# Patient Record
Sex: MALE | Race: WHITE | NOT HISPANIC OR LATINO | Employment: OTHER | URBAN - NONMETROPOLITAN AREA
[De-identification: names, ages, dates, MRNs, and addresses within clinical notes are randomized per-mention and may not be internally consistent; named-entity substitution may affect disease eponyms.]

---

## 2017-01-04 ENCOUNTER — OFFICE VISIT (OUTPATIENT)
Dept: CHIROPRACTIC MEDICINE | Facility: OTHER | Age: 57
End: 2017-01-04
Attending: CHIROPRACTOR
Payer: COMMERCIAL

## 2017-01-04 DIAGNOSIS — M99.02 SEGMENTAL AND SOMATIC DYSFUNCTION OF THORACIC REGION: ICD-10-CM

## 2017-01-04 DIAGNOSIS — M99.01 SEGMENTAL AND SOMATIC DYSFUNCTION OF CERVICAL REGION: Primary | ICD-10-CM

## 2017-01-04 DIAGNOSIS — M99.03 SEGMENTAL AND SOMATIC DYSFUNCTION OF LUMBAR REGION: ICD-10-CM

## 2017-01-04 DIAGNOSIS — M54.2 CERVICALGIA: ICD-10-CM

## 2017-01-04 PROCEDURE — 98941 CHIROPRACT MANJ 3-4 REGIONS: CPT | Performed by: CHIROPRACTOR

## 2017-01-09 NOTE — PROGRESS NOTES

## 2017-01-10 DIAGNOSIS — M50.30 DDD (DEGENERATIVE DISC DISEASE), CERVICAL: Primary | ICD-10-CM

## 2017-01-11 DIAGNOSIS — N40.0 BPH (BENIGN PROSTATIC HYPERPLASIA): Primary | ICD-10-CM
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
## 2017-01-13 DIAGNOSIS — M50.30 DDD (DEGENERATIVE DISC DISEASE), CERVICAL: Primary | ICD-10-CM

## 2017-01-13 RX ORDER — FENTANYL 75 UG/1
PATCH TRANSDERMAL
Qty: 11 PATCH | Refills: 0 | Status: SHIPPED | OUTPATIENT
Start: 2017-01-13 | End: 2017-02-01

## 2017-01-13 RX ORDER — FENTANYL 75 UG/1
PATCH TRANSDERMAL
Qty: 11 PATCH | Refills: 0 | OUTPATIENT
Start: 2017-01-13

## 2017-01-13 RX ORDER — TAMSULOSIN HYDROCHLORIDE 0.4 MG/1
CAPSULE ORAL
Qty: 30 CAPSULE | Refills: 9 | Status: SHIPPED | OUTPATIENT
Start: 2017-01-13 | End: 2017-05-10

## 2017-01-13 NOTE — TELEPHONE ENCOUNTER
Last office visit 11/02/16.  Fentanyl last filled 12/16/16 #11. Medication pended. PCP Dr. Fisher.

## 2017-01-23 DIAGNOSIS — H91.93 DECREASED HEARING, BILATERAL: Primary | ICD-10-CM

## 2017-01-24 DIAGNOSIS — Z00.00 HEALTH CARE MAINTENANCE: Primary | ICD-10-CM

## 2017-01-25 RX ORDER — MV,CAL,MIN/IRON/FOLIC ACID/LUT 18-500-300
TABLET ORAL
Qty: 90 TABLET | Refills: 3 | Status: SHIPPED | OUTPATIENT
Start: 2017-01-25 | End: 2017-05-25

## 2017-02-01 DIAGNOSIS — M50.30 DDD (DEGENERATIVE DISC DISEASE), CERVICAL: Primary | ICD-10-CM

## 2017-02-02 RX ORDER — FENTANYL 75 UG/1
PATCH TRANSDERMAL
Qty: 12 PATCH | Refills: 0 | Status: SHIPPED | OUTPATIENT
Start: 2017-02-02 | End: 2017-02-22

## 2017-02-02 NOTE — TELEPHONE ENCOUNTER
Fentanyl 75mcg/hr 72hr patch      Last Written Prescription Date:  12-  Last Fill Quantity: 11 patches,   # refills: 0  Last Office Visit with [facility], [facility] or [facility] prescribing provider: 1-4-2017  Future Office visit:    Next 5 appointments (look out 90 days)     Feb 02, 2017  2:40 PM   Return Visit with Shamar Escamilla DC   [facility] ([facility])    [address]  [facility] [address]   [phone]            Feb 14, 2017  4:00 PM   (Arrive by 3:45 PM)   Return Visit with Laquita Fernandez MD   [facility] [place] ([facility])    [address]  [facility] [id number] [phone]                   Routing refill request to provider for review/approval because:  Drug not on the [facility], [facility] or [facility] refill protocol or controlled substance

## 2017-02-09 DIAGNOSIS — Z87.39 HX OF DEGENERATIVE DISC DISEASE: Primary | ICD-10-CM

## 2017-02-13 RX ORDER — HYDROCODONE BITARTRATE AND ACETAMINOPHEN 10; 325 MG/1; MG/1
TABLET ORAL
Qty: 80 TABLET | Refills: 0 | Status: SHIPPED | OUTPATIENT
Start: 2017-02-13 | End: 2017-03-15

## 2017-02-14 ENCOUNTER — OFFICE VISIT (OUTPATIENT)
Dept: PSYCHIATRY | Facility: OTHER | Age: 57
End: 2017-02-14
Attending: PSYCHIATRY & NEUROLOGY
Payer: COMMERCIAL

## 2017-02-14 VITALS
WEIGHT: 233 LBS | BODY MASS INDEX: 33.36 KG/M2 | HEIGHT: 70 IN | DIASTOLIC BLOOD PRESSURE: 78 MMHG | SYSTOLIC BLOOD PRESSURE: 124 MMHG | HEART RATE: 75 BPM | TEMPERATURE: 97.4 F

## 2017-02-14 DIAGNOSIS — F41.1 GAD (GENERALIZED ANXIETY DISORDER): ICD-10-CM

## 2017-02-14 DIAGNOSIS — F98.8 ATTENTION DEFICIT DISORDER: ICD-10-CM

## 2017-02-14 PROCEDURE — 99212 OFFICE O/P EST SF 10 MIN: CPT

## 2017-02-14 PROCEDURE — 99214 OFFICE O/P EST MOD 30 MIN: CPT | Performed by: PSYCHIATRY & NEUROLOGY

## 2017-02-14 RX ORDER — LISDEXAMFETAMINE DIMESYLATE 40 MG/1
40 CAPSULE ORAL EVERY MORNING
Qty: 30 CAPSULE | Refills: 0 | Status: SHIPPED | OUTPATIENT
Start: 2017-03-14 | End: 2017-04-17 | Stop reason: DRUGHIGH

## 2017-02-14 RX ORDER — DIAZEPAM 5 MG
TABLET ORAL
Qty: 60 TABLET | Refills: 3 | Status: SHIPPED | OUTPATIENT
Start: 2017-03-14 | End: 2017-06-27

## 2017-02-14 RX ORDER — LISDEXAMFETAMINE DIMESYLATE 40 MG/1
40 CAPSULE ORAL EVERY MORNING
Qty: 30 CAPSULE | Refills: 0 | Status: SHIPPED | OUTPATIENT
Start: 2017-02-14 | End: 2017-04-17 | Stop reason: DRUGHIGH

## 2017-02-14 RX ORDER — METHYLPHENIDATE HYDROCHLORIDE 20 MG/1
TABLET ORAL
Qty: 90 TABLET | Refills: 0 | Status: SHIPPED | OUTPATIENT
Start: 2017-02-14 | End: 2017-02-14

## 2017-02-14 RX ORDER — METHYLPHENIDATE HYDROCHLORIDE 20 MG/1
TABLET ORAL
Qty: 90 TABLET | Refills: 0 | Status: SHIPPED | OUTPATIENT
Start: 2017-03-14 | End: 2017-02-14

## 2017-02-14 RX ORDER — METHYLPHENIDATE HYDROCHLORIDE 20 MG/1
TABLET ORAL
Qty: 60 TABLET | Refills: 0 | Status: SHIPPED | OUTPATIENT
Start: 2017-04-11 | End: 2017-02-14

## 2017-02-14 ASSESSMENT — PATIENT HEALTH QUESTIONNAIRE - PHQ9: 5. POOR APPETITE OR OVEREATING: NOT AT ALL

## 2017-02-14 ASSESSMENT — ANXIETY QUESTIONNAIRES
IF YOU CHECKED OFF ANY PROBLEMS ON THIS QUESTIONNAIRE, HOW DIFFICULT HAVE THESE PROBLEMS MADE IT FOR YOU TO DO YOUR WORK, TAKE CARE OF THINGS AT HOME, OR GET ALONG WITH OTHER PEOPLE: EXTREMELY DIFFICULT
7. FEELING AFRAID AS IF SOMETHING AWFUL MIGHT HAPPEN: SEVERAL DAYS
6. BECOMING EASILY ANNOYED OR IRRITABLE: SEVERAL DAYS
1. FEELING NERVOUS, ANXIOUS, OR ON EDGE: NOT AT ALL
5. BEING SO RESTLESS THAT IT IS HARD TO SIT STILL: NOT AT ALL
2. NOT BEING ABLE TO STOP OR CONTROL WORRYING: SEVERAL DAYS
GAD7 TOTAL SCORE: 3
3. WORRYING TOO MUCH ABOUT DIFFERENT THINGS: NOT AT ALL

## 2017-02-14 ASSESSMENT — PAIN SCALES - GENERAL: PAINLEVEL: MODERATE PAIN (5)

## 2017-02-14 NOTE — PROGRESS NOTES
"  PSYCHIATRY CLINIC PROGRESS NOTE   20 minute medication management, more than 50% of time spent counseling patient on medications, medication side effects, symptom history and management   SUBJECTIVE / INTERIM HISTORY                                                                       Social- Was  twice. Lives alone with his dog Montserrat and 3 cats. Has a GF in [place]  Children-  2 kids, they are in [place]  Subjective: last visit Novemeber: Continue Ritalin 40 mg am and 20 mg afternoon : last one for 11/1 and 12/1, and wrote one for 12/30/16 and gave him today .   Continue Trileptal 300 mg bid, Valium 5 mg every 12 hours prn anxiety, nortriptyline 50 mg bid.   - doing chiropractic and this is helping  - thinking would like to try a dfiferent med for aDHD  - Valium helped with anxiety and helped with the pain. Does NOT want to take any more as \"I don't want to be sleeping all the time\"  - Lots of family issues: Joshua has taken care of his parents and yet parents give his other brothers everything. oldest of 3 brothers. Brother in [place] youngest [place] and Major is here. Mom and dad here [place]. Joshua noting moving here to be closer to parents and help them, etc. But, parents don't seem to want him around much or talk to him.  SUBSTANCE USE- denies abuse of meds or substances    SYMPTOMS- issues with attention and concentration improved with Ritalin.  racing thoughts, depressed mood, impulsivity, distractibility  MEDICAL ROS-  Pain though notes improved lately, denies any issues with headaches or stomach pain / issues with stimulant  MEDICAL / SURGICAL HISTORY                     Patient Active Problem List   Diagnosis     Diabetes mellitus, type 2 (H)     Mixed hyperlipidemia     Tobacco Abuse, History of     Degeneration of lumbar or lumbosacral intervertebral disc     Depression, major     Chronic pain disorder     Chemical dependency (H)     Chronic rhinitis     Tinnitus of both ears     ETD (eustachian tube " dysfunction)     SNHL (sensorineural hearing loss)     Back pain     Obesity     Bipolar disorder (H)     Type 2 diabetes, HbA1c goal < 7% (H)     Seizure-like activity (H)     Bilateral foot pain     Preop general physical exam     Trigger index finger of right hand     Moderate persistent asthma without complication     Chronic lower back pain     ACP (advance care planning)     Onychia of toe of left foot     DDD (degenerative disc disease), lumbar     ALLERGY   Amoxicillin trihydrate and Clavulanic acid potassium  MEDICATIONS                                                                                             Current Outpatient Prescriptions   Medication Sig     HYDROcodone-acetaminophen (NORCO)  MG per tablet TAKE 1 TABLET BY MOUTH EVERY 8 HOURS AS NEEDED FOR MODERATE TO SEVEREPAIN     omeprazole (PRILOSEC) 20 MG CR capsule TAKE 1 CAPSULE BY MOUTH EVERY DAY BEFORE A MEAL     traMADol (ULTRAM) 50 MG tablet TAKE 1 TABLET BY MOUTH EVERY 6 HOURS AS NEEDED FOR MODERATE PAIN     fentaNYL (DURAGESIC) 75 mcg/hr 72 hr patch APPLY 1 PATCH EVERY 48 HOURS     multivitamin  with iron (SM COMPLETE ADVANCED FORMULA) TABS TAKE 1 TABLET BY MOUTH DAILY     tamsulosin (FLOMAX) 0.4 MG capsule TAKE 1 CAPSULE BY MOUTH DAILY     baclofen (LIORESAL) 10 MG tablet TAKE 1 TABLET BY MOUTH THREE TIMES DAILY     ibuprofen (ADVIL/MOTRIN) 600 MG tablet TAKE 1 TABLET BY MOUTH EVERY 6 HOURS AS NEEDED     SM STOOL SOFTENER/LAXATIVE 8.6-50 MG per tablet TAKE 1 OR 2 TABLETS BY MOUTH 2 TIMES A DAY     diazepam (VALIUM) 5 MG tablet TAKE 1 TABLET BY MOUTH EVERY 12 HOURS AS NEEDED FOR ANXIETY OR SLEEP SPASM     methylphenidate (RITALIN) 20 MG tablet Take 2 tabs in am and 1 tab afternoon     nortriptyline (PAMELOR) 50 MG capsule Take 1 capsule (50 mg) by mouth 3 times daily     gabapentin (NEURONTIN) 300 MG capsule TAKE 3 CAPSULES BY MOUTH THREE TIMES DAILY     DULERA 100-5 MCG/ACT oral inhaler INHALE 2 PUFFS INTO THE LUNGS 2 TIMES A DAY      VENTOLIN  (90 BASE) MCG/ACT inhaler USE 2 PUFFS 4 TIMES A DAY AS NEEDED FOR SHORTNESS OF BREATH     lidocaine (LIDODERM) 5 % patch PLACE 3 PATCHES ONTO THE SKIN DAILY AS NEEDED FOR MODERATE PAIN     OXcarbazepine (TRILEPTAL) 300 MG tablet TAKE 1 TABLET BY MOUTH 2 TIMES DAILY     SM CHILDRENS ASPIRIN 81 MG chewable tablet CHEW AND SWALLOW 1 TABLET BY MOUTH DAILY     fluticasone (FLONASE) 50 MCG/ACT nasal spray USE 2 SPRAYS IN EACH NOSTRIL DAILY     propranolol (INDERAL) 20 MG tablet TAKE 1 TABLET BY MOUTH TWICE DAILY     nicotine polacrilex (NICORETTE) 2 MG gum CHEW 1 PIECE AS NEEDED FOR SMOKING CESSATION     traZODone (DESYREL) 50 MG tablet TAKE 1 TABLET BY MOUTH NIGHTLY AS NEEDED FOR SLEEP     NON-ASPIRIN PAIN RELIEF 325 MG tablet TAKE 2 TABLETS BY MOUTH EVERY 4 HOURS AS NEEDED FOR MILD PAIN     rosuvastatin (CRESTOR) 10 MG tablet Take 1 tablet (10 mg) by mouth daily     diclofenac (VOLTAREN) 50 MG EC tablet TAKE 1 TABLET BY MOUTH 3 TIMES DAILY     order for DME Equipment being ordered: Boa Back brace     acetaminophen (TYLENOL) 325 MG tablet Take 2 tablets (650 mg) by mouth every 4 hours as needed for other (mild pain)     blood glucose monitoring (ONE TOUCH DELICA) lancets USE TO TEST BLOOD SUGARS 3 TIMES A DAY OR AS DIRECTED     blood glucose (ONE TOUCH DELICA) lancing device Use to test blood sugars 3 times daily or as directed.     blood glucose (ONE TOUCH ULTRA) test strip Use to test blood sugars 3 times daily or as directed.     blood glucose monitoring (ONE TOUCH ULTRA 2) meter device kit Use to test blood sugars 3 times daily or as directed.     ORDER FOR DME Equipment being ordered: Large gloves     [DISCONTINUED] mometasone-formoterol (DULERA) 100-5 MCG/ACT oral inhaler Inhale 2 puffs into the lungs 2 times daily     No current facility-administered medications for this visit.        VITALS   /78 (BP Location: Right arm, Patient Position: Chair, Cuff Size: Adult Regular)  Pulse 75  Temp  "97.4  F (36.3  C) (Tympanic)  Ht 5' 10\" (1.778 m)  Wt 233 lb (105.7 kg)  BMI 33.43 kg/m2     LABS                                                                                                                         EKG 2016 with 376 ms (on nortriptyline)  Last Basic Metabolic Panel:  NA      138   6/9/2016   POTASSIUM      4.7   6/9/2016  CHLORIDE      104   6/9/2016  SANDRITA      9.2   6/9/2016  CO2       29   6/9/2016  BUN       25   6/9/2016  CR     0.76   6/9/2016  GLC      173   6/9/2016    Liver Function Studies -   Recent Labs   Lab Test  02/18/15   1536   PROTTOTAL  7.5   ALBUMIN  4.6   BILITOTAL  0.4   ALKPHOS  82   AST  18   ALT  32      MENTAL STATUS EXAM                                                                                        Alert. Oriented to person, place, and date / time. Well groomed, calm, cooperative with good eye contact. No problems with speech or psychomotor behavior. Mood was described as \"doing okay\" and affect was congruent to speech content and full range. Thought process, including associations, was unremarkable and thought content was devoid of suicidal and homicidal ideation and psychotic thought. No hallucinations. Insight was good. Judgment was intact and adequate for safety. Fund of knowledge was intact. Pt demonstrates no obvious problems with attention, concentration, language, recent or remote memory although these were not formally tested.     ASSESSMENT                                                                                                      HISTORICAL:  Initial psych note 10/6/15          NOTES:      This patient is a 56 year old who was diagnosed with bipolar d/o in past in context of one episode in which he admits he was drinking heavily, depressed and made some statements that he feels were taken the wrong way. From what he reports, I can't gather enough criteria to make diagnosis of bipolar d/o and I have it listed as a rule out. I do agree that he " has ADD and I'm comfortable with prescribing med for this: today we agreed on d/c Ritalin and we will try vyvanse instead as once daily and sounds like he thinks ritalin efficacy faded. He does struggle with depression and seems to be largely related to his chronic pain issues. He is on Trileptal of which from what I can tell his for his hx of seizures - discussed it is also used as a mood stabilizer. Also discussed today with Trent that stimulants can lower the seizure threshold however I feel more comfortable since he is on the Trileptal. We started Valium in past several months as dual purpose: muscle relaxant properties and for anxiety. Per Trent has helped sificantly. Trent and I have discussed the risks that go along with combination of medications he is on: on several medications with CNS depressant effect and thus he needs to be very careful and NOT use alcohol or any other type sedating meds/substances as there are risks including respiratory depression. Valium is 5 mg bid prn and I would not feel comfortable any higher dose given the other medications he is on with CNS depressant potential. Joshua is on a lot of controlled substances thus I reviewed the [place]  and no other prescriptions aside from those his PCP and I prescribe. He is on nortriptyline which antidepressant and also can help with pain.     No med changes today    TREATMENT RISK STATEMENT:  The risks, benefits, alternatives and potential adverse effects have been explained and are understood by the pt.  The pt agrees to the treatment plan with the ability to do so.   The pt knows to call the clinic for any problems or access emergency care if needed.        DIAGNOSES                 (Use of Axes system will continue, even though absent from DSM 5)         Axis I   - ADD               MDD, recurrent, mild to mod                 Rule out bipolar disorder  Axis II  - no dx  Axis III - chronic pain (s/p back injuries and surgeries)  Axis IV-   Psychosocial Stressors include: financial, lack of support  Axis V - Global Assessment of Functioning current: 45    PLAN                                                                                                                    1)  MEDICATIONS:         --  D/c methylphendiate and we will try Vyvanse 40 mg take one daily script today 2/14 and one script for 3/14. Continue Trileptal 300 mg bid, Valium 5 mg every 12 hours prn anxiety, nortriptyline 50 mg bid.     2)  THERAPY:  No change    3)  LABS:  None    4)  PT MONITOR [call for probs]:  SEs from meds, worsening sx, SI/HI    5)  REFERRALS [CD, medical, other]:  None    6)  RTC:   ~2 months

## 2017-02-14 NOTE — NURSING NOTE
"Chief Complaint   Patient presents with     RECHECK     Mental health.  Patient reports doing alright, getting more forgetful.       Initial /78 (BP Location: Right arm, Patient Position: Chair, Cuff Size: Adult Regular)  Pulse 75  Temp 97.4  F (36.3  C) (Tympanic)  Ht 5' 10\" (1.778 m)  Wt 233 lb (105.7 kg)  BMI 33.43 kg/m2 Estimated body mass index is 33.43 kg/(m^2) as calculated from the following:    Height as of this encounter: 5' 10\" (1.778 m).    Weight as of this encounter: 233 lb (105.7 kg).  Medication Reconciliation: complete     JACQUELYN SUAREZ      "

## 2017-02-15 ENCOUNTER — TELEPHONE (OUTPATIENT)
Dept: PEDIATRICS | Facility: OTHER | Age: 57
End: 2017-02-15

## 2017-02-15 DIAGNOSIS — M54.50 MIDLINE LOW BACK PAIN WITHOUT SCIATICA: ICD-10-CM

## 2017-02-15 RX ORDER — ACETAMINOPHEN 325 MG/1
TABLET ORAL
Qty: 200 TABLET | Refills: 1 | Status: SHIPPED | OUTPATIENT
Start: 2017-02-15 | End: 2017-07-11

## 2017-02-15 ASSESSMENT — ANXIETY QUESTIONNAIRES: GAD7 TOTAL SCORE: 3

## 2017-02-15 ASSESSMENT — PATIENT HEALTH QUESTIONNAIRE - PHQ9: SUM OF ALL RESPONSES TO PHQ QUESTIONS 1-9: 5

## 2017-02-15 NOTE — TELEPHONE ENCOUNTER
Reason for call:  Medication    1. Medication Name? Tylenol  2. Is this request for a refill? Yes  3. What Pharmacy do you use? [facility]  4. Have you contacted your pharmacy? Yes    5. If yes, when?  (Please note that the turn-around-time for prescriptions is 72 business hours; I am sending your request at this time. SEND TO  [facility]  )  Description: Pt stated he has requested med several times since Sept but has not gotten this one filled. Please call him back at [phone]  Was an appointment offered for this a call? No   Preferred method for responding to this messageTelephone Call  If we cannot reach you directly, may we leave a detailed response at the number you provided? Yes  Can this message wait until your PCP/Provider returns if not available today? Not applicable

## 2017-02-22 DIAGNOSIS — M50.30 DDD (DEGENERATIVE DISC DISEASE), CERVICAL: ICD-10-CM

## 2017-02-22 RX ORDER — FENTANYL 75 UG/1
PATCH TRANSDERMAL
Qty: 12 PATCH | Refills: 0 | Status: SHIPPED | OUTPATIENT
Start: 2017-02-25 | End: 2017-03-15

## 2017-03-01 ENCOUNTER — OFFICE VISIT (OUTPATIENT)
Dept: AUDIOLOGY | Facility: OTHER | Age: 57
End: 2017-03-01
Attending: INTERNAL MEDICINE
Payer: COMMERCIAL

## 2017-03-01 DIAGNOSIS — H90.3 SENSORINEURAL HEARING LOSS, BILATERAL: Primary | ICD-10-CM

## 2017-03-01 PROCEDURE — 92552 PURE TONE AUDIOMETRY AIR: CPT | Performed by: AUDIOLOGIST

## 2017-03-01 PROCEDURE — 92567 TYMPANOMETRY: CPT | Performed by: AUDIOLOGIST

## 2017-03-01 PROCEDURE — V5266 BATTERY FOR HEARING DEVICE: HCPCS | Performed by: AUDIOLOGIST

## 2017-03-01 NOTE — MR AVS SNAPSHOT
"              After Visit Summary   3/1/2017    Joshua Barron    MRN: [id number]           Patient Information     Date Of Birth          1960        Visit Information        Provider Department      3/1/2017 2:00 PM Ciara Valladares [facility]        Today's Diagnoses     Sensorineural hearing loss, bilateral    -  1       Follow-ups after your visit        Your next 10 appointments already scheduled     Mar 01, 2017  2:00 PM CST   (Arrive by 1:45 PM)   Hearing Eval with Kevin Moreno   [facility] [place] ([facility] [place] [facility])    [address]  [place] [address]   [phone]            Apr 17, 2017  3:20 PM CDT   (Arrive by 3:05 PM)   Return Visit with Laquita Fernandez MD   [facility] [place] ([facility] [place] [place] [facility])    [address] [place] [address]  [place] [id number] [phone]              Who to contact     If you have questions or need follow up information about today's clinic visit or your schedule please contact [facility] directly at [phone].  Normal or non-critical lab and imaging results will be communicated to you by [employer], letter or phone within 4 business days after the clinic has received the results. If you do not hear from us within 7 days, please contact the clinic through [employer] or phone. If you have a critical or abnormal lab result, we will notify you by phone as soon as possible.  Submit refill requests through [employer] or call your pharmacy and they will forward the refill request to us. Please allow 3 business days for your refill to be completed.          Additional Information About Your Visit        MyChart Information     [employer] lets you send messages to your doctor, view your test results, renew your prescriptions, schedule appointments and more. To sign up, go to [email or web address] . Click on \"Log in\" on the left side of the screen, which will take you to the Welcome page. Then click on \"Sign up Now\" on the right " side of the page.     You will be asked to enter the access code listed below, as well as some personal information. Please follow the directions to create your username and password.     Your access code is: [email or web address]  Expires: 5/15/2017  5:32 PM     Your access code will  in 90 days. If you need help or a new code, please call your [place] clinic or [phone].        Care EveryWhere ID     This is your Care EveryWhere ID. This could be used by other organizations to access your [place] medical records  [id number]         Blood Pressure from Last 3 Encounters:   17 124/78   16 106/68   16 122/70    Weight from Last 3 Encounters:   17 233 lb (105.7 kg)   16 230 lb (104.3 kg)   16 230 lb (104.3 kg)              We Performed the Following     AUDIOGRAM/TYMPANOGRAM - INTERFACE        Primary Care Provider Office Phone # Fax #    Lyle Juan Fisher [phone]       [facility] [place] [address] [facility]  [place] [address]        Thank you!     Thank you for choosing [facility] [place]  for your care. Our goal is always to provide you with excellent care. Hearing back from our patients is one way we can continue to improve our services. Please take a few minutes to complete the written survey that you may receive in the mail after your visit with us. Thank you!             Your Updated Medication List - Protect others around you: Learn how to safely use, store and throw away your medicines at www.disposemymeds.org.          This list is accurate as of: 3/1/17  1:59 PM.  Always use your most recent med list.                   Brand Name Dispense Instructions for use    * acetaminophen 325 MG tablet    TYLENOL     Take 2 tablets (650 mg) by mouth every 4 hours as needed for other (mild pain)       * acetaminophen 325 MG tablet    NON-ASPIRIN PAIN RELIEF    200 tablet    TAKE 2 TABLETS BY MOUTH EVERY 4 HOURS AS NEEDED FOR MILD PAIN       *  baclofen 10 MG tablet    LIORESAL    90 tablet    TAKE 1 TABLET BY MOUTH THREE TIMES DAILY       * baclofen 10 MG tablet    LIORESAL    90 tablet    TAKE 1 TABLET BY MOUTH THREE TIMES DAILY       blood glucose lancing device     1 each    Use to test blood sugars 3 times daily or as directed.       blood glucose monitoring lancets     100 Box    USE TO TEST BLOOD SUGARS 3 TIMES A DAY OR AS DIRECTED       blood glucose monitoring meter device kit     1 kit    Use to test blood sugars 3 times daily or as directed.       blood glucose monitoring test strip    ONE TOUCH ULTRA    300 strip    Use to test blood sugars 3 times daily or as directed.       diazepam 5 MG tablet   Start taking on:  3/14/2017    VALIUM    60 tablet    TAKE 1 TABLET BY MOUTH EVERY 12 HOURS AS NEEDED FOR ANXIETY OR SLEEP SPASM       diclofenac 50 MG EC tablet    VOLTAREN    90 tablet    TAKE 1 TABLET BY MOUTH 3 TIMES DAILY       DULERA 100-5 MCG/ACT oral inhaler   Generic drug:  mometasone-formoterol     13 Inhaler    INHALE 2 PUFFS INTO THE LUNGS 2 TIMES A DAY       fentaNYL 75 mcg/hr 72 hr patch    DURAGESIC    12 patch    APPLY 1 PATCH EVERY 48 HOURS       fluticasone 50 MCG/ACT spray    FLONASE    16 g    USE 2 SPRAYS IN EACH NOSTRIL DAILY       gabapentin 300 MG capsule    NEURONTIN    270 capsule    TAKE 3 CAPSULES BY MOUTH THREE TIMES DAILY       HYDROcodone-acetaminophen  MG per tablet    NORCO    80 tablet    TAKE 1 TABLET BY MOUTH EVERY 8 HOURS AS NEEDED FOR MODERATE TO SEVEREPAIN       ibuprofen 600 MG tablet    ADVIL/MOTRIN    120 tablet    TAKE 1 TABLET BY MOUTH EVERY 6 HOURS AS NEEDED       lidocaine 5 % Patch    LIDODERM    90 patch    PLACE 3 PATCHES ONTO THE SKIN DAILY AS NEEDED FOR MODERATE PAIN       * lisdexamfetamine 40 MG capsule    VYVANSE    30 capsule    Take 1 capsule (40 mg) by mouth every morning       * lisdexamfetamine 40 MG capsule   Start taking on:  3/14/2017    VYVANSE    30 capsule    Take 1 capsule (40  mg) by mouth every morning       multivitamin  with iron Tabs     90 tablet    TAKE 1 TABLET BY MOUTH DAILY       nicotine polacrilex 2 MG gum    NICORETTE    110 each    CHEW 1 PIECE AS NEEDED FOR SMOKING CESSATION       nortriptyline 50 MG capsule    PAMELOR    90 capsule    Take 1 capsule (50 mg) by mouth 3 times daily       omeprazole 20 MG CR capsule    priLOSEC    30 capsule    TAKE 1 CAPSULE BY MOUTH EVERY DAY BEFORE A MEAL       * order for DME     2 Box    Equipment being ordered: Large gloves       * order for DME     1 Units    Equipment being ordered: Boa Back brace       OXcarbazepine 300 MG tablet    TRILEPTAL    60 tablet    TAKE 1 TABLET BY MOUTH 2 TIMES DAILY       propranolol 20 MG tablet    INDERAL    60 tablet    TAKE 1 TABLET BY MOUTH TWICE DAILY       rosuvastatin 10 MG tablet    CRESTOR    90 tablet    Take 1 tablet (10 mg) by mouth daily       SM CHILDRENS ASPIRIN 81 MG chewable tablet   Generic drug:  aspirin     30 tablet    CHEW AND SWALLOW 1 TABLET BY MOUTH DAILY       SM STOOL SOFTENER/LAXATIVE 8.6-50 MG per tablet   Generic drug:  senna-docusate     120 tablet    TAKE 1 OR 2 TABLETS BY MOUTH 2 TIMES A DAY       tamsulosin 0.4 MG capsule    FLOMAX    30 capsule    TAKE 1 CAPSULE BY MOUTH DAILY       traMADol 50 MG tablet    ULTRAM    120 tablet    TAKE 1 TABLET BY MOUTH EVERY 6 HOURS AS NEEDED FOR MODERATE PAIN       traZODone 50 MG tablet    DESYREL    30 tablet    TAKE 1 TABLET BY MOUTH NIGHTLY AS NEEDED FOR SLEEP       VENTOLIN  (90 BASE) MCG/ACT Inhaler   Generic drug:  albuterol     18 g    USE 2 PUFFS 4 TIMES A DAY AS NEEDED FOR SHORTNESS OF BREATH       * Notice:  This list has 8 medication(s) that are the same as other medications prescribed for you. Read the directions carefully, and ask your doctor or other care provider to review them with you.

## 2017-03-01 NOTE — PROGRESS NOTES
Audiology Evaluation Completed.    Hearing aid check in warranty complted. Also 32 cells given today under [facility] CONTRACT GUIDELINES.     Please refer SCANNED AUDIOGRAM and/or TYMPANOGRAM for BACKGROUND, RESULTS, RECOMMENDATIONS.        Ciara WHEAT, [facility]  Audiologist #[id number]

## 2017-03-02 DIAGNOSIS — F31.0 BIPOLAR AFFECTIVE DISORDER, CURRENT EPISODE HYPOMANIC (H): ICD-10-CM

## 2017-03-03 RX ORDER — OXCARBAZEPINE 300 MG/1
TABLET, FILM COATED ORAL
Qty: 60 TABLET | Refills: 1 | Status: SHIPPED | OUTPATIENT
Start: 2017-03-03 | End: 2017-05-26

## 2017-03-10 DIAGNOSIS — Z87.39 HX OF DEGENERATIVE DISC DISEASE: ICD-10-CM

## 2017-03-13 RX ORDER — IBUPROFEN 600 MG/1
TABLET, FILM COATED ORAL
Qty: 120 TABLET | Refills: 0 | Status: SHIPPED | OUTPATIENT
Start: 2017-03-13 | End: 2017-05-04

## 2017-03-14 ENCOUNTER — OFFICE VISIT (OUTPATIENT)
Dept: CHIROPRACTIC MEDICINE | Facility: OTHER | Age: 57
End: 2017-03-14
Attending: CHIROPRACTOR
Payer: COMMERCIAL

## 2017-03-14 DIAGNOSIS — M99.03 SEGMENTAL AND SOMATIC DYSFUNCTION OF LUMBAR REGION: ICD-10-CM

## 2017-03-14 DIAGNOSIS — M99.01 SEGMENTAL AND SOMATIC DYSFUNCTION OF CERVICAL REGION: Primary | ICD-10-CM

## 2017-03-14 DIAGNOSIS — M54.2 CERVICALGIA: ICD-10-CM

## 2017-03-14 DIAGNOSIS — M99.02 SEGMENTAL AND SOMATIC DYSFUNCTION OF THORACIC REGION: ICD-10-CM

## 2017-03-14 PROCEDURE — 98941 CHIROPRACT MANJ 3-4 REGIONS: CPT | Performed by: CHIROPRACTOR

## 2017-03-14 NOTE — MR AVS SNAPSHOT
After Visit Summary   3/14/2017    Joshua Barron    MRN: [id number]           Patient Information     Date Of Birth          1960        Visit Information        Provider Department      3/14/2017 11:10 AM Shamar Escamilla DC  [facility] Hamm        Today's Diagnoses     Segmental and somatic dysfunction of cervical region    -  1    Cervicalgia        Segmental and somatic dysfunction of lumbar region        Segmental and somatic dysfunction of thoracic region           Follow-ups after your visit        Your next 10 appointments already scheduled     Apr 17, 2017  3:20 PM CDT   (Arrive by 3:05 PM)   Return Visit with Laquita Fernandez MD   [facility] ([facility])    [address] [place], [place] [address] [id number] [phone]            Mar 07, 2018  2:45 PM CST   (Arrive by 2:30 PM)   Hearing Eval with Kevin Moreno   [facility] ([facility])    [address]  [facility] [address]   [phone]              Who to contact     If you have questions or need follow up information about today's clinic visit or your schedule please contact  [facility] directly at [phone].  Normal or non-critical lab and imaging results will be communicated to you by MyChart, letter or phone within 4 business days after the clinic has received the results. If you do not hear from us within 7 days, please contact the clinic through MyChart or phone. If you have a critical or abnormal lab result, we will notify you by phone as soon as possible.  Submit refill requests through [employer] or call your pharmacy and they will forward the refill request to us. Please allow 3 business days for your refill to be completed.          Additional Information About Your Visit        MyChart Information     [employer] lets you send messages to your doctor, view your test results, renew your prescriptions, schedule appointments and more. To sign up, go to  [email or web address] . Click on \"Log in\" on the left side of the screen, which will take you to the Welcome page. Then click on \"Sign up Now\" on the right side of the page.     You will be asked to enter the access code listed below, as well as some personal information. Please follow the directions to create your username and password.     Your access code is: [email or web address]  Expires: 5/15/2017  6:32 PM     Your access code will  in 90 days. If you need help or a new code, please call your [place] clinic or [phone].        Care EveryWhere ID     This is your Care EveryWhere ID. This could be used by other organizations to access your [place] medical records  [id number]         Blood Pressure from Last 3 Encounters:   17 124/78   16 106/68   16 122/70    Weight from Last 3 Encounters:   17 233 lb (105.7 kg)   16 230 lb (104.3 kg)   16 230 lb (104.3 kg)              We Performed the Following     CHIROPRAC MANIP,SPINAL,3-4 REGIONS        Primary Care Provider Office Phone # Fax #    Lyle Martinez DO Natalia [phone]       [facility] [place] [address]  [place] [address]        Thank you!     Thank you for choosing  [facility] [place] [facility]  for your care. Our goal is always to provide you with excellent care. Hearing back from our patients is one way we can continue to improve our services. Please take a few minutes to complete the written survey that you may receive in the mail after your visit with us. Thank you!             Your Updated Medication List - Protect others around you: Learn how to safely use, store and throw away your medicines at www.disposemymeds.org.          This list is accurate as of: 3/14/17 11:59 PM.  Always use your most recent med list.                   Brand Name Dispense Instructions for use    * acetaminophen 325 MG tablet    TYLENOL     Take 2 tablets (650 mg) by mouth every 4 hours as needed for other " (mild pain)       * acetaminophen 325 MG tablet    NON-ASPIRIN PAIN RELIEF    200 tablet    TAKE 2 TABLETS BY MOUTH EVERY 4 HOURS AS NEEDED FOR MILD PAIN       * baclofen 10 MG tablet    LIORESAL    90 tablet    TAKE 1 TABLET BY MOUTH THREE TIMES DAILY       * baclofen 10 MG tablet    LIORESAL    90 tablet    TAKE 1 TABLET BY MOUTH THREE TIMES DAILY       blood glucose lancing device     1 each    Use to test blood sugars 3 times daily or as directed.       blood glucose monitoring lancets     100 Box    USE TO TEST BLOOD SUGARS 3 TIMES A DAY OR AS DIRECTED       blood glucose monitoring meter device kit     1 kit    Use to test blood sugars 3 times daily or as directed.       blood glucose monitoring test strip    ONE TOUCH ULTRA    300 strip    Use to test blood sugars 3 times daily or as directed.       diazepam 5 MG tablet    VALIUM    60 tablet    TAKE 1 TABLET BY MOUTH EVERY 12 HOURS AS NEEDED FOR ANXIETY OR SLEEP SPASM       diclofenac 50 MG EC tablet    VOLTAREN    90 tablet    TAKE 1 TABLET BY MOUTH 3 TIMES DAILY       DULERA 100-5 MCG/ACT oral inhaler   Generic drug:  mometasone-formoterol     13 Inhaler    INHALE 2 PUFFS INTO THE LUNGS 2 TIMES A DAY       fentaNYL 75 mcg/hr 72 hr patch    DURAGESIC    12 patch    APPLY 1 PATCH EVERY 48 HOURS       fluticasone 50 MCG/ACT spray    FLONASE    16 g    USE 2 SPRAYS IN EACH NOSTRIL DAILY       gabapentin 300 MG capsule    NEURONTIN    270 capsule    TAKE 3 CAPSULES BY MOUTH THREE TIMES DAILY       ibuprofen 600 MG tablet    ADVIL/MOTRIN    120 tablet    TAKE 1 TABLET BY MOUTH EVERY 6 HOURS AS NEEDED       lidocaine 5 % Patch    LIDODERM    90 patch    PLACE 3 PATCHES ONTO THE SKIN DAILY AS NEEDED FOR MODERATE PAIN       * lisdexamfetamine 40 MG capsule    VYVANSE    30 capsule    Take 1 capsule (40 mg) by mouth every morning       * lisdexamfetamine 40 MG capsule    VYVANSE    30 capsule    Take 1 capsule (40 mg) by mouth every morning       multivitamin  with  iron Tabs     90 tablet    TAKE 1 TABLET BY MOUTH DAILY       nicotine polacrilex 2 MG gum    NICORETTE    110 each    CHEW 1 PIECE AS NEEDED FOR SMOKING CESSATION       nortriptyline 50 MG capsule    PAMELOR    90 capsule    Take 1 capsule (50 mg) by mouth 3 times daily       omeprazole 20 MG CR capsule    priLOSEC    30 capsule    TAKE 1 CAPSULE BY MOUTH EVERY DAY BEFORE A MEAL       * order for DME     2 Box    Equipment being ordered: Large gloves       * order for DME     1 Units    Equipment being ordered: Boa Back brace       OXcarbazepine 300 MG tablet    TRILEPTAL    60 tablet    TAKE 1 TABLET BY MOUTH 2 TIMES DAILY       propranolol 20 MG tablet    INDERAL    60 tablet    TAKE 1 TABLET BY MOUTH TWICE DAILY       rosuvastatin 10 MG tablet    CRESTOR    90 tablet    Take 1 tablet (10 mg) by mouth daily       SM CHILDRENS ASPIRIN 81 MG chewable tablet   Generic drug:  aspirin     30 tablet    CHEW AND SWALLOW 1 TABLET BY MOUTH DAILY       SM STOOL SOFTENER/LAXATIVE 8.6-50 MG per tablet   Generic drug:  senna-docusate     120 tablet    TAKE 1 OR 2 TABLETS BY MOUTH 2 TIMES A DAY       tamsulosin 0.4 MG capsule    FLOMAX    30 capsule    TAKE 1 CAPSULE BY MOUTH DAILY       traMADol 50 MG tablet    ULTRAM    120 tablet    TAKE 1 TABLET BY MOUTH EVERY 6 HOURS AS NEEDED FOR MODERATE PAIN       traZODone 50 MG tablet    DESYREL    30 tablet    TAKE 1 TABLET BY MOUTH NIGHTLY AS NEEDED FOR SLEEP       VENTOLIN  (90 BASE) MCG/ACT Inhaler   Generic drug:  albuterol     18 g    USE 2 PUFFS 4 TIMES A DAY AS NEEDED FOR SHORTNESS OF BREATH       * Notice:  This list has 8 medication(s) that are the same as other medications prescribed for you. Read the directions carefully, and ask your doctor or other care provider to review them with you.

## 2017-03-15 DIAGNOSIS — M50.30 DDD (DEGENERATIVE DISC DISEASE), CERVICAL: ICD-10-CM

## 2017-03-15 DIAGNOSIS — Z87.39 HX OF DEGENERATIVE DISC DISEASE: ICD-10-CM

## 2017-03-15 RX ORDER — HYDROCODONE BITARTRATE AND ACETAMINOPHEN 10; 325 MG/1; MG/1
TABLET ORAL
Qty: 80 TABLET | Refills: 0 | Status: SHIPPED | OUTPATIENT
Start: 2017-03-15 | End: 2017-04-05

## 2017-03-15 NOTE — PROGRESS NOTES
Subjective Finding:    Chief compalint: No chief complaint on file.  , Pain Scale: 4/10, Intensity: dull, Duration: 2 days, Radiating: no.    Date of injury:     Activities that the pain restricts:   Home/household/hobbies/social activities: yes.  Work duties: yes.  Sleep: yes.  Makes symptoms better: rest.  Makes symptoms worse: lumbar extension and lumbar flexion.  Have you seen anyone else for the symptoms? No.  Work related: no.  Automobile related injury: no.    Objective and Assessment:    Posture Analysis:   High shoulder: .  Head tilt: .  High iliac crest: .  Head carriage: neutral.  Thoracic Kyphosis: neutral.  Lumbar Lordosis: forward.    Lumbar Range of Motion: flexion decreased and extension decreased.  Cervical Range of Motion: extension decreased.  Thoracic Range of Motion: extension decreased.  Extremity Range of Motion: .    Palpation:   Quad lumb: bilateral, referred pain: no  T paraspinals: dull pain, no    Segmental dysfunction pre-treatment and treatment area: C4, T5, T6 and Sacrum.    Assessment post-treatment:  Cervical: ROM increased.  Thoracic: ROM increased.  Lumbar: ROM increased.    Comments: history of DDD in C and L spine.      Complicating Factors: .    Procedure(s):  CMT:  33033 Chiropractic manipulative treatment 3-4 regions performed   Cervical: Diversified, See above for level, Supine, Thoracic: Diversified, See above for level, Prone and Lumbar: Diversified, See above for level, Side posture    Modalities:  None performed this visit    Therapeutic procedures:  None    Plan:  Treatment plan: PRN.  Instructed patient: stretch as instructed at visit.  Short term goals: increase ROM.  Long term goals: increase ADL.  Prognosis: good.

## 2017-03-16 ENCOUNTER — TELEPHONE (OUTPATIENT)
Dept: INTERNAL MEDICINE | Facility: OTHER | Age: 57
End: 2017-03-16

## 2017-03-16 ENCOUNTER — TELEPHONE (OUTPATIENT)
Dept: PSYCHIATRY | Facility: OTHER | Age: 57
End: 2017-03-16

## 2017-03-16 RX ORDER — FENTANYL 75 UG/1
PATCH TRANSDERMAL
Qty: 11 PATCH | Refills: 0 | Status: SHIPPED | OUTPATIENT
Start: 2017-03-16 | End: 2017-03-29

## 2017-03-16 NOTE — TELEPHONE ENCOUNTER
Fentanyl      Last Written Prescription Date:  2.25.17  Last Fill Quantity: 11,   # refills: 0  Last Office Visit with [facility], [facility] or [facility] prescribing provider: 11.2.16  Future Office visit:    Next 5 appointments (look out 90 days)     Apr 17, 2017  3:20 PM CDT   (Arrive by 3:05 PM)   Return Visit with Laquita Fernandez MD   [facility]    [facility] [address] [place] [address]   [phone]                   Routing refill request to provider for review/approval because:  Drug not on the [facility], [facility] or [facility] refill protocol or controlled substance

## 2017-03-18 NOTE — TELEPHONE ENCOUNTER
NO , not refilling. I did two scripts: one 2/14/17 and one 3/14/17 so he should have one for upcoming month. I reviewed [place]  and he picked it up on 3/16/17 so he has Vyvanse. I'm not increasing..this can be discussed at his next clinic visit.    Thanks

## 2017-03-28 NOTE — TELEPHONE ENCOUNTER
Pt called and states that he had a missed call from someone a couple weeks ago and he deleted the message he is returning phone call today (03/28/17) would like a call back.

## 2017-03-29 DIAGNOSIS — Z87.39 HX OF DEGENERATIVE DISC DISEASE: Primary | ICD-10-CM

## 2017-03-29 DIAGNOSIS — M50.30 DDD (DEGENERATIVE DISC DISEASE), CERVICAL: ICD-10-CM

## 2017-03-29 DIAGNOSIS — M54.50 LUMBAGO: ICD-10-CM

## 2017-03-29 DIAGNOSIS — G63 POLYNEUROPATHY ASSOCIATED WITH UNDERLYING DISEASE (H): ICD-10-CM

## 2017-03-29 NOTE — TELEPHONE ENCOUNTER
Fentanyl 75mcg      Last Written Prescription Date:  2/25/17  Last Fill Quantity: 11,   # refills: 0  Last Office Visit with [facility], [facility] or [facility] prescribing provider: 11/2/16  Future Office visit:    Next 5 appointments (look out 90 days)     Apr 17, 2017  3:20 PM CDT   (Arrive by 3:05 PM)   Return Visit with Laquita Fernandez MD   [facility] [place] ([facility] [place] [facility])    [address]  [place] [address]   [phone]                   Routing refill request to provider for review/approval because:  Drug not on the [facility], [facility] or [facility] refill protocol or controlled substance    Gabapentin 300mg      Last Written Prescription Date: 2/22/17  Last Quantity: 270, # refills: 0  Last Office Visit with [facility], [facility] or [facility] prescribing provider: 11/2/16    Diclofenac sod ec 50mg      Last Written Prescription Date: 3/2/17  Last Quantity: 90, # refills: 0  Last Office Visit with [facility], [facility] or [facility] prescribing provider: 11/2/16    Baclofen 10mg      Last Written Prescription Date: 2/24/17  Last Fill Quantity: 90,  # refills: 0   Last Office Visit with [facility], [facility] or [facility] prescribing provider: 11/2/16                                         Next 5 appointments (look out 90 days)     Apr 17, 2017  3:20 PM CDT   (Arrive by 3:05 PM)   Return Visit with Laquita Fernandez MD   [facility] [place] ([facility] [place] [facility])    [address]  [place] [address] [phone]                      Next 5 appointments (look out 90 days)     Apr 17, 2017  3:20 PM CDT   (Arrive by 3:05 PM)   Return Visit with Laquita Fernandez MD   [facility] [place] ([facility] [place] [facility])    [address]  [place] [address] [phone]                   Creatinine   Date Value Ref Range Status   06/09/2016 0.76 0.66 - 1.25 mg/dL Final     Lab Results   Component Value Date    AST 19 03/17/2016     Lab Results   Component Value Date    ALT 43 03/17/2016     BP Readings from Last 3 Encounters:   02/14/17 124/78    11/22/16 106/68   11/02/16 122/70       Next 5 appointments (look out 90 days)     Apr 17, 2017  3:20 PM CDT   (Arrive by 3:05 PM)   Return Visit with Laquita Fernandez MD   [facility] ([facility]    [address] [place] [address]   [phone]                   Creatinine   Date Value Ref Range Status   06/09/2016 0.76 0.66 - 1.25 mg/dL Final     Lab Results   Component Value Date    AST 19 03/17/2016     Lab Results   Component Value Date    ALT 43 03/17/2016     BP Readings from Last 3 Encounters:   02/14/17 124/78   11/22/16 106/68   11/02/16 122/70

## 2017-03-30 NOTE — TELEPHONE ENCOUNTER
Patient contacted.  Will discuss possible increase to Vyvanse at next f/u appt..  He should have enough Vyvanse until next f/u.  He is ok with this plan.

## 2017-03-31 RX ORDER — BACLOFEN 10 MG/1
TABLET ORAL
Qty: 90 TABLET | Refills: 0 | Status: SHIPPED | OUTPATIENT
Start: 2017-03-31 | End: 2017-05-10

## 2017-03-31 RX ORDER — GABAPENTIN 300 MG/1
CAPSULE ORAL
Qty: 270 CAPSULE | Refills: 0 | Status: SHIPPED | OUTPATIENT
Start: 2017-03-31 | End: 2017-05-04

## 2017-03-31 RX ORDER — FENTANYL 75 UG/1
PATCH TRANSDERMAL
Qty: 11 PATCH | Refills: 0 | Status: SHIPPED | OUTPATIENT
Start: 2017-03-31 | End: 2017-04-20

## 2017-04-03 ENCOUNTER — TELEPHONE (OUTPATIENT)
Dept: PEDIATRICS | Facility: OTHER | Age: 57
End: 2017-04-03

## 2017-04-03 NOTE — TELEPHONE ENCOUNTER
I have it that he is currently on Advil.  He can take 600 mg three four per day or 800 mg three times per day.  He should take this with food.

## 2017-04-03 NOTE — TELEPHONE ENCOUNTER
10:49 AM    Reason for Call: Phone Call    Description: patient wants a return phone call to discuss how much advil he should be taking on top of all the other medication he is on    Was an appointment offered for this call? N/A    Preferred method for responding to this message: Telephone Call    If we cannot reach you directly, may we leave a detailed response at the number you provided? Yes    Can this message wait until your PCP/provider returns, if available today? Not applicable, provider is in today    Dodie Vergara

## 2017-04-05 DIAGNOSIS — Z87.39 HX OF DEGENERATIVE DISC DISEASE: ICD-10-CM

## 2017-04-05 RX ORDER — HYDROCODONE BITARTRATE AND ACETAMINOPHEN 10; 325 MG/1; MG/1
TABLET ORAL
Qty: 80 TABLET | Refills: 0 | Status: SHIPPED | OUTPATIENT
Start: 2017-04-13 | End: 2017-05-10

## 2017-04-05 NOTE — TELEPHONE ENCOUNTER
norco      Last Written Prescription Date: 3/15/17  Last Fill Quantity: 80,  # refills: 0   Last Office Visit with [facility], [facility] or [facility] prescribing provider: 11/12/16                                         Next 5 appointments (look out 90 days)     Apr 17, 2017  3:20 PM CDT   (Arrive by 3:05 PM)   Return Visit with Laquita Fernandez MD   [facility] ([facility])    [facility] [address] [place] [address] [phone]                    ]

## 2017-04-11 ENCOUNTER — TELEPHONE (OUTPATIENT)
Dept: INTERNAL MEDICINE | Facility: OTHER | Age: 57
End: 2017-04-11

## 2017-04-11 NOTE — TELEPHONE ENCOUNTER
Reason for call:  Medication    1. Medication Name? Hydrocodone  2. Is this request for a refill?  Yes  3. What Pharmacy do you use?   [facility]  4. Have you contacted your pharmacy?  Yes  5. If yes, when?  Today  (Please note that the turn-around-time for prescriptions is 72 business hours; I am sending your request at this time. SEND TO  [facility]  )  Description:   Joshua states that his prescription was only for 26 days but not set  for refill for 30 days. He is out of this medication.   Filled last month 15th. Not set to refill until 13th.  Was an appointment offered for this a call?  No  Preferred method for responding to this  Message :  [phone]  If we cannot reach you directly, may we leave a detailed response at the number you provided?  Yes

## 2017-04-11 NOTE — TELEPHONE ENCOUNTER
Signed by Natalia and already at [facility] but post dated for 4-13-17.  Patient is stating that the RX only lasted 26 days.  It was filled 3-15-17 #80. Please advise. Thank you

## 2017-04-12 DIAGNOSIS — Z87.39 HX OF DEGENERATIVE DISC DISEASE: ICD-10-CM

## 2017-04-12 RX ORDER — TRAMADOL HYDROCHLORIDE 50 MG/1
TABLET ORAL
Qty: 120 TABLET | Refills: 0 | Status: SHIPPED | OUTPATIENT
Start: 2017-04-12 | End: 2017-05-23 | Stop reason: ALTCHOICE

## 2017-04-17 ENCOUNTER — OFFICE VISIT (OUTPATIENT)
Dept: PSYCHIATRY | Facility: OTHER | Age: 57
End: 2017-04-17
Attending: PSYCHIATRY & NEUROLOGY
Payer: COMMERCIAL

## 2017-04-17 VITALS
SYSTOLIC BLOOD PRESSURE: 116 MMHG | WEIGHT: 224 LBS | HEIGHT: 70 IN | DIASTOLIC BLOOD PRESSURE: 72 MMHG | OXYGEN SATURATION: 98 % | RESPIRATION RATE: 18 BRPM | BODY MASS INDEX: 32.07 KG/M2 | TEMPERATURE: 97.5 F | HEART RATE: 68 BPM

## 2017-04-17 DIAGNOSIS — F90.2 ADHD (ATTENTION DEFICIT HYPERACTIVITY DISORDER), COMBINED TYPE: Primary | ICD-10-CM

## 2017-04-17 DIAGNOSIS — Z87.39 HX OF DEGENERATIVE DISC DISEASE: ICD-10-CM

## 2017-04-17 LAB
CREAT SERPL-MCNC: 0.8 MG/DL (ref 0.66–1.25)
GFR SERPL CREATININE-BSD FRML MDRD: NORMAL ML/MIN/1.7M2

## 2017-04-17 PROCEDURE — 99214 OFFICE O/P EST MOD 30 MIN: CPT | Performed by: PSYCHIATRY & NEUROLOGY

## 2017-04-17 PROCEDURE — 36415 COLL VENOUS BLD VENIPUNCTURE: CPT | Mod: 59 | Performed by: INTERNAL MEDICINE

## 2017-04-17 PROCEDURE — 82565 ASSAY OF CREATININE: CPT | Performed by: INTERNAL MEDICINE

## 2017-04-17 PROCEDURE — 99213 OFFICE O/P EST LOW 20 MIN: CPT

## 2017-04-17 RX ORDER — LISDEXAMFETAMINE DIMESYLATE 60 MG/1
60 CAPSULE ORAL EVERY MORNING
Qty: 30 CAPSULE | Refills: 0 | Status: SHIPPED | OUTPATIENT
Start: 2017-05-15 | End: 2017-04-17

## 2017-04-17 RX ORDER — LIDOCAINE 50 MG/G
3 PATCH TOPICAL
COMMUNITY
End: 2017-05-10

## 2017-04-17 RX ORDER — HYDROCODONE BITARTRATE AND ACETAMINOPHEN 10; 325 MG/1; MG/1
TABLET ORAL
COMMUNITY
End: 2017-05-10

## 2017-04-17 RX ORDER — LISDEXAMFETAMINE DIMESYLATE 60 MG/1
60 CAPSULE ORAL EVERY MORNING
Qty: 30 CAPSULE | Refills: 0 | Status: SHIPPED | OUTPATIENT
Start: 2017-05-12 | End: 2017-06-19

## 2017-04-17 RX ORDER — LISDEXAMFETAMINE DIMESYLATE 60 MG/1
60 CAPSULE ORAL EVERY MORNING
Qty: 30 CAPSULE | Refills: 0 | Status: SHIPPED | OUTPATIENT
Start: 2017-04-17 | End: 2017-05-10

## 2017-04-17 ASSESSMENT — PATIENT HEALTH QUESTIONNAIRE - PHQ9: 5. POOR APPETITE OR OVEREATING: NOT AT ALL

## 2017-04-17 ASSESSMENT — PAIN SCALES - GENERAL: PAINLEVEL: EXTREME PAIN (8)

## 2017-04-17 ASSESSMENT — ANXIETY QUESTIONNAIRES
6. BECOMING EASILY ANNOYED OR IRRITABLE: NOT AT ALL
3. WORRYING TOO MUCH ABOUT DIFFERENT THINGS: NOT AT ALL
2. NOT BEING ABLE TO STOP OR CONTROL WORRYING: NOT AT ALL
GAD7 TOTAL SCORE: 0
5. BEING SO RESTLESS THAT IT IS HARD TO SIT STILL: NOT AT ALL
1. FEELING NERVOUS, ANXIOUS, OR ON EDGE: NOT AT ALL
7. FEELING AFRAID AS IF SOMETHING AWFUL MIGHT HAPPEN: NOT AT ALL

## 2017-04-17 NOTE — NURSING NOTE
"Chief Complaint   Patient presents with     Mental Health Problem     Follow up        Initial /72 (BP Location: Right arm, Patient Position: Chair, Cuff Size: Adult Large)  Pulse 68  Temp 97.5  F (36.4  C) (Tympanic)  Resp 18  Ht 5' 10\" (1.778 m)  Wt 224 lb (101.6 kg)  SpO2 98%  BMI 32.14 kg/m2 Estimated body mass index is 32.14 kg/(m^2) as calculated from the following:    Height as of this encounter: 5' 10\" (1.778 m).    Weight as of this encounter: 224 lb (101.6 kg).  Medication Reconciliation:complete  Elizabeth Mo LPN      "

## 2017-04-17 NOTE — PROGRESS NOTES
"  PSYCHIATRY CLINIC PROGRESS NOTE   20 minute medication management, more than 50% of time spent counseling patient on medications, medication side effects, symptom history and management   SUBJECTIVE / INTERIM HISTORY                                                                       Social- Was  twice. Lives alone with his dog Montserrat (beltran) and 3 cats. Has a GF in [place]  Children-  2 kids, they are in [place]  Subjective: last visit 2/14/17: D/c methylphendiate and we will try Vyvanse 40 mg take one daily script today 2/14 and one script for 3/14. Continue Trileptal 300 mg bid, Valium 5 mg every 12 hours prn anxiety, nortriptyline 50 mg bid.   - feels Vyvanse helped but room for improvement  - Easter weekend, kind of lonely  - doing chiropractic and this is helping  - thinking would like to try a dfiferent med for aDHD  - Valium helped with anxiety and helped with the pain. Does NOT want to take any more as \"I don't want to be sleeping all the time\"  - Lots of family issues: Joshua has taken care of his parents and yet parents give his other brothers everything. oldest of 3 brothers. Brother in [place] youngest [place] and Major is here. Mom and dad here [place] on [place]. Joshua noting moving here to be closer to parents and help them, etc. But, parents don't seem to want him around much or talk to him.  SUBSTANCE USE- denies abuse of meds or substances    SYMPTOMS- issues with attention and concentration improved with Ritalin.  racing thoughts, depressed mood, impulsivity, distractibility  MEDICAL ROS- back pain, denies any issues with headaches or stomach pain / issues with stimulant  MEDICAL / SURGICAL HISTORY                     Patient Active Problem List   Diagnosis     Diabetes mellitus, type 2 (H)     Mixed hyperlipidemia     Tobacco Abuse, History of     Degeneration of lumbar or lumbosacral intervertebral disc     Depression, major     Chronic pain disorder     Chemical dependency (H)     Chronic rhinitis     " Tinnitus of both ears     ETD (eustachian tube dysfunction)     SNHL (sensorineural hearing loss)     Back pain     Obesity     Bipolar disorder (H)     Type 2 diabetes, HbA1c goal < 7% (H)     Seizure-like activity (H)     Bilateral foot pain     Preop general physical exam     Trigger index finger of right hand     Moderate persistent asthma without complication     Chronic lower back pain     ACP (advance care planning)     Onychia of toe of left foot     DDD (degenerative disc disease), lumbar     ALLERGY   Amoxicillin trihydrate; Clavulanic acid potassium; Cymbalta; and Seasonal allergies  MEDICATIONS                                                                                             Current Outpatient Prescriptions   Medication Sig     HYDROcodone-acetaminophen (NORCO)  MG per tablet      lidocaine (LIDODERM) 5 % Patch 3 patches     traMADol (ULTRAM) 50 MG tablet TAKE 1 TABLET BY MOUTH EVERY 6 HOURS AS NEEDED FOR MODERATE PAIN     HYDROcodone-acetaminophen (NORCO)  MG per tablet TAKE 1 TABLET BY MOUTH EVERY 8 HOURS AS NEEDED FOR MODERATE TO SEVEREPAIN     gabapentin (NEURONTIN) 300 MG capsule TAKE 3 CAPSULES BY MOUTH THREE TIMES DAILY     baclofen (LIORESAL) 10 MG tablet TAKE 1 TABLET BY MOUTH THREE TIMES DAILY     diclofenac (VOLTAREN) 50 MG EC tablet TAKE 1 TABLET BY MOUTH 3 TIMES DAILY     fentaNYL (DURAGESIC) 75 mcg/hr 72 hr patch APPLY 1 PATCH EVERY 48 HOURS     ibuprofen (ADVIL/MOTRIN) 600 MG tablet TAKE 1 TABLET BY MOUTH EVERY 6 HOURS AS NEEDED     OXcarbazepine (TRILEPTAL) 300 MG tablet TAKE 1 TABLET BY MOUTH 2 TIMES DAILY     baclofen (LIORESAL) 10 MG tablet TAKE 1 TABLET BY MOUTH THREE TIMES DAILY     acetaminophen (NON-ASPIRIN PAIN RELIEF) 325 MG tablet TAKE 2 TABLETS BY MOUTH EVERY 4 HOURS AS NEEDED FOR MILD PAIN     lisdexamfetamine (VYVANSE) 40 MG capsule Take 1 capsule (40 mg) by mouth every morning     lisdexamfetamine (VYVANSE) 40 MG capsule Take 1 capsule (40 mg) by mouth  every morning     diazepam (VALIUM) 5 MG tablet TAKE 1 TABLET BY MOUTH EVERY 12 HOURS AS NEEDED FOR ANXIETY OR SLEEP SPASM     omeprazole (PRILOSEC) 20 MG CR capsule TAKE 1 CAPSULE BY MOUTH EVERY DAY BEFORE A MEAL     multivitamin  with iron ( COMPLETE ADVANCED FORMULA) TABS TAKE 1 TABLET BY MOUTH DAILY     tamsulosin (FLOMAX) 0.4 MG capsule TAKE 1 CAPSULE BY MOUTH DAILY     baclofen (LIORESAL) 10 MG tablet TAKE 1 TABLET BY MOUTH THREE TIMES DAILY      STOOL SOFTENER/LAXATIVE 8.6-50 MG per tablet TAKE 1 OR 2 TABLETS BY MOUTH 2 TIMES A DAY     nortriptyline (PAMELOR) 50 MG capsule Take 1 capsule (50 mg) by mouth 3 times daily     DULERA 100-5 MCG/ACT oral inhaler INHALE 2 PUFFS INTO THE LUNGS 2 TIMES A DAY     VENTOLIN  (90 BASE) MCG/ACT inhaler USE 2 PUFFS 4 TIMES A DAY AS NEEDED FOR SHORTNESS OF BREATH     lidocaine (LIDODERM) 5 % patch PLACE 3 PATCHES ONTO THE SKIN DAILY AS NEEDED FOR MODERATE PAIN      CHILDRENS ASPIRIN 81 MG chewable tablet CHEW AND SWALLOW 1 TABLET BY MOUTH DAILY     fluticasone (FLONASE) 50 MCG/ACT nasal spray USE 2 SPRAYS IN EACH NOSTRIL DAILY     propranolol (INDERAL) 20 MG tablet TAKE 1 TABLET BY MOUTH TWICE DAILY     nicotine polacrilex (NICORETTE) 2 MG gum CHEW 1 PIECE AS NEEDED FOR SMOKING CESSATION     traZODone (DESYREL) 50 MG tablet TAKE 1 TABLET BY MOUTH NIGHTLY AS NEEDED FOR SLEEP     rosuvastatin (CRESTOR) 10 MG tablet Take 1 tablet (10 mg) by mouth daily     order for DME Equipment being ordered: Boa Back brace     acetaminophen (TYLENOL) 325 MG tablet Take 2 tablets (650 mg) by mouth every 4 hours as needed for other (mild pain)     blood glucose monitoring (ONE TOUCH DELICA) lancets USE TO TEST BLOOD SUGARS 3 TIMES A DAY OR AS DIRECTED     blood glucose (ONE TOUCH DELICA) lancing device Use to test blood sugars 3 times daily or as directed.     blood glucose (ONE TOUCH ULTRA) test strip Use to test blood sugars 3 times daily or as directed.     blood glucose  "monitoring (ONE TOUCH ULTRA 2) meter device kit Use to test blood sugars 3 times daily or as directed.     ORDER FOR DME Equipment being ordered: Large gloves     Methylphenidate HCl (CONCERTA PO)      No current facility-administered medications for this visit.        VITALS   /72 (BP Location: Right arm, Patient Position: Chair, Cuff Size: Adult Large)  Pulse 68  Temp 97.5  F (36.4  C) (Tympanic)  Resp 18  Ht 5' 10\" (1.778 m)  Wt 224 lb (101.6 kg)  SpO2 98%  BMI 32.14 kg/m2     LABS                                                                                                                         EKG 2016 with 376 ms (on nortriptyline)  Last Basic Metabolic Panel:  NA      138   6/9/2016   POTASSIUM      4.7   6/9/2016  CHLORIDE      104   6/9/2016  SANDRITA      9.2   6/9/2016  CO2       29   6/9/2016  BUN       25   6/9/2016  CR     0.76   6/9/2016  GLC      173   6/9/2016    Liver Function Studies -   Recent Labs   Lab Test  02/18/15   1536   PROTTOTAL  7.5   ALBUMIN  4.6   BILITOTAL  0.4   ALKPHOS  82   AST  18   ALT  32      MENTAL STATUS EXAM                                                                                        Alert. Oriented to person, place, and date / time. Well groomed, calm, cooperative with good eye contact. No problems with speech or psychomotor behavior. Mood was described as \"doing okay\" and affect was congruent to speech content and full range. Thought process, including associations, was unremarkable and thought content was devoid of suicidal and homicidal ideation and psychotic thought. No hallucinations. Insight was good. Judgment was intact and adequate for safety. Fund of knowledge was intact. Pt demonstrates no obvious problems with attention, concentration, language, recent or remote memory although these were not formally tested.     ASSESSMENT                                                                                                      HISTORICAL:  " Initial psych note 10/6/15          NOTES:      This patient is a 56 year old who was diagnosed with bipolar d/o in past in context of one episode in which he admits he was drinking heavily, depressed and made some statements that he feels were taken the wrong way. From what he reports, I can't gather enough criteria to make diagnosis of bipolar d/o and I have it listed as a rule out. I do agree that he has ADD and I'm comfortable with prescribing med for this: today we agreed on d/c Ritalin and we will try vyvanse instead as once daily and sounds like he thinks ritalin efficacy faded. He does struggle with depression and seems to be largely related to his chronic pain issues. He is on Trileptal of which from what I can tell his for his hx of seizures - discussed it is also used as a mood stabilizer. Also discussed today with Trent that stimulants can lower the seizure threshold however I feel more comfortable since he is on the Trileptal. We started Valium in as dual purpose: muscle relaxant properties and for anxiety. Per Trent has helped sificantly. Trent and I have discussed the risks that go along with combination of medications he is on: on several medications with CNS depressant effect and thus he needs to be very careful and NOT use alcohol or any other type sedating meds/substances as there are risks including respiratory depression. Valium is 5 mg bid prn and I would not feel comfortable any higher dose given the other medications he is on with CNS depressant potential. Joshua is on a lot of controlled substances thus I reviewed the [place]  and no other prescriptions aside from those his PCP and I prescribe. He is on nortriptyline which antidepressant and also can help with pain.     Vyvanse helps but room for improvement so we are going to increase dose.     TREATMENT RISK STATEMENT:  The risks, benefits, alternatives and potential adverse effects have been explained and are understood by the pt.  The pt agrees  to the treatment plan with the ability to do so.   The pt knows to call the clinic for any problems or access emergency care if needed.        DIAGNOSES                 (Use of Axes system will continue, even though absent from DSM 5)         Axis I   - ADD               MDD, recurrent, mild to mod                 Rule out bipolar disorder  Axis II  - no dx  Axis III - chronic pain (s/p back injuries and surgeries)  Axis IV-  Psychosocial Stressors include: financial, lack of support  Axis V - Global Assessment of Functioning current: 45    PLAN                                                                                                                    1)  MEDICATIONS:         --  Increase Vyvanse from 40 mg to 60 mg daily and gave scripts today 4/17 and 5/12/17.  Continue Trileptal 300 mg bid, Valium 5 mg every 12 hours prn anxiety, nortriptyline 50 mg bid.     2)  THERAPY:  No change    3)  LABS:  None    4)  PT MONITOR [call for probs]:  SEs from meds, worsening sx, SI/HI    5)  REFERRALS [CD, medical, other]:  None    6)  RTC:   ~2 months

## 2017-04-17 NOTE — MR AVS SNAPSHOT
"              After Visit Summary   4/17/2017    Joshua Barron    MRN: [id number]           Patient Information     Date Of Birth          1960        Visit Information        Provider Department      4/17/2017 3:20 PM Laquita Fernandez MD [facility]        Today's Diagnoses     ADHD (attention deficit hyperactivity disorder), combined type    -  1       Follow-ups after your visit        Your next 10 appointments already scheduled     Jun 19, 2017  3:00 PM CDT   (Arrive by 2:45 PM)   Return Visit with Laquita Fernandez MD   [facility] [place] ([facility] [place] [facility])    [address] [place] [address]  [place] [id number] [phone]            Mar 07, 2018  2:45 PM CST   (Arrive by 2:30 PM)   Hearing Eval with Kevin Moreno   [facility] [place] ([facility] [place] [facility])    [address]  [facility] [id number] [phone]              Who to contact     If you have questions or need follow up information about today's clinic visit or your schedule please contact [facility] directly at [phone].  Normal or non-critical lab and imaging results will be communicated to you by MyChart, letter or phone within 4 business days after the clinic has received the results. If you do not hear from us within 7 days, please contact the clinic through Filecubedhart or phone. If you have a critical or abnormal lab result, we will notify you by phone as soon as possible.  Submit refill requests through Huixiaoer or call your pharmacy and they will forward the refill request to us. Please allow 3 business days for your refill to be completed.          Additional Information About Your Visit        Filecubedhart Information     Huixiaoer lets you send messages to your doctor, view your test results, renew your prescriptions, schedule appointments and more. To sign up, go to [email or web address] . Click on \"Log in\" on the left side of the screen, which will take you to the Welcome page. Then click on " "\"Sign up Now\" on the right side of the page.     You will be asked to enter the access code listed below, as well as some personal information. Please follow the directions to create your username and password.     Your access code is: [email or web address]  Expires: 5/15/2017  6:32 PM     Your access code will  in 90 days. If you need help or a new code, please call your [place] clinic or [phone].        Care EveryWhere ID     This is your Care EveryWhere ID. This could be used by other organizations to access your [place] medical records  [id number]        Your Vitals Were     Pulse Temperature Respirations Height Pulse Oximetry BMI (Body Mass Index)    68 97.5  F (36.4  C) (Tympanic) 18 5' 10\" (1.778 m) 98% 32.14 kg/m2       Blood Pressure from Last 3 Encounters:   17 116/72   17 124/78   16 106/68    Weight from Last 3 Encounters:   17 224 lb (101.6 kg)   17 233 lb (105.7 kg)   16 230 lb (104.3 kg)              Today, you had the following     No orders found for display         Today's Medication Changes          These changes are accurate as of: 17  4:07 PM.  If you have any questions, ask your nurse or doctor.               These medicines have changed or have updated prescriptions.        Dose/Directions    * lisdexamfetamine 60 MG capsule   Commonly known as:  VYVANSE   This may have changed:    - medication strength  - how much to take   Used for:  ADHD (attention deficit hyperactivity disorder), combined type        Dose:  60 mg   Take 1 capsule (60 mg) by mouth every morning   Quantity:  30 capsule   Refills:  0       * lisdexamfetamine 60 MG capsule   Commonly known as:  VYVANSE   This may have changed:    - medication strength  - how much to take   Used for:  ADHD (attention deficit hyperactivity disorder), combined type        Dose:  60 mg   Start taking on:  2017   Take 1 capsule (60 mg) by mouth every morning   Quantity:  30 capsule   Refills:  0    "    * Notice:  This list has 2 medication(s) that are the same as other medications prescribed for you. Read the directions carefully, and ask your doctor or other care provider to review them with you.         Where to get your medicines      Some of these will need a paper prescription and others can be bought over the counter.  Ask your nurse if you have questions.     Bring a paper prescription for each of these medications     lisdexamfetamine 60 MG capsule    lisdexamfetamine 60 MG capsule                Primary Care Provider Office Phone # Fax #    Lyle Martinez Natalia,  [phone]       [facility] [place] [address] [facility]  [place] [address]        Thank you!     Thank you for choosing [facility] [place]  for your care. Our goal is always to provide you with excellent care. Hearing back from our patients is one way we can continue to improve our services. Please take a few minutes to complete the written survey that you may receive in the mail after your visit with us. Thank you!             Your Updated Medication List - Protect others around you: Learn how to safely use, store and throw away your medicines at www.disposemymeds.org.          This list is accurate as of: 4/17/17  4:07 PM.  Always use your most recent med list.                   Brand Name Dispense Instructions for use    * acetaminophen 325 MG tablet    TYLENOL     Take 2 tablets (650 mg) by mouth every 4 hours as needed for other (mild pain)       * acetaminophen 325 MG tablet    NON-ASPIRIN PAIN RELIEF    200 tablet    TAKE 2 TABLETS BY MOUTH EVERY 4 HOURS AS NEEDED FOR MILD PAIN       * baclofen 10 MG tablet    LIORESAL    90 tablet    TAKE 1 TABLET BY MOUTH THREE TIMES DAILY       * baclofen 10 MG tablet    LIORESAL    90 tablet    TAKE 1 TABLET BY MOUTH THREE TIMES DAILY       * baclofen 10 MG tablet    LIORESAL    90 tablet    TAKE 1 TABLET BY MOUTH THREE TIMES DAILY       blood glucose lancing device      1 each    Use to test blood sugars 3 times daily or as directed.       blood glucose monitoring lancets     100 Box    USE TO TEST BLOOD SUGARS 3 TIMES A DAY OR AS DIRECTED       blood glucose monitoring meter device kit     1 kit    Use to test blood sugars 3 times daily or as directed.       blood glucose monitoring test strip    ONE TOUCH ULTRA    300 strip    Use to test blood sugars 3 times daily or as directed.       CONCERTA PO          diazepam 5 MG tablet    VALIUM    60 tablet    TAKE 1 TABLET BY MOUTH EVERY 12 HOURS AS NEEDED FOR ANXIETY OR SLEEP SPASM       diclofenac 50 MG EC tablet    VOLTAREN    90 tablet    TAKE 1 TABLET BY MOUTH 3 TIMES DAILY       DULERA 100-5 MCG/ACT oral inhaler   Generic drug:  mometasone-formoterol     13 Inhaler    INHALE 2 PUFFS INTO THE LUNGS 2 TIMES A DAY       fentaNYL 75 mcg/hr 72 hr patch    DURAGESIC    11 patch    APPLY 1 PATCH EVERY 48 HOURS       fluticasone 50 MCG/ACT spray    FLONASE    16 g    USE 2 SPRAYS IN EACH NOSTRIL DAILY       gabapentin 300 MG capsule    NEURONTIN    270 capsule    TAKE 3 CAPSULES BY MOUTH THREE TIMES DAILY       * HYDROcodone-acetaminophen  MG per tablet    NORCO         * HYDROcodone-acetaminophen  MG per tablet    NORCO    80 tablet    TAKE 1 TABLET BY MOUTH EVERY 8 HOURS AS NEEDED FOR MODERATE TO SEVEREPAIN       ibuprofen 600 MG tablet    ADVIL/MOTRIN    120 tablet    TAKE 1 TABLET BY MOUTH EVERY 6 HOURS AS NEEDED       * lidocaine 5 % Patch    LIDODERM     3 patches       * lidocaine 5 % Patch    LIDODERM    90 patch    PLACE 3 PATCHES ONTO THE SKIN DAILY AS NEEDED FOR MODERATE PAIN       * lisdexamfetamine 60 MG capsule    VYVANSE    30 capsule    Take 1 capsule (60 mg) by mouth every morning       * lisdexamfetamine 60 MG capsule   Start taking on:  5/12/2017    VYVANSE    30 capsule    Take 1 capsule (60 mg) by mouth every morning       multivitamin  with iron Tabs     90 tablet    TAKE 1 TABLET BY MOUTH DAILY        nicotine polacrilex 2 MG gum    NICORETTE    110 each    CHEW 1 PIECE AS NEEDED FOR SMOKING CESSATION       nortriptyline 50 MG capsule    PAMELOR    90 capsule    Take 1 capsule (50 mg) by mouth 3 times daily       omeprazole 20 MG CR capsule    priLOSEC    30 capsule    TAKE 1 CAPSULE BY MOUTH EVERY DAY BEFORE A MEAL       * order for DME     2 Box    Equipment being ordered: Large gloves       * order for DME     1 Units    Equipment being ordered: Boa Back brace       OXcarbazepine 300 MG tablet    TRILEPTAL    60 tablet    TAKE 1 TABLET BY MOUTH 2 TIMES DAILY       propranolol 20 MG tablet    INDERAL    60 tablet    TAKE 1 TABLET BY MOUTH TWICE DAILY       rosuvastatin 10 MG tablet    CRESTOR    90 tablet    Take 1 tablet (10 mg) by mouth daily       SM CHILDRENS ASPIRIN 81 MG chewable tablet   Generic drug:  aspirin     30 tablet    CHEW AND SWALLOW 1 TABLET BY MOUTH DAILY       SM STOOL SOFTENER/LAXATIVE 8.6-50 MG per tablet   Generic drug:  senna-docusate     120 tablet    TAKE 1 OR 2 TABLETS BY MOUTH 2 TIMES A DAY       tamsulosin 0.4 MG capsule    FLOMAX    30 capsule    TAKE 1 CAPSULE BY MOUTH DAILY       traMADol 50 MG tablet    ULTRAM    120 tablet    TAKE 1 TABLET BY MOUTH EVERY 6 HOURS AS NEEDED FOR MODERATE PAIN       traZODone 50 MG tablet    DESYREL    30 tablet    TAKE 1 TABLET BY MOUTH NIGHTLY AS NEEDED FOR SLEEP       VENTOLIN  (90 BASE) MCG/ACT Inhaler   Generic drug:  albuterol     18 g    USE 2 PUFFS 4 TIMES A DAY AS NEEDED FOR SHORTNESS OF BREATH       * Notice:  This list has 13 medication(s) that are the same as other medications prescribed for you. Read the directions carefully, and ask your doctor or other care provider to review them with you.

## 2017-04-18 ASSESSMENT — PATIENT HEALTH QUESTIONNAIRE - PHQ9: SUM OF ALL RESPONSES TO PHQ QUESTIONS 1-9: 6

## 2017-04-18 ASSESSMENT — ANXIETY QUESTIONNAIRES: GAD7 TOTAL SCORE: 0

## 2017-04-20 DIAGNOSIS — M50.30 DDD (DEGENERATIVE DISC DISEASE), CERVICAL: ICD-10-CM

## 2017-04-20 NOTE — TELEPHONE ENCOUNTER
Fentanyl        Last Written Prescription Date: 3/31/17  Last Fill Quantity: 11,  # refills: 0   Last Office Visit with [facility], [facility] or [facility] prescribing provider: 4/17/17                                         Next 5 appointments (look out 90 days)     Jun 19, 2017  3:00 PM CDT   (Arrive by 2:45 PM)   Return Visit with Lauqita Fernandez MD   [facility] ([facility]    [facility] [address] [place] [id number] [phone]

## 2017-04-21 RX ORDER — FENTANYL 75 UG/1
PATCH TRANSDERMAL
Qty: 11 PATCH | Refills: 0 | Status: SHIPPED | OUTPATIENT
Start: 2017-04-27 | End: 2017-04-25

## 2017-04-25 DIAGNOSIS — M50.30 DDD (DEGENERATIVE DISC DISEASE), CERVICAL: ICD-10-CM

## 2017-04-25 RX ORDER — FENTANYL 75 UG/1
PATCH TRANSDERMAL
Qty: 15 PATCH | Refills: 0 | Status: SHIPPED | OUTPATIENT
Start: 2017-04-25 | End: 2017-05-08

## 2017-04-25 NOTE — TELEPHONE ENCOUNTER
Last office visit 11/02/16.  Fentanyl last signed 04/21/17 #11 that was post dated to 04/27/17.  Per patient, due to directions stating every 48 hours patient was out of patches the weekend of 04/22/17. Patient would like a new script sent to [facility] with a start date of today. Please advise.

## 2017-05-04 ENCOUNTER — TELEPHONE (OUTPATIENT)
Dept: PSYCHIATRY | Facility: OTHER | Age: 57
End: 2017-05-04

## 2017-05-04 DIAGNOSIS — J45.40 MODERATE PERSISTENT ASTHMA WITHOUT COMPLICATION: ICD-10-CM

## 2017-05-04 DIAGNOSIS — G63 POLYNEUROPATHY ASSOCIATED WITH UNDERLYING DISEASE (H): ICD-10-CM

## 2017-05-04 DIAGNOSIS — Z87.39 HX OF DEGENERATIVE DISC DISEASE: ICD-10-CM

## 2017-05-04 DIAGNOSIS — M54.50 LUMBAGO: ICD-10-CM

## 2017-05-04 DIAGNOSIS — M54.5 LOW BACK PAIN, UNSPECIFIED BACK PAIN LATERALITY, UNSPECIFIED CHRONICITY, WITH SCIATICA PRESENCE UNSPECIFIED: Primary | ICD-10-CM

## 2017-05-04 DIAGNOSIS — J31.0 CHRONIC RHINITIS: ICD-10-CM

## 2017-05-04 NOTE — TELEPHONE ENCOUNTER
Patient reports that the Vyvanse is not helping, does nothing for him.  Feels it makes his condition worse.  States Ritalin worked better.  Would like to go back on the Ritalin.  Next f/u appt. on 5-10.  I told patient that Dr. Fernandez will discuss at f/u visit next week. He is ok with this

## 2017-05-08 ENCOUNTER — TELEPHONE (OUTPATIENT)
Dept: FAMILY MEDICINE | Facility: OTHER | Age: 57
End: 2017-05-08

## 2017-05-08 ENCOUNTER — TELEPHONE (OUTPATIENT)
Dept: PEDIATRICS | Facility: OTHER | Age: 57
End: 2017-05-08

## 2017-05-08 ENCOUNTER — OFFICE VISIT (OUTPATIENT)
Dept: CHIROPRACTIC MEDICINE | Facility: OTHER | Age: 57
End: 2017-05-08
Attending: CHIROPRACTOR
Payer: COMMERCIAL

## 2017-05-08 DIAGNOSIS — M99.03 SEGMENTAL AND SOMATIC DYSFUNCTION OF LUMBAR REGION: Primary | ICD-10-CM

## 2017-05-08 DIAGNOSIS — M50.30 DDD (DEGENERATIVE DISC DISEASE), CERVICAL: ICD-10-CM

## 2017-05-08 DIAGNOSIS — M54.50 ACUTE BILATERAL LOW BACK PAIN WITHOUT SCIATICA: ICD-10-CM

## 2017-05-08 DIAGNOSIS — M99.02 SEGMENTAL AND SOMATIC DYSFUNCTION OF THORACIC REGION: ICD-10-CM

## 2017-05-08 DIAGNOSIS — M99.01 SEGMENTAL AND SOMATIC DYSFUNCTION OF CERVICAL REGION: ICD-10-CM

## 2017-05-08 PROCEDURE — 98941 CHIROPRACT MANJ 3-4 REGIONS: CPT | Performed by: CHIROPRACTOR

## 2017-05-08 RX ORDER — MOMETASONE FUROATE AND FORMOTEROL FUMARATE DIHYDRATE 100; 5 UG/1; UG/1
AEROSOL RESPIRATORY (INHALATION)
Qty: 13 G | Refills: 0 | Status: SHIPPED | OUTPATIENT
Start: 2017-05-08 | End: 2017-09-07

## 2017-05-08 RX ORDER — IBUPROFEN 600 MG/1
TABLET, FILM COATED ORAL
Qty: 120 TABLET | Refills: 0 | Status: SHIPPED | OUTPATIENT
Start: 2017-05-08 | End: 2017-05-25

## 2017-05-08 RX ORDER — GABAPENTIN 300 MG/1
CAPSULE ORAL
Qty: 270 CAPSULE | Refills: 0 | Status: SHIPPED | OUTPATIENT
Start: 2017-05-08 | End: 2017-06-06

## 2017-05-08 RX ORDER — HYDROCODONE BITARTRATE AND ACETAMINOPHEN 10; 325 MG/1; MG/1
TABLET ORAL
Qty: 80 TABLET | Refills: 0 | Status: SHIPPED | OUTPATIENT
Start: 2017-05-08 | End: 2017-06-06

## 2017-05-08 RX ORDER — BACLOFEN 10 MG/1
TABLET ORAL
Qty: 90 TABLET | Refills: 0 | Status: SHIPPED | OUTPATIENT
Start: 2017-05-08 | End: 2017-07-31 | Stop reason: DRUGHIGH

## 2017-05-08 RX ORDER — ALBUTEROL SULFATE 90 UG/1
AEROSOL, METERED RESPIRATORY (INHALATION)
Qty: 18 G | Refills: 0 | Status: SHIPPED | OUTPATIENT
Start: 2017-05-08 | End: 2017-09-07

## 2017-05-08 RX ORDER — FLUTICASONE PROPIONATE 50 MCG
SPRAY, SUSPENSION (ML) NASAL
Qty: 16 G | Refills: 0 | Status: SHIPPED | OUTPATIENT
Start: 2017-05-08 | End: 2017-07-14

## 2017-05-08 RX ORDER — FENTANYL 75 UG/1
PATCH TRANSDERMAL
Qty: 15 PATCH | Refills: 0 | Status: SHIPPED | OUTPATIENT
Start: 2017-05-08 | End: 2017-06-06

## 2017-05-08 NOTE — TELEPHONE ENCOUNTER
Pt of . Usha is pt.reported.Neurontin last filled on 3.31.17,# 270. Baclofen last filled on 5.31.17,#90. Dulera last filled on 11.8.16 # 13 with 5 refills.Ventolin last filled on 11.8.16 # 18g with 5 refills. Last office visit on 6.30. 16. Medications pended.Thank you

## 2017-05-08 NOTE — TELEPHONE ENCOUNTER
4:26 PM    Reason for Call: Phone Call    Description: call to discuss medication & xrays from last surgery    Was an appointment offered for this call? N/A    Preferred method for responding to this message: Telephone Call    If we cannot reach you directly, may we leave a detailed response at the number you provided? Yes    Can this message wait until your PCP/provider returns, if available today? Please return call Tuesday 5/8/17 AM    Dodie Vergara

## 2017-05-08 NOTE — PROGRESS NOTES

## 2017-05-08 NOTE — MR AVS SNAPSHOT
After Visit Summary   5/8/2017    Joshua Barron    MRN: [id number]           Patient Information     Date Of Birth          1960        Visit Information        Provider Department      5/8/2017 11:40 AM [facility]        Today's Diagnoses     Segmental and somatic dysfunction of lumbar region    -  1    Acute bilateral low back pain without sciatica        Segmental and somatic dysfunction of thoracic region        Segmental and somatic dysfunction of cervical region           Follow-ups after your visit        Your next 10 appointments already scheduled     May 10, 2017  1:20 PM CDT   (Arrive by 1:05 PM)   Return Visit with Laquita Fernandez MD   [facility] [place] ([facility])    [address] [place] [address]  [place] [address]   [phone]            Jun 19, 2017  3:00 PM CDT   (Arrive by 2:45 PM)   Return Visit with Laquita Fernandez MD   [facility] [place] ([facility])    [address] [place] [address]  [place] [address]   [phone]            Mar 07, 2018  2:45 PM CST   (Arrive by 2:30 PM)   Hearing Eval with Kevin Moreno   [facility] [place] ([facility])    [address]  [place] [address]   [phone]              Who to contact     If you have questions or need follow up information about today's clinic visit or your schedule please contact  [facility] directly at [phone].  Normal or non-critical lab and imaging results will be communicated to you by MyChart, letter or phone within 4 business days after the clinic has received the results. If you do not hear from us within 7 days, please contact the clinic through MyChart or phone. If you have a critical or abnormal lab result, we will notify you by phone as soon as possible.  Submit refill requests through [employer] or call your pharmacy and they will forward the refill request to us. Please allow 3 business days for your refill to be  "completed.          Additional Information About Your Visit        [employer] Information     [employer] lets you send messages to your doctor, view your test results, renew your prescriptions, schedule appointments and more. To sign up, go to [email or web address] . Click on \"Log in\" on the left side of the screen, which will take you to the Welcome page. Then click on \"Sign up Now\" on the right side of the page.     You will be asked to enter the access code listed below, as well as some personal information. Please follow the directions to create your username and password.     Your access code is: [email or web address]  Expires: 5/15/2017  6:32 PM     Your access code will  in 90 days. If you need help or a new code, please call your [place] clinic or [phone].        Care EveryWhere ID     This is your Care EveryWhere ID. This could be used by other organizations to access your [facility] medical records  [id number]         Blood Pressure from Last 3 Encounters:   17 116/72   17 124/78   16 106/68    Weight from Last 3 Encounters:   17 224 lb (101.6 kg)   17 233 lb (105.7 kg)   16 230 lb (104.3 kg)              We Performed the Following     CHIROPRAC MANIP,SPINAL,3-4 REGIONS          Where to get your medicines      Some of these will need a paper prescription and others can be bought over the counter.  Ask your nurse if you have questions.     Bring a paper prescription for each of these medications     fentaNYL 75 mcg/hr 72 hr patch          Primary Care Provider Office Phone # Fax #    Lylegrecia Martinez DO Natalia [phone]       [facility] [place] [address]  [place] [address]        Thank you!     Thank you for choosing  [facility]  for your care. Our goal is always to provide you with excellent care. Hearing back from our patients is one way we can continue to improve our services. Please take a few minutes to complete the written " survey that you may receive in the mail after your visit with us. Thank you!             Your Updated Medication List - Protect others around you: Learn how to safely use, store and throw away your medicines at www.disposemymeds.org.          This list is accurate as of: 5/8/17  4:21 PM.  Always use your most recent med list.                   Brand Name Dispense Instructions for use    * acetaminophen 325 MG tablet    TYLENOL     Take 2 tablets (650 mg) by mouth every 4 hours as needed for other (mild pain)       * acetaminophen 325 MG tablet    NON-ASPIRIN PAIN RELIEF    200 tablet    TAKE 2 TABLETS BY MOUTH EVERY 4 HOURS AS NEEDED FOR MILD PAIN       * baclofen 10 MG tablet    LIORESAL    90 tablet    TAKE 1 TABLET BY MOUTH THREE TIMES DAILY       * baclofen 10 MG tablet    LIORESAL    90 tablet    TAKE 1 TABLET BY MOUTH THREE TIMES DAILY       * baclofen 10 MG tablet    LIORESAL    90 tablet    TAKE 1 TABLET BY MOUTH THREE TIMES DAILY       * baclofen 10 MG tablet    LIORESAL    90 tablet    TAKE 1 TABLET BY MOUTH THREE TIMES DAILY       blood glucose lancing device     1 each    Use to test blood sugars 3 times daily or as directed.       blood glucose monitoring lancets     100 Box    USE TO TEST BLOOD SUGARS 3 TIMES A DAY OR AS DIRECTED       blood glucose monitoring meter device kit     1 kit    Use to test blood sugars 3 times daily or as directed.       blood glucose monitoring test strip    ONE TOUCH ULTRA    300 strip    Use to test blood sugars 3 times daily or as directed.       CONCERTA PO          diazepam 5 MG tablet    VALIUM    60 tablet    TAKE 1 TABLET BY MOUTH EVERY 12 HOURS AS NEEDED FOR ANXIETY OR SLEEP SPASM       diclofenac 50 MG EC tablet    VOLTAREN    90 tablet    TAKE 1 TABLET BY MOUTH 3 TIMES DAILY       DULERA 100-5 MCG/ACT oral inhaler   Generic drug:  mometasone-formoterol     13 g    INHALE 2 PUFFS INTO THE LUNGS 2 TIMES A DAY       fentaNYL 75 mcg/hr 72 hr patch    DURAGESIC    15  patch    APPLY 1 PATCH EVERY 48 HOURS       fluticasone 50 MCG/ACT spray    FLONASE    16 g    USE 2 SPRAYS IN EACH NOSTRIL DAILY       gabapentin 300 MG capsule    NEURONTIN    270 capsule    TAKE 3 CAPSULES BY MOUTH THREE TIMES DAILY       * HYDROcodone-acetaminophen  MG per tablet    NORCO         * HYDROcodone-acetaminophen  MG per tablet    NORCO    80 tablet    TAKE 1 TABLET BY MOUTH EVERY 8 HOURS AS NEEDED FOR MODERATE TO SEVEREPAIN       * HYDROcodone-acetaminophen  MG per tablet    NORCO    80 tablet    TAKE 1 TABLET BY MOUTH EVERY 8 HOURS AS NEEDED FOR MODERATE TO SEVEREPAIN       ibuprofen 600 MG tablet    ADVIL/MOTRIN    120 tablet    TAKE 1 TABLET BY MOUTH EVERY 6 HOURS AS NEEDED       * lidocaine 5 % Patch    LIDODERM     3 patches       * lidocaine 5 % Patch    LIDODERM    90 patch    PLACE 3 PATCHES ONTO THE SKIN DAILY AS NEEDED FOR MODERATE PAIN       * lisdexamfetamine 60 MG capsule    VYVANSE    30 capsule    Take 1 capsule (60 mg) by mouth every morning       * lisdexamfetamine 60 MG capsule   Start taking on:  5/12/2017    VYVANSE    30 capsule    Take 1 capsule (60 mg) by mouth every morning       multivitamin  with iron Tabs     90 tablet    TAKE 1 TABLET BY MOUTH DAILY       nicotine polacrilex 2 MG gum    NICORETTE    110 each    CHEW 1 PIECE AS NEEDED FOR SMOKING CESSATION       nortriptyline 50 MG capsule    PAMELOR    90 capsule    Take 1 capsule (50 mg) by mouth 3 times daily       omeprazole 20 MG CR capsule    priLOSEC    30 capsule    TAKE 1 CAPSULE BY MOUTH EVERY DAY BEFORE A MEAL       * order for DME     2 Box    Equipment being ordered: Large gloves       * order for DME     1 Units    Equipment being ordered: Boa Back brace       OXcarbazepine 300 MG tablet    TRILEPTAL    60 tablet    TAKE 1 TABLET BY MOUTH 2 TIMES DAILY       propranolol 20 MG tablet    INDERAL    60 tablet    TAKE 1 TABLET BY MOUTH TWICE DAILY       rosuvastatin 10 MG tablet    CRESTOR    90  tablet    Take 1 tablet (10 mg) by mouth daily       SM CHILDRENS ASPIRIN 81 MG chewable tablet   Generic drug:  aspirin     30 tablet    CHEW AND SWALLOW 1 TABLET BY MOUTH DAILY       SM STOOL SOFTENER/LAXATIVE 8.6-50 MG per tablet   Generic drug:  senna-docusate     120 tablet    TAKE 1 OR 2 TABLETS BY MOUTH 2 TIMES A DAY       tamsulosin 0.4 MG capsule    FLOMAX    30 capsule    TAKE 1 CAPSULE BY MOUTH DAILY       traMADol 50 MG tablet    ULTRAM    120 tablet    TAKE 1 TABLET BY MOUTH EVERY 6 HOURS AS NEEDED FOR MODERATE PAIN       traZODone 50 MG tablet    DESYREL    30 tablet    TAKE 1 TABLET BY MOUTH NIGHTLY AS NEEDED FOR SLEEP       VENTOLIN  (90 BASE) MCG/ACT Inhaler   Generic drug:  albuterol     18 g    USE 2 PUFFS 4 TIMES A DAY AS NEEDED FOR SHORTNESS OF BREATH       * Notice:  This list has 15 medication(s) that are the same as other medications prescribed for you. Read the directions carefully, and ask your doctor or other care provider to review them with you.

## 2017-05-08 NOTE — TELEPHONE ENCOUNTER
Reason for call:  Medication    Medication Name?             fentaNYL (DURAGESIC) 75 mcg/hr 72 hr patch          1. Is this request for a refill? Yes  2. What [facility] you use? [facility]  3. Have you contacted your pharmacy? No    4. If yes, when?  (Please note that the turn-around-time for prescriptions is 72 business hours; I am sending your request at this time. SEND TO  [facility]  )  Description: Patient will be going to [place] on 5-15-17 and would like Rx early, if possible.  Was an appointment offered for this a call? No   Preferred method for responding to this messageTelephone Call  If we cannot reach you directly, may we leave a detailed response at the number you provided? Yes  Can this message wait until your PCP/Provider returns if not available today? Yes

## 2017-05-09 NOTE — TELEPHONE ENCOUNTER
Talked with patient. He wanted to let us know he was going to [place] may 15th and would like to get his fentanyl patches on Saturday 13th. I instructed him this is fine as they let you  a few days early. Also questioning xrays from Dr. Pettit and where to load them here at the clinic so Dr. Fisher can take a look. Patient instructed to bring in disc and front staff may bring back to lab to get loaded.

## 2017-05-09 NOTE — TELEPHONE ENCOUNTER
Patient reports medication is working fine.  He is very happy with the dosing.  Also has had some overwhelming stuff going on .  Will discuss more at tomorrow's (5-10.)  Thank you

## 2017-05-10 ENCOUNTER — OFFICE VISIT (OUTPATIENT)
Dept: PSYCHIATRY | Facility: OTHER | Age: 57
End: 2017-05-10
Attending: PSYCHIATRY & NEUROLOGY
Payer: COMMERCIAL

## 2017-05-10 VITALS
WEIGHT: 227 LBS | TEMPERATURE: 98.4 F | BODY MASS INDEX: 32.5 KG/M2 | DIASTOLIC BLOOD PRESSURE: 76 MMHG | HEIGHT: 70 IN | HEART RATE: 76 BPM | SYSTOLIC BLOOD PRESSURE: 122 MMHG

## 2017-05-10 DIAGNOSIS — Z79.899 ENCOUNTER FOR LONG TERM BENZODIAZEPINE THERAPY: Primary | ICD-10-CM

## 2017-05-10 DIAGNOSIS — Z79.899 ENCOUNTER FOR LONG TERM BENZODIAZEPINE THERAPY: ICD-10-CM

## 2017-05-10 DIAGNOSIS — F90.2 ADHD (ATTENTION DEFICIT HYPERACTIVITY DISORDER), COMBINED TYPE: ICD-10-CM

## 2017-05-10 PROCEDURE — 80307 DRUG TEST PRSMV CHEM ANLYZR: CPT | Performed by: PSYCHIATRY & NEUROLOGY

## 2017-05-10 PROCEDURE — 99214 OFFICE O/P EST MOD 30 MIN: CPT | Performed by: PSYCHIATRY & NEUROLOGY

## 2017-05-10 PROCEDURE — 99212 OFFICE O/P EST SF 10 MIN: CPT

## 2017-05-10 RX ORDER — LISDEXAMFETAMINE DIMESYLATE 60 MG/1
60 CAPSULE ORAL EVERY MORNING
Qty: 30 CAPSULE | Refills: 0 | Status: SHIPPED | OUTPATIENT
Start: 2017-06-09 | End: 2017-05-15

## 2017-05-10 ASSESSMENT — ANXIETY QUESTIONNAIRES
2. NOT BEING ABLE TO STOP OR CONTROL WORRYING: SEVERAL DAYS
7. FEELING AFRAID AS IF SOMETHING AWFUL MIGHT HAPPEN: SEVERAL DAYS
3. WORRYING TOO MUCH ABOUT DIFFERENT THINGS: SEVERAL DAYS
1. FEELING NERVOUS, ANXIOUS, OR ON EDGE: NEARLY EVERY DAY
GAD7 TOTAL SCORE: 7
5. BEING SO RESTLESS THAT IT IS HARD TO SIT STILL: NOT AT ALL
6. BECOMING EASILY ANNOYED OR IRRITABLE: SEVERAL DAYS

## 2017-05-10 ASSESSMENT — PATIENT HEALTH QUESTIONNAIRE - PHQ9: 5. POOR APPETITE OR OVEREATING: NOT AT ALL

## 2017-05-10 ASSESSMENT — PAIN SCALES - GENERAL: PAINLEVEL: MODERATE PAIN (4)

## 2017-05-10 NOTE — LETTER
May 18, 2017      TO: Joshua Barron  [address]  [place] [address]     All checked out well with the urine drug screen. I can show you what the results look like in the electronic medical record at our next visit if you'd like. Hope you are doing well!    Sincerely,      [facility] MD

## 2017-05-10 NOTE — NURSING NOTE
"Chief Complaint   Patient presents with     RECHECK     Mental health.       Initial /76 (BP Location: Right arm, Patient Position: Chair, Cuff Size: Adult Large)  Pulse 76  Temp 98.4  F (36.9  C) (Tympanic)  Ht 5' 10\" (1.778 m)  Wt 227 lb (103 kg)  BMI 32.57 kg/m2 Estimated body mass index is 32.57 kg/(m^2) as calculated from the following:    Height as of this encounter: 5' 10\" (1.778 m).    Weight as of this encounter: 227 lb (103 kg).  Medication Reconciliation: complete     JACQUELYN SUAREZ      "

## 2017-05-10 NOTE — PROGRESS NOTES
"  PSYCHIATRY CLINIC PROGRESS NOTE   20 minute medication management, more than 50% of time spent counseling patient on medications, medication side effects, symptom history and management   SUBJECTIVE / INTERIM HISTORY                                                                       Social- Was  twice. Lives alone with his dog Montserrat (beltran) and 3 cats. Has a GF in [place]  Children-  2 kids, they are in [place]  Last visit: --  Increase Vyvanse from 40 mg to 60 mg daily and gave scripts today 4/17 and 5/12/17.  Continue Trileptal 300 mg bid, Valium 5 mg every 12 hours prn anxiety, nortriptyline 50 mg bid.    - feels Vyvanse helped now, but for while it didn't seem to but is now. Likes doesn't have the drop off like Ritalin in past. Less misplacing stuff. Feels his attention / concentration are better.   - trip to [place]   - doing chiropractic and this is helping  - thinking would like to try a dfiferent med for aDHD  - Valium helped with anxiety and helped with the pain. Does NOT want to take any more as \"I don't want to be sleeping all the time\"  - Lots of family issues: Joshua has taken care of his parents and yet parents give his other brothers everything. oldest of 3 brothers. Brother in [place] youngest [place] and Major is here. Mom and dad here out on [place]. Joshua noting moving here to be closer to parents and help them, etc. But, parents don't seem to want him around much or talk to him.  SUBSTANCE USE- denies abuse of meds or substances    SYMPTOMS- issues with attention and concentration improved with Ritalin.  racing thoughts, depressed mood, impulsivity, distractibility  MEDICAL ROS- back pain, denies any issues with headaches or stomach pain / issues with stimulant  MEDICAL / SURGICAL HISTORY                     Patient Active Problem List   Diagnosis     Diabetes mellitus, type 2 (H)     Mixed hyperlipidemia     Tobacco Abuse, History of     Degeneration of lumbar or lumbosacral intervertebral disc     " Depression, major     Chronic pain disorder     Chemical dependency (H)     Chronic rhinitis     Tinnitus of both ears     ETD (eustachian tube dysfunction)     SNHL (sensorineural hearing loss)     Back pain     Obesity     Bipolar disorder (H)     Type 2 diabetes, HbA1c goal < 7% (H)     Seizure-like activity (H)     Bilateral foot pain     Preop general physical exam     Trigger index finger of right hand     Moderate persistent asthma without complication     Chronic lower back pain     ACP (advance care planning)     Onychia of toe of left foot     DDD (degenerative disc disease), lumbar     ALLERGY   Amoxicillin trihydrate; Clavulanic acid potassium; Cymbalta; and Seasonal allergies  MEDICATIONS                                                                                             Current Outpatient Prescriptions   Medication Sig     HYDROcodone-acetaminophen (NORCO)  MG per tablet TAKE 1 TABLET BY MOUTH EVERY 8 HOURS AS NEEDED FOR MODERATE TO SEVEREPAIN     gabapentin (NEURONTIN) 300 MG capsule TAKE 3 CAPSULES BY MOUTH THREE TIMES DAILY     baclofen (LIORESAL) 10 MG tablet TAKE 1 TABLET BY MOUTH THREE TIMES DAILY     diclofenac (VOLTAREN) 50 MG EC tablet TAKE 1 TABLET BY MOUTH 3 TIMES DAILY     ibuprofen (ADVIL/MOTRIN) 600 MG tablet TAKE 1 TABLET BY MOUTH EVERY 6 HOURS AS NEEDED     VENTOLIN  (90 BASE) MCG/ACT Inhaler USE 2 PUFFS 4 TIMES A DAY AS NEEDED FOR SHORTNESS OF BREATH     DULERA 100-5 MCG/ACT oral inhaler INHALE 2 PUFFS INTO THE LUNGS 2 TIMES A DAY     fluticasone (FLONASE) 50 MCG/ACT spray USE 2 SPRAYS IN EACH NOSTRIL DAILY     fentaNYL (DURAGESIC) 75 mcg/hr 72 hr patch APPLY 1 PATCH EVERY 48 HOURS     [START ON 5/12/2017] lisdexamfetamine (VYVANSE) 60 MG capsule Take 1 capsule (60 mg) by mouth every morning     traMADol (ULTRAM) 50 MG tablet TAKE 1 TABLET BY MOUTH EVERY 6 HOURS AS NEEDED FOR MODERATE PAIN     OXcarbazepine (TRILEPTAL) 300 MG tablet TAKE 1 TABLET BY MOUTH 2 TIMES  DAILY     acetaminophen (NON-ASPIRIN PAIN RELIEF) 325 MG tablet TAKE 2 TABLETS BY MOUTH EVERY 4 HOURS AS NEEDED FOR MILD PAIN     diazepam (VALIUM) 5 MG tablet TAKE 1 TABLET BY MOUTH EVERY 12 HOURS AS NEEDED FOR ANXIETY OR SLEEP SPASM     omeprazole (PRILOSEC) 20 MG CR capsule TAKE 1 CAPSULE BY MOUTH EVERY DAY BEFORE A MEAL     multivitamin  with iron (SM COMPLETE ADVANCED FORMULA) TABS TAKE 1 TABLET BY MOUTH DAILY     baclofen (LIORESAL) 10 MG tablet TAKE 1 TABLET BY MOUTH THREE TIMES DAILY     SM STOOL SOFTENER/LAXATIVE 8.6-50 MG per tablet TAKE 1 OR 2 TABLETS BY MOUTH 2 TIMES A DAY     nortriptyline (PAMELOR) 50 MG capsule Take 1 capsule (50 mg) by mouth 3 times daily     lidocaine (LIDODERM) 5 % patch PLACE 3 PATCHES ONTO THE SKIN DAILY AS NEEDED FOR MODERATE PAIN     SM CHILDRENS ASPIRIN 81 MG chewable tablet CHEW AND SWALLOW 1 TABLET BY MOUTH DAILY     propranolol (INDERAL) 20 MG tablet TAKE 1 TABLET BY MOUTH TWICE DAILY     nicotine polacrilex (NICORETTE) 2 MG gum CHEW 1 PIECE AS NEEDED FOR SMOKING CESSATION     traZODone (DESYREL) 50 MG tablet TAKE 1 TABLET BY MOUTH NIGHTLY AS NEEDED FOR SLEEP     rosuvastatin (CRESTOR) 10 MG tablet Take 1 tablet (10 mg) by mouth daily     order for DME Equipment being ordered: Boa Back brace     blood glucose monitoring (ONE TOUCH DELICA) lancets USE TO TEST BLOOD SUGARS 3 TIMES A DAY OR AS DIRECTED     blood glucose (ONE TOUCH DELICA) lancing device Use to test blood sugars 3 times daily or as directed.     blood glucose (ONE TOUCH ULTRA) test strip Use to test blood sugars 3 times daily or as directed.     blood glucose monitoring (ONE TOUCH ULTRA 2) meter device kit Use to test blood sugars 3 times daily or as directed.     ORDER FOR DME Equipment being ordered: Large gloves     tamsulosin (FLOMAX) 0.4 MG capsule TAKE 1 CAPSULE BY MOUTH DAILY (Patient not taking: Reported on 5/10/2017)     No current facility-administered medications for this visit.        VITALS   BP  "122/76 (BP Location: Right arm, Patient Position: Chair, Cuff Size: Adult Large)  Pulse 76  Temp 98.4  F (36.9  C) (Tympanic)  Ht 5' 10\" (1.778 m)  Wt 227 lb (103 kg)  BMI 32.57 kg/m2     LABS                                                                                                                         EKG 2016 with 376 ms (on nortriptyline)  Last Basic Metabolic Panel:  NA      138   6/9/2016   POTASSIUM      4.7   6/9/2016  CHLORIDE      104   6/9/2016  SANDRITA      9.2   6/9/2016  CO2       29   6/9/2016  BUN       25   6/9/2016  CR     0.76   6/9/2016  GLC      173   6/9/2016    Liver Function Studies -   Recent Labs   Lab Test  02/18/15   1536   PROTTOTAL  7.5   ALBUMIN  4.6   BILITOTAL  0.4   ALKPHOS  82   AST  18   ALT  32      MENTAL STATUS EXAM                                                                                        Alert. Oriented to person, place, and date / time. Well groomed, calm, cooperative with good eye contact. No problems with speech or psychomotor behavior. Mood was described as \"doing okay\" and affect was congruent to speech content and full range. Thought process, including associations, was unremarkable and thought content was devoid of suicidal and homicidal ideation and psychotic thought. No hallucinations. Insight was good. Judgment was intact and adequate for safety. Fund of knowledge was intact. Pt demonstrates no obvious problems with attention, concentration, language, recent or remote memory although these were not formally tested.     ASSESSMENT                                                                                                      HISTORICAL:  Initial psych note 10/6/15          NOTES:      This patient is a 56 year old who was diagnosed with bipolar d/o in past in context of one episode in which he admits he was drinking heavily, depressed and made some statements that he feels were taken the wrong way. From what he reports, I can't gather enough " criteria to make diagnosis of bipolar d/o and I have it listed as a rule out. I do agree that he has ADD and I'm comfortable with prescribing med for this: today we agreed on d/c Ritalin and we will try vyvanse instead as once daily and sounds like he thinks ritalin efficacy faded. He does struggle with depression and seems to be largely related to his chronic pain issues. He is on Trileptal of which from what I can tell his for his hx of seizures - discussed it is also used as a mood stabilizer. Also discussed today with Trent that stimulants can lower the seizure threshold however I feel more comfortable since he is on the Trileptal. We started Valium in as dual purpose: muscle relaxant properties and for anxiety. Per Trent has helped sificantly. Trent and I have discussed the risks that go along with combination of medications he is on: on several medications with CNS depressant effect and thus he needs to be very careful and NOT use alcohol or any other type sedating meds/substances as there are risks including respiratory depression. Valium is 5 mg bid prn and I would not feel comfortable any higher dose given the other medications he is on with CNS depressant potential. Joshua is on a lot of controlled substances thus I reviewed the [place]  and no other prescriptions aside from those his PCP and I prescribe. He is on nortriptyline which antidepressant and also can help with pain.     Discussed given he is on multiple controlled substances UDS. He was fine with this.    TREATMENT RISK STATEMENT:  The risks, benefits, alternatives and potential adverse effects have been explained and are understood by the pt.  The pt agrees to the treatment plan with the ability to do so.   The pt knows to call the clinic for any problems or access emergency care if needed.        DIAGNOSES                 (Use of Axes system will continue, even though absent from DSM 5)         Axis I   - ADD               MDD, recurrent,  mod                  Rule out bipolar disorder  Axis II  - no dx  Axis III - chronic pain (s/p back injuries and surgeries)  Axis IV-  Psychosocial Stressors include: financial, lack of support  Axis V - Global Assessment of Functioning current: 45    PLAN                                                                                                                    1)  MEDICATIONS:         --  Continue Vyvanse 60 mg daily and last script 5/12/17, gave scripts today: 6/9/17, .  Continue Trileptal 300 mg bid, Valium 5 mg every 12 hours prn anxiety, nortriptyline 50 mg bid.     2)  THERAPY:  No change    3)  LABS:  UDS today    4)  PT MONITOR [call for probs]:  SEs from meds, worsening sx, SI/HI    5)  REFERRALS [CD, medical, other]:  None    6)  RTC:   Has apptmt set up 6/19/17

## 2017-05-10 NOTE — MR AVS SNAPSHOT
"              After Visit Summary   5/10/2017    Joshua Barron    MRN: [id number]           Patient Information     Date Of Birth          1960        Visit Information        Provider Department      5/10/2017 1:20 PM [facility]        Today's Diagnoses     Encounter for long term benzodiazepine therapy    -  1    ADHD (attention deficit hyperactivity disorder), combined type           Follow-ups after your visit        Your next 10 appointments already scheduled     Jun 19, 2017  3:00 PM CDT   (Arrive by 2:45 PM)   Return Visit with Laquita Fernandez MD   [facility]    [address] [place] [address]  [place] [id number] [phone]            Mar 07, 2018  2:45 PM CST   (Arrive by 2:30 PM)   Hearing Eval with Kevin Moreno   [facility] ([facility]    [address]  [place] [address] [phone]              Who to contact     If you have questions or need follow up information about today's clinic visit or your schedule please contact [facility] directly at [phone].  Normal or non-critical lab and imaging results will be communicated to you by MyChart, letter or phone within 4 business days after the clinic has received the results. If you do not hear from us within 7 days, please contact the clinic through MyChart or phone. If you have a critical or abnormal lab result, we will notify you by phone as soon as possible.  Submit refill requests through [employer] or call your pharmacy and they will forward the refill request to us. Please allow 3 business days for your refill to be completed.          Additional Information About Your Visit        MyChart Information     [employer] lets you send messages to your doctor, view your test results, renew your prescriptions, schedule appointments and more. To sign up, go to [email or web address] . Click on \"Log in\" on the left side of the screen, " "which will take you to the Welcome page. Then click on \"Sign up Now\" on the right side of the page.     You will be asked to enter the access code listed below, as well as some personal information. Please follow the directions to create your username and password.     Your access code is: [email or web address]  Expires: 5/15/2017  6:32 PM     Your access code will  in 90 days. If you need help or a new code, please call your [facility] or [phone].        Care EveryWhere ID     This is your Care EveryWhere ID. This could be used by other organizations to access your [facility] medical records  [id number]        Your Vitals Were     Pulse Temperature Height BMI (Body Mass Index)          76 98.4  F (36.9  C) (Tympanic) 5' 10\" (1.778 m) 32.57 kg/m2         Blood Pressure from Last 3 Encounters:   05/10/17 122/76   17 116/72   17 124/78    Weight from Last 3 Encounters:   05/10/17 227 lb (103 kg)   17 224 lb (101.6 kg)   17 233 lb (105.7 kg)              We Performed the Following     Pain Drug Scr UR W Rptd Meds          Today's Medication Changes          These changes are accurate as of: 5/10/17  1:57 PM.  If you have any questions, ask your nurse or doctor.               These medicines have changed or have updated prescriptions.        Dose/Directions    acetaminophen 325 MG tablet   Commonly known as:  NON-ASPIRIN PAIN RELIEF   This may have changed:  Another medication with the same name was removed. Continue taking this medication, and follow the directions you see here.   Used for:  Midline low back pain without sciatica   Changed by:  Lyle Fisher, DO        TAKE 2 TABLETS BY MOUTH EVERY 4 HOURS AS NEEDED FOR MILD PAIN   Quantity:  200 tablet   Refills:  1       * baclofen 10 MG tablet   Commonly known as:  LIORESAL   This may have changed:  Another medication with the same name was removed. Continue taking this medication, and follow the directions you see here. "   Used for:  Hx of degenerative disc disease   Changed by:  Lyle Fisher DO        TAKE 1 TABLET BY MOUTH THREE TIMES DAILY   Quantity:  90 tablet   Refills:  0       * baclofen 10 MG tablet   Commonly known as:  LIORESAL   This may have changed:  Another medication with the same name was removed. Continue taking this medication, and follow the directions you see here.   Used for:  Low back pain, unspecified back pain laterality, unspecified chronicity, with sciatica presence unspecified   Changed by:  Lyle Fisher DO        TAKE 1 TABLET BY MOUTH THREE TIMES DAILY   Quantity:  90 tablet   Refills:  0       HYDROcodone-acetaminophen  MG per tablet   Commonly known as:  NORCO   This may have changed:  Another medication with the same name was removed. Continue taking this medication, and follow the directions you see here.   Used for:  Low back pain, unspecified back pain laterality, unspecified chronicity, with sciatica presence unspecified   Changed by:  Lyle Fisher DO        TAKE 1 TABLET BY MOUTH EVERY 8 HOURS AS NEEDED FOR MODERATE TO SEVEREPAIN   Quantity:  80 tablet   Refills:  0       lidocaine 5 % Patch   Commonly known as:  LIDODERM   This may have changed:  Another medication with the same name was removed. Continue taking this medication, and follow the directions you see here.   Used for:  Midline low back pain without sciatica   Changed by:  Lyle Fisher DO        PLACE 3 PATCHES ONTO THE SKIN DAILY AS NEEDED FOR MODERATE PAIN   Quantity:  90 patch   Refills:  5       * Notice:  This list has 2 medication(s) that are the same as other medications prescribed for you. Read the directions carefully, and ask your doctor or other care provider to review them with you.      Stop taking these medicines if you haven't already. Please contact your care team if you have questions.     CONCERTA PO   Stopped by:  Laquita Fernandez MD                Where to get  your medicines      Some of these will need a paper prescription and others can be bought over the counter.  Ask your nurse if you have questions.     Bring a paper prescription for each of these medications     lisdexamfetamine 60 MG capsule                Primary Care Provider Office Phone # Fax #    Lyle Fisher [phone]       [facility] [place] [address]  [facility] [address]        Thank you!     [facility]  [facility]. Our goal is always to provide you with excellent care. Hearing back from our patients is one way we can continue to improve our services. Please take a few minutes to complete the written survey that you may receive in the mail after your visit with us. Thank you!             Your Updated Medication List - Protect others around you: Learn how to safely use, store and throw away your medicines at www.disposemymeds.org.          This list is accurate as of: 5/10/17  1:57 PM.  Always use your most recent med list.                   Brand Name Dispense Instructions for use    acetaminophen 325 MG tablet    NON-ASPIRIN PAIN RELIEF    200 tablet    TAKE 2 TABLETS BY MOUTH EVERY 4 HOURS AS NEEDED FOR MILD PAIN       * baclofen 10 MG tablet    LIORESAL    90 tablet    TAKE 1 TABLET BY MOUTH THREE TIMES DAILY       * baclofen 10 MG tablet    LIORESAL    90 tablet    TAKE 1 TABLET BY MOUTH THREE TIMES DAILY       blood glucose lancing device     1 each    Use to test blood sugars 3 times daily or as directed.       blood glucose monitoring lancets     100 Box    USE TO TEST BLOOD SUGARS 3 TIMES A DAY OR AS DIRECTED       blood glucose monitoring meter device kit     1 kit    Use to test blood sugars 3 times daily or as directed.       blood glucose monitoring test strip    ONE TOUCH ULTRA    300 strip    Use to test blood sugars 3 times daily or as directed.       diazepam 5 MG tablet    VALIUM    60 tablet    TAKE 1 TABLET  BY MOUTH EVERY 12 HOURS AS NEEDED FOR ANXIETY OR SLEEP SPASM       diclofenac 50 MG EC tablet    VOLTAREN    90 tablet    TAKE 1 TABLET BY MOUTH 3 TIMES DAILY       DULERA 100-5 MCG/ACT oral inhaler   Generic drug:  mometasone-formoterol     13 g    INHALE 2 PUFFS INTO THE LUNGS 2 TIMES A DAY       fentaNYL 75 mcg/hr 72 hr patch    DURAGESIC    15 patch    APPLY 1 PATCH EVERY 48 HOURS       fluticasone 50 MCG/ACT spray    FLONASE    16 g    USE 2 SPRAYS IN EACH NOSTRIL DAILY       gabapentin 300 MG capsule    NEURONTIN    270 capsule    TAKE 3 CAPSULES BY MOUTH THREE TIMES DAILY       HYDROcodone-acetaminophen  MG per tablet    NORCO    80 tablet    TAKE 1 TABLET BY MOUTH EVERY 8 HOURS AS NEEDED FOR MODERATE TO SEVEREPAIN       ibuprofen 600 MG tablet    ADVIL/MOTRIN    120 tablet    TAKE 1 TABLET BY MOUTH EVERY 6 HOURS AS NEEDED       lidocaine 5 % Patch    LIDODERM    90 patch    PLACE 3 PATCHES ONTO THE SKIN DAILY AS NEEDED FOR MODERATE PAIN       * lisdexamfetamine 60 MG capsule   Start taking on:  5/12/2017    VYVANSE    30 capsule    Take 1 capsule (60 mg) by mouth every morning       * lisdexamfetamine 60 MG capsule   Start taking on:  6/9/2017    VYVANSE    30 capsule    Take 1 capsule (60 mg) by mouth every morning       multivitamin  with iron Tabs     90 tablet    TAKE 1 TABLET BY MOUTH DAILY       nicotine polacrilex 2 MG gum    NICORETTE    110 each    CHEW 1 PIECE AS NEEDED FOR SMOKING CESSATION       nortriptyline 50 MG capsule    PAMELOR    90 capsule    Take 1 capsule (50 mg) by mouth 3 times daily       omeprazole 20 MG CR capsule    priLOSEC    30 capsule    TAKE 1 CAPSULE BY MOUTH EVERY DAY BEFORE A MEAL       * order for DME     2 Box    Equipment being ordered: Large gloves       * order for DME     1 Units    Equipment being ordered: Boa Back brace       OXcarbazepine 300 MG tablet    TRILEPTAL    60 tablet    TAKE 1 TABLET BY MOUTH 2 TIMES DAILY       propranolol 20 MG tablet    INDERAL     60 tablet    TAKE 1 TABLET BY MOUTH TWICE DAILY       rosuvastatin 10 MG tablet    CRESTOR    90 tablet    Take 1 tablet (10 mg) by mouth daily       SM CHILDRENS ASPIRIN 81 MG chewable tablet   Generic drug:  aspirin     30 tablet    CHEW AND SWALLOW 1 TABLET BY MOUTH DAILY       SM STOOL SOFTENER/LAXATIVE 8.6-50 MG per tablet   Generic drug:  senna-docusate     120 tablet    TAKE 1 OR 2 TABLETS BY MOUTH 2 TIMES A DAY       tamsulosin 0.4 MG capsule    FLOMAX    30 capsule    TAKE 1 CAPSULE BY MOUTH DAILY       traMADol 50 MG tablet    ULTRAM    120 tablet    TAKE 1 TABLET BY MOUTH EVERY 6 HOURS AS NEEDED FOR MODERATE PAIN       traZODone 50 MG tablet    DESYREL    30 tablet    TAKE 1 TABLET BY MOUTH NIGHTLY AS NEEDED FOR SLEEP       VENTOLIN  (90 BASE) MCG/ACT Inhaler   Generic drug:  albuterol     18 g    USE 2 PUFFS 4 TIMES A DAY AS NEEDED FOR SHORTNESS OF BREATH       * Notice:  This list has 6 medication(s) that are the same as other medications prescribed for you. Read the directions carefully, and ask your doctor or other care provider to review them with you.

## 2017-05-10 NOTE — Clinical Note
I did a comprehensive drug screen since Joshua is on multiple controlled substances -- looks like all checked out well.

## 2017-05-11 ASSESSMENT — PATIENT HEALTH QUESTIONNAIRE - PHQ9: SUM OF ALL RESPONSES TO PHQ QUESTIONS 1-9: 11

## 2017-05-11 ASSESSMENT — ANXIETY QUESTIONNAIRES: GAD7 TOTAL SCORE: 7

## 2017-05-14 ENCOUNTER — HOSPITAL ENCOUNTER (EMERGENCY)
Facility: HOSPITAL | Age: 57
Discharge: HOME | End: 2017-05-15
Attending: EMERGENCY MEDICINE | Admitting: EMERGENCY MEDICINE
Payer: COMMERCIAL

## 2017-05-14 DIAGNOSIS — G40.89 OTHER SEIZURES (H): Primary | ICD-10-CM

## 2017-05-14 PROCEDURE — 99285 EMERGENCY DEPT VISIT HI MDM: CPT | Mod: 25

## 2017-05-14 PROCEDURE — 99285 EMERGENCY DEPT VISIT HI MDM: CPT | Performed by: EMERGENCY MEDICINE

## 2017-05-14 PROCEDURE — 93005 ELECTROCARDIOGRAM TRACING: CPT

## 2017-05-14 NOTE — ED AVS SNAPSHOT
[facility]    [address] [address]    Phone:  [phone]                                       Joshua Barron   MRN: [id number]    Department:  [facility]   Date of Visit:  5/14/2017           Patient Information     Date Of Birth          1960        Your diagnoses for this visit were:     Other seizures (H)        You were seen by Rahat Villeda MD.      Follow-up Information     Follow up with Lyle Fisher DO In 2 days.    Specialties:  Internal Medicine, Pediatrics    Contact information:    [facility] [place]  [address]  [facility] [id number] [phone]          Discharge Instructions         Discharge Instructions for Epilepsy  You have been diagnosed with epilepsy, a disorder of recurring seizures. When you have a seizure, an electrical disturbance happens in your brain. There are different kinds of seizures, and each patient may have one or many types of seizures. Here are some guidelines for you and your family.  If you have a seizure  Ask friends and family members to learn seizure management. Also, tell them to do the following if you have a seizure:    Clear the area to prevent injury.    Position you on a flat, carpeted surface, if possible.    Don t try to restrain you.    Don t put anything in your mouth.    Turn you onto your side if you start to vomit.    Keep track of the date and time the seizure started, how long it lasted, whether or not you lost consciousness, a description of your body movements, what provoked the seizure (if known), and any injuries you suffered. Using a watch may help keep correct time of events.      Stay with you until you regain consciousness.    Call 911 if the seizure is longer than 5 minutes, if there are multiple seizures, or if you do not begin to wake up after the seizure stops.    Activities  Following are some things to consider:    Enjoy your normal activities. Most people  with epilepsy lead normal lives.    Avoid hazardous activities, such as mountain climbing or scuba diving. A seizure under these conditions could lead to a fatal accident.    Do not swim alone or participate in other similar activities without others nearby.    Ask your healthcare provider about any restrictions on driving or other activities.    Check with your [facility] department of public safety to learn whether there are any driving limitations based on your condition.  Other home care  Other considerations:    Take your medicine exactly as directed. Skipping doses can affect the way your body handles the medicine, which could cause you to have a seizure.    Don t drink alcohol or use any medicine without talking with your healthcare provider first.    Seizure medicines may interact with other medicines. Make sure all of your healthcare providers have a list of all your medicines.     Birth control pills may not work as effectively when taking seizure medicines. Ask your healthcare provider if a change in birth control is needed.     Wear a medical alert pendant or bracelet that alerts others to your condition, especially if you are allergic to seizure medicine.    Join a local support group. Ask your healthcare provider for names and phone numbers.  .  When to seek medical attention  Tell your family members or friends to call 911 right away if you have:    Seizure that lasts more than 5 minutes    Multiple seizures in a row    No recovery of consciousness after the seizure stops  Otherwise, have them call your healthcare provider immediately if you have:    Seizures that are getting longer and worse    Seizures that are different from those you ve had in the past    Seizures strong enough to cause injury    Skin rash    Fever of 101.4 F (38.5 C) or higher     [id number] [employer]. [address] [place] [address], [place] [address]. All rights reserved. This information is not intended as a substitute for  professional medical care. Always follow your healthcare professional's instructions.          Future Appointments        Provider Department Dept Phone Center    6/19/2017 3:00 PM Laquita Fernandez MD [facility] [phone] [facility] [place]    3/7/2018 2:45 PM Kevin Zavala [facility] [phone] [facility] [place]         Review of your medicines      Our records show that you are taking the medicines listed below. If these are incorrect, please call your family doctor or clinic.        Dose / Directions Last dose taken    acetaminophen 325 MG tablet   Commonly known as:  NON-ASPIRIN PAIN RELIEF   Quantity:  200 tablet        TAKE 2 TABLETS BY MOUTH EVERY 4 HOURS AS NEEDED FOR MILD PAIN   Refills:  1        * baclofen 10 MG tablet   Commonly known as:  LIORESAL   Quantity:  90 tablet        TAKE 1 TABLET BY MOUTH THREE TIMES DAILY   Refills:  0        * baclofen 10 MG tablet   Commonly known as:  LIORESAL   Quantity:  90 tablet        TAKE 1 TABLET BY MOUTH THREE TIMES DAILY   Refills:  0        blood glucose monitoring meter device kit   Quantity:  1 kit        Use to test blood sugars 3 times daily or as directed.   Refills:  0        blood glucose monitoring test strip   Commonly known as:  ONE TOUCH ULTRA   Quantity:  300 strip        Use to test blood sugars 3 times daily or as directed.   Refills:  3        diazepam 5 MG tablet   Commonly known as:  VALIUM   Quantity:  60 tablet        TAKE 1 TABLET BY MOUTH EVERY 12 HOURS AS NEEDED FOR ANXIETY OR SLEEP SPASM   Refills:  3        diclofenac 50 MG EC tablet   Commonly known as:  VOLTAREN   Quantity:  90 tablet        TAKE 1 TABLET BY MOUTH 3 TIMES DAILY   Refills:  0        DULERA 100-5 MCG/ACT oral inhaler   Quantity:  13 g   Generic drug:  mometasone-formoterol        INHALE 2 PUFFS INTO THE LUNGS 2 TIMES A DAY   Refills:  0        fentaNYL 75 mcg/hr 72 hr patch   Commonly known as:  DURAGESIC   Quantity:  15 patch        APPLY 1  PATCH EVERY 48 HOURS   Refills:  0        fluticasone 50 MCG/ACT spray   Commonly known as:  FLONASE   Quantity:  16 g        USE 2 SPRAYS IN EACH NOSTRIL DAILY   Refills:  0        gabapentin 300 MG capsule   Commonly known as:  NEURONTIN   Quantity:  270 capsule        TAKE 3 CAPSULES BY MOUTH THREE TIMES DAILY   Refills:  0        HYDROcodone-acetaminophen  MG per tablet   Commonly known as:  NORCO   Quantity:  80 tablet        TAKE 1 TABLET BY MOUTH EVERY 8 HOURS AS NEEDED FOR MODERATE TO SEVEREPAIN   Refills:  0        ibuprofen 600 MG tablet   Commonly known as:  ADVIL/MOTRIN   Quantity:  120 tablet        TAKE 1 TABLET BY MOUTH EVERY 6 HOURS AS NEEDED   Refills:  0        lidocaine 5 % Patch   Commonly known as:  LIDODERM   Quantity:  90 patch        PLACE 3 PATCHES ONTO THE SKIN DAILY AS NEEDED FOR MODERATE PAIN   Refills:  5        lisdexamfetamine 60 MG capsule   Commonly known as:  VYVANSE   Dose:  60 mg   Quantity:  30 capsule        Take 1 capsule (60 mg) by mouth every morning   Refills:  0        multivitamin  with iron Tabs   Quantity:  90 tablet        TAKE 1 TABLET BY MOUTH DAILY   Refills:  3        nicotine polacrilex 2 MG gum   Commonly known as:  NICORETTE   Quantity:  110 each        CHEW 1 PIECE AS NEEDED FOR SMOKING CESSATION   Refills:  3        nortriptyline 50 MG capsule   Commonly known as:  PAMELOR   Dose:  50 mg   Quantity:  90 capsule        Take 1 capsule (50 mg) by mouth 3 times daily   Refills:  5        omeprazole 20 MG CR capsule   Commonly known as:  priLOSEC   Quantity:  30 capsule        TAKE 1 CAPSULE BY MOUTH EVERY DAY BEFORE A MEAL   Refills:  10        * order for DME   Quantity:  2 Box        Equipment being ordered: Large gloves   Refills:  0        * order for DME   Quantity:  1 Units        Equipment being ordered: Boa Back brace   Refills:  0        OXcarbazepine 300 MG tablet   Commonly known as:  TRILEPTAL   Quantity:  60 tablet        TAKE 1 TABLET BY MOUTH 2  TIMES DAILY   Refills:  1        rosuvastatin 10 MG tablet   Commonly known as:  CRESTOR   Dose:  10 mg   Quantity:  90 tablet        Take 1 tablet (10 mg) by mouth daily   Refills:  3        SM CHILDRENS ASPIRIN 81 MG chewable tablet   Quantity:  30 tablet   Generic drug:  aspirin        CHEW AND SWALLOW 1 TABLET BY MOUTH DAILY   Refills:  11        traMADol 50 MG tablet   Commonly known as:  ULTRAM   Quantity:  120 tablet        TAKE 1 TABLET BY MOUTH EVERY 6 HOURS AS NEEDED FOR MODERATE PAIN   Refills:  0        traZODone 50 MG tablet   Commonly known as:  DESYREL   Quantity:  30 tablet        TAKE 1 TABLET BY MOUTH NIGHTLY AS NEEDED FOR SLEEP   Refills:  11        VENTOLIN  (90 BASE) MCG/ACT Inhaler   Quantity:  18 g   Generic drug:  albuterol        USE 2 PUFFS 4 TIMES A DAY AS NEEDED FOR SHORTNESS OF BREATH   Refills:  0        * Notice:  This list has 4 medication(s) that are the same as other medications prescribed for you. Read the directions carefully, and ask your doctor or other care provider to review them with you.            Procedures and tests performed during your visit     CBC with platelets differential    Comprehensive metabolic panel    EKG 12 lead    Head CT w/o contrast    Lactic acid      Orders Needing Specimen Collection     Ordered          05/14/17 2357  UA reflex to Microscopic and Culture - ROUTINE, Pri[facility]: Routine, Needs to be Collected     Scheduled Task Status   05/14/17 2356 Collect UA reflex to Microscopic and Culture Open   Order Class:  PCU Collect                  Pending Results     Date and Time Order Name Status Description    5/14/2017 2357 Head CT w/o contrast In process             Pending Culture Results     No orders found for last 3 day(s).            Thank you for choosing [facility]       Thank you for choosing [facility] for your care. Our goal is always to provide you with excellent care. Hearing back from our patients is one way we can continue to improve our  "services. Please take a few minutes to complete the written survey that you may receive in the mail after you visit with us. Thank you!        [employer] Information     [employer] lets you send messages to your doctor, view your test results, renew your prescriptions, schedule appointments and more. To sign up, go to [email or web address] . Click on \"Log in\" on the left side of the screen, which will take you to the Welcome page. Then click on \"Sign up Now\" on the right side of the page.     You will be asked to enter the access code listed below, as well as some personal information. Please follow the directions to create your username and password.     Your access code is: [email or web address]  Expires: 5/15/2017  6:32 PM     Your access code will  in 90 days. If you need help or a new code, please call your [facility] clinic or [phone].        Care EveryWhere ID     This is your Care EveryWhere ID. This could be used by other organizations to access your [facility] medical records  [id number]        After Visit Summary       This is your record. Keep this with you and show to your community pharmacist(s) and doctor(s) at your next visit.                  "

## 2017-05-14 NOTE — ED AVS SNAPSHOT
[facility]    [address]    [place] [address]    Phone:  [phone]                                       Joshua Barron   MRN: [id number]    Department:  [facility]   Date of Visit:  5/14/2017           After Visit Summary Signature Page     I have received my discharge instructions, and my questions have been answered. I have discussed any challenges I see with this plan with the nurse or doctor.    ..........................................................................................................................................  Patient/Patient Representative Signature      ..........................................................................................................................................  Patient Representative Print Name and Relationship to Patient    ..................................................               ................................................  Date                                            Time    ..........................................................................................................................................  Reviewed by Signature/Title    ...................................................              ..............................................  Date                                                            Time

## 2017-05-15 VITALS
DIASTOLIC BLOOD PRESSURE: 92 MMHG | RESPIRATION RATE: 12 BRPM | OXYGEN SATURATION: 96 % | SYSTOLIC BLOOD PRESSURE: 142 MMHG | TEMPERATURE: 98.8 F

## 2017-05-15 LAB
ALBUMIN SERPL-MCNC: 4.5 G/DL (ref 3.4–5)
ALP SERPL-CCNC: 89 U/L (ref 40–150)
ALT SERPL W P-5'-P-CCNC: 53 U/L (ref 0–70)
ANION GAP SERPL CALCULATED.3IONS-SCNC: 9 MMOL/L (ref 3–14)
AST SERPL W P-5'-P-CCNC: 124 U/L (ref 0–45)
BASOPHILS # BLD AUTO: 0 10E9/L (ref 0–0.2)
BASOPHILS NFR BLD AUTO: 0.3 %
BILIRUB SERPL-MCNC: 0.4 MG/DL (ref 0.2–1.3)
BUN SERPL-MCNC: 26 MG/DL (ref 7–30)
CALCIUM SERPL-MCNC: 9 MG/DL (ref 8.5–10.1)
CHLORIDE SERPL-SCNC: 108 MMOL/L (ref 94–109)
CO2 SERPL-SCNC: 27 MMOL/L (ref 20–32)
CREAT SERPL-MCNC: 1.33 MG/DL (ref 0.66–1.25)
DIFFERENTIAL METHOD BLD: ABNORMAL
EOSINOPHIL # BLD AUTO: 0 10E9/L (ref 0–0.7)
EOSINOPHIL NFR BLD AUTO: 0.6 %
ERYTHROCYTE [DISTWIDTH] IN BLOOD BY AUTOMATED COUNT: 12.1 % (ref 10–15)
GFR SERPL CREATININE-BSD FRML MDRD: 56 ML/MIN/1.7M2
GLUCOSE SERPL-MCNC: 116 MG/DL (ref 70–99)
HCT VFR BLD AUTO: 37.7 % (ref 40–53)
HGB BLD-MCNC: 13.1 G/DL (ref 13.3–17.7)
IMM GRANULOCYTES # BLD: 0 10E9/L (ref 0–0.4)
IMM GRANULOCYTES NFR BLD: 0.3 %
LACTATE SERPL-SCNC: 1.6 MMOL/L (ref 0.4–2)
LYMPHOCYTES # BLD AUTO: 0.8 10E9/L (ref 0.8–5.3)
LYMPHOCYTES NFR BLD AUTO: 12.3 %
MCH RBC QN AUTO: 32.4 PG (ref 26.5–33)
MCHC RBC AUTO-ENTMCNC: 34.7 G/DL (ref 31.5–36.5)
MCV RBC AUTO: 93 FL (ref 78–100)
MONOCYTES # BLD AUTO: 0.4 10E9/L (ref 0–1.3)
MONOCYTES NFR BLD AUTO: 6.4 %
NEUTROPHILS # BLD AUTO: 5.3 10E9/L (ref 1.6–8.3)
NEUTROPHILS NFR BLD AUTO: 80.1 %
NRBC # BLD AUTO: 0 10*3/UL
NRBC BLD AUTO-RTO: 0 /100
PLATELET # BLD AUTO: 170 10E9/L (ref 150–450)
POTASSIUM SERPL-SCNC: 4.2 MMOL/L (ref 3.4–5.3)
PROT SERPL-MCNC: 7.6 G/DL (ref 6.8–8.8)
RBC # BLD AUTO: 4.04 10E12/L (ref 4.4–5.9)
SODIUM SERPL-SCNC: 144 MMOL/L (ref 133–144)
WBC # BLD AUTO: 6.6 10E9/L (ref 4–11)

## 2017-05-15 PROCEDURE — 93010 ELECTROCARDIOGRAM REPORT: CPT | Performed by: INTERNAL MEDICINE

## 2017-05-15 PROCEDURE — 83605 ASSAY OF LACTIC ACID: CPT | Performed by: EMERGENCY MEDICINE

## 2017-05-15 PROCEDURE — 80053 COMPREHEN METABOLIC PANEL: CPT | Performed by: EMERGENCY MEDICINE

## 2017-05-15 PROCEDURE — 36415 COLL VENOUS BLD VENIPUNCTURE: CPT | Performed by: EMERGENCY MEDICINE

## 2017-05-15 PROCEDURE — 70450 CT HEAD/BRAIN W/O DYE: CPT | Mod: TC

## 2017-05-15 PROCEDURE — 85025 COMPLETE CBC W/AUTO DIFF WBC: CPT | Performed by: EMERGENCY MEDICINE

## 2017-05-15 NOTE — ED NOTES
"Pt's girlfriend, (a \"med tech\" in the [facility]), is on the phone telling him he shouldn't be discharged r/t his leg pain. Pt informed that his presenting complaint was r/t his LOC, right leg weakness, and seeming confusion. His CT ruled out stroke, he is very alert and loudly verbal, he has a history of back surgery and chronic back pain. Pt informed by MD that his leg symptoms are most likely r/t his back problems and that he needs to follow up with PCP for further evaluation. Pt is standing while yelling about no doctor wants to see or treat him. His tirade lasted about 10 minutes. Pt was assisted to get dressed and calmed a bit. Pt was discharged in care of parents who state understanding of explanation of his test results tonight and need to follow through with his PCP.  "

## 2017-05-15 NOTE — ED PROVIDER NOTES
History     Chief Complaint   Patient presents with     Altered Mental Status     Had an episode of LOC around 1500, parents found him on the floor unconsciousness, they were able to rouse him and get him off the floor. States numbness of right leg since episode. Pt was ambulating at registration.,     HPI  Joshua Barron is a 56 year old male who is brought to the emergency department by father.  He was found on the floor unconscious.  Patient is known to be epileptic.  Patient remembers trying to go to the bathroom at 3:00 this afternoon then suspects that he passed out.  Denies any recent medication changes.  No fever, headache, neck pains.  Patient does not recall the events that happened between 3:00 and up to now.     Patient denies unilateral body weakness, headache, neck pains, fever, vomiting, visual changes, decreased urine output, chills.    I have reviewed the Medications, Allergies, Past Medical and Surgical History, and Social History in the Epic system.    Review of Systems   All other systems reviewed and are negative.      Physical Exam      Physical Exam   Constitutional: He is oriented to person, place, and time. He appears well-developed and well-nourished. No distress.   HENT:   Head: Normocephalic and atraumatic.   Mouth/Throat: Oropharynx is clear and moist. No oropharyngeal exudate.   Eyes: Pupils are equal, round, and reactive to light. No scleral icterus.   Cardiovascular: Normal rate, regular rhythm, normal heart sounds and intact distal pulses.    Pulmonary/Chest: Effort normal and breath sounds normal. No respiratory distress. He has no wheezes.   Abdominal: Soft. Bowel sounds are normal. There is no tenderness. There is no rebound and no guarding.   Musculoskeletal: He exhibits no edema or tenderness.   Neurological: He is alert and oriented to person, place, and time.   Skin: Skin is warm. No rash noted. He is not diaphoretic.       ED Course   Evaluated and laboratory tests  ordered.  CT scan ordered.  1:28 PM: Normal CT scan, normal CBC, CMP.    ED Course     Procedures           Labs Ordered and Resulted from Time of ED Arrival Up to the Time of Departure from the ED   CBC WITH PLATELETS DIFFERENTIAL - Abnormal; Notable for the following:        Result Value    RBC Count 4.04 (*)     Hemoglobin 13.1 (*)     Hematocrit 37.7 (*)     All other components within normal limits   COMPREHENSIVE METABOLIC PANEL - Abnormal; Notable for the following:     Glucose 116 (*)     Creatinine 1.33 (*)     GFR Estimate 56 (*)      (*)     All other components within normal limits   LACTIC ACID   UA MACROSCOPIC WITH REFLEX TO MICRO AND CULTURE       Assessments & Plan (with Medical Decision Making)     I have reviewed the nursing notes.    I have reviewed the findings, diagnosis, plan and need for follow up with the patient.    New Prescriptions    No medications on file       Final diagnoses:   Other seizures (H)     Postictal state.  Normal evaluation at the ED.  Normal CT scan, CBC, CMP.  Most likely patient had a seizure and was found in a postictal state.  Patient subsequently discharged home together with both parents.  Advised follow-up with PCP within 2 days.    5/14/2017   [facility]     Rahat Villeda MD  05/15/17 0130

## 2017-05-15 NOTE — ED NOTES
Attempt to discharge the patient and the patient is questioning how he can be discharged with what happened to him tonight.. Patient's girlfriend is on the phone and swearing and demanding to speak to the nurse. The patient's parents are at the bedside. Dr. Villeda notified and states he will talk to the patient.

## 2017-05-15 NOTE — DISCHARGE INSTRUCTIONS
Discharge Instructions for Epilepsy  You have been diagnosed with epilepsy, a disorder of recurring seizures. When you have a seizure, an electrical disturbance happens in your brain. There are different kinds of seizures, and each patient may have one or many types of seizures. Here are some guidelines for you and your family.  If you have a seizure  Ask friends and family members to learn seizure management. Also, tell them to do the following if you have a seizure:    Clear the area to prevent injury.    Position you on a flat, carpeted surface, if possible.    Don t try to restrain you.    Don t put anything in your mouth.    Turn you onto your side if you start to vomit.    Keep track of the date and time the seizure started, how long it lasted, whether or not you lost consciousness, a description of your body movements, what provoked the seizure (if known), and any injuries you suffered. Using a watch may help keep correct time of events.      Stay with you until you regain consciousness.    Call 911 if the seizure is longer than 5 minutes, if there are multiple seizures, or if you do not begin to wake up after the seizure stops.    Activities  Following are some things to consider:    Enjoy your normal activities. Most people with epilepsy lead normal lives.    Avoid hazardous activities, such as mountain climbing or scuba diving. A seizure under these conditions could lead to a fatal accident.    Do not swim alone or participate in other similar activities without others nearby.    Ask your healthcare provider about any restrictions on driving or other activities.    Check with your state department of public safety to learn whether there are any driving limitations based on your condition.  Other home care  Other considerations:    Take your medicine exactly as directed. Skipping doses can affect the way your body handles the medicine, which could cause you to have a seizure.    Don t drink alcohol or use  any medicine without talking with your healthcare provider first.    Seizure medicines may interact with other medicines. Make sure all of your healthcare providers have a list of all your medicines.     Birth control pills may not work as effectively when taking seizure medicines. Ask your healthcare provider if a change in birth control is needed.     Wear a medical alert pendant or bracelet that alerts others to your condition, especially if you are allergic to seizure medicine.    Join a local support group. Ask your healthcare provider for names and phone numbers.  .  When to seek medical attention  Tell your family members or friends to call 911 right away if you have:    Seizure that lasts more than 5 minutes    Multiple seizures in a row    No recovery of consciousness after the seizure stops  Otherwise, have them call your healthcare provider immediately if you have:    Seizures that are getting longer and worse    Seizures that are different from those you ve had in the past    Seizures strong enough to cause injury    Skin rash    Fever of 101.4 F (38.5 C) or higher     2801-5686 [employer]. [address] [place] [address], [place] [id number]. All rights reserved. This information is not intended as a substitute for professional medical care. Always follow your healthcare professional's instructions.

## 2017-05-16 ENCOUNTER — TELEPHONE (OUTPATIENT)
Dept: PEDIATRICS | Facility: OTHER | Age: 57
End: 2017-05-16

## 2017-05-16 NOTE — TELEPHONE ENCOUNTER
9:31 AM    Reason for Call: OVERBOOK    Patient is having the following symptoms: ER follow up/poss stroke/RT leg and RT hand numbness for 3 days.    The patient is requesting an appointment for ASAP with Dr Fisher.    Was an appointment offered for this call? Yes    Preferred method for responding to this message: Telephone Call   [phone]    If we cannot reach you directly, may we leave a detailed response at the number you provided? Yes    Can this message wait until your PCP/provider returns, if unavailable today? Not applicable    Janett Knight

## 2017-05-16 NOTE — TELEPHONE ENCOUNTER
Please schedule patient for date/time: Please schedule for Thursday 5-18-17 at 8:00 arrive 7:40    Have patient go to ER/Urgent Care Center. Urgent Care hours are 9:30 am to 8 pm, open 7 days a week. No.    Provider will call patient.No.    Other:

## 2017-05-18 LAB — PAIN DRUG SCR UR W RPTD MEDS: NORMAL

## 2017-05-22 ENCOUNTER — TELEPHONE (OUTPATIENT)
Dept: PEDIATRICS | Facility: OTHER | Age: 57
End: 2017-05-22

## 2017-05-22 NOTE — TELEPHONE ENCOUNTER
Called patient to advise him that he will be scheduled as a next available as this is the second scheduled visit the patient has no showed for. Patient was not available, writer left voicemail explaining this as well as provided him with the follow up date he has been scheduled for.

## 2017-05-22 NOTE — TELEPHONE ENCOUNTER
3:12 PM    Reason for Call: OVERBOOK    Patient is having the following symptoms: Follow up ER - pain/numbess for 2 weeks.    The patient is requesting an appointment for overbook with Dr Fisher.    Was an appointment offered for this call? Yes 6-16-17 pt declined - had appointment scheduled today but did not show up because he fell asleep    Preferred method for responding to this message: Telephone Call - [phone]    If we cannot reach you directly, may we leave a detailed response at the number you provided? Yes    Can this message wait until your PCP/provider returns, if unavailable today? Not applicable, provider is in today    Catalina Francois

## 2017-05-23 ENCOUNTER — OFFICE VISIT (OUTPATIENT)
Dept: PEDIATRICS | Facility: OTHER | Age: 57
End: 2017-05-23
Attending: INTERNAL MEDICINE
Payer: COMMERCIAL

## 2017-05-23 VITALS
WEIGHT: 218 LBS | TEMPERATURE: 98.1 F | RESPIRATION RATE: 20 BRPM | DIASTOLIC BLOOD PRESSURE: 70 MMHG | OXYGEN SATURATION: 95 % | HEART RATE: 82 BPM | BODY MASS INDEX: 31.28 KG/M2 | SYSTOLIC BLOOD PRESSURE: 108 MMHG

## 2017-05-23 DIAGNOSIS — G89.4 CHRONIC PAIN SYNDROME: ICD-10-CM

## 2017-05-23 DIAGNOSIS — M51.379 DEGENERATION OF LUMBAR OR LUMBOSACRAL INTERVERTEBRAL DISC: Primary | ICD-10-CM

## 2017-05-23 DIAGNOSIS — R56.9 CONVULSIONS, UNSPECIFIED CONVULSION TYPE (H): ICD-10-CM

## 2017-05-23 DIAGNOSIS — G40.909 SEIZURE DISORDER (H): ICD-10-CM

## 2017-05-23 LAB
ALBUMIN SERPL-MCNC: 3.6 G/DL (ref 3.4–5)
ALP SERPL-CCNC: 65 U/L (ref 40–150)
ALT SERPL W P-5'-P-CCNC: 73 U/L (ref 0–70)
ANION GAP SERPL CALCULATED.3IONS-SCNC: 10 MMOL/L (ref 3–14)
AST SERPL W P-5'-P-CCNC: 26 U/L (ref 0–45)
BILIRUB SERPL-MCNC: 0.2 MG/DL (ref 0.2–1.3)
BUN SERPL-MCNC: 21 MG/DL (ref 7–30)
CALCIUM SERPL-MCNC: 8.4 MG/DL (ref 8.5–10.1)
CHLORIDE SERPL-SCNC: 106 MMOL/L (ref 94–109)
CO2 SERPL-SCNC: 27 MMOL/L (ref 20–32)
CREAT SERPL-MCNC: 0.71 MG/DL (ref 0.66–1.25)
GFR SERPL CREATININE-BSD FRML MDRD: >90 ML/MIN/1.7M2
GLUCOSE SERPL-MCNC: 130 MG/DL (ref 70–99)
POTASSIUM SERPL-SCNC: 4.2 MMOL/L (ref 3.4–5.3)
PROT SERPL-MCNC: 6.3 G/DL (ref 6.8–8.8)
SODIUM SERPL-SCNC: 143 MMOL/L (ref 133–144)

## 2017-05-23 PROCEDURE — 99214 OFFICE O/P EST MOD 30 MIN: CPT | Performed by: INTERNAL MEDICINE

## 2017-05-23 PROCEDURE — 99212 OFFICE O/P EST SF 10 MIN: CPT

## 2017-05-23 PROCEDURE — 80183 DRUG SCRN QUANT OXCARBAZEPIN: CPT | Mod: ZL | Performed by: INTERNAL MEDICINE

## 2017-05-23 PROCEDURE — 36415 COLL VENOUS BLD VENIPUNCTURE: CPT | Mod: ZL | Performed by: INTERNAL MEDICINE

## 2017-05-23 PROCEDURE — 99000 SPECIMEN HANDLING OFFICE-LAB: CPT | Mod: ZL | Performed by: INTERNAL MEDICINE

## 2017-05-23 PROCEDURE — 80053 COMPREHEN METABOLIC PANEL: CPT | Mod: ZL | Performed by: INTERNAL MEDICINE

## 2017-05-23 ASSESSMENT — ENCOUNTER SYMPTOMS
BACK PAIN: 1
COUGH: 0
HEADACHES: 0
DIZZINESS: 1
DIARRHEA: 0
CONSTIPATION: 0
HEARTBURN: 0
SHORTNESS OF BREATH: 0
MEMORY LOSS: 1
WHEEZING: 0
FEVER: 0
HEMATURIA: 0
VOMITING: 0
NAUSEA: 0
CHILLS: 0
BLOOD IN STOOL: 0
PALPITATIONS: 0
DYSURIA: 0
SENSORY CHANGE: 1
ABDOMINAL PAIN: 0

## 2017-05-23 ASSESSMENT — PAIN SCALES - GENERAL: PAINLEVEL: SEVERE PAIN (6)

## 2017-05-23 NOTE — LETTER
[facility]    05/23/17    Patient: Joshua Barron  YOB: 1960  Medical Record Number: [id number]                                                                  Controlled Substance Agreement  I understand that my care provider has prescribed controlled substances (narcotics, tranquilizers, and/or stimulants) to help manage my condition(s).  I am taking this medicine to help me function or work.  I know that this is strong medicine.  It could have serious side effects and even cause a dependency on the drug.  If I stop these medicines suddenly, I could have severe withdrawal symptoms.    The risks, benefits, and side effects of these medication(s) were explained to me.  I agree that:  1. I will take part in other treatments as advised by my provider.  This may be psychiatry or counseling, physical therapy, behavioral therapy, group treatment, or a referral to a pain clinic.  I will reduce or stop my medicine when my provider tells me to do so.   2. I will take my medicines as prescribed.  I will not change the dose or schedule unless my provider tells me to.  There will be no refills if I  run out early.   I may be contacted at any time without warning and asked to complete a drug test or pill count.   3. I will keep all my appointments at the clinic.  If I miss appointments or fail to follow instructions, my provider may stop my medicine.  4. I will not ask other providers to prescribe controlled substances. And I will not accept controlled substances from other people. If I need another prescribed controlled substance for a new reason, I will notify my provider within one business day.  5. If I enroll in the [place] Medical Marijuana program, I will tell my provider.  I will also sign an agreement to share my medical records with my provider.  6. I will use one pharmacy to fill all of my controlled substance prescriptions.  If my prescription is mailed to my pharmacy, it may take 5 to 7  days for my medicine to be ready.  7. I understand that my provider, clinic care team, and pharmacy can track controlled substance prescriptions from other providers through a central database (prescription monitoring program).  8. I will bring in my list of medications (or my medicine bottles) each time I come to the clinic.  REV-  04/2016                                                                                                                                            Page 1 of 2      [facility]    05/23/17    Patient: Joshua Barron  YOB: 1960  Medical Record Number: [id number]    9. Refills of controlled substances will be made only during office hours.  It is up to me to make sure that I do not run out of my medicines on weekends or holidays.    10. I am responsible for my prescriptions.  If the medicine is lost or stolen, it will not be replaced.   I also agree not to share these medicines with anyone.  11. I agree to not use ANY illegal or recreational drugs.  This includes marijuana, cocaine, bath salts or other drugs.  I agree not to use alcohol unless my provider says I may.  I agree to give urine samples whenever asked.  If I fail to give a urine sample, the provider may stop my medicine.     12. I will tell my nurse or provider right away if I become pregnant or have a new medical problem treated outside of [facility].  13. I understand that this medicine can affect my thinking and judgment.  It may be unsafe for me to drive, use machinery and do dangerous tasks.  I will not do any of these things until I know how the medicine affects me.  If my dose changes, I will wait to see how it affects me.  I will contact my provider if I have concerns about medicine side effects.  I understand that if I do not follow any of the conditions above, my prescriptions or treatment may be stopped.    I agree that my provider, clinic care team, and pharmacy may work with any city,  state or federal law enforcement agency that investigates the misuse, sale, or other diversion of my controlled medicine. I will allow my provider to discuss my care with or share a copy of this agreement with any other treating provider, pharmacy or emergency room where I receive care.  I agree to give up (waive) any right of privacy or confidentiality with respect to these authorizations.   I have read this agreement and have asked questions about anything I did not understand.   ___________________________________    ___________________________  Patient Signature                                                           Date and Time  ___________________________________     ____________________________  Witness                                                                            Date and Time  ___________________________________  Lyle Fisher DO, DO  REV- 04/2016                                                                                                                                                                 Page 2 of 2
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
## 2017-05-23 NOTE — NURSING NOTE
"Chief Complaint   Patient presents with     ER F/U     seizures       Initial /70 (BP Location: Right arm, Patient Position: Chair, Cuff Size: Adult Large)  Pulse 82  Temp 98.1  F (36.7  C) (Tympanic)  Resp 20  Wt 218 lb (98.9 kg)  SpO2 95%  BMI 31.28 kg/m2 Estimated body mass index is 31.28 kg/(m^2) as calculated from the following:    Height as of 5/10/17: 5' 10\" (1.778 m).    Weight as of this encounter: 218 lb (98.9 kg).  Medication Reconciliation: complete   Chelo Rader LPN      "

## 2017-05-23 NOTE — MR AVS SNAPSHOT
After Visit Summary   5/23/2017    Joshua Barron    MRN: [id number]           Patient Information     Date Of Birth          1960        Visit Information        Provider Department      5/23/2017 9:00 AM Lyle Fisher DO [facility] [place]        Today's Diagnoses     Degeneration of lumbar or lumbosacral intervertebral disc    -  1    Seizure disorder (H)        Chronic pain disorder        Convulsions, unspecified convulsion type (H)           Follow-ups after your visit        Follow-up notes from your care team     Return in about 2 weeks (around 6/6/2017), or fatigue and drowsiness.      Your next 10 appointments already scheduled     Jun 19, 2017  3:00 PM CDT   (Arrive by 2:45 PM)   Return Visit with Laquita Fernandez MD   [facility] ([facility] [place] [facility])    [address] [place] [address]  [place] [address] [phone]            Jun 27, 2017  3:20 PM CDT   (Arrive by 3:00 PM)   SHORT with Lyle Fisher DO   [facility] [place] ([facility] [place] [facility])    [address]  [place] [address]   [phone]            Mar 07, 2018  2:45 PM CST   (Arrive by 2:30 PM)   Hearing Eval with Kevin Moreno   [facility] ([facility] [place] [facility])    [address] [place] [address]   [phone]              Future tests that were ordered for you today     Open Future Orders        Priority Expected Expires Ordered    EEG AWAKE & DROWSY Routine  6/20/2017 5/23/2017            Who to contact     If you have questions or need follow up information about today's clinic visit or your schedule please contact [facility] directly at [phone].  Normal or non-critical lab and imaging results will be communicated to you by MyChart, letter or phone within 4 business days after the clinic has received the results. If you do not hear from us within 7 days, please contact the clinic through MyChart or phone. If you have a critical or  "abnormal lab result, we will notify you by phone as soon as possible.  Submit refill requests through [employer] or call your pharmacy and they will forward the refill request to us. Please allow 3 business days for your refill to be completed.          Additional Information About Your Visit        [employer] Information     [employer] lets you send messages to your doctor, view your test results, renew your prescriptions, schedule appointments and more. To sign up, go to [email or web address] . Click on \"Log in\" on the left side of the screen, which will take you to the Welcome page. Then click on \"Sign up Now\" on the right side of the page.     You will be asked to enter the access code listed below, as well as some personal information. Please follow the directions to create your username and password.     Your access code is: [email or web address]  Expires: 2017  9:31 AM     Your access code will  in 90 days. If you need help or a new code, please call your [facility] clinic or [phone].        Care EveryWhere ID     This is your Care EveryWhere ID. This could be used by other organizations to access your [facility] medical records  [id number]        Your Vitals Were     Pulse Temperature Respirations Pulse Oximetry BMI (Body Mass Index)       82 98.1  F (36.7  C) (Tympanic) 20 95% 31.28 kg/m2        Blood Pressure from Last 3 Encounters:   17 108/70   05/15/17 142/92   05/10/17 122/76    Weight from Last 3 Encounters:   17 218 lb (98.9 kg)   05/10/17 227 lb (103 kg)   17 224 lb (101.6 kg)              We Performed the Following     Comprehensive metabolic panel     Oxcarbazepine level          Today's Medication Changes          These changes are accurate as of: 17  9:32 AM.  If you have any questions, ask your nurse or doctor.               Stop taking these medicines if you haven't already. Please contact your care team if you have questions.     nortriptyline 50 MG capsule   Commonly " known as:  PAMELOR   Stopped by:  Lyle Fisher DO           traMADol 50 MG tablet   Commonly known as:  ULTRAM   Stopped by:  Lyle Fisher DO                    Primary Care Provider Office Phone # Fax #    Lyle Martinez DO Natalia [phone]       [facility] [place] [address]  [place] [address]        Thank you!     Thank you for choosing [facility] [place]  for your care. Our goal is always to provide you with excellent care. Hearing back from our patients is one way we can continue to improve our services. Please take a few minutes to complete the written survey that you may receive in the mail after your visit with us. Thank you!             Your Updated Medication List - Protect others around you: Learn how to safely use, store and throw away your medicines at www.disposemymeds.org.          This list is accurate as of: 5/23/17  9:32 AM.  Always use your most recent med list.                   Brand Name Dispense Instructions for use    acetaminophen 325 MG tablet    NON-ASPIRIN PAIN RELIEF    200 tablet    TAKE 2 TABLETS BY MOUTH EVERY 4 HOURS AS NEEDED FOR MILD PAIN       * baclofen 10 MG tablet    LIORESAL    90 tablet    TAKE 1 TABLET BY MOUTH THREE TIMES DAILY       * baclofen 10 MG tablet    LIORESAL    90 tablet    TAKE 1 TABLET BY MOUTH THREE TIMES DAILY       blood glucose monitoring meter device kit     1 kit    Use to test blood sugars 3 times daily or as directed.       blood glucose monitoring test strip    ONE TOUCH ULTRA    300 strip    Use to test blood sugars 3 times daily or as directed.       diazepam 5 MG tablet    VALIUM    60 tablet    TAKE 1 TABLET BY MOUTH EVERY 12 HOURS AS NEEDED FOR ANXIETY OR SLEEP SPASM       diclofenac 50 MG EC tablet    VOLTAREN    90 tablet    TAKE 1 TABLET BY MOUTH 3 TIMES DAILY       DULERA 100-5 MCG/ACT oral inhaler   Generic drug:  mometasone-formoterol     13 g    INHALE 2 PUFFS INTO THE LUNGS 2  TIMES A DAY       fentaNYL 75 mcg/hr 72 hr patch    DURAGESIC    15 patch    APPLY 1 PATCH EVERY 48 HOURS       fluticasone 50 MCG/ACT spray    FLONASE    16 g    USE 2 SPRAYS IN EACH NOSTRIL DAILY       gabapentin 300 MG capsule    NEURONTIN    270 capsule    TAKE 3 CAPSULES BY MOUTH THREE TIMES DAILY       HYDROcodone-acetaminophen  MG per tablet    NORCO    80 tablet    TAKE 1 TABLET BY MOUTH EVERY 8 HOURS AS NEEDED FOR MODERATE TO SEVEREPAIN       ibuprofen 600 MG tablet    ADVIL/MOTRIN    120 tablet    TAKE 1 TABLET BY MOUTH EVERY 6 HOURS AS NEEDED       lidocaine 5 % Patch    LIDODERM    90 patch    PLACE 3 PATCHES ONTO THE SKIN DAILY AS NEEDED FOR MODERATE PAIN       lisdexamfetamine 60 MG capsule    VYVANSE    30 capsule    Take 1 capsule (60 mg) by mouth every morning       multivitamin  with iron Tabs     90 tablet    TAKE 1 TABLET BY MOUTH DAILY       nicotine polacrilex 2 MG gum    NICORETTE    110 each    CHEW 1 PIECE AS NEEDED FOR SMOKING CESSATION       omeprazole 20 MG CR capsule    priLOSEC    30 capsule    TAKE 1 CAPSULE BY MOUTH EVERY DAY BEFORE A MEAL       * order for DME     2 Box    Equipment being ordered: Large gloves       * order for DME     1 Units    Equipment being ordered: Boa Back brace       OXcarbazepine 300 MG tablet    TRILEPTAL    60 tablet    TAKE 1 TABLET BY MOUTH 2 TIMES DAILY       rosuvastatin 10 MG tablet    CRESTOR    90 tablet    Take 1 tablet (10 mg) by mouth daily       [facility] ASPIRIN 81 MG chewable tablet   Generic drug:  aspirin     30 tablet    CHEW AND SWALLOW 1 TABLET BY MOUTH DAILY       traZODone 50 MG tablet    DESYREL    30 tablet    TAKE 1 TABLET BY MOUTH NIGHTLY AS NEEDED FOR SLEEP       VENTOLIN  (90 BASE) MCG/ACT Inhaler   Generic drug:  albuterol     18 g    USE 2 PUFFS 4 TIMES A DAY AS NEEDED FOR SHORTNESS OF BREATH       * Notice:  This list has 4 medication(s) that are the same as other medications prescribed for you. Read the  directions carefully, and ask your doctor or other care provider to review them with you.

## 2017-05-24 ENCOUNTER — TELEPHONE (OUTPATIENT)
Dept: PEDIATRICS | Facility: OTHER | Age: 57
End: 2017-05-24

## 2017-05-24 NOTE — TELEPHONE ENCOUNTER
Reason for call:  RESULTS    1. What is the test that was ordered? Labs (pt unsure of specific lab)  2. Who ordered your test? Dr Fisher  3. When was the test performed?  05/23/17  Description: Pt called to check on status of labs? Stated someone did try to call his cell phone but there was no message. Please call him back at [phone]  Was an appointment offered for this a call? no  Preferred method for responding to this message: Telephone Call  If we cannot reach you directly, may we leave a detailed response at the number you provided? Yes  Can this message wait until your PCP/Provider returns if not available today? Not applicable

## 2017-05-25 DIAGNOSIS — G63 POLYNEUROPATHY ASSOCIATED WITH UNDERLYING DISEASE (H): ICD-10-CM

## 2017-05-25 DIAGNOSIS — M54.5 LOW BACK PAIN, UNSPECIFIED BACK PAIN LATERALITY, UNSPECIFIED CHRONICITY, WITH SCIATICA PRESENCE UNSPECIFIED: ICD-10-CM

## 2017-05-25 LAB — 10OH-CARBAZEPINE SERPL-MCNC: 6.3 UG/ML

## 2017-05-26 ENCOUNTER — TELEPHONE (OUTPATIENT)
Dept: INTERNAL MEDICINE | Facility: OTHER | Age: 57
End: 2017-05-26

## 2017-05-26 DIAGNOSIS — F31.0 BIPOLAR AFFECTIVE DISORDER, CURRENT EPISODE HYPOMANIC (H): ICD-10-CM

## 2017-05-26 RX ORDER — BACLOFEN 10 MG/1
TABLET ORAL
Qty: 90 TABLET | Refills: 0 | OUTPATIENT
Start: 2017-05-26

## 2017-05-26 RX ORDER — GABAPENTIN 300 MG/1
CAPSULE ORAL
Qty: 270 CAPSULE | Refills: 0 | OUTPATIENT
Start: 2017-05-26

## 2017-05-26 RX ORDER — OXCARBAZEPINE 300 MG/1
600 TABLET, FILM COATED ORAL 2 TIMES DAILY
Qty: 60 TABLET | Refills: 1 | Status: SHIPPED | OUTPATIENT
Start: 2017-05-26 | End: 2017-06-06

## 2017-05-26 NOTE — TELEPHONE ENCOUNTER
5:01 PM    Reason for Call: Phone Call    Description: Pt called and would like a call back regarding his medication.    Was an appointment offered for this call? No    Preferred method for responding to this message: Telephone Call    If we cannot reach you directly, may we leave a detailed response at the number you provided? Yes    Can this message wait until your PCP/provider returns, if available today? YES, Not applicable,     Edna Elliott

## 2017-05-30 ENCOUNTER — TELEPHONE (OUTPATIENT)
Dept: INTERNAL MEDICINE | Facility: OTHER | Age: 57
End: 2017-05-30

## 2017-05-30 NOTE — TELEPHONE ENCOUNTER
Patient states he put in a refill and it didn't get refilled. I think it went to Dr. Mayers. This is regarding tramadol. Please advise.
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
## 2017-05-30 NOTE — TELEPHONE ENCOUNTER
This needs to be addressed at an office visit, not over the phone. Please schedule him with the next available.

## 2017-05-30 NOTE — TELEPHONE ENCOUNTER
Reason for call:  Medication    1. Medication Name? gabapentin (NEURONTIN) 300 MG capsule  2. Is this request for a refill? No  3. What Pharmacy do you use? n/a  4. Have you contacted your pharmacy? n/a    5. If yes, when?  (Please note that the turn-around-time for prescriptions is 72 business hours; I am sending your request at this time. SEND TO  [facility]  )  Description: Pt calling stating that the medication listed is no longer effective and is requesting a different medication.  Pt is upset that the medication isn't resolving the pain he is in.  Pt states his pain level is at an 8 on a scale of 1-10.  Nurse please advise.  Was an appointment offered for this a call? No   Preferred method for responding to this message Telephone call: [phone] house phone.  Cell phone is out of minutes-do not call this.  If we cannot reach you directly, may we leave a detailed response at the number you provided? Yes  Can this message wait until your PCP/Provider returns if not available today? Not applicable, DR. Fisher is in today.

## 2017-05-31 ENCOUNTER — TELEPHONE (OUTPATIENT)
Dept: PEDIATRICS | Facility: OTHER | Age: 57
End: 2017-05-31

## 2017-05-31 ENCOUNTER — APPOINTMENT (OUTPATIENT)
Dept: PEDIATRICS | Facility: OTHER | Age: 57
End: 2017-05-31
Attending: INTERNAL MEDICINE
Payer: COMMERCIAL

## 2017-06-01 ENCOUNTER — TELEPHONE (OUTPATIENT)
Dept: PEDIATRICS | Facility: OTHER | Age: 57
End: 2017-06-01

## 2017-06-01 DIAGNOSIS — M54.5 LOW BACK PAIN, UNSPECIFIED BACK PAIN LATERALITY, UNSPECIFIED CHRONICITY, WITH SCIATICA PRESENCE UNSPECIFIED: ICD-10-CM

## 2017-06-01 DIAGNOSIS — M50.30 DDD (DEGENERATIVE DISC DISEASE), CERVICAL: ICD-10-CM

## 2017-06-01 NOTE — TELEPHONE ENCOUNTER
11:59 AM    Reason for Call: Phone Call    Description: Patient is inquiring if Dr Fisher received a letter from Dr Pettit regarding explanation of thorax degeneration and possible surgery? Patient never received a letter and called Dr Pettit's office and was informed of information. If you could call back at your earliest convenience   [phone]  Was an appointment offered for this call? No    Preferred method for responding to this message: Telephone Call    If we cannot reach you directly, may we leave a detailed response at the number you provided? Yes    Can this message wait until your PCP/provider returns, if available today? YES    Brenda Kaplan

## 2017-06-02 RX ORDER — BACLOFEN 10 MG/1
TABLET ORAL
Qty: 90 TABLET | Refills: 0 | OUTPATIENT
Start: 2017-06-02

## 2017-06-02 RX ORDER — FENTANYL 75 UG/1
PATCH TRANSDERMAL
Qty: 11 PATCH | Refills: 0 | OUTPATIENT
Start: 2017-06-02

## 2017-06-02 RX ORDER — HYDROCODONE BITARTRATE AND ACETAMINOPHEN 10; 325 MG/1; MG/1
TABLET ORAL
Qty: 80 TABLET | Refills: 0 | OUTPATIENT
Start: 2017-06-02

## 2017-06-05 DIAGNOSIS — M54.50 MIDLINE LOW BACK PAIN WITHOUT SCIATICA: ICD-10-CM

## 2017-06-06 ENCOUNTER — MEDICAL CORRESPONDENCE (OUTPATIENT)
Dept: HEALTH INFORMATION MANAGEMENT | Facility: CLINIC | Age: 57
End: 2017-06-06

## 2017-06-06 ENCOUNTER — OFFICE VISIT (OUTPATIENT)
Dept: PEDIATRICS | Facility: OTHER | Age: 57
End: 2017-06-06
Attending: INTERNAL MEDICINE
Payer: COMMERCIAL

## 2017-06-06 ENCOUNTER — TRANSFERRED RECORDS (OUTPATIENT)
Dept: HEALTH INFORMATION MANAGEMENT | Facility: CLINIC | Age: 57
End: 2017-06-06

## 2017-06-06 VITALS
TEMPERATURE: 99.1 F | OXYGEN SATURATION: 98 % | SYSTOLIC BLOOD PRESSURE: 122 MMHG | DIASTOLIC BLOOD PRESSURE: 90 MMHG | HEART RATE: 70 BPM

## 2017-06-06 DIAGNOSIS — M50.30 DDD (DEGENERATIVE DISC DISEASE), CERVICAL: ICD-10-CM

## 2017-06-06 DIAGNOSIS — M62.830 BACK MUSCLE SPASM: ICD-10-CM

## 2017-06-06 DIAGNOSIS — F31.0 BIPOLAR AFFECTIVE DISORDER, CURRENT EPISODE HYPOMANIC (H): ICD-10-CM

## 2017-06-06 DIAGNOSIS — M54.5 LOW BACK PAIN, UNSPECIFIED BACK PAIN LATERALITY, UNSPECIFIED CHRONICITY, WITH SCIATICA PRESENCE UNSPECIFIED: ICD-10-CM

## 2017-06-06 DIAGNOSIS — G63 POLYNEUROPATHY ASSOCIATED WITH UNDERLYING DISEASE (H): ICD-10-CM

## 2017-06-06 DIAGNOSIS — G40.909 SEIZURE DISORDER (H): Primary | ICD-10-CM

## 2017-06-06 DIAGNOSIS — G89.4 CHRONIC PAIN SYNDROME: ICD-10-CM

## 2017-06-06 PROCEDURE — 99212 OFFICE O/P EST SF 10 MIN: CPT

## 2017-06-06 PROCEDURE — 99214 OFFICE O/P EST MOD 30 MIN: CPT | Performed by: INTERNAL MEDICINE

## 2017-06-06 RX ORDER — OXCARBAZEPINE 600 MG/1
600 TABLET, FILM COATED ORAL 2 TIMES DAILY
Qty: 60 TABLET | Refills: 3 | Status: SHIPPED | OUTPATIENT
Start: 2017-06-06 | End: 2017-10-09

## 2017-06-06 RX ORDER — BACLOFEN 20 MG/1
20 TABLET ORAL 3 TIMES DAILY
Qty: 90 TABLET | Refills: 3 | Status: SHIPPED | OUTPATIENT
Start: 2017-06-06 | End: 2017-08-26

## 2017-06-06 RX ORDER — FENTANYL 75 UG/1
PATCH TRANSDERMAL
Qty: 15 PATCH | Refills: 0 | Status: SHIPPED | OUTPATIENT
Start: 2017-06-06 | End: 2017-06-29

## 2017-06-06 RX ORDER — HYDROCODONE BITARTRATE AND ACETAMINOPHEN 10; 325 MG/1; MG/1
TABLET ORAL
Qty: 120 TABLET | Refills: 0 | Status: SHIPPED | OUTPATIENT
Start: 2017-06-06 | End: 2017-07-05

## 2017-06-06 ASSESSMENT — ENCOUNTER SYMPTOMS
BACK PAIN: 1
CONSTIPATION: 0
BLOOD IN STOOL: 0
COUGH: 0
NAUSEA: 0
DYSURIA: 0
SHORTNESS OF BREATH: 0
SENSORY CHANGE: 1
PALPITATIONS: 0
HEADACHES: 0
HEMATURIA: 0
FEVER: 0
ABDOMINAL PAIN: 0
VOMITING: 0
DIARRHEA: 0
WHEEZING: 0
CHILLS: 0
DIZZINESS: 0
HEARTBURN: 0

## 2017-06-06 ASSESSMENT — PAIN SCALES - GENERAL: PAINLEVEL: EXTREME PAIN (8)

## 2017-06-06 NOTE — NURSING NOTE
"Chief Complaint   Patient presents with     Back Pain       Initial /90  Pulse 70  Temp 99.1  F (37.3  C)  SpO2 98% Estimated body mass index is 31.28 kg/(m^2) as calculated from the following:    Height as of 5/10/17: 5' 10\" (1.778 m).    Weight as of 5/23/17: 218 lb (98.9 kg).  Medication Reconciliation: princess Boogie      "
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
## 2017-06-06 NOTE — MR AVS SNAPSHOT
After Visit Summary   6/6/2017    Joshua Barron    MRN: [id number]           Patient Information     Date Of Birth          1960        Visit Information        Provider Department      6/6/2017 11:40 AM Lyle Fisher DO [facility]        Today's Diagnoses     Seizure disorder (H)    -  1    Low back pain, unspecified back pain laterality, unspecified chronicity, with sciatica presence unspecified        Bipolar affective disorder, current episode hypomanic (H)        DDD (degenerative disc disease), cervical        Back muscle spasm           Follow-ups after your visit        Your next 10 appointments already scheduled     Jun 07, 2017 11:20 AM CDT   Return Visit with Shamar Escamilla DC   [facility] [place] [facility]    [address]  [place] [address]   [phone]            Jun 19, 2017  3:00 PM CDT   (Arrive by 2:45 PM)   Return Visit with Laquita Fernandez MD   [facility] [place] [facility]    [address]  [place] [id number] [phone]            Jun 27, 2017  3:20 PM CDT   (Arrive by 3:00 PM)   SHORT with Lyle Fisher DO   [facility] [place] [facility]    [address]  [place] [address]   [phone]            Mar 07, 2018  2:45 PM CST   (Arrive by 2:30 PM)   Hearing Eval with Kevin Moreno   [facility] [place] [facility]    [address]  [place] [address]   [phone]              Who to contact     If you have questions or need follow up information about today's clinic visit or your schedule please contact [facility] directly at [phone].  Normal or non-critical lab and imaging results will be communicated to you by MyChart, letter or phone within 4 business days after the clinic has received the results. If you do not hear from us within 7 days, please contact the clinic through MyChart or phone. If you have a critical or  "abnormal lab result, we will notify you by phone as soon as possible.  Submit refill requests through Lexy or call your pharmacy and they will forward the refill request to us. Please allow 3 business days for your refill to be completed.          Additional Information About Your Visit        [email or web address] Information     Lexy lets you send messages to your doctor, view your test results, renew your prescriptions, schedule appointments and more. To sign up, go to [email or web address] . Click on \"Log in\" on the left side of the screen, which will take you to the Welcome page. Then click on \"Sign up Now\" on the right side of the page.     You will be asked to enter the access code listed below, as well as some personal information. Please follow the directions to create your username and password.     Your access code is: [email or web address]  Expires: 2017  9:31 AM     Your access code will  in 90 days. If you need help or a new code, please call your [place] clinic or [phone].        Care EveryWhere ID     This is your Care EveryWhere ID. This could be used by other organizations to access your [place] medical records  [id number]        Your Vitals Were     Pulse Temperature Pulse Oximetry             70 99.1  F (37.3  C) 98%          Blood Pressure from Last 3 Encounters:   17 122/90   17 108/70   05/15/17 142/92    Weight from Last 3 Encounters:   17 218 lb (98.9 kg)   05/10/17 227 lb (103 kg)   17 224 lb (101.6 kg)              Today, you had the following     No orders found for display         Today's Medication Changes          These changes are accurate as of: 17  4:18 PM.  If you have any questions, ask your nurse or doctor.               These medicines have changed or have updated prescriptions.        Dose/Directions    * baclofen 10 MG tablet   Commonly known as:  LIORESAL   This may have changed:  Another medication with the same name was changed. Make sure " you understand how and when to take each.   Used for:  Low back pain, unspecified back pain laterality, unspecified chronicity, with sciatica presence unspecified   Changed by:  Lyle Fisher DO        TAKE 1 TABLET BY MOUTH THREE TIMES DAILY   Quantity:  90 tablet   Refills:  0       * baclofen 20 MG tablet   Commonly known as:  LIORESAL   This may have changed:  See the new instructions.   Used for:  Back muscle spasm   Changed by:  Lyle Fisher DO        Dose:  20 mg   Take 1 tablet (20 mg) by mouth 3 times daily   Quantity:  90 tablet   Refills:  3       HYDROcodone-acetaminophen  MG per tablet   Commonly known as:  NORCO   This may have changed:  See the new instructions.   Used for:  Low back pain, unspecified back pain laterality, unspecified chronicity, with sciatica presence unspecified   Changed by:  Lyle Fisher DO        TAKE 1 TABLET BY MOUTH EVERY 6 HOURS AS NEEDED FOR MODERATE TO SEVEREPAIN   Quantity:  120 tablet   Refills:  0       OXcarbazepine 600 MG tablet   Commonly known as:  TRILEPTAL   This may have changed:  medication strength   Used for:  Bipolar affective disorder, current episode hypomanic (H)   Changed by:  Lyle Fisher DO        Dose:  600 mg   Take 1 tablet (600 mg) by mouth 2 times daily   Quantity:  60 tablet   Refills:  3       * Notice:  This list has 2 medication(s) that are the same as other medications prescribed for you. Read the directions carefully, and ask your doctor or other care provider to review them with you.         Where to get your medicines      These medications were sent to [facility] - [address]  [address] [place] [address]     Phone:  [phone]     baclofen 20 MG tablet    OXcarbazepine 600 MG tablet         Some of these will need a paper prescription and others can be bought over the counter.  Ask your nurse if you have questions.     Bring a paper prescription  for each of these medications     fentaNYL 75 mcg/hr 72 hr patch    HYDROcodone-acetaminophen  MG per tablet                Primary Care Provider Office Phone # Fax #    Lyle Fisher [phone]       [facility] [place] [address]  [place] [address]        Thank you!     Thank you for choosing [facility] [place]  for your care. Our goal is always to provide you with excellent care. Hearing back from our patients is one way we can continue to improve our services. Please take a few minutes to complete the written survey that you may receive in the mail after your visit with us. Thank you!             Your Updated Medication List - Protect others around you: Learn how to safely use, store and throw away your medicines at www.disposemymeds.org.          This list is accurate as of: 6/6/17  4:18 PM.  Always use your most recent med list.                   Brand Name Dispense Instructions for use    acetaminophen 325 MG tablet    NON-ASPIRIN PAIN RELIEF    200 tablet    TAKE 2 TABLETS BY MOUTH EVERY 4 HOURS AS NEEDED FOR MILD PAIN       * baclofen 10 MG tablet    LIORESAL    90 tablet    TAKE 1 TABLET BY MOUTH THREE TIMES DAILY       * baclofen 20 MG tablet    LIORESAL    90 tablet    Take 1 tablet (20 mg) by mouth 3 times daily       blood glucose monitoring meter device kit     1 kit    Use to test blood sugars 3 times daily or as directed.       blood glucose monitoring test strip    ONE TOUCH ULTRA    300 strip    Use to test blood sugars 3 times daily or as directed.       diazepam 5 MG tablet    VALIUM    60 tablet    TAKE 1 TABLET BY MOUTH EVERY 12 HOURS AS NEEDED FOR ANXIETY OR SLEEP SPASM       diclofenac 50 MG EC tablet    VOLTAREN    90 tablet    TAKE 1 TABLET BY MOUTH 3 TIMES DAILY       DULERA 100-5 MCG/ACT oral inhaler   Generic drug:  mometasone-formoterol     13 g    INHALE 2 PUFFS INTO THE LUNGS 2 TIMES A DAY       fentaNYL 75 mcg/hr 72 hr patch     DURAGESIC    15 patch    APPLY 1 PATCH EVERY 48 HOURS       fluticasone 50 MCG/ACT spray    FLONASE    16 g    USE 2 SPRAYS IN EACH NOSTRIL DAILY       gabapentin 300 MG capsule    NEURONTIN    270 capsule    TAKE 3 CAPSULES BY MOUTH THREE TIMES DAILY       HYDROcodone-acetaminophen  MG per tablet    NORCO    120 tablet    TAKE 1 TABLET BY MOUTH EVERY 6 HOURS AS NEEDED FOR MODERATE TO SEVEREPAIN       ibuprofen 600 MG tablet    ADVIL/MOTRIN    120 tablet    TAKE 1 TABLET BY MOUTH EVERY 6 HOURS AS NEEDED       lidocaine 5 % Patch    LIDODERM    90 patch    PLACE 3 PATCHES ONTO THE SKIN DAILY AS NEEDED FOR MODERATE PAIN       lisdexamfetamine 60 MG capsule    VYVANSE    30 capsule    Take 1 capsule (60 mg) by mouth every morning       multivitamin  with iron Tabs     90 tablet    TAKE 1 TABLET BY MOUTH DAILY       nicotine polacrilex 2 MG gum    NICORETTE    110 each    CHEW 1 PIECE AS NEEDED FOR SMOKING CESSATION       omeprazole 20 MG CR capsule    priLOSEC    30 capsule    TAKE 1 CAPSULE BY MOUTH EVERY DAY BEFORE A MEAL       * order for DME     2 Box    Equipment being ordered: Large gloves       * order for DME     1 Units    Equipment being ordered: Boa Back brace       OXcarbazepine 600 MG tablet    TRILEPTAL    60 tablet    Take 1 tablet (600 mg) by mouth 2 times daily       rosuvastatin 10 MG tablet    CRESTOR    90 tablet    Take 1 tablet (10 mg) by mouth daily       SM CHILDRENS ASPIRIN 81 MG chewable tablet   Generic drug:  aspirin     30 tablet    CHEW AND SWALLOW 1 TABLET BY MOUTH DAILY       traZODone 50 MG tablet    DESYREL    30 tablet    TAKE 1 TABLET BY MOUTH NIGHTLY AS NEEDED FOR SLEEP       VENTOLIN  (90 BASE) MCG/ACT Inhaler   Generic drug:  albuterol     18 g    USE 2 PUFFS 4 TIMES A DAY AS NEEDED FOR SHORTNESS OF BREATH       * Notice:  This list has 4 medication(s) that are the same as other medications prescribed for you. Read the directions carefully, and ask your doctor or  other care provider to review them with you.

## 2017-06-07 ENCOUNTER — OFFICE VISIT (OUTPATIENT)
Dept: CHIROPRACTIC MEDICINE | Facility: OTHER | Age: 57
End: 2017-06-07
Attending: CHIROPRACTOR
Payer: COMMERCIAL

## 2017-06-07 DIAGNOSIS — M99.03 SEGMENTAL AND SOMATIC DYSFUNCTION OF LUMBAR REGION: Primary | ICD-10-CM

## 2017-06-07 DIAGNOSIS — M54.50 ACUTE BILATERAL LOW BACK PAIN WITHOUT SCIATICA: ICD-10-CM

## 2017-06-07 DIAGNOSIS — M99.01 SEGMENTAL AND SOMATIC DYSFUNCTION OF CERVICAL REGION: ICD-10-CM

## 2017-06-07 DIAGNOSIS — E78.2 MIXED HYPERLIPIDEMIA: ICD-10-CM

## 2017-06-07 DIAGNOSIS — M99.02 SEGMENTAL AND SOMATIC DYSFUNCTION OF THORACIC REGION: ICD-10-CM

## 2017-06-07 DIAGNOSIS — E11.42 TYPE 2 DIABETES MELLITUS WITH DIABETIC POLYNEUROPATHY (H): ICD-10-CM

## 2017-06-07 PROCEDURE — 98941 CHIROPRACT MANJ 3-4 REGIONS: CPT | Mod: AT | Performed by: CHIROPRACTOR

## 2017-06-07 RX ORDER — LIDOCAINE 50 MG/G
PATCH TOPICAL
Qty: 90 PATCH | Refills: 0 | Status: SHIPPED | OUTPATIENT
Start: 2017-06-07 | End: 2017-07-11

## 2017-06-07 NOTE — MR AVS SNAPSHOT
After Visit Summary   6/7/2017    Joshua Barron    MRN: [id number]           Patient Information     Date Of Birth          1960        Visit Information        Provider Department      6/7/2017 11:20 AM Shamar Escamilla DC [facility] [place] [facility]        Today's Diagnoses     Segmental and somatic dysfunction of lumbar region    -  1    Acute bilateral low back pain without sciatica        Segmental and somatic dysfunction of thoracic region        Segmental and somatic dysfunction of cervical region           Follow-ups after your visit        Your next 10 appointments already scheduled     Jun 19, 2017  3:00 PM CDT   (Arrive by 2:45 PM)   Return Visit with Laquita Fernandez MD   [facility] [place] [facility] [place] [facility]    [address] [place] [address]  [place] [address] [phone]            Jun 27, 2017  3:20 PM CDT   (Arrive by 3:00 PM)   SHORT with Lyle Fisher DO   [facility] [place] [facility] [place] [facility]    [address]  [place] [address]   [phone]            Mar 07, 2018  2:45 PM CST   (Arrive by 2:30 PM)   Hearing Eval with Kevin Moreno   [facility] [place] [facility] [place] [facility]    [address]  [place] [address]   [phone]              Who to contact     If you have questions or need follow up information about today's clinic visit or your schedule please contact  [facility] [place] directly at [phone].  Normal or non-critical lab and imaging results will be communicated to you by MyChart, letter or phone within 4 business days after the clinic has received the results. If you do not hear from us within 7 days, please contact the clinic through MyChart or phone. If you have a critical or abnormal lab result, we will notify you by phone as soon as possible.  Submit refill requests through Transmit or call your pharmacy and they will forward the refill request to us. Please allow 3 business days for your refill to be completed.     "      Additional Information About Your Visit        MyChart Information     [email or web address] lets you send messages to your doctor, view your test results, renew your prescriptions, schedule appointments and more. To sign up, go to [email or web address] . Click on \"Log in\" on the left side of the screen, which will take you to the Welcome page. Then click on \"Sign up Now\" on the right side of the page.     You will be asked to enter the access code listed below, as well as some personal information. Please follow the directions to create your username and password.     Your access code is: [email or web address]  Expires: 2017  9:31 AM     Your access code will  in 90 days. If you need help or a new code, please call your [place] clinic or [phone].        Care EveryWhere ID     This is your Care EveryWhere ID. This could be used by other organizations to access your [place] medical records  [id number]         Blood Pressure from Last 3 Encounters:   17 122/90   17 108/70   05/15/17 142/92    Weight from Last 3 Encounters:   17 218 lb (98.9 kg)   05/10/17 227 lb (103 kg)   17 224 lb (101.6 kg)              We Performed the Following     CHIROPRAC MANIP,SPINAL,3-4 REGIONS        Primary Care Provider Office Phone # Fax #    Lyle Juan Fisher [phone]       [facility] [place] [address] [place] [id number]        Thank you!     Thank you for choosing  [facility] [place] [facility]  for your care. Our goal is always to provide you with excellent care. Hearing back from our patients is one way we can continue to improve our services. Please take a few minutes to complete the written survey that you may receive in the mail after your visit with us. Thank you!             Your Updated Medication List - Protect others around you: Learn how to safely use, store and throw away your medicines at www.disposemymeds.org.          This list is accurate as of: 17 11:59 " PM.  Always use your most recent med list.                   Brand Name Dispense Instructions for use    acetaminophen 325 MG tablet    NON-ASPIRIN PAIN RELIEF    200 tablet    TAKE 2 TABLETS BY MOUTH EVERY 4 HOURS AS NEEDED FOR MILD PAIN       * baclofen 10 MG tablet    LIORESAL    90 tablet    TAKE 1 TABLET BY MOUTH THREE TIMES DAILY       * baclofen 20 MG tablet    LIORESAL    90 tablet    Take 1 tablet (20 mg) by mouth 3 times daily       blood glucose monitoring meter device kit     1 kit    Use to test blood sugars 3 times daily or as directed.       blood glucose monitoring test strip    ONE TOUCH ULTRA    300 strip    Use to test blood sugars 3 times daily or as directed.       diazepam 5 MG tablet    VALIUM    60 tablet    TAKE 1 TABLET BY MOUTH EVERY 12 HOURS AS NEEDED FOR ANXIETY OR SLEEP SPASM       diclofenac 50 MG EC tablet    VOLTAREN    90 tablet    TAKE 1 TABLET BY MOUTH 3 TIMES DAILY       DULERA 100-5 MCG/ACT oral inhaler   Generic drug:  mometasone-formoterol     13 g    INHALE 2 PUFFS INTO THE LUNGS 2 TIMES A DAY       fentaNYL 75 mcg/hr 72 hr patch    DURAGESIC    15 patch    APPLY 1 PATCH EVERY 48 HOURS       fluticasone 50 MCG/ACT spray    FLONASE    16 g    USE 2 SPRAYS IN EACH NOSTRIL DAILY       gabapentin 300 MG capsule    NEURONTIN    270 capsule    TAKE 3 CAPSULES BY MOUTH THREE TIMES DAILY       HYDROcodone-acetaminophen  MG per tablet    NORCO    120 tablet    TAKE 1 TABLET BY MOUTH EVERY 6 HOURS AS NEEDED FOR MODERATE TO SEVEREPAIN       ibuprofen 600 MG tablet    ADVIL/MOTRIN    120 tablet    TAKE 1 TABLET BY MOUTH EVERY 6 HOURS AS NEEDED       lidocaine 5 % Patch    LIDODERM    90 patch    PLACE 3 PATCHES ONTO THE SKIN DAILY AS NEEDED FOR MODERATE PAIN       lisdexamfetamine 60 MG capsule    VYVANSE    30 capsule    Take 1 capsule (60 mg) by mouth every morning       multivitamin  with iron Tabs     90 tablet    TAKE 1 TABLET BY MOUTH DAILY       nicotine polacrilex 2 MG gum     NICORETTE    110 each    CHEW 1 PIECE AS NEEDED FOR SMOKING CESSATION       omeprazole 20 MG CR capsule    priLOSEC    30 capsule    TAKE 1 CAPSULE BY MOUTH EVERY DAY BEFORE A MEAL       * order for DME     2 Box    Equipment being ordered: Large gloves       * order for DME     1 Units    Equipment being ordered: Boa Back brace       OXcarbazepine 600 MG tablet    TRILEPTAL    60 tablet    Take 1 tablet (600 mg) by mouth 2 times daily       rosuvastatin 10 MG tablet    CRESTOR    90 tablet    Take 1 tablet (10 mg) by mouth daily       SM CHIL[employer]ENS ASPIRIN 81 MG chewable tablet   Generic drug:  aspirin     30 tablet    CHEW AND SWALLOW 1 TABLET BY MOUTH DAILY       traZODone 50 MG tablet    DESYREL    30 tablet    TAKE 1 TABLET BY MOUTH NIGHTLY AS NEEDED FOR SLEEP       VENTOLIN  (90 BASE) MCG/ACT Inhaler   Generic drug:  albuterol     18 g    USE 2 PUFFS 4 TIMES A DAY AS NEEDED FOR SHORTNESS OF BREATH       * Notice:  This list has 4 medication(s) that are the same as other medications prescribed for you. Read the directions carefully, and ask your doctor or other care provider to review them with you.

## 2017-06-08 RX ORDER — GABAPENTIN 300 MG/1
CAPSULE ORAL
Qty: 270 CAPSULE | Refills: 1 | Status: SHIPPED | OUTPATIENT
Start: 2017-06-08 | End: 2017-07-11

## 2017-06-08 NOTE — TELEPHONE ENCOUNTER
Gabapentin 300mg      Last Written Prescription Date: 12/23/13  Last Fill Quantity: 270,  # refills: 0   Last Office Visit with G, P or [facility] prescribing provider: 6/7/17                                         Next 5 appointments (look out 90 days)     Jun 19, 2017  3:00 PM CDT   (Arrive by 2:45 PM)   Return Visit with Laquita Fernandez MD   [facility] [place] [facility] [place] [facility]    [address] [place] [id number] [phone]            Jun 27, 2017  3:20 PM CDT   (Arrive by 3:00 PM)   SHORT with Lyle Fisher DO   [facility] [place] [facility] [place] [facility]    [address]  [place] [address]   [phone]

## 2017-06-08 NOTE — PROGRESS NOTES

## 2017-06-09 RX ORDER — ROSUVASTATIN CALCIUM 10 MG/1
TABLET, COATED ORAL
Qty: 30 TABLET | Refills: 0 | Status: SHIPPED | OUTPATIENT
Start: 2017-06-09 | End: 2017-07-31

## 2017-06-12 ENCOUNTER — OFFICE VISIT (OUTPATIENT)
Dept: CHIROPRACTIC MEDICINE | Facility: OTHER | Age: 57
End: 2017-06-12
Attending: CHIROPRACTOR
Payer: COMMERCIAL

## 2017-06-12 DIAGNOSIS — M99.03 SEGMENTAL AND SOMATIC DYSFUNCTION OF LUMBAR REGION: Primary | ICD-10-CM

## 2017-06-12 DIAGNOSIS — M99.01 SEGMENTAL AND SOMATIC DYSFUNCTION OF CERVICAL REGION: ICD-10-CM

## 2017-06-12 DIAGNOSIS — M99.02 SEGMENTAL AND SOMATIC DYSFUNCTION OF THORACIC REGION: ICD-10-CM

## 2017-06-12 DIAGNOSIS — M54.50 ACUTE BILATERAL LOW BACK PAIN WITHOUT SCIATICA: ICD-10-CM

## 2017-06-12 PROCEDURE — 98941 CHIROPRACT MANJ 3-4 REGIONS: CPT | Performed by: CHIROPRACTOR

## 2017-06-12 NOTE — MR AVS SNAPSHOT
After Visit Summary   6/12/2017    Joshua Barron    MRN: [id number]           Patient Information     Date Of Birth          1960        Visit Information        Provider Department      6/12/2017 10:40 AM Shamar Escamilla DC  [facility]        Today's Diagnoses     Segmental and somatic dysfunction of lumbar region    -  1    Acute bilateral low back pain without sciatica        Segmental and somatic dysfunction of thoracic region        Segmental and somatic dysfunction of cervical region           Follow-ups after your visit        Your next 10 appointments already scheduled     Jun 19, 2017  3:00 PM CDT   (Arrive by 2:45 PM)   Return Visit with Laquita Fernandez MD   [facility] [place] ([facility] [place] )    [address] [place]  [place] [id number] [phone]            Jun 27, 2017  3:20 PM CDT   (Arrive by 3:00 PM)   SHORT with Lyle Fisher,    [facility] [place] ([facility] [place] )    [address]  [place] [address]   [phone]            Mar 07, 2018  2:45 PM CST   (Arrive by 2:30 PM)   Hearing Eval with Kevin Moreno   [facility] [place] ([facility] [place] )    [address]  [place] [address]   [phone]              Who to contact     If you have questions or need follow up information about today's clinic visit or your schedule please contact  [facility] directly at [phone].  Normal or non-critical lab and imaging results will be communicated to you by MyChart, letter or phone within 4 business days after the clinic has received the results. If you do not hear from us within 7 days, please contact the clinic through MyChart or phone. If you have a critical or abnormal lab result, we will notify you by phone as soon as possible.  Submit refill requests through [employer] or call your pharmacy and they will forward the refill request to us. Please allow 3  "business days for your refill to be completed.          Additional Information About Your Visit        MyChart Information     [employer] lets you send messages to your doctor, view your test results, renew your prescriptions, schedule appointments and more. To sign up, go to [email or web address] . Click on \"Log in\" on the left side of the screen, which will take you to the Welcome page. Then click on \"Sign up Now\" on the right side of the page.     You will be asked to enter the access code listed below, as well as some personal information. Please follow the directions to create your username and password.     Your access code is: [email or web address]  Expires: 2017  9:31 AM     Your access code will  in 90 days. If you need help or a new code, please call your [place] clinic or [phone].        Care EveryWhere ID     This is your Care EveryWhere ID. This could be used by other organizations to access your [place] medical records  [id number]         Blood Pressure from Last 3 Encounters:   17 122/90   17 108/70   05/15/17 142/92    Weight from Last 3 Encounters:   17 218 lb (98.9 kg)   05/10/17 227 lb (103 kg)   17 224 lb (101.6 kg)              We Performed the Following     CHIROPRAC MANIP,SPINAL,3-4 REGIONS        Primary Care Provider Office Phone # Fax #    Lyle Martinez DO Natalia [phone]       [facility] [place] [address] [place] [address]        Thank you!     Thank you for choosing  [facility] [facility]  for your care. Our goal is always to provide you with excellent care. Hearing back from our patients is one way we can continue to improve our services. Please take a few minutes to complete the written survey that you may receive in the mail after your visit with us. Thank you!             Your Updated Medication List - Protect others around you: Learn how to safely use, store and throw away your medicines at www.disposemymeds.org. "          This list is accurate as of: 6/12/17 11:59 PM.  Always use your most recent med list.                   Brand Name Dispense Instructions for use    acetaminophen 325 MG tablet    NON-ASPIRIN PAIN RELIEF    200 tablet    TAKE 2 TABLETS BY MOUTH EVERY 4 HOURS AS NEEDED FOR MILD PAIN       * baclofen 10 MG tablet    LIORESAL    90 tablet    TAKE 1 TABLET BY MOUTH THREE TIMES DAILY       * baclofen 20 MG tablet    LIORESAL    90 tablet    Take 1 tablet (20 mg) by mouth 3 times daily       blood glucose monitoring meter device kit     1 kit    Use to test blood sugars 3 times daily or as directed.       blood glucose monitoring test strip    ONE TOUCH ULTRA    300 strip    Use to test blood sugars 3 times daily or as directed.       diazepam 5 MG tablet    VALIUM    60 tablet    TAKE 1 TABLET BY MOUTH EVERY 12 HOURS AS NEEDED FOR ANXIETY OR SLEEP SPASM       diclofenac 50 MG EC tablet    VOLTAREN    90 tablet    TAKE 1 TABLET BY MOUTH 3 TIMES DAILY       DULERA 100-5 MCG/ACT oral inhaler   Generic drug:  mometasone-formoterol     13 g    INHALE 2 PUFFS INTO THE LUNGS 2 TIMES A DAY       fentaNYL 75 mcg/hr 72 hr patch    DURAGESIC    15 patch    APPLY 1 PATCH EVERY 48 HOURS       fluticasone 50 MCG/ACT spray    FLONASE    16 g    USE 2 SPRAYS IN EACH NOSTRIL DAILY       gabapentin 300 MG capsule    NEURONTIN    270 capsule    TAKE 3 CAPSULES BY MOUTH THREE TIMES DAILY       HYDROcodone-acetaminophen  MG per tablet    NORCO    120 tablet    TAKE 1 TABLET BY MOUTH EVERY 6 HOURS AS NEEDED FOR MODERATE TO SEVEREPAIN       ibuprofen 600 MG tablet    ADVIL/MOTRIN    120 tablet    TAKE 1 TABLET BY MOUTH EVERY 6 HOURS AS NEEDED       lidocaine 5 % Patch    LIDODERM    90 patch    PLACE 3 PATCHES ONTO THE SKIN DAILY AS NEEDED FOR MODERATE PAIN       lisdexamfetamine 60 MG capsule    VYVANSE    30 capsule    Take 1 capsule (60 mg) by mouth every morning       multivitamin  with iron Tabs     90 tablet    TAKE 1  TABLET BY MOUTH DAILY       nicotine polacrilex 2 MG gum    NICORETTE    110 each    CHEW 1 PIECE AS NEEDED FOR SMOKING CESSATION       omeprazole 20 MG CR capsule    priLOSEC    30 capsule    TAKE 1 CAPSULE BY MOUTH EVERY DAY BEFORE A MEAL       * order for DME     2 Box    Equipment being ordered: Large gloves       * order for DME     1 Units    Equipment being ordered: Boa Back brace       OXcarbazepine 600 MG tablet    TRILEPTAL    60 tablet    Take 1 tablet (600 mg) by mouth 2 times daily       rosuvastatin 10 MG tablet    CRESTOR    30 tablet    TAKE 1 TABLET BY MOUTH DAILY       SM CHILDRENS ASPIRIN 81 MG chewable tablet   Generic drug:  aspirin     30 tablet    CHEW AND SWALLOW 1 TABLET BY MOUTH DAILY       traZODone 50 MG tablet    DESYREL    30 tablet    TAKE 1 TABLET BY MOUTH NIGHTLY AS NEEDED FOR SLEEP       VENTOLIN  (90 BASE) MCG/ACT Inhaler   Generic drug:  albuterol     18 g    USE 2 PUFFS 4 TIMES A DAY AS NEEDED FOR SHORTNESS OF BREATH       * Notice:  This list has 4 medication(s) that are the same as other medications prescribed for you. Read the directions carefully, and ask your doctor or other care provider to review them with you.

## 2017-06-13 ENCOUNTER — TELEPHONE (OUTPATIENT)
Dept: INTERNAL MEDICINE | Facility: OTHER | Age: 57
End: 2017-06-13

## 2017-06-13 NOTE — TELEPHONE ENCOUNTER
Received PA request from [employer] on 06/12/2017 for Rosuvastatin 10 mg - not on formulary for [facility].  Received DENIAL from [employer] on 6/12/2017.  I spoke with Nat at [employer] [phone] and asked for alternatives.  She said there are four alternatives which do not require a PA - they are Atorvastatin, Simvastatin,Pravastatin, Lovastatin.  Would you like to switch patient to one of these alternatives or appeal the denial? Please advise.  Carmen Bishop, [facility] Specialist

## 2017-06-13 NOTE — PROGRESS NOTES

## 2017-06-15 ENCOUNTER — OFFICE VISIT (OUTPATIENT)
Dept: CHIROPRACTIC MEDICINE | Facility: OTHER | Age: 57
End: 2017-06-15
Attending: CHIROPRACTOR
Payer: COMMERCIAL

## 2017-06-15 ENCOUNTER — TELEPHONE (OUTPATIENT)
Dept: FAMILY MEDICINE | Facility: OTHER | Age: 57
End: 2017-06-15

## 2017-06-15 DIAGNOSIS — M99.03 SEGMENTAL AND SOMATIC DYSFUNCTION OF LUMBAR REGION: Primary | ICD-10-CM

## 2017-06-15 DIAGNOSIS — M99.01 SEGMENTAL AND SOMATIC DYSFUNCTION OF CERVICAL REGION: ICD-10-CM

## 2017-06-15 DIAGNOSIS — E78.2 MIXED HYPERLIPIDEMIA: Primary | ICD-10-CM

## 2017-06-15 DIAGNOSIS — M99.02 SEGMENTAL AND SOMATIC DYSFUNCTION OF THORACIC REGION: ICD-10-CM

## 2017-06-15 DIAGNOSIS — M54.50 ACUTE BILATERAL LOW BACK PAIN WITHOUT SCIATICA: ICD-10-CM

## 2017-06-15 PROCEDURE — 98941 CHIROPRACT MANJ 3-4 REGIONS: CPT | Performed by: CHIROPRACTOR

## 2017-06-15 RX ORDER — ATORVASTATIN CALCIUM 20 MG/1
20 TABLET, FILM COATED ORAL DAILY
Qty: 90 TABLET | Refills: 1 | Status: SHIPPED | OUTPATIENT
Start: 2017-06-15 | End: 2017-11-27

## 2017-06-15 NOTE — MR AVS SNAPSHOT
After Visit Summary   6/15/2017    Joshua Barron    MRN: [id number]           Patient Information     Date Of Birth          1960        Visit Information        Provider Department      6/15/2017 2:10 PM Shamar Escamilla DC  [facility]        Today's Diagnoses     Segmental and somatic dysfunction of lumbar region    -  1    Acute bilateral low back pain without sciatica        Segmental and somatic dysfunction of thoracic region        Segmental and somatic dysfunction of cervical region           Follow-ups after your visit        Your next 10 appointments already scheduled     Jun 19, 2017  3:00 PM CDT   (Arrive by 2:45 PM)   Return Visit with Laquita Fernandez MD   [facility] [place] ([facility] [place] )    [address]  [place] [id number] [phone]            Jun 27, 2017  3:20 PM CDT   (Arrive by 3:00 PM)   SHORT with Lyle Fisher,    [facility] [place] ([facility] [place] )    [address]  [place] [address]   [phone]            Mar 07, 2018  2:45 PM CST   (Arrive by 2:30 PM)   Hearing Eval with Kevin Moreno   [facility] [place] ([facility] [place] )    [address]  [place] [address]   [phone]              Who to contact     If you have questions or need follow up information about today's clinic visit or your schedule please contact  [facility] directly at [phone].  Normal or non-critical lab and imaging results will be communicated to you by MyChart, letter or phone within 4 business days after the clinic has received the results. If you do not hear from us within 7 days, please contact the clinic through MyChart or phone. If you have a critical or abnormal lab result, we will notify you by phone as soon as possible.  Submit refill requests through [employer] or call your pharmacy and they will forward the refill request to us. Please allow 3  "business days for your refill to be completed.          Additional Information About Your Visit        MyChart Information     [employer] lets you send messages to your doctor, view your test results, renew your prescriptions, schedule appointments and more. To sign up, go to [email or web address] . Click on \"Log in\" on the left side of the screen, which will take you to the Welcome page. Then click on \"Sign up Now\" on the right side of the page.     You will be asked to enter the access code listed below, as well as some personal information. Please follow the directions to create your username and password.     Your access code is: [email or web address]  Expires: 2017  9:31 AM     Your access code will  in 90 days. If you need help or a new code, please call your [place] clinic or [phone].        Care EveryWhere ID     This is your Care EveryWhere ID. This could be used by other organizations to access your [place] medical records  [id number]         Blood Pressure from Last 3 Encounters:   17 122/90   17 108/70   05/15/17 142/92    Weight from Last 3 Encounters:   17 218 lb (98.9 kg)   05/10/17 227 lb (103 kg)   17 224 lb (101.6 kg)              We Performed the Following     CHIROPRAC MANIP,SPINAL,3-4 REGIONS          Today's Medication Changes          These changes are accurate as of: 6/15/17 11:59 PM.  If you have any questions, ask your nurse or doctor.               Start taking these medicines.        Dose/Directions    atorvastatin 20 MG tablet   Commonly known as:  LIPITOR   Used for:  Mixed hyperlipidemia   Started by:  Lyle Fisher DO        Dose:  20 mg   Take 1 tablet (20 mg) by mouth daily   Quantity:  90 tablet   Refills:  1            Where to get your medicines      These medications were sent to [facility] - [address]  [address] [address]     Phone:  [phone]     atorvastatin 20 MG tablet          "       Primary Care Provider Office Phone # Fax #    Lyle Fisher [phone]       [facility] [place] [address]  [place] [address]        Thank you!     Thank you for choosing  [facility] [place] [facility]  for your care. Our goal is always to provide you with excellent care. Hearing back from our patients is one way we can continue to improve our services. Please take a few minutes to complete the written survey that you may receive in the mail after your visit with us. Thank you!             Your Updated Medication List - Protect others around you: Learn how to safely use, store and throw away your medicines at www.disposemymeds.org.          This list is accurate as of: 6/15/17 11:59 PM.  Always use your most recent med list.                   Brand Name Dispense Instructions for use    acetaminophen 325 MG tablet    NON-ASPIRIN PAIN RELIEF    200 tablet    TAKE 2 TABLETS BY MOUTH EVERY 4 HOURS AS NEEDED FOR MILD PAIN       atorvastatin 20 MG tablet    LIPITOR    90 tablet    Take 1 tablet (20 mg) by mouth daily       * baclofen 10 MG tablet    LIORESAL    90 tablet    TAKE 1 TABLET BY MOUTH THREE TIMES DAILY       * baclofen 20 MG tablet    LIORESAL    90 tablet    Take 1 tablet (20 mg) by mouth 3 times daily       blood glucose monitoring meter device kit     1 kit    Use to test blood sugars 3 times daily or as directed.       blood glucose monitoring test strip    ONE TOUCH ULTRA    300 strip    Use to test blood sugars 3 times daily or as directed.       diazepam 5 MG tablet    VALIUM    60 tablet    TAKE 1 TABLET BY MOUTH EVERY 12 HOURS AS NEEDED FOR ANXIETY OR SLEEP SPASM       diclofenac 50 MG EC tablet    VOLTAREN    90 tablet    TAKE 1 TABLET BY MOUTH 3 TIMES DAILY       DULERA 100-5 MCG/ACT oral inhaler   Generic drug:  mometasone-formoterol     13 g    INHALE 2 PUFFS INTO THE LUNGS 2 TIMES A DAY       fentaNYL 75 mcg/hr 72 hr patch    DURAGESIC    15 patch    APPLY  1 PATCH EVERY 48 HOURS       fluticasone 50 MCG/ACT spray    FLONASE    16 g    USE 2 SPRAYS IN EACH NOSTRIL DAILY       gabapentin 300 MG capsule    NEURONTIN    270 capsule    TAKE 3 CAPSULES BY MOUTH THREE TIMES DAILY       HYDROcodone-acetaminophen  MG per tablet    NORCO    120 tablet    TAKE 1 TABLET BY MOUTH EVERY 6 HOURS AS NEEDED FOR MODERATE TO SEVEREPAIN       ibuprofen 600 MG tablet    ADVIL/MOTRIN    120 tablet    TAKE 1 TABLET BY MOUTH EVERY 6 HOURS AS NEEDED       lidocaine 5 % Patch    LIDODERM    90 patch    PLACE 3 PATCHES ONTO THE SKIN DAILY AS NEEDED FOR MODERATE PAIN       lisdexamfetamine 60 MG capsule    VYVANSE    30 capsule    Take 1 capsule (60 mg) by mouth every morning       multivitamin  with iron Tabs     90 tablet    TAKE 1 TABLET BY MOUTH DAILY       nicotine polacrilex 2 MG gum    NICORETTE    110 each    CHEW 1 PIECE AS NEEDED FOR SMOKING CESSATION       omeprazole 20 MG CR capsule    priLOSEC    30 capsule    TAKE 1 CAPSULE BY MOUTH EVERY DAY BEFORE A MEAL       * order for DME     2 Box    Equipment being ordered: Large gloves       * order for DME     1 Units    Equipment being ordered: Boa Back brace       OXcarbazepine 600 MG tablet    TRILEPTAL    60 tablet    Take 1 tablet (600 mg) by mouth 2 times daily       rosuvastatin 10 MG tablet    CRESTOR    30 tablet    TAKE 1 TABLET BY MOUTH DAILY       SM CHILDRENS ASPIRIN 81 MG chewable tablet   Generic drug:  aspirin     30 tablet    CHEW AND SWALLOW 1 TABLET BY MOUTH DAILY       traZODone 50 MG tablet    DESYREL    30 tablet    TAKE 1 TABLET BY MOUTH NIGHTLY AS NEEDED FOR SLEEP       VENTOLIN  (90 BASE) MCG/ACT Inhaler   Generic drug:  albuterol     18 g    USE 2 PUFFS 4 TIMES A DAY AS NEEDED FOR SHORTNESS OF BREATH       * Notice:  This list has 4 medication(s) that are the same as other medications prescribed for you. Read the directions carefully, and ask your doctor or other care provider to review them with  you.

## 2017-06-16 ENCOUNTER — TELEPHONE (OUTPATIENT)
Dept: PEDIATRICS | Facility: OTHER | Age: 57
End: 2017-06-16

## 2017-06-16 NOTE — TELEPHONE ENCOUNTER
10:40 AM    Reason for Call: Phone Call    Description: Joshua wanted Dr. Fisher to know that he is not taking the nortryptolene anymore , upping the dose af the hydrocodone has helped him a lot . He wants to Thank Dr. Fisher    Was an appointment offered for this call?  No    Preferred method for responding to this message: [phone]    If we cannot reach you directly, may we leave a detailed response at the number you provided?  Yes      Nicky Tai

## 2017-06-19 ENCOUNTER — OFFICE VISIT (OUTPATIENT)
Dept: PSYCHIATRY | Facility: OTHER | Age: 57
End: 2017-06-19
Attending: PSYCHIATRY & NEUROLOGY
Payer: COMMERCIAL

## 2017-06-19 VITALS
HEIGHT: 70 IN | DIASTOLIC BLOOD PRESSURE: 88 MMHG | SYSTOLIC BLOOD PRESSURE: 130 MMHG | TEMPERATURE: 99.2 F | HEART RATE: 77 BPM | WEIGHT: 211 LBS | BODY MASS INDEX: 30.21 KG/M2 | OXYGEN SATURATION: 98 %

## 2017-06-19 DIAGNOSIS — F90.2 ADHD (ATTENTION DEFICIT HYPERACTIVITY DISORDER), COMBINED TYPE: ICD-10-CM

## 2017-06-19 PROCEDURE — 90833 PSYTX W PT W E/M 30 MIN: CPT | Performed by: PSYCHIATRY & NEUROLOGY

## 2017-06-19 PROCEDURE — 99214 OFFICE O/P EST MOD 30 MIN: CPT | Performed by: PSYCHIATRY & NEUROLOGY

## 2017-06-19 PROCEDURE — 99212 OFFICE O/P EST SF 10 MIN: CPT

## 2017-06-19 PROCEDURE — 90833 PSYTX W PT W E/M 30 MIN: CPT

## 2017-06-19 RX ORDER — LISDEXAMFETAMINE DIMESYLATE 60 MG/1
60 CAPSULE ORAL EVERY MORNING
Qty: 30 CAPSULE | Refills: 0 | Status: SHIPPED | OUTPATIENT
Start: 2017-07-07 | End: 2017-07-31

## 2017-06-19 ASSESSMENT — PAIN SCALES - GENERAL: PAINLEVEL: SEVERE PAIN (7)

## 2017-06-19 NOTE — PROGRESS NOTES
"  PSYCHIATRY CLINIC PROGRESS NOTE   40 minute medication management, more than 50% of time spent counseling patient on medications, medication side effects, symptom history and management   SUBJECTIVE / INTERIM HISTORY                                                                       Social- Was  twice. Lives alone with his dog Montserrat (beltran) and 3 cats. Has a GF in [place]  Children-  2 kids, they are in [place]  Last visit: --  Increase Vyvanse from 40 mg to 60 mg daily and gave scripts today 4/17 and 5/12/17.  Continue Trileptal 300 mg bid, Valium 5 mg every 12 hours prn anxiety, nortriptyline 50 mg bid.    - had \"stroke / seizures\" and didn't make flight to [place]. CT head. He feels however this was him being lightheaded and he thinks once nortriptyline was d/c he no longer had the lightheadedness.   - feels Vyvanse helped now, but for while it didn't seem to but is now. Likes doesn't have the drop off like Ritalin in past. Less misplacing stuff. Feels his attention / concentration are better.   - trip to [place]   - doing chiropractic and this is helping  - thinking would like to try a dfiferent med for aDHD  - Valium helped with anxiety and helped with the pain. Does NOT want to take any more as \"I don't want to be sleeping all the time\"  - Lots of family issues: Joshua has taken care of his parents and yet parents give his other brothers everything. oldest of 3 brothers. Brother in [place] youngest Mark and Major is here. Mom and dad here out on [place]. Joshua noting moving here to be closer to parents and help them, etc. But, parents don't seem to want him around much or talk to him.  SUBSTANCE USE- denies abuse of meds or substances    SYMPTOMS- issues with attention and concentration improved with Ritalin.  racing thoughts, depressed mood, impulsivity, distractibility  MEDICAL ROS- back pain, denies any issues with headaches or stomach pain / issues with stimulant  MEDICAL / SURGICAL HISTORY                 "     Patient Active Problem List   Diagnosis     Diabetes mellitus, type 2 (H)     Mixed hyperlipidemia     Tobacco Abuse, History of     Degeneration of lumbar or lumbosacral intervertebral disc     Depression, major     Chronic pain syndrome     Chemical dependency (H)     Chronic rhinitis     Tinnitus of both ears     ETD (eustachian tube dysfunction)     SNHL (sensorineural hearing loss)     Back pain     Obesity     Bipolar disorder (H)     Type 2 diabetes, HbA1c goal < 7% (H)     Seizure-like activity (H)     Bilateral foot pain     Preop general physical exam     Trigger index finger of right hand     Moderate persistent asthma without complication     Chronic lower back pain     ACP (advance care planning)     Onychia of toe of left foot     DDD (degenerative disc disease), lumbar     Seizure disorder (H)     ALLERGY   Amoxicillin trihydrate; Clavulanic acid potassium; Cymbalta; and Seasonal allergies  MEDICATIONS                                                                                             Current Outpatient Prescriptions   Medication Sig     atorvastatin (LIPITOR) 20 MG tablet Take 1 tablet (20 mg) by mouth daily     rosuvastatin (CRESTOR) 10 MG tablet TAKE 1 TABLET BY MOUTH DAILY     gabapentin (NEURONTIN) 300 MG capsule TAKE 3 CAPSULES BY MOUTH THREE TIMES DAILY     lidocaine (LIDODERM) 5 % Patch PLACE 3 PATCHES ONTO THE SKIN DAILY AS NEEDED FOR MODERATE PAIN     HYDROcodone-acetaminophen (NORCO)  MG per tablet TAKE 1 TABLET BY MOUTH EVERY 6 HOURS AS NEEDED FOR MODERATE TO SEVEREPAIN     OXcarbazepine (TRILEPTAL) 600 MG tablet Take 1 tablet (600 mg) by mouth 2 times daily     fentaNYL (DURAGESIC) 75 mcg/hr 72 hr patch APPLY 1 PATCH EVERY 48 HOURS     baclofen (LIORESAL) 20 MG tablet Take 1 tablet (20 mg) by mouth 3 times daily     diclofenac (VOLTAREN) 50 MG EC tablet TAKE 1 TABLET BY MOUTH 3 TIMES DAILY     ibuprofen (ADVIL/MOTRIN) 600 MG tablet TAKE 1 TABLET BY MOUTH EVERY 6 HOURS AS  "NEEDED     multivitamin  with iron ( COMPLETE ADVANCED FORMULA) TABS TAKE 1 TABLET BY MOUTH DAILY     baclofen (LIORESAL) 10 MG tablet TAKE 1 TABLET BY MOUTH THREE TIMES DAILY     VENTOLIN  (90 BASE) MCG/ACT Inhaler USE 2 PUFFS 4 TIMES A DAY AS NEEDED FOR SHORTNESS OF BREATH     DULERA 100-5 MCG/ACT oral inhaler INHALE 2 PUFFS INTO THE LUNGS 2 TIMES A DAY     fluticasone (FLONASE) 50 MCG/ACT spray USE 2 SPRAYS IN EACH NOSTRIL DAILY     lisdexamfetamine (VYVANSE) 60 MG capsule Take 1 capsule (60 mg) by mouth every morning     acetaminophen (NON-ASPIRIN PAIN RELIEF) 325 MG tablet TAKE 2 TABLETS BY MOUTH EVERY 4 HOURS AS NEEDED FOR MILD PAIN     diazepam (VALIUM) 5 MG tablet TAKE 1 TABLET BY MOUTH EVERY 12 HOURS AS NEEDED FOR ANXIETY OR SLEEP SPASM     omeprazole (PRILOSEC) 20 MG CR capsule TAKE 1 CAPSULE BY MOUTH EVERY DAY BEFORE A MEAL      CHILDRENS ASPIRIN 81 MG chewable tablet CHEW AND SWALLOW 1 TABLET BY MOUTH DAILY     nicotine polacrilex (NICORETTE) 2 MG gum CHEW 1 PIECE AS NEEDED FOR SMOKING CESSATION     traZODone (DESYREL) 50 MG tablet TAKE 1 TABLET BY MOUTH NIGHTLY AS NEEDED FOR SLEEP     order for DME Equipment being ordered: Boa Back brace     blood glucose (ONE TOUCH ULTRA) test strip Use to test blood sugars 3 times daily or as directed.     blood glucose monitoring (ONE TOUCH ULTRA 2) meter device kit Use to test blood sugars 3 times daily or as directed.     ORDER FOR DME Equipment being ordered: Large gloves     No current facility-administered medications for this visit.        VITALS   /88 (BP Location: Right arm, Cuff Size: Adult Regular)  Pulse 77  Temp 99.2  F (37.3  C) (Tympanic)  Ht 5' 10\" (1.778 m)  Wt 211 lb (95.7 kg)  SpO2 98%  BMI 30.28 kg/m2     LABS                                                                                                                         EKG 2016 with 376 ms (on nortriptyline)  Last Basic Metabolic Panel:  NA      138   6/9/2016 " "  POTASSIUM      4.7   6/9/2016  CHLORIDE      104   6/9/2016  SANDRITA      9.2   6/9/2016  CO2       29   6/9/2016  BUN       25   6/9/2016  CR     0.76   6/9/2016  GLC      173   6/9/2016    Liver Function Studies -   Recent Labs   Lab Test  02/18/15   1536   PROTTOTAL  7.5   ALBUMIN  4.6   BILITOTAL  0.4   ALKPHOS  82   AST  18   ALT  32      MENTAL STATUS EXAM                                                                                        Alert. Oriented to person, place, and date / time. Well groomed, calm, cooperative with good eye contact. No problems with speech or psychomotor behavior. Mood was described as \"doing okay\" and affect was congruent to speech content and full range. Thought process, including associations, was unremarkable and thought content was devoid of suicidal and homicidal ideation and psychotic thought. No hallucinations. Insight was good. Judgment was intact and adequate for safety. Fund of knowledge was intact. Pt demonstrates no obvious problems with attention, concentration, language, recent or remote memory although these were not formally tested.     ASSESSMENT                                                                                                      HISTORICAL:  Initial psych note 10/6/15          NOTES:      This patient is a 56 year old with bipolar, MDD, bipolar d/o.  He does struggle with depression and seems to be largely related to his chronic pain issues.We started Valium in as dual purpose: muscle relaxant properties and for anxiety. Heladio Newman has helped sificantly. Trent and I have discussed the risks that go along with combination of medications he is on: on several medications with CNS depressant effect and thus he needs to be very careful and NOT use alcohol or any other type sedating meds/substances as there are risks including respiratory depression. Valium is 5 mg bid prn and I would not feel comfortable any higher dose given the other medications he is on " with CNS depressant potential. Joshua is on a lot of controlled substances thus I reviewed the [place]  and no other prescriptions aside from those his PCP and I prescribe. He is on nortriptyline which antidepressant and also can help with pain. We had lengthy discussion today as I noted episodes of LOC: I discussed stimulant and if seizure stimulants can lower threshold. Joshua is certain that the nortriptyline was causing him to fall as opposed to a stroke or seizure.     20 minutes of today's visit spent on psychotherapy.      TREATMENT RISK STATEMENT:  The risks, benefits, alternatives and potential adverse effects have been explained and are understood by the pt.  The pt agrees to the treatment plan with the ability to do so.   The pt knows to call the clinic for any problems or access emergency care if needed.        DIAGNOSES                 (Use of Axes system will continue, even though absent from DSM 5)         Axis I   - ADD               MDD, recurrent,  mod                 Rule out bipolar disorder  Axis II  - no dx  Axis III - chronic pain (s/p back injuries and surgeries)  Axis IV-  Psychosocial Stressors include: financial, lack of support  Axis V - Global Assessment of Functioning current: 45    PLAN                                                                                                                    1)  MEDICATIONS:         --  Continue Vyvanse 60 mg daily and last script 6/9/17 and gave script 7/7/17 .  Continue Trileptal 600 mg bid, Valium 5 mg every 12 hours prn anxiety .  2)  THERAPY:  No change    3)  LABS:  UDS 5/10/17    4)  PT MONITOR [call for probs]:  SEs from meds, worsening sx, SI/HI    5)  REFERRALS [CD, medical, other]:  None    6)  RTC:  4-6 weeks

## 2017-06-19 NOTE — PROGRESS NOTES

## 2017-06-19 NOTE — NURSING NOTE
"Chief Complaint   Patient presents with     RECHECK     ADD       Initial /88 (BP Location: Right arm, Cuff Size: Adult Regular)  Pulse 77  Temp 99.2  F (37.3  C) (Tympanic)  Ht 5' 10\" (1.778 m)  Wt 211 lb (95.7 kg)  SpO2 98%  BMI 30.28 kg/m2 Estimated body mass index is 30.28 kg/(m^2) as calculated from the following:    Height as of this encounter: 5' 10\" (1.778 m).    Weight as of this encounter: 211 lb (95.7 kg).  Medication Reconciliation: complete   Myrtle Finch LPN      "

## 2017-06-19 NOTE — MR AVS SNAPSHOT
After Visit Summary   6/19/2017    Joshua Barron    MRN: [id number]           Patient Information     Date Of Birth          1960        Visit Information        Provider Department      6/19/2017 3:00 PM [facility] Fernandez MD [facility]        Today's Diagnoses     ADHD (attention deficit hyperactivity disorder), combined type           Follow-ups after your visit        Your next 10 appointments already scheduled     Jun 27, 2017  3:20 PM CDT   (Arrive by 3:00 PM)   SHORT with Lyle Fisher DO   [facility] [place] ([facility] [place] )    [address]  [place] [address]   [phone]            Jul 31, 2017  2:40 PM CDT   (Arrive by 2:25 PM)   Return Visit with Laquita Fernandez MD   [facility] [place] ([facility] [place] )    [address] [place] [address]  [place] [address] [phone]            Mar 07, 2018  2:45 PM CST   (Arrive by 2:30 PM)   Hearing Eval with Kevin Moreno   [facility] [place] ([facility] [place] )    [address]  [place] [address]   [phone]              Who to contact     If you have questions or need follow up information about today's clinic visit or your schedule please contact [facility] at [phone].  Normal or non-critical lab and imaging results will be communicated to you by MyChart, letter or phone within 4 business days after the clinic has received the results. If you do not hear from us within 7 days, please contact the clinic through MyChart or phone. If you have a critical or abnormal lab result, we will notify you by phone as soon as possible.  Submit refill requests through [employer] or call your pharmacy and they will forward the refill request to us. Please allow 3 business days for your refill to be completed.          Additional Information About Your Visit        MyChart Information     [employer] lets you send messages to your  "doctor, view your test results, renew your prescriptions, schedule appointments and more. To sign up, go to [email or web address] . Click on \"Log in\" on the left side of the screen, which will take you to the Welcome page. Then click on \"Sign up Now\" on the right side of the page.     You will be asked to enter the access code listed below, as well as some personal information. Please follow the directions to create your username and password.     Your access code is: [email or web address]  Expires: 2017  9:31 AM     Your access code will  in 90 days. If you need help or a new code, please call your [place] clinic or [phone].        Care EveryWhere ID     This is your Care EveryWhere ID. This could be used by other organizations to access your [place] medical records  [id number]        Your Vitals Were     Pulse Temperature Height Pulse Oximetry BMI (Body Mass Index)       77 99.2  F (37.3  C) (Tympanic) 5' 10\" (1.778 m) 98% 30.28 kg/m2        Blood Pressure from Last 3 Encounters:   17 130/88   17 122/90   17 108/70    Weight from Last 3 Encounters:   17 211 lb (95.7 kg)   17 218 lb (98.9 kg)   05/10/17 227 lb (103 kg)              Today, you had the following     No orders found for display         Where to get your medicines      Some of these will need a paper prescription and others can be bought over the counter.  Ask your nurse if you have questions.     Bring a paper prescription for each of these medications     lisdexamfetamine 60 MG capsule          Primary Care Provider Office Phone # Fax #    Lyle Juan Fisher [phone]       [facility] [place] [address]  [facility] [address]        Thank you!     Thank you for choosing [facility]  for your care. Our goal is always to provide you with excellent care. Hearing back from our patients is one way we can continue to improve our services. Please take a few minutes to " complete the written survey that you may receive in the mail after your visit with us. Thank you!             Your Updated Medication List - Protect others around you: Learn how to safely use, store and throw away your medicines at www.disposemymeds.org.          This list is accurate as of: 6/19/17  4:58 PM.  Always use your most recent med list.                   Brand Name Dispense Instructions for use    acetaminophen 325 MG tablet    NON-ASPIRIN PAIN RELIEF    200 tablet    TAKE 2 TABLETS BY MOUTH EVERY 4 HOURS AS NEEDED FOR MILD PAIN       atorvastatin 20 MG tablet    LIPITOR    90 tablet    Take 1 tablet (20 mg) by mouth daily       * baclofen 10 MG tablet    LIORESAL    90 tablet    TAKE 1 TABLET BY MOUTH THREE TIMES DAILY       * baclofen 20 MG tablet    LIORESAL    90 tablet    Take 1 tablet (20 mg) by mouth 3 times daily       blood glucose monitoring meter device kit     1 kit    Use to test blood sugars 3 times daily or as directed.       blood glucose monitoring test strip    ONE TOUCH ULTRA    300 strip    Use to test blood sugars 3 times daily or as directed.       diazepam 5 MG tablet    VALIUM    60 tablet    TAKE 1 TABLET BY MOUTH EVERY 12 HOURS AS NEEDED FOR ANXIETY OR SLEEP SPASM       diclofenac 50 MG EC tablet    VOLTAREN    90 tablet    TAKE 1 TABLET BY MOUTH 3 TIMES DAILY       DULERA 100-5 MCG/ACT oral inhaler   Generic drug:  mometasone-formoterol     13 g    INHALE 2 PUFFS INTO THE LUNGS 2 TIMES A DAY       fentaNYL 75 mcg/hr 72 hr patch    DURAGESIC    15 patch    APPLY 1 PATCH EVERY 48 HOURS       fluticasone 50 MCG/ACT spray    FLONASE    16 g    USE 2 SPRAYS IN EACH NOSTRIL DAILY       gabapentin 300 MG capsule    NEURONTIN    270 capsule    TAKE 3 CAPSULES BY MOUTH THREE TIMES DAILY       HYDROcodone-acetaminophen  MG per tablet    NORCO    120 tablet    TAKE 1 TABLET BY MOUTH EVERY 6 HOURS AS NEEDED FOR MODERATE TO SEVEREPAIN       ibuprofen 600 MG tablet    ADVIL/MOTRIN     120 tablet    TAKE 1 TABLET BY MOUTH EVERY 6 HOURS AS NEEDED       lidocaine 5 % Patch    LIDODERM    90 patch    PLACE 3 PATCHES ONTO THE SKIN DAILY AS NEEDED FOR MODERATE PAIN       lisdexamfetamine 60 MG capsule   Start taking on:  7/7/2017    VYVANSE    30 capsule    Take 1 capsule (60 mg) by mouth every morning       multivitamin  with iron Tabs     90 tablet    TAKE 1 TABLET BY MOUTH DAILY       nicotine polacrilex 2 MG gum    NICORETTE    110 each    CHEW 1 PIECE AS NEEDED FOR SMOKING CESSATION       omeprazole 20 MG CR capsule    priLOSEC    30 capsule    TAKE 1 CAPSULE BY MOUTH EVERY DAY BEFORE A MEAL       * order for DME     2 Box    Equipment being ordered: Large gloves       * order for DME     1 Units    Equipment being ordered: Boa Back brace       OXcarbazepine 600 MG tablet    TRILEPTAL    60 tablet    Take 1 tablet (600 mg) by mouth 2 times daily       rosuvastatin 10 MG tablet    CRESTOR    30 tablet    TAKE 1 TABLET BY MOUTH DAILY       SM CHILDRENS ASPIRIN 81 MG chewable tablet   Generic drug:  aspirin     30 tablet    CHEW AND SWALLOW 1 TABLET BY MOUTH DAILY       traZODone 50 MG tablet    DESYREL    30 tablet    TAKE 1 TABLET BY MOUTH NIGHTLY AS NEEDED FOR SLEEP       VENTOLIN  (90 BASE) MCG/ACT Inhaler   Generic drug:  albuterol     18 g    USE 2 PUFFS 4 TIMES A DAY AS NEEDED FOR SHORTNESS OF BREATH       * Notice:  This list has 4 medication(s) that are the same as other medications prescribed for you. Read the directions carefully, and ask your doctor or other care provider to review them with you.

## 2017-06-27 ENCOUNTER — OFFICE VISIT (OUTPATIENT)
Dept: PEDIATRICS | Facility: OTHER | Age: 57
End: 2017-06-27
Attending: INTERNAL MEDICINE
Payer: COMMERCIAL

## 2017-06-27 VITALS
HEART RATE: 82 BPM | OXYGEN SATURATION: 98 % | SYSTOLIC BLOOD PRESSURE: 150 MMHG | RESPIRATION RATE: 20 BRPM | WEIGHT: 205 LBS | TEMPERATURE: 98 F | BODY MASS INDEX: 29.35 KG/M2 | HEIGHT: 70 IN | DIASTOLIC BLOOD PRESSURE: 80 MMHG

## 2017-06-27 DIAGNOSIS — F41.1 GAD (GENERALIZED ANXIETY DISORDER): ICD-10-CM

## 2017-06-27 DIAGNOSIS — M79.7 FIBROMYALGIA: ICD-10-CM

## 2017-06-27 DIAGNOSIS — J45.40 MODERATE PERSISTENT ASTHMA WITHOUT COMPLICATION: ICD-10-CM

## 2017-06-27 DIAGNOSIS — M50.30 DDD (DEGENERATIVE DISC DISEASE), CERVICAL: ICD-10-CM

## 2017-06-27 DIAGNOSIS — M62.830 BACK MUSCLE SPASM: ICD-10-CM

## 2017-06-27 DIAGNOSIS — M51.379 DEGENERATION OF LUMBAR OR LUMBOSACRAL INTERVERTEBRAL DISC: Primary | ICD-10-CM

## 2017-06-27 DIAGNOSIS — G40.909 SEIZURE DISORDER (H): ICD-10-CM

## 2017-06-27 LAB
ALBUMIN SERPL-MCNC: 4.3 G/DL (ref 3.4–5)
ALP SERPL-CCNC: 82 U/L (ref 40–150)
ALT SERPL W P-5'-P-CCNC: 28 U/L (ref 0–70)
ANION GAP SERPL CALCULATED.3IONS-SCNC: 6 MMOL/L (ref 3–14)
AST SERPL W P-5'-P-CCNC: 12 U/L (ref 0–45)
BILIRUB SERPL-MCNC: 0.3 MG/DL (ref 0.2–1.3)
BUN SERPL-MCNC: 21 MG/DL (ref 7–30)
CALCIUM SERPL-MCNC: 8.6 MG/DL (ref 8.5–10.1)
CHLORIDE SERPL-SCNC: 111 MMOL/L (ref 94–109)
CO2 SERPL-SCNC: 29 MMOL/L (ref 20–32)
CREAT SERPL-MCNC: 0.72 MG/DL (ref 0.66–1.25)
GFR SERPL CREATININE-BSD FRML MDRD: ABNORMAL ML/MIN/1.7M2
GLUCOSE SERPL-MCNC: 116 MG/DL (ref 70–99)
POTASSIUM SERPL-SCNC: 3.8 MMOL/L (ref 3.4–5.3)
PROT SERPL-MCNC: 7.1 G/DL (ref 6.8–8.8)
SODIUM SERPL-SCNC: 146 MMOL/L (ref 133–144)

## 2017-06-27 PROCEDURE — 36415 COLL VENOUS BLD VENIPUNCTURE: CPT | Mod: ZL | Performed by: INTERNAL MEDICINE

## 2017-06-27 PROCEDURE — 80053 COMPREHEN METABOLIC PANEL: CPT | Mod: ZL | Performed by: INTERNAL MEDICINE

## 2017-06-27 PROCEDURE — 99000 SPECIMEN HANDLING OFFICE-LAB: CPT | Performed by: INTERNAL MEDICINE

## 2017-06-27 PROCEDURE — 99214 OFFICE O/P EST MOD 30 MIN: CPT | Performed by: INTERNAL MEDICINE

## 2017-06-27 PROCEDURE — 80183 DRUG SCRN QUANT OXCARBAZEPIN: CPT | Mod: ZL | Performed by: INTERNAL MEDICINE

## 2017-06-27 PROCEDURE — 99212 OFFICE O/P EST SF 10 MIN: CPT

## 2017-06-27 RX ORDER — ALBUTEROL SULFATE 90 UG/1
2 AEROSOL, METERED RESPIRATORY (INHALATION) EVERY 6 HOURS PRN
COMMUNITY
End: 2018-11-15

## 2017-06-27 RX ORDER — FENTANYL 75 UG/1
PATCH TRANSDERMAL
Qty: 11 PATCH | Refills: 0 | Status: CANCELLED | OUTPATIENT
Start: 2017-06-27

## 2017-06-27 ASSESSMENT — ENCOUNTER SYMPTOMS
DYSURIA: 0
SHORTNESS OF BREATH: 0
BACK PAIN: 1
NAUSEA: 0
DIARRHEA: 0
DIZZINESS: 0
FEVER: 0
HEMATURIA: 0
VOMITING: 0
WHEEZING: 0
PALPITATIONS: 0
SENSORY CHANGE: 1
CHILLS: 0
HEARTBURN: 0
INSOMNIA: 0
HEADACHES: 0
ABDOMINAL PAIN: 0
BLOOD IN STOOL: 0
COUGH: 0
CONSTIPATION: 0

## 2017-06-27 ASSESSMENT — PAIN SCALES - GENERAL: PAINLEVEL: MODERATE PAIN (5)

## 2017-06-27 NOTE — NURSING NOTE
"Chief Complaint   Patient presents with     Pain     Follow up Pain      *_* Health Care Directive *_*     Declined       Initial /80  Pulse 82  Temp 98  F (36.7  C) (Tympanic)  Resp 20  Ht 5' 10\" (1.778 m)  Wt 205 lb (93 kg)  SpO2 98%  BMI 29.41 kg/m2 Estimated body mass index is 29.41 kg/(m^2) as calculated from the following:    Height as of this encounter: 5' 10\" (1.778 m).    Weight as of this encounter: 205 lb (93 kg).  Medication Reconciliation: complete   Marla Alvarado LPN      "

## 2017-06-27 NOTE — PATIENT INSTRUCTIONS
My Asthma Action Plan  Name: Joshua Barron   YOB: 1960  Date: 6/27/2017   My doctor: Lyle Fisher, DO, DO   My clinic: [facility] [place]        My Control Medicine: Dulera 100/5 2 puffs twice a day  My Rescue Medicine: Albuterol inhaler 2 puffs as needed    My Asthma Severity: intermittent  Avoid your asthma triggers: dust mites, animal dander, mold and strong odors and fumes               GREEN ZONE   Good Control    I feel good    No cough or wheeze    Can work, sleep and play without asthma symptoms       Take your asthma control medicine every day.     1. If exercise triggers your asthma, take your rescue medication    15 minutes before exercise or sports, and    During exercise if you have asthma symptoms  2. Spacer to use with inhaler: If you have a spacer, make sure to use it with your inhaler             YELLOW ZONE Getting Worse  I have ANY of these:    I do not feel good    Cough or wheeze    Chest feels tight    Wake up at night   1. Keep taking your Green Zone medications  2. Start taking your rescue medicine:    every 20 minutes for up to 1 hour. Then every 4 hours for 24-48 hours.  3. If you stay in the Yellow Zone for more than 12-24 hours, contact your doctor.  4. If you do not return to the Green Zone in 12-24 hours or you get worse, start taking your oral steroid medicine if prescribed by your provider.           RED ZONE Medical Alert - Get Help  I have ANY of these:    I feel awful    Medicine is not helping    Breathing getting harder    Trouble walking or talking    Nose opens wide to breathe       1. Take your rescue medicine NOW  2. If your provider has prescribed an oral steroid medicine, start taking it NOW  3. Call your doctor NOW  4. If you are still in the Red Zone after 20 minutes and you have not reached your doctor:    Take your rescue medicine again and    Call 911 or go to the emergency room right away    See your regular doctor within 2 weeks  of an Emergency Room or Urgent Care visit for follow-up treatment.        Electronically signed by: Marla Alvarado, June 27, 2017    Annual Reminders:  Meet with Asthma Educator,  Flu Shot in the Fall, consider Pneumonia Vaccination for patients with asthma (aged 19 and older).    Pharmacy: [facility] [place], [place] [address] [address]                    Asthma Triggers  How To Control Things That Make Your Asthma Worse    Triggers are things that make your asthma worse.  Look at the list below to help you find your triggers and what you can do about them.  You can help prevent asthma flare-ups by staying away from your triggers.      Trigger                                                          What you can do   Cigarette Smoke  Tobacco smoke can make asthma worse. Do not allow smoking in your home, car or around you.  Be sure no one smokes at a child s day care or school.  If you smoke, ask your health care provider for ways to help you quit.  Ask family members to quit too.  Ask your health care provider for a referral to Quit Plan to help you quit smoking, or call 7-849-531-PLAN.     Colds, Flu, Bronchitis  These are common triggers of asthma. Wash your hands often.  Don t touch your eyes, nose or mouth.  Get a flu shot every year.     Dust Mites  These are tiny bugs that live in cloth or carpet. They are too small to see. Wash sheets and blankets in hot water every week.   Encase pillows and mattress in dust mite proof covers.  Avoid having carpet if you can. If you have carpet, vacuum weekly.   Use a dust mask and HEPA vacuum.   Pollen and Outdoor Mold  Some people are allergic to trees, grass, or weed pollen, or molds. Try to keep your windows closed.  Limit time out doors when pollen count is high.   Ask you health care provider about taking medicine during allergy season.     Animal Dander  Some people are allergic to skin flakes, urine or saliva from pets with fur or feathers. Keep pets  with fur or feathers out of your home.    If you can t keep the pet outdoors, then keep the pet out of your bedroom.  Keep the bedroom door closed.  Keep pets off cloth furniture and away from stuffed toys.     Mice, Rats, and Cockroaches  Some people are allergic to the waste from these pests.   Cover food and garbage.  Clean up spills and food crumbs.  Store grease in the refrigerator.   Keep food out of the bedroom.   Indoor Mold  This can be a trigger if your home has high moisture. Fix leaking faucets, pipes, or other sources of water.   Clean moldy surfaces.  Dehumidify basement if it is damp and smelly.   Smoke, Strong Odors, and Sprays  These can reduce air quality. Stay away from strong odors and sprays, such as perfume, powder, hair spray, paints, smoke incense, paint, cleaning products, candles and new carpet.   Exercise or Sports  Some people with asthma have this trigger. Be active!  Ask your doctor about taking medicine before sports or exercise to prevent symptoms.    Warm up for 5-10 minutes before and after sports or exercise.     Other Triggers of Asthma  Cold air:  Cover your nose and mouth with a scarf.  Sometimes laughing or crying can be a trigger.  Some medicines and food can trigger asthma.
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
## 2017-06-27 NOTE — MR AVS SNAPSHOT
After Visit Summary   6/27/2017    Joshua Barron    MRN: [id number]           Patient Information     Date Of Birth          1960        Visit Information        Provider Department      6/27/2017 3:20 PM Lyle Fisher DO [facility]        Today's Diagnoses     Degeneration of lumbar or lumbosacral intervertebral disc    -  1    Back muscle spasm        Moderate persistent asthma without complication        Seizure disorder (H)          Care Instructions      My Asthma Action Plan  Name: Joshua Barron   YOB: 1960  Date: 6/27/2017   My doctor: Lyle Fisher DO, DO   My clinic: [facility] [place]        My Control Medicine: Dulera 100/5 2 puffs twice a day  My Rescue Medicine: Albuterol inhaler 2 puffs as needed    My Asthma Severity: intermittent  Avoid your asthma triggers: dust mites, animal dander, mold and strong odors and fumes               GREEN ZONE   Good Control    I feel good    No cough or wheeze    Can work, sleep and play without asthma symptoms       Take your asthma control medicine every day.     1. If exercise triggers your asthma, take your rescue medication    15 minutes before exercise or sports, and    During exercise if you have asthma symptoms  2. Spacer to use with inhaler: If you have a spacer, make sure to use it with your inhaler             YELLOW ZONE Getting Worse  I have ANY of these:    I do not feel good    Cough or wheeze    Chest feels tight    Wake up at night   1. Keep taking your Green Zone medications  2. Start taking your rescue medicine:    every 20 minutes for up to 1 hour. Then every 4 hours for 24-48 hours.  3. If you stay in the Yellow Zone for more than 12-24 hours, contact your doctor.  4. If you do not return to the Green Zone in 12-24 hours or you get worse, start taking your oral steroid medicine if prescribed by your provider.           RED ZONE Medical Alert - Get Help  I have ANY of  these:    I feel awful    Medicine is not helping    Breathing getting harder    Trouble walking or talking    Nose opens wide to breathe       1. Take your rescue medicine NOW  2. If your provider has prescribed an oral steroid medicine, start taking it NOW  3. Call your doctor NOW  4. If you are still in the Red Zone after 20 minutes and you have not reached your doctor:    Take your rescue medicine again and    Call 911 or go to the emergency room right away    See your regular doctor within 2 weeks of an Emergency Room or Urgent Care visit for follow-up treatment.        Electronically signed by: Marla Alvarado, June 27, 2017    Annual Reminders:  Meet with Asthma Educator,  Flu Shot in the Fall, consider Pneumonia Vaccination for patients with asthma (aged 19 and older).    Pharmacy: [facility] - [place] [address] [address]                    Asthma Triggers  How To Control Things That Make Your Asthma Worse    Triggers are things that make your asthma worse.  Look at the list below to help you find your triggers and what you can do about them.  You can help prevent asthma flare-ups by staying away from your triggers.      Trigger                                                          What you can do   Cigarette Smoke  Tobacco smoke can make asthma worse. Do not allow smoking in your home, car or around you.  Be sure no one smokes at a child s day care or school.  If you smoke, ask your health care provider for ways to help you quit.  Ask family members to quit too.  Ask your health care provider for a referral to Quit Plan to help you quit smoking, or call 6-284-915-PLAN.     Colds, Flu, Bronchitis  These are common triggers of asthma. Wash your hands often.  Don t touch your eyes, nose or mouth.  Get a flu shot every year.     Dust Mites  These are tiny bugs that live in cloth or carpet. They are too small to see. Wash sheets and blankets in hot water every week.   Encase pillows and  mattress in dust mite proof covers.  Avoid having carpet if you can. If you have carpet, vacuum weekly.   Use a dust mask and HEPA vacuum.   Pollen and Outdoor Mold  Some people are allergic to trees, grass, or weed pollen, or molds. Try to keep your windows closed.  Limit time out doors when pollen count is high.   Ask you health care provider about taking medicine during allergy season.     Animal Dander  Some people are allergic to skin flakes, urine or saliva from pets with fur or feathers. Keep pets with fur or feathers out of your home.    If you can t keep the pet outdoors, then keep the pet out of your bedroom.  Keep the bedroom door closed.  Keep pets off cloth furniture and away from stuffed toys.     Mice, Rats, and Cockroaches  Some people are allergic to the waste from these pests.   Cover food and garbage.  Clean up spills and food crumbs.  Store grease in the refrigerator.   Keep food out of the bedroom.   Indoor Mold  This can be a trigger if your home has high moisture. Fix leaking faucets, pipes, or other sources of water.   Clean moldy surfaces.  Dehumidify basement if it is damp and smelly.   Smoke, Strong Odors, and Sprays  These can reduce air quality. Stay away from strong odors and sprays, such as perfume, powder, hair spray, paints, smoke incense, paint, cleaning products, candles and new carpet.   Exercise or Sports  Some people with asthma have this trigger. Be active!  Ask your doctor about taking medicine before sports or exercise to prevent symptoms.    Warm up for 5-10 minutes before and after sports or exercise.     Other Triggers of Asthma  Cold air:  Cover your nose and mouth with a scarf.  Sometimes laughing or crying can be a trigger.  Some medicines and food can trigger asthma.             Follow-ups after your visit        Follow-up notes from your care team     Return in about 4 months (around 10/27/2017).      Your next 10 appointments already scheduled     Jul 31, 2017  2:40  "PM CDT   (Arrive by 2:25 PM)   Return Visit with Laquita Fernandez MD   [facility] [place] ([facility] [place] )    [address]  [place] [address] [phone]            Mar 07, 2018  2:45 PM CST   (Arrive by 2:30 PM)   Hearing Eval with Kevin Moreno   [facility] [place] ([facility] [place] )    [address]  [place] [address]   [phone]              Who to contact     If you have questions or need follow up information about today's clinic visit or your schedule please contact [facility] directly at [phone].  Normal or non-critical lab and imaging results will be communicated to you by MyChart, letter or phone within 4 business days after the clinic has received the results. If you do not hear from us within 7 days, please contact the clinic through MyChart or phone. If you have a critical or abnormal lab result, we will notify you by phone as soon as possible.  Submit refill requests through [employer] or call your pharmacy and they will forward the refill request to us. Please allow 3 business days for your refill to be completed.          Additional Information About Your Visit        [employer] To File Information     [employer] lets you send messages to your doctor, view your test results, renew your prescriptions, schedule appointments and more. To sign up, go to [email or web address] . Click on \"Log in\" on the left side of the screen, which will take you to the Welcome page. Then click on \"Sign up Now\" on the right side of the page.     You will be asked to enter the access code listed below, as well as some personal information. Please follow the directions to create your username and password.     Your access code is: [email or web address]  Expires: 2017  9:31 AM     Your access code will  in 90 days. If you need help or a new code, please call your [place] clinic or [phone].        Care EveryWhere ID     This is your Care " "EveryWhere ID. This could be used by other organizations to access your [facility] medical records  [id number]        Your Vitals Were     Pulse Temperature Respirations Height Pulse Oximetry BMI (Body Mass Index)    82 98  F (36.7  C) (Tympanic) 20 5' 10\" (1.778 m) 98% 29.41 kg/m2       Blood Pressure from Last 3 Encounters:   06/27/17 150/80   06/19/17 130/88   06/06/17 122/90    Weight from Last 3 Encounters:   06/27/17 205 lb (93 kg)   06/19/17 211 lb (95.7 kg)   05/23/17 218 lb (98.9 kg)              We Performed the Following     Asthma Action Plan     Comprehensive metabolic panel (BMP + Alb, Alk Phos, ALT, AST, Total. Bili, TP)     Oxcarbazepine level        Primary Care Provider Office Phone # Fax #    Lyle Juan Natalia, [phone]       [facility] [place] [address]  [place] [address]        Equal Access to Services     SHAHBAZ DANIELS AH: Hadii aad ku hadasho Soomaali, waaxda luqadaha, qaybta kaalmada adeegyada, waxay nicole haydayna millan . So [facility] [phone].    ATENCIÓN: Si habla español, tiene a hastings disposición servicios gratuitos de asistencia lingüística. Llame al [phone].    We comply with applicable federal civil rights laws and [place] laws. We do not discriminate on the basis of race, color, national origin, age, disability sex, sexual orientation or gender identity.            Thank you!     Thank you for choosing [facility] [place]  for your care. Our goal is always to provide you with excellent care. Hearing back from our patients is one way we can continue to improve our services. Please take a few minutes to complete the written survey that you may receive in the mail after your visit with us. Thank you!             Your Updated Medication List - Protect others around you: Learn how to safely use, store and throw away your medicines at www.disposemymeds.org.          This list is accurate as of: 6/27/17  3:56 PM.  Always use your " most recent med list.                   Brand Name Dispense Instructions for use Diagnosis    acetaminophen 325 MG tablet    NON-ASPIRIN PAIN RELIEF    200 tablet    TAKE 2 TABLETS BY MOUTH EVERY 4 HOURS AS NEEDED FOR MILD PAIN    Midline low back pain without sciatica       atorvastatin 20 MG tablet    LIPITOR    90 tablet    Take 1 tablet (20 mg) by mouth daily    Mixed hyperlipidemia       * baclofen 10 MG tablet    LIORESAL    90 tablet    TAKE 1 TABLET BY MOUTH THREE TIMES DAILY    Low back pain, unspecified back pain laterality, unspecified chronicity, with sciatica presence unspecified       * baclofen 20 MG tablet    LIORESAL    90 tablet    Take 1 tablet (20 mg) by mouth 3 times daily    Back muscle spasm       blood glucose monitoring meter device kit     1 kit    Use to test blood sugars 3 times daily or as directed.    Type 2 diabetes, HbA1c goal < 7% (H)       blood glucose monitoring test strip    ONE TOUCH ULTRA    300 strip    Use to test blood sugars 3 times daily or as directed.    Type 2 diabetes, HbA1c goal < 7% (H)       diazepam 5 MG tablet    VALIUM    60 tablet    TAKE 1 TABLET BY MOUTH EVERY 12 HOURS AS NEEDED FOR ANXIETY OR SLEEP SPASM    DAYANA (generalized anxiety disorder)       diclofenac 50 MG EC tablet    VOLTAREN    90 tablet    TAKE 1 TABLET BY MOUTH 3 TIMES DAILY    Lumbago       DULERA 100-5 MCG/ACT oral inhaler   Generic drug:  mometasone-formoterol     13 g    INHALE 2 PUFFS INTO THE LUNGS 2 TIMES A DAY    Moderate persistent asthma without complication       fentaNYL 75 mcg/hr 72 hr patch    DURAGESIC    15 patch    APPLY 1 PATCH EVERY 48 HOURS    DDD (degenerative disc disease), cervical       fluticasone 50 MCG/ACT spray    FLONASE    16 g    USE 2 SPRAYS IN EACH NOSTRIL DAILY    Chronic rhinitis       gabapentin 300 MG capsule    NEURONTIN    270 capsule    TAKE 3 CAPSULES BY MOUTH THREE TIMES DAILY    Polyneuropathy associated with underlying disease (H), Low back pain,  unspecified back pain laterality, unspecified chronicity, with sciatica presence unspecified       HYDROcodone-acetaminophen  MG per tablet    NORCO    120 tablet    TAKE 1 TABLET BY MOUTH EVERY 6 HOURS AS NEEDED FOR MODERATE TO SEVEREPAIN    Low back pain, unspecified back pain laterality, unspecified chronicity, with sciatica presence unspecified       ibuprofen 600 MG tablet    ADVIL/MOTRIN    120 tablet    TAKE 1 TABLET BY MOUTH EVERY 6 HOURS AS NEEDED    Hx of degenerative disc disease       lidocaine 5 % Patch    LIDODERM    90 patch    PLACE 3 PATCHES ONTO THE SKIN DAILY AS NEEDED FOR MODERATE PAIN    Midline low back pain without sciatica       lisdexamfetamine 60 MG capsule   Start taking on:  7/7/2017    VYVANSE    30 capsule    Take 1 capsule (60 mg) by mouth every morning    ADHD (attention deficit hyperactivity disorder), combined type       multivitamin  with iron Tabs     90 tablet    TAKE 1 TABLET BY MOUTH DAILY    Health care maintenance       nicotine polacrilex 2 MG gum    NICORETTE    110 each    CHEW 1 PIECE AS NEEDED FOR SMOKING CESSATION    Tobacco abuse       omeprazole 20 MG CR capsule    priLOSEC    30 capsule    TAKE 1 CAPSULE BY MOUTH EVERY DAY BEFORE A MEAL    Gastroesophageal reflux disease, esophagitis presence not specified       * order for DME     2 Box    Equipment being ordered: Large gloves    Need for assistance with personal care       * order for DME     1 Units    Equipment being ordered: Boa Back brace    DDD (degenerative disc disease), lumbar, Chronic lower back pain       OXcarbazepine 600 MG tablet    TRILEPTAL    60 tablet    Take 1 tablet (600 mg) by mouth 2 times daily    Bipolar affective disorder, current episode hypomanic (H)       rosuvastatin 10 MG tablet    CRESTOR    30 tablet    TAKE 1 TABLET BY MOUTH DAILY    Mixed hyperlipidemia, Type 2 diabetes mellitus with diabetic polyneuropathy (H)        CHILDRENS ASPIRIN 81 MG chewable tablet   Generic drug:   aspirin     30 tablet    CHEW AND SWALLOW 1 TABLET BY MOUTH DAILY    Health care maintenance       traZODone 50 MG tablet    DESYREL    30 tablet    TAKE 1 TABLET BY MOUTH NIGHTLY AS NEEDED FOR SLEEP    Chronic pain       * albuterol 108 (90 BASE) MCG/ACT Inhaler    PROAIR HFA/PROVENTIL HFA/VENTOLIN HFA     Inhale 2 puffs into the lungs every 6 hours as needed for shortness of breath / dyspnea or wheezing        * VENTOLIN  (90 BASE) MCG/ACT Inhaler   Generic drug:  albuterol     18 g    USE 2 PUFFS 4 TIMES A DAY AS NEEDED FOR SHORTNESS OF BREATH    Moderate persistent asthma without complication       * Notice:  This list has 6 medication(s) that are the same as other medications prescribed for you. Read the directions carefully, and ask your doctor or other care provider to review them with you.

## 2017-06-28 ASSESSMENT — ASTHMA QUESTIONNAIRES: ACT_TOTALSCORE: 25

## 2017-06-29 ENCOUNTER — TELEPHONE (OUTPATIENT)
Dept: INTERNAL MEDICINE | Facility: OTHER | Age: 57
End: 2017-06-29

## 2017-06-29 DIAGNOSIS — M50.30 DDD (DEGENERATIVE DISC DISEASE), CERVICAL: ICD-10-CM

## 2017-06-29 LAB — 10OH-CARBAZEPINE SERPL-MCNC: 13.5 UG/ML

## 2017-06-29 RX ORDER — NORTRIPTYLINE HYDROCHLORIDE 50 MG/1
CAPSULE ORAL
Qty: 90 CAPSULE | Refills: 0 | Status: SHIPPED | OUTPATIENT
Start: 2017-06-29 | End: 2017-07-31

## 2017-06-29 RX ORDER — FENTANYL 75 UG/1
PATCH TRANSDERMAL
Qty: 15 PATCH | Refills: 0 | Status: SHIPPED | OUTPATIENT
Start: 2017-06-29 | End: 2017-07-18

## 2017-06-29 NOTE — TELEPHONE ENCOUNTER
Fentanyl      Last Written Prescription Date:  6.6.17  Last Fill Quantity: #11,   # refills: 0  Last Office Visit with [facility], [facility] or [facility] prescribing provider: 6.27.17  Future Office visit:    Next 5 appointments (look out 90 days)     Jul 31, 2017  2:40 PM CDT   (Arrive by 2:25 PM)   Return Visit with Laquita Fernandez MD   [facility] ([facility] )    [facility] [address] [place] [id number]   [phone]                   Routing refill request to provider for review/approval because:  Drug not on the [facility], [facility] or [facility] refill protocol or controlled substance

## 2017-06-29 NOTE — TELEPHONE ENCOUNTER
Just spoke with Nan at [facility]. They were only able to dispense #11 on 6.6.17.  Patient is now out. Please advise. Thank you  Last office visit: 6.27.17

## 2017-06-29 NOTE — TELEPHONE ENCOUNTER
9:56 AM    Reason for Call: Phone Call    Description: Pt states that he only receives 10 or 11 pain patches at a time and his order says 15, he states he will need  A new patch today. If you can please call him back    Was an appointment offered for this call? Yes    Preferred method for responding to this message: Telephone Call    If we cannot reach you directly, may we leave a detailed response at the number you provided? Yes    Can this message wait until your PCP/provider returns, if available today? PCP is in    Lisandra Rodarte

## 2017-06-29 NOTE — TELEPHONE ENCOUNTER
RX pended to Dr. Fisher in a refill encounter.    Love Quan RN-BSN  Chronic Pain Care Coordinator  [phone]

## 2017-06-30 RX ORDER — DIAZEPAM 5 MG
TABLET ORAL
Qty: 60 TABLET | Refills: 5 | Status: SHIPPED | OUTPATIENT
Start: 2017-06-30 | End: 2017-11-28

## 2017-07-05 ENCOUNTER — TELEPHONE (OUTPATIENT)
Dept: PEDIATRICS | Facility: OTHER | Age: 57
End: 2017-07-05

## 2017-07-05 DIAGNOSIS — M54.5 LOW BACK PAIN, UNSPECIFIED BACK PAIN LATERALITY, UNSPECIFIED CHRONICITY, WITH SCIATICA PRESENCE UNSPECIFIED: ICD-10-CM

## 2017-07-05 DIAGNOSIS — G89.29 CHRONIC PAIN: ICD-10-CM

## 2017-07-05 NOTE — TELEPHONE ENCOUNTER
"Patient states \"Dr. Fisher's new nurse must not know about my medications\" and that he is out of his hydrocodone and needs a refill, which patent was advised has already been sent to the refill pool. This message is forwarded to Chelo, nurse with Dr. Fisher, for review.   "

## 2017-07-06 ENCOUNTER — OFFICE VISIT (OUTPATIENT)
Dept: CHIROPRACTIC MEDICINE | Facility: OTHER | Age: 57
End: 2017-07-06
Attending: CHIROPRACTOR
Payer: COMMERCIAL

## 2017-07-06 DIAGNOSIS — M99.03 SEGMENTAL AND SOMATIC DYSFUNCTION OF LUMBAR REGION: ICD-10-CM

## 2017-07-06 DIAGNOSIS — M99.02 SEGMENTAL AND SOMATIC DYSFUNCTION OF THORACIC REGION: ICD-10-CM

## 2017-07-06 DIAGNOSIS — M99.01 SEGMENTAL AND SOMATIC DYSFUNCTION OF CERVICAL REGION: Primary | ICD-10-CM

## 2017-07-06 DIAGNOSIS — M54.2 CERVICALGIA: ICD-10-CM

## 2017-07-06 PROCEDURE — 98941 CHIROPRACT MANJ 3-4 REGIONS: CPT | Performed by: CHIROPRACTOR

## 2017-07-06 RX ORDER — HYDROCODONE BITARTRATE AND ACETAMINOPHEN 10; 325 MG/1; MG/1
TABLET ORAL
Qty: 120 TABLET | Refills: 0 | Status: SHIPPED | OUTPATIENT
Start: 2017-07-06 | End: 2017-07-31

## 2017-07-06 RX ORDER — TRAZODONE HYDROCHLORIDE 50 MG/1
TABLET, FILM COATED ORAL
Qty: 30 TABLET | Refills: 5 | Status: SHIPPED | OUTPATIENT
Start: 2017-07-06 | End: 2017-12-04

## 2017-07-06 NOTE — MR AVS SNAPSHOT
After Visit Summary   7/6/2017    Joshua Barron    MRN: [id number]           Patient Information     Date Of Birth          1960        Visit Information        Provider Department      7/6/2017 2:50 PM Shamar Escamilla DC  [facility]        Today's Diagnoses     Segmental and somatic dysfunction of cervical region    -  1    Cervicalgia        Segmental and somatic dysfunction of lumbar region        Segmental and somatic dysfunction of thoracic region           Follow-ups after your visit        Your next 10 appointments already scheduled     Jul 12, 2017  2:20 PM CDT   Return Visit with Shamar Escamilla DC   [facility] ([facility])    [address]  [place] [address]   [phone]            Jul 31, 2017  2:40 PM CDT   (Arrive by 2:25 PM)   Return Visit with Laquita Fernandez MD   [facility] ([facility] [place] )    [address]  [place] [address] [phone]            Mar 07, 2018  2:45 PM CST   (Arrive by 2:30 PM)   Hearing Eval with Kevin Moreno   [facility] ([facility] [place] )    [address] [address]   [phone]              Who to contact     If you have questions or need follow up information about today's clinic visit or your schedule please contact  [facility] directly at [phone].  Normal or non-critical lab and imaging results will be communicated to you by MyChart, letter or phone within 4 business days after the clinic has received the results. If you do not hear from us within 7 days, please contact the clinic through MyChart or phone. If you have a critical or abnormal lab result, we will notify you by phone as soon as possible.  Submit refill requests through [employer] or call your pharmacy and they will forward the refill request to us. Please allow 3 business days for your refill to be completed.          Additional Information  "About Your Visit        MyChart Information     [employer] lets you send messages to your doctor, view your test results, renew your prescriptions, schedule appointments and more. To sign up, go to [email or web address] . Click on \"Log in\" on the left side of the screen, which will take you to the Welcome page. Then click on \"Sign up Now\" on the right side of the page.     You will be asked to enter the access code listed below, as well as some personal information. Please follow the directions to create your username and password.     Your access code is: [email or web address]  Expires: 2017  9:31 AM     Your access code will  in 90 days. If you need help or a new code, please call your [facility] clinic or [phone].        Care EveryWhere ID     This is your Care EveryWhere ID. This could be used by other organizations to access your [facility] medical records  [id number]         Blood Pressure from Last 3 Encounters:   17 158/88   17 150/80   17 130/88    Weight from Last 3 Encounters:   17 201 lb 6.4 oz (91.4 kg)   17 205 lb (93 kg)   17 211 lb (95.7 kg)              We Performed the Following     CHIROPRAC MANIP,SPINAL,3-4 REGIONS        Primary Care Provider Office Phone # Fax #    Lyle Juan MorenoDO yoav [phone]       [facility] [place] [address]  [place] [address]        Equal Access to Services     ANIYAH DANIELS : Hadii aad ku hadasho Soomaali, waaxda luqadaha, qaybta kaalmada adeegyacheng, jaki millan . So [facility] [phone].    ATENCIÓN: Si habla español, tiene a hastings disposición servicios gratuitos de asistencia lingüística. Llame al [phone].    We comply with applicable federal civil rights laws and [place] laws. We do not discriminate on the basis of race, color, national origin, age, disability sex, sexual orientation or gender identity.            Thank you!     Thank you for choosing  [facility]  for your care. Our goal is always to provide you with excellent care. Hearing back from our patients is one way we can continue to improve our services. Please take a few minutes to complete the written survey that you may receive in the mail after your visit with us. Thank you!             Your Updated Medication List - Protect others around you: Learn how to safely use, store and throw away your medicines at www.disposemymeds.org.          This list is accurate as of: 7/6/17 11:59 PM.  Always use your most recent med list.                   Brand Name Dispense Instructions for use Diagnosis    acetaminophen 325 MG tablet    NON-ASPIRIN PAIN RELIEF    200 tablet    TAKE 2 TABLETS BY MOUTH EVERY 4 HOURS AS NEEDED FOR MILD PAIN    Midline low back pain without sciatica       atorvastatin 20 MG tablet    LIPITOR    90 tablet    Take 1 tablet (20 mg) by mouth daily    Mixed hyperlipidemia       * baclofen 10 MG tablet    LIORESAL    90 tablet    TAKE 1 TABLET BY MOUTH THREE TIMES DAILY    Low back pain, unspecified back pain laterality, unspecified chronicity, with sciatica presence unspecified       * baclofen 20 MG tablet    LIORESAL    90 tablet    Take 1 tablet (20 mg) by mouth 3 times daily    Back muscle spasm       blood glucose monitoring meter device kit     1 kit    Use to test blood sugars 3 times daily or as directed.    Type 2 diabetes, HbA1c goal < 7% (H)       blood glucose monitoring test strip    ONE TOUCH ULTRA    300 strip    Use to test blood sugars 3 times daily or as directed.    Type 2 diabetes, HbA1c goal < 7% (H)       diazepam 5 MG tablet    VALIUM    60 tablet    TAKE 1 TABLET BY MOUTH EVERY 12 HOURS AS NEEDED FOR ANXIETY OR SLEEP SPASM    DAYANA (generalized anxiety disorder)       diclofenac 50 MG EC tablet    VOLTAREN    90 tablet    TAKE 1 TABLET BY MOUTH 3 TIMES DAILY    Lumbago       DULERA 100-5 MCG/ACT oral inhaler   Generic drug:  mometasone-formoterol     13 g    INHALE  2 PUFFS INTO THE LUNGS 2 TIMES A DAY    Moderate persistent asthma without complication       fentaNYL 75 mcg/hr 72 hr patch    DURAGESIC    15 patch    APPLY 1 PATCH EVERY 48 HOURS    DDD (degenerative disc disease), cervical       fluticasone 50 MCG/ACT spray    FLONASE    16 g    USE 2 SPRAYS IN EACH NOSTRIL DAILY    Chronic rhinitis       gabapentin 300 MG capsule    NEURONTIN    270 capsule    TAKE 3 CAPSULES BY MOUTH THREE TIMES DAILY    Polyneuropathy associated with underlying disease (H), Low back pain, unspecified back pain laterality, unspecified chronicity, with sciatica presence unspecified       HYDROcodone-acetaminophen  MG per tablet    NORCO    120 tablet    TAKE 1 TABLET BY MOUTH EVERY 6 HOURS AS NEEDED FOR MODERATE TO SEVEREPAIN    Low back pain, unspecified back pain laterality, unspecified chronicity, with sciatica presence unspecified       ibuprofen 600 MG tablet    ADVIL/MOTRIN    120 tablet    TAKE 1 TABLET BY MOUTH EVERY 6 HOURS AS NEEDED    Hx of degenerative disc disease       lidocaine 5 % Patch    LIDODERM    90 patch    PLACE 3 PATCHES ONTO THE SKIN DAILY AS NEEDED FOR MODERATE PAIN    Midline low back pain without sciatica       lisdexamfetamine 60 MG capsule    VYVANSE    30 capsule    Take 1 capsule (60 mg) by mouth every morning    ADHD (attention deficit hyperactivity disorder), combined type       multivitamin  with iron Tabs     90 tablet    TAKE 1 TABLET BY MOUTH DAILY    Health care maintenance       nicotine polacrilex 2 MG gum    NICORETTE    110 each    CHEW 1 PIECE AS NEEDED FOR SMOKING CESSATION    Tobacco abuse       nortriptyline 50 MG capsule    PAMELOR    90 capsule    TAKE 1 CAPSULE BY MOUTH 3 TIMES A DAY    Fibromyalgia       omeprazole 20 MG CR capsule    priLOSEC    30 capsule    TAKE 1 CAPSULE BY MOUTH EVERY DAY BEFORE A MEAL    Gastroesophageal reflux disease, esophagitis presence not specified       * order for DME     2 Box    Equipment being ordered:  Large gloves    Need for assistance with personal care       * order for DME     1 Units    Equipment being ordered: Boa Back brace    DDD (degenerative disc disease), lumbar, Chronic lower back pain       OXcarbazepine 600 MG tablet    TRILEPTAL    60 tablet    Take 1 tablet (600 mg) by mouth 2 times daily    Bipolar affective disorder, current episode hypomanic (H)       rosuvastatin 10 MG tablet    CRESTOR    30 tablet    TAKE 1 TABLET BY MOUTH DAILY    Mixed hyperlipidemia, Type 2 diabetes mellitus with diabetic polyneuropathy (H)       [employer] ASPIRIN 81 MG chewable tablet   Generic drug:  aspirin     30 tablet    CHEW AND SWALLOW 1 TABLET BY MOUTH DAILY    Health care maintenance       traZODone 50 MG tablet    DESYREL    30 tablet    TAKE 1 TABLET BY MOUTH NIGHTLY AS NEEDED FOR SLEEP    Chronic pain       * albuterol 108 (90 BASE) MCG/ACT Inhaler    PROAIR HFA/PROVENTIL HFA/VENTOLIN HFA     Inhale 2 puffs into the lungs every 6 hours as needed for shortness of breath / dyspnea or wheezing        * VENTOLIN  (90 BASE) MCG/ACT Inhaler   Generic drug:  albuterol     18 g    USE 2 PUFFS 4 TIMES A DAY AS NEEDED FOR SHORTNESS OF BREATH    Moderate persistent asthma without complication       * Notice:  This list has 6 medication(s) that are the same as other medications prescribed for you. Read the directions carefully, and ask your doctor or other care provider to review them with you.

## 2017-07-07 ENCOUNTER — OFFICE VISIT (OUTPATIENT)
Dept: PEDIATRICS | Facility: OTHER | Age: 57
End: 2017-07-07
Attending: INTERNAL MEDICINE
Payer: COMMERCIAL

## 2017-07-07 VITALS
SYSTOLIC BLOOD PRESSURE: 158 MMHG | WEIGHT: 201.4 LBS | HEART RATE: 77 BPM | DIASTOLIC BLOOD PRESSURE: 88 MMHG | BODY MASS INDEX: 28.83 KG/M2 | HEIGHT: 70 IN | TEMPERATURE: 98.1 F | OXYGEN SATURATION: 95 %

## 2017-07-07 DIAGNOSIS — G89.4 CHRONIC PAIN SYNDROME: Primary | ICD-10-CM

## 2017-07-07 DIAGNOSIS — I10 BENIGN ESSENTIAL HYPERTENSION: ICD-10-CM

## 2017-07-07 DIAGNOSIS — N53.19 OTHER EJACULATORY DYSFUNCTION: ICD-10-CM

## 2017-07-07 DIAGNOSIS — R07.0 THROAT PAIN: ICD-10-CM

## 2017-07-07 PROCEDURE — 99212 OFFICE O/P EST SF 10 MIN: CPT

## 2017-07-07 PROCEDURE — 99213 OFFICE O/P EST LOW 20 MIN: CPT | Performed by: INTERNAL MEDICINE

## 2017-07-07 ASSESSMENT — ENCOUNTER SYMPTOMS
COUGH: 0
ABDOMINAL PAIN: 0
FEVER: 0
CONSTIPATION: 0
SORE THROAT: 0
CHILLS: 0
PALPITATIONS: 0
VOMITING: 0
WHEEZING: 0
BLOOD IN STOOL: 0
HEADACHES: 0
NAUSEA: 0
HEMATURIA: 0
DYSURIA: 0
SHORTNESS OF BREATH: 0
BACK PAIN: 1
DIARRHEA: 0

## 2017-07-07 ASSESSMENT — PAIN SCALES - GENERAL: PAINLEVEL: EXTREME PAIN (8)

## 2017-07-07 NOTE — NURSING NOTE
"Chief Complaint   Patient presents with     Results     would like copies of 3 previous xrays (dr troy), also would like to talk about MRI     Pharyngitis     no longer has a sore throat       Initial /78 (BP Location: Right arm, Patient Position: Chair, Cuff Size: Adult Regular)  Pulse 77  Temp 98.1  F (36.7  C) (Tympanic)  Ht 5' 10\" (1.778 m)  Wt 201 lb 6.4 oz (91.4 kg)  SpO2 95%  BMI 28.9 kg/m2 Estimated body mass index is 28.9 kg/(m^2) as calculated from the following:    Height as of this encounter: 5' 10\" (1.778 m).    Weight as of this encounter: 201 lb 6.4 oz (91.4 kg).  Medication Reconciliation: complete     Ana Downey   "
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
## 2017-07-07 NOTE — MR AVS SNAPSHOT
After Visit Summary   7/7/2017    Joshua Barron    MRN: [id number]           Patient Information     Date Of Birth          1960        Visit Information        Provider Department      7/7/2017 4:20 PM Lyle Fisher,  [facility]        Today's Diagnoses     Chronic pain syndrome    -  1    Throat pain        Benign essential hypertension        Other ejaculatory dysfunction           Follow-ups after your visit        Additional Services     UROLOGY ADULT REFERRAL       Your provider has referred you to: PREFERRED PROVIDERS:  [facility]    Please be aware that coverage of these services is subject to the terms and limitations of your health insurance plan.  Call member services at your health plan with any benefit or coverage questions.      Please bring the following with you to your appointment:    (1) Any X-Rays, CTs or MRIs which have been performed.  Contact the facility where they were done to arrange for  prior to your scheduled appointment.    (2) List of current medications  (3) This referral request   (4) Any documents/labs given to you for this referral                  Your next 10 appointments already scheduled     Jul 31, 2017  2:40 PM CDT   (Arrive by 2:25 PM)   Return Visit with Laquita Fernandez MD   [facility] ([facility] [place] )    [address] [place] [address]  [place] [address] [phone]            Mar 07, 2018  2:45 PM CST   (Arrive by 2:30 PM)   Hearing Eval with Kevin Moreno   [facility] ([facility] [place] )    [address]  [place] [address]   [phone]              Who to contact     If you have questions or need follow up information about today's clinic visit or your schedule please contact [facility] directly at [phone].  Normal or non-critical lab and imaging results will be communicated to you by MyChart, letter or phone within 4 business days  "after the clinic has received the results. If you do not hear from us within 7 days, please contact the clinic through [facility] or phone. If you have a critical or abnormal lab result, we will notify you by phone as soon as possible.  Submit refill requests through [employer] or call your pharmacy and they will forward the refill request to us. Please allow 3 business days for your refill to be completed.          Additional Information About Your Visit        [facility] [employer] Information     [facility] lets you send messages to your doctor, view your test results, renew your prescriptions, schedule appointments and more. To sign up, go to [email or web address] . Click on \"Log in\" on the left side of the screen, which will take you to the Welcome page. Then click on \"Sign up Now\" on the right side of the page.     You will be asked to enter the access code listed below, as well as some personal information. Please follow the directions to create your username and password.     Your access code is: [email or web address]  Expires: 2017  9:31 AM     Your access code will  in 90 days. If you need help or a new code, please call your [place] clinic or [phone].        Care EveryWhere ID     This is your Care EveryWhere ID. This could be used by other organizations to access your [place] medical records  [id number]        Your Vitals Were     Pulse Temperature Height Pulse Oximetry BMI (Body Mass Index)       77 98.1  F (36.7  C) (Tympanic) 5' 10\" (1.778 m) 95% 28.9 kg/m2        Blood Pressure from Last 3 Encounters:   17 158/88   17 150/80   17 130/88    Weight from Last 3 Encounters:   17 201 lb 6.4 oz (91.4 kg)   17 205 lb (93 kg)   17 211 lb (95.7 kg)              We Performed the Following     UROLOGY ADULT REFERRAL        Primary Care Provider Office Phone # Fax #    Lyle Juan Fisher [phone]       [facility] [place] [address] [place] " [address]        Equal Access to Services     [facility]: Hadii jim arizmendi samanta Gary, waflorianda luqadaha, qaybta kadanishacheng mcmillan, jaki fermin. So [facility] [phone].    ATENCIÓN: Si habla español, tiene a hastings disposición servicios gratuitos de asistencia lingüística. Alcidesame al [phone].    We comply with applicable federal civil rights laws and [place] laws. We do not discriminate on the basis of race, color, national origin, age, disability sex, sexual orientation or gender identity.            Thank you!     Thank you for choosing [facility]  for your care. Our goal is always to provide you with excellent care. Hearing back from our patients is one way we can continue to improve our services. Please take a few minutes to complete the written survey that you may receive in the mail after your visit with us. Thank you!             Your Updated Medication List - Protect others around you: Learn how to safely use, store and throw away your medicines at www.disposemymeds.org.          This list is accurate as of: 7/7/17  4:42 PM.  Always use your most recent med list.                   Brand Name Dispense Instructions for use Diagnosis    acetaminophen 325 MG tablet    NON-ASPIRIN PAIN RELIEF    200 tablet    TAKE 2 TABLETS BY MOUTH EVERY 4 HOURS AS NEEDED FOR MILD PAIN    Midline low back pain without sciatica       atorvastatin 20 MG tablet    LIPITOR    90 tablet    Take 1 tablet (20 mg) by mouth daily    Mixed hyperlipidemia       * baclofen 10 MG tablet    LIORESAL    90 tablet    TAKE 1 TABLET BY MOUTH THREE TIMES DAILY    Low back pain, unspecified back pain laterality, unspecified chronicity, with sciatica presence unspecified       * baclofen 20 MG tablet    LIORESAL    90 tablet    Take 1 tablet (20 mg) by mouth 3 times daily    Back muscle spasm       blood glucose monitoring meter device kit     1 kit    Use to test blood sugars 3 times daily or as directed.     Type 2 diabetes, HbA1c goal < 7% (H)       blood glucose monitoring test strip    ONE TOUCH ULTRA    300 strip    Use to test blood sugars 3 times daily or as directed.    Type 2 diabetes, HbA1c goal < 7% (H)       diazepam 5 MG tablet    VALIUM    60 tablet    TAKE 1 TABLET BY MOUTH EVERY 12 HOURS AS NEEDED FOR ANXIETY OR SLEEP SPASM    DAYANA (generalized anxiety disorder)       diclofenac 50 MG EC tablet    VOLTAREN    90 tablet    TAKE 1 TABLET BY MOUTH 3 TIMES DAILY    Lumbago       DULERA 100-5 MCG/ACT oral inhaler   Generic drug:  mometasone-formoterol     13 g    INHALE 2 PUFFS INTO THE LUNGS 2 TIMES A DAY    Moderate persistent asthma without complication       fentaNYL 75 mcg/hr 72 hr patch    DURAGESIC    15 patch    APPLY 1 PATCH EVERY 48 HOURS    DDD (degenerative disc disease), cervical       fluticasone 50 MCG/ACT spray    FLONASE    16 g    USE 2 SPRAYS IN EACH NOSTRIL DAILY    Chronic rhinitis       gabapentin 300 MG capsule    NEURONTIN    270 capsule    TAKE 3 CAPSULES BY MOUTH THREE TIMES DAILY    Polyneuropathy associated with underlying disease (H), Low back pain, unspecified back pain laterality, unspecified chronicity, with sciatica presence unspecified       HYDROcodone-acetaminophen  MG per tablet    NORCO    120 tablet    TAKE 1 TABLET BY MOUTH EVERY 6 HOURS AS NEEDED FOR MODERATE TO SEVEREPAIN    Low back pain, unspecified back pain laterality, unspecified chronicity, with sciatica presence unspecified       ibuprofen 600 MG tablet    ADVIL/MOTRIN    120 tablet    TAKE 1 TABLET BY MOUTH EVERY 6 HOURS AS NEEDED    Hx of degenerative disc disease       lidocaine 5 % Patch    LIDODERM    90 patch    PLACE 3 PATCHES ONTO THE SKIN DAILY AS NEEDED FOR MODERATE PAIN    Midline low back pain without sciatica       lisdexamfetamine 60 MG capsule    VYVANSE    30 capsule    Take 1 capsule (60 mg) by mouth every morning    ADHD (attention deficit hyperactivity disorder), combined type        multivitamin  with iron Tabs     90 tablet    TAKE 1 TABLET BY MOUTH DAILY    Health care maintenance       nicotine polacrilex 2 MG gum    NICORETTE    110 each    CHEW 1 PIECE AS NEEDED FOR SMOKING CESSATION    Tobacco abuse       nortriptyline 50 MG capsule    PAMELOR    90 capsule    TAKE 1 CAPSULE BY MOUTH 3 TIMES A DAY    Fibromyalgia       omeprazole 20 MG CR capsule    priLOSEC    30 capsule    TAKE 1 CAPSULE BY MOUTH EVERY DAY BEFORE A MEAL    Gastroesophageal reflux disease, esophagitis presence not specified       * order for DME     2 Box    Equipment being ordered: Large gloves    Need for assistance with personal care       * order for DME     1 Units    Equipment being ordered: Boa Back brace    DDD (degenerative disc disease), lumbar, Chronic lower back pain       OXcarbazepine 600 MG tablet    TRILEPTAL    60 tablet    Take 1 tablet (600 mg) by mouth 2 times daily    Bipolar affective disorder, current episode hypomanic (H)       rosuvastatin 10 MG tablet    CRESTOR    30 tablet    TAKE 1 TABLET BY MOUTH DAILY    Mixed hyperlipidemia, Type 2 diabetes mellitus with diabetic polyneuropathy (H)       SM CHILDRENS ASPIRIN 81 MG chewable tablet   Generic drug:  aspirin     30 tablet    CHEW AND SWALLOW 1 TABLET BY MOUTH DAILY    Health care maintenance       traZODone 50 MG tablet    DESYREL    30 tablet    TAKE 1 TABLET BY MOUTH NIGHTLY AS NEEDED FOR SLEEP    Chronic pain       * albuterol 108 (90 BASE) MCG/ACT Inhaler    PROAIR HFA/PROVENTIL HFA/VENTOLIN HFA     Inhale 2 puffs into the lungs every 6 hours as needed for shortness of breath / dyspnea or wheezing        * VENTOLIN  (90 BASE) MCG/ACT Inhaler   Generic drug:  albuterol     18 g    USE 2 PUFFS 4 TIMES A DAY AS NEEDED FOR SHORTNESS OF BREATH    Moderate persistent asthma without complication       * Notice:  This list has 6 medication(s) that are the same as other medications prescribed for you. Read the directions carefully, and ask  your doctor or other care provider to review them with you.

## 2017-07-11 ENCOUNTER — TELEPHONE (OUTPATIENT)
Dept: PEDIATRICS | Facility: OTHER | Age: 57
End: 2017-07-11

## 2017-07-11 DIAGNOSIS — M54.50 MIDLINE LOW BACK PAIN WITHOUT SCIATICA: ICD-10-CM

## 2017-07-11 DIAGNOSIS — F90.2 ADHD (ATTENTION DEFICIT HYPERACTIVITY DISORDER), COMBINED TYPE: ICD-10-CM

## 2017-07-11 DIAGNOSIS — Z87.39 HX OF DEGENERATIVE DISC DISEASE: ICD-10-CM

## 2017-07-11 DIAGNOSIS — M54.50 LUMBAGO: ICD-10-CM

## 2017-07-11 NOTE — TELEPHONE ENCOUNTER
10:22 AM    Reason for Call: Phone Call    Description: Pt called and stated he just realized why his BP was high last week. He forgot to put his BP med in his weekly pill organizer for last week so he hadn't taken it all week. If you have any questions or concerns, please call him back at [phone]    Was an appointment offered for this call? No    Preferred method for responding to this message: Telephone Call    If we cannot reach you directly, may we leave a detailed response at the number you provided? Yes    Can this message wait until your PCP/provider returns, if available today? Not applicable    Janett Knight

## 2017-07-11 NOTE — TELEPHONE ENCOUNTER
Pt called back. He said someone from our clinic tried to call him, but that he told them he would call right back. He said he didn't get the name of who it was , but would like them to call him back. He stated that he believed it was regarding this telephone encounter. [phone]

## 2017-07-11 NOTE — PROGRESS NOTES

## 2017-07-11 NOTE — TELEPHONE ENCOUNTER
Phone call missed was not from Dr. Ravi office. Updated patient. Informed patient to start taking his BP med again.

## 2017-07-12 ENCOUNTER — OFFICE VISIT (OUTPATIENT)
Dept: CHIROPRACTIC MEDICINE | Facility: OTHER | Age: 57
End: 2017-07-12
Attending: CHIROPRACTOR
Payer: COMMERCIAL

## 2017-07-12 DIAGNOSIS — M99.01 SEGMENTAL AND SOMATIC DYSFUNCTION OF CERVICAL REGION: ICD-10-CM

## 2017-07-12 DIAGNOSIS — M99.02 SEGMENTAL AND SOMATIC DYSFUNCTION OF THORACIC REGION: Primary | ICD-10-CM

## 2017-07-12 DIAGNOSIS — M99.03 SEGMENTAL AND SOMATIC DYSFUNCTION OF LUMBAR REGION: ICD-10-CM

## 2017-07-12 DIAGNOSIS — M54.2 CERVICALGIA: ICD-10-CM

## 2017-07-12 PROCEDURE — 98941 CHIROPRACT MANJ 3-4 REGIONS: CPT | Performed by: CHIROPRACTOR

## 2017-07-12 RX ORDER — ACETAMINOPHEN 325 MG/1
TABLET ORAL
Qty: 200 TABLET | Refills: 0 | Status: SHIPPED | OUTPATIENT
Start: 2017-07-12 | End: 2017-10-30

## 2017-07-12 RX ORDER — IBUPROFEN 600 MG/1
TABLET, FILM COATED ORAL
Qty: 120 TABLET | Refills: 0 | Status: SHIPPED | OUTPATIENT
Start: 2017-07-12 | End: 2017-08-26

## 2017-07-12 RX ORDER — LIDOCAINE 50 MG/G
PATCH TOPICAL
Qty: 90 PATCH | Refills: 0 | Status: SHIPPED | OUTPATIENT
Start: 2017-07-12 | End: 2017-08-02

## 2017-07-12 NOTE — MR AVS SNAPSHOT
After Visit Summary   7/12/2017    Joshua Barron    MRN: [id number]           Patient Information     Date Of Birth          1960        Visit Information        Provider Department      7/12/2017 2:20 PM Shamar Escamilla DC  [facility]        Today's Diagnoses     Segmental and somatic dysfunction of thoracic region    -  1    Cervicalgia        Segmental and somatic dysfunction of lumbar region        Segmental and somatic dysfunction of cervical region           Follow-ups after your visit        Your next 10 appointments already scheduled     Jul 26, 2017 10:30 AM CDT   (Arrive by 10:15 AM)   New Visit with Red Hair MD   [facility] [place] ([facility] [place] )    [address]  [place] [address]   [phone]            Jul 31, 2017  2:40 PM CDT   (Arrive by 2:25 PM)   Return Visit with Laquita Fernandez MD   [facility] ([facility] [place] )    [address] [place] [address]  [place] [address] [phone]            Mar 07, 2018  2:45 PM CST   (Arrive by 2:30 PM)   Hearing Eval with Kevin Moreno   [facility] ([facility] [place] )    [address]  [place] [address]   [phone]              Who to contact     If you have questions or need follow up information about today's clinic visit or your schedule please contact  [facility] directly at [phone].  Normal or non-critical lab and imaging results will be communicated to you by MyChart, letter or phone within 4 business days after the clinic has received the results. If you do not hear from us within 7 days, please contact the clinic through MyChart or phone. If you have a critical or abnormal lab result, we will notify you by phone as soon as possible.  Submit refill requests through [employer] or call your pharmacy and they will forward the refill request to us. Please allow 3 business days for your refill to be completed.  "         Additional Information About Your Visit        MyChart Information     [employer] lets you send messages to your doctor, view your test results, renew your prescriptions, schedule appointments and more. To sign up, go to [email or web address] . Click on \"Log in\" on the left side of the screen, which will take you to the Welcome page. Then click on \"Sign up Now\" on the right side of the page.     You will be asked to enter the access code listed below, as well as some personal information. Please follow the directions to create your username and password.     Your access code is: [email or web address]  Expires: 2017  9:31 AM     Your access code will  in 90 days. If you need help or a new code, please call your [place] clinic or [phone].        Care EveryWhere ID     This is your Care EveryWhere ID. This could be used by other organizations to access your [place] medical records  [id number]         Blood Pressure from Last 3 Encounters:   17 158/88   17 150/80   17 130/88    Weight from Last 3 Encounters:   17 201 lb 6.4 oz (91.4 kg)   17 205 lb (93 kg)   17 211 lb (95.7 kg)              We Performed the Following     CHIROPRAC MANIP,SPINAL,3-4 REGIONS        Primary Care Provider Office Phone # Fax #    Lyle Juan Fisher [phone]       [facility] [place] [address]  [place] [address]        Equal Access to Services     [facility] : Hadii aad ku hadasho Soomaali, waaxda luqadaha, qaybta kaalmada adeegyacheng, jaki millan . So [facility] [phone].    ATENCIÓN: Si habla español, tiene a hastings disposición servicios gratuitos de asistencia lingüística. Llame al [phone].    We comply with applicable federal civil rights laws and [place] laws. We do not discriminate on the basis of race, color, national origin, age, disability sex, sexual orientation or gender identity.            Thank you!     " Thank you for choosing  [facility]  for your care. Our goal is always to provide you with excellent care. Hearing back from our patients is one way we can continue to improve our services. Please take a few minutes to complete the written survey that you may receive in the mail after your visit with us. Thank you!             Your Updated Medication List - Protect others around you: Learn how to safely use, store and throw away your medicines at www.disposemymeds.org.          This list is accurate as of: 7/12/17 11:59 PM.  Always use your most recent med list.                   Brand Name Dispense Instructions for use Diagnosis    atorvastatin 20 MG tablet    LIPITOR    90 tablet    Take 1 tablet (20 mg) by mouth daily    Mixed hyperlipidemia       * baclofen 10 MG tablet    LIORESAL    90 tablet    TAKE 1 TABLET BY MOUTH THREE TIMES DAILY    Low back pain, unspecified back pain laterality, unspecified chronicity, with sciatica presence unspecified       * baclofen 20 MG tablet    LIORESAL    90 tablet    Take 1 tablet (20 mg) by mouth 3 times daily    Back muscle spasm       blood glucose monitoring meter device kit     1 kit    Use to test blood sugars 3 times daily or as directed.    Type 2 diabetes, HbA1c goal < 7% (H)       blood glucose monitoring test strip    ONE TOUCH ULTRA    300 strip    Use to test blood sugars 3 times daily or as directed.    Type 2 diabetes, HbA1c goal < 7% (H)       diazepam 5 MG tablet    VALIUM    60 tablet    TAKE 1 TABLET BY MOUTH EVERY 12 HOURS AS NEEDED FOR ANXIETY OR SLEEP SPASM    DAYANA (generalized anxiety disorder)       diclofenac 50 MG EC tablet    VOLTAREN    90 tablet    TAKE 1 TABLET BY MOUTH 3 TIMES DAILY    Lumbago       docusate sodium 100 MG capsule    COLACE    180 capsule    Take 1 capsule (100 mg) by mouth 2 times daily    Drug-induced constipation       DULERA 100-5 MCG/ACT oral inhaler   Generic drug:  mometasone-formoterol     13 g    INHALE 2 PUFFS  INTO THE LUNGS 2 TIMES A DAY    Moderate persistent asthma without complication       fentaNYL 75 mcg/hr 72 hr patch    DURAGESIC    15 patch    APPLY 1 PATCH EVERY 48 HOURS    DDD (degenerative disc disease), cervical       fluticasone 50 MCG/ACT spray    FLONASE    16 g    USE 2 SPRAYS IN EACH NOSTRIL DAILY    Chronic rhinitis       gabapentin 300 MG capsule    NEURONTIN    270 capsule    TAKE 3 CAPSULES BY MOUTH THREE TIMES DAILY    Polyneuropathy associated with underlying disease (H), Low back pain, unspecified back pain laterality, unspecified chronicity, with sciatica presence unspecified       HYDROcodone-acetaminophen  MG per tablet    NORCO    120 tablet    TAKE 1 TABLET BY MOUTH EVERY 6 HOURS AS NEEDED FOR MODERATE TO SEVEREPAIN    Low back pain, unspecified back pain laterality, unspecified chronicity, with sciatica presence unspecified       ibuprofen 600 MG tablet    ADVIL/MOTRIN    120 tablet    TAKE 1 TABLET BY MOUTH EVERY 6 HOURS AS NEEDED    Hx of degenerative disc disease       lidocaine 5 % Patch    LIDODERM    90 patch    PLACE 3 PATCHES ONTO THE SKIN DAILY AS NEEDED FOR MODERATE PAIN    Midline low back pain without sciatica       lisdexamfetamine 60 MG capsule    VYVANSE    30 capsule    Take 1 capsule (60 mg) by mouth every morning    ADHD (attention deficit hyperactivity disorder), combined type       MAPAP 325 MG tablet   Generic drug:  acetaminophen     200 tablet    TAKE 2 TABLETS BY MOUTH EVERY 4 HOURS AS NEEDED FOR MILD PAIN    Midline low back pain without sciatica       multivitamin  with iron Tabs     90 tablet    TAKE 1 TABLET BY MOUTH DAILY    Health care maintenance       nicotine polacrilex 2 MG gum    NICORETTE    110 each    CHEW 1 PIECE AS NEEDED FOR SMOKING CESSATION    Tobacco abuse       nortriptyline 50 MG capsule    PAMELOR    90 capsule    TAKE 1 CAPSULE BY MOUTH 3 TIMES A DAY    Fibromyalgia       omeprazole 20 MG CR capsule    priLOSEC    30 capsule    TAKE 1  CAPSULE BY MOUTH EVERY DAY BEFORE A MEAL    Gastroesophageal reflux disease, esophagitis presence not specified       * order for DME     2 Box    Equipment being ordered: Large gloves    Need for assistance with personal care       * order for DME     1 Units    Equipment being ordered: Boa Back brace    DDD (degenerative disc disease), lumbar, Chronic lower back pain       OXcarbazepine 600 MG tablet    TRILEPTAL    60 tablet    Take 1 tablet (600 mg) by mouth 2 times daily    Bipolar affective disorder, current episode hypomanic (H)       rosuvastatin 10 MG tablet    CRESTOR    30 tablet    TAKE 1 TABLET BY MOUTH DAILY    Mixed hyperlipidemia, Type 2 diabetes mellitus with diabetic polyneuropathy (H)       SM CHILDRENS ASPIRIN 81 MG chewable tablet   Generic drug:  aspirin     30 tablet    CHEW AND SWALLOW 1 TABLET BY MOUTH DAILY    Health care maintenance       traZODone 50 MG tablet    DESYREL    30 tablet    TAKE 1 TABLET BY MOUTH NIGHTLY AS NEEDED FOR SLEEP    Chronic pain       * albuterol 108 (90 BASE) MCG/ACT Inhaler    PROAIR HFA/PROVENTIL HFA/VENTOLIN HFA     Inhale 2 puffs into the lungs every 6 hours as needed for shortness of breath / dyspnea or wheezing        * VENTOLIN  (90 BASE) MCG/ACT Inhaler   Generic drug:  albuterol     18 g    USE 2 PUFFS 4 TIMES A DAY AS NEEDED FOR SHORTNESS OF BREATH    Moderate persistent asthma without complication       * Notice:  This list has 6 medication(s) that are the same as other medications prescribed for you. Read the directions carefully, and ask your doctor or other care provider to review them with you.

## 2017-07-13 RX ORDER — LISDEXAMFETAMINE DIMESYLATE 60 MG/1
CAPSULE ORAL
Qty: 30 CAPSULE | Refills: 0 | OUTPATIENT
Start: 2017-07-13

## 2017-07-14 ENCOUNTER — TELEPHONE (OUTPATIENT)
Dept: INTERNAL MEDICINE | Facility: OTHER | Age: 57
End: 2017-07-14

## 2017-07-14 DIAGNOSIS — K59.00 CONSTIPATION, UNSPECIFIED CONSTIPATION TYPE: Primary | ICD-10-CM

## 2017-07-14 DIAGNOSIS — J31.0 CHRONIC RHINITIS, UNSPECIFIED TYPE: ICD-10-CM

## 2017-07-14 RX ORDER — FLUTICASONE PROPIONATE 50 MCG
SPRAY, SUSPENSION (ML) NASAL
Qty: 16 G | Refills: 5 | Status: SHIPPED | OUTPATIENT
Start: 2017-07-14 | End: 2017-12-04

## 2017-07-14 NOTE — TELEPHONE ENCOUNTER
Patient reports that he is out of [employer].  Last fill date on pill bottle is 6-13-17.  Patient has medications delivered by [facility].  Per [facility], last fill was on 7-7-17.  Called pharmacy and they do not have a hard copy.  Last office visit for patient was on 6-19-17.  Next f/u is 7-31-17.  Thank you, please advise.

## 2017-07-14 NOTE — TELEPHONE ENCOUNTER
Last appointment was 6.19.17 and this was the day the Vyvanse was signed.  Would the hard copy have been sent with the patient that visit? Thank you

## 2017-07-14 NOTE — PROGRESS NOTES

## 2017-07-14 NOTE — TELEPHONE ENCOUNTER
Reason for call:  Medication    1. Medication Name?   Flucosinate nasal spray  2. Is this request for a refill?  Yes  3. What Pharmacy do you use?   [facility]  4. Have you contacted your pharmacy?  Yes  5. If yes, when?  3 days ago  (Please note that the turn-around-time for prescriptions is 72 business hours; I am sending your request at this time. SEND TO  [facility]  )  Description:  Joshua is completely out of this.  Was an appointment offered for this a call?  No  Preferred method for responding to this message   [phone]  If we cannot reach you directly, may we leave a detailed response at the number you provided?   Yes

## 2017-07-16 NOTE — TELEPHONE ENCOUNTER
Per note from last visit 6/19 I gave him script dated for 7/7/17. Per epic it was print on 6/19/17 and start date is 7/7/17.

## 2017-07-18 DIAGNOSIS — M50.30 DDD (DEGENERATIVE DISC DISEASE), CERVICAL: ICD-10-CM

## 2017-07-18 RX ORDER — DOCUSATE SODIUM AND SENNOSIDES 50; 8.6 MG/1; MG/1
TABLET ORAL
Qty: 120 TABLET | Refills: 0 | Status: SHIPPED | OUTPATIENT
Start: 2017-07-18 | End: 2017-09-02

## 2017-07-18 NOTE — TELEPHONE ENCOUNTER
Patient contacted to f/u on medication Vyvanse.  Patient states that medication was delivered to him from pharmacy.

## 2017-07-20 NOTE — TELEPHONE ENCOUNTER
Fentanyl  Last office visit: 7/7/17  Last refill: 6/29/17 #15, 0 R.  On controlled substance contract.  Medication pended.  Thank you.

## 2017-07-21 ENCOUNTER — ALLIED HEALTH/NURSE VISIT (OUTPATIENT)
Dept: PEDIATRICS | Facility: OTHER | Age: 57
End: 2017-07-21
Attending: INTERNAL MEDICINE
Payer: COMMERCIAL

## 2017-07-21 DIAGNOSIS — Z71.9 COUNSELED BY NURSE: Primary | ICD-10-CM

## 2017-07-21 RX ORDER — FENTANYL 75 UG/1
PATCH TRANSDERMAL
Qty: 11 PATCH | Refills: 0 | COMMUNITY
Start: 2017-07-21 | End: 2017-08-01

## 2017-07-21 RX ORDER — FENTANYL 75 UG/1
PATCH TRANSDERMAL
Qty: 15 PATCH | Refills: 0 | Status: SHIPPED | OUTPATIENT
Start: 2017-07-27 | End: 2017-07-21

## 2017-07-21 NOTE — NURSING NOTE
Prescription filled on July 1, 2017 today states I am suppose to place my new patch for the prescription on July 1st place. Called in presciption on Tuesday and today and patient states cannot refill until 27th he is stating he doses 2 days not the 3 and that is the way it has been and I will be High level of pain if I wait this long. I will be in severe pain. Patient is very hard to follow and states prescription is at pharmacy. Stating he does not understand why this is happen he talks about a person. Patient advise nurse will contact Dr. Fisher and see his recommendation.   Elizabeth Mo LPN

## 2017-07-21 NOTE — MR AVS SNAPSHOT
After Visit Summary   7/21/2017    Joshua Barron    MRN: [id number]           Patient Information     Date Of Birth          1960        Visit Information        Provider Department      7/21/2017 4:15 PM HC IM PEDS NURSE [facility]        Today's Diagnoses     Counseled by nurse    -  1       Follow-ups after your visit        Your next 10 appointments already scheduled     Jul 26, 2017 10:30 AM CDT   (Arrive by 10:15 AM)   New Visit with Red Hair MD   [facility] ([facility] [place] )    [address]  [place] [address]   [phone]            Jul 31, 2017  2:40 PM CDT   (Arrive by 2:25 PM)   Return Visit with Laquita Fernandez MD   [facility] ([facility] [place] )    [address] [place] [address]  [place] [id number] [phone]            Mar 07, 2018  2:45 PM CST   (Arrive by 2:30 PM)   Hearing Eval with Kevin Moreno   [facility] [place] ([facility] [place] )    [address]  [place] [address]   [phone]              Who to contact     If you have questions or need follow up information about today's clinic visit or your schedule please contact [facility] directly at [phone].  Normal or non-critical lab and imaging results will be communicated to you by MyChart, letter or phone within 4 business days after the clinic has received the results. If you do not hear from us within 7 days, please contact the clinic through MyChart or phone. If you have a critical or abnormal lab result, we will notify you by phone as soon as possible.  Submit refill requests through [employer] or call your pharmacy and they will forward the refill request to us. Please allow 3 business days for your refill to be completed.          Additional Information About Your Visit        MyChart Information     [employer] lets you send messages to your doctor, view your test results, renew your  "prescriptions, schedule appointments and more. To sign up, go to [email or web address] . Click on \"Log in\" on the left side of the screen, which will take you to the Welcome page. Then click on \"Sign up Now\" on the right side of the page.     You will be asked to enter the access code listed below, as well as some personal information. Please follow the directions to create your username and password.     Your access code is: [email or web address]  Expires: 2017  9:31 AM     Your access code will  in 90 days. If you need help or a new code, please call your [facility] clinic or [phone].        Care EveryWhere ID     This is your Care EveryWhere ID. This could be used by other organizations to access your [facility] medical records  [id number]         Blood Pressure from Last 3 Encounters:   17 158/88   17 150/80   17 130/88    Weight from Last 3 Encounters:   17 201 lb 6.4 oz (91.4 kg)   17 205 lb (93 kg)   17 211 lb (95.7 kg)              Today, you had the following     No orders found for display       Primary Care Provider Office Phone # Fax #    Lyle Juan Fisher [phone]       [facility] [place] [address]  [place] [address]        Equal Access to Services     SHAHBAZ DANIELS AH: Hadii aad ku hadasho Soomaali, waaxda luqadaha, qaybta kaalmada adeegyada, waxay nicole millan . So [facility] [phone].    ATENCIÓN: Si habla español, tiene a hastings disposición servicios gratuitos de asistencia lingüística. Llame al [phone].    We comply with applicable federal civil rights laws and [place] laws. We do not discriminate on the basis of race, color, national origin, age, disability sex, sexual orientation or gender identity.            Thank you!     Thank you for choosing [facility] [place]  for your care. Our goal is always to provide you with excellent care. Hearing back from our patients is one way we can " continue to improve our services. Please take a few minutes to complete the written survey that you may receive in the mail after your visit with us. Thank you!             Your Updated Medication List - Protect others around you: Learn how to safely use, store and throw away your medicines at www.disposemymeds.org.          This list is accurate as of: 7/21/17  4:34 PM.  Always use your most recent med list.                   Brand Name Dispense Instructions for use Diagnosis    atorvastatin 20 MG tablet    LIPITOR    90 tablet    Take 1 tablet (20 mg) by mouth daily    Mixed hyperlipidemia       * baclofen 10 MG tablet    LIORESAL    90 tablet    TAKE 1 TABLET BY MOUTH THREE TIMES DAILY    Low back pain, unspecified back pain laterality, unspecified chronicity, with sciatica presence unspecified       * baclofen 20 MG tablet    LIORESAL    90 tablet    Take 1 tablet (20 mg) by mouth 3 times daily    Back muscle spasm       blood glucose monitoring meter device kit     1 kit    Use to test blood sugars 3 times daily or as directed.    Type 2 diabetes, HbA1c goal < 7% (H)       blood glucose monitoring test strip    ONE TOUCH ULTRA    300 strip    Use to test blood sugars 3 times daily or as directed.    Type 2 diabetes, HbA1c goal < 7% (H)       diazepam 5 MG tablet    VALIUM    60 tablet    TAKE 1 TABLET BY MOUTH EVERY 12 HOURS AS NEEDED FOR ANXIETY OR SLEEP SPASM    DAYANA (generalized anxiety disorder)       diclofenac 50 MG EC tablet    VOLTAREN    90 tablet    TAKE 1 TABLET BY MOUTH 3 TIMES DAILY    Lumbago       docusate sodium 100 MG capsule    COLACE    180 capsule    Take 1 capsule (100 mg) by mouth 2 times daily    Drug-induced constipation       DULERA 100-5 MCG/ACT oral inhaler   Generic drug:  mometasone-formoterol     13 g    INHALE 2 PUFFS INTO THE LUNGS 2 TIMES A DAY    Moderate persistent asthma without complication       fentaNYL 75 mcg/hr 72 hr patch   Start taking on:  7/27/2017    DURAGESIC    15  patch    APPLY 1 PATCH EVERY 48 HOURS    DDD (degenerative disc disease), cervical       fluticasone 50 MCG/ACT spray    FLONASE    16 g    USE 2 SPRAYS IN EACH NOSTRIL DAILY    Chronic rhinitis, unspecified type       gabapentin 300 MG capsule    NEURONTIN    270 capsule    TAKE 3 CAPSULES BY MOUTH THREE TIMES DAILY    Polyneuropathy associated with underlying disease (H), Low back pain, unspecified back pain laterality, unspecified chronicity, with sciatica presence unspecified       HYDROcodone-acetaminophen  MG per tablet    NORCO    120 tablet    TAKE 1 TABLET BY MOUTH EVERY 6 HOURS AS NEEDED FOR MODERATE TO SEVEREPAIN    Low back pain, unspecified back pain laterality, unspecified chronicity, with sciatica presence unspecified       ibuprofen 600 MG tablet    ADVIL/MOTRIN    120 tablet    TAKE 1 TABLET BY MOUTH EVERY 6 HOURS AS NEEDED    Hx of degenerative disc disease       lidocaine 5 % Patch    LIDODERM    90 patch    PLACE 3 PATCHES ONTO THE SKIN DAILY AS NEEDED FOR MODERATE PAIN    Midline low back pain without sciatica       lisdexamfetamine 60 MG capsule    VYVANSE    30 capsule    Take 1 capsule (60 mg) by mouth every morning    ADHD (attention deficit hyperactivity disorder), combined type       MAPAP 325 MG tablet   Generic drug:  acetaminophen     200 tablet    TAKE 2 TABLETS BY MOUTH EVERY 4 HOURS AS NEEDED FOR MILD PAIN    Midline low back pain without sciatica       multivitamin  with iron Tabs     90 tablet    TAKE 1 TABLET BY MOUTH DAILY    Health care maintenance       nicotine polacrilex 2 MG gum    NICORETTE    110 each    CHEW 1 PIECE AS NEEDED FOR SMOKING CESSATION    Tobacco abuse       nortriptyline 50 MG capsule    PAMELOR    90 capsule    TAKE 1 CAPSULE BY MOUTH 3 TIMES A DAY    Fibromyalgia       omeprazole 20 MG CR capsule    priLOSEC    30 capsule    TAKE 1 CAPSULE BY MOUTH EVERY DAY BEFORE A MEAL    Gastroesophageal reflux disease, esophagitis presence not specified       *  order for DME     2 Box    Equipment being ordered: Large gloves    Need for assistance with personal care       * order for DME     1 Units    Equipment being ordered: Boa Back brace    DDD (degenerative disc disease), lumbar, Chronic lower back pain       OXcarbazepine 600 MG tablet    TRILEPTAL    60 tablet    Take 1 tablet (600 mg) by mouth 2 times daily    Bipolar affective disorder, current episode hypomanic (H)       rosuvastatin 10 MG tablet    CRESTOR    30 tablet    TAKE 1 TABLET BY MOUTH DAILY    Mixed hyperlipidemia, Type 2 diabetes mellitus with diabetic polyneuropathy (H)       SM CHILDRENS ASPIRIN 81 MG chewable tablet   Generic drug:  aspirin     30 tablet    CHEW AND SWALLOW 1 TABLET BY MOUTH DAILY    Health care maintenance        STOOL SOFTENER/LAXATIVE 8.6-50 MG per tablet   Generic drug:  senna-docusate     120 tablet    TAKE 1 OR 2 TABLETS BY MOUTH 2 TIMES A DAY    Constipation, unspecified constipation type       traZODone 50 MG tablet    DESYREL    30 tablet    TAKE 1 TABLET BY MOUTH NIGHTLY AS NEEDED FOR SLEEP    Chronic pain       * albuterol 108 (90 BASE) MCG/ACT Inhaler    PROAIR HFA/PROVENTIL HFA/VENTOLIN HFA     Inhale 2 puffs into the lungs every 6 hours as needed for shortness of breath / dyspnea or wheezing        * VENTOLIN  (90 BASE) MCG/ACT Inhaler   Generic drug:  albuterol     18 g    USE 2 PUFFS 4 TIMES A DAY AS NEEDED FOR SHORTNESS OF BREATH    Moderate persistent asthma without complication       * Notice:  This list has 6 medication(s) that are the same as other medications prescribed for you. Read the directions carefully, and ask your doctor or other care provider to review them with you.

## 2017-07-25 ENCOUNTER — OFFICE VISIT (OUTPATIENT)
Dept: CHIROPRACTIC MEDICINE | Facility: OTHER | Age: 57
End: 2017-07-25
Attending: CHIROPRACTOR
Payer: COMMERCIAL

## 2017-07-25 DIAGNOSIS — M99.01 SEGMENTAL AND SOMATIC DYSFUNCTION OF CERVICAL REGION: ICD-10-CM

## 2017-07-25 DIAGNOSIS — M99.03 SEGMENTAL AND SOMATIC DYSFUNCTION OF LUMBAR REGION: Primary | ICD-10-CM

## 2017-07-25 DIAGNOSIS — M54.50 ACUTE BILATERAL LOW BACK PAIN WITHOUT SCIATICA: ICD-10-CM

## 2017-07-25 DIAGNOSIS — M99.02 SEGMENTAL AND SOMATIC DYSFUNCTION OF THORACIC REGION: ICD-10-CM

## 2017-07-25 PROCEDURE — 98941 CHIROPRACT MANJ 3-4 REGIONS: CPT | Performed by: CHIROPRACTOR

## 2017-07-25 NOTE — MR AVS SNAPSHOT
After Visit Summary   7/25/2017    Joshua Barron    MRN: [id number]           Patient Information     Date Of Birth          1960        Visit Information        Provider Department      7/25/2017 3:50 PM [facility] Esc[facility]illa [facility]        Today's Diagnoses     Segmental and somatic dysfunction of lumbar region    -  1    Acute bilateral low back pain without sciatica        Segmental and somatic dysfunction of thoracic region        Segmental and somatic dysfunction of cervical region           Follow-ups after your visit        Your next 10 appointments already scheduled     Sep 06, 2017  2:20 PM CDT   (Arrive by 2:05 PM)   Return Visit with Laquita Fernandez MD   [facility] [place] ([facility] [place] )    [address] [place] [address]  [place] [address] [phone]            Mar 07, 2018  2:45 PM CST   (Arrive by 2:30 PM)   Hearing Eval with Kevin Moreno   [facility] [place] ([facility] [place] )    [address] [facility] [place] [address]   [phone]              Who to contact     If you have questions or need follow up information about today's clinic visit or your schedule please contact  [facility] directly at [phone].  Normal or non-critical lab and imaging results will be communicated to you by MyChart, letter or phone within 4 business days after the clinic has received the results. If you do not hear from us within 7 days, please contact the clinic through MyChart or phone. If you have a critical or abnormal lab result, we will notify you by phone as soon as possible.  Submit refill requests through [employer] or call your pharmacy and they will forward the refill request to us. Please allow 3 business days for your refill to be completed.          Additional Information About Your Visit        [email or web address] Information     [employer] lets you send messages to your doctor, view your test results, renew your  "prescriptions, schedule appointments and more. To sign up, go to [email or web address] . Click on \"Log in\" on the left side of the screen, which will take you to the Welcome page. Then click on \"Sign up Now\" on the right side of the page.     You will be asked to enter the access code listed below, as well as some personal information. Please follow the directions to create your username and password.     Your access code is: [email or web address]  Expires: 2017  9:31 AM     Your access code will  in 90 days. If you need help or a new code, please call your [facility] clinic or [phone].        Care EveryWhere ID     This is your Care EveryWhere ID. This could be used by other organizations to access your [facility] medical records  [id number]         Blood Pressure from Last 3 Encounters:   17 124/90   17 140/70   17 158/88    Weight from Last 3 Encounters:   17 185 lb (83.9 kg)   17 185 lb (83.9 kg)   17 201 lb 6.4 oz (91.4 kg)              We Performed the Following     CHIROPRAC MANIP,SPINAL,3-4 REGIONS        Primary Care Provider Office Phone # Fax #    Lyle Juan MorenoDO yoav [phone]       [facility] [place] [address]  [facility] [place] [address]        Equal Access to Services     SHAHBAZ DANIELS AH: Hadii aad ku hadasho Soomaali, waaxda luqadaha, qaybta kaalmada adeegyada, jaki millan . So [facility] [phone].    ATENCIÓN: Si habla kira, tiene a hastings disposición servicios gratuitos de asistencia lingüística. Tip al [phone].    We comply with applicable federal civil rights laws and [place] laws. We do not discriminate on the basis of race, color, national origin, age, disability sex, sexual orientation or gender identity.            Thank you!     Thank you for choosing  [facility] [place] [facility]  for your care. Our goal is always to provide you with excellent care. Hearing back from our patients is one way we " can continue to improve our services. Please take a few minutes to complete the written survey that you may receive in the mail after your visit with us. Thank you!             Your Updated Medication List - Protect others around you: Learn how to safely use, store and throw away your medicines at www.disposemymeds.org.          This list is accurate as of: 7/25/17 11:59 PM.  Always use your most recent med list.                   Brand Name Dispense Instructions for use Diagnosis    atorvastatin 20 MG tablet    LIPITOR    90 tablet    Take 1 tablet (20 mg) by mouth daily    Mixed hyperlipidemia       baclofen 20 MG tablet    LIORESAL    90 tablet    Take 1 tablet (20 mg) by mouth 3 times daily    Back muscle spasm       diazepam 5 MG tablet    VALIUM    60 tablet    TAKE 1 TABLET BY MOUTH EVERY 12 HOURS AS NEEDED FOR ANXIETY OR SLEEP SPASM    DAYANA (generalized anxiety disorder)       diclofenac 50 MG EC tablet    VOLTAREN    90 tablet    TAKE 1 TABLET BY MOUTH 3 TIMES DAILY    Lumbago       docusate sodium 100 MG capsule    COLACE    180 capsule    Take 1 capsule (100 mg) by mouth 2 times daily    Drug-induced constipation       DULERA 100-5 MCG/ACT oral inhaler   Generic drug:  mometasone-formoterol     13 g    INHALE 2 PUFFS INTO THE LUNGS 2 TIMES A DAY    Moderate persistent asthma without complication       fluticasone 50 MCG/ACT spray    FLONASE    16 g    USE 2 SPRAYS IN EACH NOSTRIL DAILY    Chronic rhinitis, unspecified type       gabapentin 300 MG capsule    NEURONTIN    270 capsule    TAKE 3 CAPSULES BY MOUTH THREE TIMES DAILY    Polyneuropathy associated with underlying disease (H), Low back pain, unspecified back pain laterality, unspecified chronicity, with sciatica presence unspecified       ibuprofen 600 MG tablet    ADVIL/MOTRIN    120 tablet    TAKE 1 TABLET BY MOUTH EVERY 6 HOURS AS NEEDED    Hx of degenerative disc disease       lidocaine 5 % Patch    LIDODERM    90 patch    PLACE 3 PATCHES ONTO  THE SKIN DAILY AS NEEDED FOR MODERATE PAIN    Midline low back pain without sciatica       losartan 50 MG tablet    COZAAR    90 tablet    Take 1 tablet (50 mg) by mouth daily    Benign essential hypertension       MAPAP 325 MG tablet   Generic drug:  acetaminophen     200 tablet    TAKE 2 TABLETS BY MOUTH EVERY 4 HOURS AS NEEDED FOR MILD PAIN    Midline low back pain without sciatica       multivitamin  with iron Tabs     90 tablet    TAKE 1 TABLET BY MOUTH DAILY    Health care maintenance       nicotine polacrilex 2 MG gum    NICORETTE    110 each    CHEW 1 PIECE AS NEEDED FOR SMOKING CESSATION    Tobacco abuse       omeprazole 20 MG CR capsule    priLOSEC    30 capsule    TAKE 1 CAPSULE BY MOUTH EVERY DAY BEFORE A MEAL    Gastroesophageal reflux disease, esophagitis presence not specified       * order for DME     2 Box    Equipment being ordered: Large gloves    Need for assistance with personal care       * order for DME     1 Units    Equipment being ordered: Boa Back brace    DDD (degenerative disc disease), lumbar, Chronic lower back pain       OXcarbazepine 600 MG tablet    TRILEPTAL    60 tablet    Take 1 tablet (600 mg) by mouth 2 times daily    Bipolar affective disorder, current episode hypomanic (H)       SM CHILDRENS ASPIRIN 81 MG chewable tablet   Generic drug:  aspirin     30 tablet    CHEW AND SWALLOW 1 TABLET BY MOUTH DAILY    Health care maintenance       SM STOOL SOFTENER/LAXATIVE 8.6-50 MG per tablet   Generic drug:  senna-docusate     120 tablet    TAKE 1 OR 2 TABLETS BY MOUTH 2 TIMES A DAY    Constipation, unspecified constipation type       traZODone 50 MG tablet    DESYREL    30 tablet    TAKE 1 TABLET BY MOUTH NIGHTLY AS NEEDED FOR SLEEP    Chronic pain       * albuterol 108 (90 BASE) MCG/ACT Inhaler    PROAIR HFA/PROVENTIL HFA/VENTOLIN HFA     Inhale 2 puffs into the lungs every 6 hours as needed for shortness of breath / dyspnea or wheezing        * VENTOLIN  (90 BASE) MCG/ACT  Inhaler   Generic drug:  albuterol     18 g    USE 2 PUFFS 4 TIMES A DAY AS NEEDED FOR SHORTNESS OF BREATH    Moderate persistent asthma without complication       * Notice:  This list has 4 medication(s) that are the same as other medications prescribed for you. Read the directions carefully, and ask your doctor or other care provider to review them with you.

## 2017-07-26 ENCOUNTER — OFFICE VISIT (OUTPATIENT)
Dept: CARE COORDINATION | Facility: OTHER | Age: 57
End: 2017-07-26
Attending: INTERNAL MEDICINE
Payer: COMMERCIAL

## 2017-07-26 ENCOUNTER — AMBULATORY - GICH (OUTPATIENT)
Dept: SCHEDULING | Facility: OTHER | Age: 57
End: 2017-07-26

## 2017-07-26 VITALS
DIASTOLIC BLOOD PRESSURE: 70 MMHG | HEART RATE: 68 BPM | BODY MASS INDEX: 26.48 KG/M2 | SYSTOLIC BLOOD PRESSURE: 140 MMHG | TEMPERATURE: 97.7 F | WEIGHT: 185 LBS | HEIGHT: 70 IN

## 2017-07-26 DIAGNOSIS — N53.19 OTHER EJACULATORY DYSFUNCTION: ICD-10-CM

## 2017-07-26 DIAGNOSIS — N53.14 RETROGRADE EJACULATION: Primary | ICD-10-CM

## 2017-07-26 PROCEDURE — 99213 OFFICE O/P EST LOW 20 MIN: CPT | Performed by: UROLOGY

## 2017-07-26 ASSESSMENT — PAIN SCALES - GENERAL: PAINLEVEL: EXTREME PAIN (8)

## 2017-07-26 NOTE — NURSING NOTE
"Chief Complaint   Patient presents with     Consult     Ejaculatory dysfuction.   Dr. Fisher referring.       Initial /70 (BP Location: Left arm, Patient Position: Chair, Cuff Size: Adult Regular)  Pulse 68  Temp 97.7  F (36.5  C) (Tympanic)  Ht 5' 10\" (1.778 m)  Wt 185 lb (83.9 kg)  BMI 26.54 kg/m2 Estimated body mass index is 26.54 kg/(m^2) as calculated from the following:    Height as of this encounter: 5' 10\" (1.778 m).    Weight as of this encounter: 185 lb (83.9 kg).  Medication Reconciliation: complete   SUSAN SHULTZ    Review of Systems (currently do you have any of the following)    Weight loss Yes      Recent fever/chills No     Current skin rash Yes      Heat intolerance Yes      Chest pain No      Shortness of breath Yes     Constipation No      Nausea No       Kidney/side pain No      Frequent headaches Yes     Night sweats No  Recent hair loss Yes  Cold intolerance Yes  Palpitations No  Wheezing Yes  Diarrhea Yes  Vomiting No  Back pain Yes  Dizziness No  Calf pain No    Family History    Do you have parents, brothers or sisters with kidney cancer? No  Do you have parents, brothers or sisters with bladder cancer? No   SUSAN SHULTZ            "

## 2017-07-26 NOTE — MR AVS SNAPSHOT
After Visit Summary   7/26/2017    Joshua Barron    MRN: [id number]           Patient Information     Date Of Birth          1960        Visit Information        Provider Department      7/26/2017 10:30 AM Red [facility] MD [facility]        Today's Diagnoses     Retrograde ejaculation    -  1    Other ejaculatory dysfunction           Follow-ups after your visit        Your next 10 appointments already scheduled     Jul 31, 2017  2:40 PM CDT   (Arrive by 2:25 PM)   Return Visit with Laquita Fernandez MD   [facility] [place] ([facility] [place] )    [address]  [place] [address] [phone]            Jul 31, 2017  4:00 PM CDT   Return Visit with Shamar Escamilla DC   [facility] [place] [facility] ([facility]    [address] [place] [address]   [phone]            Mar 07, 2018  2:45 PM CST   (Arrive by 2:30 PM)   Hearing Eval with Kevin Moreno   [facility] [place] ([facility] [place] )    [address]  [place] [address]   [phone]              Who to contact     If you have questions or need follow up information about today's clinic visit or your schedule please contact [facility] directly at [phone].  Normal or non-critical lab and imaging results will be communicated to you by MyChart, letter or phone within 4 business days after the clinic has received the results. If you do not hear from us within 7 days, please contact the clinic through MyChart or phone. If you have a critical or abnormal lab result, we will notify you by phone as soon as possible.  Submit refill requests through [employer] or call your pharmacy and they will forward the refill request to us. Please allow 3 business days for your refill to be completed.          Additional Information About Your Visit        [employer] Information     [employer] lets you send messages to your doctor, view your test results, renew  "your prescriptions, schedule appointments and more. To sign up, go to [email or web address] . Click on \"Log in\" on the left side of the screen, which will take you to the Welcome page. Then click on \"Sign up Now\" on the right side of the page.     You will be asked to enter the access code listed below, as well as some personal information. Please follow the directions to create your username and password.     Your access code is: [email or web address]  Expires: 2017  9:31 AM     Your access code will  in 90 days. If you need help or a new code, please call your [facility] clinic or [phone].        Care EveryWhere ID     This is your Care EveryWhere ID. This could be used by other organizations to access your [facility] [place] medical records  [id number]        Your Vitals Were     Pulse Temperature Height BMI (Body Mass Index)          68 97.7  F (36.5  C) (Tympanic) 1.778 m (5' 10\") 26.54 kg/m2         Blood Pressure from Last 3 Encounters:   17 140/70   17 158/88   17 150/80    Weight from Last 3 Encounters:   17 83.9 kg (185 lb)   17 91.4 kg (201 lb 6.4 oz)   17 93 kg (205 lb)              Today, you had the following     No orders found for display       Primary Care Provider Office Phone # Fax #    Lyle Juan Fisher [phone]       [facility] [place] [address]  [place] [address]        Equal Access to Services     [facility] FRANCES : Hadii jim england Soro, waaxda luqadaha, qaybta kaalmada deyanira, jaki fermin. So [facility] [phone].    ATENCIÓN: Si habla español, tiene a hastings disposición servicios gratuitos de asistencia lingüística. Llame al [phone].    We comply with applicable federal civil rights laws and [place] laws. We do not discriminate on the basis of race, color, national origin, age, disability sex, sexual orientation or gender identity.            Thank you!     Thank you for choosing " [facility] [place]  for your care. Our goal is always to provide you with excellent care. Hearing back from our patients is one way we can continue to improve our services. Please take a few minutes to complete the written survey that you may receive in the mail after your visit with us. Thank you!             Your Updated Medication List - Protect others around you: Learn how to safely use, store and throw away your medicines at www.disposemymeds.org.          This list is accurate as of: 7/26/17 10:54 AM.  Always use your most recent med list.                   Brand Name Dispense Instructions for use Diagnosis    atorvastatin 20 MG tablet    LIPITOR    90 tablet    Take 1 tablet (20 mg) by mouth daily    Mixed hyperlipidemia       * baclofen 10 MG tablet    LIORESAL    90 tablet    TAKE 1 TABLET BY MOUTH THREE TIMES DAILY    Low back pain, unspecified back pain laterality, unspecified chronicity, with sciatica presence unspecified       * baclofen 20 MG tablet    LIORESAL    90 tablet    Take 1 tablet (20 mg) by mouth 3 times daily    Back muscle spasm       diazepam 5 MG tablet    VALIUM    60 tablet    TAKE 1 TABLET BY MOUTH EVERY 12 HOURS AS NEEDED FOR ANXIETY OR SLEEP SPASM    DAYANA (generalized anxiety disorder)       diclofenac 50 MG EC tablet    VOLTAREN    90 tablet    TAKE 1 TABLET BY MOUTH 3 TIMES DAILY    Lumbago       docusate sodium 100 MG capsule    COLACE    180 capsule    Take 1 capsule (100 mg) by mouth 2 times daily    Drug-induced constipation       DULERA 100-5 MCG/ACT oral inhaler   Generic drug:  mometasone-formoterol     13 g    INHALE 2 PUFFS INTO THE LUNGS 2 TIMES A DAY    Moderate persistent asthma without complication       fentaNYL 75 mcg/hr 72 hr patch    DURAGESIC    11 patch    APPLY 1 PATCH EVERY 48 HOURS    DDD (degenerative disc disease), cervical       fluticasone 50 MCG/ACT spray    FLONASE    16 g    USE 2 SPRAYS IN EACH NOSTRIL DAILY    Chronic rhinitis, unspecified type        gabapentin 300 MG capsule    NEURONTIN    270 capsule    TAKE 3 CAPSULES BY MOUTH THREE TIMES DAILY    Polyneuropathy associated with underlying disease (H), Low back pain, unspecified back pain laterality, unspecified chronicity, with sciatica presence unspecified       HYDROcodone-acetaminophen  MG per tablet    NORCO    120 tablet    TAKE 1 TABLET BY MOUTH EVERY 6 HOURS AS NEEDED FOR MODERATE TO SEVEREPAIN    Low back pain, unspecified back pain laterality, unspecified chronicity, with sciatica presence unspecified       ibuprofen 600 MG tablet    ADVIL/MOTRIN    120 tablet    TAKE 1 TABLET BY MOUTH EVERY 6 HOURS AS NEEDED    Hx of degenerative disc disease       lidocaine 5 % Patch    LIDODERM    90 patch    PLACE 3 PATCHES ONTO THE SKIN DAILY AS NEEDED FOR MODERATE PAIN    Midline low back pain without sciatica       lisdexamfetamine 60 MG capsule    VYVANSE    30 capsule    Take 1 capsule (60 mg) by mouth every morning    ADHD (attention deficit hyperactivity disorder), combined type       MAPAP 325 MG tablet   Generic drug:  acetaminophen     200 tablet    TAKE 2 TABLETS BY MOUTH EVERY 4 HOURS AS NEEDED FOR MILD PAIN    Midline low back pain without sciatica       multivitamin  with iron Tabs     90 tablet    TAKE 1 TABLET BY MOUTH DAILY    Health care maintenance       nicotine polacrilex 2 MG gum    NICORETTE    110 each    CHEW 1 PIECE AS NEEDED FOR SMOKING CESSATION    Tobacco abuse       nortriptyline 50 MG capsule    PAMELOR    90 capsule    TAKE 1 CAPSULE BY MOUTH 3 TIMES A DAY    Fibromyalgia       omeprazole 20 MG CR capsule    priLOSEC    30 capsule    TAKE 1 CAPSULE BY MOUTH EVERY DAY BEFORE A MEAL    Gastroesophageal reflux disease, esophagitis presence not specified       * order for DME     2 Box    Equipment being ordered: Large gloves    Need for assistance with personal care       * order for DME     1 Units    Equipment being ordered: Boa Back brace    DDD (degenerative disc  disease), lumbar, Chronic lower back pain       OXcarbazepine 600 MG tablet    TRILEPTAL    60 tablet    Take 1 tablet (600 mg) by mouth 2 times daily    Bipolar affective disorder, current episode hypomanic (H)       rosuvastatin 10 MG tablet    CRESTOR    30 tablet    TAKE 1 TABLET BY MOUTH DAILY    Mixed hyperlipidemia, Type 2 diabetes mellitus with diabetic polyneuropathy (H)       SM CHILDRENS ASPIRIN 81 MG chewable tablet   Generic drug:  aspirin     30 tablet    CHEW AND SWALLOW 1 TABLET BY MOUTH DAILY    Health care maintenance        STOOL SOFTENER/LAXATIVE 8.6-50 MG per tablet   Generic drug:  senna-docusate     120 tablet    TAKE 1 OR 2 TABLETS BY MOUTH 2 TIMES A DAY    Constipation, unspecified constipation type       traZODone 50 MG tablet    DESYREL    30 tablet    TAKE 1 TABLET BY MOUTH NIGHTLY AS NEEDED FOR SLEEP    Chronic pain       * albuterol 108 (90 BASE) MCG/ACT Inhaler    PROAIR HFA/PROVENTIL HFA/VENTOLIN HFA     Inhale 2 puffs into the lungs every 6 hours as needed for shortness of breath / dyspnea or wheezing        * VENTOLIN  (90 BASE) MCG/ACT Inhaler   Generic drug:  albuterol     18 g    USE 2 PUFFS 4 TIMES A DAY AS NEEDED FOR SHORTNESS OF BREATH    Moderate persistent asthma without complication       * Notice:  This list has 6 medication(s) that are the same as other medications prescribed for you. Read the directions carefully, and ask your doctor or other care provider to review them with you.
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
## 2017-07-28 RX ORDER — LOSARTAN POTASSIUM 50 MG/1
50 TABLET ORAL DAILY
Qty: 90 TABLET | Refills: 1 | Status: SHIPPED | OUTPATIENT
Start: 2017-07-28 | End: 2017-12-04

## 2017-07-31 ENCOUNTER — APPOINTMENT (OUTPATIENT)
Dept: CHIROPRACTIC MEDICINE | Facility: OTHER | Age: 57
End: 2017-07-31
Attending: CHIROPRACTOR
Payer: COMMERCIAL

## 2017-07-31 ENCOUNTER — OFFICE VISIT (OUTPATIENT)
Dept: PSYCHIATRY | Facility: OTHER | Age: 57
End: 2017-07-31
Attending: PSYCHIATRY & NEUROLOGY
Payer: COMMERCIAL

## 2017-07-31 ENCOUNTER — TELEPHONE (OUTPATIENT)
Dept: PEDIATRICS | Facility: OTHER | Age: 57
End: 2017-07-31

## 2017-07-31 VITALS
BODY MASS INDEX: 26.48 KG/M2 | WEIGHT: 185 LBS | SYSTOLIC BLOOD PRESSURE: 124 MMHG | HEART RATE: 79 BPM | TEMPERATURE: 98.5 F | DIASTOLIC BLOOD PRESSURE: 90 MMHG | HEIGHT: 70 IN

## 2017-07-31 DIAGNOSIS — F90.2 ADHD (ATTENTION DEFICIT HYPERACTIVITY DISORDER), COMBINED TYPE: ICD-10-CM

## 2017-07-31 DIAGNOSIS — M54.40 CHRONIC LOW BACK PAIN WITH SCIATICA, SCIATICA LATERALITY UNSPECIFIED, UNSPECIFIED BACK PAIN LATERALITY: Primary | ICD-10-CM

## 2017-07-31 DIAGNOSIS — M54.5 LOW BACK PAIN, UNSPECIFIED BACK PAIN LATERALITY, UNSPECIFIED CHRONICITY, WITH SCIATICA PRESENCE UNSPECIFIED: ICD-10-CM

## 2017-07-31 DIAGNOSIS — G89.29 CHRONIC LOW BACK PAIN WITH SCIATICA, SCIATICA LATERALITY UNSPECIFIED, UNSPECIFIED BACK PAIN LATERALITY: Primary | ICD-10-CM

## 2017-07-31 PROCEDURE — 99212 OFFICE O/P EST SF 10 MIN: CPT

## 2017-07-31 PROCEDURE — 99213 OFFICE O/P EST LOW 20 MIN: CPT | Performed by: PSYCHIATRY & NEUROLOGY

## 2017-07-31 RX ORDER — HYDROCODONE BITARTRATE AND ACETAMINOPHEN 10; 325 MG/1; MG/1
TABLET ORAL
Qty: 120 TABLET | Refills: 0 | Status: SHIPPED | OUTPATIENT
Start: 2017-07-31 | End: 2017-08-26

## 2017-07-31 RX ORDER — LISDEXAMFETAMINE DIMESYLATE 60 MG/1
60 CAPSULE ORAL EVERY MORNING
Qty: 30 CAPSULE | Refills: 0 | Status: SHIPPED | OUTPATIENT
Start: 2017-08-02 | End: 2017-08-26

## 2017-07-31 ASSESSMENT — ANXIETY QUESTIONNAIRES
GAD7 TOTAL SCORE: 14
6. BECOMING EASILY ANNOYED OR IRRITABLE: NOT AT ALL
7. FEELING AFRAID AS IF SOMETHING AWFUL MIGHT HAPPEN: MORE THAN HALF THE DAYS
3. WORRYING TOO MUCH ABOUT DIFFERENT THINGS: NEARLY EVERY DAY
2. NOT BEING ABLE TO STOP OR CONTROL WORRYING: NEARLY EVERY DAY
5. BEING SO RESTLESS THAT IT IS HARD TO SIT STILL: NOT AT ALL
1. FEELING NERVOUS, ANXIOUS, OR ON EDGE: NEARLY EVERY DAY

## 2017-07-31 ASSESSMENT — PAIN SCALES - GENERAL: PAINLEVEL: EXTREME PAIN (8)

## 2017-07-31 ASSESSMENT — PATIENT HEALTH QUESTIONNAIRE - PHQ9: 5. POOR APPETITE OR OVEREATING: NEARLY EVERY DAY

## 2017-07-31 NOTE — NURSING NOTE
"Chief Complaint   Patient presents with     RECHECK     Mental health.       Initial /90 (BP Location: Right arm, Patient Position: Sitting, Cuff Size: Adult Regular)  Pulse 79  Temp 98.5  F (36.9  C) (Tympanic)  Ht 5' 10\" (1.778 m)  Wt 185 lb (83.9 kg)  BMI 26.54 kg/m2 Estimated body mass index is 26.54 kg/(m^2) as calculated from the following:    Height as of this encounter: 5' 10\" (1.778 m).    Weight as of this encounter: 185 lb (83.9 kg).  Medication Reconciliation: complete     JACQUELYN SUAREZ      "

## 2017-07-31 NOTE — MR AVS SNAPSHOT
After Visit Summary   7/31/2017    Joshua Barron    MRN: [id number]           Patient Information     Date Of Birth          1960        Visit Information        Provider Department      7/31/2017 2:40 PM Laquita Fernandez MD [facility]        Today's Diagnoses     Chronic low back pain with sciatica, sciatica laterality unspecified, unspecified back pain laterality    -  1    ADHD (attention deficit hyperactivity disorder), combined type           Follow-ups after your visit        Your next 10 appointments already scheduled     Jul 31, 2017  4:00 PM CDT   Return Visit with Shamar Escamilla DC   [facility]    [address]  [place] [address]   [phone]            Sep 06, 2017  2:20 PM CDT   (Arrive by 2:05 PM)   Return Visit with Laquita Fernandez MD   [facility] ([facility] [place] [facility]    [address]  [place] [address]   [phone]            Mar 07, 2018  2:45 PM CST   (Arrive by 2:30 PM)   Hearing Eval with Kevin Moreno   [facility] ([facility] [place] )    [address] [facility] [address]   [phone]              Who to contact     If you have questions or need follow up information about today's clinic visit or your schedule please contact [facility] directly at [phone].  Normal or non-critical lab and imaging results will be communicated to you by MyChart, letter or phone within 4 business days after the clinic has received the results. If you do not hear from us within 7 days, please contact the clinic through MyChart or phone. If you have a critical or abnormal lab result, we will notify you by phone as soon as possible.  Submit refill requests through [email or web address] or call your pharmacy and they will forward the refill request to us. Please allow 3 business days for your refill to be completed.          Additional Information About Your  "Visit        IN[employer] Information     [employer] lets you send messages to your doctor, view your test results, renew your prescriptions, schedule appointments and more. To sign up, go to [email or web address] . Click on \"Log in\" on the left side of the screen, which will take you to the Welcome page. Then click on \"Sign up Now\" on the right side of the page.     You will be asked to enter the access code listed below, as well as some personal information. Please follow the directions to create your username and password.     Your access code is: [email or web address]  Expires: 2017  9:31 AM     Your access code will  in 90 days. If you need help or a new code, please call your [facility] clinic or [phone].        Care EveryWhere ID     This is your Care EveryWhere ID. This could be used by other organizations to access your [facility] medical records  [id number]        Your Vitals Were     Pulse Temperature Height BMI (Body Mass Index)          79 98.5  F (36.9  C) (Tympanic) 5' 10\" (1.778 m) 26.54 kg/m2         Blood Pressure from Last 3 Encounters:   17 124/90   17 140/70   17 158/88    Weight from Last 3 Encounters:   17 185 lb (83.9 kg)   17 185 lb (83.9 kg)   17 201 lb 6.4 oz (91.4 kg)              Today, you had the following     No orders found for display         Today's Medication Changes          These changes are accurate as of: 17  3:30 PM.  If you have any questions, ask your nurse or doctor.               These medicines have changed or have updated prescriptions.        Dose/Directions    * order for DME   This may have changed:  Another medication with the same name was added. Make sure you understand how and when to take each.   Used for:  Need for assistance with personal care   Changed by:  Lyle Fisher,         Equipment being ordered: Large gloves   Quantity:  2 Box   Refills:  0       * order for DME   This may have changed:  Another " medication with the same name was added. Make sure you understand how and when to take each.   Used for:  DDD (degenerative disc disease), lumbar, Chronic lower back pain   Changed by:  Lyle Fisher DO        Equipment being ordered: Boa Back brace   Quantity:  1 Units   Refills:  0       * order for DME   This may have changed:  You were already taking a medication with the same name, and this prescription was added. Make sure you understand how and when to take each.   Used for:  Chronic low back pain with sciatica, sciatica laterality unspecified, unspecified back pain laterality   Changed by:  Laquita Fernandez MD        1 boa back brace   Quantity:  1 Device   Refills:  0       * Notice:  This list has 3 medication(s) that are the same as other medications prescribed for you. Read the directions carefully, and ask your doctor or other care provider to review them with you.         Where to get your medicines      Some of these will need a paper prescription and others can be bought over the counter.  Ask your nurse if you have questions.     Bring a paper prescription for each of these medications     lisdexamfetamine 60 MG capsule    order for DME                Primary Care Provider Office Phone # Fax #    Lyle Juan Fisher [phone]       [place] [address]  [place] [address]        Equal Access to Services     SHAHBAZ DANIELS AH: Hadii aad ku hadasho Soomaali, waaxda luqadaha, qaybta kaalmada adeegyada, waxay nicole fermin. So [facility] [phone].    ATENCIÓN: Si habla español, tiene a hastings disposición servicios gratuitos de asistencia lingüística. Llame al [phone].    We comply with applicable federal civil rights laws and [place] laws. We do not discriminate on the basis of race, color, national origin, age, disability sex, sexual orientation or gender identity.            Thank you!     Thank you for choosing [facility]  for your care. Our goal is always to provide you with excellent care. Hearing back from our patients is one way we can continue to improve our services. Please take a few minutes to complete the written survey that you may receive in the mail after your visit with us. Thank you!             Your Updated Medication List - Protect others around you: Learn how to safely use, store and throw away your medicines at www.disposemymeds.org.          This list is accurate as of: 7/31/17  3:30 PM.  Always use your most recent med list.                   Brand Name Dispense Instructions for use Diagnosis    atorvastatin 20 MG tablet    LIPITOR    90 tablet    Take 1 tablet (20 mg) by mouth daily    Mixed hyperlipidemia       * baclofen 10 MG tablet    LIORESAL    90 tablet    TAKE 1 TABLET BY MOUTH THREE TIMES DAILY    Low back pain, unspecified back pain laterality, unspecified chronicity, with sciatica presence unspecified       * baclofen 20 MG tablet    LIORESAL    90 tablet    Take 1 tablet (20 mg) by mouth 3 times daily    Back muscle spasm       diazepam 5 MG tablet    VALIUM    60 tablet    TAKE 1 TABLET BY MOUTH EVERY 12 HOURS AS NEEDED FOR ANXIETY OR SLEEP SPASM    DAYANA (generalized anxiety disorder)       diclofenac 50 MG EC tablet    VOLTAREN    90 tablet    TAKE 1 TABLET BY MOUTH 3 TIMES DAILY    Lumbago       docusate sodium 100 MG capsule    COLACE    180 capsule    Take 1 capsule (100 mg) by mouth 2 times daily    Drug-induced constipation       DULERA 100-5 MCG/ACT oral inhaler   Generic drug:  mometasone-formoterol     13 g    INHALE 2 PUFFS INTO THE LUNGS 2 TIMES A DAY    Moderate persistent asthma without complication       fentaNYL 75 mcg/hr 72 hr patch    DURAGESIC    11 patch    APPLY 1 PATCH EVERY 48 HOURS    DDD (degenerative disc disease), cervical       fluticasone 50 MCG/ACT spray    FLONASE    16 g    USE 2 SPRAYS IN EACH NOSTRIL DAILY    Chronic rhinitis, unspecified type        gabapentin 300 MG capsule    NEURONTIN    270 capsule    TAKE 3 CAPSULES BY MOUTH THREE TIMES DAILY    Polyneuropathy associated with underlying disease (H), Low back pain, unspecified back pain laterality, unspecified chronicity, with sciatica presence unspecified       HYDROcodone-acetaminophen  MG per tablet    NORCO    120 tablet    TAKE 1 TABLET BY MOUTH EVERY 6 HOURS AS NEEDED FOR MODERATE TO SEVEREPAIN    Low back pain, unspecified back pain laterality, unspecified chronicity, with sciatica presence unspecified       ibuprofen 600 MG tablet    ADVIL/MOTRIN    120 tablet    TAKE 1 TABLET BY MOUTH EVERY 6 HOURS AS NEEDED    Hx of degenerative disc disease       lidocaine 5 % Patch    LIDODERM    90 patch    PLACE 3 PATCHES ONTO THE SKIN DAILY AS NEEDED FOR MODERATE PAIN    Midline low back pain without sciatica       lisdexamfetamine 60 MG capsule   Start taking on:  8/2/2017    VYVANSE    30 capsule    Take 1 capsule (60 mg) by mouth every morning    ADHD (attention deficit hyperactivity disorder), combined type       losartan 50 MG tablet    COZAAR    90 tablet    Take 1 tablet (50 mg) by mouth daily    Benign essential hypertension       MAPAP 325 MG tablet   Generic drug:  acetaminophen     200 tablet    TAKE 2 TABLETS BY MOUTH EVERY 4 HOURS AS NEEDED FOR MILD PAIN    Midline low back pain without sciatica       multivitamin  with iron Tabs     90 tablet    TAKE 1 TABLET BY MOUTH DAILY    Health care maintenance       nicotine polacrilex 2 MG gum    NICORETTE    110 each    CHEW 1 PIECE AS NEEDED FOR SMOKING CESSATION    Tobacco abuse       omeprazole 20 MG CR capsule    priLOSEC    30 capsule    TAKE 1 CAPSULE BY MOUTH EVERY DAY BEFORE A MEAL    Gastroesophageal reflux disease, esophagitis presence not specified       * order for DME     2 Box    Equipment being ordered: Large gloves    Need for assistance with personal care       * order for DME     1 Units    Equipment being ordered: Boa Back brace     DDD (degenerative disc disease), lumbar, Chronic lower back pain       * order for DME     1 Device    1 boa back brace    Chronic low back pain with sciatica, sciatica laterality unspecified, unspecified back pain laterality       OXcarbazepine 600 MG tablet    TRILEPTAL    60 tablet    Take 1 tablet (600 mg) by mouth 2 times daily    Bipolar affective disorder, current episode hypomanic (H)       SM CHILDRENS ASPIRIN 81 MG chewable tablet   Generic drug:  aspirin     30 tablet    CHEW AND SWALLOW 1 TABLET BY MOUTH DAILY    Health care maintenance        STOOL SOFTENER/LAXATIVE 8.6-50 MG per tablet   Generic drug:  senna-docusate     120 tablet    TAKE 1 OR 2 TABLETS BY MOUTH 2 TIMES A DAY    Constipation, unspecified constipation type       traZODone 50 MG tablet    DESYREL    30 tablet    TAKE 1 TABLET BY MOUTH NIGHTLY AS NEEDED FOR SLEEP    Chronic pain       * albuterol 108 (90 BASE) MCG/ACT Inhaler    PROAIR HFA/PROVENTIL HFA/VENTOLIN HFA     Inhale 2 puffs into the lungs every 6 hours as needed for shortness of breath / dyspnea or wheezing        * VENTOLIN  (90 BASE) MCG/ACT Inhaler   Generic drug:  albuterol     18 g    USE 2 PUFFS 4 TIMES A DAY AS NEEDED FOR SHORTNESS OF BREATH    Moderate persistent asthma without complication       * Notice:  This list has 7 medication(s) that are the same as other medications prescribed for you. Read the directions carefully, and ask your doctor or other care provider to review them with you.

## 2017-07-31 NOTE — PROGRESS NOTES
"  PSYCHIATRY CLINIC PROGRESS NOTE   40 minute medication management, more than 50% of time spent counseling patient on medications, medication side effects, symptom history and management   SUBJECTIVE / INTERIM HISTORY                                                                       Social- Was  twice. Lives alone with his dog Montserrat (beltran) and 3 cats. Has a GF in [place]  Children-  2 kids, they are in [place]  Last visit:    --  Continue Vyvanse 60 mg daily and last script 6/9/17 and gave script 7/7/17 .  Continue Trileptal 600 mg bid, Valium 5 mg every 12 hours prn anxiety .    - leaves for trip this coming Thursday be there for about one month  - feels Vyvanse helped now, but for while it didn't seem to but is now. Likes doesn't have the drop off like Ritalin in past. Less misplacing stuff. Feels his attention / concentration are better.   - trip to [place]   - doing chiropractic and this is helping  - thinking would like to try a dfiferent med for aDHD  - Valium helped with anxiety and helped with the pain. Does NOT want to take any more as \"I don't want to be sleeping all the time\"  - Lots of family issues: Joshua has taken care of his parents and yet parents give his other brothers everything. oldest of 3 brothers. Brother in [place] youngest [place] and Major is here. Mom and dad here out on [place]. Joshua noting moving here to be closer to parents and help them, etc. But, parents don't seem to want him around much or talk to him.  SUBSTANCE USE- denies abuse of meds or substances    SYMPTOMS- issues with attention and concentration improved with Ritalin.  racing thoughts, depressed mood, impulsivity, distractibility  MEDICAL ROS- back pain, denies any issues with headaches or stomach pain / issues with stimulant  MEDICAL / SURGICAL HISTORY                     Patient Active Problem List   Diagnosis     Mixed hyperlipidemia     Tobacco Abuse, History of     Degeneration of lumbar or lumbosacral intervertebral disc "     Depression, major     Chronic pain syndrome     Chemical dependency (H)     Chronic rhinitis     Tinnitus of both ears     ETD (eustachian tube dysfunction)     SNHL (sensorineural hearing loss)     Back pain     Bipolar disorder (H)     Seizure-like activity (H)     Bilateral foot pain     Preop general physical exam     Trigger index finger of right hand     Moderate persistent asthma without complication     Chronic lower back pain     ACP (advance care planning)     Onychia of toe of left foot     DDD (degenerative disc disease), lumbar     Seizure disorder (H)     Back muscle spasm     Throat pain     Benign essential hypertension     Retrograde ejaculation     ALLERGY   Amoxicillin trihydrate; Clavulanic acid potassium; Cymbalta; and Seasonal allergies  MEDICATIONS                                                                                             Current Outpatient Prescriptions   Medication Sig     losartan (COZAAR) 50 MG tablet Take 1 tablet (50 mg) by mouth daily     fentaNYL (DURAGESIC) 75 mcg/hr 72 hr patch APPLY 1 PATCH EVERY 48 HOURS     SM STOOL SOFTENER/LAXATIVE 8.6-50 MG per tablet TAKE 1 OR 2 TABLETS BY MOUTH 2 TIMES A DAY     fluticasone (FLONASE) 50 MCG/ACT spray USE 2 SPRAYS IN EACH NOSTRIL DAILY     docusate sodium (COLACE) 100 MG capsule Take 1 capsule (100 mg) by mouth 2 times daily     gabapentin (NEURONTIN) 300 MG capsule TAKE 3 CAPSULES BY MOUTH THREE TIMES DAILY     lidocaine (LIDODERM) 5 % Patch PLACE 3 PATCHES ONTO THE SKIN DAILY AS NEEDED FOR MODERATE PAIN     ibuprofen (ADVIL/MOTRIN) 600 MG tablet TAKE 1 TABLET BY MOUTH EVERY 6 HOURS AS NEEDED     MAPAP 325 MG tablet TAKE 2 TABLETS BY MOUTH EVERY 4 HOURS AS NEEDED FOR MILD PAIN     diclofenac (VOLTAREN) 50 MG EC tablet TAKE 1 TABLET BY MOUTH 3 TIMES DAILY     HYDROcodone-acetaminophen (NORCO)  MG per tablet TAKE 1 TABLET BY MOUTH EVERY 6 HOURS AS NEEDED FOR MODERATE TO SEVEREPAIN     traZODone (DESYREL) 50 MG tablet  "TAKE 1 TABLET BY MOUTH NIGHTLY AS NEEDED FOR SLEEP     diazepam (VALIUM) 5 MG tablet TAKE 1 TABLET BY MOUTH EVERY 12 HOURS AS NEEDED FOR ANXIETY OR SLEEP SPASM     albuterol (PROAIR HFA/PROVENTIL HFA/VENTOLIN HFA) 108 (90 BASE) MCG/ACT Inhaler Inhale 2 puffs into the lungs every 6 hours as needed for shortness of breath / dyspnea or wheezing     lisdexamfetamine (VYVANSE) 60 MG capsule Take 1 capsule (60 mg) by mouth every morning     atorvastatin (LIPITOR) 20 MG tablet Take 1 tablet (20 mg) by mouth daily     OXcarbazepine (TRILEPTAL) 600 MG tablet Take 1 tablet (600 mg) by mouth 2 times daily     baclofen (LIORESAL) 20 MG tablet Take 1 tablet (20 mg) by mouth 3 times daily     multivitamin  with iron ( COMPLETE ADVANCED FORMULA) TABS TAKE 1 TABLET BY MOUTH DAILY     baclofen (LIORESAL) 10 MG tablet TAKE 1 TABLET BY MOUTH THREE TIMES DAILY     VENTOLIN  (90 BASE) MCG/ACT Inhaler USE 2 PUFFS 4 TIMES A DAY AS NEEDED FOR SHORTNESS OF BREATH     DULERA 100-5 MCG/ACT oral inhaler INHALE 2 PUFFS INTO THE LUNGS 2 TIMES A DAY     omeprazole (PRILOSEC) 20 MG CR capsule TAKE 1 CAPSULE BY MOUTH EVERY DAY BEFORE A MEAL      CHILDRENS ASPIRIN 81 MG chewable tablet CHEW AND SWALLOW 1 TABLET BY MOUTH DAILY     nicotine polacrilex (NICORETTE) 2 MG gum CHEW 1 PIECE AS NEEDED FOR SMOKING CESSATION     order for DME Equipment being ordered: Boa Back brace     ORDER FOR DME Equipment being ordered: Large gloves     No current facility-administered medications for this visit.        VITALS   /90 (BP Location: Right arm, Patient Position: Sitting, Cuff Size: Adult Regular)  Pulse 79  Temp 98.5  F (36.9  C) (Tympanic)  Ht 5' 10\" (1.778 m)  Wt 185 lb (83.9 kg)  BMI 26.54 kg/m2     LABS                                                                                                                         EKG 2016 with 376 ms (on nortriptyline)  Last Basic Metabolic Panel:  NA      138   6/9/2016   POTASSIUM      4.7   " "6/9/2016  CHLORIDE      104   6/9/2016  SANDRITA      9.2   6/9/2016  CO2       29   6/9/2016  BUN       25   6/9/2016  CR     0.76   6/9/2016  GLC      173   6/9/2016    Liver Function Studies -   Recent Labs   Lab Test  02/18/15   1536   PROTTOTAL  7.5   ALBUMIN  4.6   BILITOTAL  0.4   ALKPHOS  82   AST  18   ALT  32      MENTAL STATUS EXAM                                                                                        Alert. Oriented to person, place, and date / time. Casually groomed, calm, cooperative with good eye contact. No problems with speech or psychomotor behavior. Mood was described as \"doing okay\" and affect was congruent to speech content and full range. Thought process, including associations, was unremarkable and thought content was devoid of suicidal and homicidal ideation and psychotic thought. No hallucinations. Insight was good. Judgment was intact and adequate for safety. Fund of knowledge was intact. Pt demonstrates no obvious problems with attention, concentration, language, recent or remote memory although these were not formally tested.     ASSESSMENT                                                                                                      HISTORICAL:  Initial psych note 10/6/15          NOTES:      This patient is a 56 year old with bipolar, MDD, bipolar d/o.  He does struggle with depression and seems to be largely related to his chronic pain issues.We started Valium in as dual purpose: muscle relaxant properties and for anxiety. Heladio Newman has helped sificantly. Trent and I have discussed the risks that go along with combination of medications he is on: on several medications with CNS depressant effect and thus he needs to be very careful and NOT use alcohol or any other type sedating meds/substances as there are risks including respiratory depression. Valium is 5 mg bid prn and I would not feel comfortable any higher dose given the other medications he is on with CNS depressant " potential. Joshua is on a lot of controlled substances thus I reviewed the [place]  and no other prescriptions aside from those his PCP and I prescribe. He is on nortriptyline which antidepressant and also can help with pain. We had lengthy discussion today as I noted episodes of LOC: I discussed stimulant and if seizure stimulants can lower threshold. Joshua is certain that the nortriptyline was causing him to fall as opposed to a stroke or seizure.         TREATMENT RISK STATEMENT:  The risks, benefits, alternatives and potential adverse effects have been explained and are understood by the pt.  The pt agrees to the treatment plan with the ability to do so.   The pt knows to call the clinic for any problems or access emergency care if needed.        DIAGNOSES                 (Use of Axes system will continue, even though absent from DSM 5)         Axis I   - ADD               MDD, recurrent,  mod                 Rule out bipolar disorder  Axis II  - no dx  Axis III - chronic pain (s/p back injuries and surgeries)  Axis IV-  Psychosocial Stressors include: financial, lack of support  Axis V - Global Assessment of Functioning current: 45    PLAN                                                                                                                    1)  MEDICATIONS:         --  Continue Vyvanse 60 mg daily and last script 7/7/17gave script today for 8/2/17 .  Continue Trileptal 600 mg bid, Valium 5 mg every 12 hours prn anxiety .  2)  THERAPY:  No change    3)  LABS:  UDS 5/10/17    4)  PT MONITOR [call for probs]:  SEs from meds, worsening sx, SI/HI    5)  REFERRALS [CD, medical, other]:  None    6)  RTC:  4-6 weeks

## 2017-08-01 ENCOUNTER — TELEPHONE (OUTPATIENT)
Dept: PEDIATRICS | Facility: OTHER | Age: 57
End: 2017-08-01

## 2017-08-01 DIAGNOSIS — M50.30 DDD (DEGENERATIVE DISC DISEASE), CERVICAL: ICD-10-CM

## 2017-08-01 RX ORDER — FENTANYL 75 UG/1
PATCH TRANSDERMAL
Qty: 11 PATCH | Refills: 0 | Status: SHIPPED | OUTPATIENT
Start: 2017-08-12 | End: 2017-08-26

## 2017-08-01 ASSESSMENT — ANXIETY QUESTIONNAIRES: GAD7 TOTAL SCORE: 14

## 2017-08-01 ASSESSMENT — PATIENT HEALTH QUESTIONNAIRE - PHQ9: SUM OF ALL RESPONSES TO PHQ QUESTIONS 1-9: 13

## 2017-08-01 NOTE — TELEPHONE ENCOUNTER
10:32 AM    Reason for Call: Phone Call    Description: Pt calling stating that he talked to someone in Dr Fisher's office about getting his Fentanyl patches due to the fact he is leaving on 08/3/17 and will be out of town so needs to have date of 08/12/17 to be filled. Please call him to advise. He will be calling the pharmacy also. He gets 11 per month    Was an appointment offered for this call? No    Preferred method for responding to this message: Telephone Call call home first if no answer call cell    If we cannot reach you directly, may we leave a detailed response at the number you provided? Yes    Can this message wait until your PCP/provider returns, if available today?     Teresita Joshi

## 2017-08-01 NOTE — TELEPHONE ENCOUNTER
He contacted his pharmacy. He is going out of town. We have told him it was ok to post date a script and he can bring this with him.

## 2017-08-01 NOTE — TELEPHONE ENCOUNTER
Please call patient to inform he needs to contact his pharmacy to send a refill request. Thank you.

## 2017-08-02 DIAGNOSIS — M54.50 MIDLINE LOW BACK PAIN WITHOUT SCIATICA: ICD-10-CM

## 2017-08-02 NOTE — PROGRESS NOTES

## 2017-08-03 NOTE — TELEPHONE ENCOUNTER
Lidoderm patches       Last Written Prescription Date: 7/12/2017  Last Fill Quantity: 90,  # refills: 0   Last Office Visit with [facility], [facility] or [facility] prescribing provider: 7/31/2017                                         Next 5 appointments (look out 90 days)     Sep 06, 2017  2:20 PM CDT   (Arrive by 2:05 PM)   Return Visit with Laquita Fernandez MD   [facility]    [facility] [address] [place] [id number] [phone]

## 2017-08-07 ENCOUNTER — TELEPHONE (OUTPATIENT)
Dept: PEDIATRICS | Facility: OTHER | Age: 57
End: 2017-08-07

## 2017-08-07 RX ORDER — LIDOCAINE 50 MG/G
PATCH TOPICAL
Qty: 90 PATCH | Refills: 0 | Status: SHIPPED | OUTPATIENT
Start: 2017-08-07 | End: 2017-09-07

## 2017-08-07 NOTE — TELEPHONE ENCOUNTER
Reason for call:  Medication    1. Medication Name? lisdexamfetamine (VYVANSE) 60 MG capsule and HYDROcodone-acetaminophen (NORCO)  MG per tablet  2. Is this request for a refill? No  3. [facility]  4. Have you contacted your pharmacy? Pharmacy is calling    5. If yes, when?  (Please note that the turn-around-time for prescriptions is 72 business hours; I am sending your request at this time. SEND TO  [facility]  )  Description: Pharmacy needs call back to verify these prescriptions at [phone]  Was an appointment offered for this a call? No   Preferred method for responding to this messageTelephone Call - [phone]  If we cannot reach you directly, may we leave a detailed response at the number you provided? Yes  Can this message wait until your PCP/Provider returns if not available today? No

## 2017-08-21 DIAGNOSIS — M54.50 LUMBAGO: ICD-10-CM

## 2017-08-21 DIAGNOSIS — F41.1 GAD (GENERALIZED ANXIETY DISORDER): ICD-10-CM

## 2017-08-21 NOTE — TELEPHONE ENCOUNTER
Voltaren      Last Written Prescription Date: 07/12/2017  Last Quantity: 90, # refills: 0  Last Office Visit with [facility], [facility] or  Health prescribing provider: 07/31/2017  Next 5 appointments (look out 90 days)     Sep 27, 2017  1:20 PM CDT   (Arrive by 1:05 PM)   Return Visit with Laquita Fernandez MD   [facility] [place] ([facility] [place] [facility]    [address] [place] [address]  [place] [address]   [phone]                   Creatinine   Date Value Ref Range Status   06/27/2017 0.72 0.66 - 1.25 mg/dL Final     Lab Results   Component Value Date    AST 12 06/27/2017     Lab Results   Component Value Date    ALT 28 06/27/2017     BP Readings from Last 3 Encounters:   07/31/17 124/90   07/26/17 140/70   07/07/17 158/88

## 2017-08-21 NOTE — TELEPHONE ENCOUNTER
Propranolol      Last Written Prescription Date: 8/25/16  Last Fill Quantity: 60, # refills: 9    Last Office Visit with [facility], [facility] or  Health prescribing provider:  7/7/17   Future Office Visit:    Next 5 appointments (look out 90 days)     Sep 27, 2017  1:20 PM CDT   (Arrive by 1:05 PM)   Return Visit with Laquita Fernandez MD   [facility] ([facility]    [facility] [address] [place] [address]   [phone]                    BP Readings from Last 3 Encounters:   07/31/17 124/90   07/26/17 140/70   07/07/17 158/88

## 2017-08-22 RX ORDER — PROPRANOLOL HYDROCHLORIDE 20 MG/1
TABLET ORAL
Qty: 60 TABLET | Refills: 8 | Status: SHIPPED | OUTPATIENT
Start: 2017-08-22 | End: 2018-02-15

## 2017-08-26 DIAGNOSIS — M50.30 DDD (DEGENERATIVE DISC DISEASE), CERVICAL: ICD-10-CM

## 2017-08-26 DIAGNOSIS — M62.830 BACK MUSCLE SPASM: ICD-10-CM

## 2017-08-26 DIAGNOSIS — Z87.39 HX OF DEGENERATIVE DISC DISEASE: ICD-10-CM

## 2017-08-26 DIAGNOSIS — M54.5 LOW BACK PAIN, UNSPECIFIED BACK PAIN LATERALITY, UNSPECIFIED CHRONICITY, WITH SCIATICA PRESENCE UNSPECIFIED: ICD-10-CM

## 2017-08-26 DIAGNOSIS — F90.2 ADHD (ATTENTION DEFICIT HYPERACTIVITY DISORDER), COMBINED TYPE: ICD-10-CM

## 2017-08-28 RX ORDER — IBUPROFEN 600 MG/1
TABLET, FILM COATED ORAL
Qty: 120 TABLET | Refills: 0 | Status: SHIPPED | OUTPATIENT
Start: 2017-08-28 | End: 2017-10-16

## 2017-08-28 NOTE — TELEPHONE ENCOUNTER
Controlled Substance Refill Request for Baclofen, Fentanyl, Norco, Vyvanse  Problem List Complete:  Yes  Details  Code: G89.4  Noted: 09/08/2011    Change Dx  Resolve        Overview  Edited:  Love Quan, RN  6/7/2017  Patient is followed by Lyle Fisher DO, DO for ongoing prescription of pain medication. All refills should only be approved by this provider, or covering partner.     Medication(s): Fentanyl 75mcg, Norco 10/325mg.   Maximum quantity per month: #15, #120  Clinic visit frequency required: Q 3 months      Controlled substance agreement:  Encounter-Level CSA - 09/18/2015:                     Controlled Substance Agreement - Scan on 11/25/2015 2:25 PM : SCHEDULED MEDICATION USE AGREEMENT (below)         Pain Clinic evaluation in the past: No     DIRE Total Score(s):  No flowsheet data found.     Last [facility] website verification: done on 5.17.17                Baclofen  Last Written Prescription Date:  6.6.17  Last Fill Quantity: 90,   # refills: 3    Fentanyl  Last Written Prescription Date:  8.12.17  Last Fill Quantity: 11,   # refills: 0    Vyvanse  Last Written Prescription Date:  8.2.17  Last Fill Quantity: 30,   # refills: 0    Norco  Last Written Prescription Date:  7.31.17  Last Fill Quantity: 120,   # refills: 0    [place] [facility] [place]: 7.7.17    Clinic visit frequency required: Q 3 months     Future Office visit:   Next 5 appointments (look out 90 days)     Sep 27, 2017  1:20 PM CDT   (Arrive by 1:05 PM)   Return Visit with [facility] MD   [facility] ([facility]    [address] [place] [address]   [phone]                  Controlled substance agreement on file: Yes     Processing:  Staff will hand deliver Rx to on-site pharmacy     checked in past 6 months?  Yes 5.17.17

## 2017-08-30 RX ORDER — LISDEXAMFETAMINE DIMESYLATE 60 MG/1
CAPSULE ORAL
Qty: 30 CAPSULE | Refills: 0 | Status: SHIPPED | OUTPATIENT
Start: 2017-08-30 | End: 2017-10-31

## 2017-08-30 RX ORDER — BACLOFEN 20 MG/1
TABLET ORAL
Qty: 90 TABLET | Refills: 0 | Status: SHIPPED | OUTPATIENT
Start: 2017-08-30 | End: 2017-11-17

## 2017-08-30 RX ORDER — HYDROCODONE BITARTRATE AND ACETAMINOPHEN 10; 325 MG/1; MG/1
TABLET ORAL
Qty: 120 TABLET | Refills: 0 | Status: SHIPPED | OUTPATIENT
Start: 2017-08-30 | End: 2017-10-06

## 2017-08-30 RX ORDER — FENTANYL 75 UG/1
PATCH TRANSDERMAL
Qty: 11 PATCH | Refills: 0 | Status: SHIPPED | OUTPATIENT
Start: 2017-08-30 | End: 2017-09-18

## 2017-09-02 DIAGNOSIS — K59.00 CONSTIPATION, UNSPECIFIED CONSTIPATION TYPE: ICD-10-CM

## 2017-09-05 RX ORDER — DOCUSATE SODIUM AND SENNOSIDES 50; 8.6 MG/1; MG/1
TABLET ORAL
Qty: 120 TABLET | Refills: 0 | Status: SHIPPED | OUTPATIENT
Start: 2017-09-05 | End: 2017-10-30

## 2017-09-05 NOTE — TELEPHONE ENCOUNTER
SM Stool softener       Last Written Prescription Date: 7/18/17  Last Fill Quantity: 120,  # refills: 0   Last Office Visit with [facility], [facility] or [facility] prescribing provider: 7/7/17                                         Next 5 appointments (look out 90 days)     Sep 27, 2017  1:20 PM CDT   (Arrive by 1:05 PM)   Return Visit with Laquita Fernandez MD   [facility] ([facility] )    [facility] [address] [place] [id number] [phone]

## 2017-09-07 DIAGNOSIS — Z00.00 HEALTH CARE MAINTENANCE: ICD-10-CM

## 2017-09-07 DIAGNOSIS — J45.40 MODERATE PERSISTENT ASTHMA WITHOUT COMPLICATION: ICD-10-CM

## 2017-09-07 DIAGNOSIS — M54.50 MIDLINE LOW BACK PAIN WITHOUT SCIATICA: ICD-10-CM

## 2017-09-07 RX ORDER — MOMETASONE FUROATE AND FORMOTEROL FUMARATE DIHYDRATE 100; 5 UG/1; UG/1
AEROSOL RESPIRATORY (INHALATION)
Qty: 13 G | Refills: 1 | Status: SHIPPED | OUTPATIENT
Start: 2017-09-07 | End: 2017-10-09

## 2017-09-07 RX ORDER — ALBUTEROL SULFATE 90 UG/1
AEROSOL, METERED RESPIRATORY (INHALATION)
Qty: 18 G | Refills: 1 | Status: SHIPPED | OUTPATIENT
Start: 2017-09-07 | End: 2017-10-09

## 2017-09-07 RX ORDER — MV,CAL,MIN/IRON/FOLIC ACID/LUT 18-500-300
TABLET ORAL
Qty: 90 TABLET | Refills: 0 | Status: SHIPPED | OUTPATIENT
Start: 2017-09-07 | End: 2017-12-04

## 2017-09-07 RX ORDER — LIDOCAINE 50 MG/G
PATCH TOPICAL
Qty: 90 PATCH | Refills: 0 | Status: SHIPPED | OUTPATIENT
Start: 2017-09-07 | End: 2017-10-09

## 2017-09-18 DIAGNOSIS — M50.30 DDD (DEGENERATIVE DISC DISEASE), CERVICAL: ICD-10-CM

## 2017-09-18 RX ORDER — FENTANYL 75 UG/1
PATCH TRANSDERMAL
Qty: 11 PATCH | Refills: 0 | Status: SHIPPED | OUTPATIENT
Start: 2017-09-18 | End: 2017-10-09

## 2017-09-18 NOTE — TELEPHONE ENCOUNTER
Controlled Substance Refill Request for Fentanyl  Problem List Complete:  Yes    Last Written Prescription Date: 8.30.17  Last Fill Quantity: 11,   # refills: 0    Last Office Visit with [facility] primary care provider: 7.7.17    Clinic visit frequency required: Q 3 months     Future Office visit:   Next 5 appointments (look out 90 days)     Sep 27, 2017  1:20 PM CDT   (Arrive by 1:05 PM)   Return Visit with Laquita Fernandez MD   [facility] ([facility] )    [address] [place] [address]   [phone]                  Controlled substance agreement on file: Yes:  Date 6.7.17.     Processing:  Staff will hand deliver Rx to on-site pharmacy     checked in past 6 months?  Yes 5.17.17

## 2017-09-25 DIAGNOSIS — M54.50 LUMBAGO: ICD-10-CM

## 2017-09-26 NOTE — TELEPHONE ENCOUNTER
D iclofenac (VOLTAREN) 50 MG EC tablet       Last Written Prescription Date:  8/22/17  Last Fill Quantity: 90,   # refills: 0  Last Office Visit with [facility], [facility] or [facility] prescribing provider: 7/7/17  Future Office visit:    Next 5 appointments (look out 90 days)     Sep 27, 2017  1:20 PM CDT   (Arrive by 1:05 PM)   Return Visit with Laquita Fernandez MD   [facility] ([facility] )    [address] [place] [id number] [phone]                   Routing refill request to provider for review/approval because:  Drug not on the [facility], [facility] or [facility] refill protocol or controlled substance

## 2017-09-27 ENCOUNTER — OFFICE VISIT (OUTPATIENT)
Dept: PSYCHIATRY | Facility: OTHER | Age: 57
End: 2017-09-27
Attending: PSYCHIATRY & NEUROLOGY
Payer: COMMERCIAL

## 2017-09-27 ENCOUNTER — OFFICE VISIT (OUTPATIENT)
Dept: CHIROPRACTIC MEDICINE | Facility: OTHER | Age: 57
End: 2017-09-27
Attending: CHIROPRACTOR
Payer: COMMERCIAL

## 2017-09-27 VITALS
DIASTOLIC BLOOD PRESSURE: 90 MMHG | BODY MASS INDEX: 27.41 KG/M2 | HEART RATE: 79 BPM | WEIGHT: 191 LBS | TEMPERATURE: 97.1 F | SYSTOLIC BLOOD PRESSURE: 162 MMHG

## 2017-09-27 DIAGNOSIS — M99.01 SEGMENTAL AND SOMATIC DYSFUNCTION OF CERVICAL REGION: ICD-10-CM

## 2017-09-27 DIAGNOSIS — M54.50 ACUTE BILATERAL LOW BACK PAIN WITHOUT SCIATICA: ICD-10-CM

## 2017-09-27 DIAGNOSIS — F90.2 ADHD (ATTENTION DEFICIT HYPERACTIVITY DISORDER), COMBINED TYPE: Primary | ICD-10-CM

## 2017-09-27 DIAGNOSIS — M99.03 SEGMENTAL AND SOMATIC DYSFUNCTION OF LUMBAR REGION: Primary | ICD-10-CM

## 2017-09-27 DIAGNOSIS — M99.02 SEGMENTAL AND SOMATIC DYSFUNCTION OF THORACIC REGION: ICD-10-CM

## 2017-09-27 PROCEDURE — 99213 OFFICE O/P EST LOW 20 MIN: CPT | Performed by: PSYCHIATRY & NEUROLOGY

## 2017-09-27 PROCEDURE — 98941 CHIROPRACT MANJ 3-4 REGIONS: CPT | Mod: AT | Performed by: CHIROPRACTOR

## 2017-09-27 PROCEDURE — 99212 OFFICE O/P EST SF 10 MIN: CPT

## 2017-09-27 RX ORDER — LISDEXAMFETAMINE DIMESYLATE 60 MG/1
60 CAPSULE ORAL EVERY MORNING
Qty: 30 CAPSULE | Refills: 0 | Status: SHIPPED | OUTPATIENT
Start: 2017-10-25 | End: 2017-10-31

## 2017-09-27 RX ORDER — LISDEXAMFETAMINE DIMESYLATE 60 MG/1
60 CAPSULE ORAL EVERY MORNING
Qty: 30 CAPSULE | Refills: 0 | Status: SHIPPED | OUTPATIENT
Start: 2017-09-27 | End: 2017-11-28

## 2017-09-27 ASSESSMENT — PATIENT HEALTH QUESTIONNAIRE - PHQ9
SUM OF ALL RESPONSES TO PHQ QUESTIONS 1-9: 7
5. POOR APPETITE OR OVEREATING: NOT AT ALL

## 2017-09-27 ASSESSMENT — ANXIETY QUESTIONNAIRES
3. WORRYING TOO MUCH ABOUT DIFFERENT THINGS: SEVERAL DAYS
6. BECOMING EASILY ANNOYED OR IRRITABLE: SEVERAL DAYS
GAD7 TOTAL SCORE: 5
2. NOT BEING ABLE TO STOP OR CONTROL WORRYING: SEVERAL DAYS
5. BEING SO RESTLESS THAT IT IS HARD TO SIT STILL: NOT AT ALL
7. FEELING AFRAID AS IF SOMETHING AWFUL MIGHT HAPPEN: SEVERAL DAYS
1. FEELING NERVOUS, ANXIOUS, OR ON EDGE: SEVERAL DAYS

## 2017-09-27 ASSESSMENT — PAIN SCALES - GENERAL: PAINLEVEL: EXTREME PAIN (8)

## 2017-09-27 NOTE — NURSING NOTE
"Chief Complaint   Patient presents with     RECHECK     Mental health.       Initial /90 (BP Location: Left arm, Patient Position: Sitting, Cuff Size: Adult Regular)  Pulse 79  Temp 97.1  F (36.2  C) (Tympanic)  Wt 191 lb (86.6 kg)  BMI 27.41 kg/m2 Estimated body mass index is 27.41 kg/(m^2) as calculated from the following:    Height as of 7/31/17: 5' 10\" (1.778 m).    Weight as of this encounter: 191 lb (86.6 kg).  Medication Reconciliation: complete     JACQUELYN SUAREZ      "

## 2017-09-27 NOTE — MR AVS SNAPSHOT
After Visit Summary   9/27/2017    Joshua Barron    MRN: [id number]           Patient Information     Date Of Birth          1960        Visit Information        Provider Department      9/27/2017 11:40 AM Shamar Escamilla DC [facility] [place] [facility]        Today's Diagnoses     Segmental and somatic dysfunction of lumbar region    -  1    Acute bilateral low back pain without sciatica        Segmental and somatic dysfunction of thoracic region        Segmental and somatic dysfunction of cervical region           Follow-ups after your visit        Your next 10 appointments already scheduled     Sep 29, 2017 12:30 PM CDT   Return Visit with Shamar Escamilla DC   [facility] ([facility])    [address]  [place] [address]   [phone]            Oct 31, 2017  2:20 PM CDT   (Arrive by 2:05 PM)   Return Visit with Laquita Fernandez MD   [facility] ([facility] [place] )    [address]  [place] [address]   [phone]            Mar 07, 2018  2:45 PM CST   (Arrive by 2:30 PM)   Hearing Eval with Kevin Moreno   [facility] ([facility] [place] )    [address]  [facility] [address]   [phone]              Who to contact     If you have questions or need follow up information about today's clinic visit or your schedule please contact  [facility] directly at [phone].  Normal or non-critical lab and imaging results will be communicated to you by MyChart, letter or phone within 4 business days after the clinic has received the results. If you do not hear from us within 7 days, please contact the clinic through MyChart or phone. If you have a critical or abnormal lab result, we will notify you by phone as soon as possible.  Submit refill requests through [employer] or call your pharmacy and they will forward the refill request to us. Please allow 3 business days for your refill to be  "completed.          Additional Information About Your Visit        Kleek Information     [employer]ek lets you send messages to your doctor, view your test results, renew your prescriptions, schedule appointments and more. To sign up, go to [email or web address] . Click on \"Log in\" on the left side of the screen, which will take you to the Welcome page. Then click on \"Sign up Now\" on the right side of the page.     You will be asked to enter the access code listed below, as well as some personal information. Please follow the directions to create your username and password.     Your access code is: [email or web address]  Expires: 2017  2:03 PM     Your access code will  in 90 days. If you need help or a new code, please call your [facility] clinic or [phone].        Care EveryWhere ID     This is your Care EveryWhere ID. This could be used by other organizations to access your [facility] medical records  [id number]         Blood Pressure from Last 3 Encounters:   17 162/90   17 124/90   17 140/70    Weight from Last 3 Encounters:   17 191 lb (86.6 kg)   17 185 lb (83.9 kg)   17 185 lb (83.9 kg)              We Performed the Following     CHIROPRAC MANIP,SPINAL,3-4 REGIONS          Today's Medication Changes          These changes are accurate as of: 17 11:59 PM.  If you have any questions, ask your nurse or doctor.               These medicines have changed or have updated prescriptions.        Dose/Directions    * VYVANSE 60 MG capsule   This may have changed:  Another medication with the same name was added. Make sure you understand how and when to take each.   Used for:  ADHD (attention deficit hyperactivity disorder), combined type   Generic drug:  lisdexamfetamine   Changed by:  Lyle Fisher, DO        TAKE 1 CAPSULE BY MOUTH EVERY MORNING   Quantity:  30 capsule   Refills:  0       * lisdexamfetamine 60 MG capsule   Commonly known as:  VYVANSE   This may " have changed:  You were already taking a medication with the same name, and this prescription was added. Make sure you understand how and when to take each.   Used for:  ADHD (attention deficit hyperactivity disorder), combined type   Changed by:  Laquita Fernandez MD        Dose:  60 mg   Take 1 capsule (60 mg) by mouth every morning   Quantity:  30 capsule   Refills:  0       * lisdexamfetamine 60 MG capsule   Commonly known as:  VYVANSE   This may have changed:  You were already taking a medication with the same name, and this prescription was added. Make sure you understand how and when to take each.   Used for:  ADHD (attention deficit hyperactivity disorder), combined type   Changed by:  Laquita Fernandez MD        Dose:  60 mg   Start taking on:  10/25/2017   Take 1 capsule (60 mg) by mouth every morning   Quantity:  30 capsule   Refills:  0       * Notice:  This list has 3 medication(s) that are the same as other medications prescribed for you. Read the directions carefully, and ask your doctor or other care provider to review them with you.         Where to get your medicines      Some of these will need a paper prescription and others can be bought over the counter.  Ask your nurse if you have questions.     Bring a paper prescription for each of these medications     lisdexamfetamine 60 MG capsule    lisdexamfetamine 60 MG capsule                Primary Care Provider Office Phone # Fax #    Lyle Juan Fisher [phone]       [facility] [place] [address]  [place] [address]        Equal Access to Services     [facility]: Hadii aad ku hadasho Soomaali, waaxda luqadaha, qaybta kaalmada adeegyada, jaki fermin. So [facility] [phone].    ATENCIÓN: Si habla español, tiene a hastings disposición servicios gratuitos de asistencia lingüística. Llame al [phone].    We comply with applicable federal civil rights laws and [place] laws. We do not  discriminate on the basis of race, color, national origin, age, disability sex, sexual orientation or gender identity.            Thank you!     Thank you for choosing  [facility]  for your care. Our goal is always to provide you with excellent care. Hearing back from our patients is one way we can continue to improve our services. Please take a few minutes to complete the written survey that you may receive in the mail after your visit with us. Thank you!             Your Updated Medication List - Protect others around you: Learn how to safely use, store and throw away your medicines at www.disposemymeds.org.          This list is accurate as of: 9/27/17 11:59 PM.  Always use your most recent med list.                   Brand Name Dispense Instructions for use Diagnosis    * albuterol 108 (90 BASE) MCG/ACT Inhaler    PROAIR HFA/PROVENTIL HFA/VENTOLIN HFA     Inhale 2 puffs into the lungs every 6 hours as needed for shortness of breath / dyspnea or wheezing        * VENTOLIN  (90 BASE) MCG/ACT Inhaler   Generic drug:  albuterol     18 g    USE 2 PUFFS 4 TIMES A DAY AS NEEDED FOR SHORTNESS OF BREATH    Moderate persistent asthma without complication       atorvastatin 20 MG tablet    LIPITOR    90 tablet    Take 1 tablet (20 mg) by mouth daily    Mixed hyperlipidemia       baclofen 20 MG tablet    LIORESAL    90 tablet    TAKE 1 TABLET BY MOUTH 3 TIMES DAILY    Back muscle spasm       diazepam 5 MG tablet    VALIUM    60 tablet    TAKE 1 TABLET BY MOUTH EVERY 12 HOURS AS NEEDED FOR ANXIETY OR SLEEP SPASM    DAYANA (generalized anxiety disorder)       diclofenac 50 MG EC tablet    VOLTAREN    90 tablet    TAKE 1 TABLET BY MOUTH 3 TIMES DAILY    Lumbago       docusate sodium 100 MG capsule    COLACE    180 capsule    Take 1 capsule (100 mg) by mouth 2 times daily    Drug-induced constipation       DULERA 100-5 MCG/ACT oral inhaler   Generic drug:  mometasone-formoterol     13 g    INHALE 2 PUFFS INTO THE  LUNGS 2 TIMES A DAY    Moderate persistent asthma without complication       fentaNYL 75 mcg/hr 72 hr patch    DURAGESIC    11 patch    APPLY 1 PATCH EVERY 48 HOURS    DDD (degenerative disc disease), cervical       fluticasone 50 MCG/ACT spray    FLONASE    16 g    USE 2 SPRAYS IN EACH NOSTRIL DAILY    Chronic rhinitis, unspecified type       gabapentin 300 MG capsule    NEURONTIN    270 capsule    TAKE 3 CAPSULES BY MOUTH THREE TIMES DAILY    Polyneuropathy associated with underlying disease (H), Low back pain, unspecified back pain laterality, unspecified chronicity, with sciatica presence unspecified       HYDROcodone-acetaminophen  MG per tablet    NORCO    120 tablet    TAKE 1 TABLET BY MOUTH EVERY 6 HOURS AS NEEDED FOR MODERATE TO SEVEREPAIN    Low back pain, unspecified back pain laterality, unspecified chronicity, with sciatica presence unspecified       ibuprofen 600 MG tablet    ADVIL/MOTRIN    120 tablet    TAKE 1 TABLET BY MOUTH EVERY 6 HOURS AS NEEDED    Hx of degenerative disc disease       lidocaine 5 % Patch    LIDODERM    90 patch    PLACE 3 PATCHES ONTO THE SKIN DAILY AS NEEDED FOR MODERATE PAIN    Midline low back pain without sciatica       losartan 50 MG tablet    COZAAR    90 tablet    Take 1 tablet (50 mg) by mouth daily    Benign essential hypertension       MAPAP 325 MG tablet   Generic drug:  acetaminophen     200 tablet    TAKE 2 TABLETS BY MOUTH EVERY 4 HOURS AS NEEDED FOR MILD PAIN    Midline low back pain without sciatica       multivitamin  with iron Tabs     90 tablet    TAKE 1 TABLET BY MOUTH DAILY    Health care maintenance       nicotine polacrilex 2 MG gum    NICORETTE    110 each    CHEW 1 PIECE AS NEEDED FOR SMOKING CESSATION    Tobacco abuse       omeprazole 20 MG CR capsule    priLOSEC    30 capsule    TAKE 1 CAPSULE BY MOUTH EVERY DAY BEFORE A MEAL    Gastroesophageal reflux disease, esophagitis presence not specified       * order for DME     2 Box    Equipment being  ordered: Large gloves    Need for assistance with personal care       * order for DME     1 Units    Equipment being ordered: Boa Back brace    DDD (degenerative disc disease), lumbar, Chronic lower back pain       * order for DME     1 Device    1 boa back brace    Chronic low back pain with sciatica, sciatica laterality unspecified, unspecified back pain laterality       OXcarbazepine 600 MG tablet    TRILEPTAL    60 tablet    Take 1 tablet (600 mg) by mouth 2 times daily    Bipolar affective disorder, current episode hypomanic (H)       propranolol 20 MG tablet    INDERAL    60 tablet    TAKE 1 TABLET BY MOUTH TWICE DAILY    DAYANA (generalized anxiety disorder)       SM CHILDRENS ASPIRIN 81 MG chewable tablet   Generic drug:  aspirin     30 tablet    CHEW AND SWALLOW 1 TABLET BY MOUTH DAILY    Health care maintenance       SM STOOL SOFTENER/LAXATIVE 8.6-50 MG per tablet   Generic drug:  senna-docusate     120 tablet    TAKE 1 OR 2 TABLETS BY MOUTH 2 TIMES A DAY    Constipation, unspecified constipation type       traZODone 50 MG tablet    DESYREL    30 tablet    TAKE 1 TABLET BY MOUTH NIGHTLY AS NEEDED FOR SLEEP    Chronic pain       * VYVANSE 60 MG capsule   Generic drug:  lisdexamfetamine     30 capsule    TAKE 1 CAPSULE BY MOUTH EVERY MORNING    ADHD (attention deficit hyperactivity disorder), combined type       * lisdexamfetamine 60 MG capsule    VYVANSE    30 capsule    Take 1 capsule (60 mg) by mouth every morning    ADHD (attention deficit hyperactivity disorder), combined type       * lisdexamfetamine 60 MG capsule   Start taking on:  10/25/2017    VYVANSE    30 capsule    Take 1 capsule (60 mg) by mouth every morning    ADHD (attention deficit hyperactivity disorder), combined type       * Notice:  This list has 8 medication(s) that are the same as other medications prescribed for you. Read the directions carefully, and ask your doctor or other care provider to review them with you.

## 2017-09-27 NOTE — PROGRESS NOTES
"  PSYCHIATRY CLINIC PROGRESS NOTE   40 minute medication management, more than 50% of time spent counseling patient on medications, medication side effects, symptom history and management   SUBJECTIVE / INTERIM HISTORY                                                                       Social- Was  twice. Lives alone with his dog Montserrat (beltran) and 3 cats. Has a GF in [place]  Children-  2 kids, they are in [place]  Last visit 7/31/17:  Continue Vyvanse 60 mg daily and last script 7/7/17gave script today for 8/2/17 .  Continue Trileptal 600 mg bid, Valium 5 mg every 12 hours prn anxiety .    - trip to [place] not so good. His GF wasn't very nice and \"liberty wondering if I was just a goat\".  Also his friends weren't very nice to him  - in lot of pain evident when I walked in: wincing, sitting very still holding his back.   - Vyvanse 60 mg Dr. Fisher mailed out to [place] on 8/30/17   - trip to [place]   - marriage 6 years: not good.   - Valium helped with anxiety and helped with the pain. Does NOT want to take any more as \"I don't want to be sleeping all the time\"  - Lots of family issues: Joshua has taken care of his parents and yet parents give his other brothers everything. oldest of 3 brothers. Brother in [place] youngest [place] and Major is here. Mom and dad here out on [place]. Joshua noting moving here to be closer to parents and help them, etc. But, parents don't seem to want him around much or talk to him.  SUBSTANCE USE- denies abuse of meds or substances    SYMPTOMS- issues with attention and concentration improved with Ritalin.  racing thoughts, depressed mood, impulsivity, distractibility  MEDICAL ROS- back pain, denies any issues with headaches or stomach pain / issues with stimulant  MEDICAL / SURGICAL HISTORY                     Patient Active Problem List   Diagnosis     Mixed hyperlipidemia     Tobacco Abuse, History of     Degeneration of lumbar or lumbosacral intervertebral disc     Depression, major     Chronic pain " syndrome     Chemical dependency (H)     Chronic rhinitis     Tinnitus of both ears     ETD (eustachian tube dysfunction)     SNHL (sensorineural hearing loss)     Back pain     Bipolar disorder (H)     Seizure-like activity (H)     Bilateral foot pain     Preop general physical exam     Trigger index finger of right hand     Moderate persistent asthma without complication     Chronic lower back pain     ACP (advance care planning)     Onychia of toe of left foot     DDD (degenerative disc disease), lumbar     Seizure disorder (H)     Back muscle spasm     Throat pain     Benign essential hypertension     Retrograde ejaculation     ALLERGY   Amoxicillin trihydrate; Clavulanic acid potassium; Cymbalta; and Seasonal allergies  MEDICATIONS                                                                                             Current Outpatient Prescriptions   Medication Sig     diclofenac (VOLTAREN) 50 MG EC tablet TAKE 1 TABLET BY MOUTH 3 TIMES DAILY     fentaNYL (DURAGESIC) 75 mcg/hr 72 hr patch APPLY 1 PATCH EVERY 48 HOURS     DULERA 100-5 MCG/ACT oral inhaler INHALE 2 PUFFS INTO THE LUNGS 2 TIMES A DAY     VENTOLIN  (90 BASE) MCG/ACT Inhaler USE 2 PUFFS 4 TIMES A DAY AS NEEDED FOR SHORTNESS OF BREATH     lidocaine (LIDODERM) 5 % Patch PLACE 3 PATCHES ONTO THE SKIN DAILY AS NEEDED FOR MODERATE PAIN     multivitamin  with iron (SM COMPLETE ADVANCED FORMULA) TABS TAKE 1 TABLET BY MOUTH DAILY     SM STOOL SOFTENER/LAXATIVE 8.6-50 MG per tablet TAKE 1 OR 2 TABLETS BY MOUTH 2 TIMES A DAY     baclofen (LIORESAL) 20 MG tablet TAKE 1 TABLET BY MOUTH 3 TIMES DAILY     VYVANSE 60 MG capsule TAKE 1 CAPSULE BY MOUTH EVERY MORNING     HYDROcodone-acetaminophen (NORCO)  MG per tablet TAKE 1 TABLET BY MOUTH EVERY 6 HOURS AS NEEDED FOR MODERATE TO SEVEREPAIN     ibuprofen (ADVIL/MOTRIN) 600 MG tablet TAKE 1 TABLET BY MOUTH EVERY 6 HOURS AS NEEDED     propranolol (INDERAL) 20 MG tablet TAKE 1 TABLET BY MOUTH TWICE  DAILY     order for DME 1 boa back brace     losartan (COZAAR) 50 MG tablet Take 1 tablet (50 mg) by mouth daily     fluticasone (FLONASE) 50 MCG/ACT spray USE 2 SPRAYS IN EACH NOSTRIL DAILY     docusate sodium (COLACE) 100 MG capsule Take 1 capsule (100 mg) by mouth 2 times daily     gabapentin (NEURONTIN) 300 MG capsule TAKE 3 CAPSULES BY MOUTH THREE TIMES DAILY     MAPAP 325 MG tablet TAKE 2 TABLETS BY MOUTH EVERY 4 HOURS AS NEEDED FOR MILD PAIN     traZODone (DESYREL) 50 MG tablet TAKE 1 TABLET BY MOUTH NIGHTLY AS NEEDED FOR SLEEP     diazepam (VALIUM) 5 MG tablet TAKE 1 TABLET BY MOUTH EVERY 12 HOURS AS NEEDED FOR ANXIETY OR SLEEP SPASM     albuterol (PROAIR HFA/PROVENTIL HFA/VENTOLIN HFA) 108 (90 BASE) MCG/ACT Inhaler Inhale 2 puffs into the lungs every 6 hours as needed for shortness of breath / dyspnea or wheezing     atorvastatin (LIPITOR) 20 MG tablet Take 1 tablet (20 mg) by mouth daily     OXcarbazepine (TRILEPTAL) 600 MG tablet Take 1 tablet (600 mg) by mouth 2 times daily     omeprazole (PRILOSEC) 20 MG CR capsule TAKE 1 CAPSULE BY MOUTH EVERY DAY BEFORE A MEAL     SM CHILDRENS ASPIRIN 81 MG chewable tablet CHEW AND SWALLOW 1 TABLET BY MOUTH DAILY     nicotine polacrilex (NICORETTE) 2 MG gum CHEW 1 PIECE AS NEEDED FOR SMOKING CESSATION     order for DME Equipment being ordered: Boa Back brace     ORDER FOR DME Equipment being ordered: Large gloves     No current facility-administered medications for this visit.        VITALS   /90 (BP Location: Left arm, Patient Position: Sitting, Cuff Size: Adult Regular)  Pulse 79  Temp 97.1  F (36.2  C) (Tympanic)  Wt 191 lb (86.6 kg)  BMI 27.41 kg/m2     LABS                                                                                                                         EKG 2016 with 376 ms (on nortriptyline)  Last Basic Metabolic Panel:  NA      138   6/9/2016   POTASSIUM      4.7   6/9/2016  CHLORIDE      104   6/9/2016  SANDIRTA      9.2    "6/9/2016  CO2       29   6/9/2016  BUN       25   6/9/2016  CR     0.76   6/9/2016  GLC      173   6/9/2016    Liver Function Studies -   Recent Labs   Lab Test  02/18/15   1536   PROTTOTAL  7.5   ALBUMIN  4.6   BILITOTAL  0.4   ALKPHOS  82   AST  18   ALT  32      MENTAL STATUS EXAM                                                                                        Alert. Oriented to person, place, and date / time. Casually groomed, calm, cooperative with good eye contact. No problems with speech or psychomotor behavior. Mood was described as \"doing okay\" and affect was congruent to speech content and full range. Thought process, including associations, was unremarkable and thought content was devoid of suicidal and homicidal ideation and psychotic thought. No hallucinations. Insight was good. Judgment was intact and adequate for safety. Fund of knowledge was intact. Pt demonstrates no obvious problems with attention, concentration, language, recent or remote memory although these were not formally tested.     ASSESSMENT                                                                                                      HISTORICAL:  Initial psych note 10/6/15          NOTES:      This patient is a 56 year old with bipolar, MDD, bipolar d/o.  He does struggle with depression and seems to be largely related to his chronic pain issues.We started Valium in as dual purpose: muscle relaxant properties and for anxiety. Heladio Newman has helped sificantly. Trent and I have discussed the risks that go along with combination of medications he is on: on several medications with CNS depressant effect and thus he needs to be very careful and NOT use alcohol or any other type sedating meds/substances as there are risks including respiratory depression. Valium is 5 mg bid prn and I would not feel comfortable any higher dose given the other medications he is on with CNS depressant potential. Joshua is on a lot of controlled substances " "thus I reviewed the [place]  and no other prescriptions aside from those his PCP and I prescribe. We have reviewed on benzoiazepine and opioids combo and more risk adverse side effects. Joshua and I feel benefits outweigh risks of continuing the diazepam as helps him in terms of anxiety and for muscle tension. We feel his functioning would significantly be impaired to point of unable to leave his house. As we spoke about this he reminded me of several \"blown disks\" and one ruptured.  He is on nortriptyline which antidepressant and also can help with pain.       TREATMENT RISK STATEMENT:  The risks, benefits, alternatives and potential adverse effects have been explained and are understood by the pt.  The pt agrees to the treatment plan with the ability to do so.   The pt knows to call the clinic for any problems or access emergency care if needed.        DIAGNOSES                 (Use of Axes system will continue, even though absent from DSM 5)         Axis I   - ADD               MDD, recurrent,  mod                 Rule out bipolar disorder  Axis II  - no dx  Axis III - chronic pain (s/p back injuries and surgeries)  Axis IV-  Psychosocial Stressors include: financial, lack of support  Axis V - Global Assessment of Functioning current: 45    PLAN                                                                                                                    1)  MEDICATIONS:         --  Continue Vyvanse 60 mg daily and last script was 8/30/17 and I will refill today 9/27/17 and 10/25/17 :  .  Continue Trileptal 600 mg bid, Valium 5 mg every 12 hours prn anxiety .  2)  THERAPY:  No change    3)  LABS:  UDS 5/10/17    4)  PT MONITOR [call for probs]:  SEs from meds, worsening sx, SI/HI    5)  REFERRALS [CD, medical, other]:  None    6)  RTC:  4-6 weeks                                                              "

## 2017-09-27 NOTE — MR AVS SNAPSHOT
"              After Visit Summary   9/27/2017    Joshua Barron    MRN: [id number]           Patient Information     Date Of Birth          1960        Visit Information        Provider Department      9/27/2017 1:20 PM Laquita Fernandez MD [facility]        Today's Diagnoses     ADHD (attention deficit hyperactivity disorder), combined type    -  1       Follow-ups after your visit        Your next 10 appointments already scheduled     Sep 29, 2017 12:30 PM CDT   [facility] Visit with [facility]illa DC   [facility]    [address]  [facility] [address]   [phone]            Mar 07, 2018  2:45 PM CST   (Arrive by 2:30 PM)   Hearing Eval with Kevin Moreno   [facility] ([facility] )    [address]  [facility] [address]   [phone]              Who to contact     If you have questions or need follow up information about today's clinic visit or your schedule please contact [facility] directly at [phone].  Normal or non-critical lab and imaging results will be communicated to you by [employer], letter or phone within 4 business days after the clinic has received the results. If you do not hear from us within 7 days, please contact the clinic through [employer] or phone. If you have a critical or abnormal lab result, we will notify you by phone as soon as possible.  Submit refill requests through [employer] or call your pharmacy and they will forward the refill request to us. Please allow 3 business days for your refill to be completed.          Additional Information About Your Visit        MyChart Information     [facility] lets you send messages to your doctor, view your test results, renew your prescriptions, schedule appointments and more. To sign up, go to [email or web address] . Click on \"Log in\" on the left side of the screen, which will take you to the Welcome page. Then click on \"Sign up Now\" on " the right side of the page.     You will be asked to enter the access code listed below, as well as some personal information. Please follow the directions to create your username and password.     Your access code is: [email or web address]  Expires: 2017  2:03 PM     Your access code will  in 90 days. If you need help or a new code, please call your [facility] clinic or [phone].        Care EveryWhere ID     This is your Care EveryWhere ID. This could be used by other organizations to access your [facility] medical records  [id number]        Your Vitals Were     Pulse Temperature BMI (Body Mass Index)             79 97.1  F (36.2  C) (Tympanic) 27.41 kg/m2          Blood Pressure from Last 3 Encounters:   17 162/90   17 124/90   17 140/70    Weight from Last 3 Encounters:   17 191 lb (86.6 kg)   17 185 lb (83.9 kg)   17 185 lb (83.9 kg)              Today, you had the following     No orders found for display         Today's Medication Changes          These changes are accurate as of: 17  2:03 PM.  If you have any questions, ask your nurse or doctor.               These medicines have changed or have updated prescriptions.        Dose/Directions    * VYVANSE 60 MG capsule   This may have changed:  Another medication with the same name was added. Make sure you understand how and when to take each.   Used for:  ADHD (attention deficit hyperactivity disorder), combined type   Generic drug:  lisdexamfetamine   Changed by:  Lyle Fisher, DO        TAKE 1 CAPSULE BY MOUTH EVERY MORNING   Quantity:  30 capsule   Refills:  0       * lisdexamfetamine 60 MG capsule   Commonly known as:  VYVANSE   This may have changed:  You were already taking a medication with the same name, and this prescription was added. Make sure you understand how and when to take each.   Used for:  ADHD (attention deficit hyperactivity disorder), combined type   Changed by:  Laquita Fernandez,  MD        Dose:  60 mg   Take 1 capsule (60 mg) by mouth every morning   Quantity:  30 capsule   Refills:  0       * lisdexamfetamine 60 MG capsule   Commonly known as:  VYVANSE   This may have changed:  You were already taking a medication with the same name, and this prescription was added. Make sure you understand how and when to take each.   Used for:  ADHD (attention deficit hyperactivity disorder), combined type   Changed by:  Laquita Fernandez MD        Dose:  60 mg   Start taking on:  10/25/2017   Take 1 capsule (60 mg) by mouth every morning   Quantity:  30 capsule   Refills:  0       * Notice:  This list has 3 medication(s) that are the same as other medications prescribed for you. Read the directions carefully, and ask your doctor or other care provider to review them with you.         Where to get your medicines      Some of these will need a paper prescription and others can be bought over the counter.  Ask your nurse if you have questions.     Bring a paper prescription for each of these medications     lisdexamfetamine 60 MG capsule    lisdexamfetamine 60 MG capsule                Primary Care Provider Office Phone # Fax #    Lyle Juan Fisher [phone]       [facility] [place] [address] [facility] [address]        Equal Access to Services     SHAHBAZ DANIELS AH: Hadii jim arizmendi hadasho Soomaali, waaxda luqadaha, qaybta kaalmada adeegyada, jaki fermin. So [facility] [phone].    ATENCIÓN: Si habla español, tiene a hastings disposición servicios gratuitos de asistencia lingüística. Llame al [phone].    We comply with applicable federal civil rights laws and [place] laws. We do not discriminate on the basis of race, color, national origin, age, disability sex, sexual orientation or gender identity.            Thank you!     Thank you for choosing [facility] [place]  for your care. Our goal is always to provide you with excellent care.  Hearing back from our patients is one way we can continue to improve our services. Please take a few minutes to complete the written survey that you may receive in the mail after your visit with us. Thank you!             Your Updated Medication List - Protect others around you: Learn how to safely use, store and throw away your medicines at www.disposemymeds.org.          This list is accurate as of: 9/27/17  2:03 PM.  Always use your most recent med list.                   Brand Name Dispense Instructions for use Diagnosis    * albuterol 108 (90 BASE) MCG/ACT Inhaler    PROAIR HFA/PROVENTIL HFA/VENTOLIN HFA     Inhale 2 puffs into the lungs every 6 hours as needed for shortness of breath / dyspnea or wheezing        * VENTOLIN  (90 BASE) MCG/ACT Inhaler   Generic drug:  albuterol     18 g    USE 2 PUFFS 4 TIMES A DAY AS NEEDED FOR SHORTNESS OF BREATH    Moderate persistent asthma without complication       atorvastatin 20 MG tablet    LIPITOR    90 tablet    Take 1 tablet (20 mg) by mouth daily    Mixed hyperlipidemia       baclofen 20 MG tablet    LIORESAL    90 tablet    TAKE 1 TABLET BY MOUTH 3 TIMES DAILY    Back muscle spasm       diazepam 5 MG tablet    VALIUM    60 tablet    TAKE 1 TABLET BY MOUTH EVERY 12 HOURS AS NEEDED FOR ANXIETY OR SLEEP SPASM    DAYANA (generalized anxiety disorder)       diclofenac 50 MG EC tablet    VOLTAREN    90 tablet    TAKE 1 TABLET BY MOUTH 3 TIMES DAILY    Lumbago       docusate sodium 100 MG capsule    COLACE    180 capsule    Take 1 capsule (100 mg) by mouth 2 times daily    Drug-induced constipation       DULERA 100-5 MCG/ACT oral inhaler   Generic drug:  mometasone-formoterol     13 g    INHALE 2 PUFFS INTO THE LUNGS 2 TIMES A DAY    Moderate persistent asthma without complication       fentaNYL 75 mcg/hr 72 hr patch    DURAGESIC    11 patch    APPLY 1 PATCH EVERY 48 HOURS    DDD (degenerative disc disease), cervical       fluticasone 50 MCG/ACT spray    FLONASE    16  g    USE 2 SPRAYS IN EACH NOSTRIL DAILY    Chronic rhinitis, unspecified type       gabapentin 300 MG capsule    NEURONTIN    270 capsule    TAKE 3 CAPSULES BY MOUTH THREE TIMES DAILY    Polyneuropathy associated with underlying disease (H), Low back pain, unspecified back pain laterality, unspecified chronicity, with sciatica presence unspecified       HYDROcodone-acetaminophen  MG per tablet    NORCO    120 tablet    TAKE 1 TABLET BY MOUTH EVERY 6 HOURS AS NEEDED FOR MODERATE TO SEVEREPAIN    Low back pain, unspecified back pain laterality, unspecified chronicity, with sciatica presence unspecified       ibuprofen 600 MG tablet    ADVIL/MOTRIN    120 tablet    TAKE 1 TABLET BY MOUTH EVERY 6 HOURS AS NEEDED    Hx of degenerative disc disease       lidocaine 5 % Patch    LIDODERM    90 patch    PLACE 3 PATCHES ONTO THE SKIN DAILY AS NEEDED FOR MODERATE PAIN    Midline low back pain without sciatica       losartan 50 MG tablet    COZAAR    90 tablet    Take 1 tablet (50 mg) by mouth daily    Benign essential hypertension       MAPAP 325 MG tablet   Generic drug:  acetaminophen     200 tablet    TAKE 2 TABLETS BY MOUTH EVERY 4 HOURS AS NEEDED FOR MILD PAIN    Midline low back pain without sciatica       multivitamin  with iron Tabs     90 tablet    TAKE 1 TABLET BY MOUTH DAILY    Health care maintenance       nicotine polacrilex 2 MG gum    NICORETTE    110 each    CHEW 1 PIECE AS NEEDED FOR SMOKING CESSATION    Tobacco abuse       omeprazole 20 MG CR capsule    priLOSEC    30 capsule    TAKE 1 CAPSULE BY MOUTH EVERY DAY BEFORE A MEAL    Gastroesophageal reflux disease, esophagitis presence not specified       * order for DME     2 Box    Equipment being ordered: Large gloves    Need for assistance with personal care       * order for DME     1 Units    Equipment being ordered: Boa Back brace    DDD (degenerative disc disease), lumbar, Chronic lower back pain       * order for DME     1 Device    1 boa back  brace    Chronic low back pain with sciatica, sciatica laterality unspecified, unspecified back pain laterality       OXcarbazepine 600 MG tablet    TRILEPTAL    60 tablet    Take 1 tablet (600 mg) by mouth 2 times daily    Bipolar affective disorder, current episode hypomanic (H)       propranolol 20 MG tablet    INDERAL    60 tablet    TAKE 1 TABLET BY MOUTH TWICE DAILY    DAYANA (generalized anxiety disorder)       SM CHILDRENS ASPIRIN 81 MG chewable tablet   Generic drug:  aspirin     30 tablet    CHEW AND SWALLOW 1 TABLET BY MOUTH DAILY    Health care maintenance       SM STOOL SOFTENER/LAXATIVE 8.6-50 MG per tablet   Generic drug:  senna-docusate     120 tablet    TAKE 1 OR 2 TABLETS BY MOUTH 2 TIMES A DAY    Constipation, unspecified constipation type       traZODone 50 MG tablet    DESYREL    30 tablet    TAKE 1 TABLET BY MOUTH NIGHTLY AS NEEDED FOR SLEEP    Chronic pain       * VYVANSE 60 MG capsule   Generic drug:  lisdexamfetamine     30 capsule    TAKE 1 CAPSULE BY MOUTH EVERY MORNING    ADHD (attention deficit hyperactivity disorder), combined type       * lisdexamfetamine 60 MG capsule    VYVANSE    30 capsule    Take 1 capsule (60 mg) by mouth every morning    ADHD (attention deficit hyperactivity disorder), combined type       * lisdexamfetamine 60 MG capsule   Start taking on:  10/25/2017    VYVANSE    30 capsule    Take 1 capsule (60 mg) by mouth every morning    ADHD (attention deficit hyperactivity disorder), combined type       * Notice:  This list has 8 medication(s) that are the same as other medications prescribed for you. Read the directions carefully, and ask your doctor or other care provider to review them with you.

## 2017-09-28 ASSESSMENT — ANXIETY QUESTIONNAIRES: GAD7 TOTAL SCORE: 5

## 2017-09-28 NOTE — PROGRESS NOTES

## 2017-09-29 ENCOUNTER — OFFICE VISIT (OUTPATIENT)
Dept: CHIROPRACTIC MEDICINE | Facility: OTHER | Age: 57
End: 2017-09-29
Attending: CHIROPRACTOR
Payer: COMMERCIAL

## 2017-09-29 DIAGNOSIS — M54.5 LOW BACK PAIN, UNSPECIFIED BACK PAIN LATERALITY, UNSPECIFIED CHRONICITY, WITH SCIATICA PRESENCE UNSPECIFIED: ICD-10-CM

## 2017-09-29 DIAGNOSIS — M54.50 ACUTE BILATERAL LOW BACK PAIN WITHOUT SCIATICA: ICD-10-CM

## 2017-09-29 DIAGNOSIS — M99.01 SEGMENTAL AND SOMATIC DYSFUNCTION OF CERVICAL REGION: ICD-10-CM

## 2017-09-29 DIAGNOSIS — M99.02 SEGMENTAL AND SOMATIC DYSFUNCTION OF THORACIC REGION: ICD-10-CM

## 2017-09-29 DIAGNOSIS — M99.03 SEGMENTAL AND SOMATIC DYSFUNCTION OF LUMBAR REGION: Primary | ICD-10-CM

## 2017-09-29 PROCEDURE — 98941 CHIROPRACT MANJ 3-4 REGIONS: CPT | Mod: AT | Performed by: CHIROPRACTOR

## 2017-10-02 RX ORDER — BACLOFEN 10 MG/1
TABLET ORAL
Qty: 90 TABLET | Refills: 0 | Status: SHIPPED | OUTPATIENT
Start: 2017-10-02 | End: 2017-11-17

## 2017-10-02 NOTE — TELEPHONE ENCOUNTER
Controlled Substance Refill Request for Baclofen  Problem List Complete:  Yes    Last Written Prescription Date:  8.30.17  Last Fill Quantity: 90,   # refills: 0    Last Office Visit with [facility] primary care provider: 7.7.17    Clinic visit frequency required: Q 3 months     Future Office visit:   Next 5 appointments (look out 90 days)     Oct 03, 2017 12:30 PM CDT   [facility] Shamar [facility]illa DC   [facility] [place] [facility]    [address]  [facility] [address]   [phone]            Oct 31, 2017  2:20 PM CDT   (Arrive by 2:05 PM)   [facility]   [facility] )    [address]  [facility] [address] [phone]                  Controlled substance agreement on file: Yes:  Date 9.18.15.     Processing:  E-prescribe to Caroline Chavez     checked in past 6 months?  Yes 5.17.17

## 2017-10-03 NOTE — PROGRESS NOTES

## 2017-10-04 ENCOUNTER — OFFICE VISIT (OUTPATIENT)
Dept: CHIROPRACTIC MEDICINE | Facility: OTHER | Age: 57
End: 2017-10-04
Attending: CHIROPRACTOR
Payer: COMMERCIAL

## 2017-10-04 DIAGNOSIS — M99.01 SEGMENTAL AND SOMATIC DYSFUNCTION OF CERVICAL REGION: ICD-10-CM

## 2017-10-04 DIAGNOSIS — M99.02 SEGMENTAL AND SOMATIC DYSFUNCTION OF THORACIC REGION: ICD-10-CM

## 2017-10-04 DIAGNOSIS — M99.03 SEGMENTAL AND SOMATIC DYSFUNCTION OF LUMBAR REGION: Primary | ICD-10-CM

## 2017-10-04 DIAGNOSIS — M54.50 ACUTE BILATERAL LOW BACK PAIN WITHOUT SCIATICA: ICD-10-CM

## 2017-10-04 PROCEDURE — 98941 CHIROPRACT MANJ 3-4 REGIONS: CPT | Mod: AT | Performed by: CHIROPRACTOR

## 2017-10-04 NOTE — MR AVS SNAPSHOT
After Visit Summary   10/4/2017    Joshua Barron    MRN: [id number]           Patient Information     Date Of Birth          1960        Visit Information        Provider Department      10/4/2017 4:00 PM Shamar Esc[facility]illa [facility]  [facility]        Today's Diagnoses     Segmental and somatic dysfunction of lumbar region    -  1    Acute bilateral low back pain without sciatica        Segmental and somatic dysfunction of thoracic region        Segmental and somatic dysfunction of cervical region           Follow-ups after your visit        Your next 10 appointments already scheduled     Oct 31, 2017  2:20 PM CDT   (Arrive by 2:05 PM)   Return Visit with Laquita Fernandez MD   [facility] [place] ([facility] [place] )    [address] [place] [address]  [place] [address] [phone]            Mar 07, 2018  2:45 PM CST   (Arrive by 2:30 PM)   Hearing Eval with Kevin Moreno   [facility] ([facility] [place] )    [address] [place] [address]   [phone]              Who to contact     If you have questions or need follow up information about today's clinic visit or your schedule please contact  [facility] directly at [phone].  Normal or non-critical lab and imaging results will be communicated to you by MyChart, letter or phone within 4 business days after the clinic has received the results. If you do not hear from us within 7 days, please contact the clinic through MyChart or phone. If you have a critical or abnormal lab result, we will notify you by phone as soon as possible.  Submit refill requests through Zwittle or call your pharmacy and they will forward the refill request to us. Please allow 3 business days for your refill to be completed.          Additional Information About Your Visit        [facility] Information     Zwittle lets you send messages to your doctor, view your test results, renew your  "prescriptions, schedule appointments and more. To sign up, go to [email or web address] . Click on \"Log in\" on the left side of the screen, which will take you to the Welcome page. Then click on \"Sign up Now\" on the right side of the page.     You will be asked to enter the access code listed below, as well as some personal information. Please follow the directions to create your username and password.     Your access code is: [email or web address]  Expires: 2017  2:03 PM     Your access code will  in 90 days. If you need help or a new code, please call your [facility] clinic or [phone].        Care EveryWhere ID     This is your Care EveryWhere ID. This could be used by other organizations to access your [facility] medical records  [id number]         Blood Pressure from Last 3 Encounters:   17 162/90   17 124/90   17 140/70    Weight from Last 3 Encounters:   17 191 lb (86.6 kg)   17 185 lb (83.9 kg)   17 185 lb (83.9 kg)              We Performed the Following     CHIROPRAC MANIP,SPINAL,3-4 REGIONS        Primary Care Provider Office Phone # Fax #    Lylegrecia Martinez DO Natalia [phone]       [facility] [place] [address]  \[facility] [place] [facility]\""[place] [address]        Equal Access to Services     [facility] : Hadii aad ku hadasho Soomaali, waaxda luqadaha, qaybta kaalmada adeegyada, jaki millan . So [facility] [phone].    ATENCIÓN: Si habla español, tiene a hastings disposición servicios gratuitos de asistencia lingüística. Llame al [phone].    We comply with applicable federal civil rights laws and [place] laws. We do not discriminate on the basis of race, color, national origin, age, disability, sex, sexual orientation, or gender identity.            Thank you!     Thank you for choosing  [facility] [place] [facility]  for your care. Our goal is always to provide you with excellent care. Hearing back from our patients is one way we can " continue to improve our services. Please take a few minutes to complete the written survey that you may receive in the mail after your visit with us. Thank you!             Your Updated Medication List - Protect others around you: Learn how to safely use, store and throw away your medicines at www.disposemymeds.org.          This list is accurate as of: 10/4/17 11:59 PM.  Always use your most recent med list.                   Brand Name Dispense Instructions for use Diagnosis    * albuterol 108 (90 BASE) MCG/ACT Inhaler    PROAIR HFA/PROVENTIL HFA/VENTOLIN HFA     Inhale 2 puffs into the lungs every 6 hours as needed for shortness of breath / dyspnea or wheezing        * VENTOLIN  (90 BASE) MCG/ACT Inhaler   Generic drug:  albuterol     18 g    USE 2 PUFFS 4 TIMES A DAY AS NEEDED FOR SHORTNESS OF BREATH    Moderate persistent asthma without complication       atorvastatin 20 MG tablet    LIPITOR    90 tablet    Take 1 tablet (20 mg) by mouth daily    Mixed hyperlipidemia       * baclofen 20 MG tablet    LIORESAL    90 tablet    TAKE 1 TABLET BY MOUTH 3 TIMES DAILY    Back muscle spasm       * baclofen 10 MG tablet    LIORESAL    90 tablet    TAKE 1 TABLET BY MOUTH THREE TIMES DAILY    Low back pain, unspecified back pain laterality, unspecified chronicity, with sciatica presence unspecified       diazepam 5 MG tablet    VALIUM    60 tablet    TAKE 1 TABLET BY MOUTH EVERY 12 HOURS AS NEEDED FOR ANXIETY OR SLEEP SPASM    DAYANA (generalized anxiety disorder)       diclofenac 50 MG EC tablet    VOLTAREN    90 tablet    TAKE 1 TABLET BY MOUTH 3 TIMES DAILY    Lumbago       docusate sodium 100 MG capsule    COLACE    180 capsule    Take 1 capsule (100 mg) by mouth 2 times daily    Drug-induced constipation       DULERA 100-5 MCG/ACT oral inhaler   Generic drug:  mometasone-formoterol     13 g    INHALE 2 PUFFS INTO THE LUNGS 2 TIMES A DAY    Moderate persistent asthma without complication       fentaNYL 75 mcg/hr 72  hr patch    DURAGESIC    11 patch    APPLY 1 PATCH EVERY 48 HOURS    DDD (degenerative disc disease), cervical       fluticasone 50 MCG/ACT spray    FLONASE    16 g    USE 2 SPRAYS IN EACH NOSTRIL DAILY    Chronic rhinitis, unspecified type       gabapentin 300 MG capsule    NEURONTIN    270 capsule    TAKE 3 CAPSULES BY MOUTH THREE TIMES DAILY    Polyneuropathy associated with underlying disease (H), Low back pain, unspecified back pain laterality, unspecified chronicity, with sciatica presence unspecified       HYDROcodone-acetaminophen  MG per tablet    NORCO    120 tablet    TAKE 1 TABLET BY MOUTH EVERY 6 HOURS AS NEEDED FOR MODERATE TO SEVEREPAIN    Low back pain, unspecified back pain laterality, unspecified chronicity, with sciatica presence unspecified       ibuprofen 600 MG tablet    ADVIL/MOTRIN    120 tablet    TAKE 1 TABLET BY MOUTH EVERY 6 HOURS AS NEEDED    Hx of degenerative disc disease       lidocaine 5 % Patch    LIDODERM    90 patch    PLACE 3 PATCHES ONTO THE SKIN DAILY AS NEEDED FOR MODERATE PAIN    Midline low back pain without sciatica       losartan 50 MG tablet    COZAAR    90 tablet    Take 1 tablet (50 mg) by mouth daily    Benign essential hypertension       MAPAP 325 MG tablet   Generic drug:  acetaminophen     200 tablet    TAKE 2 TABLETS BY MOUTH EVERY 4 HOURS AS NEEDED FOR MILD PAIN    Midline low back pain without sciatica       multivitamin  with iron Tabs     90 tablet    TAKE 1 TABLET BY MOUTH DAILY    Health care maintenance       nicotine polacrilex 2 MG gum    NICORETTE    110 each    CHEW 1 PIECE AS NEEDED FOR SMOKING CESSATION    Tobacco abuse       omeprazole 20 MG CR capsule    priLOSEC    30 capsule    TAKE 1 CAPSULE BY MOUTH EVERY DAY BEFORE A MEAL    Gastroesophageal reflux disease, esophagitis presence not specified       * order for DME     2 Box    Equipment being ordered: Large gloves    Need for assistance with personal care       * order for DME     1 Units     Equipment being ordered: Boa Back brace    DDD (degenerative disc disease), lumbar, Chronic lower back pain       * order for DME     1 Device    1 boa back brace    Chronic low back pain with sciatica, sciatica laterality unspecified, unspecified back pain laterality       OXcarbazepine 600 MG tablet    TRILEPTAL    60 tablet    Take 1 tablet (600 mg) by mouth 2 times daily    Bipolar affective disorder, current episode hypomanic (H)       propranolol 20 MG tablet    INDERAL    60 tablet    TAKE 1 TABLET BY MOUTH TWICE DAILY    DAYANA (generalized anxiety disorder)       [facility] [employer] ASPIRIN 81 MG chewable tablet   Generic drug:  aspirin     30 tablet    CHEW AND SWALLOW 1 TABLET BY MOUTH DAILY    Health care maintenance        STOOL SOFTENER/LAXATIVE 8.6-50 MG per tablet   Generic drug:  senna-docusate     120 tablet    TAKE 1 OR 2 TABLETS BY MOUTH 2 TIMES A DAY    Constipation, unspecified constipation type       traZODone 50 MG tablet    DESYREL    30 tablet    TAKE 1 TABLET BY MOUTH NIGHTLY AS NEEDED FOR SLEEP    Chronic pain       * VYVANSE 60 MG capsule   Generic drug:  lisdexamfetamine     30 capsule    TAKE 1 CAPSULE BY MOUTH EVERY MORNING    ADHD (attention deficit hyperactivity disorder), combined type       * lisdexamfetamine 60 MG capsule    VYVANSE    30 capsule    Take 1 capsule (60 mg) by mouth every morning    ADHD (attention deficit hyperactivity disorder), combined type       * lisdexamfetamine 60 MG capsule   Start taking on:  10/25/2017    VYVANSE    30 capsule    Take 1 capsule (60 mg) by mouth every morning    ADHD (attention deficit hyperactivity disorder), combined type       * Notice:  This list has 10 medication(s) that are the same as other medications prescribed for you. Read the directions carefully, and ask your doctor or other care provider to review them with you.

## 2017-10-05 NOTE — PROGRESS NOTES
Subjective Finding:    Chief compalint: Patient presents with:  Back Pain: doing better   Neck Pain  , Pain Scale: 4/10, Intensity: dull, Duration: 2 days, Radiating: no.    Date of injury:     Activities that the pain restricts:   Home/household/hobbies/social activities: yes.  Work duties: yes.  Sleep: yes.  Makes symptoms better: rest.  Makes symptoms worse: lumbar extension and lumbar flexion.  Have you seen anyone else for the symptoms? No.  Work related: no.  Automobile related injury: no.    Objective and Assessment:    Posture Analysis:   High shoulder: .  Head tilt: .  High iliac crest: .  Head carriage: neutral.  Thoracic Kyphosis: neutral.  Lumbar Lordosis: forward.    Lumbar Range of Motion: flexion decreased and extension decreased.  Cervical Range of Motion: extension decreased.  Thoracic Range of Motion: extension decreased.  Extremity Range of Motion: .    Palpation:   Quad lumb: bilateral, referred pain: no  T paraspinals: dull pain, no    Segmental dysfunction pre-treatment and treatment area: C4, T5, T6 and Sacrum.    Assessment post-treatment:  Cervical: ROM increased.  Thoracic: ROM increased.  Lumbar: ROM increased.    Comments: history of DDD in C and L spine.      Complicating Factors: .    Procedure(s):  CMT:  59716 Chiropractic manipulative treatment 3-4 regions performed   Cervical: Diversified, See above for level, Supine, Thoracic: Diversified, See above for level, Prone and Lumbar: Diversified, See above for level, Side posture    Modalities:  None performed this visit    Therapeutic procedures:  None    Plan:  Treatment plan: PRN.  Instructed patient: stretch as instructed at visit.  Short term goals: increase ROM.  Long term goals: increase ADL.  Prognosis: good.

## 2017-10-06 ENCOUNTER — TELEPHONE (OUTPATIENT)
Dept: PEDIATRICS | Facility: OTHER | Age: 57
End: 2017-10-06

## 2017-10-06 DIAGNOSIS — M54.5 LOW BACK PAIN, UNSPECIFIED BACK PAIN LATERALITY, UNSPECIFIED CHRONICITY, WITH SCIATICA PRESENCE UNSPECIFIED: ICD-10-CM

## 2017-10-06 RX ORDER — HYDROCODONE BITARTRATE AND ACETAMINOPHEN 10; 325 MG/1; MG/1
TABLET ORAL
Qty: 120 TABLET | Refills: 0 | OUTPATIENT
Start: 2017-10-06

## 2017-10-06 RX ORDER — HYDROCODONE BITARTRATE AND ACETAMINOPHEN 10; 325 MG/1; MG/1
TABLET ORAL
Qty: 120 TABLET | Refills: 0 | Status: SHIPPED | OUTPATIENT
Start: 2017-10-06 | End: 2017-10-30

## 2017-10-06 NOTE — TELEPHONE ENCOUNTER
Controlled Substance Refill Request for Norco  Problem List Complete:  Yes    Last Written Prescription Date:  8.30.17  Last Fill Quantity: 120,   # refills: 0    Last Office Visit with [facility] primary care provider: 7.7.17    Clinic visit frequency required: Q 3 months     Future Office visit:   Next 5 appointments (look out 90 days)     Oct 31, 2017  2:20 PM CDT   (Arrive by 2:05 PM)   Return Visit with Laquita Fernandez MD   [facility] ([facility] )    [facility] [address] [place] [address]   [phone]                  Controlled substance agreement on file: Yes:  Date 9.18.15.     Processing:  Staff will hand deliver Rx to on-site pharmacy     checked in past 6 months?  Yes 5.17.17

## 2017-10-06 NOTE — TELEPHONE ENCOUNTER
Reason for call:  Medication    1. Medication Name? Hydrocodone  2. Is this request for a refill? Yes  3. What Pharm[facility] do you use? [facility]  4. Have you contacted your pharmacy? Yes    5. If yes, when? Monday 10/02  (Please note that the turn-around-time for prescriptions is 72 business hours; I am sending your request at this time. SEND TO  [facility]  )  Description: Patient is out of hydrocodone at this time and would like the refill. Patient called Rx on Monday and I did not see anything in chart regarding refill.  Was an appointment offered for this a call? No   Preferred method for responding to this messageTelephone Call  If we cannot reach you directly, may we leave a detailed response at the number you provided? Yes  Can this message wait until your PCP/Provider returns if not available today? No

## 2017-10-09 DIAGNOSIS — J45.40 MODERATE PERSISTENT ASTHMA WITHOUT COMPLICATION: ICD-10-CM

## 2017-10-09 NOTE — TELEPHONE ENCOUNTER
Ventolin HFA 90 MCG INH       Last Written Prescription Date: 9-7-17  Last Fill Quantity: 18g, # refills: 1    Last Office Visit with [facility], P or  Health prescribing provider:  7-7-17   Future Office Visit:    Next 5 appointments (look out 90 days)     Oct 10, 2017 12:30 PM CDT   Return Visit with Shamar Escamilla DC   [facility] [place] [facility] ([facility])    [address]  [place] [address]   [phone]            Oct 31, 2017  2:20 PM CDT   (Arrive by 2:05 PM)   Return Visit with Laquita Fernandez MD   [facility] [place] ([facility] [place] )    [address]  [place] [address] [phone]                   Date of Last Asthma Action Plan Letter:   Asthma Action Plan Q1 Year    Topic Date Due     Asthma Action Plan - yearly  06/27/2018      Asthma Control Test:   ACT Total Scores 6/27/2017   ACT TOTAL SCORE -   ASTHMA ER VISITS -   ASTHMA HOSPITALIZATIONS -   ACT TOTAL SCORE (Goal Greater than or Equal to 20) 25   In the past 12 months, how many times did you visit the emergency room for your asthma without being admitted to the hospital? 0   In the past 12 months, how many times were you hospitalized overnight because of your asthma? 0     Dulera 100 MCG/5 MCG INH       Last Written Prescription Date: 9-7-17  Last Fill Quantity: 13G, # refills: 1    Last Office Visit with [facility], P or  Health prescribing provider:  7-7-17   Future Office Visit:    Next 5 appointments (look out 90 days)     Oct 10, 2017 12:30 PM CDT   Return Visit with Shamar Escamilla DC   [facility] [place] [facility] ([facility]    [address]  [place] [address]   [phone]            Oct 31, 2017  2:20 PM CDT   (Arrive by 2:05 PM)   Return Visit with [facility] Fernandez MD   [facility] ([facility] [place] )    [address]  [place] [id number] [phone]                   Date of Last Asthma Action Plan Letter:   Asthma Action Plan Q1 Year    Topic  Date Due     Asthma Action Plan - yearly  06/27/2018      Asthma Control Test:   ACT Total Scores 6/27/2017   ACT TOTAL SCORE -   ASTHMA ER VISITS -   ASTHMA HOSPITALIZATIONS -   ACT TOTAL SCORE (Goal Greater than or Equal to 20) 25   In the past 12 months, how many times did you visit the emergency room for your asthma without being admitted to the hospital? 0   In the past 12 months, how many times were you hospitalized overnight because of your asthma? 0       Date of Last Spirometry Test:   No results found for this or any previous visit.              Date of Last Spirometry Test:   No results found for this or any previous visit.

## 2017-10-10 ENCOUNTER — OFFICE VISIT (OUTPATIENT)
Dept: CHIROPRACTIC MEDICINE | Facility: OTHER | Age: 57
End: 2017-10-10
Attending: CHIROPRACTOR
Payer: COMMERCIAL

## 2017-10-10 DIAGNOSIS — M99.03 SEGMENTAL AND SOMATIC DYSFUNCTION OF LUMBAR REGION: Primary | ICD-10-CM

## 2017-10-10 DIAGNOSIS — M99.01 SEGMENTAL AND SOMATIC DYSFUNCTION OF CERVICAL REGION: ICD-10-CM

## 2017-10-10 DIAGNOSIS — M50.30 DDD (DEGENERATIVE DISC DISEASE), CERVICAL: ICD-10-CM

## 2017-10-10 DIAGNOSIS — M54.50 ACUTE BILATERAL LOW BACK PAIN WITHOUT SCIATICA: ICD-10-CM

## 2017-10-10 DIAGNOSIS — M99.02 SEGMENTAL AND SOMATIC DYSFUNCTION OF THORACIC REGION: ICD-10-CM

## 2017-10-10 PROCEDURE — 98941 CHIROPRACT MANJ 3-4 REGIONS: CPT | Mod: AT | Performed by: CHIROPRACTOR

## 2017-10-10 NOTE — MR AVS SNAPSHOT
After Visit Summary   10/10/2017    Joshua Barron    MRN: [id number]           Patient Information     Date Of Birth          1960        Visit Information        Provider Department      10/10/2017 12:30 PM [facility]amar [facility]illa [facility]  [facility]        Today's Diagnoses     Segmental and somatic dysfunction of lumbar region    -  1    Acute bilateral low back pain without sciatica        Segmental and somatic dysfunction of thoracic region        Segmental and somatic dysfunction of cervical region           Follow-ups after your visit        Your next 10 appointments already scheduled     Oct 31, 2017  2:20 PM CDT   (Arrive by 2:05 PM)   Return Visit with Laquita Fernandez MD   [facility] [place] ([facility] [place] )    [address] [place] [address]  [place] [address] [phone]            Mar 07, 2018  2:45 PM CST   (Arrive by 2:30 PM)   Hearing Eval with Kevin Moreno   [facility] ([facility] [place] [facility]    [address] [place] [address]   [phone]              Who to contact     If you have questions or need follow up information about today's clinic visit or your schedule please contact  [facility] directly at [phone].  Normal or non-critical lab and imaging results will be communicated to you by MyChart, letter or phone within 4 business days after the clinic has received the results. If you do not hear from us within 7 days, please contact the clinic through MyChart or phone. If you have a critical or abnormal lab result, we will notify you by phone as soon as possible.  Submit refill requests through [employer] or call your pharmacy and they will forward the refill request to us. Please allow 3 business days for your refill to be completed.          Additional Information About Your Visit        [employer] Information     [employer] lets you send messages to your doctor, view your test results, renew your  "prescriptions, schedule appointments and more. To sign up, go to [email or web address] . Click on \"Log in\" on the left side of the screen, which will take you to the Welcome page. Then click on \"Sign up Now\" on the right side of the page.     You will be asked to enter the access code listed below, as well as some personal information. Please follow the directions to create your username and password.     Your access code is: [email or web address]  Expires: 2017  2:03 PM     Your access code will  in 90 days. If you need help or a new code, please call your [facility] clinic or [phone].        Care EveryWhere ID     This is your Care EveryWhere ID. This could be used by other organizations to access your [facility] medical records  [id number]         Blood Pressure from Last 3 Encounters:   17 162/90   17 124/90   17 140/70    Weight from Last 3 Encounters:   17 191 lb (86.6 kg)   17 185 lb (83.9 kg)   17 185 lb (83.9 kg)              We Performed the Following     CHIROPRAC MANIP,SPINAL,3-4 REGIONS        Primary Care Provider Office Phone # Fax #    Lylegrecia Martinez DO Natalia [phone]       [facility] [place] [address]  [facility] [place] [address]        Equal Access to Services     [facility] : Hadii aad ku hadasho Soomaali, waaxda luqadaha, qaybta kaalmada adeegyada, jaki millan . So [facility] [phone].    ATENCIÓN: Si habla español, tiene a hastings disposición servicios gratuitos de asistencia lingüística. Llame al [phone].    We comply with applicable federal civil rights laws and [place] laws. We do not discriminate on the basis of race, color, national origin, age, disability, sex, sexual orientation, or gender identity.            Thank you!     Thank you for choosing  [facility]  for your care. Our goal is always to provide you with excellent care. Hearing back from our patients is one way we can " continue to improve our services. Please take a few minutes to complete the written survey that you may receive in the mail after your visit with us. Thank you!             Your Updated Medication List - Protect others around you: Learn how to safely use, store and throw away your medicines at www.disposemymeds.org.          This list is accurate as of: 10/10/17 11:59 PM.  Always use your most recent med list.                   Brand Name Dispense Instructions for use Diagnosis    * albuterol 108 (90 BASE) MCG/ACT Inhaler    PROAIR HFA/PROVENTIL HFA/VENTOLIN HFA     Inhale 2 puffs into the lungs every 6 hours as needed for shortness of breath / dyspnea or wheezing        * VENTOLIN  (90 BASE) MCG/ACT Inhaler   Generic drug:  albuterol     18 g    USE 2 PUFFS 4 TIMES A DAY AS NEEDED FOR SHORTNESS OF BREATH    Moderate persistent asthma without complication       atorvastatin 20 MG tablet    LIPITOR    90 tablet    Take 1 tablet (20 mg) by mouth daily    Mixed hyperlipidemia       * baclofen 20 MG tablet    LIORESAL    90 tablet    TAKE 1 TABLET BY MOUTH 3 TIMES DAILY    Back muscle spasm       * baclofen 10 MG tablet    LIORESAL    90 tablet    TAKE 1 TABLET BY MOUTH THREE TIMES DAILY    Low back pain, unspecified back pain laterality, unspecified chronicity, with sciatica presence unspecified       diazepam 5 MG tablet    VALIUM    60 tablet    TAKE 1 TABLET BY MOUTH EVERY 12 HOURS AS NEEDED FOR ANXIETY OR SLEEP SPASM    DAYANA (generalized anxiety disorder)       diclofenac 50 MG EC tablet    VOLTAREN    90 tablet    TAKE 1 TABLET BY MOUTH 3 TIMES DAILY    Lumbago       docusate sodium 100 MG capsule    COLACE    180 capsule    Take 1 capsule (100 mg) by mouth 2 times daily    Drug-induced constipation       DULERA 100-5 MCG/ACT oral inhaler   Generic drug:  mometasone-formoterol     13 g    INHALE 2 PUFFS INTO THE LUNGS 2 TIMES A DAY    Moderate persistent asthma without complication       fentaNYL 75 mcg/hr  72 hr patch    DURAGESIC    11 patch    APPLY 1 PATCH EVERY 48 HOURS    DDD (degenerative disc disease), cervical       fluticasone 50 MCG/ACT spray    FLONASE    16 g    USE 2 SPRAYS IN EACH NOSTRIL DAILY    Chronic rhinitis, unspecified type       gabapentin 300 MG capsule    NEURONTIN    270 capsule    TAKE 3 CAPSULES BY MOUTH THREE TIMES DAILY    Polyneuropathy associated with underlying disease (H), Low back pain, unspecified back pain laterality, unspecified chronicity, with sciatica presence unspecified       HYDROcodone-acetaminophen  MG per tablet    NORCO    120 tablet    TAKE 1 TABLET BY MOUTH EVERY 6 HOURS AS NEEDED FOR MODERATE TO SEVEREPAIN    Low back pain, unspecified back pain laterality, unspecified chronicity, with sciatica presence unspecified       ibuprofen 600 MG tablet    ADVIL/MOTRIN    120 tablet    TAKE 1 TABLET BY MOUTH EVERY 6 HOURS AS NEEDED    Hx of degenerative disc disease       lidocaine 5 % Patch    LIDODERM    90 patch    PLACE 3 PATCHES ONTO THE SKIN DAILY AS NEEDED FOR MODERATE PAIN    Midline low back pain without sciatica       losartan 50 MG tablet    COZAAR    90 tablet    Take 1 tablet (50 mg) by mouth daily    Benign essential hypertension       MAPAP 325 MG tablet   Generic drug:  acetaminophen     200 tablet    TAKE 2 TABLETS BY MOUTH EVERY 4 HOURS AS NEEDED FOR MILD PAIN    Midline low back pain without sciatica       multivitamin  with iron Tabs     90 tablet    TAKE 1 TABLET BY MOUTH DAILY    Health care maintenance       nicotine polacrilex 2 MG gum    NICORETTE    110 each    CHEW 1 PIECE AS NEEDED FOR SMOKING CESSATION    Tobacco abuse       omeprazole 20 MG CR capsule    priLOSEC    30 capsule    TAKE 1 CAPSULE BY MOUTH EVERY DAY BEFORE A MEAL    Gastroesophageal reflux disease, esophagitis presence not specified       * order for DME     2 Box    Equipment being ordered: Large gloves    Need for assistance with personal care       * order for DME     1  Units    Equipment being ordered: Boa Back brace    DDD (degenerative disc disease), lumbar, Chronic lower back pain       * order for DME     1 Device    1 boa back brace    Chronic low back pain with sciatica, sciatica laterality unspecified, unspecified back pain laterality       OXcarbazepine 600 MG tablet    TRILEPTAL    60 tablet    Take 1 tablet (600 mg) by mouth 2 times daily    Bipolar affective disorder, current episode hypomanic (H)       propranolol 20 MG tablet    INDERAL    60 tablet    TAKE 1 TABLET BY MOUTH TWICE DAILY    DAYANA (generalized anxiety disorder)       SM CHILDRENS ASPIRIN 81 MG chewable tablet   Generic drug:  aspirin     30 tablet    CHEW AND SWALLOW 1 TABLET BY MOUTH DAILY    Health care maintenance        STOOL SOFTENER/LAXATIVE 8.6-50 MG per tablet   Generic drug:  senna-docusate     120 tablet    TAKE 1 OR 2 TABLETS BY MOUTH 2 TIMES A DAY    Constipation, unspecified constipation type       traZODone 50 MG tablet    DESYREL    30 tablet    TAKE 1 TABLET BY MOUTH NIGHTLY AS NEEDED FOR SLEEP    Chronic pain       * VYVANSE 60 MG capsule   Generic drug:  lisdexamfetamine     30 capsule    TAKE 1 CAPSULE BY MOUTH EVERY MORNING    ADHD (attention deficit hyperactivity disorder), combined type       * lisdexamfetamine 60 MG capsule    VYVANSE    30 capsule    Take 1 capsule (60 mg) by mouth every morning    ADHD (attention deficit hyperactivity disorder), combined type       * lisdexamfetamine 60 MG capsule   Start taking on:  10/25/2017    VYVANSE    30 capsule    Take 1 capsule (60 mg) by mouth every morning    ADHD (attention deficit hyperactivity disorder), combined type       * Notice:  This list has 10 medication(s) that are the same as other medications prescribed for you. Read the directions carefully, and ask your doctor or other care provider to review them with you.

## 2017-10-11 ENCOUNTER — OFFICE VISIT (OUTPATIENT)
Dept: CHIROPRACTIC MEDICINE | Facility: OTHER | Age: 57
End: 2017-10-11
Attending: CHIROPRACTOR
Payer: COMMERCIAL

## 2017-10-11 DIAGNOSIS — M99.01 SEGMENTAL AND SOMATIC DYSFUNCTION OF CERVICAL REGION: ICD-10-CM

## 2017-10-11 DIAGNOSIS — M99.02 SEGMENTAL AND SOMATIC DYSFUNCTION OF THORACIC REGION: ICD-10-CM

## 2017-10-11 DIAGNOSIS — M99.03 SEGMENTAL AND SOMATIC DYSFUNCTION OF LUMBAR REGION: Primary | ICD-10-CM

## 2017-10-11 DIAGNOSIS — M54.50 ACUTE BILATERAL LOW BACK PAIN WITHOUT SCIATICA: ICD-10-CM

## 2017-10-11 PROCEDURE — 98941 CHIROPRACT MANJ 3-4 REGIONS: CPT | Mod: AT | Performed by: CHIROPRACTOR

## 2017-10-11 RX ORDER — FENTANYL 75 UG/1
PATCH TRANSDERMAL
Qty: 11 PATCH | Refills: 0 | OUTPATIENT
Start: 2017-10-11

## 2017-10-11 NOTE — PROGRESS NOTES

## 2017-10-11 NOTE — MR AVS SNAPSHOT
After Visit Summary   10/11/2017    Joshua Barron    MRN: [id number]           Patient Information     Date Of Birth          1960        Visit Information        Provider Department      10/11/2017 4:20 PM Shamar Esc[facility]illa DC  [facility]        Today's Diagnoses     Segmental and somatic dysfunction of lumbar region    -  1    Acute bilateral low back pain without sciatica        Segmental and somatic dysfunction of thoracic region        Segmental and somatic dysfunction of cervical region           Follow-ups after your visit        Your next 10 appointments already scheduled     Oct 31, 2017  2:20 PM CDT   (Arrive by 2:05 PM)   Return Visit with Laquita Fernandez MD   [facility] [place] ([facility] [place] )    [address] [place] [address]  [place] [address] [phone]            Mar 07, 2018  2:45 PM CST   (Arrive by 2:30 PM)   Hearing Eval with Kevin Moreno   [facility] ([facility] [place] )    [address] [place] [address]   [phone]              Who to contact     If you have questions or need follow up information about today's clinic visit or your schedule please contact  [facility] directly at [phone].  Normal or non-critical lab and imaging results will be communicated to you by MyChart, letter or phone within 4 business days after the clinic has received the results. If you do not hear from us within 7 days, please contact the clinic through MyChart or phone. If you have a critical or abnormal lab result, we will notify you by phone as soon as possible.  Submit refill requests through [employer] or call your pharmacy and they will forward the refill request to us. Please allow 3 business days for your refill to be completed.          Additional Information About Your Visit        [employer] Information     [employer] lets you send messages to your doctor, view your test results, renew your  "prescriptions, schedule appointments and more. To sign up, go to [email or web address] . Click on \"Log in\" on the left side of the screen, which will take you to the Welcome page. Then click on \"Sign up Now\" on the right side of the page.     You will be asked to enter the access code listed below, as well as some personal information. Please follow the directions to create your username and password.     Your access code is: [email or web address]  Expires: 2017  2:03 PM     Your access code will  in 90 days. If you need help or a new code, please call your [facility] clinic or [phone].        Care EveryWhere ID     This is your Care EveryWhere ID. This could be used by other organizations to access your [facility] medical records  [id number]         Blood Pressure from Last 3 Encounters:   17 162/90   17 124/90   17 140/70    Weight from Last 3 Encounters:   17 191 lb (86.6 kg)   17 185 lb (83.9 kg)   17 185 lb (83.9 kg)              We Performed the Following     CHIROPRAC MANIP,SPINAL,3-4 REGIONS        Primary Care Provider Office Phone # Fax #    Lylegrecia Martinez DO Natalia [phone]       [facility] [place] [address]  [place] [address]        Equal Access to Services     [facility] : Hadii aad ku hadasho Soomaali, waaxda luqadaha, qaybta kaalmada adeegyada, jaki millan . So [facility] [phone].    ATENCIÓN: Si habla español, tiene a hastings disposición servicios gratuitos de asistencia lingüística. Llame al [phone].    We comply with applicable federal civil rights laws and [place] laws. We do not discriminate on the basis of race, color, national origin, age, disability, sex, sexual orientation, or gender identity.            Thank you!     Thank you for choosing  [facility]  for your care. Our goal is always to provide you with excellent care. Hearing back from our patients is one way we can " continue to improve our services. Please take a few minutes to complete the written survey that you may receive in the mail after your visit with us. Thank you!             Your Updated Medication List - Protect others around you: Learn how to safely use, store and throw away your medicines at www.disposemymeds.org.          This list is accurate as of: 10/11/17 11:59 PM.  Always use your most recent med list.                   Brand Name Dispense Instructions for use Diagnosis    * albuterol 108 (90 BASE) MCG/ACT Inhaler    PROAIR HFA/PROVENTIL HFA/VENTOLIN HFA     Inhale 2 puffs into the lungs every 6 hours as needed for shortness of breath / dyspnea or wheezing        * VENTOLIN  (90 BASE) MCG/ACT Inhaler   Generic drug:  albuterol     18 g    USE 2 PUFFS 4 TIMES A DAY AS NEEDED FOR SHORTNESS OF BREATH    Moderate persistent asthma without complication       atorvastatin 20 MG tablet    LIPITOR    90 tablet    Take 1 tablet (20 mg) by mouth daily    Mixed hyperlipidemia       * baclofen 20 MG tablet    LIORESAL    90 tablet    TAKE 1 TABLET BY MOUTH 3 TIMES DAILY    Back muscle spasm       * baclofen 10 MG tablet    LIORESAL    90 tablet    TAKE 1 TABLET BY MOUTH THREE TIMES DAILY    Low back pain, unspecified back pain laterality, unspecified chronicity, with sciatica presence unspecified       diazepam 5 MG tablet    VALIUM    60 tablet    TAKE 1 TABLET BY MOUTH EVERY 12 HOURS AS NEEDED FOR ANXIETY OR SLEEP SPASM    DAYANA (generalized anxiety disorder)       diclofenac 50 MG EC tablet    VOLTAREN    90 tablet    TAKE 1 TABLET BY MOUTH 3 TIMES DAILY    Lumbago       docusate sodium 100 MG capsule    COLACE    180 capsule    Take 1 capsule (100 mg) by mouth 2 times daily    Drug-induced constipation       DULERA 100-5 MCG/ACT oral inhaler   Generic drug:  mometasone-formoterol     13 g    INHALE 2 PUFFS INTO THE LUNGS 2 TIMES A DAY    Moderate persistent asthma without complication       fentaNYL 75 mcg/hr  72 hr patch    DURAGESIC    15 patch    APPLY 1 PATCH EVERY 48 HOURS    DDD (degenerative disc disease), cervical       fluticasone 50 MCG/ACT spray    FLONASE    16 g    USE 2 SPRAYS IN EACH NOSTRIL DAILY    Chronic rhinitis, unspecified type       gabapentin 300 MG capsule    NEURONTIN    270 capsule    TAKE 3 CAPSULES BY MOUTH THREE TIMES DAILY    Polyneuropathy associated with underlying disease (H), Low back pain, unspecified back pain laterality, unspecified chronicity, with sciatica presence unspecified       HYDROcodone-acetaminophen  MG per tablet    NORCO    120 tablet    TAKE 1 TABLET BY MOUTH EVERY 6 HOURS AS NEEDED FOR MODERATE TO SEVEREPAIN    Low back pain, unspecified back pain laterality, unspecified chronicity, with sciatica presence unspecified       ibuprofen 600 MG tablet    ADVIL/MOTRIN    120 tablet    TAKE 1 TABLET BY MOUTH EVERY 6 HOURS AS NEEDED    Hx of degenerative disc disease       lidocaine 5 % Patch    LIDODERM    90 patch    PLACE 3 PATCHES ONTO THE SKIN DAILY AS NEEDED FOR MODERATE PAIN    Midline low back pain without sciatica       losartan 50 MG tablet    COZAAR    90 tablet    Take 1 tablet (50 mg) by mouth daily    Benign essential hypertension       MAPAP 325 MG tablet   Generic drug:  acetaminophen     200 tablet    TAKE 2 TABLETS BY MOUTH EVERY 4 HOURS AS NEEDED FOR MILD PAIN    Midline low back pain without sciatica       multivitamin  with iron Tabs     90 tablet    TAKE 1 TABLET BY MOUTH DAILY    Health care maintenance       nicotine polacrilex 2 MG gum    NICORETTE    110 each    CHEW 1 PIECE AS NEEDED FOR SMOKING CESSATION    Tobacco abuse       omeprazole 20 MG CR capsule    priLOSEC    30 capsule    TAKE 1 CAPSULE BY MOUTH EVERY DAY BEFORE A MEAL    Gastroesophageal reflux disease, esophagitis presence not specified       * order for DME     2 Box    Equipment being ordered: Large gloves    Need for assistance with personal care       * order for DME     1  Units    Equipment being ordered: Boa Back brace    DDD (degenerative disc disease), lumbar, Chronic lower back pain       * order for DME     1 Device    1 boa back brace    Chronic low back pain with sciatica, sciatica laterality unspecified, unspecified back pain laterality       OXcarbazepine 600 MG tablet    TRILEPTAL    60 tablet    TAKE 1 TABLET BY MOUTH 2 TIMES DAILY    Bipolar affective disorder, current episode hypomanic (H)       propranolol 20 MG tablet    INDERAL    60 tablet    TAKE 1 TABLET BY MOUTH TWICE DAILY    DAYANA (generalized anxiety disorder)       SM CHILDRENS ASPIRIN 81 MG chewable tablet   Generic drug:  aspirin     30 tablet    CHEW AND SWALLOW 1 TABLET BY MOUTH DAILY    Health care maintenance       SM STOOL SOFTENER/LAXATIVE 8.6-50 MG per tablet   Generic drug:  senna-docusate     120 tablet    TAKE 1 OR 2 TABLETS BY MOUTH 2 TIMES A DAY    Constipation, unspecified constipation type       traZODone 50 MG tablet    DESYREL    30 tablet    TAKE 1 TABLET BY MOUTH NIGHTLY AS NEEDED FOR SLEEP    Chronic pain       * VYVANSE 60 MG capsule   Generic drug:  lisdexamfetamine     30 capsule    TAKE 1 CAPSULE BY MOUTH EVERY MORNING    ADHD (attention deficit hyperactivity disorder), combined type       * lisdexamfetamine 60 MG capsule    VYVANSE    30 capsule    Take 1 capsule (60 mg) by mouth every morning    ADHD (attention deficit hyperactivity disorder), combined type       * lisdexamfetamine 60 MG capsule   Start taking on:  10/25/2017    VYVANSE    30 capsule    Take 1 capsule (60 mg) by mouth every morning    ADHD (attention deficit hyperactivity disorder), combined type       * Notice:  This list has 10 medication(s) that are the same as other medications prescribed for you. Read the directions carefully, and ask your doctor or other care provider to review them with you.

## 2017-10-11 NOTE — TELEPHONE ENCOUNTER
Denied this one because already signed by Dr. Fisher this morning.  's called to let us know that they can only fill 11 patches at a time and not the 15.  They want us to know this so won't be denied next time they request.

## 2017-10-12 RX ORDER — MOMETASONE FUROATE AND FORMOTEROL FUMARATE DIHYDRATE 100; 5 UG/1; UG/1
AEROSOL RESPIRATORY (INHALATION)
Qty: 13 G | Refills: 2 | Status: SHIPPED | OUTPATIENT
Start: 2017-10-12 | End: 2018-01-05

## 2017-10-12 RX ORDER — ALBUTEROL SULFATE 90 UG/1
AEROSOL, METERED RESPIRATORY (INHALATION)
Qty: 18 G | Refills: 2 | Status: SHIPPED | OUTPATIENT
Start: 2017-10-12 | End: 2018-01-05

## 2017-10-12 NOTE — PROGRESS NOTES

## 2017-10-16 DIAGNOSIS — Z87.39 HX OF DEGENERATIVE DISC DISEASE: ICD-10-CM

## 2017-10-16 NOTE — TELEPHONE ENCOUNTER
Ibuprofen      Last Written Prescription Date: 8/28/17  Last Quantity: 120, # refills: 0  Last Office Visit with [facility], [facility] or  Health prescribing provider: 7/7/17  Next 5 appointments (look out 90 days)     Oct 31, 2017  2:20 PM CDT   (Arrive by 2:05 PM)   Return Visit with Laquita Fernandez MD   [facility] [place] ([facility] [place] )    [address] [place] [address]  [place] [address] [phone]                   Creatinine   Date Value Ref Range Status   06/27/2017 0.72 0.66 - 1.25 mg/dL Final     Lab Results   Component Value Date    AST 12 06/27/2017     Lab Results   Component Value Date    ALT 28 06/27/2017     BP Readings from Last 3 Encounters:   09/27/17 162/90   07/31/17 124/90   07/26/17 140/70

## 2017-10-18 RX ORDER — IBUPROFEN 600 MG/1
TABLET, FILM COATED ORAL
Qty: 120 TABLET | Refills: 0 | Status: SHIPPED | OUTPATIENT
Start: 2017-10-18 | End: 2017-11-20

## 2017-10-20 ENCOUNTER — HOSPITAL ENCOUNTER (EMERGENCY)
Facility: HOSPITAL | Age: 57
Discharge: HOME | End: 2017-10-20
Attending: NURSE PRACTITIONER | Admitting: NURSE PRACTITIONER
Payer: COMMERCIAL

## 2017-10-20 ENCOUNTER — TELEPHONE (OUTPATIENT)
Dept: INTERNAL MEDICINE | Facility: OTHER | Age: 57
End: 2017-10-20

## 2017-10-20 ENCOUNTER — APPOINTMENT (OUTPATIENT)
Dept: CT IMAGING | Facility: HOSPITAL | Age: 57
End: 2017-10-20
Attending: NURSE PRACTITIONER
Payer: COMMERCIAL

## 2017-10-20 VITALS
OXYGEN SATURATION: 98 % | DIASTOLIC BLOOD PRESSURE: 93 MMHG | RESPIRATION RATE: 16 BRPM | TEMPERATURE: 95.9 F | SYSTOLIC BLOOD PRESSURE: 160 MMHG

## 2017-10-20 DIAGNOSIS — R10.9 FLANK PAIN: ICD-10-CM

## 2017-10-20 LAB
ALBUMIN SERPL-MCNC: 4.4 G/DL (ref 3.4–5)
ALP SERPL-CCNC: 120 U/L (ref 40–150)
ALT SERPL W P-5'-P-CCNC: 30 U/L (ref 0–70)
ANION GAP SERPL CALCULATED.3IONS-SCNC: 7 MMOL/L (ref 3–14)
AST SERPL W P-5'-P-CCNC: 18 U/L (ref 0–45)
BASOPHILS # BLD AUTO: 0 10E9/L (ref 0–0.2)
BASOPHILS NFR BLD AUTO: 0.5 %
BILIRUB SERPL-MCNC: 0.4 MG/DL (ref 0.2–1.3)
BUN SERPL-MCNC: 17 MG/DL (ref 7–30)
CALCIUM SERPL-MCNC: 9 MG/DL (ref 8.5–10.1)
CHLORIDE SERPL-SCNC: 107 MMOL/L (ref 94–109)
CO2 SERPL-SCNC: 28 MMOL/L (ref 20–32)
CREAT SERPL-MCNC: 0.69 MG/DL (ref 0.66–1.25)
CRP SERPL-MCNC: <2.9 MG/L (ref 0–8)
DIFFERENTIAL METHOD BLD: ABNORMAL
EOSINOPHIL # BLD AUTO: 0.1 10E9/L (ref 0–0.7)
EOSINOPHIL NFR BLD AUTO: 2.5 %
ERYTHROCYTE [DISTWIDTH] IN BLOOD BY AUTOMATED COUNT: 12.4 % (ref 10–15)
GFR SERPL CREATININE-BSD FRML MDRD: >90 ML/MIN/1.7M2
GLUCOSE SERPL-MCNC: 116 MG/DL (ref 70–99)
HCT VFR BLD AUTO: 36.5 % (ref 40–53)
HGB BLD-MCNC: 12.9 G/DL (ref 13.3–17.7)
IMM GRANULOCYTES # BLD: 0 10E9/L (ref 0–0.4)
IMM GRANULOCYTES NFR BLD: 0.2 %
LYMPHOCYTES # BLD AUTO: 1 10E9/L (ref 0.8–5.3)
LYMPHOCYTES NFR BLD AUTO: 24.6 %
MCH RBC QN AUTO: 32.3 PG (ref 26.5–33)
MCHC RBC AUTO-ENTMCNC: 35.3 G/DL (ref 31.5–36.5)
MCV RBC AUTO: 91 FL (ref 78–100)
MONOCYTES # BLD AUTO: 0.3 10E9/L (ref 0–1.3)
MONOCYTES NFR BLD AUTO: 7.4 %
NEUTROPHILS # BLD AUTO: 2.6 10E9/L (ref 1.6–8.3)
NEUTROPHILS NFR BLD AUTO: 64.8 %
NRBC # BLD AUTO: 0 10*3/UL
NRBC BLD AUTO-RTO: 0 /100
PLATELET # BLD AUTO: 151 10E9/L (ref 150–450)
POTASSIUM SERPL-SCNC: 4.2 MMOL/L (ref 3.4–5.3)
PROT SERPL-MCNC: 7.4 G/DL (ref 6.8–8.8)
RBC # BLD AUTO: 4 10E12/L (ref 4.4–5.9)
SODIUM SERPL-SCNC: 142 MMOL/L (ref 133–144)
WBC # BLD AUTO: 4 10E9/L (ref 4–11)

## 2017-10-20 PROCEDURE — 99214 OFFICE O/P EST MOD 30 MIN: CPT | Performed by: NURSE PRACTITIONER

## 2017-10-20 PROCEDURE — 36415 COLL VENOUS BLD VENIPUNCTURE: CPT | Performed by: NURSE PRACTITIONER

## 2017-10-20 PROCEDURE — 80053 COMPREHEN METABOLIC PANEL: CPT | Performed by: NURSE PRACTITIONER

## 2017-10-20 PROCEDURE — 86140 C-REACTIVE PROTEIN: CPT | Performed by: NURSE PRACTITIONER

## 2017-10-20 PROCEDURE — 99214 OFFICE O/P EST MOD 30 MIN: CPT | Mod: 25

## 2017-10-20 PROCEDURE — 85025 COMPLETE CBC W/AUTO DIFF WBC: CPT | Performed by: NURSE PRACTITIONER

## 2017-10-20 PROCEDURE — 74176 CT ABD & PELVIS W/O CONTRAST: CPT | Mod: TC

## 2017-10-20 NOTE — ED AVS SNAPSHOT
[facility]    [address]    [place] [address]    Phone:  [phone]                                       Joshua Barron   MRN: [id number]    Department:  [facility]   Date of Visit:  10/20/2017           Patient Information     Date Of Birth          1960        Your diagnoses for this visit were:     Flank pain Rule of kidney stone.       You were seen by Sue Krishnamurthy NP.      Follow-up Information     Follow up with Lyle Fisher DO In 3 days.    Specialties:  Internal Medicine, Pediatrics    Contact information:    [facility] [place]  [address]  [place] [address] [phone]          Follow up with [facility].    Specialty:  EMERGENCY MEDICINE    Why:  As needed, If symptoms worsen    Contact information:    [address]  [place] [address] [phone]    Additional information:    From [place]: Take [address]. Turn left at [address]. Turn left at the first stoplight on [address]. At the first stop sign, take a right onto [address]. Take a left into the parking lot and continue through until you reach the North enterance of the building.       From [place]: Take [address]. Take the [address] ramp towards [place]. Turn left onto [address]. Take a slight right onto [address]. Turn left at the first stoplight on [address]. At the first stop sign, take a right onto [address]. Take a left into the parking lot and continue through until you reach the North enterance of the building.       From [place]: Take [address]. Take a right at [address]. At the first stop sign, take a right onto [address]. Take a left into the parking lot and continue through until you reach the North enterance of the building.         Discharge Instructions       Bring urine sample in when you are able to go.   Continue pain  medication regime per Dr. Fisher.   Increase water intake.   Follow up with Dr. Fisher in 3 days for re-evaluation.   Return to urgent care or emergency department with any increase in symptoms or concerns.     Discharge References/Attachments     FLANK PAIN, UNCERTAIN CAUSE (ENGLISH)      Future Appointments        Provider Department Dept Phone Center    10/31/2017 2:20 PM Laquita Fernandez MD [facility] [place] [phone] [place]    3/7/2018 2:45 PM Kevin Zavala [facility] [place] [phone] [place]         Review of your medicines      Our records show that you are taking the medicines listed below. If these are incorrect, please call your family doctor or clinic.        Dose / Directions Last dose taken    * albuterol 108 (90 BASE) MCG/ACT Inhaler   Commonly known as:  PROAIR HFA/PROVENTIL HFA/VENTOLIN HFA   Dose:  2 puff        Inhale 2 puffs into the lungs every 6 hours as needed for shortness of breath / dyspnea or wheezing   Refills:  0        * VENTOLIN  (90 BASE) MCG/ACT Inhaler   Quantity:  18 g   Generic drug:  albuterol        USE 2 PUFFS 4 TIMES A DAY AS NEEDED FOR SHORTNESS OF BREATH   Refills:  2        atorvastatin 20 MG tablet   Commonly known as:  LIPITOR   Dose:  20 mg   Quantity:  90 tablet        Take 1 tablet (20 mg) by mouth daily   Refills:  1        * baclofen 20 MG tablet   Commonly known as:  LIORESAL   Quantity:  90 tablet        TAKE 1 TABLET BY MOUTH 3 TIMES DAILY   Refills:  0        * baclofen 10 MG tablet   Commonly known as:  LIORESAL   Quantity:  90 tablet        TAKE 1 TABLET BY MOUTH THREE TIMES DAILY   Refills:  0        diazepam 5 MG tablet   Commonly known as:  VALIUM   Quantity:  60 tablet        TAKE 1 TABLET BY MOUTH EVERY 12 HOURS AS NEEDED FOR ANXIETY OR SLEEP SPASM   Refills:  5        diclofenac 50 MG EC tablet   Commonly known as:  VOLTAREN   Quantity:  90 tablet        TAKE 1 TABLET BY MOUTH 3 TIMES DAILY   Refills:  1         docusate sodium 100 MG capsule   Commonly known as:  COLACE   Dose:  100 mg   Quantity:  180 capsule        Take 1 capsule (100 mg) by mouth 2 times daily   Refills:  1        DULERA 100-5 MCG/ACT oral inhaler   Quantity:  13 g   Generic drug:  mometasone-formoterol        INHALE 2 PUFFS INTO THE LUNGS 2 TIMES A DAY   Refills:  2        fentaNYL 75 mcg/hr 72 hr patch   Commonly known as:  DURAGESIC   Quantity:  15 patch        APPLY 1 PATCH EVERY 48 HOURS   Refills:  0        fluticasone 50 MCG/ACT spray   Commonly known as:  FLONASE   Quantity:  16 g        USE 2 SPRAYS IN EACH NOSTRIL DAILY   Refills:  5        gabapentin 300 MG capsule   Commonly known as:  NEURONTIN   Quantity:  270 capsule        TAKE 3 CAPSULES BY MOUTH THREE TIMES DAILY   Refills:  5        HYDROcodone-acetaminophen  MG per tablet   Commonly known as:  NORCO   Quantity:  120 tablet        TAKE 1 TABLET BY MOUTH EVERY 6 HOURS AS NEEDED FOR MODERATE TO SEVEREPAIN   Refills:  0        ibuprofen 600 MG tablet   Commonly known as:  ADVIL/MOTRIN   Quantity:  120 tablet        TAKE 1 TABLET BY MOUTH EVERY 6 HOURS AS NEEDED   Refills:  0        lidocaine 5 % Patch   Commonly known as:  LIDODERM   Quantity:  90 patch        PLACE 3 PATCHES ONTO THE SKIN DAILY AS NEEDED FOR MODERATE PAIN   Refills:  0        losartan 50 MG tablet   Commonly known as:  COZAAR   Dose:  50 mg   Quantity:  90 tablet        Take 1 tablet (50 mg) by mouth daily   Refills:  1        MAPAP 325 MG tablet   Quantity:  200 tablet   Generic drug:  acetaminophen        TAKE 2 TABLETS BY MOUTH EVERY 4 HOURS AS NEEDED FOR MILD PAIN   Refills:  0        multivitamin  with iron Tabs   Quantity:  90 tablet        TAKE 1 TABLET BY MOUTH DAILY   Refills:  0        nicotine polacrilex 2 MG gum   Commonly known as:  NICORETTE   Quantity:  110 each        CHEW 1 PIECE AS NEEDED FOR SMOKING CESSATION   Refills:  3        omeprazole 20 MG CR capsule   Commonly known as:  priLOSEC    Quantity:  30 capsule        TAKE 1 CAPSULE BY MOUTH EVERY DAY BEFORE A MEAL   Refills:  10        * order for DME   Quantity:  2 Box        Equipment being ordered: Large gloves   Refills:  0        * order for DME   Quantity:  1 Units        Equipment being ordered: Boa Back brace   Refills:  0        * order for DME   Quantity:  1 Device        1 boa back brace   Refills:  0        OXcarbazepine 600 MG tablet   Commonly known as:  TRILEPTAL   Quantity:  60 tablet        TAKE 1 TABLET BY MOUTH 2 TIMES DAILY   Refills:  3        propranolol 20 MG tablet   Commonly known as:  INDERAL   Quantity:  60 tablet        TAKE 1 TABLET BY MOUTH TWICE DAILY   Refills:  8        SM CHILDRENS ASPIRIN 81 MG chewable tablet   Quantity:  30 tablet   Generic drug:  aspirin        CHEW AND SWALLOW 1 TABLET BY MOUTH DAILY   Refills:  5        SM STOOL SOFTENER/LAXATIVE 8.6-50 MG per tablet   Quantity:  120 tablet   Generic drug:  senna-docusate        TAKE 1 OR 2 TABLETS BY MOUTH 2 TIMES A DAY   Refills:  0        traZODone 50 MG tablet   Commonly known as:  DESYREL   Quantity:  30 tablet        TAKE 1 TABLET BY MOUTH NIGHTLY AS NEEDED FOR SLEEP   Refills:  5        * VYVANSE 60 MG capsule   Quantity:  30 capsule   Generic drug:  lisdexamfetamine        TAKE 1 CAPSULE BY MOUTH EVERY MORNING   Refills:  0        * lisdexamfetamine 60 MG capsule   Commonly known as:  VYVANSE   Dose:  60 mg   Quantity:  30 capsule        Take 1 capsule (60 mg) by mouth every morning   Refills:  0        * lisdexamfetamine 60 MG capsule   Commonly known as:  VYVANSE   Dose:  60 mg   Quantity:  30 capsule   Start taking on:  10/25/2017        Take 1 capsule (60 mg) by mouth every morning   Refills:  0        * Notice:  This list has 10 medication(s) that are the same as other medications prescribed for you. Read the directions carefully, and ask your doctor or other care provider to review them with you.            Procedures and tests performed during  "your visit     Abd/pelvis CT - no contrast - Stone Protocol    CBC with platelets differential    CRP inflammation    Comprehensive metabolic panel      Orders Needing Specimen Collection     Ordered          10/20/17 1349  UA reflex to Microscopic and Culture - STAT, Pri[facility]: STAT, Needs to be Collected     Scheduled Task Status   10/20/17 1350 Collect UA reflex to Microscopic and Culture Open   Order Class:  PCU Collect                  Pending Results     No orders found from 10/18/2017 to 10/21/2017.            Pending Culture Results     No orders found from 10/18/2017 to 10/21/2017.            Thank you for choosing [facility]       Thank you for choosing [facility] for your care. Our goal is always to provide you with excellent care. Hearing back from our patients is one way we can continue to improve our services. Please take a few minutes to complete the written survey that you may receive in the mail after you visit with us. Thank you!        [employer] Information     [employer] lets you send messages to your doctor, view your test results, renew your prescriptions, schedule appointments and more. To sign up, go to [email or web address] . Click on \"Log in\" on the left side of the screen, which will take you to the Welcome page. Then click on \"Sign up Now\" on the right side of the page.     You will be asked to enter the access code listed below, as well as some personal information. Please follow the directions to create your username and password.     Your access code is: [email or web address]  Expires: 2017  2:03 PM     Your access code will  in 90 days. If you need help or a new code, please call your [facility] clinic or [phone].        Care EveryWhere ID     This is your Care EveryWhere ID. This could be used by other organizations to access your [facility] medical records  [id number]        Equal Access to Services     SHAHBAZ DANIELS AH: dixon Coe, john paul lopez " jaki mcmillan ah. So [facility] [phone].    ATENCIÓN: Si habla español, tiene a hastings disposición servicios gratuitos de asistencia lingüística. Llame al [phone].    We comply with applicable federal civil rights laws and [place] laws. We do not discriminate on the basis of race, color, national origin, age, disability, sex, sexual orientation, or gender identity.            After Visit Summary       This is your record. Keep this with you and show to your community pharmacist(s) and doctor(s) at your next visit.

## 2017-10-20 NOTE — TELEPHONE ENCOUNTER
Talked with patient. States he is having a lot of pain in back and like no other pain hes experienced previously. Informed patient to have a scan done at ER/UC. Patient states he thinks it is a kidney stone. Left message yesterday informing us that he was going to ER/UC but did not go. He states understanding and states he will go to ER/UC.

## 2017-10-20 NOTE — DISCHARGE INSTRUCTIONS
Bring urine sample in when you are able to go.   Continue pain medication regime per Dr. Fisher.   Increase water intake.   Follow up with Dr. Fisher in 3 days for re-evaluation.   Return to urgent care or emergency department with any increase in symptoms or concerns.

## 2017-10-20 NOTE — ED AVS SNAPSHOT
[facility]    [address]    [place] [address]    Phone:  [phone]                                       Joshua Barron   MRN: [id number]    Department:  [facility]   Date of Visit:  10/20/2017           After Visit Summary Signature Page     I have received my discharge instructions, and my questions have been answered. I have discussed any challenges I see with this plan with the nurse or doctor.    ..........................................................................................................................................  Patient/Patient Representative Signature      ..........................................................................................................................................  Patient Representative Print Name and Relationship to Patient    ..................................................               ................................................  Date                                            Time    ..........................................................................................................................................  Reviewed by Signature/Title    ...................................................              ..............................................  Date                                                            Time

## 2017-10-20 NOTE — ED NOTES
Patient presents with  Possible kidney stone he states.  Lower back pain.  SX stream no blood, dark urine and small amounts when he urinates.

## 2017-10-20 NOTE — TELEPHONE ENCOUNTER
8:52 AM    Reason for Call: Phone Call    Description: Patient would like to talk to the nurse regarding a possible kidney stone he may have, if you could call him back at [phone]    Preferred method for responding to this message: Telephone Call     What is your phone number ?    [phone]    If we cannot reach you directly, may we leave a detailed response at the number you provided? Yes    Can this message wait until your PCP/provider returns, if available today? PCP is in    Lisandra Rodarte

## 2017-10-20 NOTE — PROGRESS NOTES
Viki Krishnamurthy, ALYSSA was having difficulty accessing [place]  for this pt.  She asked if I could print of his report, to aid in her pt care. To assist in pt care, I printed the [place]  report on this pt and gave to Sue.   Jesika Yang Certified  Physician Assistant  10/20/2017  2:16 PM  [facility]
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
## 2017-10-21 ASSESSMENT — ENCOUNTER SYMPTOMS
FEVER: 0
HEMATURIA: 0
WOUND: 0
NUMBNESS: 0
DYSURIA: 1
NAUSEA: 0
PSYCHIATRIC NEGATIVE: 1
VOMITING: 0
DIARRHEA: 0
TROUBLE SWALLOWING: 0
ACTIVITY CHANGE: 1
CHILLS: 0
BACK PAIN: 1
COUGH: 0
FLANK PAIN: 1
APPETITE CHANGE: 0

## 2017-10-22 ENCOUNTER — APPOINTMENT (OUTPATIENT)
Dept: LAB | Facility: HOSPITAL | Age: 57
End: 2017-10-22
Attending: NURSE PRACTITIONER
Payer: COMMERCIAL

## 2017-10-22 LAB
ALBUMIN UR-MCNC: 10 MG/DL
APPEARANCE UR: CLEAR
BACTERIA #/AREA URNS HPF: ABNORMAL /HPF
BILIRUB UR QL STRIP: NEGATIVE
COLOR UR AUTO: YELLOW
GLUCOSE UR STRIP-MCNC: NEGATIVE MG/DL
HGB UR QL STRIP: NEGATIVE
KETONES UR STRIP-MCNC: NEGATIVE MG/DL
LEUKOCYTE ESTERASE UR QL STRIP: NEGATIVE
MUCOUS THREADS #/AREA URNS LPF: PRESENT /LPF
NITRATE UR QL: NEGATIVE
PH UR STRIP: 6 PH (ref 4.7–8)
RBC #/AREA URNS AUTO: <1 /HPF (ref 0–2)
SOURCE: ABNORMAL
SP GR UR STRIP: 1.03 (ref 1–1.03)
SQUAMOUS #/AREA URNS AUTO: <1 /HPF (ref 0–1)
UROBILINOGEN UR STRIP-MCNC: NORMAL MG/DL (ref 0–2)
WBC #/AREA URNS AUTO: 2 /HPF (ref 0–2)

## 2017-10-22 PROCEDURE — 81001 URINALYSIS AUTO W/SCOPE: CPT | Performed by: NURSE PRACTITIONER

## 2017-10-25 ENCOUNTER — OFFICE VISIT (OUTPATIENT)
Dept: PEDIATRICS | Facility: OTHER | Age: 57
End: 2017-10-25
Attending: INTERNAL MEDICINE
Payer: COMMERCIAL

## 2017-10-25 VITALS
SYSTOLIC BLOOD PRESSURE: 130 MMHG | OXYGEN SATURATION: 96 % | TEMPERATURE: 97.8 F | HEART RATE: 77 BPM | BODY MASS INDEX: 27.06 KG/M2 | WEIGHT: 188.6 LBS | DIASTOLIC BLOOD PRESSURE: 80 MMHG

## 2017-10-25 DIAGNOSIS — M99.05 SOMATIC DYSFUNCTION OF PELVIS REGION: Primary | ICD-10-CM

## 2017-10-25 PROCEDURE — 98925 OSTEOPATH MANJ 1-2 REGIONS: CPT | Performed by: INTERNAL MEDICINE

## 2017-10-25 PROCEDURE — 99212 OFFICE O/P EST SF 10 MIN: CPT | Mod: 25

## 2017-10-25 PROCEDURE — 99212 OFFICE O/P EST SF 10 MIN: CPT | Mod: 25 | Performed by: INTERNAL MEDICINE

## 2017-10-25 RX ORDER — AMOXICILLIN 250 MG
1-4 CAPSULE ORAL 2 TIMES DAILY
COMMUNITY
Start: 2010-05-06 | End: 2018-02-15

## 2017-10-25 ASSESSMENT — PAIN SCALES - GENERAL: PAINLEVEL: SEVERE PAIN (6)

## 2017-10-25 ASSESSMENT — ANXIETY QUESTIONNAIRES
2. NOT BEING ABLE TO STOP OR CONTROL WORRYING: NOT AT ALL
3. WORRYING TOO MUCH ABOUT DIFFERENT THINGS: NOT AT ALL
4. TROUBLE RELAXING: SEVERAL DAYS
6. BECOMING EASILY ANNOYED OR IRRITABLE: SEVERAL DAYS
GAD7 TOTAL SCORE: 3
5. BEING SO RESTLESS THAT IT IS HARD TO SIT STILL: NOT AT ALL
7. FEELING AFRAID AS IF SOMETHING AWFUL MIGHT HAPPEN: SEVERAL DAYS
1. FEELING NERVOUS, ANXIOUS, OR ON EDGE: NOT AT ALL

## 2017-10-25 ASSESSMENT — ENCOUNTER SYMPTOMS
NAUSEA: 0
PALPITATIONS: 0
DIZZINESS: 0
COUGH: 0
BLOOD IN STOOL: 0
CONSTIPATION: 0
SHORTNESS OF BREATH: 0
HEMATURIA: 0
HEADACHES: 0
WHEEZING: 0
VOMITING: 0
HEARTBURN: 0
FEVER: 0
BACK PAIN: 1
DIARRHEA: 0
ABDOMINAL PAIN: 0
DYSURIA: 0
CHILLS: 0

## 2017-10-25 ASSESSMENT — PATIENT HEALTH QUESTIONNAIRE - PHQ9: SUM OF ALL RESPONSES TO PHQ QUESTIONS 1-9: 5

## 2017-10-25 NOTE — NURSING NOTE
"Chief Complaint   Patient presents with     ER F/U     flank pain       Initial /80 (BP Location: Right arm, Patient Position: Chair, Cuff Size: Adult Large)  Pulse 77  Temp 97.8  F (36.6  C) (Tympanic)  Wt 188 lb 9.6 oz (85.5 kg)  SpO2 96%  BMI 27.06 kg/m2 Estimated body mass index is 27.06 kg/(m^2) as calculated from the following:    Height as of 7/31/17: 5' 10\" (1.778 m).    Weight as of this encounter: 188 lb 9.6 oz (85.5 kg).  Medication Reconciliation: complete   Bonny Mckeon LPN    "

## 2017-10-25 NOTE — MR AVS SNAPSHOT
After Visit Summary   10/25/2017    Joshua Barron    MRN: [id number]           Patient Information     Date Of Birth          1960        Visit Information        Provider Department      10/25/2017 2:00 PM Lyle Fisher DO [facility]        Today's Diagnoses     Somatic dysfunction of pelvis region    -  1       Follow-ups after your visit        Follow-up notes from your care team     Return if symptoms worsen or fail to improve.      Your next 10 appointments already scheduled     Oct 26, 2017  2:20 PM CDT   Return Visit with Shamar Escamilla DC   [facility] [place] [facility] ([facility]    [address]  [place] [address]   [phone]            Oct 31, 2017  2:20 PM CDT   (Arrive by 2:05 PM)   Return Visit with Laquita Fernandez MD   [facility] [place] ([facility] [place] )    [address]  [place] [address]   [phone]            Jan 29, 2018  2:20 PM CST   (Arrive by 2:00 PM)   SHORT with Lyle Fisher DO   [facility] [place] ([facility] [place] )    [address]  [place] [address]   [phone]            Mar 07, 2018  2:45 PM CST   (Arrive by 2:30 PM)   Hearing Eval with Kevin Moreno   [facility] [place] ([facility] [place] )    [address]  [place] [address]   [phone]              Who to contact     If you have questions or need follow up information about today's clinic visit or your schedule please contact [facility] directly at [phone].  Normal or non-critical lab and imaging results will be communicated to you by MyChart, letter or phone within 4 business days after the clinic has received the results. If you do not hear from us within 7 days, please contact the clinic through MyChart or phone. If you have a critical or abnormal lab result, we will notify you by phone as soon as possible.  Submit refill requests through  "MyChart or call your pharmacy and they will forward the refill request to us. Please allow 3 business days for your refill to be completed.          Additional Information About Your Visit        MyChart Information     [employer] lets you send messages to your doctor, view your test results, renew your prescriptions, schedule appointments and more. To sign up, go to [email or web address] . Click on \"Log in\" on the left side of the screen, which will take you to the Welcome page. Then click on \"Sign up Now\" on the right side of the page.     You will be asked to enter the access code listed below, as well as some personal information. Please follow the directions to create your username and password.     Your access code is: [email or web address]  Expires: 2017  2:03 PM     Your access code will  in 90 days. If you need help or a new code, please call your [facility] clinic or [phone].        Care EveryWhere ID     This is your [facility] EveryWhere ID. This could be used by other organizations to access your [facility] medical records  [id number]        Your Vitals Were     Pulse Temperature Pulse Oximetry BMI (Body Mass Index)          77 97.8  F (36.6  C) (Tympanic) 96% 27.06 kg/m2         Blood Pressure from Last 3 Encounters:   10/25/17 130/80   10/20/17 160/93   17 162/90    Weight from Last 3 Encounters:   10/25/17 188 lb 9.6 oz (85.5 kg)   17 191 lb (86.6 kg)   17 185 lb (83.9 kg)              Today, you had the following     No orders found for display       Primary Care Provider Office Phone # Fax #    Lyle Juan Fisher [phone]       [facility] [place] [address]  [place] [address]        Equal Access to Services     SHAHBAZ DANIELS : Rosie england Soro, waflorianda luqadaha, qaybta kaalmada deyanira, jaki fermin. So [facility] [phone].    ATENCIÓN: Si habla jvañol, tiene a hastings disposición servicios gratuitos de " asistencia lingüística. Tip al [phone].    We comply with applicable federal civil rights laws and [place] laws. We do not discriminate on the basis of race, color, national origin, age, disability, sex, sexual orientation, or gender identity.            Thank you!     Thank you for choosing [facility]  for your care. Our goal is always to provide you with excellent care. Hearing back from our patients is one way we can continue to improve our services. Please take a few minutes to complete the written survey that you may receive in the mail after your visit with us. Thank you!             Your Updated Medication List - Protect others around you: Learn how to safely use, store and throw away your medicines at www.disposemymeds.org.          This list is accurate as of: 10/25/17  2:43 PM.  Always use your most recent med list.                   Brand Name Dispense Instructions for use Diagnosis    * albuterol 108 (90 BASE) MCG/ACT Inhaler    PROAIR HFA/PROVENTIL HFA/VENTOLIN HFA     Inhale 2 puffs into the lungs every 6 hours as needed for shortness of breath / dyspnea or wheezing        * VENTOLIN  (90 BASE) MCG/ACT Inhaler   Generic drug:  albuterol     18 g    USE 2 PUFFS 4 TIMES A DAY AS NEEDED FOR SHORTNESS OF BREATH    Moderate persistent asthma without complication       atorvastatin 20 MG tablet    LIPITOR    90 tablet    Take 1 tablet (20 mg) by mouth daily    Mixed hyperlipidemia       * baclofen 20 MG tablet    LIORESAL    90 tablet    TAKE 1 TABLET BY MOUTH 3 TIMES DAILY    Back muscle spasm       * baclofen 10 MG tablet    LIORESAL    90 tablet    TAKE 1 TABLET BY MOUTH THREE TIMES DAILY    Low back pain, unspecified back pain laterality, unspecified chronicity, with sciatica presence unspecified       diazepam 5 MG tablet    VALIUM    60 tablet    TAKE 1 TABLET BY MOUTH EVERY 12 HOURS AS NEEDED FOR ANXIETY OR SLEEP SPASM    DAYANA (generalized anxiety disorder)        diclofenac 50 MG EC tablet    VOLTAREN    90 tablet    TAKE 1 TABLET BY MOUTH 3 TIMES DAILY    Lumbago       docusate sodium 100 MG capsule    COLACE    180 capsule    Take 1 capsule (100 mg) by mouth 2 times daily    Drug-induced constipation       DULERA 100-5 MCG/ACT oral inhaler   Generic drug:  mometasone-formoterol     13 g    INHALE 2 PUFFS INTO THE LUNGS 2 TIMES A DAY    Moderate persistent asthma without complication       fentaNYL 75 mcg/hr 72 hr patch    DURAGESIC    15 patch    APPLY 1 PATCH EVERY 48 HOURS    DDD (degenerative disc disease), cervical       fluticasone 50 MCG/ACT spray    FLONASE    16 g    USE 2 SPRAYS IN EACH NOSTRIL DAILY    Chronic rhinitis, unspecified type       gabapentin 300 MG capsule    NEURONTIN    270 capsule    TAKE 3 CAPSULES BY MOUTH THREE TIMES DAILY    Polyneuropathy associated with underlying disease (H), Low back pain, unspecified back pain laterality, unspecified chronicity, with sciatica presence unspecified       HYDROcodone-acetaminophen  MG per tablet    NORCO    120 tablet    TAKE 1 TABLET BY MOUTH EVERY 6 HOURS AS NEEDED FOR MODERATE TO SEVEREPAIN    Low back pain, unspecified back pain laterality, unspecified chronicity, with sciatica presence unspecified       ibuprofen 600 MG tablet    ADVIL/MOTRIN    120 tablet    TAKE 1 TABLET BY MOUTH EVERY 6 HOURS AS NEEDED    Hx of degenerative disc disease       lidocaine 5 % Patch    LIDODERM    90 patch    PLACE 3 PATCHES ONTO THE SKIN DAILY AS NEEDED FOR MODERATE PAIN    Midline low back pain without sciatica       losartan 50 MG tablet    COZAAR    90 tablet    Take 1 tablet (50 mg) by mouth daily    Benign essential hypertension       MAPAP 325 MG tablet   Generic drug:  acetaminophen     200 tablet    TAKE 2 TABLETS BY MOUTH EVERY 4 HOURS AS NEEDED FOR MILD PAIN    Midline low back pain without sciatica       multivitamin  with iron Tabs     90 tablet    TAKE 1 TABLET BY MOUTH DAILY    Health care maintenance        nicotine polacrilex 2 MG gum    NICORETTE    110 each    CHEW 1 PIECE AS NEEDED FOR SMOKING CESSATION    Tobacco abuse       omeprazole 20 MG CR capsule    priLOSEC    30 capsule    TAKE 1 CAPSULE BY MOUTH EVERY DAY BEFORE A MEAL    Gastroesophageal reflux disease, esophagitis presence not specified       * order for DME     2 Box    Equipment being ordered: Large gloves    Need for assistance with personal care       * order for DME     1 Units    Equipment being ordered: Boa Back brace    DDD (degenerative disc disease), lumbar, Chronic lower back pain       * order for DME     1 Device    1 boa back brace    Chronic low back pain with sciatica, sciatica laterality unspecified, unspecified back pain laterality       OXcarbazepine 600 MG tablet    TRILEPTAL    60 tablet    TAKE 1 TABLET BY MOUTH 2 TIMES DAILY    Bipolar affective disorder, current episode hypomanic (H)       propranolol 20 MG tablet    INDERAL    60 tablet    TAKE 1 TABLET BY MOUTH TWICE DAILY    DAYANA (generalized anxiety disorder)       SM CHILDRENS ASPIRIN 81 MG chewable tablet   Generic drug:  aspirin     30 tablet    CHEW AND SWALLOW 1 TABLET BY MOUTH DAILY    Health care maintenance       * senna-docusate 8.6-50 MG per tablet    SENOKOT-S;PERICOLACE     Take 1-4 tablets by mouth 2 times daily        * SM STOOL SOFTENER/LAXATIVE 8.6-50 MG per tablet   Generic drug:  senna-docusate     120 tablet    TAKE 1 OR 2 TABLETS BY MOUTH 2 TIMES A DAY    Constipation, unspecified constipation type       traZODone 50 MG tablet    DESYREL    30 tablet    TAKE 1 TABLET BY MOUTH NIGHTLY AS NEEDED FOR SLEEP    Chronic pain       * VYVANSE 60 MG capsule   Generic drug:  lisdexamfetamine     30 capsule    TAKE 1 CAPSULE BY MOUTH EVERY MORNING    ADHD (attention deficit hyperactivity disorder), combined type       * lisdexamfetamine 60 MG capsule    VYVANSE    30 capsule    Take 1 capsule (60 mg) by mouth every morning    ADHD (attention deficit  hyperactivity disorder), combined type       * lisdexamfetamine 60 MG capsule    VYVANSE    30 capsule    Take 1 capsule (60 mg) by mouth every morning    ADHD (attention deficit hyperactivity disorder), combined type       * Notice:  This list has 12 medication(s) that are the same as other medications prescribed for you. Read the directions carefully, and ask your doctor or other care provider to review them with you.
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
## 2017-10-26 ENCOUNTER — OFFICE VISIT (OUTPATIENT)
Dept: CHIROPRACTIC MEDICINE | Facility: OTHER | Age: 57
End: 2017-10-26
Attending: CHIROPRACTOR
Payer: COMMERCIAL

## 2017-10-26 DIAGNOSIS — M54.42 ACUTE BILATERAL LOW BACK PAIN WITH BILATERAL SCIATICA: ICD-10-CM

## 2017-10-26 DIAGNOSIS — M99.03 SEGMENTAL AND SOMATIC DYSFUNCTION OF LUMBAR REGION: Primary | ICD-10-CM

## 2017-10-26 DIAGNOSIS — M54.41 ACUTE BILATERAL LOW BACK PAIN WITH BILATERAL SCIATICA: ICD-10-CM

## 2017-10-26 DIAGNOSIS — M99.02 SEGMENTAL AND SOMATIC DYSFUNCTION OF THORACIC REGION: ICD-10-CM

## 2017-10-26 DIAGNOSIS — M99.01 SEGMENTAL AND SOMATIC DYSFUNCTION OF CERVICAL REGION: ICD-10-CM

## 2017-10-26 PROCEDURE — 98941 CHIROPRACT MANJ 3-4 REGIONS: CPT | Mod: AT | Performed by: CHIROPRACTOR

## 2017-10-26 ASSESSMENT — ANXIETY QUESTIONNAIRES: GAD7 TOTAL SCORE: 3

## 2017-10-26 NOTE — MR AVS SNAPSHOT
After Visit Summary   10/26/2017    Joshua Barron    MRN: [id number]           Patient Information     Date Of Birth          1960        Visit Information        Provider Department      10/26/2017 2:20 PM Shamar Escamilla DC  [facility] Josue Hamm        Today's Diagnoses     Segmental and somatic dysfunction of lumbar region    -  1    Acute bilateral low back pain with bilateral sciatica        Segmental and somatic dysfunction of thoracic region        Segmental and somatic dysfunction of cervical region           Follow-ups after your visit        Your next 10 appointments already scheduled     Oct 31, 2017  2:20 PM CDT   (Arrive by 2:05 PM)   Return Visit with Laquita Fernandez MD   [facility] [place] [facility] [place] [facility]    [address] [place] [address]  [place] [id number] [phone]            Jan 29, 2018  2:20 PM CST   (Arrive by 2:00 PM)   SHORT with Lyle Fisher,    [facility] [place] [facility] [place] [facility]    [address]  [place] [address]   [phone]            Mar 07, 2018  2:45 PM CST   (Arrive by 2:30 PM)   Hearing Eval with Kevin Moreno   [facility] [place] [facility] [place] [facility]    [address]  [place] [address]   [phone]              Who to contact     If you have questions or need follow up information about today's clinic visit or your schedule please contact  [facility] directly at [phone].  Normal or non-critical lab and imaging results will be communicated to you by MyChart, letter or phone within 4 business days after the clinic has received the results. If you do not hear from us within 7 days, please contact the clinic through MyChart or phone. If you have a critical or abnormal lab result, we will notify you by phone as soon as possible.  Submit refill requests through [facility] or call your pharmacy and they will forward the refill request to us. Please allow  "3 business days for your refill to be completed.          Additional Information About Your Visit        MyChart Information     [employer] lets you send messages to your doctor, view your test results, renew your prescriptions, schedule appointments and more. To sign up, go to [email or web address] . Click on \"Log in\" on the left side of the screen, which will take you to the Welcome page. Then click on \"Sign up Now\" on the right side of the page.     You will be asked to enter the access code listed below, as well as some personal information. Please follow the directions to create your username and password.     Your access code is: [email or web address]  Expires: 2017  2:03 PM     Your access code will  in 90 days. If you need help or a new code, please call your [facility] clinic or [phone].        Care EveryWhere ID     This is your Care EveryWhere ID. This could be used by other organizations to access your [facility] medical records  [id number]         Blood Pressure from Last 3 Encounters:   10/25/17 130/80   10/20/17 160/93   17 162/90    Weight from Last 3 Encounters:   10/25/17 188 lb 9.6 oz (85.5 kg)   17 191 lb (86.6 kg)   17 185 lb (83.9 kg)              We Performed the Following     CHIROPRAC MANIP,SPINAL,3-4 REGIONS        Primary Care Provider Office Phone # Fax #    Lyle Juan Fisher [phone]       [facility] [place] [address]  [place] [address]        Equal Access to Services     [facility] : Hadii aad ku hadasho Soomaali, waaxda luqadaha, qaybta kaalmada deyanira, jaki fermin. So [facility] [phone].    ATENCIÓN: Si habla español, tiene a hastings disposición servicios gratuitos de asistencia lingüística. Llame al [phone].    We comply with applicable federal civil rights laws and [place] laws. We do not discriminate on the basis of race, color, national origin, age, disability, sex, sexual " orientation, or gender identity.            Thank you!     [facility] you [facility]  [facility]. Our goal is always to provide you with excellent care. Hearing back from our patients is one way we can continue to improve our services. Please take a few minutes to complete the written survey that you may receive in the mail after your visit with us. Thank you!             Your Updated Medication List - Protect others around you: Learn how to safely use, store and throw away your medicines at www.disposemymeds.org.          This list is accurate as of: 10/26/17 11:59 PM.  Always use your most recent med list.                   Brand Name Dispense Instructions for use Diagnosis    * albuterol 108 (90 BASE) MCG/ACT Inhaler    PROAIR HFA/PROVENTIL HFA/VENTOLIN HFA     Inhale 2 puffs into the lungs every 6 hours as needed for shortness of breath / dyspnea or wheezing        * VENTOLIN  (90 BASE) MCG/ACT Inhaler   Generic drug:  albuterol     18 g    USE 2 PUFFS 4 TIMES A DAY AS NEEDED FOR SHORTNESS OF BREATH    Moderate persistent asthma without complication       atorvastatin 20 MG tablet    LIPITOR    90 tablet    Take 1 tablet (20 mg) by mouth daily    Mixed hyperlipidemia       * baclofen 20 MG tablet    LIORESAL    90 tablet    TAKE 1 TABLET BY MOUTH 3 TIMES DAILY    Back muscle spasm       * baclofen 10 MG tablet    LIORESAL    90 tablet    TAKE 1 TABLET BY MOUTH THREE TIMES DAILY    Low back pain, unspecified back pain laterality, unspecified chronicity, with sciatica presence unspecified       diazepam 5 MG tablet    VALIUM    60 tablet    TAKE 1 TABLET BY MOUTH EVERY 12 HOURS AS NEEDED FOR ANXIETY OR SLEEP SPASM    DAYANA (generalized anxiety disorder)       diclofenac 50 MG EC tablet    VOLTAREN    90 tablet    TAKE 1 TABLET BY MOUTH 3 TIMES DAILY    Lumbago       docusate sodium 100 MG capsule    COLACE    180 capsule    Take 1 capsule (100 mg) by mouth 2 times daily    Drug-induced  constipation       DULERA 100-5 MCG/ACT oral inhaler   Generic drug:  mometasone-formoterol     13 g    INHALE 2 PUFFS INTO THE LUNGS 2 TIMES A DAY    Moderate persistent asthma without complication       fentaNYL 75 mcg/hr 72 hr patch    DURAGESIC    15 patch    APPLY 1 PATCH EVERY 48 HOURS    DDD (degenerative disc disease), cervical       fluticasone 50 MCG/ACT spray    FLONASE    16 g    USE 2 SPRAYS IN EACH NOSTRIL DAILY    Chronic rhinitis, unspecified type       gabapentin 300 MG capsule    NEURONTIN    270 capsule    TAKE 3 CAPSULES BY MOUTH THREE TIMES DAILY    Polyneuropathy associated with underlying disease (H), Low back pain, unspecified back pain laterality, unspecified chronicity, with sciatica presence unspecified       HYDROcodone-acetaminophen  MG per tablet    NORCO    120 tablet    TAKE 1 TABLET BY MOUTH EVERY 6 HOURS AS NEEDED FOR MODERATE TO SEVEREPAIN    Low back pain, unspecified back pain laterality, unspecified chronicity, with sciatica presence unspecified       ibuprofen 600 MG tablet    ADVIL/MOTRIN    120 tablet    TAKE 1 TABLET BY MOUTH EVERY 6 HOURS AS NEEDED    Hx of degenerative disc disease       lidocaine 5 % Patch    LIDODERM    90 patch    PLACE 3 PATCHES ONTO THE SKIN DAILY AS NEEDED FOR MODERATE PAIN    Midline low back pain without sciatica       losartan 50 MG tablet    COZAAR    90 tablet    Take 1 tablet (50 mg) by mouth daily    Benign essential hypertension       MAPAP 325 MG tablet   Generic drug:  acetaminophen     200 tablet    TAKE 2 TABLETS BY MOUTH EVERY 4 HOURS AS NEEDED FOR MILD PAIN    Midline low back pain without sciatica       multivitamin  with iron Tabs     90 tablet    TAKE 1 TABLET BY MOUTH DAILY    Health care maintenance       nicotine polacrilex 2 MG gum    NICORETTE    110 each    CHEW 1 PIECE AS NEEDED FOR SMOKING CESSATION    Tobacco abuse       omeprazole 20 MG CR capsule    priLOSEC    30 capsule    TAKE 1 CAPSULE BY MOUTH EVERY DAY BEFORE A  MEAL    Gastroesophageal reflux disease, esophagitis presence not specified       * order for DME     2 Box    Equipment being ordered: Large gloves    Need for assistance with personal care       * order for DME     1 Units    Equipment being ordered: Boa Back brace    DDD (degenerative disc disease), lumbar, Chronic lower back pain       * order for DME     1 Device    1 boa back brace    Chronic low back pain with sciatica, sciatica laterality unspecified, unspecified back pain laterality       OXcarbazepine 600 MG tablet    TRILEPTAL    60 tablet    TAKE 1 TABLET BY MOUTH 2 TIMES DAILY    Bipolar affective disorder, current episode hypomanic (H)       propranolol 20 MG tablet    INDERAL    60 tablet    TAKE 1 TABLET BY MOUTH TWICE DAILY    DAYANA (generalized anxiety disorder)       SM CHILDRENS ASPIRIN 81 MG chewable tablet   Generic drug:  aspirin     30 tablet    CHEW AND SWALLOW 1 TABLET BY MOUTH DAILY    Health care maintenance       * senna-docusate 8.6-50 MG per tablet    SENOKOT-S;PERICOLACE     Take 1-4 tablets by mouth 2 times daily        * SM STOOL SOFTENER/LAXATIVE 8.6-50 MG per tablet   Generic drug:  senna-docusate     120 tablet    TAKE 1 OR 2 TABLETS BY MOUTH 2 TIMES A DAY    Constipation, unspecified constipation type       traZODone 50 MG tablet    DESYREL    30 tablet    TAKE 1 TABLET BY MOUTH NIGHTLY AS NEEDED FOR SLEEP    Chronic pain       * VYVANSE 60 MG capsule   Generic drug:  lisdexamfetamine     30 capsule    TAKE 1 CAPSULE BY MOUTH EVERY MORNING    ADHD (attention deficit hyperactivity disorder), combined type       * lisdexamfetamine 60 MG capsule    VYVANSE    30 capsule    Take 1 capsule (60 mg) by mouth every morning    ADHD (attention deficit hyperactivity disorder), combined type       * lisdexamfetamine 60 MG capsule    VYVANSE    30 capsule    Take 1 capsule (60 mg) by mouth every morning    ADHD (attention deficit hyperactivity disorder), combined type       * Notice:  This  list has 12 medication(s) that are the same as other medications prescribed for you. Read the directions carefully, and ask your doctor or other care provider to review them with you.

## 2017-10-30 DIAGNOSIS — K59.00 CONSTIPATION, UNSPECIFIED CONSTIPATION TYPE: ICD-10-CM

## 2017-10-30 DIAGNOSIS — M54.5 LOW BACK PAIN, UNSPECIFIED BACK PAIN LATERALITY, UNSPECIFIED CHRONICITY, WITH SCIATICA PRESENCE UNSPECIFIED: ICD-10-CM

## 2017-10-30 DIAGNOSIS — M50.30 DDD (DEGENERATIVE DISC DISEASE), CERVICAL: ICD-10-CM

## 2017-10-30 DIAGNOSIS — M54.50 MIDLINE LOW BACK PAIN WITHOUT SCIATICA: ICD-10-CM

## 2017-10-30 NOTE — PROGRESS NOTES

## 2017-10-31 ENCOUNTER — OFFICE VISIT (OUTPATIENT)
Dept: PSYCHIATRY | Facility: OTHER | Age: 57
End: 2017-10-31
Attending: PSYCHIATRY & NEUROLOGY
Payer: COMMERCIAL

## 2017-10-31 VITALS
WEIGHT: 188 LBS | HEART RATE: 76 BPM | SYSTOLIC BLOOD PRESSURE: 116 MMHG | BODY MASS INDEX: 26.98 KG/M2 | TEMPERATURE: 97.2 F | DIASTOLIC BLOOD PRESSURE: 78 MMHG

## 2017-10-31 DIAGNOSIS — N40.1 BENIGN PROSTATIC HYPERPLASIA WITH INCOMPLETE BLADDER EMPTYING: Primary | ICD-10-CM

## 2017-10-31 DIAGNOSIS — F90.2 ADHD (ATTENTION DEFICIT HYPERACTIVITY DISORDER), COMBINED TYPE: ICD-10-CM

## 2017-10-31 DIAGNOSIS — R39.14 BENIGN PROSTATIC HYPERPLASIA WITH INCOMPLETE BLADDER EMPTYING: Primary | ICD-10-CM

## 2017-10-31 PROCEDURE — 99213 OFFICE O/P EST LOW 20 MIN: CPT | Performed by: PSYCHIATRY & NEUROLOGY

## 2017-10-31 PROCEDURE — 99212 OFFICE O/P EST SF 10 MIN: CPT

## 2017-10-31 RX ORDER — LISDEXAMFETAMINE DIMESYLATE 60 MG/1
60 CAPSULE ORAL EVERY MORNING
Qty: 30 CAPSULE | Refills: 0 | Status: SHIPPED | OUTPATIENT
Start: 2017-11-22 | End: 2018-01-16

## 2017-10-31 ASSESSMENT — PATIENT HEALTH QUESTIONNAIRE - PHQ9
SUM OF ALL RESPONSES TO PHQ QUESTIONS 1-9: 4
5. POOR APPETITE OR OVEREATING: SEVERAL DAYS

## 2017-10-31 ASSESSMENT — ANXIETY QUESTIONNAIRES
1. FEELING NERVOUS, ANXIOUS, OR ON EDGE: NOT AT ALL
GAD7 TOTAL SCORE: 2
7. FEELING AFRAID AS IF SOMETHING AWFUL MIGHT HAPPEN: NOT AT ALL
5. BEING SO RESTLESS THAT IT IS HARD TO SIT STILL: NOT AT ALL
6. BECOMING EASILY ANNOYED OR IRRITABLE: SEVERAL DAYS
2. NOT BEING ABLE TO STOP OR CONTROL WORRYING: NOT AT ALL
3. WORRYING TOO MUCH ABOUT DIFFERENT THINGS: NOT AT ALL

## 2017-10-31 ASSESSMENT — PAIN SCALES - GENERAL: PAINLEVEL: EXTREME PAIN (8)

## 2017-10-31 NOTE — NURSING NOTE
"Chief Complaint   Patient presents with     RECHECK     Mental health.       Initial /78 (BP Location: Right arm, Patient Position: Sitting, Cuff Size: Adult Regular)  Pulse 76  Temp 97.2  F (36.2  C) (Tympanic)  Wt 188 lb (85.3 kg)  BMI 26.98 kg/m2 Estimated body mass index is 26.98 kg/(m^2) as calculated from the following:    Height as of 7/31/17: 5' 10\" (1.778 m).    Weight as of this encounter: 188 lb (85.3 kg).  Medication Reconciliation: complete     JACQUELYN SUAREZ      "

## 2017-10-31 NOTE — TELEPHONE ENCOUNTER
tamsulosin 0.4 mg  Last office visit: 10/25/17  Last refill: 30  DISCONTINUED ON 5/10/17 WITH 9 REILLS REMAINING #30    Thank you.

## 2017-10-31 NOTE — MR AVS SNAPSHOT
After Visit Summary   10/31/2017    Joshua Barron    MRN: [id number]           Patient Information     Date Of Birth          1960        Visit Information        Provider Department      10/31/2017 2:20 PM [facility]        Today's Diagnoses     ADHD (attention deficit hyperactivity disorder), combined type           Follow-ups after your visit        Your next 10 appointments already scheduled     Nov 28, 2017  3:00 PM CST   (Arrive by 2:45 PM)   Return Visit with Laquita Fernandez MD   [facility] [place] ([facility] [place] [facility]    [address] [place] [address]  [place] [address]   [phone]            Jan 29, 2018  2:20 PM CST   (Arrive by 2:00 PM)   SHORT with Lyle Fisher,    [facility] [place] ([facility] [place] [facility]    [address]  [place] [address]   [phone]            Mar 07, 2018  2:45 PM CST   (Arrive by 2:30 PM)   Hearing Eval with Kevin Moreno   [facility] [place] ([facility] [place] [facility]    [address]  [place] [address]   [phone]              Who to contact     If you have questions or need follow up information about today's clinic visit or your schedule please contact [facility] directly at [phone].  Normal or non-critical lab and imaging results will be communicated to you by MyChart, letter or phone within 4 business days after the clinic has received the results. If you do not hear from us within 7 days, please contact the clinic through MyChart or phone. If you have a critical or abnormal lab result, we will notify you by phone as soon as possible.  Submit refill requests through [employer] or call your pharmacy and they will forward the refill request to us. Please allow 3 business days for your refill to be completed.          Additional Information About Your Visit        MyChart Information     [employer] lets you send messages to your  "doctor, view your test results, renew your prescriptions, schedule appointments and more. To sign up, go to [email or web address] . Click on \"Log in\" on the left side of the screen, which will take you to the Welcome page. Then click on \"Sign up Now\" on the right side of the page.     You will be asked to enter the access code listed below, as well as some personal information. Please follow the directions to create your username and password.     Your access code is: [email or web address]  Expires: 2017  2:03 PM     Your access code will  in 90 days. If you need help or a new code, please call your [facility] clinic or [phone].        Care EveryWhere ID     This is your Care EveryWhere ID. This could be used by other organizations to access your [place] medical records  [id number]        Your Vitals Were     Pulse Temperature BMI (Body Mass Index)             76 97.2  F (36.2  C) (Tympanic) 26.98 kg/m2          Blood Pressure from Last 3 Encounters:   10/31/17 116/78   10/25/17 130/80   10/20/17 160/93    Weight from Last 3 Encounters:   10/31/17 188 lb (85.3 kg)   10/25/17 188 lb 9.6 oz (85.5 kg)   17 191 lb (86.6 kg)              Today, you had the following     No orders found for display         Today's Medication Changes          These changes are accurate as of: 10/31/17  3:12 PM.  If you have any questions, ask your nurse or doctor.               These medicines have changed or have updated prescriptions.        Dose/Directions    * lisdexamfetamine 60 MG capsule   Commonly known as:  VYVANSE   This may have changed:  Another medication with the same name was removed. Continue taking this medication, and follow the directions you see here.   Used for:  ADHD (attention deficit hyperactivity disorder), combined type   Changed by:  Laquita Fernandez MD        Dose:  60 mg   Take 1 capsule (60 mg) by mouth every morning   Quantity:  30 capsule   Refills:  0       * lisdexamfetamine 60 MG capsule "   Commonly known as:  VYVANSE   This may have changed:  Another medication with the same name was removed. Continue taking this medication, and follow the directions you see here.   Used for:  ADHD (attention deficit hyperactivity disorder), combined type   Changed by:  Laquita Fernandez MD        Dose:  60 mg   Start taking on:  11/22/2017   Take 1 capsule (60 mg) by mouth every morning   Quantity:  30 capsule   Refills:  0       * Notice:  This list has 2 medication(s) that are the same as other medications prescribed for you. Read the directions carefully, and ask your doctor or other care provider to review them with you.         Where to get your medicines      Some of these will need a paper prescription and others can be bought over the counter.  Ask your nurse if you have questions.     Bring a paper prescription for each of these medications     lisdexamfetamine 60 MG capsule                Primary Care Provider Office Phone # Fax #    Lylegrecia Martinez DO [place] [phone]       [facility] [place] [address]  [place] [address]        Equal Access to Services     [facility] : Hadii aad ku hadasho Soomaali, waaxda luqadaha, qaybta kaalmada adeegyada, waxay idiin hayaan brittany millan . So [facility] [phone].    ATENCIÓN: Si habla español, tiene a hastings disposición servicios gratuitos de asistencia lingüística. Llame al [phone].    We comply with applicable federal civil rights laws and [place] laws. We do not discriminate on the basis of race, color, national origin, age, disability, sex, sexual orientation, or gender identity.            Thank you!     Thank you for choosing [facility] [place]  for your care. Our goal is always to provide you with excellent care. Hearing back from our patients is one way we can continue to improve our services. Please take a few minutes to complete the written survey that you may receive in the mail after your visit with us.  Thank you!             Your Updated Medication List - Protect others around you: Learn how to safely use, store and throw away your medicines at www.disposemymeds.org.          This list is accurate as of: 10/31/17  3:12 PM.  Always use your most recent med list.                   Brand Name Dispense Instructions for use Diagnosis    * albuterol 108 (90 BASE) MCG/ACT Inhaler    PROAIR HFA/PROVENTIL HFA/VENTOLIN HFA     Inhale 2 puffs into the lungs every 6 hours as needed for shortness of breath / dyspnea or wheezing        * VENTOLIN  (90 BASE) MCG/ACT Inhaler   Generic drug:  albuterol     18 g    USE 2 PUFFS 4 TIMES A DAY AS NEEDED FOR SHORTNESS OF BREATH    Moderate persistent asthma without complication       atorvastatin 20 MG tablet    LIPITOR    90 tablet    Take 1 tablet (20 mg) by mouth daily    Mixed hyperlipidemia       * baclofen 20 MG tablet    LIORESAL    90 tablet    TAKE 1 TABLET BY MOUTH 3 TIMES DAILY    Back muscle spasm       * baclofen 10 MG tablet    LIORESAL    90 tablet    TAKE 1 TABLET BY MOUTH THREE TIMES DAILY    Low back pain, unspecified back pain laterality, unspecified chronicity, with sciatica presence unspecified       diazepam 5 MG tablet    VALIUM    60 tablet    TAKE 1 TABLET BY MOUTH EVERY 12 HOURS AS NEEDED FOR ANXIETY OR SLEEP SPASM    DAYANA (generalized anxiety disorder)       diclofenac 50 MG EC tablet    VOLTAREN    90 tablet    TAKE 1 TABLET BY MOUTH 3 TIMES DAILY    Lumbago       docusate sodium 100 MG capsule    COLACE    180 capsule    Take 1 capsule (100 mg) by mouth 2 times daily    Drug-induced constipation       DULERA 100-5 MCG/ACT oral inhaler   Generic drug:  mometasone-formoterol     13 g    INHALE 2 PUFFS INTO THE LUNGS 2 TIMES A DAY    Moderate persistent asthma without complication       fentaNYL 75 mcg/hr 72 hr patch    DURAGESIC    15 patch    APPLY 1 PATCH EVERY 48 HOURS    DDD (degenerative disc disease), cervical       fluticasone 50 MCG/ACT spray     FLONASE    16 g    USE 2 SPRAYS IN EACH NOSTRIL DAILY    Chronic rhinitis, unspecified type       gabapentin 300 MG capsule    NEURONTIN    270 capsule    TAKE 3 CAPSULES BY MOUTH THREE TIMES DAILY    Polyneuropathy associated with underlying disease (H), Low back pain, unspecified back pain laterality, unspecified chronicity, with sciatica presence unspecified       HYDROcodone-acetaminophen  MG per tablet    NORCO    120 tablet    TAKE 1 TABLET BY MOUTH EVERY 6 HOURS AS NEEDED FOR MODERATE TO SEVEREPAIN    Low back pain, unspecified back pain laterality, unspecified chronicity, with sciatica presence unspecified       ibuprofen 600 MG tablet    ADVIL/MOTRIN    120 tablet    TAKE 1 TABLET BY MOUTH EVERY 6 HOURS AS NEEDED    Hx of degenerative disc disease       lidocaine 5 % Patch    LIDODERM    90 patch    PLACE 3 PATCHES ONTO THE SKIN DAILY AS NEEDED FOR MODERATE PAIN    Midline low back pain without sciatica       * lisdexamfetamine 60 MG capsule    VYVANSE    30 capsule    Take 1 capsule (60 mg) by mouth every morning    ADHD (attention deficit hyperactivity disorder), combined type       * lisdexamfetamine 60 MG capsule   Start taking on:  11/22/2017    VYVANSE    30 capsule    Take 1 capsule (60 mg) by mouth every morning    ADHD (attention deficit hyperactivity disorder), combined type       losartan 50 MG tablet    COZAAR    90 tablet    Take 1 tablet (50 mg) by mouth daily    Benign essential hypertension       MAPAP 325 MG tablet   Generic drug:  acetaminophen     200 tablet    TAKE 2 TABLETS BY MOUTH EVERY 4 HOURS AS NEEDED FOR MILD PAIN    Midline low back pain without sciatica       multivitamin  with iron Tabs     90 tablet    TAKE 1 TABLET BY MOUTH DAILY    Health care maintenance       nicotine polacrilex 2 MG gum    NICORETTE    110 each    CHEW 1 PIECE AS NEEDED FOR SMOKING CESSATION    Tobacco abuse       omeprazole 20 MG CR capsule    priLOSEC    30 capsule    TAKE 1 CAPSULE BY MOUTH EVERY  DAY BEFORE A MEAL    Gastroesophageal reflux disease, esophagitis presence not specified       * order for DME     2 Box    Equipment being ordered: Large gloves    Need for assistance with personal care       * order for DME     1 Units    Equipment being ordered: Boa Back brace    DDD (degenerative disc disease), lumbar, Chronic lower back pain       * order for DME     1 Device    1 boa back brace    Chronic low back pain with sciatica, sciatica laterality unspecified, unspecified back pain laterality       OXcarbazepine 600 MG tablet    TRILEPTAL    60 tablet    TAKE 1 TABLET BY MOUTH 2 TIMES DAILY    Bipolar affective disorder, current episode hypomanic (H)       propranolol 20 MG tablet    INDERAL    60 tablet    TAKE 1 TABLET BY MOUTH TWICE DAILY    DAYANA (generalized anxiety disorder)       SM CHILDRENS ASPIRIN 81 MG chewable tablet   Generic drug:  aspirin     30 tablet    CHEW AND SWALLOW 1 TABLET BY MOUTH DAILY    Health care maintenance       * senna-docusate 8.6-50 MG per tablet    SENOKOT-S;PERICOLACE     Take 1-4 tablets by mouth 2 times daily        * SM STOOL SOFTENER/LAXATIVE 8.6-50 MG per tablet   Generic drug:  senna-docusate     120 tablet    TAKE 1 OR 2 TABLETS BY MOUTH 2 TIMES A DAY    Constipation, unspecified constipation type       traZODone 50 MG tablet    DESYREL    30 tablet    TAKE 1 TABLET BY MOUTH NIGHTLY AS NEEDED FOR SLEEP    Chronic pain       * Notice:  This list has 11 medication(s) that are the same as other medications prescribed for you. Read the directions carefully, and ask your doctor or other care provider to review them with you.

## 2017-10-31 NOTE — PROGRESS NOTES
"  PSYCHIATRY CLINIC PROGRESS NOTE   40 minute medication management, more than 50% of time spent counseling patient on medications, medication side effects, symptom history and management   SUBJECTIVE / INTERIM HISTORY                                                                       Social- Was  twice. Lives alone with his dog Montserrat (beltran) and 3 cats. Has a GF in [place]  Children-  2 kids, they are in [place]  Last visit :      --  Continue Vyvanse 60 mg daily and last script was 8/30/17 and I will refill today 9/27/17 and 10/25/17 :  .  Continue Trileptal 600 mg bid, Valium 5 mg every 12 hours prn anxiety .      - in lot of pain evident when I walked in: wincing, sitting very still holding his back.   - Vyvanse 60 mg Dr. Fisher mailed out to [place] on 8/30/17   - trip to [place]   - marriage 6 years: not good.   - Valium helped with anxiety and helped with the pain. Does NOT want to take any more as \"I don't want to be sleeping all the time\"  - Lots of family issues: Joshua has taken care of his parents and yet parents give his other brothers everything. oldest of 3 brothers. Brother in [place] youngest [place] and Major is here. Mom and dad here [place]. Joshua noting moving here to be closer to parents and help them, etc. But, parents don't seem to want him around much or talk to him.  SUBSTANCE USE- denies abuse of meds or substances    SYMPTOMS- issues with attention and concentration improved with Ritalin.  racing thoughts, depressed mood, impulsivity, distractibility  MEDICAL ROS- back pain, denies any issues with headaches or stomach pain / issues with stimulant  MEDICAL / SURGICAL HISTORY                     Patient Active Problem List   Diagnosis     Mixed hyperlipidemia     Tobacco Abuse, History of     Degeneration of lumbar or lumbosacral intervertebral disc     Depression, major     Chronic pain syndrome     Chemical dependency (H)     Chronic rhinitis     Tinnitus of both ears     ETD (eustachian tube " dysfunction)     SNHL (sensorineural hearing loss)     Back pain     Bipolar disorder (H)     Seizure-like activity (H)     Somatic dysfunction of pelvis region     Bilateral foot pain     Preop general physical exam     Trigger index finger of right hand     Moderate persistent asthma without complication     Chronic lower back pain     ACP (advance care planning)     Onychia of toe of left foot     DDD (degenerative disc disease), lumbar     Seizure disorder (H)     Back muscle spasm     Throat pain     Benign essential hypertension     Retrograde ejaculation     ALLERGY   Amoxicillin trihydrate; Clavulanic acid potassium; Cymbalta; and Seasonal allergies  MEDICATIONS                                                                                             Current Outpatient Prescriptions   Medication Sig     senna-docusate (SENOKOT-S;PERICOLACE) 8.6-50 MG per tablet Take 1-4 tablets by mouth 2 times daily     ibuprofen (ADVIL/MOTRIN) 600 MG tablet TAKE 1 TABLET BY MOUTH EVERY 6 HOURS AS NEEDED     VENTOLIN  (90 BASE) MCG/ACT Inhaler USE 2 PUFFS 4 TIMES A DAY AS NEEDED FOR SHORTNESS OF BREATH     DULERA 100-5 MCG/ACT oral inhaler INHALE 2 PUFFS INTO THE LUNGS 2 TIMES A DAY     OXcarbazepine (TRILEPTAL) 600 MG tablet TAKE 1 TABLET BY MOUTH 2 TIMES DAILY     SM CHILDRENS ASPIRIN 81 MG chewable tablet CHEW AND SWALLOW 1 TABLET BY MOUTH DAILY     fentaNYL (DURAGESIC) 75 mcg/hr 72 hr patch APPLY 1 PATCH EVERY 48 HOURS     lidocaine (LIDODERM) 5 % Patch PLACE 3 PATCHES ONTO THE SKIN DAILY AS NEEDED FOR MODERATE PAIN     HYDROcodone-acetaminophen (NORCO)  MG per tablet TAKE 1 TABLET BY MOUTH EVERY 6 HOURS AS NEEDED FOR MODERATE TO SEVEREPAIN     baclofen (LIORESAL) 10 MG tablet TAKE 1 TABLET BY MOUTH THREE TIMES DAILY     lisdexamfetamine (VYVANSE) 60 MG capsule Take 1 capsule (60 mg) by mouth every morning     lisdexamfetamine (VYVANSE) 60 MG capsule Take 1 capsule (60 mg) by mouth every morning      diclofenac (VOLTAREN) 50 MG EC tablet TAKE 1 TABLET BY MOUTH 3 TIMES DAILY     multivitamin  with iron (SM COMPLETE ADVANCED FORMULA) TABS TAKE 1 TABLET BY MOUTH DAILY     SM STOOL SOFTENER/LAXATIVE 8.6-50 MG per tablet TAKE 1 OR 2 TABLETS BY MOUTH 2 TIMES A DAY     baclofen (LIORESAL) 20 MG tablet TAKE 1 TABLET BY MOUTH 3 TIMES DAILY     propranolol (INDERAL) 20 MG tablet TAKE 1 TABLET BY MOUTH TWICE DAILY     order for DME 1 boa back brace     losartan (COZAAR) 50 MG tablet Take 1 tablet (50 mg) by mouth daily     fluticasone (FLONASE) 50 MCG/ACT spray USE 2 SPRAYS IN EACH NOSTRIL DAILY     docusate sodium (COLACE) 100 MG capsule Take 1 capsule (100 mg) by mouth 2 times daily     gabapentin (NEURONTIN) 300 MG capsule TAKE 3 CAPSULES BY MOUTH THREE TIMES DAILY     MAPAP 325 MG tablet TAKE 2 TABLETS BY MOUTH EVERY 4 HOURS AS NEEDED FOR MILD PAIN     traZODone (DESYREL) 50 MG tablet TAKE 1 TABLET BY MOUTH NIGHTLY AS NEEDED FOR SLEEP     diazepam (VALIUM) 5 MG tablet TAKE 1 TABLET BY MOUTH EVERY 12 HOURS AS NEEDED FOR ANXIETY OR SLEEP SPASM     albuterol (PROAIR HFA/PROVENTIL HFA/VENTOLIN HFA) 108 (90 BASE) MCG/ACT Inhaler Inhale 2 puffs into the lungs every 6 hours as needed for shortness of breath / dyspnea or wheezing     atorvastatin (LIPITOR) 20 MG tablet Take 1 tablet (20 mg) by mouth daily     omeprazole (PRILOSEC) 20 MG CR capsule TAKE 1 CAPSULE BY MOUTH EVERY DAY BEFORE A MEAL     nicotine polacrilex (NICORETTE) 2 MG gum CHEW 1 PIECE AS NEEDED FOR SMOKING CESSATION     order for DME Equipment being ordered: Boa Back brace     ORDER FOR DME Equipment being ordered: Large gloves     No current facility-administered medications for this visit.        VITALS   /78 (BP Location: Right arm, Patient Position: Sitting, Cuff Size: Adult Regular)  Pulse 76  Temp 97.2  F (36.2  C) (Tympanic)  Wt 188 lb (85.3 kg)  BMI 26.98 kg/m2     LABS                                                                            "                                              EKG 2016 with 376 ms (on nortriptyline)  Last Basic Metabolic Panel:  NA      138   6/9/2016   POTASSIUM      4.7   6/9/2016  CHLORIDE      104   6/9/2016  SANDRITA      9.2   6/9/2016  CO2       29   6/9/2016  BUN       25   6/9/2016  CR     0.76   6/9/2016  GLC      173   6/9/2016    Liver Function Studies -   Recent Labs   Lab Test  02/18/15   1536   PROTTOTAL  7.5   ALBUMIN  4.6   BILITOTAL  0.4   ALKPHOS  82   AST  18   ALT  32      MENTAL STATUS EXAM                                                                                        Alert. Oriented to person, place, and date / time. Casually groomed, calm, cooperative with good eye contact. No problems with speech or psychomotor behavior. Mood was described as \"doing okay\" and affect was congruent to speech content and full range. Thought process, including associations, was unremarkable and thought content was devoid of suicidal and homicidal ideation and psychotic thought. No hallucinations. Insight was good. Judgment was intact and adequate for safety. Fund of knowledge was intact. Pt demonstrates no obvious problems with attention, concentration, language, recent or remote memory although these were not formally tested.     ASSESSMENT                                                                                                      HISTORICAL:  Initial psych note 10/6/15          NOTES:      This patient is a 56 year old with bipolar, MDD, bipolar d/o.  He does struggle with depression and seems to be largely related to his chronic pain issues.We started Valium in as dual purpose: muscle relaxant properties and for anxiety. Heladio Newman has helped sificantly. Trent and I have discussed the risks that go along with combination of medications he is on: on several medications with CNS depressant effect and thus he needs to be very careful and NOT use alcohol or any other type sedating meds/substances as there are risks " "including respiratory depression. Valium is 5 mg bid prn and I would not feel comfortable any higher dose given the other medications he is on with CNS depressant potential. Joshua is on a lot of controlled substances thus I reviewed the [place]  and no other prescriptions aside from those his PCP and I prescribe. We have reviewed on benzodiazepine and opioids combo and more risk adverse side effects. Joshua and I feel benefits outweigh risks of continuing the diazepam as helps him in terms of anxiety and for muscle tension. We feel his functioning would significantly be impaired to point of unable to leave his house. As we spoke about this he reminded me of several \"blown disks\" and one ruptured.  He is on nortriptyline which antidepressant and also can help with pain.       TREATMENT RISK STATEMENT:  The risks, benefits, alternatives and potential adverse effects have been explained and are understood by the pt.  The pt agrees to the treatment plan with the ability to do so.   The pt knows to call the clinic for any problems or access emergency care if needed.        DIAGNOSES                 (Use of Axes system will continue, even though absent from DSM 5)         Axis I   - ADD               MDD, recurrent,  mod                 Rule out bipolar disorder  Axis II  - no dx  Axis III - chronic pain (s/p back injuries and surgeries)  Axis IV-  Psychosocial Stressors include: financial, lack of support  Axis V - Global Assessment of Functioning current: 45    PLAN                                                                                                                    1)  MEDICATIONS:         --  Continue Vyvanse 60 mg daily and last script was  10/25/17 and gave script today:11/22/17  .  Continue Trileptal 600 mg bid, Valium 5 mg every 12 hours prn anxiety .  2)  THERAPY:  No change    3)  LABS:  UDS 5/10/17    4)  PT MONITOR [call for probs]:  SEs from meds, worsening sx, SI/HI    5)  REFERRALS [CD, medical, " other]:  None    6)  RTC:  4-6 weeks

## 2017-11-01 ENCOUNTER — OFFICE VISIT (OUTPATIENT)
Dept: CHIROPRACTIC MEDICINE | Facility: OTHER | Age: 57
End: 2017-11-01
Attending: CHIROPRACTOR
Payer: COMMERCIAL

## 2017-11-01 DIAGNOSIS — M99.01 SEGMENTAL AND SOMATIC DYSFUNCTION OF CERVICAL REGION: ICD-10-CM

## 2017-11-01 DIAGNOSIS — M54.50 ACUTE BILATERAL LOW BACK PAIN WITHOUT SCIATICA: ICD-10-CM

## 2017-11-01 DIAGNOSIS — M99.02 SEGMENTAL AND SOMATIC DYSFUNCTION OF THORACIC REGION: ICD-10-CM

## 2017-11-01 DIAGNOSIS — M99.03 SEGMENTAL AND SOMATIC DYSFUNCTION OF LUMBAR REGION: Primary | ICD-10-CM

## 2017-11-01 PROCEDURE — 98941 CHIROPRACT MANJ 3-4 REGIONS: CPT | Mod: AT | Performed by: CHIROPRACTOR

## 2017-11-01 ASSESSMENT — ANXIETY QUESTIONNAIRES: GAD7 TOTAL SCORE: 2

## 2017-11-01 NOTE — TELEPHONE ENCOUNTER
norco      Last Written Prescription Date: 10/06/2017  Last Fill Quantity: 120,  # refills: 0   Last Office Visit with [facility], [facility] or [facility] prescribing provider: 10/25/2017         Stool softner      Last Written Prescription Date: 9/05/2017  Last Fill Quantity: 120,  # refills: 0  Last Office Visit with [facility], [facility] or [facility] prescribing provider: 10/25/2017      acetaminophen      Last Written Prescription Date: 7/12/2017  Last Fill Quantity: 200,  # refills: 0   Last Office Visit with [facility], [facility] or [facility] prescribing provider: 10/25/2017      fentanyl      Last Written Prescription Date: 10/11/2017  Last Fill Quantity: 15,  # refills: 0   Last Office Visit with [facility], [facility] or [facility] prescribing provider: 10/25/2017                                        Next 5 appointments (look out 90 days)     Nov 01, 2017  2:10 PM CDT   Return Visit with Shamar Escamilla DC   [facility] [place] ([facility])    [address]  [place] [address]   [phone]            Nov 28, 2017  3:00 PM CST   (Arrive by 2:45 PM)   Return Visit with Laquita Fernandez MD   [facility] [place] ([facility] [place] )    [address]  [place] [id number] [phone]            Jan 29, 2018  2:20 PM CST   (Arrive by 2:00 PM)   SHORT with Lyle Fisher DO   [facility] [place] ([facility] [place] )    [address]  [place] [address]   [phone]

## 2017-11-01 NOTE — MR AVS SNAPSHOT
After Visit Summary   11/1/2017    Joshua Barron    MRN: [id number]           Patient Information     Date Of Birth          1960        Visit Information        Provider Department      11/1/2017 2:10 PM Shamar Escamilla DC [facility]        Today's Diagnoses     Segmental and somatic dysfunction of lumbar region    -  1    Acute bilateral low back pain without sciatica        Segmental and somatic dysfunction of thoracic region        Segmental and somatic dysfunction of cervical region           Follow-ups after your visit        Your next 10 appointments already scheduled     Nov 03, 2017 12:30 PM CDT   Return Visit with Shamar Escamilla DC   [facility] ([facility])    [address]  [place] [address]   [phone]            Nov 28, 2017  3:00 PM CST   (Arrive by 2:45 PM)   Return Visit with Laquita Fernandez MD   [facility] [place] ([facility] [place] )    [address]  [place] [address]   [phone]            Jan 29, 2018  2:20 PM CST   (Arrive by 2:00 PM)   SHORT with Lyle Fisher,    [facility] [place] ([facility] [place] )    [address]  [place] [address]   [phone]            Mar 07, 2018  2:45 PM CST   (Arrive by 2:30 PM)   Hearing Eval with Kevin Moreno   [facility] [place] ([facility] [place] )    [address]  [place] [address]   [phone]              Who to contact     If you have questions or need follow up information about today's clinic visit or your schedule please contact  [facility] directly at [phone].  Normal or non-critical lab and imaging results will be communicated to you by MyChart, letter or phone within 4 business days after the clinic has received the results. If you do not hear from us within 7 days, please contact the clinic through MyChart or phone. If you have a critical or abnormal lab result,  "we will notify you by phone as soon as possible.  Submit refill requests through [employer] or call your pharmacy and they will forward the refill request to us. Please allow 3 business days for your refill to be completed.          Additional Information About Your Visit        [employer] Information     [employer] lets you send messages to your doctor, view your test results, renew your prescriptions, schedule appointments and more. To sign up, go to [email or web address] . Click on \"Log in\" on the left side of the screen, which will take you to the Welcome page. Then click on \"Sign up Now\" on the right side of the page.     You will be asked to enter the access code listed below, as well as some personal information. Please follow the directions to create your username and password.     Your access code is: [email or web address]  Expires: 2017  2:03 PM     Your access code will  in 90 days. If you need help or a new code, please call your [place] clinic or [phone].        Care EveryWhere ID     This is your Care EveryWhere ID. This could be used by other organizations to access your [place] medical records  [id number]         Blood Pressure from Last 3 Encounters:   10/31/17 116/78   10/25/17 130/80   10/20/17 160/93    Weight from Last 3 Encounters:   10/31/17 188 lb (85.3 kg)   10/25/17 188 lb 9.6 oz (85.5 kg)   17 191 lb (86.6 kg)              We Performed the Following     CHIROPRAC MANIP,SPINAL,3-4 REGIONS        Primary Care Provider Office Phone # Fax #    Lyle Juan Fisher [phone]       [facility] [place] [address]  [place] [address]        Equal Access to Services     SHAHBAZ DANIELS : Hadii jim Gary, waflorianda renettaadaha, qaybta kaalmada deyanira, jaki fermin. So [facility] [phone].    ATENCIÓN: Si habla español, tiene a hastings disposición servicios gratuitos de asistencia lingüística. Llame al [phone].    We " comply with applicable federal civil rights laws and [place] laws. We do not discriminate on the basis of race, color, national origin, age, disability, sex, sexual orientation, or gender identity.            Thank you!     Thank you for choosing  [facility]  for your care. Our goal is always to provide you with excellent care. Hearing back from our patients is one way we can continue to improve our services. Please take a few minutes to complete the written survey that you may receive in the mail after your visit with us. Thank you!             Your Updated Medication List - Protect others around you: Learn how to safely use, store and throw away your medicines at www.disposemymeds.org.          This list is accurate as of: 11/1/17 11:59 PM.  Always use your most recent med list.                   Brand Name Dispense Instructions for use Diagnosis    * albuterol 108 (90 BASE) MCG/ACT Inhaler    PROAIR HFA/PROVENTIL HFA/VENTOLIN HFA     Inhale 2 puffs into the lungs every 6 hours as needed for shortness of breath / dyspnea or wheezing        * VENTOLIN  (90 BASE) MCG/ACT Inhaler   Generic drug:  albuterol     18 g    USE 2 PUFFS 4 TIMES A DAY AS NEEDED FOR SHORTNESS OF BREATH    Moderate persistent asthma without complication       atorvastatin 20 MG tablet    LIPITOR    90 tablet    Take 1 tablet (20 mg) by mouth daily    Mixed hyperlipidemia       * baclofen 20 MG tablet    LIORESAL    90 tablet    TAKE 1 TABLET BY MOUTH 3 TIMES DAILY    Back muscle spasm       * baclofen 10 MG tablet    LIORESAL    90 tablet    TAKE 1 TABLET BY MOUTH THREE TIMES DAILY    Low back pain, unspecified back pain laterality, unspecified chronicity, with sciatica presence unspecified       diazepam 5 MG tablet    VALIUM    60 tablet    TAKE 1 TABLET BY MOUTH EVERY 12 HOURS AS NEEDED FOR ANXIETY OR SLEEP SPASM    DAYANA (generalized anxiety disorder)       diclofenac 50 MG EC tablet    VOLTAREN    90 tablet    TAKE 1  TABLET BY MOUTH 3 TIMES DAILY    Lumbago       docusate sodium 100 MG capsule    COLACE    180 capsule    Take 1 capsule (100 mg) by mouth 2 times daily    Drug-induced constipation       DULERA 100-5 MCG/ACT oral inhaler   Generic drug:  mometasone-formoterol     13 g    INHALE 2 PUFFS INTO THE LUNGS 2 TIMES A DAY    Moderate persistent asthma without complication       fentaNYL 75 mcg/hr 72 hr patch    DURAGESIC    15 patch    APPLY 1 PATCH EVERY 48 HOURS    DDD (degenerative disc disease), cervical       fluticasone 50 MCG/ACT spray    FLONASE    16 g    USE 2 SPRAYS IN EACH NOSTRIL DAILY    Chronic rhinitis, unspecified type       gabapentin 300 MG capsule    NEURONTIN    270 capsule    TAKE 3 CAPSULES BY MOUTH THREE TIMES DAILY    Polyneuropathy associated with underlying disease (H), Low back pain, unspecified back pain laterality, unspecified chronicity, with sciatica presence unspecified       HYDROcodone-acetaminophen  MG per tablet    NORCO    120 tablet    TAKE 1 TABLET BY MOUTH EVERY 6 HOURS AS NEEDED FOR MODERATE TO SEVEREPAIN    Low back pain, unspecified back pain laterality, unspecified chronicity, with sciatica presence unspecified       ibuprofen 600 MG tablet    ADVIL/MOTRIN    120 tablet    TAKE 1 TABLET BY MOUTH EVERY 6 HOURS AS NEEDED    Hx of degenerative disc disease       lidocaine 5 % Patch    LIDODERM    90 patch    PLACE 3 PATCHES ONTO THE SKIN DAILY AS NEEDED FOR MODERATE PAIN    Midline low back pain without sciatica       * lisdexamfetamine 60 MG capsule    VYVANSE    30 capsule    Take 1 capsule (60 mg) by mouth every morning    ADHD (attention deficit hyperactivity disorder), combined type       * lisdexamfetamine 60 MG capsule   Start taking on:  11/22/2017    VYVANSE    30 capsule    Take 1 capsule (60 mg) by mouth every morning    ADHD (attention deficit hyperactivity disorder), combined type       losartan 50 MG tablet    COZAAR    90 tablet    Take 1 tablet (50 mg) by  mouth daily    Benign essential hypertension       MAPAP 325 MG tablet   Generic drug:  acetaminophen     200 tablet    TAKE 2 TABLETS BY MOUTH EVERY 4 HOURS AS NEEDED FOR MILD PAIN    Midline low back pain without sciatica       multivitamin  with iron Tabs     90 tablet    TAKE 1 TABLET BY MOUTH DAILY    Health care maintenance       nicotine polacrilex 2 MG gum    NICORETTE    110 each    CHEW 1 PIECE AS NEEDED FOR SMOKING CESSATION    Tobacco abuse       omeprazole 20 MG CR capsule    priLOSEC    30 capsule    TAKE 1 CAPSULE BY MOUTH EVERY DAY BEFORE A MEAL    Gastroesophageal reflux disease, esophagitis presence not specified       * order for DME     2 Box    Equipment being ordered: Large gloves    Need for assistance with personal care       * order for DME     1 Units    Equipment being ordered: Boa Back brace    DDD (degenerative disc disease), lumbar, Chronic lower back pain       * order for DME     1 Device    1 boa back brace    Chronic low back pain with sciatica, sciatica laterality unspecified, unspecified back pain laterality       OXcarbazepine 600 MG tablet    TRILEPTAL    60 tablet    TAKE 1 TABLET BY MOUTH 2 TIMES DAILY    Bipolar affective disorder, current episode hypomanic (H)       propranolol 20 MG tablet    INDERAL    60 tablet    TAKE 1 TABLET BY MOUTH TWICE DAILY    DAYANA (generalized anxiety disorder)       * senna-docusate 8.6-50 MG per tablet    SENOKOT-S;PERICOLACE     Take 1-4 tablets by mouth 2 times daily        * SM STOOL SOFTENER/LAXATIVE 8.6-50 MG per tablet   Generic drug:  senna-docusate     120 tablet    TAKE 1 OR 2 TABLETS BY MOUTH 2 TIMES A DAY    Constipation, unspecified constipation type       SM CHILDRENS ASPIRIN 81 MG chewable tablet   Generic drug:  aspirin     30 tablet    CHEW AND SWALLOW 1 TABLET BY MOUTH DAILY    Health care maintenance       traZODone 50 MG tablet    DESYREL    30 tablet    TAKE 1 TABLET BY MOUTH NIGHTLY AS NEEDED FOR SLEEP    Chronic pain        * Notice:  This list has 11 medication(s) that are the same as other medications prescribed for you. Read the directions carefully, and ask your doctor or other care provider to review them with you.

## 2017-11-02 RX ORDER — HYDROCODONE BITARTRATE AND ACETAMINOPHEN 10; 325 MG/1; MG/1
TABLET ORAL
Qty: 120 TABLET | Refills: 0 | Status: SHIPPED | OUTPATIENT
Start: 2017-11-04 | End: 2017-11-28

## 2017-11-02 RX ORDER — FENTANYL 75 UG/1
PATCH TRANSDERMAL
Qty: 11 PATCH | Refills: 0 | Status: SHIPPED | OUTPATIENT
Start: 2017-11-02 | End: 2017-11-16

## 2017-11-02 RX ORDER — ACETAMINOPHEN 325 MG/1
TABLET ORAL
Qty: 200 TABLET | Refills: 0 | Status: SHIPPED | OUTPATIENT
Start: 2017-11-02 | End: 2018-02-15

## 2017-11-02 RX ORDER — DOCUSATE SODIUM AND SENNOSIDES 50; 8.6 MG/1; MG/1
TABLET ORAL
Qty: 120 TABLET | Refills: 0 | Status: SHIPPED | OUTPATIENT
Start: 2017-11-02 | End: 2017-12-18

## 2017-11-02 NOTE — TELEPHONE ENCOUNTER
Controlled Substance Refill Request for Fentanyl  Problem List Complete:  Yes    Last Written Prescription Date:  10/11/17  Last Fill Quantity: 15,   # refills: 0  Pharmacy can only fill 11 patches.     4:09 PM   Note      Denied this one because already signed by Dr. Fisher this morning.  [facility] called to let us know that they can only fill 11 patches at a time and not the 15.  They want us to know this so won't be denied next time they request            Last Office Visit with [facility] primary care provider: 10/25/17    Future Office visit:   Next 5 appointments (look out 90 days)     Nov 03, 2017 12:30 PM CDT   Return Visit with Shamar Escamilla DC   [facility] [place] [facility] ([facility]    [address]  [place] [address]   [phone]            Nov 28, 2017  3:00 PM CST   (Arrive by 2:45 PM)   Return Visit with Laquita Fernandez MD   [facility] [place] ([facility] [place] )    [address]  [place] [address] [phone]            Jan 29, 2018  2:20 PM CST   (Arrive by 2:00 PM)   SHORT with Lyle Fisher,    [facility] [place] ([facility] [place] )    [address]  [place] [address]   [phone]                  Controlled substance agreement on file: Yes:  Date 11/25/15.     Processing:  Staff will hand deliver Rx to on-site pharmacy     [facility]  Last refill: 10/6/17 #120 R-0  Script starting 11/5/17

## 2017-11-02 NOTE — PROGRESS NOTES
Subjective Finding:    Chief compalint: Patient presents with:  Back Pain: soreness throughout low back  Neck Pain  , Pain Scale: 4/10, Intensity: dull, Duration: 2 days, Radiating: no.    Date of injury:     Activities that the pain restricts:   Home/household/hobbies/social activities: yes.  Work duties: yes.  Sleep: yes.  Makes symptoms better: rest.  Makes symptoms worse: lumbar extension and lumbar flexion.  Have you seen anyone else for the symptoms? No.  Work related: no.  Automobile related injury: no.    Objective and Assessment:    Posture Analysis:   High shoulder: .  Head tilt: .  High iliac crest: .  Head carriage: neutral.  Thoracic Kyphosis: neutral.  Lumbar Lordosis: forward.    Lumbar Range of Motion: flexion decreased and extension decreased.  Cervical Range of Motion: extension decreased.  Thoracic Range of Motion: extension decreased.  Extremity Range of Motion: .    Palpation:   Quad lumb: bilateral, referred pain: no  T paraspinals: dull pain, no    Segmental dysfunction pre-treatment and treatment area: C4, T5, T6 and Sacrum.    Assessment post-treatment:  Cervical: ROM increased.  Thoracic: ROM increased.  Lumbar: ROM increased.    Comments: history of DDD in C and L spine.      Complicating Factors: .    Procedure(s):  CMT:  26277 Chiropractic manipulative treatment 3-4 regions performed   Cervical: Diversified, See above for level, Supine, Thoracic: Diversified, See above for level, Prone and Lumbar: Diversified, See above for level, Side posture    Modalities:  None performed this visit    Therapeutic procedures:  None    Plan:  Treatment plan: PRN.  Instructed patient: stretch as instructed at visit.  Short term goals: increase ROM.  Long term goals: increase ADL.  Prognosis: good.

## 2017-11-03 ENCOUNTER — OFFICE VISIT (OUTPATIENT)
Dept: CHIROPRACTIC MEDICINE | Facility: OTHER | Age: 57
End: 2017-11-03
Attending: CHIROPRACTOR
Payer: COMMERCIAL

## 2017-11-03 DIAGNOSIS — M54.50 ACUTE BILATERAL LOW BACK PAIN WITHOUT SCIATICA: ICD-10-CM

## 2017-11-03 DIAGNOSIS — M99.01 SEGMENTAL AND SOMATIC DYSFUNCTION OF CERVICAL REGION: ICD-10-CM

## 2017-11-03 DIAGNOSIS — M99.02 SEGMENTAL AND SOMATIC DYSFUNCTION OF THORACIC REGION: ICD-10-CM

## 2017-11-03 DIAGNOSIS — M99.03 SEGMENTAL AND SOMATIC DYSFUNCTION OF LUMBAR REGION: Primary | ICD-10-CM

## 2017-11-03 PROCEDURE — 98941 CHIROPRACT MANJ 3-4 REGIONS: CPT | Mod: AT | Performed by: CHIROPRACTOR

## 2017-11-03 RX ORDER — TAMSULOSIN HYDROCHLORIDE 0.4 MG/1
CAPSULE ORAL
Qty: 30 CAPSULE | Refills: 0 | Status: SHIPPED | OUTPATIENT
Start: 2017-11-03 | End: 2018-02-15

## 2017-11-03 NOTE — MR AVS SNAPSHOT
After Visit Summary   11/3/2017    Joshua Barron    MRN: [id number]           Patient Information     Date Of Birth          1960        Visit Information        Provider Department      11/3/2017 12:30 PM Shamar Escamilla DC  [facility]        Today's Diagnoses     Segmental and somatic dysfunction of lumbar region    -  1    Acute bilateral low back pain without sciatica        Segmental and somatic dysfunction of thoracic region        Segmental and somatic dysfunction of cervical region           Follow-ups after your visit        Your next 10 appointments already scheduled     Nov 28, 2017  3:00 PM CST   (Arrive by 2:45 PM)   Return Visit with Laquita Fernandez MD   [facility] [place] ([facility] [place] [facility]    [address] [place]  [place] [id number] [phone]            Jan 29, 2018  2:20 PM CST   (Arrive by 2:00 PM)   SHORT with Lyle Fisher,    [facility] [place] ([facility] [place] )    [address]  [place] [address]   [phone]            Mar 07, 2018  2:45 PM CST   (Arrive by 2:30 PM)   Hearing Eval with Kevin Moreno   [facility] [place] ([facility] [place] )    [address]  [place] [address]   [phone]              Who to contact     If you have questions or need follow up information about today's clinic visit or your schedule please contact  [facility] at [phone].  Normal or non-critical lab and imaging results will be communicated to you by MyChart, letter or phone within 4 business days after the clinic has received the results. If you do not hear from us within 7 days, please contact the clinic through MyChart or phone. If you have a critical or abnormal lab result, we will notify you by phone as soon as possible.  Submit refill requests through [email or web address] or call your pharmacy and they will forward the refill request to us. Please allow 3  "business days for your refill to be completed.          Additional Information About Your Visit        MyChart Information     [facility] [employer] lets you send messages to your doctor, view your test results, renew your prescriptions, schedule appointments and more. To sign up, go to [email or web address] . Click on \"Log in\" on the left side of the screen, which will take you to the Welcome page. Then click on \"Sign up Now\" on the right side of the page.     You will be asked to enter the access code listed below, as well as some personal information. Please follow the directions to create your username and password.     Your access code is: [email or web address]  Expires: 2017  1:03 PM     Your access code will  in 90 days. If you need help or a new code, please call your [place] clinic or [phone].        Care EveryWhere ID     This is your Care EveryWhere ID. This could be used by other organizations to access your [place] medical records  [id number]         Blood Pressure from Last 3 Encounters:   10/31/17 116/78   10/25/17 130/80   10/20/17 160/93    Weight from Last 3 Encounters:   10/31/17 188 lb (85.3 kg)   10/25/17 188 lb 9.6 oz (85.5 kg)   17 191 lb (86.6 kg)              We Performed the Following     CHIROPRAC MANIP,SPINAL,3-4 REGIONS        Primary Care Provider Office Phone # Fax #    Lyle Juan Fisher [phone]       [facility] [place] [address]  [place] [address]        Equal Access to Services     [facility]: Hadii aad ku hadasho Soomaali, waaxda luqadaha, qaybta kaalmada adebelkis, jaki fermin. So [facility] [phone].    ATENCIÓN: Si habla español, tiene a hastings disposición servicios gratuitos de asistencia lingüística. Llame al [phone].    We comply with applicable federal civil rights laws and [place] laws. We do not discriminate on the basis of race, color, national origin, age, disability, sex, sexual " orientation, or gender identity.            Thank you!     [facility] you [facility]  [facility]. Our goal is always to provide you with excellent care. Hearing back from our patients is one way we can continue to improve our services. Please take a few minutes to complete the written survey that you may receive in the mail after your visit with us. Thank you!             Your Updated Medication List - Protect others around you: Learn how to safely use, store and throw away your medicines at www.disposemymeds.org.          This list is accurate as of: 11/3/17 11:59 PM.  Always use your most recent med list.                   Brand Name Dispense Instructions for use Diagnosis    * albuterol 108 (90 BASE) MCG/ACT Inhaler    PROAIR HFA/PROVENTIL HFA/VENTOLIN HFA     Inhale 2 puffs into the lungs every 6 hours as needed for shortness of breath / dyspnea or wheezing        * VENTOLIN  (90 BASE) MCG/ACT Inhaler   Generic drug:  albuterol     18 g    USE 2 PUFFS 4 TIMES A DAY AS NEEDED FOR SHORTNESS OF BREATH    Moderate persistent asthma without complication       atorvastatin 20 MG tablet    LIPITOR    90 tablet    Take 1 tablet (20 mg) by mouth daily    Mixed hyperlipidemia       * baclofen 20 MG tablet    LIORESAL    90 tablet    TAKE 1 TABLET BY MOUTH 3 TIMES DAILY    Back muscle spasm       * baclofen 10 MG tablet    LIORESAL    90 tablet    TAKE 1 TABLET BY MOUTH THREE TIMES DAILY    Low back pain, unspecified back pain laterality, unspecified chronicity, with sciatica presence unspecified       diazepam 5 MG tablet    VALIUM    60 tablet    TAKE 1 TABLET BY MOUTH EVERY 12 HOURS AS NEEDED FOR ANXIETY OR SLEEP SPASM    DAYANA (generalized anxiety disorder)       diclofenac 50 MG EC tablet    VOLTAREN    90 tablet    TAKE 1 TABLET BY MOUTH 3 TIMES DAILY    Lumbago       docusate sodium 100 MG capsule    COLACE    180 capsule    Take 1 capsule (100 mg) by mouth 2 times daily    Drug-induced  constipation       DULERA 100-5 MCG/ACT oral inhaler   Generic drug:  mometasone-formoterol     13 g    INHALE 2 PUFFS INTO THE LUNGS 2 TIMES A DAY    Moderate persistent asthma without complication       fentaNYL 75 mcg/hr 72 hr patch    DURAGESIC    11 patch    APPLY 1 PATCH EVERY 48 HOURS    DDD (degenerative disc disease), cervical       fluticasone 50 MCG/ACT spray    FLONASE    16 g    USE 2 SPRAYS IN EACH NOSTRIL DAILY    Chronic rhinitis, unspecified type       gabapentin 300 MG capsule    NEURONTIN    270 capsule    TAKE 3 CAPSULES BY MOUTH THREE TIMES DAILY    Polyneuropathy associated with underlying disease (H), Low back pain, unspecified back pain laterality, unspecified chronicity, with sciatica presence unspecified       HYDROcodone-acetaminophen  MG per tablet    NORCO    120 tablet    TAKE 1 TABLET BY MOUTH EVERY 6 HOURS AS NEEDED FOR MODERATE TO SEVEREPAIN    Low back pain, unspecified back pain laterality, unspecified chronicity, with sciatica presence unspecified       ibuprofen 600 MG tablet    ADVIL/MOTRIN    120 tablet    TAKE 1 TABLET BY MOUTH EVERY 6 HOURS AS NEEDED    Hx of degenerative disc disease       lidocaine 5 % Patch    LIDODERM    90 patch    PLACE 3 PATCHES ONTO THE SKIN DAILY AS NEEDED FOR MODERATE PAIN    Midline low back pain without sciatica       * lisdexamfetamine 60 MG capsule    VYVANSE    30 capsule    Take 1 capsule (60 mg) by mouth every morning    ADHD (attention deficit hyperactivity disorder), combined type       * lisdexamfetamine 60 MG capsule   Start taking on:  11/22/2017    VYVANSE    30 capsule    Take 1 capsule (60 mg) by mouth every morning    ADHD (attention deficit hyperactivity disorder), combined type       losartan 50 MG tablet    COZAAR    90 tablet    Take 1 tablet (50 mg) by mouth daily    Benign essential hypertension       MAPAP 325 MG tablet   Generic drug:  acetaminophen     200 tablet    TAKE 2 TABLETS BY MOUTH EVERY 4 HOURS AS NEEDED FOR  MILD PAIN    Midline low back pain without sciatica       multivitamin  with iron Tabs     90 tablet    TAKE 1 TABLET BY MOUTH DAILY    Health care maintenance       nicotine polacrilex 2 MG gum    NICORETTE    110 each    CHEW 1 PIECE AS NEEDED FOR SMOKING CESSATION    Tobacco abuse       omeprazole 20 MG CR capsule    priLOSEC    30 capsule    TAKE 1 CAPSULE BY MOUTH EVERY DAY BEFORE A MEAL    Gastroesophageal reflux disease, esophagitis presence not specified       * order for DME     2 Box    Equipment being ordered: Large gloves    Need for assistance with personal care       * order for DME     1 Units    Equipment being ordered: Boa Back brace    DDD (degenerative disc disease), lumbar, Chronic lower back pain       * order for DME     1 Device    1 boa back brace    Chronic low back pain with sciatica, sciatica laterality unspecified, unspecified back pain laterality       OXcarbazepine 600 MG tablet    TRILEPTAL    60 tablet    TAKE 1 TABLET BY MOUTH 2 TIMES DAILY    Bipolar affective disorder, current episode hypomanic (H)       propranolol 20 MG tablet    INDERAL    60 tablet    TAKE 1 TABLET BY MOUTH TWICE DAILY    DAYANA (generalized anxiety disorder)       * senna-docusate 8.6-50 MG per tablet    SENOKOT-S;PERICOLACE     Take 1-4 tablets by mouth 2 times daily        * SM STOOL SOFTENER/LAXATIVE 8.6-50 MG per tablet   Generic drug:  senna-docusate     120 tablet    TAKE 1 OR 2 TABLETS BY MOUTH 2 TIMES A DAY    Constipation, unspecified constipation type       SM CHILDRENS ASPIRIN 81 MG chewable tablet   Generic drug:  aspirin     30 tablet    CHEW AND SWALLOW 1 TABLET BY MOUTH DAILY    Health care maintenance       tamsulosin 0.4 MG capsule    FLOMAX    30 capsule    TAKE 1 CAPSULE BY MOUTH DAILY    Benign prostatic hyperplasia with incomplete bladder emptying       traZODone 50 MG tablet    DESYREL    30 tablet    TAKE 1 TABLET BY MOUTH NIGHTLY AS NEEDED FOR SLEEP    Chronic pain       * Notice:  This  list has 11 medication(s) that are the same as other medications prescribed for you. Read the directions carefully, and ask your doctor or other care provider to review them with you.

## 2017-11-06 NOTE — PROGRESS NOTES

## 2017-11-07 ENCOUNTER — TELEPHONE (OUTPATIENT)
Dept: PEDIATRICS | Facility: OTHER | Age: 57
End: 2017-11-07

## 2017-11-16 DIAGNOSIS — M50.30 DDD (DEGENERATIVE DISC DISEASE), CERVICAL: ICD-10-CM

## 2017-11-16 NOTE — TELEPHONE ENCOUNTER
Duragesic      Last Written Prescription Date: 11/2/17  Last Fill Quantity: 11patch,  # refills: 0   Last Office Visit with G, [facility] or [facility] prescribing provider: 10/25/17

## 2017-11-17 ENCOUNTER — RADIANT APPOINTMENT (OUTPATIENT)
Dept: GENERAL RADIOLOGY | Facility: OTHER | Age: 57
End: 2017-11-17
Attending: INTERNAL MEDICINE
Payer: COMMERCIAL

## 2017-11-17 ENCOUNTER — OFFICE VISIT (OUTPATIENT)
Dept: PEDIATRICS | Facility: OTHER | Age: 57
End: 2017-11-17
Attending: INTERNAL MEDICINE
Payer: COMMERCIAL

## 2017-11-17 VITALS
BODY MASS INDEX: 26.98 KG/M2 | SYSTOLIC BLOOD PRESSURE: 128 MMHG | HEART RATE: 78 BPM | WEIGHT: 188 LBS | DIASTOLIC BLOOD PRESSURE: 74 MMHG | RESPIRATION RATE: 19 BRPM

## 2017-11-17 DIAGNOSIS — R07.81 RIB PAIN ON RIGHT SIDE: ICD-10-CM

## 2017-11-17 DIAGNOSIS — Z23 NEED FOR PROPHYLACTIC VACCINATION AND INOCULATION AGAINST INFLUENZA: Primary | ICD-10-CM

## 2017-11-17 DIAGNOSIS — G89.29 CHRONIC BILATERAL LOW BACK PAIN WITH SCIATICA, SCIATICA LATERALITY UNSPECIFIED: ICD-10-CM

## 2017-11-17 DIAGNOSIS — G89.4 CHRONIC PAIN SYNDROME: ICD-10-CM

## 2017-11-17 DIAGNOSIS — M54.40 CHRONIC BILATERAL LOW BACK PAIN WITH SCIATICA, SCIATICA LATERALITY UNSPECIFIED: ICD-10-CM

## 2017-11-17 PROCEDURE — 99212 OFFICE O/P EST SF 10 MIN: CPT | Mod: 25

## 2017-11-17 PROCEDURE — 99213 OFFICE O/P EST LOW 20 MIN: CPT | Performed by: INTERNAL MEDICINE

## 2017-11-17 PROCEDURE — 90686 IIV4 VACC NO PRSV 0.5 ML IM: CPT | Performed by: INTERNAL MEDICINE

## 2017-11-17 PROCEDURE — 71101 X-RAY EXAM UNILAT RIBS/CHEST: CPT | Mod: TC,RT

## 2017-11-17 PROCEDURE — 90471 IMMUNIZATION ADMIN: CPT | Performed by: INTERNAL MEDICINE

## 2017-11-17 RX ORDER — FENTANYL 75 UG/1
PATCH TRANSDERMAL
Qty: 11 PATCH | Refills: 0 | Status: SHIPPED | OUTPATIENT
Start: 2017-11-24 | End: 2017-12-13

## 2017-11-17 ASSESSMENT — ENCOUNTER SYMPTOMS
DIZZINESS: 0
NAUSEA: 0
PALPITATIONS: 0
VOMITING: 0
DIARRHEA: 0
HEMATURIA: 0
ABDOMINAL PAIN: 0
FEVER: 0
FOCAL WEAKNESS: 0
DYSURIA: 0
COUGH: 0
SHORTNESS OF BREATH: 0
WHEEZING: 0
BACK PAIN: 1
BLOOD IN STOOL: 0
CONSTIPATION: 1

## 2017-11-17 ASSESSMENT — PAIN SCALES - GENERAL: PAINLEVEL: EXTREME PAIN (8)

## 2017-11-17 NOTE — MR AVS SNAPSHOT
After Visit Summary   11/17/2017    Joshua Barron    MRN: [id number]           Patient Information     Date Of Birth          1960        Visit Information        Provider Department      11/17/2017 9:00 AM Lyle Fisher,  [facility] [place]        Today's Diagnoses     Need for prophylactic vaccination and inoculation against influenza    -  1    Rib pain on right side        Chronic bilateral low back pain with sciatica, sciatica laterality unspecified        Chronic pain syndrome           Follow-ups after your visit        Your next 10 appointments already scheduled     Jan 16, 2018  2:40 PM CST   (Arrive by 2:25 PM)   Return Visit with Laquita Fernandez MD   [facility] [place] ([facility] [place] )    [address] [place] [address]  [place] [id number] [phone]            Jan 29, 2018  2:20 PM CST   (Arrive by 2:00 PM)   SHORT with Lyle Fisher DO   [facility] [place] ([facility] [place] )    [address]  [place] [address]   [phone]            Mar 07, 2018  2:45 PM CST   (Arrive by 2:30 PM)   Hearing Eval with Kevin Moreno   [facility] [place] ([facility] [place] )    [address]  [place] [address]   [phone]              Who to contact     If you have questions or need follow up information about today's clinic visit or your schedule please contact [facility] directly at [phone].  Normal or non-critical lab and imaging results will be communicated to you by MyChart, letter or phone within 4 business days after the clinic has received the results. If you do not hear from us within 7 days, please contact the clinic through MyChart or phone. If you have a critical or abnormal lab result, we will notify you by phone as soon as possible.  Submit refill requests through [employer] or call your pharmacy and they will forward the refill request to us. Please allow 3  "business days for your refill to be completed.          Additional Information About Your Visit        MyChart Information     [employer] lets you send messages to your doctor, view your test results, renew your prescriptions, schedule appointments and more. To sign up, go to [email or web address] . Click on \"Log in\" on the left side of the screen, which will take you to the Welcome page. Then click on \"Sign up Now\" on the right side of the page.     You will be asked to enter the access code listed below, as well as some personal information. Please follow the directions to create your username and password.     Your access code is: [email or web address]  Expires: 2018  8:58 AM     Your access code will  in 90 days. If you need help or a new code, please call your [place] clinic or [phone].        Care EveryWhere ID     This is your [facility] EveryWhere ID. This could be used by other organizations to access your [place] medical records  [id number]        Your Vitals Were     Pulse Respirations BMI (Body Mass Index)             78 19 26.98 kg/m2          Blood Pressure from Last 3 Encounters:   17 132/74   17 128/74   10/31/17 116/78    Weight from Last 3 Encounters:   17 188 lb (85.3 kg)   17 188 lb (85.3 kg)   10/31/17 188 lb (85.3 kg)              We Performed the Following     FLU VAC, SPLIT VIRUS IM > 3 YO (QUADRIVALENT) [30207]     Vaccine Administration, Initial [50654]        Primary Care Provider Office Phone # Fax #    Lyle Juan Fisher [phone]       [facility] [place] [address]  [place] [address]        Equal Access to Services     [facility]: Rosie Gary, wasuzie broderick, qarosio kaalmada adebelkis, jaki fermin. So [facility] [phone].    ATENCIÓN: Si habla español, tiene a hastings disposición servicios gratuitos de asistencia lingüística. Llame al [phone].    We comply with applicable " federal civil rights laws and [place] laws. We do not discriminate on the basis of race, color, national origin, age, disability, sex, sexual orientation, or gender identity.            Thank you!     [facility] you for [facility]  for your care. Our goal is always to provide you with excellent care. Hearing back from our patients is one way we can continue to improve our services. Please take a few minutes to complete the written survey that you may receive in the mail after your visit with us. Thank you!             Your Updated Medication List - Protect others around you: Learn how to safely use, store and throw away your medicines at www.disposemymeds.org.          This list is accurate as of: 11/17/17 11:59 PM.  Always use your most recent med list.                   Brand Name Dispense Instructions for use Diagnosis    * albuterol 108 (90 BASE) MCG/ACT Inhaler    PROAIR HFA/PROVENTIL HFA/VENTOLIN HFA     Inhale 2 puffs into the lungs every 6 hours as needed for shortness of breath / dyspnea or wheezing        * VENTOLIN  (90 BASE) MCG/ACT Inhaler   Generic drug:  albuterol     18 g    USE 2 PUFFS 4 TIMES A DAY AS NEEDED FOR SHORTNESS OF BREATH    Moderate persistent asthma without complication       baclofen 20 MG tablet    LIORESAL    90 tablet    TAKE 1 TABLET BY MOUTH 3 TIMES DAILY    Low back pain, unspecified back pain laterality, unspecified chronicity, with sciatica presence unspecified       docusate sodium 100 MG capsule    COLACE    180 capsule    Take 1 capsule (100 mg) by mouth 2 times daily    Drug-induced constipation       DULERA 100-5 MCG/ACT oral inhaler   Generic drug:  mometasone-formoterol     13 g    INHALE 2 PUFFS INTO THE LUNGS 2 TIMES A DAY    Moderate persistent asthma without complication       gabapentin 300 MG capsule    NEURONTIN    270 capsule    TAKE 3 CAPSULES BY MOUTH THREE TIMES DAILY    Polyneuropathy associated with underlying disease (H), Low back  pain, unspecified back pain laterality, unspecified chronicity, with sciatica presence unspecified       * lisdexamfetamine 60 MG capsule    VYVANSE    30 capsule    Take 1 capsule (60 mg) by mouth every morning    ADHD (attention deficit hyperactivity disorder), combined type       * lisdexamfetamine 60 MG capsule    VYVANSE    30 capsule    Take 1 capsule (60 mg) by mouth every morning    ADHD (attention deficit hyperactivity disorder), combined type       MAPAP 325 MG tablet   Generic drug:  acetaminophen     200 tablet    TAKE 2 TABLETS BY MOUTH EVERY 4 HOURS AS NEEDED FOR MILD PAIN    Midline low back pain without sciatica       nicotine polacrilex 2 MG gum    NICORETTE    110 each    CHEW 1 PIECE AS NEEDED FOR SMOKING CESSATION    Tobacco abuse       * order for DME     2 Box    Equipment being ordered: Large gloves    Need for assistance with personal care       * order for DME     1 Units    Equipment being ordered: Boa Back brace    DDD (degenerative disc disease), lumbar, Chronic lower back pain       * order for DME     1 Device    1 boa back brace    Chronic low back pain with sciatica, sciatica laterality unspecified, unspecified back pain laterality       OXcarbazepine 600 MG tablet    TRILEPTAL    60 tablet    TAKE 1 TABLET BY MOUTH 2 TIMES DAILY    Bipolar affective disorder, current episode hypomanic (H)       propranolol 20 MG tablet    INDERAL    60 tablet    TAKE 1 TABLET BY MOUTH TWICE DAILY    DAYANA (generalized anxiety disorder)       senna-docusate 8.6-50 MG per tablet    SENOKOT-S;PERICOLACE     Take 1-4 tablets by mouth 2 times daily        SM CHILDRENS ASPIRIN 81 MG chewable tablet   Generic drug:  aspirin     30 tablet    CHEW AND SWALLOW 1 TABLET BY MOUTH DAILY    Health care maintenance       tamsulosin 0.4 MG capsule    FLOMAX    30 capsule    TAKE 1 CAPSULE BY MOUTH DAILY    Benign prostatic hyperplasia with incomplete bladder emptying       * Notice:  This list has 7 medication(s) that  are the same as other medications prescribed for you. Read the directions carefully, and ask your doctor or other care provider to review them with you.

## 2017-11-17 NOTE — PROGRESS NOTES
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
## 2017-11-17 NOTE — NURSING NOTE
"Chief Complaint   Patient presents with     Back Pain     Fall     states fell a week ago and hurt right side of rib cage        Initial /74  Pulse 78  Resp 19  Wt 188 lb (85.3 kg)  BMI 26.98 kg/m2 Estimated body mass index is 26.98 kg/(m^2) as calculated from the following:    Height as of 7/31/17: 5' 10\" (1.778 m).    Weight as of this encounter: 188 lb (85.3 kg).  Medication Reconciliation: complete   Kristen Breaux       "

## 2017-11-27 DIAGNOSIS — M54.5 LOW BACK PAIN, UNSPECIFIED BACK PAIN LATERALITY, UNSPECIFIED CHRONICITY, WITH SCIATICA PRESENCE UNSPECIFIED: ICD-10-CM

## 2017-11-27 DIAGNOSIS — E78.2 MIXED HYPERLIPIDEMIA: ICD-10-CM

## 2017-11-28 ENCOUNTER — OFFICE VISIT (OUTPATIENT)
Dept: PSYCHIATRY | Facility: OTHER | Age: 57
End: 2017-11-28
Attending: PSYCHIATRY & NEUROLOGY
Payer: COMMERCIAL

## 2017-11-28 VITALS
BODY MASS INDEX: 26.98 KG/M2 | DIASTOLIC BLOOD PRESSURE: 74 MMHG | SYSTOLIC BLOOD PRESSURE: 132 MMHG | WEIGHT: 188 LBS | TEMPERATURE: 98.6 F | HEART RATE: 78 BPM

## 2017-11-28 DIAGNOSIS — F41.1 GAD (GENERALIZED ANXIETY DISORDER): ICD-10-CM

## 2017-11-28 DIAGNOSIS — M54.5 LOW BACK PAIN, UNSPECIFIED BACK PAIN LATERALITY, UNSPECIFIED CHRONICITY, WITH SCIATICA PRESENCE UNSPECIFIED: ICD-10-CM

## 2017-11-28 DIAGNOSIS — F90.2 ADHD (ATTENTION DEFICIT HYPERACTIVITY DISORDER), COMBINED TYPE: Primary | ICD-10-CM

## 2017-11-28 PROCEDURE — 99213 OFFICE O/P EST LOW 20 MIN: CPT | Performed by: PSYCHIATRY & NEUROLOGY

## 2017-11-28 PROCEDURE — 99212 OFFICE O/P EST SF 10 MIN: CPT

## 2017-11-28 RX ORDER — LISDEXAMFETAMINE DIMESYLATE 60 MG/1
60 CAPSULE ORAL EVERY MORNING
Qty: 30 CAPSULE | Refills: 0 | Status: SHIPPED | OUTPATIENT
Start: 2018-01-18 | End: 2018-01-16 | Stop reason: DRUGHIGH

## 2017-11-28 RX ORDER — DIAZEPAM 5 MG
TABLET ORAL
Qty: 60 TABLET | Refills: 5 | Status: SHIPPED | OUTPATIENT
Start: 2017-11-28 | End: 2018-03-27

## 2017-11-28 RX ORDER — LISDEXAMFETAMINE DIMESYLATE 60 MG/1
60 CAPSULE ORAL EVERY MORNING
Qty: 30 CAPSULE | Refills: 0 | Status: SHIPPED | OUTPATIENT
Start: 2017-12-20 | End: 2018-01-29 | Stop reason: DRUGHIGH

## 2017-11-28 ASSESSMENT — ANXIETY QUESTIONNAIRES
2. NOT BEING ABLE TO STOP OR CONTROL WORRYING: SEVERAL DAYS
6. BECOMING EASILY ANNOYED OR IRRITABLE: SEVERAL DAYS
7. FEELING AFRAID AS IF SOMETHING AWFUL MIGHT HAPPEN: SEVERAL DAYS
1. FEELING NERVOUS, ANXIOUS, OR ON EDGE: NOT AT ALL
5. BEING SO RESTLESS THAT IT IS HARD TO SIT STILL: NOT AT ALL
GAD7 TOTAL SCORE: 4
3. WORRYING TOO MUCH ABOUT DIFFERENT THINGS: SEVERAL DAYS

## 2017-11-28 ASSESSMENT — PATIENT HEALTH QUESTIONNAIRE - PHQ9
5. POOR APPETITE OR OVEREATING: NOT AT ALL
SUM OF ALL RESPONSES TO PHQ QUESTIONS 1-9: 9

## 2017-11-28 ASSESSMENT — PAIN SCALES - GENERAL: PAINLEVEL: EXTREME PAIN (8)

## 2017-11-28 NOTE — NURSING NOTE
"Chief Complaint   Patient presents with     RECHECK     Mental health.       Initial /74 (BP Location: Right arm, Patient Position: Sitting, Cuff Size: Adult Regular)  Pulse 78  Temp 98.6  F (37  C) (Tympanic)  Wt 188 lb (85.3 kg)  BMI 26.98 kg/m2 Estimated body mass index is 26.98 kg/(m^2) as calculated from the following:    Height as of 7/31/17: 5' 10\" (1.778 m).    Weight as of this encounter: 188 lb (85.3 kg).  Medication Reconciliation: complete     JACQUELYN SUAREZ      "
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
## 2017-11-28 NOTE — PROGRESS NOTES
"  PSYCHIATRY CLINIC PROGRESS NOTE   20 minute medication management, more than 50% of time spent counseling patient on medications, medication side effects, symptom history and management   SUBJECTIVE / INTERIM HISTORY                                                                       Social- Was  twice. Lives alone with his dog Montserrat (beltran) and 3 cats. Has a GF in [place]  Children-  2 kids, they are in [place]  Last visit :      --  Continue Vyvanse 60 mg daily and last script was 8/30/17 and I will refill today 9/27/17 and 10/25/17 :  .  Continue Trileptal 600 mg bid, Valium 5 mg every 12 hours prn anxiety .      - in lot of pain. To point he is getting physically ill / nauseated and not able to do much other than rest on the couch. Notes clicking and such noises  - his neurosurgeon is in [facility].   - marriage 6 years: not good.   - Valium helped with anxiety and helped with the pain. Does NOT want to take any more as \"I don't want to be sleeping all the time\"  - Lots of family issues: Joshua has taken care of his parents and yet parents give his other brothers everything. oldest of 3 brothers. Brother in [place] youngest [place] and Major is here. Mom and dad here [place] on [place]. Joshua noting moving here to be closer to parents and help them, etc. But, parents don't seem to want him around much or talk to him.  SUBSTANCE USE- denies abuse of meds or substances    SYMPTOMS- issues with attention and concentration improved with Ritalin.  racing thoughts, depressed mood, impulsivity, distractibility  MEDICAL ROS- back pain, denies any issues with headaches or stomach pain / issues with stimulant  MEDICAL / SURGICAL HISTORY                     Patient Active Problem List   Diagnosis     Mixed hyperlipidemia     Tobacco Abuse, History of     Degeneration of lumbar or lumbosacral intervertebral disc     Depression, major     Chronic pain syndrome     Chemical dependency (H)     Chronic rhinitis     Tinnitus of both " ears     ETD (eustachian tube dysfunction)     SNHL (sensorineural hearing loss)     Back pain     Bipolar disorder (H)     Seizure-like activity (H)     Somatic dysfunction of pelvis region     Bilateral foot pain     Preop general physical exam     Trigger index finger of right hand     Moderate persistent asthma without complication     Chronic lower back pain     ACP (advance care planning)     Onychia of toe of left foot     DDD (degenerative disc disease), lumbar     Seizure disorder (H)     Back muscle spasm     Throat pain     Benign essential hypertension     Retrograde ejaculation     ALLERGY   Amoxicillin trihydrate; Clavulanic acid potassium; Cymbalta; and Seasonal allergies  MEDICATIONS                                                                                             Current Outpatient Prescriptions   Medication Sig     ibuprofen (ADVIL/MOTRIN) 600 MG tablet TAKE 1 TABLET BY MOUTH EVERY 6 HOURS AS NEEDED     fentaNYL (DURAGESIC) 75 mcg/hr 72 hr patch APPLY 1 PATCH EVERY 48 HOURS     baclofen (LIORESAL) 20 MG tablet TAKE 1 TABLET BY MOUTH 3 TIMES DAILY     tamsulosin (FLOMAX) 0.4 MG capsule TAKE 1 CAPSULE BY MOUTH DAILY     HYDROcodone-acetaminophen (NORCO)  MG per tablet TAKE 1 TABLET BY MOUTH EVERY 6 HOURS AS NEEDED FOR MODERATE TO SEVEREPAIN     SM STOOL SOFTENER/LAXATIVE 8.6-50 MG per tablet TAKE 1 OR 2 TABLETS BY MOUTH 2 TIMES A DAY     MAPAP 325 MG tablet TAKE 2 TABLETS BY MOUTH EVERY 4 HOURS AS NEEDED FOR MILD PAIN     senna-docusate (SENOKOT-S;PERICOLACE) 8.6-50 MG per tablet Take 1-4 tablets by mouth 2 times daily     VENTOLIN  (90 BASE) MCG/ACT Inhaler USE 2 PUFFS 4 TIMES A DAY AS NEEDED FOR SHORTNESS OF BREATH     DULERA 100-5 MCG/ACT oral inhaler INHALE 2 PUFFS INTO THE LUNGS 2 TIMES A DAY     OXcarbazepine (TRILEPTAL) 600 MG tablet TAKE 1 TABLET BY MOUTH 2 TIMES DAILY     SM CHILDRENS ASPIRIN 81 MG chewable tablet CHEW AND SWALLOW 1 TABLET BY MOUTH DAILY     lidocaine  (LIDODERM) 5 % Patch PLACE 3 PATCHES ONTO THE SKIN DAILY AS NEEDED FOR MODERATE PAIN     diclofenac (VOLTAREN) 50 MG EC tablet TAKE 1 TABLET BY MOUTH 3 TIMES DAILY     multivitamin  with iron (SM COMPLETE ADVANCED FORMULA) TABS TAKE 1 TABLET BY MOUTH DAILY     propranolol (INDERAL) 20 MG tablet TAKE 1 TABLET BY MOUTH TWICE DAILY     losartan (COZAAR) 50 MG tablet Take 1 tablet (50 mg) by mouth daily     fluticasone (FLONASE) 50 MCG/ACT spray USE 2 SPRAYS IN EACH NOSTRIL DAILY     docusate sodium (COLACE) 100 MG capsule Take 1 capsule (100 mg) by mouth 2 times daily     gabapentin (NEURONTIN) 300 MG capsule TAKE 3 CAPSULES BY MOUTH THREE TIMES DAILY     traZODone (DESYREL) 50 MG tablet TAKE 1 TABLET BY MOUTH NIGHTLY AS NEEDED FOR SLEEP     diazepam (VALIUM) 5 MG tablet TAKE 1 TABLET BY MOUTH EVERY 12 HOURS AS NEEDED FOR ANXIETY OR SLEEP SPASM     albuterol (PROAIR HFA/PROVENTIL HFA/VENTOLIN HFA) 108 (90 BASE) MCG/ACT Inhaler Inhale 2 puffs into the lungs every 6 hours as needed for shortness of breath / dyspnea or wheezing     atorvastatin (LIPITOR) 20 MG tablet Take 1 tablet (20 mg) by mouth daily     omeprazole (PRILOSEC) 20 MG CR capsule TAKE 1 CAPSULE BY MOUTH EVERY DAY BEFORE A MEAL     order for DME Equipment being ordered: Boa Back brace     ORDER FOR DME Equipment being ordered: Large gloves     lisdexamfetamine (VYVANSE) 60 MG capsule Take 1 capsule (60 mg) by mouth every morning     lisdexamfetamine (VYVANSE) 60 MG capsule Take 1 capsule (60 mg) by mouth every morning     order for DME 1 boa back brace     nicotine polacrilex (NICORETTE) 2 MG gum CHEW 1 PIECE AS NEEDED FOR SMOKING CESSATION (Patient not taking: Reported on 11/28/2017)     No current facility-administered medications for this visit.        VITALS   /74 (BP Location: Right arm, Patient Position: Sitting, Cuff Size: Adult Regular)  Pulse 78  Temp 98.6  F (37  C) (Tympanic)  Wt 188 lb (85.3 kg)  BMI 26.98 kg/m2     LABS                 "                                                                                                         EKG 2016 with 376 ms (on nortriptyline)  Last Basic Metabolic Panel:  NA      138   6/9/2016   POTASSIUM      4.7   6/9/2016  CHLORIDE      104   6/9/2016  SANDRITA      9.2   6/9/2016  CO2       29   6/9/2016  BUN       25   6/9/2016  CR     0.76   6/9/2016  GLC      173   6/9/2016    Liver Function Studies -   Recent Labs   Lab Test  02/18/15   1536   PROTTOTAL  7.5   ALBUMIN  4.6   BILITOTAL  0.4   ALKPHOS  82   AST  18   ALT  32      MENTAL STATUS EXAM                                                                                        Alert. Oriented to person, place, and date / time. Casually groomed, calm, cooperative with good eye contact. No problems with speech or psychomotor behavior. Mood was described as \"doing okay\" and affect was congruent to speech content and full range. Thought process, including associations, was unremarkable and thought content was devoid of suicidal and homicidal ideation and psychotic thought. No hallucinations. Insight was good. Judgment was intact and adequate for safety. Fund of knowledge was intact. Pt demonstrates no obvious problems with attention, concentration, language, recent or remote memory although these were not formally tested.     ASSESSMENT                                                                                                      HISTORICAL:  Initial psych note 10/6/15          NOTES:      This patient is a 57 year old with bipolar, MDD, bipolar d/o.  He does struggle with depression and seems to be largely related to his chronic pain issues.We started Valium in as dual purpose: muscle relaxant properties and for anxiety. Heladio Newman has helped sificantly. Trent and I have discussed the risks that go along with combination of medications he is on: on several medications with CNS depressant effect and thus he needs to be very careful and NOT use alcohol or " "any other type sedating meds/substances as there are risks including respiratory depression. Valium is 5 mg bid prn and I would not feel comfortable any higher dose given the other medications he is on with CNS depressant potential. Joshua is on a lot of controlled substances thus I reviewed the [place]  and no other prescriptions aside from those his PCP and I prescribe. We have reviewed on benzodiazepine and opioids combo and more risk adverse side effects. Joshua and I feel benefits outweigh risks of continuing the diazepam as helps him in terms of anxiety and for muscle tension. We feel his functioning would significantly be impaired to point of unable to leave his house. As we spoke about this he reminded me of several \"blown disks\" and one ruptured.  He is on nortriptyline which antidepressant and also can help with pain.     No changes today      TREATMENT RISK STATEMENT:  The risks, benefits, alternatives and potential adverse effects have been explained and are understood by the pt.  The pt agrees to the treatment plan with the ability to do so.   The pt knows to call the clinic for any problems or access emergency care if needed.        DIAGNOSES                 (Use of Axes system will continue, even though absent from DSM 5)         Axis I   - ADD               MDD, recurrent,  mod                 Rule out bipolar disorder  Axis II  - no dx  Axis III - chronic pain (s/p back injuries and surgeries)  Axis IV-  Psychosocial Stressors include: financial, lack of support  Axis V - Global Assessment of Functioning current: 45    PLAN                                                                                                                    1)  MEDICATIONS:         --  Continue Vyvanse 60 mg daily and last script was 11/22/17 and gave script for 12/20/17 , 1/18/17 .  Discuss next visit whether increase Vyvanse to 70 mg daily. Continue Trileptal 600 mg bid, Valium 5 mg every 12 hours prn anxiety .  2)  " THERAPY:  No change    3)  LABS:  UDS 5/10/17    4)  PT MONITOR [call for probs]:  SEs from meds, worsening sx, SI/HI    5)  REFERRALS [CD, medical, other]:  None    6)  RTC:  4-6 weeks

## 2017-11-28 NOTE — MR AVS SNAPSHOT
After Visit Summary   11/28/2017    Joshua Barron    MRN: [id number]           Patient Information     Date Of Birth          1960        Visit Information        Provider Department      11/28/2017 3:00 PM Laquita Fernandez MD [facility]        Today's Diagnoses     ADHD (attention deficit hyperactivity disorder), combined type    -  1    DAYANA (generalized anxiety disorder)           Follow-ups after your visit        Your next 10 appointments already scheduled     Jan 16, 2018  2:40 PM CST   (Arrive by 2:25 PM)   Return Visit with Laquita Fernandze MD   [facility] ([facility] [place] )    [address] [place] [address]  [place] [address]   [phone]            Jan 29, 2018  2:20 PM CST   (Arrive by 2:00 PM)   SHORT with Lyle Fisher,    [facility] [place] ([facility] [place] )    [address]  [place] [address]   [phone]            Mar 07, 2018  2:45 PM CST   (Arrive by 2:30 PM)   Hearing Eval with Kevin Moreno   [facility] [place] ([facility] [place] )    [address]  [place] [address]   [phone]              Who to contact     If you have questions or need follow up information about today's clinic visit or your schedule please contact [facility] directly at [phone].  Normal or non-critical lab and imaging results will be communicated to you by MyChart, letter or phone within 4 business days after the clinic has received the results. If you do not hear from us within 7 days, please contact the clinic through MyChart or phone. If you have a critical or abnormal lab result, we will notify you by phone as soon as possible.  Submit refill requests through [employer] or call your pharmacy and they will forward the refill request to us. Please allow 3 business days for your refill to be completed.          Additional Information About Your Visit        [facility]  "Information     [employer] lets you send messages to your doctor, view your test results, renew your prescriptions, schedule appointments and more. To sign up, go to [email or web address] . Click on \"Log in\" on the left side of the screen, which will take you to the Welcome page. Then click on \"Sign up Now\" on the right side of the page.     You will be asked to enter the access code listed below, as well as some personal information. Please follow the directions to create your username and password.     Your access code is: [email or web address]  Expires: 2017  1:03 PM     Your access code will  in 90 days. If you need help or a new code, please call your [facility] clinic or [phone].        Care EveryWhere ID     This is your Care EveryWhere ID. This could be used by other organizations to access your [facility] medical records  [id number]        Your Vitals Were     Pulse Temperature BMI (Body Mass Index)             78 98.6  F (37  C) (Tympanic) 26.98 kg/m2          Blood Pressure from Last 3 Encounters:   17 132/74   17 128/74   10/31/17 116/78    Weight from Last 3 Encounters:   17 188 lb (85.3 kg)   17 188 lb (85.3 kg)   10/31/17 188 lb (85.3 kg)              Today, you had the following     No orders found for display         Today's Medication Changes          These changes are accurate as of: 17  3:50 PM.  If you have any questions, ask your nurse or doctor.               These medicines have changed or have updated prescriptions.        Dose/Directions    diazepam 5 MG tablet   Commonly known as:  VALIUM   This may have changed:  See the new instructions.   Used for:  DAYANA (generalized anxiety disorder)   Changed by:  Laquita Fernandez MD        TAKE 1 TABLET BY MOUTH EVERY 12 HOURS AS NEEDED FOR ANXIETY OR SLEEP SPASM   Quantity:  60 tablet   Refills:  5       * lisdexamfetamine 60 MG capsule   Commonly known as:  VYVANSE   This may have changed:  Another medication with " the same name was added. Make sure you understand how and when to take each.   Used for:  ADHD (attention deficit hyperactivity disorder), combined type   Changed by:  Laquita Fernandez MD        Dose:  60 mg   Take 1 capsule (60 mg) by mouth every morning   Quantity:  30 capsule   Refills:  0       * lisdexamfetamine 60 MG capsule   Commonly known as:  VYVANSE   This may have changed:  Another medication with the same name was added. Make sure you understand how and when to take each.   Used for:  ADHD (attention deficit hyperactivity disorder), combined type   Changed by:  Laquita Fernandez MD        Dose:  60 mg   Start taking on:  12/20/2017   Take 1 capsule (60 mg) by mouth every morning   Quantity:  30 capsule   Refills:  0       * lisdexamfetamine 60 MG capsule   Commonly known as:  VYVANSE   This may have changed:  You were already taking a medication with the same name, and this prescription was added. Make sure you understand how and when to take each.   Used for:  ADHD (attention deficit hyperactivity disorder), combined type   Changed by:  Laquita Fernandez MD        Dose:  60 mg   Start taking on:  1/18/2018   Take 1 capsule (60 mg) by mouth every morning   Quantity:  30 capsule   Refills:  0       * order for DME   This may have changed:  Another medication with the same name was removed. Continue taking this medication, and follow the directions you see here.   Used for:  Need for assistance with personal care   Changed by:  Lyle Fisher DO        Equipment being ordered: Large gloves   Quantity:  2 Box   Refills:  0       * order for DME   This may have changed:  Another medication with the same name was removed. Continue taking this medication, and follow the directions you see here.   Used for:  Chronic low back pain with sciatica, sciatica laterality unspecified, unspecified back pain laterality   Changed by:  Laquita Fernandez MD        1 boa back brace   Quantity:  1 Device    Refills:  0       * Notice:  This list has 5 medication(s) that are the same as other medications prescribed for you. Read the directions carefully, and ask your doctor or other care provider to review them with you.         Where to get your medicines      Some of these will need a paper prescription and others can be bought over the counter.  Ask your nurse if you have questions.     Bring a paper prescription for each of these medications     diazepam 5 MG tablet    lisdexamfetamine 60 MG capsule    lisdexamfetamine 60 MG capsule                Primary Care Provider Office Phone # Fax #    Lyle Fisher,  [phone]       [facility] [place] [address]  [place] [address]        Equal Access to Services     SHAHBAZ DANIELS : Hadii aad ku hadasho Soomaali, waaxda luqadaha, qaybta kaalmada adeegyada, jaki fermin. So [facility] [phone].    ATENCIÓN: Si habla español, tiene a hastings disposición servicios gratuitos de asistencia lingüística. Llame al [phone].    We comply with applicable federal civil rights laws and [place] laws. We do not discriminate on the basis of race, color, national origin, age, disability, sex, sexual orientation, or gender identity.            Thank you!     Thank you for choosing [facility] [place]  for your care. Our goal is always to provide you with excellent care. Hearing back from our patients is one way we can continue to improve our services. Please take a few minutes to complete the written survey that you may receive in the mail after your visit with us. Thank you!             Your Updated Medication List - Protect others around you: Learn how to safely use, store and throw away your medicines at www.disposemymeds.org.          This list is accurate as of: 11/28/17  3:50 PM.  Always use your most recent med list.                   Brand Name Dispense Instructions for use Diagnosis    * albuterol 108 (90  BASE) MCG/ACT Inhaler    PROAIR HFA/PROVENTIL HFA/VENTOLIN HFA     Inhale 2 puffs into the lungs every 6 hours as needed for shortness of breath / dyspnea or wheezing        * VENTOLIN  (90 BASE) MCG/ACT Inhaler   Generic drug:  albuterol     18 g    USE 2 PUFFS 4 TIMES A DAY AS NEEDED FOR SHORTNESS OF BREATH    Moderate persistent asthma without complication       atorvastatin 20 MG tablet    LIPITOR    90 tablet    Take 1 tablet (20 mg) by mouth daily    Mixed hyperlipidemia       baclofen 20 MG tablet    LIORESAL    90 tablet    TAKE 1 TABLET BY MOUTH 3 TIMES DAILY    Low back pain, unspecified back pain laterality, unspecified chronicity, with sciatica presence unspecified       diazepam 5 MG tablet    VALIUM    60 tablet    TAKE 1 TABLET BY MOUTH EVERY 12 HOURS AS NEEDED FOR ANXIETY OR SLEEP SPASM    DAYANA (generalized anxiety disorder)       diclofenac 50 MG EC tablet    VOLTAREN    90 tablet    TAKE 1 TABLET BY MOUTH 3 TIMES DAILY    Lumbago       docusate sodium 100 MG capsule    COLACE    180 capsule    Take 1 capsule (100 mg) by mouth 2 times daily    Drug-induced constipation       DULERA 100-5 MCG/ACT oral inhaler   Generic drug:  mometasone-formoterol     13 g    INHALE 2 PUFFS INTO THE LUNGS 2 TIMES A DAY    Moderate persistent asthma without complication       fentaNYL 75 mcg/hr 72 hr patch    DURAGESIC    11 patch    APPLY 1 PATCH EVERY 48 HOURS    DDD (degenerative disc disease), cervical       fluticasone 50 MCG/ACT spray    FLONASE    16 g    USE 2 SPRAYS IN EACH NOSTRIL DAILY    Chronic rhinitis, unspecified type       gabapentin 300 MG capsule    NEURONTIN    270 capsule    TAKE 3 CAPSULES BY MOUTH THREE TIMES DAILY    Polyneuropathy associated with underlying disease (H), Low back pain, unspecified back pain laterality, unspecified chronicity, with sciatica presence unspecified       HYDROcodone-acetaminophen  MG per tablet    NORCO    120 tablet    TAKE 1 TABLET BY MOUTH EVERY 6  HOURS AS NEEDED FOR MODERATE TO SEVEREPAIN    Low back pain, unspecified back pain laterality, unspecified chronicity, with sciatica presence unspecified       ibuprofen 600 MG tablet    ADVIL/MOTRIN    120 tablet    TAKE 1 TABLET BY MOUTH EVERY 6 HOURS AS NEEDED    Hx of degenerative disc disease       lidocaine 5 % Patch    LIDODERM    90 patch    PLACE 3 PATCHES ONTO THE SKIN DAILY AS NEEDED FOR MODERATE PAIN    Midline low back pain without sciatica       * lisdexamfetamine 60 MG capsule    VYVANSE    30 capsule    Take 1 capsule (60 mg) by mouth every morning    ADHD (attention deficit hyperactivity disorder), combined type       * lisdexamfetamine 60 MG capsule   Start taking on:  12/20/2017    VYVANSE    30 capsule    Take 1 capsule (60 mg) by mouth every morning    ADHD (attention deficit hyperactivity disorder), combined type       * lisdexamfetamine 60 MG capsule   Start taking on:  1/18/2018    VYVANSE    30 capsule    Take 1 capsule (60 mg) by mouth every morning    ADHD (attention deficit hyperactivity disorder), combined type       losartan 50 MG tablet    COZAAR    90 tablet    Take 1 tablet (50 mg) by mouth daily    Benign essential hypertension       MAPAP 325 MG tablet   Generic drug:  acetaminophen     200 tablet    TAKE 2 TABLETS BY MOUTH EVERY 4 HOURS AS NEEDED FOR MILD PAIN    Midline low back pain without sciatica       multivitamin  with iron Tabs     90 tablet    TAKE 1 TABLET BY MOUTH DAILY    Health care maintenance       nicotine polacrilex 2 MG gum    NICORETTE    110 each    CHEW 1 PIECE AS NEEDED FOR SMOKING CESSATION    Tobacco abuse       omeprazole 20 MG CR capsule    priLOSEC    30 capsule    TAKE 1 CAPSULE BY MOUTH EVERY DAY BEFORE A MEAL    Gastroesophageal reflux disease, esophagitis presence not specified       * order for DME     2 Box    Equipment being ordered: Large gloves    Need for assistance with personal care       * order for DME     1 Device    1 boa back brace     Chronic low back pain with sciatica, sciatica laterality unspecified, unspecified back pain laterality       OXcarbazepine 600 MG tablet    TRILEPTAL    60 tablet    TAKE 1 TABLET BY MOUTH 2 TIMES DAILY    Bipolar affective disorder, current episode hypomanic (H)       propranolol 20 MG tablet    INDERAL    60 tablet    TAKE 1 TABLET BY MOUTH TWICE DAILY    DAYANA (generalized anxiety disorder)       * senna-docusate 8.6-50 MG per tablet    SENOKOT-S;PERICOLACE     Take 1-4 tablets by mouth 2 times daily        * SM STOOL SOFTENER/LAXATIVE 8.6-50 MG per tablet   Generic drug:  senna-docusate     120 tablet    TAKE 1 OR 2 TABLETS BY MOUTH 2 TIMES A DAY    Constipation, unspecified constipation type       SM CHILDRENS ASPIRIN 81 MG chewable tablet   Generic drug:  aspirin     30 tablet    CHEW AND SWALLOW 1 TABLET BY MOUTH DAILY    Health care maintenance       tamsulosin 0.4 MG capsule    FLOMAX    30 capsule    TAKE 1 CAPSULE BY MOUTH DAILY    Benign prostatic hyperplasia with incomplete bladder emptying       traZODone 50 MG tablet    DESYREL    30 tablet    TAKE 1 TABLET BY MOUTH NIGHTLY AS NEEDED FOR SLEEP    Chronic pain       * Notice:  This list has 9 medication(s) that are the same as other medications prescribed for you. Read the directions carefully, and ask your doctor or other care provider to review them with you.

## 2017-11-29 ENCOUNTER — OFFICE VISIT (OUTPATIENT)
Dept: CHIROPRACTIC MEDICINE | Facility: OTHER | Age: 57
End: 2017-11-29
Attending: CHIROPRACTOR
Payer: COMMERCIAL

## 2017-11-29 DIAGNOSIS — M99.01 SEGMENTAL AND SOMATIC DYSFUNCTION OF CERVICAL REGION: ICD-10-CM

## 2017-11-29 DIAGNOSIS — M99.02 SEGMENTAL AND SOMATIC DYSFUNCTION OF THORACIC REGION: ICD-10-CM

## 2017-11-29 DIAGNOSIS — M54.50 ACUTE BILATERAL LOW BACK PAIN WITHOUT SCIATICA: ICD-10-CM

## 2017-11-29 DIAGNOSIS — M99.03 SEGMENTAL AND SOMATIC DYSFUNCTION OF LUMBAR REGION: Primary | ICD-10-CM

## 2017-11-29 PROCEDURE — 98941 CHIROPRACT MANJ 3-4 REGIONS: CPT | Mod: AT | Performed by: CHIROPRACTOR

## 2017-11-29 ASSESSMENT — ANXIETY QUESTIONNAIRES: GAD7 TOTAL SCORE: 4

## 2017-11-29 NOTE — MR AVS SNAPSHOT
After Visit Summary   11/29/2017    Joshua Barron    MRN: [id number]           Patient Information     Date Of Birth          1960        Visit Information        Provider Department      11/29/2017 4:30 PM Shamar Escamilla DC [facility]        Today's Diagnoses     Segmental and somatic dysfunction of lumbar region    -  1    Acute bilateral low back pain without sciatica        Segmental and somatic dysfunction of thoracic region        Segmental and somatic dysfunction of cervical region           Follow-ups after your visit        Your next 10 appointments already scheduled     Dec 06, 2017  4:50 PM CST   Return Visit with Shamar Escamilla DC   [facility] [place] [facility]    [address]  [place] [address]   [phone]            Jan 16, 2018  2:40 PM CST   (Arrive by 2:25 PM)   Return Visit with Laquita Fernandez MD   [facility] [place] ([facility] [place] )    [address]  [place] [address]   [phone]            Jan 29, 2018  2:20 PM CST   (Arrive by 2:00 PM)   SHORT with Lyle Fisher,    [facility] [place] ([facility] [place] )    [address]  [place] [address]   [phone]            Mar 07, 2018  2:45 PM CST   (Arrive by 2:30 PM)   Hearing Eval with Kevin Moreno   [facility] [place] ([facility] [place] )    [address]  [place] [address]   [phone]              Who to contact     If you have questions or need follow up information about today's clinic visit or your schedule please contact  [facility] directly at [phone].  Normal or non-critical lab and imaging results will be communicated to you by MyChart, letter or phone within 4 business days after the clinic has received the results. If you do not hear from us within 7 days, please contact the clinic through MyChart or phone. If you have a critical or abnormal lab result,  "we will notify you by phone as soon as possible.  Submit refill requests through Evi or call your pharmacy and they will forward the refill request to us. Please allow 3 business days for your refill to be completed.          Additional Information About Your Visit        [employer] Information     Evi lets you send messages to your doctor, view your test results, renew your prescriptions, schedule appointments and more. To sign up, go to [email or web address] . Click on \"Log in\" on the left side of the screen, which will take you to the Welcome page. Then click on \"Sign up Now\" on the right side of the page.     You will be asked to enter the access code listed below, as well as some personal information. Please follow the directions to create your username and password.     Your access code is: [email or web address]  Expires: 2017  1:03 PM     Your access code will  in 90 days. If you need help or a new code, please call your [place] clinic or [phone].        Care EveryWhere ID     This is your Care EveryWhere ID. This could be used by other organizations to access your [place] medical records  [id number]         Blood Pressure from Last 3 Encounters:   17 132/74   17 128/74   10/31/17 116/78    Weight from Last 3 Encounters:   17 188 lb (85.3 kg)   17 188 lb (85.3 kg)   10/31/17 188 lb (85.3 kg)              We Performed the Following     CHIROPRAC MANIP,SPINAL,3-4 REGIONS        Primary Care Provider Office Phone # Fax #    Lyle Juan Fisher [phone]       [facility] [place] [address]  [place] [address]        Equal Access to Services     SHAHBAZ DANIELS : Hadii jim Gary, waflorianda luqadaha, qaybta kaalmada adechanceyada, jaki fermin. So [facility] [phone].    ATENCIÓN: Si habla español, tiene a hastings disposición servicios gratuitos de asistencia lingüística. Llame al [phone].    We comply with " applicable federal civil rights laws and [place] laws. We do not discriminate on the basis of race, color, national origin, age, disability, sex, sexual orientation, or gender identity.            Thank you!     Thank you for choosing  [facility]  for your care. Our goal is always to provide you with excellent care. Hearing back from our patients is one way we can continue to improve our services. Please take a few minutes to complete the written survey that you may receive in the mail after your visit with us. Thank you!             Your Updated Medication List - Protect others around you: Learn how to safely use, store and throw away your medicines at www.disposemymeds.org.          This list is accurate as of: 11/29/17 11:59 PM.  Always use your most recent med list.                   Brand Name Dispense Instructions for use Diagnosis    * albuterol 108 (90 BASE) MCG/ACT Inhaler    PROAIR HFA/PROVENTIL HFA/VENTOLIN HFA     Inhale 2 puffs into the lungs every 6 hours as needed for shortness of breath / dyspnea or wheezing        * VENTOLIN  (90 BASE) MCG/ACT Inhaler   Generic drug:  albuterol     18 g    USE 2 PUFFS 4 TIMES A DAY AS NEEDED FOR SHORTNESS OF BREATH    Moderate persistent asthma without complication       atorvastatin 20 MG tablet    LIPITOR    90 tablet    Take 1 tablet (20 mg) by mouth daily    Mixed hyperlipidemia       baclofen 20 MG tablet    LIORESAL    90 tablet    TAKE 1 TABLET BY MOUTH 3 TIMES DAILY    Low back pain, unspecified back pain laterality, unspecified chronicity, with sciatica presence unspecified       diazepam 5 MG tablet    VALIUM    60 tablet    TAKE 1 TABLET BY MOUTH EVERY 12 HOURS AS NEEDED FOR ANXIETY OR SLEEP SPASM    DAYANA (generalized anxiety disorder)       diclofenac 50 MG EC tablet    VOLTAREN    90 tablet    TAKE 1 TABLET BY MOUTH 3 TIMES DAILY    Lumbago       docusate sodium 100 MG capsule    COLACE    180 capsule    Take 1 capsule (100 mg) by  mouth 2 times daily    Drug-induced constipation       DULERA 100-5 MCG/ACT oral inhaler   Generic drug:  mometasone-formoterol     13 g    INHALE 2 PUFFS INTO THE LUNGS 2 TIMES A DAY    Moderate persistent asthma without complication       fentaNYL 75 mcg/hr 72 hr patch    DURAGESIC    11 patch    APPLY 1 PATCH EVERY 48 HOURS    DDD (degenerative disc disease), cervical       fluticasone 50 MCG/ACT spray    FLONASE    16 g    USE 2 SPRAYS IN EACH NOSTRIL DAILY    Chronic rhinitis, unspecified type       gabapentin 300 MG capsule    NEURONTIN    270 capsule    TAKE 3 CAPSULES BY MOUTH THREE TIMES DAILY    Polyneuropathy associated with underlying disease (H), Low back pain, unspecified back pain laterality, unspecified chronicity, with sciatica presence unspecified       HYDROcodone-acetaminophen  MG per tablet    NORCO    120 tablet    TAKE 1 TABLET BY MOUTH EVERY 6 HOURS AS NEEDED FOR MODERATE TO SEVEREPAIN    Low back pain, unspecified back pain laterality, unspecified chronicity, with sciatica presence unspecified       ibuprofen 600 MG tablet    ADVIL/MOTRIN    120 tablet    TAKE 1 TABLET BY MOUTH EVERY 6 HOURS AS NEEDED    Hx of degenerative disc disease       lidocaine 5 % Patch    LIDODERM    90 patch    PLACE 3 PATCHES ONTO THE SKIN DAILY AS NEEDED FOR MODERATE PAIN    Midline low back pain without sciatica       * lisdexamfetamine 60 MG capsule    VYVANSE    30 capsule    Take 1 capsule (60 mg) by mouth every morning    ADHD (attention deficit hyperactivity disorder), combined type       * lisdexamfetamine 60 MG capsule   Start taking on:  12/20/2017    VYVANSE    30 capsule    Take 1 capsule (60 mg) by mouth every morning    ADHD (attention deficit hyperactivity disorder), combined type       * lisdexamfetamine 60 MG capsule   Start taking on:  1/18/2018    VYVANSE    30 capsule    Take 1 capsule (60 mg) by mouth every morning    ADHD (attention deficit hyperactivity disorder), combined type        losartan 50 MG tablet    COZAAR    90 tablet    Take 1 tablet (50 mg) by mouth daily    Benign essential hypertension       MAPAP 325 MG tablet   Generic drug:  acetaminophen     200 tablet    TAKE 2 TABLETS BY MOUTH EVERY 4 HOURS AS NEEDED FOR MILD PAIN    Midline low back pain without sciatica       multivitamin  with iron Tabs     90 tablet    TAKE 1 TABLET BY MOUTH DAILY    Health care maintenance       nicotine polacrilex 2 MG gum    NICORETTE    110 each    CHEW 1 PIECE AS NEEDED FOR SMOKING CESSATION    Tobacco abuse       omeprazole 20 MG CR capsule    priLOSEC    30 capsule    TAKE 1 CAPSULE BY MOUTH EVERY DAY BEFORE A MEAL    Gastroesophageal reflux disease, esophagitis presence not specified       * order for DME     2 Box    Equipment being ordered: Large gloves    Need for assistance with personal care       * order for DME     1 Device    1 boa back brace    Chronic low back pain with sciatica, sciatica laterality unspecified, unspecified back pain laterality       OXcarbazepine 600 MG tablet    TRILEPTAL    60 tablet    TAKE 1 TABLET BY MOUTH 2 TIMES DAILY    Bipolar affective disorder, current episode hypomanic (H)       propranolol 20 MG tablet    INDERAL    60 tablet    TAKE 1 TABLET BY MOUTH TWICE DAILY    DAYANA (generalized anxiety disorder)       * senna-docusate 8.6-50 MG per tablet    SENOKOT-S;PERICOLACE     Take 1-4 tablets by mouth 2 times daily        * SM STOOL SOFTENER/LAXATIVE 8.6-50 MG per tablet   Generic drug:  senna-docusate     120 tablet    TAKE 1 OR 2 TABLETS BY MOUTH 2 TIMES A DAY    Constipation, unspecified constipation type       SM CHILDRENS ASPIRIN 81 MG chewable tablet   Generic drug:  aspirin     30 tablet    CHEW AND SWALLOW 1 TABLET BY MOUTH DAILY    Health care maintenance       tamsulosin 0.4 MG capsule    FLOMAX    30 capsule    TAKE 1 CAPSULE BY MOUTH DAILY    Benign prostatic hyperplasia with incomplete bladder emptying       traZODone 50 MG tablet    DESYREL    30  tablet    TAKE 1 TABLET BY MOUTH NIGHTLY AS NEEDED FOR SLEEP    Chronic pain       * Notice:  This list has 9 medication(s) that are the same as other medications prescribed for you. Read the directions carefully, and ask your doctor or other care provider to review them with you.

## 2017-11-30 RX ORDER — HYDROCODONE BITARTRATE AND ACETAMINOPHEN 10; 325 MG/1; MG/1
TABLET ORAL
Qty: 120 TABLET | Refills: 0 | Status: SHIPPED | OUTPATIENT
Start: 2017-12-02 | End: 2018-01-02

## 2017-11-30 NOTE — PROGRESS NOTES
Subjective Finding:    Chief compalint: Patient presents with:  Back Pain  , Pain Scale: 4/10, Intensity: dull, Duration: 2 days, Radiating: no.    Date of injury:     Activities that the pain restricts:   Home/household/hobbies/social activities: yes.  Work duties: yes.  Sleep: yes.  Makes symptoms better: rest.  Makes symptoms worse: lumbar extension and lumbar flexion.  Have you seen anyone else for the symptoms? No.  Work related: no.  Automobile related injury: no.    Objective and Assessment:    Posture Analysis:   High shoulder: .  Head tilt: .  High iliac crest: .  Head carriage: neutral.  Thoracic Kyphosis: neutral.  Lumbar Lordosis: forward.    Lumbar Range of Motion: flexion decreased and extension decreased.  Cervical Range of Motion: extension decreased.  Thoracic Range of Motion: extension decreased.  Extremity Range of Motion: .    Palpation:   Quad lumb: bilateral, referred pain: no  T paraspinals: dull pain, no    Segmental dysfunction pre-treatment and treatment area: C4, T5, T6 and Sacrum.    Assessment post-treatment:  Cervical: ROM increased.  Thoracic: ROM increased.  Lumbar: ROM increased.    Comments: history of DDD in C and L spine.      Complicating Factors: .    Procedure(s):  CMT:  27673 Chiropractic manipulative treatment 3-4 regions performed   Cervical: Diversified, See above for level, Supine, Thoracic: Diversified, See above for level, Prone and Lumbar: Diversified, See above for level, Side posture    Modalities:  None performed this visit    Therapeutic procedures:  None    Plan:  Treatment plan: PRN.  Instructed patient: stretch as instructed at visit.  Short term goals: increase ROM.  Long term goals: increase ADL.  Prognosis: good.

## 2017-11-30 NOTE — TELEPHONE ENCOUNTER
norco      Last Written Prescription Date: 11/4/17  Last Fill Quantity: 120,  # refills: 0   Last Office Visit with G, [facility] or [facility] prescribing provider: 11/17/17                                         Next 5 appointments (look out 90 days)     Dec 06, 2017  4:50 PM CST   Return Visit with Shamar Escamilla DC   [facility] [place] [facility]    [address]  [place] [address]   [phone]            Jan 16, 2018  2:40 PM CST   (Arrive by 2:25 PM)   Return Visit with Laquita Fernandez MD   [facility] [place] ([facility] [place] )    [address]  [place] [address] [phone]            Jan 29, 2018  2:20 PM CST   (Arrive by 2:00 PM)   SHORT with Lyle Fisher DO   [facility] [place] ([facility] [place] )    [address]  [place] [address]   [phone]

## 2017-12-01 RX ORDER — ATORVASTATIN CALCIUM 20 MG/1
TABLET, FILM COATED ORAL
Qty: 30 TABLET | Refills: 0 | Status: SHIPPED | OUTPATIENT
Start: 2017-12-01 | End: 2018-02-01

## 2017-12-01 RX ORDER — BACLOFEN 10 MG/1
TABLET ORAL
Qty: 90 TABLET | Refills: 0 | OUTPATIENT
Start: 2017-12-01

## 2017-12-04 DIAGNOSIS — M54.50 MIDLINE LOW BACK PAIN WITHOUT SCIATICA: ICD-10-CM

## 2017-12-04 DIAGNOSIS — K21.9 GASTROESOPHAGEAL REFLUX DISEASE, ESOPHAGITIS PRESENCE NOT SPECIFIED: ICD-10-CM

## 2017-12-04 DIAGNOSIS — M54.50 LUMBAGO: ICD-10-CM

## 2017-12-04 DIAGNOSIS — J31.0 CHRONIC RHINITIS, UNSPECIFIED TYPE: ICD-10-CM

## 2017-12-04 DIAGNOSIS — G89.29 CHRONIC PAIN: ICD-10-CM

## 2017-12-04 DIAGNOSIS — Z00.00 HEALTH CARE MAINTENANCE: ICD-10-CM

## 2017-12-04 DIAGNOSIS — I10 BENIGN ESSENTIAL HYPERTENSION: ICD-10-CM

## 2017-12-06 ENCOUNTER — OFFICE VISIT (OUTPATIENT)
Dept: CHIROPRACTIC MEDICINE | Facility: OTHER | Age: 57
End: 2017-12-06
Attending: CHIROPRACTOR
Payer: COMMERCIAL

## 2017-12-06 DIAGNOSIS — M99.02 SEGMENTAL AND SOMATIC DYSFUNCTION OF THORACIC REGION: ICD-10-CM

## 2017-12-06 DIAGNOSIS — M99.01 SEGMENTAL AND SOMATIC DYSFUNCTION OF CERVICAL REGION: ICD-10-CM

## 2017-12-06 DIAGNOSIS — M99.03 SEGMENTAL AND SOMATIC DYSFUNCTION OF LUMBAR REGION: Primary | ICD-10-CM

## 2017-12-06 DIAGNOSIS — M54.50 ACUTE BILATERAL LOW BACK PAIN WITHOUT SCIATICA: ICD-10-CM

## 2017-12-06 PROCEDURE — 98941 CHIROPRACT MANJ 3-4 REGIONS: CPT | Mod: AT | Performed by: CHIROPRACTOR

## 2017-12-06 NOTE — MR AVS SNAPSHOT
After Visit Summary   12/6/2017    Joshua Barron    MRN: [id number]           Patient Information     Date Of Birth          1960        Visit Information        Provider Department      12/6/2017 3:50 PM Shamar Escamilla DC  [facility]        Today's Diagnoses     Segmental and somatic dysfunction of lumbar region    -  1    Acute bilateral low back pain without sciatica        Segmental and somatic dysfunction of thoracic region        Segmental and somatic dysfunction of cervical region           Follow-ups after your visit        Your next 10 appointments already scheduled     Jan 16, 2018  2:40 PM CST   (Arrive by 2:25 PM)   Return Visit with Laquita Fernandez MD   [facility] [place] ([facility] [place] )    [address]  [place] [address] [phone]            Jan 29, 2018  2:20 PM CST   (Arrive by 2:00 PM)   SHORT with Lyle Fisher,    [facility] [place] ([facility] [place] )    [address]  [place] [address]   [phone]            Mar 07, 2018  2:45 PM CST   (Arrive by 2:30 PM)   Hearing Eval with Kevin Moreno   [facility] [place] ([facility] [place] )    [address]  [place] [address]   [phone]              Who to contact     If you have questions or need follow up information about today's clinic visit or your schedule please contact  [facility] directly at [phone].  Normal or non-critical lab and imaging results will be communicated to you by MyChart, letter or phone within 4 business days after the clinic has received the results. If you do not hear from us within 7 days, please contact the clinic through MyChart or phone. If you have a critical or abnormal lab result, we will notify you by phone as soon as possible.  Submit refill requests through [employer] or call your pharmacy and they will forward the refill request to us. Please allow 3  "business days for your refill to be completed.          Additional Information About Your Visit        MyChart Information     Minbox lets you send messages to your doctor, view your test results, renew your prescriptions, schedule appointments and more. To sign up, go to [email or web address] . Click on \"Log in\" on the left side of the screen, which will take you to the Welcome page. Then click on \"Sign up Now\" on the right side of the page.     You will be asked to enter the access code listed below, as well as some personal information. Please follow the directions to create your username and password.     Your access code is: [email or web address]  Expires: 2017  1:03 PM     Your access code will  in 90 days. If you need help or a new code, please call your [place] clinic or [phone].        Care EveryWhere ID     This is your Care EveryWhere ID. This could be used by other organizations to access your [place] medical records  [id number]         Blood Pressure from Last 3 Encounters:   17 132/74   17 128/74   10/31/17 116/78    Weight from Last 3 Encounters:   17 188 lb (85.3 kg)   17 188 lb (85.3 kg)   10/31/17 188 lb (85.3 kg)              We Performed the Following     CHIROPRAC MANIP,SPINAL,3-4 REGIONS        Primary Care Provider Office Phone # Fax #    Lyle Juan Fisher [phone]       [facility] [place] [address]  [place] [address]        Equal Access to Services     [facility]: Hadii aad ku hadasho Soomaali, waaxda luqadaha, qaybta kaalmada adeegyada, jaki millan . So [facility] [phone].    ATENCIÓN: Si habla español, tiene a hastings disposición servicios gratuitos de asistencia lingüística. Llame al [phone].    We comply with applicable federal civil rights laws and [place] laws. We do not discriminate on the basis of race, color, national origin, age, disability, sex, sexual orientation, or " gender identity.            Thank you!     [facility] you for choosing  [facility]  [facility] your [facility]. Our goal is always to provide you with excellent care. Hearing back from our patients is one way we can continue to improve our services. Please take a few minutes to complete the written survey that you may receive in the mail after your visit with us. Thank you!             Your Updated Medication List - Protect others around you: Learn how to safely use, store and throw away your medicines at www.disposemymeds.org.          This list is accurate as of: 12/6/17 11:59 PM.  Always use your most recent med list.                   Brand Name Dispense Instructions for use Diagnosis    * albuterol 108 (90 BASE) MCG/ACT Inhaler    PROAIR HFA/PROVENTIL HFA/VENTOLIN HFA     Inhale 2 puffs into the lungs every 6 hours as needed for shortness of breath / dyspnea or wheezing        * VENTOLIN  (90 BASE) MCG/ACT Inhaler   Generic drug:  albuterol     18 g    USE 2 PUFFS 4 TIMES A DAY AS NEEDED FOR SHORTNESS OF BREATH    Moderate persistent asthma without complication       atorvastatin 20 MG tablet    LIPITOR    30 tablet    TAKE 1 TABLET BY MOUTH DAILY    Mixed hyperlipidemia       baclofen 20 MG tablet    LIORESAL    90 tablet    TAKE 1 TABLET BY MOUTH 3 TIMES DAILY    Low back pain, unspecified back pain laterality, unspecified chronicity, with sciatica presence unspecified       diazepam 5 MG tablet    VALIUM    60 tablet    TAKE 1 TABLET BY MOUTH EVERY 12 HOURS AS NEEDED FOR ANXIETY OR SLEEP SPASM    DAYANA (generalized anxiety disorder)       diclofenac 50 MG EC tablet    VOLTAREN    90 tablet    TAKE 1 TABLET BY MOUTH 3 TIMES DAILY    Lumbago       docusate sodium 100 MG capsule    COLACE    180 capsule    Take 1 capsule (100 mg) by mouth 2 times daily    Drug-induced constipation       DULERA 100-5 MCG/ACT oral inhaler   Generic drug:  mometasone-formoterol     13 g    INHALE 2 PUFFS INTO THE LUNGS 2 TIMES A DAY     Moderate persistent asthma without complication       fentaNYL 75 mcg/hr 72 hr patch    DURAGESIC    11 patch    APPLY 1 PATCH EVERY 48 HOURS    DDD (degenerative disc disease), cervical       fluticasone 50 MCG/ACT spray    FLONASE    16 g    USE 2 SPRAYS IN EACH NOSTRIL DAILY    Chronic rhinitis, unspecified type       gabapentin 300 MG capsule    NEURONTIN    270 capsule    TAKE 3 CAPSULES BY MOUTH THREE TIMES DAILY    Polyneuropathy associated with underlying disease (H), Low back pain, unspecified back pain laterality, unspecified chronicity, with sciatica presence unspecified       HYDROcodone-acetaminophen  MG per tablet    NORCO    120 tablet    TAKE 1 TABLET BY MOUTH EVERY 6 HOURS AS NEEDED FOR MODERATE TO SEVEREPAIN. MUST LAST 30 DAYS    Low back pain, unspecified back pain laterality, unspecified chronicity, with sciatica presence unspecified       ibuprofen 600 MG tablet    ADVIL/MOTRIN    120 tablet    TAKE 1 TABLET BY MOUTH EVERY 6 HOURS AS NEEDED    Hx of degenerative disc disease       lidocaine 5 % Patch    LIDODERM    90 patch    PLACE 3 PATCHES ONTO THE SKIN DAILY AS NEEDED FOR MODERATE PAIN    Midline low back pain without sciatica       * lisdexamfetamine 60 MG capsule    VYVANSE    30 capsule    Take 1 capsule (60 mg) by mouth every morning    ADHD (attention deficit hyperactivity disorder), combined type       * lisdexamfetamine 60 MG capsule   Start taking on:  12/20/2017    VYVANSE    30 capsule    Take 1 capsule (60 mg) by mouth every morning    ADHD (attention deficit hyperactivity disorder), combined type       * lisdexamfetamine 60 MG capsule   Start taking on:  1/18/2018    VYVANSE    30 capsule    Take 1 capsule (60 mg) by mouth every morning    ADHD (attention deficit hyperactivity disorder), combined type       losartan 50 MG tablet    COZAAR    30 tablet    TAKE 1 TABLET BY MOUTH DAILY    Benign essential hypertension       MAPAP 325 MG tablet   Generic drug:  acetaminophen      200 tablet    TAKE 2 TABLETS BY MOUTH EVERY 4 HOURS AS NEEDED FOR MILD PAIN    Midline low back pain without sciatica       multivitamin  with iron Tabs     30 tablet    TAKE 1 TABLET BY MOUTH DAILY    Health care maintenance       nicotine polacrilex 2 MG gum    NICORETTE    110 each    CHEW 1 PIECE AS NEEDED FOR SMOKING CESSATION    Tobacco abuse       omeprazole 20 MG CR capsule    priLOSEC    30 capsule    TAKE 1 CAPSULE BY MOUTH EVERY DAY BEFORE A MEAL    Gastroesophageal reflux disease, esophagitis presence not specified       * order for DME     2 Box    Equipment being ordered: Large gloves    Need for assistance with personal care       * order for DME     1 Device    1 boa back brace    Chronic low back pain with sciatica, sciatica laterality unspecified, unspecified back pain laterality       OXcarbazepine 600 MG tablet    TRILEPTAL    60 tablet    TAKE 1 TABLET BY MOUTH 2 TIMES DAILY    Bipolar affective disorder, current episode hypomanic (H)       propranolol 20 MG tablet    INDERAL    60 tablet    TAKE 1 TABLET BY MOUTH TWICE DAILY    DAYANA (generalized anxiety disorder)       * senna-docusate 8.6-50 MG per tablet    SENOKOT-S;PERICOLACE     Take 1-4 tablets by mouth 2 times daily        * SM STOOL SOFTENER/LAXATIVE 8.6-50 MG per tablet   Generic drug:  senna-docusate     120 tablet    TAKE 1 OR 2 TABLETS BY MOUTH 2 TIMES A DAY    Constipation, unspecified constipation type       SM CHILDRENS ASPIRIN 81 MG chewable tablet   Generic drug:  aspirin     30 tablet    CHEW AND SWALLOW 1 TABLET BY MOUTH DAILY    Health care maintenance       tamsulosin 0.4 MG capsule    FLOMAX    30 capsule    TAKE 1 CAPSULE BY MOUTH DAILY    Benign prostatic hyperplasia with incomplete bladder emptying       traZODone 50 MG tablet    DESYREL    30 tablet    TAKE 1 TABLET BY MOUTH NIGHTLY AS NEEDED FOR SLEEP    Chronic pain       * Notice:  This list has 9 medication(s) that are the same as other medications prescribed  for you. Read the directions carefully, and ask your doctor or other care provider to review them with you.

## 2017-12-07 RX ORDER — LOSARTAN POTASSIUM 50 MG/1
TABLET ORAL
Qty: 30 TABLET | Refills: 11 | Status: SHIPPED | OUTPATIENT
Start: 2017-12-07 | End: 2018-02-15

## 2017-12-07 RX ORDER — FLUTICASONE PROPIONATE 50 MCG
SPRAY, SUSPENSION (ML) NASAL
Qty: 16 G | Refills: 11 | Status: SHIPPED | OUTPATIENT
Start: 2017-12-07 | End: 2018-02-15

## 2017-12-07 RX ORDER — TRAZODONE HYDROCHLORIDE 50 MG/1
TABLET, FILM COATED ORAL
Qty: 30 TABLET | Refills: 11 | Status: SHIPPED | OUTPATIENT
Start: 2017-12-07 | End: 2018-02-15

## 2017-12-07 RX ORDER — LIDOCAINE 50 MG/G
PATCH TOPICAL
Qty: 90 PATCH | Refills: 11 | Status: SHIPPED | OUTPATIENT
Start: 2017-12-07 | End: 2019-01-09

## 2017-12-07 RX ORDER — MV,CAL,MIN/IRON/FOLIC ACID/LUT 18-500-300
TABLET ORAL
Qty: 30 TABLET | Refills: 11 | Status: SHIPPED | OUTPATIENT
Start: 2017-12-07 | End: 2019-02-23

## 2017-12-07 NOTE — PROGRESS NOTES

## 2017-12-13 DIAGNOSIS — M50.30 DDD (DEGENERATIVE DISC DISEASE), CERVICAL: ICD-10-CM

## 2017-12-15 NOTE — TELEPHONE ENCOUNTER
Controlled Substance Refill Request for Fentanyl  Problem List Complete:  Yes    Last Written Prescription Date:  11/17/17 start date 11/24/17  Last Fill Quantity: 11,   # refills: 0    Last Office Visit with [facility] primary care provider: 11/17/17    Future Office visit:   Next 5 appointments (look out 90 days)     Jan 16, 2018  2:40 PM CST   (Arrive by 2:25 PM)   Return Visit with La[facility] Fernandez MD   [facility]    [address] [place], [place] [address] [address] [phone]            Jan 29, 2018  2:20 PM CST   (Arrive by 2:00 PM)   SHORT with Lyle Fisher,    [facility]    [address] [place] [address]   [phone]                  Controlled substance agreement on file: Yes:  Date 11/25/15.     Processing:  Staff will hand deliver Rx to on-site pharmacy    PCP Natalia.  Thank you.

## 2017-12-18 ENCOUNTER — TELEPHONE (OUTPATIENT)
Dept: INTERNAL MEDICINE | Facility: OTHER | Age: 57
End: 2017-12-18

## 2017-12-18 RX ORDER — FENTANYL 75 UG/1
PATCH TRANSDERMAL
Qty: 11 PATCH | Refills: 0 | Status: SHIPPED | OUTPATIENT
Start: 2017-12-18 | End: 2018-01-04

## 2017-12-18 NOTE — TELEPHONE ENCOUNTER
12:37 PM    Reason for Call: Phone Call    Description: Pt called and states that he would like a call back regarding his medications     Was an appointment offered for this call? No  If yes : Appointment type              Date    Preferred method for responding to this message: Telephone Call  What is your phone number ?    If we cannot reach you directly, may we leave a detailed response at the number you provided? Yes    Can this message wait until your PCP/provider returns, if available today? Geovanna Elliott

## 2017-12-18 NOTE — TELEPHONE ENCOUNTER
Fentanyl prescription carried to [facility] from [facility]. Jennifer Segovia LPN

## 2017-12-18 NOTE — TELEPHONE ENCOUNTER
Pt called wondering where his patches were- notified Leticia (refill line nurse) that it will be coming via carrier this afternoon. Jennifer Segovia LPN

## 2018-01-02 DIAGNOSIS — M54.5 LOW BACK PAIN, UNSPECIFIED BACK PAIN LATERALITY, UNSPECIFIED CHRONICITY, WITH SCIATICA PRESENCE UNSPECIFIED: ICD-10-CM

## 2018-01-04 DIAGNOSIS — M50.30 DDD (DEGENERATIVE DISC DISEASE), CERVICAL: ICD-10-CM

## 2018-01-04 RX ORDER — HYDROCODONE BITARTRATE AND ACETAMINOPHEN 10; 325 MG/1; MG/1
TABLET ORAL
Qty: 120 TABLET | Refills: 0 | Status: SHIPPED | OUTPATIENT
Start: 2018-01-04 | End: 2018-01-17

## 2018-01-04 NOTE — TELEPHONE ENCOUNTER
fentanyl      Last Written Prescription Date: 12/18/17  Last Fill Quantity: 11,  # refills: 0   Last Office Visit with [facility], [facility] or [facility] prescribing provider: 11/17/17                                         Next 5 appointments (look out 90 days)     Jan 16, 2018  2:40 PM CST   (Arrive by 2:25 PM)   Return Visit with Laquita Fernandez MD   [facility] [place] ([facility] [place] [facility]    [address] [place] [address]  [place] [address] [phone]            Jan 29, 2018  2:20 PM CST   (Arrive by 2:00 PM)   SHORT with Lyle Fisher DO   [facility] [place] ([facility] [place] [facility]    [facility] [place] [address]   [phone]

## 2018-01-05 DIAGNOSIS — M54.5 LOW BACK PAIN, UNSPECIFIED BACK PAIN LATERALITY, UNSPECIFIED CHRONICITY, WITH SCIATICA PRESENCE UNSPECIFIED: ICD-10-CM

## 2018-01-05 DIAGNOSIS — G63 POLYNEUROPATHY ASSOCIATED WITH UNDERLYING DISEASE (H): ICD-10-CM

## 2018-01-05 DIAGNOSIS — K59.03 DRUG-INDUCED CONSTIPATION: ICD-10-CM

## 2018-01-05 DIAGNOSIS — J45.40 MODERATE PERSISTENT ASTHMA WITHOUT COMPLICATION: ICD-10-CM

## 2018-01-05 RX ORDER — FENTANYL 75 UG/1
PATCH TRANSDERMAL
Qty: 11 PATCH | Refills: 0 | Status: SHIPPED | OUTPATIENT
Start: 2018-01-10 | End: 2018-01-25

## 2018-01-08 NOTE — TELEPHONE ENCOUNTER
ventolin      Last Written Prescription Date:  10/12/17  Last Fill Quantity: 18 g,   # refills: 2  Last Office Visit: 11/17/17    dulera      Last Written Prescription Date:  10/12/17  Last Fill Quantity: 13 g,   # refills: 2  Last Office Visit: 11/17/17  Future Office visit:    Next 5 appointments (look out 90 days)     Jan 16, 2018  2:40 PM CST   (Arrive by 2:25 PM)   Return Visit with Laquita Fernandez MD   [facility] [place] ([facility] [place] [facility]    [address] [place] [address]  [place] [address]   [phone]            Jan 29, 2018  2:20 PM CST   (Arrive by 2:00 PM)   SHORT with Lyle Fisher DO   [facility] [place] ([facility] [place] )    [address]  [place] [address]   [phone]                        Future Office visit:    Next 5 appointments (look out 90 days)     Jan 16, 2018  2:40 PM CST   (Arrive by 2:25 PM)   Return Visit with Laquita Fernandez MD   [facility] [place] ([facility] [place] )    [address]  [place] [address]   [phone]            Jan 29, 2018  2:20 PM CST   (Arrive by 2:00 PM)   SHORT with Lyle Fisher DO   [facility] [place] ([facility] [place] )    [address]  [place] [address]   [phone]

## 2018-01-08 NOTE — TELEPHONE ENCOUNTER
gabapentin      Last Written Prescription Date:  7/12/17  Last Fill Quantity: 270,   # refills: 5  Last Office Visit: 11/17/17  Future Office visit:      DOK      Last Written Prescription Date:  Not on medication list  Last Fill Quantity: ,   # refills:   Last Office Visit: 11/17/17  Future Office visit:    Next 5 appointments (look out 90 days)     Jan 16, 2018  2:40 PM CST   (Arrive by 2:25 PM)   Return Visit with Laquita Fernandez MD   [facility] [place] ([facility] [place] [facility]    [address] [place] [address]  [place] [address]   [phone]            Jan 29, 2018  2:20 PM CST   (Arrive by 2:00 PM)   SHORT with Lyle Fisher DO   [facility] [place] ([facility] [place] [facility]    [address] [facility]  [place] [address]   [phone]                        Next 5 appointments (look out 90 days)     Jan 16, 2018  2:40 PM CST   (Arrive by 2:25 PM)   Return Visit with Laquita Fernandez MD   [facility] [place] ([facility] [place] [facility]    [address] [place] [address]  [place] [address]   [phone]            Jan 29, 2018  2:20 PM CST   (Arrive by 2:00 PM)   SHORT with Lyle Fisher DO   [facility] [place] ([facility] [place] )    [address]  [place] [address]   [phone]

## 2018-01-09 RX ORDER — DOCUSATE SODIUM 100 MG/1
CAPSULE, LIQUID FILLED ORAL
Qty: 60 CAPSULE | Refills: 5 | Status: SHIPPED | OUTPATIENT
Start: 2018-01-09 | End: 2018-02-15

## 2018-01-09 RX ORDER — GABAPENTIN 300 MG/1
CAPSULE ORAL
Qty: 270 CAPSULE | Refills: 0 | Status: SHIPPED | OUTPATIENT
Start: 2018-01-09 | End: 2018-02-15

## 2018-01-09 RX ORDER — ALBUTEROL SULFATE 90 UG/1
AEROSOL, METERED RESPIRATORY (INHALATION)
Qty: 18 G | Refills: 0 | Status: SHIPPED | OUTPATIENT
Start: 2018-01-09 | End: 2018-02-15

## 2018-01-09 RX ORDER — MOMETASONE FUROATE AND FORMOTEROL FUMARATE DIHYDRATE 100; 5 UG/1; UG/1
AEROSOL RESPIRATORY (INHALATION)
Qty: 13 G | Refills: 0 | Status: SHIPPED | OUTPATIENT
Start: 2018-01-09 | End: 2018-02-15

## 2018-01-12 ENCOUNTER — TELEPHONE (OUTPATIENT)
Dept: PEDIATRICS | Facility: OTHER | Age: 58
End: 2018-01-12

## 2018-01-12 NOTE — TELEPHONE ENCOUNTER
8:24 AM    Reason for Call: OVERBOOK    Patient is having the following symptoms: medication review.    The patient is requesting an appointment for sooner than the 1-29-18 appointment with Dr Fisher.    Was an appointment offered for this call? Yes, patient has one scheduled for 1-29-19, but has court that day.  If yes : Appointment type              Date    Preferred method for responding to this message: Telephone Call  What is your phone number ? [phone]    If we cannot reach you directly, may we leave a detailed response at the number you provided? Yes    Can this message wait until your PCP/provider returns, if unavailable today? YES    Brenda Kaplan

## 2018-01-16 ENCOUNTER — OFFICE VISIT (OUTPATIENT)
Dept: PSYCHIATRY | Facility: OTHER | Age: 58
End: 2018-01-16
Attending: PSYCHIATRY & NEUROLOGY
Payer: COMMERCIAL

## 2018-01-16 ENCOUNTER — ALLIED HEALTH/NURSE VISIT (OUTPATIENT)
Dept: AUDIOLOGY | Facility: OTHER | Age: 58
End: 2018-01-16
Attending: AUDIOLOGIST
Payer: COMMERCIAL

## 2018-01-16 VITALS
SYSTOLIC BLOOD PRESSURE: 84 MMHG | HEART RATE: 99 BPM | TEMPERATURE: 99 F | DIASTOLIC BLOOD PRESSURE: 54 MMHG | BODY MASS INDEX: 26.98 KG/M2 | WEIGHT: 188 LBS

## 2018-01-16 DIAGNOSIS — H90.3 SENSORINEURAL HEARING LOSS (SNHL) OF BOTH EARS: ICD-10-CM

## 2018-01-16 DIAGNOSIS — F90.2 ADHD (ATTENTION DEFICIT HYPERACTIVITY DISORDER), COMBINED TYPE: Primary | ICD-10-CM

## 2018-01-16 PROCEDURE — 99214 OFFICE O/P EST MOD 30 MIN: CPT | Performed by: PSYCHIATRY & NEUROLOGY

## 2018-01-16 PROCEDURE — G0463 HOSPITAL OUTPT CLINIC VISIT: HCPCS

## 2018-01-16 PROCEDURE — V5266 BATTERY FOR HEARING DEVICE: HCPCS

## 2018-01-16 RX ORDER — LISDEXAMFETAMINE DIMESYLATE 70 MG/1
70 CAPSULE ORAL DAILY
Qty: 30 CAPSULE | Refills: 0 | Status: SHIPPED | OUTPATIENT
Start: 2018-01-16 | End: 2018-02-15

## 2018-01-16 ASSESSMENT — PAIN SCALES - GENERAL: PAINLEVEL: SEVERE PAIN (6)

## 2018-01-16 ASSESSMENT — ANXIETY QUESTIONNAIRES
5. BEING SO RESTLESS THAT IT IS HARD TO SIT STILL: NOT AT ALL
7. FEELING AFRAID AS IF SOMETHING AWFUL MIGHT HAPPEN: NEARLY EVERY DAY
3. WORRYING TOO MUCH ABOUT DIFFERENT THINGS: NEARLY EVERY DAY
1. FEELING NERVOUS, ANXIOUS, OR ON EDGE: NEARLY EVERY DAY
GAD7 TOTAL SCORE: 15
6. BECOMING EASILY ANNOYED OR IRRITABLE: NOT AT ALL
2. NOT BEING ABLE TO STOP OR CONTROL WORRYING: NEARLY EVERY DAY

## 2018-01-16 ASSESSMENT — PATIENT HEALTH QUESTIONNAIRE - PHQ9: 5. POOR APPETITE OR OVEREATING: NEARLY EVERY DAY
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
## 2018-01-16 NOTE — MR AVS SNAPSHOT
"              After Visit Summary   1/16/2018    Joshua Barron    MRN: [id number]           Patient Information     Date Of Birth          1960        Visit Information        Provider Department      1/16/2018 2:40 PM Laquita Fernandez MD [facility]        Today's Diagnoses     ADHD (attention deficit hyperactivity disorder), combined type    -  1       Follow-ups after your visit        Your next 10 appointments already scheduled     Jan 26, 2018  2:40 PM CST   (Arrive by 2:20 PM)   SHORT with Lyle Fisher,    [facility] [place] ([facility] [place] )    [address]  [place] [address]   [phone]            Mar 07, 2018  2:45 PM CST   (Arrive by 2:30 PM)   Hearing Eval with Kevin Moreno   [facility] [place] ([facility] [place] )    [address]  [place] [address]   [phone]              Who to contact     If you have questions or need follow up information about today's clinic visit or your schedule please contact [facility] directly at [phone].  Normal or non-critical lab and imaging results will be communicated to you by MyChart, letter or phone within 4 business days after the clinic has received the results. If you do not hear from us within 7 days, please contact the clinic through MyChart or phone. If you have a critical or abnormal lab result, we will notify you by phone as soon as possible.  Submit refill requests through [employer] or call your pharmacy and they will forward the refill request to us. Please allow 3 business days for your refill to be completed.          Additional Information About Your Visit        MyChart Information     [employer] lets you send messages to your doctor, view your test results, renew your prescriptions, schedule appointments and more. To sign up, go to [email or web address] . Click on \"Log in\" on the left side of the screen, which will take you to the Welcome " "page. Then click on \"Sign up Now\" on the right side of the page.     You will be asked to enter the access code listed below, as well as some personal information. Please follow the directions to create your username and password.     Your access code is: [email or web address]  Expires: 2018  8:58 AM     Your access code will  in 90 days. If you need help or a new code, please call your [facility] clinic or [phone].        Care EveryWhere ID     This is your Care EveryWhere ID. This could be used by other organizations to access your [facility] medical records  [id number]        Your Vitals Were     Pulse Temperature BMI (Body Mass Index)             99 99  F (37.2  C) (Tympanic) 26.98 kg/m2          Blood Pressure from Last 3 Encounters:   18 (!) 84/54   17 132/74   17 128/74    Weight from Last 3 Encounters:   18 188 lb (85.3 kg)   17 188 lb (85.3 kg)   17 188 lb (85.3 kg)              Today, you had the following     No orders found for display         Today's Medication Changes          These changes are accurate as of: 18  3:37 PM.  If you have any questions, ask your nurse or doctor.               These medicines have changed or have updated prescriptions.        Dose/Directions    * lisdexamfetamine 60 MG capsule   Commonly known as:  VYVANSE   This may have changed:    - Another medication with the same name was changed. Make sure you understand how and when to take each.  - Another medication with the same name was removed. Continue taking this medication, and follow the directions you see here.   Used for:  ADHD (attention deficit hyperactivity disorder), combined type   Changed by:  Laquita Fernandez MD        Dose:  60 mg   Take 1 capsule (60 mg) by mouth every morning   Quantity:  30 capsule   Refills:  0       * lisdexamfetamine 70 MG capsule   Commonly known as:  VYVANSE   This may have changed:    - medication strength  - how much to take  - when to take " this  - Another medication with the same name was removed. Continue taking this medication, and follow the directions you see here.   Used for:  ADHD (attention deficit hyperactivity disorder), combined type   Changed by:  Laquita Fernandez MD        Dose:  70 mg   Take 1 capsule (70 mg) by mouth daily   Quantity:  30 capsule   Refills:  0       * Notice:  This list has 2 medication(s) that are the same as other medications prescribed for you. Read the directions carefully, and ask your doctor or other care provider to review them with you.         Where to get your medicines      Some of these will need a paper prescription and others can be bought over the counter.  Ask your nurse if you have questions.     Bring a paper prescription for each of these medications     lisdexamfetamine 70 MG capsule                Primary Care Provider Office Phone # Fax #    Lyle Martinez DO Natalia [phone]       [facility] [place] [address]  [place] [address]        Equal Access to Services     SHAHBAZ DANIELS : Hadii aad ku hadasho Soomaali, waaxda luqadaha, qaybta kaalmada adeegyada, waxay idiin hayaan brittany millan . So [facility] [phone].    ATENCIÓN: Si habla español, tiene a hastings disposición servicios gratuitos de asistencia lingüística. Llame al [phone].    We comply with applicable federal civil rights laws and [place] laws. We do not discriminate on the basis of race, color, national origin, age, disability, sex, sexual orientation, or gender identity.            Thank you!     Thank you for choosing [facility]  for your care. Our goal is always to provide you with excellent care. Hearing back from our patients is one way we can continue to improve our services. Please take a few minutes to complete the written survey that you may receive in the mail after your visit with us. Thank you!             Your Updated Medication List - Protect others around you: Learn how  to safely use, store and throw away your medicines at www.disposemymeds.org.          This list is accurate as of: 1/16/18  3:37 PM.  Always use your most recent med list.                   Brand Name Dispense Instructions for use Diagnosis    * albuterol 108 (90 BASE) MCG/ACT Inhaler    PROAIR HFA/PROVENTIL HFA/VENTOLIN HFA     Inhale 2 puffs into the lungs every 6 hours as needed for shortness of breath / dyspnea or wheezing        * VENTOLIN  (90 BASE) MCG/ACT Inhaler   Generic drug:  albuterol     18 g    USE 2 PUFFS 4 TIMES A DAY AS NEEDED FOR SHORTNESS OF BREATH    Moderate persistent asthma without complication       atorvastatin 20 MG tablet    LIPITOR    30 tablet    TAKE 1 TABLET BY MOUTH DAILY    Mixed hyperlipidemia       baclofen 20 MG tablet    LIORESAL    90 tablet    TAKE 1 TABLET BY MOUTH 3 TIMES DAILY    Low back pain, unspecified back pain laterality, unspecified chronicity, with sciatica presence unspecified       diazepam 5 MG tablet    VALIUM    60 tablet    TAKE 1 TABLET BY MOUTH EVERY 12 HOURS AS NEEDED FOR ANXIETY OR SLEEP SPASM    DAYANA (generalized anxiety disorder)       diclofenac 50 MG EC tablet    VOLTAREN    90 tablet    TAKE 1 TABLET BY MOUTH 3 TIMES DAILY    Lumbago        MG capsule   Generic drug:  docusate sodium     60 capsule    TAKE 1 CAPSULE BY MOUTH TWICE DAILY    Drug-induced constipation       DULERA 100-5 MCG/ACT oral inhaler   Generic drug:  mometasone-formoterol     13 g    INHALE 2 PUFFS INTO THE LUNGS 2 TIMES A DAY    Moderate persistent asthma without complication       fentaNYL 75 mcg/hr 72 hr patch    DURAGESIC    11 patch    APPLY 1 PATCH EVERY 48 HOURS    DDD (degenerative disc disease), cervical       fluticasone 50 MCG/ACT spray    FLONASE    16 g    USE 2 SPRAYS IN EACH NOSTRIL DAILY    Chronic rhinitis, unspecified type       gabapentin 300 MG capsule    NEURONTIN    270 capsule    TAKE 3 CAPSULES BY MOUTH THREE TIMES DAILY    Polyneuropathy  associated with underlying disease (H), Low back pain, unspecified back pain laterality, unspecified chronicity, with sciatica presence unspecified       HYDROcodone-acetaminophen  MG per tablet    NORCO    120 tablet    TAKE 1 TABLET BY MOUTH EVERY 6 HOURS AS NEEDED FOR MODERATE TO SEVEREPAIN. MUST LAST 30 DAYS    Low back pain, unspecified back pain laterality, unspecified chronicity, with sciatica presence unspecified       ibuprofen 600 MG tablet    ADVIL/MOTRIN    120 tablet    TAKE 1 TABLET BY MOUTH EVERY 6 HOURS AS NEEDED    Hx of degenerative disc disease       lidocaine 5 % Patch    LIDODERM    90 patch    PLACE 3 PATCHES ONTO THE SKIN DAILY AS NEEDED FOR MODERATE PAIN    Midline low back pain without sciatica       * lisdexamfetamine 60 MG capsule    VYVANSE    30 capsule    Take 1 capsule (60 mg) by mouth every morning    ADHD (attention deficit hyperactivity disorder), combined type       * lisdexamfetamine 70 MG capsule    VYVANSE    30 capsule    Take 1 capsule (70 mg) by mouth daily    ADHD (attention deficit hyperactivity disorder), combined type       losartan 50 MG tablet    COZAAR    30 tablet    TAKE 1 TABLET BY MOUTH DAILY    Benign essential hypertension       MAPAP 325 MG tablet   Generic drug:  acetaminophen     200 tablet    TAKE 2 TABLETS BY MOUTH EVERY 4 HOURS AS NEEDED FOR MILD PAIN    Midline low back pain without sciatica       multivitamin  with iron Tabs     30 tablet    TAKE 1 TABLET BY MOUTH DAILY    Health care maintenance       nicotine polacrilex 2 MG gum    NICORETTE    110 each    CHEW 1 PIECE AS NEEDED FOR SMOKING CESSATION    Tobacco abuse       omeprazole 20 MG CR capsule    priLOSEC    30 capsule    TAKE 1 CAPSULE BY MOUTH EVERY DAY BEFORE A MEAL    Gastroesophageal reflux disease, esophagitis presence not specified       * order for DME     2 Box    Equipment being ordered: Large gloves    Need for assistance with personal care       * order for DME     1 Device    1  boa back brace    Chronic low back pain with sciatica, sciatica laterality unspecified, unspecified back pain laterality       OXcarbazepine 600 MG tablet    TRILEPTAL    60 tablet    TAKE 1 TABLET BY MOUTH 2 TIMES DAILY    Bipolar affective disorder, current episode hypomanic (H)       propranolol 20 MG tablet    INDERAL    60 tablet    TAKE 1 TABLET BY MOUTH TWICE DAILY    DAYANA (generalized anxiety disorder)       * senna-docusate 8.6-50 MG per tablet    SENOKOT-S;PERICOLACE     Take 1-4 tablets by mouth 2 times daily        * SM STOOL SOFTENER/LAXATIVE 8.6-50 MG per tablet   Generic drug:  senna-docusate     120 tablet    TAKE 1 OR 2 TABLETS BY MOUTH 2 TIMES A DAY    Constipation, unspecified constipation type       SM CHILDRENS ASPIRIN 81 MG chewable tablet   Generic drug:  aspirin     30 tablet    CHEW AND SWALLOW 1 TABLET BY MOUTH DAILY    Health care maintenance       tamsulosin 0.4 MG capsule    FLOMAX    30 capsule    TAKE 1 CAPSULE BY MOUTH DAILY    Benign prostatic hyperplasia with incomplete bladder emptying       traZODone 50 MG tablet    DESYREL    30 tablet    TAKE 1 TABLET BY MOUTH NIGHTLY AS NEEDED FOR SLEEP    Chronic pain       * Notice:  This list has 8 medication(s) that are the same as other medications prescribed for you. Read the directions carefully, and ask your doctor or other care provider to review them with you.

## 2018-01-16 NOTE — NURSING NOTE
"Chief Complaint   Patient presents with     RECHECK     Mental health.       Initial BP (!) 84/54 (BP Location: Right arm, Patient Position: Sitting, Cuff Size: Adult Regular)  Pulse 99  Temp 99  F (37.2  C) (Tympanic)  Wt 188 lb (85.3 kg)  BMI 26.98 kg/m2 Estimated body mass index is 26.98 kg/(m^2) as calculated from the following:    Height as of 7/31/17: 5' 10\" (1.778 m).    Weight as of this encounter: 188 lb (85.3 kg).  Medication Reconciliation: complete     JACQUELYN SUAREZ      "

## 2018-01-17 ENCOUNTER — OFFICE VISIT (OUTPATIENT)
Dept: PEDIATRICS | Facility: OTHER | Age: 58
End: 2018-01-17
Attending: INTERNAL MEDICINE
Payer: COMMERCIAL

## 2018-01-17 VITALS
DIASTOLIC BLOOD PRESSURE: 82 MMHG | OXYGEN SATURATION: 97 % | SYSTOLIC BLOOD PRESSURE: 130 MMHG | TEMPERATURE: 99.3 F | WEIGHT: 170 LBS | HEART RATE: 80 BPM | BODY MASS INDEX: 24.39 KG/M2

## 2018-01-17 DIAGNOSIS — F31.12 BIPOLAR AFFECTIVE DISORDER, CURRENTLY MANIC, MODERATE (H): Primary | ICD-10-CM

## 2018-01-17 DIAGNOSIS — M79.7 FIBROMYALGIA: ICD-10-CM

## 2018-01-17 DIAGNOSIS — M54.5 LOW BACK PAIN, UNSPECIFIED BACK PAIN LATERALITY, UNSPECIFIED CHRONICITY, WITH SCIATICA PRESENCE UNSPECIFIED: ICD-10-CM

## 2018-01-17 PROCEDURE — 99214 OFFICE O/P EST MOD 30 MIN: CPT | Performed by: INTERNAL MEDICINE

## 2018-01-17 PROCEDURE — G0463 HOSPITAL OUTPT CLINIC VISIT: HCPCS

## 2018-01-17 RX ORDER — NORTRIPTYLINE HYDROCHLORIDE 75 MG/1
75 CAPSULE ORAL AT BEDTIME
Qty: 30 CAPSULE | Refills: 3 | Status: SHIPPED | OUTPATIENT
Start: 2018-01-17 | End: 2018-02-15

## 2018-01-17 RX ORDER — HYDROCODONE BITARTRATE AND ACETAMINOPHEN 10; 325 MG/1; MG/1
2 TABLET ORAL 2 TIMES DAILY
Qty: 120 TABLET | Refills: 0 | COMMUNITY
Start: 2018-01-17 | End: 2018-02-01

## 2018-01-17 RX ORDER — DIVALPROEX SODIUM 500 MG/1
500 TABLET, DELAYED RELEASE ORAL 2 TIMES DAILY
Qty: 60 TABLET | Refills: 3 | Status: SHIPPED | OUTPATIENT
Start: 2018-01-17 | End: 2018-02-15

## 2018-01-17 ASSESSMENT — PATIENT HEALTH QUESTIONNAIRE - PHQ9
SUM OF ALL RESPONSES TO PHQ QUESTIONS 1-9: 26
5. POOR APPETITE OR OVEREATING: NEARLY EVERY DAY

## 2018-01-17 ASSESSMENT — ANXIETY QUESTIONNAIRES
2. NOT BEING ABLE TO STOP OR CONTROL WORRYING: NEARLY EVERY DAY
GAD7 TOTAL SCORE: 15
7. FEELING AFRAID AS IF SOMETHING AWFUL MIGHT HAPPEN: NEARLY EVERY DAY
6. BECOMING EASILY ANNOYED OR IRRITABLE: NEARLY EVERY DAY
GAD7 TOTAL SCORE: 21
5. BEING SO RESTLESS THAT IT IS HARD TO SIT STILL: NEARLY EVERY DAY
3. WORRYING TOO MUCH ABOUT DIFFERENT THINGS: NEARLY EVERY DAY
IF YOU CHECKED OFF ANY PROBLEMS ON THIS QUESTIONNAIRE, HOW DIFFICULT HAVE THESE PROBLEMS MADE IT FOR YOU TO DO YOUR WORK, TAKE CARE OF THINGS AT HOME, OR GET ALONG WITH OTHER PEOPLE: EXTREMELY DIFFICULT
1. FEELING NERVOUS, ANXIOUS, OR ON EDGE: NEARLY EVERY DAY

## 2018-01-17 NOTE — LETTER
To whom it may concern,     I am the primary care provider for Mr. Joshua Barron, : 1960, who I treat for several conditions including his Bipolar Disorder with the help from my colleague Dr. Laquita Fernandez.  I have been treating Joshua for the past 4 years.  I recently saw Joshua on 2016 for a request to get back on his Bipolar medications which he had stopped at he believed that one of the medications had be causing him to black out and have seizures.  He did in fact have a black out episode in the past 18 months where he was founds on the floor by his family.  He has been advised by myself that he should never go off of his medications to suleiman episodes.  The medication in question (medications causing black out events) has been removed from his medication regimen.  He has informed me of recent incident with the police involved due to a neighbor complaining that Joshua's TV was too loud and Joshua receiving a ticket for disturbing the peace.  It is of my medical opinion that the events occurring on this day were exacerbated by both Joshua being off his medications and due to Joshua being hard of hearing with poor adherence to wearing his hearing aids.  Additionally, Joshua felt that his neighbor had been torturing his cat by pulling out the cat's nails which led to heightened emotions.  When I discussed this with Joshua, he believes this to be true and not just a paranoid hallucination.    I am asking that the court please consider the medical circumstances, regarding Mr. Barron's violation.  I would also suggest that he be given a warning that he is to stay on his psychiatric medications as prescribed by myself and Dr. Jim PAULSON.  I thank you for your time and efforts in regards to this matter.  If I can be of any help in clarifying Joshua's medical condition please do not hesitate to contact me.          Sincerely,                 Lyle Fisher, DO

## 2018-01-17 NOTE — MR AVS SNAPSHOT
After Visit Summary   1/17/2018    Joshua Barron    MRN: [id number]           Patient Information     Date Of Birth          1960        Visit Information        Provider Department      1/17/2018 4:20 PM Lyle Fisher DO [facility]        Today's Diagnoses     Bipolar affective disorder, currently manic, moderate (H)    -  1    Low back pain, unspecified back pain laterality, unspecified chronicity, with sciatica presence unspecified        Fibromyalgia           Follow-ups after your visit        Follow-up notes from your care team     Return in about 9 days (around 1/26/2018) for Med adjustemnt.      Your next 10 appointments already scheduled     Jan 26, 2018  2:40 PM CST   (Arrive by 2:20 PM)   SHORT with Lyle Fisher DO   [facility] [place] ([facility] [place] )    [address]  [place] [address]   [phone]            Feb 14, 2018  3:00 PM CST   (Arrive by 2:45 PM)   Return Visit with Laquita Fernandez MD   [facility] ([facility] [place] )    [address] [place] [address]  [place] [id number] [phone]            Mar 07, 2018  2:45 PM CST   (Arrive by 2:30 PM)   Hearing Eval with Kevin Moreno   [facility] ([facility] [place] [facility]    [address] [facility] [place] [address]   [phone]              Who to contact     If you have questions or need follow up information about today's clinic visit or your schedule please contact [facility] directly at [phone].  Normal or non-critical lab and imaging results will be communicated to you by MyChart, letter or phone within 4 business days after the clinic has received the results. If you do not hear from us within 7 days, please contact the clinic through MyChart or phone. If you have a critical or abnormal lab result, we will notify you by phone as soon as possible.  Submit refill requests through [employer] or  "call your pharmacy and they will forward the refill request to us. Please allow 3 business days for your refill to be completed.          Additional Information About Your Visit        MyChart Information     [employer] lets you send messages to your doctor, view your test results, renew your prescriptions, schedule appointments and more. To sign up, go to [email or web address] . Click on \"Log in\" on the left side of the screen, which will take you to the Welcome page. Then click on \"Sign up Now\" on the right side of the page.     You will be asked to enter the access code listed below, as well as some personal information. Please follow the directions to create your username and password.     Your access code is: [email or web address]  Expires: 2018  8:58 AM     Your access code will  in 90 days. If you need help or a new code, please call your [facility] clinic or [phone].        Care EveryWhere ID     This is your Care EveryWhere ID. This could be used by other organizations to access your [facility] medical records  [id number]        Your Vitals Were     Pulse Temperature Pulse Oximetry BMI (Body Mass Index)          80 99.3  F (37.4  C) (Tympanic) 97% 24.39 kg/m2         Blood Pressure from Last 3 Encounters:   18 130/82   18 (!) 84/54   17 132/74    Weight from Last 3 Encounters:   18 170 lb (77.1 kg)   18 188 lb (85.3 kg)   17 188 lb (85.3 kg)              Today, you had the following     No orders found for display         Today's Medication Changes          These changes are accurate as of: 18  5:34 PM.  If you have any questions, ask your nurse or doctor.               Start taking these medicines.        Dose/Directions    divalproex 500 MG EC tablet   Commonly known as:  DEPAKOTE   Used for:  Bipolar affective disorder, currently manic, moderate (H)   Started by:  Lyle Fisher, DO        Dose:  500 mg   Take 1 tablet (500 mg) by mouth 2 times daily "   Quantity:  60 tablet   Refills:  3       nortriptyline 75 MG capsule   Commonly known as:  PAMELOR   Used for:  Fibromyalgia   Started by:  Lyle Fisher DO        Dose:  75 mg   Take 1 capsule (75 mg) by mouth At Bedtime   Quantity:  30 capsule   Refills:  3         These medicines have changed or have updated prescriptions.        Dose/Directions    HYDROcodone-acetaminophen  MG per tablet   Commonly known as:  NORCO   This may have changed:  See the new instructions.   Used for:  Low back pain, unspecified back pain laterality, unspecified chronicity, with sciatica presence unspecified   Changed by:  Lyle Fisher DO        Dose:  2 tablet   Take 2 tablets by mouth 2 times daily   Quantity:  120 tablet   Refills:  0            Where to get your medicines      These medications were sent to [facility] [place] - [address]  [address] [place] [address]     Phone:  [phone]     divalproex 500 MG EC tablet    nortriptyline 75 MG capsule                Primary Care Provider Office Phone # Fax #    Lyle Fisher [phone]       [facility] [place] [address]  [place] [address]        Equal Access to Services     SHAHBAZ DANIELS : Hadii aad ku hadasho Soomaali, waaxda luqadaha, qaybta kaalmada adeegyada, waxay nicole hayolivan brittany fermin. So [facility] [phone].    ATENCIÓN: Si habla español, tiene a hastings disposición servicios gratuitos de asistencia lingüística. [facility] [phone].    We comply with applicable federal civil rights laws and [place] laws. We do not discriminate on the basis of race, color, national origin, age, disability, sex, sexual orientation, or gender identity.            Thank you!     [facility]. Our goal is always to provide you with excellent care. Hearing back from our patients is one way we can continue to improve our services.  Please take a few minutes to complete the written survey that you may receive in the mail after your visit with us. Thank you!             Your Updated Medication List - Protect others around you: Learn how to safely use, store and throw away your medicines at www.disposemymeds.org.          This list is accurate as of: 1/17/18  5:34 PM.  Always use your most recent med list.                   Brand Name Dispense Instructions for use Diagnosis    * albuterol 108 (90 BASE) MCG/ACT Inhaler    PROAIR HFA/PROVENTIL HFA/VENTOLIN HFA     Inhale 2 puffs into the lungs every 6 hours as needed for shortness of breath / dyspnea or wheezing        * VENTOLIN  (90 BASE) MCG/ACT Inhaler   Generic drug:  albuterol     18 g    USE 2 PUFFS 4 TIMES A DAY AS NEEDED FOR SHORTNESS OF BREATH    Moderate persistent asthma without complication       atorvastatin 20 MG tablet    LIPITOR    30 tablet    TAKE 1 TABLET BY MOUTH DAILY    Mixed hyperlipidemia       baclofen 20 MG tablet    LIORESAL    90 tablet    TAKE 1 TABLET BY MOUTH 3 TIMES DAILY    Low back pain, unspecified back pain laterality, unspecified chronicity, with sciatica presence unspecified       diazepam 5 MG tablet    VALIUM    60 tablet    TAKE 1 TABLET BY MOUTH EVERY 12 HOURS AS NEEDED FOR ANXIETY OR SLEEP SPASM    DAYANA (generalized anxiety disorder)       diclofenac 50 MG EC tablet    VOLTAREN    90 tablet    TAKE 1 TABLET BY MOUTH 3 TIMES DAILY    Lumbago       divalproex 500 MG EC tablet    DEPAKOTE    60 tablet    Take 1 tablet (500 mg) by mouth 2 times daily    Bipolar affective disorder, currently manic, moderate (H)        MG capsule   Generic drug:  docusate sodium     60 capsule    TAKE 1 CAPSULE BY MOUTH TWICE DAILY    Drug-induced constipation       DULERA 100-5 MCG/ACT oral inhaler   Generic drug:  mometasone-formoterol     13 g    INHALE 2 PUFFS INTO THE LUNGS 2 TIMES A DAY    Moderate persistent asthma without complication       fentaNYL 75  mcg/hr 72 hr patch    DURAGESIC    11 patch    APPLY 1 PATCH EVERY 48 HOURS    DDD (degenerative disc disease), cervical       fluticasone 50 MCG/ACT spray    FLONASE    16 g    USE 2 SPRAYS IN EACH NOSTRIL DAILY    Chronic rhinitis, unspecified type       gabapentin 300 MG capsule    NEURONTIN    270 capsule    TAKE 3 CAPSULES BY MOUTH THREE TIMES DAILY    Polyneuropathy associated with underlying disease (H), Low back pain, unspecified back pain laterality, unspecified chronicity, with sciatica presence unspecified       HYDROcodone-acetaminophen  MG per tablet    NORCO    120 tablet    Take 2 tablets by mouth 2 times daily    Low back pain, unspecified back pain laterality, unspecified chronicity, with sciatica presence unspecified       ibuprofen 600 MG tablet    ADVIL/MOTRIN    120 tablet    TAKE 1 TABLET BY MOUTH EVERY 6 HOURS AS NEEDED    Hx of degenerative disc disease       lidocaine 5 % Patch    LIDODERM    90 patch    PLACE 3 PATCHES ONTO THE SKIN DAILY AS NEEDED FOR MODERATE PAIN    Midline low back pain without sciatica       * lisdexamfetamine 60 MG capsule    VYVANSE    30 capsule    Take 1 capsule (60 mg) by mouth every morning    ADHD (attention deficit hyperactivity disorder), combined type       * lisdexamfetamine 70 MG capsule    VYVANSE    30 capsule    Take 1 capsule (70 mg) by mouth daily    ADHD (attention deficit hyperactivity disorder), combined type       losartan 50 MG tablet    COZAAR    30 tablet    TAKE 1 TABLET BY MOUTH DAILY    Benign essential hypertension       MAPAP 325 MG tablet   Generic drug:  acetaminophen     200 tablet    TAKE 2 TABLETS BY MOUTH EVERY 4 HOURS AS NEEDED FOR MILD PAIN    Midline low back pain without sciatica       multivitamin  with iron Tabs     30 tablet    TAKE 1 TABLET BY MOUTH DAILY    Health care maintenance       nicotine polacrilex 2 MG gum    NICORETTE    110 each    CHEW 1 PIECE AS NEEDED FOR SMOKING CESSATION    Tobacco abuse        nortriptyline 75 MG capsule    PAMELOR    30 capsule    Take 1 capsule (75 mg) by mouth At Bedtime    Fibromyalgia       omeprazole 20 MG CR capsule    priLOSEC    30 capsule    TAKE 1 CAPSULE BY MOUTH EVERY DAY BEFORE A MEAL    Gastroesophageal reflux disease, esophagitis presence not specified       * order for DME     2 Box    Equipment being ordered: Large gloves    Need for assistance with personal care       * order for DME     1 Device    1 boa back brace    Chronic low back pain with sciatica, sciatica laterality unspecified, unspecified back pain laterality       OXcarbazepine 600 MG tablet    TRILEPTAL    60 tablet    TAKE 1 TABLET BY MOUTH 2 TIMES DAILY    Bipolar affective disorder, current episode hypomanic (H)       propranolol 20 MG tablet    INDERAL    60 tablet    TAKE 1 TABLET BY MOUTH TWICE DAILY    DAYANA (generalized anxiety disorder)       * senna-docusate 8.6-50 MG per tablet    SENOKOT-S;PERICOLACE     Take 1-4 tablets by mouth 2 times daily        * SM STOOL SOFTENER/LAXATIVE 8.6-50 MG per tablet   Generic drug:  senna-docusate     120 tablet    TAKE 1 OR 2 TABLETS BY MOUTH 2 TIMES A DAY    Constipation, unspecified constipation type       SM CHILDRENS ASPIRIN 81 MG chewable tablet   Generic drug:  aspirin     30 tablet    CHEW AND SWALLOW 1 TABLET BY MOUTH DAILY    Health care maintenance       tamsulosin 0.4 MG capsule    FLOMAX    30 capsule    TAKE 1 CAPSULE BY MOUTH DAILY    Benign prostatic hyperplasia with incomplete bladder emptying       traZODone 50 MG tablet    DESYREL    30 tablet    TAKE 1 TABLET BY MOUTH NIGHTLY AS NEEDED FOR SLEEP    Chronic pain       * Notice:  This list has 8 medication(s) that are the same as other medications prescribed for you. Read the directions carefully, and ask your doctor or other care provider to review them with you.

## 2018-01-17 NOTE — NURSING NOTE
"Chief Complaint   Patient presents with     Other     BP Problems       Initial /82  Pulse 80  Temp 99.3  F (37.4  C) (Tympanic)  SpO2 97% Estimated body mass index is 26.98 kg/(m^2) as calculated from the following:    Height as of 7/31/17: 5' 10\" (1.778 m).    Weight as of 1/16/18: 188 lb (85.3 kg).  Medication Reconciliation: complete   ROBIN OLGUIN LPN  "

## 2018-01-18 ENCOUNTER — TELEPHONE (OUTPATIENT)
Dept: INTERNAL MEDICINE | Facility: OTHER | Age: 58
End: 2018-01-18

## 2018-01-18 NOTE — TELEPHONE ENCOUNTER
2:35 PM    Reason for Call: Phone Call    Description: Pt called and states that he would like to let  know that the meds for his bipolar seraquil 200 mg 1 tablet by mouth daily at 8 pm   concerta 36 mg ER tabs 1 tablet by mouth every morning     Was an appointment offered for this call? No  If yes : Appointment type              Date    Preferred method for responding to this message: Telephone Call  What is your phone number ?    If we cannot reach you directly, may we leave a detailed response at the number you provided? Yes    Can this message wait until your PCP/provider returns, if available today? Not applicable, PCP is in     [facility]

## 2018-01-19 ASSESSMENT — ANXIETY QUESTIONNAIRES: GAD7 TOTAL SCORE: 21

## 2018-01-24 ENCOUNTER — TRANSFERRED RECORDS (OUTPATIENT)
Dept: HEALTH INFORMATION MANAGEMENT | Facility: CLINIC | Age: 58
End: 2018-01-24

## 2018-01-25 DIAGNOSIS — M50.30 DDD (DEGENERATIVE DISC DISEASE), CERVICAL: ICD-10-CM

## 2018-01-25 NOTE — TELEPHONE ENCOUNTER
Controlled Substance Refill Request for Fentanyl  Problem List Complete:  Yes    Last Written Prescription Date:  1/5/18 with start date 1/10/18  Last Fill Quantity: 11,   # refills: 0 - EARLY    Last Office Visit with [facility] primary care provider: 1/17/18    Future Office visit:   Next 5 appointments (look out 90 days)     Jan 26, 2018  2:40 PM CST   (Arrive by 2:20 PM)   SHORT with Lyle Fisher,    [facility] [place] ([facility] [place] )    [address] [place] [address]  [place] [address]   [phone]            Feb 14, 2018  3:00 PM CST   (Arrive by 2:45 PM)   Return Visit with Laquita Fernandez MD   [facility] [place] ([facility] [place] )    [address] [place], [place] [address] [place] [address] [phone]                  Controlled substance agreement on file: Yes:  Date 11/25/15.     Processing:  Staff will hand deliver Rx to on-site pharmacy     PCP Natalia.  Thank you.

## 2018-01-26 ENCOUNTER — OFFICE VISIT (OUTPATIENT)
Dept: PEDIATRICS | Facility: OTHER | Age: 58
End: 2018-01-26
Attending: INTERNAL MEDICINE
Payer: COMMERCIAL

## 2018-01-26 VITALS
HEART RATE: 100 BPM | TEMPERATURE: 97.8 F | OXYGEN SATURATION: 97 % | SYSTOLIC BLOOD PRESSURE: 139 MMHG | BODY MASS INDEX: 24.39 KG/M2 | WEIGHT: 170 LBS | DIASTOLIC BLOOD PRESSURE: 84 MMHG

## 2018-01-26 DIAGNOSIS — F31.12 BIPOLAR AFFECTIVE DISORDER, CURRENTLY MANIC, MODERATE (H): Primary | ICD-10-CM

## 2018-01-26 PROCEDURE — G0463 HOSPITAL OUTPT CLINIC VISIT: HCPCS

## 2018-01-26 PROCEDURE — 99213 OFFICE O/P EST LOW 20 MIN: CPT | Performed by: INTERNAL MEDICINE

## 2018-01-26 RX ORDER — DIVALPROEX SODIUM 250 MG/1
TABLET, DELAYED RELEASE ORAL
Qty: 60 TABLET | Refills: 3 | Status: SHIPPED | OUTPATIENT
Start: 2018-01-26 | End: 2018-02-15

## 2018-01-26 ASSESSMENT — ANXIETY QUESTIONNAIRES
7. FEELING AFRAID AS IF SOMETHING AWFUL MIGHT HAPPEN: NEARLY EVERY DAY
1. FEELING NERVOUS, ANXIOUS, OR ON EDGE: NEARLY EVERY DAY
3. WORRYING TOO MUCH ABOUT DIFFERENT THINGS: MORE THAN HALF THE DAYS
GAD7 TOTAL SCORE: 13
2. NOT BEING ABLE TO STOP OR CONTROL WORRYING: NEARLY EVERY DAY
6. BECOMING EASILY ANNOYED OR IRRITABLE: NOT AT ALL
5. BEING SO RESTLESS THAT IT IS HARD TO SIT STILL: NOT AT ALL
IF YOU CHECKED OFF ANY PROBLEMS ON THIS QUESTIONNAIRE, HOW DIFFICULT HAVE THESE PROBLEMS MADE IT FOR YOU TO DO YOUR WORK, TAKE CARE OF THINGS AT HOME, OR GET ALONG WITH OTHER PEOPLE: VERY DIFFICULT

## 2018-01-26 ASSESSMENT — PATIENT HEALTH QUESTIONNAIRE - PHQ9: 5. POOR APPETITE OR OVEREATING: MORE THAN HALF THE DAYS

## 2018-01-26 ASSESSMENT — PAIN SCALES - GENERAL: PAINLEVEL: EXTREME PAIN (8)

## 2018-01-26 NOTE — PROGRESS NOTES
SUBJECTIVE:   Joshua Barron is a 57 year old male who presents to clinic today for the following health issues:      HPI  Bipolar disorder:  He has restarted his Depakote 500 mg BID and has continued Trileptal 600 mg BID      He reports his mood id better in regards to his anger.  He reports no depression.  He denies any pressured speech.  He feels that his thiugh process is not tangential.            Problem list and histories reviewed & adjusted, as indicated.  Additional history: as documented        Patient Active Problem List   Diagnosis     Mixed hyperlipidemia     Tobacco Abuse, History of     Degeneration of lumbar or lumbosacral intervertebral disc     Depression, major     Chronic pain syndrome     Chemical dependency (H)     Chronic rhinitis     Tinnitus of both ears     ETD (eustachian tube dysfunction)     SNHL (sensorineural hearing loss)     Back pain     Bipolar disorder (H)     Seizure-like activity (H)     Somatic dysfunction of pelvis region     Bilateral foot pain     Preop general physical exam     Trigger index finger of right hand     Moderate persistent asthma without complication     Chronic lower back pain     ACP (advance care planning)     Onychia of toe of left foot     DDD (degenerative disc disease), lumbar     Seizure disorder (H)     Back muscle spasm     Throat pain     Benign essential hypertension     Retrograde ejaculation     Allergic rhinitis due to other allergen     Attention to dressings and sutures     Cervical spondylosis without myelopathy     Chronic headaches     DDD (degenerative disc disease)     Diabetes mellitus, type II (H)     Generalized anxiety disorder     Hypertrophy of prostate without urinary obstruction and other lower urinary tract symptoms (LUTS)     GERD (gastroesophageal reflux disease)     Lumbago     Major depressive disorder, recurrent episode, moderate (H)     Myalgia and myositis     Hyperlipidemia     Other pain disorders related to  psychological factors     Spinal stenosis in cervical region     Status post lumbar spinal fusion     Tobacco use disorder     Trigger finger     Asthma     Past Surgical History:   Procedure Laterality Date     APPENDECTOMY      Appendicitis     BACK SURGERY  2007,2010    back surgery 3 disk fusion     BACK SURGERY      L1-L2, L3-L4 laminectomy     COLONOSCOPY  11/2007    repeat 5-10 years     COLONOSCOPY N/A 7/1/2016    Procedure: COLONOSCOPY;  Surgeon: Steve Hoff DO;  Location: [facility] OR     exophytic lesion posterior scalp line  1/2011    Excision     laminectomy L3-4 and L1-2       RELEASE TRIGGER FINGER  2010    4th digit both hands     RELEASE TRIGGER FINGER Right 1/7/2016    Procedure: RELEASE TRIGGER FINGER;  Surgeon: Zev Schroeder MD;  Location: [facility] OR       Social History   Substance Use Topics     Smoking status: Former Smoker     Packs/day: 1.00     Years: 30.00     Smokeless tobacco: Current User     Types: Chew      Comment: Quit 2007 (Smoking)?     Alcohol use Yes      Comment: Rarely     Family History   Problem Relation Age of Onset     Asthma Mother      Musculoskeletal Disorder Mother      arthritis     DIABETES Father      CANCER Maternal Grandmother      stomach     Alzheimer Disease Maternal Grandfather      CANCER Paternal Grandmother      stomach     Hypertension Paternal Grandfather            ROS:  C: NEGATIVE for fever, chills, change in weight  E: NEGATIVE for vision changes or irritation  E/M: NEGATIVE for ear, mouth and throat problems  ENT/MOUTH: He reports dry mouth   R: NEGATIVE for significant cough or SOB  CV: NEGATIVE for chest pain, palpitations or peripheral edema  GI: NEGATIVE for nausea, abdominal pain, heartburn, or change in bowel habits  : NEGATIVE for frequency, dysuria, or hematuria  MUSCULOSKELETAL:POSITIVE  for back pain   N: NEGATIVE for weakness, dizziness or paresthesias  PSYCHIATRIC: See HPI.    OBJECTIVE:     /84  Pulse 100  Temp 97.8  F (36.6   C) (Tympanic)  Wt 170 lb (77.1 kg)  SpO2 97%  BMI 24.39 kg/m2  Body mass index is 24.39 kg/(m^2).  GENERAL: healthy, alert and no distress  NECK: no adenopathy, no asymmetry, masses, or scars and thyroid normal to palpation  RESP: lungs clear to auscultation - no rales, rhonchi or wheezes  CV: regular rate and rhythm, normal S1 S2, no S3 or S4, no murmur, click or rub, no peripheral edema and peripheral pulses strong  ABDOMEN: soft, nontender, no hepatosplenomegaly, no masses and bowel sounds normal  MS: no gross musculoskeletal defects noted, no edema  PSYCH: mentation appears normal, affect normal/bright    Diagnostic Test Results:  none     ASSESSMENT/PLAN:   (F31.12) Bipolar affective disorder, currently manic, moderate (H)  (primary encounter diagnosis)  Comment: He continues to have rapid/ pressured speech.  He is otherwise doing very well.  Plan:   Add divalproex (DEPAKOTE) 250 MG EC tablet to 500 mg tablets BID and continue Trileptal 600 mg BID.  He will continue Vyvanse 70 mg for his inattention.                  FUTURE APPOINTMENTS:       - Follow-up visit in 3 weeks.    Lyle Fisher DO,   [facility]

## 2018-01-26 NOTE — MR AVS SNAPSHOT
After Visit Summary   1/26/2018    Joshua Barron    MRN: [id number]           Patient Information     Date Of Birth          1960        Visit Information        Provider Department      1/26/2018 2:40 PM Lyle Fisher,  [facility]        Today's Diagnoses     Bipolar affective disorder, currently manic, moderate (H)    -  1       Follow-ups after your visit        Follow-up notes from your care team     Return in about 3 months (around 4/26/2018) for Bipolar dis and labs.      Your next 10 appointments already scheduled     Feb 14, 2018  3:00 PM CST   (Arrive by 2:45 PM)   Return Visit with Laquita Fernandez MD   [facility] ([facility] [place] )    [address]  [place] [address] [phone]            Mar 07, 2018  2:45 PM CST   (Arrive by 2:30 PM)   Hearing Eval with Kevin Moreno   [facility] [place] ([facility] [place] )    [address]  [place] [address]   [phone]              Who to contact     If you have questions or need follow up information about today's clinic visit or your schedule please contact [facility] directly at [phone].  Normal or non-critical lab and imaging results will be communicated to you by MyChart, letter or phone within 4 business days after the clinic has received the results. If you do not hear from us within 7 days, please contact the clinic through MyChart or phone. If you have a critical or abnormal lab result, we will notify you by phone as soon as possible.  Submit refill requests through [employer] or call your pharmacy and they will forward the refill request to us. Please allow 3 business days for your refill to be completed.          Additional Information About Your Visit        [employer] Information     [employer] lets you send messages to your doctor, view your test results, renew your prescriptions, schedule appointments and more. To sign  "up, go to [email or web address] . Click on \"Log in\" on the left side of the screen, which will take you to the Welcome page. Then click on \"Sign up Now\" on the right side of the page.     You will be asked to enter the access code listed below, as well as some personal information. Please follow the directions to create your username and password.     Your access code is: [email or web address]  Expires: 2018  8:58 AM     Your access code will  in 90 days. If you need help or a new code, please call your [facility] clinic or [phone].        Care EveryWhere ID     This is your Care EveryWhere ID. This could be used by other organizations to access your [facility] medical records  [id number]        Your Vitals Were     Pulse Temperature Pulse Oximetry BMI (Body Mass Index)          100 97.8  F (36.6  C) (Tympanic) 97% 24.39 kg/m2         Blood Pressure from Last 3 Encounters:   18 139/84   18 130/82   18 (!) 84/54    Weight from Last 3 Encounters:   18 170 lb (77.1 kg)   18 170 lb (77.1 kg)   18 188 lb (85.3 kg)              Today, you had the following     No orders found for display         Today's Medication Changes          These changes are accurate as of 18  3:09 PM.  If you have any questions, ask your nurse or doctor.               These medicines have changed or have updated prescriptions.        Dose/Directions    * divalproex 500 MG EC tablet   Commonly known as:  DEPAKOTE   This may have changed:  Another medication with the same name was added. Make sure you understand how and when to take each.   Used for:  Bipolar affective disorder, currently manic, moderate (H)   Changed by:  Lyle Fisher DO        Dose:  500 mg   Take 1 tablet (500 mg) by mouth 2 times daily   Quantity:  60 tablet   Refills:  3       * divalproex 250 MG EC tablet   Commonly known as:  DEPAKOTE   This may have changed:  You were already taking a medication with the same name, " and this prescription was added. Make sure you understand how and when to take each.   Used for:  Bipolar affective disorder, currently manic, moderate (H)   Changed by:  Lyle Fisher, DO        Take one tablet by mouth with your 500 mg twice per day.   Quantity:  60 tablet   Refills:  3       * Notice:  This list has 2 medication(s) that are the same as other medications prescribed for you. Read the directions carefully, and ask your doctor or other care provider to review them with you.         Where to get your medicines      These medications were sent to [facility] [place] - [address]  [address] [place] [address]     Phone:  [phone]     divalproex 250 MG EC tablet                Primary Care Provider Office Phone # Fax #    Lyle Fisher [phone]       [facility] [address]  [place] [address]        Equal Access to Services     SHAHBAZ DANIELS AH: Hadii jim ku hadasho Soomaali, waaxda luqadaha, qaybta kaalmada adeegyada, waxay corinnein hayolivan brittany millan . So [facility] [phone].    ATENCIÓN: Si radhames malone, tiene a hastings disposición servicios gratuitos de asistencia lingüística. Llame al [phone].    We comply with applicable federal civil rights laws and [place] laws. We do not discriminate on the basis of race, color, national origin, age, disability, sex, sexual orientation, or gender identity.            Thank you!     Thank you for choosing [facility] (formerly [facility]3]  for your care. Our goal is always to provide you with excellent care. Hearing back from our patients is one way we can continue to improve our services. Please take a few minutes to complete the written survey that you may receive in the mail after your visit with us. Thank you!             Your Updated Medication List - Protect others around you: Learn how to safely use, store and throw away your medicines at www.disposemymeds.org.           This list is accurate as of 1/26/18  3:09 PM.  Always use your most recent med list.                   Brand Name Dispense Instructions for use Diagnosis    * albuterol 108 (90 BASE) MCG/ACT Inhaler    PROAIR HFA/PROVENTIL HFA/VENTOLIN HFA     Inhale 2 puffs into the lungs every 6 hours as needed for shortness of breath / dyspnea or wheezing        * VENTOLIN  (90 BASE) MCG/ACT Inhaler   Generic drug:  albuterol     18 g    USE 2 PUFFS 4 TIMES A DAY AS NEEDED FOR SHORTNESS OF BREATH    Moderate persistent asthma without complication       atorvastatin 20 MG tablet    LIPITOR    30 tablet    TAKE 1 TABLET BY MOUTH DAILY    Mixed hyperlipidemia       baclofen 20 MG tablet    LIORESAL    90 tablet    TAKE 1 TABLET BY MOUTH 3 TIMES DAILY    Low back pain, unspecified back pain laterality, unspecified chronicity, with sciatica presence unspecified       diazepam 5 MG tablet    VALIUM    60 tablet    TAKE 1 TABLET BY MOUTH EVERY 12 HOURS AS NEEDED FOR ANXIETY OR SLEEP SPASM    DAYANA (generalized anxiety disorder)       diclofenac 50 MG EC tablet    VOLTAREN    90 tablet    TAKE 1 TABLET BY MOUTH 3 TIMES DAILY    Lumbago       * divalproex 500 MG EC tablet    DEPAKOTE    60 tablet    Take 1 tablet (500 mg) by mouth 2 times daily    Bipolar affective disorder, currently manic, moderate (H)       * divalproex 250 MG EC tablet    DEPAKOTE    60 tablet    Take one tablet by mouth with your 500 mg twice per day.    Bipolar affective disorder, currently manic, moderate (H)        MG capsule   Generic drug:  docusate sodium     60 capsule    TAKE 1 CAPSULE BY MOUTH TWICE DAILY    Drug-induced constipation       DULERA 100-5 MCG/ACT oral inhaler   Generic drug:  mometasone-formoterol     13 g    INHALE 2 PUFFS INTO THE LUNGS 2 TIMES A DAY    Moderate persistent asthma without complication       fentaNYL 75 mcg/hr 72 hr patch    DURAGESIC    11 patch    APPLY 1 PATCH EVERY 48 HOURS    DDD (degenerative disc disease),  cervical       fluticasone 50 MCG/ACT spray    FLONASE    16 g    USE 2 SPRAYS IN EACH NOSTRIL DAILY    Chronic rhinitis, unspecified type       gabapentin 300 MG capsule    NEURONTIN    270 capsule    TAKE 3 CAPSULES BY MOUTH THREE TIMES DAILY    Polyneuropathy associated with underlying disease (H), Low back pain, unspecified back pain laterality, unspecified chronicity, with sciatica presence unspecified       HYDROcodone-acetaminophen  MG per tablet    NORCO    120 tablet    Take 2 tablets by mouth 2 times daily    Low back pain, unspecified back pain laterality, unspecified chronicity, with sciatica presence unspecified       ibuprofen 600 MG tablet    ADVIL/MOTRIN    120 tablet    TAKE 1 TABLET BY MOUTH EVERY 6 HOURS AS NEEDED    Hx of degenerative disc disease       lidocaine 5 % Patch    LIDODERM    90 patch    PLACE 3 PATCHES ONTO THE SKIN DAILY AS NEEDED FOR MODERATE PAIN    Midline low back pain without sciatica       * lisdexamfetamine 60 MG capsule    VYVANSE    30 capsule    Take 1 capsule (60 mg) by mouth every morning    ADHD (attention deficit hyperactivity disorder), combined type       * lisdexamfetamine 70 MG capsule    VYVANSE    30 capsule    Take 1 capsule (70 mg) by mouth daily    ADHD (attention deficit hyperactivity disorder), combined type       losartan 50 MG tablet    COZAAR    30 tablet    TAKE 1 TABLET BY MOUTH DAILY    Benign essential hypertension       MAPAP 325 MG tablet   Generic drug:  acetaminophen     200 tablet    TAKE 2 TABLETS BY MOUTH EVERY 4 HOURS AS NEEDED FOR MILD PAIN    Midline low back pain without sciatica       multivitamin  with iron Tabs     30 tablet    TAKE 1 TABLET BY MOUTH DAILY    Health care maintenance       nicotine polacrilex 2 MG gum    NICORETTE    110 each    CHEW 1 PIECE AS NEEDED FOR SMOKING CESSATION    Tobacco abuse       nortriptyline 75 MG capsule    PAMELOR    30 capsule    Take 1 capsule (75 mg) by mouth At Bedtime    Fibromyalgia        omeprazole 20 MG CR capsule    priLOSEC    30 capsule    TAKE 1 CAPSULE BY MOUTH EVERY DAY BEFORE A MEAL    Gastroesophageal reflux disease, esophagitis presence not specified       * order for DME     2 Box    Equipment being ordered: Large gloves    Need for assistance with personal care       * order for DME     1 Device    1 boa back brace    Chronic low back pain with sciatica, sciatica laterality unspecified, unspecified back pain laterality       OXcarbazepine 600 MG tablet    TRILEPTAL    60 tablet    TAKE 1 TABLET BY MOUTH 2 TIMES DAILY    Bipolar affective disorder, current episode hypomanic (H)       propranolol 20 MG tablet    INDERAL    60 tablet    TAKE 1 TABLET BY MOUTH TWICE DAILY    DAYANA (generalized anxiety disorder)       * senna-docusate 8.6-50 MG per tablet    SENOKOT-S;PERICOLACE     Take 1-4 tablets by mouth 2 times daily        * SM STOOL SOFTENER/LAXATIVE 8.6-50 MG per tablet   Generic drug:  senna-docusate     120 tablet    TAKE 1 OR 2 TABLETS BY MOUTH 2 TIMES A DAY    Constipation, unspecified constipation type       SM CHILDRENS ASPIRIN 81 MG chewable tablet   Generic drug:  aspirin     30 tablet    CHEW AND SWALLOW 1 TABLET BY MOUTH DAILY    Health care maintenance       tamsulosin 0.4 MG capsule    FLOMAX    30 capsule    TAKE 1 CAPSULE BY MOUTH DAILY    Benign prostatic hyperplasia with incomplete bladder emptying       traZODone 50 MG tablet    DESYREL    30 tablet    TAKE 1 TABLET BY MOUTH NIGHTLY AS NEEDED FOR SLEEP    Chronic pain       * Notice:  This list has 10 medication(s) that are the same as other medications prescribed for you. Read the directions carefully, and ask your doctor or other care provider to review them with you.

## 2018-01-26 NOTE — NURSING NOTE
"Chief Complaint   Patient presents with     Other     Medication adjustment/Medication follow up       Initial /84  Pulse 100  Temp 97.8  F (36.6  C) (Tympanic)  Wt 170 lb (77.1 kg)  SpO2 97%  BMI 24.39 kg/m2 Estimated body mass index is 24.39 kg/(m^2) as calculated from the following:    Height as of 7/31/17: 5' 10\" (1.778 m).    Weight as of this encounter: 170 lb (77.1 kg).  Medication Reconciliation: complete   ROBIN OLGUIN LPN  "

## 2018-01-27 RX ORDER — FENTANYL 75 UG/1
PATCH TRANSDERMAL
Qty: 11 PATCH | Refills: 0 | Status: SHIPPED | OUTPATIENT
Start: 2018-01-31 | End: 2018-02-13

## 2018-01-27 ASSESSMENT — ANXIETY QUESTIONNAIRES: GAD7 TOTAL SCORE: 13

## 2018-01-27 ASSESSMENT — PATIENT HEALTH QUESTIONNAIRE - PHQ9: SUM OF ALL RESPONSES TO PHQ QUESTIONS 1-9: 10
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
## 2018-01-29 ASSESSMENT — ENCOUNTER SYMPTOMS
NECK PAIN: 0
DYSURIA: 0
ABDOMINAL PAIN: 0
HALLUCINATIONS: 0
FEVER: 0
DEPRESSION: 0
NERVOUS/ANXIOUS: 1
DIZZINESS: 0
TREMORS: 0
BLOOD IN STOOL: 0
PALPITATIONS: 0
CHILLS: 0
NAUSEA: 0
HEMATURIA: 0
SHORTNESS OF BREATH: 0
DOUBLE VISION: 0
DIARRHEA: 0
CONSTIPATION: 0
BACK PAIN: 1
HEADACHES: 0
INSOMNIA: 1
VOMITING: 0
WHEEZING: 0
HEARTBURN: 0
BLURRED VISION: 0

## 2018-01-29 ASSESSMENT — LIFESTYLE VARIABLES: SUBSTANCE_ABUSE: 0

## 2018-02-01 DIAGNOSIS — F31.0 BIPOLAR AFFECTIVE DISORDER, CURRENT EPISODE HYPOMANIC (H): ICD-10-CM

## 2018-02-01 DIAGNOSIS — M54.5 LOW BACK PAIN, UNSPECIFIED BACK PAIN LATERALITY, UNSPECIFIED CHRONICITY, WITH SCIATICA PRESENCE UNSPECIFIED: ICD-10-CM

## 2018-02-01 DIAGNOSIS — E78.2 MIXED HYPERLIPIDEMIA: ICD-10-CM

## 2018-02-01 NOTE — TELEPHONE ENCOUNTER
Lipitor  Last office visit: 1/26/18  Last refill: 12/1/17 #30, 0 R.  Patient due for lab. Lab pended for you if you would like.     LDL on file in past 12 months           Recent Labs   Lab Test  03/17/16   1412   LDL  Cannot estimate LDL when triglyceride exceeds 400 mg/dL             Controlled Substance Refill Request for Norco  Problem List Complete:  Yes    Last Written Prescription Date:  1/17/18 - Early  Last Fill Quantity: 120,   # refills: 0    Last Office Visit with [facility] primary care provider: 1/26/18      Future Office visit:   Next 5 appointments (look out 90 days)     Feb 14, 2018  3:00 PM CST   (Arrive by 2:45 PM)   Return Visit with Laquita Fernandez MD   [facility] ([facility] )    [address] [place] [id number]   [phone]                  Controlled substance agreement on file: Yes:  Date 11/25/15.     Processing:  Staff will hand deliver Rx to on-site pharmacy    Trileptal  Last filled: 10/11/17 #60, 3 R.  Thank you.

## 2018-02-01 NOTE — TELEPHONE ENCOUNTER
Last visit 1/26/18  Last fill  Atorvastatin 12/01/17 D 30 R 0  Oxcarbazepine 10/11/17  D 60 R 3  Hydrocodone 1/17/17 D 120 R 0

## 2018-02-02 RX ORDER — ATORVASTATIN CALCIUM 20 MG/1
TABLET, FILM COATED ORAL
Qty: 90 TABLET | Refills: 0 | Status: SHIPPED | OUTPATIENT
Start: 2018-02-02 | End: 2018-02-15

## 2018-02-02 RX ORDER — HYDROCODONE BITARTRATE AND ACETAMINOPHEN 10; 325 MG/1; MG/1
TABLET ORAL
Qty: 120 TABLET | Refills: 0 | Status: SHIPPED | OUTPATIENT
Start: 2018-02-02 | End: 2018-02-23

## 2018-02-02 RX ORDER — OXCARBAZEPINE 600 MG/1
TABLET, FILM COATED ORAL
Qty: 60 TABLET | Refills: 3 | Status: SHIPPED | OUTPATIENT
Start: 2018-02-02 | End: 2018-02-15

## 2018-02-12 ENCOUNTER — TELEPHONE (OUTPATIENT)
Dept: PEDIATRICS | Facility: OTHER | Age: 58
End: 2018-02-12
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
## 2018-02-12 NOTE — TELEPHONE ENCOUNTER
9:33 AM    Reason for Call: OVERBOOK    Patient is having the following symptoms: Med follow up for n/a days.     The patient is requesting an appointment for ASAP with Dr Fisher.    Was an appointment offered for this call? Yes  If yes : Appointment type short              Date  03/05/18    Preferred method for responding to this message: Telephone  What is your phone number ?  [phone]    If we cannot reach you directly, may we leave a detailed response at the number you provided? Yes    Can this message wait until your PCP/provider returns, if unavailable today? Yes, aware provider out today    Janett Knight

## 2018-02-12 NOTE — TELEPHONE ENCOUNTER
LVM letting patient know he was added to wait list, and that he can also call each morning to see if there are any cancellations

## 2018-02-13 ENCOUNTER — HOSPITAL ENCOUNTER (EMERGENCY)
Facility: HOSPITAL | Age: 58
Discharge: HOME | End: 2018-02-13
Attending: PHYSICIAN ASSISTANT | Admitting: PHYSICIAN ASSISTANT
Payer: COMMERCIAL

## 2018-02-13 ENCOUNTER — ALLIED HEALTH/NURSE VISIT (OUTPATIENT)
Dept: PEDIATRICS | Facility: OTHER | Age: 58
End: 2018-02-13
Attending: INTERNAL MEDICINE
Payer: COMMERCIAL

## 2018-02-13 VITALS
SYSTOLIC BLOOD PRESSURE: 150 MMHG | RESPIRATION RATE: 18 BRPM | DIASTOLIC BLOOD PRESSURE: 100 MMHG | HEART RATE: 68 BPM | TEMPERATURE: 98.2 F | OXYGEN SATURATION: 95 % | HEIGHT: 69 IN

## 2018-02-13 DIAGNOSIS — M50.30 DDD (DEGENERATIVE DISC DISEASE), CERVICAL: ICD-10-CM

## 2018-02-13 DIAGNOSIS — F11.23 OPIOID DEPENDENCE WITH WITHDRAWAL (H): ICD-10-CM

## 2018-02-13 DIAGNOSIS — M51.369 DDD (DEGENERATIVE DISC DISEASE), LUMBAR: Primary | ICD-10-CM

## 2018-02-13 DIAGNOSIS — F90.2 ADHD (ATTENTION DEFICIT HYPERACTIVITY DISORDER), COMBINED TYPE: ICD-10-CM

## 2018-02-13 PROCEDURE — 99283 EMERGENCY DEPT VISIT LOW MDM: CPT

## 2018-02-13 PROCEDURE — 99284 EMERGENCY DEPT VISIT MOD MDM: CPT | Performed by: PHYSICIAN ASSISTANT

## 2018-02-13 PROCEDURE — 25000125 ZZHC RX 250: Performed by: PHYSICIAN ASSISTANT

## 2018-02-13 PROCEDURE — 25000132 ZZH RX MED GY IP 250 OP 250 PS 637: Performed by: PHYSICIAN ASSISTANT

## 2018-02-13 RX ORDER — HYDROXYZINE PAMOATE 50 MG/1
50-100 CAPSULE ORAL 4 TIMES DAILY PRN
Qty: 30 CAPSULE | Refills: 0 | Status: SHIPPED | OUTPATIENT
Start: 2018-02-13 | End: 2018-02-15

## 2018-02-13 RX ORDER — CLONIDINE HYDROCHLORIDE 0.1 MG/1
0.1 TABLET ORAL ONCE
Status: COMPLETED | OUTPATIENT
Start: 2018-02-13 | End: 2018-02-13

## 2018-02-13 RX ORDER — ONDANSETRON 4 MG/1
4 TABLET, ORALLY DISINTEGRATING ORAL ONCE
Status: COMPLETED | OUTPATIENT
Start: 2018-02-13 | End: 2018-02-13

## 2018-02-13 RX ORDER — CLONIDINE HYDROCHLORIDE 0.1 MG/1
0.1 TABLET ORAL EVERY 6 HOURS PRN
Qty: 30 TABLET | Refills: 1 | Status: SHIPPED | OUTPATIENT
Start: 2018-02-13 | End: 2018-02-15

## 2018-02-13 RX ORDER — ONDANSETRON 4 MG/1
4 TABLET, ORALLY DISINTEGRATING ORAL EVERY 8 HOURS PRN
Qty: 20 TABLET | Refills: 0 | Status: SHIPPED | OUTPATIENT
Start: 2018-02-13 | End: 2018-02-15

## 2018-02-13 RX ORDER — LISDEXAMFETAMINE DIMESYLATE 70 MG/1
CAPSULE ORAL
Qty: 30 CAPSULE | Refills: 0 | Status: CANCELLED | OUTPATIENT
Start: 2018-02-13

## 2018-02-13 RX ADMIN — ONDANSETRON 4 MG: 4 TABLET, ORALLY DISINTEGRATING ORAL at 18:01

## 2018-02-13 RX ADMIN — CLONIDINE HYDROCHLORIDE 0.1 MG: 0.1 TABLET ORAL at 18:01

## 2018-02-13 ASSESSMENT — ENCOUNTER SYMPTOMS
NEUROLOGICAL NEGATIVE: 1
DIARRHEA: 0
VOMITING: 0
ABDOMINAL PAIN: 0
NAUSEA: 1

## 2018-02-13 NOTE — ED AVS SNAPSHOT
[facility]    [address] [address]    Phone:  [phone]                                       Joshua Barron   MRN: [id number]    Department:  [facility]   Date of Visit:  2/13/2018           After Visit Summary Signature Page     I have received my discharge instructions, and my questions have been answered. I have discussed any challenges I see with this plan with the nurse or doctor.    ..........................................................................................................................................  Patient/Patient Representative Signature      ..........................................................................................................................................  Patient Representative Print Name and Relationship to Patient    ..................................................               ................................................  Date                                            Time    ..........................................................................................................................................  Reviewed by Signature/Title    ...................................................              ..............................................  Date                                                            Time
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
## 2018-02-13 NOTE — ED AVS SNAPSHOT
[facility]    [address] [address]    Phone:  [phone]                                       Joshua Barron   MRN: [id number]    Department:  [facility]   Date of Visit:  2/13/2018           Patient Information     Date Of Birth          1960        Your diagnoses for this visit were:     Opioid dependence with withdrawal (H)        You were seen by Myesha Hinton PA-C.      Follow-up Information     Follow up with Lyle Fisher DO In 3 days.    Specialties:  Internal Medicine, Pediatrics    Contact information:    [address] [facility] [address]  [phone]          Follow up with [facility].    Specialty:  EMERGENCY MEDICINE    Why:  If symptoms worsen    Contact information:    [address]  [facility] [address] [phone]    Additional information:    From [facility]: Take [address]. Turn left at [address]. Turn left at the first stoplight on [address]. At the first stop sign, take a right onto [address]. Take a left into the parking lot and continue through until you reach the North enterance of the building.       From [place]: Take [address]. Take the [address] ramp towards [place]. Turn left onto [address]. Take a slight right onto [address]. Turn left at the first stoplight on [address]. At the first stop sign, take a right onto [address]. Take a left into the parking lot and continue through until you reach the North enterance of the building.       From [place]: Take [address]. Take a right at [address]. At the first stop sign, take a right onto [address]. Take a left into the parking lot and continue through until you reach the North enterance of the building.         Discharge Instructions       Take the medications as prescribed for your withdrawal symptoms. Consider going to detox as  discussed. Keep your medications locked up in the future at all times.         Opioid Withdrawal  Opioid withdrawal occurs in people who have used opioids on a daily basis for at least 3 weeks. Symptoms usually start about 12 hours after the last dose of the opioid. Withdrawal symptoms last from 3 to 5 days and may include yawning, sweating, runny nose, restlessness, stomach cramping, diarrhea, nausea, vomiting, hot and cold flashes, and trouble sleeping.  Home care  The treatment for withdrawal is mostly managing the symptoms without making the problem worse. Follow these guidelines when caring for yourself at home:    Stay with someone who can help you and give you emotional support during this time. Resist the temptation to take more of the addicting drug to stop your symptoms.    If you have stomach cramps, nausea, or vomiting, take only clear liquids until the symptoms improve. Adults should drink a total of 2 to 3 quarts of liquid daily. It is best to take small frequent drinks rather than a few large ones. You may consume liquids in any of the following forms: mineral water, apple juice, sports drinks, soft drinks without caffeine, clear broth soups, plain gelatin, and ice pops.    Avoid alcohol, caffeine, and tobacco during this time.    Take any medicines prescribed for nausea or cramping exactly as directed.    If you were given a clonidine patch, leave this on for 7 days. You may remove it sooner if you develop excess dizziness or drowsiness.  Follow-up care  Follow up with your healthcare provider if you need further symptom control, or as advised. When you are through withdrawal, take the opportunity to enter a treatment program. This may help you stay off the addicting drug.  When to seek medical advice  Call your healthcare provider right away if any of these occur:    Fever of 100.4 F (38 C) or higher, or as directed by your healthcare provider    Inability to keep down liquids for 8  hours    Frequent diarrhea    Signs of infection at the site of IV needle use (redness, warmth, pain, or swelling)  Call 911  Call emergency services right away if any of these occur:    Trouble breathing or swallowing, or wheezing    Severe confusion    Extreme drowsiness or trouble awakening    Fainting or loss of consciousness    Rapid heart rate or very slow heart rate    Very low or very high blood pressure    Vomiting blood, or large amounts of blood in stool    Seizure  Date Last Reviewed: 3/1/2017    9057-9668 [employer]. [address] [place] [address]. All rights reserved. This information is not intended as a substitute for professional medical care. Always follow your healthcare professional's instructions.          Future Appointments        Provider Department Dept Phone Center    2/14/2018 3:00 PM Laquita Fernandez MD [facility] [phone] [facility] [place]    2/15/2018 11:00 AM Lyle Fisher DO, DO [facility] [phone] [place]    3/7/2018 2:45 PM Kevin Zavala [facility] [phone] [facility]         Review of your medicines      START taking        Dose / Directions Last dose taken    cloNIDine 0.1 MG tablet   Commonly known as:  CATAPRES   Dose:  0.1 mg   Quantity:  30 tablet        Take 1 tablet (0.1 mg) by mouth every 6 hours as needed   Refills:  1        hydrOXYzine 50 MG capsule   Commonly known as:  VISTARIL   Dose:   mg   Quantity:  30 capsule        Take 1-2 capsules ( mg) by mouth 4 times daily as needed for anxiety   Refills:  0        ondansetron 4 MG ODT tab   Commonly known as:  ZOFRAN ODT   Dose:  4 mg   Quantity:  20 tablet        Take 1 tablet (4 mg) by mouth every 8 hours as needed for nausea   Refills:  0          Our records show that you are taking the medicines listed below. If these are incorrect, please call your family doctor or clinic.        Dose / Directions Last dose  taken    * albuterol 108 (90 BASE) MCG/ACT Inhaler   Commonly known as:  PROAIR HFA/PROVENTIL HFA/VENTOLIN HFA   Dose:  2 puff        Inhale 2 puffs into the lungs every 6 hours as needed for shortness of breath / dyspnea or wheezing   Refills:  0        * VENTOLIN  (90 BASE) MCG/ACT Inhaler   Quantity:  18 g   Generic drug:  albuterol        USE 2 PUFFS 4 TIMES A DAY AS NEEDED FOR SHORTNESS OF BREATH   Refills:  0        atorvastatin 20 MG tablet   Commonly known as:  LIPITOR   Quantity:  90 tablet        TAKE 1 TABLET BY MOUTH DAILY   Refills:  0        baclofen 20 MG tablet   Commonly known as:  LIORESAL   Quantity:  90 tablet        TAKE 1 TABLET BY MOUTH 3 TIMES DAILY   Refills:  3        diazepam 5 MG tablet   Commonly known as:  VALIUM   Quantity:  60 tablet        TAKE 1 TABLET BY MOUTH EVERY 12 HOURS AS NEEDED FOR ANXIETY OR SLEEP SPASM   Refills:  5        diclofenac 50 MG EC tablet   Commonly known as:  VOLTAREN   Quantity:  90 tablet        TAKE 1 TABLET BY MOUTH 3 TIMES DAILY   Refills:  3        * divalproex sodium delayed-release 500 MG DR tablet   Commonly known as:  DEPAKOTE   Dose:  500 mg   Quantity:  60 tablet        Take 1 tablet (500 mg) by mouth 2 times daily   Refills:  3        * divalproex sodium delayed-release 250 MG DR tablet   Commonly known as:  DEPAKOTE   Quantity:  60 tablet        Take one tablet by mouth with your 500 mg twice per day.   Refills:  3         MG capsule   Quantity:  60 capsule   Generic drug:  docusate sodium        TAKE 1 CAPSULE BY MOUTH TWICE DAILY   Refills:  5        DULERA 100-5 MCG/ACT oral inhaler   Quantity:  13 g   Generic drug:  mometasone-formoterol        INHALE 2 PUFFS INTO THE LUNGS 2 TIMES A DAY   Refills:  0        fentaNYL 75 mcg/hr 72 hr patch   Commonly known as:  DURAGESIC   Quantity:  11 patch        APPLY 1 PATCH ONTO THE SKIN EVERY 48 HOURS   Refills:  0        fluticasone 50 MCG/ACT spray   Commonly known as:  FLONASE    Quantity:  16 g        USE 2 SPRAYS IN EACH NOSTRIL DAILY   Refills:  11        gabapentin 300 MG capsule   Commonly known as:  NEURONTIN   Quantity:  270 capsule        TAKE 3 CAPSULES BY MOUTH THREE TIMES DAILY   Refills:  0        HYDROcodone-acetaminophen  MG per tablet   Commonly known as:  NORCO   Quantity:  120 tablet        TAKE 1 TABLET BY MOUTH EVERY 6 HOURS AS NEEDED FOR MODERATE TO SEVEREPAIN. MUST LAST 30 DAYS   Refills:  0        ibuprofen 600 MG tablet   Commonly known as:  ADVIL/MOTRIN   Quantity:  120 tablet        TAKE 1 TABLET BY MOUTH EVERY 6 HOURS AS NEEDED   Refills:  5        lidocaine 5 % Patch   Commonly known as:  LIDODERM   Quantity:  90 patch        PLACE 3 PATCHES ONTO THE SKIN DAILY AS NEEDED FOR MODERATE PAIN   Refills:  11        lisdexamfetamine 70 MG capsule   Commonly known as:  VYVANSE   Dose:  70 mg   Quantity:  30 capsule        Take 1 capsule (70 mg) by mouth daily   Refills:  0        losartan 50 MG tablet   Commonly known as:  COZAAR   Quantity:  30 tablet        TAKE 1 TABLET BY MOUTH DAILY   Refills:  11        MAPAP 325 MG tablet   Quantity:  200 tablet   Generic drug:  acetaminophen        TAKE 2 TABLETS BY MOUTH EVERY 4 HOURS AS NEEDED FOR MILD PAIN   Refills:  0        multivitamin  with iron Tabs   Quantity:  30 tablet        TAKE 1 TABLET BY MOUTH DAILY   Refills:  11        nicotine polacrilex 2 MG gum   Commonly known as:  NICORETTE   Quantity:  110 each        CHEW 1 PIECE AS NEEDED FOR SMOKING CESSATION   Refills:  3        nortriptyline 75 MG capsule   Commonly known as:  PAMELOR   Dose:  75 mg   Quantity:  30 capsule        Take 1 capsule (75 mg) by mouth At Bedtime   Refills:  3        omeprazole 20 MG CR capsule   Commonly known as:  priLOSEC   Quantity:  30 capsule        TAKE 1 CAPSULE BY MOUTH EVERY DAY BEFORE A MEAL   Refills:  11        * order for DME   Quantity:  2 Box        Equipment being ordered: Large gloves   Refills:  0        * order for  DME   Quantity:  1 Device        1 boa back brace   Refills:  0        OXcarbazepine 600 MG tablet   Commonly known as:  TRILEPTAL   Quantity:  60 tablet        TAKE 1 TABLET BY MOUTH 2 TIMES DAILY   Refills:  3        propranolol 20 MG tablet   Commonly known as:  INDERAL   Quantity:  60 tablet        TAKE 1 TABLET BY MOUTH TWICE DAILY   Refills:  8        * senna-docusate 8.6-50 MG per tablet   Commonly known as:  SENOKOT-S;PERICOLACE   Dose:  1-4 tablet        Take 1-4 tablets by mouth 2 times daily   Refills:  0        * SM STOOL SOFTENER/LAXATIVE 8.6-50 MG per tablet   Quantity:  120 tablet   Generic drug:  senna-docusate        TAKE 1 OR 2 TABLETS BY MOUTH 2 TIMES A DAY   Refills:  5        SM CHILDRENS ASPIRIN 81 MG chewable tablet   Quantity:  30 tablet   Generic drug:  aspirin        CHEW AND SWALLOW 1 TABLET BY MOUTH DAILY   Refills:  5        tamsulosin 0.4 MG capsule   Commonly known as:  FLOMAX   Quantity:  30 capsule        TAKE 1 CAPSULE BY MOUTH DAILY   Refills:  0        traZODone 50 MG tablet   Commonly known as:  DESYREL   Quantity:  30 tablet        TAKE 1 TABLET BY MOUTH NIGHTLY AS NEEDED FOR SLEEP   Refills:  11        * Notice:  This list has 8 medication(s) that are the same as other medications prescribed for you. Read the directions carefully, and ask your doctor or other care provider to review them with you.            Prescriptions were sent or printed at these locations (3 Prescriptions)                   [facility] [address] [place], [place] - [address] [facility] [address]   [address] [facility] [address], [facility] [address]    Telephone:  [phone]   Fax:  [phone]   Hours:                  E-Prescribed (3 of 3)         cloNIDine (CATAPRES) 0.1 MG tablet               ondansetron (ZOFRAN ODT) 4 MG ODT tab               hydrOXYzine (VISTARIL) 50 MG capsule                Orders Needing Specimen Collection     None      Pending Results     No orders found from 2/11/2018 to 2/14/2018.            Pending Culture  "Results     No orders found from 2018 to 2018.            Thank you for choosing [facility]       Thank you for choosing [facility] for your care. Our goal is always to provide you with excellent care. Hearing back from our patients is one way we can continue to improve our services. Please take a few minutes to complete the written survey that you may receive in the mail after you visit with us. Thank you!        [employer] Information     [employer] lets you send messages to your doctor, view your test results, renew your prescriptions, schedule appointments and more. To sign up, go to [email or web address] . Click on \"Log in\" on the left side of the screen, which will take you to the Welcome page. Then click on \"Sign up Now\" on the right side of the page.     You will be asked to enter the access code listed below, as well as some personal information. Please follow the directions to create your username and password.     Your access code is: [email or web address]  Expires: 2018  8:58 AM     Your access code will  in 90 days. If you need help or a new code, please call your [facility] clinic or [phone].        Care EveryWhere ID     This is your Care EveryWhere ID. This could be used by other organizations to access your [facility] medical records  [id number]        Equal Access to Services     SHAHBAZ DANIELS : Rosie Gary, waaxda luqadaha, qaybta kaalmada deyanira, jaki millan . So [facility] [phone].    ATENCIÓN: Si habla español, tiene a hastings disposición servicios gratuitos de asistencia lingüística. Llame al [phone].    We comply with applicable federal civil rights laws and [place] laws. We do not discriminate on the basis of race, color, national origin, age, disability, sex, sexual orientation, or gender identity.            After Visit Summary       This is your record. Keep this with you and show to your community pharmacist(s) and doctor(s) at your " next visit.

## 2018-02-13 NOTE — MR AVS SNAPSHOT
After Visit Summary   2/13/2018    Joshua Barron    MRN: [id number]           Patient Information     Date Of Birth          1960        Visit Information        Provider Department      2/13/2018 4:15 PM HC IM PEDS NURSE [facility]        Today's Diagnoses     DDD (degenerative disc disease), lumbar    -  1       Follow-ups after your visit        Your next 10 appointments already scheduled     Feb 14, 2018  3:00 PM CST   (Arrive by 2:45 PM)   Return Visit with Laquita Fernandez MD   [facility] [place] ([facility] [place] [facility] [place] )    [address]  [place] [address]   [phone]            Feb 15, 2018 11:00 AM CST   (Arrive by 10:40 AM)   Office Visit with Lyle Fisher DO   [facility] [place] ([facility] [place] [facility] [place] )    [address]  [place] [address]   [phone]           Bring a current list of meds and any records pertaining to this visit. For Physicals, please bring immunization records and any forms needing to be filled out. Please arrive 10 minutes early to complete paperwork.            Mar 07, 2018  2:45 PM CST   (Arrive by 2:30 PM)   Hearing Eval with Kevin Moreno   [facility] [place] ([facility] [place] [facility] [place] )    [address] [place] [address]   [phone]              Who to contact     If you have questions or need follow up information about today's clinic visit or your schedule please contact [facility] directly at [phone].  Normal or non-critical lab and imaging results will be communicated to you by MyChart, letter or phone within 4 business days after the clinic has received the results. If you do not hear from us within 7 days, please contact the clinic through MyChart or phone. If you have a critical or abnormal lab result, we will notify you by phone as soon as possible.  Submit refill requests through [employer] or call your pharmacy and they will forward  "the refill request to us. Please allow 3 business days for your refill to be completed.          Additional Information About Your Visit        MyChart Information     [employer] lets you send messages to your doctor, view your test results, renew your prescriptions, schedule appointments and more. To sign up, go to [email or web address] . Click on \"Log in\" on the left side of the screen, which will take you to the Welcome page. Then click on \"Sign up Now\" on the right side of the page.     You will be asked to enter the access code listed below, as well as some personal information. Please follow the directions to create your username and password.     Your access code is: [email or web address]  Expires: 2018  8:58 AM     Your access code will  in 90 days. If you need help or a new code, please call your [facility] clinic or [phone].        Care EveryWhere ID     This is your Care EveryWhere ID. This could be used by other organizations to access your [facility] medical records  [id number]         Blood Pressure from Last 3 Encounters:   18 139/84   18 130/82   18 (!) 84/54    Weight from Last 3 Encounters:   18 170 lb (77.1 kg)   18 170 lb (77.1 kg)   18 188 lb (85.3 kg)              Today, you had the following     No orders found for display       Primary Care Provider Office Phone # Fax #    Lyle Juan Fisher [phone]       [address] [place] [address]        Equal Access to Services     [facility] : Hadii aad ku hadashirma Soro, waaxda luqadaha, qaybta kaalmada deyanira, jaki fermin. So [facility] [phone].    ATENCIÓN: Si habla español, tiene a hastings disposición servicios gratuitos de asistencia lingüística. Tip vela [phone].    We comply with applicable federal civil rights laws and [place] laws. We do not discriminate on the basis of race, color, national origin, age, disability, sex, sexual " orientation, or gender identity.            Thank you!     Thank you for choosing [facility]  [facility] your [facility]. Our goal is always to provide you with excellent care. Hearing back from our patients is one way we can continue to improve our services. Please take a few minutes to complete the written survey that you may receive in the mail after your visit with us. Thank you!             Your Updated Medication List - Protect others around you: Learn how to safely use, store and throw away your medicines at www.disposemymeds.org.          This list is accurate as of 2/13/18  4:47 PM.  Always use your most recent med list.                   Brand Name Dispense Instructions for use Diagnosis    * albuterol 108 (90 BASE) MCG/ACT Inhaler    PROAIR HFA/PROVENTIL HFA/VENTOLIN HFA     Inhale 2 puffs into the lungs every 6 hours as needed for shortness of breath / dyspnea or wheezing        * VENTOLIN  (90 BASE) MCG/ACT Inhaler   Generic drug:  albuterol     18 g    USE 2 PUFFS 4 TIMES A DAY AS NEEDED FOR SHORTNESS OF BREATH    Moderate persistent asthma without complication       atorvastatin 20 MG tablet    LIPITOR    90 tablet    TAKE 1 TABLET BY MOUTH DAILY    Mixed hyperlipidemia       baclofen 20 MG tablet    LIORESAL    90 tablet    TAKE 1 TABLET BY MOUTH 3 TIMES DAILY    Low back pain, unspecified back pain laterality, unspecified chronicity, with sciatica presence unspecified       diazepam 5 MG tablet    VALIUM    60 tablet    TAKE 1 TABLET BY MOUTH EVERY 12 HOURS AS NEEDED FOR ANXIETY OR SLEEP SPASM    DAYANA (generalized anxiety disorder)       diclofenac 50 MG EC tablet    VOLTAREN    90 tablet    TAKE 1 TABLET BY MOUTH 3 TIMES DAILY    Lumbago       * divalproex sodium delayed-release 500 MG DR tablet    DEPAKOTE    60 tablet    Take 1 tablet (500 mg) by mouth 2 times daily    Bipolar affective disorder, currently manic, moderate (H)       * divalproex sodium delayed-release 250 MG DR tablet     DEPAKOTE    60 tablet    Take one tablet by mouth with your 500 mg twice per day.    Bipolar affective disorder, currently manic, moderate (H)        MG capsule   Generic drug:  docusate sodium     60 capsule    TAKE 1 CAPSULE BY MOUTH TWICE DAILY    Drug-induced constipation       DULERA 100-5 MCG/ACT oral inhaler   Generic drug:  mometasone-formoterol     13 g    INHALE 2 PUFFS INTO THE LUNGS 2 TIMES A DAY    Moderate persistent asthma without complication       fentaNYL 75 mcg/hr 72 hr patch    DURAGESIC    11 patch    APPLY 1 PATCH ONTO THE SKIN EVERY 48 HOURS    DDD (degenerative disc disease), cervical       fluticasone 50 MCG/ACT spray    FLONASE    16 g    USE 2 SPRAYS IN EACH NOSTRIL DAILY    Chronic rhinitis, unspecified type       gabapentin 300 MG capsule    NEURONTIN    270 capsule    TAKE 3 CAPSULES BY MOUTH THREE TIMES DAILY    Polyneuropathy associated with underlying disease (H), Low back pain, unspecified back pain laterality, unspecified chronicity, with sciatica presence unspecified       HYDROcodone-acetaminophen  MG per tablet    NORCO    120 tablet    TAKE 1 TABLET BY MOUTH EVERY 6 HOURS AS NEEDED FOR MODERATE TO SEVEREPAIN. MUST LAST 30 DAYS    Low back pain, unspecified back pain laterality, unspecified chronicity, with sciatica presence unspecified       ibuprofen 600 MG tablet    ADVIL/MOTRIN    120 tablet    TAKE 1 TABLET BY MOUTH EVERY 6 HOURS AS NEEDED    Hx of degenerative disc disease       lidocaine 5 % Patch    LIDODERM    90 patch    PLACE 3 PATCHES ONTO THE SKIN DAILY AS NEEDED FOR MODERATE PAIN    Midline low back pain without sciatica       lisdexamfetamine 70 MG capsule    VYVANSE    30 capsule    Take 1 capsule (70 mg) by mouth daily    ADHD (attention deficit hyperactivity disorder), combined type       losartan 50 MG tablet    COZAAR    30 tablet    TAKE 1 TABLET BY MOUTH DAILY    Benign essential hypertension       MAPAP 325 MG tablet   Generic drug:   acetaminophen     200 tablet    TAKE 2 TABLETS BY MOUTH EVERY 4 HOURS AS NEEDED FOR MILD PAIN    Midline low back pain without sciatica       multivitamin  with iron Tabs     30 tablet    TAKE 1 TABLET BY MOUTH DAILY    Health care maintenance       nicotine polacrilex 2 MG gum    NICORETTE    110 each    CHEW 1 PIECE AS NEEDED FOR SMOKING CESSATION    Tobacco abuse       nortriptyline 75 MG capsule    PAMELOR    30 capsule    Take 1 capsule (75 mg) by mouth At Bedtime    Fibromyalgia       omeprazole 20 MG CR capsule    priLOSEC    30 capsule    TAKE 1 CAPSULE BY MOUTH EVERY DAY BEFORE A MEAL    Gastroesophageal reflux disease, esophagitis presence not specified       * order for DME     2 Box    Equipment being ordered: Large gloves    Need for assistance with personal care       * order for DME     1 Device    1 boa back brace    Chronic low back pain with sciatica, sciatica laterality unspecified, unspecified back pain laterality       OXcarbazepine 600 MG tablet    TRILEPTAL    60 tablet    TAKE 1 TABLET BY MOUTH 2 TIMES DAILY    Bipolar affective disorder, current episode hypomanic (H)       propranolol 20 MG tablet    INDERAL    60 tablet    TAKE 1 TABLET BY MOUTH TWICE DAILY    DAYANA (generalized anxiety disorder)       * senna-docusate 8.6-50 MG per tablet    SENOKOT-S;PERICOLACE     Take 1-4 tablets by mouth 2 times daily        * SM STOOL SOFTENER/LAXATIVE 8.6-50 MG per tablet   Generic drug:  senna-docusate     120 tablet    TAKE 1 OR 2 TABLETS BY MOUTH 2 TIMES A DAY    Constipation, unspecified constipation type       SM CHILDRENS ASPIRIN 81 MG chewable tablet   Generic drug:  aspirin     30 tablet    CHEW AND SWALLOW 1 TABLET BY MOUTH DAILY    Health care maintenance       tamsulosin 0.4 MG capsule    FLOMAX    30 capsule    TAKE 1 CAPSULE BY MOUTH DAILY    Benign prostatic hyperplasia with incomplete bladder emptying       traZODone 50 MG tablet    DESYREL    30 tablet    TAKE 1 TABLET BY MOUTH NIGHTLY  AS NEEDED FOR SLEEP    Chronic pain       * Notice:  This list has 8 medication(s) that are the same as other medications prescribed for you. Read the directions carefully, and ask your doctor or other care provider to review them with you.
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
## 2018-02-14 NOTE — DISCHARGE INSTRUCTIONS
Take the medications as prescribed for your withdrawal symptoms. Consider going to detox as discussed. Keep your medications locked up in the future at all times.         Opioid Withdrawal  Opioid withdrawal occurs in people who have used opioids on a daily basis for at least 3 weeks. Symptoms usually start about 12 hours after the last dose of the opioid. Withdrawal symptoms last from 3 to 5 days and may include yawning, sweating, runny nose, restlessness, stomach cramping, diarrhea, nausea, vomiting, hot and cold flashes, and trouble sleeping.  Home care  The treatment for withdrawal is mostly managing the symptoms without making the problem worse. Follow these guidelines when caring for yourself at home:    Stay with someone who can help you and give you emotional support during this time. Resist the temptation to take more of the addicting drug to stop your symptoms.    If you have stomach cramps, nausea, or vomiting, take only clear liquids until the symptoms improve. Adults should drink a total of 2 to 3 quarts of liquid daily. It is best to take small frequent drinks rather than a few large ones. You may consume liquids in any of the following forms: mineral water, apple juice, sports drinks, soft drinks without caffeine, clear broth soups, plain gelatin, and ice pops.    Avoid alcohol, caffeine, and tobacco during this time.    Take any medicines prescribed for nausea or cramping exactly as directed.    If you were given a clonidine patch, leave this on for 7 days. You may remove it sooner if you develop excess dizziness or drowsiness.  Follow-up care  Follow up with your healthcare provider if you need further symptom control, or as advised. When you are through withdrawal, take the opportunity to enter a treatment program. This may help you stay off the addicting drug.  When to seek medical advice  Call your healthcare provider right away if any of these occur:    Fever of 100.4 F (38 C) or higher, or as  directed by your healthcare provider    Inability to keep down liquids for 8 hours    Frequent diarrhea    Signs of infection at the site of IV needle use (redness, warmth, pain, or swelling)  Call 911  Call emergency services right away if any of these occur:    Trouble breathing or swallowing, or wheezing    Severe confusion    Extreme drowsiness or trouble awakening    Fainting or loss of consciousness    Rapid heart rate or very slow heart rate    Very low or very high blood pressure    Vomiting blood, or large amounts of blood in stool    Seizure  Date Last Reviewed: 3/1/2017    [id number] [employer]. [address] [place] [id number]. All rights reserved. This information is not intended as a substitute for professional medical care. Always follow your healthcare professional's instructions.

## 2018-02-14 NOTE — ED NOTES
Pt given dc instruct via avs, verbalizes understanding and is aware to follow up at appts made already and that scripts for vistaril, zofran and hydroxizine have been e scribed to [facility]

## 2018-02-15 ENCOUNTER — OFFICE VISIT (OUTPATIENT)
Dept: PEDIATRICS | Facility: OTHER | Age: 58
End: 2018-02-15
Attending: INTERNAL MEDICINE
Payer: COMMERCIAL

## 2018-02-15 VITALS
SYSTOLIC BLOOD PRESSURE: 128 MMHG | HEIGHT: 69 IN | DIASTOLIC BLOOD PRESSURE: 72 MMHG | BODY MASS INDEX: 25.18 KG/M2 | HEART RATE: 86 BPM | OXYGEN SATURATION: 98 % | TEMPERATURE: 98.1 F | WEIGHT: 170 LBS

## 2018-02-15 DIAGNOSIS — Z79.899 POLYPHARMACY: Primary | ICD-10-CM

## 2018-02-15 DIAGNOSIS — H91.93 DECREASED HEARING OF BOTH EARS: Primary | ICD-10-CM

## 2018-02-15 PROCEDURE — G0463 HOSPITAL OUTPT CLINIC VISIT: HCPCS

## 2018-02-15 PROCEDURE — 99214 OFFICE O/P EST MOD 30 MIN: CPT | Performed by: INTERNAL MEDICINE

## 2018-02-15 RX ORDER — ALBUTEROL SULFATE 90 UG/1
AEROSOL, METERED RESPIRATORY (INHALATION)
Qty: 18 G | Refills: 0 | Status: SHIPPED | OUTPATIENT
Start: 2018-02-15 | End: 2019-07-08

## 2018-02-15 RX ORDER — HYDROXYZINE PAMOATE 50 MG/1
50-100 CAPSULE ORAL 4 TIMES DAILY PRN
Qty: 60 CAPSULE | Refills: 1 | Status: SHIPPED | OUTPATIENT
Start: 2018-02-15 | End: 2019-01-09

## 2018-02-15 RX ORDER — PROPRANOLOL HYDROCHLORIDE 20 MG/1
20 TABLET ORAL 2 TIMES DAILY
Qty: 60 TABLET | Refills: 8 | Status: SHIPPED | OUTPATIENT
Start: 2018-02-15 | End: 2019-02-23

## 2018-02-15 RX ORDER — DIVALPROEX SODIUM 250 MG/1
TABLET, DELAYED RELEASE ORAL
Qty: 60 TABLET | Refills: 3 | Status: SHIPPED | OUTPATIENT
Start: 2018-02-15 | End: 2018-03-27

## 2018-02-15 RX ORDER — DOCUSATE SODIUM 100 MG/1
CAPSULE, LIQUID FILLED ORAL
Qty: 60 CAPSULE | Refills: 5 | Status: SHIPPED | OUTPATIENT
Start: 2018-02-15 | End: 2021-10-26

## 2018-02-15 RX ORDER — LOSARTAN POTASSIUM 50 MG/1
50 TABLET ORAL DAILY
Qty: 30 TABLET | Refills: 11 | Status: SHIPPED | OUTPATIENT
Start: 2018-02-15 | End: 2019-02-23

## 2018-02-15 RX ORDER — OXCARBAZEPINE 600 MG/1
TABLET, FILM COATED ORAL
Qty: 60 TABLET | Refills: 3 | Status: SHIPPED | OUTPATIENT
Start: 2018-02-15 | End: 2018-06-29

## 2018-02-15 RX ORDER — ASPIRIN 81 MG/1
TABLET, CHEWABLE ORAL
Qty: 30 TABLET | Refills: 11 | Status: SHIPPED | OUTPATIENT
Start: 2018-02-15 | End: 2018-03-27

## 2018-02-15 RX ORDER — DIVALPROEX SODIUM 500 MG/1
500 TABLET, DELAYED RELEASE ORAL 2 TIMES DAILY
Qty: 60 TABLET | Refills: 3 | Status: SHIPPED | OUTPATIENT
Start: 2018-02-15 | End: 2018-03-27

## 2018-02-15 RX ORDER — NORTRIPTYLINE HYDROCHLORIDE 75 MG/1
75 CAPSULE ORAL AT BEDTIME
Qty: 30 CAPSULE | Refills: 11 | Status: SHIPPED | OUTPATIENT
Start: 2018-02-15 | End: 2018-12-26

## 2018-02-15 RX ORDER — TRAZODONE HYDROCHLORIDE 50 MG/1
100 TABLET, FILM COATED ORAL
Qty: 60 TABLET | Refills: 11 | Status: SHIPPED | OUTPATIENT
Start: 2018-02-15 | End: 2019-03-14

## 2018-02-15 RX ORDER — GABAPENTIN 300 MG/1
CAPSULE ORAL
Qty: 270 CAPSULE | Refills: 11 | Status: SHIPPED | OUTPATIENT
Start: 2018-02-15 | End: 2019-03-14

## 2018-02-15 RX ORDER — FLUTICASONE PROPIONATE 50 MCG
SPRAY, SUSPENSION (ML) NASAL
Qty: 16 G | Refills: 11 | Status: SHIPPED | OUTPATIENT
Start: 2018-02-15 | End: 2019-02-23

## 2018-02-15 RX ORDER — BACLOFEN 20 MG/1
TABLET ORAL
Qty: 90 TABLET | Refills: 3 | Status: SHIPPED | OUTPATIENT
Start: 2018-02-15 | End: 2018-06-29

## 2018-02-15 RX ORDER — TAMSULOSIN HYDROCHLORIDE 0.4 MG/1
CAPSULE ORAL
Qty: 30 CAPSULE | Refills: 11 | Status: SHIPPED | OUTPATIENT
Start: 2018-02-15 | End: 2018-10-25

## 2018-02-15 RX ORDER — LISDEXAMFETAMINE DIMESYLATE 70 MG/1
70 CAPSULE ORAL DAILY
Qty: 30 CAPSULE | Refills: 0 | Status: SHIPPED | OUTPATIENT
Start: 2018-02-15 | End: 2018-03-15

## 2018-02-15 RX ORDER — AMOXICILLIN 250 MG
CAPSULE ORAL
Qty: 120 TABLET | Refills: 5 | Status: SHIPPED | OUTPATIENT
Start: 2018-02-15 | End: 2019-03-15

## 2018-02-15 RX ORDER — ATORVASTATIN CALCIUM 20 MG/1
20 TABLET, FILM COATED ORAL DAILY
Qty: 90 TABLET | Refills: 3 | Status: SHIPPED | OUTPATIENT
Start: 2018-02-15 | End: 2019-01-23

## 2018-02-15 ASSESSMENT — PAIN SCALES - GENERAL: PAINLEVEL: WORST PAIN (10)

## 2018-02-15 NOTE — TELEPHONE ENCOUNTER
Controlled Substance Refill Request for Fentanyl  Problem List Complete:  Yes    Last Written Prescription Date:  1.31.18  Last Fill Quantity: 11,   # refills: 0    [place] [facility] [place] 02/15/18      Clinic visit frequency required: Q 3 months     Future Office visit:   Next 5 appointments (look out 90 days)     Mar 27, 2018  3:00 PM CDT   (Arrive by 2:45 PM)   Return Visit with Laquita Fernandez MD   [facility] ([facility] [place] )    [address] [place] [id number]   [phone]                  Controlled substance agreement on file: Yes:  Date 11.25.15.     Processing:  Staff will hand deliver Rx to on-site pharmacy   checked in past 6 months?  Yes 5.17.17
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
## 2018-02-15 NOTE — MR AVS SNAPSHOT
After Visit Summary   2/15/2018    Joshua Barron    MRN: [id number]           Patient Information     Date Of Birth          1960        Visit Information        Provider Department      2/15/2018 11:00 AM Lyle Fisher,  [facility]        Today's Diagnoses     Bipolar affective disorder, currently manic, moderate (H)    -  1    Bipolar affective disorder, current episode hypomanic (H)        Polypharmacy           Follow-ups after your visit        Your next 10 appointments already scheduled     Mar 07, 2018  2:45 PM CST   (Arrive by 2:30 PM)   Hearing Eval with Kevin Moreno   [facility] [place] ([facility] [place] )    [address]  [place] [address] [phone]            Mar 27, 2018  3:00 PM CDT   (Arrive by 2:45 PM)   Return Visit with Lauqita Fernandez MD   [facility] [place] ([facility] [place] )    [address] [place] [address]  [place] [address] [phone]              Who to contact     If you have questions or need follow up information about today's clinic visit or your schedule please contact [facility] directly at [phone].  Normal or non-critical lab and imaging results will be communicated to you by MyChart, letter or phone within 4 business days after the clinic has received the results. If you do not hear from us within 7 days, please contact the clinic through MyChart or phone. If you have a critical or abnormal lab result, we will notify you by phone as soon as possible.  Submit refill requests through [employer] or call your pharmacy and they will forward the refill request to us. Please allow 3 business days for your refill to be completed.          Additional Information About Your Visit        [employer] Information     [employer] lets you send messages to your doctor, view your test results, renew your prescriptions, schedule appointments and more. To sign up, go to  [email or web address] . Click on \"Log in\" on the left side of the screen, which will take you to the Welcome page. Then click on \"Sign up Now\" on the right side of the page.     You will be asked to enter the access code listed below, as well as some personal information. Please follow the directions to create your username and password.     Your access code is: [email or web address]  Expires: 2018  8:58 AM     Your access code will  in 90 days. If you need help or a new code, please call your [place] clinic or [phone].        Care EveryWhere ID     This is your Care EveryWhere ID. This could be used by other organizations to access your [place] medical records  [id number]        Your Vitals Were     Pulse Temperature Height Pulse Oximetry BMI (Body Mass Index)       86 98.1  F (36.7  C) (Tympanic) 5' 9\" (1.753 m) 98% 25.1 kg/m2        Blood Pressure from Last 3 Encounters:   02/15/18 128/72   18 150/100   18 139/84    Weight from Last 3 Encounters:   02/15/18 170 lb (77.1 kg)   18 170 lb (77.1 kg)   18 170 lb (77.1 kg)              Today, you had the following     No orders found for display         Today's Medication Changes          These changes are accurate as of 2/15/18 11:14 AM.  If you have any questions, ask your nurse or doctor.               These medicines have changed or have updated prescriptions.        Dose/Directions    * albuterol 108 (90 BASE) MCG/ACT Inhaler   Commonly known as:  PROAIR HFA/PROVENTIL HFA/VENTOLIN HFA   This may have changed:  Another medication with the same name was changed. Make sure you understand how and when to take each.   Changed by:  Lyle Fisher, DO        Dose:  2 puff   Inhale 2 puffs into the lungs every 6 hours as needed for shortness of breath / dyspnea or wheezing   Refills:  0       * albuterol 108 (90 BASE) MCG/ACT Inhaler   Commonly known as:  VENTOLIN HFA   This may have changed:  See the new instructions. "   Changed by:  Lyle Fisher DO        USE 2 PUFFS 4 TIMES A DAY AS NEEDED FOR SHORTNESS OF BREATH   Quantity:  18 g   Refills:  0       aspirin 81 MG chewable tablet   Commonly known as:  SM CHILDRENS ASPIRIN   This may have changed:  See the new instructions.   Changed by:  Lyle Fisher DO        CHEW AND SWALLOW 1 TABLET BY MOUTH DAILY   Quantity:  30 tablet   Refills:  11       atorvastatin 20 MG tablet   Commonly known as:  LIPITOR   This may have changed:  See the new instructions.   Changed by:  Lyle Fisher DO        Dose:  20 mg   Take 1 tablet (20 mg) by mouth daily   Quantity:  90 tablet   Refills:  3       baclofen 20 MG tablet   Commonly known as:  LIORESAL   This may have changed:  See the new instructions.   Changed by:  Lyle Fisher DO        TAKE 1 TABLET BY MOUTH 3 TIMES DAILY   Quantity:  90 tablet   Refills:  3       docusate sodium 100 MG capsule   Commonly known as:  DOK   This may have changed:  See the new instructions.   Changed by:  Lyle Fisher DO        TAKE 1 CAPSULE BY MOUTH TWICE DAILY   Quantity:  60 capsule   Refills:  5       fluticasone 50 MCG/ACT spray   Commonly known as:  FLONASE   This may have changed:  See the new instructions.   Changed by:  Lyle Fisher DO        USE 2 SPRAYS IN EACH NOSTRIL DAILY   Quantity:  16 g   Refills:  11       gabapentin 300 MG capsule   Commonly known as:  NEURONTIN   This may have changed:  See the new instructions.   Changed by:  Lyle Fisher DO        TAKE 3 CAPSULES BY MOUTH THREE TIMES DAILY   Quantity:  270 capsule   Refills:  11       losartan 50 MG tablet   Commonly known as:  COZAAR   This may have changed:  See the new instructions.   Changed by:  Lyle Fisher DO        Dose:  50 mg   Take 1 tablet (50 mg) by mouth daily   Quantity:  30 tablet   Refills:  11       mometasone-formoterol 100-5 MCG/ACT oral inhaler   Commonly known as:  DULERA    This may have changed:  See the new instructions.   Changed by:  Lyle Fisher DO        INHALE 2 PUFFS INTO THE LUNGS 2 TIMES A DAY   Quantity:  13 g   Refills:  3       nicotine polacrilex 2 MG gum   Commonly known as:  NICORETTE   This may have changed:  See the new instructions.   Changed by:  Lyle Fisher DO        CHEW 1 PIECE AS NEEDED FOR SMOKING CESSATION   Quantity:  110 each   Refills:  3       omeprazole 20 MG CR capsule   Commonly known as:  priLOSEC   This may have changed:  See the new instructions.   Changed by:  Lyle Fisher DO        TAKE 1 CAPSULE BY MOUTH EVERY DAY BEFORE A MEAL   Quantity:  30 capsule   Refills:  11       OXcarbazepine 600 MG tablet   Commonly known as:  TRILEPTAL   This may have changed:  See the new instructions.   Used for:  Bipolar affective disorder, current episode hypomanic (H)   Changed by:  Lyle Fisher DO        TAKE 1 TABLET BY MOUTH 2 TIMES DAILY   Quantity:  60 tablet   Refills:  3       propranolol 20 MG tablet   Commonly known as:  INDERAL   This may have changed:  See the new instructions.   Changed by:  Lyle Fisher DO        Dose:  20 mg   Take 1 tablet (20 mg) by mouth 2 times daily   Quantity:  60 tablet   Refills:  8       senna-docusate 8.6-50 MG per tablet   Commonly known as:  SM STOOL SOFTENER/LAXATIVE   This may have changed:  See the new instructions.   Changed by:  Lyle Fisher DO        TAKE 1 OR 2 TABLETS BY MOUTH 2 TIMES A DAY   Quantity:  120 tablet   Refills:  5       tamsulosin 0.4 MG capsule   Commonly known as:  FLOMAX   This may have changed:  See the new instructions.   Changed by:  Lyle Fisher DO        TAKE 1 CAPSULE BY MOUTH DAILY   Quantity:  30 capsule   Refills:  11       traZODone 50 MG tablet   Commonly known as:  DESYREL   This may have changed:  See the new instructions.   Changed by:  Lyle Fisher DO        Dose:  100 mg   Take 2  tablets (100 mg) by mouth nightly as needed   Quantity:  60 tablet   Refills:  11       * Notice:  This list has 2 medication(s) that are the same as other medications prescribed for you. Read the directions carefully, and ask your doctor or other care provider to review them with you.         Where to get your medicines      These medications were sent to [facility] [place] - [address]  [address] [place] [address]     Phone:  [phone]     albuterol 108 (90 BASE) MCG/ACT Inhaler    aspirin 81 MG chewable tablet    atorvastatin 20 MG tablet    baclofen 20 MG tablet    divalproex sodium delayed-release 250 MG DR tablet    divalproex sodium delayed-release 500 MG DR tablet    docusate sodium 100 MG capsule    fluticasone 50 MCG/ACT spray    gabapentin 300 MG capsule    hydrOXYzine 50 MG capsule    losartan 50 MG tablet    mometasone-formoterol 100-5 MCG/ACT oral inhaler    nortriptyline 75 MG capsule    omeprazole 20 MG CR capsule    OXcarbazepine 600 MG tablet    propranolol 20 MG tablet    senna-docusate 8.6-50 MG per tablet    tamsulosin 0.4 MG capsule    traZODone 50 MG tablet         Some of these will need a paper prescription and others can be bought over the counter.  Ask your nurse if you have questions.     Bring a paper prescription for each of these medications     lisdexamfetamine 70 MG capsule                Primary Care Provider Office Phone # Fax #    Lyle Juan Fisher  [phone] [address] [address]        Equal Access to Services     [facility] AH: Hadii aad ku hadasho Soomaali, waaxda luqadaha, qaybta kaalmada adeegyada, waxay nicole millan . So [facility] [phone].    ATENCIÓN: Si habla español, tiene a hastings disposición servicios gratuitos de asistencia lingüística. Llame al [phone].    We comply with applicable federal civil rights laws and [place] laws. We do not discriminate on the basis  of race, color, national origin, age, disability, sex, sexual orientation, or gender identity.            Thank you!     Thank you for choosing [facility]  for your care. Our goal is always to provide you with excellent care. Hearing back from our patients is one way we can continue to improve our services. Please take a few minutes to complete the written survey that you may receive in the mail after your visit with us. Thank you!             Your Updated Medication List - Protect others around you: Learn how to safely use, store and throw away your medicines at www.disposemymeds.org.          This list is accurate as of 2/15/18 11:14 AM.  Always use your most recent med list.                   Brand Name Dispense Instructions for use Diagnosis    * albuterol 108 (90 BASE) MCG/ACT Inhaler    PROAIR HFA/PROVENTIL HFA/VENTOLIN HFA     Inhale 2 puffs into the lungs every 6 hours as needed for shortness of breath / dyspnea or wheezing        * albuterol 108 (90 BASE) MCG/ACT Inhaler    VENTOLIN HFA    18 g    USE 2 PUFFS 4 TIMES A DAY AS NEEDED FOR SHORTNESS OF BREATH        aspirin 81 MG chewable tablet    SM CHILDRENS ASPIRIN    30 tablet    CHEW AND SWALLOW 1 TABLET BY MOUTH DAILY        atorvastatin 20 MG tablet    LIPITOR    90 tablet    Take 1 tablet (20 mg) by mouth daily        baclofen 20 MG tablet    LIORESAL    90 tablet    TAKE 1 TABLET BY MOUTH 3 TIMES DAILY        diazepam 5 MG tablet    VALIUM    60 tablet    TAKE 1 TABLET BY MOUTH EVERY 12 HOURS AS NEEDED FOR ANXIETY OR SLEEP SPASM    DAYANA (generalized anxiety disorder)       * divalproex sodium delayed-release 500 MG DR tablet    DEPAKOTE    60 tablet    Take 1 tablet (500 mg) by mouth 2 times daily    Bipolar affective disorder, currently manic, moderate (H)       * divalproex sodium delayed-release 250 MG DR tablet    DEPAKOTE    60 tablet    Take one tablet by mouth with your 500 mg twice per day.    Bipolar affective disorder,  currently manic, moderate (H)       docusate sodium 100 MG capsule    DOK    60 capsule    TAKE 1 CAPSULE BY MOUTH TWICE DAILY        fentaNYL 75 mcg/hr 72 hr patch    DURAGESIC    11 patch    APPLY 1 PATCH ONTO THE SKIN EVERY 48 HOURS    DDD (degenerative disc disease), cervical       fluticasone 50 MCG/ACT spray    FLONASE    16 g    USE 2 SPRAYS IN EACH NOSTRIL DAILY        gabapentin 300 MG capsule    NEURONTIN    270 capsule    TAKE 3 CAPSULES BY MOUTH THREE TIMES DAILY        HYDROcodone-acetaminophen  MG per tablet    NORCO    120 tablet    TAKE 1 TABLET BY MOUTH EVERY 6 HOURS AS NEEDED FOR MODERATE TO SEVEREPAIN. MUST LAST 30 DAYS    Low back pain, unspecified back pain laterality, unspecified chronicity, with sciatica presence unspecified       hydrOXYzine 50 MG capsule    VISTARIL    60 capsule    Take 1-2 capsules ( mg) by mouth 4 times daily as needed for anxiety        lidocaine 5 % Patch    LIDODERM    90 patch    PLACE 3 PATCHES ONTO THE SKIN DAILY AS NEEDED FOR MODERATE PAIN    Midline low back pain without sciatica       lisdexamfetamine 70 MG capsule    VYVANSE    30 capsule    Take 1 capsule (70 mg) by mouth daily        losartan 50 MG tablet    COZAAR    30 tablet    Take 1 tablet (50 mg) by mouth daily        mometasone-formoterol 100-5 MCG/ACT oral inhaler    DULERA    13 g    INHALE 2 PUFFS INTO THE LUNGS 2 TIMES A DAY        multivitamin  with iron Tabs     30 tablet    TAKE 1 TABLET BY MOUTH DAILY    Health care maintenance       nicotine polacrilex 2 MG gum    NICORETTE    110 each    CHEW 1 PIECE AS NEEDED FOR SMOKING CESSATION        nortriptyline 75 MG capsule    PAMELOR    30 capsule    Take 1 capsule (75 mg) by mouth At Bedtime        omeprazole 20 MG CR capsule    priLOSEC    30 capsule    TAKE 1 CAPSULE BY MOUTH EVERY DAY BEFORE A MEAL        * order for DME     2 Box    Equipment being ordered: Large gloves    Need for assistance with personal care       * order for DME      1 Device    1 boa back brace    Chronic low back pain with sciatica, sciatica laterality unspecified, unspecified back pain laterality       OXcarbazepine 600 MG tablet    TRILEPTAL    60 tablet    TAKE 1 TABLET BY MOUTH 2 TIMES DAILY    Bipolar affective disorder, current episode hypomanic (H)       propranolol 20 MG tablet    INDERAL    60 tablet    Take 1 tablet (20 mg) by mouth 2 times daily        senna-docusate 8.6-50 MG per tablet    SM STOOL SOFTENER/LAXATIVE    120 tablet    TAKE 1 OR 2 TABLETS BY MOUTH 2 TIMES A DAY        tamsulosin 0.4 MG capsule    FLOMAX    30 capsule    TAKE 1 CAPSULE BY MOUTH DAILY        traZODone 50 MG tablet    DESYREL    60 tablet    Take 2 tablets (100 mg) by mouth nightly as needed        * Notice:  This list has 6 medication(s) that are the same as other medications prescribed for you. Read the directions carefully, and ask your doctor or other care provider to review them with you.

## 2018-02-16 RX ORDER — FENTANYL 75 UG/1
PATCH TRANSDERMAL
Qty: 11 PATCH | Refills: 0 | Status: SHIPPED | OUTPATIENT
Start: 2018-02-21 | End: 2018-03-12

## 2018-02-16 ASSESSMENT — PATIENT HEALTH QUESTIONNAIRE - PHQ9: SUM OF ALL RESPONSES TO PHQ QUESTIONS 1-9: 25

## 2018-02-20 ENCOUNTER — OFFICE VISIT (OUTPATIENT)
Dept: CHIROPRACTIC MEDICINE | Facility: OTHER | Age: 58
End: 2018-02-20
Attending: CHIROPRACTOR
Payer: COMMERCIAL

## 2018-02-20 DIAGNOSIS — M99.01 SEGMENTAL AND SOMATIC DYSFUNCTION OF CERVICAL REGION: ICD-10-CM

## 2018-02-20 DIAGNOSIS — M99.02 SEGMENTAL AND SOMATIC DYSFUNCTION OF THORACIC REGION: ICD-10-CM

## 2018-02-20 DIAGNOSIS — M99.03 SEGMENTAL AND SOMATIC DYSFUNCTION OF LUMBAR REGION: Primary | ICD-10-CM

## 2018-02-20 DIAGNOSIS — M54.50 ACUTE BILATERAL LOW BACK PAIN WITHOUT SCIATICA: ICD-10-CM

## 2018-02-20 PROCEDURE — 99211 OFF/OP EST MAY X REQ PHY/QHP: CPT | Mod: 25 | Performed by: CHIROPRACTOR

## 2018-02-20 PROCEDURE — 98941 CHIROPRACT MANJ 3-4 REGIONS: CPT | Mod: AT | Performed by: CHIROPRACTOR

## 2018-02-20 NOTE — MR AVS SNAPSHOT
After Visit Summary   2/20/2018    Joshua Barron    MRN: [id number]           Patient Information     Date Of Birth          1960        Visit Information        Provider Department      2/20/2018 4:00 PM Shamar Escamilla DC  [facility]        Today's Diagnoses     Segmental and somatic dysfunction of lumbar region    -  1    Acute bilateral low back pain without sciatica        Segmental and somatic dysfunction of thoracic region        Segmental and somatic dysfunction of cervical region           Follow-ups after your visit        Your next 10 appointments already scheduled     Mar 07, 2018  2:45 PM CST   (Arrive by 2:30 PM)   Hearing Eval with Kevin Moreno   [facility] [place] ([facility] [place] )    [address]  [place] [address] [phone]            Mar 27, 2018  3:00 PM CDT   (Arrive by 2:45 PM)   Return Visit with Laquita Fernandez MD   [facility] [place] ([facility] [place] )    [address] [place] [address]  [place] [address] [phone]              Who to contact     If you have questions or need follow up information about today's clinic visit or your schedule please contact  [facility] directly at [phone].  Normal or non-critical lab and imaging results will be communicated to you by MyChart, letter or phone within 4 business days after the clinic has received the results. If you do not hear from us within 7 days, please contact the clinic through [facility] or phone. If you have a critical or abnormal lab result, we will notify you by phone as soon as possible.  Submit refill requests through [employer] or call your pharmacy and they will forward the refill request to us. Please allow 3 business days for your refill to be completed.          Additional Information About Your Visit        [facility] Information     [employer] lets you send messages to your doctor, view your test results, renew your  "prescriptions, schedule appointments and more. To sign up, go to [email or web address] . Click on \"Log in\" on the left side of the screen, which will take you to the Welcome page. Then click on \"Sign up Now\" on the right side of the page.     You will be asked to enter the access code listed below, as well as some personal information. Please follow the directions to create your username and password.     Your access code is: [email or web address]  Expires: 2018  8:58 AM     Your access code will  in 90 days. If you need help or a new code, please call your [place] clinic or [phone].        Care EveryWhere ID     This is your Care EveryWhere ID. This could be used by other organizations to access your [place] medical records  [id number]         Blood Pressure from Last 3 Encounters:   02/15/18 128/72   18 150/100   18 139/84    Weight from Last 3 Encounters:   02/15/18 170 lb (77.1 kg)   18 170 lb (77.1 kg)   18 170 lb (77.1 kg)              We Performed the Following     CHIROPRAC MANIP,SPINAL,3-4 REGIONS        Primary Care Provider Office Phone # Fax #    Lyle Juan DO [place] [phone] [address] [place] [id number]        Equal Access to Services     ANIYAH DANIELS : Hadii aad ku hadasho Sosaranali, waaxda luqadaha, qaybta kaalmada adeegyada, jaki millan . So [facility] [phone].    ATENCIÓN: Si habla español, tiene a hastings disposición servicios gratuitos de asistencia lingüística. Tip al [phone].    We comply with applicable federal civil rights laws and [place] laws. We do not discriminate on the basis of race, color, national origin, age, disability, sex, sexual orientation, or gender identity.            Thank you!     Thank you for choosing  [facility]  for your care. Our goal is always to provide you with excellent care. Hearing back from our patients is one way we can continue to improve our " services. Please take a few minutes to complete the written survey that you may receive in the mail after your visit with us. Thank you!             Your Updated Medication List - Protect others around you: Learn how to safely use, store and throw away your medicines at www.disposemymeds.org.          This list is accurate as of 2/20/18  4:08 PM.  Always use your most recent med list.                   Brand Name Dispense Instructions for use Diagnosis    * albuterol 108 (90 BASE) MCG/ACT Inhaler    PROAIR HFA/PROVENTIL HFA/VENTOLIN HFA     Inhale 2 puffs into the lungs every 6 hours as needed for shortness of breath / dyspnea or wheezing        * albuterol 108 (90 BASE) MCG/ACT Inhaler    VENTOLIN HFA    18 g    USE 2 PUFFS 4 TIMES A DAY AS NEEDED FOR SHORTNESS OF BREATH        aspirin 81 MG chewable tablet    SM CHILDRENS ASPIRIN    30 tablet    CHEW AND SWALLOW 1 TABLET BY MOUTH DAILY        atorvastatin 20 MG tablet    LIPITOR    90 tablet    Take 1 tablet (20 mg) by mouth daily        baclofen 20 MG tablet    LIORESAL    90 tablet    TAKE 1 TABLET BY MOUTH 3 TIMES DAILY        diazepam 5 MG tablet    VALIUM    60 tablet    TAKE 1 TABLET BY MOUTH EVERY 12 HOURS AS NEEDED FOR ANXIETY OR SLEEP SPASM    DAYANA (generalized anxiety disorder)       * divalproex sodium delayed-release 500 MG DR tablet    DEPAKOTE    60 tablet    Take 1 tablet (500 mg) by mouth 2 times daily        * divalproex sodium delayed-release 250 MG DR tablet    DEPAKOTE    60 tablet    Take one tablet by mouth with your 500 mg twice per day.        docusate sodium 100 MG capsule    DOK    60 capsule    TAKE 1 CAPSULE BY MOUTH TWICE DAILY        fentaNYL 75 mcg/hr 72 hr patch   Start taking on:  2/21/2018    DURAGESIC    11 patch    APPLY 1 PATCH ONTO THE SKIN EVERY 48 HOURS    DDD (degenerative disc disease), cervical       fluticasone 50 MCG/ACT spray    FLONASE    16 g    USE 2 SPRAYS IN EACH NOSTRIL DAILY        gabapentin 300 MG capsule     NEURONTIN    270 capsule    TAKE 3 CAPSULES BY MOUTH THREE TIMES DAILY        HYDROcodone-acetaminophen  MG per tablet    NORCO    120 tablet    TAKE 1 TABLET BY MOUTH EVERY 6 HOURS AS NEEDED FOR MODERATE TO SEVEREPAIN. MUST LAST 30 DAYS    Low back pain, unspecified back pain laterality, unspecified chronicity, with sciatica presence unspecified       hydrOXYzine 50 MG capsule    VISTARIL    60 capsule    Take 1-2 capsules ( mg) by mouth 4 times daily as needed for anxiety        lidocaine 5 % Patch    LIDODERM    90 patch    PLACE 3 PATCHES ONTO THE SKIN DAILY AS NEEDED FOR MODERATE PAIN    Midline low back pain without sciatica       lisdexamfetamine 70 MG capsule    VYVANSE    30 capsule    Take 1 capsule (70 mg) by mouth daily        losartan 50 MG tablet    COZAAR    30 tablet    Take 1 tablet (50 mg) by mouth daily        mometasone-formoterol 100-5 MCG/ACT oral inhaler    DULERA    13 g    INHALE 2 PUFFS INTO THE LUNGS 2 TIMES A DAY        multivitamin  with iron Tabs     30 tablet    TAKE 1 TABLET BY MOUTH DAILY    Health care maintenance       nicotine polacrilex 2 MG gum    NICORETTE    110 each    CHEW 1 PIECE AS NEEDED FOR SMOKING CESSATION        nortriptyline 75 MG capsule    PAMELOR    30 capsule    Take 1 capsule (75 mg) by mouth At Bedtime        omeprazole 20 MG CR capsule    priLOSEC    30 capsule    TAKE 1 CAPSULE BY MOUTH EVERY DAY BEFORE A MEAL        * order for DME     2 Box    Equipment being ordered: Large gloves    Need for assistance with personal care       * order for DME     1 Device    1 boa back brace    Chronic low back pain with sciatica, sciatica laterality unspecified, unspecified back pain laterality       OXcarbazepine 600 MG tablet    TRILEPTAL    60 tablet    TAKE 1 TABLET BY MOUTH 2 TIMES DAILY        propranolol 20 MG tablet    INDERAL    60 tablet    Take 1 tablet (20 mg) by mouth 2 times daily        senna-docusate 8.6-50 MG per tablet    SM STOOL  SOFTENER/LAXATIVE    120 tablet    TAKE 1 OR 2 TABLETS BY MOUTH 2 TIMES A DAY        tamsulosin 0.4 MG capsule    FLOMAX    30 capsule    TAKE 1 CAPSULE BY MOUTH DAILY        traZODone 50 MG tablet    DESYREL    60 tablet    Take 2 tablets (100 mg) by mouth nightly as needed        * Notice:  This list has 6 medication(s) that are the same as other medications prescribed for you. Read the directions carefully, and ask your doctor or other care provider to review them with you.

## 2018-02-20 NOTE — PROGRESS NOTES

## 2018-02-23 DIAGNOSIS — M54.5 LOW BACK PAIN, UNSPECIFIED BACK PAIN LATERALITY, UNSPECIFIED CHRONICITY, WITH SCIATICA PRESENCE UNSPECIFIED: ICD-10-CM

## 2018-02-23 NOTE — TELEPHONE ENCOUNTER
Controlled Substance Refill Request for Norco last filled on 2.2.18 #120. Last office visit on 2.15.18  Problem List Complete:  Yes   checked in past 6 months?  Yes 5.17.17     Next 5 appointments (look out 90 days)     Mar 01, 2018 10:10 AM CST   Return Visit with Shamar Escamilla DC   [facility]    [address]  [facility] [address]   [phone]            Mar 27, 2018  3:00 PM CDT   (Arrive by 2:45 PM)   Return Visit with Laquita Fernandez MD   [facility] ([facility] )    [address]  [facility] [address] [phone]                   Routing refill request to provider for review/approval because:  Drug not on the [facility], [facility] or  Health refill protocol or controlled substance

## 2018-02-26 RX ORDER — HYDROCODONE BITARTRATE AND ACETAMINOPHEN 10; 325 MG/1; MG/1
TABLET ORAL
Qty: 120 TABLET | Refills: 0 | Status: SHIPPED | OUTPATIENT
Start: 2018-03-01 | End: 2018-04-02

## 2018-02-27 ENCOUNTER — TELEPHONE (OUTPATIENT)
Dept: PEDIATRICS | Facility: OTHER | Age: 58
End: 2018-02-27

## 2018-02-27 DIAGNOSIS — B37.0 THRUSH: Primary | ICD-10-CM

## 2018-02-27 RX ORDER — FLUCONAZOLE 100 MG/1
200 TABLET ORAL DAILY
Qty: 14 TABLET | Refills: 0 | Status: SHIPPED | OUTPATIENT
Start: 2018-02-27 | End: 2018-11-15

## 2018-02-27 NOTE — TELEPHONE ENCOUNTER
"9:05 AM    Reason for Call: Phone Call    Description: Pt called and stated he has been forgetting to rinse his mouth after using his inhaler and has developed \"trench\" mouth. Pt spoke with pharmacist who suggested he speak with his doctor about getting Nystatin. Pt wondering if this can be called into [facility]? Please call him back at [phone]    Was an appointment offered for this call? No  If yes : Appointment type              Date    Preferred method for responding to this message: Telephone Call  What is your phone number ?    If we cannot reach you directly, may we leave a detailed response at the number you provided? Yes    Can this message wait until your PCP/provider returns, if available today? Not applicable    Janett Knight    "

## 2018-02-28 ENCOUNTER — TELEPHONE (OUTPATIENT)
Dept: INTERNAL MEDICINE | Facility: OTHER | Age: 58
End: 2018-02-28

## 2018-03-05 ENCOUNTER — OFFICE VISIT (OUTPATIENT)
Dept: CHIROPRACTIC MEDICINE | Facility: OTHER | Age: 58
End: 2018-03-05
Attending: CHIROPRACTOR
Payer: COMMERCIAL

## 2018-03-05 ENCOUNTER — TELEPHONE (OUTPATIENT)
Dept: INTERNAL MEDICINE | Facility: OTHER | Age: 58
End: 2018-03-05

## 2018-03-05 ENCOUNTER — TRANSFERRED RECORDS (OUTPATIENT)
Dept: HEALTH INFORMATION MANAGEMENT | Facility: CLINIC | Age: 58
End: 2018-03-05

## 2018-03-05 DIAGNOSIS — M99.03 SEGMENTAL AND SOMATIC DYSFUNCTION OF LUMBAR REGION: Primary | ICD-10-CM

## 2018-03-05 DIAGNOSIS — M99.02 SEGMENTAL AND SOMATIC DYSFUNCTION OF THORACIC REGION: ICD-10-CM

## 2018-03-05 DIAGNOSIS — M54.50 ACUTE BILATERAL LOW BACK PAIN WITHOUT SCIATICA: ICD-10-CM

## 2018-03-05 DIAGNOSIS — M99.01 SEGMENTAL AND SOMATIC DYSFUNCTION OF CERVICAL REGION: ICD-10-CM

## 2018-03-05 LAB — RETINOPATHY: NEGATIVE

## 2018-03-05 PROCEDURE — 98941 CHIROPRACT MANJ 3-4 REGIONS: CPT | Mod: AT | Performed by: CHIROPRACTOR

## 2018-03-05 NOTE — MR AVS SNAPSHOT
After Visit Summary   3/5/2018    Joshua Barron    MRN: [id number]           Patient Information     Date Of Birth          1960        Visit Information        Provider Department      3/5/2018 2:40 PM Shamar Escamilla DC  [facility]        Today's Diagnoses     Segmental and somatic dysfunction of lumbar region    -  1    Acute bilateral low back pain without sciatica        Segmental and somatic dysfunction of thoracic region        Segmental and somatic dysfunction of cervical region           Follow-ups after your visit        Your next 10 appointments already scheduled     Mar 07, 2018  2:45 PM CST   (Arrive by 2:30 PM)   Hearing Eval with Kevin Moreno   [facility] [place] ([facility] [place] )    [address]  [place] [address]   [phone]            Mar 08, 2018  2:40 PM CST   Return Visit with Shamar Escamilla DC   [facility] ([facility])    [address]  [place] [address]   [phone]            Mar 27, 2018  3:00 PM CDT   (Arrive by 2:45 PM)   Return Visit with Laquita Fernandez MD   [facility] ([facility] [place] )    [address]  [place] [id number] [phone]              Who to contact     If you have questions or need follow up information about today's clinic visit or your schedule please contact  [facility] directly at [phone].  Normal or non-critical lab and imaging results will be communicated to you by MyChart, letter or phone within 4 business days after the clinic has received the results. If you do not hear from us within 7 days, please contact the clinic through MyChart or phone. If you have a critical or abnormal lab result, we will notify you by phone as soon as possible.  Submit refill requests through [employer] or call your pharmacy and they will forward the refill request to us. Please allow 3 business days for your refill to be  "completed.          Additional Information About Your Visit        [employer] Information     [employer] lets you send messages to your doctor, view your test results, renew your prescriptions, schedule appointments and more. To sign up, go to [email or web address] . Click on \"Log in\" on the left side of the screen, which will take you to the Welcome page. Then click on \"Sign up Now\" on the right side of the page.     You will be asked to enter the access code listed below, as well as some personal information. Please follow the directions to create your username and password.     Your access code is: [email or web address]  Expires: 2018  8:58 AM     Your access code will  in 90 days. If you need help or a new code, please call your [facility] clinic or [phone].        Care EveryWhere ID     This is your Care EveryWhere ID. This could be used by other organizations to access your [facility] medical records  [id number]         Blood Pressure from Last 3 Encounters:   02/15/18 128/72   18 150/100   18 139/84    Weight from Last 3 Encounters:   02/15/18 170 lb (77.1 kg)   18 170 lb (77.1 kg)   18 170 lb (77.1 kg)              We Performed the Following     CHIROPRAC MANIP,SPINAL,3-4 REGIONS        Primary Care Provider Office Phone # Fax #    Lyle Juan DO Natalia [phone]       [address] [place] [address]        Equal Access to Services     ANIYAH DANIELS : Hadii aad ku hadasho Sosaranali, waaxda luqadaha, qaybta kaalmada adeegyada, jaki fermin. So [facility] [phone].    ATENCIÓN: Si habla español, tiene a hastings disposición servicios gratuitos de asistencia lingüística. Llame al [phone].    We comply with applicable federal civil rights laws and [place] laws. We do not discriminate on the basis of race, color, national origin, age, disability, sex, sexual orientation, or gender identity.            Thank you!     Thank you for " choosing  [facility]  for your care. Our goal is always to provide you with excellent care. Hearing back from our patients is one way we can continue to improve our services. Please take a few minutes to complete the written survey that you may receive in the mail after your visit with us. Thank you!             Your Updated Medication List - Protect others around you: Learn how to safely use, store and throw away your medicines at www.disposemymeds.org.          This list is accurate as of 3/5/18  2:57 PM.  Always use your most recent med list.                   Brand Name Dispense Instructions for use Diagnosis    * albuterol 108 (90 BASE) MCG/ACT Inhaler    PROAIR HFA/PROVENTIL HFA/VENTOLIN HFA     Inhale 2 puffs into the lungs every 6 hours as needed for shortness of breath / dyspnea or wheezing        * albuterol 108 (90 BASE) MCG/ACT Inhaler    VENTOLIN HFA    18 g    USE 2 PUFFS 4 TIMES A DAY AS NEEDED FOR SHORTNESS OF BREATH        aspirin 81 MG chewable tablet    SM CHILDRENS ASPIRIN    30 tablet    CHEW AND SWALLOW 1 TABLET BY MOUTH DAILY        atorvastatin 20 MG tablet    LIPITOR    90 tablet    Take 1 tablet (20 mg) by mouth daily        baclofen 20 MG tablet    LIORESAL    90 tablet    TAKE 1 TABLET BY MOUTH 3 TIMES DAILY        diazepam 5 MG tablet    VALIUM    60 tablet    TAKE 1 TABLET BY MOUTH EVERY 12 HOURS AS NEEDED FOR ANXIETY OR SLEEP SPASM    DAYANA (generalized anxiety disorder)       * divalproex sodium delayed-release 500 MG DR tablet    DEPAKOTE    60 tablet    Take 1 tablet (500 mg) by mouth 2 times daily        * divalproex sodium delayed-release 250 MG DR tablet    DEPAKOTE    60 tablet    Take one tablet by mouth with your 500 mg twice per day.        docusate sodium 100 MG capsule    DOK    60 capsule    TAKE 1 CAPSULE BY MOUTH TWICE DAILY        fentaNYL 75 mcg/hr 72 hr patch    DURAGESIC    11 patch    APPLY 1 PATCH ONTO THE SKIN EVERY 48 HOURS    DDD (degenerative disc  disease), cervical       fluconazole 100 MG tablet    DIFLUCAN    14 tablet    Take 2 tablets (200 mg) by mouth daily    Thrush       fluticasone 50 MCG/ACT spray    FLONASE    16 g    USE 2 SPRAYS IN EACH NOSTRIL DAILY        gabapentin 300 MG capsule    NEURONTIN    270 capsule    TAKE 3 CAPSULES BY MOUTH THREE TIMES DAILY        HYDROcodone-acetaminophen  MG per tablet    NORCO    120 tablet    TAKE 1 TABLET BY MOUTH EVERY 6 HOURS AS NEEDED FOR MODERATE TO SEVERER PAIN, MUST LAST 30 DAYS    Low back pain, unspecified back pain laterality, unspecified chronicity, with sciatica presence unspecified       hydrOXYzine 50 MG capsule    VISTARIL    60 capsule    Take 1-2 capsules ( mg) by mouth 4 times daily as needed for anxiety        lidocaine 5 % Patch    LIDODERM    90 patch    PLACE 3 PATCHES ONTO THE SKIN DAILY AS NEEDED FOR MODERATE PAIN    Midline low back pain without sciatica       lisdexamfetamine 70 MG capsule    VYVANSE    30 capsule    Take 1 capsule (70 mg) by mouth daily        losartan 50 MG tablet    COZAAR    30 tablet    Take 1 tablet (50 mg) by mouth daily        MAPAP 325 MG tablet   Generic drug:  acetaminophen     200 tablet    TAKE 2 TABLETS BY MOUTH EVERY 4 HOURS AS NEEDED FOR MILD PAIN    Pain       mometasone-formoterol 100-5 MCG/ACT oral inhaler    DULERA    13 g    INHALE 2 PUFFS INTO THE LUNGS 2 TIMES A DAY        multivitamin  with iron Tabs     30 tablet    TAKE 1 TABLET BY MOUTH DAILY    Health care maintenance       nicotine polacrilex 2 MG gum    NICORETTE    110 each    CHEW 1 PIECE AS NEEDED FOR SMOKING CESSATION        nortriptyline 75 MG capsule    PAMELOR    30 capsule    Take 1 capsule (75 mg) by mouth At Bedtime        omeprazole 20 MG CR capsule    priLOSEC    30 capsule    TAKE 1 CAPSULE BY MOUTH EVERY DAY BEFORE A MEAL        * order for DME     2 Box    Equipment being ordered: Large gloves    Need for assistance with personal care       * order for DME     1  Device    1 boa back brace    Chronic low back pain with sciatica, sciatica laterality unspecified, unspecified back pain laterality       OXcarbazepine 600 MG tablet    TRILEPTAL    60 tablet    TAKE 1 TABLET BY MOUTH 2 TIMES DAILY        propranolol 20 MG tablet    INDERAL    60 tablet    Take 1 tablet (20 mg) by mouth 2 times daily        senna-docusate 8.6-50 MG per tablet    SM STOOL SOFTENER/LAXATIVE    120 tablet    TAKE 1 OR 2 TABLETS BY MOUTH 2 TIMES A DAY        tamsulosin 0.4 MG capsule    FLOMAX    30 capsule    TAKE 1 CAPSULE BY MOUTH DAILY        traZODone 50 MG tablet    DESYREL    60 tablet    Take 2 tablets (100 mg) by mouth nightly as needed        * Notice:  This list has 6 medication(s) that are the same as other medications prescribed for you. Read the directions carefully, and ask your doctor or other care provider to review them with you.

## 2018-03-05 NOTE — TELEPHONE ENCOUNTER
8:58 AM    Reason for Call: OVERBOOK    Patient is having the following symptoms: URI Brayan Leiws was told if it wasn't better to come back. It is not better    The patient is requesting an appointment for  Today with Dr. Fisher    Was an appointment offered for this call?    No    Preferred method for responding to this message: [phone]    If we cannot reach you directly, may we leave a detailed response at the number you provided? Yes        Nicky Tai

## 2018-03-05 NOTE — TELEPHONE ENCOUNTER
Please schedule patient for date/time: Mikala Holguin we have no openings. Please schedule with another provider or Joshua should go to ER/UC    Have patient go to ER/Urgent Care Center. Urgent Care hours are 9:30 am to 8 pm, open 7 days a week. Yes.    Provider will call patient.No.    Other:

## 2018-03-05 NOTE — PROGRESS NOTES
Subjective Finding:    Chief compalint: Patient presents with:  Neck Pain  Back Pain  , Pain Scale: 4/10, Intensity: dull, Duration: 2 days, Radiating: no.    Date of injury:     Activities that the pain restricts:   Home/household/hobbies/social activities: yes.  Work duties: yes.  Sleep: yes.  Makes symptoms better: rest.  Makes symptoms worse: lumbar extension and lumbar flexion.  Have you seen anyone else for the symptoms? No.  Work related: no.  Automobile related injury: no.    Objective and Assessment:    Posture Analysis:   High shoulder: .  Head tilt: .  High iliac crest: .  Head carriage: neutral.  Thoracic Kyphosis: neutral.  Lumbar Lordosis: forward.    Lumbar Range of Motion: flexion decreased and extension decreased.  Cervical Range of Motion: extension decreased.  Thoracic Range of Motion: extension decreased.  Extremity Range of Motion: .    Palpation:   Quad lumb: bilateral, referred pain: no  T paraspinals: dull pain, no    Segmental dysfunction pre-treatment and treatment area: C4, T5, T6 and Sacrum.    Assessment post-treatment:  Cervical: ROM increased.  Thoracic: ROM increased.  Lumbar: ROM increased.    Comments: history of DDD in C and L spine.      Complicating Factors: .    Procedure(s):  CMT:  97996 Chiropractic manipulative treatment 3-4 regions performed   Cervical: Diversified, See above for level, Supine, Thoracic: Diversified, See above for level, Prone and Lumbar: Diversified, See above for level, Side posture    Modalities:  None performed this visit    Therapeutic procedures:  None    Plan:  Treatment plan: PRN.  Instructed patient: stretch as instructed at visit.  Short term goals: increase ROM.  Long term goals: increase ADL.  Prognosis: good.

## 2018-03-07 ENCOUNTER — HOSPITAL ENCOUNTER (EMERGENCY)
Facility: HOSPITAL | Age: 58
Discharge: HOME | End: 2018-03-07
Attending: PHYSICIAN ASSISTANT | Admitting: PHYSICIAN ASSISTANT
Payer: COMMERCIAL

## 2018-03-07 ENCOUNTER — OFFICE VISIT (OUTPATIENT)
Dept: AUDIOLOGY | Facility: OTHER | Age: 58
End: 2018-03-07
Attending: AUDIOLOGIST
Payer: COMMERCIAL

## 2018-03-07 VITALS — DIASTOLIC BLOOD PRESSURE: 42 MMHG | TEMPERATURE: 97 F | SYSTOLIC BLOOD PRESSURE: 110 MMHG | OXYGEN SATURATION: 96 %

## 2018-03-07 DIAGNOSIS — J01.00 ACUTE MAXILLARY SINUSITIS, RECURRENCE NOT SPECIFIED: ICD-10-CM

## 2018-03-07 DIAGNOSIS — H90.3 SENSORINEURAL HEARING LOSS, BILATERAL: Primary | ICD-10-CM

## 2018-03-07 DIAGNOSIS — J20.8 ACUTE BRONCHITIS DUE TO OTHER SPECIFIED ORGANISMS: ICD-10-CM

## 2018-03-07 PROCEDURE — 92593 ZZHC HEARING AID CHECK, BINAURAL: CPT | Performed by: AUDIOLOGIST

## 2018-03-07 PROCEDURE — G0463 HOSPITAL OUTPT CLINIC VISIT: HCPCS | Mod: 25

## 2018-03-07 PROCEDURE — 92552 PURE TONE AUDIOMETRY AIR: CPT | Performed by: AUDIOLOGIST

## 2018-03-07 PROCEDURE — 99213 OFFICE O/P EST LOW 20 MIN: CPT | Performed by: PHYSICIAN ASSISTANT

## 2018-03-07 PROCEDURE — G0463 HOSPITAL OUTPT CLINIC VISIT: HCPCS

## 2018-03-07 PROCEDURE — 92556 SPEECH AUDIOMETRY COMPLETE: CPT | Performed by: AUDIOLOGIST

## 2018-03-07 ASSESSMENT — ENCOUNTER SYMPTOMS
TROUBLE SWALLOWING: 0
HEADACHES: 0
SINUS PAIN: 1
FATIGUE: 1
EYE DISCHARGE: 0
ABDOMINAL PAIN: 0
COUGH: 1
PSYCHIATRIC NEGATIVE: 1
VOMITING: 0
CARDIOVASCULAR NEGATIVE: 1
DIZZINESS: 0
SINUS PRESSURE: 1
FEVER: 0
LIGHT-HEADEDNESS: 0
VOICE CHANGE: 0
SORE THROAT: 0
NAUSEA: 0
APPETITE CHANGE: 0
EYE REDNESS: 0
DIARRHEA: 0
NECK STIFFNESS: 0
NECK PAIN: 0

## 2018-03-07 NOTE — ED AVS SNAPSHOT
[facility]    [address]    [place] [address]    Phone:  [phone]                                       Joshua Barron   MRN: [id number]    Department:  [facility]   Date of Visit:  3/7/2018           After Visit Summary Signature Page     I have received my discharge instructions, and my questions have been answered. I have discussed any challenges I see with this plan with the nurse or doctor.    ..........................................................................................................................................  Patient/Patient Representative Signature      ..........................................................................................................................................  Patient Representative Print Name and Relationship to Patient    ..................................................               ................................................  Date                                            Time    ..........................................................................................................................................  Reviewed by Signature/Title    ...................................................              ..............................................  Date                                                            Time

## 2018-03-07 NOTE — PROGRESS NOTES
Audiology Evaluation Completed.   Hearing aid check completed.  Please refer SCANNED AUDIOGRAM and/or TYMPANOGRAM for BACKGROUND, RESULTS, RECOMMENDATIONS.  Ciara Valladares M.S., [facility]  Audiologist #[id number]
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
## 2018-03-07 NOTE — ED AVS SNAPSHOT
[facility]    [address] [address]    Phone:  [phone]                                       Joshua Barron   MRN: [id number]    Department:  [facility]   Date of Visit:  3/7/2018           Patient Information     Date Of Birth          1960        Your diagnoses for this visit were:     Acute bronchitis due to other specified organisms     Acute maxillary sinusitis, recurrence not specified        You were seen by Jesika Yang PA.      Follow-up Information     Follow up with Lyle Fisher DO.    Specialties:  Internal Medicine, Pediatrics    Why:  If symptoms worsen    Contact information:    [address]  [facility] [address] [phone]          Follow up with [facility].    Specialty:  EMERGENCY MEDICINE    Why:  If further concerns develop    Contact information:    [address] [address] [phone]    Additional information:    From [place]: Take [address]. Turn left at [address]. Turn left at the first stoplight on [address] [place] [address]. At the first stop sign, take a right onto [address]. Take a left into the parking lot and continue through until you reach the North enterance of the building.       From [place]: Take [address]. Take the [address] ramp towards [place]. Turn left onto [address]. Take a slight right onto [address]. Turn left at the first stoplight on [address]. At the first stop sign, take a right onto [address]. Take a left into the parking lot and continue through until you reach the North enterance of the building.       From [place]: Take [address]. Take a right at [address]. At the first stop sign, take a right onto [address]. Take a left into the parking lot and continue through until you reach the North enterance of the building.       Discharge References/Attachments      BRONCHITIS, ANTIBIOTIC TREATMENT (ADULT) (ENGLISH)    SINUSITIS (ANTIBIOTIC TREATMENT) (ENGLISH)      Future Appointments        Provider Department Dept Phone Center    3/8/2018 2:40 PM Shamar Escamilla DC  [facility] [place] [phone] [facility]    3/27/2018 3:00 PM Laquita Fernandez MD [facility] [place] [phone] [facility] [place]    3/13/2019 2:15 PM Kevin Zavala [facility] [phone] [facility]         Review of your medicines      START taking        Dose / Directions Last dose taken    amoxicillin-clavulanate 875-125 MG per tablet   Commonly known as:  AUGMENTIN   Dose:  1 tablet   Quantity:  20 tablet        Take 1 tablet by mouth 2 times daily   Refills:  0          Our records show that you are taking the medicines listed below. If these are incorrect, please call your family doctor or clinic.        Dose / Directions Last dose taken    * albuterol 108 (90 BASE) MCG/ACT Inhaler   Commonly known as:  PROAIR HFA/PROVENTIL HFA/VENTOLIN HFA   Dose:  2 puff        Inhale 2 puffs into the lungs every 6 hours as needed for shortness of breath / dyspnea or wheezing   Refills:  0        * albuterol 108 (90 BASE) MCG/ACT Inhaler   Commonly known as:  VENTOLIN HFA   Quantity:  18 g        USE 2 PUFFS 4 TIMES A DAY AS NEEDED FOR SHORTNESS OF BREATH   Refills:  0        aspirin 81 MG chewable tablet   Commonly known as:  SM CHILDRENS ASPIRIN   Quantity:  30 tablet        CHEW AND SWALLOW 1 TABLET BY MOUTH DAILY   Refills:  11        atorvastatin 20 MG tablet   Commonly known as:  LIPITOR   Dose:  20 mg   Quantity:  90 tablet        Take 1 tablet (20 mg) by mouth daily   Refills:  3        baclofen 20 MG tablet   Commonly known as:  LIORESAL   Quantity:  90 tablet        TAKE 1 TABLET BY MOUTH 3 TIMES DAILY   Refills:  3        diazepam 5 MG tablet   Commonly known as:  VALIUM   Quantity:  60 tablet        TAKE 1 TABLET BY MOUTH EVERY 12 HOURS AS NEEDED FOR ANXIETY OR SLEEP SPASM    Refills:  5        * divalproex sodium delayed-release 500 MG DR tablet   Commonly known as:  DEPAKOTE   Dose:  500 mg   Quantity:  60 tablet        Take 1 tablet (500 mg) by mouth 2 times daily   Refills:  3        * divalproex sodium delayed-release 250 MG DR tablet   Commonly known as:  DEPAKOTE   Quantity:  60 tablet        Take one tablet by mouth with your 500 mg twice per day.   Refills:  3        docusate sodium 100 MG capsule   Commonly known as:  DOK   Quantity:  60 capsule        TAKE 1 CAPSULE BY MOUTH TWICE DAILY   Refills:  5        fentaNYL 75 mcg/hr 72 hr patch   Commonly known as:  DURAGESIC   Quantity:  11 patch        APPLY 1 PATCH ONTO THE SKIN EVERY 48 HOURS   Refills:  0        fluconazole 100 MG tablet   Commonly known as:  DIFLUCAN   Dose:  200 mg   Quantity:  14 tablet        Take 2 tablets (200 mg) by mouth daily   Refills:  0        fluticasone 50 MCG/ACT spray   Commonly known as:  FLONASE   Quantity:  16 g        USE 2 SPRAYS IN EACH NOSTRIL DAILY   Refills:  11        gabapentin 300 MG capsule   Commonly known as:  NEURONTIN   Quantity:  270 capsule        TAKE 3 CAPSULES BY MOUTH THREE TIMES DAILY   Refills:  11        HYDROcodone-acetaminophen  MG per tablet   Commonly known as:  NORCO   Quantity:  120 tablet        TAKE 1 TABLET BY MOUTH EVERY 6 HOURS AS NEEDED FOR MODERATE TO SEVERER PAIN, MUST LAST 30 DAYS   Refills:  0        hydrOXYzine 50 MG capsule   Commonly known as:  VISTARIL   Dose:   mg   Quantity:  60 capsule        Take 1-2 capsules ( mg) by mouth 4 times daily as needed for anxiety   Refills:  1        lidocaine 5 % Patch   Commonly known as:  LIDODERM   Quantity:  90 patch        PLACE 3 PATCHES ONTO THE SKIN DAILY AS NEEDED FOR MODERATE PAIN   Refills:  11        lisdexamfetamine 70 MG capsule   Commonly known as:  VYVANSE   Dose:  70 mg   Quantity:  30 capsule        Take 1 capsule (70 mg) by mouth daily   Refills:  0        losartan 50 MG tablet    Commonly known as:  COZAAR   Dose:  50 mg   Quantity:  30 tablet        Take 1 tablet (50 mg) by mouth daily   Refills:  11        MAPAP 325 MG tablet   Quantity:  200 tablet   Generic drug:  acetaminophen        TAKE 2 TABLETS BY MOUTH EVERY 4 HOURS AS NEEDED FOR MILD PAIN   Refills:  0        mometasone-formoterol 100-5 MCG/ACT oral inhaler   Commonly known as:  DULERA   Quantity:  13 g        INHALE 2 PUFFS INTO THE LUNGS 2 TIMES A DAY   Refills:  3        multivitamin  with iron Tabs   Quantity:  30 tablet        TAKE 1 TABLET BY MOUTH DAILY   Refills:  11        nicotine polacrilex 2 MG gum   Commonly known as:  NICORETTE   Quantity:  110 each        CHEW 1 PIECE AS NEEDED FOR SMOKING CESSATION   Refills:  3        nortriptyline 75 MG capsule   Commonly known as:  PAMELOR   Dose:  75 mg   Quantity:  30 capsule        Take 1 capsule (75 mg) by mouth At Bedtime   Refills:  11        omeprazole 20 MG CR capsule   Commonly known as:  priLOSEC   Quantity:  30 capsule        TAKE 1 CAPSULE BY MOUTH EVERY DAY BEFORE A MEAL   Refills:  11        * order for DME   Quantity:  2 Box        Equipment being ordered: Large gloves   Refills:  0        * order for DME   Quantity:  1 Device        1 boa back brace   Refills:  0        OXcarbazepine 600 MG tablet   Commonly known as:  TRILEPTAL   Quantity:  60 tablet        TAKE 1 TABLET BY MOUTH 2 TIMES DAILY   Refills:  3        propranolol 20 MG tablet   Commonly known as:  INDERAL   Dose:  20 mg   Quantity:  60 tablet        Take 1 tablet (20 mg) by mouth 2 times daily   Refills:  8        senna-docusate 8.6-50 MG per tablet   Commonly known as:  SM STOOL SOFTENER/LAXATIVE   Quantity:  120 tablet        TAKE 1 OR 2 TABLETS BY MOUTH 2 TIMES A DAY   Refills:  5        tamsulosin 0.4 MG capsule   Commonly known as:  FLOMAX   Quantity:  30 capsule        TAKE 1 CAPSULE BY MOUTH DAILY   Refills:  11        traZODone 50 MG tablet   Commonly known as:  DESYREL   Dose:  100 mg  "  Quantity:  60 tablet        Take 2 tablets (100 mg) by mouth nightly as needed   Refills:  11        * Notice:  This list has 6 medication(s) that are the same as other medications prescribed for you. Read the directions carefully, and ask your doctor or other care provider to review them with you.            Prescriptions were sent or printed at these locations (1 Prescription)                   [facility] [place] - [address]   [address] [place] [place] [address]    Telephone:  [phone]   Fax:  [phone]   Hours:                  E-Prescribed (1 of 1)         amoxicillin-clavulanate (AUGMENTIN) 875-125 MG per tablet                Orders Needing Specimen Collection     None      Pending Results     No orders found from 3/5/2018 to 3/8/2018.            Pending Culture Results     No orders found from 3/5/2018 to 3/8/2018.            Thank you for choosing [facility]       Thank you for choosing [facility] for your care. Our goal is always to provide you with excellent care. Hearing back from our patients is one way we can continue to improve our services. Please take a few minutes to complete the written survey that you may receive in the mail after you visit with us. Thank you!        [employer] Information     [employer] lets you send messages to your doctor, view your test results, renew your prescriptions, schedule appointments and more. To sign up, go to [email or web address] . Click on \"Log in\" on the left side of the screen, which will take you to the Welcome page. Then click on \"Sign up Now\" on the right side of the page.     You will be asked to enter the access code listed below, as well as some personal information. Please follow the directions to create your username and password.     Your access code is: [email or web address]  Expires: 2018  8:58 AM     Your access code will  in 90 days. If you need help or a new code, please call your [facility] clinic or [phone].      "   Care EveryWhere ID     This is your Care EveryWhere ID. This could be used by other organizations to access your [facility] medical records  [id number]        Equal Access to Services     SHAHBAZ DANIELS : Rosie Gary, dixon broderick, john paul mcmillan, jaki fermin. So [facility] [phone].    ATENCIÓN: Si habla español, tiene a hastings disposición servicios gratuitos de asistencia lingüística. Llame al [phone].    We comply with applicable federal civil rights laws and [place] laws. We do not discriminate on the basis of race, color, national origin, age, disability, sex, sexual orientation, or gender identity.            After Visit Summary       This is your record. Keep this with you and show to your community pharmacist(s) and doctor(s) at your next visit.

## 2018-03-07 NOTE — MR AVS SNAPSHOT
"              After Visit Summary   3/7/2018    Joshua Barron    MRN: [id number]           Patient Information     Date Of Birth          1960        Visit Information        Provider Department      3/7/2018 2:45 PM Ciara Valladares [facility]        Today's Diagnoses     Sensorineural hearing loss, bilateral    -  1       Follow-ups after your visit        Your next 10 appointments already scheduled     Mar 08, 2018  2:40 PM CST   Return Visit with Shamar Escamilla DC   [facility] [place] ([facility])    [address]  [place] [address]   [phone]            Mar 27, 2018  3:00 PM CDT   (Arrive by 2:45 PM)   Return Visit with Laquita Fernandez MD   [facility] ([facility] [place] )    [address]  [place] [id number] [phone]              Who to contact     If you have questions or need follow up information about today's clinic visit or your schedule please contact [facility] directly at [phone].  Normal or non-critical lab and imaging results will be communicated to you by MyChart, letter or phone within 4 business days after the clinic has received the results. If you do not hear from us within 7 days, please contact the clinic through MyChart or phone. If you have a critical or abnormal lab result, we will notify you by phone as soon as possible.  Submit refill requests through [employer] or call your pharmacy and they will forward the refill request to us. Please allow 3 business days for your refill to be completed.          Additional Information About Your Visit        MyChart Information     [employer] lets you send messages to your doctor, view your test results, renew your prescriptions, schedule appointments and more. To sign up, go to [email or web address] . Click on \"Log in\" on the left side of the screen, which will take you to the Welcome page. Then click on \"Sign up Now\" on the right side of the " page.     You will be asked to enter the access code listed below, as well as some personal information. Please follow the directions to create your username and password.     Your access code is: [email or web address]  Expires: 2018  8:58 AM     Your access code will  in 90 days. If you need help or a new code, please call your [facility] clinic or [phone].        Care EveryWhere ID     This is your Care EveryWhere ID. This could be used by other organizations to access your [facility] medical records  [id number]         Blood Pressure from Last 3 Encounters:   02/15/18 128/72   18 150/100   18 139/84    Weight from Last 3 Encounters:   02/15/18 170 lb (77.1 kg)   18 170 lb (77.1 kg)   18 170 lb (77.1 kg)              We Performed the Following     AUDIOGRAM/TYMPANOGRAM - INTERFACE        Primary Care Provider Office Phone # Fax #    Lyle Juan Fisher,  [phone]       [address] [place] [address]        Equal Access to Services     [facility] FRANCES AH: Hadii aad ku hadasho Soomaali, waaxda luqadaha, qaybta kaalmada adeegyada, jaki millan . [facility] [phone].    ATENCIÓN: Si habla español, tiene a hastings disposición servicios gratuitos de asistencia lingüística. [facility] [phone].    We comply with applicable federal civil rights laws and [place] laws. We do not discriminate on the basis of race, color, national origin, age, disability, sex, sexual orientation, or gender identity.            Thank you!     Thank you for choosing [facility]  for your care. Our goal is always to provide you with excellent care. Hearing back from our patients is one way we can continue to improve our services. Please take a few minutes to complete the written survey that you may receive in the mail after your visit with us. Thank you!             Your Updated Medication List - Protect others around you: Learn how to safely use, store  and throw away your medicines at www.disposemymeds.org.          This list is accurate as of 3/7/18  3:02 PM.  Always use your most recent med list.                   Brand Name Dispense Instructions for use Diagnosis    * albuterol 108 (90 BASE) MCG/ACT Inhaler    PROAIR HFA/PROVENTIL HFA/VENTOLIN HFA     Inhale 2 puffs into the lungs every 6 hours as needed for shortness of breath / dyspnea or wheezing        * albuterol 108 (90 BASE) MCG/ACT Inhaler    VENTOLIN HFA    18 g    USE 2 PUFFS 4 TIMES A DAY AS NEEDED FOR SHORTNESS OF BREATH        aspirin 81 MG chewable tablet     CHILDRENS ASPIRIN    30 tablet    CHEW AND SWALLOW 1 TABLET BY MOUTH DAILY        atorvastatin 20 MG tablet    LIPITOR    90 tablet    Take 1 tablet (20 mg) by mouth daily        baclofen 20 MG tablet    LIORESAL    90 tablet    TAKE 1 TABLET BY MOUTH 3 TIMES DAILY        diazepam 5 MG tablet    VALIUM    60 tablet    TAKE 1 TABLET BY MOUTH EVERY 12 HOURS AS NEEDED FOR ANXIETY OR SLEEP SPASM    DAYANA (generalized anxiety disorder)       * divalproex sodium delayed-release 500 MG DR tablet    DEPAKOTE    60 tablet    Take 1 tablet (500 mg) by mouth 2 times daily        * divalproex sodium delayed-release 250 MG DR tablet    DEPAKOTE    60 tablet    Take one tablet by mouth with your 500 mg twice per day.        docusate sodium 100 MG capsule    DOK    60 capsule    TAKE 1 CAPSULE BY MOUTH TWICE DAILY        fentaNYL 75 mcg/hr 72 hr patch    DURAGESIC    11 patch    APPLY 1 PATCH ONTO THE SKIN EVERY 48 HOURS    DDD (degenerative disc disease), cervical       fluconazole 100 MG tablet    DIFLUCAN    14 tablet    Take 2 tablets (200 mg) by mouth daily    Thrush       fluticasone 50 MCG/ACT spray    FLONASE    16 g    USE 2 SPRAYS IN EACH NOSTRIL DAILY        gabapentin 300 MG capsule    NEURONTIN    270 capsule    TAKE 3 CAPSULES BY MOUTH THREE TIMES DAILY        HYDROcodone-acetaminophen  MG per tablet    NORCO    120 tablet    TAKE 1  TABLET BY MOUTH EVERY 6 HOURS AS NEEDED FOR MODERATE TO SEVERER PAIN, MUST LAST 30 DAYS    Low back pain, unspecified back pain laterality, unspecified chronicity, with sciatica presence unspecified       hydrOXYzine 50 MG capsule    VISTARIL    60 capsule    Take 1-2 capsules ( mg) by mouth 4 times daily as needed for anxiety        lidocaine 5 % Patch    LIDODERM    90 patch    PLACE 3 PATCHES ONTO THE SKIN DAILY AS NEEDED FOR MODERATE PAIN    Midline low back pain without sciatica       lisdexamfetamine 70 MG capsule    VYVANSE    30 capsule    Take 1 capsule (70 mg) by mouth daily        losartan 50 MG tablet    COZAAR    30 tablet    Take 1 tablet (50 mg) by mouth daily        MAPAP 325 MG tablet   Generic drug:  acetaminophen     200 tablet    TAKE 2 TABLETS BY MOUTH EVERY 4 HOURS AS NEEDED FOR MILD PAIN    Pain       mometasone-formoterol 100-5 MCG/ACT oral inhaler    DULERA    13 g    INHALE 2 PUFFS INTO THE LUNGS 2 TIMES A DAY        multivitamin  with iron Tabs     30 tablet    TAKE 1 TABLET BY MOUTH DAILY    Health care maintenance       nicotine polacrilex 2 MG gum    NICORETTE    110 each    CHEW 1 PIECE AS NEEDED FOR SMOKING CESSATION        nortriptyline 75 MG capsule    PAMELOR    30 capsule    Take 1 capsule (75 mg) by mouth At Bedtime        omeprazole 20 MG CR capsule    priLOSEC    30 capsule    TAKE 1 CAPSULE BY MOUTH EVERY DAY BEFORE A MEAL        * order for DME     2 Box    Equipment being ordered: Large gloves    Need for assistance with personal care       * order for DME     1 Device    1 boa back brace    Chronic low back pain with sciatica, sciatica laterality unspecified, unspecified back pain laterality       OXcarbazepine 600 MG tablet    TRILEPTAL    60 tablet    TAKE 1 TABLET BY MOUTH 2 TIMES DAILY        propranolol 20 MG tablet    INDERAL    60 tablet    Take 1 tablet (20 mg) by mouth 2 times daily        senna-docusate 8.6-50 MG per tablet    SM STOOL SOFTENER/LAXATIVE     120 tablet    TAKE 1 OR 2 TABLETS BY MOUTH 2 TIMES A DAY        tamsulosin 0.4 MG capsule    FLOMAX    30 capsule    TAKE 1 CAPSULE BY MOUTH DAILY        traZODone 50 MG tablet    DESYREL    60 tablet    Take 2 tablets (100 mg) by mouth nightly as needed        * Notice:  This list has 6 medication(s) that are the same as other medications prescribed for you. Read the directions carefully, and ask your doctor or other care provider to review them with you.

## 2018-03-12 ENCOUNTER — TELEPHONE (OUTPATIENT)
Dept: INTERNAL MEDICINE | Facility: OTHER | Age: 58
End: 2018-03-12

## 2018-03-12 DIAGNOSIS — M50.30 DDD (DEGENERATIVE DISC DISEASE), CERVICAL: ICD-10-CM

## 2018-03-12 NOTE — TELEPHONE ENCOUNTER
Controlled Substance Refill Request for Fentanyl  Problem List Complete:  Yes    Last Written Prescription Date:  2.21.18  Last Fill Quantity: 11,   # refills: 0    Last Office Visit with [facility] primary care provider: 2.15.18    Clinic visit frequency required: Q 3 months     Future Office visit:   Next 5 appointments (look out 90 days)     Mar 27, 2018  3:00 PM CDT   (Arrive by 2:45 PM)   Return Visit with Laquita Fernandez MD   [facility] ([facility] [place] )    [address] [place] [id number]   [phone]                  Controlled substance agreement on file: Yes:  Date 11.25.15.     Processing:  Staff will hand deliver Rx to on-site pharmacy   checked in past 6 months?  Yes 5.17.17

## 2018-03-13 RX ORDER — FENTANYL 75 UG/1
PATCH TRANSDERMAL
Qty: 11 PATCH | Refills: 0 | Status: SHIPPED | OUTPATIENT
Start: 2018-03-13 | End: 2018-03-29

## 2018-03-15 DIAGNOSIS — F90.9 ADHD (ATTENTION DEFICIT HYPERACTIVITY DISORDER): Primary | ICD-10-CM

## 2018-03-15 DIAGNOSIS — F90.9 ATTENTION DEFICIT HYPERACTIVITY DISORDER (ADHD), UNSPECIFIED ADHD TYPE: ICD-10-CM

## 2018-03-16 RX ORDER — LISDEXAMFETAMINE DIMESYLATE 70 MG/1
CAPSULE ORAL
Qty: 30 CAPSULE | Refills: 0 | Status: SHIPPED | OUTPATIENT
Start: 2018-03-16 | End: 2018-03-27

## 2018-03-16 NOTE — TELEPHONE ENCOUNTER
Vyvanse  Last Written Prescription Date:  2/15/18  Last Fill Qty:  30, # Refills:  0  Last Office Visit:  2/15/18    PCP is Dr. Fisher.  Medication is pended.  Thank you.

## 2018-03-27 ENCOUNTER — OFFICE VISIT (OUTPATIENT)
Dept: PSYCHIATRY | Facility: OTHER | Age: 58
End: 2018-03-27
Attending: PSYCHIATRY & NEUROLOGY
Payer: COMMERCIAL

## 2018-03-27 VITALS
HEART RATE: 86 BPM | DIASTOLIC BLOOD PRESSURE: 70 MMHG | BODY MASS INDEX: 23.63 KG/M2 | TEMPERATURE: 99 F | SYSTOLIC BLOOD PRESSURE: 114 MMHG | WEIGHT: 160 LBS

## 2018-03-27 DIAGNOSIS — Z00.00 HEALTHCARE MAINTENANCE: Primary | ICD-10-CM

## 2018-03-27 DIAGNOSIS — F90.9 ATTENTION DEFICIT HYPERACTIVITY DISORDER (ADHD), UNSPECIFIED ADHD TYPE: ICD-10-CM

## 2018-03-27 DIAGNOSIS — F41.1 GAD (GENERALIZED ANXIETY DISORDER): ICD-10-CM

## 2018-03-27 PROCEDURE — 99214 OFFICE O/P EST MOD 30 MIN: CPT | Performed by: PSYCHIATRY & NEUROLOGY

## 2018-03-27 PROCEDURE — G0463 HOSPITAL OUTPT CLINIC VISIT: HCPCS

## 2018-03-27 RX ORDER — ASPIRIN 81 MG
TABLET,CHEWABLE ORAL
Qty: 30 TABLET | Refills: 2 | Status: SHIPPED | OUTPATIENT
Start: 2018-03-27 | End: 2019-07-27

## 2018-03-27 RX ORDER — LISDEXAMFETAMINE DIMESYLATE 70 MG/1
CAPSULE ORAL
Qty: 30 CAPSULE | Refills: 0 | Status: SHIPPED | OUTPATIENT
Start: 2018-04-13 | End: 2018-04-30

## 2018-03-27 RX ORDER — DIAZEPAM 5 MG
TABLET ORAL
Qty: 60 TABLET | Refills: 5 | Status: SHIPPED | OUTPATIENT
Start: 2018-04-25 | End: 2018-12-05 | Stop reason: DRUGHIGH

## 2018-03-27 ASSESSMENT — ANXIETY QUESTIONNAIRES
2. NOT BEING ABLE TO STOP OR CONTROL WORRYING: SEVERAL DAYS
1. FEELING NERVOUS, ANXIOUS, OR ON EDGE: NOT AT ALL
7. FEELING AFRAID AS IF SOMETHING AWFUL MIGHT HAPPEN: SEVERAL DAYS
GAD7 TOTAL SCORE: 5
5. BEING SO RESTLESS THAT IT IS HARD TO SIT STILL: NOT AT ALL
3. WORRYING TOO MUCH ABOUT DIFFERENT THINGS: SEVERAL DAYS
6. BECOMING EASILY ANNOYED OR IRRITABLE: SEVERAL DAYS

## 2018-03-27 ASSESSMENT — PATIENT HEALTH QUESTIONNAIRE - PHQ9: 5. POOR APPETITE OR OVEREATING: SEVERAL DAYS

## 2018-03-27 ASSESSMENT — PAIN SCALES - GENERAL: PAINLEVEL: EXTREME PAIN (8)
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
## 2018-03-27 NOTE — MR AVS SNAPSHOT
"              After Visit Summary   3/27/2018    Joshua Barron    MRN: [id number]           Patient Information     Date Of Birth          1960        Visit Information        Provider Department      3/27/2018 3:00 PM Laquita Fernandez MD [facility]        Today's Diagnoses     DAYANA (generalized anxiety disorder)        Attention deficit hyperactivity disorder (ADHD), unspecified ADHD type           Follow-ups after your visit        Your next 10 appointments already scheduled     Apr 30, 2018  3:20 PM CDT   (Arrive by 3:05 PM)   Return Visit with Laquita Fernandez MD   [facility] ([facility] [place] )    [address] [place] [address]  [place] [address] [phone]            Mar 13, 2019  2:15 PM CDT   (Arrive by 2:00 PM)   Hearing Eval with Kevin Moreno   [facility] ([facility] [place] )    [address]  [place] [address]   [phone]              Who to contact     If you have questions or need follow up information about today's clinic visit or your schedule please contact [facility] directly at [phone].  Normal or non-critical lab and imaging results will be communicated to you by MyChart, letter or phone within 4 business days after the clinic has received the results. If you do not hear from us within 7 days, please contact the clinic through MyChart or phone. If you have a critical or abnormal lab result, we will notify you by phone as soon as possible.  Submit refill requests through nanoMR or call your pharmacy and they will forward the refill request to us. Please allow 3 business days for your refill to be completed.          Additional Information About Your Visit        MyChart Information     nanoMR lets you send messages to your doctor, view your test results, renew your prescriptions, schedule appointments and more. To sign up, go to [email or web address] . Click on \"Log in\" on the left " "side of the screen, which will take you to the Welcome page. Then click on \"Sign up Now\" on the right side of the page.     You will be asked to enter the access code listed below, as well as some personal information. Please follow the directions to create your username and password.     Your access code is: [email or web address]  Expires: 2018  9:58 AM     Your access code will  in 90 days. If you need help or a new code, please call your [facility] clinic or [phone].        Care EveryWhere ID     This is your Care EveryWhere ID. This could be used by other organizations to access your [facility] medical records  [id number]        Your Vitals Were     Pulse Temperature BMI (Body Mass Index)             86 99  F (37.2  C) (Tympanic) 23.63 kg/m2          Blood Pressure from Last 3 Encounters:   18 114/70   18 (!) 110/42   02/15/18 128/72    Weight from Last 3 Encounters:   18 160 lb (72.6 kg)   02/15/18 170 lb (77.1 kg)   18 170 lb (77.1 kg)              Today, you had the following     No orders found for display         Today's Medication Changes          These changes are accurate as of 3/27/18  4:22 PM.  If you have any questions, ask your nurse or doctor.               These medicines have changed or have updated prescriptions.        Dose/Directions    lisdexamfetamine 70 MG capsule   Commonly known as:  VYVANSE   This may have changed:  See the new instructions.   Used for:  Attention deficit hyperactivity disorder (ADHD), unspecified ADHD type   Changed by:  Laquita Fernandez MD        Start taking on:  2018   TAKE 1 CAPSULE BY MOUTH DAILY   Quantity:  30 capsule   Refills:  0            Where to get your medicines      Some of these will need a paper prescription and others can be bought over the counter.  Ask your nurse if you have questions.     Bring a paper prescription for each of these medications     diazepam 5 MG tablet    lisdexamfetamine 70 MG capsule                " Primary Care Provider Office Phone # Fax #    Lyle Fisher [phone] [address] [phone]        Equal Access to Services     SHAHBAZ DANIELS : Hadii aad ku hadbrookeo Sosaranali, waaxda luqadaha, qaybta kaalmada deyanira, jaki krishna hanselchance arthur monty fermin. So [facility] [phone].    ATENCIÓN: Si habla español, tiene a hastings disposición servicios gratuitos de asistencia lingüística. Llame al [phone].    We comply with applicable federal civil rights laws and [place] laws. We do not discriminate on the basis of race, color, national origin, age, disability, sex, sexual orientation, or gender identity.            Thank you!     Thank you for choosing [facility]  for your care. Our goal is always to provide you with excellent care. Hearing back from our patients is one way we can continue to improve our services. Please take a few minutes to complete the written survey that you may receive in the mail after your visit with us. Thank you!             Your Updated Medication List - Protect others around you: Learn how to safely use, store and throw away your medicines at www.disposemymeds.org.          This list is accurate as of 3/27/18  4:22 PM.  Always use your most recent med list.                   Brand Name Dispense Instructions for use Diagnosis    * albuterol 108 (90 BASE) MCG/ACT Inhaler    PROAIR HFA/PROVENTIL HFA/VENTOLIN HFA     Inhale 2 puffs into the lungs every 6 hours as needed for shortness of breath / dyspnea or wheezing        * albuterol 108 (90 BASE) MCG/ACT Inhaler    VENTOLIN HFA    18 g    USE 2 PUFFS 4 TIMES A DAY AS NEEDED FOR SHORTNESS OF BREATH        aspirin 81 MG chewable tablet    SM CHILDRENS ASPIRIN    30 tablet    CHEW AND SWALLOW 1 TABLET BY MOUTH DAILY        atorvastatin 20 MG tablet    LIPITOR    90 tablet    Take 1 tablet (20 mg) by mouth daily        baclofen 20 MG tablet    LIORESAL    90 tablet    TAKE 1  TABLET BY MOUTH 3 TIMES DAILY        diazepam 5 MG tablet   Start taking on:  4/25/2018    VALIUM    60 tablet    TAKE 1 TABLET BY MOUTH EVERY 12 HOURS AS NEEDED FOR ANXIETY OR SLEEP SPASM    DAYANA (generalized anxiety disorder)       docusate sodium 100 MG capsule    DOK    60 capsule    TAKE 1 CAPSULE BY MOUTH TWICE DAILY        fentaNYL 75 mcg/hr 72 hr patch    DURAGESIC    11 patch    APPLY 1 PATCH ONTO THE SKIN EVERY 48 HOURS    DDD (degenerative disc disease), cervical       fluconazole 100 MG tablet    DIFLUCAN    14 tablet    Take 2 tablets (200 mg) by mouth daily    Thrush       fluticasone 50 MCG/ACT spray    FLONASE    16 g    USE 2 SPRAYS IN EACH NOSTRIL DAILY        gabapentin 300 MG capsule    NEURONTIN    270 capsule    TAKE 3 CAPSULES BY MOUTH THREE TIMES DAILY        HYDROcodone-acetaminophen  MG per tablet    NORCO    120 tablet    TAKE 1 TABLET BY MOUTH EVERY 6 HOURS AS NEEDED FOR MODERATE TO SEVERER PAIN, MUST LAST 30 DAYS    Low back pain, unspecified back pain laterality, unspecified chronicity, with sciatica presence unspecified       hydrOXYzine 50 MG capsule    VISTARIL    60 capsule    Take 1-2 capsules ( mg) by mouth 4 times daily as needed for anxiety        lidocaine 5 % Patch    LIDODERM    90 patch    PLACE 3 PATCHES ONTO THE SKIN DAILY AS NEEDED FOR MODERATE PAIN    Midline low back pain without sciatica       lisdexamfetamine 70 MG capsule   Start taking on:  4/13/2018    VYVANSE    30 capsule    TAKE 1 CAPSULE BY MOUTH DAILY    Attention deficit hyperactivity disorder (ADHD), unspecified ADHD type       losartan 50 MG tablet    COZAAR    30 tablet    Take 1 tablet (50 mg) by mouth daily        MAPAP 325 MG tablet   Generic drug:  acetaminophen     200 tablet    TAKE 2 TABLETS BY MOUTH EVERY 4 HOURS AS NEEDED FOR MILD PAIN    Pain       mometasone-formoterol 100-5 MCG/ACT oral inhaler    DULERA    13 g    INHALE 2 PUFFS INTO THE LUNGS 2 TIMES A DAY        multivitamin  with  iron Tabs     30 tablet    TAKE 1 TABLET BY MOUTH DAILY    Health care maintenance       nicotine polacrilex 2 MG gum    NICORETTE    110 each    CHEW 1 PIECE AS NEEDED FOR SMOKING CESSATION        nortriptyline 75 MG capsule    PAMELOR    30 capsule    Take 1 capsule (75 mg) by mouth At Bedtime        omeprazole 20 MG CR capsule    priLOSEC    30 capsule    TAKE 1 CAPSULE BY MOUTH EVERY DAY BEFORE A MEAL        * order for DME     2 Box    Equipment being ordered: Large gloves    Need for assistance with personal care       * order for DME     1 Device    1 boa back brace    Chronic low back pain with sciatica, sciatica laterality unspecified, unspecified back pain laterality       OXcarbazepine 600 MG tablet    TRILEPTAL    60 tablet    TAKE 1 TABLET BY MOUTH 2 TIMES DAILY        propranolol 20 MG tablet    INDERAL    60 tablet    Take 1 tablet (20 mg) by mouth 2 times daily        senna-docusate 8.6-50 MG per tablet    SM STOOL SOFTENER/LAXATIVE    120 tablet    TAKE 1 OR 2 TABLETS BY MOUTH 2 TIMES A DAY        tamsulosin 0.4 MG capsule    FLOMAX    30 capsule    TAKE 1 CAPSULE BY MOUTH DAILY        traZODone 50 MG tablet    DESYREL    60 tablet    Take 2 tablets (100 mg) by mouth nightly as needed        * Notice:  This list has 4 medication(s) that are the same as other medications prescribed for you. Read the directions carefully, and ask your doctor or other care provider to review them with you.

## 2018-03-27 NOTE — NURSING NOTE
"Chief Complaint   Patient presents with     RECHECK     Mental health.  Patient stopped taking Depakote.  Unable to tolerate       Initial /70 (BP Location: Right arm, Patient Position: Sitting, Cuff Size: Adult Regular)  Pulse 86  Temp 99  F (37.2  C) (Tympanic)  Wt 160 lb (72.6 kg)  BMI 23.63 kg/m2 Estimated body mass index is 23.63 kg/(m^2) as calculated from the following:    Height as of 2/15/18: 5' 9\" (1.753 m).    Weight as of this encounter: 160 lb (72.6 kg).  Medication Reconciliation: complete     JACQUELYN SUAREZ      "

## 2018-03-28 ASSESSMENT — ANXIETY QUESTIONNAIRES: GAD7 TOTAL SCORE: 5

## 2018-03-29 DIAGNOSIS — M50.30 DDD (DEGENERATIVE DISC DISEASE), CERVICAL: ICD-10-CM

## 2018-03-29 RX ORDER — FENTANYL 75 UG/1
PATCH TRANSDERMAL
Qty: 11 PATCH | Refills: 0 | Status: SHIPPED | OUTPATIENT
Start: 2018-04-02 | End: 2018-04-19

## 2018-03-29 NOTE — TELEPHONE ENCOUNTER
fentaNYL (DURAGESIC) 75 mcg/hr 72 hr patch    Last fill date: 3/13/18    Last office visit: 2/15/18

## 2018-03-29 NOTE — TELEPHONE ENCOUNTER
Controlled Substance Refill Request for Fentanyl  Problem List Complete:  Yes    Last Written Prescription Date:  3.13.18  Last Fill Quantity: 11,   # refills: 0    [facility] Office Visit with [facility] primary care provider: 2.15.18    Clinic visit frequency required: Q 3 months     Future Office visit:   Next 5 appointments (look out 90 days)     Apr 30, 2018  3:20 PM CDT   (Arrive by 3:05 PM)   Return Visit with Laquita Fernandez MD   [facility] ([facility] )    [address] [place] [address]   [phone]                  Controlled substance agreement on file: Yes:  Date 11.25.15.     Processing:  Staff will hand deliver Rx to on-site pharmacy   checked in past 6 months?  Yes 5.17.17

## 2018-04-02 ENCOUNTER — TELEPHONE (OUTPATIENT)
Dept: PEDIATRICS | Facility: OTHER | Age: 58
End: 2018-04-02

## 2018-04-02 DIAGNOSIS — M54.5 LOW BACK PAIN, UNSPECIFIED BACK PAIN LATERALITY, UNSPECIFIED CHRONICITY, WITH SCIATICA PRESENCE UNSPECIFIED: ICD-10-CM

## 2018-04-02 NOTE — TELEPHONE ENCOUNTER
norco      Last Written Prescription Date:  3/1/18  Last Fill Quantity: 120,   # refills: 0  Last Office Visit: 2/15/18  Future Office visit:    Next 5 appointments (look out 90 days)     Apr 30, 2018  3:20 PM CDT   (Arrive by 3:05 PM)   Return Visit with Laquita Fernandez MD   [facility] [place] ([facility] [place] )    [address] [place] [address]  [place] [address]   [phone]                   Routing refill request to provider for review/approval because:  Drug not on the [facility], [facility] or  Health refill protocol or controlled substance

## 2018-04-02 NOTE — TELEPHONE ENCOUNTER
APPROVAL - 04/02/2018 - received PA request from [employer] for lidocaine patches.  Submitted thru [facility].  Received immediate APPROVAL.  Approval dates:  03/03/2018 thru 04/02/2019.  Pharmacy advised.  Forms scanned to Epic.  Carmen Bishop, HIS Specialist.

## 2018-04-03 RX ORDER — HYDROCODONE BITARTRATE AND ACETAMINOPHEN 10; 325 MG/1; MG/1
TABLET ORAL
Qty: 120 TABLET | Refills: 0 | Status: SHIPPED | OUTPATIENT
Start: 2018-04-03 | End: 2018-05-07

## 2018-04-04 ENCOUNTER — OFFICE VISIT (OUTPATIENT)
Dept: CHIROPRACTIC MEDICINE | Facility: OTHER | Age: 58
End: 2018-04-04
Attending: CHIROPRACTOR
Payer: COMMERCIAL

## 2018-04-04 DIAGNOSIS — M99.02 SEGMENTAL AND SOMATIC DYSFUNCTION OF THORACIC REGION: ICD-10-CM

## 2018-04-04 DIAGNOSIS — M99.01 SEGMENTAL AND SOMATIC DYSFUNCTION OF CERVICAL REGION: ICD-10-CM

## 2018-04-04 DIAGNOSIS — M54.50 ACUTE BILATERAL LOW BACK PAIN WITHOUT SCIATICA: ICD-10-CM

## 2018-04-04 DIAGNOSIS — M99.03 SEGMENTAL AND SOMATIC DYSFUNCTION OF LUMBAR REGION: Primary | ICD-10-CM

## 2018-04-04 PROCEDURE — 98941 CHIROPRACT MANJ 3-4 REGIONS: CPT | Mod: AT | Performed by: CHIROPRACTOR

## 2018-04-04 NOTE — MR AVS SNAPSHOT
After Visit Summary   4/4/2018    Joshua Barron    MRN: [id number]           Patient Information     Date Of Birth          1960        Visit Information        Provider Department      4/4/2018 4:30 PM Shamar Escamilla DC [facility]        Today's Diagnoses     Segmental and somatic dysfunction of lumbar region    -  1    Acute bilateral low back pain without sciatica        Segmental and somatic dysfunction of thoracic region        Segmental and somatic dysfunction of cervical region           Follow-ups after your visit        Your next 10 appointments already scheduled     Apr 05, 2018  4:20 PM CDT   Return Visit with Shamar Escamilla DC   [facility] ([facility])    [address]  [place] [address]   [phone]            Apr 30, 2018  3:20 PM CDT   (Arrive by 3:05 PM)   [facility] Visit [facility] MD   [facility] ([facility] [place] )    [address]  [place] [address]   [phone]            Mar 13, 2019  2:15 PM CDT   (Arrive by 2:00 PM)   Hearing Eval with Kevin Moreno   [facility] ([facility] [place] )    [address] [facility] [address]   [phone]              Who to contact     If you have questions or need follow up information about today's clinic visit or your schedule please contact  [facility] directly at [phone].  Normal or non-critical lab and imaging results will be communicated to you by MyChart, letter or phone within 4 business days after the clinic has received the results. If you do not hear from us within 7 days, please contact the clinic through MyChart or phone. If you have a critical or abnormal lab result, we will notify you by phone as soon as possible.  Submit refill requests through op5 or call your pharmacy and they will forward the refill request to us. Please allow 3 business days for your refill to be  "completed.          Additional Information About Your Visit        [employer] Information     [employer] lets you send messages to your doctor, view your test results, renew your prescriptions, schedule appointments and more. To sign up, go to [email or web address] . Click on \"Log in\" on the left side of the screen, which will take you to the Welcome page. Then click on \"Sign up Now\" on the right side of the page.     You will be asked to enter the access code listed below, as well as some personal information. Please follow the directions to create your username and password.     Your access code is: [email or web address]  Expires: 2018  9:58 AM     Your access code will  in 90 days. If you need help or a new code, please call your [facility] clinic or [phone].        Care EveryWhere ID     This is your Care EveryWhere ID. This could be used by other organizations to access your [facility] medical records  [id number]         Blood Pressure from Last 3 Encounters:   18 114/70   18 (!) 110/42   02/15/18 128/72    Weight from Last 3 Encounters:   18 160 lb (72.6 kg)   02/15/18 170 lb (77.1 kg)   18 170 lb (77.1 kg)              We Performed the Following     CHIROPRAC MANIP,SPINAL,3-4 REGIONS        Primary Care Provider Office Phone # Fax #    Lyle Juan DO Natal[place] [phone]       [address] [place] [address]        Equal Access to Services     SHAHBAZ DANIELS : Hadsergio bryanto Soro, waaxda luqadaha, qaybta kaalmada adeegyacheng, jaki fermin. So [facility] [phone].    ATENCIÓN: Si habla español, tiene a hastings disposición servicios gratuitos de asistencia lingüística. Llame al [phone].    We comply with applicable federal civil rights laws and [place] laws. We do not discriminate on the basis of race, color, national origin, age, disability, sex, sexual orientation, or gender identity.            Thank you!     Thank you for " choosing  [facility]  for your care. Our goal is always to provide you with excellent care. Hearing back from our patients is one way we can continue to improve our services. Please take a few minutes to complete the written survey that you may receive in the mail after your visit with us. Thank you!             Your Updated Medication List - Protect others around you: Learn how to safely use, store and throw away your medicines at www.disposemymeds.org.          This list is accurate as of 4/4/18 11:59 PM.  Always use your most recent med list.                   Brand Name Dispense Instructions for use Diagnosis    * albuterol 108 (90 BASE) MCG/ACT Inhaler    PROAIR HFA/PROVENTIL HFA/VENTOLIN HFA     Inhale 2 puffs into the lungs every 6 hours as needed for shortness of breath / dyspnea or wheezing        * albuterol 108 (90 BASE) MCG/ACT Inhaler    VENTOLIN HFA    18 g    USE 2 PUFFS 4 TIMES A DAY AS NEEDED FOR SHORTNESS OF BREATH        ASPIRIN LOW DOSE 81 MG chewable tablet   Generic drug:  aspirin     30 tablet    CHEW AND SWALLOW 1 TABLET BY MOUTH DAILY    Healthcare maintenance       atorvastatin 20 MG tablet    LIPITOR    90 tablet    Take 1 tablet (20 mg) by mouth daily        baclofen 20 MG tablet    LIORESAL    90 tablet    TAKE 1 TABLET BY MOUTH 3 TIMES DAILY        diazepam 5 MG tablet   Start taking on:  4/25/2018    VALIUM    60 tablet    TAKE 1 TABLET BY MOUTH EVERY 12 HOURS AS NEEDED FOR ANXIETY OR SLEEP SPASM    DAYANA (generalized anxiety disorder)       docusate sodium 100 MG capsule    DOK    60 capsule    TAKE 1 CAPSULE BY MOUTH TWICE DAILY        fentaNYL 75 mcg/hr 72 hr patch    DURAGESIC    11 patch    APPLY 1 PATCH ONTO THE SKIN EVERY 48 HOURS    DDD (degenerative disc disease), cervical       fluconazole 100 MG tablet    DIFLUCAN    14 tablet    Take 2 tablets (200 mg) by mouth daily    Thrush       fluticasone 50 MCG/ACT spray    FLONASE    16 g    USE 2 SPRAYS IN EACH NOSTRIL  DAILY        gabapentin 300 MG capsule    NEURONTIN    270 capsule    TAKE 3 CAPSULES BY MOUTH THREE TIMES DAILY        HYDROcodone-acetaminophen  MG per tablet    NORCO    120 tablet    TAKE 1 TABLET BY MOUTH EVERY 6 HOURS AS NEEDED FOR MODERATE TO SEVEREPAIN    Low back pain, unspecified back pain laterality, unspecified chronicity, with sciatica presence unspecified       hydrOXYzine 50 MG capsule    VISTARIL    60 capsule    Take 1-2 capsules ( mg) by mouth 4 times daily as needed for anxiety        lidocaine 5 % Patch    LIDODERM    90 patch    PLACE 3 PATCHES ONTO THE SKIN DAILY AS NEEDED FOR MODERATE PAIN    Midline low back pain without sciatica       lisdexamfetamine 70 MG capsule   Start taking on:  4/13/2018    VYVANSE    30 capsule    TAKE 1 CAPSULE BY MOUTH DAILY    Attention deficit hyperactivity disorder (ADHD), unspecified ADHD type       losartan 50 MG tablet    COZAAR    30 tablet    Take 1 tablet (50 mg) by mouth daily        MAPAP 325 MG tablet   Generic drug:  acetaminophen     200 tablet    TAKE 2 TABLETS BY MOUTH EVERY 4 HOURS AS NEEDED FOR MILD PAIN    Pain       mometasone-formoterol 100-5 MCG/ACT oral inhaler    DULERA    13 g    INHALE 2 PUFFS INTO THE LUNGS 2 TIMES A DAY        multivitamin  with iron Tabs     30 tablet    TAKE 1 TABLET BY MOUTH DAILY    Health care maintenance       nicotine polacrilex 2 MG gum    NICORETTE    110 each    CHEW 1 PIECE AS NEEDED FOR SMOKING CESSATION        nortriptyline 75 MG capsule    PAMELOR    30 capsule    Take 1 capsule (75 mg) by mouth At Bedtime        omeprazole 20 MG CR capsule    priLOSEC    30 capsule    TAKE 1 CAPSULE BY MOUTH EVERY DAY BEFORE A MEAL        * order for DME     2 Box    Equipment being ordered: Large gloves    Need for assistance with personal care       * order for DME     1 Device    1 boa back brace    Chronic low back pain with sciatica, sciatica laterality unspecified, unspecified back pain laterality        OXcarbazepine 600 MG tablet    TRILEPTAL    60 tablet    TAKE 1 TABLET BY MOUTH 2 TIMES DAILY        propranolol 20 MG tablet    INDERAL    60 tablet    Take 1 tablet (20 mg) by mouth 2 times daily        senna-docusate 8.6-50 MG per tablet    SM STOOL SOFTENER/LAXATIVE    120 tablet    TAKE 1 OR 2 TABLETS BY MOUTH 2 TIMES A DAY        tamsulosin 0.4 MG capsule    FLOMAX    30 capsule    TAKE 1 CAPSULE BY MOUTH DAILY        traZODone 50 MG tablet    DESYREL    60 tablet    Take 2 tablets (100 mg) by mouth nightly as needed        * Notice:  This list has 4 medication(s) that are the same as other medications prescribed for you. Read the directions carefully, and ask your doctor or other care provider to review them with you.

## 2018-04-05 NOTE — PROGRESS NOTES
Subjective Finding:    Chief compalint: No chief complaint on file.  , Pain Scale: 4/10, Intensity: dull, Duration: 2 days, Radiating: no.    Date of injury:     Activities that the pain restricts:   Home/household/hobbies/social activities: yes.  Work duties: yes.  Sleep: yes.  Makes symptoms better: rest.  Makes symptoms worse: lumbar extension and lumbar flexion.  Have you seen anyone else for the symptoms? No.  Work related: no.  Automobile related injury: no.    Objective and Assessment:    Posture Analysis:   High shoulder: .  Head tilt: .  High iliac crest: .  Head carriage: neutral.  Thoracic Kyphosis: neutral.  Lumbar Lordosis: forward.    Lumbar Range of Motion: flexion decreased and extension decreased.  Cervical Range of Motion: extension decreased.  Thoracic Range of Motion: extension decreased.  Extremity Range of Motion: .    Palpation:   Quad lumb: bilateral, referred pain: no  T paraspinals: dull pain, no    Segmental dysfunction pre-treatment and treatment area: C4, T5, T6 and Sacrum.    Assessment post-treatment:  Cervical: ROM increased.  Thoracic: ROM increased.  Lumbar: ROM increased.    Comments: history of DDD in C and L spine.      Complicating Factors: .    Procedure(s):  CMT:  72157 Chiropractic manipulative treatment 3-4 regions performed   Cervical: Diversified, See above for level, Supine, Thoracic: Diversified, See above for level, Prone and Lumbar: Diversified, See above for level, Side posture    Modalities:  None performed this visit    Therapeutic procedures:  None    Plan:  Treatment plan: PRN.  Instructed patient: stretch as instructed at visit.  Short term goals: increase ROM.  Long term goals: increase ADL.  Prognosis: good.

## 2018-04-17 ENCOUNTER — TELEPHONE (OUTPATIENT)
Dept: PEDIATRICS | Facility: OTHER | Age: 58
End: 2018-04-17

## 2018-04-17 NOTE — TELEPHONE ENCOUNTER
04/17/2018 - Received PA request thru [employer] for hydrocodone-acetaminophen  mg tablets.  Completed/submitted form thru [employer].  Received immediate response:  Drug is covered by current benefit plan.  No further PA activity needed.  Carmen Bishop, HIS Specialist.

## 2018-04-19 DIAGNOSIS — M50.30 DDD (DEGENERATIVE DISC DISEASE), CERVICAL: ICD-10-CM

## 2018-04-20 RX ORDER — FENTANYL 75 UG/1
PATCH TRANSDERMAL
Qty: 11 PATCH | Refills: 0 | Status: SHIPPED | OUTPATIENT
Start: 2018-04-23 | End: 2018-05-15

## 2018-04-20 NOTE — TELEPHONE ENCOUNTER
Controlled Substance Refill Request for Fentanyl  Problem List Complete:  Yes    Last Written Prescription Date:  4/2/18  Last Fill Quantity: 11,   # refills: 0    [place] [facility] [place]: 2/15/18    Chronic pain syndrome         Problem Detail      Noted:  9/8/2011      Priority:  Medium      Overview Addendum 1/18/2018  8:15 AM by Samuel Lawrence     Overview:   Opioid Drug Agreement Form.   Patient is followed by Lyle Fisher DO, DO for ongoing prescription of pain medication.  All refills should only be approved by this provider, or covering partner.     Medication(s): Fentanyl 75mcg, Norco 10/325mg.   Maximum quantity per month: #15, #120  Clinic visit frequency required: Q 3 months      Controlled substance agreement:  Encounter-Level CSA - 09/18/2015:                     Controlled Substance Agreement - Scan on 11/25/2015  2:25 PM : SCHEDULED MEDICATION USE AGREEMENT (below)         Pain Clinic evaluation in the past: No     DIRE Total Score(s):  No flowsheet data found.     Last [facility] website verification:  done on 5.17.17   [email or web address]       Future Office visit:   Next 5 appointments (look out 90 days)     Apr 30, 2018  3:20 PM CDT   (Arrive by 3:05 PM)   Return Visit with Laquita Fernandez MD   [facility] [place] ([facility] [place] )    [address] [place] [id number]   [phone]                   Processing:  Staff will hand deliver Rx to on-site pharmacy

## 2018-04-25 NOTE — TELEPHONE ENCOUNTER
Pt called to check on status of med. Advised nurse will go look for it and bring it to [facility] and he can check with them later.

## 2018-04-25 NOTE — TELEPHONE ENCOUNTER
Patient called scheduling in regards to script.  Script with Dr. Fisher.  Walked RX Fentanyl  to [facility].

## 2018-04-30 ENCOUNTER — OFFICE VISIT (OUTPATIENT)
Dept: PSYCHIATRY | Facility: OTHER | Age: 58
End: 2018-04-30
Attending: PSYCHIATRY & NEUROLOGY
Payer: COMMERCIAL

## 2018-04-30 VITALS
HEART RATE: 78 BPM | TEMPERATURE: 97.9 F | BODY MASS INDEX: 28.5 KG/M2 | DIASTOLIC BLOOD PRESSURE: 86 MMHG | SYSTOLIC BLOOD PRESSURE: 144 MMHG | WEIGHT: 193 LBS

## 2018-04-30 DIAGNOSIS — F90.9 ATTENTION DEFICIT HYPERACTIVITY DISORDER (ADHD), UNSPECIFIED ADHD TYPE: ICD-10-CM

## 2018-04-30 PROCEDURE — 99214 OFFICE O/P EST MOD 30 MIN: CPT | Performed by: PSYCHIATRY & NEUROLOGY

## 2018-04-30 PROCEDURE — G0463 HOSPITAL OUTPT CLINIC VISIT: HCPCS

## 2018-04-30 RX ORDER — LISDEXAMFETAMINE DIMESYLATE 70 MG/1
CAPSULE ORAL
Qty: 30 CAPSULE | Refills: 0 | Status: SHIPPED | OUTPATIENT
Start: 2018-05-11 | End: 2018-05-30

## 2018-04-30 ASSESSMENT — ANXIETY QUESTIONNAIRES
GAD7 TOTAL SCORE: 6
5. BEING SO RESTLESS THAT IT IS HARD TO SIT STILL: NOT AT ALL
2. NOT BEING ABLE TO STOP OR CONTROL WORRYING: MORE THAN HALF THE DAYS
7. FEELING AFRAID AS IF SOMETHING AWFUL MIGHT HAPPEN: SEVERAL DAYS
1. FEELING NERVOUS, ANXIOUS, OR ON EDGE: NOT AT ALL
3. WORRYING TOO MUCH ABOUT DIFFERENT THINGS: MORE THAN HALF THE DAYS
6. BECOMING EASILY ANNOYED OR IRRITABLE: SEVERAL DAYS

## 2018-04-30 ASSESSMENT — PAIN SCALES - GENERAL: PAINLEVEL: EXTREME PAIN (8)

## 2018-04-30 ASSESSMENT — PATIENT HEALTH QUESTIONNAIRE - PHQ9: 5. POOR APPETITE OR OVEREATING: NOT AT ALL

## 2018-04-30 NOTE — NURSING NOTE
"Chief Complaint   Patient presents with     RECHECK     Mental health.  Discuss Depakote       Initial /86 (BP Location: Left arm, Patient Position: Sitting, Cuff Size: Adult Regular)  Pulse 78  Temp 97.9  F (36.6  C) (Tympanic)  Wt 193 lb (87.5 kg)  BMI 28.5 kg/m2 Estimated body mass index is 28.5 kg/(m^2) as calculated from the following:    Height as of 2/15/18: 5' 9\" (1.753 m).    Weight as of this encounter: 193 lb (87.5 kg).  Medication Reconciliation: complete     JACQUELYN SUAREZ      "

## 2018-04-30 NOTE — MR AVS SNAPSHOT
"              After Visit Summary   4/30/2018    Joshua Barron    MRN: [id number]           Patient Information     Date Of Birth          1960        Visit Information        Provider Department      4/30/2018 3:20 PM Laquita Fernandez MD [facility]        Today's Diagnoses     Attention deficit hyperactivity disorder (ADHD), unspecified ADHD type           Follow-ups after your visit        Your next 10 appointments already scheduled     Mar 13, 2019  2:15 PM CDT   (Arrive by 2:00 PM)   Hearing Eval with Kevin Moreno   [facility] [place] ([facility] [place] [facility]    [address]  [place] [address]   [phone]              Who to contact     If you have questions or need follow up information about today's clinic visit or your schedule please contact [facility] directly at [phone].  Normal or non-critical lab and imaging results will be communicated to you by MyChart, letter or phone within 4 business days after the clinic has received the results. If you do not hear from us within 7 days, please contact the clinic through MyChart or phone. If you have a critical or abnormal lab result, we will notify you by phone as soon as possible.  Submit refill requests through [employer] or call your pharmacy and they will forward the refill request to us. Please allow 3 business days for your refill to be completed.          Additional Information About Your Visit        MyChart Information     [employer] lets you send messages to your doctor, view your test results, renew your prescriptions, schedule appointments and more. To sign up, go to [email or web address] . Click on \"Log in\" on the left side of the screen, which will take you to the Welcome page. Then click on \"Sign up Now\" on the right side of the page.     You will be asked to enter the access code listed below, as well as some personal information. Please follow the directions to create your " username and password.     Your access code is: [email or web address]  Expires: 2018  4:04 PM     Your access code will  in 90 days. If you need help or a new code, please call your [facility] clinic or [phone].        Care EveryWhere ID     This is your Care EveryWhere ID. This could be used by other organizations to access your [facility] medical records  [id number]        Your Vitals Were     Pulse Temperature BMI (Body Mass Index)             78 97.9  F (36.6  C) (Tympanic) 28.5 kg/m2          Blood Pressure from Last 3 Encounters:   18 144/86   18 114/70   18 (!) 110/42    Weight from Last 3 Encounters:   18 193 lb (87.5 kg)   18 160 lb (72.6 kg)   02/15/18 170 lb (77.1 kg)              Today, you had the following     No orders found for display         Where to get your medicines      Some of these will need a paper prescription and others can be bought over the counter.  Ask your nurse if you have questions.     Bring a paper prescription for each of these medications     lisdexamfetamine 70 MG capsule          Primary Care Provider Office Phone # Fax #    Lyle Juan DO Natalia [phone]       [address] [place] [address]        Equal Access to Services     SHAHBAZ DANIELS AH: Hadii jmi arizmendi hadasho Sosaranali, waaxda luqadaha, qaybta kaalmada deyanira, jaki fermin. So [facility] [phone].    ATENCIÓN: Si habla español, tiene a hastings disposición servicios gratuitos de asistencia lingüística. Llame al [phone].    We comply with applicable federal civil rights laws and [place] laws. We do not discriminate on the basis of race, color, national origin, age, disability, sex, sexual orientation, or gender identity.            Thank you!     Thank you for choosing [facility]  for your care. Our goal is always to provide you with excellent care. Hearing back from our patients is one way we can continue to improve  our services. Please take a few minutes to complete the written survey that you may receive in the mail after your visit with us. Thank you!             Your Updated Medication List - Protect others around you: Learn how to safely use, store and throw away your medicines at www.disposemymeds.org.          This list is accurate as of 4/30/18  4:04 PM.  Always use your most recent med list.                   Brand Name Dispense Instructions for use Diagnosis    * albuterol 108 (90 Base) MCG/ACT Inhaler    PROAIR HFA/PROVENTIL HFA/VENTOLIN HFA     Inhale 2 puffs into the lungs every 6 hours as needed for shortness of breath / dyspnea or wheezing        * albuterol 108 (90 Base) MCG/ACT Inhaler    VENTOLIN HFA    18 g    USE 2 PUFFS 4 TIMES A DAY AS NEEDED FOR SHORTNESS OF BREATH        ASPIRIN LOW DOSE 81 MG chewable tablet   Generic drug:  aspirin     30 tablet    CHEW AND SWALLOW 1 TABLET BY MOUTH DAILY    Healthcare maintenance       atorvastatin 20 MG tablet    LIPITOR    90 tablet    Take 1 tablet (20 mg) by mouth daily        baclofen 20 MG tablet    LIORESAL    90 tablet    TAKE 1 TABLET BY MOUTH 3 TIMES DAILY        diazepam 5 MG tablet    VALIUM    60 tablet    TAKE 1 TABLET BY MOUTH EVERY 12 HOURS AS NEEDED FOR ANXIETY OR SLEEP SPASM    DAYANA (generalized anxiety disorder)       docusate sodium 100 MG capsule    DOK    60 capsule    TAKE 1 CAPSULE BY MOUTH TWICE DAILY        fentaNYL 75 mcg/hr 72 hr patch    DURAGESIC    11 patch    APPLY 1 PATCH ONTO THE SKIN EVERY 48 HOURS    DDD (degenerative disc disease), cervical       fluconazole 100 MG tablet    DIFLUCAN    14 tablet    Take 2 tablets (200 mg) by mouth daily    Thrush       fluticasone 50 MCG/ACT spray    FLONASE    16 g    USE 2 SPRAYS IN EACH NOSTRIL DAILY        gabapentin 300 MG capsule    NEURONTIN    270 capsule    TAKE 3 CAPSULES BY MOUTH THREE TIMES DAILY        HYDROcodone-acetaminophen  MG per tablet    NORCO    120 tablet    TAKE 1  TABLET BY MOUTH EVERY 6 HOURS AS NEEDED FOR MODERATE TO SEVEREPAIN    Low back pain, unspecified back pain laterality, unspecified chronicity, with sciatica presence unspecified       hydrOXYzine 50 MG capsule    VISTARIL    60 capsule    Take 1-2 capsules ( mg) by mouth 4 times daily as needed for anxiety        lidocaine 5 % Patch    LIDODERM    90 patch    PLACE 3 PATCHES ONTO THE SKIN DAILY AS NEEDED FOR MODERATE PAIN    Midline low back pain without sciatica       lisdexamfetamine 70 MG capsule   Start taking on:  5/11/2018    VYVANSE    30 capsule    TAKE 1 CAPSULE BY MOUTH DAILY    Attention deficit hyperactivity disorder (ADHD), unspecified ADHD type       losartan 50 MG tablet    COZAAR    30 tablet    Take 1 tablet (50 mg) by mouth daily        MAPAP 325 MG tablet   Generic drug:  acetaminophen     200 tablet    TAKE 2 TABLETS BY MOUTH EVERY 4 HOURS AS NEEDED FOR MILD PAIN    Pain       mometasone-formoterol 100-5 MCG/ACT oral inhaler    DULERA    13 g    INHALE 2 PUFFS INTO THE LUNGS 2 TIMES A DAY        multivitamin  with iron Tabs     30 tablet    TAKE 1 TABLET BY MOUTH DAILY    Health care maintenance       nicotine polacrilex 2 MG gum    NICORETTE    110 each    CHEW 1 PIECE AS NEEDED FOR SMOKING CESSATION        nortriptyline 75 MG capsule    PAMELOR    30 capsule    Take 1 capsule (75 mg) by mouth At Bedtime        omeprazole 20 MG CR capsule    priLOSEC    30 capsule    TAKE 1 CAPSULE BY MOUTH EVERY DAY BEFORE A MEAL        * order for DME     2 Box    Equipment being ordered: Large gloves    Need for assistance with personal care       * order for DME     1 Device    1 boa back brace    Chronic low back pain with sciatica, sciatica laterality unspecified, unspecified back pain laterality       OXcarbazepine 600 MG tablet    TRILEPTAL    60 tablet    TAKE 1 TABLET BY MOUTH 2 TIMES DAILY        propranolol 20 MG tablet    INDERAL    60 tablet    Take 1 tablet (20 mg) by mouth 2 times daily         senna-docusate 8.6-50 MG per tablet    SM STOOL SOFTENER/LAXATIVE    120 tablet    TAKE 1 OR 2 TABLETS BY MOUTH 2 TIMES A DAY        tamsulosin 0.4 MG capsule    FLOMAX    30 capsule    TAKE 1 CAPSULE BY MOUTH DAILY        traZODone 50 MG tablet    DESYREL    60 tablet    Take 2 tablets (100 mg) by mouth nightly as needed        * Notice:  This list has 4 medication(s) that are the same as other medications prescribed for you. Read the directions carefully, and ask your doctor or other care provider to review them with you.

## 2018-04-30 NOTE — PROGRESS NOTES
"  PSYCHIATRY CLINIC PROGRESS NOTE   20 minute medication management, more than 50% of time spent counseling patient on medications, medication side effects, symptom history and management   SUBJECTIVE / INTERIM HISTORY                                                                       Social- Was  twice. Lives alone with his dog Montserrat (beltran) and 3 cats: Smoky the cat. Has a GF in [place]  Children-  2 kids, they are in [place]  Last visit March '18:     - Joshua notes he had stopped the Depakote 750 as felt was too high of a dose. But tried 250 mg other and feels like helped smooth his mood.   -   - his neurosurgeon is in [facility].   - marriage 6 years: not good.   - Valium helped with anxiety and helped with the pain. Does NOT want to take any more as \"I don't want to be sleeping all the time\"  - Lots of family issues: Joshua has taken care of his parents and yet parents give his other brothers everything. oldest of 3 brothers. Brother in [place] youngest [place] and Major is here. Mom and dad here [place] on [place]. Joshua noting moving here to be closer to parents and help them, etc. But, parents don't seem to want him around much or talk to him.  SUBSTANCE USE- denies abuse of meds or substances    SYMPTOMS- issues with attention and concentration improved with Vyvanse: racing thoughts, depressed mood, impulsivity, distractibility  MEDICAL ROS- back pain, denies any issues with headaches or stomach pain / issues with stimulant  MEDICAL / SURGICAL HISTORY                     Patient Active Problem List   Diagnosis     Mixed hyperlipidemia     Tobacco Abuse, History of     Degeneration of lumbar or lumbosacral intervertebral disc     Depression, major     Chronic pain syndrome     Chemical dependency (H)     Chronic rhinitis     Tinnitus of both ears     ETD (eustachian tube dysfunction)     SNHL (sensorineural hearing loss)     Back pain     Bipolar disorder (H)     Seizure-like activity (H)     Somatic dysfunction of " pelvis region     Bilateral foot pain     Preop general physical exam     Trigger index finger of right hand     Moderate persistent asthma without complication     Chronic lower back pain     ACP (advance care planning)     Onychia of toe of left foot     DDD (degenerative disc disease), lumbar     Seizure disorder (H)     Back muscle spasm     Throat pain     Benign essential hypertension     Retrograde ejaculation     Allergic rhinitis due to other allergen     Attention to dressings and sutures     Cervical spondylosis without myelopathy     Chronic headaches     DDD (degenerative disc disease)     Diabetes mellitus, type II (H)     Generalized anxiety disorder     Hypertrophy of prostate without urinary obstruction and other lower urinary tract symptoms (LUTS)     GERD (gastroesophageal reflux disease)     Lumbago     Major depressive disorder, recurrent episode, moderate (H)     Myalgia and myositis     Hyperlipidemia     Other pain disorders related to psychological factors     Spinal stenosis in cervical region     Status post lumbar spinal fusion     Tobacco use disorder     Trigger finger     Asthma     Polypharmacy     ALLERGY   Cymbalta and Seasonal allergies  MEDICATIONS                                                                                             Current Outpatient Prescriptions   Medication Sig     albuterol (PROAIR HFA/PROVENTIL HFA/VENTOLIN HFA) 108 (90 BASE) MCG/ACT Inhaler Inhale 2 puffs into the lungs every 6 hours as needed for shortness of breath / dyspnea or wheezing     albuterol (VENTOLIN HFA) 108 (90 BASE) MCG/ACT Inhaler USE 2 PUFFS 4 TIMES A DAY AS NEEDED FOR SHORTNESS OF BREATH     ASPIRIN LOW DOSE 81 MG chewable tablet CHEW AND SWALLOW 1 TABLET BY MOUTH DAILY     atorvastatin (LIPITOR) 20 MG tablet Take 1 tablet (20 mg) by mouth daily     baclofen (LIORESAL) 20 MG tablet TAKE 1 TABLET BY MOUTH 3 TIMES DAILY     diazepam (VALIUM) 5 MG tablet TAKE 1 TABLET BY MOUTH EVERY 12  HOURS AS NEEDED FOR ANXIETY OR SLEEP SPASM     docusate sodium (DOK) 100 MG capsule TAKE 1 CAPSULE BY MOUTH TWICE DAILY     fentaNYL (DURAGESIC) 75 mcg/hr 72 hr patch APPLY 1 PATCH ONTO THE SKIN EVERY 48 HOURS     fluconazole (DIFLUCAN) 100 MG tablet Take 2 tablets (200 mg) by mouth daily     fluticasone (FLONASE) 50 MCG/ACT spray USE 2 SPRAYS IN EACH NOSTRIL DAILY     gabapentin (NEURONTIN) 300 MG capsule TAKE 3 CAPSULES BY MOUTH THREE TIMES DAILY     HYDROcodone-acetaminophen (NORCO)  MG per tablet TAKE 1 TABLET BY MOUTH EVERY 6 HOURS AS NEEDED FOR MODERATE TO SEVEREPAIN     hydrOXYzine (VISTARIL) 50 MG capsule Take 1-2 capsules ( mg) by mouth 4 times daily as needed for anxiety     lidocaine (LIDODERM) 5 % Patch PLACE 3 PATCHES ONTO THE SKIN DAILY AS NEEDED FOR MODERATE PAIN     lisdexamfetamine (VYVANSE) 70 MG capsule TAKE 1 CAPSULE BY MOUTH DAILY     losartan (COZAAR) 50 MG tablet Take 1 tablet (50 mg) by mouth daily     MAPAP 325 MG tablet TAKE 2 TABLETS BY MOUTH EVERY 4 HOURS AS NEEDED FOR MILD PAIN     mometasone-formoterol (DULERA) 100-5 MCG/ACT oral inhaler INHALE 2 PUFFS INTO THE LUNGS 2 TIMES A DAY     multivitamin  with iron (SM COMPLETE ADVANCED FORMULA) TABS TAKE 1 TABLET BY MOUTH DAILY     nicotine polacrilex (NICORETTE) 2 MG gum CHEW 1 PIECE AS NEEDED FOR SMOKING CESSATION     nortriptyline (PAMELOR) 75 MG capsule Take 1 capsule (75 mg) by mouth At Bedtime     omeprazole (PRILOSEC) 20 MG CR capsule TAKE 1 CAPSULE BY MOUTH EVERY DAY BEFORE A MEAL     ORDER FOR DME Equipment being ordered: Large gloves     order for DME 1 boa back brace     OXcarbazepine (TRILEPTAL) 600 MG tablet TAKE 1 TABLET BY MOUTH 2 TIMES DAILY     propranolol (INDERAL) 20 MG tablet Take 1 tablet (20 mg) by mouth 2 times daily     senna-docusate ( STOOL SOFTENER/LAXATIVE) 8.6-50 MG per tablet TAKE 1 OR 2 TABLETS BY MOUTH 2 TIMES A DAY     tamsulosin (FLOMAX) 0.4 MG capsule TAKE 1 CAPSULE BY MOUTH DAILY     traZODone  "(DESYREL) 50 MG tablet Take 2 tablets (100 mg) by mouth nightly as needed     No current facility-administered medications for this visit.        VITALS   /86 (BP Location: Left arm, Patient Position: Sitting, Cuff Size: Adult Regular)  Pulse 78  Temp 97.9  F (36.6  C) (Tympanic)  Wt 193 lb (87.5 kg)  BMI 28.5 kg/m2     LABS                                                                                                                         EKG 2016 with 376 ms (on nortriptyline)    Last Basic Metabolic Panel:  Lab Results   Component Value Date     10/20/2017      Lab Results   Component Value Date    POTASSIUM 4.2 10/20/2017     Lab Results   Component Value Date    CHLORIDE 107 10/20/2017     Lab Results   Component Value Date    SANDRITA 9.0 10/20/2017     Lab Results   Component Value Date    CO2 28 10/20/2017     Lab Results   Component Value Date    BUN 17 10/20/2017     Lab Results   Component Value Date    CR 0.69 10/20/2017     Lab Results   Component Value Date     10/20/2017     Liver Function Studies -   Recent Labs   Lab Test  10/20/17   1443   PROTTOTAL  7.4   ALBUMIN  4.4   BILITOTAL  0.4   ALKPHOS  120   AST  18   ALT  30     CBC RESULTS:   Recent Labs   Lab Test  10/20/17   1443   WBC  4.0   RBC  4.00*   HGB  12.9*   HCT  36.5*   MCV  91   MCH  32.3   MCHC  35.3   RDW  12.4   PLT  151          MENTAL STATUS EXAM                                                                                        Alert. Oriented to person, place, and date / time. Casually groomed, calm, cooperative with good eye contact. No problems with speech or psychomotor behavior. Mood was described as \"kickin ass\" and affect was congruent to speech content and full range. Thought process, including associations, was unremarkable and thought content was devoid of suicidal and homicidal ideation and psychotic thought. No hallucinations. Insight was good. Judgment was intact and adequate for safety. Fund of " "knowledge was intact. Pt demonstrates no obvious problems with attention, concentration, language, recent or remote memory although these were not formally tested.     ASSESSMENT                                                                                                      HISTORICAL:  Initial psych note 10/6/15          NOTES:      Joshua is a 57 year old with bipolar and MDD.   He struggles with depression and seems to be largely related to his chronic pain issues.We started Valium as dual purpose: muscle relaxant properties and for anxiety. Per Trent has helped sificantly. Trent and I have discussed the risks that go along with combination of medications he is on: on several medications with CNS depressant effect and thus he needs to be very careful and NOT use alcohol or any other type sedating meds/substances as there are risks including respiratory depression. Valium is 5 mg bid prn and I would not feel comfortable any higher dose given the other medications he is on with CNS depressant potential. Joshua is on a lot of controlled substances thus I reviewed the [place]  and no other prescriptions aside from those his PCP and I prescribe. We have reviewed on benzodiazepine and opioids combo and more risk adverse side effects. Joshua and I feel benefits outweigh risks of continuing the diazepam as helps him in terms of anxiety and for muscle tension. We feel his functioning would significantly be impaired to point of unable to leave his house. As we spoke about this he reminded me of several \"blown disks\" and one ruptured.  He is on nortriptyline which antidepressant and also can help with pain.      Was started on depakote but Joshua notes he stopped it 2/2 SEs..Today he notes he tried Depakote couple days ago and took 250 mg and feels it helped his mood (last visit he noted he had stopped it as felt too sedated).      TREATMENT RISK STATEMENT:  The risks, benefits, alternatives and potential adverse effects have " been explained and are understood by the pt.  The pt agrees to the treatment plan with the ability to do so.   The pt knows to call the clinic for any problems or access emergency care if needed.        DIAGNOSES                  ADD  Bipolar disorder      PLAN                                                                                                                    1)  MEDICATIONS:         He restarted Depakote but taking 250 mg daily and he notes he is taking once daily.  Last script I wrote was Vyvanse  4/13/18 and gave script today 5/11/18 .  Continue Trileptal 600 mg bid, Valium 5 mg every 12 hours prn   2)  THERAPY:  No change    3)  LABS:  UDS 5/10/17    4)  PT MONITOR [call for probs]:  SEs from meds, worsening sx, SI/HI    5)  REFERRALS [CD, medical, other]:  None    6)  RTC:  4-6 weeks

## 2018-05-01 ASSESSMENT — PATIENT HEALTH QUESTIONNAIRE - PHQ9: SUM OF ALL RESPONSES TO PHQ QUESTIONS 1-9: 11

## 2018-05-01 ASSESSMENT — ANXIETY QUESTIONNAIRES: GAD7 TOTAL SCORE: 6

## 2018-05-02 ENCOUNTER — OFFICE VISIT (OUTPATIENT)
Dept: CHIROPRACTIC MEDICINE | Facility: OTHER | Age: 58
End: 2018-05-02
Attending: CHIROPRACTOR
Payer: COMMERCIAL

## 2018-05-02 DIAGNOSIS — M99.02 SEGMENTAL AND SOMATIC DYSFUNCTION OF THORACIC REGION: ICD-10-CM

## 2018-05-02 DIAGNOSIS — M54.50 ACUTE BILATERAL LOW BACK PAIN WITHOUT SCIATICA: ICD-10-CM

## 2018-05-02 DIAGNOSIS — M99.03 SEGMENTAL AND SOMATIC DYSFUNCTION OF LUMBAR REGION: Primary | ICD-10-CM

## 2018-05-02 DIAGNOSIS — M99.01 SEGMENTAL AND SOMATIC DYSFUNCTION OF CERVICAL REGION: ICD-10-CM

## 2018-05-02 PROCEDURE — 98941 CHIROPRACT MANJ 3-4 REGIONS: CPT | Mod: AT | Performed by: CHIROPRACTOR

## 2018-05-02 NOTE — MR AVS SNAPSHOT
After Visit Summary   5/2/2018    Joshua Barron    MRN: [id number]           Patient Information     Date Of Birth          1960        Visit Information        Provider Department      5/2/2018 3:30 PM Shamar Escamilla DC  [facility]        Today's Diagnoses     Segmental and somatic dysfunction of lumbar region    -  1    Acute bilateral low back pain without sciatica        Segmental and somatic dysfunction of thoracic region        Segmental and somatic dysfunction of cervical region           Follow-ups after your visit        Your next 10 appointments already scheduled     May 09, 2018  3:30 PM CDT   Return Visit with Shamar Escamilla DC   [facility] ([facility])    [address]  [place] [address]   [phone]            May 30, 2018  4:00 PM CDT   (Arrive by 3:45 PM)   Return Visit with Laquita Fernandez MD   [facility] ([facility] [place] )    [address]  [place] [address]   [phone]            Mar 13, 2019  2:15 PM CDT   (Arrive by 2:00 PM)   Hearing Eval with Kevin Moreno   [facility] [place] )    [address] [facility] [address]   [phone]              Who to contact     If you have questions or need follow up information about today's clinic visit or your schedule please contact  [facility] directly at [phone].  Normal or non-critical lab and imaging results will be communicated to you by MyChart, letter or phone within 4 business days after the clinic has received the results. If you do not hear from us within 7 days, please contact the clinic through MyChart or phone. If you have a critical or abnormal lab result, we will notify you by phone as soon as possible.  Submit refill requests through [employer] or call your pharmacy and they will forward the refill request to us. Please allow 3 business days for your refill to be  "completed.          Additional Information About Your Visit        SensewareharFileThis Information     [employer] lets you send messages to your doctor, view your test results, renew your prescriptions, schedule appointments and more. To sign up, go to [email or web address] . Click on \"Log in\" on the left side of the screen, which will take you to the Welcome page. Then click on \"Sign up Now\" on the right side of the page.     You will be asked to enter the access code listed below, as well as some personal information. Please follow the directions to create your username and password.     Your access code is: [email or web address]  Expires: 2018  4:04 PM     Your access code will  in 90 days. If you need help or a new code, please call your [place] clinic or [phone].        Care EveryWhere ID     This is your Care EveryWhere ID. This could be used by other organizations to access your [place] medical records  [id number]         Blood Pressure from Last 3 Encounters:   18 144/86   18 114/70   18 (!) 110/42    Weight from Last 3 Encounters:   18 193 lb (87.5 kg)   18 160 lb (72.6 kg)   02/15/18 170 lb (77.1 kg)              We Performed the Following     CHIROPRAC MANIP,SPINAL,3-4 REGIONS        Primary Care Provider Office Phone # Fax #    Lyle Juan DO Natalia [phone]       [address] [place] [address]        Equal Access to Services     SHAHBAZ DANIELS : Hadii aad ku hadasho Sosaranali, waaxda luqadaha, qaybta kaalmada adeegyacheng, jaki fermin. So [facility] [phone].    ATENCIÓN: Si habla español, tiene a hastings disposición servicios gratuitos de asistencia lingüística. Llame al [phone].    We comply with applicable federal civil rights laws and [place] laws. We do not discriminate on the basis of race, color, national origin, age, disability, sex, sexual orientation, or gender identity.            Thank you!     Thank you for " choosing  [facility]  for your care. Our goal is always to provide you with excellent care. Hearing back from our patients is one way we can continue to improve our services. Please take a few minutes to complete the written survey that you may receive in the mail after your visit with us. Thank you!             Your Updated Medication List - Protect others around you: Learn how to safely use, store and throw away your medicines at www.disposemymeds.org.          This list is accurate as of 5/2/18 11:59 PM.  Always use your most recent med list.                   Brand Name Dispense Instructions for use Diagnosis    * albuterol 108 (90 Base) MCG/ACT Inhaler    PROAIR HFA/PROVENTIL HFA/VENTOLIN HFA     Inhale 2 puffs into the lungs every 6 hours as needed for shortness of breath / dyspnea or wheezing        * albuterol 108 (90 Base) MCG/ACT Inhaler    VENTOLIN HFA    18 g    USE 2 PUFFS 4 TIMES A DAY AS NEEDED FOR SHORTNESS OF BREATH        ASPIRIN LOW DOSE 81 MG chewable tablet   Generic drug:  aspirin     30 tablet    CHEW AND SWALLOW 1 TABLET BY MOUTH DAILY    Healthcare maintenance       atorvastatin 20 MG tablet    LIPITOR    90 tablet    Take 1 tablet (20 mg) by mouth daily        baclofen 20 MG tablet    LIORESAL    90 tablet    TAKE 1 TABLET BY MOUTH 3 TIMES DAILY        diazepam 5 MG tablet    VALIUM    60 tablet    TAKE 1 TABLET BY MOUTH EVERY 12 HOURS AS NEEDED FOR ANXIETY OR SLEEP SPASM    DAYANA (generalized anxiety disorder)       docusate sodium 100 MG capsule    DOK    60 capsule    TAKE 1 CAPSULE BY MOUTH TWICE DAILY        fentaNYL 75 mcg/hr 72 hr patch    DURAGESIC    11 patch    APPLY 1 PATCH ONTO THE SKIN EVERY 48 HOURS    DDD (degenerative disc disease), cervical       fluconazole 100 MG tablet    DIFLUCAN    14 tablet    Take 2 tablets (200 mg) by mouth daily    Thrush       fluticasone 50 MCG/ACT spray    FLONASE    16 g    USE 2 SPRAYS IN EACH NOSTRIL DAILY        gabapentin 300 MG  capsule    NEURONTIN    270 capsule    TAKE 3 CAPSULES BY MOUTH THREE TIMES DAILY        HYDROcodone-acetaminophen  MG per tablet    NORCO    120 tablet    TAKE 1 TABLET BY MOUTH EVERY 6 HOURS AS NEEDED FOR MODERATE TO SEVEREPAIN    Low back pain, unspecified back pain laterality, unspecified chronicity, with sciatica presence unspecified       hydrOXYzine 50 MG capsule    VISTARIL    60 capsule    Take 1-2 capsules ( mg) by mouth 4 times daily as needed for anxiety        lidocaine 5 % Patch    LIDODERM    90 patch    PLACE 3 PATCHES ONTO THE SKIN DAILY AS NEEDED FOR MODERATE PAIN    Midline low back pain without sciatica       lisdexamfetamine 70 MG capsule   Start taking on:  5/11/2018    VYVANSE    30 capsule    TAKE 1 CAPSULE BY MOUTH DAILY    Attention deficit hyperactivity disorder (ADHD), unspecified ADHD type       losartan 50 MG tablet    COZAAR    30 tablet    Take 1 tablet (50 mg) by mouth daily        MAPAP 325 MG tablet   Generic drug:  acetaminophen     200 tablet    TAKE 2 TABLETS BY MOUTH EVERY 4 HOURS AS NEEDED FOR MILD PAIN    Pain       mometasone-formoterol 100-5 MCG/ACT oral inhaler    DULERA    13 g    INHALE 2 PUFFS INTO THE LUNGS 2 TIMES A DAY        multivitamin  with iron Tabs     30 tablet    TAKE 1 TABLET BY MOUTH DAILY    Health care maintenance       nicotine polacrilex 2 MG gum    NICORETTE    110 each    CHEW 1 PIECE AS NEEDED FOR SMOKING CESSATION        nortriptyline 75 MG capsule    PAMELOR    30 capsule    Take 1 capsule (75 mg) by mouth At Bedtime        omeprazole 20 MG CR capsule    priLOSEC    30 capsule    TAKE 1 CAPSULE BY MOUTH EVERY DAY BEFORE A MEAL        * order for DME     2 Box    Equipment being ordered: Large gloves    Need for assistance with personal care       * order for DME     1 Device    1 boa back brace    Chronic low back pain with sciatica, sciatica laterality unspecified, unspecified back pain laterality       OXcarbazepine 600 MG tablet     TRILEPTAL    60 tablet    TAKE 1 TABLET BY MOUTH 2 TIMES DAILY        propranolol 20 MG tablet    INDERAL    60 tablet    Take 1 tablet (20 mg) by mouth 2 times daily        senna-docusate 8.6-50 MG per tablet    SM STOOL SOFTENER/LAXATIVE    120 tablet    TAKE 1 OR 2 TABLETS BY MOUTH 2 TIMES A DAY        tamsulosin 0.4 MG capsule    FLOMAX    30 capsule    TAKE 1 CAPSULE BY MOUTH DAILY        traZODone 50 MG tablet    DESYREL    60 tablet    Take 2 tablets (100 mg) by mouth nightly as needed        * Notice:  This list has 4 medication(s) that are the same as other medications prescribed for you. Read the directions carefully, and ask your doctor or other care provider to review them with you.

## 2018-05-03 NOTE — PROGRESS NOTES

## 2018-05-07 DIAGNOSIS — M54.5 LOW BACK PAIN, UNSPECIFIED BACK PAIN LATERALITY, UNSPECIFIED CHRONICITY, WITH SCIATICA PRESENCE UNSPECIFIED: ICD-10-CM

## 2018-05-07 RX ORDER — HYDROCODONE BITARTRATE AND ACETAMINOPHEN 10; 325 MG/1; MG/1
TABLET ORAL
Qty: 120 TABLET | Refills: 0 | Status: SHIPPED | OUTPATIENT
Start: 2018-05-07 | End: 2018-06-08

## 2018-05-07 NOTE — TELEPHONE ENCOUNTER
Controlled Substance Refill Request for Norco  Problem List Complete:  Yes    Last Written Prescription Date:  4/3/18  Last Fill Quantity: 120,   # refills: 0    Last Office Visit with [facility] primary care provider: 2/15/18  Chronic pain syndrome         Problem Detail      Noted:  9/8/2011      Priority:  Medium      Overview Addendum 1/18/2018  8:15 AM by Samuel Lawrence     Overview:   Opioid Drug Agreement Form.   Patient is followed by Lyle Fisher DO, DO for ongoing prescription of pain medication.  All refills should only be approved by this provider, or covering partner.     Medication(s): Fentanyl 75mcg, Norco 10/325mg.   Maximum quantity per month: #15, #120  Clinic visit frequency required: Q 3 months      Controlled substance agreement:  Encounter-Level CSA - 09/18/2015:                     Controlled Substance Agreement - Scan on 11/25/2015  2:25 PM : SCHEDULED MEDICATION USE AGREEMENT (below)         Pain Clinic evaluation in the past: No     DIRE Total Score(s):  No flowsheet data found.     Last [facility] website verification:  done on 5.17.17   [email or web address]       Future Office visit:   Next 5 appointments (look out 90 days)     May 09, 2018  3:30 PM CDT   [facility] Visit with Shamar [facility] DC   [facility]    [address]  [place] [address]   [phone]            May 30, 2018  4:00 PM CDT   (Arrive by 3:45 PM)   [facility] La[facility] Fernandez MD   [facility] [place] [facility] [place] [facility]    [address]  [place] [address] [phone]                   Processing:  Staff will hand deliver Rx to on-site pharmacy

## 2018-05-07 NOTE — TELEPHONE ENCOUNTER
Pt called and states that he has been out of this since 05/03/18, I told him that this can take up to 72 business hours to fill and we got it today he still wanted me to let someone know that he is out of this.

## 2018-05-10 ENCOUNTER — OFFICE VISIT (OUTPATIENT)
Dept: CHIROPRACTIC MEDICINE | Facility: OTHER | Age: 58
End: 2018-05-10
Attending: CHIROPRACTOR
Payer: COMMERCIAL

## 2018-05-10 DIAGNOSIS — M99.01 SEGMENTAL AND SOMATIC DYSFUNCTION OF CERVICAL REGION: ICD-10-CM

## 2018-05-10 DIAGNOSIS — M99.03 SEGMENTAL AND SOMATIC DYSFUNCTION OF LUMBAR REGION: Primary | ICD-10-CM

## 2018-05-10 DIAGNOSIS — M99.02 SEGMENTAL AND SOMATIC DYSFUNCTION OF THORACIC REGION: ICD-10-CM

## 2018-05-10 DIAGNOSIS — M54.50 ACUTE BILATERAL LOW BACK PAIN WITHOUT SCIATICA: ICD-10-CM

## 2018-05-10 PROCEDURE — 98941 CHIROPRACT MANJ 3-4 REGIONS: CPT | Mod: AT | Performed by: CHIROPRACTOR

## 2018-05-10 NOTE — MR AVS SNAPSHOT
After Visit Summary   5/10/2018    Joshua Barron    MRN: [id number]           Patient Information     Date Of Birth          1960        Visit Information        Provider Department      5/10/2018 11:40 AM Shamar Escamilla DC [facility]        Today's Diagnoses     Segmental and somatic dysfunction of lumbar region    -  1    Acute bilateral low back pain without sciatica        Segmental and somatic dysfunction of thoracic region        Segmental and somatic dysfunction of cervical region           Follow-ups after your visit        Your next 10 appointments already scheduled     May 14, 2018  1:30 PM CDT   Return Visit with Shamar Escamilla DC   [facility] ([facility])    [address]  [place] [address]   [phone]            May 30, 2018  4:00 PM CDT   (Arrive by 3:45 PM)   Return Visit with Laquita Fernandez MD   [facility] [place] )    [address]  [place] [address]   [phone]            Mar 13, 2019  2:15 PM CDT   (Arrive by 2:00 PM)   Hearing Eval with Kevin Moreno   [facility] [place] [facility]    [address] [facility] [place] [address]   [phone]              Who to contact     If you have questions or need follow up information about today's clinic visit or your schedule please contact  [facility] directly at [phone].  Normal or non-critical lab and imaging results will be communicated to you by MyChart, letter or phone within 4 business days after the clinic has received the results. If you do not hear from us within 7 days, please contact the clinic through MyChart or phone. If you have a critical or abnormal lab result, we will notify you by phone as soon as possible.  Submit refill requests through [employer] or call your pharmacy and they will forward the refill request to us. Please allow 3 business days for your refill to be  "completed.          Additional Information About Your Visit        RewardableharLimeLife Information     [email or web address] lets you send messages to your doctor, view your test results, renew your prescriptions, schedule appointments and more. To sign up, go to [email or web address] . Click on \"Log in\" on the left side of the screen, which will take you to the Welcome page. Then click on \"Sign up Now\" on the right side of the page.     You will be asked to enter the access code listed below, as well as some personal information. Please follow the directions to create your username and password.     Your access code is: [email or web address]  Expires: 2018  4:04 PM     Your access code will  in 90 days. If you need help or a new code, please call your [place] clinic or [phone].        Care EveryWhere ID     This is your Care EveryWhere ID. This could be used by other organizations to access your [place] medical records  [id number]         Blood Pressure from Last 3 Encounters:   18 144/86   18 114/70   18 (!) 110/42    Weight from Last 3 Encounters:   18 193 lb (87.5 kg)   18 160 lb (72.6 kg)   02/15/18 170 lb (77.1 kg)              We Performed the Following     CHIROPRAC MANIP,SPINAL,3-4 REGIONS        Primary Care Provider Office Phone # Fax #    Lyle Juan DO Natalia [phone]       [address] [place] [address]        Equal Access to Services     SHAHBAZ DANIELS : Hadii aad ku hadasho Sosaranali, waaxda luqadaha, qaybta kaalmada adeegyacheng, jaki fermin. So [facility] [phone].    ATENCIÓN: Si habla español, tiene a hastings disposición servicios gratuitos de asistencia lingüística. Llame al [phone].    We comply with applicable federal civil rights laws and [place] laws. We do not discriminate on the basis of race, color, national origin, age, disability, sex, sexual orientation, or gender identity.            Thank you!     Thank you for " choosing  [facility]  for your care. Our goal is always to provide you with excellent care. Hearing back from our patients is one way we can continue to improve our services. Please take a few minutes to complete the written survey that you may receive in the mail after your visit with us. Thank you!             Your Updated Medication List - Protect others around you: Learn how to safely use, store and throw away your medicines at www.disposemymeds.org.          This list is accurate as of 5/10/18  1:25 PM.  Always use your most recent med list.                   Brand Name Dispense Instructions for use Diagnosis    * albuterol 108 (90 Base) MCG/ACT Inhaler    PROAIR HFA/PROVENTIL HFA/VENTOLIN HFA     Inhale 2 puffs into the lungs every 6 hours as needed for shortness of breath / dyspnea or wheezing        * albuterol 108 (90 Base) MCG/ACT Inhaler    VENTOLIN HFA    18 g    USE 2 PUFFS 4 TIMES A DAY AS NEEDED FOR SHORTNESS OF BREATH        ASPIRIN LOW DOSE 81 MG chewable tablet   Generic drug:  aspirin     30 tablet    CHEW AND SWALLOW 1 TABLET BY MOUTH DAILY    Healthcare maintenance       atorvastatin 20 MG tablet    LIPITOR    90 tablet    Take 1 tablet (20 mg) by mouth daily        baclofen 20 MG tablet    LIORESAL    90 tablet    TAKE 1 TABLET BY MOUTH 3 TIMES DAILY        diazepam 5 MG tablet    VALIUM    60 tablet    TAKE 1 TABLET BY MOUTH EVERY 12 HOURS AS NEEDED FOR ANXIETY OR SLEEP SPASM    DAYANA (generalized anxiety disorder)       docusate sodium 100 MG capsule    DOK    60 capsule    TAKE 1 CAPSULE BY MOUTH TWICE DAILY        fentaNYL 75 mcg/hr 72 hr patch    DURAGESIC    11 patch    APPLY 1 PATCH ONTO THE SKIN EVERY 48 HOURS    DDD (degenerative disc disease), cervical       fluconazole 100 MG tablet    DIFLUCAN    14 tablet    Take 2 tablets (200 mg) by mouth daily    Thrush       fluticasone 50 MCG/ACT spray    FLONASE    16 g    USE 2 SPRAYS IN EACH NOSTRIL DAILY        gabapentin 300 MG  capsule    NEURONTIN    270 capsule    TAKE 3 CAPSULES BY MOUTH THREE TIMES DAILY        HYDROcodone-acetaminophen  MG per tablet    NORCO    120 tablet    TAKE 1 TABLET BY MOUTH EVERY 6 HOURS AS NEEDED FOR MODERATE TO SEVEREPAIN    Low back pain, unspecified back pain laterality, unspecified chronicity, with sciatica presence unspecified       hydrOXYzine 50 MG capsule    VISTARIL    60 capsule    Take 1-2 capsules ( mg) by mouth 4 times daily as needed for anxiety        lidocaine 5 % Patch    LIDODERM    90 patch    PLACE 3 PATCHES ONTO THE SKIN DAILY AS NEEDED FOR MODERATE PAIN    Midline low back pain without sciatica       lisdexamfetamine 70 MG capsule   Start taking on:  5/11/2018    VYVANSE    30 capsule    TAKE 1 CAPSULE BY MOUTH DAILY    Attention deficit hyperactivity disorder (ADHD), unspecified ADHD type       losartan 50 MG tablet    COZAAR    30 tablet    Take 1 tablet (50 mg) by mouth daily        MAPAP 325 MG tablet   Generic drug:  acetaminophen     200 tablet    TAKE 2 TABLETS BY MOUTH EVERY 4 HOURS AS NEEDED FOR MILD PAIN    Pain       mometasone-formoterol 100-5 MCG/ACT oral inhaler    DULERA    13 g    INHALE 2 PUFFS INTO THE LUNGS 2 TIMES A DAY        multivitamin  with iron Tabs     30 tablet    TAKE 1 TABLET BY MOUTH DAILY    Health care maintenance       nicotine polacrilex 2 MG gum    NICORETTE    110 each    CHEW 1 PIECE AS NEEDED FOR SMOKING CESSATION        nortriptyline 75 MG capsule    PAMELOR    30 capsule    Take 1 capsule (75 mg) by mouth At Bedtime        omeprazole 20 MG CR capsule    priLOSEC    30 capsule    TAKE 1 CAPSULE BY MOUTH EVERY DAY BEFORE A MEAL        * order for DME     2 Box    Equipment being ordered: Large gloves    Need for assistance with personal care       * order for DME     1 Device    1 boa back brace    Chronic low back pain with sciatica, sciatica laterality unspecified, unspecified back pain laterality       OXcarbazepine 600 MG tablet     TRILEPTAL    60 tablet    TAKE 1 TABLET BY MOUTH 2 TIMES DAILY        propranolol 20 MG tablet    INDERAL    60 tablet    Take 1 tablet (20 mg) by mouth 2 times daily        senna-docusate 8.6-50 MG per tablet    SM STOOL SOFTENER/LAXATIVE    120 tablet    TAKE 1 OR 2 TABLETS BY MOUTH 2 TIMES A DAY        tamsulosin 0.4 MG capsule    FLOMAX    30 capsule    TAKE 1 CAPSULE BY MOUTH DAILY        traZODone 50 MG tablet    DESYREL    60 tablet    Take 2 tablets (100 mg) by mouth nightly as needed        * Notice:  This list has 4 medication(s) that are the same as other medications prescribed for you. Read the directions carefully, and ask your doctor or other care provider to review them with you.

## 2018-05-10 NOTE — PROGRESS NOTES

## 2018-05-14 ENCOUNTER — OFFICE VISIT (OUTPATIENT)
Dept: CHIROPRACTIC MEDICINE | Facility: OTHER | Age: 58
End: 2018-05-14
Attending: CHIROPRACTOR
Payer: COMMERCIAL

## 2018-05-14 DIAGNOSIS — M54.50 ACUTE BILATERAL LOW BACK PAIN WITHOUT SCIATICA: ICD-10-CM

## 2018-05-14 DIAGNOSIS — M99.02 SEGMENTAL AND SOMATIC DYSFUNCTION OF THORACIC REGION: Primary | ICD-10-CM

## 2018-05-14 DIAGNOSIS — M99.03 SEGMENTAL AND SOMATIC DYSFUNCTION OF LUMBAR REGION: ICD-10-CM

## 2018-05-14 DIAGNOSIS — M99.01 SEGMENTAL AND SOMATIC DYSFUNCTION OF CERVICAL REGION: ICD-10-CM

## 2018-05-14 PROCEDURE — 98941 CHIROPRACT MANJ 3-4 REGIONS: CPT | Mod: AT | Performed by: CHIROPRACTOR

## 2018-05-14 NOTE — MR AVS SNAPSHOT
After Visit Summary   5/14/2018    Joshua Barron    MRN: [id number]           Patient Information     Date Of Birth          1960        Visit Information        Provider Department      5/14/2018 1:30 PM Shamar Escamilla DC [facility]        Today's Diagnoses     Segmental and somatic dysfunction of thoracic region    -  1    Acute bilateral low back pain without sciatica        Segmental and somatic dysfunction of lumbar region        Segmental and somatic dysfunction of cervical region           Follow-ups after your visit        Your next 10 appointments already scheduled     May 23, 2018  2:00 PM CDT   Return Visit with Shamar Escamilla DC   [facility] ([facility])    [address]  [place] [address]   [phone]            May 30, 2018  4:00 PM CDT   (Arrive by 3:45 PM)   Return Visit with Laquita Fernandez MD   [facility] ([facility] [place] )    [address]  [place] [address]   [phone]            Mar 13, 2019  2:15 PM CDT   (Arrive by 2:00 PM)   Hearing Eval with Kevin Moreno   [facility] ([facility] [place] )    [address] [facility] [address]   [phone]              Who to contact     If you have questions or need follow up information about today's clinic visit or your schedule please contact  [facility] directly at [phone].  Normal or non-critical lab and imaging results will be communicated to you by MyChart, letter or phone within 4 business days after the clinic has received the results. If you do not hear from us within 7 days, please contact the clinic through MyChart or phone. If you have a critical or abnormal lab result, we will notify you by phone as soon as possible.  Submit refill requests through [employer] or call your pharmacy and they will forward the refill request to us. Please allow 3 business days for your refill to be  "completed.          Additional Information About Your Visit        Keel[employer] Information     [employer] lets you send messages to your doctor, view your test results, renew your prescriptions, schedule appointments and more. To sign up, go to [email or web address] . Click on \"Log in\" on the left side of the screen, which will take you to the Welcome page. Then click on \"Sign up Now\" on the right side of the page.     You will be asked to enter the access code listed below, as well as some personal information. Please follow the directions to create your username and password.     Your access code is: [email or web address]  Expires: 2018  4:04 PM     Your access code will  in 90 days. If you need help or a new code, please call your [facility] clinic or [phone].        Care EveryWhere ID     This is your Care EveryWhere ID. This could be used by other organizations to access your [facility] medical records  [id number]         Blood Pressure from Last 3 Encounters:   18 144/86   18 114/70   18 (!) 110/42    Weight from Last 3 Encounters:   18 193 lb (87.5 kg)   18 160 lb (72.6 kg)   02/15/18 170 lb (77.1 kg)              We Performed the Following     CHIROPRAC MANIP,SPINAL,3-4 REGIONS        Primary Care Provider Office Phone # Fax #    Lyle Juan DO Natalia [phone]       [address] [place] [address]        Equal Access to Services     SHAHBAZ DANIELS : Hadii aad ku hadasho Sosaranali, waaxda luqadaha, qaybta kaalmada adeegyacheng, jaki fermin. So [facility] [phone].    ATENCIÓN: Si habla español, tiene a hastings disposición servicios gratuitos de asistencia lingüística. Llame al [phone].    We comply with applicable federal civil rights laws and [place] laws. We do not discriminate on the basis of race, color, national origin, age, disability, sex, sexual orientation, or gender identity.            Thank you!     Thank you for " choosing  [facility]  for your care. Our goal is always to provide you with excellent care. Hearing back from our patients is one way we can continue to improve our services. Please take a few minutes to complete the written survey that you may receive in the mail after your visit with us. Thank you!             Your Updated Medication List - Protect others around you: Learn how to safely use, store and throw away your medicines at www.disposemymeds.org.          This list is accurate as of 5/14/18  2:46 PM.  Always use your most recent med list.                   Brand Name Dispense Instructions for use Diagnosis    * albuterol 108 (90 Base) MCG/ACT Inhaler    PROAIR HFA/PROVENTIL HFA/VENTOLIN HFA     Inhale 2 puffs into the lungs every 6 hours as needed for shortness of breath / dyspnea or wheezing        * albuterol 108 (90 Base) MCG/ACT Inhaler    VENTOLIN HFA    18 g    USE 2 PUFFS 4 TIMES A DAY AS NEEDED FOR SHORTNESS OF BREATH        ASPIRIN LOW DOSE 81 MG chewable tablet   Generic drug:  aspirin     30 tablet    CHEW AND SWALLOW 1 TABLET BY MOUTH DAILY    Healthcare maintenance       atorvastatin 20 MG tablet    LIPITOR    90 tablet    Take 1 tablet (20 mg) by mouth daily        baclofen 20 MG tablet    LIORESAL    90 tablet    TAKE 1 TABLET BY MOUTH 3 TIMES DAILY        diazepam 5 MG tablet    VALIUM    60 tablet    TAKE 1 TABLET BY MOUTH EVERY 12 HOURS AS NEEDED FOR ANXIETY OR SLEEP SPASM    DAYANA (generalized anxiety disorder)       docusate sodium 100 MG capsule    DOK    60 capsule    TAKE 1 CAPSULE BY MOUTH TWICE DAILY        fentaNYL 75 mcg/hr 72 hr patch    DURAGESIC    11 patch    APPLY 1 PATCH ONTO THE SKIN EVERY 48 HOURS    DDD (degenerative disc disease), cervical       fluconazole 100 MG tablet    DIFLUCAN    14 tablet    Take 2 tablets (200 mg) by mouth daily    Thrush       fluticasone 50 MCG/ACT spray    FLONASE    16 g    USE 2 SPRAYS IN EACH NOSTRIL DAILY        gabapentin 300 MG  capsule    NEURONTIN    270 capsule    TAKE 3 CAPSULES BY MOUTH THREE TIMES DAILY        HYDROcodone-acetaminophen  MG per tablet    NORCO    120 tablet    TAKE 1 TABLET BY MOUTH EVERY 6 HOURS AS NEEDED FOR MODERATE TO SEVEREPAIN    Low back pain, unspecified back pain laterality, unspecified chronicity, with sciatica presence unspecified       hydrOXYzine 50 MG capsule    VISTARIL    60 capsule    Take 1-2 capsules ( mg) by mouth 4 times daily as needed for anxiety        lidocaine 5 % Patch    LIDODERM    90 patch    PLACE 3 PATCHES ONTO THE SKIN DAILY AS NEEDED FOR MODERATE PAIN    Midline low back pain without sciatica       lisdexamfetamine 70 MG capsule    VYVANSE    30 capsule    TAKE 1 CAPSULE BY MOUTH DAILY    Attention deficit hyperactivity disorder (ADHD), unspecified ADHD type       losartan 50 MG tablet    COZAAR    30 tablet    Take 1 tablet (50 mg) by mouth daily        MAPAP 325 MG tablet   Generic drug:  acetaminophen     200 tablet    TAKE 2 TABLETS BY MOUTH EVERY 4 HOURS AS NEEDED FOR MILD PAIN    Pain       mometasone-formoterol 100-5 MCG/ACT oral inhaler    DULERA    13 g    INHALE 2 PUFFS INTO THE LUNGS 2 TIMES A DAY        multivitamin  with iron Tabs     30 tablet    TAKE 1 TABLET BY MOUTH DAILY    Health care maintenance       nicotine polacrilex 2 MG gum    NICORETTE    110 each    CHEW 1 PIECE AS NEEDED FOR SMOKING CESSATION        nortriptyline 75 MG capsule    PAMELOR    30 capsule    Take 1 capsule (75 mg) by mouth At Bedtime        omeprazole 20 MG CR capsule    priLOSEC    30 capsule    TAKE 1 CAPSULE BY MOUTH EVERY DAY BEFORE A MEAL        * order for DME     2 Box    Equipment being ordered: Large gloves    Need for assistance with personal care       * order for DME     1 Device    1 boa back brace    Chronic low back pain with sciatica, sciatica laterality unspecified, unspecified back pain laterality       OXcarbazepine 600 MG tablet    TRILEPTAL    60 tablet    TAKE  1 TABLET BY MOUTH 2 TIMES DAILY        propranolol 20 MG tablet    INDERAL    60 tablet    Take 1 tablet (20 mg) by mouth 2 times daily        senna-docusate 8.6-50 MG per tablet    SM STOOL SOFTENER/LAXATIVE    120 tablet    TAKE 1 OR 2 TABLETS BY MOUTH 2 TIMES A DAY        tamsulosin 0.4 MG capsule    FLOMAX    30 capsule    TAKE 1 CAPSULE BY MOUTH DAILY        traZODone 50 MG tablet    DESYREL    60 tablet    Take 2 tablets (100 mg) by mouth nightly as needed        * Notice:  This list has 4 medication(s) that are the same as other medications prescribed for you. Read the directions carefully, and ask your doctor or other care provider to review them with you.

## 2018-05-14 NOTE — PROGRESS NOTES
Subjective Finding:    Chief compalint: Patient presents with:  Back Pain: still having tightness in mid back  Neck Pain  , Pain Scale: 4/10, Intensity: dull, Duration: 2 days, Radiating: no.    Date of injury:     Activities that the pain restricts:   Home/household/hobbies/social activities: yes.  Work duties: yes.  Sleep: yes.  Makes symptoms better: rest.  Makes symptoms worse: lumbar extension and lumbar flexion.  Have you seen anyone else for the symptoms? No.  Work related: no.  Automobile related injury: no.    Objective and Assessment:    Posture Analysis:   High shoulder: .  Head tilt: .  High iliac crest: .  Head carriage: neutral.  Thoracic Kyphosis: neutral.  Lumbar Lordosis: forward.    Lumbar Range of Motion: flexion decreased and extension decreased.  Cervical Range of Motion: extension decreased.  Thoracic Range of Motion: extension decreased.  Extremity Range of Motion: .    Palpation:   Quad lumb: bilateral, referred pain: no  T paraspinals: dull pain, no    Segmental dysfunction pre-treatment and treatment area: C4, T5, T6 and Sacrum.    Assessment post-treatment:  Cervical: ROM increased.  Thoracic: ROM increased.  Lumbar: ROM increased.    Comments: history of DDD in C and L spine.      Complicating Factors: .    Procedure(s):  CMT:  70944 Chiropractic manipulative treatment 3-4 regions performed   Cervical: Diversified, See above for level, Supine, Thoracic: Diversified, See above for level, Prone and Lumbar: Diversified, See above for level, Side posture    Modalities:  None performed this visit    Therapeutic procedures:  None    Plan:  Treatment plan: PRN.  Instructed patient: stretch as instructed at visit.  Short term goals: increase ROM.  Long term goals: increase ADL.  Prognosis: good.

## 2018-05-15 DIAGNOSIS — M50.30 DDD (DEGENERATIVE DISC DISEASE), CERVICAL: ICD-10-CM

## 2018-05-15 NOTE — TELEPHONE ENCOUNTER
Controlled Substance Refill Request for Fentanyl  Problem List Complete:  Yes    Last Written Prescription Date:  4/23/18  Last Fill Quantity: 11 patches,   # refills: 0    Last Office Visit with [facility] primary care provider: 2/15/18  Chronic pain syndrome         Problem Detail      Noted:  9/8/2011      Priority:  Medium      Overview Addendum 1/18/2018  8:15 AM by Samuel Lawrence     Overview:   Opioid Drug Agreement Form.   Patient is followed by Lyle Fisher DO, DO for ongoing prescription of pain medication.  All refills should only be approved by this provider, or covering partner.     Medication(s): Fentanyl 75mcg, Norco 10/325mg.   Maximum quantity per month: #15, #120  Clinic visit frequency required: Q 3 months      Controlled substance agreement:  Encounter-Level CSA - 09/18/2015:                     Controlled Substance Agreement - Scan on 11/25/2015  2:25 PM : SCHEDULED MEDICATION USE AGREEMENT (below)         Pain Clinic evaluation in the past: No     DIRE Total Score(s):  No flowsheet data found.     Last [facility] website verification:  done on 5.17.17   [email or web address]       Future Office visit:   Next 5 appointments (look out 90 days)     May 23, 2018  2:00 PM CDT   Return Visit with Shamar Escamilla DC   [facility] ([facility])    [address]  [place] [address]   [phone]            May 30, 2018  4:00 PM CDT   (Arrive by 3:45 PM)   Return Visit with Laquita Fernandez MD   [facility] ([facility] [place] )    [address]  [place] [address] [phone]                   Processing:  Staff will hand deliver Rx to on-site pharmacy

## 2018-05-16 RX ORDER — FENTANYL 75 UG/1
PATCH TRANSDERMAL
Qty: 11 PATCH | Refills: 0 | Status: SHIPPED | OUTPATIENT
Start: 2018-05-16 | End: 2018-05-29

## 2018-05-22 DIAGNOSIS — M19.90 ARTHRITIS PAIN: Primary | ICD-10-CM

## 2018-05-23 ENCOUNTER — OFFICE VISIT (OUTPATIENT)
Dept: CHIROPRACTIC MEDICINE | Facility: OTHER | Age: 58
End: 2018-05-23
Attending: CHIROPRACTOR
Payer: COMMERCIAL

## 2018-05-23 DIAGNOSIS — M99.03 SEGMENTAL AND SOMATIC DYSFUNCTION OF LUMBAR REGION: Primary | ICD-10-CM

## 2018-05-23 DIAGNOSIS — M99.01 SEGMENTAL AND SOMATIC DYSFUNCTION OF CERVICAL REGION: ICD-10-CM

## 2018-05-23 DIAGNOSIS — M99.02 SEGMENTAL AND SOMATIC DYSFUNCTION OF THORACIC REGION: ICD-10-CM

## 2018-05-23 DIAGNOSIS — M54.50 ACUTE BILATERAL LOW BACK PAIN WITHOUT SCIATICA: ICD-10-CM

## 2018-05-23 PROCEDURE — 98941 CHIROPRACT MANJ 3-4 REGIONS: CPT | Mod: AT | Performed by: CHIROPRACTOR

## 2018-05-23 NOTE — MR AVS SNAPSHOT
After Visit Summary   5/23/2018    Joshua Barron    MRN: [id number]           Patient Information     Date Of Birth          1960        Visit Information        Provider Department      5/23/2018 2:00 PM [facility]        Today's Diagnoses     Segmental and somatic dysfunction of lumbar region    -  1    Acute bilateral low back pain without sciatica        Segmental and somatic dysfunction of thoracic region        Segmental and somatic dysfunction of cervical region           Follow-ups after your visit        Your next 10 appointments already scheduled     May 30, 2018  2:00 PM CDT   Return Visit with Shamar Escamilla DC   [facility] ([facility])    [address]  [place] [address]   [phone]            May 30, 2018  4:00 PM CDT   (Arrive by 3:45 PM)   Return Visit with La[facility] Fernandez MD   [facility] ([facility] [place] )    [address]  [place] [address]   [phone]            Mar 13, 2019  2:15 PM CDT   (Arrive by 2:00 PM)   Hearing Eval with Kevin Moreno   [facility] ([facility] [place] )    [address] [facility] [address]   [phone]              Who to contact     If you have questions or need follow up information about today's clinic visit or your schedule please contact  [facility] directly at [phone].  Normal or non-critical lab and imaging results will be communicated to you by MyChart, letter or phone within 4 business days after the clinic has received the results. If you do not hear from us within 7 days, please contact the clinic through MyChart or phone. If you have a critical or abnormal lab result, we will notify you by phone as soon as possible.  Submit refill requests through [employer] or call your pharmacy and they will forward the refill request to us. Please allow 3 business days for your refill to be  "completed.          Additional Information About Your Visit        [employer] Information     [employer] lets you send messages to your doctor, view your test results, renew your prescriptions, schedule appointments and more. To sign up, go to [email or web address] . Click on \"Log in\" on the left side of the screen, which will take you to the Welcome page. Then click on \"Sign up Now\" on the right side of the page.     You will be asked to enter the access code listed below, as well as some personal information. Please follow the directions to create your username and password.     Your access code is: [email or web address]  Expires: 2018  4:04 PM     Your access code will  in 90 days. If you need help or a new code, please call your [facility] clinic or [phone].        Care EveryWhere ID     This is your Care EveryWhere ID. This could be used by other organizations to access your [place] medical records  [id number]         Blood Pressure from Last 3 Encounters:   18 144/86   18 114/70   18 (!) 110/42    Weight from Last 3 Encounters:   18 193 lb (87.5 kg)   18 160 lb (72.6 kg)   02/15/18 170 lb (77.1 kg)              We Performed the Following     CHIROPRAC MANIP,SPINAL,3-4 REGIONS        Primary Care Provider Office Phone # Fax #    Lyle Juan DO Natalia [phone]       [address] [place] [address]        Equal Access to Services     SHAHBAZ DANIELS : Hadii aad ku hadasho Sosaranali, waaxda luqadaha, qaybta kaalmada adeegyacheng, jaki fermin. So [facility] [phone].    ATENCIÓN: Si habla español, tiene a hastings disposición servicios gratuitos de asistencia lingüística. Llame al [phone].    We comply with applicable federal civil rights laws and [place] laws. We do not discriminate on the basis of race, color, national origin, age, disability, sex, sexual orientation, or gender identity.            Thank you!     Thank you for " choosing  [facility]  for your care. Our goal is always to provide you with excellent care. Hearing back from our patients is one way we can continue to improve our services. Please take a few minutes to complete the written survey that you may receive in the mail after your visit with us. Thank you!             Your Updated Medication List - Protect others around you: Learn how to safely use, store and throw away your medicines at www.disposemymeds.org.          This list is accurate as of 5/23/18 11:59 PM.  Always use your most recent med list.                   Brand Name Dispense Instructions for use Diagnosis    * albuterol 108 (90 Base) MCG/ACT Inhaler    PROAIR HFA/PROVENTIL HFA/VENTOLIN HFA     Inhale 2 puffs into the lungs every 6 hours as needed for shortness of breath / dyspnea or wheezing        * albuterol 108 (90 Base) MCG/ACT Inhaler    VENTOLIN HFA    18 g    USE 2 PUFFS 4 TIMES A DAY AS NEEDED FOR SHORTNESS OF BREATH        * VENTOLIN  (90 Base) MCG/ACT Inhaler   Generic drug:  albuterol     18 g    USE 2 PUFFS 4 TIMES A DAY AS NEEDED FOR SHORTNESS OF BREATH    Moderate persistent asthma without complication       ASPIRIN LOW DOSE 81 MG chewable tablet   Generic drug:  aspirin     30 tablet    CHEW AND SWALLOW 1 TABLET BY MOUTH DAILY    Healthcare maintenance       atorvastatin 20 MG tablet    LIPITOR    90 tablet    Take 1 tablet (20 mg) by mouth daily        baclofen 20 MG tablet    LIORESAL    90 tablet    TAKE 1 TABLET BY MOUTH 3 TIMES DAILY        diazepam 5 MG tablet    VALIUM    60 tablet    TAKE 1 TABLET BY MOUTH EVERY 12 HOURS AS NEEDED FOR ANXIETY OR SLEEP SPASM    DAYANA (generalized anxiety disorder)       diclofenac 50 MG EC tablet    VOLTAREN    90 tablet    TAKE 1 TABLET BY MOUTH 3 TIMES DAILY    Arthritis pain       docusate sodium 100 MG capsule    DOK    60 capsule    TAKE 1 CAPSULE BY MOUTH TWICE DAILY        fentaNYL 75 mcg/hr 72 hr patch    DURAGESIC    11 patch     APPLY 1 PATCH ONTO THE SKIN EVERY 48 HOURS    DDD (degenerative disc disease), cervical       fluconazole 100 MG tablet    DIFLUCAN    14 tablet    Take 2 tablets (200 mg) by mouth daily    Thrush       fluticasone 50 MCG/ACT spray    FLONASE    16 g    USE 2 SPRAYS IN EACH NOSTRIL DAILY        gabapentin 300 MG capsule    NEURONTIN    270 capsule    TAKE 3 CAPSULES BY MOUTH THREE TIMES DAILY        HYDROcodone-acetaminophen  MG per tablet    NORCO    120 tablet    TAKE 1 TABLET BY MOUTH EVERY 6 HOURS AS NEEDED FOR MODERATE TO SEVEREPAIN    Low back pain, unspecified back pain laterality, unspecified chronicity, with sciatica presence unspecified       hydrOXYzine 50 MG capsule    VISTARIL    60 capsule    Take 1-2 capsules ( mg) by mouth 4 times daily as needed for anxiety        lidocaine 5 % Patch    LIDODERM    90 patch    PLACE 3 PATCHES ONTO THE SKIN DAILY AS NEEDED FOR MODERATE PAIN    Midline low back pain without sciatica       lisdexamfetamine 70 MG capsule    VYVANSE    30 capsule    TAKE 1 CAPSULE BY MOUTH DAILY    Attention deficit hyperactivity disorder (ADHD), unspecified ADHD type       losartan 50 MG tablet    COZAAR    30 tablet    Take 1 tablet (50 mg) by mouth daily        MAPAP 325 MG tablet   Generic drug:  acetaminophen     200 tablet    TAKE 2 TABLETS BY MOUTH EVERY 4 HOURS AS NEEDED FOR MILD PAIN    Pain       mometasone-formoterol 100-5 MCG/ACT oral inhaler    DULERA    13 g    INHALE 2 PUFFS INTO THE LUNGS 2 TIMES A DAY        multivitamin  with iron Tabs     30 tablet    TAKE 1 TABLET BY MOUTH DAILY    Health care maintenance       nicotine polacrilex 2 MG gum    NICORETTE    110 each    CHEW 1 PIECE AS NEEDED FOR SMOKING CESSATION        nortriptyline 75 MG capsule    PAMELOR    30 capsule    Take 1 capsule (75 mg) by mouth At Bedtime        omeprazole 20 MG CR capsule    priLOSEC    30 capsule    TAKE 1 CAPSULE BY MOUTH EVERY DAY BEFORE A MEAL        * order for DME     2  Box    Equipment being ordered: Large gloves    Need for assistance with personal care       * order for DME     1 Device    1 boa back brace    Chronic low back pain with sciatica, sciatica laterality unspecified, unspecified back pain laterality       OXcarbazepine 600 MG tablet    TRILEPTAL    60 tablet    TAKE 1 TABLET BY MOUTH 2 TIMES DAILY        propranolol 20 MG tablet    INDERAL    60 tablet    Take 1 tablet (20 mg) by mouth 2 times daily        senna-docusate 8.6-50 MG per tablet    SM STOOL SOFTENER/LAXATIVE    120 tablet    TAKE 1 OR 2 TABLETS BY MOUTH 2 TIMES A DAY        tamsulosin 0.4 MG capsule    FLOMAX    30 capsule    TAKE 1 CAPSULE BY MOUTH DAILY        traZODone 50 MG tablet    DESYREL    60 tablet    Take 2 tablets (100 mg) by mouth nightly as needed        * Notice:  This list has 5 medication(s) that are the same as other medications prescribed for you. Read the directions carefully, and ask your doctor or other care provider to review them with you.

## 2018-05-23 NOTE — TELEPHONE ENCOUNTER
Voltaren  Last office visit: 02/15/18  Last refill: not listed on current medication list.  Medication not listed on current medication list.   Please associate dx and sign if appropriate.    Thank you.

## 2018-05-29 DIAGNOSIS — J45.40 MODERATE PERSISTENT ASTHMA WITHOUT COMPLICATION: ICD-10-CM

## 2018-05-29 DIAGNOSIS — M50.30 DDD (DEGENERATIVE DISC DISEASE), CERVICAL: ICD-10-CM

## 2018-05-29 NOTE — PROGRESS NOTES

## 2018-05-30 ENCOUNTER — OFFICE VISIT (OUTPATIENT)
Dept: PSYCHIATRY | Facility: OTHER | Age: 58
End: 2018-05-30
Attending: PSYCHIATRY & NEUROLOGY
Payer: COMMERCIAL

## 2018-05-30 ENCOUNTER — OFFICE VISIT (OUTPATIENT)
Dept: CHIROPRACTIC MEDICINE | Facility: OTHER | Age: 58
End: 2018-05-30
Attending: CHIROPRACTOR
Payer: COMMERCIAL

## 2018-05-30 VITALS
WEIGHT: 190 LBS | SYSTOLIC BLOOD PRESSURE: 132 MMHG | BODY MASS INDEX: 28.06 KG/M2 | DIASTOLIC BLOOD PRESSURE: 80 MMHG | HEART RATE: 71 BPM | TEMPERATURE: 98.4 F

## 2018-05-30 DIAGNOSIS — M99.01 SEGMENTAL AND SOMATIC DYSFUNCTION OF CERVICAL REGION: ICD-10-CM

## 2018-05-30 DIAGNOSIS — M54.50 ACUTE BILATERAL LOW BACK PAIN WITHOUT SCIATICA: ICD-10-CM

## 2018-05-30 DIAGNOSIS — M99.03 SEGMENTAL AND SOMATIC DYSFUNCTION OF LUMBAR REGION: Primary | ICD-10-CM

## 2018-05-30 DIAGNOSIS — F90.9 ATTENTION DEFICIT HYPERACTIVITY DISORDER (ADHD), UNSPECIFIED ADHD TYPE: ICD-10-CM

## 2018-05-30 DIAGNOSIS — M99.02 SEGMENTAL AND SOMATIC DYSFUNCTION OF THORACIC REGION: ICD-10-CM

## 2018-05-30 PROCEDURE — G0463 HOSPITAL OUTPT CLINIC VISIT: HCPCS

## 2018-05-30 PROCEDURE — 99214 OFFICE O/P EST MOD 30 MIN: CPT | Performed by: PSYCHIATRY & NEUROLOGY

## 2018-05-30 PROCEDURE — 98941 CHIROPRACT MANJ 3-4 REGIONS: CPT | Mod: AT | Performed by: CHIROPRACTOR

## 2018-05-30 RX ORDER — LISDEXAMFETAMINE DIMESYLATE 70 MG/1
70 CAPSULE ORAL EVERY MORNING
Qty: 30 CAPSULE | Refills: 0 | Status: SHIPPED | OUTPATIENT
Start: 2018-07-06 | End: 2018-09-05

## 2018-05-30 RX ORDER — LISDEXAMFETAMINE DIMESYLATE 70 MG/1
CAPSULE ORAL
Qty: 30 CAPSULE | Refills: 0 | Status: SHIPPED | OUTPATIENT
Start: 2018-05-30 | End: 2018-05-30

## 2018-05-30 RX ORDER — LISDEXAMFETAMINE DIMESYLATE 70 MG/1
CAPSULE ORAL
Qty: 30 CAPSULE | Refills: 0 | Status: SHIPPED | OUTPATIENT
Start: 2018-06-08 | End: 2018-07-30

## 2018-05-30 ASSESSMENT — ANXIETY QUESTIONNAIRES
5. BEING SO RESTLESS THAT IT IS HARD TO SIT STILL: NOT AT ALL
GAD7 TOTAL SCORE: 1
3. WORRYING TOO MUCH ABOUT DIFFERENT THINGS: NOT AT ALL
IF YOU CHECKED OFF ANY PROBLEMS ON THIS QUESTIONNAIRE, HOW DIFFICULT HAVE THESE PROBLEMS MADE IT FOR YOU TO DO YOUR WORK, TAKE CARE OF THINGS AT HOME, OR GET ALONG WITH OTHER PEOPLE: EXTREMELY DIFFICULT
6. BECOMING EASILY ANNOYED OR IRRITABLE: NOT AT ALL
7. FEELING AFRAID AS IF SOMETHING AWFUL MIGHT HAPPEN: SEVERAL DAYS
1. FEELING NERVOUS, ANXIOUS, OR ON EDGE: NOT AT ALL
2. NOT BEING ABLE TO STOP OR CONTROL WORRYING: NOT AT ALL

## 2018-05-30 ASSESSMENT — PATIENT HEALTH QUESTIONNAIRE - PHQ9: 5. POOR APPETITE OR OVEREATING: NOT AT ALL

## 2018-05-30 ASSESSMENT — PAIN SCALES - GENERAL: PAINLEVEL: SEVERE PAIN (7)

## 2018-05-30 NOTE — PROGRESS NOTES
Subjective Finding:    Chief compalint: Patient presents with:  Back Pain  , Pain Scale: 4/10, Intensity: dull, Duration: 2 days, Radiating: no.    Date of injury:     Activities that the pain restricts:   Home/household/hobbies/social activities: yes.  Work duties: yes.  Sleep: yes.  Makes symptoms better: rest.  Makes symptoms worse: lumbar extension and lumbar flexion.  Have you seen anyone else for the symptoms? No.  Work related: no.  Automobile related injury: no.    Objective and Assessment:    Posture Analysis:   High shoulder: .  Head tilt: .  High iliac crest: .  Head carriage: neutral.  Thoracic Kyphosis: neutral.  Lumbar Lordosis: forward.    Lumbar Range of Motion: flexion decreased and extension decreased.  Cervical Range of Motion: extension decreased.  Thoracic Range of Motion: extension decreased.  Extremity Range of Motion: .    Palpation:   Quad lumb: bilateral, referred pain: no  T paraspinals: dull pain, no    Segmental dysfunction pre-treatment and treatment area: C4, T5, T6 and Sacrum.    Assessment post-treatment:  Cervical: ROM increased.  Thoracic: ROM increased.  Lumbar: ROM increased.    Comments: history of DDD in C and L spine.      Complicating Factors: .    Procedure(s):  CMT:  72503 Chiropractic manipulative treatment 3-4 regions performed   Cervical: Diversified, See above for level, Supine, Thoracic: Diversified, See above for level, Prone and Lumbar: Diversified, See above for level, Side posture    Modalities:  None performed this visit    Therapeutic procedures:  None    Plan:  Treatment plan: PRN.  Instructed patient: stretch as instructed at visit.  Short term goals: increase ROM.  Long term goals: increase ADL.  Prognosis: good.

## 2018-05-30 NOTE — PROGRESS NOTES
"  PSYCHIATRY CLINIC PROGRESS NOTE   20 minute medication management, more than 50% of time spent counseling patient on medications, medication side effects, symptom history and management   SUBJECTIVE / INTERIM HISTORY                                                                       Social- Was  twice. Lives alone with his dog Montserrat (beltran) and 3 cats: Smoky the cat. Has a GF in [place]  Children-  2 kids, they are in [place]  Last visit:   He restarted Depakote but taking 250 mg daily and he notes he is taking once daily.  Last script I wrote was Vyvanse  4/13/18 and gave script today 5/11/18 .  Continue Trileptal 600 mg bid, Valium 5 mg every 12 hours prn     - Joshua reports today his dog Montserrat was likely beat by a neighbor with a stick. Joshua notes \"the police ended up coming hard down on me for it\"  - I inquired about how he thought this happened. He notes Montserrat came back to door limping. Now anytime he is thinking / talking out loud Montserrat goes and runs and hides under bed  - NO longer taking Depakote as he notes gives him seizures. I inquires if he realizes Depakote is an anticonvulsant as well as for mood and he noted he did not realize this and may give Depakote a try for a consistent week rather than couple days  - his neurosurgeon is in [facility].   - marriage 6 years: not good.   - Valium helped with anxiety and helped with the pain. Does NOT want to take any more as \"I don't want to be sleeping all the time\"  - Lots of family issues: Joshua has taken care of his parents and yet parents give his other brothers everything. oldest of 3 brothers. Brother in [place] youngest Mark and Major is here. Mom and dad here out on [place]. Joshua noting moving here to be closer to parents and help them, etc. But, parents don't seem to want him around much or talk to him.  SUBSTANCE USE- denies abuse of meds or substances    SYMPTOMS- issues with attention and concentration improved with Vyvanse: racing thoughts, " depressed mood, impulsivity, distractibility  MEDICAL ROS- back pain, denies any issues with headaches or stomach pain / issues with stimulant  MEDICAL / SURGICAL HISTORY                     Patient Active Problem List   Diagnosis     Mixed hyperlipidemia     Tobacco Abuse, History of     Degeneration of lumbar or lumbosacral intervertebral disc     Depression, major     Chronic pain syndrome     Chemical dependency (H)     Chronic rhinitis     Tinnitus of both ears     ETD (eustachian tube dysfunction)     SNHL (sensorineural hearing loss)     Back pain     Bipolar disorder (H)     Seizure-like activity (H)     Somatic dysfunction of pelvis region     Bilateral foot pain     Preop general physical exam     Trigger index finger of right hand     Moderate persistent asthma without complication     Chronic lower back pain     ACP (advance care planning)     Onychia of toe of left foot     DDD (degenerative disc disease), lumbar     Seizure disorder (H)     Back muscle spasm     Throat pain     Benign essential hypertension     Retrograde ejaculation     Allergic rhinitis due to other allergen     Attention to dressings and sutures     Cervical spondylosis without myelopathy     Chronic headaches     DDD (degenerative disc disease)     Diabetes mellitus, type II (H)     Generalized anxiety disorder     Hypertrophy of prostate without urinary obstruction and other lower urinary tract symptoms (LUTS)     GERD (gastroesophageal reflux disease)     Lumbago     Major depressive disorder, recurrent episode, moderate (H)     Myalgia and myositis     Hyperlipidemia     Other pain disorders related to psychological factors     Spinal stenosis in cervical region     Status post lumbar spinal fusion     Tobacco use disorder     Trigger finger     Asthma     Polypharmacy     ALLERGY   Cymbalta and Seasonal allergies  MEDICATIONS                                                                                             Current  Outpatient Prescriptions   Medication Sig     albuterol (PROAIR HFA/PROVENTIL HFA/VENTOLIN HFA) 108 (90 BASE) MCG/ACT Inhaler Inhale 2 puffs into the lungs every 6 hours as needed for shortness of breath / dyspnea or wheezing     albuterol (VENTOLIN HFA) 108 (90 BASE) MCG/ACT Inhaler USE 2 PUFFS 4 TIMES A DAY AS NEEDED FOR SHORTNESS OF BREATH     ASPIRIN LOW DOSE 81 MG chewable tablet CHEW AND SWALLOW 1 TABLET BY MOUTH DAILY     atorvastatin (LIPITOR) 20 MG tablet Take 1 tablet (20 mg) by mouth daily     baclofen (LIORESAL) 20 MG tablet TAKE 1 TABLET BY MOUTH 3 TIMES DAILY     diazepam (VALIUM) 5 MG tablet TAKE 1 TABLET BY MOUTH EVERY 12 HOURS AS NEEDED FOR ANXIETY OR SLEEP SPASM     diclofenac (VOLTAREN) 50 MG EC tablet TAKE 1 TABLET BY MOUTH 3 TIMES DAILY     docusate sodium (DOK) 100 MG capsule TAKE 1 CAPSULE BY MOUTH TWICE DAILY     fentaNYL (DURAGESIC) 75 mcg/hr 72 hr patch APPLY 1 PATCH ONTO THE SKIN EVERY 48 HOURS     fluconazole (DIFLUCAN) 100 MG tablet Take 2 tablets (200 mg) by mouth daily     fluticasone (FLONASE) 50 MCG/ACT spray USE 2 SPRAYS IN EACH NOSTRIL DAILY     gabapentin (NEURONTIN) 300 MG capsule TAKE 3 CAPSULES BY MOUTH THREE TIMES DAILY     HYDROcodone-acetaminophen (NORCO)  MG per tablet TAKE 1 TABLET BY MOUTH EVERY 6 HOURS AS NEEDED FOR MODERATE TO SEVEREPAIN     hydrOXYzine (VISTARIL) 50 MG capsule Take 1-2 capsules ( mg) by mouth 4 times daily as needed for anxiety     lidocaine (LIDODERM) 5 % Patch PLACE 3 PATCHES ONTO THE SKIN DAILY AS NEEDED FOR MODERATE PAIN     lisdexamfetamine (VYVANSE) 70 MG capsule TAKE 1 CAPSULE BY MOUTH DAILY     losartan (COZAAR) 50 MG tablet Take 1 tablet (50 mg) by mouth daily     MAPAP 325 MG tablet TAKE 2 TABLETS BY MOUTH EVERY 4 HOURS AS NEEDED FOR MILD PAIN     mometasone-formoterol (DULERA) 100-5 MCG/ACT oral inhaler INHALE 2 PUFFS INTO THE LUNGS 2 TIMES A DAY     multivitamin  with iron (SM COMPLETE ADVANCED FORMULA) TABS TAKE 1 TABLET BY  MOUTH DAILY     nicotine polacrilex (NICORETTE) 2 MG gum CHEW 1 PIECE AS NEEDED FOR SMOKING CESSATION     nortriptyline (PAMELOR) 75 MG capsule Take 1 capsule (75 mg) by mouth At Bedtime     omeprazole (PRILOSEC) 20 MG CR capsule TAKE 1 CAPSULE BY MOUTH EVERY DAY BEFORE A MEAL     order for DME 1 boa back brace     ORDER FOR DME Equipment being ordered: Large gloves     OXcarbazepine (TRILEPTAL) 600 MG tablet TAKE 1 TABLET BY MOUTH 2 TIMES DAILY     propranolol (INDERAL) 20 MG tablet Take 1 tablet (20 mg) by mouth 2 times daily     senna-docusate (SM STOOL SOFTENER/LAXATIVE) 8.6-50 MG per tablet TAKE 1 OR 2 TABLETS BY MOUTH 2 TIMES A DAY     tamsulosin (FLOMAX) 0.4 MG capsule TAKE 1 CAPSULE BY MOUTH DAILY     traZODone (DESYREL) 50 MG tablet Take 2 tablets (100 mg) by mouth nightly as needed     VENTOLIN  (90 Base) MCG/ACT Inhaler USE 2 PUFFS 4 TIMES A DAY AS NEEDED FOR SHORTNESS OF BREATH     No current facility-administered medications for this visit.        VITALS   There were no vitals taken for this visit.     LABS                                                                                                                         EKG 2016 with 376 ms (on nortriptyline)    Last Basic Metabolic Panel:  Lab Results   Component Value Date     10/20/2017      Lab Results   Component Value Date    POTASSIUM 4.2 10/20/2017     Lab Results   Component Value Date    CHLORIDE 107 10/20/2017     Lab Results   Component Value Date    SANDRITA 9.0 10/20/2017     Lab Results   Component Value Date    CO2 28 10/20/2017     Lab Results   Component Value Date    BUN 17 10/20/2017     Lab Results   Component Value Date    CR 0.69 10/20/2017     Lab Results   Component Value Date     10/20/2017     Liver Function Studies -   Recent Labs   Lab Test  10/20/17   1443   PROTTOTAL  7.4   ALBUMIN  4.4   BILITOTAL  0.4   ALKPHOS  120   AST  18   ALT  30     CBC RESULTS:   Recent Labs   Lab Test  10/20/17   1443   WBC   "4.0   RBC  4.00*   HGB  12.9*   HCT  36.5*   MCV  91   MCH  32.3   MCHC  35.3   RDW  12.4   PLT  151          MENTAL STATUS EXAM                                                                                        Alert. Oriented to person, place, and date / time. Casually groomed, calm, cooperative with good eye contact. No problems with speech or psychomotor behavior. Mood was described as \"doing better\" and affect was congruent to speech content and full range. Thought process, including associations, was unremarkable and thought content was devoid of suicidal and homicidal ideation and psychotic thought. No hallucinations. Insight was good. Judgment was intact and adequate for safety. Fund of knowledge was intact. Pt demonstrates no obvious problems with attention, concentration, language, recent or remote memory although these were not formally tested.     ASSESSMENT                                                                                                      HISTORICAL:  Initial psych note 10/6/15          NOTES:      Joshua is a 57 year old with bipolar and MDD.   He struggles with depression and seems to be largely related to his chronic pain issues.We started Valium as dual purpose: muscle relaxant properties and for anxiety. Per Trent has helped sificantly. Trent and I have discussed the risks that go along with combination of medications he is on: on several medications with CNS depressant effect and thus he needs to be very careful and NOT use alcohol or any other type sedating meds/substances as there are risks including respiratory depression. Valium is 5 mg bid prn and I would not feel comfortable any higher dose given the other medications he is on with CNS depressant potential. Joshua is on a lot of controlled substances thus I reviewed the [place]  and no other prescriptions aside from those his PCP and I prescribe. We have reviewed on benzodiazepine and opioids combo and more risk adverse side " "effects. Joshua and I feel benefits outweigh risks of continuing the diazepam as helps him in terms of anxiety and for muscle tension. We feel his functioning would significantly be impaired to point of unable to leave his house. As we spoke about this he reminded me of several \"blown disks\" and one ruptured.  He is on nortriptyline which antidepressant and also can help with pain.      Was started on depakote but Joshua notes he stopped it 2/2 SEs..Today we discussed he didn't realize Depakote IS for seizures in addition to as mood stabilizer thus doesn't make senese it would cause seizures. He's thinking giving Deapkote another try..      TREATMENT RISK STATEMENT:  The risks, benefits, alternatives and potential adverse effects have been explained and are understood by the pt.  The pt agrees to the treatment plan with the ability to do so.   The pt knows to call the clinic for any problems or access emergency care if needed.        DIAGNOSES                  ADD  Bipolar disorder      PLAN                                                                                                                    1)  MEDICATIONS:         He restarted Depakote but taking 250 mg daily and he notes he is taking once daily.  Last script I wrote was Vyvanse 5/11/18 and next script 6/8/18, 7/6/18 .  Continue Trileptal 600 mg bid, Valium 5 mg every 12 hours prn   2)  THERAPY:  No change    3)  LABS:  UDS 5/10/17    4)  PT MONITOR [call for probs]:  SEs from meds, worsening sx, SI/HI    5)  REFERRALS [CD, medical, other]:  None    6)  RTC:  2 months                                                  "

## 2018-05-30 NOTE — MR AVS SNAPSHOT
After Visit Summary   5/30/2018    Joshua Barron    MRN: [id number]           Patient Information     Date Of Birth          1960        Visit Information        Provider Department      5/30/2018 4:00 PM Laquita Fernandez MD [facility]        Today's Diagnoses     Attention deficit hyperactivity disorder (ADHD), unspecified ADHD type           Follow-ups after your visit        Your next 10 appointments already scheduled     Mar 13, 2019  2:15 PM CDT   (Arrive by 2:00 PM)   Hearing Eval with Kevin Moreno   [facility] [place] ([facility] [place] )    [address]  [place] [address]   [phone]              Who to contact     If you have questions or need follow up information about today's clinic visit or your schedule please contact [facility] directly at [phone].  Normal or non-critical lab and imaging results will be communicated to you by MyChart, letter or phone within 4 business days after the clinic has received the results. If you do not hear from us within 7 days, please contact the clinic through MyChart or phone. If you have a critical or abnormal lab result, we will notify you by phone as soon as possible.  Submit refill requests through [facility] or call your pharmacy and they will forward the refill request to us. Please allow 3 business days for your refill to be completed.          Additional Information About Your Visit        Care EveryWhere ID     This is your Care EveryWhere ID. This could be used by other organizations to access your [facility] medical records  [id number]        Your Vitals Were     Pulse Temperature BMI (Body Mass Index)             71 98.4  F (36.9  C) (Tympanic) 28.06 kg/m2          Blood Pressure from Last 3 Encounters:   05/30/18 132/80   04/30/18 144/86   03/27/18 114/70    Weight from Last 3 Encounters:   05/30/18 190 lb (86.2 kg)   04/30/18 193 lb (87.5 kg)   03/27/18 160 lb (72.6 kg)               Today, you had the following     No orders found for display         Today's Medication Changes          These changes are accurate as of 5/30/18  4:47 PM.  If you have any questions, ask your nurse or doctor.               Start taking these medicines.        Dose/Directions    * lisdexamfetamine 70 MG capsule   Commonly known as:  VYVANSE   Used for:  Attention deficit hyperactivity disorder (ADHD), unspecified ADHD type   Started by:  Laquita Fernandez MD        Start taking on:  6/8/2018   TAKE 1 CAPSULE BY MOUTH DAILY   Quantity:  30 capsule   Refills:  0       * lisdexamfetamine 70 MG capsule   Commonly known as:  VYVANSE   Used for:  Attention deficit hyperactivity disorder (ADHD), unspecified ADHD type   Started by:  Laquita Fernandez MD        Dose:  70 mg   Start taking on:  7/6/2018   Take 1 capsule (70 mg) by mouth every morning   Quantity:  30 capsule   Refills:  0       * Notice:  This list has 2 medication(s) that are the same as other medications prescribed for you. Read the directions carefully, and ask your doctor or other care provider to review them with you.         Where to get your medicines      Some of these will need a paper prescription and others can be bought over the counter.  Ask your nurse if you have questions.     Bring a paper prescription for each of these medications     lisdexamfetamine 70 MG capsule    lisdexamfetamine 70 MG capsule                Primary Care Provider Office Phone # Fax #    Lylegrecia Fisher  [phone]       [address] [place] [address]        Equal Access to Services     [facility]: Hadii jim england Soro, waaxda luqadaha, qaybta kaalmada adeegyada, jaki fermin. So [facility] [phone].    ATENCIÓN: Si habla español, tiene a hastings disposición servicios gratuitos de asistencia lingüística. Llame al [phone].    We comply with applicable federal civil rights laws and  [place] laws. We do not discriminate on the basis of race, color, national origin, age, disability, sex, sexual orientation, or gender identity.            Thank you!     [facility] you for [facility]  [facility] your [facility]. Our goal is always to provide you with excellent care. Hearing back from our patients is one way we can continue to improve our services. Please take a few minutes to complete the written survey that you may receive in the mail after your visit with us. Thank you!             Your Updated Medication List - Protect others around you: Learn how to safely use, store and throw away your medicines at www.disposemymeds.org.          This list is accurate as of 5/30/18  4:47 PM.  Always use your most recent med list.                   Brand Name Dispense Instructions for use Diagnosis    * albuterol 108 (90 Base) MCG/ACT Inhaler    PROAIR HFA/PROVENTIL HFA/VENTOLIN HFA     Inhale 2 puffs into the lungs every 6 hours as needed for shortness of breath / dyspnea or wheezing        * albuterol 108 (90 Base) MCG/ACT Inhaler    VENTOLIN HFA    18 g    USE 2 PUFFS 4 TIMES A DAY AS NEEDED FOR SHORTNESS OF BREATH        * VENTOLIN  (90 Base) MCG/ACT Inhaler   Generic drug:  albuterol     18 g    USE 2 PUFFS 4 TIMES A DAY AS NEEDED FOR SHORTNESS OF BREATH    Moderate persistent asthma without complication       ASPIRIN LOW DOSE 81 MG chewable tablet   Generic drug:  aspirin     30 tablet    CHEW AND SWALLOW 1 TABLET BY MOUTH DAILY    Healthcare maintenance       atorvastatin 20 MG tablet    LIPITOR    90 tablet    Take 1 tablet (20 mg) by mouth daily        baclofen 20 MG tablet    LIORESAL    90 tablet    TAKE 1 TABLET BY MOUTH 3 TIMES DAILY        diazepam 5 MG tablet    VALIUM    60 tablet    TAKE 1 TABLET BY MOUTH EVERY 12 HOURS AS NEEDED FOR ANXIETY OR SLEEP SPASM    DAYANA (generalized anxiety disorder)       diclofenac 50 MG EC tablet    VOLTAREN    90 tablet    TAKE 1 TABLET BY MOUTH 3  TIMES DAILY    Arthritis pain       docusate sodium 100 MG capsule    DOK    60 capsule    TAKE 1 CAPSULE BY MOUTH TWICE DAILY        fentaNYL 75 mcg/hr 72 hr patch    DURAGESIC    11 patch    APPLY 1 PATCH ONTO THE SKIN EVERY 48 HOURS    DDD (degenerative disc disease), cervical       fluconazole 100 MG tablet    DIFLUCAN    14 tablet    Take 2 tablets (200 mg) by mouth daily    Thrush       fluticasone 50 MCG/ACT spray    FLONASE    16 g    USE 2 SPRAYS IN EACH NOSTRIL DAILY        gabapentin 300 MG capsule    NEURONTIN    270 capsule    TAKE 3 CAPSULES BY MOUTH THREE TIMES DAILY        HYDROcodone-acetaminophen  MG per tablet    NORCO    120 tablet    TAKE 1 TABLET BY MOUTH EVERY 6 HOURS AS NEEDED FOR MODERATE TO SEVEREPAIN    Low back pain, unspecified back pain laterality, unspecified chronicity, with sciatica presence unspecified       hydrOXYzine 50 MG capsule    VISTARIL    60 capsule    Take 1-2 capsules ( mg) by mouth 4 times daily as needed for anxiety        lidocaine 5 % Patch    LIDODERM    90 patch    PLACE 3 PATCHES ONTO THE SKIN DAILY AS NEEDED FOR MODERATE PAIN    Midline low back pain without sciatica       * lisdexamfetamine 70 MG capsule   Start taking on:  6/8/2018    VYVANSE    30 capsule    TAKE 1 CAPSULE BY MOUTH DAILY    Attention deficit hyperactivity disorder (ADHD), unspecified ADHD type       * lisdexamfetamine 70 MG capsule   Start taking on:  7/6/2018    VYVANSE    30 capsule    Take 1 capsule (70 mg) by mouth every morning    Attention deficit hyperactivity disorder (ADHD), unspecified ADHD type       losartan 50 MG tablet    COZAAR    30 tablet    Take 1 tablet (50 mg) by mouth daily        MAPAP 325 MG tablet   Generic drug:  acetaminophen     200 tablet    TAKE 2 TABLETS BY MOUTH EVERY 4 HOURS AS NEEDED FOR MILD PAIN    Pain       mometasone-formoterol 100-5 MCG/ACT oral inhaler    DULERA    13 g    INHALE 2 PUFFS INTO THE LUNGS 2 TIMES A DAY        multivitamin  with  iron Tabs     30 tablet    TAKE 1 TABLET BY MOUTH DAILY    Health care maintenance       nicotine polacrilex 2 MG gum    NICORETTE    110 each    CHEW 1 PIECE AS NEEDED FOR SMOKING CESSATION        nortriptyline 75 MG capsule    PAMELOR    30 capsule    Take 1 capsule (75 mg) by mouth At Bedtime        omeprazole 20 MG CR capsule    priLOSEC    30 capsule    TAKE 1 CAPSULE BY MOUTH EVERY DAY BEFORE A MEAL        * order for DME     2 Box    Equipment being ordered: Large gloves    Need for assistance with personal care       * order for DME     1 Device    1 boa back brace    Chronic low back pain with sciatica, sciatica laterality unspecified, unspecified back pain laterality       OXcarbazepine 600 MG tablet    TRILEPTAL    60 tablet    TAKE 1 TABLET BY MOUTH 2 TIMES DAILY        propranolol 20 MG tablet    INDERAL    60 tablet    Take 1 tablet (20 mg) by mouth 2 times daily        senna-docusate 8.6-50 MG per tablet    SM STOOL SOFTENER/LAXATIVE    120 tablet    TAKE 1 OR 2 TABLETS BY MOUTH 2 TIMES A DAY        tamsulosin 0.4 MG capsule    FLOMAX    30 capsule    TAKE 1 CAPSULE BY MOUTH DAILY        traZODone 50 MG tablet    DESYREL    60 tablet    Take 2 tablets (100 mg) by mouth nightly as needed        * Notice:  This list has 7 medication(s) that are the same as other medications prescribed for you. Read the directions carefully, and ask your doctor or other care provider to review them with you.

## 2018-05-30 NOTE — NURSING NOTE
"Chief Complaint   Patient presents with     RECHECK     Mental health.        Initial /80 (BP Location: Left arm, Patient Position: Sitting, Cuff Size: Adult Regular)  Pulse 71  Temp 98.4  F (36.9  C) (Tympanic)  Wt 190 lb (86.2 kg)  BMI 28.06 kg/m2 Estimated body mass index is 28.06 kg/(m^2) as calculated from the following:    Height as of 2/15/18: 5' 9\" (1.753 m).    Weight as of this encounter: 190 lb (86.2 kg).  Medication Reconciliation: complete    JACQUELYN SUAREZ LPN    "

## 2018-05-31 RX ORDER — ALBUTEROL SULFATE 90 UG/1
AEROSOL, METERED RESPIRATORY (INHALATION)
Qty: 18 G | Refills: 1 | Status: SHIPPED | OUTPATIENT
Start: 2018-05-31 | End: 2018-08-20

## 2018-05-31 RX ORDER — FENTANYL 75 UG/1
PATCH TRANSDERMAL
Qty: 11 PATCH | Refills: 0 | Status: SHIPPED | OUTPATIENT
Start: 2018-06-06 | End: 2018-06-28

## 2018-05-31 ASSESSMENT — PATIENT HEALTH QUESTIONNAIRE - PHQ9: SUM OF ALL RESPONSES TO PHQ QUESTIONS 1-9: 4

## 2018-05-31 ASSESSMENT — ANXIETY QUESTIONNAIRES: GAD7 TOTAL SCORE: 1

## 2018-05-31 NOTE — TELEPHONE ENCOUNTER
Controlled Substance Refill Request for Fentanyl  Problem List Complete:  Yes    Last Written Prescription Date:  5.16.18  Last Fill Quantity: 11,   # refills: 0    [facility] Office Visit with [facility] [place] care provider: 2.15.18    Clinic visit frequency required: Q 3 months     Future Office visit:   Next 5 appointments (look out 90 days)     Jul 30, 2018  3:40 PM CDT   (Arrive by 3:25 PM)   Return Visit with Laquita Fernandez MD   [facility] ([facility] )    [address] [place] [id number]   [phone]                  Controlled substance agreement on file: Yes:  Date 11.25.15.     Processing:  Staff will hand deliver Rx to on-site pharmacy   checked in past 6 months?  Yes 5.17.17

## 2018-06-08 DIAGNOSIS — M54.5 LOW BACK PAIN, UNSPECIFIED BACK PAIN LATERALITY, UNSPECIFIED CHRONICITY, WITH SCIATICA PRESENCE UNSPECIFIED: ICD-10-CM

## 2018-06-08 RX ORDER — HYDROCODONE BITARTRATE AND ACETAMINOPHEN 10; 325 MG/1; MG/1
TABLET ORAL
Qty: 120 TABLET | Refills: 0 | Status: SHIPPED | OUTPATIENT
Start: 2018-06-08 | End: 2018-07-05

## 2018-06-08 NOTE — TELEPHONE ENCOUNTER
Controlled Substance Refill Request for Norco  Problem List Complete:  Yes    Last Written Prescription Date:  5/7/18  Last Fill Quantity: 120,   # refills: 0    Last Office Visit with [facility] primary care provider: 2/15/18    Clinic visit frequency required: Q 3 months     Future Office visit:   Next 5 appointments (look out 90 days)     Jul 30, 2018  3:40 PM CDT   (Arrive by 3:25 PM)   [facility]   [facility]    [facility] [address] [place] [id number]   [phone]                  Controlled substance agreement on file: Yes:  Date 11/25/15.     Processing:  Staff will hand deliver Rx to on-site pharmacy   checked in past 6 months?  Yes 5/17/17

## 2018-06-11 DIAGNOSIS — R52 PAIN: ICD-10-CM

## 2018-06-11 DIAGNOSIS — M19.90 ARTHRITIS PAIN: ICD-10-CM

## 2018-06-11 NOTE — TELEPHONE ENCOUNTER
ACETAMINOPHEN 325 MG TABLET    Last Written Prescription Date:  3/2/18  Last Fill Quantity: 200,   # refills: 0  Last Office Visit: 2/15/18  Future Office visit:    Next 5 appointments (look out 90 days)     Jul 30, 2018  3:40 PM CDT   (Arrive by 3:25 PM)   Return Visit with Laquita Fernandez MD   [facility] [place] [facility] [place]    [facility] [address] [place] [id number] [phone]

## 2018-06-13 RX ORDER — ACETAMINOPHEN 325 MG/1
TABLET ORAL
Qty: 200 TABLET | Refills: 1 | Status: SHIPPED | OUTPATIENT
Start: 2018-06-13 | End: 2018-11-02

## 2018-06-13 RX ORDER — ATORVASTATIN CALCIUM 20 MG/1
TABLET, FILM COATED ORAL
Qty: 30 TABLET | Refills: 0 | OUTPATIENT
Start: 2018-06-13

## 2018-06-14 ENCOUNTER — OFFICE VISIT (OUTPATIENT)
Dept: CHIROPRACTIC MEDICINE | Facility: OTHER | Age: 58
End: 2018-06-14
Attending: CHIROPRACTOR
Payer: COMMERCIAL

## 2018-06-14 DIAGNOSIS — M54.50 ACUTE BILATERAL LOW BACK PAIN WITHOUT SCIATICA: ICD-10-CM

## 2018-06-14 DIAGNOSIS — M99.03 SEGMENTAL AND SOMATIC DYSFUNCTION OF LUMBAR REGION: Primary | ICD-10-CM

## 2018-06-14 DIAGNOSIS — M99.02 SEGMENTAL AND SOMATIC DYSFUNCTION OF THORACIC REGION: ICD-10-CM

## 2018-06-14 DIAGNOSIS — M99.01 SEGMENTAL AND SOMATIC DYSFUNCTION OF CERVICAL REGION: ICD-10-CM

## 2018-06-14 PROCEDURE — 98941 CHIROPRACT MANJ 3-4 REGIONS: CPT | Mod: AT | Performed by: CHIROPRACTOR

## 2018-06-14 NOTE — PROGRESS NOTES
Subjective Finding:    Chief compalint: Patient presents with:  Back Pain: sharp low back pain  Neck Pain  , Pain Scale: 4/10, Intensity: dull, Duration: 2 days, Radiating: no.    Date of injury:     Activities that the pain restricts:   Home/household/hobbies/social activities: yes.  Work duties: yes.  Sleep: yes.  Makes symptoms better: rest.  Makes symptoms worse: lumbar extension and lumbar flexion.  Have you seen anyone else for the symptoms? No.  Work related: no.  Automobile related injury: no.    Objective and Assessment:    Posture Analysis:   High shoulder: .  Head tilt: .  High iliac crest: .  Head carriage: neutral.  Thoracic Kyphosis: neutral.  Lumbar Lordosis: forward.    Lumbar Range of Motion: flexion decreased and extension decreased.  Cervical Range of Motion: extension decreased.  Thoracic Range of Motion: extension decreased.  Extremity Range of Motion: .    Palpation:   Quad lumb: bilateral, referred pain: no  T paraspinals: dull pain, no    Segmental dysfunction pre-treatment and treatment area: C4, T5, T6 and Sacrum.    Assessment post-treatment:  Cervical: ROM increased.  Thoracic: ROM increased.  Lumbar: ROM increased.    Comments: history of DDD in C and L spine.      Complicating Factors: .    Procedure(s):  CMT:  07988 Chiropractic manipulative treatment 3-4 regions performed   Cervical: Diversified, See above for level, Supine, Thoracic: Diversified, See above for level, Prone and Lumbar: Diversified, See above for level, Side posture    Modalities:  None performed this visit    Therapeutic procedures:  None    Plan:  Treatment plan: PRN.  Instructed patient: stretch as instructed at visit.  Short term goals: increase ROM.  Long term goals: increase ADL.  Prognosis: good.

## 2018-06-14 NOTE — MR AVS SNAPSHOT
After Visit Summary   6/14/2018    Joshua Barron    MRN: [id number]           Patient Information     Date Of Birth          1960        Visit Information        Provider Department      6/14/2018 2:40 PM [facility]        Today's Diagnoses     Segmental and somatic dysfunction of lumbar region    -  1    Acute bilateral low back pain without sciatica        Segmental and somatic dysfunction of thoracic region        Segmental and somatic dysfunction of cervical region           Follow-ups after your visit        Your next 10 appointments already scheduled     Jul 30, 2018  3:40 PM CDT   (Arrive by 3:25 PM)   Return Visit with Laquita Fernandez MD   [facility]    [address] [address] [phone]            Mar 13, 2019  2:15 PM CDT   (Arrive by 2:00 PM)   Hearing Eval with Kevin Moreno   [facility]    [address] [facility] [place] [address]   [phone]              Who to contact     If you have questions or need follow up information about today's clinic visit or your schedule please contact  [facility] directly at [phone].  Normal or non-critical lab and imaging results will be communicated to you by MyChart, letter or phone within 4 business days after the clinic has received the results. If you do not hear from us within 7 days, please contact the clinic through MyChart or phone. If you have a critical or abnormal lab result, we will notify you by phone as soon as possible.  Submit refill requests through [employer] or call your pharmacy and they will forward the refill request to us. Please allow 3 business days for your refill to be completed.          Additional Information About Your Visit        Care EveryWhere ID     This is your Care EveryWhere ID. This could be used by other organizations to access your  [place] medical records  [id number]         Blood Pressure from Last 3 Encounters:   05/30/18 132/80   04/30/18 144/86   03/27/18 114/70    Weight from Last 3 Encounters:   05/30/18 190 lb (86.2 kg)   04/30/18 193 lb (87.5 kg)   03/27/18 160 lb (72.6 kg)              We Performed the Following     CHIROPRAC MANIP,SPINAL,3-4 REGIONS        Primary Care Provider Office Phone # Fax #    Lyle Fisher  [phone] [address] [facility] [address]        Equal Access to Services     [facility] : Hadii jim Gary, waaxda luqadaha, qaybta kaalmada deyanira, jaki millan . So [facility] [phone].    ATENCIÓN: Si habla español, tiene a hastings disposición servicios gratuitos de asistencia lingüística. [facility] [phone].    We comply with applicable federal civil rights laws and [place] laws. We do not discriminate on the basis of race, color, national origin, age, disability, sex, sexual orientation, or gender identity.            Thank you!     Thank you for choosing  [facility]  for your care. Our goal is always to provide you with excellent care. Hearing back from our patients is one way we can continue to improve our services. Please take a few minutes to complete the written survey that you may receive in the mail after your visit with us. Thank you!             Your Updated Medication List - Protect others around you: Learn how to safely use, store and throw away your medicines at www.disposemymeds.org.          This list is accurate as of 6/14/18  3:47 PM.  Always use your most recent med list.                   Brand Name Dispense Instructions for use Diagnosis    * albuterol 108 (90 Base) MCG/ACT Inhaler    PROAIR HFA/PROVENTIL HFA/VENTOLIN HFA     Inhale 2 puffs into the lungs every 6 hours as needed for shortness of breath / dyspnea or wheezing        * albuterol 108 (90 Base) MCG/ACT Inhaler    VENTOLIN HFA    18 g     USE 2 PUFFS 4 TIMES A DAY AS NEEDED FOR SHORTNESS OF BREATH        * VENTOLIN  (90 Base) MCG/ACT Inhaler   Generic drug:  albuterol     18 g    USE 2 PUFFS 4 TIMES A DAY AS NEEDED FOR SHORTNESS OF BREATH    Moderate persistent asthma without complication       * VENTOLIN  (90 Base) MCG/ACT Inhaler   Generic drug:  albuterol     18 g    USE 2 PUFFS 4 TIMES A DAY AS NEEDED FOR SHORTNESS OF BREATH    Moderate persistent asthma without complication       ASPIRIN LOW DOSE 81 MG chewable tablet   Generic drug:  aspirin     30 tablet    CHEW AND SWALLOW 1 TABLET BY MOUTH DAILY    Healthcare maintenance       atorvastatin 20 MG tablet    LIPITOR    90 tablet    Take 1 tablet (20 mg) by mouth daily        baclofen 20 MG tablet    LIORESAL    90 tablet    TAKE 1 TABLET BY MOUTH 3 TIMES DAILY        diazepam 5 MG tablet    VALIUM    60 tablet    TAKE 1 TABLET BY MOUTH EVERY 12 HOURS AS NEEDED FOR ANXIETY OR SLEEP SPASM    DAYANA (generalized anxiety disorder)       diclofenac 50 MG EC tablet    VOLTAREN    90 tablet    TAKE 1 TABLET BY MOUTH 3 TIMES DAILY    Arthritis pain       docusate sodium 100 MG capsule    DOK    60 capsule    TAKE 1 CAPSULE BY MOUTH TWICE DAILY        fentaNYL 75 mcg/hr 72 hr patch    DURAGESIC    11 patch    APPLY 1 PATCH ONTO THE SKIN EVERY 48 HOURS    DDD (degenerative disc disease), cervical       fluconazole 100 MG tablet    DIFLUCAN    14 tablet    Take 2 tablets (200 mg) by mouth daily    Thrush       fluticasone 50 MCG/ACT spray    FLONASE    16 g    USE 2 SPRAYS IN EACH NOSTRIL DAILY        gabapentin 300 MG capsule    NEURONTIN    270 capsule    TAKE 3 CAPSULES BY MOUTH THREE TIMES DAILY        HYDROcodone-acetaminophen  MG per tablet    NORCO    120 tablet    TAKE 1 TABLET BY MOUTH EVERY 6 HOURS AS NEEDED FOR MODERATE TO SEVEREPAIN    Low back pain, unspecified back pain laterality, unspecified chronicity, with sciatica presence unspecified       hydrOXYzine 50 MG capsule     VISTARIL    60 capsule    Take 1-2 capsules ( mg) by mouth 4 times daily as needed for anxiety        lidocaine 5 % Patch    LIDODERM    90 patch    PLACE 3 PATCHES ONTO THE SKIN DAILY AS NEEDED FOR MODERATE PAIN    Midline low back pain without sciatica       * lisdexamfetamine 70 MG capsule    VYVANSE    30 capsule    TAKE 1 CAPSULE BY MOUTH DAILY    Attention deficit hyperactivity disorder (ADHD), unspecified ADHD type       * lisdexamfetamine 70 MG capsule   Start taking on:  7/6/2018    VYVANSE    30 capsule    Take 1 capsule (70 mg) by mouth every morning    Attention deficit hyperactivity disorder (ADHD), unspecified ADHD type       losartan 50 MG tablet    COZAAR    30 tablet    Take 1 tablet (50 mg) by mouth daily        MAPAP 325 MG tablet   Generic drug:  acetaminophen     200 tablet    TAKE 2 TABLETS BY MOUTH EVERY 4 HOURS AS NEEDED FOR MILD PAIN    Pain       mometasone-formoterol 100-5 MCG/ACT oral inhaler    DULERA    13 g    INHALE 2 PUFFS INTO THE LUNGS 2 TIMES A DAY        multivitamin  with iron Tabs     30 tablet    TAKE 1 TABLET BY MOUTH DAILY    Health care maintenance       nicotine polacrilex 2 MG gum    NICORETTE    110 each    CHEW 1 PIECE AS NEEDED FOR SMOKING CESSATION        nortriptyline 75 MG capsule    PAMELOR    30 capsule    Take 1 capsule (75 mg) by mouth At Bedtime        omeprazole 20 MG CR capsule    priLOSEC    30 capsule    TAKE 1 CAPSULE BY MOUTH EVERY DAY BEFORE A MEAL        * order for DME     2 Box    Equipment being ordered: Large gloves    Need for assistance with personal care       * order for DME     1 Device    1 boa back brace    Chronic low back pain with sciatica, sciatica laterality unspecified, unspecified back pain laterality       OXcarbazepine 600 MG tablet    TRILEPTAL    60 tablet    TAKE 1 TABLET BY MOUTH 2 TIMES DAILY        propranolol 20 MG tablet    INDERAL    60 tablet    Take 1 tablet (20 mg) by mouth 2 times daily        senna-docusate 8.6-50  MG per tablet    SM STOOL SOFTENER/LAXATIVE    120 tablet    TAKE 1 OR 2 TABLETS BY MOUTH 2 TIMES A DAY        tamsulosin 0.4 MG capsule    FLOMAX    30 capsule    TAKE 1 CAPSULE BY MOUTH DAILY        traZODone 50 MG tablet    DESYREL    60 tablet    Take 2 tablets (100 mg) by mouth nightly as needed        * Notice:  This list has 8 medication(s) that are the same as other medications prescribed for you. Read the directions carefully, and ask your doctor or other care provider to review them with you.

## 2018-06-22 ENCOUNTER — OFFICE VISIT (OUTPATIENT)
Dept: CHIROPRACTIC MEDICINE | Facility: OTHER | Age: 58
End: 2018-06-22
Attending: CHIROPRACTOR
Payer: COMMERCIAL

## 2018-06-22 DIAGNOSIS — M99.03 SEGMENTAL AND SOMATIC DYSFUNCTION OF LUMBAR REGION: Primary | ICD-10-CM

## 2018-06-22 DIAGNOSIS — M99.01 SEGMENTAL AND SOMATIC DYSFUNCTION OF CERVICAL REGION: ICD-10-CM

## 2018-06-22 DIAGNOSIS — M99.02 SEGMENTAL AND SOMATIC DYSFUNCTION OF THORACIC REGION: ICD-10-CM

## 2018-06-22 DIAGNOSIS — M54.50 ACUTE BILATERAL LOW BACK PAIN WITHOUT SCIATICA: ICD-10-CM

## 2018-06-22 PROCEDURE — 98941 CHIROPRACT MANJ 3-4 REGIONS: CPT | Mod: AT | Performed by: CHIROPRACTOR

## 2018-06-22 NOTE — MR AVS SNAPSHOT
After Visit Summary   6/22/2018    Joshua Barron    MRN: [id number]           Patient Information     Date Of Birth          1960        Visit Information        Provider Department      6/22/2018 11:10 AM Shamar Escamilla DC [facility]        Today's Diagnoses     Segmental and somatic dysfunction of lumbar region    -  1    Acute bilateral low back pain without sciatica        Segmental and somatic dysfunction of thoracic region        Segmental and somatic dysfunction of cervical region           Follow-ups after your visit        Your next 10 appointments already scheduled     Jun 25, 2018  2:50 PM CDT   Return Visit with FRANCY Park ([facility])    [address]  [place] [address]   [phone]            Jul 30, 2018  3:40 PM CDT   (Arrive by 3:25 PM)   [facility]   [facility] [place] [facility]    [address]  [place] [address]   [phone]            Mar 13, 2019  2:15 PM CDT   (Arrive by 2:00 PM)   Hearing Eval with Kevin Moreno   [facility] [place] [facility]    [address] [facility] [address]   [phone]              Who to contact     If you have questions or need follow up information about today's clinic visit or your schedule please contact  [facility] directly at [phone].  Normal or non-critical lab and imaging results will be communicated to you by MyChart, letter or phone within 4 business days after the clinic has received the results. If you do not hear from us within 7 days, please contact the clinic through MyChart or phone. If you have a critical or abnormal lab result, we will notify you by phone as soon as possible.  Submit refill requests through [employer] or call your pharmacy and they will forward the refill request to us. Please allow 3 business days for your refill to be  completed.          Additional Information About Your Visit        Care EveryWhere ID     This is your Care EveryWhere ID. This could be used by other organizations to access your [place] medical records  [id number]         Blood Pressure from Last 3 Encounters:   05/30/18 132/80   04/30/18 144/86   03/27/18 114/70    Weight from Last 3 Encounters:   05/30/18 190 lb (86.2 kg)   04/30/18 193 lb (87.5 kg)   03/27/18 160 lb (72.6 kg)              We Performed the Following     CHIROPRAC MANIP,SPINAL,3-4 REGIONS        Primary Care Provider Office Phone # Fax #    Lyle Juan Fisher,  [phone]       [facility] [address]        Equal Access to Services     SHAHBAZ DANIELS : Rosie bryanto Soro, waaxda luqadaha, qaybta kaalmada adeegyada, jaki millan . [facility] [phone].    ATENCIÓN: Si habla español, tiene a hastings disposición servicios gratuitos de asistencia lingüística. Llame al [phone].    We comply with applicable federal civil rights laws and [place] laws. We do not discriminate on the basis of race, color, national origin, age, disability, sex, sexual orientation, or gender identity.            Thank you!     Thank you for choosing  [facility]  for your care. Our goal is always to provide you with excellent care. Hearing back from our patients is one way we can continue to improve our services. Please take a few minutes to complete the written survey that you may receive in the mail after your visit with us. Thank you!             Your Updated Medication List - Protect others around you: Learn how to safely use, store and throw away your medicines at www.disposemymeds.org.          This list is accurate as of 6/22/18 11:59 PM.  Always use your most recent med list.                   Brand Name Dispense Instructions for use Diagnosis    * albuterol 108 (90 Base) MCG/ACT Inhaler    PROAIR HFA/PROVENTIL HFA/VENTOLIN HFA      Inhale 2 puffs into the lungs every 6 hours as needed for shortness of breath / dyspnea or wheezing        * albuterol 108 (90 Base) MCG/ACT Inhaler    VENTOLIN HFA    18 g    USE 2 PUFFS 4 TIMES A DAY AS NEEDED FOR SHORTNESS OF BREATH        * VENTOLIN  (90 Base) MCG/ACT Inhaler   Generic drug:  albuterol     18 g    USE 2 PUFFS 4 TIMES A DAY AS NEEDED FOR SHORTNESS OF BREATH    Moderate persistent asthma without complication       * VENTOLIN  (90 Base) MCG/ACT Inhaler   Generic drug:  albuterol     18 g    USE 2 PUFFS 4 TIMES A DAY AS NEEDED FOR SHORTNESS OF BREATH    Moderate persistent asthma without complication       ASPIRIN LOW DOSE 81 MG chewable tablet   Generic drug:  aspirin     30 tablet    CHEW AND SWALLOW 1 TABLET BY MOUTH DAILY    Healthcare maintenance       atorvastatin 20 MG tablet    LIPITOR    90 tablet    Take 1 tablet (20 mg) by mouth daily        baclofen 20 MG tablet    LIORESAL    90 tablet    TAKE 1 TABLET BY MOUTH 3 TIMES DAILY        diazepam 5 MG tablet    VALIUM    60 tablet    TAKE 1 TABLET BY MOUTH EVERY 12 HOURS AS NEEDED FOR ANXIETY OR SLEEP SPASM    DAYANA (generalized anxiety disorder)       diclofenac 50 MG EC tablet    VOLTAREN    90 tablet    TAKE 1 TABLET BY MOUTH 3 TIMES DAILY    Arthritis pain       docusate sodium 100 MG capsule    DOK    60 capsule    TAKE 1 CAPSULE BY MOUTH TWICE DAILY        fentaNYL 75 mcg/hr 72 hr patch    DURAGESIC    11 patch    APPLY 1 PATCH ONTO THE SKIN EVERY 48 HOURS    DDD (degenerative disc disease), cervical       fluconazole 100 MG tablet    DIFLUCAN    14 tablet    Take 2 tablets (200 mg) by mouth daily    Thrush       fluticasone 50 MCG/ACT spray    FLONASE    16 g    USE 2 SPRAYS IN EACH NOSTRIL DAILY        gabapentin 300 MG capsule    NEURONTIN    270 capsule    TAKE 3 CAPSULES BY MOUTH THREE TIMES DAILY        HYDROcodone-acetaminophen  MG per tablet    NORCO    120 tablet    TAKE 1 TABLET BY MOUTH EVERY 6 HOURS AS  NEEDED FOR MODERATE TO SEVEREPAIN    Low back pain, unspecified back pain laterality, unspecified chronicity, with sciatica presence unspecified       hydrOXYzine 50 MG capsule    VISTARIL    60 capsule    Take 1-2 capsules ( mg) by mouth 4 times daily as needed for anxiety        lidocaine 5 % Patch    LIDODERM    90 patch    PLACE 3 PATCHES ONTO THE SKIN DAILY AS NEEDED FOR MODERATE PAIN    Midline low back pain without sciatica       * lisdexamfetamine 70 MG capsule    VYVANSE    30 capsule    TAKE 1 CAPSULE BY MOUTH DAILY    Attention deficit hyperactivity disorder (ADHD), unspecified ADHD type       * lisdexamfetamine 70 MG capsule   Start taking on:  7/6/2018    VYVANSE    30 capsule    Take 1 capsule (70 mg) by mouth every morning    Attention deficit hyperactivity disorder (ADHD), unspecified ADHD type       losartan 50 MG tablet    COZAAR    30 tablet    Take 1 tablet (50 mg) by mouth daily        MAPAP 325 MG tablet   Generic drug:  acetaminophen     200 tablet    TAKE 2 TABLETS BY MOUTH EVERY 4 HOURS AS NEEDED FOR MILD PAIN    Pain       mometasone-formoterol 100-5 MCG/ACT oral inhaler    DULERA    13 g    INHALE 2 PUFFS INTO THE LUNGS 2 TIMES A DAY        multivitamin  with iron Tabs     30 tablet    TAKE 1 TABLET BY MOUTH DAILY    Health care maintenance       nicotine polacrilex 2 MG gum    NICORETTE    110 each    CHEW 1 PIECE AS NEEDED FOR SMOKING CESSATION        nortriptyline 75 MG capsule    PAMELOR    30 capsule    Take 1 capsule (75 mg) by mouth At Bedtime        omeprazole 20 MG CR capsule    priLOSEC    30 capsule    TAKE 1 CAPSULE BY MOUTH EVERY DAY BEFORE A MEAL        * order for DME     2 Box    Equipment being ordered: Large gloves    Need for assistance with personal care       * order for DME     1 Device    1 boa back brace    Chronic low back pain with sciatica, sciatica laterality unspecified, unspecified back pain laterality       OXcarbazepine 600 MG tablet    TRILEPTAL    60  tablet    TAKE 1 TABLET BY MOUTH 2 TIMES DAILY        propranolol 20 MG tablet    INDERAL    60 tablet    Take 1 tablet (20 mg) by mouth 2 times daily        senna-docusate 8.6-50 MG per tablet    SM STOOL SOFTENER/LAXATIVE    120 tablet    TAKE 1 OR 2 TABLETS BY MOUTH 2 TIMES A DAY        tamsulosin 0.4 MG capsule    FLOMAX    30 capsule    TAKE 1 CAPSULE BY MOUTH DAILY        traZODone 50 MG tablet    DESYREL    60 tablet    Take 2 tablets (100 mg) by mouth nightly as needed        * Notice:  This list has 8 medication(s) that are the same as other medications prescribed for you. Read the directions carefully, and ask your doctor or other care provider to review them with you.

## 2018-06-25 ENCOUNTER — OFFICE VISIT (OUTPATIENT)
Dept: CHIROPRACTIC MEDICINE | Facility: OTHER | Age: 58
End: 2018-06-25
Attending: CHIROPRACTOR
Payer: COMMERCIAL

## 2018-06-25 DIAGNOSIS — M99.02 SEGMENTAL AND SOMATIC DYSFUNCTION OF THORACIC REGION: ICD-10-CM

## 2018-06-25 DIAGNOSIS — M99.03 SEGMENTAL AND SOMATIC DYSFUNCTION OF LUMBAR REGION: Primary | ICD-10-CM

## 2018-06-25 DIAGNOSIS — M99.01 SEGMENTAL AND SOMATIC DYSFUNCTION OF CERVICAL REGION: ICD-10-CM

## 2018-06-25 DIAGNOSIS — M54.50 ACUTE BILATERAL LOW BACK PAIN WITHOUT SCIATICA: ICD-10-CM

## 2018-06-25 PROCEDURE — 98941 CHIROPRACT MANJ 3-4 REGIONS: CPT | Mod: AT | Performed by: CHIROPRACTOR

## 2018-06-25 NOTE — PROGRESS NOTES

## 2018-06-25 NOTE — MR AVS SNAPSHOT
After Visit Summary   6/25/2018    Joshua Barron    MRN: [id number]           Patient Information     Date Of Birth          1960        Visit Information        Provider Department      6/25/2018 2:50 PM [facility]amar Esc[facility]illa [facility]  [facility]        Today's Diagnoses     Segmental and somatic dysfunction of lumbar region    -  1    Acute bilateral low back pain without sciatica        Segmental and somatic dysfunction of thoracic region        Segmental and somatic dysfunction of cervical region           Follow-ups after your visit        Your next 10 appointments already scheduled     Jul 30, 2018  3:40 PM CDT   (Arrive by 3:25 PM)   Return Visit with Laquita Fernandez MD   [facility] [place] ([facility] [place] )    [address]  [place] [address] [phone]            Mar 13, 2019  2:15 PM CDT   (Arrive by 2:00 PM)   Hearing Eval with Kevin Moreno   [facility] ([facility] [place] )    [address] [facility] [place] [address]   [phone]              Who to contact     If you have questions or need follow up information about today's clinic visit or your schedule please contact  [facility] directly at [phone].  Normal or non-critical lab and imaging results will be communicated to you by MyChart, letter or phone within 4 business days after the clinic has received the results. If you do not hear from us within 7 days, please contact the clinic through MyChart or phone. If you have a critical or abnormal lab result, we will notify you by phone as soon as possible.  Submit refill requests through [employer] or call your pharmacy and they will forward the refill request to us. Please allow 3 business days for your refill to be completed.          Additional Information About Your Visit        Care EveryWhere ID     This is your Care EveryWhere ID. This could be used by other organizations to access your  [place] medical records  [id number]         Blood Pressure from Last 3 Encounters:   05/30/18 132/80   04/30/18 144/86   03/27/18 114/70    Weight from Last 3 Encounters:   05/30/18 190 lb (86.2 kg)   04/30/18 193 lb (87.5 kg)   03/27/18 160 lb (72.6 kg)              We Performed the Following     CHIROPRAC MANIP,SPINAL,3-4 REGIONS        Primary Care Provider Office Phone # Fax #    Lyle Fisher  [phone] [address] [facility] [address]        Equal Access to Services     [facility] : Hadii jim Gary, waaxda luqadaha, qaybta kaalmada deyanira, jaki millan . So [facility] [phone].    ATENCIÓN: Si habla español, tiene a hastings disposición servicios gratuitos de asistencia lingüística. [facility] [phone].    We comply with applicable federal civil rights laws and [place] laws. We do not discriminate on the basis of race, color, national origin, age, disability, sex, sexual orientation, or gender identity.            Thank you!     Thank you for choosing  [facility]  for your care. Our goal is always to provide you with excellent care. Hearing back from our patients is one way we can continue to improve our services. Please take a few minutes to complete the written survey that you may receive in the mail after your visit with us. Thank you!             Your Updated Medication List - Protect others around you: Learn how to safely use, store and throw away your medicines at www.disposemymeds.org.          This list is accurate as of 6/25/18 11:59 PM.  Always use your most recent med list.                   Brand Name Dispense Instructions for use Diagnosis    * albuterol 108 (90 Base) MCG/ACT Inhaler    PROAIR HFA/PROVENTIL HFA/VENTOLIN HFA     Inhale 2 puffs into the lungs every 6 hours as needed for shortness of breath / dyspnea or wheezing        * albuterol 108 (90 Base) MCG/ACT Inhaler    VENTOLIN HFA    18 g     USE 2 PUFFS 4 TIMES A DAY AS NEEDED FOR SHORTNESS OF BREATH        * VENTOLIN  (90 Base) MCG/ACT Inhaler   Generic drug:  albuterol     18 g    USE 2 PUFFS 4 TIMES A DAY AS NEEDED FOR SHORTNESS OF BREATH    Moderate persistent asthma without complication       * VENTOLIN  (90 Base) MCG/ACT Inhaler   Generic drug:  albuterol     18 g    USE 2 PUFFS 4 TIMES A DAY AS NEEDED FOR SHORTNESS OF BREATH    Moderate persistent asthma without complication       ASPIRIN LOW DOSE 81 MG chewable tablet   Generic drug:  aspirin     30 tablet    CHEW AND SWALLOW 1 TABLET BY MOUTH DAILY    Healthcare maintenance       atorvastatin 20 MG tablet    LIPITOR    90 tablet    Take 1 tablet (20 mg) by mouth daily        baclofen 20 MG tablet    LIORESAL    90 tablet    TAKE 1 TABLET BY MOUTH 3 TIMES DAILY        diazepam 5 MG tablet    VALIUM    60 tablet    TAKE 1 TABLET BY MOUTH EVERY 12 HOURS AS NEEDED FOR ANXIETY OR SLEEP SPASM    DAYANA (generalized anxiety disorder)       diclofenac 50 MG EC tablet    VOLTAREN    90 tablet    TAKE 1 TABLET BY MOUTH 3 TIMES DAILY    Arthritis pain       docusate sodium 100 MG capsule    DOK    60 capsule    TAKE 1 CAPSULE BY MOUTH TWICE DAILY        fentaNYL 75 mcg/hr 72 hr patch    DURAGESIC    11 patch    APPLY 1 PATCH ONTO THE SKIN EVERY 48 HOURS    DDD (degenerative disc disease), cervical       fluconazole 100 MG tablet    DIFLUCAN    14 tablet    Take 2 tablets (200 mg) by mouth daily    Thrush       fluticasone 50 MCG/ACT spray    FLONASE    16 g    USE 2 SPRAYS IN EACH NOSTRIL DAILY        gabapentin 300 MG capsule    NEURONTIN    270 capsule    TAKE 3 CAPSULES BY MOUTH THREE TIMES DAILY        HYDROcodone-acetaminophen  MG per tablet    NORCO    120 tablet    TAKE 1 TABLET BY MOUTH EVERY 6 HOURS AS NEEDED FOR MODERATE TO SEVEREPAIN    Low back pain, unspecified back pain laterality, unspecified chronicity, with sciatica presence unspecified       hydrOXYzine 50 MG capsule     VISTARIL    60 capsule    Take 1-2 capsules ( mg) by mouth 4 times daily as needed for anxiety        lidocaine 5 % Patch    LIDODERM    90 patch    PLACE 3 PATCHES ONTO THE SKIN DAILY AS NEEDED FOR MODERATE PAIN    Midline low back pain without sciatica       * lisdexamfetamine 70 MG capsule    VYVANSE    30 capsule    TAKE 1 CAPSULE BY MOUTH DAILY    Attention deficit hyperactivity disorder (ADHD), unspecified ADHD type       * lisdexamfetamine 70 MG capsule   Start taking on:  7/6/2018    VYVANSE    30 capsule    Take 1 capsule (70 mg) by mouth every morning    Attention deficit hyperactivity disorder (ADHD), unspecified ADHD type       losartan 50 MG tablet    COZAAR    30 tablet    Take 1 tablet (50 mg) by mouth daily        MAPAP 325 MG tablet   Generic drug:  acetaminophen     200 tablet    TAKE 2 TABLETS BY MOUTH EVERY 4 HOURS AS NEEDED FOR MILD PAIN    Pain       mometasone-formoterol 100-5 MCG/ACT oral inhaler    DULERA    13 g    INHALE 2 PUFFS INTO THE LUNGS 2 TIMES A DAY        multivitamin  with iron Tabs     30 tablet    TAKE 1 TABLET BY MOUTH DAILY    Health care maintenance       nicotine polacrilex 2 MG gum    NICORETTE    110 each    CHEW 1 PIECE AS NEEDED FOR SMOKING CESSATION        nortriptyline 75 MG capsule    PAMELOR    30 capsule    Take 1 capsule (75 mg) by mouth At Bedtime        omeprazole 20 MG CR capsule    priLOSEC    30 capsule    TAKE 1 CAPSULE BY MOUTH EVERY DAY BEFORE A MEAL        * order for DME     2 Box    Equipment being ordered: Large gloves    Need for assistance with personal care       * order for DME     1 Device    1 boa back brace    Chronic low back pain with sciatica, sciatica laterality unspecified, unspecified back pain laterality       OXcarbazepine 600 MG tablet    TRILEPTAL    60 tablet    TAKE 1 TABLET BY MOUTH 2 TIMES DAILY        propranolol 20 MG tablet    INDERAL    60 tablet    Take 1 tablet (20 mg) by mouth 2 times daily        senna-docusate 8.6-50  MG per tablet    SM STOOL SOFTENER/LAXATIVE    120 tablet    TAKE 1 OR 2 TABLETS BY MOUTH 2 TIMES A DAY        tamsulosin 0.4 MG capsule    FLOMAX    30 capsule    TAKE 1 CAPSULE BY MOUTH DAILY        traZODone 50 MG tablet    DESYREL    60 tablet    Take 2 tablets (100 mg) by mouth nightly as needed        * Notice:  This list has 8 medication(s) that are the same as other medications prescribed for you. Read the directions carefully, and ask your doctor or other care provider to review them with you.

## 2018-06-27 NOTE — PROGRESS NOTES

## 2018-06-28 DIAGNOSIS — M50.30 DDD (DEGENERATIVE DISC DISEASE), CERVICAL: ICD-10-CM

## 2018-06-28 RX ORDER — FENTANYL 75 UG/1
PATCH TRANSDERMAL
Qty: 11 PATCH | Refills: 0 | Status: SHIPPED | OUTPATIENT
Start: 2018-06-28 | End: 2018-07-18

## 2018-06-28 NOTE — TELEPHONE ENCOUNTER
Controlled Substance Refill Request for Fentanyl  Problem List Complete:  Yes    Last Written Prescription Date:  5/31/18 start 6/6/18  Last Fill Quantity: 11,   # refills: 0    Last Office Visit with [facility] primary care provider: 2/15/18    Future Office visit:   Next 5 appointments (look out 90 days)     Jul 30, 2018  3:40 PM CDT   (Arrive by 3:25 PM)   Return Visit with Laquita Fernandez MD   [facility] ([facility] )    [facility] [address] [place] [address]   [phone]                  Controlled substance agreement on file: Yes:  Date 11/25/15.     Processing:  Staff will hand deliver Rx to on-site pharmacy  PCP Natalia.  Thank you.

## 2018-07-05 DIAGNOSIS — M54.5 LOW BACK PAIN, UNSPECIFIED BACK PAIN LATERALITY, UNSPECIFIED CHRONICITY, WITH SCIATICA PRESENCE UNSPECIFIED: ICD-10-CM

## 2018-07-05 NOTE — TELEPHONE ENCOUNTER
Ibuprofen 600mg      Last Written Prescription Date:  1-  Last Fill Quantity: 120,   # refills: 0  Last Office Visit: 2-  Future Office visit:    Next 5 appointments (look out 90 days)     Jul 30, 2018  3:40 PM CDT   (Arrive by 3:25 PM)   Return Visit with Laquita Fernandez MD   [facility] ([facility] )    [address] [place], [place] [address] [place] [address]   [phone]                   Routing refill request to provider for review/approval because:    Hydrocodone-Apap  Last Written Prescription Date:  6-8-2018  Last Fill Quantity: 120,   # refills: 0  Last Office Visit: 2-  Future Office visit:    Next 5 appointments (look out 90 days)     Jul 30, 2018  3:40 PM CDT   (Arrive by 3:25 PM)   Return Visit with Laquita Fernandez MD   [facility] ([facility] )    [address] [place], [place] [address] [place] [address]   [phone]                   Routing refill request to provider for review/approval because:

## 2018-07-06 NOTE — TELEPHONE ENCOUNTER
Ibuprofen 600mg      Last Written Prescription Date:  11/20/17  Last Fill Quantity: 120,   # refills: 5  Last Office Visit: 2-  Future Office visit:         Next 5 appointments (look out 90 days)      Jul 30, 2018  3:40 PM CDT   (Arrive by 3:25 PM)   Return Visit with La[facility]ita Fernandez MD   [facility]     [address] [place], [place] [address] [address] [phone]                    IBUPROFEN discontinued by Dr. Fisher on 2/15/18  pcp out routed to liz johnson      Controlled Substance Refill Request for norco  Problem List Complete:  No     PROVIDER TO CONSIDER COMPLETION OF PROBLEM LIST AND OVERVIEW/CONTROLLED SUBSTANCE AGREEMENT    Last Written Prescription Date:  6/8/18  Last Fill Quantity: 120,   # refills: 0    Last Office Visit with [facility] primary care provider: 2/15/18    Future Office visit:   Next 5 appointments (look out 90 days)     Jul 30, 2018  3:40 PM CDT   (Arrive by 3:25 PM)   Return Visit with Laquita Fernandez MD   [facility]    [address] [place], [place] [address] [place] [address] [phone]                  Controlled substance agreement on file: No.     Processing:  Staff will hand deliver Rx to on-site pharmacy   checked in past 3 months?

## 2018-07-09 RX ORDER — HYDROCODONE BITARTRATE AND ACETAMINOPHEN 10; 325 MG/1; MG/1
TABLET ORAL
Qty: 120 TABLET | Refills: 0 | Status: SHIPPED | OUTPATIENT
Start: 2018-07-09 | End: 2018-08-06

## 2018-07-09 RX ORDER — IBUPROFEN 600 MG/1
TABLET ORAL
Qty: 120 TABLET | Refills: 0 | Status: SHIPPED | OUTPATIENT
Start: 2018-07-09 | End: 2018-08-14

## 2018-07-12 ENCOUNTER — OFFICE VISIT (OUTPATIENT)
Dept: CHIROPRACTIC MEDICINE | Facility: OTHER | Age: 58
End: 2018-07-12
Attending: CHIROPRACTOR
Payer: COMMERCIAL

## 2018-07-12 DIAGNOSIS — M54.50 ACUTE BILATERAL LOW BACK PAIN WITHOUT SCIATICA: ICD-10-CM

## 2018-07-12 DIAGNOSIS — M99.01 SEGMENTAL AND SOMATIC DYSFUNCTION OF CERVICAL REGION: ICD-10-CM

## 2018-07-12 DIAGNOSIS — M99.03 SEGMENTAL AND SOMATIC DYSFUNCTION OF LUMBAR REGION: Primary | ICD-10-CM

## 2018-07-12 DIAGNOSIS — M99.02 SEGMENTAL AND SOMATIC DYSFUNCTION OF THORACIC REGION: ICD-10-CM

## 2018-07-12 PROCEDURE — 98940 CHIROPRACT MANJ 1-2 REGIONS: CPT | Mod: AT | Performed by: CHIROPRACTOR

## 2018-07-12 NOTE — MR AVS SNAPSHOT
After Visit Summary   7/12/2018    Joshua Barron    MRN: [id number]           Patient Information     Date Of Birth          1960        Visit Information        Provider Department      7/12/2018 3:30 PM [facility]  [facility]        Today's Diagnoses     Segmental and somatic dysfunction of lumbar region    -  1    Acute bilateral low back pain without sciatica        Segmental and somatic dysfunction of thoracic region        Segmental and somatic dysfunction of cervical region           Follow-ups after your visit        Your next 10 appointments already scheduled     Jul 30, 2018  3:40 PM CDT   (Arrive by 3:25 PM)   Return Visit with Laquita Fernandez MD   [facility] ([facility] )    [address]  [place] [address] [phone]            Mar 13, 2019  2:15 PM CDT   (Arrive by 2:00 PM)   Hearing Eval with Kevin Moreno   [facility] ([facility] )    [address] [facility] [place] [address]   [phone]              Who to contact     If you have questions or need follow up information about today's clinic visit or your schedule please contact  [facility] directly at [phone].  Normal or non-critical lab and imaging results will be communicated to you by MyChart, letter or phone within 4 business days after the clinic has received the results. If you do not hear from us within 7 days, please contact the clinic through MyChart or phone. If you have a critical or abnormal lab result, we will notify you by phone as soon as possible.  Submit refill requests through [employer] or call your pharmacy and they will forward the refill request to us. Please allow 3 business days for your refill to be completed.          Additional Information About Your Visit        Care EveryWhere ID     This is your Care EveryWhere ID. This could be used by other organizations to access your  [place] medical records  [id number]         Blood Pressure from Last 3 Encounters:   05/30/18 132/80   04/30/18 144/86   03/27/18 114/70    Weight from Last 3 Encounters:   05/30/18 190 lb (86.2 kg)   04/30/18 193 lb (87.5 kg)   03/27/18 160 lb (72.6 kg)              We Performed the Following     CHIROPRAC MANIP,SPINAL,3-4 REGIONS        Primary Care Provider Office Phone # Fax #    Lyle Fisher  [phone] [address] [facility] [address]        Equal Access to Services     [facility] : Hadii jim Gary, waaxda luqadaha, qaybta kaalmada deyanira, jaki millan . So [facility] [phone].    ATENCIÓN: Si habla español, tiene a hastings disposición servicios gratuitos de asistencia lingüística. [facility] [phone].    We comply with applicable federal civil rights laws and [place] laws. We do not discriminate on the basis of race, color, national origin, age, disability, sex, sexual orientation, or gender identity.            Thank you!     Thank you for choosing  [facility]  for your care. Our goal is always to provide you with excellent care. Hearing back from our patients is one way we can continue to improve our services. Please take a few minutes to complete the written survey that you may receive in the mail after your visit with us. Thank you!             Your Updated Medication List - Protect others around you: Learn how to safely use, store and throw away your medicines at www.disposemymeds.org.          This list is accurate as of 7/12/18  3:43 PM.  Always use your most recent med list.                   Brand Name Dispense Instructions for use Diagnosis    * albuterol 108 (90 Base) MCG/ACT Inhaler    PROAIR HFA/PROVENTIL HFA/VENTOLIN HFA     Inhale 2 puffs into the lungs every 6 hours as needed for shortness of breath / dyspnea or wheezing        * albuterol 108 (90 Base) MCG/ACT Inhaler    VENTOLIN HFA    18 g     USE 2 PUFFS 4 TIMES A DAY AS NEEDED FOR SHORTNESS OF BREATH        * VENTOLIN  (90 Base) MCG/ACT Inhaler   Generic drug:  albuterol     18 g    USE 2 PUFFS 4 TIMES A DAY AS NEEDED FOR SHORTNESS OF BREATH    Moderate persistent asthma without complication       * VENTOLIN  (90 Base) MCG/ACT Inhaler   Generic drug:  albuterol     18 g    USE 2 PUFFS 4 TIMES A DAY AS NEEDED FOR SHORTNESS OF BREATH    Moderate persistent asthma without complication       ASPIRIN LOW DOSE 81 MG chewable tablet   Generic drug:  aspirin     30 tablet    CHEW AND SWALLOW 1 TABLET BY MOUTH DAILY    Healthcare maintenance       atorvastatin 20 MG tablet    LIPITOR    90 tablet    Take 1 tablet (20 mg) by mouth daily        baclofen 20 MG tablet    LIORESAL    90 tablet    TAKE 1 TABLET BY MOUTH 3 TIMES DAILY    Bilateral low back pain with left-sided sciatica, unspecified chronicity       diazepam 5 MG tablet    VALIUM    60 tablet    TAKE 1 TABLET BY MOUTH EVERY 12 HOURS AS NEEDED FOR ANXIETY OR SLEEP SPASM    DAYANA (generalized anxiety disorder)       diclofenac 50 MG EC tablet    VOLTAREN    90 tablet    TAKE 1 TABLET BY MOUTH 3 TIMES DAILY    Arthritis pain       docusate sodium 100 MG capsule    DOK    60 capsule    TAKE 1 CAPSULE BY MOUTH TWICE DAILY        fentaNYL 75 mcg/hr 72 hr patch    DURAGESIC    11 patch    APPLY 1 PATCH ONTO THE SKIN EVERY 48 HOURS    DDD (degenerative disc disease), cervical       fluconazole 100 MG tablet    DIFLUCAN    14 tablet    Take 2 tablets (200 mg) by mouth daily    Thrush       fluticasone 50 MCG/ACT spray    FLONASE    16 g    USE 2 SPRAYS IN EACH NOSTRIL DAILY        gabapentin 300 MG capsule    NEURONTIN    270 capsule    TAKE 3 CAPSULES BY MOUTH THREE TIMES DAILY        HYDROcodone-acetaminophen  MG per tablet    NORCO    120 tablet    TAKE 1 TABLET BY MOUTH EVERY 6 HOURS AS NEEDED FOR MODERATE TO SEVEREPAIN    Low back pain, unspecified back pain laterality, unspecified  chronicity, with sciatica presence unspecified       hydrOXYzine 50 MG capsule    VISTARIL    60 capsule    Take 1-2 capsules ( mg) by mouth 4 times daily as needed for anxiety         MG tablet   Generic drug:  ibuprofen     120 tablet    TAKE 1 TABLET BY MOUTH EVERY 6 HOURS AS NEEDED    Low back pain, unspecified back pain laterality, unspecified chronicity, with sciatica presence unspecified       lidocaine 5 % Patch    LIDODERM    90 patch    PLACE 3 PATCHES ONTO THE SKIN DAILY AS NEEDED FOR MODERATE PAIN    Midline low back pain without sciatica       * lisdexamfetamine 70 MG capsule    VYVANSE    30 capsule    TAKE 1 CAPSULE BY MOUTH DAILY    Attention deficit hyperactivity disorder (ADHD), unspecified ADHD type       * lisdexamfetamine 70 MG capsule    VYVANSE    30 capsule    Take 1 capsule (70 mg) by mouth every morning    Attention deficit hyperactivity disorder (ADHD), unspecified ADHD type       losartan 50 MG tablet    COZAAR    30 tablet    Take 1 tablet (50 mg) by mouth daily        MAPAP 325 MG tablet   Generic drug:  acetaminophen     200 tablet    TAKE 2 TABLETS BY MOUTH EVERY 4 HOURS AS NEEDED FOR MILD PAIN    Pain       mometasone-formoterol 100-5 MCG/ACT oral inhaler    DULERA    13 g    INHALE 2 PUFFS INTO THE LUNGS 2 TIMES A DAY        multivitamin  with iron Tabs     30 tablet    TAKE 1 TABLET BY MOUTH DAILY    Health care maintenance       nicotine polacrilex 2 MG gum    NICORETTE    110 each    CHEW 1 PIECE AS NEEDED FOR SMOKING CESSATION        nortriptyline 75 MG capsule    PAMELOR    30 capsule    Take 1 capsule (75 mg) by mouth At Bedtime        omeprazole 20 MG CR capsule    priLOSEC    30 capsule    TAKE 1 CAPSULE BY MOUTH EVERY DAY BEFORE A MEAL        * order for DME     2 Box    Equipment being ordered: Large gloves    Need for assistance with personal care       * order for DME     1 Device    1 boa back brace    Chronic low back pain with sciatica, sciatica  laterality unspecified, unspecified back pain laterality       OXcarbazepine 600 MG tablet    TRILEPTAL    60 tablet    TAKE 1 TABLET BY MOUTH 2 TIMES DAILY    Bipolar affective disorder, current episode hypomanic (H)       propranolol 20 MG tablet    INDERAL    60 tablet    Take 1 tablet (20 mg) by mouth 2 times daily        senna-docusate 8.6-50 MG per tablet    SM STOOL SOFTENER/LAXATIVE    120 tablet    TAKE 1 OR 2 TABLETS BY MOUTH 2 TIMES A DAY        tamsulosin 0.4 MG capsule    FLOMAX    30 capsule    TAKE 1 CAPSULE BY MOUTH DAILY        traZODone 50 MG tablet    DESYREL    60 tablet    Take 2 tablets (100 mg) by mouth nightly as needed        * Notice:  This list has 8 medication(s) that are the same as other medications prescribed for you. Read the directions carefully, and ask your doctor or other care provider to review them with you.

## 2018-07-12 NOTE — PROGRESS NOTES

## 2018-07-18 DIAGNOSIS — M50.30 DDD (DEGENERATIVE DISC DISEASE), CERVICAL: ICD-10-CM

## 2018-07-19 RX ORDER — FENTANYL 75 UG/1
PATCH TRANSDERMAL
Qty: 11 PATCH | Refills: 0 | Status: SHIPPED | OUTPATIENT
Start: 2018-07-19 | End: 2018-08-06

## 2018-07-19 NOTE — TELEPHONE ENCOUNTER
FENTANYL 75 MCG/HR PATCH    Last Written Prescription Date:  6/28/2018  Last Fill Quantity: 11,   # refills: 0  Last Office Visit: 2/15/2018  Future Office visit:    Next 5 appointments (look out 90 days)     Jul 30, 2018  3:40 PM CDT   (Arrive by 3:25 PM)   Return Visit with Laquita Fernandez MD   [facility] ([facility]    [facility] [address] [place] [address] [phone]

## 2018-07-19 NOTE — TELEPHONE ENCOUNTER
Controlled Substance Refill Request for Fentanyl  Problem List Complete:  Yes    Last Written Prescription Date:  6/28/18  Last Fill Quantity: 11,   # refills: 0    Last Office Visit with [facility] primary care provider: 2/15/18    Future Office visit:   Next 5 appointments (look out 90 days)     Jul 20, 2018 11:40 AM CDT   Return Visit with Shamar Escamilla DC   [facility]    [address]  [facility] [address]   [phone]            Jul 30, 2018  3:40 PM CDT   (Arrive by 3:25 PM)   Return Visit with La[facility]ita [facility]andez MD   [facility]    [address]  [facility] [address] [phone]                  Controlled substance agreement on file: Yes:  Date 11/25/15.     Processing:  Staff will hand deliver Rx to on-site pharmacy  PCP Natalia.  Thank you.

## 2018-07-20 ENCOUNTER — OFFICE VISIT (OUTPATIENT)
Dept: CHIROPRACTIC MEDICINE | Facility: OTHER | Age: 58
End: 2018-07-20
Attending: CHIROPRACTOR
Payer: COMMERCIAL

## 2018-07-20 DIAGNOSIS — M99.02 SEGMENTAL AND SOMATIC DYSFUNCTION OF THORACIC REGION: ICD-10-CM

## 2018-07-20 DIAGNOSIS — M99.03 SEGMENTAL AND SOMATIC DYSFUNCTION OF LUMBAR REGION: Primary | ICD-10-CM

## 2018-07-20 DIAGNOSIS — M99.01 SEGMENTAL AND SOMATIC DYSFUNCTION OF CERVICAL REGION: ICD-10-CM

## 2018-07-20 DIAGNOSIS — M54.50 ACUTE BILATERAL LOW BACK PAIN WITHOUT SCIATICA: ICD-10-CM

## 2018-07-20 PROCEDURE — 98941 CHIROPRACT MANJ 3-4 REGIONS: CPT | Mod: AT | Performed by: CHIROPRACTOR

## 2018-07-20 NOTE — MR AVS SNAPSHOT
After Visit Summary   7/20/2018    Joshua Barron    MRN: [id number]           Patient Information     Date Of Birth          1960        Visit Information        Provider Department      7/20/2018 11:40 AM Sh[facility] Escamilla [facility]        Today's Diagnoses     Segmental and somatic dysfunction of lumbar region    -  1    Acute bilateral low back pain without sciatica        Segmental and somatic dysfunction of thoracic region        Segmental and somatic dysfunction of cervical region           Follow-ups after your visit        Your next 10 appointments already scheduled     Jul 30, 2018  3:40 PM CDT   (Arrive by 3:25 PM)   Return Visit with Laquita Fernandez MD   [facility] [place] [place] [facility]    [address]  [place] [address] [phone]            Mar 13, 2019  2:15 PM CDT   (Arrive by 2:00 PM)   Hearing Eval with eKvin Moreno   [facility] [place] [facility]    [address] [place] [address]   [phone]              Who to contact     If you have questions or need follow up information about today's clinic visit or your schedule please contact  [facility] directly at [phone].  Normal or non-critical lab and imaging results will be communicated to you by MyChart, letter or phone within 4 business days after the clinic has received the results. If you do not hear from us within 7 days, please contact the clinic through MyChart or phone. If you have a critical or abnormal lab result, we will notify you by phone as soon as possible.  Submit refill requests through [employer] or call your pharmacy and they will forward the refill request to us. Please allow 3 business days for your refill to be completed.          Additional Information About Your Visit        Care EveryWhere ID     This is your Care EveryWhere ID. This could be used by other organizations to access your  [place] medical records  [id number]         Blood Pressure from Last 3 Encounters:   05/30/18 132/80   04/30/18 144/86   03/27/18 114/70    Weight from Last 3 Encounters:   05/30/18 190 lb (86.2 kg)   04/30/18 193 lb (87.5 kg)   03/27/18 160 lb (72.6 kg)              We Performed the Following     CHIROPRAC MANIP,SPINAL,3-4 REGIONS        Primary Care Provider Office Phone # Fax #    Lyle Fisher  [phone] [address] [facility] [address]        Equal Access to Services     [facility] : Hadii jim Gary, waaxda luqadaha, qaybta kaalmada deyanira, jaki millan . So [facility] [phone].    ATENCIÓN: Si habla español, tiene a hastings disposición servicios gratuitos de asistencia lingüística. [facility] [phone].    We comply with applicable federal civil rights laws and [place] laws. We do not discriminate on the basis of race, color, national origin, age, disability, sex, sexual orientation, or gender identity.            Thank you!     Thank you for choosing  [facility]  for your care. Our goal is always to provide you with excellent care. Hearing back from our patients is one way we can continue to improve our services. Please take a few minutes to complete the written survey that you may receive in the mail after your visit with us. Thank you!             Your Updated Medication List - Protect others around you: Learn how to safely use, store and throw away your medicines at www.disposemymeds.org.          This list is accurate as of 7/20/18 11:59 PM.  Always use your most recent med list.                   Brand Name Dispense Instructions for use Diagnosis    * albuterol 108 (90 Base) MCG/ACT Inhaler    PROAIR HFA/PROVENTIL HFA/VENTOLIN HFA     Inhale 2 puffs into the lungs every 6 hours as needed for shortness of breath / dyspnea or wheezing        * albuterol 108 (90 Base) MCG/ACT Inhaler    VENTOLIN HFA    18 g     USE 2 PUFFS 4 TIMES A DAY AS NEEDED FOR SHORTNESS OF BREATH        * VENTOLIN  (90 Base) MCG/ACT Inhaler   Generic drug:  albuterol     18 g    USE 2 PUFFS 4 TIMES A DAY AS NEEDED FOR SHORTNESS OF BREATH    Moderate persistent asthma without complication       * VENTOLIN  (90 Base) MCG/ACT Inhaler   Generic drug:  albuterol     18 g    USE 2 PUFFS 4 TIMES A DAY AS NEEDED FOR SHORTNESS OF BREATH    Moderate persistent asthma without complication       ASPIRIN LOW DOSE 81 MG chewable tablet   Generic drug:  aspirin     30 tablet    CHEW AND SWALLOW 1 TABLET BY MOUTH DAILY    Healthcare maintenance       atorvastatin 20 MG tablet    LIPITOR    90 tablet    Take 1 tablet (20 mg) by mouth daily        baclofen 20 MG tablet    LIORESAL    90 tablet    TAKE 1 TABLET BY MOUTH 3 TIMES DAILY    Bilateral low back pain with left-sided sciatica, unspecified chronicity       diazepam 5 MG tablet    VALIUM    60 tablet    TAKE 1 TABLET BY MOUTH EVERY 12 HOURS AS NEEDED FOR ANXIETY OR SLEEP SPASM    DAYANA (generalized anxiety disorder)       diclofenac 50 MG EC tablet    VOLTAREN    90 tablet    TAKE 1 TABLET BY MOUTH 3 TIMES DAILY    Arthritis pain       docusate sodium 100 MG capsule    DOK    60 capsule    TAKE 1 CAPSULE BY MOUTH TWICE DAILY        fentaNYL 75 mcg/hr 72 hr patch    DURAGESIC    11 patch    APPLY 1 PATCH ONTO THE SKIN EVERY 48 HOURS. REMOVE OLD PATCH    DDD (degenerative disc disease), cervical       fluconazole 100 MG tablet    DIFLUCAN    14 tablet    Take 2 tablets (200 mg) by mouth daily    Thrush       fluticasone 50 MCG/ACT spray    FLONASE    16 g    USE 2 SPRAYS IN EACH NOSTRIL DAILY        gabapentin 300 MG capsule    NEURONTIN    270 capsule    TAKE 3 CAPSULES BY MOUTH THREE TIMES DAILY        HYDROcodone-acetaminophen  MG per tablet    NORCO    120 tablet    TAKE 1 TABLET BY MOUTH EVERY 6 HOURS AS NEEDED FOR MODERATE TO SEVEREPAIN    Low back pain, unspecified back pain laterality,  unspecified chronicity, with sciatica presence unspecified       hydrOXYzine 50 MG capsule    VISTARIL    60 capsule    Take 1-2 capsules ( mg) by mouth 4 times daily as needed for anxiety         MG tablet   Generic drug:  ibuprofen     120 tablet    TAKE 1 TABLET BY MOUTH EVERY 6 HOURS AS NEEDED    Low back pain, unspecified back pain laterality, unspecified chronicity, with sciatica presence unspecified       lidocaine 5 % Patch    LIDODERM    90 patch    PLACE 3 PATCHES ONTO THE SKIN DAILY AS NEEDED FOR MODERATE PAIN    Midline low back pain without sciatica       * lisdexamfetamine 70 MG capsule    VYVANSE    30 capsule    TAKE 1 CAPSULE BY MOUTH DAILY    Attention deficit hyperactivity disorder (ADHD), unspecified ADHD type       * lisdexamfetamine 70 MG capsule    VYVANSE    30 capsule    Take 1 capsule (70 mg) by mouth every morning    Attention deficit hyperactivity disorder (ADHD), unspecified ADHD type       losartan 50 MG tablet    COZAAR    30 tablet    Take 1 tablet (50 mg) by mouth daily        MAPAP 325 MG tablet   Generic drug:  acetaminophen     200 tablet    TAKE 2 TABLETS BY MOUTH EVERY 4 HOURS AS NEEDED FOR MILD PAIN    Pain       mometasone-formoterol 100-5 MCG/ACT oral inhaler    DULERA    13 g    INHALE 2 PUFFS INTO THE LUNGS 2 TIMES A DAY        multivitamin  with iron Tabs     30 tablet    TAKE 1 TABLET BY MOUTH DAILY    Health care maintenance       nicotine polacrilex 2 MG gum    NICORETTE    110 each    CHEW 1 PIECE AS NEEDED FOR SMOKING CESSATION        nortriptyline 75 MG capsule    PAMELOR    30 capsule    Take 1 capsule (75 mg) by mouth At Bedtime        omeprazole 20 MG CR capsule    priLOSEC    30 capsule    TAKE 1 CAPSULE BY MOUTH EVERY DAY BEFORE A MEAL        * order for DME     2 Box    Equipment being ordered: Large gloves    Need for assistance with personal care       * order for DME     1 Device    1 boa back brace    Chronic low back pain with sciatica,  sciatica laterality unspecified, unspecified back pain laterality       OXcarbazepine 600 MG tablet    TRILEPTAL    60 tablet    TAKE 1 TABLET BY MOUTH 2 TIMES DAILY    Bipolar affective disorder, current episode hypomanic (H)       propranolol 20 MG tablet    INDERAL    60 tablet    Take 1 tablet (20 mg) by mouth 2 times daily        senna-docusate 8.6-50 MG per tablet    SM STOOL SOFTENER/LAXATIVE    120 tablet    TAKE 1 OR 2 TABLETS BY MOUTH 2 TIMES A DAY        tamsulosin 0.4 MG capsule    FLOMAX    30 capsule    TAKE 1 CAPSULE BY MOUTH DAILY        traZODone 50 MG tablet    DESYREL    60 tablet    Take 2 tablets (100 mg) by mouth nightly as needed        * Notice:  This list has 8 medication(s) that are the same as other medications prescribed for you. Read the directions carefully, and ask your doctor or other care provider to review them with you.

## 2018-07-23 NOTE — PROGRESS NOTES

## 2018-07-30 ENCOUNTER — OFFICE VISIT (OUTPATIENT)
Dept: PSYCHIATRY | Facility: OTHER | Age: 58
End: 2018-07-30
Attending: PSYCHIATRY & NEUROLOGY
Payer: COMMERCIAL

## 2018-07-30 VITALS
WEIGHT: 190 LBS | SYSTOLIC BLOOD PRESSURE: 118 MMHG | HEART RATE: 83 BPM | TEMPERATURE: 98.6 F | DIASTOLIC BLOOD PRESSURE: 82 MMHG | BODY MASS INDEX: 28.06 KG/M2

## 2018-07-30 DIAGNOSIS — F90.2 ADHD (ATTENTION DEFICIT HYPERACTIVITY DISORDER), COMBINED TYPE: Primary | ICD-10-CM

## 2018-07-30 PROCEDURE — G0463 HOSPITAL OUTPT CLINIC VISIT: HCPCS

## 2018-07-30 PROCEDURE — 99214 OFFICE O/P EST MOD 30 MIN: CPT | Performed by: PSYCHIATRY & NEUROLOGY

## 2018-07-30 RX ORDER — LISDEXAMFETAMINE DIMESYLATE 70 MG/1
70 CAPSULE ORAL DAILY
Qty: 30 CAPSULE | Refills: 0 | Status: SHIPPED | OUTPATIENT
Start: 2018-08-31 | End: 2018-09-05

## 2018-07-30 RX ORDER — LISDEXAMFETAMINE DIMESYLATE 70 MG/1
70 CAPSULE ORAL DAILY
Qty: 30 CAPSULE | Refills: 0 | Status: SHIPPED | OUTPATIENT
Start: 2018-08-03 | End: 2019-02-11

## 2018-07-30 ASSESSMENT — PAIN SCALES - GENERAL: PAINLEVEL: EXTREME PAIN (8)

## 2018-07-30 NOTE — PROGRESS NOTES
PSYCHIATRY CLINIC PROGRESS NOTE   20 minute medication management, more than 50% of time spent counseling patient on medications, medication side effects, symptom history and management   SUBJECTIVE / INTERIM HISTORY                                                                       Social- Was  twice. Lives alone with his dog Montserrat (beltran) and 3 cats: Smoky the cat. Has a GF in [place]  Children-  2 kids, they are in [place]  Last visit:   He restarted Depakote but taking 250 mg daily and he notes he is taking once daily.  Last script I wrote was Vyvanse  4/13/18 and gave script today 5/11/18 .  Continue Trileptal 600 mg bid, Valium 5 mg every 12 hours prn     - lots of pain still unfortunately  - not taking Depakote  - his neurosurgeon is in [facility].   - marriage 6 years: not good.   - Valium helped with anxiety and helped with the pain. I noted today I will be tapering down and then discontinuing diazepam as guidelines are NOW short time use and avoid long-term use. Also to avoid with use opioids.   - Lots of family issues: Joshua has taken care of his parents and yet parents give his other brothers everything. oldest of 3 brothers. Brother in [place] youngest [place] and Major is here. Mom and dad here [place] on [place]. Joshua noting moving here to be closer to parents and help them, etc. But, parents don't seem to want him around much or talk to him.  SUBSTANCE USE- denies abuse of meds or substances    SYMPTOMS- issues with attention and concentration improved with Vyvanse: racing thoughts, depressed mood, impulsivity, distractibility  MEDICAL ROS- back pain, denies any issues with headaches or stomach pain / issues with stimulant  MEDICAL / SURGICAL HISTORY                     Patient Active Problem List   Diagnosis     Mixed hyperlipidemia     Tobacco Abuse, History of     Degeneration of lumbar or lumbosacral intervertebral disc     Depression, major     Chronic pain syndrome     Chemical dependency (H)      Chronic rhinitis     Tinnitus of both ears     ETD (eustachian tube dysfunction)     SNHL (sensorineural hearing loss)     Back pain     Bipolar disorder (H)     Seizure-like activity (H)     Somatic dysfunction of pelvis region     Bilateral foot pain     Preop general physical exam     Trigger index finger of right hand     Moderate persistent asthma without complication     Chronic lower back pain     ACP (advance care planning)     Onychia of toe of left foot     DDD (degenerative disc disease), lumbar     Seizure disorder (H)     Back muscle spasm     Throat pain     Benign essential hypertension     Retrograde ejaculation     Allergic rhinitis due to other allergen     Attention to dressings and sutures     Cervical spondylosis without myelopathy     Chronic headaches     DDD (degenerative disc disease)     Diabetes mellitus, type II (H)     Generalized anxiety disorder     Hypertrophy of prostate without urinary obstruction and other lower urinary tract symptoms (LUTS)     GERD (gastroesophageal reflux disease)     Lumbago     Major depressive disorder, recurrent episode, moderate (H)     Myalgia and myositis     Hyperlipidemia     Other pain disorders related to psychological factors     Spinal stenosis in cervical region     Status post lumbar spinal fusion     Tobacco use disorder     Trigger finger     Asthma     Polypharmacy     ALLERGY   Cymbalta and Seasonal allergies  MEDICATIONS                                                                                             Current Outpatient Prescriptions   Medication Sig     albuterol (PROAIR HFA/PROVENTIL HFA/VENTOLIN HFA) 108 (90 BASE) MCG/ACT Inhaler Inhale 2 puffs into the lungs every 6 hours as needed for shortness of breath / dyspnea or wheezing     albuterol (VENTOLIN HFA) 108 (90 BASE) MCG/ACT Inhaler USE 2 PUFFS 4 TIMES A DAY AS NEEDED FOR SHORTNESS OF BREATH     ASPIRIN LOW DOSE 81 MG chewable tablet CHEW AND SWALLOW 1 TABLET BY MOUTH DAILY      atorvastatin (LIPITOR) 20 MG tablet Take 1 tablet (20 mg) by mouth daily     baclofen (LIORESAL) 20 MG tablet TAKE 1 TABLET BY MOUTH 3 TIMES DAILY     diazepam (VALIUM) 5 MG tablet TAKE 1 TABLET BY MOUTH EVERY 12 HOURS AS NEEDED FOR ANXIETY OR SLEEP SPASM     diclofenac (VOLTAREN) 50 MG EC tablet TAKE 1 TABLET BY MOUTH 3 TIMES DAILY     docusate sodium (DOK) 100 MG capsule TAKE 1 CAPSULE BY MOUTH TWICE DAILY     fentaNYL (DURAGESIC) 75 mcg/hr 72 hr patch APPLY 1 PATCH ONTO THE SKIN EVERY 48 HOURS. REMOVE OLD PATCH     fluconazole (DIFLUCAN) 100 MG tablet Take 2 tablets (200 mg) by mouth daily     fluticasone (FLONASE) 50 MCG/ACT spray USE 2 SPRAYS IN EACH NOSTRIL DAILY     gabapentin (NEURONTIN) 300 MG capsule TAKE 3 CAPSULES BY MOUTH THREE TIMES DAILY     HYDROcodone-acetaminophen (NORCO)  MG per tablet TAKE 1 TABLET BY MOUTH EVERY 6 HOURS AS NEEDED FOR MODERATE TO SEVEREPAIN     hydrOXYzine (VISTARIL) 50 MG capsule Take 1-2 capsules ( mg) by mouth 4 times daily as needed for anxiety      MG tablet TAKE 1 TABLET BY MOUTH EVERY 6 HOURS AS NEEDED     lidocaine (LIDODERM) 5 % Patch PLACE 3 PATCHES ONTO THE SKIN DAILY AS NEEDED FOR MODERATE PAIN     lisdexamfetamine (VYVANSE) 70 MG capsule Take 1 capsule (70 mg) by mouth every morning     losartan (COZAAR) 50 MG tablet Take 1 tablet (50 mg) by mouth daily     MAPAP 325 MG tablet TAKE 2 TABLETS BY MOUTH EVERY 4 HOURS AS NEEDED FOR MILD PAIN     mometasone-formoterol (DULERA) 100-5 MCG/ACT oral inhaler INHALE 2 PUFFS INTO THE LUNGS 2 TIMES A DAY     multivitamin  with iron (SM COMPLETE ADVANCED FORMULA) TABS TAKE 1 TABLET BY MOUTH DAILY     nortriptyline (PAMELOR) 75 MG capsule Take 1 capsule (75 mg) by mouth At Bedtime     omeprazole (PRILOSEC) 20 MG CR capsule TAKE 1 CAPSULE BY MOUTH EVERY DAY BEFORE A MEAL     order for DME 1 boa back brace     ORDER FOR DME Equipment being ordered: Large gloves     OXcarbazepine (TRILEPTAL) 600 MG tablet TAKE 1  TABLET BY MOUTH 2 TIMES DAILY     propranolol (INDERAL) 20 MG tablet Take 1 tablet (20 mg) by mouth 2 times daily     senna-docusate (SM STOOL SOFTENER/LAXATIVE) 8.6-50 MG per tablet TAKE 1 OR 2 TABLETS BY MOUTH 2 TIMES A DAY     tamsulosin (FLOMAX) 0.4 MG capsule TAKE 1 CAPSULE BY MOUTH DAILY     traZODone (DESYREL) 50 MG tablet Take 2 tablets (100 mg) by mouth nightly as needed     VENTOLIN  (90 Base) MCG/ACT Inhaler USE 2 PUFFS 4 TIMES A DAY AS NEEDED FOR SHORTNESS OF BREATH     nicotine polacrilex (NICORETTE) 2 MG gum CHEW 1 PIECE AS NEEDED FOR SMOKING CESSATION     [DISCONTINUED] VENTOLIN  (90 Base) MCG/ACT Inhaler USE 2 PUFFS 4 TIMES A DAY AS NEEDED FOR SHORTNESS OF BREATH     No current facility-administered medications for this visit.        VITALS   /82 (BP Location: Right arm, Patient Position: Sitting, Cuff Size: Adult Regular)  Pulse 83  Temp 98.6  F (37  C) (Tympanic)  Wt 190 lb (86.2 kg)  BMI 28.06 kg/m2     LABS                                                                                                                         EKG 2016 with 376 ms (on nortriptyline)    Last Basic Metabolic Panel:  Lab Results   Component Value Date     10/20/2017      Lab Results   Component Value Date    POTASSIUM 4.2 10/20/2017     Lab Results   Component Value Date    CHLORIDE 107 10/20/2017     Lab Results   Component Value Date    SANDRITA 9.0 10/20/2017     Lab Results   Component Value Date    CO2 28 10/20/2017     Lab Results   Component Value Date    BUN 17 10/20/2017     Lab Results   Component Value Date    CR 0.69 10/20/2017     Lab Results   Component Value Date     10/20/2017     Liver Function Studies -   Recent Labs   Lab Test  10/20/17   1443   PROTTOTAL  7.4   ALBUMIN  4.4   BILITOTAL  0.4   ALKPHOS  120   AST  18   ALT  30     CBC RESULTS:   Recent Labs   Lab Test  10/20/17   1443   WBC  4.0   RBC  4.00*   HGB  12.9*   HCT  36.5*   MCV  91   MCH  32.3   MCHC  35.3  "  RDW  12.4   PLT  151          MENTAL STATUS EXAM                                                                                        Alert. Oriented to person, place, and date / time. Casually groomed, calm, cooperative with good eye contact. No problems with speech or psychomotor behavior. Mood was described as depressed and affect was congruent to speech content and full range. Thought process, including associations, was unremarkable and thought content was devoid of suicidal and homicidal ideation and psychotic thought. No hallucinations. Insight was good. Judgment was intact and adequate for safety. Fund of knowledge was intact. Pt demonstrates no obvious problems with attention, concentration, language, recent or remote memory although these were not formally tested.     ASSESSMENT                                                                                                      HISTORICAL:  Initial psych note 10/6/15          NOTES:      Joshua is a 57 year old with bipolar, ADHD, and MDD.   He struggles with depression and seems to be largely related to his chronic pain issues.We started Valium as dual purpose: muscle relaxant properties and for anxiety. Today I discussed the new guidelines for short - term use of benzodiazepines (Valium). I will leave the current script but once refill comes will begin to taper down and then eventually discontinued. He notes his primary would take over it. Noted I\"m fine with if his primary is comfortable filling / managing .. If I continue to fill however again plan will be to taper down and then off. I am okay continuing the vyvanse, Trileptal and him continuing trazodone.       TREATMENT RISK STATEMENT:  The risks, benefits, alternatives and potential adverse effects have been explained and are understood by the pt.  The pt agrees to the treatment plan with the ability to do so.   The pt knows to call the clinic for any problems or access emergency care if needed.  "       DIAGNOSES                  ADD  Bipolar disorder      PLAN                                                                                                                    1)  MEDICATIONS:         Valium is currently 5 mg bid prn: next fill if I am asked to refill will be down to 2 mg bid prn (see above discussion)Last script I wrote was Vyvanse 7/6/18 and today gave scripts today 8/3/18, and 8/31/18   .  Continue Trileptal 600 mg bid,   2)  THERAPY:  No change    3)  LABS:  UDS 5/10/17    4)  PT MONITOR [call for probs]:  SEs from meds, worsening sx, SI/HI    5)  REFERRALS [CD, medical, other]:  None    6)  RTC:  2 months

## 2018-07-30 NOTE — NURSING NOTE
"Chief Complaint   Patient presents with     RECHECK     Mental health.  Patient c/o pain issues.  Cannot take Depakote       Initial /82 (BP Location: Right arm, Patient Position: Sitting, Cuff Size: Adult Regular)  Pulse 83  Temp 98.6  F (37  C) (Tympanic)  Wt 190 lb (86.2 kg)  BMI 28.06 kg/m2 Estimated body mass index is 28.06 kg/(m^2) as calculated from the following:    Height as of 2/15/18: 5' 9\" (1.753 m).    Weight as of this encounter: 190 lb (86.2 kg).  Medication Reconciliation: complete    JACQUELYN SUAREZ LPN    "

## 2018-07-30 NOTE — MR AVS SNAPSHOT
After Visit Summary   7/30/2018    Joshua Barron    MRN: [id number]           Patient Information     Date Of Birth          1960        Visit Information        Provider Department      7/30/2018 3:40 PM Laquita Fernandez MD [facility]        Today's Diagnoses     ADHD (attention deficit hyperactivity disorder), combined type    -  1       Follow-ups after your visit        Your next 10 appointments already scheduled     Mar 13, 2019  2:15 PM CDT   (Arrive by 2:00 PM)   Hearing Eval with Kevin Moreno   [facility] [place] ([facility] [place] )    [address]  [place] [address]   [phone]              Who to contact     If you have questions or need follow up information about today's clinic visit or your schedule please contact [facility] directly at [phone].  Normal or non-critical lab and imaging results will be communicated to you by MyChart, letter or phone within 4 business days after the clinic has received the results. If you do not hear from us within 7 days, please contact the clinic through MyChart or phone. If you have a critical or abnormal lab result, we will notify you by phone as soon as possible.  Submit refill requests through Ofuz or call your pharmacy and they will forward the refill request to us. Please allow 3 business days for your refill to be completed.          Additional Information About Your Visit        Care EveryWhere ID     This is your Care EveryWhere ID. This could be used by other organizations to access your [place] medical records  [id number]        Your Vitals Were     Pulse Temperature BMI (Body Mass Index)             83 98.6  F (37  C) (Tympanic) 28.06 kg/m2          Blood Pressure from Last 3 Encounters:   07/30/18 118/82   05/30/18 132/80   04/30/18 144/86    Weight from Last 3 Encounters:   07/30/18 190 lb (86.2 kg)   05/30/18 190 lb (86.2 kg)   04/30/18 193 lb (87.5 kg)               Today, you had the following     No orders found for display         Today's Medication Changes          These changes are accurate as of 7/30/18  4:14 PM.  If you have any questions, ask your nurse or doctor.               These medicines have changed or have updated prescriptions.        Dose/Directions    * lisdexamfetamine 70 MG capsule   Commonly known as:  VYVANSE   This may have changed:  Another medication with the same name was added. Make sure you understand how and when to take each.   Used for:  Attention deficit hyperactivity disorder (ADHD), unspecified ADHD type   Changed by:  Laquita Fernandez MD        Dose:  70 mg   Take 1 capsule (70 mg) by mouth every morning   Quantity:  30 capsule   Refills:  0       * lisdexamfetamine 70 MG capsule   Commonly known as:  VYVANSE   This may have changed:  You were already taking a medication with the same name, and this prescription was added. Make sure you understand how and when to take each.   Used for:  ADHD (attention deficit hyperactivity disorder), combined type   Changed by:  Laquita Fernandez MD        Dose:  70 mg   Start taking on:  8/3/2018   Take 1 capsule (70 mg) by mouth daily   Quantity:  30 capsule   Refills:  0       * lisdexamfetamine 70 MG capsule   Commonly known as:  VYVANSE   This may have changed:  You were already taking a medication with the same name, and this prescription was added. Make sure you understand how and when to take each.   Used for:  ADHD (attention deficit hyperactivity disorder), combined type   Changed by:  Laquita Fernandez MD        Dose:  70 mg   Start taking on:  8/31/2018   Take 1 capsule (70 mg) by mouth daily   Quantity:  30 capsule   Refills:  0       * Notice:  This list has 3 medication(s) that are the same as other medications prescribed for you. Read the directions carefully, and ask your doctor or other care provider to review them with you.         Where to get your medicines      Some of these  will need a paper prescription and others can be bought over the counter.  Ask your nurse if you have questions.     Bring a paper prescription for each of these medications     lisdexamfetamine 70 MG capsule    lisdexamfetamine 70 MG capsule                Primary Care Provider Office Phone # Fax #    Lyle Fisher [phone] [address] [address]        Equal Access to Services     SHAHBAZ DANIELS : Hadii aad ku hadasho Soomaali, waaxda luqadaha, qaybta kaalmada adeegyada, waxay idiin hayaan adeeg khmichaelsh laserina . So [facility] [phone].    ATENCIÓN: Si radhames malnoe, tiene a hastings disposición servicios gratuitos de asistencia lingüística. Llame al [phone].    We comply with applicable federal civil rights laws and [place] laws. We do not discriminate on the basis of race, color, national origin, age, disability, sex, sexual orientation, or gender identity.            Thank you!     Thank you for choosing [facility]  for your care. Our goal is always to provide you with excellent care. Hearing back from our patients is one way we can continue to improve our services. Please take a few minutes to complete the written survey that you may receive in the mail after your visit with us. Thank you!             Your Updated Medication List - Protect others around you: Learn how to safely use, store and throw away your medicines at www.disposemymeds.org.          This list is accurate as of 7/30/18  4:14 PM.  Always use your most recent med list.                   Brand Name Dispense Instructions for use Diagnosis    * albuterol 108 (90 Base) MCG/ACT Inhaler    PROAIR HFA/PROVENTIL HFA/VENTOLIN HFA     Inhale 2 puffs into the lungs every 6 hours as needed for shortness of breath / dyspnea or wheezing        * albuterol 108 (90 Base) MCG/ACT Inhaler    VENTOLIN HFA    18 g    USE 2 PUFFS 4 TIMES A DAY AS NEEDED FOR SHORTNESS OF BREATH        * VENTOLIN HFA  108 (90 Base) MCG/ACT Inhaler   Generic drug:  albuterol     18 g    USE 2 PUFFS 4 TIMES A DAY AS NEEDED FOR SHORTNESS OF BREATH    Moderate persistent asthma without complication       ASPIRIN LOW DOSE 81 MG chewable tablet   Generic drug:  aspirin     30 tablet    CHEW AND SWALLOW 1 TABLET BY MOUTH DAILY    Healthcare maintenance       atorvastatin 20 MG tablet    LIPITOR    90 tablet    Take 1 tablet (20 mg) by mouth daily        baclofen 20 MG tablet    LIORESAL    90 tablet    TAKE 1 TABLET BY MOUTH 3 TIMES DAILY    Bilateral low back pain with left-sided sciatica, unspecified chronicity       diazepam 5 MG tablet    VALIUM    60 tablet    TAKE 1 TABLET BY MOUTH EVERY 12 HOURS AS NEEDED FOR ANXIETY OR SLEEP SPASM    DAYANA (generalized anxiety disorder)       diclofenac 50 MG EC tablet    VOLTAREN    90 tablet    TAKE 1 TABLET BY MOUTH 3 TIMES DAILY    Arthritis pain       docusate sodium 100 MG capsule    DOK    60 capsule    TAKE 1 CAPSULE BY MOUTH TWICE DAILY        fentaNYL 75 mcg/hr 72 hr patch    DURAGESIC    11 patch    APPLY 1 PATCH ONTO THE SKIN EVERY 48 HOURS. REMOVE OLD PATCH    DDD (degenerative disc disease), cervical       fluconazole 100 MG tablet    DIFLUCAN    14 tablet    Take 2 tablets (200 mg) by mouth daily    Thrush       fluticasone 50 MCG/ACT spray    FLONASE    16 g    USE 2 SPRAYS IN EACH NOSTRIL DAILY        gabapentin 300 MG capsule    NEURONTIN    270 capsule    TAKE 3 CAPSULES BY MOUTH THREE TIMES DAILY        HYDROcodone-acetaminophen  MG per tablet    NORCO    120 tablet    TAKE 1 TABLET BY MOUTH EVERY 6 HOURS AS NEEDED FOR MODERATE TO SEVEREPAIN    Low back pain, unspecified back pain laterality, unspecified chronicity, with sciatica presence unspecified       hydrOXYzine 50 MG capsule    VISTARIL    60 capsule    Take 1-2 capsules ( mg) by mouth 4 times daily as needed for anxiety         MG tablet   Generic drug:  ibuprofen     120 tablet    TAKE 1 TABLET BY  MOUTH EVERY 6 HOURS AS NEEDED    Low back pain, unspecified back pain laterality, unspecified chronicity, with sciatica presence unspecified       lidocaine 5 % Patch    LIDODERM    90 patch    PLACE 3 PATCHES ONTO THE SKIN DAILY AS NEEDED FOR MODERATE PAIN    Midline low back pain without sciatica       * lisdexamfetamine 70 MG capsule    VYVANSE    30 capsule    Take 1 capsule (70 mg) by mouth every morning    Attention deficit hyperactivity disorder (ADHD), unspecified ADHD type       * lisdexamfetamine 70 MG capsule   Start taking on:  8/3/2018    VYVANSE    30 capsule    Take 1 capsule (70 mg) by mouth daily    ADHD (attention deficit hyperactivity disorder), combined type       * lisdexamfetamine 70 MG capsule   Start taking on:  8/31/2018    VYVANSE    30 capsule    Take 1 capsule (70 mg) by mouth daily    ADHD (attention deficit hyperactivity disorder), combined type       losartan 50 MG tablet    COZAAR    30 tablet    Take 1 tablet (50 mg) by mouth daily        MAPAP 325 MG tablet   Generic drug:  acetaminophen     200 tablet    TAKE 2 TABLETS BY MOUTH EVERY 4 HOURS AS NEEDED FOR MILD PAIN    Pain       mometasone-formoterol 100-5 MCG/ACT oral inhaler    DULERA    13 g    INHALE 2 PUFFS INTO THE LUNGS 2 TIMES A DAY        multivitamin  with iron Tabs     30 tablet    TAKE 1 TABLET BY MOUTH DAILY    Health care maintenance       nicotine polacrilex 2 MG gum    NICORETTE    110 each    CHEW 1 PIECE AS NEEDED FOR SMOKING CESSATION        nortriptyline 75 MG capsule    PAMELOR    30 capsule    Take 1 capsule (75 mg) by mouth At Bedtime        omeprazole 20 MG CR capsule    priLOSEC    30 capsule    TAKE 1 CAPSULE BY MOUTH EVERY DAY BEFORE A MEAL        * order for DME     2 Box    Equipment being ordered: Large gloves    Need for assistance with personal care       * order for DME     1 Device    1 boa back brace    Chronic low back pain with sciatica, sciatica laterality unspecified, unspecified back pain  laterality       OXcarbazepine 600 MG tablet    TRILEPTAL    60 tablet    TAKE 1 TABLET BY MOUTH 2 TIMES DAILY    Bipolar affective disorder, current episode hypomanic (H)       propranolol 20 MG tablet    INDERAL    60 tablet    Take 1 tablet (20 mg) by mouth 2 times daily        senna-docusate 8.6-50 MG per tablet    SM STOOL SOFTENER/LAXATIVE    120 tablet    TAKE 1 OR 2 TABLETS BY MOUTH 2 TIMES A DAY        tamsulosin 0.4 MG capsule    FLOMAX    30 capsule    TAKE 1 CAPSULE BY MOUTH DAILY        traZODone 50 MG tablet    DESYREL    60 tablet    Take 2 tablets (100 mg) by mouth nightly as needed        * Notice:  This list has 8 medication(s) that are the same as other medications prescribed for you. Read the directions carefully, and ask your doctor or other care provider to review them with you.

## 2018-08-06 DIAGNOSIS — M54.5 LOW BACK PAIN, UNSPECIFIED BACK PAIN LATERALITY, UNSPECIFIED CHRONICITY, WITH SCIATICA PRESENCE UNSPECIFIED: ICD-10-CM

## 2018-08-06 DIAGNOSIS — M50.30 DDD (DEGENERATIVE DISC DISEASE), CERVICAL: ICD-10-CM

## 2018-08-08 RX ORDER — HYDROCODONE BITARTRATE AND ACETAMINOPHEN 10; 325 MG/1; MG/1
TABLET ORAL
Qty: 120 TABLET | Refills: 0 | Status: SHIPPED | OUTPATIENT
Start: 2018-08-08 | End: 2018-09-10

## 2018-08-08 RX ORDER — FENTANYL 75 UG/1
PATCH TRANSDERMAL
Qty: 11 PATCH | Refills: 0 | Status: SHIPPED | OUTPATIENT
Start: 2018-08-08 | End: 2018-08-28

## 2018-08-08 NOTE — TELEPHONE ENCOUNTER
Norco  Last office visit: 2/15/18  Last refill: 7/9/18 #120, 0 R  Thank you.  Fentanyl  Last refill: 7/19/18 #11, 0 R  Thank you.

## 2018-08-14 ENCOUNTER — TELEPHONE (OUTPATIENT)
Dept: PEDIATRICS | Facility: OTHER | Age: 58
End: 2018-08-14

## 2018-08-14 DIAGNOSIS — M54.5 LOW BACK PAIN, UNSPECIFIED BACK PAIN LATERALITY, UNSPECIFIED CHRONICITY, WITH SCIATICA PRESENCE UNSPECIFIED: ICD-10-CM

## 2018-08-14 NOTE — TELEPHONE ENCOUNTER
Ibuprofen       Last Written Prescription Date:  7/09/2018  Last Fill Quantity: 120,   # refills: 0  Last Office Visit: 1/26/2018  Future Office visit:    Next 5 appointments (look out 90 days)     Sep 05, 2018  3:00 PM CDT   (Arrive by 2:45 PM)   Return Visit with Laquita Fernandez MD   [facility] ([facility] [place] [facility] )    [address] [place] [id number] [phone]

## 2018-08-14 NOTE — TELEPHONE ENCOUNTER
11:06 AM    Reason for Call: Phone Call    Description: Pt called and stated he received a bill from  his last visit with Dr Escamilla on 07/20/18. He talked to his [employer] company ([employer]) and they stated if his PCP does a prior auth for his visits they would be covered. Pt also stated Dr Fernandez will not be able to prescribe his valium anymore. Wondering if Dr Fisher would be willing to take this over? Please call him back at [phone]    Was an appointment offered for this call? No  If yes : Appointment type              Date    Preferred method for responding to this message: Telephone Call  What is your phone number ?    If we cannot reach you directly, may we leave a detailed response at the number you provided? Yes    Can this message wait until your PCP/provider returns, if available today? Not applicable    Janett Knight

## 2018-08-14 NOTE — TELEPHONE ENCOUNTER
I will need information in regards to his Valium.  I am not sure how to go about having a prior Authorization done on chiro visits.  Please inquire to Carmen.

## 2018-08-15 RX ORDER — IBUPROFEN 600 MG/1
TABLET ORAL
Qty: 120 TABLET | Refills: 1 | Status: SHIPPED | OUTPATIENT
Start: 2018-08-15 | End: 2018-10-29

## 2018-08-15 NOTE — TELEPHONE ENCOUNTER
"I spoke with Ranjit in [employer][id number] about the 7/20/2018 chiropractic visit for which patient was billed.  Marivel Gates is in the process of reviewing this issue for the patient.  Ranjit and I are not sure why the patient received a bill, but the issue is being addressed.  I also called [facility] and spoke with TV at [phone].  He stated that the patient's plan, are [employer], allows for 24 visits in a year.  Visits 1-14 do not require any authorization.  For visits 15-24, the provider (Andi) will need/needs to fax a copy of the chiropractic visit notes to [facility] after each visit, as well as file their electronic claim, and the visits (15-24) will be reviewed for medical necessity.  Starting with visit 25, the provider (Andi) will need to submit a prior authorization to [facility] prior to visit #25, as patient's plan does not allow for retro authorizations.  They will review whether they believe the patient's visits (25 and up) will be covered.  All visits over 24 need to be \"prior authed\" by the provider (Andi) prior to each visit occurring.  At this time, it appears that patient has only attended 11 appts with Dr. Escamilla.  Please advise patient of the foregoing.  Thank you.  Carmen Bishop, [facility] Specialist.  "

## 2018-08-20 DIAGNOSIS — M54.42 BILATERAL LOW BACK PAIN WITH LEFT-SIDED SCIATICA, UNSPECIFIED CHRONICITY: ICD-10-CM

## 2018-08-20 DIAGNOSIS — J45.40 MODERATE PERSISTENT ASTHMA WITHOUT COMPLICATION: ICD-10-CM

## 2018-08-20 NOTE — TELEPHONE ENCOUNTER
Baclofen 20mg      Last Written Prescription Date:  7/2/18  Last Fill Quantity: 90,   # refills: 0  Last Office Visit: 2/15/18  Future Office visit:    Next 5 appointments (look out 90 days)     Aug 27, 2018  1:10 PM CDT   Return Visit with Shamar Escamilla DC   [facility] [place] [facility] ([facility]    [address]  [place] [address]   [phone]            Sep 05, 2018  3:00 PM CDT   (Arrive by 2:45 PM)   Return Visit with Laquita Fernandez MD   [facility] ([facility]    [address] [place] [address]  [place] [address]   [phone]                   Ventolin HFA 90mcg inhaler      Last Written Prescription Date:  5/31/18  Last Fill Quantity: 18g,   # refills: 1  Last Office Visit: 2/15/18  Future Office visit:    Next 5 appointments (look out 90 days)     Aug 27, 2018  1:10 PM CDT   Return Visit with Shamar Escamilla DC   [facility] [place] [facility] ([facility])    [address] [place] [address]  [place] [address]   [phone]            Sep 05, 2018  3:00 PM CDT   (Arrive by 2:45 PM)   Return Visit with Laquita Fernandez MD   [facility] ([facility]    [address] [place], [place] [address] [place] [address]   [phone]

## 2018-08-22 RX ORDER — ALBUTEROL SULFATE 90 UG/1
AEROSOL, METERED RESPIRATORY (INHALATION)
Qty: 18 G | Refills: 0 | Status: SHIPPED | OUTPATIENT
Start: 2018-08-22 | End: 2018-09-19

## 2018-08-22 RX ORDER — BACLOFEN 20 MG/1
TABLET ORAL
Qty: 90 TABLET | Refills: 0 | Status: SHIPPED | OUTPATIENT
Start: 2018-08-22 | End: 2018-09-19

## 2018-08-27 ENCOUNTER — OFFICE VISIT (OUTPATIENT)
Dept: CHIROPRACTIC MEDICINE | Facility: OTHER | Age: 58
End: 2018-08-27
Attending: CHIROPRACTOR
Payer: COMMERCIAL

## 2018-08-27 DIAGNOSIS — M99.03 SEGMENTAL AND SOMATIC DYSFUNCTION OF LUMBAR REGION: ICD-10-CM

## 2018-08-27 DIAGNOSIS — M99.01 SEGMENTAL AND SOMATIC DYSFUNCTION OF CERVICAL REGION: Primary | ICD-10-CM

## 2018-08-27 DIAGNOSIS — M54.2 CERVICALGIA: ICD-10-CM

## 2018-08-27 DIAGNOSIS — M99.02 SEGMENTAL AND SOMATIC DYSFUNCTION OF THORACIC REGION: ICD-10-CM

## 2018-08-27 PROCEDURE — 98941 CHIROPRACT MANJ 3-4 REGIONS: CPT | Mod: AT | Performed by: CHIROPRACTOR

## 2018-08-27 NOTE — PROGRESS NOTES

## 2018-08-27 NOTE — MR AVS SNAPSHOT
After Visit Summary   8/27/2018    Joshua Barron    MRN: [id number]           Patient Information     Date Of Birth          1960        Visit Information        Provider Department      8/27/2018 1:10 PM Shamar Escamilla DC  [facility]        Today's Diagnoses     Segmental and somatic dysfunction of cervical region    -  1    Cervicalgia        Segmental and somatic dysfunction of lumbar region        Segmental and somatic dysfunction of thoracic region           Follow-ups after your visit        Your next 10 appointments already scheduled     Aug 30, 2018  2:50 PM CDT   Return Visit with Shamar Escamilla DC   [facility] ([facility])    [address]  [place] [address]   [phone]            Sep 05, 2018  3:00 PM CDT   (Arrive by 2:45 PM)   Return Visit with Laquita Fernandez MD   [facility] ([facility] [place] )    [address]  [place] [address] [phone]            Mar 13, 2019  2:15 PM CDT   (Arrive by 2:00 PM)   Hearing Eval with Kevin Moreno   [facility] ([facility] [place] [facility]    [address] [place] [address]   [phone]              Who to contact     If you have questions or need follow up information about today's clinic visit or your schedule please contact  [facility] directly at [phone].  Normal or non-critical lab and imaging results will be communicated to you by MyChart, letter or phone within 4 business days after the clinic has received the results. If you do not hear from us within 7 days, please contact the clinic through MyChart or phone. If you have a critical or abnormal lab result, we will notify you by phone as soon as possible.  Submit refill requests through Bizily or call your pharmacy and they will forward the refill request to us. Please allow 3 business days for your refill to be completed.          Additional Information  About Your Visit        Care EveryWhere ID     This is your Care EveryWhere ID. This could be used by other organizations to access your [place] medical records  [id number]         Blood Pressure from Last 3 Encounters:   07/30/18 118/82   05/30/18 132/80   04/30/18 144/86    Weight from Last 3 Encounters:   07/30/18 190 lb (86.2 kg)   05/30/18 190 lb (86.2 kg)   04/30/18 193 lb (87.5 kg)              We Performed the Following     CHIROPRAC MANIP,SPINAL,3-4 REGIONS        Primary Care Provider Office Phone # Fax #    Lyleunruly Fisher, [phone] [address] [id number]        Equal Access to Services     SHAHBAZ DANIELS : Hadii aad ku hadasho Sosaranali, waaxda luqadaha, qaybta kaalmada adeegyada, jaki millan . So [facility] [phone].    ATENCIÓN: Si habla español, tiene a hastings disposición servicios gratuitos de asistencia lingüística. [facility] [phone].    We comply with applicable federal civil rights laws and [place] laws. We do not discriminate on the basis of race, color, national origin, age, disability, sex, sexual orientation, or gender identity.            Thank you!     Thank you for choosing  [facility]  for your care. Our goal is always to provide you with excellent care. Hearing back from our patients is one way we can continue to improve our services. Please take a few minutes to complete the written survey that you may receive in the mail after your visit with us. Thank you!             Your Updated Medication List - Protect others around you: Learn how to safely use, store and throw away your medicines at www.disposemymeds.org.          This list is accurate as of 8/27/18  3:15 PM.  Always use your most recent med list.                   Brand Name Dispense Instructions for use Diagnosis    * albuterol 108 (90 Base) MCG/ACT inhaler    PROAIR HFA/PROVENTIL HFA/VENTOLIN HFA     Inhale 2 puffs into the lungs every 6  hours as needed for shortness of breath / dyspnea or wheezing        * albuterol 108 (90 Base) MCG/ACT inhaler    VENTOLIN HFA    18 g    USE 2 PUFFS 4 TIMES A DAY AS NEEDED FOR SHORTNESS OF BREATH        * VENTOLIN  (90 Base) MCG/ACT inhaler   Generic drug:  albuterol     18 g    USE 2 PUFFS 4 TIMES A DAY AS NEEDED FOR SHORTNESS OF BREATH    Moderate persistent asthma without complication       ASPIRIN LOW DOSE 81 MG chewable tablet   Generic drug:  aspirin     30 tablet    CHEW AND SWALLOW 1 TABLET BY MOUTH DAILY    Healthcare maintenance       atorvastatin 20 MG tablet    LIPITOR    90 tablet    Take 1 tablet (20 mg) by mouth daily        baclofen 20 MG tablet    LIORESAL    90 tablet    TAKE 1 TABLET BY MOUTH 3 TIMES DAILY    Bilateral low back pain with left-sided sciatica, unspecified chronicity       diazepam 5 MG tablet    VALIUM    60 tablet    TAKE 1 TABLET BY MOUTH EVERY 12 HOURS AS NEEDED FOR ANXIETY OR SLEEP SPASM    DAYANA (generalized anxiety disorder)       diclofenac 50 MG EC tablet    VOLTAREN    90 tablet    TAKE 1 TABLET BY MOUTH 3 TIMES DAILY    Arthritis pain       docusate sodium 100 MG capsule    DOK    60 capsule    TAKE 1 CAPSULE BY MOUTH TWICE DAILY        fentaNYL 75 mcg/hr 72 hr patch    DURAGESIC    11 patch    APPLY 1 PATCH ONTO THE SKIN EVERY 48 HOURS. REMOVE OLD PATCH    DDD (degenerative disc disease), cervical       fluconazole 100 MG tablet    DIFLUCAN    14 tablet    Take 2 tablets (200 mg) by mouth daily    Thrush       fluticasone 50 MCG/ACT spray    FLONASE    16 g    USE 2 SPRAYS IN EACH NOSTRIL DAILY        gabapentin 300 MG capsule    NEURONTIN    270 capsule    TAKE 3 CAPSULES BY MOUTH THREE TIMES DAILY        HYDROcodone-acetaminophen  MG per tablet    NORCO    120 tablet    TAKE 1 TABLET BY MOUTH EVERY 6 HOURS AS NEEDED FOR MODERATE TO SEVEREPAIN    Low back pain, unspecified back pain laterality, unspecified chronicity, with sciatica presence unspecified        hydrOXYzine 50 MG capsule    VISTARIL    60 capsule    Take 1-2 capsules ( mg) by mouth 4 times daily as needed for anxiety         MG tablet   Generic drug:  ibuprofen     120 tablet    TAKE 1 TABLET BY MOUTH EVERY 6 HOURS AS NEEDED    Low back pain, unspecified back pain laterality, unspecified chronicity, with sciatica presence unspecified       lidocaine 5 % Patch    LIDODERM    90 patch    PLACE 3 PATCHES ONTO THE SKIN DAILY AS NEEDED FOR MODERATE PAIN    Midline low back pain without sciatica       * lisdexamfetamine 70 MG capsule    VYVANSE    30 capsule    Take 1 capsule (70 mg) by mouth every morning    Attention deficit hyperactivity disorder (ADHD), unspecified ADHD type       * lisdexamfetamine 70 MG capsule    VYVANSE    30 capsule    Take 1 capsule (70 mg) by mouth daily    ADHD (attention deficit hyperactivity disorder), combined type       * lisdexamfetamine 70 MG capsule   Start taking on:  8/31/2018    VYVANSE    30 capsule    Take 1 capsule (70 mg) by mouth daily    ADHD (attention deficit hyperactivity disorder), combined type       losartan 50 MG tablet    COZAAR    30 tablet    Take 1 tablet (50 mg) by mouth daily        MAPAP 325 MG tablet   Generic drug:  acetaminophen     200 tablet    TAKE 2 TABLETS BY MOUTH EVERY 4 HOURS AS NEEDED FOR MILD PAIN    Pain       mometasone-formoterol 100-5 MCG/ACT oral inhaler    DULERA    13 g    INHALE 2 PUFFS INTO THE LUNGS 2 TIMES A DAY        multivitamin  with iron Tabs     30 tablet    TAKE 1 TABLET BY MOUTH DAILY    Health care maintenance       nicotine polacrilex 2 MG gum    NICORETTE    110 each    CHEW 1 PIECE AS NEEDED FOR SMOKING CESSATION        nortriptyline 75 MG capsule    PAMELOR    30 capsule    Take 1 capsule (75 mg) by mouth At Bedtime        omeprazole 20 MG CR capsule    priLOSEC    30 capsule    TAKE 1 CAPSULE BY MOUTH EVERY DAY BEFORE A MEAL        * order for DME     2 Box    Equipment being ordered: Large gloves    Need  for assistance with personal care       * order for DME     1 Device    1 boa back brace    Chronic low back pain with sciatica, sciatica laterality unspecified, unspecified back pain laterality       OXcarbazepine 600 MG tablet    TRILEPTAL    60 tablet    TAKE 1 TABLET BY MOUTH 2 TIMES DAILY    Bipolar affective disorder, current episode hypomanic (H)       propranolol 20 MG tablet    INDERAL    60 tablet    Take 1 tablet (20 mg) by mouth 2 times daily        senna-docusate 8.6-50 MG per tablet    SM STOOL SOFTENER/LAXATIVE    120 tablet    TAKE 1 OR 2 TABLETS BY MOUTH 2 TIMES A DAY        tamsulosin 0.4 MG capsule    FLOMAX    30 capsule    TAKE 1 CAPSULE BY MOUTH DAILY        traZODone 50 MG tablet    DESYREL    60 tablet    Take 2 tablets (100 mg) by mouth nightly as needed        * Notice:  This list has 8 medication(s) that are the same as other medications prescribed for you. Read the directions carefully, and ask your doctor or other care provider to review them with you.

## 2018-08-28 DIAGNOSIS — M50.30 DDD (DEGENERATIVE DISC DISEASE), CERVICAL: ICD-10-CM

## 2018-08-29 NOTE — TELEPHONE ENCOUNTER
FENTANYL 75 MCG/HR PATCH     Last Written Prescription Date:  8/08/2018  Last Fill Quantity: 11,   # refills: 0  Last Office Visit: 2/15/2018  Future Office visit:    Next 5 appointments (look out 90 days)     Aug 30, 2018  2:50 PM CDT   Return Visit with Shamar Escamilla DC   [facility] ([facility])    [address]  [facility] [address]   [phone]            Sep 05, 2018  3:00 PM CDT   (Arrive by 2:45 PM)   Return Visit with Laquita Fernandez MD   [facility] ([facility] [place] )    [address]  [facility] [address] [phone]

## 2018-08-30 ENCOUNTER — OFFICE VISIT (OUTPATIENT)
Dept: CHIROPRACTIC MEDICINE | Facility: OTHER | Age: 58
End: 2018-08-30
Attending: CHIROPRACTOR
Payer: COMMERCIAL

## 2018-08-30 DIAGNOSIS — M99.01 SEGMENTAL AND SOMATIC DYSFUNCTION OF CERVICAL REGION: ICD-10-CM

## 2018-08-30 DIAGNOSIS — M54.50 ACUTE BILATERAL LOW BACK PAIN WITHOUT SCIATICA: ICD-10-CM

## 2018-08-30 DIAGNOSIS — M99.03 SEGMENTAL AND SOMATIC DYSFUNCTION OF LUMBAR REGION: Primary | ICD-10-CM

## 2018-08-30 DIAGNOSIS — M99.02 SEGMENTAL AND SOMATIC DYSFUNCTION OF THORACIC REGION: ICD-10-CM

## 2018-08-30 PROCEDURE — 98941 CHIROPRACT MANJ 3-4 REGIONS: CPT | Mod: AT | Performed by: CHIROPRACTOR

## 2018-08-30 RX ORDER — FENTANYL 75 UG/1
PATCH TRANSDERMAL
Qty: 11 PATCH | Refills: 0 | Status: SHIPPED | OUTPATIENT
Start: 2018-08-30 | End: 2018-09-19

## 2018-08-30 NOTE — MR AVS SNAPSHOT
After Visit Summary   8/30/2018    Joshua Barron    MRN: [id number]           Patient Information     Date Of Birth          1960        Visit Information        Provider Department      8/30/2018 2:50 PM Shamar Esc[facility]illa [facility]  [facility]        Today's Diagnoses     Segmental and somatic dysfunction of lumbar region    -  1    Acute bilateral low back pain without sciatica        Segmental and somatic dysfunction of thoracic region        Segmental and somatic dysfunction of cervical region           Follow-ups after your visit        Your next 10 appointments already scheduled     Sep 05, 2018  3:00 PM CDT   (Arrive by 2:45 PM)   Return Visit with Laquita Fernandez MD   [facility] [place] ([facility] [place] )    [address] [place]  [place] [address] [phone]            Mar 13, 2019  2:15 PM CDT   (Arrive by 2:00 PM)   Hearing Eval with Kevin Moreno   [facility] ([facility]    [address] [facility] [place] [address]   [phone]              Who to contact     If you have questions or need follow up information about today's clinic visit or your schedule please contact  [facility] directly at [phone].  Normal or non-critical lab and imaging results will be communicated to you by MyChart, letter or phone within 4 business days after the clinic has received the results. If you do not hear from us within 7 days, please contact the clinic through MyChart or phone. If you have a critical or abnormal lab result, we will notify you by phone as soon as possible.  Submit refill requests through [employer] or call your pharmacy and they will forward the refill request to us. Please allow 3 business days for your refill to be completed.          Additional Information About Your Visit        Care EveryWhere ID     This is your Care EveryWhere ID. This could be used by other organizations to access your  [place] medical records  [id number]         Blood Pressure from Last 3 Encounters:   07/30/18 118/82   05/30/18 132/80   04/30/18 144/86    Weight from Last 3 Encounters:   07/30/18 190 lb (86.2 kg)   05/30/18 190 lb (86.2 kg)   04/30/18 193 lb (87.5 kg)              We Performed the Following     CHIROPRAC MANIP,SPINAL,3-4 REGIONS        Primary Care Provider Office Phone # Fax #    Lyle Fisher  [phone] [address] [facility] [address]        Equal Access to Services     [facility] : Hadii jim Gary, waaxda luqadaha, qaybta kaalmada deyanira, jaki millan . So [facility] [phone].    ATENCIÓN: Si habla español, tiene a hastings disposición servicios gratuitos de asistencia lingüística. [facility] [phone].    We comply with applicable federal civil rights laws and [place] laws. We do not discriminate on the basis of race, color, national origin, age, disability, sex, sexual orientation, or gender identity.            Thank you!     Thank you for choosing  [facility]  for your care. Our goal is always to provide you with excellent care. Hearing back from our patients is one way we can continue to improve our services. Please take a few minutes to complete the written survey that you may receive in the mail after your visit with us. Thank you!             Your Updated Medication List - Protect others around you: Learn how to safely use, store and throw away your medicines at www.disposemymeds.org.          This list is accurate as of 8/30/18 11:59 PM.  Always use your most recent med list.                   Brand Name Dispense Instructions for use Diagnosis    * albuterol 108 (90 Base) MCG/ACT inhaler    PROAIR HFA/PROVENTIL HFA/VENTOLIN HFA     Inhale 2 puffs into the lungs every 6 hours as needed for shortness of breath / dyspnea or wheezing        * albuterol 108 (90 Base) MCG/ACT inhaler    VENTOLIN HFA    18 g     USE 2 PUFFS 4 TIMES A DAY AS NEEDED FOR SHORTNESS OF BREATH        * VENTOLIN  (90 Base) MCG/ACT inhaler   Generic drug:  albuterol     18 g    USE 2 PUFFS 4 TIMES A DAY AS NEEDED FOR SHORTNESS OF BREATH    Moderate persistent asthma without complication       ASPIRIN LOW DOSE 81 MG chewable tablet   Generic drug:  aspirin     30 tablet    CHEW AND SWALLOW 1 TABLET BY MOUTH DAILY    Healthcare maintenance       atorvastatin 20 MG tablet    LIPITOR    90 tablet    Take 1 tablet (20 mg) by mouth daily        baclofen 20 MG tablet    LIORESAL    90 tablet    TAKE 1 TABLET BY MOUTH 3 TIMES DAILY    Bilateral low back pain with left-sided sciatica, unspecified chronicity       diazepam 5 MG tablet    VALIUM    60 tablet    TAKE 1 TABLET BY MOUTH EVERY 12 HOURS AS NEEDED FOR ANXIETY OR SLEEP SPASM    DAYANA (generalized anxiety disorder)       diclofenac 50 MG EC tablet    VOLTAREN    90 tablet    TAKE 1 TABLET BY MOUTH 3 TIMES DAILY    Arthritis pain       docusate sodium 100 MG capsule    DOK    60 capsule    TAKE 1 CAPSULE BY MOUTH TWICE DAILY        fentaNYL 75 mcg/hr 72 hr patch    DURAGESIC    11 patch    APPLY 1 PATCH ONTO THE SKIN EVERY 48 HOURS, REMOVE OLD PATCH    DDD (degenerative disc disease), cervical       fluconazole 100 MG tablet    DIFLUCAN    14 tablet    Take 2 tablets (200 mg) by mouth daily    Thrush       fluticasone 50 MCG/ACT spray    FLONASE    16 g    USE 2 SPRAYS IN EACH NOSTRIL DAILY        gabapentin 300 MG capsule    NEURONTIN    270 capsule    TAKE 3 CAPSULES BY MOUTH THREE TIMES DAILY        HYDROcodone-acetaminophen  MG per tablet    NORCO    120 tablet    TAKE 1 TABLET BY MOUTH EVERY 6 HOURS AS NEEDED FOR MODERATE TO SEVEREPAIN    Low back pain, unspecified back pain laterality, unspecified chronicity, with sciatica presence unspecified       hydrOXYzine 50 MG capsule    VISTARIL    60 capsule    Take 1-2 capsules ( mg) by mouth 4 times daily as needed for anxiety          MG tablet   Generic drug:  ibuprofen     120 tablet    TAKE 1 TABLET BY MOUTH EVERY 6 HOURS AS NEEDED    Low back pain, unspecified back pain laterality, unspecified chronicity, with sciatica presence unspecified       lidocaine 5 % Patch    LIDODERM    90 patch    PLACE 3 PATCHES ONTO THE SKIN DAILY AS NEEDED FOR MODERATE PAIN    Midline low back pain without sciatica       * lisdexamfetamine 70 MG capsule    VYVANSE    30 capsule    Take 1 capsule (70 mg) by mouth every morning    Attention deficit hyperactivity disorder (ADHD), unspecified ADHD type       * lisdexamfetamine 70 MG capsule    VYVANSE    30 capsule    Take 1 capsule (70 mg) by mouth daily    ADHD (attention deficit hyperactivity disorder), combined type       * lisdexamfetamine 70 MG capsule    VYVANSE    30 capsule    Take 1 capsule (70 mg) by mouth daily    ADHD (attention deficit hyperactivity disorder), combined type       losartan 50 MG tablet    COZAAR    30 tablet    Take 1 tablet (50 mg) by mouth daily        MAPAP 325 MG tablet   Generic drug:  acetaminophen     200 tablet    TAKE 2 TABLETS BY MOUTH EVERY 4 HOURS AS NEEDED FOR MILD PAIN    Pain       mometasone-formoterol 100-5 MCG/ACT oral inhaler    DULERA    13 g    INHALE 2 PUFFS INTO THE LUNGS 2 TIMES A DAY        multivitamin  with iron Tabs     30 tablet    TAKE 1 TABLET BY MOUTH DAILY    Health care maintenance       nicotine polacrilex 2 MG gum    NICORETTE    110 each    CHEW 1 PIECE AS NEEDED FOR SMOKING CESSATION        nortriptyline 75 MG capsule    PAMELOR    30 capsule    Take 1 capsule (75 mg) by mouth At Bedtime        omeprazole 20 MG CR capsule    priLOSEC    30 capsule    TAKE 1 CAPSULE BY MOUTH EVERY DAY BEFORE A MEAL        * order for DME     2 Box    Equipment being ordered: Large gloves    Need for assistance with personal care       * order for DME     1 Device    1 boa back brace    Chronic low back pain with sciatica, sciatica laterality unspecified,  unspecified back pain laterality       OXcarbazepine 600 MG tablet    TRILEPTAL    60 tablet    TAKE 1 TABLET BY MOUTH 2 TIMES DAILY    Bipolar affective disorder, current episode hypomanic (H)       propranolol 20 MG tablet    INDERAL    60 tablet    Take 1 tablet (20 mg) by mouth 2 times daily        senna-docusate 8.6-50 MG per tablet    SM STOOL SOFTENER/LAXATIVE    120 tablet    TAKE 1 OR 2 TABLETS BY MOUTH 2 TIMES A DAY        tamsulosin 0.4 MG capsule    FLOMAX    30 capsule    TAKE 1 CAPSULE BY MOUTH DAILY        traZODone 50 MG tablet    DESYREL    60 tablet    Take 2 tablets (100 mg) by mouth nightly as needed        * Notice:  This list has 8 medication(s) that are the same as other medications prescribed for you. Read the directions carefully, and ask your doctor or other care provider to review them with you.

## 2018-09-04 NOTE — PROGRESS NOTES
Subjective Finding:    Chief compalint: Patient presents with:  Back Pain  , Pain Scale: 4/10, Intensity: dull, Duration: 2 days, Radiating: no.    Date of injury:     Activities that the pain restricts:   Home/household/hobbies/social activities: yes.  Work duties: yes.  Sleep: yes.  Makes symptoms better: rest.  Makes symptoms worse: lumbar extension and lumbar flexion.  Have you seen anyone else for the symptoms? No.  Work related: no.  Automobile related injury: no.    Objective and Assessment:    Posture Analysis:   High shoulder: .  Head tilt: .  High iliac crest: .  Head carriage: neutral.  Thoracic Kyphosis: neutral.  Lumbar Lordosis: forward.    Lumbar Range of Motion: flexion decreased and extension decreased.  Cervical Range of Motion: extension decreased.  Thoracic Range of Motion: extension decreased.  Extremity Range of Motion: .    Palpation:   Quad lumb: bilateral, referred pain: no  T paraspinals: dull pain, no    Segmental dysfunction pre-treatment and treatment area: C4, T5, T6 and Sacrum.    Assessment post-treatment:  Cervical: ROM increased.  Thoracic: ROM increased.  Lumbar: ROM increased.    Comments: history of DDD in C and L spine.      Complicating Factors: .    Procedure(s):  CMT:  84019 Chiropractic manipulative treatment 3-4 regions performed   Cervical: Diversified, See above for level, Supine, Thoracic: Diversified, See above for level, Prone and Lumbar: Diversified, See above for level, Side posture    Modalities:  None performed this visit    Therapeutic procedures:  None    Plan:  Treatment plan: PRN.  Instructed patient: stretch as instructed at visit.  Short term goals: increase ROM.  Long term goals: increase ADL.  Prognosis: good.

## 2018-09-05 ENCOUNTER — OFFICE VISIT (OUTPATIENT)
Dept: PSYCHIATRY | Facility: OTHER | Age: 58
End: 2018-09-05
Attending: PSYCHIATRY & NEUROLOGY
Payer: COMMERCIAL

## 2018-09-05 VITALS
WEIGHT: 190 LBS | SYSTOLIC BLOOD PRESSURE: 120 MMHG | DIASTOLIC BLOOD PRESSURE: 70 MMHG | OXYGEN SATURATION: 96 % | HEART RATE: 73 BPM | TEMPERATURE: 97.7 F | BODY MASS INDEX: 28.06 KG/M2

## 2018-09-05 DIAGNOSIS — F90.9 ATTENTION DEFICIT HYPERACTIVITY DISORDER (ADHD), UNSPECIFIED ADHD TYPE: ICD-10-CM

## 2018-09-05 DIAGNOSIS — F90.2 ADHD (ATTENTION DEFICIT HYPERACTIVITY DISORDER), COMBINED TYPE: ICD-10-CM

## 2018-09-05 DIAGNOSIS — F31.0 BIPOLAR AFFECTIVE DISORDER, CURRENT EPISODE HYPOMANIC (H): ICD-10-CM

## 2018-09-05 PROCEDURE — G0463 HOSPITAL OUTPT CLINIC VISIT: HCPCS

## 2018-09-05 PROCEDURE — 99214 OFFICE O/P EST MOD 30 MIN: CPT | Performed by: PSYCHIATRY & NEUROLOGY

## 2018-09-05 RX ORDER — LISDEXAMFETAMINE DIMESYLATE 70 MG/1
70 CAPSULE ORAL EVERY MORNING
Qty: 30 CAPSULE | Refills: 0 | Status: SHIPPED | OUTPATIENT
Start: 2018-09-28 | End: 2018-11-07

## 2018-09-05 RX ORDER — LISDEXAMFETAMINE DIMESYLATE 70 MG/1
70 CAPSULE ORAL DAILY
Qty: 30 CAPSULE | Refills: 0 | Status: SHIPPED | OUTPATIENT
Start: 2018-10-26 | End: 2018-11-07

## 2018-09-05 RX ORDER — OXCARBAZEPINE 600 MG/1
TABLET, FILM COATED ORAL
Qty: 60 TABLET | Refills: 6 | Status: SHIPPED | OUTPATIENT
Start: 2018-09-05 | End: 2019-05-08

## 2018-09-05 ASSESSMENT — PAIN SCALES - GENERAL: PAINLEVEL: EXTREME PAIN (8)

## 2018-09-05 NOTE — MR AVS SNAPSHOT
After Visit Summary   9/5/2018    Joshua Barron    MRN: [id number]           Patient Information     Date Of Birth          1960        Visit Information        Provider Department      9/5/2018 3:00 PM Laquita Fernnadez MD [facility]        Today's Diagnoses     Attention deficit hyperactivity disorder (ADHD), unspecified ADHD type        ADHD (attention deficit hyperactivity disorder), combined type        Bipolar affective disorder, current episode hypomanic (H)           Follow-ups after your visit        Your next 10 appointments already scheduled     Nov 07, 2018  3:00 PM CST   (Arrive by 2:45 PM)   Return Visit with Laquita Fernandez MD   [facility] [place] ([facility] [place] )    [address] [place] [address]  [place] [address] [phone]            Mar 13, 2019  2:15 PM CDT   (Arrive by 2:00 PM)   Hearing Eval with Kevin Moreno   [facility] [place] ([facility] [place] )    [address]  [place] [address]   [phone]              Who to contact     If you have questions or need follow up information about today's clinic visit or your schedule please contact [facility] directly at [phone].  Normal or non-critical lab and imaging results will be communicated to you by MyChart, letter or phone within 4 business days after the clinic has received the results. If you do not hear from us within 7 days, please contact the clinic through MyChart or phone. If you have a critical or abnormal lab result, we will notify you by phone as soon as possible.  Submit refill requests through [employer] or call your pharmacy and they will forward the refill request to us. Please allow 3 business days for your refill to be completed.          Additional Information About Your Visit        Care EveryWhere ID     This is your Care EveryWhere ID. This could be used by other organizations to access your [facility]  records  [id number]        Your Vitals Were     Pulse Temperature Pulse Oximetry BMI (Body Mass Index)          73 97.7  F (36.5  C) (Tympanic) 96% 28.06 kg/m2         Blood Pressure from Last 3 Encounters:   09/05/18 120/70   07/30/18 118/82   05/30/18 132/80    Weight from Last 3 Encounters:   09/05/18 190 lb (86.2 kg)   07/30/18 190 lb (86.2 kg)   05/30/18 190 lb (86.2 kg)              Today, you had the following     No orders found for display         Today's Medication Changes          These changes are accurate as of 9/5/18  4:02 PM.  If you have any questions, ask your nurse or doctor.               These medicines have changed or have updated prescriptions.        Dose/Directions    * lisdexamfetamine 70 MG capsule   Commonly known as:  VYVANSE   This may have changed:  These instructions start on 9/28/2018. If you are unsure what to do until then, ask your doctor or other care provider.   Used for:  Attention deficit hyperactivity disorder (ADHD), unspecified ADHD type   Changed by:  Laquita Fernandez MD        Dose:  70 mg   Start taking on:  9/28/2018   Take 1 capsule (70 mg) by mouth every morning   Quantity:  30 capsule   Refills:  0       * lisdexamfetamine 70 MG capsule   Commonly known as:  VYVANSE   This may have changed:  These instructions start on 10/26/2018. If you are unsure what to do until then, ask your doctor or other care provider.   Used for:  ADHD (attention deficit hyperactivity disorder), combined type   Changed by:  Laquita Fernandez MD        Dose:  70 mg   Start taking on:  10/26/2018   Take 1 capsule (70 mg) by mouth daily   Quantity:  30 capsule   Refills:  0       * Notice:  This list has 2 medication(s) that are the same as other medications prescribed for you. Read the directions carefully, and ask your doctor or other care provider to review them with you.         Where to get your medicines      These medications were sent to [facility] [place] [address]  [facility]  [address] [facility] [address]     Phone:  [phone]     OXcarbazepine 600 MG tablet         Some of these will need a paper prescription and others can be bought over the counter.  Ask your nurse if you have questions.     Bring a paper prescription for each of these medications     lisdexamfetamine 70 MG capsule    lisdexamfetamine 70 MG capsule                Primary Care Provider Office Phone # Fax #    Lyle Fisher [phone] [address] [facility] [address]        Equal Access to Services     [facility] : Hadii aad ku hadasho Soomaali, waaxda luqadaha, qaybta kaalmada adeegyada, jaki millan . So [facility] [phone].    ATENCIÓN: Si habla español, tiene a hastings disposición servicios gratuitos de asistencia lingüística. [facility] [phone].    We comply with applicable federal civil rights laws and [place] laws. We do not discriminate on the basis of race, color, national origin, age, disability, sex, sexual orientation, or gender identity.            Thank you!     Thank you for choosing [facility]  for your care. Our goal is always to provide you with excellent care. Hearing back from our patients is one way we can continue to improve our services. Please take a few minutes to complete the written survey that you may receive in the mail after your visit with us. Thank you!             Your Updated Medication List - Protect others around you: Learn how to safely use, store and throw away your medicines at www.disposemymeds.org.          This list is accurate as of 9/5/18  4:02 PM.  Always use your most recent med list.                   Brand Name Dispense Instructions for use Diagnosis    * albuterol 108 (90 Base) MCG/ACT inhaler    PROAIR HFA/PROVENTIL HFA/VENTOLIN HFA     Inhale 2 puffs into the lungs every 6 hours as needed for shortness of breath / dyspnea or wheezing        * albuterol 108 (90 Base)  MCG/ACT inhaler    VENTOLIN HFA    18 g    USE 2 PUFFS 4 TIMES A DAY AS NEEDED FOR SHORTNESS OF BREATH        * VENTOLIN  (90 Base) MCG/ACT inhaler   Generic drug:  albuterol     18 g    USE 2 PUFFS 4 TIMES A DAY AS NEEDED FOR SHORTNESS OF BREATH    Moderate persistent asthma without complication       ASPIRIN LOW DOSE 81 MG chewable tablet   Generic drug:  aspirin     30 tablet    CHEW AND SWALLOW 1 TABLET BY MOUTH DAILY    Healthcare maintenance       atorvastatin 20 MG tablet    LIPITOR    90 tablet    Take 1 tablet (20 mg) by mouth daily        baclofen 20 MG tablet    LIORESAL    90 tablet    TAKE 1 TABLET BY MOUTH 3 TIMES DAILY    Bilateral low back pain with left-sided sciatica, unspecified chronicity       diazepam 5 MG tablet    VALIUM    60 tablet    TAKE 1 TABLET BY MOUTH EVERY 12 HOURS AS NEEDED FOR ANXIETY OR SLEEP SPASM    DAYANA (generalized anxiety disorder)       diclofenac 50 MG EC tablet    VOLTAREN    90 tablet    TAKE 1 TABLET BY MOUTH 3 TIMES DAILY    Arthritis pain       docusate sodium 100 MG capsule    DOK    60 capsule    TAKE 1 CAPSULE BY MOUTH TWICE DAILY        fentaNYL 75 mcg/hr 72 hr patch    DURAGESIC    11 patch    APPLY 1 PATCH ONTO THE SKIN EVERY 48 HOURS, REMOVE OLD PATCH    DDD (degenerative disc disease), cervical       fluconazole 100 MG tablet    DIFLUCAN    14 tablet    Take 2 tablets (200 mg) by mouth daily    Thrush       fluticasone 50 MCG/ACT spray    FLONASE    16 g    USE 2 SPRAYS IN EACH NOSTRIL DAILY        gabapentin 300 MG capsule    NEURONTIN    270 capsule    TAKE 3 CAPSULES BY MOUTH THREE TIMES DAILY        HYDROcodone-acetaminophen  MG per tablet    NORCO    120 tablet    TAKE 1 TABLET BY MOUTH EVERY 6 HOURS AS NEEDED FOR MODERATE TO SEVEREPAIN    Low back pain, unspecified back pain laterality, unspecified chronicity, with sciatica presence unspecified       hydrOXYzine 50 MG capsule    VISTARIL    60 capsule    Take 1-2 capsules ( mg) by mouth  4 times daily as needed for anxiety         MG tablet   Generic drug:  ibuprofen     120 tablet    TAKE 1 TABLET BY MOUTH EVERY 6 HOURS AS NEEDED    Low back pain, unspecified back pain laterality, unspecified chronicity, with sciatica presence unspecified       lidocaine 5 % Patch    LIDODERM    90 patch    PLACE 3 PATCHES ONTO THE SKIN DAILY AS NEEDED FOR MODERATE PAIN    Midline low back pain without sciatica       * lisdexamfetamine 70 MG capsule   Start taking on:  9/28/2018    VYVANSE    30 capsule    Take 1 capsule (70 mg) by mouth every morning    Attention deficit hyperactivity disorder (ADHD), unspecified ADHD type       * lisdexamfetamine 70 MG capsule   Start taking on:  10/26/2018    VYVANSE    30 capsule    Take 1 capsule (70 mg) by mouth daily    ADHD (attention deficit hyperactivity disorder), combined type       losartan 50 MG tablet    COZAAR    30 tablet    Take 1 tablet (50 mg) by mouth daily        MAPAP 325 MG tablet   Generic drug:  acetaminophen     200 tablet    TAKE 2 TABLETS BY MOUTH EVERY 4 HOURS AS NEEDED FOR MILD PAIN    Pain       mometasone-formoterol 100-5 MCG/ACT oral inhaler    DULERA    13 g    INHALE 2 PUFFS INTO THE LUNGS 2 TIMES A DAY        multivitamin  with iron Tabs     30 tablet    TAKE 1 TABLET BY MOUTH DAILY    Health care maintenance       nicotine polacrilex 2 MG gum    NICORETTE    110 each    CHEW 1 PIECE AS NEEDED FOR SMOKING CESSATION        nortriptyline 75 MG capsule    PAMELOR    30 capsule    Take 1 capsule (75 mg) by mouth At Bedtime        omeprazole 20 MG CR capsule    priLOSEC    30 capsule    TAKE 1 CAPSULE BY MOUTH EVERY DAY BEFORE A MEAL        * order for DME     2 Box    Equipment being ordered: Large gloves    Need for assistance with personal care       * order for DME     1 Device    1 boa back brace    Chronic low back pain with sciatica, sciatica laterality unspecified, unspecified back pain laterality       OXcarbazepine 600 MG tablet     TRILEPTAL    60 tablet    TAKE 1 TABLET BY MOUTH 2 TIMES DAILY    Bipolar affective disorder, current episode hypomanic (H)       propranolol 20 MG tablet    INDERAL    60 tablet    Take 1 tablet (20 mg) by mouth 2 times daily        senna-docusate 8.6-50 MG per tablet    SM STOOL SOFTENER/LAXATIVE    120 tablet    TAKE 1 OR 2 TABLETS BY MOUTH 2 TIMES A DAY        tamsulosin 0.4 MG capsule    FLOMAX    30 capsule    TAKE 1 CAPSULE BY MOUTH DAILY        traZODone 50 MG tablet    DESYREL    60 tablet    Take 2 tablets (100 mg) by mouth nightly as needed        * Notice:  This list has 7 medication(s) that are the same as other medications prescribed for you. Read the directions carefully, and ask your doctor or other care provider to review them with you.

## 2018-09-05 NOTE — PROGRESS NOTES
PSYCHIATRY CLINIC PROGRESS NOTE   20 minute medication management, more than 50% of time spent counseling patient on medications, medication side effects, symptom history and management                                                                       Social- Was  twice. Lives alone with his dog Montserrat (beltran) and 3 cats: Smoky the cat. Has a GF in [place]  Children-  2 kids, they are in [place]  Last visit 7/30/18: Valium is currently 5 mg bid prn: next fill if I am asked to refill will be down to 2 mg bid prn (see above discussion)Last script I wrote was Vyvanse 7/6/18 and today gave scripts today 8/3/18, and 8/31/18 .  Continue Trileptal 600 mg bid,     - got home and his AC unit was taken off , door was wide open this past weekend.   - in past was NOT in favor of further surgeries however pain has gotten to the point that he wants to pursue surgery  - his neurosurgeon was in [facility]. Met with a surgeon in [place]  - marriage 6 years: not good.   - his daughter and son are in [place]  - Lots of family issues: Joshua has taken care of his parents and yet parents give his other brothers everything. oldest of 3 brothers. Brother in [place] youngest Mark and Major is here. Mom and dad here [place] on [place]. Joshua noting moving here to be closer to parents and help them, etc. But, parents don't seem to want him around much or talk to him.  SUBSTANCE USE- denies abuse of meds or substances    SYMPTOMS- issues with attention and concentration improved with Vyvanse: racing thoughts, depressed mood, impulsivity, distractibility  MEDICAL ROS- back pain, denies any issues with headaches or stomach pain / issues with stimulant  MEDICAL / SURGICAL HISTORY                     Patient Active Problem List   Diagnosis     Mixed hyperlipidemia     Tobacco Abuse, History of     Degeneration of lumbar or lumbosacral intervertebral disc     Depression, major     Chronic pain syndrome     Chemical dependency (H)     Chronic rhinitis      Tinnitus of both ears     ETD (eustachian tube dysfunction)     SNHL (sensorineural hearing loss)     Back pain     Bipolar disorder (H)     Seizure-like activity (H)     Somatic dysfunction of pelvis region     Bilateral foot pain     Preop general physical exam     Trigger index finger of right hand     Moderate persistent asthma without complication     Chronic lower back pain     ACP (advance care planning)     Onychia of toe of left foot     DDD (degenerative disc disease), lumbar     Seizure disorder (H)     Back muscle spasm     Throat pain     Benign essential hypertension     Retrograde ejaculation     Allergic rhinitis due to other allergen     Attention to dressings and sutures     Cervical spondylosis without myelopathy     Chronic headaches     DDD (degenerative disc disease)     Diabetes mellitus, type II (H)     Generalized anxiety disorder     Hypertrophy of prostate without urinary obstruction and other lower urinary tract symptoms (LUTS)     GERD (gastroesophageal reflux disease)     Lumbago     Major depressive disorder, recurrent episode, moderate (H)     Myalgia and myositis     Hyperlipidemia     Other pain disorders related to psychological factors     Spinal stenosis in cervical region     Status post lumbar spinal fusion     Tobacco use disorder     Trigger finger     Asthma     Polypharmacy     ALLERGY   Cymbalta and Seasonal allergies  MEDICATIONS                                                                                             Current Outpatient Prescriptions   Medication Sig     albuterol (PROAIR HFA/PROVENTIL HFA/VENTOLIN HFA) 108 (90 BASE) MCG/ACT Inhaler Inhale 2 puffs into the lungs every 6 hours as needed for shortness of breath / dyspnea or wheezing     albuterol (VENTOLIN HFA) 108 (90 BASE) MCG/ACT Inhaler USE 2 PUFFS 4 TIMES A DAY AS NEEDED FOR SHORTNESS OF BREATH     ASPIRIN LOW DOSE 81 MG chewable tablet CHEW AND SWALLOW 1 TABLET BY MOUTH DAILY     atorvastatin  (LIPITOR) 20 MG tablet Take 1 tablet (20 mg) by mouth daily     baclofen (LIORESAL) 20 MG tablet TAKE 1 TABLET BY MOUTH 3 TIMES DAILY     diazepam (VALIUM) 5 MG tablet TAKE 1 TABLET BY MOUTH EVERY 12 HOURS AS NEEDED FOR ANXIETY OR SLEEP SPASM     diclofenac (VOLTAREN) 50 MG EC tablet TAKE 1 TABLET BY MOUTH 3 TIMES DAILY     docusate sodium (DOK) 100 MG capsule TAKE 1 CAPSULE BY MOUTH TWICE DAILY     fentaNYL (DURAGESIC) 75 mcg/hr 72 hr patch APPLY 1 PATCH ONTO THE SKIN EVERY 48 HOURS, REMOVE OLD PATCH     fluconazole (DIFLUCAN) 100 MG tablet Take 2 tablets (200 mg) by mouth daily     fluticasone (FLONASE) 50 MCG/ACT spray USE 2 SPRAYS IN EACH NOSTRIL DAILY     gabapentin (NEURONTIN) 300 MG capsule TAKE 3 CAPSULES BY MOUTH THREE TIMES DAILY     HYDROcodone-acetaminophen (NORCO)  MG per tablet TAKE 1 TABLET BY MOUTH EVERY 6 HOURS AS NEEDED FOR MODERATE TO SEVEREPAIN     hydrOXYzine (VISTARIL) 50 MG capsule Take 1-2 capsules ( mg) by mouth 4 times daily as needed for anxiety      MG tablet TAKE 1 TABLET BY MOUTH EVERY 6 HOURS AS NEEDED     lidocaine (LIDODERM) 5 % Patch PLACE 3 PATCHES ONTO THE SKIN DAILY AS NEEDED FOR MODERATE PAIN     lisdexamfetamine (VYVANSE) 70 MG capsule Take 1 capsule (70 mg) by mouth daily     lisdexamfetamine (VYVANSE) 70 MG capsule Take 1 capsule (70 mg) by mouth every morning     losartan (COZAAR) 50 MG tablet Take 1 tablet (50 mg) by mouth daily     MAPAP 325 MG tablet TAKE 2 TABLETS BY MOUTH EVERY 4 HOURS AS NEEDED FOR MILD PAIN     mometasone-formoterol (DULERA) 100-5 MCG/ACT oral inhaler INHALE 2 PUFFS INTO THE LUNGS 2 TIMES A DAY     multivitamin  with iron (SM COMPLETE ADVANCED FORMULA) TABS TAKE 1 TABLET BY MOUTH DAILY     nicotine polacrilex (NICORETTE) 2 MG gum CHEW 1 PIECE AS NEEDED FOR SMOKING CESSATION     nortriptyline (PAMELOR) 75 MG capsule Take 1 capsule (75 mg) by mouth At Bedtime     omeprazole (PRILOSEC) 20 MG CR capsule TAKE 1 CAPSULE BY MOUTH EVERY DAY  BEFORE A MEAL     order for DME 1 boa back brace     ORDER FOR DME Equipment being ordered: Large gloves     OXcarbazepine (TRILEPTAL) 600 MG tablet TAKE 1 TABLET BY MOUTH 2 TIMES DAILY     propranolol (INDERAL) 20 MG tablet Take 1 tablet (20 mg) by mouth 2 times daily     senna-docusate (SM STOOL SOFTENER/LAXATIVE) 8.6-50 MG per tablet TAKE 1 OR 2 TABLETS BY MOUTH 2 TIMES A DAY     tamsulosin (FLOMAX) 0.4 MG capsule TAKE 1 CAPSULE BY MOUTH DAILY     traZODone (DESYREL) 50 MG tablet Take 2 tablets (100 mg) by mouth nightly as needed     VENTOLIN  (90 Base) MCG/ACT inhaler USE 2 PUFFS 4 TIMES A DAY AS NEEDED FOR SHORTNESS OF BREATH     No current facility-administered medications for this visit.        VITALS   /70 (BP Location: Right arm, Patient Position: Chair, Cuff Size: Adult Regular)  Pulse 73  Temp 97.7  F (36.5  C) (Tympanic)  Wt 190 lb (86.2 kg)  SpO2 96%  BMI 28.06 kg/m2     LABS                                                                                                                         EKG 2016 with 376 ms (on nortriptyline)    Last Basic Metabolic Panel:  Lab Results   Component Value Date     10/20/2017      Lab Results   Component Value Date    POTASSIUM 4.2 10/20/2017     Lab Results   Component Value Date    CHLORIDE 107 10/20/2017     Lab Results   Component Value Date    SANDRITA 9.0 10/20/2017     Lab Results   Component Value Date    CO2 28 10/20/2017     Lab Results   Component Value Date    BUN 17 10/20/2017     Lab Results   Component Value Date    CR 0.69 10/20/2017     Lab Results   Component Value Date     10/20/2017     Liver Function Studies -   Recent Labs   Lab Test  10/20/17   1443   PROTTOTAL  7.4   ALBUMIN  4.4   BILITOTAL  0.4   ALKPHOS  120   AST  18   ALT  30     CBC RESULTS:   Recent Labs   Lab Test  10/20/17   1443   WBC  4.0   RBC  4.00*   HGB  12.9*   HCT  36.5*   MCV  91   MCH  32.3   MCHC  35.3   RDW  12.4   PLT  151          MENTAL STATUS  EXAM                                                                                        Alert. Oriented to person, place, and date / time. Casually groomed, calm, cooperative with good eye contact. No problems with speech or psychomotor behavior. Mood was described as depressed and affect was congruent to speech content and full range. Thought process, including associations, was unremarkable and thought content was devoid of suicidal and homicidal ideation and psychotic thought. No hallucinations. Insight was good. Judgment was intact and adequate for safety. Fund of knowledge was intact. Pt demonstrates no obvious problems with attention, concentration, language, recent or remote memory although these were not formally tested.     ASSESSMENT                                                                                                      HISTORICAL:  Initial psych note 10/6/15          NOTES:      Joshua is a 57 year old with bipolar, ADHD, and MDD.   He struggles with depression and seems to be largely related to his chronic pain issues.We started Valium as dual purpose: muscle relaxant properties and for anxiety. Last visit we discussed the new guidelines for short - term use of benzodiazepines (Valium). I will leave the current script but once refill comes will begin to taper down and then eventually discontinued. . If I continue to fill however again plan will be to taper down and then off: we discussed this again today . I am okay continuing the vyvanse, Trileptal and him continuing trazodone.       TREATMENT RISK STATEMENT:  The risks, benefits, alternatives and potential adverse effects have been explained and are understood by the pt.  The pt agrees to the treatment plan with the ability to do so.   The pt knows to call the clinic for any problems or access emergency care if needed.        DIAGNOSES                  ADHD  Bipolar disorder      PLAN                                                                                                                     1)  MEDICATIONS:         Valium is currently 5 mg bid prn: next fill if I am asked to refill will be down to 2 mg bid prn (see above discussion) and we discussed this again today. Last script I wrote was Vyvanse 8/31/18 and gave script 9/28/18 and 10/26/18  .  Continue Trileptal 600 mg bid,   2)  THERAPY:  No change    3)  LABS:  UDS 5/10/17    4)  PT MONITOR [call for probs]:  SEs from meds, worsening sx, SI/HI    5)  REFERRALS [CD, medical, other]:  None    6)  RTC:  2 months

## 2018-09-05 NOTE — NURSING NOTE
"Chief Complaint   Patient presents with     Mental Health Problem     f/u ADD, Bipolar disorder        Initial /70 (BP Location: Right arm, Patient Position: Chair, Cuff Size: Adult Regular)  Pulse 73  Temp 97.7  F (36.5  C) (Tympanic)  Wt 190 lb (86.2 kg)  SpO2 96%  BMI 28.06 kg/m2 Estimated body mass index is 28.06 kg/(m^2) as calculated from the following:    Height as of 2/15/18: 5' 9\" (1.753 m).    Weight as of this encounter: 190 lb (86.2 kg).  Medication Reconciliation: complete    Karen Jeffries LPN    "

## 2018-09-10 DIAGNOSIS — M54.5 LOW BACK PAIN, UNSPECIFIED BACK PAIN LATERALITY, UNSPECIFIED CHRONICITY, WITH SCIATICA PRESENCE UNSPECIFIED: ICD-10-CM

## 2018-09-11 NOTE — TELEPHONE ENCOUNTER
Usha  Last visit: 2.15.18  Last refill: 8.8.18 #120    Next 5 appointments (look out 90 days)     Nov 07, 2018  3:00 PM CST   (Arrive by 2:45 PM)   Return Visit with Laquita Fernandez MD   [facility] [place] ([facility] [place] )    [address] [place] [address]  [place] [address] [phone]

## 2018-09-12 RX ORDER — HYDROCODONE BITARTRATE AND ACETAMINOPHEN 10; 325 MG/1; MG/1
TABLET ORAL
Qty: 120 TABLET | Refills: 0 | Status: SHIPPED | OUTPATIENT
Start: 2018-09-12 | End: 2018-10-08

## 2018-09-17 ENCOUNTER — OFFICE VISIT (OUTPATIENT)
Dept: CHIROPRACTIC MEDICINE | Facility: OTHER | Age: 58
End: 2018-09-17
Attending: CHIROPRACTOR
Payer: COMMERCIAL

## 2018-09-17 DIAGNOSIS — M99.03 SEGMENTAL AND SOMATIC DYSFUNCTION OF LUMBAR REGION: Primary | ICD-10-CM

## 2018-09-17 DIAGNOSIS — M99.01 SEGMENTAL AND SOMATIC DYSFUNCTION OF CERVICAL REGION: ICD-10-CM

## 2018-09-17 DIAGNOSIS — M99.02 SEGMENTAL AND SOMATIC DYSFUNCTION OF THORACIC REGION: ICD-10-CM

## 2018-09-17 DIAGNOSIS — M54.50 ACUTE BILATERAL LOW BACK PAIN WITHOUT SCIATICA: ICD-10-CM

## 2018-09-17 PROCEDURE — 98940 CHIROPRACT MANJ 1-2 REGIONS: CPT | Mod: AT | Performed by: CHIROPRACTOR

## 2018-09-19 DIAGNOSIS — M54.42 BILATERAL LOW BACK PAIN WITH LEFT-SIDED SCIATICA, UNSPECIFIED CHRONICITY: ICD-10-CM

## 2018-09-19 DIAGNOSIS — M50.30 DDD (DEGENERATIVE DISC DISEASE), CERVICAL: ICD-10-CM

## 2018-09-20 ENCOUNTER — OFFICE VISIT (OUTPATIENT)
Dept: CHIROPRACTIC MEDICINE | Facility: OTHER | Age: 58
End: 2018-09-20
Attending: CHIROPRACTOR
Payer: COMMERCIAL

## 2018-09-20 DIAGNOSIS — M99.01 SEGMENTAL AND SOMATIC DYSFUNCTION OF CERVICAL REGION: ICD-10-CM

## 2018-09-20 DIAGNOSIS — M54.50 ACUTE BILATERAL LOW BACK PAIN WITHOUT SCIATICA: ICD-10-CM

## 2018-09-20 DIAGNOSIS — M99.03 SEGMENTAL AND SOMATIC DYSFUNCTION OF LUMBAR REGION: Primary | ICD-10-CM

## 2018-09-20 DIAGNOSIS — M99.02 SEGMENTAL AND SOMATIC DYSFUNCTION OF THORACIC REGION: ICD-10-CM

## 2018-09-20 PROCEDURE — 98941 CHIROPRACT MANJ 3-4 REGIONS: CPT | Mod: AT | Performed by: CHIROPRACTOR

## 2018-09-20 NOTE — TELEPHONE ENCOUNTER
Controlled Substance Refill Request for Baclofen and Fentanyl  Problem List Complete:  Yes  Baclofen  Last Written Prescription Date:  8/22/18  Last Fill Quantity: 90,   # refills: 0  Fentanyl  Last Written Prescription Date:  8/30/18  Last Fill Quantity: 11,   # refills: 0    [facility] Office Visit with [facility] care provider: 2/15/18    Future Office visit:   Next 5 appointments (look out 90 days)     Sep 20, 2018  3:20 PM CDT   Return Visit with Shamar Escamilla DC   [facility] ([facility])    [address]  [facility] [address]   [phone]            Sep 21, 2018  1:20 PM CDT   (Arrive by 1:00 PM)   SHORT with Lyle Fisher DO   [facility]    [address] [facility] [address]   [phone]            Nov 07, 2018  3:00 PM CST   (Arrive by 2:45 PM)   Return Visit with Laquita Fernandez MD   [facility] ([facility] )    [address]  [facility] [id number] [phone]                  Controlled substance agreement on file: Yes:  Date 11/25/15.     Processing:  Staff will hand deliver Rx to on-site pharmacy  PCP Natalia.  Thank you.

## 2018-09-20 NOTE — MR AVS SNAPSHOT
After Visit Summary   9/20/2018    Joshua Barron    MRN: [id number]           Patient Information     Date Of Birth          1960        Visit Information        Provider Department      9/20/2018 3:20 PM Shamar Escamilla DC  [facility]        Today's Diagnoses     Segmental and somatic dysfunction of lumbar region    -  1    Acute bilateral low back pain without sciatica        Segmental and somatic dysfunction of thoracic region        Segmental and somatic dysfunction of cervical region           Follow-ups after your visit        Your next 10 appointments already scheduled     Sep 26, 2018  3:30 PM CDT   Return Visit with Shamar Escamilla DC   [facility] ([facility])    [address]  [place] [address]   [phone]            Oct 25, 2018  2:40 PM CDT   (Arrive by 2:20 PM)   SHORT with Lyle Fisher DO   [facility] [place] ([facility] [place] [facility]    [address]  [place] [address]   [phone]            Nov 07, 2018  3:00 PM CST   (Arrive by 2:45 PM)   Return Visit with Laquita Fernandez MD   [facility] [place] ([facility] [place] )    [address]  [place] [id number] [phone]            Mar 13, 2019  2:15 PM CDT   (Arrive by 2:00 PM)   Hearing Eval with Kevin Moreno   [facility] [place] ([facility] [place] )    [address]  [place] [address]   [phone]              Who to contact     If you have questions or need follow up information about today's clinic visit or your schedule please contact  [facility] directly at [phone].  Normal or non-critical lab and imaging results will be communicated to you by MyChart, letter or phone within 4 business days after the clinic has received the results. If you do not hear from us within 7 days, please contact the clinic through MyChart or phone. If you have a  critical or abnormal lab result, we will notify you by phone as soon as possible.  Submit refill requests through [facility] or call your pharmacy and they will forward the refill request to us. Please allow 3 business days for your refill to be completed.          Additional Information About Your Visit        Care EveryWhere ID     This is your Care EveryWhere ID. This could be used by other organizations to access your [facility] medical records  [id number]         Blood Pressure from Last 3 Encounters:   09/05/18 120/70   07/30/18 118/82   05/30/18 132/80    Weight from Last 3 Encounters:   09/05/18 190 lb (86.2 kg)   07/30/18 190 lb (86.2 kg)   05/30/18 190 lb (86.2 kg)              We Performed the Following     CHIROPRAC MANIP,SPINAL,3-4 REGIONS        Primary Care Provider Office Phone # Fax #    Lyleunruly Martinez DO Natalia [phone]       [address] [place] [address]        Equal Access to Services     SHAHBAZ DANIELS : Hadii aad ku hadasho Soomaali, waaxda luqadaha, qaybta kaalmada adeegyada, waxay idiin hayaan brittany millan . So [facility] [phone].    ATENCIÓN: Si habla español, tiene a hastings disposición servicios gratuitos de asistencia lingüística. Tip al [phone].    We comply with applicable federal civil rights laws and [place] laws. We do not discriminate on the basis of race, color, national origin, age, disability, sex, sexual orientation, or gender identity.            Thank you!     Thank you for choosing  [facility]  for your care. Our goal is always to provide you with excellent care. Hearing back from our patients is one way we can continue to improve our services. Please take a few minutes to complete the written survey that you may receive in the mail after your visit with us. Thank you!             Your Updated Medication List - Protect others around you: Learn how to safely use, store and throw away your medicines at www.disposemymeds.org.           This list is accurate as of 9/20/18 11:59 PM.  Always use your most recent med list.                   Brand Name Dispense Instructions for use Diagnosis    * albuterol 108 (90 Base) MCG/ACT inhaler    PROAIR HFA/PROVENTIL HFA/VENTOLIN HFA     Inhale 2 puffs into the lungs every 6 hours as needed for shortness of breath / dyspnea or wheezing        * albuterol 108 (90 Base) MCG/ACT inhaler    VENTOLIN HFA    18 g    USE 2 PUFFS 4 TIMES A DAY AS NEEDED FOR SHORTNESS OF BREATH        * VENTOLIN  (90 Base) MCG/ACT inhaler   Generic drug:  albuterol     18 g    USE 2 PUFFS 4 TIMES A DAY AS NEEDED FOR SHORTNESS OF BREATH    Moderate persistent asthma without complication       ASPIRIN LOW DOSE 81 MG chewable tablet   Generic drug:  aspirin     30 tablet    CHEW AND SWALLOW 1 TABLET BY MOUTH DAILY    Healthcare maintenance       atorvastatin 20 MG tablet    LIPITOR    90 tablet    Take 1 tablet (20 mg) by mouth daily        baclofen 20 MG tablet    LIORESAL    90 tablet    TAKE 1 TABLET BY MOUTH 3 TIMES DAILY    Bilateral low back pain with left-sided sciatica, unspecified chronicity       diazepam 5 MG tablet    VALIUM    60 tablet    TAKE 1 TABLET BY MOUTH EVERY 12 HOURS AS NEEDED FOR ANXIETY OR SLEEP SPASM    DAYANA (generalized anxiety disorder)       diclofenac 50 MG EC tablet    VOLTAREN    90 tablet    TAKE 1 TABLET BY MOUTH 3 TIMES DAILY    Arthritis pain       docusate sodium 100 MG capsule    DOK    60 capsule    TAKE 1 CAPSULE BY MOUTH TWICE DAILY        fentaNYL 75 mcg/hr 72 hr patch    DURAGESIC    5 patch    APPLY 1 PATCH ONTO THE SKIN EVERY 48 HOURS, REMOVE OLD PATCH    DDD (degenerative disc disease), cervical       fluconazole 100 MG tablet    DIFLUCAN    14 tablet    Take 2 tablets (200 mg) by mouth daily    Thrush       fluticasone 50 MCG/ACT spray    FLONASE    16 g    USE 2 SPRAYS IN EACH NOSTRIL DAILY        gabapentin 300 MG capsule    NEURONTIN    270 capsule    TAKE 3 CAPSULES BY MOUTH THREE  TIMES DAILY        HYDROcodone-acetaminophen  MG per tablet    NORCO    120 tablet    TAKE 1 TABLET BY MOUTH EVERY 6 HOURS AS NEEDED FOR MODERATE TO SEVEREPAIN    Low back pain, unspecified back pain laterality, unspecified chronicity, with sciatica presence unspecified       hydrOXYzine 50 MG capsule    VISTARIL    60 capsule    Take 1-2 capsules ( mg) by mouth 4 times daily as needed for anxiety         MG tablet   Generic drug:  ibuprofen     120 tablet    TAKE 1 TABLET BY MOUTH EVERY 6 HOURS AS NEEDED    Low back pain, unspecified back pain laterality, unspecified chronicity, with sciatica presence unspecified       lidocaine 5 % Patch    LIDODERM    90 patch    PLACE 3 PATCHES ONTO THE SKIN DAILY AS NEEDED FOR MODERATE PAIN    Midline low back pain without sciatica       * lisdexamfetamine 70 MG capsule   Start taking on:  9/28/2018    VYVANSE    30 capsule    Take 1 capsule (70 mg) by mouth every morning    Attention deficit hyperactivity disorder (ADHD), unspecified ADHD type       * lisdexamfetamine 70 MG capsule   Start taking on:  10/26/2018    VYVANSE    30 capsule    Take 1 capsule (70 mg) by mouth daily    ADHD (attention deficit hyperactivity disorder), combined type       losartan 50 MG tablet    COZAAR    30 tablet    Take 1 tablet (50 mg) by mouth daily        MAPAP 325 MG tablet   Generic drug:  acetaminophen     200 tablet    TAKE 2 TABLETS BY MOUTH EVERY 4 HOURS AS NEEDED FOR MILD PAIN    Pain       * mometasone-formoterol 100-5 MCG/ACT oral inhaler    DULERA    13 g    INHALE 2 PUFFS INTO THE LUNGS 2 TIMES A DAY        * DULERA 100-5 MCG/ACT oral inhaler   Generic drug:  mometasone-formoterol     13 g    INHALE 2 PUFFS INTO THE LUNGS 2 TIMES A DAY    Moderate persistent asthma without complication       multivitamin  with iron Tabs     30 tablet    TAKE 1 TABLET BY MOUTH DAILY    Health care maintenance       nicotine polacrilex 2 MG gum    NICORETTE    110 each    CHEW 1  PIECE AS NEEDED FOR SMOKING CESSATION        nortriptyline 75 MG capsule    PAMELOR    30 capsule    Take 1 capsule (75 mg) by mouth At Bedtime        omeprazole 20 MG CR capsule    priLOSEC    30 capsule    TAKE 1 CAPSULE BY MOUTH EVERY DAY BEFORE A MEAL        * order for DME     2 Box    Equipment being ordered: Large gloves    Need for assistance with personal care       * order for DME     1 Device    1 boa back brace    Chronic low back pain with sciatica, sciatica laterality unspecified, unspecified back pain laterality       OXcarbazepine 600 MG tablet    TRILEPTAL    60 tablet    TAKE 1 TABLET BY MOUTH 2 TIMES DAILY    Bipolar affective disorder, current episode hypomanic (H)       propranolol 20 MG tablet    INDERAL    60 tablet    Take 1 tablet (20 mg) by mouth 2 times daily        senna-docusate 8.6-50 MG per tablet    SM STOOL SOFTENER/LAXATIVE    120 tablet    TAKE 1 OR 2 TABLETS BY MOUTH 2 TIMES A DAY        tamsulosin 0.4 MG capsule    FLOMAX    30 capsule    TAKE 1 CAPSULE BY MOUTH DAILY        traZODone 50 MG tablet    DESYREL    60 tablet    Take 2 tablets (100 mg) by mouth nightly as needed        * Notice:  This list has 9 medication(s) that are the same as other medications prescribed for you. Read the directions carefully, and ask your doctor or other care provider to review them with you.

## 2018-09-20 NOTE — PROGRESS NOTES

## 2018-09-21 PROBLEM — R07.0 THROAT PAIN: Status: RESOLVED | Noted: 2017-07-07 | Resolved: 2018-09-21

## 2018-09-21 RX ORDER — BACLOFEN 20 MG/1
TABLET ORAL
Qty: 90 TABLET | Refills: 0 | Status: SHIPPED | OUTPATIENT
Start: 2018-09-21 | End: 2018-11-07

## 2018-09-21 RX ORDER — FENTANYL 75 UG/1
PATCH TRANSDERMAL
Qty: 5 PATCH | Refills: 0 | Status: SHIPPED | OUTPATIENT
Start: 2018-09-21 | End: 2018-10-01

## 2018-09-24 ENCOUNTER — TELEPHONE (OUTPATIENT)
Dept: PEDIATRICS | Facility: OTHER | Age: 58
End: 2018-09-24

## 2018-09-24 NOTE — PROGRESS NOTES

## 2018-09-24 NOTE — TELEPHONE ENCOUNTER
Reason for call:  REFERRAL   1. Concern: back pain // bad disks  2. Have you seen a provider for this concern? Yes  3. Who? Makenzie, Natalia, etc  4. When? Ongoing   5. What services are you requesting? Referral to neuro surgeon   Description: Pt requesting referral to a good neurosurgeon   Was an appointment offered for this a call? Yes  If Yes:  Appointment type                Date     Preferred method for responding to this message: Telephone Call  What is your phone number ? [phone]    If we cannot reach you directly, may we leave a detailed response at the number you provided? Yes  Can this message wait until your PCP/Provider returns if not available today? YES

## 2018-09-24 NOTE — TELEPHONE ENCOUNTER
Called patient.See below.He is requesting below. He missed his appt last Friday and apologizes.His lower back is painful and radiates up into his ribcage.He rescheduled for 10.25.18 for lower back. Last MRI of back 2016. He is aware provider is out until tomorrow.Please advise.

## 2018-09-27 ENCOUNTER — OFFICE VISIT (OUTPATIENT)
Dept: CHIROPRACTIC MEDICINE | Facility: OTHER | Age: 58
End: 2018-09-27
Attending: CHIROPRACTOR
Payer: COMMERCIAL

## 2018-09-27 DIAGNOSIS — M54.50 ACUTE BILATERAL LOW BACK PAIN WITHOUT SCIATICA: ICD-10-CM

## 2018-09-27 DIAGNOSIS — M99.01 SEGMENTAL AND SOMATIC DYSFUNCTION OF CERVICAL REGION: ICD-10-CM

## 2018-09-27 DIAGNOSIS — M99.03 SEGMENTAL AND SOMATIC DYSFUNCTION OF LUMBAR REGION: Primary | ICD-10-CM

## 2018-09-27 DIAGNOSIS — M99.02 SEGMENTAL AND SOMATIC DYSFUNCTION OF THORACIC REGION: ICD-10-CM

## 2018-09-27 PROCEDURE — 98940 CHIROPRACT MANJ 1-2 REGIONS: CPT | Mod: AT | Performed by: CHIROPRACTOR

## 2018-09-27 NOTE — MR AVS SNAPSHOT
After Visit Summary   9/27/2018    Joshua Barron    MRN: [id number]           Patient Information     Date Of Birth          1960        Visit Information        Provider Department      9/27/2018 3:40 PM Shamar Escamilla DC  [facility]        Today's Diagnoses     Segmental and somatic dysfunction of lumbar region    -  1    Acute bilateral low back pain without sciatica        Segmental and somatic dysfunction of thoracic region        Segmental and somatic dysfunction of cervical region           Follow-ups after your visit        Your next 10 appointments already scheduled     Oct 03, 2018  1:10 PM CDT   Return Visit with Shamar Escamilla DC   [facility] ([facility])    [address]  [place] [address]   [phone]            Oct 25, 2018  2:40 PM CDT   (Arrive by 2:20 PM)   SHORT with Lyle Fisher DO   [facility] [place] ([facility] [place] )    [address]  [place] [address]   [phone]            Nov 07, 2018  3:00 PM CST   (Arrive by 2:45 PM)   Return Visit with Laquita Fernandez MD   [facility] [place] ([facility] [place] )    [address]  [place] [id number] [phone]            Mar 13, 2019  2:15 PM CDT   (Arrive by 2:00 PM)   Hearing Eval with Kevin Moreno   [facility] [place] ([facility] [place] )    [address]  [place] [address]   [phone]              Who to contact     If you have questions or need follow up information about today's clinic visit or your schedule please contact  [facility] directly at [phone].  Normal or non-critical lab and imaging results will be communicated to you by MyChart, letter or phone within 4 business days after the clinic has received the results. If you do not hear from us within 7 days, please contact the clinic through MyChart or phone. If you have a  critical or abnormal lab result, we will notify you by phone as soon as possible.  Submit refill requests through [employer] or call your pharmacy and they will forward the refill request to us. Please allow 3 business days for your refill to be completed.          Additional Information About Your Visit        Care EveryWhere ID     This is your Care EveryWhere ID. This could be used by other organizations to access your [place] medical records  [id number]         Blood Pressure from Last 3 Encounters:   09/05/18 120/70   07/30/18 118/82   05/30/18 132/80    Weight from Last 3 Encounters:   09/05/18 190 lb (86.2 kg)   07/30/18 190 lb (86.2 kg)   05/30/18 190 lb (86.2 kg)              We Performed the Following     CHIROPRAC MANIP,SPINAL,3-4 REGIONS        Primary Care Provider Office Phone # Fax #    Lyleunruly Martinez DO Natalia [phone]       [address] [place] [address]        Equal Access to Services     SHAHBAZ DANIELS : Hadii aad ku hadasho Soomaali, waaxda luqadaha, qaybta kaalmada adeegyada, waxay idiin hayaan brittany millan . So [facility] [phone].    ATENCIÓN: Si habla español, tiene a hastings disposición servicios gratuitos de asistencia lingüística. Tip al [phone].    We comply with applicable federal civil rights laws and [place] laws. We do not discriminate on the basis of race, color, national origin, age, disability, sex, sexual orientation, or gender identity.            Thank you!     Thank you for choosing  [facility]  for your care. Our goal is always to provide you with excellent care. Hearing back from our patients is one way we can continue to improve our services. Please take a few minutes to complete the written survey that you may receive in the mail after your visit with us. Thank you!             Your Updated Medication List - Protect others around you: Learn how to safely use, store and throw away your medicines at www.disposemymeds.org.           This list is accurate as of 9/27/18 11:59 PM.  Always use your most recent med list.                   Brand Name Dispense Instructions for use Diagnosis    * albuterol 108 (90 Base) MCG/ACT inhaler    PROAIR HFA/PROVENTIL HFA/VENTOLIN HFA     Inhale 2 puffs into the lungs every 6 hours as needed for shortness of breath / dyspnea or wheezing        * albuterol 108 (90 Base) MCG/ACT inhaler    VENTOLIN HFA    18 g    USE 2 PUFFS 4 TIMES A DAY AS NEEDED FOR SHORTNESS OF BREATH        * VENTOLIN  (90 Base) MCG/ACT inhaler   Generic drug:  albuterol     18 g    USE 2 PUFFS 4 TIMES A DAY AS NEEDED FOR SHORTNESS OF BREATH    Moderate persistent asthma without complication       ASPIRIN LOW DOSE 81 MG chewable tablet   Generic drug:  aspirin     30 tablet    CHEW AND SWALLOW 1 TABLET BY MOUTH DAILY    Healthcare maintenance       atorvastatin 20 MG tablet    LIPITOR    90 tablet    Take 1 tablet (20 mg) by mouth daily        baclofen 20 MG tablet    LIORESAL    90 tablet    TAKE 1 TABLET BY MOUTH 3 TIMES DAILY    Bilateral low back pain with left-sided sciatica, unspecified chronicity       diazepam 5 MG tablet    VALIUM    60 tablet    TAKE 1 TABLET BY MOUTH EVERY 12 HOURS AS NEEDED FOR ANXIETY OR SLEEP SPASM    DAYANA (generalized anxiety disorder)       diclofenac 50 MG EC tablet    VOLTAREN    90 tablet    TAKE 1 TABLET BY MOUTH 3 TIMES DAILY    Arthritis pain       docusate sodium 100 MG capsule    DOK    60 capsule    TAKE 1 CAPSULE BY MOUTH TWICE DAILY        fentaNYL 75 mcg/hr 72 hr patch    DURAGESIC    5 patch    APPLY 1 PATCH ONTO THE SKIN EVERY 48 HOURS, REMOVE OLD PATCH    DDD (degenerative disc disease), cervical       fluconazole 100 MG tablet    DIFLUCAN    14 tablet    Take 2 tablets (200 mg) by mouth daily    Thrush       fluticasone 50 MCG/ACT spray    FLONASE    16 g    USE 2 SPRAYS IN EACH NOSTRIL DAILY        gabapentin 300 MG capsule    NEURONTIN    270 capsule    TAKE 3 CAPSULES BY MOUTH THREE  TIMES DAILY        HYDROcodone-acetaminophen  MG per tablet    NORCO    120 tablet    TAKE 1 TABLET BY MOUTH EVERY 6 HOURS AS NEEDED FOR MODERATE TO SEVEREPAIN    Low back pain, unspecified back pain laterality, unspecified chronicity, with sciatica presence unspecified       hydrOXYzine 50 MG capsule    VISTARIL    60 capsule    Take 1-2 capsules ( mg) by mouth 4 times daily as needed for anxiety         MG tablet   Generic drug:  ibuprofen     120 tablet    TAKE 1 TABLET BY MOUTH EVERY 6 HOURS AS NEEDED    Low back pain, unspecified back pain laterality, unspecified chronicity, with sciatica presence unspecified       lidocaine 5 % Patch    LIDODERM    90 patch    PLACE 3 PATCHES ONTO THE SKIN DAILY AS NEEDED FOR MODERATE PAIN    Midline low back pain without sciatica       * lisdexamfetamine 70 MG capsule    VYVANSE    30 capsule    Take 1 capsule (70 mg) by mouth every morning    Attention deficit hyperactivity disorder (ADHD), unspecified ADHD type       * lisdexamfetamine 70 MG capsule   Start taking on:  10/26/2018    VYVANSE    30 capsule    Take 1 capsule (70 mg) by mouth daily    ADHD (attention deficit hyperactivity disorder), combined type       losartan 50 MG tablet    COZAAR    30 tablet    Take 1 tablet (50 mg) by mouth daily        MAPAP 325 MG tablet   Generic drug:  acetaminophen     200 tablet    TAKE 2 TABLETS BY MOUTH EVERY 4 HOURS AS NEEDED FOR MILD PAIN    Pain       * mometasone-formoterol 100-5 MCG/ACT oral inhaler    DULERA    13 g    INHALE 2 PUFFS INTO THE LUNGS 2 TIMES A DAY        * DULERA 100-5 MCG/ACT oral inhaler   Generic drug:  mometasone-formoterol     13 g    INHALE 2 PUFFS INTO THE LUNGS 2 TIMES A DAY    Moderate persistent asthma without complication       multivitamin  with iron Tabs     30 tablet    TAKE 1 TABLET BY MOUTH DAILY    Health care maintenance       nicotine polacrilex 2 MG gum    NICORETTE    110 each    CHEW 1 PIECE AS NEEDED FOR SMOKING  CESSATION        nortriptyline 75 MG capsule    PAMELOR    30 capsule    Take 1 capsule (75 mg) by mouth At Bedtime        omeprazole 20 MG CR capsule    priLOSEC    30 capsule    TAKE 1 CAPSULE BY MOUTH EVERY DAY BEFORE A MEAL        * order for DME     2 Box    Equipment being ordered: Large gloves    Need for assistance with personal care       * order for DME     1 Device    1 boa back brace    Chronic low back pain with sciatica, sciatica laterality unspecified, unspecified back pain laterality       OXcarbazepine 600 MG tablet    TRILEPTAL    60 tablet    TAKE 1 TABLET BY MOUTH 2 TIMES DAILY    Bipolar affective disorder, current episode hypomanic (H)       propranolol 20 MG tablet    INDERAL    60 tablet    Take 1 tablet (20 mg) by mouth 2 times daily        senna-docusate 8.6-50 MG per tablet    SM STOOL SOFTENER/LAXATIVE    120 tablet    TAKE 1 OR 2 TABLETS BY MOUTH 2 TIMES A DAY        tamsulosin 0.4 MG capsule    FLOMAX    30 capsule    TAKE 1 CAPSULE BY MOUTH DAILY        traZODone 50 MG tablet    DESYREL    60 tablet    Take 2 tablets (100 mg) by mouth nightly as needed        * Notice:  This list has 9 medication(s) that are the same as other medications prescribed for you. Read the directions carefully, and ask your doctor or other care provider to review them with you.

## 2018-09-28 NOTE — PROGRESS NOTES
Subjective Finding:    Chief compalint: No chief complaint on file.  , Pain Scale: 4/10, Intensity: dull, Duration: 2 days, Radiating: no.    Date of injury:     Activities that the pain restricts:   Home/household/hobbies/social activities: yes.  Work duties: yes.  Sleep: yes.  Makes symptoms better: rest.  Makes symptoms worse: lumbar extension and lumbar flexion.  Have you seen anyone else for the symptoms? No.  Work related: no.  Automobile related injury: no.    Objective and Assessment:    Posture Analysis:   High shoulder: .  Head tilt: .  High iliac crest: .  Head carriage: neutral.  Thoracic Kyphosis: neutral.  Lumbar Lordosis: forward.    Lumbar Range of Motion: flexion decreased and extension decreased.  Cervical Range of Motion: extension decreased.  Thoracic Range of Motion: extension decreased.  Extremity Range of Motion: .    Palpation:   Quad lumb: bilateral, referred pain: no  T paraspinals: dull pain, no    Segmental dysfunction pre-treatment and treatment area: C4, T5, T6 and Sacrum.    Assessment post-treatment:  Cervical: ROM increased.  Thoracic: ROM increased.  Lumbar: ROM increased.    Comments: history of DDD in C and L spine.      Complicating Factors: .    Procedure(s):  CMT:  72792 Chiropractic manipulative treatment 3-4 regions performed   Cervical: Diversified, See above for level, Supine, Thoracic: Diversified, See above for level, Prone and Lumbar: Diversified, See above for level, Side posture    Modalities:  None performed this visit    Therapeutic procedures:  None    Plan:  Treatment plan: PRN.  Instructed patient: stretch as instructed at visit.  Short term goals: increase ROM.  Long term goals: increase ADL.  Prognosis: good.

## 2018-10-01 DIAGNOSIS — M50.30 DDD (DEGENERATIVE DISC DISEASE), CERVICAL: ICD-10-CM

## 2018-10-01 RX ORDER — FENTANYL 75 UG/1
PATCH TRANSDERMAL
Qty: 11 PATCH | Refills: 0 | Status: SHIPPED | OUTPATIENT
Start: 2018-10-01 | End: 2018-10-18

## 2018-10-01 NOTE — TELEPHONE ENCOUNTER
Patient no showed his appointment with Dr. Fisher on 9/21/18 and is rescheduled for 10/25/18. He states that he will need a refill to make it to his appointment. Pt states that he will move his appointment up if he needed.    Fentanyl patch      Last Written Prescription Date:  9/21/18  Last Fill Quantity: 5,   # refills: 0  Last Office Visit: 2/15/18  Future Office visit:    Next 5 appointments (look out 90 days)     Oct 03, 2018  1:10 PM CDT   Return Visit with Shamar Escamilla DC   [facility] ([facility]    [address]  [place] [facility] [place] [address]   [phone]            Oct 25, 2018  2:40 PM CDT   (Arrive by 2:20 PM)   SHORT with Lyle Fisher DO   [facility]    [address] [facility] [address]   [phone]            Nov 07, 2018  3:00 PM CST   (Arrive by 2:45 PM)   Return Visit with Laquita Fernandez MD   [facility] ([facility] )    [address]  [place] [facility] [place] [id number] [phone]                   Routing refill request to provider for review/approval because:

## 2018-10-03 ENCOUNTER — OFFICE VISIT (OUTPATIENT)
Dept: CHIROPRACTIC MEDICINE | Facility: OTHER | Age: 58
End: 2018-10-03
Attending: CHIROPRACTOR
Payer: COMMERCIAL

## 2018-10-03 DIAGNOSIS — M99.01 SEGMENTAL AND SOMATIC DYSFUNCTION OF CERVICAL REGION: ICD-10-CM

## 2018-10-03 DIAGNOSIS — M54.50 ACUTE BILATERAL LOW BACK PAIN WITHOUT SCIATICA: ICD-10-CM

## 2018-10-03 DIAGNOSIS — M99.03 SEGMENTAL AND SOMATIC DYSFUNCTION OF LUMBAR REGION: Primary | ICD-10-CM

## 2018-10-03 DIAGNOSIS — M99.02 SEGMENTAL AND SOMATIC DYSFUNCTION OF THORACIC REGION: ICD-10-CM

## 2018-10-03 PROCEDURE — 98941 CHIROPRACT MANJ 3-4 REGIONS: CPT | Mod: AT | Performed by: CHIROPRACTOR

## 2018-10-03 NOTE — MR AVS SNAPSHOT
After Visit Summary   10/3/2018    Joshua Barron    MRN: [id number]           Patient Information     Date Of Birth          1960        Visit Information        Provider Department      10/3/2018 1:10 PM Shamar Escamilla DC  [facility]        Today's Diagnoses     Segmental and somatic dysfunction of lumbar region    -  1    Acute bilateral low back pain without sciatica        Segmental and somatic dysfunction of thoracic region        Segmental and somatic dysfunction of cervical region           Follow-ups after your visit        Your next 10 appointments already scheduled     Oct 25, 2018  2:40 PM CDT   (Arrive by 2:20 PM)   SHORT with Lyle Fisher,    [facility] [place] ([facility] [place] )    [address]  [place] [address]   [phone]            Nov 07, 2018  3:00 PM CST   (Arrive by 2:45 PM)   Return Visit with Laquita Fernandez MD   [facility] [place] ([facility] [place] )    [address]  [place] [id number] [phone]            Mar 13, 2019  2:15 PM CDT   (Arrive by 2:00 PM)   Hearing Eval with Kevin Moreno   [facility] [place] ([facility] [place] )    [address]  [place] [address]   [phone]              Who to contact     If you have questions or need follow up information about today's clinic visit or your schedule please contact  [facility] directly at [phone].  Normal or non-critical lab and imaging results will be communicated to you by MyChart, letter or phone within 4 business days after the clinic has received the results. If you do not hear from us within 7 days, please contact the clinic through MyChart or phone. If you have a critical or abnormal lab result, we will notify you by phone as soon as possible.  Submit refill requests through [employer] or call your pharmacy and they will forward the refill request  to us. Please allow 3 business days for your refill to be completed.          Additional Information About Your Visit        Care EveryWhere ID     This is your Care EveryWhere ID. This could be used by other organizations to access your [place] medical records  [id number]         Blood Pressure from Last 3 Encounters:   09/05/18 120/70   07/30/18 118/82   05/30/18 132/80    Weight from Last 3 Encounters:   09/05/18 190 lb (86.2 kg)   07/30/18 190 lb (86.2 kg)   05/30/18 190 lb (86.2 kg)              We Performed the Following     CHIROPRAC MANIP,SPINAL,3-4 REGIONS        Primary Care Provider Office Phone # Fax #    Lyleunruly Fisher,  [phone]       [facility] [address]        Equal Access to Services     SHAHBAZ DANIELS : Hadii aad ku hadasho Soomaali, waaxda luqadaha, qaybta kaalmada brittanyyacheng, jaki millan . So [facility] [phone].    ATENCIÓN: Si habla español, tiene a hastings disposición servicios gratuitos de asistencia lingüística. Tip al [phone].    We comply with applicable federal civil rights laws and [place] laws. We do not discriminate on the basis of race, color, national origin, age, disability, sex, sexual orientation, or gender identity.            Thank you!     Thank you for choosing  [facility]  for your care. Our goal is always to provide you with excellent care. Hearing back from our patients is one way we can continue to improve our services. Please take a few minutes to complete the written survey that you may receive in the mail after your visit with us. Thank you!             Your Updated Medication List - Protect others around you: Learn how to safely use, store and throw away your medicines at www.disposemymeds.org.          This list is accurate as of 10/3/18 11:59 PM.  Always use your most recent med list.                   Brand Name Dispense Instructions for use Diagnosis    * albuterol 108 (90 Base)  MCG/ACT inhaler    PROAIR HFA/PROVENTIL HFA/VENTOLIN HFA     Inhale 2 puffs into the lungs every 6 hours as needed for shortness of breath / dyspnea or wheezing        * albuterol 108 (90 Base) MCG/ACT inhaler    VENTOLIN HFA    18 g    USE 2 PUFFS 4 TIMES A DAY AS NEEDED FOR SHORTNESS OF BREATH        * VENTOLIN  (90 Base) MCG/ACT inhaler   Generic drug:  albuterol     18 g    USE 2 PUFFS 4 TIMES A DAY AS NEEDED FOR SHORTNESS OF BREATH    Moderate persistent asthma without complication       ASPIRIN LOW DOSE 81 MG chewable tablet   Generic drug:  aspirin     30 tablet    CHEW AND SWALLOW 1 TABLET BY MOUTH DAILY    Healthcare maintenance       atorvastatin 20 MG tablet    LIPITOR    90 tablet    Take 1 tablet (20 mg) by mouth daily        baclofen 20 MG tablet    LIORESAL    90 tablet    TAKE 1 TABLET BY MOUTH 3 TIMES DAILY    Bilateral low back pain with left-sided sciatica, unspecified chronicity       diazepam 5 MG tablet    VALIUM    60 tablet    TAKE 1 TABLET BY MOUTH EVERY 12 HOURS AS NEEDED FOR ANXIETY OR SLEEP SPASM    DAYANA (generalized anxiety disorder)       diclofenac 50 MG EC tablet    VOLTAREN    90 tablet    TAKE 1 TABLET BY MOUTH 3 TIMES DAILY    Arthritis pain       docusate sodium 100 MG capsule    DOK    60 capsule    TAKE 1 CAPSULE BY MOUTH TWICE DAILY        fentaNYL 75 mcg/hr 72 hr patch    DURAGESIC    11 patch    APPLY 1 PATCH ONTO THE SKIN EVERY 48 HOURS, REMOVE OLD PATCH    DDD (degenerative disc disease), cervical       fluconazole 100 MG tablet    DIFLUCAN    14 tablet    Take 2 tablets (200 mg) by mouth daily    Thrush       fluticasone 50 MCG/ACT spray    FLONASE    16 g    USE 2 SPRAYS IN EACH NOSTRIL DAILY        gabapentin 300 MG capsule    NEURONTIN    270 capsule    TAKE 3 CAPSULES BY MOUTH THREE TIMES DAILY        HYDROcodone-acetaminophen  MG per tablet    NORCO    120 tablet    TAKE 1 TABLET BY MOUTH EVERY 6 HOURS AS NEEDED FOR MODERATE TO SEVEREPAIN    Low back pain,  unspecified back pain laterality, unspecified chronicity, with sciatica presence unspecified       hydrOXYzine 50 MG capsule    VISTARIL    60 capsule    Take 1-2 capsules ( mg) by mouth 4 times daily as needed for anxiety         MG tablet   Generic drug:  ibuprofen     120 tablet    TAKE 1 TABLET BY MOUTH EVERY 6 HOURS AS NEEDED    Low back pain, unspecified back pain laterality, unspecified chronicity, with sciatica presence unspecified       lidocaine 5 % Patch    LIDODERM    90 patch    PLACE 3 PATCHES ONTO THE SKIN DAILY AS NEEDED FOR MODERATE PAIN    Midline low back pain without sciatica       * lisdexamfetamine 70 MG capsule    VYVANSE    30 capsule    Take 1 capsule (70 mg) by mouth every morning    Attention deficit hyperactivity disorder (ADHD), unspecified ADHD type       * lisdexamfetamine 70 MG capsule   Start taking on:  10/26/2018    VYVANSE    30 capsule    Take 1 capsule (70 mg) by mouth daily    ADHD (attention deficit hyperactivity disorder), combined type       losartan 50 MG tablet    COZAAR    30 tablet    Take 1 tablet (50 mg) by mouth daily        MAPAP 325 MG tablet   Generic drug:  acetaminophen     200 tablet    TAKE 2 TABLETS BY MOUTH EVERY 4 HOURS AS NEEDED FOR MILD PAIN    Pain       * mometasone-formoterol 100-5 MCG/ACT oral inhaler    DULERA    13 g    INHALE 2 PUFFS INTO THE LUNGS 2 TIMES A DAY        * DULERA 100-5 MCG/ACT oral inhaler   Generic drug:  mometasone-formoterol     13 g    INHALE 2 PUFFS INTO THE LUNGS 2 TIMES A DAY    Moderate persistent asthma without complication       multivitamin  with iron Tabs     30 tablet    TAKE 1 TABLET BY MOUTH DAILY    Health care maintenance       nicotine polacrilex 2 MG gum    NICORETTE    110 each    CHEW 1 PIECE AS NEEDED FOR SMOKING CESSATION        nortriptyline 75 MG capsule    PAMELOR    30 capsule    Take 1 capsule (75 mg) by mouth At Bedtime        omeprazole 20 MG CR capsule    priLOSEC    30 capsule    TAKE 1  CAPSULE BY MOUTH EVERY DAY BEFORE A MEAL        * order for DME     2 Box    Equipment being ordered: Large gloves    Need for assistance with personal care       * order for DME     1 Device    1 boa back brace    Chronic low back pain with sciatica, sciatica laterality unspecified, unspecified back pain laterality       OXcarbazepine 600 MG tablet    TRILEPTAL    60 tablet    TAKE 1 TABLET BY MOUTH 2 TIMES DAILY    Bipolar affective disorder, current episode hypomanic (H)       propranolol 20 MG tablet    INDERAL    60 tablet    Take 1 tablet (20 mg) by mouth 2 times daily        senna-docusate 8.6-50 MG per tablet    SM STOOL SOFTENER/LAXATIVE    120 tablet    TAKE 1 OR 2 TABLETS BY MOUTH 2 TIMES A DAY        tamsulosin 0.4 MG capsule    FLOMAX    30 capsule    TAKE 1 CAPSULE BY MOUTH DAILY        traZODone 50 MG tablet    DESYREL    60 tablet    Take 2 tablets (100 mg) by mouth nightly as needed        * Notice:  This list has 9 medication(s) that are the same as other medications prescribed for you. Read the directions carefully, and ask your doctor or other care provider to review them with you.

## 2018-10-04 NOTE — PROGRESS NOTES

## 2018-10-08 DIAGNOSIS — M54.5 LOW BACK PAIN, UNSPECIFIED BACK PAIN LATERALITY, UNSPECIFIED CHRONICITY, WITH SCIATICA PRESENCE UNSPECIFIED: ICD-10-CM

## 2018-10-11 RX ORDER — HYDROCODONE BITARTRATE AND ACETAMINOPHEN 10; 325 MG/1; MG/1
TABLET ORAL
Qty: 120 TABLET | Refills: 0 | Status: SHIPPED | OUTPATIENT
Start: 2018-10-11 | End: 2018-11-02

## 2018-10-18 DIAGNOSIS — M54.42 BILATERAL LOW BACK PAIN WITH LEFT-SIDED SCIATICA, UNSPECIFIED CHRONICITY: Primary | ICD-10-CM

## 2018-10-18 DIAGNOSIS — F31.12 BIPOLAR AFFECTIVE DISORDER, CURRENTLY MANIC, MODERATE (H): ICD-10-CM

## 2018-10-18 DIAGNOSIS — M50.30 DDD (DEGENERATIVE DISC DISEASE), CERVICAL: ICD-10-CM

## 2018-10-18 NOTE — TELEPHONE ENCOUNTER
Fentanyl  Last office visit: 2/15/18  Last refill: 10/1/18 #11, 0 R  Thank you.  Baclofen  Last refill: 9/21/18 #90, 0 R  Thank you.  Depakote   Last refill: Not on current medication list.  Last signed 2/15/18 #60, 3 R.  Medication discontinued on 3/27/18 for reason stopped by patient.  It looks like the note from Dr. Fernandez from that day says that he dc'd this medication.  Please advise.  Thank you.

## 2018-10-19 RX ORDER — FENTANYL 75 UG/1
PATCH TRANSDERMAL
Qty: 11 PATCH | Refills: 0 | Status: SHIPPED | OUTPATIENT
Start: 2018-10-19 | End: 2018-11-15

## 2018-10-19 RX ORDER — DIVALPROEX SODIUM 500 MG/1
TABLET, DELAYED RELEASE ORAL
Qty: 60 TABLET | Refills: 0 | Status: SHIPPED | OUTPATIENT
Start: 2018-10-19 | End: 2018-11-15 | Stop reason: SINTOL

## 2018-10-19 RX ORDER — BACLOFEN 20 MG/1
TABLET ORAL
Qty: 90 TABLET | Refills: 0 | Status: SHIPPED | OUTPATIENT
Start: 2018-10-19 | End: 2018-11-15

## 2018-10-25 ENCOUNTER — TRANSFERRED RECORDS (OUTPATIENT)
Dept: HEALTH INFORMATION MANAGEMENT | Facility: CLINIC | Age: 58
End: 2018-10-25

## 2018-10-25 ENCOUNTER — PATIENT OUTREACH (OUTPATIENT)
Dept: CARE COORDINATION | Facility: OTHER | Age: 58
End: 2018-10-25

## 2018-10-25 ENCOUNTER — OFFICE VISIT (OUTPATIENT)
Dept: PEDIATRICS | Facility: OTHER | Age: 58
End: 2018-10-25
Attending: INTERNAL MEDICINE
Payer: COMMERCIAL

## 2018-10-25 VITALS
OXYGEN SATURATION: 95 % | WEIGHT: 186 LBS | BODY MASS INDEX: 27.47 KG/M2 | HEART RATE: 85 BPM | DIASTOLIC BLOOD PRESSURE: 74 MMHG | RESPIRATION RATE: 17 BRPM | TEMPERATURE: 98.2 F | SYSTOLIC BLOOD PRESSURE: 126 MMHG

## 2018-10-25 DIAGNOSIS — G89.4 CHRONIC PAIN SYNDROME: Primary | ICD-10-CM

## 2018-10-25 DIAGNOSIS — E78.2 MIXED HYPERLIPIDEMIA: ICD-10-CM

## 2018-10-25 DIAGNOSIS — G89.4 CHRONIC PAIN SYNDROME: ICD-10-CM

## 2018-10-25 DIAGNOSIS — Z23 NEED FOR PROPHYLACTIC VACCINATION AND INOCULATION AGAINST INFLUENZA: ICD-10-CM

## 2018-10-25 DIAGNOSIS — J45.40 MODERATE PERSISTENT ASTHMA WITHOUT COMPLICATION: ICD-10-CM

## 2018-10-25 DIAGNOSIS — Z12.5 SCREENING FOR PROSTATE CANCER: ICD-10-CM

## 2018-10-25 DIAGNOSIS — I10 BENIGN ESSENTIAL HYPERTENSION: ICD-10-CM

## 2018-10-25 DIAGNOSIS — E11.9 TYPE 2 DIABETES MELLITUS WITHOUT COMPLICATION, WITHOUT LONG-TERM CURRENT USE OF INSULIN (H): ICD-10-CM

## 2018-10-25 DIAGNOSIS — M51.369 DDD (DEGENERATIVE DISC DISEASE), LUMBAR: ICD-10-CM

## 2018-10-25 DIAGNOSIS — Z12.5 PROSTATE CANCER SCREENING: ICD-10-CM

## 2018-10-25 LAB
ALBUMIN SERPL-MCNC: 4.2 G/DL (ref 3.4–5)
ALP SERPL-CCNC: 88 U/L (ref 40–150)
ALT SERPL W P-5'-P-CCNC: 34 U/L (ref 0–70)
ANION GAP SERPL CALCULATED.3IONS-SCNC: 5 MMOL/L (ref 3–14)
AST SERPL W P-5'-P-CCNC: 28 U/L (ref 0–45)
BILIRUB SERPL-MCNC: 0.3 MG/DL (ref 0.2–1.3)
BUN SERPL-MCNC: 24 MG/DL (ref 7–30)
CALCIUM SERPL-MCNC: 8.2 MG/DL (ref 8.5–10.1)
CHLORIDE SERPL-SCNC: 107 MMOL/L (ref 94–109)
CHOLEST SERPL-MCNC: 131 MG/DL
CO2 SERPL-SCNC: 29 MMOL/L (ref 20–32)
CREAT SERPL-MCNC: 0.92 MG/DL (ref 0.66–1.25)
CRP SERPL-MCNC: 32.1 MG/L (ref 0–8)
ERYTHROCYTE [DISTWIDTH] IN BLOOD BY AUTOMATED COUNT: 12.1 % (ref 10–15)
EST. AVERAGE GLUCOSE BLD GHB EST-MCNC: 97 MG/DL
GFR SERPL CREATININE-BSD FRML MDRD: 86 ML/MIN/1.7M2
GLUCOSE SERPL-MCNC: 111 MG/DL (ref 70–99)
HBA1C MFR BLD: 5 % (ref 0–5.6)
HCT VFR BLD AUTO: 35.3 % (ref 40–53)
HDLC SERPL-MCNC: 51 MG/DL
HGB BLD-MCNC: 12.2 G/DL (ref 13.3–17.7)
LDLC SERPL CALC-MCNC: 60 MG/DL
MCH RBC QN AUTO: 32.7 PG (ref 26.5–33)
MCHC RBC AUTO-ENTMCNC: 34.6 G/DL (ref 31.5–36.5)
MCV RBC AUTO: 95 FL (ref 78–100)
NONHDLC SERPL-MCNC: 80 MG/DL
PLATELET # BLD AUTO: 166 10E9/L (ref 150–450)
POTASSIUM SERPL-SCNC: 4.4 MMOL/L (ref 3.4–5.3)
PROT SERPL-MCNC: 6.9 G/DL (ref 6.8–8.8)
PSA SERPL-ACNC: 0.19 UG/L (ref 0–4)
RBC # BLD AUTO: 3.73 10E12/L (ref 4.4–5.9)
SODIUM SERPL-SCNC: 141 MMOL/L (ref 133–144)
TRIGL SERPL-MCNC: 101 MG/DL
WBC # BLD AUTO: 5.6 10E9/L (ref 4–11)

## 2018-10-25 PROCEDURE — 80354 DRUG SCREENING FENTANYL: CPT | Mod: ZL | Performed by: INTERNAL MEDICINE

## 2018-10-25 PROCEDURE — 80361 OPIATES 1 OR MORE: CPT | Mod: ZL | Performed by: INTERNAL MEDICINE

## 2018-10-25 PROCEDURE — G0103 PSA SCREENING: HCPCS | Mod: ZL | Performed by: INTERNAL MEDICINE

## 2018-10-25 PROCEDURE — 90682 RIV4 VACC RECOMBINANT DNA IM: CPT | Performed by: INTERNAL MEDICINE

## 2018-10-25 PROCEDURE — 86140 C-REACTIVE PROTEIN: CPT | Mod: ZL | Performed by: INTERNAL MEDICINE

## 2018-10-25 PROCEDURE — 80053 COMPREHEN METABOLIC PANEL: CPT | Mod: ZL | Performed by: INTERNAL MEDICINE

## 2018-10-25 PROCEDURE — 80307 DRUG TEST PRSMV CHEM ANLYZR: CPT | Mod: ZL | Performed by: INTERNAL MEDICINE

## 2018-10-25 PROCEDURE — 80171 DRUG SCREEN QUANT GABAPENTIN: CPT | Mod: ZL | Performed by: INTERNAL MEDICINE

## 2018-10-25 PROCEDURE — 80359 METHYLENEDIOXYAMPHETAMINES: CPT | Mod: ZL | Performed by: INTERNAL MEDICINE

## 2018-10-25 PROCEDURE — 80061 LIPID PANEL: CPT | Mod: ZL | Performed by: INTERNAL MEDICINE

## 2018-10-25 PROCEDURE — 99214 OFFICE O/P EST MOD 30 MIN: CPT | Performed by: INTERNAL MEDICINE

## 2018-10-25 PROCEDURE — G0463 HOSPITAL OUTPT CLINIC VISIT: HCPCS | Mod: 25

## 2018-10-25 PROCEDURE — G0008 ADMIN INFLUENZA VIRUS VAC: HCPCS | Performed by: INTERNAL MEDICINE

## 2018-10-25 PROCEDURE — 80347 BENZODIAZEPINES 13 OR MORE: CPT | Mod: ZL | Performed by: INTERNAL MEDICINE

## 2018-10-25 PROCEDURE — 40000788 ZZHCL STATISTIC ESTIMATED AVERAGE GLUCOSE: Mod: ZL | Performed by: INTERNAL MEDICINE

## 2018-10-25 PROCEDURE — 85027 COMPLETE CBC AUTOMATED: CPT | Mod: ZL | Performed by: INTERNAL MEDICINE

## 2018-10-25 PROCEDURE — 83036 HEMOGLOBIN GLYCOSYLATED A1C: CPT | Mod: ZL | Performed by: INTERNAL MEDICINE

## 2018-10-25 PROCEDURE — 80365 DRUG SCREENING OXYCODONE: CPT | Mod: ZL | Performed by: INTERNAL MEDICINE

## 2018-10-25 PROCEDURE — 99000 SPECIMEN HANDLING OFFICE-LAB: CPT | Performed by: INTERNAL MEDICINE

## 2018-10-25 PROCEDURE — 80326 AMPHETAMINES 5 OR MORE: CPT | Mod: ZL | Performed by: INTERNAL MEDICINE

## 2018-10-25 PROCEDURE — 36415 COLL VENOUS BLD VENIPUNCTURE: CPT | Mod: ZL | Performed by: INTERNAL MEDICINE

## 2018-10-25 ASSESSMENT — PAIN SCALES - GENERAL: PAINLEVEL: EXTREME PAIN (8)

## 2018-10-25 ASSESSMENT — ACTIVITIES OF DAILY LIVING (ADL): DEPENDENT_IADLS:: INDEPENDENT

## 2018-10-25 NOTE — PROGRESS NOTES
"Clinic Care Coordination Contact  Care Team Conversations    Care coordinator attended office visit with pt and PCP this date.  Care coordination and role of care coordinator explained to pt.  He is accepting of services.  Provided him with CC\"s business card this date.  Mailed intro letter, emergency care plan, and chronic pain self care plan this date as well.  Encouraged him to call CC with any questions/concerns/problems.  Verbalized understanding.  He is to get an MRI of Thoracic Spine.  This is ordered.  No medication changes today.   Educated him on opioid induced hyperalgesia, use of Narcan, and how to properly dispose of Fentanyl patches.  He is interested in attending PT.  Will put in PT referral.      Love Quan RN-BSN  Chronic Pain Care Coordinator  [phone] opt. #3            "

## 2018-10-25 NOTE — LETTER
Chronic Pain Self-care Plan   Name: Joshua Barron   Date of Birth 1960  Date: 10/25/2018    My doctor: Lyle Fisher   My clinic: [facility] [place]  [address]  [place] [id number] [phone]          GREEN    ZONE     Good Control    What it looks like:     Things are going generally well.    You have normal up s and down s in your pain level.    You have bad moods but they usually last less than a day, and overall you don t feel depressed or, if you have depression, it is well managed.     You are able to do all aspects of your self-care plan.   What you need to do:  1. Continue to care for yourself (see self-care plan).  2. Follow your physician s recommendations including any medication.  3. Do not stop taking medication unless you consult with your physician first.         YELLOW         ZONE     Getting Worse    What it looks like:     Your pain is starting to interfere more with your life:  you hesitate to plan leisure/fun activities and find yourself saying  no  to invitations from friends and/or family.    Most days your pain is worse than it used to be and your medications aren t working as well.    You feel like you are more irritable or discouraged in general and you wonder if you need help with depression.     You are having more trouble completing your usual exercises and stretching program, daily relaxation or other parts of your self-care program.  OR    You are starting to notice a new kind of pain.     What you need to do:     1. Call your care team, your response to treatment will improve if you keep your care team informed of your progress. Yellow periods are signs an adjustment may need to be made.     2. Continue your self-care.    3. Talk to someone in your support network.    4. Do not adjust or stop taking medication unless you consult with your physician first.           RED    ZONE     Medical Alert - Get Help    What it looks  like:     Pain is seriously interfering with your life: You are taking time off of work or cancelling other obligations, you need to spend extra time in bed, and/or your friends or family are urging you to seek medical care.    Your pain seems out of control.     You are feeling depressed or your depression is worsening.    You are doing hardly any self-care.     OR    You have a severe, NEW pain.     What you need to do:  1. Call your care team and request a same-day appointment. If they are not available (weekends or after hours) call your local crisis line, emergency room or 911.    2. Do not adjust or stop taking medication unless you consult with your physician first.        Electronically signed by: ALMA DE JESUS, October 25, 2018        Chronic Pain Self Care Plan  There are many benefits of managing pain flares with self-care.  Self-care does not have the side effects found with medicines.  Medicines do not always help.  Some of their side effects may make you more sensitive to pain.  Pain can increase depression and self-care can help manage depression.  Depressed patients report more pain and report less pain when depression is managed.  Relaxation techniques can undo the harmful effects of stress on the body.  When you are stressed, your body releases a hormone called cortisol, which can increase anxiety and disturb sleep.    How can you better manage pain flares?     Ways to relax:    Muscle stretching    Shoulder shrugs, head circles, shoulder rolls, arm and fist tightening and release, arms overhead with chest tightening, PT exercises    General muscle relaxation    Breathing exercises    To a set count:  breathe in, counting 1-2-3-4.  Breath out, counting 1-2-3-4.  OR:  Breathe in for 6 counts and breathe out for 3 counts.    Color breathing:  breath as a soft relaxing color you breathe into all parts of your body.    Music    To relax, listen to music slower than your heart rate; To energize,  music faster than your heart rate.    Try to hear the sounds or patterns in music.    Use music with breathing or relaxation exercises.    Dance, sing, hum or play an instrument.    Visual imagery    Imagine your ideal place or a safe, peaceful place, relax.    Empty the sandbag:  fill it up, make a slit, and let the pain flow out with the sand.    Red ball:  gather pains and aches into a ball.  Change the size of the ball--from really big to the size of a grain of sand.  Move it out of your body and farther away each time you breath out.    Red light to green:  scan your body and see the areas of pain as red  stoplights.   Slowly scan again turning each red light to green, letting go of pain and tension.    Activity:    Exercise is good.  It will make you feel more cheerful, improve blood flow and muscle endurance, reduce pain, and help release emotions.  Talk to your provider about appropriate exercises.   In most instances, activity will not cause more harm even if it is painful.    Pace yourself.  Plan to take breaks, but stick to a schedule.  If you can t take a break, stop and change your position.  Change positions before you feel tired or sore.  Stop and stretch often.    Save energy.  Don t do what does not need to be done.  Avoid using shoulder bags and pack grocery bags lightly or use a rolling cart.  Keep items you use often within reach.    Move safely.  Be aware of your posture at rest and during activity.  Don t let one part of your body bear most of the force.  Break difficult activities into smaller movements.    Spiritual Living:    Thoughts can increase stress.  When you relive painful memories or have sad or angry thoughts, the body responds as if it is under threat.      Grow in spiritual awareness.   o Spiritual beliefs can change how you experience and respond to an event.  They can give hope and comfort that you are not alone.  A spiritual life is not necessarily about Confucianism.  It is the  connection to life energy.  o Ways to grow in spiritual awareness:  chanting, meditating, repetitious prayer, silent conversation or focusing on a sacred object.  Connecting to spirit helps us stay in the present and detach from past and future.  Pain will not take over.    Humor and Stress Reduction    Humor as medicine:  Collect funny TV programs or jokes.  Make daily laughs a habit.    Practice mindfulness:  be aware of the world around you--sunlight, movement of fish, leaves in the wind.  Connect to the world.  The world is not defined by pain or suffering.    Share your stress:  Talk with a friend, family, therapist and know when to ask for help.    Keep a journal:  Write about your dreams, disappointments, plans and concerns.  Let go of things that frustrate you.  Look for patterns of feelings and thoughts.  There is more to your life than pain.    Distraction:  When an activity causes your body discomfort, change what you re doing.  If your task requires sitting, do something active.  If you re moving about, sit and rest for a change.    You re Flaring.  Now what do you do?      Eat healthy meals.    Laugh, do an activity you enjoy.    Use cold and hot packs (a rice sock is one type).  Use cold for 24 to 48 hrs on red, hot or swollen joints and muscles.  Cold reduces inflammation, relaxes muscles and numbs the area.  Use heat for 48 to 72 hrs on stiff joints, muscle spasms and pain.  Use either one for 15 to 20 minutes at a time.    Acupressure    Hand massage    Stop.  Take a break.  Lie down.    Distract yourself with music or by looking forward to something you enjoy.    Talk to your care team regarding other options that may work for you.

## 2018-10-25 NOTE — LETTER
[facility]  [address] [place] [address]    October 25, 2018    Joshua Barron  [address] [facility] [address]      Dear Joshua,    I am a clinic care coordinator who works with Lyle Fisher DO, at the [facility] in [place]. I wanted to thank you for spending the time to talk with me.  I wanted to introduce myself and provide you with my contact information so that you can call me with questions or concerns about your health care. Below is a description of clinic care coordination and how I can further assist you.     The clinic care coordinator is a registered nurse and/or  who understand the health care system. The goal of clinic care coordination is to help you manage your health and improve access to the [facility] system in the most efficient manner. The registered nurse can assist you in meeting your health care goals by providing education, coordinating services, and strengthening the communication among your providers. The  can assist you with financial, behavioral, psychosocial, chemical dependency, counseling, and/or psychiatric resources.    Please feel free to contact me at [phone] option #3, with any questions or concerns. We at [facility] are focused on providing you with the highest-quality healthcare experience possible and that all starts with you.     Sincerely,       Love Quan RN-BSN  Chronic Pain Care Coordinator  [phone] opt. #3    Enclosed: I have enclosed a copy of a 24 Hour Access Plan. This has helpful phone numbers for you to call when needed. Please keep this in an easy to access place to use as needed.

## 2018-10-25 NOTE — LETTER
[facility] [place]  [address]  [place] [address] [phone]      October 25, 2018      Joshua Barron  [address] [place] [address]      EMERGENCY CARE PLAN  Presenting Problem Treatment Plan   Questions or concerns during clinic hours    24 hour Nurse line available - Call main clinic line and follow prompts I will call the clinic directly: [phone]    24 Hour Nurse Line [phone]- Follow prompts and press option for nurse advisor    [facility]  [address] [place] [address]   Patient needs to schedule an appointment  I will call the scheduling team during business hours at [phone]   Same day treatment   I will call the clinic first, then urgent care if needed   Clinic Care Coordinators [facility]  RN Clinic Care Coordinator  [phone] #3    [phone]   Crisis Services:  Behavioral or Mental Health Behavioral Health Crisis [facility]  [phone]   Emergency treatment--Immediately CALL 911

## 2018-10-25 NOTE — PROGRESS NOTES

## 2018-10-25 NOTE — MR AVS SNAPSHOT
After Visit Summary   10/25/2018    Joshua Barron    MRN: [id number]           Patient Information     Date Of Birth          1960        Visit Information        Provider Department      10/25/2018 2:40 PM Lyle Fisher DO [facility] [place]        Today's Diagnoses     Chronic pain syndrome    -  1    Need for prophylactic vaccination and inoculation against influenza        Moderate persistent asthma without complication           Follow-ups after your visit        Follow-up notes from your care team     Return in about 3 weeks (around 11/15/2018) for chronic pain.      Your next 10 appointments already scheduled     Nov 07, 2018  3:00 PM CST   (Arrive by 2:45 PM)   Return Visit with Laquita Fernandez MD   [facility] [place] [facility] [place] [facility]    [address]  [place] [address] [phone]            Nov 15, 2018  3:00 PM CST   (Arrive by 2:40 PM)   SHORT with Lyle Fisher DO   [facility] [place] ([facility] [place] [facility]    [address]  [place] [address]   [phone]            Mar 13, 2019  2:15 PM CDT   (Arrive by 2:00 PM)   Hearing Eval with Kevin Moreno   [facility] [place] ([facility] [place] [facility]    [address]  [place] [address]   [phone]              Future tests that were ordered for you today     Open Future Orders        Priority Expected Expires Ordered    MR Thoracic Spine w/o Contrast Routine  10/25/2019 10/25/2018            Who to contact     If you have questions or need follow up information about today's clinic visit or your schedule please contact [facility] directly at [phone].  Normal or non-critical lab and imaging results will be communicated to you by MyChart, letter or phone within 4 business days after the clinic has received the results. If you do not hear from us within 7 days,  please contact the clinic through [employer] or phone. If you have a critical or abnormal lab result, we will notify you by phone as soon as possible.  Submit refill requests through [employer] or call your pharmacy and they will forward the refill request to us. Please allow 3 business days for your refill to be completed.          Additional Information About Your Visit        Care EveryWhere ID     This is your Care EveryWhere ID. This could be used by other organizations to access your [facility] medical records  [id number]        Your Vitals Were     Pulse Temperature Respirations Pulse Oximetry BMI (Body Mass Index)       85 98.2  F (36.8  C) (Tympanic) 17 95% 27.47 kg/m2        Blood Pressure from Last 3 Encounters:   10/25/18 126/74   09/05/18 120/70   07/30/18 118/82    Weight from Last 3 Encounters:   10/25/18 186 lb (84.4 kg)   09/05/18 190 lb (86.2 kg)   07/30/18 190 lb (86.2 kg)              We Performed the Following     ADMIN INFLUENZA (For MEDICARE Patients ONLY) []     C RIV4 (FLUBLOK) VACCINE RECOMBINANT DNA PRSRV ANTIBIO FREE, IM          Today's Medication Changes          These changes are accurate as of 10/25/18  3:58 PM.  If you have any questions, ask your nurse or doctor.               Start taking these medicines.        Dose/Directions    naloxone 1 mg/mL for intranasal kit (2 syringes with 2 mucosal atomizer device)   Commonly known as:  NARCAN   Used for:  Chronic pain syndrome   Started by:  Lyle Fisher, DO        In opioid overdose put cone in nostril and push 1/2 of contents into each nostril.  Repeat every 3 min if no response until help arrives.   Quantity:  2 kit   Refills:  0            Where to get your medicines      These medications were sent to [facility] [place] - [address]  [address] [address]     Phone:  [phone]     mometasone-formoterol 100-5 MCG/ACT oral inhaler    naloxone 1 mg/mL for intranasal kit (2 syringes  with 2 mucosal atomizer device)                Primary Care Provider Office Phone # Fax #    yLle Fisher [phone] [address] [facility] [address]        Goals        General    Mental Health Management (pt-stated)     Pain Management (pt-stated)       Equal Access to Services     SHAHBAZ DANIELS : Hadii aad ku hadasho Soomaali, waaxda luqadaha, qaybta kaalmada adeegyada, waxay nicole garibaymichaelgreyson fermin. So [facility] [phone].    ATENCIÓN: Si habla español, tiene a hastings disposición servicios gratuitos de asistencia lingüística. Tip al [phone].    We comply with applicable federal civil rights laws and [place] laws. We do not discriminate on the basis of race, color, national origin, age, disability, sex, sexual orientation, or gender identity.            Thank you!     Thank you for choosing [facility]  for your care. Our goal is always to provide you with excellent care. Hearing back from our patients is one way we can continue to improve our services. Please take a few minutes to complete the written survey that you may receive in the mail after your visit with us. Thank you!             Your Updated Medication List - Protect others around you: Learn how to safely use, store and throw away your medicines at www.disposemymeds.org.          This list is accurate as of 10/25/18  3:58 PM.  Always use your most recent med list.                   Brand Name Dispense Instructions for use Diagnosis    * albuterol 108 (90 Base) MCG/ACT inhaler    PROAIR HFA/PROVENTIL HFA/VENTOLIN HFA     Inhale 2 puffs into the lungs every 6 hours as needed for shortness of breath / dyspnea or wheezing        * albuterol 108 (90 Base) MCG/ACT inhaler    VENTOLIN HFA    18 g    USE 2 PUFFS 4 TIMES A DAY AS NEEDED FOR SHORTNESS OF BREATH        * VENTOLIN  (90 Base) MCG/ACT inhaler   Generic drug:  albuterol     18 g    USE 2 PUFFS 4 TIMES A DAY AS NEEDED  FOR SHORTNESS OF BREATH    Moderate persistent asthma without complication       ASPIRIN LOW DOSE 81 MG chewable tablet   Generic drug:  aspirin     30 tablet    CHEW AND SWALLOW 1 TABLET BY MOUTH DAILY    Healthcare maintenance       atorvastatin 20 MG tablet    LIPITOR    90 tablet    Take 1 tablet (20 mg) by mouth daily        * baclofen 20 MG tablet    LIORESAL    90 tablet    TAKE 1 TABLET BY MOUTH 3 TIMES DAILY    Bilateral low back pain with left-sided sciatica, unspecified chronicity       * baclofen 20 MG tablet    LIORESAL    90 tablet    TAKE 1 TABLET BY MOUTH 3 TIMES DAILY    Bilateral low back pain with left-sided sciatica, unspecified chronicity       diazepam 5 MG tablet    VALIUM    60 tablet    TAKE 1 TABLET BY MOUTH EVERY 12 HOURS AS NEEDED FOR ANXIETY OR SLEEP SPASM    DAYANA (generalized anxiety disorder)       diclofenac 50 MG EC tablet    VOLTAREN    90 tablet    TAKE 1 TABLET BY MOUTH 3 TIMES DAILY    Arthritis pain       divalproex sodium delayed-release 500 MG DR tablet    DEPAKOTE    60 tablet    TAKE 1 TABLET BY MOUTH 2 TIMES A DAY    Bipolar affective disorder, currently manic, moderate (H)       docusate sodium 100 MG capsule    DOK    60 capsule    TAKE 1 CAPSULE BY MOUTH TWICE DAILY        * DULERA 100-5 MCG/ACT oral inhaler   Generic drug:  mometasone-formoterol     13 g    INHALE 2 PUFFS INTO THE LUNGS 2 TIMES A DAY    Moderate persistent asthma without complication       * mometasone-formoterol 100-5 MCG/ACT oral inhaler    DULERA    13 g    INHALE 2 PUFFS INTO THE LUNGS 2 TIMES A DAY    Moderate persistent asthma without complication       fentaNYL 75 mcg/hr 72 hr patch    DURAGESIC    11 patch    APPLY 1 PATCH ONTO THE SKIN EVERY 48 HOURS, REMOVE OLD PATCH    DDD (degenerative disc disease), cervical       fluconazole 100 MG tablet    DIFLUCAN    14 tablet    Take 2 tablets (200 mg) by mouth daily    Thrush       fluticasone 50 MCG/ACT spray    FLONASE    16 g    USE 2 SPRAYS IN EACH  NOSTRIL DAILY        gabapentin 300 MG capsule    NEURONTIN    270 capsule    TAKE 3 CAPSULES BY MOUTH THREE TIMES DAILY        HYDROcodone-acetaminophen  MG per tablet    NORCO    120 tablet    TAKE 1 TABLET BY MOUTH EVERY 6 HOURS AS NEEDED FOR MODERATE TO SEVEREPAIN    Low back pain, unspecified back pain laterality, unspecified chronicity, with sciatica presence unspecified       hydrOXYzine 50 MG capsule    VISTARIL    60 capsule    Take 1-2 capsules ( mg) by mouth 4 times daily as needed for anxiety         MG tablet   Generic drug:  ibuprofen     120 tablet    TAKE 1 TABLET BY MOUTH EVERY 6 HOURS AS NEEDED    Low back pain, unspecified back pain laterality, unspecified chronicity, with sciatica presence unspecified       lidocaine 5 % Patch    LIDODERM    90 patch    PLACE 3 PATCHES ONTO THE SKIN DAILY AS NEEDED FOR MODERATE PAIN    Midline low back pain without sciatica       * lisdexamfetamine 70 MG capsule    VYVANSE    30 capsule    Take 1 capsule (70 mg) by mouth every morning    Attention deficit hyperactivity disorder (ADHD), unspecified ADHD type       * lisdexamfetamine 70 MG capsule   Start taking on:  10/26/2018    VYVANSE    30 capsule    Take 1 capsule (70 mg) by mouth daily    ADHD (attention deficit hyperactivity disorder), combined type       losartan 50 MG tablet    COZAAR    30 tablet    Take 1 tablet (50 mg) by mouth daily        MAPAP 325 MG tablet   Generic drug:  acetaminophen     200 tablet    TAKE 2 TABLETS BY MOUTH EVERY 4 HOURS AS NEEDED FOR MILD PAIN    Pain       multivitamin  with iron Tabs     30 tablet    TAKE 1 TABLET BY MOUTH DAILY    Health care maintenance       naloxone 1 mg/mL for intranasal kit (2 syringes with 2 mucosal atomizer device)    NARCAN    2 kit    In opioid overdose put cone in nostril and push 1/2 of contents into each nostril.  Repeat every 3 min if no response until help arrives.    Chronic pain syndrome       nicotine polacrilex 2 MG gum     NICORETTE    110 each    CHEW 1 PIECE AS NEEDED FOR SMOKING CESSATION        nortriptyline 75 MG capsule    PAMELOR    30 capsule    Take 1 capsule (75 mg) by mouth At Bedtime        omeprazole 20 MG CR capsule    priLOSEC    30 capsule    TAKE 1 CAPSULE BY MOUTH EVERY DAY BEFORE A MEAL        * order for DME     2 Box    Equipment being ordered: Large gloves    Need for assistance with personal care       * order for DME     1 Device    1 boa back brace    Chronic low back pain with sciatica, sciatica laterality unspecified, unspecified back pain laterality       OXcarbazepine 600 MG tablet    TRILEPTAL    60 tablet    TAKE 1 TABLET BY MOUTH 2 TIMES DAILY    Bipolar affective disorder, current episode hypomanic (H)       propranolol 20 MG tablet    INDERAL    60 tablet    Take 1 tablet (20 mg) by mouth 2 times daily        senna-docusate 8.6-50 MG per tablet    SM STOOL SOFTENER/LAXATIVE    120 tablet    TAKE 1 OR 2 TABLETS BY MOUTH 2 TIMES A DAY        traZODone 50 MG tablet    DESYREL    60 tablet    Take 2 tablets (100 mg) by mouth nightly as needed        * Notice:  This list has 11 medication(s) that are the same as other medications prescribed for you. Read the directions carefully, and ask your doctor or other care provider to review them with you.

## 2018-10-26 ASSESSMENT — ANXIETY QUESTIONNAIRES
7. FEELING AFRAID AS IF SOMETHING AWFUL MIGHT HAPPEN: NOT AT ALL
2. NOT BEING ABLE TO STOP OR CONTROL WORRYING: NEARLY EVERY DAY
GAD7 TOTAL SCORE: 7
6. BECOMING EASILY ANNOYED OR IRRITABLE: SEVERAL DAYS
3. WORRYING TOO MUCH ABOUT DIFFERENT THINGS: NEARLY EVERY DAY
5. BEING SO RESTLESS THAT IT IS HARD TO SIT STILL: NOT AT ALL
1. FEELING NERVOUS, ANXIOUS, OR ON EDGE: NOT AT ALL

## 2018-10-26 ASSESSMENT — PATIENT HEALTH QUESTIONNAIRE - PHQ9
5. POOR APPETITE OR OVEREATING: NOT AT ALL
SUM OF ALL RESPONSES TO PHQ QUESTIONS 1-9: 11

## 2018-10-27 ASSESSMENT — ANXIETY QUESTIONNAIRES: GAD7 TOTAL SCORE: 7

## 2018-10-30 ENCOUNTER — HOSPITAL ENCOUNTER (OUTPATIENT)
Dept: MRI IMAGING | Facility: HOSPITAL | Age: 58
Discharge: HOME | End: 2018-10-30
Attending: INTERNAL MEDICINE | Admitting: INTERNAL MEDICINE
Payer: COMMERCIAL

## 2018-10-30 DIAGNOSIS — G89.4 CHRONIC PAIN SYNDROME: ICD-10-CM

## 2018-10-30 PROCEDURE — 72146 MRI CHEST SPINE W/O DYE: CPT | Mod: TC

## 2018-11-01 ENCOUNTER — PATIENT OUTREACH (OUTPATIENT)
Dept: CARE COORDINATION | Facility: OTHER | Age: 58
End: 2018-11-01

## 2018-11-01 ASSESSMENT — ACTIVITIES OF DAILY LIVING (ADL): DEPENDENT_IADLS:: INDEPENDENT

## 2018-11-01 NOTE — PROGRESS NOTES
"Received return phone call from pt.  Reports Dr. Pettit told him that rehab would not help him.  Dr. Pettit's orders were to \"walk, not bend\".  Did highly encourage him to go to PT on November 6.  He stated he will go.  He wants to stay on his current pain medication regimen, but would like a stronger muscle relaxer.  Is currently on Baclofen 20mg TID.  Did re-educate him on opioid induced hyperalgesia.  He still does not want to go down on his pain medication.  Did inform him of the CDC guidelines, [place] state guidelines, and safe prescribing.  He is adamant on not going down on pain medications.  Did inform him of his MRI results.  All questions answered.  Encouraged him to call CC with any questions/concerns/problems.  Verbalized understanding.    Love Quan RN-BSN  Chronic Pain Care Coordinator  [phone] opt. #3        "

## 2018-11-01 NOTE — PROGRESS NOTES
"Clinic Care Coordination Contact  Care Team Conversations    Received VM from pt this date stating he has been \"laid up\" since office visit on 10.25.18. States the exercise at the gym caused a flare up in his back pain.  He is going to cancel PT.  Opioids are the only thing that work for him.  He is canceling PT because it has not worked in the past and it is only going to cause problems.  He went to his chiropractor the other day and he is going to go over pt's recent imaging.  Reports his pain medications are the only things that work for him and he needs them.  He does not want a reduction at all.  He requested a return phone call.  CC did return his phone call.  He did not answer.  LM requesting a return phone call to discuss this matter.    Love Quan RN-BSN  Chronic Pain Care Coordinator  [phone] opt. #3            "

## 2018-11-02 DIAGNOSIS — M54.5 LOW BACK PAIN, UNSPECIFIED BACK PAIN LATERALITY, UNSPECIFIED CHRONICITY, WITH SCIATICA PRESENCE UNSPECIFIED: ICD-10-CM

## 2018-11-02 DIAGNOSIS — M50.30 DDD (DEGENERATIVE DISC DISEASE), CERVICAL: ICD-10-CM

## 2018-11-02 NOTE — TELEPHONE ENCOUNTER
Controlled Substance Refill Request for Fentanyl  Problem List Complete:  No     PROVIDER TO CONSIDER COMPLETION OF PROBLEM LIST AND OVERVIEW/CONTROLLED SUBSTANCE AGREEMENT    Last Written Prescription Date:  10/19/18  Last Fill Quantity: 11,   # refills: 0    Last Office Visit with [facility] primary care provider: 10/25/18    Future Office visit:   Next 5 appointments (look out 90 days)     Nov 07, 2018  3:00 PM CST   (Arrive by 2:45 PM)   Return Visit with Laquita Fernandez MD   [facility] ([facility] )    [address] [place] [id number] [phone]            Nov 15, 2018  3:00 PM CST   (Arrive by 2:40 PM)   SHORT with Lyle Fisher DO   [facility] ([facility] )    [address] [facility] [address]   [phone]                  Controlled substance agreement on file: Yes:  Date 11/25/15.     Processing:  Staff will hand deliver Rx to on-site pharmacy   checked in past 3 months?          norco  Last refill: 10/11/18 #120 R-0

## 2018-11-06 ENCOUNTER — HOSPITAL ENCOUNTER (OUTPATIENT)
Dept: PHYSICAL THERAPY | Facility: HOSPITAL | Age: 58
Setting detail: THERAPIES SERIES
End: 2018-11-06
Attending: INTERNAL MEDICINE
Payer: COMMERCIAL

## 2018-11-06 DIAGNOSIS — M51.369 DDD (DEGENERATIVE DISC DISEASE), LUMBAR: ICD-10-CM

## 2018-11-06 PROCEDURE — 97110 THERAPEUTIC EXERCISES: CPT | Mod: GP

## 2018-11-06 PROCEDURE — 97162 PT EVAL MOD COMPLEX 30 MIN: CPT | Mod: GP

## 2018-11-06 PROCEDURE — G8978 MOBILITY CURRENT STATUS: HCPCS | Mod: GP,CK

## 2018-11-06 PROCEDURE — G8979 MOBILITY GOAL STATUS: HCPCS | Mod: GP,CJ
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
## 2018-11-07 ENCOUNTER — OFFICE VISIT (OUTPATIENT)
Dept: PSYCHIATRY | Facility: OTHER | Age: 58
End: 2018-11-07
Attending: PSYCHIATRY & NEUROLOGY
Payer: COMMERCIAL

## 2018-11-07 ENCOUNTER — ALLIED HEALTH/NURSE VISIT (OUTPATIENT)
Dept: AUDIOLOGY | Facility: OTHER | Age: 58
End: 2018-11-07
Attending: AUDIOLOGIST
Payer: COMMERCIAL

## 2018-11-07 VITALS
TEMPERATURE: 99 F | SYSTOLIC BLOOD PRESSURE: 134 MMHG | HEART RATE: 88 BPM | WEIGHT: 180 LBS | BODY MASS INDEX: 26.58 KG/M2 | DIASTOLIC BLOOD PRESSURE: 92 MMHG

## 2018-11-07 DIAGNOSIS — H90.3 SENSORINEURAL HEARING LOSS (SNHL) OF BOTH EARS: ICD-10-CM

## 2018-11-07 DIAGNOSIS — F90.2 ADHD (ATTENTION DEFICIT HYPERACTIVITY DISORDER), COMBINED TYPE: ICD-10-CM

## 2018-11-07 PROCEDURE — V5266 BATTERY FOR HEARING DEVICE: HCPCS

## 2018-11-07 PROCEDURE — G0463 HOSPITAL OUTPT CLINIC VISIT: HCPCS

## 2018-11-07 PROCEDURE — 99214 OFFICE O/P EST MOD 30 MIN: CPT | Performed by: PSYCHIATRY & NEUROLOGY

## 2018-11-07 RX ORDER — LISDEXAMFETAMINE DIMESYLATE 70 MG/1
70 CAPSULE ORAL EVERY MORNING
Qty: 30 CAPSULE | Refills: 0 | Status: SHIPPED | OUTPATIENT
Start: 2018-12-21 | End: 2018-11-15

## 2018-11-07 RX ORDER — LISDEXAMFETAMINE DIMESYLATE 70 MG/1
70 CAPSULE ORAL DAILY
Qty: 30 CAPSULE | Refills: 0 | Status: SHIPPED | OUTPATIENT
Start: 2018-11-23 | End: 2018-12-05

## 2018-11-07 RX ORDER — MORPHINE SULFATE 80 MG/1
CAPSULE, EXTENDED RELEASE ORAL
Refills: 0 | OUTPATIENT
Start: 2018-11-07

## 2018-11-07 ASSESSMENT — ANXIETY QUESTIONNAIRES
7. FEELING AFRAID AS IF SOMETHING AWFUL MIGHT HAPPEN: SEVERAL DAYS
IF YOU CHECKED OFF ANY PROBLEMS ON THIS QUESTIONNAIRE, HOW DIFFICULT HAVE THESE PROBLEMS MADE IT FOR YOU TO DO YOUR WORK, TAKE CARE OF THINGS AT HOME, OR GET ALONG WITH OTHER PEOPLE: VERY DIFFICULT
1. FEELING NERVOUS, ANXIOUS, OR ON EDGE: NOT AT ALL
GAD7 TOTAL SCORE: 5
5. BEING SO RESTLESS THAT IT IS HARD TO SIT STILL: NOT AT ALL
3. WORRYING TOO MUCH ABOUT DIFFERENT THINGS: SEVERAL DAYS
6. BECOMING EASILY ANNOYED OR IRRITABLE: SEVERAL DAYS
2. NOT BEING ABLE TO STOP OR CONTROL WORRYING: SEVERAL DAYS

## 2018-11-07 ASSESSMENT — PATIENT HEALTH QUESTIONNAIRE - PHQ9
SUM OF ALL RESPONSES TO PHQ QUESTIONS 1-9: 7
5. POOR APPETITE OR OVEREATING: SEVERAL DAYS

## 2018-11-07 ASSESSMENT — PAIN SCALES - GENERAL: PAINLEVEL: NO PAIN (0)

## 2018-11-07 NOTE — MR AVS SNAPSHOT
After Visit Summary   11/7/2018    Joshua Barron    MRN: [id number]           Patient Information     Date Of Birth          1960        Visit Information        Provider Department      11/7/2018 3:00 PM Laquita Fernandez MD [facility]        Today's Diagnoses     ADHD (attention deficit hyperactivity disorder), combined type           Follow-ups after your visit        Your next 10 appointments already scheduled     Nov 08, 2018  2:40 PM CST   Return Visit with Shamar Escamilla DC   [facility] ([facility])    [address] [place] [address]  [place] [address]   [phone]            Nov 13, 2018  2:30 PM CST   Ortho Treatment with Sowmya Noha, PTA   [facility] ([facility] )    [address] [address]   [phone]            Nov 15, 2018  3:00 PM CST   (Arrive by 2:40 PM)   SHORT with Lyle Fisher DO   [facility] ([facility] )    [address] [facility] [address]   [phone]            Nov 20, 2018  2:00 PM CST   Ortho Treatment with Sowmya Noha, PTA   [facility] ([facility] )    [address] [address]   [phone]            Nov 27, 2018  3:00 PM CST   Ortho Treatment with Kip Thorstenson, PT   [facility] ([facility] )    [address] [address]   [phone]            Dec 04, 2018  2:00 PM CST   Ortho Treatment with Kip Thorstenson, PT   [facility] ([facility] )    [address] [address]   [phone]            Dec 05, 2018  3:20 PM CST   (Arrive by 3:05 PM)   Return Visit with Laquita Fernandez MD   [facility] ([facility] )    [address] [place], [place] [address] [place] [address] [phone]            Mar 13, 2019  2:15 PM CDT   (Arrive by 2:00 PM)   Hearing Eval with Kevin Moreno   [facility]  [facility] ([facility] )    [address] [place] [address]   [phone]              Who to contact     If you have questions or need follow up information about today's clinic visit or your schedule please contact [facility] directly at [phone].  Normal or non-critical lab and imaging results will be communicated to you by MyChart, letter or phone within 4 business days after the clinic has received the results. If you do not hear from us within 7 days, please contact the clinic through MyChart or phone. If you have a critical or abnormal lab result, we will notify you by phone as soon as possible.  Submit refill requests through [employer] or call your pharmacy and they will forward the refill request to us. Please allow 3 business days for your refill to be completed.          Additional Information About Your Visit        Care EveryWhere ID     This is your Care EveryWhere ID. This could be used by other organizations to access your [facility] medical records  [id number]        Your Vitals Were     Pulse Temperature BMI (Body Mass Index)             88 99  F (37.2  C) (Tympanic) 26.58 kg/m2          Blood Pressure from Last 3 Encounters:   11/07/18 (!) 134/92   10/25/18 126/74   09/05/18 120/70    Weight from Last 3 Encounters:   11/07/18 81.6 kg (180 lb)   10/25/18 84.4 kg (186 lb)   09/05/18 86.2 kg (190 lb)              Today, you had the following     No orders found for display         Today's Medication Changes          These changes are accurate as of 11/7/18  4:25 PM.  If you have any questions, ask your nurse or doctor.               These medicines have changed or have updated prescriptions.        Dose/Directions    * lisdexamfetamine 70 MG capsule   Commonly known as:  VYVANSE   This may have changed:  These instructions start on 11/23/2018. If you are unsure what to do until then, ask your doctor or other care provider.   Used for:  ADHD  (attention deficit hyperactivity disorder), combined type   Changed by:  Laquita Fernandez MD        Dose:  70 mg   Start taking on:  11/23/2018   Take 1 capsule (70 mg) by mouth daily   Quantity:  30 capsule   Refills:  0       * lisdexamfetamine 70 MG capsule   Commonly known as:  VYVANSE   This may have changed:  You were already taking a medication with the same name, and this prescription was added. Make sure you understand how and when to take each.   Used for:  ADHD (attention deficit hyperactivity disorder), combined type   Changed by:  Laquita Fernandez MD        Dose:  70 mg   Start taking on:  12/21/2018   Take 1 capsule (70 mg) by mouth every morning   Quantity:  30 capsule   Refills:  0       * Notice:  This list has 2 medication(s) that are the same as other medications prescribed for you. Read the directions carefully, and ask your doctor or other care provider to review them with you.         Where to get your medicines      Some of these will need a paper prescription and others can be bought over the counter.  Ask your nurse if you have questions.     Bring a paper prescription for each of these medications     lisdexamfetamine 70 MG capsule    lisdexamfetamine 70 MG capsule                Primary Care Provider Office Phone # Fax #    Lyle Juan Fisher,  [phone]       [address] [place] [address]        Goals        General    Mental Health Management (pt-stated)     Pain Management (pt-stated)       Equal Access to Services     SHAHBAZ DANIELS AH: Rosie Gary, waaxda luqadaha, qaybta kaaljaki booth. [facility] [phone].    ATENCIÓN: Si habla español, tiene a hastings disposición servicios gratuitos de asistencia lingüística. Llharvey al [phone].    We comply with applicable federal civil rights laws and [place] laws. We do not discriminate on the basis of race, color, national origin, age, disability,  sex, sexual orientation, or gender identity.            Thank you!     Thank you for choosing [facility]  for your care. Our goal is always to provide you with excellent care. Hearing back from our patients is one way we can continue to improve our services. Please take a few minutes to complete the written survey that you may receive in the mail after your visit with us. Thank you!             Your Updated Medication List - Protect others around you: Learn how to safely use, store and throw away your medicines at www.disposemymeds.org.          This list is accurate as of 11/7/18  4:25 PM.  Always use your most recent med list.                   Brand Name Dispense Instructions for use Diagnosis    * albuterol 108 (90 Base) MCG/ACT inhaler    PROAIR HFA/PROVENTIL HFA/VENTOLIN HFA     Inhale 2 puffs into the lungs every 6 hours as needed for shortness of breath / dyspnea or wheezing        * albuterol 108 (90 Base) MCG/ACT inhaler    VENTOLIN HFA    18 g    USE 2 PUFFS 4 TIMES A DAY AS NEEDED FOR SHORTNESS OF BREATH        * VENTOLIN  (90 Base) MCG/ACT inhaler   Generic drug:  albuterol     18 g    USE 2 PUFFS 4 TIMES A DAY AS NEEDED FOR SHORTNESS OF BREATH    Moderate persistent asthma without complication       ASPIRIN LOW DOSE 81 MG chewable tablet   Generic drug:  aspirin     30 tablet    CHEW AND SWALLOW 1 TABLET BY MOUTH DAILY    Healthcare maintenance       atorvastatin 20 MG tablet    LIPITOR    90 tablet    Take 1 tablet (20 mg) by mouth daily        baclofen 20 MG tablet    LIORESAL    90 tablet    TAKE 1 TABLET BY MOUTH 3 TIMES DAILY    Bilateral low back pain with left-sided sciatica, unspecified chronicity       diazepam 5 MG tablet    VALIUM    60 tablet    TAKE 1 TABLET BY MOUTH EVERY 12 HOURS AS NEEDED FOR ANXIETY OR SLEEP SPASM    DAYANA (generalized anxiety disorder)       diclofenac 50 MG EC tablet    VOLTAREN    90 tablet    TAKE 1 TABLET BY MOUTH 3 TIMES DAILY     Arthritis pain       divalproex sodium delayed-release 500 MG DR tablet    DEPAKOTE    60 tablet    TAKE 1 TABLET BY MOUTH 2 TIMES A DAY    Bipolar affective disorder, currently manic, moderate (H)       docusate sodium 100 MG capsule    DOK    60 capsule    TAKE 1 CAPSULE BY MOUTH TWICE DAILY        * DULERA 100-5 MCG/ACT oral inhaler   Generic drug:  mometasone-formoterol     13 g    INHALE 2 PUFFS INTO THE LUNGS 2 TIMES A DAY    Moderate persistent asthma without complication       * mometasone-formoterol 100-5 MCG/ACT oral inhaler    DULERA    13 g    INHALE 2 PUFFS INTO THE LUNGS 2 TIMES A DAY    Moderate persistent asthma without complication       fentaNYL 75 mcg/hr 72 hr patch    DURAGESIC    11 patch    APPLY 1 PATCH ONTO THE SKIN EVERY 48 HOURS, REMOVE OLD PATCH    DDD (degenerative disc disease), cervical       fluconazole 100 MG tablet    DIFLUCAN    14 tablet    Take 2 tablets (200 mg) by mouth daily    Thrush       fluticasone 50 MCG/ACT spray    FLONASE    16 g    USE 2 SPRAYS IN EACH NOSTRIL DAILY        gabapentin 300 MG capsule    NEURONTIN    270 capsule    TAKE 3 CAPSULES BY MOUTH THREE TIMES DAILY        HYDROcodone-acetaminophen  MG per tablet    NORCO    120 tablet    TAKE 1 TABLET BY MOUTH EVERY 6 HOURS AS NEEDED FOR MODERATE TO SEVEREPAIN    Low back pain, unspecified back pain laterality, unspecified chronicity, with sciatica presence unspecified       hydrOXYzine 50 MG capsule    VISTARIL    60 capsule    Take 1-2 capsules ( mg) by mouth 4 times daily as needed for anxiety         MG tablet   Generic drug:  ibuprofen     120 tablet    TAKE 1 TABLET BY MOUTH EVERY 6 HOURS AS NEEDED    Low back pain, unspecified back pain laterality, unspecified chronicity, with sciatica presence unspecified       lidocaine 5 % Patch    LIDODERM    90 patch    PLACE 3 PATCHES ONTO THE SKIN DAILY AS NEEDED FOR MODERATE PAIN    Midline low back pain without sciatica       * lisdexamfetamine  70 MG capsule   Start taking on:  11/23/2018    VYVANSE    30 capsule    Take 1 capsule (70 mg) by mouth daily    ADHD (attention deficit hyperactivity disorder), combined type       * lisdexamfetamine 70 MG capsule   Start taking on:  12/21/2018    VYVANSE    30 capsule    Take 1 capsule (70 mg) by mouth every morning    ADHD (attention deficit hyperactivity disorder), combined type       losartan 50 MG tablet    COZAAR    30 tablet    Take 1 tablet (50 mg) by mouth daily        MAPAP 325 MG tablet   Generic drug:  acetaminophen     200 tablet    TAKE 2 TABLETS BY MOUTH EVERY 4 HOURS AS NEEDED FOR MILD PAIN    Pain       multivitamin  with iron Tabs     30 tablet    TAKE 1 TABLET BY MOUTH DAILY    Health care maintenance       naloxone 1 mg/mL for intranasal kit (2 syringes with 2 mucosal atomizer device)    NARCAN    2 kit    In opioid overdose put cone in nostril and push 1/2 of contents into each nostril.  Repeat every 3 min if no response until help arrives.    Chronic pain syndrome       nicotine polacrilex 2 MG gum    NICORETTE    110 each    CHEW 1 PIECE AS NEEDED FOR SMOKING CESSATION        nortriptyline 75 MG capsule    PAMELOR    30 capsule    Take 1 capsule (75 mg) by mouth At Bedtime        omeprazole 20 MG CR capsule    priLOSEC    30 capsule    TAKE 1 CAPSULE BY MOUTH EVERY DAY BEFORE A MEAL        * order for DME     2 Box    Equipment being ordered: Large gloves    Need for assistance with personal care       * order for DME     1 Device    1 boa back brace    Chronic low back pain with sciatica, sciatica laterality unspecified, unspecified back pain laterality       OXcarbazepine 600 MG tablet    TRILEPTAL    60 tablet    TAKE 1 TABLET BY MOUTH 2 TIMES DAILY    Bipolar affective disorder, current episode hypomanic (H)       propranolol 20 MG tablet    INDERAL    60 tablet    Take 1 tablet (20 mg) by mouth 2 times daily        senna-docusate 8.6-50 MG per tablet    SM STOOL SOFTENER/LAXATIVE    120  tablet    TAKE 1 OR 2 TABLETS BY MOUTH 2 TIMES A DAY        traZODone 50 MG tablet    DESYREL    60 tablet    Take 2 tablets (100 mg) by mouth nightly as needed        * Notice:  This list has 9 medication(s) that are the same as other medications prescribed for you. Read the directions carefully, and ask your doctor or other care provider to review them with you.

## 2018-11-07 NOTE — NURSING NOTE
"Chief Complaint   Patient presents with     RECHECK     Mental health.       Initial BP (!) 134/92 (BP Location: Right arm, Patient Position: Sitting, Cuff Size: Adult Regular)  Pulse 88  Temp 99  F (37.2  C) (Tympanic)  Wt 81.6 kg (180 lb)  BMI 26.58 kg/m2 Estimated body mass index is 26.58 kg/(m^2) as calculated from the following:    Height as of 2/15/18: 1.753 m (5' 9\").    Weight as of this encounter: 81.6 kg (180 lb).  Medication Reconciliation: complete    JACQUELYN SUAREZ LPN    "

## 2018-11-07 NOTE — TELEPHONE ENCOUNTER
Rosa 80mg TID and Norco 10/325mg TID pended per your written wean on 10.25.18.  Please review and sign if appropriate.  Please place on CC's desk so he can be updated via telephone. Thank you.

## 2018-11-07 NOTE — PROGRESS NOTES
PSYCHIATRY CLINIC PROGRESS NOTE   20 minute medication management, more than 50% of time spent counseling patient on medications, medication side effects, symptom history and management                                                                       Social- Was  twice. Lives alone with his dog Montserrat (beltran) and 3 cats: Smoky the cat. Has a GF in [place]  Children-  2 kids, they are in [place]  Last visit 9/5/18:  Valium is currently 5 mg bid prn: next fill if I am asked to refill will be down to 2 mg bid prn (see above discussion) and we discussed this again today. Last script I wrote was Vyvanse 8/31/18 and gave script 9/28/18 and 10/26/18  .  Continue Trileptal 600 mg bid,     - today we talked about a bit his marriage years ago  - physical therapy yesterday   - his daughter and son are in [place]  - Lots of family issues: Joshua has taken care of his parents and yet parents give his other brothers everything. oldest of 3 brothers. Brother in [place] youngest Mark and Major is here. Mom and dad here [place] on [place]. Joshua noting moving here to be closer to parents and help them, etc. But, parents don't seem to want him around much or talk to him.  SUBSTANCE USE- denies abuse of meds or substances    SYMPTOMS- issues with attention and concentration improved with Vyvanse: racing thoughts, depressed mood, impulsivity, distractibility  MEDICAL ROS- back pain, denies any issues with headaches or stomach pain / issues with stimulant  MEDICAL / SURGICAL HISTORY                     Patient Active Problem List   Diagnosis     Mixed hyperlipidemia     Tobacco Abuse, History of     Degeneration of lumbar or lumbosacral intervertebral disc     Depression, major     Chronic pain syndrome     Chemical dependency (H)     Chronic rhinitis     Tinnitus of both ears     SNHL (sensorineural hearing loss)     Back pain     Bipolar disorder (H)     Seizure-like activity (H)     Somatic dysfunction of pelvis region     Bilateral foot pain      Prostate cancer screening     Trigger index finger of right hand     Moderate persistent asthma without complication     Chronic lower back pain     ACP (advance care planning)     Onychia of toe of left foot     DDD (degenerative disc disease), lumbar     Seizure disorder (H)     Back muscle spasm     Benign essential hypertension     Retrograde ejaculation     Allergic rhinitis due to other allergen     Attention to dressings and sutures     Cervical spondylosis without myelopathy     Chronic headaches     DDD (degenerative disc disease)     Diabetes mellitus, type II (H)     Generalized anxiety disorder     Hypertrophy of prostate without urinary obstruction and other lower urinary tract symptoms (LUTS)     GERD (gastroesophageal reflux disease)     Lumbago     Major depressive disorder, recurrent episode, moderate (H)     Myalgia and myositis     Hyperlipidemia     Other pain disorders related to psychological factors     Spinal stenosis in cervical region     Status post lumbar spinal fusion     Tobacco use disorder     Trigger finger     Asthma     Polypharmacy     ALLERGY   Cymbalta and Seasonal allergies  MEDICATIONS                                                                                             Current Outpatient Prescriptions   Medication Sig     albuterol (PROAIR HFA/PROVENTIL HFA/VENTOLIN HFA) 108 (90 BASE) MCG/ACT Inhaler Inhale 2 puffs into the lungs every 6 hours as needed for shortness of breath / dyspnea or wheezing     albuterol (VENTOLIN HFA) 108 (90 BASE) MCG/ACT Inhaler USE 2 PUFFS 4 TIMES A DAY AS NEEDED FOR SHORTNESS OF BREATH     ASPIRIN LOW DOSE 81 MG chewable tablet CHEW AND SWALLOW 1 TABLET BY MOUTH DAILY     atorvastatin (LIPITOR) 20 MG tablet Take 1 tablet (20 mg) by mouth daily     baclofen (LIORESAL) 20 MG tablet TAKE 1 TABLET BY MOUTH 3 TIMES DAILY     diazepam (VALIUM) 5 MG tablet TAKE 1 TABLET BY MOUTH EVERY 12 HOURS AS NEEDED FOR ANXIETY OR SLEEP SPASM      diclofenac (VOLTAREN) 50 MG EC tablet TAKE 1 TABLET BY MOUTH 3 TIMES DAILY     divalproex sodium delayed-release (DEPAKOTE) 500 MG DR tablet TAKE 1 TABLET BY MOUTH 2 TIMES A DAY     docusate sodium (DOK) 100 MG capsule TAKE 1 CAPSULE BY MOUTH TWICE DAILY     DULERA 100-5 MCG/ACT oral inhaler INHALE 2 PUFFS INTO THE LUNGS 2 TIMES A DAY     fentaNYL (DURAGESIC) 75 mcg/hr 72 hr patch APPLY 1 PATCH ONTO THE SKIN EVERY 48 HOURS, REMOVE OLD PATCH     fluconazole (DIFLUCAN) 100 MG tablet Take 2 tablets (200 mg) by mouth daily     fluticasone (FLONASE) 50 MCG/ACT spray USE 2 SPRAYS IN EACH NOSTRIL DAILY     gabapentin (NEURONTIN) 300 MG capsule TAKE 3 CAPSULES BY MOUTH THREE TIMES DAILY     HYDROcodone-acetaminophen (NORCO)  MG per tablet TAKE 1 TABLET BY MOUTH EVERY 6 HOURS AS NEEDED FOR MODERATE TO SEVEREPAIN     hydrOXYzine (VISTARIL) 50 MG capsule Take 1-2 capsules ( mg) by mouth 4 times daily as needed for anxiety      MG tablet TAKE 1 TABLET BY MOUTH EVERY 6 HOURS AS NEEDED     lidocaine (LIDODERM) 5 % Patch PLACE 3 PATCHES ONTO THE SKIN DAILY AS NEEDED FOR MODERATE PAIN     lisdexamfetamine (VYVANSE) 70 MG capsule Take 1 capsule (70 mg) by mouth every morning     losartan (COZAAR) 50 MG tablet Take 1 tablet (50 mg) by mouth daily     MAPAP 325 MG tablet TAKE 2 TABLETS BY MOUTH EVERY 4 HOURS AS NEEDED FOR MILD PAIN     mometasone-formoterol (DULERA) 100-5 MCG/ACT oral inhaler INHALE 2 PUFFS INTO THE LUNGS 2 TIMES A DAY     multivitamin  with iron (SM COMPLETE ADVANCED FORMULA) TABS TAKE 1 TABLET BY MOUTH DAILY     naloxone (NARCAN) 1 mg/mL for intranasal kit (2 syringes with 2 mucosal atomizer device) In opioid overdose put cone in nostril and push 1/2 of contents into each nostril.  Repeat every 3 min if no response until help arrives.     baclofen (LIORESAL) 20 MG tablet TAKE 1 TABLET BY MOUTH 3 TIMES DAILY     lisdexamfetamine (VYVANSE) 70 MG capsule Take 1 capsule (70 mg) by mouth daily      nicotine polacrilex (NICORETTE) 2 MG gum CHEW 1 PIECE AS NEEDED FOR SMOKING CESSATION     nortriptyline (PAMELOR) 75 MG capsule Take 1 capsule (75 mg) by mouth At Bedtime     omeprazole (PRILOSEC) 20 MG CR capsule TAKE 1 CAPSULE BY MOUTH EVERY DAY BEFORE A MEAL     order for DME 1 boa back brace     ORDER FOR DME Equipment being ordered: Large gloves     OXcarbazepine (TRILEPTAL) 600 MG tablet TAKE 1 TABLET BY MOUTH 2 TIMES DAILY     propranolol (INDERAL) 20 MG tablet Take 1 tablet (20 mg) by mouth 2 times daily     senna-docusate (SM STOOL SOFTENER/LAXATIVE) 8.6-50 MG per tablet TAKE 1 OR 2 TABLETS BY MOUTH 2 TIMES A DAY     traZODone (DESYREL) 50 MG tablet Take 2 tablets (100 mg) by mouth nightly as needed     VENTOLIN  (90 Base) MCG/ACT inhaler USE 2 PUFFS 4 TIMES A DAY AS NEEDED FOR SHORTNESS OF BREATH     No current facility-administered medications for this visit.        VITALS   BP (!) 134/92 (BP Location: Right arm, Patient Position: Sitting, Cuff Size: Adult Regular)  Pulse 88  Temp 99  F (37.2  C) (Tympanic)  Wt 81.6 kg (180 lb)  BMI 26.58 kg/m2     LABS                                                                                                                         EKG 2016 with 376 ms (on nortriptyline)    Last Basic Metabolic Panel:  Lab Results   Component Value Date     10/20/2017      Lab Results   Component Value Date    POTASSIUM 4.2 10/20/2017     Lab Results   Component Value Date    CHLORIDE 107 10/20/2017     Lab Results   Component Value Date    SANDRITA 9.0 10/20/2017     Lab Results   Component Value Date    CO2 28 10/20/2017     Lab Results   Component Value Date    BUN 17 10/20/2017     Lab Results   Component Value Date    CR 0.69 10/20/2017     Lab Results   Component Value Date     10/20/2017     Liver Function Studies -   Recent Labs   Lab Test  10/20/17   1443   PROTTOTAL  7.4   ALBUMIN  4.4   BILITOTAL  0.4   ALKPHOS  120   AST  18   ALT  30     CBC RESULTS:    Recent Labs   Lab Test  10/20/17   1443   WBC  4.0   RBC  4.00*   HGB  12.9*   HCT  36.5*   MCV  91   MCH  32.3   MCHC  35.3   RDW  12.4   PLT  151          MENTAL STATUS EXAM                                                                                        Alert. Oriented to person, place, and date / time. Casually groomed, calm, cooperative with good eye contact. No problems with speech or psychomotor behavior. Mood was described as depressed and affect was congruent to speech content and full range. Thought process, including associations, was unremarkable and thought content was devoid of suicidal and homicidal ideation and psychotic thought. No hallucinations. Insight was good. Judgment was intact and adequate for safety. Fund of knowledge was intact. Pt demonstrates no obvious problems with attention, concentration, language, recent or remote memory although these were not formally tested.     ASSESSMENT                                                                                                      HISTORICAL:  Initial psych note 10/6/15          NOTES:      Joshua is a 58 year old with bipolar, ADHD, and MDD.   He struggles with depression and seems to be largely related to his chronic pain issues.We started Valium as dual purpose: muscle relaxant properties and for anxiety. Couple visits ago we discussed the new guidelines for short - term use of benzodiazepines (Valium). . I am okay continuing the vyvanse, Trileptal and him continuing trazodone.       TREATMENT RISK STATEMENT:  The risks, benefits, alternatives and potential adverse effects have been explained and are understood by the pt.  The pt agrees to the treatment plan with the ability to do so.   The pt knows to call the clinic for any problems or access emergency care if needed.        DIAGNOSES                  ADHD  Bipolar disorder      PLAN                                                                                                                     1)  MEDICATIONS:    Last script I wrote was Vyvanse 10/26/18 and gave scripts today 11/23/18 and  12/21/18.  Continue Trileptal 600 mg bid, trazodone 100 mg bedtime prn insomnia  2)  THERAPY:  No change    3)  LABS:  UDS 5/10/17    4)  PT MONITOR [call for probs]:  SEs from meds, worsening sx, SI/HI    5)  REFERRALS [CD, medical, other]:  None    6)  RTC:  2 months

## 2018-11-08 ENCOUNTER — TELEPHONE (OUTPATIENT)
Dept: PEDIATRICS | Facility: OTHER | Age: 58
End: 2018-11-08

## 2018-11-08 ENCOUNTER — OFFICE VISIT (OUTPATIENT)
Dept: CHIROPRACTIC MEDICINE | Facility: OTHER | Age: 58
End: 2018-11-08
Attending: CHIROPRACTOR
Payer: COMMERCIAL

## 2018-11-08 DIAGNOSIS — M99.01 SEGMENTAL AND SOMATIC DYSFUNCTION OF CERVICAL REGION: ICD-10-CM

## 2018-11-08 DIAGNOSIS — M99.02 SEGMENTAL AND SOMATIC DYSFUNCTION OF THORACIC REGION: ICD-10-CM

## 2018-11-08 DIAGNOSIS — M54.50 ACUTE BILATERAL LOW BACK PAIN WITHOUT SCIATICA: ICD-10-CM

## 2018-11-08 DIAGNOSIS — M99.03 SEGMENTAL AND SOMATIC DYSFUNCTION OF LUMBAR REGION: Primary | ICD-10-CM

## 2018-11-08 PROCEDURE — 98941 CHIROPRACT MANJ 3-4 REGIONS: CPT | Mod: AT | Performed by: CHIROPRACTOR

## 2018-11-08 RX ORDER — MORPHINE SULFATE 80 MG/1
CAPSULE, EXTENDED RELEASE ORAL
Qty: 90 CAPSULE | Refills: 0 | Status: SHIPPED | OUTPATIENT
Start: 2018-11-08 | End: 2018-11-27

## 2018-11-08 RX ORDER — HYDROCODONE BITARTRATE AND ACETAMINOPHEN 10; 325 MG/1; MG/1
1 TABLET ORAL 3 TIMES DAILY PRN
Qty: 90 TABLET | Refills: 0 | Status: SHIPPED | OUTPATIENT
Start: 2018-11-08 | End: 2018-12-12

## 2018-11-08 ASSESSMENT — ANXIETY QUESTIONNAIRES: GAD7 TOTAL SCORE: 5

## 2018-11-08 NOTE — TELEPHONE ENCOUNTER
11/8/2018 - Received PA request from 's for morphine sulfate ER 80 mg.  Submitted thru [facility] as urgent request.  Waiting for response.  Carmen Bishop, [facility] Specialist.

## 2018-11-08 NOTE — MR AVS SNAPSHOT
After Visit Summary   11/8/2018    Joshua Barron    MRN: [id number]           Patient Information     Date Of Birth          1960        Visit Information        Provider Department      11/8/2018 2:40 PM Shamar Escamilla DC  [facility]        Today's Diagnoses     Segmental and somatic dysfunction of lumbar region    -  1    Acute bilateral low back pain without sciatica        Segmental and somatic dysfunction of thoracic region        Segmental and somatic dysfunction of cervical region           Follow-ups after your visit        Your next 10 appointments already scheduled     Nov 13, 2018  2:30 PM CST   Ortho Treatment with Sowmya Noha, PTA   [facility] ([facility] )    [address] [address]   [phone]            Nov 14, 2018  2:30 PM CST   Return Visit with Shamar Escamilla DC   [facility] ([facility])    [address] [address]   [phone]            Nov 15, 2018  3:00 PM CST   (Arrive by 2:40 PM)   [facility]    [address] [facility] [facility] [address]   [phone]            Nov 20, 2018  2:00 PM CST   Ortho Treatment with Sowmya Noha, PTA   [facility] ([facility] )    [address] [address]   [phone]            Nov 27, 2018  3:00 PM CST   Ortho Treatment with Kip Thorstenson, PT   [facility] ([facility] )    [address] [address]   [phone]            Dec 04, 2018  2:00 PM CST   Ortho Treatment with Kip Thorstenson, PT   [facility] ([facility] )    [address] [address]   [phone]            Dec 05, 2018  3:20 PM CST   (Arrive by 3:05 PM)   [facility]andez MD   [facility]    [address]  [place]  [place] [id number] [phone]            Mar 13, 2019  2:15 PM CDT   (Arrive by 2:00 PM)   Hearing Eval with Kevin Moreno   [facility]    [address] [facility]  [place] [address] [phone]              Who to contact     If you have questions or need follow up information about today's clinic visit or your schedule please contact  [facility] directly at [phone].  Normal or non-critical lab and imaging results will be communicated to you by MyChart, letter or phone within 4 business days after the clinic has received the results. If you do not hear from us within 7 days, please contact the clinic through MyChart or phone. If you have a critical or abnormal lab result, we will notify you by phone as soon as possible.  Submit refill requests through [employer] or call your pharmacy and they will forward the refill request to us. Please allow 3 business days for your refill to be completed.          Additional Information About Your Visit        Care EveryWhere ID     This is your Care EveryWhere ID. This could be used by other organizations to access your [facility] records  [id number]         Blood Pressure from Last 3 Encounters:   11/07/18 (!) 134/92   10/25/18 126/74   09/05/18 120/70    Weight from Last 3 Encounters:   11/07/18 180 lb (81.6 kg)   10/25/18 186 lb (84.4 kg)   09/05/18 190 lb (86.2 kg)              We Performed the Following     CHIROPRAC MANIP,SPINAL,3-4 REGIONS        Primary Care Provider Office Phone # Fax #    Lyle Martinez DO Natalia [phone]       [address] [facility] [address]        Goals        General    Mental Health Management (pt-stated)     Pain Management (pt-stated)       Equal Access to Services     SHAHBAZ DANIELS : Rosie bryanto Soro, waaxda luqadaha, qaybta kaalmada adeegyada, waxay nicole fermin. So [facility]  [phone].    ATENCIÓN: Si radhames malone, tiene a hastings disposición servicios gratuitos de asistencia lingüística. Tip vela [phone].    We comply with applicable federal civil rights laws and [place] laws. We do not discriminate on the basis of race, color, national origin, age, disability, sex, sexual orientation, or gender identity.            Thank you!     Thank you for choosing  [facility]  for your care. Our goal is always to provide you with excellent care. Hearing back from our patients is one way we can continue to improve our services. Please take a few minutes to complete the written survey that you may receive in the mail after your visit with us. Thank you!             Your Updated Medication List - Protect others around you: Learn how to safely use, store and throw away your medicines at www.disposemymeds.org.          This list is accurate as of 11/8/18  3:44 PM.  Always use your most recent med list.                   Brand Name Dispense Instructions for use Diagnosis    * albuterol 108 (90 Base) MCG/ACT inhaler    PROAIR HFA/PROVENTIL HFA/VENTOLIN HFA     Inhale 2 puffs into the lungs every 6 hours as needed for shortness of breath / dyspnea or wheezing        * albuterol 108 (90 Base) MCG/ACT inhaler    VENTOLIN HFA    18 g    USE 2 PUFFS 4 TIMES A DAY AS NEEDED FOR SHORTNESS OF BREATH        * VENTOLIN  (90 Base) MCG/ACT inhaler   Generic drug:  albuterol     18 g    USE 2 PUFFS 4 TIMES A DAY AS NEEDED FOR SHORTNESS OF BREATH    Moderate persistent asthma without complication       ASPIRIN LOW DOSE 81 MG chewable tablet   Generic drug:  aspirin     30 tablet    CHEW AND SWALLOW 1 TABLET BY MOUTH DAILY    Healthcare maintenance       atorvastatin 20 MG tablet    LIPITOR    90 tablet    Take 1 tablet (20 mg) by mouth daily        baclofen 20 MG tablet    LIORESAL    90 tablet    TAKE 1 TABLET BY MOUTH 3 TIMES DAILY    Bilateral low back pain with left-sided sciatica,  unspecified chronicity       diazepam 5 MG tablet    VALIUM    60 tablet    TAKE 1 TABLET BY MOUTH EVERY 12 HOURS AS NEEDED FOR ANXIETY OR SLEEP SPASM    DAYANA (generalized anxiety disorder)       diclofenac 50 MG EC tablet    VOLTAREN    90 tablet    TAKE 1 TABLET BY MOUTH 3 TIMES DAILY    Arthritis pain       divalproex sodium delayed-release 500 MG DR tablet    DEPAKOTE    60 tablet    TAKE 1 TABLET BY MOUTH 2 TIMES A DAY    Bipolar affective disorder, currently manic, moderate (H)       docusate sodium 100 MG capsule    DOK    60 capsule    TAKE 1 CAPSULE BY MOUTH TWICE DAILY        * DULERA 100-5 MCG/ACT oral inhaler   Generic drug:  mometasone-formoterol     13 g    INHALE 2 PUFFS INTO THE LUNGS 2 TIMES A DAY    Moderate persistent asthma without complication       * mometasone-formoterol 100-5 MCG/ACT oral inhaler    DULERA    13 g    INHALE 2 PUFFS INTO THE LUNGS 2 TIMES A DAY    Moderate persistent asthma without complication       fentaNYL 75 mcg/hr 72 hr patch    DURAGESIC    11 patch    APPLY 1 PATCH ONTO THE SKIN EVERY 48 HOURS, REMOVE OLD PATCH    DDD (degenerative disc disease), cervical       fluconazole 100 MG tablet    DIFLUCAN    14 tablet    Take 2 tablets (200 mg) by mouth daily    Thrush       fluticasone 50 MCG/ACT spray    FLONASE    16 g    USE 2 SPRAYS IN EACH NOSTRIL DAILY        gabapentin 300 MG capsule    NEURONTIN    270 capsule    TAKE 3 CAPSULES BY MOUTH THREE TIMES DAILY        HYDROcodone-acetaminophen  MG per tablet    NORCO    90 tablet    Take 1 tablet by mouth 3 times daily as needed for severe pain    Low back pain, unspecified back pain laterality, unspecified chronicity, with sciatica presence unspecified       hydrOXYzine 50 MG capsule    VISTARIL    60 capsule    Take 1-2 capsules ( mg) by mouth 4 times daily as needed for anxiety         MG tablet   Generic drug:  ibuprofen     120 tablet    TAKE 1 TABLET BY MOUTH EVERY 6 HOURS AS NEEDED    Low back pain,  unspecified back pain laterality, unspecified chronicity, with sciatica presence unspecified       lidocaine 5 % Patch    LIDODERM    90 patch    PLACE 3 PATCHES ONTO THE SKIN DAILY AS NEEDED FOR MODERATE PAIN    Midline low back pain without sciatica       * lisdexamfetamine 70 MG capsule   Start taking on:  11/23/2018    VYVANSE    30 capsule    Take 1 capsule (70 mg) by mouth daily    ADHD (attention deficit hyperactivity disorder), combined type       * lisdexamfetamine 70 MG capsule   Start taking on:  12/21/2018    VYVANSE    30 capsule    Take 1 capsule (70 mg) by mouth every morning    ADHD (attention deficit hyperactivity disorder), combined type       losartan 50 MG tablet    COZAAR    30 tablet    Take 1 tablet (50 mg) by mouth daily        MAPAP 325 MG tablet   Generic drug:  acetaminophen     200 tablet    TAKE 2 TABLETS BY MOUTH EVERY 4 HOURS AS NEEDED FOR MILD PAIN    Pain       Morphine Sulfate 80 MG Cp24     90 capsule    Take 1 capsule by oral route 3 times daily    Low back pain, unspecified back pain laterality, unspecified chronicity, with sciatica presence unspecified       multivitamin  with iron Tabs     30 tablet    TAKE 1 TABLET BY MOUTH DAILY    Health care maintenance       naloxone 1 mg/mL for intranasal kit (2 syringes with 2 mucosal atomizer device)    NARCAN    2 kit    In opioid overdose put cone in nostril and push 1/2 of contents into each nostril.  Repeat every 3 min if no response until help arrives.    Chronic pain syndrome       nicotine polacrilex 2 MG gum    NICORETTE    110 each    CHEW 1 PIECE AS NEEDED FOR SMOKING CESSATION        nortriptyline 75 MG capsule    PAMELOR    30 capsule    Take 1 capsule (75 mg) by mouth At Bedtime        omeprazole 20 MG CR capsule    priLOSEC    30 capsule    TAKE 1 CAPSULE BY MOUTH EVERY DAY BEFORE A MEAL        * order for DME     2 Box    Equipment being ordered: Large gloves    Need for assistance with personal care       * order for DME      1 Device    1 boa back brace    Chronic low back pain with sciatica, sciatica laterality unspecified, unspecified back pain laterality       OXcarbazepine 600 MG tablet    TRILEPTAL    60 tablet    TAKE 1 TABLET BY MOUTH 2 TIMES DAILY    Bipolar affective disorder, current episode hypomanic (H)       propranolol 20 MG tablet    INDERAL    60 tablet    Take 1 tablet (20 mg) by mouth 2 times daily        senna-docusate 8.6-50 MG per tablet    SM STOOL SOFTENER/LAXATIVE    120 tablet    TAKE 1 OR 2 TABLETS BY MOUTH 2 TIMES A DAY        traZODone 50 MG tablet    DESYREL    60 tablet    Take 2 tablets (100 mg) by mouth nightly as needed        * Notice:  This list has 9 medication(s) that are the same as other medications prescribed for you. Read the directions carefully, and ask your doctor or other care provider to review them with you.

## 2018-11-08 NOTE — PROGRESS NOTES

## 2018-11-09 LAB
6MAM SERPL QL CFM: NEGATIVE
AMPHET UR QL CFM: 112 NG/ML
AMPHETAMINES SPEC-MCNC: NORMAL NG/ML
APAP BLD-MCNC: NEGATIVE UG/ML
BARBITURATES SPEC-MCNC: NEGATIVE UG/ML
BENZODIAZ SPEC-MCNC: NORMAL NG/ML
BUPRENORPHINE SERPLBLD-MCNC: NEGATIVE NG/ML
CARBOXYTHC BLD-MCNC: NEGATIVE NG/ML
CARISOPRODOL IA: NEGATIVE UG/ML
COCAINE METABOLITE IA: NEGATIVE NG/ML
CODEINE SERPL-MCNC: NEGATIVE NG/ML
DECLARED MEDICATIONS: NORMAL
DIETHYLPROPION: NEGATIVE NG/ML
DIHYDROCODEINE: 5.9 NG/ML
DRUGS PRES NOT REPTD: NORMAL
EPHEDRINE: NEGATIVE NG/ML
ETHANOL BLD-MCNC: NEGATIVE GM/DL
FENTANYL BLD CFM-MCNC: 3.1 NG/ML
FENTANYL IA: NORMAL NG/ML
FENTANYL SPEC QL: POSITIVE
GABAPENTIN CONFIRM: POSITIVE
GABAPENTIN IA: NORMAL UG/ML
GABAPENTIN: 12.6 UG/ML
HYDROCODONE SERPL-MCNC: 39 NG/ML
HYDROMORPHONE SERPL-MCNC: NEGATIVE NG/ML
MDA SERPL CFM-MCNC: NEGATIVE NG/ML
MDEA SERPL CFM-MCNC: NEGATIVE NG/ML
MDMA SERPL CFM-MCNC: NEGATIVE NG/ML
MEPERIDINE SERPLBLD-MCNC: NEGATIVE NG/ML
METHADONE BLD-MCNC: NEGATIVE NG/ML
METHAMPHET SERPL CFM-MCNC: NEGATIVE NG/ML
MORPHINE SERPL-MCNC: NEGATIVE NG/ML
NORFENTANYL BLD CFM-MCNC: 1.6 NG/ML
OPIATES SERPL QL CFM: POSITIVE
OPIATES SPEC-MCNC: NORMAL NG/ML
OTHER DRUGS DETECTED: POSITIVE
OXYCODONE SERPL QL: NEGATIVE
OXYCODONE SERPL-MCNC: NEGATIVE NG/ML
OXYCODONE SERPLBLD SCN-MCNC: NEGATIVE NG/ML
OXYMORPHONE SERPLBLD CFM-MCNC: NEGATIVE NG/ML
PCP SPEC-MCNC: NEGATIVE NG/ML
PHENDIMETRAZINE: NEGATIVE NG/ML
PHENTERMINE BLD-MCNC: NEGATIVE NG/ML
PHENYLPROPANOLAMINE: NEGATIVE NG/ML
PROPOXYPH SERPL-MCNC: NORMAL NG/ML
PROPOXYPH SPEC-MCNC: NEGATIVE NG/ML
PSEUDOEPHEDRINE: NEGATIVE NG/ML
SYMPATHOMIMETICS CF: POSITIVE
TRAMADOL BLD-MCNC: NEGATIVE NG/ML

## 2018-11-09 NOTE — TELEPHONE ENCOUNTER
Pt notified via telephone on medication changes listed below.  He verbalized understanding.  He is willing to try these.  Did inform him that his pain level has been 7-8 on current regimen and they aren't controlling his pain adequately.  He verbalized understanding. Did also re-educate him on opioid induced hyperalgesia and increasing medications will most likely increase his pain.  He verbalized understanding.  He has f/u appt on 11.15.18.  All questions answered.  Encouraged him to call [facility] with any questions/concerns/problems.  Verbalized understanding.    Love Quan RN-BSN  Chronic Pain Care Coordinator  [phone] opt. #3

## 2018-11-09 NOTE — TELEPHONE ENCOUNTER
11/09/2018 - Spoke with Emiliana at [facility].  She ran the prescription for 51 tablets, and that went thru, so patient will be okay for a little while.  Will discuss with Dr. Fisher and send an appeal letter on 11/13/2018.  Dr. Fisher will need to write a new prescription for the remainder of pills that patient will need.  Carmen Bishop, [facility] Specialist.

## 2018-11-09 NOTE — TELEPHONE ENCOUNTER
DENIAL - 11/9/2018 - Received a DENIAL for quantity limits from [facility] for morphine sulfate ER 80 mg.  Limits are 51 tablets per prescription claim.  I have attempted to contact [facility] several times today and have been on hold a total of 2.5 hours with no one taking the call.  Spoke with Emiliana at [facility] and explained situation.  Spoke with Jennifer Segovia and it was discussed that we will need to wait until Dr. Fisher returns on 11/13 to address this situation due to the amount of pain medications patient is taking.  Carmen Bishop, [facility] Specialist.

## 2018-11-09 NOTE — PROCEDURES
"  SUBJECTIVE:   Joshua Barron is a 58 year old male who presents to clinic today for the following health issues:  {Provider please address medication reconciliation discrepancies--rooming staff please delete if no med/rec issues}    Chronic Pain Follow-Up       Type / Location of Pain: ***  Analgesia/pain control:       Recent changes:  { :836316477}      Overall control: { :734982::\"Tolerable with discomfort\"}  Activity level/function:      Daily activities:  { :071170}    Work:  { :492291}  Adverse effects:  { :349073::\"No\"}  Adherance    Taking medication as directed?  { :244994::\"Yes\"}    Participating in other treatments: { :956248::\"yes\"}  Risk Factors:    Sleep:  { :523641}    Mood/anxiety:  { :438805::\"controlled\"}    Recent family or social stressors:  { :635868::\"none noted\"}    Other aggravating factors: { :555246::\"none\"}  PHQ-9 SCORE 5/30/2018 10/26/2018 11/7/2018   Total Score - - -   Total Score 4 11 7     DAYANA-7 SCORE 5/30/2018 10/26/2018 11/7/2018   Total Score 1 7 5     Encounter-Level CSA - 05/23/2017:          Controlled Substance Agreement - Scan on 7/31/2018 12:12 PM : CONTROLLED SUBSTANCE AGREEMENT (below)            Encounter-Level CSA - 05/23/2017:          Controlled Substance Agreement - Scan on 11/25/2015  2:25 PM : SCHEDULED MEDICATION USE AGREEMENT (below)                Amount of exercise or physical activity: {Exercise frequency days per week:722052}    Problems taking medications regularly: {Med Problems:659880::\"No\"}    Medication side effects: {CHRONIC MED SIDE EFFECTS:875501::\"none\"}    Diet: { :731654}        {additional problems for provider to add:934137}    Problem list and histories reviewed & adjusted, as indicated.  Additional history: {NONE - AS DOCUMENTED:554291::\"as documented\"}    {HIST REVIEW/ LINKS 2:017565}    Reviewed and updated as needed this visit by clinical staff       Reviewed and updated as needed this visit by Provider         {PROVIDER CHARTING " PREFERENCE:475958}

## 2018-11-09 NOTE — TELEPHONE ENCOUNTER
APPROVAL - 11/09/2018 - received APPROVAL from [facility] for Morphine Sulfate er 80 mg.  Approval dates:  10/09/2018 thru 11/08/2019.  Pharmacy advised.  Documentation scanned to Epic.  Carmen Bishop, [facility] Specialist.

## 2018-11-13 LAB
7AMINOCLONAZEPAM BLD-MCNC: NEGATIVE NG/ML
ALPRAZ SERPL-MCNC: NEGATIVE NG/ML
BENZODIAZ SERPL QL: POSITIVE
CHLORDIAZEP SERPL-MCNC: NEGATIVE NG/ML
CLONAZEPAM SERPL CFM-MCNC: NEGATIVE NG/ML
DESALKYLFLURAZ SERPL-MCNC: NEGATIVE NG/ML
DIAZEPAM SERPL-MCNC: 184 NG/ML
FLURAZEPAM SERPL-MCNC: NEGATIVE NG/ML
LORAZEPAM BLD CFM-MCNC: NEGATIVE NG/ML
MIDAZOLAM BLD CFM-MCNC: NEGATIVE NG/ML
NORCHLORDIAZEP SERPL-MCNC: NEGATIVE NG/ML
NORDIAZEPAM SERPL-MCNC: 513 NG/ML
OXAZEPAM SERPL-MCNC: NEGATIVE NG/ML
TEMAZEPAM SERPL-MCNC: NEGATIVE NG/ML
TRIAZOLAM BLD-MCNC: NEGATIVE NG/ML

## 2018-11-14 ENCOUNTER — OFFICE VISIT (OUTPATIENT)
Dept: CHIROPRACTIC MEDICINE | Facility: OTHER | Age: 58
End: 2018-11-14
Attending: CHIROPRACTOR
Payer: COMMERCIAL

## 2018-11-14 DIAGNOSIS — M54.50 ACUTE BILATERAL LOW BACK PAIN WITHOUT SCIATICA: ICD-10-CM

## 2018-11-14 DIAGNOSIS — M99.02 SEGMENTAL AND SOMATIC DYSFUNCTION OF THORACIC REGION: ICD-10-CM

## 2018-11-14 DIAGNOSIS — M99.01 SEGMENTAL AND SOMATIC DYSFUNCTION OF CERVICAL REGION: ICD-10-CM

## 2018-11-14 DIAGNOSIS — M99.03 SEGMENTAL AND SOMATIC DYSFUNCTION OF LUMBAR REGION: Primary | ICD-10-CM

## 2018-11-14 PROCEDURE — 98941 CHIROPRACT MANJ 3-4 REGIONS: CPT | Mod: AT | Performed by: CHIROPRACTOR

## 2018-11-14 NOTE — MR AVS SNAPSHOT
After Visit Summary   11/14/2018    Joshua Barron    MRN: [id number]           Patient Information     Date Of Birth          1960        Visit Information        Provider Department      11/14/2018 2:30 PM Shamar Escamilla DC  [facility]        Today's Diagnoses     Segmental and somatic dysfunction of lumbar region    -  1    Acute bilateral low back pain without sciatica        Segmental and somatic dysfunction of thoracic region        Segmental and somatic dysfunction of cervical region           Follow-ups after your visit        Your next 10 appointments already scheduled     Nov 15, 2018  3:00 PM CST   (Arrive by 2:40 PM)   SHORT with Lyle Fisher DO   [facility]    [address] [facility] [address]   [phone]            Nov 20, 2018  2:00 PM CST   Ortho Treatment with Sowmya Noha, PTA   [facility] ([facility] )    [address] [address]   [phone]            Nov 21, 2018  3:00 PM CST   Return Visit with Shamar Escamilla DC   [facility] ([facility])    [address] [place] [address]   [phone]            Nov 27, 2018  3:00 PM CST   Ortho Treatment with Kip Thorstenson, PT   [facility] ([facility] )    [address] [address]   [phone]            Dec 04, 2018  2:00 PM CST   Ortho Treatment with Kip Thorstenson, PT   [facility] ([facility] )    [address] [address]   [phone]            Dec 05, 2018  3:20 PM CST   (Arrive by 3:05 PM)   Return Visit with Laquita Fernandez MD   [facility] ([facility] )    [address] [place], [place] [address] [place] [address] [phone]            Mar 13, 2019  2:15 PM CDT   (Arrive by 2:00 PM)   Hearing Eval with Kevin Moreno   [facility] ([facility] )    [address] [facility] [id number] [phone]              Who to contact     If you have questions or need follow up information about today's clinic visit or your schedule please contact  [facility] directly at [phone].  Normal or non-critical lab and imaging results will be communicated to you by MyChart, letter or phone within 4 business days after the clinic has received the results. If you do not hear from us within 7 days, please contact the clinic through MyChart or phone. If you have a critical or abnormal lab result, we will notify you by phone as soon as possible.  Submit refill requests through [employer] or call your pharmacy and they will forward the refill request to us. Please allow 3 business days for your refill to be completed.          Additional Information About Your Visit        Care EveryWhere ID     This is your Care EveryWhere ID. This could be used by other organizations to access your [facility] medical records  [id number]         Blood Pressure from Last 3 Encounters:   11/07/18 (!) 134/92   10/25/18 126/74   09/05/18 120/70    Weight from Last 3 Encounters:   11/07/18 180 lb (81.6 kg)   10/25/18 186 lb (84.4 kg)   09/05/18 190 lb (86.2 kg)              We Performed the Following     CHIROPRAC MANIP,SPINAL,3-4 REGIONS        Primary Care Provider Office Phone # Fax #    Lyle Juan Fisher  [phone] [address] [facility] [address]        Goals        General    Mental Health Management (pt-stated)     Pain Management (pt-stated)       Equal Access to Services     [facility] FRANCES : Hadii jim arizmendi hadasho Soro, waaxda luqadaha, qaybta kaalmada jaki mcmillan . So [facility] [phone].    ATENCIÓN: Si habla español, tiene a hastings disposición servicios gratuitos de asistencia lingüística. Tip al [phone].    We comply with applicable federal civil rights laws and [place]  laws. We do not discriminate on the basis of race, color, national origin, age, disability, sex, sexual orientation, or gender identity.            Thank you!     Thank you for choosing  [facility]  for your care. Our goal is always to provide you with excellent care. Hearing back from our patients is one way we can continue to improve our services. Please take a few minutes to complete the written survey that you may receive in the mail after your visit with us. Thank you!             Your Updated Medication List - Protect others around you: Learn how to safely use, store and throw away your medicines at www.disposemymeds.org.          This list is accurate as of 11/14/18 11:59 PM.  Always use your most recent med list.                   Brand Name Dispense Instructions for use Diagnosis    * albuterol 108 (90 Base) MCG/ACT inhaler    PROAIR HFA/PROVENTIL HFA/VENTOLIN HFA     Inhale 2 puffs into the lungs every 6 hours as needed for shortness of breath / dyspnea or wheezing        * albuterol 108 (90 Base) MCG/ACT inhaler    VENTOLIN HFA    18 g    USE 2 PUFFS 4 TIMES A DAY AS NEEDED FOR SHORTNESS OF BREATH        * VENTOLIN  (90 Base) MCG/ACT inhaler   Generic drug:  albuterol     18 g    USE 2 PUFFS 4 TIMES A DAY AS NEEDED FOR SHORTNESS OF BREATH    Moderate persistent asthma without complication       ASPIRIN LOW DOSE 81 MG chewable tablet   Generic drug:  aspirin     30 tablet    CHEW AND SWALLOW 1 TABLET BY MOUTH DAILY    Healthcare maintenance       atorvastatin 20 MG tablet    LIPITOR    90 tablet    Take 1 tablet (20 mg) by mouth daily        baclofen 20 MG tablet    LIORESAL    90 tablet    TAKE 1 TABLET BY MOUTH 3 TIMES DAILY    Bilateral low back pain with left-sided sciatica, unspecified chronicity       diazepam 5 MG tablet    VALIUM    60 tablet    TAKE 1 TABLET BY MOUTH EVERY 12 HOURS AS NEEDED FOR ANXIETY OR SLEEP SPASM    DAYANA (generalized anxiety disorder)       diclofenac 50 MG  EC tablet    VOLTAREN    90 tablet    TAKE 1 TABLET BY MOUTH 3 TIMES DAILY    Arthritis pain       divalproex sodium delayed-release 500 MG DR tablet    DEPAKOTE    60 tablet    TAKE 1 TABLET BY MOUTH 2 TIMES A DAY    Bipolar affective disorder, currently manic, moderate (H)       docusate sodium 100 MG capsule    DOK    60 capsule    TAKE 1 CAPSULE BY MOUTH TWICE DAILY        * DULERA 100-5 MCG/ACT oral inhaler   Generic drug:  mometasone-formoterol     13 g    INHALE 2 PUFFS INTO THE LUNGS 2 TIMES A DAY    Moderate persistent asthma without complication       * mometasone-formoterol 100-5 MCG/ACT oral inhaler    DULERA    13 g    INHALE 2 PUFFS INTO THE LUNGS 2 TIMES A DAY    Moderate persistent asthma without complication       fentaNYL 75 mcg/hr 72 hr patch    DURAGESIC    11 patch    APPLY 1 PATCH ONTO THE SKIN EVERY 48 HOURS, REMOVE OLD PATCH    DDD (degenerative disc disease), cervical       fluconazole 100 MG tablet    DIFLUCAN    14 tablet    Take 2 tablets (200 mg) by mouth daily    Thrush       fluticasone 50 MCG/ACT spray    FLONASE    16 g    USE 2 SPRAYS IN EACH NOSTRIL DAILY        gabapentin 300 MG capsule    NEURONTIN    270 capsule    TAKE 3 CAPSULES BY MOUTH THREE TIMES DAILY        HYDROcodone-acetaminophen  MG per tablet    NORCO    90 tablet    Take 1 tablet by mouth 3 times daily as needed for severe pain    Low back pain, unspecified back pain laterality, unspecified chronicity, with sciatica presence unspecified       hydrOXYzine 50 MG capsule    VISTARIL    60 capsule    Take 1-2 capsules ( mg) by mouth 4 times daily as needed for anxiety         MG tablet   Generic drug:  ibuprofen     120 tablet    TAKE 1 TABLET BY MOUTH EVERY 6 HOURS AS NEEDED    Low back pain, unspecified back pain laterality, unspecified chronicity, with sciatica presence unspecified       lidocaine 5 % Patch    LIDODERM    90 patch    PLACE 3 PATCHES ONTO THE SKIN DAILY AS NEEDED FOR MODERATE PAIN     Midline low back pain without sciatica       * lisdexamfetamine 70 MG capsule   Start taking on:  11/23/2018    VYVANSE    30 capsule    Take 1 capsule (70 mg) by mouth daily    ADHD (attention deficit hyperactivity disorder), combined type       * lisdexamfetamine 70 MG capsule   Start taking on:  12/21/2018    VYVANSE    30 capsule    Take 1 capsule (70 mg) by mouth every morning    ADHD (attention deficit hyperactivity disorder), combined type       losartan 50 MG tablet    COZAAR    30 tablet    Take 1 tablet (50 mg) by mouth daily        MAPAP 325 MG tablet   Generic drug:  acetaminophen     200 tablet    TAKE 2 TABLETS BY MOUTH EVERY 4 HOURS AS NEEDED FOR MILD PAIN    Pain       Morphine Sulfate 80 MG Cp24     90 capsule    Take 1 capsule by oral route 3 times daily    Low back pain, unspecified back pain laterality, unspecified chronicity, with sciatica presence unspecified       multivitamin  with iron Tabs     30 tablet    TAKE 1 TABLET BY MOUTH DAILY    Health care maintenance       naloxone 1 mg/mL for intranasal kit (2 syringes with 2 mucosal atomizer device)    NARCAN    2 kit    In opioid overdose put cone in nostril and push 1/2 of contents into each nostril.  Repeat every 3 min if no response until help arrives.    Chronic pain syndrome       nicotine polacrilex 2 MG gum    NICORETTE    110 each    CHEW 1 PIECE AS NEEDED FOR SMOKING CESSATION        nortriptyline 75 MG capsule    PAMELOR    30 capsule    Take 1 capsule (75 mg) by mouth At Bedtime        omeprazole 20 MG CR capsule    priLOSEC    30 capsule    TAKE 1 CAPSULE BY MOUTH EVERY DAY BEFORE A MEAL        * order for DME     2 Box    Equipment being ordered: Large gloves    Need for assistance with personal care       * order for DME     1 Device    1 boa back brace    Chronic low back pain with sciatica, sciatica laterality unspecified, unspecified back pain laterality       OXcarbazepine 600 MG tablet    TRILEPTAL    60 tablet    TAKE 1  TABLET BY MOUTH 2 TIMES DAILY    Bipolar affective disorder, current episode hypomanic (H)       propranolol 20 MG tablet    INDERAL    60 tablet    Take 1 tablet (20 mg) by mouth 2 times daily        senna-docusate 8.6-50 MG per tablet    SM STOOL SOFTENER/LAXATIVE    120 tablet    TAKE 1 OR 2 TABLETS BY MOUTH 2 TIMES A DAY        traZODone 50 MG tablet    DESYREL    60 tablet    Take 2 tablets (100 mg) by mouth nightly as needed        * Notice:  This list has 9 medication(s) that are the same as other medications prescribed for you. Read the directions carefully, and ask your doctor or other care provider to review them with you.

## 2018-11-15 ENCOUNTER — OFFICE VISIT (OUTPATIENT)
Dept: PEDIATRICS | Facility: OTHER | Age: 58
End: 2018-11-15
Attending: INTERNAL MEDICINE
Payer: COMMERCIAL

## 2018-11-15 VITALS
RESPIRATION RATE: 20 BRPM | TEMPERATURE: 98.7 F | SYSTOLIC BLOOD PRESSURE: 110 MMHG | OXYGEN SATURATION: 96 % | HEART RATE: 80 BPM | DIASTOLIC BLOOD PRESSURE: 80 MMHG

## 2018-11-15 DIAGNOSIS — M62.830 BACK MUSCLE SPASM: ICD-10-CM

## 2018-11-15 DIAGNOSIS — M54.50 LUMBAR BACK PAIN: Primary | ICD-10-CM

## 2018-11-15 DIAGNOSIS — M54.42 BILATERAL LOW BACK PAIN WITH LEFT-SIDED SCIATICA, UNSPECIFIED CHRONICITY: ICD-10-CM

## 2018-11-15 PROCEDURE — G0463 HOSPITAL OUTPT CLINIC VISIT: HCPCS | Performed by: INTERNAL MEDICINE

## 2018-11-15 PROCEDURE — 99214 OFFICE O/P EST MOD 30 MIN: CPT | Performed by: INTERNAL MEDICINE

## 2018-11-15 RX ORDER — BACLOFEN 20 MG/1
20 TABLET ORAL 4 TIMES DAILY
Qty: 120 TABLET | Refills: 3 | Status: SHIPPED | OUTPATIENT
Start: 2018-11-15 | End: 2019-03-25

## 2018-11-15 ASSESSMENT — ANXIETY QUESTIONNAIRES
7. FEELING AFRAID AS IF SOMETHING AWFUL MIGHT HAPPEN: SEVERAL DAYS
5. BEING SO RESTLESS THAT IT IS HARD TO SIT STILL: NOT AT ALL
2. NOT BEING ABLE TO STOP OR CONTROL WORRYING: NOT AT ALL
GAD7 TOTAL SCORE: 1
3. WORRYING TOO MUCH ABOUT DIFFERENT THINGS: NOT AT ALL
1. FEELING NERVOUS, ANXIOUS, OR ON EDGE: NOT AT ALL
6. BECOMING EASILY ANNOYED OR IRRITABLE: NOT AT ALL

## 2018-11-15 ASSESSMENT — PATIENT HEALTH QUESTIONNAIRE - PHQ9
5. POOR APPETITE OR OVEREATING: NOT AT ALL
SUM OF ALL RESPONSES TO PHQ QUESTIONS 1-9: 1

## 2018-11-15 ASSESSMENT — PAIN SCALES - GENERAL: PAINLEVEL: EXTREME PAIN (8)
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
## 2018-11-15 NOTE — MR AVS SNAPSHOT
After Visit Summary   11/15/2018    Joshua Barron    MRN: [id number]           Patient Information     Date Of Birth          1960        Visit Information        Provider Department      11/15/2018 3:00 PM Lyle Fisher,  [facility] [place]        Today's Diagnoses     Lumbar back pain    -  1    Bilateral low back pain with left-sided sciatica, unspecified chronicity        Back muscle spasm          Care Instructions      Back Exercises: Arm Reach    Do this exercise on your hands and knees. Keep your knees under your hips and your hands under your shoulders. Keep your spine in a neutral position (not arched or sagging). Be sure to maintain your neck s natural curve:    Stretch one arm straight out in front of you. Don t raise your head or let your supporting shoulder sag.    Hold for 5 seconds.    Return to starting position.    Repeat 5 times.    Switch arms.  Date Last Reviewed: 3/1/2018    7168-0674 [employer]. [address] [place] [address]. All rights reserved. This information is not intended as a substitute for professional medical care. Always follow your healthcare professional's instructions.        Back Exercises: Back Extension with Elbow Press    To start, lie face down on your stomach, feet slightly apart, forehead on the floor. Breathe deeply. You should feel comfortable and relaxed in this position.    Press up on your forearms. Keep your stomach and hips on the floor. Stay within your painfree range.    Hold for 20 seconds. Lower slowly.    Repeat 2 times.    Return to starting position.  Date Last Reviewed: 3/1/2018    [id number] [employer]. [address] [place] [address]. All rights reserved. This information is not intended as a substitute for professional medical care. Always follow your healthcare professional's instructions.        Exercises to Strengthen Your Lower Back  Strong lower back  and abdominal muscles work together to support your spine. The exercises below will help strengthen the lower back. It is important that you begin exercising slowly and increase levels gradually.  Always begin any exercise program with stretching. If you feel pain while doing any of these exercises, stop and talk to your doctor about a more specific exercise program that better suits your condition.   Low back stretch  The point of stretching is to make you more flexible and increase your range of motion. Stretch only as much as you are able. Stretch slowly. Do not push your stretch to the limit. If at any point you feel pain while stretching, this is your (temporary) limit.    Lie on your back with your knees bent and both feet on the ground.    Slowly raise your left knee to your chest as you flatten your lower back against the floor. Hold for 5 seconds.    Relax and repeat the exercise with your right knee.    Do 10 of these exercises for each leg.    Repeat hugging both knees to your chest at the same time.  Building lower back strength  Start your exercise routine with 10 to 30 minutes a day, 1 to 3 times a day.  Initial exercises  Lying on your back:  1. Ankle pumps: Move your foot up and down, towards your head, and then away. Repeat 10 times with each foot.  2. Heel slides: Slowly bend your knee, drawing the heel of your foot towards you. Then slide your heel/foot from you, straightening your knee. Do not lift your foot off the floor (this is not a leg lift).  3. Abdominal contraction: Bend your knees and put your hands on your stomach. Tighten your stomach muscles. Hold for 5 seconds, then relax. Repeat 10 times.  4. Straight leg raise: Bend one leg at the knee and keep the other leg straight. Tighten your stomach muscles. Slowly lift your straight leg 6 to 12 inches off the floor and hold for up to 5 seconds. Repeat 10 times on each side.  Standin. Wall squats: Stand with your back against the wall.  Move your feet about 12 inches away from the wall. Tighten your stomach muscles, and slowly bend your knees until they are at about a 45 degree angle. Do not go down too far. Hold about 5 seconds. Then slowly return to your starting position. Repeat 10 times.  2. Heel raises: Stand facing the wall. Slowly raise the heels of your feet up and down, while keeping your toes on the floor. If you have trouble balancing, you can touch the wall with your hands. Repeat 10 times.  More advanced exercises  When you feel comfortable enough, try these exercises.  1. Kneeling lumbar extension: Begin on your hands and knees. At the same time, raise and straighten your right arm and left leg until they are parallel to the ground. Hold for 2 seconds and come back slowly to a starting position. Repeat with left arm and right leg, alternating 10 times.  2. Prone lumbar extension: Lie face down, arms extended overhead, palms on the floor. At the same time, raise your right arm and left leg as high as comfortably possible. Hold for 10 seconds and slowly return to start. Repeat with left arm and right leg, alternating 10 times. Gradually build up to 20 times. (Advanced: Repeat this exercise raising both arms and both legs a few inches off the floor at the same time. Hold for 5 seconds and release.)  3. Pelvic tilt: Lie on the floor on your back with your knees bent at 90 degrees. Your feet should be flat on the floor. Inhale, exhale, then slowly contract your abdominal muscles bringing your navel toward your spine. Let your pelvis rock back until your lower back is flat on the floor. Hold for 10 seconds while breathing smoothly.  4. Abdominal crunch: Perform a pelvic tilt (above) flattening your lower back against the floor. Holding the tension in your abdominal muscles, take another breath and raise your shoulder blades off the ground (this is not a full sit-up). Keep your head in line with your body (don t bend your neck forward). Hold  for 2 seconds, then slowly lower.  Date Last Reviewed: 6/1/2016 2000-2018 [employer]. [address] [place] [address], [place] [address]. All rights reserved. This information is not intended as a substitute for professional medical care. Always follow your healthcare professional's instructions.                Follow-ups after your visit        Your next 10 appointments already scheduled     Nov 20, 2018  2:00 PM CST   Ortho Treatment with Sowmya Agarwal, PTA   [facility] ([facility] )    [address] [address]   [phone]            Nov 21, 2018  3:00 PM CST   Return Visit with Shamar Escamilla [facility] ([facility])    [address] [address]   [phone]            Nov 27, 2018  3:00 PM CST   Ortho Treatment with Kiyue Ovalles, PT   [facility] ([facility] )    [address] [address]   [phone]            Dec 04, 2018  2:00 PM CST   Ortho Treatment with Kip Josr, PT   [facility] ([facility] )    [address] [address]   [phone]            Dec 05, 2018  3:20 PM CST   (Arrive by 3:05 PM)   Return Visit with Laquita Fernandez MD   [facility] ([facility] )    [address] [address]   [phone]            Dec 05, 2018  4:20 PM CST   (Arrive by 4:00 PM)   Office Visit with Lyle Fisher DO   [facility] ([facility] )    [address] [facility] [address]   [phone]           Bring a current list of meds and any records pertaining to this visit. For Physicals, please bring immunization records and any forms needing to be filled out. Please arrive 10 minutes early to complete paperwork.            Mar 13, 2019  2:15 PM CDT   (Arrive by 2:00 PM)   Hearing Eval with Kevin Moreno   [facility]  ([facility] )    [address] [place] [address]   [phone]              Future tests that were ordered for you today     Open Future Orders        Priority Expected Expires Ordered    MR Lumbar Spine w/o Contrast Routine  11/15/2019 11/15/2018            Who to contact     If you have questions or need follow up information about today's clinic visit or your schedule please contact [facility] directly at [phone].  Normal or non-critical lab and imaging results will be communicated to you by MyChart, letter or phone within 4 business days after the clinic has received the results. If you do not hear from us within 7 days, please contact the clinic through MyChart or phone. If you have a critical or abnormal lab result, we will notify you by phone as soon as possible.  Submit refill requests through [employer] or call your pharmacy and they will forward the refill request to us. Please allow 3 business days for your refill to be completed.          Additional Information About Your Visit        Care EveryWhere ID     This is your Care EveryWhere ID. This could be used by other organizations to access your [facility] medical records  [id number]        Your Vitals Were     Pulse Temperature Respirations Pulse Oximetry          80 98.7  F (37.1  C) (Tympanic) 20 96%         Blood Pressure from Last 3 Encounters:   11/15/18 110/80   11/07/18 (!) 134/92   10/25/18 126/74    Weight from Last 3 Encounters:   11/07/18 180 lb (81.6 kg)   10/25/18 186 lb (84.4 kg)   09/05/18 190 lb (86.2 kg)                 Today's Medication Changes          These changes are accurate as of 11/15/18  4:34 PM.  If you have any questions, ask your nurse or doctor.               These medicines have changed or have updated prescriptions.        Dose/Directions    albuterol 108 (90 Base) MCG/ACT inhaler   Commonly known as:  VENTOLIN HFA   This may have changed:  Another medication with the  same name was removed. Continue taking this medication, and follow the directions you see here.   Changed by:  Lyle Fisher DO        USE 2 PUFFS 4 TIMES A DAY AS NEEDED FOR SHORTNESS OF BREATH   Quantity:  18 g   Refills:  0       baclofen 20 MG tablet   Commonly known as:  LIORESAL   This may have changed:  See the new instructions.   Used for:  Back muscle spasm, Lumbar back pain   Changed by:  Lyle Fisher DO        Dose:  20 mg   Take 1 tablet (20 mg) by mouth 4 times daily   Quantity:  120 tablet   Refills:  3       lisdexamfetamine 70 MG capsule   Commonly known as:  VYVANSE   This may have changed:  Another medication with the same name was removed. Continue taking this medication, and follow the directions you see here.   Used for:  ADHD (attention deficit hyperactivity disorder), combined type   Changed by:  Lyle Fisher DO        Dose:  70 mg   Start taking on:  11/23/2018   Take 1 capsule (70 mg) by mouth daily   Quantity:  30 capsule   Refills:  0       mometasone-formoterol 100-5 MCG/ACT oral inhaler   Commonly known as:  DULERA   This may have changed:  Another medication with the same name was removed. Continue taking this medication, and follow the directions you see here.   Used for:  Moderate persistent asthma without complication   Changed by:  Lyle Fisher DO        INHALE 2 PUFFS INTO THE LUNGS 2 TIMES A DAY   Quantity:  13 g   Refills:  3         Stop taking these medicines if you haven't already. Please contact your care team if you have questions.     divalproex sodium delayed-release 500 MG DR tablet   Commonly known as:  DEPAKOTE   Stopped by:  Lyle Fisher DO           fentaNYL 75 mcg/hr 72 hr patch   Commonly known as:  DURAGESIC   Stopped by:  Lyle Fisher DO           fluconazole 100 MG tablet   Commonly known as:  DIFLUCAN   Stopped by:  Lyle Fisher DO                Where to get your medicines       These medications were sent to [facility] - [place], [place] - [address] [facility]  [address] [facility] [address]     Phone:  [phone]     baclofen 20 MG tablet                Primary Care Provider Office Phone # Fax #    Lyle Fisher [phone] [address] [facility] [address]        Goals        General    Mental Health Management (pt-stated)     Pain Management (pt-stated)       Equal Access to Services     SHAHBAZ DANIELS : Hadii aad ku hadasho Soomaali, waaxda luqadaha, qaybta kaalmada adeegyada, waxay idiin hayaan adeeg kharagreyson millan . So [facility] [phone].    ATENCIÓN: Si habla español, tiene a hastings disposición servicios gratuitos de asistencia lingüística. Llame al [phone].    We comply with applicable federal civil rights laws and [place] laws. We do not discriminate on the basis of race, color, national origin, age, disability, sex, sexual orientation, or gender identity.            Thank you!     Thank you for choosing [facility]  for your care. Our goal is always to provide you with excellent care. Hearing back from our patients is one way we can continue to improve our services. Please take a few minutes to complete the written survey that you may receive in the mail after your visit with us. Thank you!             Your Updated Medication List - Protect others around you: Learn how to safely use, store and throw away your medicines at www.disposemymeds.org.          This list is accurate as of 11/15/18  4:34 PM.  Always use your most recent med list.                   Brand Name Dispense Instructions for use Diagnosis    albuterol 108 (90 Base) MCG/ACT inhaler    VENTOLIN HFA    18 g    USE 2 PUFFS 4 TIMES A DAY AS NEEDED FOR SHORTNESS OF BREATH        ASPIRIN LOW DOSE 81 MG chewable tablet   Generic drug:  aspirin     30 tablet    CHEW AND SWALLOW 1 TABLET BY MOUTH DAILY    Healthcare maintenance       atorvastatin 20  MG tablet    LIPITOR    90 tablet    Take 1 tablet (20 mg) by mouth daily        baclofen 20 MG tablet    LIORESAL    120 tablet    Take 1 tablet (20 mg) by mouth 4 times daily    Back muscle spasm, Lumbar back pain       diazepam 5 MG tablet    VALIUM    60 tablet    TAKE 1 TABLET BY MOUTH EVERY 12 HOURS AS NEEDED FOR ANXIETY OR SLEEP SPASM    DAYANA (generalized anxiety disorder)       diclofenac 50 MG EC tablet    VOLTAREN    90 tablet    TAKE 1 TABLET BY MOUTH 3 TIMES DAILY    Arthritis pain       docusate sodium 100 MG capsule    DOK    60 capsule    TAKE 1 CAPSULE BY MOUTH TWICE DAILY        fluticasone 50 MCG/ACT spray    FLONASE    16 g    USE 2 SPRAYS IN EACH NOSTRIL DAILY        gabapentin 300 MG capsule    NEURONTIN    270 capsule    TAKE 3 CAPSULES BY MOUTH THREE TIMES DAILY        HYDROcodone-acetaminophen  MG per tablet    NORCO    90 tablet    Take 1 tablet by mouth 3 times daily as needed for severe pain    Low back pain, unspecified back pain laterality, unspecified chronicity, with sciatica presence unspecified       hydrOXYzine 50 MG capsule    VISTARIL    60 capsule    Take 1-2 capsules ( mg) by mouth 4 times daily as needed for anxiety         MG tablet   Generic drug:  ibuprofen     120 tablet    TAKE 1 TABLET BY MOUTH EVERY 6 HOURS AS NEEDED    Low back pain, unspecified back pain laterality, unspecified chronicity, with sciatica presence unspecified       lidocaine 5 % Patch    LIDODERM    90 patch    PLACE 3 PATCHES ONTO THE SKIN DAILY AS NEEDED FOR MODERATE PAIN    Midline low back pain without sciatica       lisdexamfetamine 70 MG capsule   Start taking on:  11/23/2018    VYVANSE    30 capsule    Take 1 capsule (70 mg) by mouth daily    ADHD (attention deficit hyperactivity disorder), combined type       losartan 50 MG tablet    COZAAR    30 tablet    Take 1 tablet (50 mg) by mouth daily        MAPAP 325 MG tablet   Generic drug:  acetaminophen     200 tablet    TAKE 2  TABLETS BY MOUTH EVERY 4 HOURS AS NEEDED FOR MILD PAIN    Pain       mometasone-formoterol 100-5 MCG/ACT oral inhaler    DULERA    13 g    INHALE 2 PUFFS INTO THE LUNGS 2 TIMES A DAY    Moderate persistent asthma without complication       Morphine Sulfate 80 MG Cp24     90 capsule    Take 1 capsule by oral route 3 times daily    Low back pain, unspecified back pain laterality, unspecified chronicity, with sciatica presence unspecified       multivitamin  with iron Tabs     30 tablet    TAKE 1 TABLET BY MOUTH DAILY    Health care maintenance       naloxone 1 mg/mL for intranasal kit (2 syringes with 2 mucosal atomizer device)    NARCAN    2 kit    In opioid overdose put cone in nostril and push 1/2 of contents into each nostril.  Repeat every 3 min if no response until help arrives.    Chronic pain syndrome       nicotine polacrilex 2 MG gum    NICORETTE    110 each    CHEW 1 PIECE AS NEEDED FOR SMOKING CESSATION        nortriptyline 75 MG capsule    PAMELOR    30 capsule    Take 1 capsule (75 mg) by mouth At Bedtime        omeprazole 20 MG CR capsule    priLOSEC    30 capsule    TAKE 1 CAPSULE BY MOUTH EVERY DAY BEFORE A MEAL        * order for DME     2 Box    Equipment being ordered: Large gloves    Need for assistance with personal care       * order for DME     1 Device    1 boa back brace    Chronic low back pain with sciatica, sciatica laterality unspecified, unspecified back pain laterality       OXcarbazepine 600 MG tablet    TRILEPTAL    60 tablet    TAKE 1 TABLET BY MOUTH 2 TIMES DAILY    Bipolar affective disorder, current episode hypomanic (H)       propranolol 20 MG tablet    INDERAL    60 tablet    Take 1 tablet (20 mg) by mouth 2 times daily        senna-docusate 8.6-50 MG per tablet    SM STOOL SOFTENER/LAXATIVE    120 tablet    TAKE 1 OR 2 TABLETS BY MOUTH 2 TIMES A DAY        traZODone 50 MG tablet    DESYREL    60 tablet    Take 2 tablets (100 mg) by mouth nightly as needed        * Notice:  This  list has 2 medication(s) that are the same as other medications prescribed for you. Read the directions carefully, and ask your doctor or other care provider to review them with you.

## 2018-11-15 NOTE — LETTER
My COPD Action Plan     Name: Joshua Barron    YOB: 1960   Date: 11/9/2018    My doctor: Lyle Fisher, DO, DO   My clinic: [facility] [place]    [address]  [place] [address]  [phone]  My Controller Medicine: Formoterol/mometasone (Dulera)   Dose: 2 puffs twice a day     My Rescue Medicine: Albuterol (Proair/Ventolin/Proventil) inhaler   Dose: 2 puffs every 6 hours as needed     My Flare Up Medicine: None   Dose: NA     My COPD Severity: unknown      Use of Oxygen: Oxygen Not Prescribed      Make sure you've had your pneumonia   vaccines.          GREEN ZONE       Doing well today      Usual level of activity and exercise    Usual amount of cough and mucus    No shortness of breath    Usual level of health (thinking clearly, sleeping well, feel like eating) Actions:      Take daily medicines    Use oxygen as prescribed    Follow regular exercise and diet plan    Avoid cigarette smoke and other irritants that harm the lungs           YELLOW ZONE          Having a bad day or flare up      Short of breath more than usual    A lot more sputum (mucus) than usual    Sputum looks yellow, green, tan, brown or bloody    More coughing or wheezing    Fever or chills    Less energy; trouble completing activities    Trouble thinking or focusing    Using quick relief inhaler or nebulizer more often    Poor sleep; symptoms wake me up    Do not feel like eating Actions:      Get plenty of rest    Take daily medicines    Use quick relief inhaler every 6 hours    If you use oxygen, call you doctor to see if you should adjust your oxygen    Do breathing exercises or other things to help you relax    Let a loved one, friend or neighbor know you are feeling worse    Call your care team if you have 2 or more symptoms.  Start taking steroids or antibiotics if directed by your care team           RED ZONE       Need medical care now      Severe shortness of breath (feel you can't  breathe)    Fever, chills    Not enough breath to do any activity    Trouble coughing up mucus, walking or talking    Blood in mucus    Frequent coughing   Rescue medicines are not working    Not able to sleep because of breathing    Feel confused or drowsy    Chest pain    Actions:      Call your health care team.  If you cannot reach your care team, call 911 or go to the emergency room.        Annual Reminders:  Meet with Care Team, Flu Shot every Fall  Pharmacy:    [facility] - [place], [place] - [address]  [facility] - [place], [place] - [address] [facility] [address]

## 2018-11-15 NOTE — LETTER
My COPD Action Plan     Name: Joshua Braron    YOB: 1960   Date: 11/9/2018    My doctor: Lyle Fisher, DO, DO   My clinic: [facility] [place]    [address]  [place] [address]  [phone]  My Controller Medicine: { :439529}   Dose: ***     My Rescue Medicine: { :838581}   Dose: ***     My Flare Up Medicine: { :233406}   Dose: ***     My COPD Severity: { :982176}      Use of Oxygen: { :133108}     Make sure you've had your pneumonia   vaccines.          GREEN ZONE       Doing well today      Usual level of activity and exercise    Usual amount of cough and mucus    No shortness of breath    Usual level of health (thinking clearly, sleeping well, feel like eating) Actions:      Take daily medicines    Use oxygen as prescribed    Follow regular exercise and diet plan    Avoid cigarette smoke and other irritants that harm the lungs           YELLOW ZONE          Having a bad day or flare up      Short of breath more than usual    A lot more sputum (mucus) than usual    Sputum looks yellow, green, tan, brown or bloody    More coughing or wheezing    Fever or chills    Less energy; trouble completing activities    Trouble thinking or focusing    Using quick relief inhaler or nebulizer more often    Poor sleep; symptoms wake me up    Do not feel like eating Actions:      Get plenty of rest    Take daily medicines    Use quick relief inhaler every *** hours    If you use oxygen, call you doctor to see if you should adjust your oxygen    Do breathing exercises or other things to help you relax    Let a loved one, friend or neighbor know you are feeling worse    Call your care team if you have 2 or more symptoms.  Start taking steroids or antibiotics if directed by your care team           RED ZONE       Need medical care now      Severe shortness of breath (feel you can't breathe)    Fever, chills    Not enough breath to do any activity    Trouble coughing up mucus, walking  or talking    Blood in mucus    Frequent coughing   Rescue medicines are not working    Not able to sleep because of breathing    Feel confused or drowsy    Chest pain    Actions:      Call your health care team.  If you cannot reach your care team, call 911 or go to the emergency room.        Annual Reminders:  Meet with Care Team, Flu Shot every Fall  Pharmacy:    [facility] [place] [place] - [address] [facility] - [place], [place] - [address]

## 2018-11-15 NOTE — NURSING NOTE
"Chief Complaint   Patient presents with     Back Pain       Initial /80 (BP Location: Left arm, Patient Position: Chair, Cuff Size: Adult Large)  Pulse 80  Temp 98.7  F (37.1  C) (Tympanic)  Resp 20  SpO2 96% Estimated body mass index is 26.58 kg/(m^2) as calculated from the following:    Height as of 2/15/18: 5' 9\" (1.753 m).    Weight as of 11/7/18: 180 lb (81.6 kg).  Medication Reconciliation: complete    Chelo Rader LPN    "

## 2018-11-15 NOTE — PROGRESS NOTES

## 2018-11-15 NOTE — PATIENT INSTRUCTIONS
Back Exercises: Arm Reach    Do this exercise on your hands and knees. Keep your knees under your hips and your hands under your shoulders. Keep your spine in a neutral position (not arched or sagging). Be sure to maintain your neck s natural curve:    Stretch one arm straight out in front of you. Don t raise your head or let your supporting shoulder sag.    Hold for 5 seconds.    Return to starting position.    Repeat 5 times.    Switch arms.  Date Last Reviewed: 3/1/2018    6890-1118 [employer]. [address] [place] [address]. All rights reserved. This information is not intended as a substitute for professional medical care. Always follow your healthcare professional's instructions.        Back Exercises: Back Extension with Elbow Press    To start, lie face down on your stomach, feet slightly apart, forehead on the floor. Breathe deeply. You should feel comfortable and relaxed in this position.    Press up on your forearms. Keep your stomach and hips on the floor. Stay within your painfree range.    Hold for 20 seconds. Lower slowly.    Repeat 2 times.    Return to starting position.  Date Last Reviewed: 3/1/2018    6102-6405 [employer]. [address] [place] [address]. All rights reserved. This information is not intended as a substitute for professional medical care. Always follow your healthcare professional's instructions.        Exercises to Strengthen Your Lower Back  Strong lower back and abdominal muscles work together to support your spine. The exercises below will help strengthen the lower back. It is important that you begin exercising slowly and increase levels gradually.  Always begin any exercise program with stretching. If you feel pain while doing any of these exercises, stop and talk to your doctor about a more specific exercise program that better suits your condition.   Low back stretch  The point of stretching is to make you more flexible  and increase your range of motion. Stretch only as much as you are able. Stretch slowly. Do not push your stretch to the limit. If at any point you feel pain while stretching, this is your (temporary) limit.    Lie on your back with your knees bent and both feet on the ground.    Slowly raise your left knee to your chest as you flatten your lower back against the floor. Hold for 5 seconds.    Relax and repeat the exercise with your right knee.    Do 10 of these exercises for each leg.    Repeat hugging both knees to your chest at the same time.  Building lower back strength  Start your exercise routine with 10 to 30 minutes a day, 1 to 3 times a day.  Initial exercises  Lying on your back:  1. Ankle pumps: Move your foot up and down, towards your head, and then away. Repeat 10 times with each foot.  2. Heel slides: Slowly bend your knee, drawing the heel of your foot towards you. Then slide your heel/foot from you, straightening your knee. Do not lift your foot off the floor (this is not a leg lift).  3. Abdominal contraction: Bend your knees and put your hands on your stomach. Tighten your stomach muscles. Hold for 5 seconds, then relax. Repeat 10 times.  4. Straight leg raise: Bend one leg at the knee and keep the other leg straight. Tighten your stomach muscles. Slowly lift your straight leg 6 to 12 inches off the floor and hold for up to 5 seconds. Repeat 10 times on each side.  Standin. Wall squats: Stand with your back against the wall. Move your feet about 12 inches away from the wall. Tighten your stomach muscles, and slowly bend your knees until they are at about a 45 degree angle. Do not go down too far. Hold about 5 seconds. Then slowly return to your starting position. Repeat 10 times.  2. Heel raises: Stand facing the wall. Slowly raise the heels of your feet up and down, while keeping your toes on the floor. If you have trouble balancing, you can touch the wall with your hands. Repeat 10  times.  More advanced exercises  When you feel comfortable enough, try these exercises.  1. Kneeling lumbar extension: Begin on your hands and knees. At the same time, raise and straighten your right arm and left leg until they are parallel to the ground. Hold for 2 seconds and come back slowly to a starting position. Repeat with left arm and right leg, alternating 10 times.  2. Prone lumbar extension: Lie face down, arms extended overhead, palms on the floor. At the same time, raise your right arm and left leg as high as comfortably possible. Hold for 10 seconds and slowly return to start. Repeat with left arm and right leg, alternating 10 times. Gradually build up to 20 times. (Advanced: Repeat this exercise raising both arms and both legs a few inches off the floor at the same time. Hold for 5 seconds and release.)  3. Pelvic tilt: Lie on the floor on your back with your knees bent at 90 degrees. Your feet should be flat on the floor. Inhale, exhale, then slowly contract your abdominal muscles bringing your navel toward your spine. Let your pelvis rock back until your lower back is flat on the floor. Hold for 10 seconds while breathing smoothly.  4. Abdominal crunch: Perform a pelvic tilt (above) flattening your lower back against the floor. Holding the tension in your abdominal muscles, take another breath and raise your shoulder blades off the ground (this is not a full sit-up). Keep your head in line with your body (don t bend your neck forward). Hold for 2 seconds, then slowly lower.  Date Last Reviewed: 6/1/2016 2000-2018 [employer]. [address] [place] [address], [place] [address]. All rights reserved. This information is not intended as a substitute for professional medical care. Always follow your healthcare professional's instructions.

## 2018-11-15 NOTE — LETTER
My Depression Action Plan  Name: Joshua Barron   Date of Birth 1960  Date: 11/9/2018    My doctor: Lyle Fisher   My clinic: [facility] [place]  [address]  [place] [address]  [phone]          GREEN    ZONE   Good Control    What it looks like:     Things are going generally well. You have normal up s and down s. You may even feel depressed from time to time, but bad moods usually last less than a day.   What you need to do:  1. Continue to care for yourself (see self care plan)  2. Check your depression survival kit and update it as needed  3. Follow your physician s recommendations including any medication.  4. Do not stop taking medication unless you consult with your physician first.           YELLOW         ZONE Getting Worse    What it looks like:     Depression is starting to interfere with your life.     It may be hard to get out of bed; you may be starting to isolate yourself from others.    Symptoms of depression are starting to last most all day and this has happened for several days.     You may have suicidal thoughts but they are not constant.   What you need to do:     1. Call your care team, your response to treatment will improve if you keep your care team informed of your progress. Yellow periods are signs an adjustment may need to be made.     2. Continue your self-care, even if you have to fake it!    3. Talk to someone in your support network    4. Open up your depression survival kit           RED    ZONE Medical Alert - Get Help    What it looks like:     Depression is seriously interfering with your life.     You may experience these or other symptoms: You can t get out of bed most days, can t work or engage in other necessary activities, you have trouble taking care of basic hygiene, or basic responsibilities, thoughts of suicide or death that will not go away, self-injurious behavior.     What you need to do:  1. Call your care team  and request a same-day appointment. If they are not available (weekends or after hours) call your local crisis line, emergency room or 911.            Depression Self Care Plan / Survival Kit    Self-Care for Depression  Here s the deal. Your body and mind are really not as separate as most people think.  What you do and think affects how you feel and how you feel influences what you do and think. This means if you do things that people who feel good do, it will help you feel better.  Sometimes this is all it takes.  There is also a place for medication and therapy depending on how severe your depression is, so be sure to consult with your medical provider and/ or Behavioral Health Consultant if your symptoms are worsening or not improving.     In order to better manage my stress, I will:    Exercise  Get some form of exercise, every day. This will help reduce pain and release endorphins, the  feel good  chemicals in your brain. This is almost as good as taking antidepressants!  This is not the same as joining a gym and then never going! (they count on that by the way ) It can be as simple as just going for a walk or doing some gardening, anything that will get you moving.      Hygiene   Maintain good hygiene (Get out of bed in the morning, Make your bed, Brush your teeth, Take a shower, and Get dressed like you were going to work, even if you are unemployed).  If your clothes don't fit try to get ones that do.    Diet  I will strive to eat foods that are good for me, drink plenty of water, and avoid excessive sugar, caffeine, alcohol, and other mood-altering substances.  Some foods that are helpful in depression are: complex carbohydrates, B vitamins, flaxseed, fish or fish oil, fresh fruits and vegetables.    Psychotherapy  I agree to participate in Individual Therapy (if recommended).    Medication  If prescribed medications, I agree to take them.  Missing doses can result in serious side effects.  I understand  that drinking alcohol, or other illicit drug use, may cause potential side effects.  I will not stop my medication abruptly without first discussing it with my provider.    Staying Connected With Others  I will stay in touch with my friends, family members, and my primary care provider/team.    Use your imagination  Be creative.  We all have a creative side; it doesn t matter if it s oil painting, sand castles, or mud pies! This will also kick up the endorphins.    Witness Beauty  (AKA stop and smell the roses) Take a look outside, even in mid-winter. Notice colors, textures. Watch the squirrels and birds.     Service to others  Be of service to others.  There is always someone else in need.  By helping others we can  get out of ourselves  and remember the really important things.  This also provides opportunities for practicing all the other parts of the program.    Humor  Laugh and be silly!  Adjust your TV habits for less news and crime-drama and more comedy.    Control your stress  Try breathing deep, massage therapy, biofeedback, and meditation. Find time to relax each day.     My support system    Clinic Contact:  Phone number:    Contact 1:  Phone number:    Contact 2:  Phone number:    Zoroastrianism/:  Phone number:    Therapist:  Phone number:    Local crisis center:    Phone number:    Other community support:  Phone number:

## 2018-11-16 DIAGNOSIS — F41.1 GAD (GENERALIZED ANXIETY DISORDER): ICD-10-CM

## 2018-11-16 ASSESSMENT — ANXIETY QUESTIONNAIRES: GAD7 TOTAL SCORE: 1

## 2018-11-16 NOTE — TELEPHONE ENCOUNTER
DIVALPROEX SOD  MG TAB  Last Written Prescription Date:  2/15/18  Last Fill Quantity: 60 ,   # refills: 5  Last Office Visit: 11/15/18  Future Office visit:    Next 5 appointments (look out 90 days)     Nov 21, 2018  3:00 PM CST   Return Visit with Shamar Escamilla DC   [facility] ([facility])    [address]  [facility] [address]   [phone]            Dec 05, 2018  3:20 PM CST   (Arrive by 3:05 PM)   Return Visit with Laquita Fernandez MD   [facility]    [address]  [place] [address] [phone]            Dec 05, 2018  4:20 PM CST   (Arrive by 4:00 PM)   Office Visit with Lyle Fisher DO   [facility] ([facility] )    [address]  [facility] [address]   [phone]

## 2018-11-16 NOTE — TELEPHONE ENCOUNTER
DIAZEPAM 5 MG TABLET      Last Written Prescription Date:  4/25/18  Last Fill Quantity: 60,   # refills: 5  Last Office Visit: 11/15/18  Future Office visit:    Next 5 appointments (look out 90 days)     Nov 21, 2018  3:00 PM CST   Return Visit with Shamar Escamilla DC   [facility] ([facility])    [address]  [facility] [address]   [phone]            Dec 05, 2018  3:20 PM CST   (Arrive by 3:05 PM)   Return Visit with Laquita Fernandez MD   [facility] ([facility] )    [address]  [facility] [address] [phone]            Dec 05, 2018  4:20 PM CST   (Arrive by 4:00 PM)   Office Visit with L[facility] Fisher DO   [facility] ([facility] )    [address] [facility] [address]   [phone]

## 2018-11-19 RX ORDER — DIVALPROEX SODIUM 500 MG/1
TABLET, DELAYED RELEASE ORAL
Qty: 60 TABLET | Refills: 0 | OUTPATIENT
Start: 2018-11-19

## 2018-11-20 ENCOUNTER — HOSPITAL ENCOUNTER (OUTPATIENT)
Dept: PHYSICAL THERAPY | Facility: HOSPITAL | Age: 58
Setting detail: THERAPIES SERIES
End: 2018-11-20
Attending: INTERNAL MEDICINE
Payer: COMMERCIAL

## 2018-11-20 PROCEDURE — 97110 THERAPEUTIC EXERCISES: CPT | Mod: GP

## 2018-11-20 RX ORDER — DIAZEPAM 5 MG
TABLET ORAL
Qty: 60 TABLET | Refills: 0 | OUTPATIENT
Start: 2018-11-20

## 2018-11-20 RX ORDER — DIAZEPAM 2 MG
2 TABLET ORAL EVERY 12 HOURS PRN
Qty: 60 TABLET | Refills: 1 | Status: SHIPPED | OUTPATIENT
Start: 2018-11-20 | End: 2019-01-30

## 2018-11-20 NOTE — TELEPHONE ENCOUNTER
We have discussed several times given guidelines avoiding benzos and opioids together (he's on morphine and Norco) that plan is for when refill due to start decreasing dose and then ultimately discontinue. I've documented in past notes. So: this fill I am filling 2 mg rather than the 5 mg.

## 2018-11-21 ENCOUNTER — OFFICE VISIT (OUTPATIENT)
Dept: CHIROPRACTIC MEDICINE | Facility: OTHER | Age: 58
End: 2018-11-21
Attending: CHIROPRACTOR
Payer: COMMERCIAL

## 2018-11-21 DIAGNOSIS — M54.50 ACUTE BILATERAL LOW BACK PAIN WITHOUT SCIATICA: ICD-10-CM

## 2018-11-21 DIAGNOSIS — M99.01 SEGMENTAL AND SOMATIC DYSFUNCTION OF CERVICAL REGION: ICD-10-CM

## 2018-11-21 DIAGNOSIS — M99.02 SEGMENTAL AND SOMATIC DYSFUNCTION OF THORACIC REGION: ICD-10-CM

## 2018-11-21 DIAGNOSIS — M99.03 SEGMENTAL AND SOMATIC DYSFUNCTION OF LUMBAR REGION: Primary | ICD-10-CM

## 2018-11-21 PROCEDURE — 98941 CHIROPRACT MANJ 3-4 REGIONS: CPT | Mod: AT | Performed by: CHIROPRACTOR

## 2018-11-21 NOTE — TELEPHONE ENCOUNTER
Patient notified that dose of Valium is being decreased and was filled as 2 mg instead of 5 mg.  He is ok with this

## 2018-11-21 NOTE — MR AVS SNAPSHOT
After Visit Summary   11/21/2018    Joshua Barron    MRN: [id number]           Patient Information     Date Of Birth          1960        Visit Information        Provider Department      11/21/2018 3:00 PM Shamar Escamilla DC  [facility]        Today's Diagnoses     Segmental and somatic dysfunction of lumbar region    -  1    Acute bilateral low back pain without sciatica        Segmental and somatic dysfunction of thoracic region        Segmental and somatic dysfunction of cervical region           Follow-ups after your visit        Your next 10 appointments already scheduled     Nov 27, 2018  3:00 PM CST   Ortho Treatment with Kip Thorstenson, PT   [facility] ([facility] )    [address] [address]   [phone]            Nov 28, 2018  3:10 PM CST   Return Visit with Shamar Escamilla DC   [facility] ([facility])    [address] [place] [address]   [phone]            Dec 04, 2018  2:00 PM CST   Ortho Treatment with Kip Thorstenson, PT   [facility] ([facility] )    [address] [address]   [phone]            Dec 05, 2018  3:20 PM CST   (Arrive by 3:05 PM)   Return Visit with Laquita Fernandez MD   [facility] ([facility] )    [address] [address] [phone]            Dec 05, 2018  4:20 PM CST   (Arrive by 4:00 PM)   Office Visit with Lyle Fisher DO   [facility] ([facility] )    [address] [facility] [address]   [phone]           Bring a current list of meds and any records pertaining to this visit. For Physicals, please bring immunization records and any forms needing to be filled out. Please arrive 10 minutes early to complete paperwork.            Mar 13, 2019  2:15 PM CDT   (Arrive by 2:00 PM)   Hearing Eval with Ciara Valladares, AuD    [facility] ([facility] )    [address] [facility] [id number] [phone]              Who to contact     If you have questions or need follow up information about today's clinic visit or your schedule please contact  [facility] directly at [phone].  Normal or non-critical lab and imaging results will be communicated to you by MyChart, letter or phone within 4 business days after the clinic has received the results. If you do not hear from us within 7 days, please contact the clinic through MyChart or phone. If you have a critical or abnormal lab result, we will notify you by phone as soon as possible.  Submit refill requests through [employer] or call your pharmacy and they will forward the refill request to us. Please allow 3 business days for your refill to be completed.          Additional Information About Your Visit        Care EveryWhere ID     This is your Care EveryWhere ID. This could be used by other organizations to access your [facility] medical records  [id number]         Blood Pressure from Last 3 Encounters:   11/15/18 110/80   11/07/18 (!) 134/92   10/25/18 126/74    Weight from Last 3 Encounters:   11/07/18 180 lb (81.6 kg)   10/25/18 186 lb (84.4 kg)   09/05/18 190 lb (86.2 kg)              We Performed the Following     CHIROPRAC MANIP,SPINAL,3-4 REGIONS        Primary Care Provider Office Phone # Fax #    Lyle Juan DO Natalia [phone]       [address] [place] [address]        Goals        General    Mental Health Management (pt-stated)     Pain Management (pt-stated)       Equal Access to Services     ANIYAH DANIELS : Hadii jim arizmendi hadashirma Soro, waaxda luqadaha, qaybta kaalmada deyanira, jaki fermin. So [facility] [phone].    ATENCIÓN: Si habla español, tiene a hastings disposición servicios gratuitos de asistencia lingüística. Llame al [phone].    We comply with  applicable federal civil rights laws and [place] laws. We do not discriminate on the basis of race, color, national origin, age, disability, sex, sexual orientation, or gender identity.            Thank you!     Thank you for choosing  [facility]  for your care. Our goal is always to provide you with excellent care. Hearing back from our patients is one way we can continue to improve our services. Please take a few minutes to complete the written survey that you may receive in the mail after your visit with us. Thank you!             Your Updated Medication List - Protect others around you: Learn how to safely use, store and throw away your medicines at www.disposemymeds.org.          This list is accurate as of 11/21/18 11:59 PM.  Always use your most recent med list.                   Brand Name Dispense Instructions for use Diagnosis    albuterol 108 (90 Base) MCG/ACT inhaler    VENTOLIN HFA    18 g    USE 2 PUFFS 4 TIMES A DAY AS NEEDED FOR SHORTNESS OF BREATH        ASPIRIN LOW DOSE 81 MG chewable tablet   Generic drug:  aspirin     30 tablet    CHEW AND SWALLOW 1 TABLET BY MOUTH DAILY    Healthcare maintenance       atorvastatin 20 MG tablet    LIPITOR    90 tablet    Take 1 tablet (20 mg) by mouth daily        baclofen 20 MG tablet    LIORESAL    120 tablet    Take 1 tablet (20 mg) by mouth 4 times daily    Back muscle spasm, Lumbar back pain       * diazepam 5 MG tablet    VALIUM    60 tablet    TAKE 1 TABLET BY MOUTH EVERY 12 HOURS AS NEEDED FOR ANXIETY OR SLEEP SPASM    DAYANA (generalized anxiety disorder)       * diazepam 2 MG tablet    VALIUM    60 tablet    Take 1 tablet (2 mg) by mouth every 12 hours as needed for anxiety    DAYANA (generalized anxiety disorder)       diclofenac 50 MG EC tablet    VOLTAREN    90 tablet    TAKE 1 TABLET BY MOUTH 3 TIMES DAILY    Arthritis pain       docusate sodium 100 MG capsule    DOK    60 capsule    TAKE 1 CAPSULE BY MOUTH TWICE DAILY        fluticasone 50  MCG/ACT spray    FLONASE    16 g    USE 2 SPRAYS IN EACH NOSTRIL DAILY        gabapentin 300 MG capsule    NEURONTIN    270 capsule    TAKE 3 CAPSULES BY MOUTH THREE TIMES DAILY        HYDROcodone-acetaminophen  MG per tablet    NORCO    90 tablet    Take 1 tablet by mouth 3 times daily as needed for severe pain    Low back pain, unspecified back pain laterality, unspecified chronicity, with sciatica presence unspecified       hydrOXYzine 50 MG capsule    VISTARIL    60 capsule    Take 1-2 capsules ( mg) by mouth 4 times daily as needed for anxiety         MG tablet   Generic drug:  ibuprofen     120 tablet    TAKE 1 TABLET BY MOUTH EVERY 6 HOURS AS NEEDED    Low back pain, unspecified back pain laterality, unspecified chronicity, with sciatica presence unspecified       lidocaine 5 % Patch    LIDODERM    90 patch    PLACE 3 PATCHES ONTO THE SKIN DAILY AS NEEDED FOR MODERATE PAIN    Midline low back pain without sciatica       lisdexamfetamine 70 MG capsule    VYVANSE    30 capsule    Take 1 capsule (70 mg) by mouth daily    ADHD (attention deficit hyperactivity disorder), combined type       losartan 50 MG tablet    COZAAR    30 tablet    Take 1 tablet (50 mg) by mouth daily        MAPAP 325 MG tablet   Generic drug:  acetaminophen     200 tablet    TAKE 2 TABLETS BY MOUTH EVERY 4 HOURS AS NEEDED FOR MILD PAIN    Pain       mometasone-formoterol 100-5 MCG/ACT inhaler    DULERA    13 g    INHALE 2 PUFFS INTO THE LUNGS 2 TIMES A DAY    Moderate persistent asthma without complication       Morphine Sulfate 80 MG Cp24     90 capsule    Take 1 capsule by oral route 3 times daily    Low back pain, unspecified back pain laterality, unspecified chronicity, with sciatica presence unspecified       multivitamin  with iron Tabs     30 tablet    TAKE 1 TABLET BY MOUTH DAILY    Health care maintenance       naloxone 1 mg/mL for intranasal kit (2 syringes with 2 mucosal atomizer device)    NARCAN    2 kit     In opioid overdose put cone in nostril and push 1/2 of contents into each nostril.  Repeat every 3 min if no response until help arrives.    Chronic pain syndrome       nicotine polacrilex 2 MG gum    NICORETTE    110 each    CHEW 1 PIECE AS NEEDED FOR SMOKING CESSATION        nortriptyline 75 MG capsule    PAMELOR    30 capsule    Take 1 capsule (75 mg) by mouth At Bedtime        omeprazole 20 MG CR capsule    priLOSEC    30 capsule    TAKE 1 CAPSULE BY MOUTH EVERY DAY BEFORE A MEAL        * order for DME     2 Box    Equipment being ordered: Large gloves    Need for assistance with personal care       * order for DME     1 Device    1 boa back brace    Chronic low back pain with sciatica, sciatica laterality unspecified, unspecified back pain laterality       OXcarbazepine 600 MG tablet    TRILEPTAL    60 tablet    TAKE 1 TABLET BY MOUTH 2 TIMES DAILY    Bipolar affective disorder, current episode hypomanic (H)       propranolol 20 MG tablet    INDERAL    60 tablet    Take 1 tablet (20 mg) by mouth 2 times daily        senna-docusate 8.6-50 MG per tablet    SM STOOL SOFTENER/LAXATIVE    120 tablet    TAKE 1 OR 2 TABLETS BY MOUTH 2 TIMES A DAY        traZODone 50 MG tablet    DESYREL    60 tablet    Take 2 tablets (100 mg) by mouth nightly as needed        * Notice:  This list has 4 medication(s) that are the same as other medications prescribed for you. Read the directions carefully, and ask your doctor or other care provider to review them with you.

## 2018-11-26 NOTE — PROGRESS NOTES

## 2018-11-27 DIAGNOSIS — M54.5 LOW BACK PAIN, UNSPECIFIED BACK PAIN LATERALITY, UNSPECIFIED CHRONICITY, WITH SCIATICA PRESENCE UNSPECIFIED: ICD-10-CM

## 2018-11-27 RX ORDER — MORPHINE SULFATE 80 MG/1
CAPSULE, EXTENDED RELEASE ORAL
Qty: 90 CAPSULE | Refills: 0 | Status: SHIPPED | OUTPATIENT
Start: 2018-11-27 | End: 2018-12-11

## 2018-11-27 NOTE — TELEPHONE ENCOUNTER
Morphine      Last Written Prescription Date:  11.9.18  Last Fill Quantity: #51,   # refills: 0  Last Office Visit: 11.15.18  Future Office visit:    Next 5 appointments (look out 90 days)     Nov 28, 2018  3:10 PM CST   Return Visit with Shamar Escamilla DC   [facility] ([facility])    [address] [facility] [address]   [phone]            Dec 05, 2018  3:20 PM CST   (Arrive by 3:05 PM)   Return Visit with Laquita Fernandez MD   [facility] ([facility] )    [address] [facility] [address]   [phone]            Dec 05, 2018  4:20 PM CST   (Arrive by 4:00 PM)   Office Visit with Lyle Fisher DO   [facility] ([facility] )    [address] [facility] [address]   [phone]                   Routing refill request to provider for review/approval because:  Drug not on the [facility], [facility] or [facility] refill protocol or controlled substance

## 2018-11-28 ENCOUNTER — OFFICE VISIT (OUTPATIENT)
Dept: CHIROPRACTIC MEDICINE | Facility: OTHER | Age: 58
End: 2018-11-28
Attending: CHIROPRACTOR
Payer: COMMERCIAL

## 2018-11-28 ENCOUNTER — PATIENT OUTREACH (OUTPATIENT)
Dept: CARE COORDINATION | Facility: OTHER | Age: 58
End: 2018-11-28

## 2018-11-28 DIAGNOSIS — M99.01 SEGMENTAL AND SOMATIC DYSFUNCTION OF CERVICAL REGION: ICD-10-CM

## 2018-11-28 DIAGNOSIS — M99.02 SEGMENTAL AND SOMATIC DYSFUNCTION OF THORACIC REGION: ICD-10-CM

## 2018-11-28 DIAGNOSIS — M99.03 SEGMENTAL AND SOMATIC DYSFUNCTION OF LUMBAR REGION: Primary | ICD-10-CM

## 2018-11-28 DIAGNOSIS — M54.50 ACUTE BILATERAL LOW BACK PAIN WITHOUT SCIATICA: ICD-10-CM

## 2018-11-28 PROCEDURE — 98941 CHIROPRACT MANJ 3-4 REGIONS: CPT | Mod: AT | Performed by: CHIROPRACTOR

## 2018-11-28 NOTE — PROGRESS NOTES
Clinic Care Coordination Contact  [facility]/Voicemail    Received VM from pt this date in regards to having an antibiotic on hand in case he gets bronchitis this winter and was also inquiring about acupuncture.   Clinical Data: Care Coordinator Outreach  Outreach attempted x 1.  Left message on voicemail with call back information and requested return call.  Plan: Care Coordinator will await return phone call from pt.     Love Quan RN-BSN  Chronic Pain Care Coordinator  [phone] opt. #[phone]

## 2018-11-28 NOTE — MR AVS SNAPSHOT
After Visit Summary   11/28/2018    Joshua Barron    MRN: [id number]           Patient Information     Date Of Birth          1960        Visit Information        Provider Department      11/28/2018 3:10 PM Shamar Escamilla DC  [facility]        Today's Diagnoses     Segmental and somatic dysfunction of lumbar region    -  1    Acute bilateral low back pain without sciatica        Segmental and somatic dysfunction of thoracic region        Segmental and somatic dysfunction of cervical region           Follow-ups after your visit        Your next 10 appointments already scheduled     Dec 04, 2018  2:00 PM CST   Ortho Treatment with Eamon Ovalles PT   [facility] ([facility] )    [address] [place] [address]   [phone]            Dec 05, 2018  3:20 PM CST   (Arrive by 3:05 PM)   Return Visit with Laquita Fernandez MD   [facility]    [address] [place] [id number]   [phone]            Dec 05, 2018  4:20 PM CST   (Arrive by 4:00 PM)   Office Visit with Lyle Fisher DO   [facility] ([facility] )    [address] [facility] [address]   [phone]           Bring a current list of meds and any records pertaining to this visit. For Physicals, please bring immunization records and any forms needing to be filled out. Please arrive 10 minutes early to complete paperwork.            Dec 06, 2018  3:30 PM CST   Return Visit with Shamar Escamilla DC   [facility] ([facility])    [address] [place] [address]   [phone]            Mar 13, 2019  2:15 PM CDT   (Arrive by 2:00 PM)   Hearing Eval with Kevin Moreno   [facility] ([facility] )    [address]  [facility] [address]   [phone]              Who to contact     If you have questions or  "need follow up information about today's clinic visit or your schedule please contact  [facility] directly at [phone].  Normal or non-critical lab and imaging results will be communicated to you by Coworkshart, letter or phone within 4 business days after the clinic has received the results. If you do not hear from us within 7 days, please contact the clinic through Coworkshart or phone. If you have a critical or abnormal lab result, we will notify you by phone as soon as possible.  Submit refill requests through InGameNow or call your pharmacy and they will forward the refill request to us. Please allow 3 business days for your refill to be completed.          Additional Information About Your Visit        [employer] Information     InGameNow lets you send messages to your doctor, view your test results, renew your prescriptions, schedule appointments and more. To sign up, go to [email or web address] . Click on \"Log in\" on the left side of the screen, which will take you to the Welcome page. Then click on \"Sign up Now\" on the right side of the page.     You will be asked to enter the access code listed below, as well as some personal information. Please follow the directions to create your username and password.     Your access code is: [email or web address]  Expires: 2019 11:05 AM     Your access code will  in 90 days. If you need help or a new code, please call your [facility] clinic or [phone].        Care EveryWhere ID     This is your Care EveryWhere ID. This could be used by other organizations to access your [facility] medical records  [id number]         Blood Pressure from Last 3 Encounters:   11/15/18 110/80   18 (!) 134/92   10/25/18 126/74    Weight from Last 3 Encounters:   18 180 lb (81.6 kg)   10/25/18 186 lb (84.4 kg)   18 190 lb (86.2 kg)              We Performed the Following     CHIROPRAC MANIP,SPINAL,3-4 REGIONS        Primary Care Provider Office Phone # Fax #    " Lylegrecia Fisher [phone]       [facility] [address]        Goals        General    Mental Health Management (pt-stated)     Pain Management (pt-stated)       Equal Access to Services     SHAHBAZ DANIELS : Rosie jim arizmendi samanta Soro, waaxda luqadaha, qaybta kaalmada adeegyada, jaki krishna hanselchance arthur monty fermin. So [facility] [phone].    ATENCIÓN: Si habla español, tiene a hastings disposición servicios gratuitos de asistencia lingüística. Llame al [phone].    We comply with applicable federal civil rights laws and [place] laws. We do not discriminate on the basis of race, color, national origin, age, disability, sex, sexual orientation, or gender identity.            Thank you!     Thank you for choosing  [facility]  for your care. Our goal is always to provide you with excellent care. Hearing back from our patients is one way we can continue to improve our services. Please take a few minutes to complete the written survey that you may receive in the mail after your visit with us. Thank you!             Your Updated Medication List - Protect others around you: Learn how to safely use, store and throw away your medicines at www.disposemymeds.org.          This list is accurate as of 11/28/18 11:59 PM.  Always use your most recent med list.                   Brand Name Dispense Instructions for use Diagnosis    albuterol 108 (90 Base) MCG/ACT inhaler    VENTOLIN HFA    18 g    USE 2 PUFFS 4 TIMES A DAY AS NEEDED FOR SHORTNESS OF BREATH        ASPIRIN LOW DOSE 81 MG chewable tablet   Generic drug:  aspirin     30 tablet    CHEW AND SWALLOW 1 TABLET BY MOUTH DAILY    Healthcare maintenance       atorvastatin 20 MG tablet    LIPITOR    90 tablet    Take 1 tablet (20 mg) by mouth daily        baclofen 20 MG tablet    LIORESAL    120 tablet    Take 1 tablet (20 mg) by mouth 4 times daily    Back muscle spasm, Lumbar back pain       * diazepam 5 MG tablet     VALIUM    60 tablet    TAKE 1 TABLET BY MOUTH EVERY 12 HOURS AS NEEDED FOR ANXIETY OR SLEEP SPASM    DAYANA (generalized anxiety disorder)       * diazepam 2 MG tablet    VALIUM    60 tablet    Take 1 tablet (2 mg) by mouth every 12 hours as needed for anxiety    DAYANA (generalized anxiety disorder)       diclofenac 50 MG EC tablet    VOLTAREN    90 tablet    TAKE 1 TABLET BY MOUTH 3 TIMES DAILY    Arthritis pain       docusate sodium 100 MG capsule    DOK    60 capsule    TAKE 1 CAPSULE BY MOUTH TWICE DAILY        fluticasone 50 MCG/ACT nasal spray    FLONASE    16 g    USE 2 SPRAYS IN EACH NOSTRIL DAILY        gabapentin 300 MG capsule    NEURONTIN    270 capsule    TAKE 3 CAPSULES BY MOUTH THREE TIMES DAILY        HYDROcodone-acetaminophen  MG per tablet    NORCO    90 tablet    Take 1 tablet by mouth 3 times daily as needed for severe pain    Low back pain, unspecified back pain laterality, unspecified chronicity, with sciatica presence unspecified       hydrOXYzine 50 MG capsule    VISTARIL    60 capsule    Take 1-2 capsules ( mg) by mouth 4 times daily as needed for anxiety         MG tablet   Generic drug:  ibuprofen     120 tablet    TAKE 1 TABLET BY MOUTH EVERY 6 HOURS AS NEEDED    Low back pain, unspecified back pain laterality, unspecified chronicity, with sciatica presence unspecified       lidocaine 5 % patch    LIDODERM    90 patch    PLACE 3 PATCHES ONTO THE SKIN DAILY AS NEEDED FOR MODERATE PAIN    Midline low back pain without sciatica       lisdexamfetamine 70 MG capsule    VYVANSE    30 capsule    Take 1 capsule (70 mg) by mouth daily    ADHD (attention deficit hyperactivity disorder), combined type       losartan 50 MG tablet    COZAAR    30 tablet    Take 1 tablet (50 mg) by mouth daily        MAPAP 325 MG tablet   Generic drug:  acetaminophen     200 tablet    TAKE 2 TABLETS BY MOUTH EVERY 4 HOURS AS NEEDED FOR MILD PAIN    Pain       mometasone-formoterol 100-5 MCG/ACT inhaler     DULERA    13 g    INHALE 2 PUFFS INTO THE LUNGS 2 TIMES A DAY    Moderate persistent asthma without complication       Morphine Sulfate 80 MG Cp24     90 capsule    TAKE 1 CAPSULE BY MOUTH 3 TIMES DAILY    Low back pain, unspecified back pain laterality, unspecified chronicity, with sciatica presence unspecified       multivitamin  with iron Tabs     30 tablet    TAKE 1 TABLET BY MOUTH DAILY    Health care maintenance       naloxone 1 mg/mL for intranasal kit (2 syringes with 2 mucosal atomizer device)    NARCAN    2 kit    In opioid overdose put cone in nostril and push 1/2 of contents into each nostril.  Repeat every 3 min if no response until help arrives.    Chronic pain syndrome       nicotine 2 MG gum    NICORETTE    110 each    CHEW 1 PIECE AS NEEDED FOR SMOKING CESSATION        nortriptyline 75 MG capsule    PAMELOR    30 capsule    Take 1 capsule (75 mg) by mouth At Bedtime        omeprazole 20 MG DR capsule    priLOSEC    30 capsule    TAKE 1 CAPSULE BY MOUTH EVERY DAY BEFORE A MEAL        * order for DME     2 Box    Equipment being ordered: Large gloves    Need for assistance with personal care       * order for DME     1 Device    1 boa back brace    Chronic low back pain with sciatica, sciatica laterality unspecified, unspecified back pain laterality       OXcarbazepine 600 MG tablet    TRILEPTAL    60 tablet    TAKE 1 TABLET BY MOUTH 2 TIMES DAILY    Bipolar affective disorder, current episode hypomanic (H)       propranolol 20 MG tablet    INDERAL    60 tablet    Take 1 tablet (20 mg) by mouth 2 times daily        senna-docusate 8.6-50 MG tablet    SM STOOL SOFTENER/LAXATIVE    120 tablet    TAKE 1 OR 2 TABLETS BY MOUTH 2 TIMES A DAY        traZODone 50 MG tablet    DESYREL    60 tablet    Take 2 tablets (100 mg) by mouth nightly as needed        * Notice:  This list has 4 medication(s) that are the same as other medications prescribed for you. Read the directions carefully, and ask your doctor or  other care provider to review them with you.

## 2018-11-29 NOTE — PROGRESS NOTES

## 2018-11-30 ENCOUNTER — HOSPITAL ENCOUNTER (OUTPATIENT)
Dept: PHYSICAL THERAPY | Facility: HOSPITAL | Age: 58
Setting detail: THERAPIES SERIES
End: 2018-11-30
Attending: INTERNAL MEDICINE
Payer: COMMERCIAL

## 2018-11-30 PROCEDURE — 97110 THERAPEUTIC EXERCISES: CPT | Mod: GP

## 2018-11-30 PROCEDURE — 97140 MANUAL THERAPY 1/> REGIONS: CPT | Mod: GP

## 2018-12-04 ENCOUNTER — HOSPITAL ENCOUNTER (OUTPATIENT)
Dept: PHYSICAL THERAPY | Facility: HOSPITAL | Age: 58
Setting detail: THERAPIES SERIES
End: 2018-12-04
Attending: INTERNAL MEDICINE
Payer: COMMERCIAL

## 2018-12-04 ENCOUNTER — TELEPHONE (OUTPATIENT)
Dept: PEDIATRICS | Facility: OTHER | Age: 58
End: 2018-12-04

## 2018-12-04 ENCOUNTER — PATIENT OUTREACH (OUTPATIENT)
Dept: CARE COORDINATION | Facility: OTHER | Age: 58
End: 2018-12-04

## 2018-12-04 PROCEDURE — 97110 THERAPEUTIC EXERCISES: CPT | Mod: GP

## 2018-12-04 ASSESSMENT — ACTIVITIES OF DAILY LIVING (ADL): DEPENDENT_IADLS:: INDEPENDENT
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
## 2018-12-04 NOTE — PROGRESS NOTES
Spoke with pt.  Suggested he write down a list of questions/concerns and bring them to appt on 12.5.18.  He verbalized understanding.    Love Quan RN-BSN  Chronic Pain Care Coordinator  [phone] opt. #[phone]

## 2018-12-04 NOTE — TELEPHONE ENCOUNTER
CC returned phone call.  All questions answered.  See CC note from today.    Love Quan RN-BSN  Chronic Pain Care Coordinator  [phone] opt. #3

## 2018-12-04 NOTE — TELEPHONE ENCOUNTER
Love pt called stated you called him. Requesting you call his home phone, has a PT appt in 30 minutes. Please call later in the day.     [phone] (U)
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
## 2018-12-05 ENCOUNTER — OFFICE VISIT (OUTPATIENT)
Dept: PEDIATRICS | Facility: OTHER | Age: 58
End: 2018-12-05
Attending: INTERNAL MEDICINE
Payer: COMMERCIAL

## 2018-12-05 ENCOUNTER — OFFICE VISIT (OUTPATIENT)
Dept: PSYCHIATRY | Facility: OTHER | Age: 58
End: 2018-12-05
Attending: PSYCHIATRY & NEUROLOGY
Payer: COMMERCIAL

## 2018-12-05 ENCOUNTER — PATIENT OUTREACH (OUTPATIENT)
Dept: CARE COORDINATION | Facility: OTHER | Age: 58
End: 2018-12-05

## 2018-12-05 VITALS
OXYGEN SATURATION: 98 % | TEMPERATURE: 98.6 F | DIASTOLIC BLOOD PRESSURE: 76 MMHG | HEART RATE: 80 BPM | RESPIRATION RATE: 20 BRPM | SYSTOLIC BLOOD PRESSURE: 120 MMHG

## 2018-12-05 VITALS
BODY MASS INDEX: 26.58 KG/M2 | WEIGHT: 180 LBS | SYSTOLIC BLOOD PRESSURE: 132 MMHG | HEART RATE: 89 BPM | DIASTOLIC BLOOD PRESSURE: 90 MMHG | TEMPERATURE: 97.7 F

## 2018-12-05 DIAGNOSIS — M54.50 CHRONIC BILATERAL LOW BACK PAIN WITHOUT SCIATICA: Primary | ICD-10-CM

## 2018-12-05 DIAGNOSIS — F90.2 ADHD (ATTENTION DEFICIT HYPERACTIVITY DISORDER), COMBINED TYPE: ICD-10-CM

## 2018-12-05 DIAGNOSIS — G89.4 CHRONIC PAIN SYNDROME: ICD-10-CM

## 2018-12-05 DIAGNOSIS — M62.830 BACK MUSCLE SPASM: ICD-10-CM

## 2018-12-05 DIAGNOSIS — G89.29 CHRONIC BILATERAL LOW BACK PAIN WITHOUT SCIATICA: Primary | ICD-10-CM

## 2018-12-05 PROCEDURE — 99214 OFFICE O/P EST MOD 30 MIN: CPT | Performed by: INTERNAL MEDICINE

## 2018-12-05 PROCEDURE — 99213 OFFICE O/P EST LOW 20 MIN: CPT | Performed by: PSYCHIATRY & NEUROLOGY

## 2018-12-05 PROCEDURE — G0463 HOSPITAL OUTPT CLINIC VISIT: HCPCS

## 2018-12-05 RX ORDER — LISDEXAMFETAMINE DIMESYLATE 70 MG/1
70 CAPSULE ORAL DAILY
Qty: 30 CAPSULE | Refills: 0 | Status: SHIPPED | OUTPATIENT
Start: 2019-01-18 | End: 2019-02-11

## 2018-12-05 ASSESSMENT — PATIENT HEALTH QUESTIONNAIRE - PHQ9
5. POOR APPETITE OR OVEREATING: NOT AT ALL
5. POOR APPETITE OR OVEREATING: NOT AT ALL
SUM OF ALL RESPONSES TO PHQ QUESTIONS 1-9: 0
SUM OF ALL RESPONSES TO PHQ QUESTIONS 1-9: 0

## 2018-12-05 ASSESSMENT — ANXIETY QUESTIONNAIRES
GAD7 TOTAL SCORE: 0
1. FEELING NERVOUS, ANXIOUS, OR ON EDGE: NOT AT ALL
5. BEING SO RESTLESS THAT IT IS HARD TO SIT STILL: NOT AT ALL
2. NOT BEING ABLE TO STOP OR CONTROL WORRYING: NOT AT ALL
3. WORRYING TOO MUCH ABOUT DIFFERENT THINGS: NOT AT ALL
2. NOT BEING ABLE TO STOP OR CONTROL WORRYING: NOT AT ALL
1. FEELING NERVOUS, ANXIOUS, OR ON EDGE: NOT AT ALL
7. FEELING AFRAID AS IF SOMETHING AWFUL MIGHT HAPPEN: NOT AT ALL
5. BEING SO RESTLESS THAT IT IS HARD TO SIT STILL: NOT AT ALL
3. WORRYING TOO MUCH ABOUT DIFFERENT THINGS: NOT AT ALL
7. FEELING AFRAID AS IF SOMETHING AWFUL MIGHT HAPPEN: NOT AT ALL
6. BECOMING EASILY ANNOYED OR IRRITABLE: NOT AT ALL
6. BECOMING EASILY ANNOYED OR IRRITABLE: NOT AT ALL
GAD7 TOTAL SCORE: 0

## 2018-12-05 ASSESSMENT — PAIN SCALES - GENERAL
PAINLEVEL: SEVERE PAIN (6)
PAINLEVEL: MODERATE PAIN (5)

## 2018-12-05 ASSESSMENT — ACTIVITIES OF DAILY LIVING (ADL): DEPENDENT_IADLS:: INDEPENDENT

## 2018-12-05 NOTE — NURSING NOTE
"Chief Complaint   Patient presents with     RECHECK     mental health.       Initial /90 (BP Location: Left arm, Patient Position: Sitting, Cuff Size: Adult Regular)  Pulse 89  Temp 97.7  F (36.5  C) (Tympanic)  Wt 81.6 kg (180 lb)  BMI 26.58 kg/m2 Estimated body mass index is 26.58 kg/(m^2) as calculated from the following:    Height as of 2/15/18: 1.753 m (5' 9\").    Weight as of this encounter: 81.6 kg (180 lb).  Medication Reconciliation: complete    JACQUELYN SUAREZ LPN    "

## 2018-12-05 NOTE — LETTER
My COPD Action Plan     Name: Joshua Barron    YOB: 1960   Date: 12/4/2018    My doctor: Lyle Fisher, DO, DO   My clinic: [facility] [place]    [address]  [place] [address]  [phone]  My Controller Medicine: Formoterol/mometasone (Dulera)   Dose: 2 puffs twice daily     My Rescue Medicine: Albuterol (Proair/Ventolin/Proventil) inhaler   Dose: 2 puffs every 4 hours as needed     My Flare Up Medicine: NA   Dose: NA     My COPD Severity: Unknown      Use of Oxygen: Oxygen Not Prescribed      Make sure you've had your pneumonia   vaccines.          GREEN ZONE       Doing well today      Usual level of activity and exercise    Usual amount of cough and mucus    No shortness of breath    Usual level of health (thinking clearly, sleeping well, feel like eating) Actions:      Take daily medicines    Use oxygen as prescribed    Follow regular exercise and diet plan    Avoid cigarette smoke and other irritants that harm the lungs           YELLOW ZONE          Having a bad day or flare up      Short of breath more than usual    A lot more sputum (mucus) than usual    Sputum looks yellow, green, tan, brown or bloody    More coughing or wheezing    Fever or chills    Less energy; trouble completing activities    Trouble thinking or focusing    Using quick relief inhaler or nebulizer more often    Poor sleep; symptoms wake me up    Do not feel like eating Actions:      Get plenty of rest    Take daily medicines    Use quick relief inhaler every 4 hours    If you use oxygen, call you doctor to see if you should adjust your oxygen    Do breathing exercises or other things to help you relax    Let a loved one, friend or neighbor know you are feeling worse    Call your care team if you have 2 or more symptoms.  Start taking steroids or antibiotics if directed by your care team           RED ZONE       Need medical care now      Severe shortness of breath (feel you can't  breathe)    Fever, chills    Not enough breath to do any activity    Trouble coughing up mucus, walking or talking    Blood in mucus    Frequent coughing   Rescue medicines are not working    Not able to sleep because of breathing    Feel confused or drowsy    Chest pain    Actions:      Call your health care team.  If you cannot reach your care team, call 911 or go to the emergency room.        Annual Reminders:  Meet with Care Team, Flu Shot every Fall  Pharmacy:    [facility] [place] [place] - [address]  [facility] - [place], [place] - [address] [facility] [address]

## 2018-12-05 NOTE — MR AVS SNAPSHOT
After Visit Summary   12/5/2018    Joshua Barron    MRN: [id number]           Patient Information     Date Of Birth          1960        Visit Information        Provider Department      12/5/2018 4:20 PM Lyle Fisher,  [facility]        Today's Diagnoses     Chronic bilateral low back pain without sciatica    -  1    Back muscle spasm        Chronic pain syndrome           Follow-ups after your visit        Follow-up notes from your care team     Return in about 3 weeks (around 12/26/2018) for back pain..      Your next 10 appointments already scheduled     Dec 06, 2018  3:30 PM CST   Return Visit with Shamar Escamilla DC   [facility] ([facility])    [address]  [place] [address]   [phone]            Mar 13, 2019  2:15 PM CDT   (Arrive by 2:00 PM)   Hearing Eval with Kevin Moreno   [facility] ([facility] )    [address]  [facility] [address]   [phone]              Who to contact     If you have questions or need follow up information about today's clinic visit or your schedule please contact [facility] directly at [phone].  Normal or non-critical lab and imaging results will be communicated to you by MyChart, letter or phone within 4 business days after the clinic has received the results. If you do not hear from us within 7 days, please contact the clinic through MyChart or phone. If you have a critical or abnormal lab result, we will notify you by phone as soon as possible.  Submit refill requests through [employer] or call your pharmacy and they will forward the refill request to us. Please allow 3 business days for your refill to be completed.          Additional Information About Your Visit        MyChart Information     [employer] lets you send messages to your doctor, view your test results, renew your prescriptions, schedule  "appointments and more. To sign up, go to [email or web address] . Click on \"Log in\" on the left side of the screen, which will take you to the Welcome page. Then click on \"Sign up Now\" on the right side of the page.     You will be asked to enter the access code listed below, as well as some personal information. Please follow the directions to create your username and password.     Your access code is: [email or web address]  Expires: 2019 11:05 AM     Your access code will  in 90 days. If you need help or a new code, please call your [facility] clinic or [phone].        Care EveryWhere ID     This is your Care EveryWhere ID. This could be used by other organizations to access your [facility] medical records  [id number]        Your Vitals Were     Pulse Temperature Respirations Pulse Oximetry          80 98.6  F (37  C) (Tympanic) 20 98%         Blood Pressure from Last 3 Encounters:   18 120/76   18 132/90   11/15/18 110/80    Weight from Last 3 Encounters:   18 180 lb (81.6 kg)   18 180 lb (81.6 kg)   10/25/18 186 lb (84.4 kg)              Today, you had the following     No orders found for display         Today's Medication Changes          These changes are accurate as of 18  5:14 PM.  If you have any questions, ask your nurse or doctor.               Start taking these medicines.        Dose/Directions    tiZANidine 4 MG tablet   Commonly known as:  ZANAFLEX   Used for:  Back muscle spasm   Started by:  Lyle Fisher DO        Dose:  4 mg   Take 1 tablet (4 mg) by mouth 3 times daily   Quantity:  90 tablet   Refills:  3         These medicines have changed or have updated prescriptions.        Dose/Directions    diazepam 2 MG tablet   Commonly known as:  VALIUM   This may have changed:  Another medication with the same name was removed. Continue taking this medication, and follow the directions you see here.   Used for:  DAYANA (generalized anxiety disorder)   Changed " by:  Laquita Fernandez MD        Dose:  2 mg   Take 1 tablet (2 mg) by mouth every 12 hours as needed for anxiety   Quantity:  60 tablet   Refills:  1       lisdexamfetamine 70 MG capsule   Commonly known as:  VYVANSE   This may have changed:  These instructions start on 1/18/2019. If you are unsure what to do until then, ask your doctor or other care provider.   Used for:  ADHD (attention deficit hyperactivity disorder), combined type   Changed by:  Laquita Fernandez MD        Dose:  70 mg   Start taking on:  1/18/2019   Take 1 capsule (70 mg) by mouth daily   Quantity:  30 capsule   Refills:  0            Where to get your medicines      These medications were sent to [facility] [place] - [address]  [address] [facility] [place] [address]     Phone:  [phone]     tiZANidine 4 MG tablet         Some of these will need a paper prescription and others can be bought over the counter.  Ask your nurse if you have questions.     Bring a paper prescription for each of these medications     lisdexamfetamine 70 MG capsule                Primary Care Provider Office Phone # Fax #    Lylegrecia Fisher  [phone]       [address] [facility] [place] [address]        Goals        General    Mental Health Management (pt-stated)     Pain Management (pt-stated)       Equal Access to Services     ANIYAH DANIELS AH: Hadii jim arizmendi hadasho Soomaali, waaxda luqadaha, qaybta kaalmada adeegyada, jaki millan . So [facility] [phone].    ATENCIÓN: Si habla español, tiene a hastings disposición servicios gratuitos de asistencia lingüística. Llame al [phone].    We comply with applicable federal civil rights laws and [place] laws. We do not discriminate on the basis of race, color, national origin, age, disability, sex, sexual orientation, or gender identity.            Thank you!     Thank you for choosing [facility]  for your care. Our goal is  always to provide you with excellent care. Hearing back from our patients is one way we can continue to improve our services. Please take a few minutes to complete the written survey that you may receive in the mail after your visit with us. Thank you!             Your Updated Medication List - Protect others around you: Learn how to safely use, store and throw away your medicines at www.disposemymeds.org.          This list is accurate as of 12/5/18  5:14 PM.  Always use your most recent med list.                   Brand Name Dispense Instructions for use Diagnosis    albuterol 108 (90 Base) MCG/ACT inhaler    VENTOLIN HFA    18 g    USE 2 PUFFS 4 TIMES A DAY AS NEEDED FOR SHORTNESS OF BREATH        ASPIRIN LOW DOSE 81 MG chewable tablet   Generic drug:  aspirin     30 tablet    CHEW AND SWALLOW 1 TABLET BY MOUTH DAILY    Healthcare maintenance       atorvastatin 20 MG tablet    LIPITOR    90 tablet    Take 1 tablet (20 mg) by mouth daily        baclofen 20 MG tablet    LIORESAL    120 tablet    Take 1 tablet (20 mg) by mouth 4 times daily    Back muscle spasm, Lumbar back pain       diazepam 2 MG tablet    VALIUM    60 tablet    Take 1 tablet (2 mg) by mouth every 12 hours as needed for anxiety    DAYANA (generalized anxiety disorder)       diclofenac 50 MG EC tablet    VOLTAREN    90 tablet    TAKE 1 TABLET BY MOUTH 3 TIMES DAILY    Arthritis pain       docusate sodium 100 MG capsule    DOK    60 capsule    TAKE 1 CAPSULE BY MOUTH TWICE DAILY        fluticasone 50 MCG/ACT nasal spray    FLONASE    16 g    USE 2 SPRAYS IN EACH NOSTRIL DAILY        gabapentin 300 MG capsule    NEURONTIN    270 capsule    TAKE 3 CAPSULES BY MOUTH THREE TIMES DAILY        HYDROcodone-acetaminophen  MG per tablet    NORCO    90 tablet    Take 1 tablet by mouth 3 times daily as needed for severe pain    Low back pain, unspecified back pain laterality, unspecified chronicity, with sciatica presence unspecified       hydrOXYzine 50 MG  capsule    VISTARIL    60 capsule    Take 1-2 capsules ( mg) by mouth 4 times daily as needed for anxiety         MG tablet   Generic drug:  ibuprofen     120 tablet    TAKE 1 TABLET BY MOUTH EVERY 6 HOURS AS NEEDED    Low back pain, unspecified back pain laterality, unspecified chronicity, with sciatica presence unspecified       lidocaine 5 % patch    LIDODERM    90 patch    PLACE 3 PATCHES ONTO THE SKIN DAILY AS NEEDED FOR MODERATE PAIN    Midline low back pain without sciatica       lisdexamfetamine 70 MG capsule   Start taking on:  1/18/2019    VYVANSE    30 capsule    Take 1 capsule (70 mg) by mouth daily    ADHD (attention deficit hyperactivity disorder), combined type       losartan 50 MG tablet    COZAAR    30 tablet    Take 1 tablet (50 mg) by mouth daily        MAPAP 325 MG tablet   Generic drug:  acetaminophen     200 tablet    TAKE 2 TABLETS BY MOUTH EVERY 4 HOURS AS NEEDED FOR MILD PAIN    Pain       mometasone-formoterol 100-5 MCG/ACT inhaler    DULERA    13 g    INHALE 2 PUFFS INTO THE LUNGS 2 TIMES A DAY    Moderate persistent asthma without complication       Morphine Sulfate 80 MG Cp24     90 capsule    TAKE 1 CAPSULE BY MOUTH 3 TIMES DAILY    Low back pain, unspecified back pain laterality, unspecified chronicity, with sciatica presence unspecified       multivitamin  with iron Tabs     30 tablet    TAKE 1 TABLET BY MOUTH DAILY    Health care maintenance       naloxone 1 mg/mL for intranasal kit (2 syringes with 2 mucosal atomizer device)    NARCAN    2 kit    In opioid overdose put cone in nostril and push 1/2 of contents into each nostril.  Repeat every 3 min if no response until help arrives.    Chronic pain syndrome       nicotine 2 MG gum    NICORETTE    110 each    CHEW 1 PIECE AS NEEDED FOR SMOKING CESSATION        nortriptyline 75 MG capsule    PAMELOR    30 capsule    Take 1 capsule (75 mg) by mouth At Bedtime        omeprazole 20 MG DR capsule    priLOSEC    30 capsule     TAKE 1 CAPSULE BY MOUTH EVERY DAY BEFORE A MEAL        * order for DME     2 Box    Equipment being ordered: Large gloves    Need for assistance with personal care       * order for DME     1 Device    1 boa back brace    Chronic low back pain with sciatica, sciatica laterality unspecified, unspecified back pain laterality       OXcarbazepine 600 MG tablet    TRILEPTAL    60 tablet    TAKE 1 TABLET BY MOUTH 2 TIMES DAILY    Bipolar affective disorder, current episode hypomanic (H)       propranolol 20 MG tablet    INDERAL    60 tablet    Take 1 tablet (20 mg) by mouth 2 times daily        senna-docusate 8.6-50 MG tablet    SM STOOL SOFTENER/LAXATIVE    120 tablet    TAKE 1 OR 2 TABLETS BY MOUTH 2 TIMES A DAY        tiZANidine 4 MG tablet    ZANAFLEX    90 tablet    Take 1 tablet (4 mg) by mouth 3 times daily    Back muscle spasm       traZODone 50 MG tablet    DESYREL    60 tablet    Take 2 tablets (100 mg) by mouth nightly as needed        * Notice:  This list has 2 medication(s) that are the same as other medications prescribed for you. Read the directions carefully, and ask your doctor or other care provider to review them with you.

## 2018-12-05 NOTE — PROGRESS NOTES
PSYCHIATRY CLINIC PROGRESS NOTE   20 minute medication management, more than 50% of time spent counseling patient on medications, medication side effects, symptom history and management                                                                       Social- Was  twice. Lives alone with his dog Montserrat (beltran) and 3 cats: Smoky the cat. Has a GF in [place]  Children-  2 kids, they are in [place]  Last visit : Last script I wrote was Vyvanse 10/26/18 and gave scripts today 11/23/18 and  12/21/18.  Continue Trileptal 600 mg bid, trazodone 100 mg bedtime prn insomnia. Interim last visit when refill request for diazepam came up I reduced from 5 mg every 12 hours prn to 2 mg every 12 hours prn    - today Joshua notes he is very frustrated because his safe combination is changed  - apologizes today for being agitated. Also notes prefer keep visit short as would like to go home before his next visit to grab his medications  - his daughter and son are in [place]  - Lots of family issues: Joshua has taken care of his parents and yet parents give his other brothers everything. oldest of 3 brothers. Brother in [place] youngest [place] and Major is here. Mom and dad here [place] on [place]. Joshua noting moving here to be closer to parents and help them, etc. But, parents don't seem to want him around much or talk to him.  SUBSTANCE USE- denies abuse of meds or substances    SYMPTOMS- issues with attention and concentration improved with Vyvanse: racing thoughts, depressed mood, impulsivity, distractibility  MEDICAL ROS- back pain, denies any issues with headaches or stomach pain / issues with stimulant  MEDICAL / SURGICAL HISTORY                     Patient Active Problem List   Diagnosis     Mixed hyperlipidemia     Tobacco Abuse, History of     Degeneration of lumbar or lumbosacral intervertebral disc     Depression, major     Chronic pain syndrome     Chemical dependency (H)     Chronic rhinitis     Tinnitus of both ears     SNHL  (sensorineural hearing loss)     Back pain     Bipolar disorder (H)     Seizure-like activity (H)     Somatic dysfunction of pelvis region     Bilateral foot pain     Prostate cancer screening     Trigger index finger of right hand     Moderate persistent asthma without complication     Chronic lower back pain     ACP (advance care planning)     Onychia of toe of left foot     DDD (degenerative disc disease), lumbar     Seizure disorder (H)     Back muscle spasm     Benign essential hypertension     Retrograde ejaculation     Allergic rhinitis due to other allergen     Attention to dressings and sutures     Cervical spondylosis without myelopathy     Chronic headaches     DDD (degenerative disc disease)     Diabetes mellitus, type II (H)     Generalized anxiety disorder     Hypertrophy of prostate without urinary obstruction and other lower urinary tract symptoms (LUTS)     GERD (gastroesophageal reflux disease)     Lumbago     Major depressive disorder, recurrent episode, moderate (H)     Myalgia and myositis     Hyperlipidemia     Other pain disorders related to psychological factors     Spinal stenosis in cervical region     Status post lumbar spinal fusion     Tobacco use disorder     Trigger finger     Asthma     Polypharmacy     ALLERGY   Cymbalta; Depakote [valproic acid]; and Seasonal allergies  MEDICATIONS                                                                                             Current Outpatient Prescriptions   Medication Sig     albuterol (VENTOLIN HFA) 108 (90 BASE) MCG/ACT Inhaler USE 2 PUFFS 4 TIMES A DAY AS NEEDED FOR SHORTNESS OF BREATH     ASPIRIN LOW DOSE 81 MG chewable tablet CHEW AND SWALLOW 1 TABLET BY MOUTH DAILY     atorvastatin (LIPITOR) 20 MG tablet Take 1 tablet (20 mg) by mouth daily     baclofen (LIORESAL) 20 MG tablet Take 1 tablet (20 mg) by mouth 4 times daily     diazepam (VALIUM) 2 MG tablet Take 1 tablet (2 mg) by mouth every 12 hours as needed for anxiety      diazepam (VALIUM) 5 MG tablet TAKE 1 TABLET BY MOUTH EVERY 12 HOURS AS NEEDED FOR ANXIETY OR SLEEP SPASM     diclofenac (VOLTAREN) 50 MG EC tablet TAKE 1 TABLET BY MOUTH 3 TIMES DAILY     docusate sodium (DOK) 100 MG capsule TAKE 1 CAPSULE BY MOUTH TWICE DAILY     fluticasone (FLONASE) 50 MCG/ACT spray USE 2 SPRAYS IN EACH NOSTRIL DAILY     gabapentin (NEURONTIN) 300 MG capsule TAKE 3 CAPSULES BY MOUTH THREE TIMES DAILY     HYDROcodone-acetaminophen (NORCO)  MG per tablet Take 1 tablet by mouth 3 times daily as needed for severe pain     hydrOXYzine (VISTARIL) 50 MG capsule Take 1-2 capsules ( mg) by mouth 4 times daily as needed for anxiety      MG tablet TAKE 1 TABLET BY MOUTH EVERY 6 HOURS AS NEEDED     lidocaine (LIDODERM) 5 % Patch PLACE 3 PATCHES ONTO THE SKIN DAILY AS NEEDED FOR MODERATE PAIN     lisdexamfetamine (VYVANSE) 70 MG capsule Take 1 capsule (70 mg) by mouth daily     losartan (COZAAR) 50 MG tablet Take 1 tablet (50 mg) by mouth daily     MAPAP 325 MG tablet TAKE 2 TABLETS BY MOUTH EVERY 4 HOURS AS NEEDED FOR MILD PAIN     mometasone-formoterol (DULERA) 100-5 MCG/ACT oral inhaler INHALE 2 PUFFS INTO THE LUNGS 2 TIMES A DAY     Morphine Sulfate 80 MG CP24 TAKE 1 CAPSULE BY MOUTH 3 TIMES DAILY     multivitamin  with iron (SM COMPLETE ADVANCED FORMULA) TABS TAKE 1 TABLET BY MOUTH DAILY     naloxone (NARCAN) 1 mg/mL for intranasal kit (2 syringes with 2 mucosal atomizer device) In opioid overdose put cone in nostril and push 1/2 of contents into each nostril.  Repeat every 3 min if no response until help arrives.     nicotine polacrilex (NICORETTE) 2 MG gum CHEW 1 PIECE AS NEEDED FOR SMOKING CESSATION     nortriptyline (PAMELOR) 75 MG capsule Take 1 capsule (75 mg) by mouth At Bedtime     omeprazole (PRILOSEC) 20 MG CR capsule TAKE 1 CAPSULE BY MOUTH EVERY DAY BEFORE A MEAL     order for DME 1 boa back brace     ORDER FOR DME Equipment being ordered: Large gloves     OXcarbazepine  (TRILEPTAL) 600 MG tablet TAKE 1 TABLET BY MOUTH 2 TIMES DAILY     propranolol (INDERAL) 20 MG tablet Take 1 tablet (20 mg) by mouth 2 times daily     senna-docusate (SM STOOL SOFTENER/LAXATIVE) 8.6-50 MG per tablet TAKE 1 OR 2 TABLETS BY MOUTH 2 TIMES A DAY     traZODone (DESYREL) 50 MG tablet Take 2 tablets (100 mg) by mouth nightly as needed     No current facility-administered medications for this visit.        VITALS   /90 (BP Location: Left arm, Patient Position: Sitting, Cuff Size: Adult Regular)  Pulse 89  Temp 97.7  F (36.5  C) (Tympanic)  Wt 81.6 kg (180 lb)  BMI 26.58 kg/m2     LABS                                                                                                                         EKG 2016 with 376 ms (on nortriptyline)    Last Basic Metabolic Panel:  Lab Results   Component Value Date     10/20/2017      Lab Results   Component Value Date    POTASSIUM 4.2 10/20/2017     Lab Results   Component Value Date    CHLORIDE 107 10/20/2017     Lab Results   Component Value Date    SANDRITA 9.0 10/20/2017     Lab Results   Component Value Date    CO2 28 10/20/2017     Lab Results   Component Value Date    BUN 17 10/20/2017     Lab Results   Component Value Date    CR 0.69 10/20/2017     Lab Results   Component Value Date     10/20/2017     Liver Function Studies -   Recent Labs   Lab Test  10/20/17   1443   PROTTOTAL  7.4   ALBUMIN  4.4   BILITOTAL  0.4   ALKPHOS  120   AST  18   ALT  30     CBC RESULTS:   Recent Labs   Lab Test  10/20/17   1443   WBC  4.0   RBC  4.00*   HGB  12.9*   HCT  36.5*   MCV  91   MCH  32.3   MCHC  35.3   RDW  12.4   PLT  151          MENTAL STATUS EXAM                                                                                        Alert. Oriented to person, place, and date / time. Casually groomed, calm, cooperative with good eye contact. No problems with speech or psychomotor behavior. Mood was described as agitated and affect was congruent  to speech content and full range. Thought process, including associations, was unremarkable and thought content was devoid of suicidal and homicidal ideation and psychotic thought. No hallucinations. Insight was good. Judgment was intact and adequate for safety. Fund of knowledge was intact. Pt demonstrates no obvious problems with attention, concentration, language, recent or remote memory although these were not formally tested.     ASSESSMENT                                                                                                      HISTORICAL:  Initial psych note 10/6/15          NOTES:      Joshua is a 58 year old with bipolar, ADHD, and MDD.   He struggles with depression and seems to be largely related to his chronic pain issues.We started Valium as dual purpose: muscle relaxant properties and for anxiety. Couple visits ago we discussed the new guidelines for short - term use of benzodiazepines (Valium) and avoiding their use along with opioids. I had noted plan to taper down over time and eventually discontinue. Last refill decreased from 5 mg every 12 hours as needed down to 2 mg every 12 hours as needed . I am okay continuing the vyvanse, Trileptal and him continuing trazodone.       TREATMENT RISK STATEMENT:  The risks, benefits, alternatives and potential adverse effects have been explained and are understood by the pt.  The pt agrees to the treatment plan with the ability to do so.   The pt knows to call the clinic for any problems or access emergency care if needed.        DIAGNOSES                  ADHD  Bipolar disorder      PLAN                                                                                                                    1)  MEDICATIONS:    Last script I wrote was Vyvanse 12/21/18 and gave script for 1/18/19.  Continue Trileptal 600 mg bid, diazepam is at 2 mg every 12 hours as needed and continue trazodone 100 mg bedtime prn insomnia    2)  THERAPY:  No change    3)   LABS:  UDS 5/10/17    4)  PT MONITOR [call for probs]:  SEs from meds, worsening sx, SI/HI    5)  REFERRALS [CD, medical, other]:  None    6)  RTC:  2 months

## 2018-12-05 NOTE — MR AVS SNAPSHOT
After Visit Summary   12/5/2018    Joshua Barron    MRN: [id number]           Patient Information     Date Of Birth          1960        Visit Information        Provider Department      12/5/2018 3:20 PM Laquita Fernandez MD [facility] [place]        Today's Diagnoses     ADHD (attention deficit hyperactivity disorder), combined type           Follow-ups after your visit        Your next 10 appointments already scheduled     Dec 06, 2018  3:30 PM CST   Return Visit with Shamar Escamilla DC   [facility] ([facility])    [address]  [place] [address]   [phone]            Dec 26, 2018  4:20 PM CST   (Arrive by 4:00 PM)   Office Visit with Lyle Fisher,    [facility] [place] ([facility] [place] )    [address] [facility] [place] [address]   [phone]           Bring a current list of meds and any records pertaining to this visit. For Physicals, please bring immunization records and any forms needing to be filled out. Please arrive 10 minutes early to complete paperwork.            Mar 13, 2019  2:15 PM CDT   (Arrive by 2:00 PM)   Hearing Eval with Kevin Moreno   [facility] [place] ([facility] [place] [facility]    [address] [place] [address]   [phone]              Who to contact     If you have questions or need follow up information about today's clinic visit or your schedule please contact [facility] directly at [phone].  Normal or non-critical lab and imaging results will be communicated to you by MyChart, letter or phone within 4 business days after the clinic has received the results. If you do not hear from us within 7 days, please contact the clinic through MyChart or phone. If you have a critical or abnormal lab result, we will notify you by phone as soon as possible.  Submit refill requests through [employer] or call your pharmacy and  "they will forward the refill request to us. Please allow 3 business days for your refill to be completed.          Additional Information About Your Visit        [employer] Information     [employer] lets you send messages to your doctor, view your test results, renew your prescriptions, schedule appointments and more. To sign up, go to [email or web address] . Click on \"Log in\" on the left side of the screen, which will take you to the Welcome page. Then click on \"Sign up Now\" on the right side of the page.     You will be asked to enter the access code listed below, as well as some personal information. Please follow the directions to create your username and password.     Your access code is: [email or web address]  Expires: 2019 11:05 AM     Your access code will  in 90 days. If you need help or a new code, please call your [place] clinic or [phone].        Care EveryWhere ID     This is your Care EveryWhere ID. This could be used by other organizations to access your [place] medical records  [id number]        Your Vitals Were     Pulse Temperature BMI (Body Mass Index)             89 97.7  F (36.5  C) (Tympanic) 26.58 kg/m2          Blood Pressure from Last 3 Encounters:   18 120/76   18 132/90   11/15/18 110/80    Weight from Last 3 Encounters:   18 81.6 kg (180 lb)   18 81.6 kg (180 lb)   10/25/18 84.4 kg (186 lb)              Today, you had the following     No orders found for display         Today's Medication Changes          These changes are accurate as of 18  5:39 PM.  If you have any questions, ask your nurse or doctor.               Start taking these medicines.        Dose/Directions    tiZANidine 4 MG tablet   Commonly known as:  ZANAFLEX   Used for:  Back muscle spasm   Started by:  Lyle Fisher DO        Dose:  4 mg   Take 1 tablet (4 mg) by mouth 3 times daily   Quantity:  90 tablet   Refills:  3         These medicines have changed or have updated " prescriptions.        Dose/Directions    diazepam 2 MG tablet   Commonly known as:  VALIUM   This may have changed:  Another medication with the same name was removed. Continue taking this medication, and follow the directions you see here.   Used for:  DAYANA (generalized anxiety disorder)   Changed by:  Laquita Fernandez MD        Dose:  2 mg   Take 1 tablet (2 mg) by mouth every 12 hours as needed for anxiety   Quantity:  60 tablet   Refills:  1       lisdexamfetamine 70 MG capsule   Commonly known as:  VYVANSE   This may have changed:  These instructions start on 1/18/2019. If you are unsure what to do until then, ask your doctor or other care provider.   Used for:  ADHD (attention deficit hyperactivity disorder), combined type   Changed by:  Laquita Fernandez MD        Dose:  70 mg   Start taking on:  1/18/2019   Take 1 capsule (70 mg) by mouth daily   Quantity:  30 capsule   Refills:  0            Where to get your medicines      These medications were sent to [facility] - [address] [address]     Phone:  [phone]     tiZANidine 4 MG tablet         Some of these will need a paper prescription and others can be bought over the counter.  Ask your nurse if you have questions.     Bring a paper prescription for each of these medications     lisdexamfetamine 70 MG capsule                Primary Care Provider Office Phone # Fax #    Lyle Fisher [phone]       [address] [place] [id number]        Goals        General    Mental Health Management (pt-stated)     Pain Management (pt-stated)       Equal Access to Services     [facility]: Hadii jim arizmendi hadasho Soomaali, waaxda luqadaha, qaybta kaalmada adeegyada, jaki millan . So [facility] [phone].    ATENCIÓN: Si habla español, tiene a hastings disposición servicios gratuitos de asistencia lingüística. Llame al [phone].    We comply with  applicable federal civil rights laws and [place] laws. We do not discriminate on the basis of race, color, national origin, age, disability, sex, sexual orientation, or gender identity.            Thank you!     Thank you for choosing [facility]  for your care. Our goal is always to provide you with excellent care. Hearing back from our patients is one way we can continue to improve our services. Please take a few minutes to complete the written survey that you may receive in the mail after your visit with us. Thank you!             Your Updated Medication List - Protect others around you: Learn how to safely use, store and throw away your medicines at www.disposemymeds.org.          This list is accurate as of 12/5/18  5:39 PM.  Always use your most recent med list.                   Brand Name Dispense Instructions for use Diagnosis    albuterol 108 (90 Base) MCG/ACT inhaler    VENTOLIN HFA    18 g    USE 2 PUFFS 4 TIMES A DAY AS NEEDED FOR SHORTNESS OF BREATH        ASPIRIN LOW DOSE 81 MG chewable tablet   Generic drug:  aspirin     30 tablet    CHEW AND SWALLOW 1 TABLET BY MOUTH DAILY    Healthcare maintenance       atorvastatin 20 MG tablet    LIPITOR    90 tablet    Take 1 tablet (20 mg) by mouth daily        baclofen 20 MG tablet    LIORESAL    120 tablet    Take 1 tablet (20 mg) by mouth 4 times daily    Back muscle spasm, Lumbar back pain       diazepam 2 MG tablet    VALIUM    60 tablet    Take 1 tablet (2 mg) by mouth every 12 hours as needed for anxiety    DAYANA (generalized anxiety disorder)       diclofenac 50 MG EC tablet    VOLTAREN    90 tablet    TAKE 1 TABLET BY MOUTH 3 TIMES DAILY    Arthritis pain       docusate sodium 100 MG capsule    DOK    60 capsule    TAKE 1 CAPSULE BY MOUTH TWICE DAILY        fluticasone 50 MCG/ACT nasal spray    FLONASE    16 g    USE 2 SPRAYS IN EACH NOSTRIL DAILY        gabapentin 300 MG capsule    NEURONTIN    270 capsule    TAKE 3 CAPSULES BY  MOUTH THREE TIMES DAILY        HYDROcodone-acetaminophen  MG per tablet    NORCO    90 tablet    Take 1 tablet by mouth 3 times daily as needed for severe pain    Low back pain, unspecified back pain laterality, unspecified chronicity, with sciatica presence unspecified       hydrOXYzine 50 MG capsule    VISTARIL    60 capsule    Take 1-2 capsules ( mg) by mouth 4 times daily as needed for anxiety         MG tablet   Generic drug:  ibuprofen     120 tablet    TAKE 1 TABLET BY MOUTH EVERY 6 HOURS AS NEEDED    Low back pain, unspecified back pain laterality, unspecified chronicity, with sciatica presence unspecified       lidocaine 5 % patch    LIDODERM    90 patch    PLACE 3 PATCHES ONTO THE SKIN DAILY AS NEEDED FOR MODERATE PAIN    Midline low back pain without sciatica       lisdexamfetamine 70 MG capsule   Start taking on:  1/18/2019    VYVANSE    30 capsule    Take 1 capsule (70 mg) by mouth daily    ADHD (attention deficit hyperactivity disorder), combined type       losartan 50 MG tablet    COZAAR    30 tablet    Take 1 tablet (50 mg) by mouth daily        MAPAP 325 MG tablet   Generic drug:  acetaminophen     200 tablet    TAKE 2 TABLETS BY MOUTH EVERY 4 HOURS AS NEEDED FOR MILD PAIN    Pain       mometasone-formoterol 100-5 MCG/ACT inhaler    DULERA    13 g    INHALE 2 PUFFS INTO THE LUNGS 2 TIMES A DAY    Moderate persistent asthma without complication       Morphine Sulfate 80 MG Cp24     90 capsule    TAKE 1 CAPSULE BY MOUTH 3 TIMES DAILY    Low back pain, unspecified back pain laterality, unspecified chronicity, with sciatica presence unspecified       multivitamin  with iron Tabs     30 tablet    TAKE 1 TABLET BY MOUTH DAILY    Health care maintenance       naloxone 1 mg/mL for intranasal kit (2 syringes with 2 mucosal atomizer device)    NARCAN    2 kit    In opioid overdose put cone in nostril and push 1/2 of contents into each nostril.  Repeat every 3 min if no response until help  arrives.    Chronic pain syndrome       nicotine 2 MG gum    NICORETTE    110 each    CHEW 1 PIECE AS NEEDED FOR SMOKING CESSATION        nortriptyline 75 MG capsule    PAMELOR    30 capsule    Take 1 capsule (75 mg) by mouth At Bedtime        omeprazole 20 MG DR capsule    priLOSEC    30 capsule    TAKE 1 CAPSULE BY MOUTH EVERY DAY BEFORE A MEAL        * order for DME     2 Box    Equipment being ordered: Large gloves    Need for assistance with personal care       * order for DME     1 Device    1 boa back brace    Chronic low back pain with sciatica, sciatica laterality unspecified, unspecified back pain laterality       OXcarbazepine 600 MG tablet    TRILEPTAL    60 tablet    TAKE 1 TABLET BY MOUTH 2 TIMES DAILY    Bipolar affective disorder, current episode hypomanic (H)       propranolol 20 MG tablet    INDERAL    60 tablet    Take 1 tablet (20 mg) by mouth 2 times daily        senna-docusate 8.6-50 MG tablet    SM STOOL SOFTENER/LAXATIVE    120 tablet    TAKE 1 OR 2 TABLETS BY MOUTH 2 TIMES A DAY        tiZANidine 4 MG tablet    ZANAFLEX    90 tablet    Take 1 tablet (4 mg) by mouth 3 times daily    Back muscle spasm       traZODone 50 MG tablet    DESYREL    60 tablet    Take 2 tablets (100 mg) by mouth nightly as needed        * Notice:  This list has 2 medication(s) that are the same as other medications prescribed for you. Read the directions carefully, and ask your doctor or other care provider to review them with you.

## 2018-12-05 NOTE — LETTER
My Depression Action Plan  Name: Joshua Barron   Date of Birth 1960  Date: 12/4/2018    My doctor: Lyle Fisher   My clinic: [facility] [place]  [address]  [place] [address]  [phone]          GREEN    ZONE   Good Control    What it looks like:     Things are going generally well. You have normal up s and down s. You may even feel depressed from time to time, but bad moods usually last less than a day.   What you need to do:  1. Continue to care for yourself (see self care plan)  2. Check your depression survival kit and update it as needed  3. Follow your physician s recommendations including any medication.  4. Do not stop taking medication unless you consult with your physician first.           YELLOW         ZONE Getting Worse    What it looks like:     Depression is starting to interfere with your life.     It may be hard to get out of bed; you may be starting to isolate yourself from others.    Symptoms of depression are starting to last most all day and this has happened for several days.     You may have suicidal thoughts but they are not constant.   What you need to do:     1. Call your care team, your response to treatment will improve if you keep your care team informed of your progress. Yellow periods are signs an adjustment may need to be made.     2. Continue your self-care, even if you have to fake it!    3. Talk to someone in your support network    4. Open up your depression survival kit           RED    ZONE Medical Alert - Get Help    What it looks like:     Depression is seriously interfering with your life.     You may experience these or other symptoms: You can t get out of bed most days, can t work or engage in other necessary activities, you have trouble taking care of basic hygiene, or basic responsibilities, thoughts of suicide or death that will not go away, self-injurious behavior.     What you need to do:  1. Call your care team  and request a same-day appointment. If they are not available (weekends or after hours) call your local crisis line, emergency room or 911.            Depression Self Care Plan / Survival Kit    Self-Care for Depression  Here s the deal. Your body and mind are really not as separate as most people think.  What you do and think affects how you feel and how you feel influences what you do and think. This means if you do things that people who feel good do, it will help you feel better.  Sometimes this is all it takes.  There is also a place for medication and therapy depending on how severe your depression is, so be sure to consult with your medical provider and/ or Behavioral Health Consultant if your symptoms are worsening or not improving.     In order to better manage my stress, I will:    Exercise  Get some form of exercise, every day. This will help reduce pain and release endorphins, the  feel good  chemicals in your brain. This is almost as good as taking antidepressants!  This is not the same as joining a gym and then never going! (they count on that by the way ) It can be as simple as just going for a walk or doing some gardening, anything that will get you moving.      Hygiene   Maintain good hygiene (Get out of bed in the morning, Make your bed, Brush your teeth, Take a shower, and Get dressed like you were going to work, even if you are unemployed).  If your clothes don't fit try to get ones that do.    Diet  I will strive to eat foods that are good for me, drink plenty of water, and avoid excessive sugar, caffeine, alcohol, and other mood-altering substances.  Some foods that are helpful in depression are: complex carbohydrates, B vitamins, flaxseed, fish or fish oil, fresh fruits and vegetables.    Psychotherapy  I agree to participate in Individual Therapy (if recommended).    Medication  If prescribed medications, I agree to take them.  Missing doses can result in serious side effects.  I understand  that drinking alcohol, or other illicit drug use, may cause potential side effects.  I will not stop my medication abruptly without first discussing it with my provider.    Staying Connected With Others  I will stay in touch with my friends, family members, and my primary care provider/team.    Use your imagination  Be creative.  We all have a creative side; it doesn t matter if it s oil painting, sand castles, or mud pies! This will also kick up the endorphins.    Witness Beauty  (AKA stop and smell the roses) Take a look outside, even in mid-winter. Notice colors, textures. Watch the squirrels and birds.     Service to others  Be of service to others.  There is always someone else in need.  By helping others we can  get out of ourselves  and remember the really important things.  This also provides opportunities for practicing all the other parts of the program.    Humor  Laugh and be silly!  Adjust your TV habits for less news and crime-drama and more comedy.    Control your stress  Try breathing deep, massage therapy, biofeedback, and meditation. Find time to relax each day.     My support system    Clinic Contact:  Phone number:    Contact 1:  Phone number:    Contact 2:  Phone number:    Evangel[employer]/:  Phone number:    Therapist:  Phone number:    Local crisis center:    Phone number:    Other community support:  Phone number:

## 2018-12-05 NOTE — PROGRESS NOTES
Clinic Care Coordination Contact  Care Team Conversations    Care coordinator attended office visit with pt and PCP this date.  Please refer to Dr. Fisher's note for plan of care.  Reports his pain this date is 5/10.  Informed him that this is the lowest pain rating he has given in over 1 year.  His pain is usually 8/10.  He reports that the lower pain rating this date is due to being inactive this date.  He did not think it was due to medication changes (Fentanyl to Morphine).  Finished PT.  Is seeing Shamar Escamilla for chiropractic care. Educated him on opioid induced hyperalgesia.  Pointed out that his pain rating is actually better since going down on opioids.  He verbalized understanding.  Informed him that he will continue on his current wean and he will be assessed and evaluated after each wean to see how he is tolerating it.  Educated him that he has to be mindful when performing activities and keep his back safe.  He verbalized understanding.  Zanaflex 4mg TID was prescribed, in addition to his Baclofen 20mg QID.  CC will follow up via telephone in a couple of weeks to assess how he is tolerating new medication.      Love Quan RN-BSN  Chronic Pain Care Coordinator  [phone] opt. #3

## 2018-12-07 ASSESSMENT — ANXIETY QUESTIONNAIRES
GAD7 TOTAL SCORE: 0
GAD7 TOTAL SCORE: 0

## 2018-12-11 ENCOUNTER — TELEPHONE (OUTPATIENT)
Dept: PEDIATRICS | Facility: OTHER | Age: 58
End: 2018-12-11

## 2018-12-11 NOTE — TELEPHONE ENCOUNTER
"Patient called and stated he wanted to know who the police officers were that we were sending into his home to go through all of his things. I tried explaining to Joshua that we do not nor have not sent anyone into his home to go through his things. Patient states \"Oh so then it must be [facility] who is sending people in here.\" I again explained to patient that nobody in this clinic would send people into his home. Pt states that he knows people are coming in and going through his things and stealing. I explained to pt again this had nothing to do with us and he needs to go through [facility] to discuss pill changes as then he started talking about how [employer] is behind everything and she is very nice on the phone but when Dr. Fisher comes in the room, \"she jeckle and hydes me. And if Dr. Fisher is black balling him, then be a man and just do it.\" I again informed patient that his phone call needed to go through Care coordination as I am unsure what their plans are with his medications. Patient then states yeah transfer me to [facility] because I bet I wont get through to her again. Updated MD on telephone call.   "

## 2018-12-12 DIAGNOSIS — M54.5 LOW BACK PAIN, UNSPECIFIED BACK PAIN LATERALITY, UNSPECIFIED CHRONICITY, WITH SCIATICA PRESENCE UNSPECIFIED: ICD-10-CM

## 2018-12-12 NOTE — TELEPHONE ENCOUNTER
Verbally discussed with Dr. Fernandez.  She recommended that CC not call pt back due to his severe paranoia at this time.  Dr. Fernandez will address with pt.

## 2018-12-14 RX ORDER — HYDROCODONE BITARTRATE AND ACETAMINOPHEN 10; 325 MG/1; MG/1
1 TABLET ORAL 3 TIMES DAILY PRN
Qty: 90 TABLET | Refills: 0 | Status: SHIPPED | OUTPATIENT
Start: 2018-12-14 | End: 2019-01-10

## 2018-12-20 NOTE — PROGRESS NOTES
SUBJECTIVE:   Joshua Barron is a 58 year old male who presents to clinic today for the following health issues:      Back Pain Follow Up      Description:   Location of pain:  Low and Mid back bilateral  Character of pain: sharp  Pain radiation: radiates into the right buttocks and radiates into the left buttocks  Since last visit, pain is:  worsened  New numbness or weakness in legs, not attributed to pain:  YES and fingers    Intensity: Currently 9/10    History:   Pain interferes with job: Not applicable  Therapies tried without relief: prescribed medication, chiropractor and rest  Therapies tried with relief: medication and chiropractor; helpful not effective           Accompanying Signs & Symptoms:  Risk of Fracture:  None  Risk of Cauda Equina:  None  Risk of Infection:  None  Risk of Cancer:  None        Amount of exercise or physical activity: None as he feels that he has more pain on his medications and cannot tolerate the pain.    Problems taking medications regularly: No    Medication side effects: none    Diet: regular (no restrictions)        -------------------------------------    Problem list and histories reviewed & adjusted, as indicated.  Additional history: as documented    Patient Active Problem List   Diagnosis     Mixed hyperlipidemia     Tobacco Abuse, History of     Degeneration of lumbar or lumbosacral intervertebral disc     Depression, major     Chronic pain syndrome     Chemical dependency (H)     Chronic rhinitis     Tinnitus of both ears     SNHL (sensorineural hearing loss)     Back pain     Bipolar disorder (H)     Seizure-like activity (H)     Somatic dysfunction of pelvis region     Bilateral foot pain     Prostate cancer screening     Trigger index finger of right hand     Moderate persistent asthma without complication     Chronic lower back pain     ACP (advance care planning)     Onychia of toe of left foot     DDD (degenerative disc disease), lumbar     Seizure disorder  (H)     Back muscle spasm     Benign essential hypertension     Retrograde ejaculation     Allergic rhinitis due to other allergen     Attention to dressings and sutures     Cervical spondylosis without myelopathy     Chronic headaches     DDD (degenerative disc disease)     Diabetes mellitus, type II (H)     Generalized anxiety disorder     Hypertrophy of prostate without urinary obstruction and other lower urinary tract symptoms (LUTS)     GERD (gastroesophageal reflux disease)     Lumbago     Major depressive disorder, recurrent episode, moderate (H)     Myalgia and myositis     Hyperlipidemia     Other pain disorders related to psychological factors     Spinal stenosis in cervical region     Status post lumbar spinal fusion     Tobacco use disorder     Trigger finger     Asthma     Polypharmacy     Past Surgical History:   Procedure Laterality Date     APPENDECTOMY      Appendicitis     BACK SURGERY  ,    back surgery 3 disk fusion     BACK SURGERY      L1-L2, L3-L4 laminectomy     COLONOSCOPY  2007    repeat 5-10 years     COLONOSCOPY N/A 2016    Procedure: COLONOSCOPY;  Surgeon: Steve Hoff DO;  Location: [facility]     exophytic lesion posterior scalp line  2011    Excision     laminectomy L3-4 and L1-2       RELEASE TRIGGER FINGER  2010    4th digit both hands     RELEASE TRIGGER FINGER Right 2016    Procedure: RELEASE TRIGGER FINGER;  Surgeon: Zev Schroeder MD;  Location: [facility]       Social History     Tobacco Use     Smoking status: Former Smoker     Packs/day: 1.00     Years: 30.00     Pack years: 30.00     Last attempt to quit: 2007     Years since quittin.3     Smokeless tobacco: Current User     Types: Chew   Substance Use Topics     Alcohol use: Yes     Comment: Rarely     Family History   Problem Relation Age of Onset     Asthma Mother      Musculoskeletal Disorder Mother         arthritis     Diabetes Father      Cancer Maternal Grandmother         stomach      Alzheimer Disease Maternal Grandfather      Cancer Paternal Grandmother         stomach     Hypertension Paternal Grandfather            Reviewed and updated as needed this visit by clinical staff  Tobacco  Allergies  Meds  Med Hx  Surg Hx  Fam Hx  Soc Hx      Reviewed and updated as needed this visit by Provider         ROS:  CONSTITUTIONAL: NEGATIVE for fever, chills, change in weight  CONSTITUTIONAL:POSITIVE  for anorexia  ENT/MOUTH: NEGATIVE for ear, mouth and throat problems  RESP: NEGATIVE for significant cough or SOB  CV: NEGATIVE for chest pain, palpitations or peripheral edema  GI: NEGATIVE for nausea, abdominal pain, heartburn, or change in bowel habits  : NEGATIVE for frequency, dysuria, or hematuria  MUSCULOSKELETAL:as in the HPI  NEURO: Radiation to the buttocks bilaterally HPI  PSYCHIATRIC: NEGATIVE for changes in mood or affect    OBJECTIVE:     /72 (BP Location: Left arm, Patient Position: Sitting, Cuff Size: Adult Regular)   Pulse 96   Temp 97.9  F (36.6  C) (Tympanic)   Wt 82.7 kg (182 lb 6.4 oz)   SpO2 96%   BMI 26.94 kg/m    Body mass index is 26.94 kg/m .  GENERAL: healthy, alert and no distress  NECK: no adenopathy, no asymmetry, masses, or scars and thyroid normal to palpation  RESP: lungs clear to auscultation - no rales, rhonchi or wheezes  CV: regular rate and rhythm, normal S1 S2, no S3 or S4, no murmur, click or rub, no peripheral edema and peripheral pulses strong  MS: no gross musculoskeletal defects noted, no edema  MS: there are paraspinal muscle spasms isolated to the left side of the spine.  NEURO: Normal strength and tone, mentation intact and speech normal  PSYCH: mentation appears normal, affect normal/bright    Diagnostic Test Results:  none     ASSESSMENT/PLAN:   (M54.42,  M54.41,  G89.29) Chronic bilateral low back pain with bilateral sciatica  (primary encounter diagnosis)  Comment: Joshua reports increased back pains since coming off of his Fentanyl   Plan:    Increase nortriptyline (PAMELOR) 50 MG capsule to 100 MG.  He will continue on his opioid wean:  In December he will wean to Morphine ER 60 TID and  Hydrocodone 10 every 8 hours for breakthrough pain ( 210 MME).  Then,  Morphine ER 50 TID and  Hydrocodone 10 every 8 hours for breakthrough pain ( 180 MME).  Then,  Morphine ER 60 BID and  Hydrocodone 10 every 8 hours for breakthrough pain ( 150 MME).  Then,  Morphine ER 40 BID and  Hydrocodone 10 every 12 hours for breakthrough pain ( 130  MME).  Then,  Morphine ER 30 TID ( 90 MME) with a wean every 2-3 weeks first to 30 mg BID, then 20 mg BID.  From there we will have a plan for other options.          FUTURE APPOINTMENTS:       - Follow-up visit in 2 weeks for chronic pain.    Lyle Fisher DO, DO  [facility] [place]

## 2018-12-26 ENCOUNTER — OFFICE VISIT (OUTPATIENT)
Dept: CHIROPRACTIC MEDICINE | Facility: OTHER | Age: 58
End: 2018-12-26
Attending: CHIROPRACTOR
Payer: COMMERCIAL

## 2018-12-26 ENCOUNTER — OFFICE VISIT (OUTPATIENT)
Dept: PEDIATRICS | Facility: OTHER | Age: 58
End: 2018-12-26
Attending: INTERNAL MEDICINE
Payer: COMMERCIAL

## 2018-12-26 VITALS
WEIGHT: 182.4 LBS | OXYGEN SATURATION: 96 % | HEART RATE: 96 BPM | DIASTOLIC BLOOD PRESSURE: 72 MMHG | BODY MASS INDEX: 26.94 KG/M2 | TEMPERATURE: 97.9 F | SYSTOLIC BLOOD PRESSURE: 118 MMHG

## 2018-12-26 DIAGNOSIS — G89.29 CHRONIC BILATERAL LOW BACK PAIN WITH BILATERAL SCIATICA: Primary | ICD-10-CM

## 2018-12-26 DIAGNOSIS — M54.42 CHRONIC BILATERAL LOW BACK PAIN WITH BILATERAL SCIATICA: Primary | ICD-10-CM

## 2018-12-26 DIAGNOSIS — M99.02 SEGMENTAL AND SOMATIC DYSFUNCTION OF THORACIC REGION: ICD-10-CM

## 2018-12-26 DIAGNOSIS — M54.50 ACUTE BILATERAL LOW BACK PAIN WITHOUT SCIATICA: ICD-10-CM

## 2018-12-26 DIAGNOSIS — M99.03 SEGMENTAL AND SOMATIC DYSFUNCTION OF LUMBAR REGION: Primary | ICD-10-CM

## 2018-12-26 DIAGNOSIS — M54.41 CHRONIC BILATERAL LOW BACK PAIN WITH BILATERAL SCIATICA: Primary | ICD-10-CM

## 2018-12-26 DIAGNOSIS — M99.01 SEGMENTAL AND SOMATIC DYSFUNCTION OF CERVICAL REGION: ICD-10-CM

## 2018-12-26 PROCEDURE — 98941 CHIROPRACT MANJ 3-4 REGIONS: CPT | Mod: AT | Performed by: CHIROPRACTOR

## 2018-12-26 PROCEDURE — 99213 OFFICE O/P EST LOW 20 MIN: CPT | Performed by: INTERNAL MEDICINE

## 2018-12-26 PROCEDURE — G0463 HOSPITAL OUTPT CLINIC VISIT: HCPCS

## 2018-12-26 RX ORDER — NORTRIPTYLINE HYDROCHLORIDE 50 MG/1
100 CAPSULE ORAL AT BEDTIME
Qty: 60 CAPSULE | Refills: 5 | Status: SHIPPED | OUTPATIENT
Start: 2018-12-26 | End: 2019-01-22

## 2018-12-26 ASSESSMENT — PAIN SCALES - GENERAL: PAINLEVEL: EXTREME PAIN (9)

## 2018-12-26 NOTE — NURSING NOTE
"Chief Complaint   Patient presents with     Musculoskeletal Problem       Initial /72 (BP Location: Left arm, Patient Position: Sitting, Cuff Size: Adult Regular)   Pulse 96   Temp 97.9  F (36.6  C) (Tympanic)   Wt 82.7 kg (182 lb 6.4 oz)   SpO2 96%   BMI 26.94 kg/m   Estimated body mass index is 26.94 kg/m  as calculated from the following:    Height as of 2/15/18: 1.753 m (5' 9\").    Weight as of this encounter: 82.7 kg (182 lb 6.4 oz).  Medication Reconciliation: complete    Avis Dorman LPN  "

## 2018-12-26 NOTE — PROGRESS NOTES

## 2019-01-02 ENCOUNTER — OFFICE VISIT (OUTPATIENT)
Dept: CHIROPRACTIC MEDICINE | Facility: OTHER | Age: 59
End: 2019-01-02
Attending: CHIROPRACTOR
Payer: COMMERCIAL

## 2019-01-02 DIAGNOSIS — M99.03 SEGMENTAL AND SOMATIC DYSFUNCTION OF LUMBAR REGION: Primary | ICD-10-CM

## 2019-01-02 DIAGNOSIS — M99.02 SEGMENTAL AND SOMATIC DYSFUNCTION OF THORACIC REGION: ICD-10-CM

## 2019-01-02 DIAGNOSIS — M99.01 SEGMENTAL AND SOMATIC DYSFUNCTION OF CERVICAL REGION: ICD-10-CM

## 2019-01-02 DIAGNOSIS — M54.50 ACUTE BILATERAL LOW BACK PAIN WITHOUT SCIATICA: ICD-10-CM

## 2019-01-02 PROCEDURE — 98941 CHIROPRACT MANJ 3-4 REGIONS: CPT | Mod: AT | Performed by: CHIROPRACTOR

## 2019-01-02 NOTE — PROGRESS NOTES

## 2019-01-04 DIAGNOSIS — M25.541 ARTHRALGIA OF BOTH HANDS: Primary | ICD-10-CM

## 2019-01-04 DIAGNOSIS — M25.542 ARTHRALGIA OF BOTH HANDS: Primary | ICD-10-CM
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
## 2019-01-09 ENCOUNTER — OFFICE VISIT (OUTPATIENT)
Dept: INTERNAL MEDICINE | Facility: OTHER | Age: 59
End: 2019-01-09
Attending: INTERNAL MEDICINE
Payer: COMMERCIAL

## 2019-01-09 VITALS
RESPIRATION RATE: 20 BRPM | TEMPERATURE: 98.4 F | HEART RATE: 85 BPM | SYSTOLIC BLOOD PRESSURE: 130 MMHG | DIASTOLIC BLOOD PRESSURE: 74 MMHG | OXYGEN SATURATION: 98 %

## 2019-01-09 DIAGNOSIS — M54.50 CHRONIC MIDLINE LOW BACK PAIN WITHOUT SCIATICA: ICD-10-CM

## 2019-01-09 DIAGNOSIS — G89.29 CHRONIC MIDLINE LOW BACK PAIN WITHOUT SCIATICA: ICD-10-CM

## 2019-01-09 DIAGNOSIS — F22 PARANOID STATE (H): ICD-10-CM

## 2019-01-09 DIAGNOSIS — F41.1 GAD (GENERALIZED ANXIETY DISORDER): Primary | ICD-10-CM

## 2019-01-09 DIAGNOSIS — M54.50 CHRONIC BILATERAL LOW BACK PAIN WITHOUT SCIATICA: ICD-10-CM

## 2019-01-09 DIAGNOSIS — G89.29 CHRONIC BILATERAL LOW BACK PAIN WITHOUT SCIATICA: ICD-10-CM

## 2019-01-09 DIAGNOSIS — F11.90 CHRONIC, CONTINUOUS USE OF OPIOIDS: ICD-10-CM

## 2019-01-09 LAB
ALBUMIN SERPL-MCNC: 4.4 G/DL (ref 3.4–5)
ALP SERPL-CCNC: 85 U/L (ref 40–150)
ALT SERPL W P-5'-P-CCNC: 31 U/L (ref 0–70)
AMMONIA PLAS-SCNC: 22 UMOL/L (ref 10–50)
ANION GAP SERPL CALCULATED.3IONS-SCNC: 4 MMOL/L (ref 3–14)
AST SERPL W P-5'-P-CCNC: 13 U/L (ref 0–45)
BILIRUB SERPL-MCNC: 0.4 MG/DL (ref 0.2–1.3)
BUN SERPL-MCNC: 26 MG/DL (ref 7–30)
CALCIUM SERPL-MCNC: 8.6 MG/DL (ref 8.5–10.1)
CHLORIDE SERPL-SCNC: 108 MMOL/L (ref 94–109)
CO2 SERPL-SCNC: 30 MMOL/L (ref 20–32)
CREAT SERPL-MCNC: 0.91 MG/DL (ref 0.66–1.25)
GFR SERPL CREATININE-BSD FRML MDRD: >90 ML/MIN/{1.73_M2}
GLUCOSE SERPL-MCNC: 90 MG/DL (ref 70–99)
POTASSIUM SERPL-SCNC: 4.5 MMOL/L (ref 3.4–5.3)
PROT SERPL-MCNC: 6.9 G/DL (ref 6.8–8.8)
SODIUM SERPL-SCNC: 142 MMOL/L (ref 133–144)

## 2019-01-09 PROCEDURE — 36415 COLL VENOUS BLD VENIPUNCTURE: CPT | Mod: ZL | Performed by: INTERNAL MEDICINE

## 2019-01-09 PROCEDURE — 80053 COMPREHEN METABOLIC PANEL: CPT | Mod: ZL | Performed by: INTERNAL MEDICINE

## 2019-01-09 PROCEDURE — 80183 DRUG SCRN QUANT OXCARBAZEPIN: CPT | Mod: ZL | Performed by: INTERNAL MEDICINE

## 2019-01-09 PROCEDURE — 99214 OFFICE O/P EST MOD 30 MIN: CPT | Performed by: INTERNAL MEDICINE

## 2019-01-09 PROCEDURE — 82140 ASSAY OF AMMONIA: CPT | Mod: ZL | Performed by: INTERNAL MEDICINE

## 2019-01-09 PROCEDURE — 99000 SPECIMEN HANDLING OFFICE-LAB: CPT | Performed by: INTERNAL MEDICINE

## 2019-01-09 PROCEDURE — G0463 HOSPITAL OUTPT CLINIC VISIT: HCPCS | Performed by: INTERNAL MEDICINE

## 2019-01-09 RX ORDER — LIDOCAINE 50 MG/G
PATCH TOPICAL
Qty: 180 PATCH | Refills: 11 | Status: SHIPPED | OUTPATIENT
Start: 2019-01-09 | End: 2020-04-21

## 2019-01-09 RX ORDER — HYDROXYZINE PAMOATE 50 MG/1
50-100 CAPSULE ORAL 4 TIMES DAILY PRN
Qty: 60 CAPSULE | Refills: 1 | Status: SHIPPED | OUTPATIENT
Start: 2019-01-09 | End: 2019-02-23

## 2019-01-09 ASSESSMENT — ANXIETY QUESTIONNAIRES
1. FEELING NERVOUS, ANXIOUS, OR ON EDGE: NEARLY EVERY DAY
5. BEING SO RESTLESS THAT IT IS HARD TO SIT STILL: SEVERAL DAYS
2. NOT BEING ABLE TO STOP OR CONTROL WORRYING: NEARLY EVERY DAY
GAD7 TOTAL SCORE: 16
7. FEELING AFRAID AS IF SOMETHING AWFUL MIGHT HAPPEN: NEARLY EVERY DAY
6. BECOMING EASILY ANNOYED OR IRRITABLE: NOT AT ALL
3. WORRYING TOO MUCH ABOUT DIFFERENT THINGS: NEARLY EVERY DAY

## 2019-01-09 ASSESSMENT — PATIENT HEALTH QUESTIONNAIRE - PHQ9
5. POOR APPETITE OR OVEREATING: NEARLY EVERY DAY
SUM OF ALL RESPONSES TO PHQ QUESTIONS 1-9: 20

## 2019-01-09 ASSESSMENT — PAIN SCALES - GENERAL: PAINLEVEL: EXTREME PAIN (8)

## 2019-01-09 NOTE — NURSING NOTE
"Chief Complaint   Patient presents with     Pain       Initial /74 (BP Location: Left arm, Patient Position: Chair, Cuff Size: Adult Large)   Pulse 85   Temp 98.4  F (36.9  C) (Tympanic)   Resp 20   SpO2 98%  Estimated body mass index is 26.94 kg/m  as calculated from the following:    Height as of 2/15/18: 1.753 m (5' 9\").    Weight as of 12/26/18: 82.7 kg (182 lb 6.4 oz).  Medication Reconciliation: complete    Chelo Rader LPN      "

## 2019-01-10 ENCOUNTER — TELEPHONE (OUTPATIENT)
Dept: PEDIATRICS | Facility: OTHER | Age: 59
End: 2019-01-10

## 2019-01-10 DIAGNOSIS — M54.5 LOW BACK PAIN, UNSPECIFIED BACK PAIN LATERALITY, UNSPECIFIED CHRONICITY, WITH SCIATICA PRESENCE UNSPECIFIED: ICD-10-CM

## 2019-01-10 ASSESSMENT — ANXIETY QUESTIONNAIRES: GAD7 TOTAL SCORE: 16

## 2019-01-10 NOTE — TELEPHONE ENCOUNTER
1/10/2019 - received a medication PA request from [facility] for lidocaine 5% patches.  There is already a PA in place for 90 for 30 days.  The new prescription is for a quantity of 180 for 30 days (6 patches per day).  I attempted to do a PA thru [facility] and received a response that there is already a PA in place thru 4/2/2019.  I then called [employer] at [phone] and spoke with Livier.  She stated that this is NOT a quantity limits' rejection issue.  She stated that there is no quantity limit for this medication.  She stated that it is an issue that the pharmacy needs to call the pharmacy help desk to get the rejection overridden.  Pharmacy help desk - [phone].  I called [facility] and relayed this information to them.  They will contact the pharmacy help desk.  Documentation scanned to Epic.  Carmen Bishop, [facility] Specialist.

## 2019-01-10 NOTE — TELEPHONE ENCOUNTER
HYDROcodone-acetaminophen (NORCO)  MG per tablet      Last Written Prescription Date:  12/14/18  Last Fill Quantity: 90,   # refills: 0  Last Office Visit: 1/9/19  Future Office visit:    Next 5 appointments (look out 90 days)    Jan 22, 2019  3:00 PM CST  (Arrive by 2:45 PM)  [facility] [address] [place] [id number]  [phone]   Jan 24, 2019 11:00 AM CST  (Arrive by 10:45 AM)  Office Visit with Lyle Fisher DO  [facility] [address] [place] [id number]  [phone]           Routing refill request to provider for review/approval because:  Drug not on the [facility], [facility] or  Health refill protocol or controlled substance    Morphine Sulfate 80 MG CP24      Last Written Prescription Date:  -  Last Fill Quantity: -,   # refills: -    Pharmacy requesting 80mg, I dont see that pt has ever been prescribed this. Pt does have 60mg dose on med list. Please advise. Thank you!

## 2019-01-11 LAB — 10OH-CARBAZEPINE SERPL-MCNC: 20.4 UG/ML

## 2019-01-11 RX ORDER — MORPHINE SULFATE 50 MG/1
50 CAPSULE, EXTENDED RELEASE ORAL DAILY
Qty: 90 CAPSULE | Refills: 0 | Status: SHIPPED | OUTPATIENT
Start: 2019-01-11 | End: 2019-02-19 | Stop reason: DRUGHIGH

## 2019-01-11 RX ORDER — HYDROCODONE BITARTRATE AND ACETAMINOPHEN 10; 325 MG/1; MG/1
1 TABLET ORAL 3 TIMES DAILY PRN
Qty: 90 TABLET | Refills: 0 | Status: SHIPPED | OUTPATIENT
Start: 2019-01-11 | End: 2019-02-13

## 2019-01-14 ENCOUNTER — TELEPHONE (OUTPATIENT)
Dept: PEDIATRICS | Facility: OTHER | Age: 59
End: 2019-01-14

## 2019-01-14 NOTE — TELEPHONE ENCOUNTER
APPROVAL - 01/14/2019 - received approval from [employer] for morphine sulfate 50 mg.  Approval dates:  12/15/18 thru 01/14/2020.  Pharmacy advised.  Documentation scanned to Epic.  Caremn Bishop, [facility] Specialist.

## 2019-01-14 NOTE — TELEPHONE ENCOUNTER
01/14/2019 - Received PA request from [facility] for morphine sulfate 50 mg capsule.  Submitted as an urgent request thru [facility].  Waiting for response.  Carmen Bishop, [facility] Specialist.

## 2019-01-22 ENCOUNTER — OFFICE VISIT (OUTPATIENT)
Dept: PSYCHIATRY | Facility: OTHER | Age: 59
End: 2019-01-22
Attending: PSYCHIATRY & NEUROLOGY
Payer: COMMERCIAL

## 2019-01-22 VITALS
WEIGHT: 182 LBS | TEMPERATURE: 98.2 F | BODY MASS INDEX: 26.88 KG/M2 | HEART RATE: 83 BPM | DIASTOLIC BLOOD PRESSURE: 90 MMHG | OXYGEN SATURATION: 96 % | SYSTOLIC BLOOD PRESSURE: 150 MMHG

## 2019-01-22 DIAGNOSIS — G89.29 CHRONIC BILATERAL LOW BACK PAIN WITH BILATERAL SCIATICA: ICD-10-CM

## 2019-01-22 DIAGNOSIS — M54.41 CHRONIC BILATERAL LOW BACK PAIN WITH BILATERAL SCIATICA: ICD-10-CM

## 2019-01-22 DIAGNOSIS — M54.42 CHRONIC BILATERAL LOW BACK PAIN WITH BILATERAL SCIATICA: ICD-10-CM

## 2019-01-22 DIAGNOSIS — F31.2 BIPOLAR AFFECTIVE DISORDER, CURRENTLY MANIC, SEVERE, WITH PSYCHOTIC FEATURES (H): Primary | ICD-10-CM

## 2019-01-22 PROCEDURE — 99214 OFFICE O/P EST MOD 30 MIN: CPT | Performed by: PSYCHIATRY & NEUROLOGY

## 2019-01-22 PROCEDURE — G0463 HOSPITAL OUTPT CLINIC VISIT: HCPCS

## 2019-01-22 RX ORDER — QUETIAPINE FUMARATE 25 MG/1
TABLET, FILM COATED ORAL
Qty: 60 TABLET | Refills: 3 | Status: SHIPPED | OUTPATIENT
Start: 2019-01-22 | End: 2019-02-11

## 2019-01-22 RX ORDER — NORTRIPTYLINE HYDROCHLORIDE 50 MG/1
CAPSULE ORAL
Qty: 5 CAPSULE | Refills: 5 | COMMUNITY
Start: 2019-01-22 | End: 2019-02-14

## 2019-01-22 ASSESSMENT — PATIENT HEALTH QUESTIONNAIRE - PHQ9
5. POOR APPETITE OR OVEREATING: NOT AT ALL
SUM OF ALL RESPONSES TO PHQ QUESTIONS 1-9: 15

## 2019-01-22 ASSESSMENT — PAIN SCALES - GENERAL: PAINLEVEL: EXTREME PAIN (8)

## 2019-01-22 ASSESSMENT — ANXIETY QUESTIONNAIRES
2. NOT BEING ABLE TO STOP OR CONTROL WORRYING: SEVERAL DAYS
GAD7 TOTAL SCORE: 3
6. BECOMING EASILY ANNOYED OR IRRITABLE: NOT AT ALL
1. FEELING NERVOUS, ANXIOUS, OR ON EDGE: NOT AT ALL
3. WORRYING TOO MUCH ABOUT DIFFERENT THINGS: SEVERAL DAYS
5. BEING SO RESTLESS THAT IT IS HARD TO SIT STILL: NOT AT ALL
7. FEELING AFRAID AS IF SOMETHING AWFUL MIGHT HAPPEN: SEVERAL DAYS
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
## 2019-01-22 NOTE — NURSING NOTE
"Chief Complaint   Patient presents with     RECHECK     Mental health.       Initial /90 (BP Location: Left arm, Patient Position: Sitting, Cuff Size: Adult Regular)   Pulse 83   Temp 98.2  F (36.8  C) (Tympanic)   Wt 82.6 kg (182 lb)   SpO2 96%   BMI 26.88 kg/m   Estimated body mass index is 26.88 kg/m  as calculated from the following:    Height as of 2/15/18: 1.753 m (5' 9\").    Weight as of this encounter: 82.6 kg (182 lb).  Medication Reconciliation: complete    JACQUELYN SUAREZ LPN    "

## 2019-01-22 NOTE — PATIENT INSTRUCTIONS
Nortriptyline : decrease down to 50 mg daily for 5 days then discontinue. We are going to have you try seroquel and we will start you at 25 mg evening. Wait to start the seroquel until you are off the nortriptyline. Seroquel will have you take 25 mg evening for 4 days then increase up to 50 mg every evening.

## 2019-01-23 DIAGNOSIS — E78.2 MIXED HYPERLIPIDEMIA: Primary | ICD-10-CM

## 2019-01-23 ASSESSMENT — ANXIETY QUESTIONNAIRES: GAD7 TOTAL SCORE: 3

## 2019-01-24 ENCOUNTER — OFFICE VISIT (OUTPATIENT)
Dept: INTERNAL MEDICINE | Facility: OTHER | Age: 59
End: 2019-01-24
Attending: INTERNAL MEDICINE
Payer: COMMERCIAL

## 2019-01-24 VITALS
SYSTOLIC BLOOD PRESSURE: 122 MMHG | WEIGHT: 182 LBS | RESPIRATION RATE: 20 BRPM | DIASTOLIC BLOOD PRESSURE: 88 MMHG | OXYGEN SATURATION: 95 % | BODY MASS INDEX: 26.88 KG/M2 | TEMPERATURE: 97.7 F | HEART RATE: 73 BPM

## 2019-01-24 DIAGNOSIS — G89.29 CHRONIC MIDLINE LOW BACK PAIN WITHOUT SCIATICA: ICD-10-CM

## 2019-01-24 DIAGNOSIS — F11.90 CHRONIC, CONTINUOUS USE OF OPIOIDS: ICD-10-CM

## 2019-01-24 DIAGNOSIS — M54.50 CHRONIC MIDLINE LOW BACK PAIN WITHOUT SCIATICA: ICD-10-CM

## 2019-01-24 DIAGNOSIS — M54.50 CHRONIC BILATERAL LOW BACK PAIN WITHOUT SCIATICA: Primary | ICD-10-CM

## 2019-01-24 DIAGNOSIS — G89.29 CHRONIC BILATERAL LOW BACK PAIN WITHOUT SCIATICA: Primary | ICD-10-CM

## 2019-01-24 PROCEDURE — G0463 HOSPITAL OUTPT CLINIC VISIT: HCPCS

## 2019-01-24 PROCEDURE — 99213 OFFICE O/P EST LOW 20 MIN: CPT | Performed by: INTERNAL MEDICINE

## 2019-01-24 RX ORDER — ATORVASTATIN CALCIUM 20 MG/1
TABLET, FILM COATED ORAL
Qty: 30 TABLET | Refills: 8 | Status: SHIPPED | OUTPATIENT
Start: 2019-01-24 | End: 2019-10-16

## 2019-01-24 ASSESSMENT — PAIN SCALES - GENERAL: PAINLEVEL: EXTREME PAIN (8)

## 2019-01-24 NOTE — NURSING NOTE
"Chief Complaint   Patient presents with     Pain       Initial /88 (BP Location: Right arm, Patient Position: Sitting, Cuff Size: Adult Regular)   Pulse 73   Temp 97.7  F (36.5  C) (Tympanic)   Resp 20   Wt 82.6 kg (182 lb)   SpO2 95%   BMI 26.88 kg/m   Estimated body mass index is 26.88 kg/m  as calculated from the following:    Height as of 2/15/18: 1.753 m (5' 9\").    Weight as of this encounter: 82.6 kg (182 lb).  Medication Reconciliation: complete    Frances Bob LPN  "

## 2019-01-31 ENCOUNTER — TELEPHONE (OUTPATIENT)
Dept: PSYCHIATRY | Facility: OTHER | Age: 59
End: 2019-01-31

## 2019-01-31 NOTE — TELEPHONE ENCOUNTER
Received incoming VM from patient stating that he is not doing very well.  He has tapered off the Nortriptyline completely.  He c/o that now the fibromyalgia is over the top.  He sleeps more and feels drowsy with the Seroquel.  Wondering if this will wear off.  He is not sure if he can go without the Nortriptyline and the significant drop in pain meds.  It is too much and he has too much physical pain problems to drop almost everything.  He wants to go back on the Nortriptyline.  Next f/u appt is on 2-11-19.  Thank you, please advise.

## 2019-02-07 DIAGNOSIS — M25.541 ARTHRALGIA OF BOTH HANDS: Primary | ICD-10-CM

## 2019-02-07 DIAGNOSIS — M25.542 ARTHRALGIA OF BOTH HANDS: Primary | ICD-10-CM

## 2019-02-07 NOTE — TELEPHONE ENCOUNTER
diclofenac (VOLTAREN) 50 MG EC tablet  Last Written Prescription Date:  1/7/19  Last Fill Quantity: 90,   # refills: 0  Last Office Visit: 1/24/19  Future Office visit:    Next 5 appointments (look out 90 days)    Feb 11, 2019  3:20 PM CST  (Arrive by 3:05 PM)  Return Visit with Laquita Fernandez MD  [facility] ([facility] ) [address] [place] [id number] [phone]   Feb 14, 2019  2:20 PM CST  (Arrive by 2:05 PM)  Office Visit with Lyle Fisher DO  [facility] ([facility] ) [address] [place] [id number]  [phone]

## 2019-02-09 NOTE — TELEPHONE ENCOUNTER
He comes in on Monday. Sounds like we may end up putting him back on nortriptyline. Thank you KILLIAN

## 2019-02-11 ENCOUNTER — OFFICE VISIT (OUTPATIENT)
Dept: PSYCHIATRY | Facility: OTHER | Age: 59
End: 2019-02-11
Attending: PSYCHIATRY & NEUROLOGY
Payer: COMMERCIAL

## 2019-02-11 VITALS
TEMPERATURE: 99 F | DIASTOLIC BLOOD PRESSURE: 78 MMHG | SYSTOLIC BLOOD PRESSURE: 134 MMHG | WEIGHT: 182 LBS | HEART RATE: 96 BPM | BODY MASS INDEX: 26.88 KG/M2

## 2019-02-11 DIAGNOSIS — F90.2 ADHD (ATTENTION DEFICIT HYPERACTIVITY DISORDER), COMBINED TYPE: ICD-10-CM

## 2019-02-11 PROCEDURE — 99213 OFFICE O/P EST LOW 20 MIN: CPT | Performed by: PSYCHIATRY & NEUROLOGY

## 2019-02-11 PROCEDURE — G0463 HOSPITAL OUTPT CLINIC VISIT: HCPCS

## 2019-02-11 RX ORDER — LISDEXAMFETAMINE DIMESYLATE 70 MG/1
70 CAPSULE ORAL DAILY
Qty: 30 CAPSULE | Refills: 0 | Status: SHIPPED | OUTPATIENT
Start: 2019-02-15 | End: 2019-03-25

## 2019-02-11 ASSESSMENT — PAIN SCALES - GENERAL: PAINLEVEL: EXTREME PAIN (8)

## 2019-02-11 ASSESSMENT — ANXIETY QUESTIONNAIRES
GAD7 TOTAL SCORE: 2
2. NOT BEING ABLE TO STOP OR CONTROL WORRYING: NOT AT ALL
7. FEELING AFRAID AS IF SOMETHING AWFUL MIGHT HAPPEN: NOT AT ALL
3. WORRYING TOO MUCH ABOUT DIFFERENT THINGS: SEVERAL DAYS
6. BECOMING EASILY ANNOYED OR IRRITABLE: SEVERAL DAYS
1. FEELING NERVOUS, ANXIOUS, OR ON EDGE: NOT AT ALL
5. BEING SO RESTLESS THAT IT IS HARD TO SIT STILL: NOT AT ALL

## 2019-02-11 ASSESSMENT — PATIENT HEALTH QUESTIONNAIRE - PHQ9
5. POOR APPETITE OR OVEREATING: NOT AT ALL
SUM OF ALL RESPONSES TO PHQ QUESTIONS 1-9: 4

## 2019-02-11 NOTE — NURSING NOTE
"Chief Complaint   Patient presents with     RECHECK     Mental health.       Initial /78 (BP Location: Right arm, Patient Position: Sitting, Cuff Size: Adult Regular)   Pulse 96   Temp 99  F (37.2  C) (Tympanic)   Wt 82.6 kg (182 lb)   BMI 26.88 kg/m   Estimated body mass index is 26.88 kg/m  as calculated from the following:    Height as of 2/15/18: 1.753 m (5' 9\").    Weight as of this encounter: 82.6 kg (182 lb).  Medication Reconciliation: complete    JACQUELYN SUAREZ LPN    "
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
## 2019-02-12 ASSESSMENT — ANXIETY QUESTIONNAIRES: GAD7 TOTAL SCORE: 2

## 2019-02-12 NOTE — PROGRESS NOTES
SUBJECTIVE:   Joshua Barron is a 58 year old male who presents to clinic today for the following health issues:      Back Pain Follow Up      Description:   Location of pain:  bilateral  Character of pain: stabbing, burning and constant  Pain radiation: Does not radiate  Since last visit, pain is:  worsened  New numbness or weakness in legs, not attributed to pain:  no     Intensity: Currently 10/10    History:   Pain interferes with job: Not applicable,   Therapies tried without relief:  chiropractor, cold and heat  Therapies tried with relief: pain medication, not effective any longer.         Accompanying Signs & Symptoms:  Risk of Fracture:  None  Risk of Cauda Equina:  None  Risk of Infection:  None  Risk of Cancer:  None        Amount of exercise or physical activity: He has been doing stretching and his back extension exercises on days that he not shoveling snow.    Problems taking medications regularly: No    Medication side effects: none    Diet: regular (no restrictions)      He was taken off if Nortriptyline by Dr. Rios he states that he had a large increase in pin sensations in his back.  -------------------------------------    Problem list and histories reviewed & adjusted, as indicated.  Additional history: as documented    Patient Active Problem List   Diagnosis     Mixed hyperlipidemia     Tobacco Abuse, History of     Degeneration of lumbar or lumbosacral intervertebral disc     Depression, major     Chronic pain syndrome     Chemical dependency (H)     Chronic rhinitis     Tinnitus of both ears     SNHL (sensorineural hearing loss)     Back pain     Bipolar disorder (H)     Seizure-like activity (H)     Somatic dysfunction of pelvis region     Bilateral foot pain     Prostate cancer screening     Trigger index finger of right hand     Moderate persistent asthma without complication     Chronic lower back pain     ACP (advance care planning)     Onychia of toe of left foot     DDD  (degenerative disc disease), lumbar     Seizure disorder (H)     Back muscle spasm     Benign essential hypertension     Retrograde ejaculation     Allergic rhinitis due to other allergen     Attention to dressings and sutures     Cervical spondylosis without myelopathy     Chronic headaches     DDD (degenerative disc disease)     Diabetes mellitus, type II (H)     Generalized anxiety disorder     Hypertrophy of prostate without urinary obstruction and other lower urinary tract symptoms (LUTS)     GERD (gastroesophageal reflux disease)     Lumbago     Major depressive disorder, recurrent episode, moderate (H)     Myalgia and myositis     Hyperlipidemia     Other pain disorders related to psychological factors     Spinal stenosis in cervical region     Status post lumbar spinal fusion     Tobacco use disorder     Trigger finger     Asthma     Polypharmacy     Past Surgical History:   Procedure Laterality Date     APPENDECTOMY      Appendicitis     BACK SURGERY  ,    back surgery 3 disk fusion     BACK SURGERY      L1-L2, L3-L4 laminectomy     COLONOSCOPY  2007    repeat 5-10 years     COLONOSCOPY N/A 2016    Procedure: COLONOSCOPY;  Surgeon: Steve Hoff DO;  Location: [facility]     exophytic lesion posterior scalp line  2011    Excision     laminectomy L3-4 and L1-2       RELEASE TRIGGER FINGER  2010    4th digit both hands     RELEASE TRIGGER FINGER Right 2016    Procedure: RELEASE TRIGGER FINGER;  Surgeon: Zev Schroeder MD;  Location: [facility] OR       Social History     Tobacco Use     Smoking status: Former Smoker     Packs/day: 1.00     Years: 30.00     Pack years: 30.00     Last attempt to quit: 2007     Years since quittin.5     Smokeless tobacco: Current User     Types: Chew   Substance Use Topics     Alcohol use: Yes     Comment: Rarely     Family History   Problem Relation Age of Onset     Asthma Mother      Musculoskeletal Disorder Mother         arthritis     Diabetes  Father      Cancer Maternal Grandmother         stomach     Alzheimer Disease Maternal Grandfather      Cancer Paternal Grandmother         stomach     Hypertension Paternal Grandfather            Reviewed and updated as needed this visit by clinical staff       Reviewed and updated as needed this visit by Provider         ROS:  CONSTITUTIONAL: NEGATIVE for fever, chills, change in weight  EYES: NEGATIVE for vision changes or irritation  ENT/MOUTH: NEGATIVE for ear, mouth and throat problems  RESP: NEGATIVE for significant cough or SOB  RESP:POSITIVE for wheezing  CV: NEGATIVE for chest pain, palpitations or peripheral edema  GI: NEGATIVE for nausea, abdominal pain, heartburn, or change in bowel habits  : NEGATIVE for frequency, dysuria, or hematuria   male :He needs to sit down to have a good stream  MUSCULOSKELETAL:See HPI  NEURO: NEGATIVE for weakness, dizziness or paresthesias  PSYCHIATRIC: NEGATIVE for changes in mood or affect.  He reports that his concentration has been off due to increased pain.    OBJECTIVE:     /74   Pulse 92   Temp 98.6  F (37  C) (Tympanic)   Resp 20   Wt 82.6 kg (182 lb)   SpO2 95%   BMI 26.88 kg/m    Body mass index is 26.88 kg/m .  GENERAL: healthy, alert and no distress  EYES: Eyes grossly normal to inspection, PERRL and EOMI  NECK: no adenopathy, no asymmetry, masses, or scars and thyroid normal to palpation  RESP: lungs clear to auscultation - no rales, rhonchi or wheezes  CV: regular rate and rhythm, normal S1 S2, no S3 or S4, no murmur, click or rub, no peripheral edema and peripheral pulses strong  MS: poor posture   NEURO: Normal strength and tone over the upper extremities bilaterally, there is strength deficit in the lower extremities on the left with 4/5 strength in flexion and extension mentation intact and speech normal  PSYCH: mentation appears normal, tangential, speech pressured, judgement and insight impaired and appearance well groomed  Patient is  smiling despite being in 10/10 pain.        Diagnostic Test Results:  none     ASSESSMENT/PLAN:   (G89.4) Chronic pain disorder  Comment: Joshua has stable lumbar DDD disease s/p fusion.  He is currently on an opioid wean.  Plan:   As below.    (M54.42,  M54.41,  G89.29) Chronic bilateral low back pain with bilateral sciatica  Comment: Joshua reports 10/10 pain appears comfortable in the office and smiling.  His reports of pain are likely influenced by his mental health and psychological dependence.  Plan:   He will continue medications as below along with further opioid wean.    February:  Morphine ER 60 BID and  Hydrocodone 10 every 8 hours for breakthrough pain ( 150 MME).  Then,  Morphine ER 40 BID and  Hydrocodone 10 every 12 hours for breakthrough pain ( 130  MME).  Then,  Morphine ER 30 TID ( 90 MME) with a wean every 2-3 weeks first to 30 mg BID, then 20 mg BID.  From there we will have a plan for other options.    (M79.7) Fibromyalgia  Comment: Joshua could not tolerate his pins and needle sensation off of Nortriptyline.  Plan:   He will continue nortriptyline (PAMELOR) 50 MG capsule, 100 mg at bedtime and gabapentin 900 TID.    (M62.830) Back muscle spasm  Comment: Joshua has chronic back spasms due to paraspinal muscle weakness and recurrent strain.  He does not feel like Zanaflex helps his pain.  Plan:   Discontinue Zanaflex and start methocarbamol (ROBAXIN) 500 MG tablet TID.  He will continue Baclofen 20 mg 4 times daily.      (Z87.891) Personal history of tobacco use  (primary encounter diagnosis)  Comment:   Plan: Prof Fee: Shared Decision Making Visit for Lung        Cancer Screening, CT Chest Lung Cancer Scrn Low        Dose wo        FUTURE APPOINTMENTS:       - Follow-up visit in 3 weeks for back pain and fibromyalgia.    Lyle Fisher DO, DO  [facility] [place]  Lung Cancer Screening Shared Decision Making Visit     Joshua Barron is eligible for lung cancer screening on  the basis of the information provided in my signed lung cancer screening order.     I have discussed with patient the risks and benefits of screening for lung cancer with low-dose CT.     The risks include:  radiation exposure: one low dose chest CT has as much ionizing radiation as about 15 chest x-rays or 6 months of background radiation living in [place]    false positives: 96% of positive findings/nodules are NOT cancer, but some might still require additional diagnostic evaluation, including biopsy  over-diagnosis: some slow growing cancers that might never have been clinically significant will be detected and treated unnecessarily     The benefit of early detection of lung cancer is contingent upon adherence to annual screening or more frequent follow up if indicated.     Furthermore, reaping the benefits of screening requires Joshua Barron to be willing and physically able to undergo diagnostic procedures, if indicated. Although no specific guide is available for determining severity of comorbidities, it is reasonable to withhold screening in patients who have greater mortality risk from other diseases.     We did discuss that the only way to prevent lung cancer is to not smoke. Smoking cessation assistance was not offered.    I did offer risk estimation using a calculator such as this one:    ShouldIScreen

## 2019-02-13 DIAGNOSIS — H91.93 DECREASED HEARING OF BOTH EARS: Primary | ICD-10-CM

## 2019-02-13 DIAGNOSIS — G89.4 CHRONIC PAIN SYNDROME: ICD-10-CM

## 2019-02-13 DIAGNOSIS — M54.5 LOW BACK PAIN, UNSPECIFIED BACK PAIN LATERALITY, UNSPECIFIED CHRONICITY, WITH SCIATICA PRESENCE UNSPECIFIED: ICD-10-CM

## 2019-02-13 DIAGNOSIS — M51.379 DEGENERATION OF LUMBAR OR LUMBOSACRAL INTERVERTEBRAL DISC: ICD-10-CM

## 2019-02-13 RX ORDER — HYDROCODONE BITARTRATE AND ACETAMINOPHEN 10; 325 MG/1; MG/1
1 TABLET ORAL 3 TIMES DAILY PRN
Qty: 90 TABLET | Refills: 0 | Status: SHIPPED | OUTPATIENT
Start: 2019-02-13 | End: 2019-03-14

## 2019-02-13 RX ORDER — MORPHINE SULFATE 60 MG/1
60 TABLET, FILM COATED, EXTENDED RELEASE ORAL 2 TIMES DAILY
Qty: 60 TABLET | Refills: 0 | Status: SHIPPED | OUTPATIENT
Start: 2019-02-13 | End: 2019-03-05

## 2019-02-13 RX ORDER — MORPHINE SULFATE 50 MG/1
CAPSULE, EXTENDED RELEASE ORAL
Qty: 90 CAPSULE | Refills: 0 | OUTPATIENT
Start: 2019-02-13

## 2019-02-13 NOTE — TELEPHONE ENCOUNTER
MS Contin 60mg BID pended per written wean schedule.  Please review and sign if appropriate.  Thank you.

## 2019-02-13 NOTE — TELEPHONE ENCOUNTER
norco       Last Written Prescription Date:  1/11/19  Last Fill Quantity: 90,   # refills: 0  Last Office Visit: 1/24/19  Future Office visit:    Next 5 appointments (look out 90 days)    Feb 14, 2019  2:20 PM CST  (Arrive by 2:05 PM)  Office Visit with Lyle Fisher DO  [facility] [place] ([facility] [place] [facility] [address]  [place] [address]  [phone]   Mar 12, 2019  2:20 PM CDT  (Arrive by 2:05 PM)  Return Visit with Laquita Fernandez MD  [facility] [place] ([facility] [place] ) [address]  [place] [address] [phone]           Routing refill request to provider for review/approval because:  Drug not on the [facility], [facility] or [facility] refill protocol or controlled substance       morphine (YANIQUE) 50 MG 24 hr capsule  Last Written Prescription Date:  1/11/19  Last Fill Quantity: 90,   # refills: 0  Last Office Visit: 1/24/19  Future Office visit:    Next 5 appointments (look out 90 days)    Feb 14, 2019  2:20 PM CST  (Arrive by 2:05 PM)  Office Visit with Lyle Fisher DO  [facility] [place] ([facility] [place] ) [address]  [place] [address]  [phone]   Mar 12, 2019  2:20 PM CDT  (Arrive by 2:05 PM)  Return Visit with Laquita Fernandez MD  [facility] [place] ([facility] [place] ) [address]  [place] [address] [phone]           Routing refill request to provider for review/approval because:  Drug not on the [facility], [facility] or [facility] refill protocol or controlled substance

## 2019-02-14 ENCOUNTER — TELEPHONE (OUTPATIENT)
Dept: PEDIATRICS | Facility: OTHER | Age: 59
End: 2019-02-14

## 2019-02-14 ENCOUNTER — OFFICE VISIT (OUTPATIENT)
Dept: INTERNAL MEDICINE | Facility: OTHER | Age: 59
End: 2019-02-14
Attending: INTERNAL MEDICINE
Payer: COMMERCIAL

## 2019-02-14 VITALS
SYSTOLIC BLOOD PRESSURE: 140 MMHG | DIASTOLIC BLOOD PRESSURE: 74 MMHG | WEIGHT: 182 LBS | RESPIRATION RATE: 20 BRPM | BODY MASS INDEX: 26.88 KG/M2 | HEART RATE: 92 BPM | OXYGEN SATURATION: 95 % | TEMPERATURE: 98.6 F

## 2019-02-14 DIAGNOSIS — M79.7 FIBROMYALGIA: ICD-10-CM

## 2019-02-14 DIAGNOSIS — G89.4 CHRONIC PAIN SYNDROME: ICD-10-CM

## 2019-02-14 DIAGNOSIS — M54.41 CHRONIC BILATERAL LOW BACK PAIN WITH BILATERAL SCIATICA: ICD-10-CM

## 2019-02-14 DIAGNOSIS — G89.29 CHRONIC BILATERAL LOW BACK PAIN WITH BILATERAL SCIATICA: ICD-10-CM

## 2019-02-14 DIAGNOSIS — M54.42 CHRONIC BILATERAL LOW BACK PAIN WITH BILATERAL SCIATICA: ICD-10-CM

## 2019-02-14 DIAGNOSIS — Z87.891 PERSONAL HISTORY OF TOBACCO USE: Primary | ICD-10-CM

## 2019-02-14 DIAGNOSIS — M62.830 BACK MUSCLE SPASM: ICD-10-CM

## 2019-02-14 PROCEDURE — 99214 OFFICE O/P EST MOD 30 MIN: CPT | Performed by: INTERNAL MEDICINE

## 2019-02-14 PROCEDURE — G0296 VISIT TO DETERM LDCT ELIG: HCPCS | Performed by: INTERNAL MEDICINE

## 2019-02-14 PROCEDURE — G0463 HOSPITAL OUTPT CLINIC VISIT: HCPCS

## 2019-02-14 RX ORDER — NORTRIPTYLINE HYDROCHLORIDE 50 MG/1
100 CAPSULE ORAL AT BEDTIME
Qty: 180 CAPSULE | Refills: 1 | Status: SHIPPED | OUTPATIENT
Start: 2019-02-14 | End: 2019-09-03

## 2019-02-14 RX ORDER — METHOCARBAMOL 500 MG/1
500 TABLET, FILM COATED ORAL 3 TIMES DAILY
Qty: 90 TABLET | Refills: 3 | Status: SHIPPED | OUTPATIENT
Start: 2019-02-14 | End: 2019-06-04

## 2019-02-14 ASSESSMENT — PAIN SCALES - GENERAL: PAINLEVEL: WORST PAIN (10)

## 2019-02-14 NOTE — TELEPHONE ENCOUNTER
02/14/2019 - received PA request from [facility] for quantity limits morphine ER 60 mg. Submitted thru [facility] as an urgent request.  Waiting for response.  Carmen Bishop, [facility] Specialist.

## 2019-02-14 NOTE — PATIENT INSTRUCTIONS

## 2019-02-14 NOTE — NURSING NOTE
"Chief Complaint   Patient presents with     Back Pain       Initial /90 (BP Location: Right arm, Patient Position: Sitting, Cuff Size: Adult Regular)   Pulse 92   Temp 98.6  F (37  C) (Tympanic)   Resp 20   Wt 82.6 kg (182 lb)   SpO2 95%   BMI 26.88 kg/m   Estimated body mass index is 26.88 kg/m  as calculated from the following:    Height as of 2/15/18: 1.753 m (5' 9\").    Weight as of this encounter: 82.6 kg (182 lb).  Medication Reconciliation: complete    Frances Bob LPN  "

## 2019-02-15 NOTE — TELEPHONE ENCOUNTER
APPROVAL - 02/15/2019 - received APPROVAL from [facility] for morphine ER 60.  Approval dates:  01/15/2019 thru 02/14/2020.  Pharmacy advised.  Documentation scanned to Epic.  Carmen Bishop, [facility] Specialist.

## 2019-02-19 ENCOUNTER — HOSPITAL ENCOUNTER (EMERGENCY)
Facility: HOSPITAL | Age: 59
Discharge: HOME | End: 2019-02-19
Attending: FAMILY MEDICINE | Admitting: FAMILY MEDICINE
Payer: COMMERCIAL

## 2019-02-19 ENCOUNTER — TELEPHONE (OUTPATIENT)
Dept: PEDIATRICS | Facility: OTHER | Age: 59
End: 2019-02-19

## 2019-02-19 VITALS
HEART RATE: 83 BPM | RESPIRATION RATE: 20 BRPM | DIASTOLIC BLOOD PRESSURE: 100 MMHG | TEMPERATURE: 98.1 F | OXYGEN SATURATION: 99 % | SYSTOLIC BLOOD PRESSURE: 153 MMHG

## 2019-02-19 DIAGNOSIS — H10.32 ACUTE CONJUNCTIVITIS OF LEFT EYE, UNSPECIFIED ACUTE CONJUNCTIVITIS TYPE: ICD-10-CM

## 2019-02-19 PROCEDURE — 99283 EMERGENCY DEPT VISIT LOW MDM: CPT

## 2019-02-19 PROCEDURE — 99283 EMERGENCY DEPT VISIT LOW MDM: CPT | Mod: Z6 | Performed by: FAMILY MEDICINE

## 2019-02-19 RX ORDER — POLYMYXIN B SULFATE AND TRIMETHOPRIM 1; 10000 MG/ML; [USP'U]/ML
2 SOLUTION OPHTHALMIC EVERY 6 HOURS
Qty: 2 ML | Refills: 0 | Status: SHIPPED | OUTPATIENT
Start: 2019-02-19 | End: 2020-03-23

## 2019-02-19 ASSESSMENT — ENCOUNTER SYMPTOMS
FACIAL ASYMMETRY: 0
HEADACHES: 0
SINUS PAIN: 0
DIZZINESS: 0
EYE ITCHING: 0
EYE DISCHARGE: 0
FACIAL SWELLING: 0

## 2019-02-19 NOTE — ED AVS SNAPSHOT
[facility]  [address]  [place] [address]  Phone:  [phone]                                    Joshua Barron   MRN: [id number]    Department:  [facility]   Date of Visit:  2/19/2019           After Visit Summary Signature Page    I have received my discharge instructions, and my questions have been answered. I have discussed any challenges I see with this plan with the nurse or doctor.    ..........................................................................................................................................  Patient/Patient Representative Signature      ..........................................................................................................................................  Patient Representative Print Name and Relationship to Patient    ..................................................               ................................................  Date                                   Time    ..........................................................................................................................................  Reviewed by Signature/Title    ...................................................              ..............................................  Date                                               Time          22EPIC Rev 08/18

## 2019-02-19 NOTE — TELEPHONE ENCOUNTER
Patient phoned regarding the OxyContin, patient has [place] and MS Contin on med list, but does not take OxyContin, prescribed to him on 2/14/19 isn't working for his back pain 10/10.  Requesting to have PCP change his medication.  Consulted with pain management nurse, who advised that patient be seen for a follow-up appointment for medication changes.  Advised patient that he needs to make an appointment with PCP.  Patient states he already has an appointment on 3/7/19 scheduled.  Patient states he would like to file a complaint and is wondering who he can speak with. Patient states he is under distress and believes his PCP and nurses don't listen to him or answer his questions regarding his back pain  Advised that management would be contacting him about his concerns in the near future.

## 2019-02-19 NOTE — ED NOTES
Discharge instructions gone over with patient and he states understanding. Patient is then discharged in stable condition, ambulatory.

## 2019-02-19 NOTE — TELEPHONE ENCOUNTER
Patient phoned regarding the OxyContin, patient has [place] and MS Contin on med list, but does not take OxyContin, prescribed to him on 2/14/19 isn't working for his back pain 10/10.  Requesting to have PCP change his medication.  Consulted with pain management nurse, who advised that patient be seen for a follow-up appointment for medication changes.  Advised patient that he needs to make an appointment with PCP.  Patient states he already has an appointment on 3/7/19 scheduled.  Patient states he would like to file a complaint and is wondering who he can speak with. Patient states he is under distress and believes his PCP and nurses don't listen to him or answer his questions regarding his back pain  Advised that management would be contacting him about his concerns in the near future.

## 2019-02-19 NOTE — ED NOTES
Patient here for complaints of left eye pain for a couple weeks. He states he feels like he may have something in his eye. Denies being around anything or working near something that could have gotten in his eye. He states he has been trying flushing his eye at home with no relief. States having increased pain tonight.  Vision check done with right eye at 20/20, left eye at 20/15, and bilateral eyes at 20/15.
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
## 2019-02-20 ENCOUNTER — HOSPITAL ENCOUNTER (OUTPATIENT)
Dept: CT IMAGING | Facility: HOSPITAL | Age: 59
Discharge: HOME | End: 2019-02-20
Attending: INTERNAL MEDICINE | Admitting: INTERNAL MEDICINE
Payer: COMMERCIAL

## 2019-02-20 DIAGNOSIS — Z87.891 PERSONAL HISTORY OF TOBACCO USE: ICD-10-CM

## 2019-02-20 PROCEDURE — G0297 LDCT FOR LUNG CA SCREEN: HCPCS | Mod: TC

## 2019-02-23 DIAGNOSIS — F41.1 GAD (GENERALIZED ANXIETY DISORDER): ICD-10-CM

## 2019-02-23 DIAGNOSIS — J45.40 MODERATE PERSISTENT ASTHMA WITHOUT COMPLICATION: ICD-10-CM

## 2019-02-23 DIAGNOSIS — I10 ESSENTIAL HYPERTENSION, BENIGN: Primary | ICD-10-CM

## 2019-02-23 DIAGNOSIS — Z00.00 HEALTH CARE MAINTENANCE: ICD-10-CM

## 2019-02-25 ENCOUNTER — PATIENT OUTREACH (OUTPATIENT)
Dept: CARE COORDINATION | Facility: OTHER | Age: 59
End: 2019-02-25

## 2019-02-25 NOTE — TELEPHONE ENCOUNTER
The patient is well aware of the reason for his wean as the medications are nit indicated.  Your may leave a message for him that you are no longer involved with his care and he is to discuss issues with Dr. Fernandez via my chart or via me at his appointments.  I want no further phone calls from Joshua as he causes confusion and takes up valuable nursing time.

## 2019-02-25 NOTE — PROGRESS NOTES
"Clinic Care Coordination Contact  Care Team Conversations    Received VM from pt this date.  Inquires why his pain medications are being revoked?  On message, pt was very tangential. Reports that he used to throw his Fentanyl patches in waste basket, but then start putting them in coffee grounds.  He would flush some down the toilet.  He reports that this is the reason why his pain medications are being \"revoked\".  States he has been doing the \"silly exercises\" that Dr. Fisher instructed him to do.  He reports that it is making his back pain worse.  States Dr. Pettit told him to only \"walk, and don't bend\". Reports \"Dr. Pettit must not know as much as Dr. Fisher does\".  Reports it is not \"very fun for him\" getting his pain medications revoked.  He wants a reason why.  States, \"I am a big boy, I want you or Dr. Fisher to tell me to my face, the reason why this is happening\".  Again, very tangential.  Dr. Fernandez recommended in the past that this CC not be involved in pt's care due to increase in paranoia.  CC has not had any contact with pt since 12.5.18.  Dr. Fisher, this is an FYI.  Please advise as this CC is not directly involved with pt.    Love Quan RN-BSN  Chronic Pain Care Coordinator  [phone] opt. #3            "

## 2019-02-26 RX ORDER — ALBUTEROL SULFATE 90 UG/1
AEROSOL, METERED RESPIRATORY (INHALATION)
Qty: 18 G | Refills: 0 | Status: ON HOLD | OUTPATIENT
Start: 2019-02-26 | End: 2019-07-02

## 2019-02-26 RX ORDER — PROPRANOLOL HYDROCHLORIDE 20 MG/1
TABLET ORAL
Qty: 60 TABLET | Refills: 0 | Status: SHIPPED | OUTPATIENT
Start: 2019-02-26 | End: 2019-04-03

## 2019-02-26 RX ORDER — LOSARTAN POTASSIUM 50 MG/1
TABLET ORAL
Qty: 30 TABLET | Refills: 3 | Status: ON HOLD | OUTPATIENT
Start: 2019-02-26 | End: 2019-07-01

## 2019-02-26 RX ORDER — FLUTICASONE PROPIONATE 50 MCG
SPRAY, SUSPENSION (ML) NASAL
Qty: 16 G | Refills: 2 | Status: SHIPPED | OUTPATIENT
Start: 2019-02-26 | End: 2020-02-21

## 2019-02-26 RX ORDER — MV,CAL,MIN/IRON/FOLIC ACID/LUT 18-500-300
TABLET ORAL
Qty: 30 TABLET | Refills: 11 | Status: SHIPPED | OUTPATIENT
Start: 2019-02-26 | End: 2020-03-12

## 2019-02-26 RX ORDER — HYDROXYZINE PAMOATE 50 MG/1
CAPSULE ORAL
Qty: 60 CAPSULE | Refills: 1 | Status: SHIPPED | OUTPATIENT
Start: 2019-02-26 | End: 2019-04-08

## 2019-02-26 RX ORDER — ASPIRIN 81 MG/1
TABLET, CHEWABLE ORAL
Qty: 30 TABLET | Refills: 3 | Status: SHIPPED | OUTPATIENT
Start: 2019-02-26 | End: 2019-03-07

## 2019-02-26 NOTE — TELEPHONE ENCOUNTER
Ventolin  Last office visit: 02/14/19  Last refill: 02/15/18 #18g  Patient due for ACT score  Thank you.    Bethtaril  Last office visit: 02/14/19  Last refill: 01/09/19 #60 with 1 refill.    Thank you.

## 2019-02-26 NOTE — PROGRESS NOTES
Please inform pt of Dr. Fisher's response, as this CC is no longer involved in his direct care.  Thank you.

## 2019-02-26 NOTE — TELEPHONE ENCOUNTER
hydroxyzine      Last Written Prescription Date:  1/9/19  Last Fill Quantity: 60,   # refills: 1  Last Office Visit: 2/14/18  Future Office visit:    Next 5 appointments (look out 90 days)    Mar 07, 2019  3:00 PM CST  (Arrive by 2:40 PM)  Office Visit with Lyle Fisher DO  [facility] [place] ([facility] [place] ) [address]  [place] [address]  [phone]   Mar 12, 2019  2:20 PM CDT  (Arrive by 2:05 PM)  Return Visit with Laquita Fernandez MD  [facility] [place] ([facility] [place] ) [address] [place] [address]  [place] [address] [phone]           Routing refill request to provider for review/approval because:  pcp to advise.     Ventolin - policy says due for ACT score, APPT note made due for ACT score.     pcp to advise.

## 2019-03-01 DIAGNOSIS — F41.1 GAD (GENERALIZED ANXIETY DISORDER): ICD-10-CM

## 2019-03-04 DIAGNOSIS — G89.4 CHRONIC PAIN SYNDROME: ICD-10-CM

## 2019-03-04 DIAGNOSIS — M51.379 DEGENERATION OF LUMBAR OR LUMBOSACRAL INTERVERTEBRAL DISC: ICD-10-CM

## 2019-03-04 RX ORDER — DIAZEPAM 2 MG
TABLET ORAL
Qty: 60 TABLET | Refills: 0 | Status: SHIPPED | OUTPATIENT
Start: 2019-03-04 | End: 2019-04-08

## 2019-03-04 NOTE — TELEPHONE ENCOUNTER
Valium  Last visit: 2.14.19  Last refill: 2.4.19 #60    Future appointments:   Next 5 appointments (look out 90 days)    Mar 07, 2019  3:00 PM CST  (Arrive by 2:40 PM)  Office Visit with Lyle Fisher DO  [facility] [place] ([facility] [place] ) [address]  [place] [address]  [phone]   Mar 12, 2019  2:20 PM CDT  (Arrive by 2:05 PM)  Return Visit with Laquita Fernandez MD  [facility] [place] ([facility] [place] ) [address]  [place] [id number] [phone]

## 2019-03-04 NOTE — PROGRESS NOTES
SUBJECTIVE:   Joshua Barron is a 58 year old male who presents to clinic today for the following health issues:      Back Pain Follow Up      Description:   Location of pain:  bilateral  Character of pain: stabbing, burning and constant  Pain radiation: Does not radiate  Since last visit, pain is:  unchanged  New numbness or weakness in legs, not attributed to pain:  no     Intensity: Currently 7/10, At its worst 9/10    History:   Pain interferes with job: YES,   Therapies tried without relief: chiropractor, cold, heat, muscle relaxants, NSAIDs and Physical Therapy  Therapies tried with relief: walking makes a little beter   He continues to do his back exercises inconsistently due to perceived pain.    He does report weight gain due to muscle weight gain from more activity.  He is requiring to pull his belt tighter.      Accompanying Signs & Symptoms:  Risk of Fracture:  None  Risk of Cauda Equina:  None  Risk of Infection:  None  Risk of Cancer:  None        Amount of exercise or physical activity: 2-3 days/week for an average of less than 15 minutes    Problems taking medications regularly: No    Medication side effects: none    Diet: regular (no restrictions)    Wt Readings from Last 5 Encounters:   03/07/19 87.1 kg (192 lb)   02/14/19 82.6 kg (182 lb)   02/11/19 82.6 kg (182 lb)   01/24/19 82.6 kg (182 lb)   01/22/19 82.6 kg (182 lb)       BP Readings from Last 6 Encounters:   03/07/19 148/90   02/19/19 153/100   02/14/19 140/74   02/11/19 134/78   01/24/19 122/88   01/22/19 150/90       Asthma Follow-Up    Was ACT completed today?    Yes    ACT Total Scores 3/7/2019   ACT TOTAL SCORE -   ASTHMA ER VISITS -   ASTHMA HOSPITALIZATIONS -   ACT TOTAL SCORE (Goal Greater than or Equal to 20) 18   In the past 12 months, how many times did you visit the emergency room for your asthma without being admitted to the hospital? -   In the past 12 months, how many times were you hospitalized overnight because of your  asthma? -       Recent asthma triggers that patient is dealing with: dust mites and cold air and molds  He report that he only using his control medications every other day as he is afraid of getting mouth sores.  He reports that he is also lazy.  He is using and his albuterol inhaler once per day.    He has three cats and a dog with one cat that rolls around outside in a the shed   Problem list and histories reviewed & adjusted, as indicated.  Additional history: as documented    Patient Active Problem List   Diagnosis     Mixed hyperlipidemia     Tobacco Abuse, History of     Degeneration of lumbar or lumbosacral intervertebral disc     Depression, major     Chronic pain syndrome     Chemical dependency (H)     Chronic rhinitis     Tinnitus of both ears     SNHL (sensorineural hearing loss)     Back pain     Bipolar disorder (H)     Seizure-like activity (H)     Somatic dysfunction of pelvis region     Bilateral foot pain     Prostate cancer screening     Trigger index finger of right hand     Moderate persistent asthma without complication     Chronic lower back pain     ACP (advance care planning)     Onychia of toe of left foot     DDD (degenerative disc disease), lumbar     Seizure disorder (H)     Back muscle spasm     Benign essential hypertension     Retrograde ejaculation     Allergic rhinitis due to other allergen     Attention to dressings and sutures     Cervical spondylosis without myelopathy     Chronic headaches     DDD (degenerative disc disease)     Diabetes mellitus, type II (H)     Generalized anxiety disorder     Hypertrophy of prostate without urinary obstruction and other lower urinary tract symptoms (LUTS)     GERD (gastroesophageal reflux disease)     Lumbago     Major depressive disorder, recurrent episode, moderate (H)     Myalgia and myositis     Hyperlipidemia     Other pain disorders related to psychological factors     Spinal stenosis in cervical region     Status post lumbar spinal  fusion     Tobacco use disorder     Trigger finger     Asthma     Polypharmacy     Past Surgical History:   Procedure Laterality Date     APPENDECTOMY      Appendicitis     BACK SURGERY  ,2010    back surgery 3 disk fusion     BACK SURGERY      L1-L2, L3-L4 laminectomy     COLONOSCOPY  2007    repeat 5-10 years     COLONOSCOPY N/A 2016    Procedure: COLONOSCOPY;  Surgeon: Steve Hoff DO;  Location: [facility]     exophytic lesion posterior scalp line  2011    Excision     laminectomy L3-4 and L1-2       RELEASE TRIGGER FINGER  2010    4th digit both hands     RELEASE TRIGGER FINGER Right 2016    Procedure: RELEASE TRIGGER FINGER;  Surgeon: Zev Schroeder MD;  Location: [facility]       Social History     Tobacco Use     Smoking status: Former Smoker     Packs/day: 1.00     Years: 30.00     Pack years: 30.00     Last attempt to quit: 2007     Years since quittin.5     Smokeless tobacco: Current User     Types: Chew   Substance Use Topics     Alcohol use: Yes     Comment: Rarely     Family History   Problem Relation Age of Onset     Asthma Mother      Musculoskeletal Disorder Mother         arthritis     Diabetes Father      Cancer Maternal Grandmother         stomach     Alzheimer Disease Maternal Grandfather      Cancer Paternal Grandmother         stomach     Hypertension Paternal Grandfather            Reviewed and updated as needed this visit by clinical staff       Reviewed and updated as needed this visit by Provider         ROS:  CONSTITUTIONAL: NEGATIVE for fever, chills, change in weight  INTEGUMENTARY/SKIN: NEGATIVE for worrisome rashes, moles or lesions  EYES: NEGATIVE for vision changes or irritation  ENT/MOUTH: NEGATIVE for ear, mouth and throat problems  RESP:POSITIVE for cough-non productive, SOB/dyspnea and wheezing and NEGATIVE for hemoptysis  CV: NEGATIVE for chest pain, palpitations or peripheral edema  GI: NEGATIVE for nausea, abdominal pain, heartburn, or change in  bowel habits  GI: POSITIVE for constipation with good controlled with Senna-docusate   : NEGATIVE for frequency, dysuria, or hematuria  MUSCULOSKELETAL:See HPI  NEURO: NEGATIVE for weakness, dizziness or paresthesias  PSYCHIATRIC: NEGATIVE for changes in mood or affect    OBJECTIVE:     /90   Pulse 94   Temp 99.4  F (37.4  C) (Tympanic)   Resp 18   Wt 87.1 kg (192 lb)   SpO2 96%   BMI 28.35 kg/m     Body mass index is 28.35 kg/m .  GENERAL: healthy, alert and no distress  EYES: Eyes grossly normal to inspection and conjunctivae and sclerae normal  NECK: no adenopathy, no asymmetry, masses, or scars and thyroid normal to palpation  RESP: no rales , no rhonchi and expiratory wheezes R upper anterior, L upper anterior and bibasilar  CV: regular rate and rhythm, normal S1 S2, no S3 or S4, no murmur, click or rub, no peripheral edema and peripheral pulses strong  ABDOMEN: soft, nontender, no hepatosplenomegaly, no masses and bowel sounds normal  ABDOMEN: no bruits heard  MS: no gross musculoskeletal defects noted, no edema  MS: poor posture noted.  NEURO: Normal strength and tone, mentation intact and speech normal  PSYCH: mentation appears normal, tangential and speech pressured    Diagnostic Test Results:  none     ASSESSMENT/PLAN:   (G89.4) Chronic pain disorder  (primary encounter diagnosis)  Comment: Secondary to lack of exercise and stretching with muscle spasms.  He also has fibromyalgia.  Plan:   Joshua was once again encouraged to do his stretching and get back into the gym for muscle strengthening.  He was given further exercises for stretch as in the patient instructions.  He will continue Robaxin 500 mg TID and baclofen 20 mg 4 times per day.    (M54.42,  M54.41,  G89.29) Chronic bilateral low back pain with bilateral sciatica  Comment: Secondary to Lumbar DDD.  He is s/p Lumbar spine fusion and has stable MRIS  Plan:   He will continue his opioid wean as noted on the February Visit: Starting in  March:  Morphine ER 40 BID and  Hydrocodone 10 every 12 hours for breakthrough pain ( 130  MME).  Then,  Morphine ER 30 TID ( 90 MME) with a wean every 2-3 weeks first to 30 mg BID, then 20 mg BID.  From there we will have a plan for other options.     (M79.7) Fibromyalgia  Comment: Stable.  He did suffer with increased pain when his mental health provider had him discontinue his Nortriptyline  Plan:   He will continue Nortriptyline 100 mg at bedtime.  He will continue gabapentin 300 mg BID    (J45.40) Moderate persistent asthma without complication  Comment: Stable  Plan:   He will continue Dulera 100- 5 2 puffs BID    (R11.0) Nausea  Comment:   Plan:   dronabinol (MARINOL) 2.5 MG capsule BID before meals.                    FUTURE APPOINTMENTS:       - Follow-up visit in 5 weeks     Lyle Fisher DO, DO  [facility]

## 2019-03-05 RX ORDER — MORPHINE SULFATE 40 MG/1
40 CAPSULE, EXTENDED RELEASE ORAL 2 TIMES DAILY
Qty: 60 CAPSULE | Refills: 0 | Status: SHIPPED | OUTPATIENT
Start: 2019-03-05 | End: 2019-04-08

## 2019-03-05 RX ORDER — MORPHINE SULFATE 60 MG/1
TABLET, FILM COATED, EXTENDED RELEASE ORAL
Qty: 60 TABLET | Refills: 0 | OUTPATIENT
Start: 2019-03-05

## 2019-03-05 NOTE — TELEPHONE ENCOUNTER
Dr. Fisher, pt is to go down to 40mg BID.  Rosa 40mg 24hr capsules are the only 40mg long acting morphine available.  This is pended.  Please review and sign if appropriate.  Thank you.

## 2019-03-05 NOTE — TELEPHONE ENCOUNTER
morphine (MS CONTIN) 60 MG 12 hr tablet      Last Written Prescription Date:  2/13/19  Last Fill Quantity: 60,   # refills: 0  Last Office Visit: 2/14/19  Future Office visit:    Next 5 appointments (look out 90 days)    Mar 07, 2019  3:00 PM CST  (Arrive by 2:40 PM)  [facility] [address] [place] [address]  [phone]   Mar 12, 2019  2:20 PM CDT  (Arrive by 2:05 PM)  Return Visit with Laquita Fernandez MD  [facility] [address] [place] [id number] [phone]           Routing refill request to provider for review/approval because:  Drug not on the [facility], [facility] or  Health refill protocol or controlled substance

## 2019-03-07 ENCOUNTER — OFFICE VISIT (OUTPATIENT)
Dept: INTERNAL MEDICINE | Facility: OTHER | Age: 59
End: 2019-03-07
Attending: INTERNAL MEDICINE
Payer: COMMERCIAL

## 2019-03-07 VITALS
SYSTOLIC BLOOD PRESSURE: 136 MMHG | TEMPERATURE: 99.4 F | OXYGEN SATURATION: 96 % | BODY MASS INDEX: 28.35 KG/M2 | DIASTOLIC BLOOD PRESSURE: 90 MMHG | HEART RATE: 94 BPM | RESPIRATION RATE: 18 BRPM | WEIGHT: 192 LBS

## 2019-03-07 DIAGNOSIS — M54.41 CHRONIC BILATERAL LOW BACK PAIN WITH BILATERAL SCIATICA: ICD-10-CM

## 2019-03-07 DIAGNOSIS — G89.4 CHRONIC PAIN SYNDROME: Primary | ICD-10-CM

## 2019-03-07 DIAGNOSIS — R11.0 NAUSEA: ICD-10-CM

## 2019-03-07 DIAGNOSIS — G89.29 CHRONIC BILATERAL LOW BACK PAIN WITH BILATERAL SCIATICA: ICD-10-CM

## 2019-03-07 DIAGNOSIS — M79.7 FIBROMYALGIA: ICD-10-CM

## 2019-03-07 DIAGNOSIS — M54.42 CHRONIC BILATERAL LOW BACK PAIN WITH BILATERAL SCIATICA: ICD-10-CM

## 2019-03-07 DIAGNOSIS — J45.40 MODERATE PERSISTENT ASTHMA WITHOUT COMPLICATION: ICD-10-CM

## 2019-03-07 PROCEDURE — 99214 OFFICE O/P EST MOD 30 MIN: CPT | Performed by: INTERNAL MEDICINE

## 2019-03-07 PROCEDURE — G0463 HOSPITAL OUTPT CLINIC VISIT: HCPCS

## 2019-03-07 RX ORDER — DRONABINOL 2.5 MG/1
2.5 CAPSULE ORAL
Qty: 60 CAPSULE | Refills: 1 | Status: SHIPPED | OUTPATIENT
Start: 2019-03-07 | End: 2019-05-10

## 2019-03-07 ASSESSMENT — PAIN SCALES - GENERAL: PAINLEVEL: SEVERE PAIN (7)

## 2019-03-07 NOTE — PATIENT INSTRUCTIONS
Patient Education     Supine Hamstring Stretch    1. Lie on the floor on your back, with both knees bent and your feet flat on the floor. Put a towel around the back of your right thigh.  2. Tighten your stomach muscles. Slowly pull on the towel to pull your right leg toward your chest. Straighten your right leg or keep it slightly bent. Raise it as high as you feel comfortable.  3. Hold for 30 to 60 seconds. Lower the leg back down to the floor.  4. Repeat 2 to 3 times, or as instructed.  5. Switch legs and repeat.  Date Last Reviewed: 3/10/2016    2536-8964 [employer]. [address] [place] [address]. All rights reserved. This information is not intended as a substitute for professional medical care. Always follow your healthcare professional's instructions.           Patient Education     Lumbar Stretch (Flexibility)    1. Lie on your back on the floor, with your knees bent and your feet flat on the floor. Don t press your neck or lower back to the floor.  2. Pull one knee up toward your chest. Clasp your hands under your thigh to help pull.  3. Hold for 30 to 60 seconds. Lower your leg back down to the floor.  4. Repeat 2 times, or as instructed.  5. Switch legs and repeat.  Date Last Reviewed: 3/10/2016    3375-9409 [employer]. [address] [place] [address]. All rights reserved. This information is not intended as a substitute for professional medical care. Always follow your healthcare professional's instructions.           Patient Education     Lumbar Flexion (Flexibility)    1. Lie on your back on the floor, with your knees bent and your feet flat on the floor.  2. Gently pull your knees up toward your chest. Put your hands under your thighs to help pull your knees up.  3. Press your lower back down to the floor. Hold for 20 seconds.  4. Lower your legs back down to the floor and relax.  5. Repeat 2 times, or as instructed.  Date Last Reviewed:  3/10/2016    2251-3069 [employer]. [address] [place] [address]. All rights reserved. This information is not intended as a substitute for professional medical care. Always follow your healthcare professional's instructions.           Patient Education     Lumbar Extension (Flexibility)    1. Lie face down on your stomach, forehead on the floor. You can lie on a mat or towel.  2. Bend your arms next to your body and lift your upper body up on your forearms. Your palms and forearms should be flat on the floor. Keep your stomach and hips on the floor.  3. Hold your upper body up with your forearms for 20 seconds. Slowly lower back down to the floor.  4. Repeat 2 times, or as instructed.  Date Last Reviewed: 3/10/2016    [id number] [employer]. [address] [place] [address]. All rights reserved. This information is not intended as a substitute for professional medical care. Always follow your healthcare professional's instructions.           Patient Education     Hip Flexor Stretch (Flexibility)      1. Kneel on the floor on a mat or carpet. Put your right foot on the floor in front of you, with the knee bent. Hold on to a chair for balance if needed.  2. Press your hips forward, keeping your back and shoulders upright. Feel the stretch in the front of your left hip.  3. Hold for 30 to 60 seconds. Relax.  4. Repeat 2 times. Switch sides.   5. Repeat 3 times per day, or as instructed.  Date Last Reviewed: 3/10/2016    [id number] [employer]. [address] [place] [address]. All rights reserved. This information is not intended as a substitute for professional medical care. Always follow your healthcare professional's instructions.

## 2019-03-07 NOTE — NURSING NOTE
"Chief Complaint   Patient presents with     Pain       Initial /90 (BP Location: Right arm, Patient Position: Chair, Cuff Size: Adult Regular)   Pulse 94   Temp 99.4  F (37.4  C) (Tympanic)   Resp 18   Wt 87.1 kg (192 lb)   SpO2 96%   BMI 28.35 kg/m   Estimated body mass index is 28.35 kg/m  as calculated from the following:    Height as of 2/15/18: 1.753 m (5' 9\").    Weight as of this encounter: 87.1 kg (192 lb).  Medication Reconciliation: complete    Pamela M. Lechevalier, LPN    "

## 2019-03-14 DIAGNOSIS — H91.93 DECREASED HEARING OF BOTH EARS: Primary | ICD-10-CM

## 2019-03-14 DIAGNOSIS — M54.5 LOW BACK PAIN, UNSPECIFIED BACK PAIN LATERALITY, UNSPECIFIED CHRONICITY, WITH SCIATICA PRESENCE UNSPECIFIED: ICD-10-CM

## 2019-03-14 DIAGNOSIS — G89.4 CHRONIC PAIN SYNDROME: Primary | ICD-10-CM

## 2019-03-14 NOTE — TELEPHONE ENCOUNTER
Norco  Last Written Prescription Date: 2/13/19  Last Fill Quantity: 90 # of Refills: 0  Last Office Visit: 3/7/19    Gabapentin  Last Written Prescription Date: 2/15/18  Last Fill Quantity: 270 # of Refills: 11  Last Office Visit: 3/7/19    Trazodone  Last Written Prescription Date: 2/15/18  Last Fill Quantity: 60 # of Refills: 11  Last Office Visit: 3/7/19

## 2019-03-15 DIAGNOSIS — K59.00 CONSTIPATION: Primary | ICD-10-CM

## 2019-03-15 RX ORDER — GABAPENTIN 300 MG/1
CAPSULE ORAL
Qty: 270 CAPSULE | Refills: 0 | Status: SHIPPED | OUTPATIENT
Start: 2019-03-15 | End: 2019-04-12

## 2019-03-15 RX ORDER — TRAZODONE HYDROCHLORIDE 50 MG/1
TABLET, FILM COATED ORAL
Qty: 60 TABLET | Refills: 3 | Status: SHIPPED | OUTPATIENT
Start: 2019-03-15 | End: 2019-08-13

## 2019-03-15 RX ORDER — HYDROCODONE BITARTRATE AND ACETAMINOPHEN 10; 325 MG/1; MG/1
1 TABLET ORAL 3 TIMES DAILY PRN
Qty: 90 TABLET | Refills: 0 | Status: SHIPPED | OUTPATIENT
Start: 2019-03-15 | End: 2019-04-11

## 2019-03-18 RX ORDER — AMOXICILLIN 250 MG
CAPSULE ORAL
Qty: 120 TABLET | Refills: 1 | Status: SHIPPED | OUTPATIENT
Start: 2019-03-18 | End: 2019-05-21

## 2019-03-20 ENCOUNTER — OFFICE VISIT (OUTPATIENT)
Dept: CHIROPRACTIC MEDICINE | Facility: OTHER | Age: 59
End: 2019-03-20
Attending: CHIROPRACTOR
Payer: COMMERCIAL

## 2019-03-20 DIAGNOSIS — M99.03 SEGMENTAL AND SOMATIC DYSFUNCTION OF LUMBAR REGION: Primary | ICD-10-CM

## 2019-03-20 DIAGNOSIS — M99.02 SEGMENTAL AND SOMATIC DYSFUNCTION OF THORACIC REGION: ICD-10-CM

## 2019-03-20 DIAGNOSIS — M99.01 SEGMENTAL AND SOMATIC DYSFUNCTION OF CERVICAL REGION: ICD-10-CM

## 2019-03-20 DIAGNOSIS — M54.50 ACUTE BILATERAL LOW BACK PAIN WITHOUT SCIATICA: ICD-10-CM

## 2019-03-20 PROCEDURE — 98941 CHIROPRACT MANJ 3-4 REGIONS: CPT | Mod: AT | Performed by: CHIROPRACTOR

## 2019-03-21 NOTE — PROGRESS NOTES

## 2019-03-23 DIAGNOSIS — R52 PAIN: ICD-10-CM

## 2019-03-25 DIAGNOSIS — F90.2 ADHD (ATTENTION DEFICIT HYPERACTIVITY DISORDER), COMBINED TYPE: ICD-10-CM

## 2019-03-25 DIAGNOSIS — M62.830 BACK MUSCLE SPASM: ICD-10-CM

## 2019-03-25 DIAGNOSIS — M54.50 LUMBAR BACK PAIN: ICD-10-CM

## 2019-03-25 DIAGNOSIS — I10 ESSENTIAL HYPERTENSION: Primary | ICD-10-CM

## 2019-03-25 RX ORDER — ACETAMINOPHEN 325 MG/1
TABLET ORAL
Qty: 200 TABLET | Refills: 0 | Status: SHIPPED | OUTPATIENT
Start: 2019-03-25 | End: 2019-04-26

## 2019-03-25 NOTE — TELEPHONE ENCOUNTER
Med discontinued, not on med list.    acetaminophen (NON-ASPIRIN PAIN RELIEF) 325 MG tablet (Discontinued)     Last Written Prescription Date:  2/5/17  Last Fill Quantity: 200,   # refills: 1  Last Office Visit: 2/14/19  Future Office visit:    Next 5 appointments (look out 90 days)    Apr 03, 2019  1:40 PM CDT  (Arrive by 1:25 PM)  [facility] [address] [place] [id number] [phone]   Apr 18, 2019  3:20 PM CDT  (Arrive by 3:05 PM)  Office Visit with [facility]yle [facility] DO  [facility] [address] [place] [address]  [phone]           Routing refill request to provider for review/approval because:  Drug not active on patient's medication list

## 2019-03-26 RX ORDER — PROPRANOLOL HYDROCHLORIDE 20 MG/1
TABLET ORAL
Qty: 60 TABLET | Refills: 5 | Status: SHIPPED | OUTPATIENT
Start: 2019-03-26 | End: 2019-10-16

## 2019-03-26 NOTE — TELEPHONE ENCOUNTER
baclofen (LIORESAL) 20 MG tablet      Last Written Prescription Date:  11/15/18  Last Fill Quantity: 120,   # refills: 0  Last Office Visit: 3/7/19  Future Office visit:    Next 5 appointments (look out 90 days)    Apr 03, 2019  1:40 PM CDT  (Arrive by 1:25 PM)  [facility] Visit with La[facility] MD  [facility] [address] [place] [id number] [phone]   Apr 18, 2019  3:20 PM CDT  (Arrive by 3:05 PM)  Office Visit with Lyle Fisher DO  [facility] [address] [place] [address]  [phone]           Routing refill request to provider for review/approval because:  Drug not on the [facility], [facility] or  Health refill protocol or controlled substance

## 2019-03-26 NOTE — TELEPHONE ENCOUNTER
lisdexamfetamine (VYVANSE) 70 MG capsule      Last Written Prescription Date:  2/15/19  Last Fill Quantity: 30,   # refills: 0  Last Office Visit: 3/7/19  Future Office visit:    Next 5 appointments (look out 90 days)    Apr 03, 2019  1:40 PM CDT  (Arrive by 1:25 PM)  Return Visit with [facility]qu[facility] MD  [facility] [address] [place] [id number] [phone]   Apr 18, 2019  3:20 PM CDT  (Arrive by 3:05 PM)  Office Visit with Lyle [facility] DO  [facility] [address] [place] [address]  [phone]           Routing refill request to provider for review/approval because:  Drug not on the [facility], [facility] or [facility] refill protocol or controlled substance

## 2019-03-27 RX ORDER — BACLOFEN 20 MG/1
TABLET ORAL
Qty: 120 TABLET | Refills: 0 | Status: SHIPPED | OUTPATIENT
Start: 2019-03-27 | End: 2019-04-29

## 2019-03-27 RX ORDER — LISDEXAMFETAMINE DIMESYLATE 70 MG/1
CAPSULE ORAL
Qty: 30 CAPSULE | Refills: 0 | Status: SHIPPED | OUTPATIENT
Start: 2019-03-27 | End: 2019-04-03

## 2019-03-27 NOTE — TELEPHONE ENCOUNTER
CJ, I reviewed [place]  and filled vyvanse for him. He comes in next week. Can you please bring script to [facility]? Thank you!!

## 2019-03-29 ENCOUNTER — TELEPHONE (OUTPATIENT)
Dept: PEDIATRICS | Facility: OTHER | Age: 59
End: 2019-03-29

## 2019-03-29 NOTE — TELEPHONE ENCOUNTER
APPROVAL - 03/29/2019 - received PA request thru [facility] for lidocaine patch.  Submitted thru [facility] and receive immediate approval.  Approval dates:  02/27/19 thru 03/28/2020.  Pharmacy advised.  Documentation scanned to Epic.  Carmen Bishop, [facility] Specialist.

## 2019-03-30 DIAGNOSIS — M54.50 LOW BACK PAIN: Primary | ICD-10-CM

## 2019-04-01 RX ORDER — IBUPROFEN 600 MG/1
TABLET ORAL
Qty: 120 TABLET | Refills: 0 | Status: SHIPPED | OUTPATIENT
Start: 2019-04-01 | End: 2019-05-02

## 2019-04-02 ENCOUNTER — TELEPHONE (OUTPATIENT)
Dept: INTERNAL MEDICINE | Facility: OTHER | Age: 59
End: 2019-04-02

## 2019-04-03 ENCOUNTER — ALLIED HEALTH/NURSE VISIT (OUTPATIENT)
Dept: AUDIOLOGY | Facility: OTHER | Age: 59
End: 2019-04-03
Attending: AUDIOLOGIST
Payer: COMMERCIAL

## 2019-04-03 ENCOUNTER — OFFICE VISIT (OUTPATIENT)
Dept: PSYCHIATRY | Facility: OTHER | Age: 59
End: 2019-04-03
Attending: PSYCHIATRY & NEUROLOGY
Payer: COMMERCIAL

## 2019-04-03 VITALS
WEIGHT: 190 LBS | DIASTOLIC BLOOD PRESSURE: 82 MMHG | HEART RATE: 85 BPM | OXYGEN SATURATION: 96 % | SYSTOLIC BLOOD PRESSURE: 120 MMHG | TEMPERATURE: 98.4 F | BODY MASS INDEX: 28.06 KG/M2

## 2019-04-03 DIAGNOSIS — H90.3 SENSORINEURAL HEARING LOSS, BILATERAL: Primary | ICD-10-CM

## 2019-04-03 DIAGNOSIS — H90.3 SENSORINEURAL HEARING LOSS (SNHL) OF BOTH EARS: ICD-10-CM

## 2019-04-03 DIAGNOSIS — F90.2 ADHD (ATTENTION DEFICIT HYPERACTIVITY DISORDER), COMBINED TYPE: ICD-10-CM

## 2019-04-03 PROCEDURE — 92552 PURE TONE AUDIOMETRY AIR: CPT | Performed by: AUDIOLOGIST

## 2019-04-03 PROCEDURE — 92556 SPEECH AUDIOMETRY COMPLETE: CPT | Performed by: AUDIOLOGIST

## 2019-04-03 PROCEDURE — 99214 OFFICE O/P EST MOD 30 MIN: CPT | Performed by: PSYCHIATRY & NEUROLOGY

## 2019-04-03 PROCEDURE — G0463 HOSPITAL OUTPT CLINIC VISIT: HCPCS

## 2019-04-03 PROCEDURE — V5266 BATTERY FOR HEARING DEVICE: HCPCS

## 2019-04-03 RX ORDER — LISDEXAMFETAMINE DIMESYLATE 70 MG/1
70 CAPSULE ORAL DAILY
Qty: 30 CAPSULE | Refills: 0 | Status: SHIPPED | OUTPATIENT
Start: 2019-04-24 | End: 2019-05-08

## 2019-04-03 ASSESSMENT — ANXIETY QUESTIONNAIRES
2. NOT BEING ABLE TO STOP OR CONTROL WORRYING: NOT AT ALL
5. BEING SO RESTLESS THAT IT IS HARD TO SIT STILL: NOT AT ALL
GAD7 TOTAL SCORE: 3
3. WORRYING TOO MUCH ABOUT DIFFERENT THINGS: NOT AT ALL
6. BECOMING EASILY ANNOYED OR IRRITABLE: SEVERAL DAYS
1. FEELING NERVOUS, ANXIOUS, OR ON EDGE: SEVERAL DAYS
7. FEELING AFRAID AS IF SOMETHING AWFUL MIGHT HAPPEN: SEVERAL DAYS

## 2019-04-03 ASSESSMENT — PAIN SCALES - GENERAL: PAINLEVEL: EXTREME PAIN (8)

## 2019-04-03 ASSESSMENT — PATIENT HEALTH QUESTIONNAIRE - PHQ9
SUM OF ALL RESPONSES TO PHQ QUESTIONS 1-9: 8
5. POOR APPETITE OR OVEREATING: NOT AT ALL

## 2019-04-03 NOTE — PROGRESS NOTES
Audiology Evaluation Completed. Please refer SCANNED AUDIOGRAM and/or TYMPANOGRAM for BACKGROUND, RESULTS, RECOMMENDATIONS.      Ciara WHEAT, [facility]  Audiologist #[id number]

## 2019-04-03 NOTE — PROGRESS NOTES
"  PSYCHIATRY CLINIC PROGRESS NOTE   20 minute medication management, more than 50% of time spent counseling patient on medications, medication side effects, symptom history and management                                                                       Social- Was  twice. Lives alone with his dog Montserrat (beltran) and 3 cats: Smoky the cat. Has a GF in [place]  Children-  2 kids, they are in [place]  Last visit 2/2019: He is back taking nortriptyline 100 mg daily and he stopped seroquel.     Continue Trileptal 600 mg bid,  Gave scripts for Vyvanse 70 mg daily dated 2/15/19. diazepam is at 2 mg every 12 hours as needed and continue trazodone 100 mg bedtime prn insomnia    - headaches. I inquired as to why getting headaches again lot. Feels is in relation to reduction of pain meds  - Dr. Pettit about year ago was his last neurosurg apptmt  - neighbors \"it's a nightmare\".   - I inquired about how meds are and if we should make changes. Joshua notes only if meds that will help for his pain and headaches.  - hasn't talked to his parents for awhile. They are wintering in [place], [place]  - Lots of family issues: Joshua has taken care of his parents and yet parents give his other brothers everything. oldest of 3 brothers. Brother in [place] youngest Mark and Major is here. Mom and dad here out on [place]. Joshua noting moving here to be closer to parents and help them, etc. But, parents don't seem to want him around much or talk to him.    SUBSTANCE USE- denies abuse of meds or substances    SYMPTOMS- paranoia,  issues with attention and concentration improved with Vyvanse: racing thoughts, depressed mood, impulsivity, distractibility  MEDICAL ROS- back pain, denies any issues with headaches or stomach pain / issues with stimulant. Intentional weight loss  MEDICAL / SURGICAL HISTORY                     Patient Active Problem List   Diagnosis     Mixed hyperlipidemia     Tobacco Abuse, History of     Degeneration of lumbar or " lumbosacral intervertebral disc     Depression, major     Chronic pain syndrome     Chemical dependency (H)     Chronic rhinitis     Tinnitus of both ears     SNHL (sensorineural hearing loss)     Back pain     Bipolar disorder (H)     Seizure-like activity (H)     Somatic dysfunction of pelvis region     Bilateral foot pain     Prostate cancer screening     Trigger index finger of right hand     Moderate persistent asthma without complication     Chronic lower back pain     ACP (advance care planning)     Onychia of toe of left foot     DDD (degenerative disc disease), lumbar     Seizure disorder (H)     Back muscle spasm     Benign essential hypertension     Retrograde ejaculation     Allergic rhinitis due to other allergen     Attention to dressings and sutures     Cervical spondylosis without myelopathy     Chronic headaches     DDD (degenerative disc disease)     Diabetes mellitus, type II (H)     Generalized anxiety disorder     Hypertrophy of prostate without urinary obstruction and other lower urinary tract symptoms (LUTS)     GERD (gastroesophageal reflux disease)     Lumbago     Major depressive disorder, recurrent episode, moderate (H)     Myalgia and myositis     Hyperlipidemia     Other pain disorders related to psychological factors     Spinal stenosis in cervical region     Status post lumbar spinal fusion     Tobacco use disorder     Trigger finger     Asthma     Polypharmacy     ALLERGY   Cymbalta; Depakote [valproic acid]; and Seasonal allergies  MEDICATIONS                                                                                             Current Outpatient Medications   Medication Sig     acetaminophen (TYLENOL) 325 MG tablet TAKE 2 TABLETS BY MOUTH EVERY 4 HOURS AS NEEDED FOR MILD PAIN     albuterol (VENTOLIN HFA) 108 (90 BASE) MCG/ACT Inhaler USE 2 PUFFS 4 TIMES A DAY AS NEEDED FOR SHORTNESS OF BREATH     ASPIRIN LOW DOSE 81 MG chewable tablet CHEW AND SWALLOW 1 TABLET BY MOUTH DAILY      atorvastatin (LIPITOR) 20 MG tablet TAKE 1 TABLET BY MOUTH DAILY     baclofen (LIORESAL) 20 MG tablet TAKE 1 TABLET BY MOUTH 4 TIMES DAILY     diazepam (VALIUM) 2 MG tablet TAKE 1 TABLET BY MOUTH EVERY 12 HOURS AS NEEDED FOR ANXIETY     diclofenac (VOLTAREN) 50 MG EC tablet TAKE 1 TABLET BY MOUTH 3 TIMES DAILY     docusate sodium (DOK) 100 MG capsule TAKE 1 CAPSULE BY MOUTH TWICE DAILY     dronabinol (MARINOL) 2.5 MG capsule Take 1 capsule (2.5 mg) by mouth 2 times daily (before meals)     fluticasone (FLONASE) 50 MCG/ACT nasal spray USE 2 SPRAYS IN EACH NOSTRIL DAILY     gabapentin (NEURONTIN) 300 MG capsule TAKE 3 CAPSULES BY MOUTH THREE TIMES DAILY     HYDROcodone-acetaminophen (NORCO)  MG per tablet TAKE 1 TABLET BY MOUTH 3 TIMES DAILY AS NEEDED FOR SEVERE PAIN     hydrOXYzine (VISTARIL) 50 MG capsule TAKE 1 TO 2 CAPSULES BY MOUTH 4 TIMES DAILY AS NEEDED FOR ANXIETY     Morphine Sulfate 40 MG CP24 Take 40 mg by mouth 2 times daily     multivitamin  with iron (SM COMPLETE ADVANCED FORMULA) TABS TAKE 1 TABLET BY MOUTH DAILY     naloxone (NARCAN) 1 mg/mL for intranasal kit (2 syringes with 2 mucosal atomizer device) In opioid overdose put cone in nostril and push 1/2 of contents into each nostril.  Repeat every 3 min if no response until help arrives.     nicotine polacrilex (NICORETTE) 2 MG gum CHEW 1 PIECE AS NEEDED FOR SMOKING CESSATION     nortriptyline (PAMELOR) 50 MG capsule Take 2 capsules (100 mg) by mouth At Bedtime     omeprazole (PRILOSEC) 20 MG DR capsule TAKE 1 CAPSULE BY MOUTH EVERY DAY BEFORE A MEAL     order for DME 1 boa back brace     ORDER FOR DME Equipment being ordered: Large gloves     OXcarbazepine (TRILEPTAL) 600 MG tablet TAKE 1 TABLET BY MOUTH 2 TIMES DAILY     propranolol (INDERAL) 20 MG tablet TAKE 1 TABLET BY MOUTH TWICE DAILY     senna-docusate (SENNA PLUS) 8.6-50 MG tablet TAKE 1 OR 2 TABLETS BY MOUTH 2 TIMES A DAY     traZODone (DESYREL) 50 MG tablet TAKE 2 TABLETS BY MOUTH  NIGHTLY AS NEEDED     trimethoprim-polymyxin b (POLYTRIM) 73812-6.1 UNIT/ML-% ophthalmic solution Place 2 drops Into the left eye every 6 hours     VENTOLIN  (90 Base) MCG/ACT inhaler USE 2 PUFFS 4 TIMES A DAY AS NEEDED FOR SHORTNESS OF BREATH     VYVANSE 70 MG capsule TAKE 1 CAPSULE BY MOUTH DAILY      MG tablet TAKE 1 TABLET BY MOUTH EVERY 6 HOURS AS NEEDED      MG tablet TAKE 1 TABLET BY MOUTH EVERY 6 HOURS AS NEEDED     lidocaine (LIDODERM) 5 % patch Place 3 patches on daily for 12 hours and remove and replace with three patches ( 6 patches per day)     losartan (COZAAR) 50 MG tablet TAKE 1 TABLET BY MOUTH DAILY     methocarbamol (ROBAXIN) 500 MG tablet Take 1 tablet (500 mg) by mouth 3 times daily     mometasone-formoterol (DULERA) 100-5 MCG/ACT oral inhaler INHALE 2 PUFFS INTO THE LUNGS 2 TIMES A DAY     propranolol (INDERAL) 20 MG tablet TAKE 1 TABLET BY MOUTH TWICE DAILY     No current facility-administered medications for this visit.        VITALS   /82 (BP Location: Left arm, Patient Position: Sitting, Cuff Size: Adult Large)   Pulse 85   Temp 98.4  F (36.9  C) (Tympanic)   Wt 86.2 kg (190 lb)   SpO2 96%   BMI 28.06 kg/m       LABS                                                                                                                         EKG 2016 with 376 ms (on nortriptyline)    Last Basic Metabolic Panel:  Lab Results   Component Value Date     10/20/2017      Lab Results   Component Value Date    POTASSIUM 4.2 10/20/2017     Lab Results   Component Value Date    CHLORIDE 107 10/20/2017     Lab Results   Component Value Date    SANDRITA 9.0 10/20/2017     Lab Results   Component Value Date    CO2 28 10/20/2017     Lab Results   Component Value Date    BUN 17 10/20/2017     Lab Results   Component Value Date    CR 0.69 10/20/2017     Lab Results   Component Value Date     10/20/2017     Liver Function Studies -   Recent Labs   Lab Test  10/20/17   144  "  PROTTOTAL  7.4   ALBUMIN  4.4   BILITOTAL  0.4   ALKPHOS  120   AST  18   ALT  30     CBC RESULTS:   Recent Labs   Lab Test  10/20/17   1443   WBC  4.0   RBC  4.00*   HGB  12.9*   HCT  36.5*   MCV  91   MCH  32.3   MCHC  35.3   RDW  12.4   PLT  151          MENTAL STATUS EXAM                                                                                        Alert. Oriented to person, place, and date / time. Casually groomed, calm, cooperative with good eye contact. No problems with speech or psychomotor behavior. Mood was described as \"doing okay\"  and affect was congruent to speech content and full range. Thought process, including associations, was unremarkable and thought content was devoid of suicidal and homicidal ideation.  No hallucinations. Insight was poor Judgment was  adequate for safety. Fund of knowledge was intact. Pt demonstrates no obvious problems with attention, concentration, language, recent or remote memory although these were not formally tested.     ASSESSMENT                                                                                                      HISTORICAL:  Initial psych note 10/6/15          NOTES:      Joshua is a 58 year old with bipolar, ADHD, and MDD.  We started Valium as dual purpose: muscle relaxant properties and for anxiety.We have discussed the new guidelines for short - term use of benzodiazepines (Valium) and avoiding their use along with opioids. I had noted plan to taper down over time and eventually discontinue. We decreased from 5 mg every 12 hours as needed down to 2 mg every 12 hours as needed .    We discontinued his nortriptyline and started him on Seroquel. He had sedation and cognitive impairment and muscle twitching so he stopped it. He went back on nortriptyline. We have discussed my concern of paranoia: Joshua doesn't feel this is the case... He therefore does not feel trying a different antipsychotic is going to help with anything.. I was thinking " about Latuda and ran medication interactions last visit however he doesn't feel paranoia hence doesn't feel antipsychotic warranted..       TREATMENT RISK STATEMENT:  The risks, benefits, alternatives and potential adverse effects have been explained and are understood by the pt.  The pt agrees to the treatment plan with the ability to do so.   The pt knows to call the clinic for any problems or access emergency care if needed.        DIAGNOSES                    Bipolar disorder I with psychosis vs. Schizoaffective disorder, bipolar type  ADHD      PLAN                                                                                                                    1)  MEDICATIONS:    He is back taking nortriptyline 100 mg daily and he stopped seroquel.     Continue Trileptal 600 mg bid,  vyvanse 70 mg daily and last end March and gave script for April 24 script was diazepam is at 2 mg every 12 hours as needed and continue trazodone 100 mg bedtime prn insomnia    2)  THERAPY:  No change    3)  LABS:  none    4)  PT MONITOR [call for probs]:  SEs from meds, worsening sx, SI/HI    5)  REFERRALS [CD, medical, other]:  None    6)  RTC:  1 month

## 2019-04-03 NOTE — NURSING NOTE
"Chief Complaint   Patient presents with     RECHECK     ADHD.  Patient c/o pain issues and headaches.       Initial /82 (BP Location: Left arm, Patient Position: Sitting, Cuff Size: Adult Large)   Pulse 85   Temp 98.4  F (36.9  C) (Tympanic)   Wt 86.2 kg (190 lb)   SpO2 96%   BMI 28.06 kg/m   Estimated body mass index is 28.06 kg/m  as calculated from the following:    Height as of 2/15/18: 1.753 m (5' 9\").    Weight as of this encounter: 86.2 kg (190 lb).  Medication Reconciliation: complete    JACQUELYN SUAREZ LPN    "

## 2019-04-04 ASSESSMENT — ANXIETY QUESTIONNAIRES: GAD7 TOTAL SCORE: 3

## 2019-04-08 ENCOUNTER — TELEPHONE (OUTPATIENT)
Dept: PEDIATRICS | Facility: OTHER | Age: 59
End: 2019-04-08

## 2019-04-08 DIAGNOSIS — F41.1 GAD (GENERALIZED ANXIETY DISORDER): ICD-10-CM

## 2019-04-08 DIAGNOSIS — G89.4 CHRONIC PAIN SYNDROME: ICD-10-CM

## 2019-04-08 DIAGNOSIS — M51.379 DEGENERATION OF LUMBAR OR LUMBOSACRAL INTERVERTEBRAL DISC: ICD-10-CM

## 2019-04-08 NOTE — TELEPHONE ENCOUNTER
3:15 PM    Reason for call: Phone call    Description: Patient stated he had a voicemail from the clinic.  No result notes or any documentation regarding who was calling or what it was regarding.  Advised PCP is out today so a message would be sent and if they were trying to reach him someone would call him back.     Call back number: [phone]

## 2019-04-09 RX ORDER — DIAZEPAM 2 MG
TABLET ORAL
Qty: 60 TABLET | Refills: 0 | Status: SHIPPED | OUTPATIENT
Start: 2019-04-09 | End: 2019-05-08

## 2019-04-09 NOTE — TELEPHONE ENCOUNTER
diazepam (VALIUM) 2 MG tablet      Last Written Prescription Date:  3/4/19  Last Fill Quantity: 60,   # refills: 0  Last Office Visit: 4/3/19  Future Office visit:    Next 5 appointments (look out 90 days)    Apr 18, 2019  3:20 PM CDT  (Arrive by 3:05 PM)  [facility] [address] [address]  [phone]   May 08, 2019  2:00 PM CDT  (Arrive by 1:45 PM)  Return Visit with Laquita Fernandez MD  [facility] ([facility] ) [address] [place] [id number] [phone]           Routing refill request to provider for review/approval because:  Drug not on the [facility], UMP or  Health refill protocol or controlled substance

## 2019-04-10 RX ORDER — HYDROXYZINE PAMOATE 50 MG/1
CAPSULE ORAL
Qty: 60 CAPSULE | Refills: 1 | Status: SHIPPED | OUTPATIENT
Start: 2019-04-10 | End: 2019-05-02

## 2019-04-10 NOTE — TELEPHONE ENCOUNTER
Controlled Substance Refill Request for marquis  Problem List Complete:    Yes    Last Written Prescription Date:  3/5/19  Last Fill Quantity: 60,   # refills: 0    THE MOST RECENT OFFICE VISIT MUST BE WITHIN THE PAST 3 MONTHS. AT LEAST ONE FACE TO FACE VISIT MUST OCCUR EVERY 6 MONTHS. ADDITIONAL VISITS CAN BE VIRTUAL.  (THIS STATEMENT SHOULD BE DELETED.)    Last Office Visit with [facility] [place] care provider: 3/17/19    Future Office visit:   Next 5 appointments (look out 90 days)    Apr 18, 2019  3:20 PM CDT  (Arrive by 3:05 PM)  Office Visit with Lyle Fisher DO  [facility] ([facility] ) [address]  [place] [address]  [phone]   May 08, 2019  2:00 PM CDT  (Arrive by 1:45 PM)  Return Visit with Laqiuta Fernandez MD  [facility] [place] ([facility] [place] ) [address]  [place] [address]  [phone]          Controlled substance agreement:   Encounter-Level CSA - 05/23/2017:    Controlled Substance Agreement - Scan on 7/31/2018 12:12 PM: CONTROLLED SUBSTANCE AGREEMENT (below)       Encounter-Level CSA - 09/18/2015:    Controlled Substance Agreement - Scan on 11/25/2015  2:25 PM: SCHEDULED MEDICATION USE AGREEMENT (below)       Patient-Level CSA:    There are no patient-level csa.         Last Urine Drug Screen:   Pain Drug SCR UR W RPTD Meds   Date Value Ref Range Status   05/10/2017   Final    FINAL  (Note)  ====================================================================  [facility]RE COMP DRUG ANALYSIS,UR  ====================================================================  Test                             Result       Flag       Units  Drug Present and Declared for Prescription Verification   Amphetamine                    >6061        EXPECTED   ng/mg creat    Amphetamine is available as a schedule II prescription drug.     Desmethyldiazepam              653          EXPECTED   ng/mg creat   Oxazepam                        905          EXPECTED   ng/mg creat   Temazepam                      999          EXPECTED   ng/mg creat    Desmethyldiazepam, oxazepam, and temazepam are expected    metabolites of diazepam. Desmethyldiazepam and oxazepam are also    expected metabolites of other drugs, including chlordiazepoxide,    prazepam, clorazepate, and halazepam. Oxazepam is an expected    metabolite of temazepam. Oxazepam and temazepam are also    available as scheduled prescription medications.     Hydrocodone                    2941         EXPECTED   ng/mg creat   Hydromorphone                  281          EXPECTED   ng/mg creat   Dihydrocodeine                 83           EXPECTED   ng/mg creat   Norhydrocodone                 2072         EXPECTED   ng/mg creat    Sources of hydrocodone include scheduled prescription    medications. Hydromorphone, dihydrocodeine and norhydrocodone are    expected metabolites of hydrocodone. Hydromorphone and    dihydrocodeine are also available as scheduled prescription    medications.     Fentanyl                       158          EXPECTED   ng/mg creat   Norfentanyl                    >606         EXPECTED   ng/mg creat    Source of fentanyl is a scheduled prescription medication,    including IV, patch, and transmucosal formulations. Norfentanyl    is an expected metabolite of fentanyl.     Acetaminophen                  PRESENT      EXPECTED    Drug Present not Declared for Prescription Verification   Gabapentin                     PRESENT      UNEXPECTED   Nortriptyline                  PRESENT      UNEXPECTED    Nortriptyline may be administered as a prescription drug; it is    also an expected metabolite of amitriptyline.     Trazodone                      PRESENT      UNEXPECTED   1,3 chlorophenyl piperazine    PRESENT      UNEXPECTED    1,3-chlorophenyl piperazine is an expected metabolite of    trazodone.     Diclofenac                     PRESENT      UNEXPECTED    Ibuprofen                      PRESENT      UNEXPECTED   Diphenhydramine                PRESENT      UNEXPECTED   Lidocaine                      PRESENT      UNEXPECTED   Propranolol                    PRESENT      UNEXPECTED  ====================================================================  Test                      Result    Flag   Units      Ref Range   Creatinine              165              mg/dL      >=20  ====================================================================  Declared Medications:  The flagging and interpretation on this report are based on the  following declared medications.  Unexpected results may arise from  inaccuracies in the declared medications.    **Note: The testing scope of this panel includes these medications:    Amphetamine  Amphetamine (Lisdexamfetamine)  Diazepam  Fentanyl  Hydrocodone (Norco)    **Note: The testing scope of this panel does not include small to  moderate amounts of these reported medications:    Acetaminophen (Norco)  ====================================================================  For clinical consultation, please call [phone].  ====================================================================  Analysis performed by [employer] [place] [address]     , No results found for: COMDAT, No results found for: THC13, PCP13, COC13, MAMP13, OPI13, AMP13, BZO13, TCA13, MTD13, BAR13, OXY13, PPX13, BUP13     Processing:  Staff will hand deliver Rx to on-site pharmacy    [email or web address]   checked in past 3 months?

## 2019-04-11 DIAGNOSIS — M54.5 LOW BACK PAIN, UNSPECIFIED BACK PAIN LATERALITY, UNSPECIFIED CHRONICITY, WITH SCIATICA PRESENCE UNSPECIFIED: ICD-10-CM

## 2019-04-11 RX ORDER — MORPHINE SULFATE 30 MG/1
30 TABLET, FILM COATED, EXTENDED RELEASE ORAL 3 TIMES DAILY
Qty: 90 TABLET | Refills: 0 | Status: SHIPPED | OUTPATIENT
Start: 2019-04-11 | End: 2019-05-10

## 2019-04-11 NOTE — TELEPHONE ENCOUNTER
HYDROcodone-acetaminophen (NORCO)  MG per tablet    Last Written Prescription Date:  3-15-19  Last Fill Quantity: 90,   # refills: 0  Last Office Visit: 3-7-19  Future Office visit:    Next 5 appointments (look out 90 days)    Apr 18, 2019  3:20 PM CDT  (Arrive by 3:05 PM)  [facility] ) [address] [place] [address]  [phone]   May 08, 2019  2:00 PM CDT  (Arrive by 1:45 PM)  Return Visit with Laquita Fernandez MD  [facility] ([facility] ) [address] [place] [phone]           Routing refill request to provider for review/approval because:  Drug not on the [facility], UMP or [facility] refill protocol or controlled substance

## 2019-04-11 NOTE — TELEPHONE ENCOUNTER
Norco 10/325mg BID pended per opioid wean schedule.  Please review and sign if appropriate.  Thank you.

## 2019-04-12 DIAGNOSIS — M54.5 LOW BACK PAIN, UNSPECIFIED BACK PAIN LATERALITY, UNSPECIFIED CHRONICITY, WITH SCIATICA PRESENCE UNSPECIFIED: ICD-10-CM

## 2019-04-12 DIAGNOSIS — G89.4 CHRONIC PAIN SYNDROME: ICD-10-CM

## 2019-04-12 RX ORDER — GABAPENTIN 300 MG/1
CAPSULE ORAL
Qty: 270 CAPSULE | Refills: 0 | Status: SHIPPED | OUTPATIENT
Start: 2019-04-12 | End: 2019-05-17

## 2019-04-12 RX ORDER — HYDROCODONE BITARTRATE AND ACETAMINOPHEN 10; 325 MG/1; MG/1
1 TABLET ORAL 2 TIMES DAILY PRN
Qty: 60 TABLET | Refills: 0 | Status: SHIPPED | OUTPATIENT
Start: 2019-04-12 | End: 2019-05-23

## 2019-04-12 NOTE — TELEPHONE ENCOUNTER
gabapentin (NEURONTIN) 300 MG capsule      Last Written Prescription Date:  3/15/19  Last Fill Quantity: 270,   # refills: 0  Last Office Visit: 3/7/19  Future Office visit:    Next 5 appointments (look out 90 days)    Apr 18, 2019  3:20 PM CDT  (Arrive by 3:05 PM)  [facility] [address] [facility] [address]  [phone]   May 08, 2019  2:00 PM CDT  (Arrive by 1:45 PM)  Return Visit with La[facility] MD  [facility] ([facility] ) [address] [place] [phone]           Routing refill request to provider for review/approval because:  Drug not on the [facility], UMP or  Health refill protocol or controlled substance

## 2019-04-16 NOTE — PROGRESS NOTES
SUBJECTIVE:   Joshua Barron is a 58 year old male who presents to clinic today for the following   health issues:      Asthma Follow-Up    Was ACT completed today?  Jerald     ACT Total Scores 6/27/2017 3/7/2019 4/18/2019   ACT TOTAL SCORE - - -   ASTHMA ER VISITS - - -   ASTHMA HOSPITALIZATIONS - - -   ACT TOTAL SCORE (Goal Greater than or Equal to 20) 25 18 18   In the past 12 months, how many times did you visit the emergency room for your asthma without being admitted to the hospital? 0 - 0   In the past 12 months, how many times were you hospitalized overnight because of your asthma? 0 - 0        Respiratory symptoms:   Cough: No   Wheezing: No   Shortness of breath: No    Use of short- acting(rescue) inhaler: no    Taking controlled (daily) meds as prescribed: Yes    ER/UC visits or hospital admissions since last visit: none     Recent asthma triggers that patient is dealing with: None       Back Pain Follow Up      Description:   Location of pain: Bilateral, Center   Character of pain: sharp, stabbing, burning and constant  Pain radiation: Does not radiate  Since last visit, pain is:  unchanged  New numbness or weakness in legs, not attributed to pain:  no     Intensity: Currently 8/10, At its worst 9/10    History:   Pain interferes with job: YES  Therapies tried without relief: chiropractor, cold, heat, muscle relaxants, NSAIDs and Physical Therapy  Therapies tried with relief: walking, medication           Accompanying Signs & Symptoms:  Risk of Fracture:  None  Risk of Cauda Equina:  None  Risk of Infection:  None  Risk of Cancer:  None        Amount of exercise or physical activity: 2-3 days/week for an average of 15-30 minutes    Problems taking medications regularly: No    Medication side effects: none    Diet: regular (no restrictions)        -------------------------------------    Additional history: as documented    Reviewed  and updated as needed this visit by clinical staff  Tobacco  Allergies   Meds  Med Hx  Surg Hx  Fam Hx  Soc Hx        Reviewed and updated as needed this visit by Provider         Patient Active Problem List   Diagnosis     Mixed hyperlipidemia     Tobacco Abuse, History of     Degeneration of lumbar or lumbosacral intervertebral disc     Depression, major     Chronic pain syndrome     Chemical dependency (H)     Chronic rhinitis     Tinnitus of both ears     SNHL (sensorineural hearing loss)     Back pain     Bipolar disorder (H)     Seizure-like activity (H)     Somatic dysfunction of pelvis region     Bilateral foot pain     Prostate cancer screening     Trigger index finger of right hand     Moderate persistent asthma without complication     Chronic lower back pain     ACP (advance care planning)     Onychia of toe of left foot     DDD (degenerative disc disease), lumbar     Seizure disorder (H)     Back muscle spasm     Benign essential hypertension     Retrograde ejaculation     Allergic rhinitis due to other allergen     Attention to dressings and sutures     Cervical spondylosis without myelopathy     Chronic headaches     DDD (degenerative disc disease)     Diabetes mellitus, type II (H)     Generalized anxiety disorder     Hypertrophy of prostate without urinary obstruction and other lower urinary tract symptoms (LUTS)     GERD (gastroesophageal reflux disease)     Lumbago     Major depressive disorder, recurrent episode, moderate (H)     Myalgia and myositis     Hyperlipidemia     Other pain disorders related to psychological factors     Spinal stenosis in cervical region     Status post lumbar spinal fusion     Tobacco use disorder     Trigger finger     Asthma     Polypharmacy     Past Surgical History:   Procedure Laterality Date     APPENDECTOMY      Appendicitis     BACK SURGERY  2007,2010    back surgery 3 disk fusion     BACK SURGERY      L1-L2, L3-L4 laminectomy     COLONOSCOPY  11/2007    repeat 5-10 years     COLONOSCOPY N/A 7/1/2016    Procedure: COLONOSCOPY;   Surgeon: Steve Hoff DO;  Location: [facility]     exophytic lesion posterior scalp line  2011    Excision     laminectomy L3-4 and L1-2       RELEASE TRIGGER FINGER  2010    4th digit both hands     RELEASE TRIGGER FINGER Right 2016    Procedure: RELEASE TRIGGER FINGER;  Surgeon: Zev Schroeder MD;  Location: [facility] OR       Social History     Tobacco Use     Smoking status: Former Smoker     Packs/day: 1.00     Years: 30.00     Pack years: 30.00     Last attempt to quit: 2007     Years since quittin.7     Smokeless tobacco: Current User     Types: Chew   Substance Use Topics     Alcohol use: Yes     Comment: Rarely     Family History   Problem Relation Age of Onset     Asthma Mother      Musculoskeletal Disorder Mother         arthritis     Diabetes Father      Cancer Maternal Grandmother         stomach     Alzheimer Disease Maternal Grandfather      Cancer Paternal Grandmother         stomach     Hypertension Paternal Grandfather            ROS:  CONSTITUTIONAL: NEGATIVE for fever, chills, change in weight  EYES: He reports sinus congestion daily with pressure.  ENT/MOUTH: NEGATIVE for ear, mouth and throat problems  RESP: SOB and wheezing once per day which improves with inhalers.  CV: NEGATIVE for chest pain, palpitations or peripheral edema  GI: NEGATIVE for nausea, abdominal pain, heartburn, or change in bowel habits  : NEGATIVE for frequency, dysuria, or hematuria  MUSCULOSKELETAL:   See HPI  NEURO: NEGATIVE for weakness, dizziness or paresthesias  PSYCHIATRIC: NEGATIVE for changes in mood or affect    OBJECTIVE:     /88 (BP Location: Right arm, Patient Position: Sitting, Cuff Size: Adult Regular)   Pulse 86   Temp 98.6  F (37  C) (Tympanic)   Wt 86.2 kg (190 lb)   SpO2 97%   BMI 28.06 kg/m    Body mass index is 28.06 kg/m .  GENERAL: healthy, alert and no distress  EYES: Eyes grossly normal to inspection and conjunctivae and sclerae normal  HENT: ear canals and TM's  normal, nose and mouth without ulcers or lesions  NECK: no adenopathy, no asymmetry, masses, or scars and thyroid normal to palpation  RESP: lungs clear to auscultation - no rales, rhonchi or wheezes  CV: regular rate and rhythm, normal S1 S2, no S3 or S4, no murmur, click or rub, no peripheral edema and peripheral pulses strong  ABDOMEN: soft, nontender, no hepatosplenomegaly, no masses and bowel sounds normal  ABDOMEN: no bruits heard  MS: no gross musculoskeletal defects noted, no edema  PSYCH: mentation appears normal, affect normal/bright    Diagnostic Test Results:  none     ASSESSMENT/PLAN:   (J45.40) Moderate persistent asthma without complication  (primary encounter diagnosis)  Comment: Not controlled per his ACT score.  He is using his rescue inhaler daily.  He ias on Dulera 100 mg BID.  Plan:   mometasone-formoterol (DULERA) 200-5 MCG/ACT inhaler 2 puffs BID     (M54.5) Low back pain, unspecified back pain laterality, unspecified chronicity, with sciatica presence unspecified  Comment: His back pain is stable.  He has weak paraspinal muscles and with activity that requires back muscle involvement he has a two day recovery  Plan:   Joshua has been encouraged to continue his exercises for his spine as given to him on previous visits.    (M54.42,  M54.41,  G89.29) Chronic bilateral low back pain with bilateral sciatica  Comment:  Joshua has a stable back in rears to his DJD.  He continues to seek out surgical opinions which in my medical opinion are unnecessary and unlikely to help him as his main issue at the present time are due to back muscle atrophy and weakness.  Plan:   He will continue Baclofen 20 mg 4 times daily, Methocarbamol 500 mg TID, Hydrocodone 10/325 mg BID and MS CONTIN 30 mg TID.      (G89.4) Chronic pain syndrome  Comment: Back pain is stable.  He is on an opioid wean with current level at 110 MME  Plan:   His wean will continue and he needs his other pain modalities reviewed such as his  spasm medications and his NSAID medications.  Next month, his wean will consist on MS CONTIN 20 MG TID.        FUTURE APPOINTMENTS:       - Follow-up visit in 6 weeks for chronic pain.    Lyle Fisher DO, DO  [facility] - [place]

## 2019-04-18 ENCOUNTER — OFFICE VISIT (OUTPATIENT)
Dept: INTERNAL MEDICINE | Facility: OTHER | Age: 59
End: 2019-04-18
Attending: INTERNAL MEDICINE
Payer: COMMERCIAL

## 2019-04-18 VITALS
DIASTOLIC BLOOD PRESSURE: 88 MMHG | TEMPERATURE: 98.6 F | BODY MASS INDEX: 28.06 KG/M2 | WEIGHT: 190 LBS | HEART RATE: 86 BPM | SYSTOLIC BLOOD PRESSURE: 142 MMHG | OXYGEN SATURATION: 97 %

## 2019-04-18 DIAGNOSIS — M54.41 CHRONIC BILATERAL LOW BACK PAIN WITH BILATERAL SCIATICA: ICD-10-CM

## 2019-04-18 DIAGNOSIS — G89.29 CHRONIC BILATERAL LOW BACK PAIN WITH BILATERAL SCIATICA: ICD-10-CM

## 2019-04-18 DIAGNOSIS — M54.5 LOW BACK PAIN, UNSPECIFIED BACK PAIN LATERALITY, UNSPECIFIED CHRONICITY, WITH SCIATICA PRESENCE UNSPECIFIED: ICD-10-CM

## 2019-04-18 DIAGNOSIS — M54.42 CHRONIC BILATERAL LOW BACK PAIN WITH BILATERAL SCIATICA: ICD-10-CM

## 2019-04-18 DIAGNOSIS — G89.4 CHRONIC PAIN SYNDROME: ICD-10-CM

## 2019-04-18 DIAGNOSIS — J45.40 MODERATE PERSISTENT ASTHMA WITHOUT COMPLICATION: Primary | ICD-10-CM

## 2019-04-18 PROCEDURE — 99214 OFFICE O/P EST MOD 30 MIN: CPT | Performed by: INTERNAL MEDICINE

## 2019-04-18 PROCEDURE — G0463 HOSPITAL OUTPT CLINIC VISIT: HCPCS

## 2019-04-18 ASSESSMENT — PAIN SCALES - GENERAL: PAINLEVEL: EXTREME PAIN (8)

## 2019-04-18 NOTE — LETTER
My Asthma Action Plan  Name: Joshua Barron   YOB: 1960  Date: 4/16/2019   My doctor: Lyle Fisher, DO, DO   My clinic: [facility] - [place]        My Control Medicine: { :897900}  My Rescue Medicine: { :654565}  {AAP include Oral Steroid:878592} My Asthma Severity: { :206080}  Avoid your asthma triggers: { :814089}  dust mites  cold air     {Is patient a child or adult?:478836}       GREEN ZONE   Good Control    I feel good    No cough or wheeze    Can work, sleep and play without asthma symptoms       Take your asthma control medicine every day.     1. If exercise triggers your asthma, take your rescue medication    15 minutes before exercise or sports, and    During exercise if you have asthma symptoms  2. Spacer to use with inhaler: If you have a spacer, make sure to use it with your inhaler             YELLOW ZONE Getting Worse  I have ANY of these:    I do not feel good    Cough or wheeze    Chest feels tight    Wake up at night   1. Keep taking your Green Zone medications  2. Start taking your rescue medicine:    every 20 minutes for up to 1 hour. Then every 4 hours for 24-48 hours.  3. If you stay in the Yellow Zone for more than 12-24 hours, contact your doctor.  4. If you do not return to the Green Zone in 12-24 hours or you get worse, start taking your oral steroid medicine if prescribed by your provider.           RED ZONE Medical Alert - Get Help  I have ANY of these:    I feel awful    Medicine is not helping    Breathing getting harder    Trouble walking or talking    Nose opens wide to breathe       1. Take your rescue medicine NOW  2. If your provider has prescribed an oral steroid medicine, start taking it NOW  3. Call your doctor NOW  4. If you are still in the Red Zone after 20 minutes and you have not reached your doctor:    Take your rescue medicine again and    Call 911 or go to the emergency room right away    See your regular doctor within 2 weeks of  an Emergency Room or Urgent Care visit for follow-up treatment.          Annual Reminders:  Meet with Asthma Educator,  Flu Shot in the Fall, consider Pneumonia Vaccination for patients with asthma (aged 19 and older).    Pharmacy:    [facility] - [place], [place] - [address]  [facility] - [place], [place] - [address]                       Asthma Triggers  How To Control Things That Make Your Asthma Worse    Triggers are things that make your asthma worse.  Look at the list below to help you find your triggers and what you can do about them.  You can help prevent asthma flare-ups by staying away from your triggers.      Trigger                                                          What you can do   Cigarette Smoke  Tobacco smoke can make asthma worse. Do not allow smoking in your home, car or around you.  Be sure no one smokes at a child s day care or school.  If you smoke, ask your health care provider for ways to help you quit.  Ask family members to quit too.  Ask your health care provider for a referral to Quit Plan to help you quit smoking, or call 2-390-611-PLAN.     Colds, Flu, Bronchitis  These are common triggers of asthma. Wash your hands often.  Don t touch your eyes, nose or mouth.  Get a flu shot every year.     Dust Mites  These are tiny bugs that live in cloth or carpet. They are too small to see. Wash sheets and blankets in hot water every week.   Encase pillows and mattress in dust mite proof covers.  Avoid having carpet if you can. If you have carpet, vacuum weekly.   Use a dust mask and HEPA vacuum.   Pollen and Outdoor Mold  Some people are allergic to trees, grass, or weed pollen, or molds. Try to keep your windows closed.  Limit time out doors when pollen count is high.   Ask you health care provider about taking medicine during allergy season.     Animal Dander  Some people are allergic to skin flakes, urine or saliva from pets with fur or feathers. Keep pets with  fur or feathers out of your home.    If you can t keep the pet outdoors, then keep the pet out of your bedroom.  Keep the bedroom door closed.  Keep pets off cloth furniture and away from stuffed toys.     Mice, Rats, and Cockroaches  Some people are allergic to the waste from these pests.   Cover food and garbage.  Clean up spills and food crumbs.  Store grease in the refrigerator.   Keep food out of the bedroom.   Indoor Mold  This can be a trigger if your home has high moisture. Fix leaking faucets, pipes, or other sources of water.   Clean moldy surfaces.  Dehumidify basement if it is damp and smelly.   Smoke, Strong Odors, and Sprays  These can reduce air quality. Stay away from strong odors and sprays, such as perfume, powder, hair spray, paints, smoke incense, paint, cleaning products, candles and new carpet.   Exercise or Sports  Some people with asthma have this trigger. Be active!  Ask your doctor about taking medicine before sports or exercise to prevent symptoms.    Warm up for 5-10 minutes before and after sports or exercise.     Other Triggers of Asthma  Cold air:  Cover your nose and mouth with a scarf.  Sometimes laughing or crying can be a trigger.  Some medicines and food can trigger asthma.

## 2019-04-18 NOTE — NURSING NOTE
"Chief Complaint   Patient presents with     Pain     Chronic Pain F/U       Initial /88 (BP Location: Right arm, Patient Position: Sitting, Cuff Size: Adult Regular)   Pulse 86   Temp 98.6  F (37  C) (Tympanic)   Wt 86.2 kg (190 lb)   SpO2 97%   BMI 28.06 kg/m   Estimated body mass index is 28.06 kg/m  as calculated from the following:    Height as of 2/15/18: 1.753 m (5' 9\").    Weight as of this encounter: 86.2 kg (190 lb).  Medication Reconciliation: complete    Ashley A. Lechevalier, LPN  "

## 2019-04-19 ASSESSMENT — ASTHMA QUESTIONNAIRES: ACT_TOTALSCORE: 18

## 2019-04-24 ENCOUNTER — OFFICE VISIT (OUTPATIENT)
Dept: OTOLARYNGOLOGY | Facility: OTHER | Age: 59
End: 2019-04-24
Attending: NURSE PRACTITIONER
Payer: COMMERCIAL

## 2019-04-24 VITALS
HEART RATE: 68 BPM | SYSTOLIC BLOOD PRESSURE: 108 MMHG | OXYGEN SATURATION: 96 % | TEMPERATURE: 98.1 F | DIASTOLIC BLOOD PRESSURE: 60 MMHG | BODY MASS INDEX: 28.06 KG/M2 | WEIGHT: 190 LBS

## 2019-04-24 DIAGNOSIS — J34.3 NASAL TURBINATE HYPERTROPHY: ICD-10-CM

## 2019-04-24 DIAGNOSIS — J34.829 NASAL VALVE COLLAPSE: ICD-10-CM

## 2019-04-24 DIAGNOSIS — J34.2 DNS (DEVIATED NASAL SEPTUM): Primary | ICD-10-CM

## 2019-04-24 DIAGNOSIS — J30.2 SEASONAL ALLERGIES: ICD-10-CM

## 2019-04-24 DIAGNOSIS — Z71.6 TOBACCO ABUSE COUNSELING: ICD-10-CM

## 2019-04-24 PROCEDURE — 31231 NASAL ENDOSCOPY DX: CPT | Performed by: NURSE PRACTITIONER

## 2019-04-24 PROCEDURE — G0463 HOSPITAL OUTPT CLINIC VISIT: HCPCS | Mod: 25

## 2019-04-24 PROCEDURE — 99213 OFFICE O/P EST LOW 20 MIN: CPT | Mod: 25 | Performed by: NURSE PRACTITIONER

## 2019-04-24 RX ORDER — BUDESONIDE 0.5 MG/2ML
INHALANT ORAL
Qty: 2 BOX | Refills: 3 | Status: SHIPPED | OUTPATIENT
Start: 2019-04-24 | End: 2020-07-20

## 2019-04-24 ASSESSMENT — PAIN SCALES - GENERAL: PAINLEVEL: EXTREME PAIN (8)
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
## 2019-04-24 NOTE — PATIENT INSTRUCTIONS
Budesonide nasal saline irrigation per instructions:  -Obtain [facility] Sinus rinse over the counter.    -Use warm distilled water and 2 packets of the salt solution that comes with the bottle, dissolve in bottle up to the 240 mL juju.  -Add 1 vial of budesonide.  -Irrigate each side of your nose leaning over the sink, using 1/3 to 1/2 the volume of the bottle in each nostril every irrigation.  Irrigate 2 times daily.  -If additional rinses are needed/recommended, you may use the plan [facility] Sinus irrigation without the use of added budesonide.     Continue with Flonase.  Follow up with Dr Gonzalez.    Thank you for allowing Sowmya Baird CNP and our ENT team to participate in your care.  If your medications are too expensive, please give the nurse a call.  We can possibly change this medication.  If you have a scheduling or an appointment question please contact [facility] Unit Coordinator at their direct line [phone]  ALL nursing questions or concerns can be directed to your ENT nurse at: [phone] - [place]
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
## 2019-04-24 NOTE — NURSING NOTE
"Chief Complaint   Patient presents with     Ent Problem     can't breathe through nose       Initial /60 (BP Location: Right arm)   Pulse 68   Temp 98.1  F (36.7  C) (Tympanic)   Wt 86.2 kg (190 lb)   SpO2 96%   BMI 28.06 kg/m   Estimated body mass index is 28.06 kg/m  as calculated from the following:    Height as of 2/15/18: 1.753 m (5' 9\").    Weight as of this encounter: 86.2 kg (190 lb).  Medication Reconciliation: complete    Lissy Das LPN  "

## 2019-04-26 DIAGNOSIS — M62.830 BACK MUSCLE SPASM: ICD-10-CM

## 2019-04-26 DIAGNOSIS — M54.50 LUMBAR BACK PAIN: ICD-10-CM

## 2019-04-26 DIAGNOSIS — R52 PAIN: ICD-10-CM

## 2019-04-29 RX ORDER — ACETAMINOPHEN 325 MG/1
TABLET ORAL
Qty: 200 TABLET | Refills: 0 | Status: SHIPPED | OUTPATIENT
Start: 2019-04-29 | End: 2019-05-31

## 2019-04-29 RX ORDER — BACLOFEN 20 MG/1
TABLET ORAL
Qty: 120 TABLET | Refills: 0 | Status: SHIPPED | OUTPATIENT
Start: 2019-04-29 | End: 2019-05-29

## 2019-04-29 RX ORDER — BACLOFEN 20 MG/1
TABLET ORAL
Qty: 120 TABLET | Refills: 0 | OUTPATIENT
Start: 2019-04-29

## 2019-04-29 NOTE — TELEPHONE ENCOUNTER
baclofen (LIORESAL) 20 MG tablet      Last Written Prescription Date:  3/27/19  Last Fill Quantity: 120,   # refills: 0  Last Office Visit: 4/18/19  Future Office visit:    Next 5 appointments (look out 90 days)    May 08, 2019  2:00 PM CDT  (Arrive by 1:45 PM)  Return Visit with Laquita Fernandez MD  [facility] ([facility] ) [address] [place] [id number] [phone]   May 30, 2019  3:20 PM CDT  (Arrive by 3:00 PM)  Office Visit with Lyle Fisher DO  [facility] ([facility] ) [facility] [address]  [phone]           Routing refill request to provider for review/approval because:  Drug not on the [facility], [facility] or [facility] refill protocol or controlled substance

## 2019-05-01 DIAGNOSIS — M62.830 BACK MUSCLE SPASM: ICD-10-CM

## 2019-05-01 NOTE — TELEPHONE ENCOUNTER
tiZANidine (ZANAFLEX) 4 MG tablet      Last Written Prescription Date:  Not on current med list  Last Fill Quantity: -,   # refills: -  Last Office Visit: 4/18/19  Future Office visit:    Next 5 appointments (look out 90 days)    May 08, 2019  2:00 PM CDT  (Arrive by 1:45 PM)  Return Visit with Laquita Fernandez MD  [facility] ) [address] [place] [id number] [phone]   May 30, 2019  3:20 PM CDT  (Arrive by 3:00 PM)  Office Visit with Lyle Fisher DO  [facility] [address]  [phone]           Routing refill request to provider for review/approval because:  Drug not active on patient's medication list    Discontinued by Dr Fisher on 2/14/19 for reason- alternate therapy. Looks like pt was started on robaxin at that time.

## 2019-05-02 DIAGNOSIS — M54.50 LOW BACK PAIN: ICD-10-CM

## 2019-05-02 DIAGNOSIS — F41.1 GAD (GENERALIZED ANXIETY DISORDER): ICD-10-CM

## 2019-05-02 RX ORDER — IBUPROFEN 600 MG/1
TABLET, FILM COATED ORAL
Qty: 120 TABLET | Refills: 0 | Status: SHIPPED | OUTPATIENT
Start: 2019-05-02 | End: 2019-05-29

## 2019-05-02 RX ORDER — IBUPROFEN 600 MG/1
TABLET ORAL
Qty: 120 TABLET | Refills: 0 | OUTPATIENT
Start: 2019-05-02

## 2019-05-02 RX ORDER — HYDROXYZINE PAMOATE 50 MG/1
CAPSULE ORAL
Qty: 60 CAPSULE | Refills: 0 | Status: SHIPPED | OUTPATIENT
Start: 2019-05-02 | End: 2019-05-17

## 2019-05-08 ENCOUNTER — OFFICE VISIT (OUTPATIENT)
Dept: PSYCHIATRY | Facility: OTHER | Age: 59
End: 2019-05-08
Attending: PSYCHIATRY & NEUROLOGY
Payer: COMMERCIAL

## 2019-05-08 ENCOUNTER — TELEPHONE (OUTPATIENT)
Dept: PEDIATRICS | Facility: OTHER | Age: 59
End: 2019-05-08

## 2019-05-08 VITALS
BODY MASS INDEX: 28.06 KG/M2 | TEMPERATURE: 98.1 F | HEART RATE: 78 BPM | SYSTOLIC BLOOD PRESSURE: 120 MMHG | WEIGHT: 190 LBS | OXYGEN SATURATION: 96 % | DIASTOLIC BLOOD PRESSURE: 82 MMHG

## 2019-05-08 DIAGNOSIS — F41.1 GAD (GENERALIZED ANXIETY DISORDER): ICD-10-CM

## 2019-05-08 DIAGNOSIS — F90.2 ADHD (ATTENTION DEFICIT HYPERACTIVITY DISORDER), COMBINED TYPE: ICD-10-CM

## 2019-05-08 DIAGNOSIS — F31.0 BIPOLAR AFFECTIVE DISORDER, CURRENT EPISODE HYPOMANIC (H): ICD-10-CM

## 2019-05-08 PROCEDURE — 99214 OFFICE O/P EST MOD 30 MIN: CPT | Performed by: PSYCHIATRY & NEUROLOGY

## 2019-05-08 PROCEDURE — G0463 HOSPITAL OUTPT CLINIC VISIT: HCPCS

## 2019-05-08 RX ORDER — DIAZEPAM 2 MG
TABLET ORAL
Qty: 60 TABLET | Refills: 0 | Status: SHIPPED | OUTPATIENT
Start: 2019-05-08 | End: 2019-06-11

## 2019-05-08 RX ORDER — OXCARBAZEPINE 600 MG/1
TABLET, FILM COATED ORAL
Qty: 60 TABLET | Refills: 6 | Status: SHIPPED | OUTPATIENT
Start: 2019-05-08 | End: 2020-01-17

## 2019-05-08 RX ORDER — LISDEXAMFETAMINE DIMESYLATE 70 MG/1
70 CAPSULE ORAL DAILY
Qty: 30 CAPSULE | Refills: 0 | Status: SHIPPED | OUTPATIENT
Start: 2019-05-23 | End: 2019-07-12

## 2019-05-08 ASSESSMENT — PAIN SCALES - GENERAL: PAINLEVEL: EXTREME PAIN (9)

## 2019-05-08 NOTE — NURSING NOTE
"Chief Complaint   Patient presents with     RECHECK     Mental health.       Initial /82 (BP Location: Right arm, Patient Position: Sitting, Cuff Size: Adult Regular)   Pulse 78   Temp 98.1  F (36.7  C) (Tympanic)   Wt 86.2 kg (190 lb)   SpO2 96%   BMI 28.06 kg/m   Estimated body mass index is 28.06 kg/m  as calculated from the following:    Height as of 2/15/18: 1.753 m (5' 9\").    Weight as of this encounter: 86.2 kg (190 lb).  Medication Reconciliation: complete    JACQUELYN SUAREZ LPN    "

## 2019-05-08 NOTE — TELEPHONE ENCOUNTER
Spoke with staff at [facility]. They wanted to let refill staff know that Laquita did okay an early fill of pt's vyvanse script with start date of 5/23/19 because med had not been filled since 3/29/19 per .

## 2019-05-08 NOTE — PROGRESS NOTES
PSYCHIATRY CLINIC PROGRESS NOTE   20 minute medication management, more than 50% of time spent counseling patient on medications, medication side effects, symptom history and management                                                                       Social- Was  twice. Lives alone with his dog Montserrat (beltran) and 3 cats: Smoky the cat. Has a GF in [place]  Children-  2 kids, they are in [place]  Last visit: He is back taking nortriptyline 100 mg daily and he stopped seroquel.     Continue Trileptal 600 mg bid,  vyvanse 70 mg daily and last end March and gave script for April 24 script was diazepam is at 2 mg every 12 hours as needed and continue trazodone 100 mg bedtime prn insomnia     Continue Trileptal 600 mg bid,  Gave scripts for Vyvanse 70 mg daily dated 2/15/19. diazepam is at 2 mg every 12 hours as needed and continue trazodone 100 mg bedtime prn insomnia    - pain is horrendous, is worried about how he is going to be able to mow his lawn this summer.   - Dr. Pettit about year ago was his last neurosurg apptmt  - parents are back. They [place], [place]  - Lots of family issues: Joshua has taken care of his parents and yet parents give his other brothers everything. oldest of 3 brothers. Brother in [place] youngest [place] and Major is here. Mom and dad here out on [place] acres. Joshua noting moving here to be closer to parents and help them, etc. But, parents don't seem to want him around much or talk to him.    SUBSTANCE USE- denies abuse of meds or substances    SYMPTOMS- paranoia,  issues with attention and concentration improved with Vyvanse: racing thoughts, depressed mood, impulsivity, distractibility  MEDICAL ROS- back pain, denies any issues with headaches or stomach pain / issues with stimulant. Intentional weight loss  MEDICAL / SURGICAL HISTORY                     Patient Active Problem List   Diagnosis     Mixed hyperlipidemia     Tobacco Abuse, History of     Degeneration of lumbar or lumbosacral  intervertebral disc     Depression, major     Chronic pain syndrome     Chemical dependency (H)     Chronic rhinitis     Tinnitus of both ears     SNHL (sensorineural hearing loss)     Bipolar disorder (H)     Seizure-like activity (H)     Somatic dysfunction of pelvis region     Bilateral foot pain     Prostate cancer screening     Trigger index finger of right hand     Moderate persistent asthma without complication     Chronic lower back pain     ACP (advance care planning)     Onychia of toe of left foot     DDD (degenerative disc disease), lumbar     Seizure disorder (H)     Back muscle spasm     Benign essential hypertension     Retrograde ejaculation     Allergic rhinitis due to other allergen     Cervical spondylosis without myelopathy     Chronic headaches     DDD (degenerative disc disease)     Generalized anxiety disorder     Hypertrophy of prostate without urinary obstruction and other lower urinary tract symptoms (LUTS)     GERD (gastroesophageal reflux disease)     Lumbago     Major depressive disorder, recurrent episode, moderate (H)     Myalgia and myositis     Hyperlipidemia     Other pain disorders related to psychological factors     Spinal stenosis in cervical region     Status post lumbar spinal fusion     Tobacco use disorder     Trigger finger     Asthma     Polypharmacy     ALLERGY   Cymbalta; Depakote [valproic acid]; and Seasonal allergies  MEDICATIONS                                                                                             Current Outpatient Medications   Medication Sig     acetaminophen (TYLENOL) 325 MG tablet TAKE 2 TABLETS BY MOUTH EVERY 4 HOURS AS NEEDED FOR MILD PAIN     albuterol (VENTOLIN HFA) 108 (90 BASE) MCG/ACT Inhaler USE 2 PUFFS 4 TIMES A DAY AS NEEDED FOR SHORTNESS OF BREATH     ASPIRIN LOW DOSE 81 MG chewable tablet CHEW AND SWALLOW 1 TABLET BY MOUTH DAILY     atorvastatin (LIPITOR) 20 MG tablet TAKE 1 TABLET BY MOUTH DAILY     baclofen (LIORESAL) 20 MG  tablet TAKE 1 TABLET BY MOUTH 4 TIMES DAILY     budesonide (PULMICORT) 0.5 MG/2ML neb solution Squirt entire vial into previously made harlan med saline bottle, mix, irrigate both nostrils until entire bottle empty. Do this twice daily.     diazepam (VALIUM) 2 MG tablet TAKE 1 TABLET BY MOUTH EVERY 12 HOURS AS NEEDED FOR ANXIETY     diclofenac (VOLTAREN) 50 MG EC tablet TAKE 1 TABLET BY MOUTH 3 TIMES DAILY     docusate sodium (DOK) 100 MG capsule TAKE 1 CAPSULE BY MOUTH TWICE DAILY     dronabinol (MARINOL) 2.5 MG capsule Take 1 capsule (2.5 mg) by mouth 2 times daily (before meals)     fluticasone (FLONASE) 50 MCG/ACT nasal spray USE 2 SPRAYS IN EACH NOSTRIL DAILY     gabapentin (NEURONTIN) 300 MG capsule TAKE 3 CAPSULES BY MOUTH THREE TIMES DAILY     HYDROcodone-acetaminophen (NORCO)  MG per tablet Take 1 tablet by mouth 2 times daily as needed for severe pain     hydrOXYzine (VISTARIL) 50 MG capsule TAKE 1 TO 2 CAPSULES BY MOUTH 4 TIMES DAILY AS NEEDED FOR ANXIETY     ibuprofen (IBU) 600 MG tablet TAKE 1 TABLET BY MOUTH EVERY 6 HOURS AS NEEDED     lidocaine (LIDODERM) 5 % patch Place 3 patches on daily for 12 hours and remove and replace with three patches ( 6 patches per day)     lisdexamfetamine (VYVANSE) 70 MG capsule Take 1 capsule (70 mg) by mouth daily     losartan (COZAAR) 50 MG tablet TAKE 1 TABLET BY MOUTH DAILY     methocarbamol (ROBAXIN) 500 MG tablet Take 1 tablet (500 mg) by mouth 3 times daily     mometasone-formoterol (DULERA) 200-5 MCG/ACT inhaler Inhale 2 puffs into the lungs 2 times daily     morphine (MS CONTIN) 30 MG CR tablet Take 1 tablet (30 mg) by mouth 3 times daily     multivitamin  with iron (SM COMPLETE ADVANCED FORMULA) TABS TAKE 1 TABLET BY MOUTH DAILY     nicotine polacrilex (NICORETTE) 2 MG gum CHEW 1 PIECE AS NEEDED FOR SMOKING CESSATION     nortriptyline (PAMELOR) 50 MG capsule Take 2 capsules (100 mg) by mouth At Bedtime     omeprazole (PRILOSEC) 20 MG DR capsule TAKE 1  CAPSULE BY MOUTH EVERY DAY BEFORE A MEAL     order for DME 1 boa back brace     ORDER FOR DME Equipment being ordered: Large gloves     OXcarbazepine (TRILEPTAL) 600 MG tablet TAKE 1 TABLET BY MOUTH 2 TIMES DAILY     propranolol (INDERAL) 20 MG tablet TAKE 1 TABLET BY MOUTH TWICE DAILY     senna-docusate (SENNA PLUS) 8.6-50 MG tablet TAKE 1 OR 2 TABLETS BY MOUTH 2 TIMES A DAY     tiZANidine (ZANAFLEX) 4 MG tablet TAKE 1 TABLET BY MOUTH 3 TIMES A DAY     traZODone (DESYREL) 50 MG tablet TAKE 2 TABLETS BY MOUTH NIGHTLY AS NEEDED     trimethoprim-polymyxin b (POLYTRIM) 54100-1.1 UNIT/ML-% ophthalmic solution Place 2 drops Into the left eye every 6 hours     VENTOLIN  (90 Base) MCG/ACT inhaler USE 2 PUFFS 4 TIMES A DAY AS NEEDED FOR SHORTNESS OF BREATH     naloxone (NARCAN) 1 mg/mL for intranasal kit (2 syringes with 2 mucosal atomizer device) In opioid overdose put cone in nostril and push 1/2 of contents into each nostril.  Repeat every 3 min if no response until help arrives. (Patient not taking: Reported on 5/8/2019)     No current facility-administered medications for this visit.        VITALS   /82 (BP Location: Right arm, Patient Position: Sitting, Cuff Size: Adult Regular)   Pulse 78   Temp 98.1  F (36.7  C) (Tympanic)   Wt 86.2 kg (190 lb)   SpO2 96%   BMI 28.06 kg/m       LABS                                                                                                                         EKG 2016 with 376 ms (on nortriptyline)    Last Basic Metabolic Panel:  Lab Results   Component Value Date     10/20/2017      Lab Results   Component Value Date    POTASSIUM 4.2 10/20/2017     Lab Results   Component Value Date    CHLORIDE 107 10/20/2017     Lab Results   Component Value Date    SANDRITA 9.0 10/20/2017     Lab Results   Component Value Date    CO2 28 10/20/2017     Lab Results   Component Value Date    BUN 17 10/20/2017     Lab Results   Component Value Date    CR 0.69 10/20/2017  "    Lab Results   Component Value Date     10/20/2017     Liver Function Studies -   Recent Labs   Lab Test  10/20/17   1443   PROTTOTAL  7.4   ALBUMIN  4.4   BILITOTAL  0.4   ALKPHOS  120   AST  18   ALT  30     CBC RESULTS:   Recent Labs   Lab Test  10/20/17   1443   WBC  4.0   RBC  4.00*   HGB  12.9*   HCT  36.5*   MCV  91   MCH  32.3   MCHC  35.3   RDW  12.4   PLT  151          MENTAL STATUS EXAM                                                                                        Alert. Oriented to person, place, and date / time. Casually groomed, calm, cooperative with good eye contact. No problems with speech or psychomotor behavior. Mood was described as \"doing okay\"  and affect was congruent to speech content and full range. Thought process, including associations, was unremarkable and thought content was devoid of suicidal and homicidal ideation.  No hallucinations. Insight was poor Judgment was  adequate for safety. Fund of knowledge was intact. Pt demonstrates no obvious problems with attention, concentration, language, recent or remote memory although these were not formally tested.     ASSESSMENT                                                                                                      HISTORICAL:  Initial psych note 10/6/15          NOTES:      Joshua is a 58 year old with bipolar, ADHD, and MDD.  We started Valium as dual purpose: muscle relaxant properties and for anxiety.We have discussed the new guidelines for short - term use of benzodiazepines (Valium) and avoiding their use along with opioids. I had noted plan to taper down over time and eventually discontinue. We decreased from 5 mg every 12 hours as needed down to 2 mg every 12 hours as needed .    We discontinued his nortriptyline and started him on Seroquel. He had sedation and cognitive impairment and muscle twitching so he stopped it. He went back on nortriptyline.  Today we discussed potential interaction with " nortritptyline and trileptal. We feel benefits outweigh risks of continuing with both especially given he is struggling very much with mood , depression.       TREATMENT RISK STATEMENT:  The risks, benefits, alternatives and potential adverse effects have been explained and are understood by the pt.  The pt agrees to the treatment plan with the ability to do so.   The pt knows to call the clinic for any problems or access emergency care if needed.        DIAGNOSES                    Bipolar disorder I with psychosis vs. Schizoaffective disorder, bipolar type  ADHD      PLAN                                                                                                                    1)  MEDICATIONS:    He is back taking nortriptyline 100 mg daily and he stopped seroquel.     Continue Trileptal 600 mg bid,  vyvanse 70 mg daily and last script for April 24 and gave script for 5/23/19 and continue diazepam 2 mg every 12 hours as needed and continue trazodone 100 mg bedtime prn insomnia    2)  THERAPY:  No change    3)  LABS:  none    4)  PT MONITOR [call for probs]:  SEs from meds, worsening sx, SI/HI    5)  REFERRALS [CD, medical, other]:  None    6)  RTC:  1 month

## 2019-05-10 DIAGNOSIS — R11.0 NAUSEA: ICD-10-CM

## 2019-05-10 DIAGNOSIS — M51.379 DEGENERATION OF LUMBAR OR LUMBOSACRAL INTERVERTEBRAL DISC: ICD-10-CM

## 2019-05-10 DIAGNOSIS — G89.4 CHRONIC PAIN SYNDROME: ICD-10-CM

## 2019-05-10 RX ORDER — MORPHINE SULFATE 30 MG/1
30 TABLET, FILM COATED, EXTENDED RELEASE ORAL EVERY 12 HOURS
Qty: 60 TABLET | Refills: 0 | Status: SHIPPED | OUTPATIENT
Start: 2019-05-10 | End: 2019-06-04

## 2019-05-10 RX ORDER — DRONABINOL 2.5 MG/1
CAPSULE ORAL
Qty: 60 CAPSULE | Refills: 0 | Status: SHIPPED | OUTPATIENT
Start: 2019-05-10 | End: 2019-06-07

## 2019-05-10 NOTE — TELEPHONE ENCOUNTER
Controlled Substance Refill Request for morphine  Problem List Complete:    Yes    Last Written Prescription Date:  4/11/19  Last Fill Quantity: 90,   # refills: 0    THE MOST RECENT OFFICE VISIT MUST BE WITHIN THE PAST 3 MONTHS. AT LEAST ONE FACE TO FACE VISIT MUST OCCUR EVERY 6 MONTHS. ADDITIONAL VISITS CAN BE VIRTUAL.  (THIS STATEMENT SHOULD BE DELETED.)    Last Office Visit with [facility] primary care provider: 4/18/19    Future Office visit:   Next 5 appointments (look out 90 days)    May 30, 2019  3:20 PM CDT  (Arrive by 3:00 PM)  Office Visit with Lyle Fisher DO  [facility] ([facility] [place] ) [address]  [place] [address]  [phone]   Jul 10, 2019  2:40 PM CDT  (Arrive by 2:25 PM)  Return Visit with Laquita Fernandez MD  [facility] [place] ([facility] [place] ) [address]  [place] [address] [phone]          Controlled substance agreement:   Encounter-Level CSA - 05/23/2017:    Controlled Substance Agreement - Scan on 7/31/2018 12:12 PM: CONTROLLED SUBSTANCE AGREEMENT (below)       Encounter-Level CSA - 09/18/2015:    Controlled Substance Agreement - Scan on 11/25/2015  2:25 PM: SCHEDULED MEDICATION USE AGREEMENT (below)       Patient-Level CSA:    There are no patient-level csa.         Last Urine Drug Screen:   Pain Drug SCR UR W RPTD Meds   Date Value Ref Range Status   05/10/2017   Final    FINAL  (Note)  ====================================================================  TOXAS[facility]RE COMP DRUG ANALYSIS,UR  ====================================================================  Test                             Result       Flag       Units  Drug Present and Declared for Prescription Verification   Amphetamine                    >6061        EXPECTED   ng/mg creat    Amphetamine is available as a schedule II prescription drug.     Desmethyldiazepam              653          EXPECTED   ng/mg creat   Oxazepam                        905          EXPECTED   ng/mg creat   Temazepam                      999          EXPECTED   ng/mg creat    Desmethyldiazepam, oxazepam, and temazepam are expected    metabolites of diazepam. Desmethyldiazepam and oxazepam are also    expected metabolites of other drugs, including chlordiazepoxide,    prazepam, clorazepate, and halazepam. Oxazepam is an expected    metabolite of temazepam. Oxazepam and temazepam are also    available as scheduled prescription medications.     Hydrocodone                    2941         EXPECTED   ng/mg creat   Hydromorphone                  281          EXPECTED   ng/mg creat   Dihydrocodeine                 83           EXPECTED   ng/mg creat   Norhydrocodone                 2072         EXPECTED   ng/mg creat    Sources of hydrocodone include scheduled prescription    medications. Hydromorphone, dihydrocodeine and norhydrocodone are    expected metabolites of hydrocodone. Hydromorphone and    dihydrocodeine are also available as scheduled prescription    medications.     Fentanyl                       158          EXPECTED   ng/mg creat   Norfentanyl                    >606         EXPECTED   ng/mg creat    Source of fentanyl is a scheduled prescription medication,    including IV, patch, and transmucosal formulations. Norfentanyl    is an expected metabolite of fentanyl.     Acetaminophen                  PRESENT      EXPECTED    Drug Present not Declared for Prescription Verification   Gabapentin                     PRESENT      UNEXPECTED   Nortriptyline                  PRESENT      UNEXPECTED    Nortriptyline may be administered as a prescription drug; it is    also an expected metabolite of amitriptyline.     Trazodone                      PRESENT      UNEXPECTED   1,3 chlorophenyl piperazine    PRESENT      UNEXPECTED    1,3-chlorophenyl piperazine is an expected metabolite of    trazodone.     Diclofenac                     PRESENT      UNEXPECTED    Ibuprofen                      PRESENT      UNEXPECTED   Diphenhydramine                PRESENT      UNEXPECTED   Lidocaine                      PRESENT      UNEXPECTED   Propranolol                    PRESENT      UNEXPECTED  ====================================================================  Test                      Result    Flag   Units      Ref Range   Creatinine              165              mg/dL      >=20  ====================================================================  Declared Medications:  The flagging and interpretation on this report are based on the  following declared medications.  Unexpected results may arise from  inaccuracies in the declared medications.    **Note: The testing scope of this panel includes these medications:    Amphetamine  Amphetamine (Lisdexamfetamine)  Diazepam  Fentanyl  Hydrocodone (Norco)    **Note: The testing scope of this panel does not include small to  moderate amounts of these reported medications:    Acetaminophen (Norco)  ====================================================================  For clinical consultation, please call [phone].  ====================================================================  Analysis performed by [employer] [place] [address]     , No results found for: COMDAT, No results found for: THC13, PCP13, COC13, MAMP13, OPI13, AMP13, BZO13, TCA13, MTD13, BAR13, OXY13, PPX13, BUP13     Processing:  Staff will hand deliver Rx to on-site pharmacy    [email or web address]   checked in past 3 months?

## 2019-05-10 NOTE — TELEPHONE ENCOUNTER
Marinol  Last visit: 4.18.19  Last refill: 3.7.19 #60 /1 refill    Future appointments:   Next 5 appointments (look out 90 days)    May 30, 2019  3:20 PM CDT  (Arrive by 3:00 PM)  Office Visit with Lyle Fisher DO  [facility] ([facility] ) [address]  [place] [address]  [phone]   Jul 10, 2019  2:40 PM CDT  (Arrive by 2:25 PM)  Return Visit with Laquita Fernandez MD  [facility] ([facility] ) [address] [place], [place] [address] [id number] [phone]

## 2019-05-17 DIAGNOSIS — M54.5 LOW BACK PAIN, UNSPECIFIED BACK PAIN LATERALITY, UNSPECIFIED CHRONICITY, WITH SCIATICA PRESENCE UNSPECIFIED: ICD-10-CM

## 2019-05-17 DIAGNOSIS — G89.4 CHRONIC PAIN SYNDROME: ICD-10-CM

## 2019-05-17 DIAGNOSIS — F41.1 GAD (GENERALIZED ANXIETY DISORDER): ICD-10-CM

## 2019-05-20 RX ORDER — GABAPENTIN 300 MG/1
CAPSULE ORAL
Qty: 270 CAPSULE | Refills: 0 | Status: SHIPPED | OUTPATIENT
Start: 2019-05-20 | End: 2019-06-22

## 2019-05-20 RX ORDER — HYDROXYZINE PAMOATE 50 MG/1
CAPSULE ORAL
Qty: 60 CAPSULE | Refills: 0 | Status: SHIPPED | OUTPATIENT
Start: 2019-05-20 | End: 2019-06-03

## 2019-05-20 NOTE — TELEPHONE ENCOUNTER
gabapentin (NEURONTIN) 300 MG capsule      Last Written Prescription Date:  4-12-19  Last Fill Quantity: 270,   # refills: 0  Last Office Visit: 4-18-19  Future Office visit:    Next 5 appointments (look out 90 days)    May 30, 2019  3:20 PM CDT  (Arrive by 3:00 PM)  [facility] [address] [facility] [address]  [phone]   Jul 10, 2019  2:40 PM CDT  (Arrive by 2:25 PM)  Return Visit with La[facility]ita [facility]andez MD  [facility] ) [address] [place] [id number] [phone]           Routing refill request to provider for review/approval because:  Drug not on the [facility], [facility] or  Health refill protocol or controlled substance

## 2019-05-21 DIAGNOSIS — K59.00 CONSTIPATION: ICD-10-CM

## 2019-05-21 RX ORDER — AMOXICILLIN 250 MG
CAPSULE ORAL
Qty: 120 TABLET | Refills: 0 | Status: SHIPPED | OUTPATIENT
Start: 2019-05-21 | End: 2019-07-12

## 2019-05-23 ENCOUNTER — TELEPHONE (OUTPATIENT)
Dept: PEDIATRICS | Facility: OTHER | Age: 59
End: 2019-05-23

## 2019-05-23 DIAGNOSIS — M54.5 LOW BACK PAIN, UNSPECIFIED BACK PAIN LATERALITY, UNSPECIFIED CHRONICITY, WITH SCIATICA PRESENCE UNSPECIFIED: ICD-10-CM

## 2019-05-23 RX ORDER — HYDROCODONE BITARTRATE AND ACETAMINOPHEN 10; 325 MG/1; MG/1
1 TABLET ORAL 2 TIMES DAILY PRN
Qty: 60 TABLET | Refills: 0 | Status: SHIPPED | OUTPATIENT
Start: 2019-05-23 | End: 2019-06-17

## 2019-05-23 NOTE — TELEPHONE ENCOUNTER
Spoke with pt, his speech is very rapid and rambling. Pt upset about a Dr Perez and his pain medications. Seems to be upset that no one will do surgery for his back. States that he is upset because Dr Fisher is decreasing his pain meds. Pt has great difficulty staying on topic and displays flight of ideas. He is difficult to follow despite numerous attempts at redirection by this writer. Pt was transferred to pain care coordinator due to being on pain contract.

## 2019-05-23 NOTE — TELEPHONE ENCOUNTER
"Spoke with pt.  He saw a spine specialist - Dr. Perez.  Pt states his pain is being under treated and that Dr. Perez thinks he is being under treated as well.  Pt very tangential during conversation.  Keeps referencing how to dispose of Fentanyl patches.  And during one office visit Dr. Fisher looked at him and said, \"oh, you like to get high, don't you?\"  CC informed pt that CC was in the visit he was referencing and told him that that was not said.  He stated, \"You know it was said, you just will never admit it\".  CC informed him that per Dr. Fisher and Dr. Fernandez, it would be in pt's best interest if this CC was no longer involved in pt's care.  Pt was argumentative with CC.  CC informed him that his concerns will be sent to Dr. Fisher, but CC will not be involved in his care any more.  Also told him to bring up his concerns with Dr. Fisher next week at his scheduled appt.  Conversation ended.    Love Quan RN-BSN  Chronic Pain Care Coordinator  [phone] opt. #3        "

## 2019-05-23 NOTE — TELEPHONE ENCOUNTER
Norco      Last Written Prescription Date:  4.12.19  Last Fill Quantity: #60,   # refills: 0  Last Office Visit: 4.18.19  Future Office visit:    Next 5 appointments (look out 90 days)    May 30, 2019  3:20 PM CDT  (Arrive by 3:00 PM)  Office Visit with Lyle Fisher DO  [facility] [address] [address] [address]  [phone]   Jul 10, 2019  2:40 PM CDT  (Arrive by 2:25 PM)  Return Visit with [facility] MD  [facility] [address] [place] [id number] [phone]           Routing refill request to provider for review/approval because:  Drug not on the [facility], [facility] or  Health refill protocol or controlled substance

## 2019-05-23 NOTE — TELEPHONE ENCOUNTER
There is no indication for increased pain medications.  I agree with acupuncture and spinal injections.  I am doubtful surgery would help as he does not follow guided instructions by myself on strengthening his spine.

## 2019-05-23 NOTE — TELEPHONE ENCOUNTER
Patient calls states he saw a neurosurgeon regarding his back. States needs to discuss their finding and recommendations.   Patient is also talking about hearing voices. Concerned his home or phone is bugged? Seems confused to writer.

## 2019-05-23 NOTE — PROGRESS NOTES
Subjective     Joshua Barron is a 58 year old male who presents to clinic today for the following health issues:    HPI   Chronic/Recurring Back Pain Follow Up      Where is your back pain located? (Select all that apply) middle of back bilateral    How would you describe your back pain?  sharp and stabbing    Where does your back pain spread? Radiates to hips    Since your last clinic visit for back pain, how has your pain changed? rapidly worsening    Does your back pain interfere with your job? Not applicable, does not work    Since your last visit, have you tried any new treatment? No      Amount of exercise or physical activity: None    Problems taking medications regularly: No    Medication side effects: headaches/migranes    Diet: regular (no restrictions)        Joshua had a recent visit to a neurosurgeon in [place] ( Dr. Soares)on 05/17/2019 who discussed further back surgery with Joshua.  The physician ordered acupuncture, PT and epidural back injections, SI injections for Joshua.    -------------------------------------    Patient Active Problem List   Diagnosis     Mixed hyperlipidemia     Tobacco Abuse, History of     Degeneration of lumbar or lumbosacral intervertebral disc     Depression, major     Chronic, continuous use of opioids     Chemical dependency (H)     Chronic rhinitis     Tinnitus of both ears     SNHL (sensorineural hearing loss)     Bipolar disorder (H)     Seizure-like activity (H)     Somatic dysfunction of pelvis region     Bilateral foot pain     Prostate cancer screening     Trigger index finger of right hand     Moderate persistent asthma without complication     Chronic lower back pain     ACP (advance care planning)     Onychia of toe of left foot     DDD (degenerative disc disease), lumbar     Seizure disorder (H)     Back muscle spasm     Benign essential hypertension     Retrograde ejaculation     Allergic rhinitis due to other allergen     Cervical spondylosis without  myelopathy     Chronic headaches     DDD (degenerative disc disease)     Generalized anxiety disorder     Hypertrophy of prostate without urinary obstruction and other lower urinary tract symptoms (LUTS)     GERD (gastroesophageal reflux disease)     Lumbago     Major depressive disorder, recurrent episode, moderate (H)     Myalgia and myositis     Hyperlipidemia     Other pain disorders related to psychological factors     Spinal stenosis in cervical region     Status post lumbar spinal fusion     Tobacco use disorder     Trigger finger     Asthma     Polypharmacy     Past Surgical History:   Procedure Laterality Date     APPENDECTOMY      Appendicitis     BACK SURGERY  ,    back surgery 3 disk fusion     BACK SURGERY      L1-L2, L3-L4 laminectomy     COLONOSCOPY  2007    repeat 5-10 years     COLONOSCOPY N/A 2016    Procedure: COLONOSCOPY;  Surgeon: Steve Hoff DO;  Location: [facility]     exophytic lesion posterior scalp line  2011    Excision     laminectomy L3-4 and L1-2       RELEASE TRIGGER FINGER      4th digit both hands     RELEASE TRIGGER FINGER Right 2016    Procedure: RELEASE TRIGGER FINGER;  Surgeon: Zev Schroeder MD;  Location: [facility]       Social History     Tobacco Use     Smoking status: Former Smoker     Packs/day: 1.00     Years: 30.00     Pack years: 30.00     Last attempt to quit: 2007     Years since quittin.8     Smokeless tobacco: Current User     Types: Chew   Substance Use Topics     Alcohol use: Yes     Comment: Rarely     Family History   Problem Relation Age of Onset     Asthma Mother      Musculoskeletal Disorder Mother         arthritis     Diabetes Father      Cancer Maternal Grandmother         stomach     Alzheimer Disease Maternal Grandfather      Cancer Paternal Grandmother         stomach     Hypertension Paternal Grandfather            -------------------------------------  Reviewed and updated as needed this visit by Provider          Review of Systems   ROS COMP: CONSTITUTIONAL: NEGATIVE for fever, chills, change in weight  INTEGUMENTARY/SKIN: recent cut to the knee one months ago which has not closed with bandage placement.  EYES: NEGATIVE for vision changes or irritation  ENT/MOUTH: NEGATIVE for ear, mouth and throat problems  RESP:POSITIVE for SOB/dyspnea and wheezing and NEGATIVE for cough-non productive and cough-productive  CV: NEGATIVE for chest pain, palpitations or peripheral edema  GI: NEGATIVE for nausea, abdominal pain, heartburn, or change in bowel habits  GI: POSITIVE for nausea with exertional activity   : NEGATIVE for frequency, dysuria, or hematuria  MUSCULOSKELETAL:POSITIVE  for back pain Back pain  NEURO: dizziness/lightheadedness with exertional activity  ENDOCRINE: NEGATIVE for temperature intolerance, skin/hair changes  HEME: NEGATIVE for bleeding problems  PSYCHIATRIC: NEGATIVE for changes in mood or affect      Objective    /78 (BP Location: Left arm, Patient Position: Sitting, Cuff Size: Adult Regular)   Pulse 82   Temp 98.3  F (36.8  C) (Oral)   Resp 14   Wt 85.3 kg (188 lb)   SpO2 93%   BMI 27.76 kg/m    There is no height or weight on file to calculate BMI.  Physical Exam   GENERAL: healthy, alert and no distress  EYES: Eyes grossly normal to inspection  NECK: no adenopathy, no asymmetry, masses, or scars and thyroid normal to palpation  RESP: lungs clear to auscultation - no rales, rhonchi or wheezes  CV: regular rate and rhythm, normal S1 S2, no S3 or S4, no murmur, click or rub, no peripheral edema and peripheral pulses strong  MS: no gross musculoskeletal defects noted, no edema, Loss of tone in the paraspinal muscles, poor posture  SKIN: no suspicious lesions or rashes and left knee laceration of 2 mm depth and 5 mm in left with serous drainage  NEURO: Normal strength and tone of the lower extremities bilaterally of 5/5 strength and weakness of the left leg 4/5 in leg flexion  PSYCH:  concentration poor, tangential and speech pressured    Diagnostic Test Results:  None      Labs:  NA      Imagin/15/16  2:19 PM 9/15/16  2:34 PM [id number] [facility]    PACS Images      Show images for MR Lumbar Spine w/o Contrast   Study Result           MR OF LUMBAR SPINE     TECHNIQUE:  Images were obtained sagittally T1 proton density  T2-weighted;  axially proton density and T2-weighted.     REPORT:  There has been fusion of L2, L3, L4, L5 and S1 with  intervertebral disk spacers, and fixed dorsally with pedicle screws  and rods.  There is some broad-based anular bulging seen at T11-T12  and T12-L1.  At L1-L2 there is loss of vertical disk space height  noted, but no significant nerve roto compression is seen. Across the  fused segment, no thecal sac or nerve root compression is noted.  Upper portion of the sacrum and sacroiliac joints appear normal.  Intradurally, the nerves of cauda equina and conus appear normal.  The  paravertebral soft tissues appear normal.     IMPRESSION:  STATUS POST FUSION OF L2 THROUGH S1.  NO RECURRENT OR  RESIDUAL DISK HERNIATION OR PROTRUSION IS SEEN ACROSS THE FUSED  SEGMENT.  THERE IS SOME ANNULAR BULGING AT T12-L1 AND L1-L2, WITHOUT  SIGNIFICANT NERVE ROOT COMPRESSION.  Exam Date: Sep 15, 2016 02:34:00 PM  Author: BURT KITCHEN  This report is final and signed        10/30/18 11:14 AM [id number] [facility]    PACS Images      Show images for MR Thoracic Spine w/o Contrast   Study Result     PROCEDURE: MR THORACIC SPINE W/O CONTRAST 10/30/2018 11:14 AM     HISTORY: ; Chronic pain syndrome     COMPARISONS: None.     Meds/Dose Given: None.     TECHNIQUE: Sagittal T1, T2 and STIR sequences and axial T2-weighted  images.     FINDINGS: There is fairly severe degenerative disc disease in the  cervical spine with disc space narrowing, reactive endplate changes  and broad-based disc bulges. These are most severe at C3-4 and C4-5  levels. There is also degenerative change in the lumbar spine  and  there is been previous lumbar fusion.     There is a moderate right convex scoliosis with some straightening of  the normal thoracic kyphosis. Endplate reactive changes are seen in  the mid and lower thoracic spine.     Cord itself appears intrinsically normal.     There is a broad-based disc bulge at the T12-L1 level. Broad-based  disc bulge is also seen at T11-12 and at T10-11. These efface ventral  CSF and do contact the cord especially at T10-11. Degenerative changes  seen in the facet joints at the T10-11 level as well and there are  facet joint effusions.     There are mild disc bulges at T9-10, T8-9, and T7-8. These efface  ventral CSF.     There is a moderate central and right paramedian protrusion at T6-7  with mild flattening of the ventral cord.     Mild disc bulges seen at T5-6 and T4-5 levels. There is a moderate  broad-based disc bulge at T2-3.                                                                        IMPRESSION:   1. Multilevel degenerative change including the thoracic, cervical and  lumbar spines with postsurgical changes in the lumbar spine.  2. Multilevel disc bulges and protrusions with some effacement of  ventral CSF but no significant central stenosis or cord compression.     PRABHU SLOAN MD           ASSESSMENT /PLAN:  (M54.5) Lumbar back pain  (primary encounter diagnosis)  Comment: Joshua has a long standing history of Lumbar DDD, s/p laminectomy of L1-L2, L3-L4 and s/p fusion L2 thru S1.  Joshua obsesses about his back and has not taken steps to strengthen his paraspinal muscles.  In my medical opinion most of his back pain at the present time is due to paraspinal muscle weakness, poor posture and atrophy.  He has had a discussion recently with a Neurosurgeon who suggested revision surgery along with having SI joint injections along with trans foraminal thoracic injections.  I do not think that Joshua is a good candidate for further back surgery as he has not taken  consistent steps to strengthen his back. He tends to rely on medications and surgery versus putting in the effort to prevent further back deterioration.  Plan:   I discussed with joshua that he should follow up with getting SI joint injections along with trans foraminal thoracic injections.  I do not think that Joshua is a good candidate for further back surgery as he has not taken consistent steps to strengthen his back.  I have encouraged him to follow the back strengthening exercises that I had prescribed at previous visits.    (G89.4) Chronic pain syndrome  Comment: As noted above.  Plan:   Joshua will continue Robaxin 500 mg TID for back spasms, Baclofen 20 mg 4 times daily, Neurontin 900 mg TID,  Morphine ER 30 mg BID and Norco 10/325 mg twice daily as needed for his back pain.    (S81.012A) Laceration of left knee, initial encounter  Comment:  His knee wound is in a location of high stress with minimal depth and no bleeding.  Plan:   Steri strips applied with adhesion glue to approximate the skin and allow faster healing.    (F31.12) Bipolar affective disorder, currently manic, moderate (H)  Comment: Joshua sees Dr Fernandez for his mental health.  I have treated his bipolar disorder in the past and he has been in and ongoing manic state for several months.  He did request to go back on his Depakote as he has some better insight on his mental health state.  Plan:   He will continue Trileptal 600 BID.  I will send his mental health provider correspondence and suggest that he be on a low dose of Depakote.          Follow up with Provider - 1 month for chronic pain assessment after his back injections.          Lyle Fisher,

## 2019-05-29 DIAGNOSIS — M54.50 LUMBAR BACK PAIN: ICD-10-CM

## 2019-05-29 DIAGNOSIS — M54.50 LOW BACK PAIN: ICD-10-CM

## 2019-05-29 DIAGNOSIS — M62.830 BACK MUSCLE SPASM: ICD-10-CM

## 2019-05-30 ENCOUNTER — OFFICE VISIT (OUTPATIENT)
Dept: INTERNAL MEDICINE | Facility: OTHER | Age: 59
End: 2019-05-30
Attending: INTERNAL MEDICINE
Payer: COMMERCIAL

## 2019-05-30 VITALS
OXYGEN SATURATION: 93 % | TEMPERATURE: 98.3 F | WEIGHT: 188 LBS | DIASTOLIC BLOOD PRESSURE: 78 MMHG | HEART RATE: 82 BPM | RESPIRATION RATE: 14 BRPM | BODY MASS INDEX: 27.76 KG/M2 | SYSTOLIC BLOOD PRESSURE: 102 MMHG

## 2019-05-30 DIAGNOSIS — F31.12 BIPOLAR AFFECTIVE DISORDER, CURRENTLY MANIC, MODERATE (H): ICD-10-CM

## 2019-05-30 DIAGNOSIS — S81.012A LACERATION OF LEFT KNEE, INITIAL ENCOUNTER: ICD-10-CM

## 2019-05-30 DIAGNOSIS — M54.50 LUMBAR BACK PAIN: Primary | ICD-10-CM

## 2019-05-30 DIAGNOSIS — G89.4 CHRONIC PAIN SYNDROME: ICD-10-CM

## 2019-05-30 PROCEDURE — G0463 HOSPITAL OUTPT CLINIC VISIT: HCPCS

## 2019-05-30 PROCEDURE — G0463 HOSPITAL OUTPT CLINIC VISIT: HCPCS | Mod: 25

## 2019-05-30 PROCEDURE — 99214 OFFICE O/P EST MOD 30 MIN: CPT | Performed by: INTERNAL MEDICINE

## 2019-05-30 ASSESSMENT — PAIN SCALES - GENERAL: PAINLEVEL: EXTREME PAIN (8)
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
## 2019-05-31 DIAGNOSIS — R52 PAIN: ICD-10-CM

## 2019-05-31 DIAGNOSIS — F41.1 GAD (GENERALIZED ANXIETY DISORDER): ICD-10-CM

## 2019-05-31 RX ORDER — BACLOFEN 20 MG/1
TABLET ORAL
Qty: 120 TABLET | Refills: 0 | Status: SHIPPED | OUTPATIENT
Start: 2019-05-31 | End: 2019-06-24

## 2019-05-31 RX ORDER — IBUPROFEN 600 MG/1
TABLET, FILM COATED ORAL
Qty: 120 TABLET | Refills: 0 | Status: ON HOLD | OUTPATIENT
Start: 2019-05-31 | End: 2019-07-01

## 2019-05-31 NOTE — TELEPHONE ENCOUNTER
baclofen (LIORESAL) 20 MG tablet      Last Written Prescription Date:  4/29/19  Last Fill Quantity: 120,   # refills: 0    tiZANidine (ZANAFLEX) 4 MG tablet      Last Written Prescription Date:  5/2/19  Last Fill Quantity: 90,   # refills: 0    Last Office Visit: 5/30/19  Future Office visit:    Next 5 appointments (look out 90 days)    Jul 10, 2019  2:40 PM CDT  (Arrive by 2:25 PM)  [facility]andez [facility]  [facility] [address] [place] [id number]  [phone]           Routing refill request to provider for review/approval because:  Drug not on the [facility], [facility] or [facility] refill protocol or controlled substance

## 2019-06-03 ENCOUNTER — HOSPITAL ENCOUNTER (EMERGENCY)
Facility: HOSPITAL | Age: 59
Discharge: HOME | End: 2019-06-03
Attending: PHYSICIAN ASSISTANT | Admitting: PHYSICIAN ASSISTANT
Payer: COMMERCIAL

## 2019-06-03 ENCOUNTER — OFFICE VISIT (OUTPATIENT)
Dept: OTOLARYNGOLOGY | Facility: OTHER | Age: 59
End: 2019-06-03
Attending: OTOLARYNGOLOGY
Payer: COMMERCIAL

## 2019-06-03 VITALS
OXYGEN SATURATION: 97 % | SYSTOLIC BLOOD PRESSURE: 104 MMHG | DIASTOLIC BLOOD PRESSURE: 80 MMHG | TEMPERATURE: 98 F | HEART RATE: 92 BPM | RESPIRATION RATE: 16 BRPM

## 2019-06-03 VITALS
SYSTOLIC BLOOD PRESSURE: 98 MMHG | HEART RATE: 87 BPM | BODY MASS INDEX: 25.77 KG/M2 | HEIGHT: 70 IN | TEMPERATURE: 99.5 F | DIASTOLIC BLOOD PRESSURE: 60 MMHG | WEIGHT: 180 LBS

## 2019-06-03 DIAGNOSIS — K21.9 GASTROESOPHAGEAL REFLUX DISEASE, ESOPHAGITIS PRESENCE NOT SPECIFIED: ICD-10-CM

## 2019-06-03 DIAGNOSIS — J34.3 NASAL TURBINATE HYPERTROPHY: ICD-10-CM

## 2019-06-03 DIAGNOSIS — Z78.9 INTOLERANCE OF CONTINUOUS POSITIVE AIRWAY PRESSURE (CPAP) VENTILATION: ICD-10-CM

## 2019-06-03 DIAGNOSIS — G47.33 OSA (OBSTRUCTIVE SLEEP APNEA): ICD-10-CM

## 2019-06-03 DIAGNOSIS — M54.9 CHRONIC BACK PAIN: ICD-10-CM

## 2019-06-03 DIAGNOSIS — G89.29 CHRONIC BACK PAIN: ICD-10-CM

## 2019-06-03 DIAGNOSIS — F41.1 GAD (GENERALIZED ANXIETY DISORDER): ICD-10-CM

## 2019-06-03 DIAGNOSIS — J34.2 DNS (DEVIATED NASAL SEPTUM): Primary | ICD-10-CM

## 2019-06-03 DIAGNOSIS — H90.3 SENSORINEURAL HEARING LOSS (SNHL) OF BOTH EARS: ICD-10-CM

## 2019-06-03 DIAGNOSIS — R55 NEAR SYNCOPE: ICD-10-CM

## 2019-06-03 DIAGNOSIS — Z71.6 ENCOUNTER FOR TOBACCO USE CESSATION COUNSELING: ICD-10-CM

## 2019-06-03 LAB
ALBUMIN SERPL-MCNC: 4.5 G/DL (ref 3.4–5)
ALP SERPL-CCNC: 85 U/L (ref 40–150)
ALT SERPL W P-5'-P-CCNC: 35 U/L (ref 0–70)
ANION GAP SERPL CALCULATED.3IONS-SCNC: 5 MMOL/L (ref 3–14)
AST SERPL W P-5'-P-CCNC: 13 U/L (ref 0–45)
BASOPHILS # BLD AUTO: 0 10E9/L (ref 0–0.2)
BASOPHILS NFR BLD AUTO: 0.4 %
BILIRUB SERPL-MCNC: 0.4 MG/DL (ref 0.2–1.3)
BUN SERPL-MCNC: 20 MG/DL (ref 7–30)
CALCIUM SERPL-MCNC: 8.8 MG/DL (ref 8.5–10.1)
CHLORIDE SERPL-SCNC: 106 MMOL/L (ref 94–109)
CO2 SERPL-SCNC: 31 MMOL/L (ref 20–32)
CREAT SERPL-MCNC: 1.22 MG/DL (ref 0.66–1.25)
D DIMER PPP DDU-MCNC: <200 NG/ML D-DU (ref 0–300)
DIFFERENTIAL METHOD BLD: ABNORMAL
EOSINOPHIL # BLD AUTO: 0.1 10E9/L (ref 0–0.7)
EOSINOPHIL NFR BLD AUTO: 2.8 %
ERYTHROCYTE [DISTWIDTH] IN BLOOD BY AUTOMATED COUNT: 12.7 % (ref 10–15)
GFR SERPL CREATININE-BSD FRML MDRD: 66 ML/MIN/{1.73_M2}
GLUCOSE SERPL-MCNC: 99 MG/DL (ref 70–99)
HCT VFR BLD AUTO: 38.2 % (ref 40–53)
HGB BLD-MCNC: 13.3 G/DL (ref 13.3–17.7)
IMM GRANULOCYTES # BLD: 0 10E9/L (ref 0–0.4)
IMM GRANULOCYTES NFR BLD: 0.2 %
LYMPHOCYTES # BLD AUTO: 1.8 10E9/L (ref 0.8–5.3)
LYMPHOCYTES NFR BLD AUTO: 38 %
MCH RBC QN AUTO: 31.4 PG (ref 26.5–33)
MCHC RBC AUTO-ENTMCNC: 34.8 G/DL (ref 31.5–36.5)
MCV RBC AUTO: 90 FL (ref 78–100)
MONOCYTES # BLD AUTO: 0.5 10E9/L (ref 0–1.3)
MONOCYTES NFR BLD AUTO: 10.2 %
NEUTROPHILS # BLD AUTO: 2.2 10E9/L (ref 1.6–8.3)
NEUTROPHILS NFR BLD AUTO: 48.4 %
NRBC # BLD AUTO: 0 10*3/UL
NRBC BLD AUTO-RTO: 0 /100
PLATELET # BLD AUTO: 159 10E9/L (ref 150–450)
POTASSIUM SERPL-SCNC: 4.3 MMOL/L (ref 3.4–5.3)
PROT SERPL-MCNC: 7.4 G/DL (ref 6.8–8.8)
RBC # BLD AUTO: 4.23 10E12/L (ref 4.4–5.9)
SODIUM SERPL-SCNC: 142 MMOL/L (ref 133–144)
TROPONIN I SERPL-MCNC: <0.015 UG/L (ref 0–0.04)
WBC # BLD AUTO: 4.6 10E9/L (ref 4–11)

## 2019-06-03 PROCEDURE — 99214 OFFICE O/P EST MOD 30 MIN: CPT | Mod: 25 | Performed by: OTOLARYNGOLOGY

## 2019-06-03 PROCEDURE — 85025 COMPLETE CBC W/AUTO DIFF WBC: CPT | Performed by: PHYSICIAN ASSISTANT

## 2019-06-03 PROCEDURE — 36415 COLL VENOUS BLD VENIPUNCTURE: CPT | Performed by: PHYSICIAN ASSISTANT

## 2019-06-03 PROCEDURE — 84484 ASSAY OF TROPONIN QUANT: CPT | Performed by: PHYSICIAN ASSISTANT

## 2019-06-03 PROCEDURE — 31231 NASAL ENDOSCOPY DX: CPT | Performed by: OTOLARYNGOLOGY

## 2019-06-03 PROCEDURE — 85379 FIBRIN DEGRADATION QUANT: CPT | Performed by: PHYSICIAN ASSISTANT

## 2019-06-03 PROCEDURE — 93005 ELECTROCARDIOGRAM TRACING: CPT | Mod: XE

## 2019-06-03 PROCEDURE — 99284 EMERGENCY DEPT VISIT MOD MDM: CPT | Mod: Z6 | Performed by: PHYSICIAN ASSISTANT

## 2019-06-03 PROCEDURE — G0463 HOSPITAL OUTPT CLINIC VISIT: HCPCS | Mod: 25

## 2019-06-03 PROCEDURE — 99284 EMERGENCY DEPT VISIT MOD MDM: CPT

## 2019-06-03 PROCEDURE — 80053 COMPREHEN METABOLIC PANEL: CPT | Performed by: PHYSICIAN ASSISTANT

## 2019-06-03 PROCEDURE — 93010 ELECTROCARDIOGRAM REPORT: CPT | Performed by: INTERNAL MEDICINE

## 2019-06-03 PROCEDURE — 99284 EMERGENCY DEPT VISIT MOD MDM: CPT | Mod: 25,27

## 2019-06-03 RX ORDER — HYDROXYZINE PAMOATE 50 MG/1
CAPSULE ORAL
Qty: 120 CAPSULE | Refills: 1 | Status: SHIPPED | OUTPATIENT
Start: 2019-06-03 | End: 2019-07-08

## 2019-06-03 ASSESSMENT — MIFFLIN-ST. JEOR: SCORE: 1652.72

## 2019-06-03 ASSESSMENT — ENCOUNTER SYMPTOMS
FATIGUE: 0
VOMITING: 0
CHOKING: 0
NEUROLOGICAL NEGATIVE: 1
CARDIOVASCULAR NEGATIVE: 1
ABDOMINAL PAIN: 0
CHILLS: 0
BACK PAIN: 1
ACTIVITY CHANGE: 0
APPETITE CHANGE: 0
BRUISES/BLEEDS EASILY: 0
PHOTOPHOBIA: 0
NAUSEA: 0
FEVER: 0
SHORTNESS OF BREATH: 0

## 2019-06-03 ASSESSMENT — PAIN SCALES - GENERAL: PAINLEVEL: EXTREME PAIN (8)

## 2019-06-03 NOTE — ED AVS SNAPSHOT
[facility]  [address]  [place] [address]  Phone:  [phone]                                    Joshua Barron   MRN: [id number]    Department:  [facility]   Date of Visit:  6/3/2019           After Visit Summary Signature Page    I have received my discharge instructions, and my questions have been answered. I have discussed any challenges I see with this plan with the nurse or doctor.    ..........................................................................................................................................  Patient/Patient Representative Signature      ..........................................................................................................................................  Patient Representative Print Name and Relationship to Patient    ..................................................               ................................................  Date                                   Time    ..........................................................................................................................................  Reviewed by Signature/Title    ...................................................              ..............................................  Date                                               Time          22EPIC Rev 08/18
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
## 2019-06-03 NOTE — PATIENT INSTRUCTIONS
Thank you for allowing Dr. Gonzalez and our ENT team to participate in your care.  If your medications are too expensive, please give the nurse a call.  We can possibly change this medication.  If you have a scheduling or an appointment question please contact our Health Unit Coordinator at their direct line [phone].   ALL nursing questions or concerns can be directed to your ENT nurse at: [phone] Shamir Bautista    Use Budesonide Rinses Twice Daily  Continue Flonase 2 sprays, once daily  Start Zantac as prescribed at night    Adult lifestyle changes to prevent LPR reviewed      Avoid eating and drinking within two to three hours prior to bedtime    Do not drink alcohol    Eat small meals and slowly    Limit problem foods:    o Caffeine  o Carbonated drinks  o Chocolate  o Peppermint  o Tomato  o Citrus fruits  o Fatty and fried foods      Lose weight    Quit smoking    Wear loose clothing        Budesonide nasal saline irrigation per instructions:  -Obtain [facility] Sinus rinse over the counter.    -Use warm distilled water and 2 packets of the salt solution that comes with the bottle, dissolve in bottle up to the 240 mL juju.  -Add 1 vial of budesonide.  -Irrigate each side of your nose leaning over the sink, using 1/3 to 1/2 the volume of the bottle in each nostril every irrigation.  Irrigate 2 times daily.  -If additional rinses are needed/recommended, you may use the plan [facility] Sinus irrigation without the use of added budesonide.     Instructions for Sinus Surgery    Recovery - Everyone recovers differently from a general anesthetic.  Symptoms such as fatigue, nausea, light-headedness, and sometimes a low grade fever (up to 100 degrees) are not unusual.  As your body removes the anesthetic drugs from circulation, these symptoms will resolve.  Your nose will be sore after surgery, and you may even have symptoms similar to a sinus infection with headache, congestion, and pressure.  These will resolve with  healing.  For several days you may experience bloody drainage from the nose, please use the drip pad as necessary for this.  If there is persistent bleeding, please call the office during business hours or the on call ENT physician after hours.  There are no diet restrictions after sinus surgery, and you can resume your home medications.      Please do not blow your nose until 1 week after surgery.  At 1 week you may gently blow your nose, unless otherwise indicated by Dr. Gonzalez.     Limit your activity to no strenuous activities until I see you for the first follow-up visit in approximately 2 weeks.      Medications - You were sent home with narcotic pain medication.  If you can tolerate the discomfort during your recovery by using just plain Tylenol or ibuprofen (advil), please do so.  However, do not hesitate to use the stronger pain medication if needed.  If you were sent home with an antibiotic, it is primarily used to help the healing process.  If it causes loose bowel movements or other signs of intolerance, it is appropriate to discontinue it.  By far the most important measure you can take to speed recovery, and maximize the chances of long term success of sinus surgery is using the sinus rinses at least three time per day for the first month after surgery.       Start [facility] Med saline irrigation tomorrow and use at least 5 times daily.     I recommend 2 harlan med bottles, one to add the budesonide to and irrigate with the budesonide rinse twice a day for 2 months.    In the other harlan med bottle use only the saline solution, and irrigate with this at least 3 additional times daily.    Perform gentle irrigation for the first week.  Starting 1 week after surgery, you should increase the volume of harlan med saline irrigation to each nostril, continuing to use the rinses in an alternating fashion at least 5 times daily.  You cannot use too much of the harlan med saline, but please limit budesonide rinses to  twice daily.    At 2 weeks after surgery, you may also restart nasal steroids (flonase, nasonex, etc).        Complications - Problems related to sinus surgery almost always are detected during the operation, and special instruction will be given in that situation.  However, unexpected things can happen, and are all related to the structures around the sinus cavities.  Symptoms that should alert you to a possible problem include: severe eye pain or eye swelling, persistent heavy bleeding from the nose, and high fevers with headache and neck pain.  Any of these symptoms should be called into my office or to the on call ENT if after hours.  The most common non-emergency complication of sinus surgery is the formation of scar tissue which can re-block the sinuses.  This is addressed below.    Follow-up -  As you have noted, there are quite a few follow-up visits after sinus surgery.  This is done to aggressively manage the most common complication of this technique, which is scar tissue blocking the sinuses.  These visits will require the examination of your nose and possibly removal of crusts of dry mucous and blood, with possible removal of early scar tissue.  Please prepare for these visits by using your sinus rinses.    If there are any questions or issues with the above, or if there are other issues that concern you, always feel free to call the clinic and I am happy to speak with you as soon as I can.    Ivett Gonzalez D.O.  Otolaryngology/Head and Neck Surgery  Allergy    [phone] office            HOW TO PREPARE-      You need to have a scheduled Pre-Op with your primary care physician within 30 days of your scheduled surgery. You should be set up with this before you leave today.       You need a friend or family member available to drive you home AND stay with you for 24 hours after you leave the hospital. You will not be allowed to drive yourself. IF you need to take a taxi or the bus you MUST  have a responsible person to ride with you. YOUR PROCEDURE WILL BE CANCELLED IF YOU DO NOT HAVE A RESPONSIBLE ADULT TO DRIVE YOU HOME.       You CANNOT have anything to eat or drink after midnight the night before your surgery, including water and coffee. Your stomach needs to be completely empty. Do NOT chew gum, suck on hard candy, or smoke. You can brush your teeth the morning of surgery.       The Surgery Education Nurses will call you  1-2 weeks prior to your surgery date at  [phone] or toll free [phone]. Please have your medication and allergy lists ready.      Stop your aspirin or other NSAIDs(Ibuprofen, Motrin, Aleve, Celebrex, Naproxen, etc...) 7 days before your surgery.      Hospital admitting will call you the day before your surgery with your exact arrival time.       Please call your primary care physician if you should become ill within 24 hours of scheduled surgery. (ex.vomiting, diarrhea, fever)          You will need to wash the night before AND the morning of you procedure with a liquid antibacterial soap, like Dial.

## 2019-06-03 NOTE — NURSING NOTE
"Chief Complaint   Patient presents with     Sinus Problem     Pt is here for DNS, NTH, and nasal valve collapse.       Initial BP 98/60 (BP Location: Left arm, Cuff Size: Adult Regular)   Pulse 87   Temp 99.5  F (37.5  C) (Tympanic)   Ht 1.778 m (5' 10\")   Wt 81.6 kg (180 lb)   BMI 25.83 kg/m   Estimated body mass index is 25.83 kg/m  as calculated from the following:    Height as of this encounter: 1.778 m (5' 10\").    Weight as of this encounter: 81.6 kg (180 lb).  Medication Reconciliation: complete    Yolette Samano LPN    "

## 2019-06-03 NOTE — ED TRIAGE NOTES
The patient presents from the [facility] where he was seen for ENT, and while scheduling a follow up appointment he was near syncopal and lowered himself to his knees. The patient drove himself to the [facility] and his car is in the clinic parking lot.

## 2019-06-03 NOTE — ED NOTES
Ambulated to room 1 independently, call light in reach.  Patient stated was seen today by Dr. Gonzalez, was at the counter to review information, episode of dizziness, went down on right knee.  Denies LOC.  Advised to come to ED for evaluation, brought from clinic via w/c.  Denies dizziness, nausea or vomiting at this time.  Chronic back pain, denies pain when sitting, increases to 8 with ambulation.

## 2019-06-03 NOTE — TELEPHONE ENCOUNTER
"Refill request from pharmacy notes that \"patient requests larger quantity.\"      hydroxyzine  Last Written Prescription Date:  5/20/19  Last Fill Qty:  60, # Refills:  0  Last Office Visit:  5/30/19    Refill request does not indicate how many tablets are requested.  Please advise.  Thank you.      "
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
## 2019-06-04 DIAGNOSIS — Z00.00 HEALTH CARE MAINTENANCE: Primary | ICD-10-CM

## 2019-06-04 DIAGNOSIS — G89.4 CHRONIC PAIN SYNDROME: ICD-10-CM

## 2019-06-04 DIAGNOSIS — M51.379 DEGENERATION OF LUMBAR OR LUMBOSACRAL INTERVERTEBRAL DISC: ICD-10-CM

## 2019-06-04 DIAGNOSIS — M62.830 BACK MUSCLE SPASM: ICD-10-CM

## 2019-06-04 RX ORDER — HYDROXYZINE PAMOATE 50 MG/1
CAPSULE ORAL
Qty: 60 CAPSULE | Refills: 0 | OUTPATIENT
Start: 2019-06-04

## 2019-06-04 RX ORDER — ACETAMINOPHEN 325 MG/1
TABLET ORAL
Qty: 200 TABLET | Refills: 0 | Status: ON HOLD | OUTPATIENT
Start: 2019-06-04 | End: 2019-07-01

## 2019-06-05 ASSESSMENT — ANXIETY QUESTIONNAIRES
GAD7 TOTAL SCORE: 1
7. FEELING AFRAID AS IF SOMETHING AWFUL MIGHT HAPPEN: NOT AT ALL
6. BECOMING EASILY ANNOYED OR IRRITABLE: SEVERAL DAYS
2. NOT BEING ABLE TO STOP OR CONTROL WORRYING: NOT AT ALL
1. FEELING NERVOUS, ANXIOUS, OR ON EDGE: NOT AT ALL
3. WORRYING TOO MUCH ABOUT DIFFERENT THINGS: NOT AT ALL
5. BEING SO RESTLESS THAT IT IS HARD TO SIT STILL: NOT AT ALL

## 2019-06-05 ASSESSMENT — PATIENT HEALTH QUESTIONNAIRE - PHQ9
SUM OF ALL RESPONSES TO PHQ QUESTIONS 1-9: 11
5. POOR APPETITE OR OVEREATING: NOT AT ALL

## 2019-06-05 NOTE — TELEPHONE ENCOUNTER
Flonase       Last Written Prescription Date:  2/26/2019  Last Fill Quantity: 16g,   # refills: 2    Voltaren       Last Written Prescription Date:  2/7/2019  Last Fill Quantity: 90,   # refills: 3    Robaxin       Last Written Prescription Date:  2/14/2019  Last Fill Quantity: 90,   # refills: 3    Morphine      Last Written Prescription Date:  5/10/2019  Last Fill Quantity: 60,   # refills: 0  Last Office Visit: 5/30/2019  Future Office visit:    Next 5 appointments (look out 90 days)    Jun 25, 2019  4:20 PM CDT  (Arrive by 4:00 PM)  Pre-Op physical with Lyle Fisher DO  [facility] ([facility] ) [address] [place] [id number]  [phone]   Jul 10, 2019  2:40 PM CDT  (Arrive by 2:25 PM)  Return Visit with Laquita Fernandez MD  [facility] ([facility] ) [address] [place] [id number] [phone]           Routing refill request to provider for review/approval because:  Drug not on the [facility], [facility] or  Health refill protocol or controlled substance

## 2019-06-06 RX ORDER — METHOCARBAMOL 500 MG/1
TABLET, FILM COATED ORAL
Qty: 90 TABLET | Refills: 3 | Status: SHIPPED | OUTPATIENT
Start: 2019-06-06 | End: 2019-10-25

## 2019-06-06 RX ORDER — FLUTICASONE PROPIONATE 50 MCG
SPRAY, SUSPENSION (ML) NASAL
Qty: 16 G | Refills: 2 | Status: ON HOLD | OUTPATIENT
Start: 2019-06-06 | End: 2019-07-02

## 2019-06-06 ASSESSMENT — ASTHMA QUESTIONNAIRES: ACT_TOTALSCORE: 16

## 2019-06-06 ASSESSMENT — ANXIETY QUESTIONNAIRES: GAD7 TOTAL SCORE: 1

## 2019-06-07 RX ORDER — MORPHINE SULFATE 30 MG/1
TABLET, FILM COATED, EXTENDED RELEASE ORAL
Qty: 60 TABLET | Refills: 0 | Status: SHIPPED | OUTPATIENT
Start: 2019-06-07 | End: 2019-07-03

## 2019-06-11 DIAGNOSIS — F41.1 GAD (GENERALIZED ANXIETY DISORDER): ICD-10-CM

## 2019-06-12 RX ORDER — DIAZEPAM 2 MG
TABLET ORAL
Qty: 60 TABLET | Refills: 0 | Status: SHIPPED | OUTPATIENT
Start: 2019-06-12 | End: 2019-07-08

## 2019-06-12 NOTE — TELEPHONE ENCOUNTER
Valium -  reviewed  Last visit: 5.8.19  Last refill: 5.8.19 #60    Future appointments:   Next 5 appointments (look out 90 days)    Jun 25, 2019  4:20 PM CDT  (Arrive by 4:00 PM)  Pre-Op physical with Lyle Fisher DO  [facility] ([facility] [place] ) [address]  [place] [address]  [phone]   Jul 10, 2019  2:40 PM CDT  (Arrive by 2:25 PM)  Return Visit with Laquita Fernandez MD  [facility] [place] ([facility] [place] ) [address] [place] [address]  [place] [id number] [phone]

## 2019-06-17 DIAGNOSIS — M54.5 LOW BACK PAIN, UNSPECIFIED BACK PAIN LATERALITY, UNSPECIFIED CHRONICITY, WITH SCIATICA PRESENCE UNSPECIFIED: ICD-10-CM

## 2019-06-17 NOTE — TELEPHONE ENCOUNTER
HYDROcodone-acetaminophen (NORCO)  MG per tablet      Last Written Prescription Date:  5/23/19  Last Fill Quantity: 60,   # refills: 0  Last Office Visit: 5/30/19  Future Office visit:    Next 5 appointments (look out 90 days)    Jun 25, 2019  4:20 PM CDT  (Arrive by 4:00 PM)  Pre-Op physical with Lyle Fisher DO  [facility] ([facility] ) [address] [place] [id number]  [phone]   Jul 10, 2019  2:40 PM CDT  (Arrive by 2:25 PM)  Return Visit with Laquita Fernandez MD  [facility] ([facility] ) [address] [place] [phone]           Routing refill request to provider for review/approval because:  Drug not on the [facility], [facility] or  Health refill protocol or controlled substance    Start date changed

## 2019-06-18 RX ORDER — HYDROCODONE BITARTRATE AND ACETAMINOPHEN 10; 325 MG/1; MG/1
1 TABLET ORAL 2 TIMES DAILY PRN
Qty: 60 TABLET | Refills: 0 | Status: ON HOLD | OUTPATIENT
Start: 2019-06-21 | End: 2019-07-02

## 2019-06-19 NOTE — PATIENT INSTRUCTIONS
Before Your Surgery      Call your surgeon if there is any change in your health. This includes signs of a cold or flu (such as a sore throat, runny nose, cough, rash or fever).    Do not smoke, drink alcohol or take over the counter medicine (unless your surgeon or primary care doctor tells you to) for the 24 hours before and after surgery.    If you take prescribed drugs: Follow your doctor s orders about which medicines to take and which to stop until after surgery.    Eating and drinking prior to surgery: follow the instructions from your surgeon    Take a shower or bath the night before surgery. Use the soap your surgeon gave you to gently clean your skin. If you do not have soap from your surgeon, use your regular soap. Do not shave or scrub the surgery site.  Wear clean pajamas and have clean sheets on your bed.         Hold your Losartan the day of surgery.
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
## 2019-06-22 DIAGNOSIS — M54.5 LOW BACK PAIN, UNSPECIFIED BACK PAIN LATERALITY, UNSPECIFIED CHRONICITY, WITH SCIATICA PRESENCE UNSPECIFIED: ICD-10-CM

## 2019-06-22 DIAGNOSIS — G89.4 CHRONIC PAIN SYNDROME: ICD-10-CM

## 2019-06-22 NOTE — NURSING NOTE
"Chief Complaint   Patient presents with     Back Pain       Initial /76 (BP Location: Right arm, Patient Position: Chair, Cuff Size: Adult Large)  Pulse 80  Temp 98.6  F (37  C) (Tympanic)  Resp 20  SpO2 98% Estimated body mass index is 26.58 kg/(m^2) as calculated from the following:    Height as of 2/15/18: 5' 9\" (1.753 m).    Weight as of an earlier encounter on 12/5/18: 180 lb (81.6 kg).  Medication Reconciliation: complete    Chelo Rader LPN    " No

## 2019-06-24 ENCOUNTER — TELEPHONE (OUTPATIENT)
Dept: INTERVENTIONAL RADIOLOGY/VASCULAR | Facility: HOSPITAL | Age: 59
End: 2019-06-24

## 2019-06-24 DIAGNOSIS — M54.50 LUMBAR BACK PAIN: ICD-10-CM

## 2019-06-24 DIAGNOSIS — M62.830 BACK MUSCLE SPASM: ICD-10-CM

## 2019-06-24 RX ORDER — BACLOFEN 20 MG/1
TABLET ORAL
Qty: 120 TABLET | Refills: 0 | Status: SHIPPED | OUTPATIENT
Start: 2019-06-24 | End: 2019-07-19

## 2019-06-24 RX ORDER — BACLOFEN 20 MG/1
TABLET ORAL
Qty: 120 TABLET | Refills: 0 | OUTPATIENT
Start: 2019-06-24

## 2019-06-24 RX ORDER — GABAPENTIN 300 MG/1
CAPSULE ORAL
Qty: 270 CAPSULE | Refills: 0 | Status: SHIPPED | OUTPATIENT
Start: 2019-06-24 | End: 2019-07-19

## 2019-06-24 NOTE — TELEPHONE ENCOUNTER
gabapentin (NEURONTIN) 300 MG capsule      Last Written Prescription Date:  5-20-19  Last Fill Quantity: 270,   # refills: 0  Last Office Visit: 5-30-19  Future Office visit:    Next 5 appointments (look out 90 days)    Jun 25, 2019  4:20 PM CDT  (Arrive by 4:00 PM)  [facility]-Op [facility] DO  [facility] [address] [place] [address]  [phone]   Jul 10, 2019  2:40 PM CDT  (Arrive by 2:25 PM)  Return Visit with Laquita Fernandez MD  [facility] [address] [place] [phone]           Routing refill request to provider for review/approval because:  Drug not on the [facility], [facility] or  Health refill protocol or controlled substance

## 2019-06-24 NOTE — TELEPHONE ENCOUNTER
baclofen (LIORESAL) 20 MG tablet      Last Written Prescription Date:  5-31-19  Last Fill Quantity: 120,   # refills: 0  Last Office Visit: 5-30-19  Future Office visit:    Next 5 appointments (look out 90 days)    Jun 25, 2019  4:20 PM CDT  (Arrive by 4:00 PM)  Pre-Op physical with Lyle Fisher DO  [facility] ([facility] ) [address] [place], [place] [address] [address]  [phone]   Jul 10, 2019  2:40 PM CDT  (Arrive by 2:25 PM)  Return Visit with Laquita Fernandez MD  [facility] ([facility] ) [address] [place] [phone]           Routing refill request to provider for review/approval because:  Drug not on the [facility], [facility] or [facility] refill protocol or controlled substance      tiZANidine (ZANAFLEX) 4 MG tablet (Discontinued on 5-30-19 by PCP- alternate therapy)  Last Written Prescription Date:  5-31-19  Last Fill Quantity: 90,   # refills: 0      Routing refill request to provider for review/approval because:  Drug not on the [facility], [facility] or [facility] refill protocol or controlled substance  Drug not active on patient's medication list

## 2019-06-25 ENCOUNTER — ANESTHESIA EVENT (OUTPATIENT)
Dept: SURGERY | Facility: HOSPITAL | Age: 59
End: 2019-06-25
Payer: COMMERCIAL

## 2019-06-25 ENCOUNTER — OFFICE VISIT (OUTPATIENT)
Dept: INTERNAL MEDICINE | Facility: OTHER | Age: 59
End: 2019-06-25
Attending: INTERNAL MEDICINE
Payer: COMMERCIAL

## 2019-06-25 VITALS
HEART RATE: 77 BPM | SYSTOLIC BLOOD PRESSURE: 128 MMHG | OXYGEN SATURATION: 98 % | WEIGHT: 181.6 LBS | TEMPERATURE: 98 F | BODY MASS INDEX: 26.06 KG/M2 | DIASTOLIC BLOOD PRESSURE: 40 MMHG

## 2019-06-25 DIAGNOSIS — G40.909 SEIZURE DISORDER (H): ICD-10-CM

## 2019-06-25 DIAGNOSIS — E78.2 MIXED HYPERLIPIDEMIA: ICD-10-CM

## 2019-06-25 DIAGNOSIS — Z01.818 PREOP GENERAL PHYSICAL EXAM: Primary | ICD-10-CM

## 2019-06-25 DIAGNOSIS — F33.1 MAJOR DEPRESSIVE DISORDER, RECURRENT EPISODE, MODERATE (H): ICD-10-CM

## 2019-06-25 DIAGNOSIS — J34.2 DEVIATED NASAL SEPTUM: ICD-10-CM

## 2019-06-25 DIAGNOSIS — J34.3 NASAL TURBINATE HYPERTROPHY: ICD-10-CM

## 2019-06-25 DIAGNOSIS — J45.40 MODERATE PERSISTENT ASTHMA WITHOUT COMPLICATION: ICD-10-CM

## 2019-06-25 DIAGNOSIS — I10 BENIGN ESSENTIAL HYPERTENSION: ICD-10-CM

## 2019-06-25 PROCEDURE — 99214 OFFICE O/P EST MOD 30 MIN: CPT | Performed by: INTERNAL MEDICINE

## 2019-06-25 PROCEDURE — G0463 HOSPITAL OUTPT CLINIC VISIT: HCPCS

## 2019-06-25 ASSESSMENT — LIFESTYLE VARIABLES: TOBACCO_USE: 1

## 2019-06-25 ASSESSMENT — ENCOUNTER SYMPTOMS: SEIZURES: 1

## 2019-06-25 ASSESSMENT — PAIN SCALES - GENERAL: PAINLEVEL: EXTREME PAIN (8)
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
## 2019-06-25 NOTE — NURSING NOTE
"No chief complaint on file.      Initial /40 (BP Location: Right arm, Patient Position: Chair, Cuff Size: Adult Large)   Pulse 77   Temp 98  F (36.7  C) (Tympanic)   Wt 82.4 kg (181 lb 9.6 oz)   SpO2 98%   BMI 26.06 kg/m   Estimated body mass index is 26.06 kg/m  as calculated from the following:    Height as of 6/3/19: 1.778 m (5' 10\").    Weight as of this encounter: 82.4 kg (181 lb 9.6 oz).  Medication Reconciliation: complete     Obi Rubio LPN      "

## 2019-06-28 ENCOUNTER — HOSPITAL ENCOUNTER (OUTPATIENT)
Dept: PHYSICAL THERAPY | Facility: HOSPITAL | Age: 59
Setting detail: THERAPIES SERIES
End: 2019-06-28
Attending: INTERNAL MEDICINE
Payer: COMMERCIAL

## 2019-06-28 PROCEDURE — 97162 PT EVAL MOD COMPLEX 30 MIN: CPT | Mod: GP | Performed by: PHYSICAL THERAPIST

## 2019-06-28 PROCEDURE — 97110 THERAPEUTIC EXERCISES: CPT | Mod: GP | Performed by: PHYSICAL THERAPIST

## 2019-06-28 NOTE — PROGRESS NOTES
06/28/19 1500   General Information   Type of Visit Initial OP Ortho PT Evaluation   Start of Care Date 06/28/19   Referring Physician Dr Perez   Patient/Family Goals Statement less pain, strengthen back muscles   Orders Evaluate and Treat   Date of Order 05/17/19   Medical Diagnosis chronic back pain   Surgical/Medical history reviewed Yes   Precautions/Limitations no known precautions/limitations   Body Part(s)   Body Part(s) Lumbar Spine/SI   Presentation and Etiology   Pertinent history of current problem (include personal factors and/or comorbidities that impact the POC) Patient reports a long-standing history of back pain associated with a fusion he had in 2007. He is fused from L2-S1. He has been progressively losing motion in his back to the point where he walks haunched forward quite severely. He said his back feel better when he is in a more extended position, but his legs and back fatigue very quickly. He is sometimes bedridden due to his pain and the popping in his back. His physicians indicate that he is not a good surgical candidate and they would like him to perform strengthening in physical therapy to improve his overall function.   Impairments A. Pain;D. Decreased ROM;E. Decreased flexibility;F. Decreased strength and endurance;H. Impaired gait   Functional Limitations perform activities of daily living;perform required work activities;perform desired leisure / sports activities   Symptom Location lower back and B legs   How/Where did it occur   (Chronic, following surgery)   Chronicity Chronic   Best (/10) 6   Worst (/10) 8   Pain quality A. Sharp;C. Aching;D. Burning;F. Stabbing;G. Cramping   Frequency of pain/symptoms A. Constant   Pain/symptoms are: The same all the time   Pain/symptoms exacerbated by A. Sitting;B. Walking;C. Lifting;D. Carrying;E. Rest;G. Certain positions;H. Overhead reach;J. ADL;K. Home tasks;L. Work tasks   Pain/symptoms eased by A. Sitting;C. Rest   Progression of  symptoms since onset: Worsened   Current / Previous Interventions   Diagnostic Tests:   (Imaging associated with surgery)   Current Level of Function   Patient role/employment history E. Unemployed   Fall Risk Screen   Fall screen completed by PT   Have you fallen 2 or more times in the past year? Yes   Have you fallen and had an injury in the past year? Yes   Is patient a fall risk? Yes   Fall screen comments will address in PT   Lumbar Spine/SI Objective Findings   Observation significant forward flexion posture, difficulty keeping on task   Integumentary abraision open on L knee following fall secondary to seizure   Posture Forward flexed, noted hip flexor tightness   Gait/Locomotion Forward flexed, fatigues quickly   Flexion ROM full   Extension ROM viktoria ngo   Right Side Bending ROM WNL   Left Side Bending ROM WNL   Lumbar ROM Comment Improved pain with trunk EXT, but very weak, Tolerated standing EXT posture position for 1:20sec   Hip Flexion (L2) Strength 5/5   Hip Abduction Strength 4/5   Hip Adduction Strength 5/5   Hip Extension Strength 4/5   Knee Flexion Strength 5/5   Knee Extension (L3) Strength 4/5   Ankle Dorsiflexion (L4) Strength 5/5   Great Toe Extension (L5) Strength 5/5   Hamstring Flexibility hypo   Hip Flexor Flexibility hypo   Quadricep Flexibility hypo   SLR NT   Spring Test NT   Palpation Increased tone appreciated in lumbar spine, QL, paraspinals   Planned Therapy Interventions   Planned Therapy Interventions manual therapy;ROM;strengthening;stretching   Planned Modality Interventions   Planned Modality Interventions Comments as needed   Clinical Impression   Criteria for Skilled Therapeutic Interventions Met yes, treatment indicated   PT Diagnosis chronic lower back pain, s/p multi-level fusion   Influenced by the following impairments pain and weakness   Functional limitations due to impairments difficulty performing home tasks   Clinical Presentation Evolving/Changing   Clinical  Presentation Rationale due to additional comorbidites and personal contextual factors influencing anticipated PT progress   Clinical Decision Making (Complexity) Moderate complexity   Therapy Frequency 2 times/Week   Predicted Duration of Therapy Intervention (days/wks) 8 weeks   Risk & Benefits of therapy have been explained Yes   Patient, Family & other staff in agreement with plan of care Yes   Clinical Impression Comments Presentation is consistent with chronic lower back pain with progressive weakness resulting in difficulty with walking and all mobility tasks that is hopeful to improve with skilled PT intervention, although his progress may be limited due to the influence of personal contextual factors that the patient has identified   Education Assessment   Preferred Learning Style Demonstration   Barriers to Learning Emotional  (attention, mental health)   ORTHO GOALS   PT Ortho Eval Goals 1;2;3   Ortho Goal 1   Goal Identifier STG 12   Goal Description Patient will be compliant with HEP in order to make progress while at home   Target Date 07/19/19   Ortho Goal 2   Goal Identifier LTG 1   Goal Description Patient will demonstrate erect posture while performing gait tasks for 3 minutes in order to improve his walking and mobility tolerance   Target Date 08/09/19   Ortho Goal 3   Goal Identifier LTG 2   Goal Description Patient will report overall 50% or more improvement in his back and leg symptoms in order to improve his function at home   Target Date 08/23/19   Total Evaluation Time   PT Eval, Moderate Complexity Minutes (48764) 45

## 2019-07-01 ENCOUNTER — HOSPITAL ENCOUNTER (OUTPATIENT)
Dept: INTERVENTIONAL RADIOLOGY/VASCULAR | Facility: HOSPITAL | Age: 59
Discharge: HOME | End: 2019-07-01
Attending: PHYSICIAN ASSISTANT | Admitting: PHYSICIAN ASSISTANT
Payer: COMMERCIAL

## 2019-07-01 DIAGNOSIS — M54.6 THORACIC SPINE PAIN: ICD-10-CM

## 2019-07-01 DIAGNOSIS — M54.50 LOW BACK PAIN: ICD-10-CM

## 2019-07-01 DIAGNOSIS — R52 PAIN: ICD-10-CM

## 2019-07-01 DIAGNOSIS — M51.34 THORACIC DEGENERATIVE DISC DISEASE: ICD-10-CM

## 2019-07-01 DIAGNOSIS — I10 ESSENTIAL HYPERTENSION, BENIGN: ICD-10-CM

## 2019-07-01 PROCEDURE — 25000128 H RX IP 250 OP 636: Performed by: RADIOLOGY

## 2019-07-01 PROCEDURE — 64479 NJX AA&/STRD TFRM EPI C/T 1: CPT | Mod: TC,50

## 2019-07-01 RX ORDER — LIDOCAINE HYDROCHLORIDE 10 MG/ML
INJECTION, SOLUTION EPIDURAL; INFILTRATION; INTRACAUDAL; PERINEURAL
Status: DISCONTINUED
Start: 2019-07-01 | End: 2019-07-01 | Stop reason: WASHOUT

## 2019-07-01 RX ORDER — DEXAMETHASONE SODIUM PHOSPHATE 10 MG/ML
INJECTION, SOLUTION INTRAMUSCULAR; INTRAVENOUS
Status: COMPLETED
Start: 2019-07-01 | End: 2019-07-02

## 2019-07-01 RX ORDER — DEXAMETHASONE SODIUM PHOSPHATE 10 MG/ML
10 INJECTION, SOLUTION INTRAMUSCULAR; INTRAVENOUS ONCE
Status: COMPLETED | OUTPATIENT
Start: 2019-07-01 | End: 2019-07-01

## 2019-07-01 RX ORDER — LIDOCAINE HYDROCHLORIDE 10 MG/ML
5 INJECTION, SOLUTION INFILTRATION; PERINEURAL ONCE
Status: COMPLETED | OUTPATIENT
Start: 2019-07-01 | End: 2019-07-01

## 2019-07-01 RX ORDER — LIDOCAINE HYDROCHLORIDE 10 MG/ML
INJECTION, SOLUTION EPIDURAL; INFILTRATION; INTRACAUDAL; PERINEURAL
Status: DISPENSED
Start: 2019-07-01 | End: 2019-07-01

## 2019-07-01 RX ORDER — IOPAMIDOL 612 MG/ML
15 INJECTION, SOLUTION INTRATHECAL ONCE
Status: COMPLETED | OUTPATIENT
Start: 2019-07-01 | End: 2019-07-01

## 2019-07-01 RX ADMIN — DEXAMETHASONE SODIUM PHOSPHATE 10 MG: 10 INJECTION, SOLUTION INTRAMUSCULAR; INTRAVENOUS at 11:49

## 2019-07-01 RX ADMIN — DEXAMETHASONE SODIUM PHOSPHATE 10 MG: 10 INJECTION, SOLUTION INTRAMUSCULAR; INTRAVENOUS at 11:48

## 2019-07-01 RX ADMIN — IOPAMIDOL 2 ML: 612 INJECTION, SOLUTION INTRATHECAL at 11:47

## 2019-07-01 RX ADMIN — LIDOCAINE HYDROCHLORIDE 5 ML: 10 INJECTION, SOLUTION INFILTRATION; PERINEURAL at 11:48

## 2019-07-01 NOTE — DISCHARGE INSTRUCTIONS
Home number on file [phone] (home)  Is it ok to leave a message at this number(s)? Yes    Dr. Guido completed your procedure on 7/1/2019.    Current Pain Level (0-10 Scale): 7/10  Post Pain Level (0-10):  6/10    Radiology Discharge instructions for Steroid Injection    Activity Level:     Do not do any heavy activity or exercise for 24 hours.   Do not drive for 4 hours after your injection.  Diet:   Return to your normal diet.  Medications:   If you have stopped taking your Aspirin, Coumadin/Warfarin, Ibuprofen, or any   other blood thinner for this procedure you may resume in the morning unless   your primary care provider has given you other instructions.    Diabetics may see an increase in blood sugar after steroid injections. If you are concerned about your blood sugar, please contact your family doctor.    Site Care:  Remove the bandage and bathe or shower the morning after the procedure.      Please allow two weeks to experience improvement in your pain.  If you have any further issues, please contact your provider.    Call your Primary Care Provider if you have the following (if your primary care provider is not available please seek emergency care):   Nausea with vomiting   Severe headache   Drowsiness or confusion   Redness or drainage at the injection or puncture site   Temperature over 101 degrees F   Other concerns   Worsening back pain   Stiff neck

## 2019-07-01 NOTE — IP AVS SNAPSHOT
[facility]  [address] [address]  Phone:  [phone]  Fax:  [phone]                                    After Visit Summary   7/1/2019    Joshua Barron    MRN: [id number]           After Visit Summary Signature Page    I have received my discharge instructions, and my questions have been answered. I have discussed any challenges I see with this plan with the nurse or doctor.    ..........................................................................................................................................  Patient/Patient Representative Signature      ..........................................................................................................................................  Patient Representative Print Name and Relationship to Patient    ..................................................               ................................................  Date                                   Time    ..........................................................................................................................................  Reviewed by Signature/Title    ...................................................              ..............................................  Date                                               Time          22EPIC Rev 08/18

## 2019-07-02 ENCOUNTER — TELEPHONE (OUTPATIENT)
Dept: OTOLARYNGOLOGY | Facility: OTHER | Age: 59
End: 2019-07-02

## 2019-07-02 ENCOUNTER — HOSPITAL ENCOUNTER (OUTPATIENT)
Facility: HOSPITAL | Age: 59
Discharge: HOME | End: 2019-07-02
Attending: OTOLARYNGOLOGY | Admitting: OTOLARYNGOLOGY
Payer: COMMERCIAL

## 2019-07-02 ENCOUNTER — ANESTHESIA (OUTPATIENT)
Dept: SURGERY | Facility: HOSPITAL | Age: 59
End: 2019-07-02
Payer: COMMERCIAL

## 2019-07-02 VITALS
TEMPERATURE: 98.4 F | HEART RATE: 114 BPM | DIASTOLIC BLOOD PRESSURE: 71 MMHG | OXYGEN SATURATION: 97 % | SYSTOLIC BLOOD PRESSURE: 142 MMHG | RESPIRATION RATE: 20 BRPM

## 2019-07-02 DIAGNOSIS — Z98.890 POST-OPERATIVE STATE: Primary | ICD-10-CM

## 2019-07-02 DIAGNOSIS — Z98.890 POSTOPERATIVE STATE: Primary | ICD-10-CM

## 2019-07-02 DIAGNOSIS — M54.5 LOW BACK PAIN, UNSPECIFIED BACK PAIN LATERALITY, UNSPECIFIED CHRONICITY, WITH SCIATICA PRESENCE UNSPECIFIED: ICD-10-CM

## 2019-07-02 PROCEDURE — 30520 REPAIR OF NASAL SEPTUM: CPT | Performed by: ANESTHESIOLOGY

## 2019-07-02 PROCEDURE — 71000016 ZZH RECOVERY PHASE 1 LEVEL 3 FIRST HR: Performed by: OTOLARYNGOLOGY

## 2019-07-02 PROCEDURE — 30520 REPAIR OF NASAL SEPTUM: CPT | Performed by: OTOLARYNGOLOGY

## 2019-07-02 PROCEDURE — 27210794 ZZH OR GENERAL SUPPLY STERILE: Performed by: OTOLARYNGOLOGY

## 2019-07-02 PROCEDURE — 36000058 ZZH SURGERY LEVEL 3 EA 15 ADDTL MIN: Performed by: OTOLARYNGOLOGY

## 2019-07-02 PROCEDURE — 40000305 ZZH STATISTIC PRE PROC ASSESS I: Performed by: OTOLARYNGOLOGY

## 2019-07-02 PROCEDURE — 25000128 H RX IP 250 OP 636

## 2019-07-02 PROCEDURE — 37000008 ZZH ANESTHESIA TECHNICAL FEE, 1ST 30 MIN: Performed by: OTOLARYNGOLOGY

## 2019-07-02 PROCEDURE — 30930 THER FX NASAL INF TURBINATE: CPT | Performed by: OTOLARYNGOLOGY

## 2019-07-02 PROCEDURE — 71000027 ZZH RECOVERY PHASE 2 EACH 15 MINS: Performed by: OTOLARYNGOLOGY

## 2019-07-02 PROCEDURE — 30520 REPAIR OF NASAL SEPTUM: CPT | Performed by: NURSE ANESTHETIST, CERTIFIED REGISTERED

## 2019-07-02 PROCEDURE — 25800030 ZZH RX IP 258 OP 636: Performed by: NURSE ANESTHETIST, CERTIFIED REGISTERED

## 2019-07-02 PROCEDURE — 36000056 ZZH SURGERY LEVEL 3 1ST 30 MIN: Performed by: OTOLARYNGOLOGY

## 2019-07-02 PROCEDURE — 27110028 ZZH OR GENERAL SUPPLY NON-STERILE: Performed by: OTOLARYNGOLOGY

## 2019-07-02 PROCEDURE — 25000566 ZZH SEVOFLURANE, EA 15 MIN: Performed by: ANESTHESIOLOGY

## 2019-07-02 PROCEDURE — 25000125 ZZHC RX 250: Performed by: OTOLARYNGOLOGY

## 2019-07-02 PROCEDURE — 25000125 ZZHC RX 250

## 2019-07-02 PROCEDURE — 25000125 ZZHC RX 250: Performed by: ANESTHESIOLOGY

## 2019-07-02 PROCEDURE — 25000132 ZZH RX MED GY IP 250 OP 250 PS 637: Performed by: ANESTHESIOLOGY

## 2019-07-02 PROCEDURE — 37000009 ZZH ANESTHESIA TECHNICAL FEE, EACH ADDTL 15 MIN: Performed by: OTOLARYNGOLOGY

## 2019-07-02 RX ORDER — LIDOCAINE 40 MG/G
CREAM TOPICAL
Status: DISCONTINUED | OUTPATIENT
Start: 2019-07-02 | End: 2019-07-02 | Stop reason: HOSPADM

## 2019-07-02 RX ORDER — SCOLOPAMINE TRANSDERMAL SYSTEM 1 MG/1
1 PATCH, EXTENDED RELEASE TRANSDERMAL ONCE
Status: COMPLETED | OUTPATIENT
Start: 2019-07-02 | End: 2019-07-02

## 2019-07-02 RX ORDER — FENTANYL CITRATE 50 UG/ML
INJECTION, SOLUTION INTRAMUSCULAR; INTRAVENOUS PRN
Status: DISCONTINUED | OUTPATIENT
Start: 2019-07-02 | End: 2019-07-02

## 2019-07-02 RX ORDER — LIDOCAINE HYDROCHLORIDE 20 MG/ML
INJECTION, SOLUTION INFILTRATION; PERINEURAL PRN
Status: DISCONTINUED | OUTPATIENT
Start: 2019-07-02 | End: 2019-07-02

## 2019-07-02 RX ORDER — SODIUM CHLORIDE, SODIUM LACTATE, POTASSIUM CHLORIDE, CALCIUM CHLORIDE 600; 310; 30; 20 MG/100ML; MG/100ML; MG/100ML; MG/100ML
INJECTION, SOLUTION INTRAVENOUS CONTINUOUS
Status: DISCONTINUED | OUTPATIENT
Start: 2019-07-02 | End: 2019-07-02 | Stop reason: HOSPADM

## 2019-07-02 RX ORDER — DEXAMETHASONE SODIUM PHOSPHATE 4 MG/ML
4 INJECTION, SOLUTION INTRA-ARTICULAR; INTRALESIONAL; INTRAMUSCULAR; INTRAVENOUS; SOFT TISSUE EVERY 10 MIN PRN
Status: DISCONTINUED | OUTPATIENT
Start: 2019-07-02 | End: 2019-07-02 | Stop reason: HOSPADM

## 2019-07-02 RX ORDER — DEXAMETHASONE SODIUM PHOSPHATE 10 MG/ML
INJECTION, SOLUTION INTRAMUSCULAR; INTRAVENOUS PRN
Status: DISCONTINUED | OUTPATIENT
Start: 2019-07-02 | End: 2019-07-02

## 2019-07-02 RX ORDER — OXYMETAZOLINE HYDROCHLORIDE 0.05 G/100ML
2 SPRAY NASAL 2 TIMES DAILY
Qty: 1 BOTTLE | Refills: 0 | Status: SHIPPED | OUTPATIENT
Start: 2019-07-02 | End: 2020-08-20

## 2019-07-02 RX ORDER — FENTANYL CITRATE 50 UG/ML
25-50 INJECTION, SOLUTION INTRAMUSCULAR; INTRAVENOUS
Status: DISCONTINUED | OUTPATIENT
Start: 2019-07-02 | End: 2019-07-02 | Stop reason: HOSPADM

## 2019-07-02 RX ORDER — NALOXONE HYDROCHLORIDE 0.4 MG/ML
.1-.4 INJECTION, SOLUTION INTRAMUSCULAR; INTRAVENOUS; SUBCUTANEOUS
Status: DISCONTINUED | OUTPATIENT
Start: 2019-07-02 | End: 2019-07-02 | Stop reason: HOSPADM

## 2019-07-02 RX ORDER — LIDOCAINE HYDROCHLORIDE AND EPINEPHRINE 10; 10 MG/ML; UG/ML
INJECTION, SOLUTION INFILTRATION; PERINEURAL PRN
Status: DISCONTINUED | OUTPATIENT
Start: 2019-07-02 | End: 2019-07-02 | Stop reason: HOSPADM

## 2019-07-02 RX ORDER — KETOROLAC TROMETHAMINE 30 MG/ML
30 INJECTION, SOLUTION INTRAMUSCULAR; INTRAVENOUS EVERY 6 HOURS PRN
Status: DISCONTINUED | OUTPATIENT
Start: 2019-07-02 | End: 2019-07-02 | Stop reason: HOSPADM

## 2019-07-02 RX ORDER — OXYMETAZOLINE HYDROCHLORIDE 0.05 G/100ML
3 SPRAY NASAL
Status: COMPLETED | OUTPATIENT
Start: 2019-07-02 | End: 2019-07-02

## 2019-07-02 RX ORDER — PROPOFOL 10 MG/ML
INJECTION, EMULSION INTRAVENOUS PRN
Status: DISCONTINUED | OUTPATIENT
Start: 2019-07-02 | End: 2019-07-02

## 2019-07-02 RX ORDER — ONDANSETRON 2 MG/ML
4 INJECTION INTRAMUSCULAR; INTRAVENOUS EVERY 30 MIN PRN
Status: DISCONTINUED | OUTPATIENT
Start: 2019-07-02 | End: 2019-07-02 | Stop reason: HOSPADM

## 2019-07-02 RX ORDER — HYDROCODONE BITARTRATE AND ACETAMINOPHEN 7.5; 325 MG/1; MG/1
1 TABLET ORAL EVERY 6 HOURS PRN
Qty: 5 TABLET | Refills: 0 | Status: SHIPPED | OUTPATIENT
Start: 2019-07-02 | End: 2019-07-24

## 2019-07-02 RX ORDER — ACETAMINOPHEN 325 MG/1
TABLET ORAL
Qty: 200 TABLET | Refills: 1 | Status: SHIPPED | OUTPATIENT
Start: 2019-07-02 | End: 2019-08-26

## 2019-07-02 RX ORDER — LOSARTAN POTASSIUM 50 MG/1
TABLET ORAL
Qty: 30 TABLET | Refills: 9 | Status: SHIPPED | OUTPATIENT
Start: 2019-07-02 | End: 2020-05-13

## 2019-07-02 RX ORDER — ONDANSETRON 4 MG/1
4 TABLET, ORALLY DISINTEGRATING ORAL EVERY 30 MIN PRN
Status: DISCONTINUED | OUTPATIENT
Start: 2019-07-02 | End: 2019-07-02 | Stop reason: HOSPADM

## 2019-07-02 RX ORDER — ONDANSETRON 2 MG/ML
INJECTION INTRAMUSCULAR; INTRAVENOUS PRN
Status: DISCONTINUED | OUTPATIENT
Start: 2019-07-02 | End: 2019-07-02

## 2019-07-02 RX ORDER — IBUPROFEN 600 MG/1
TABLET, FILM COATED ORAL
Qty: 120 TABLET | Refills: 1 | Status: SHIPPED | OUTPATIENT
Start: 2019-07-02 | End: 2019-09-03

## 2019-07-02 RX ORDER — HYDROCODONE BITARTRATE AND ACETAMINOPHEN 7.5; 325 MG/1; MG/1
1 TABLET ORAL
Status: COMPLETED | OUTPATIENT
Start: 2019-07-02 | End: 2019-07-02

## 2019-07-02 RX ADMIN — Medication 100 MG: at 10:50

## 2019-07-02 RX ADMIN — ONDANSETRON 4 MG: 2 INJECTION INTRAMUSCULAR; INTRAVENOUS at 10:55

## 2019-07-02 RX ADMIN — OXYMETAZOLINE HYDROCHLORIDE 3 SPRAY: 0.05 SPRAY NASAL at 10:03

## 2019-07-02 RX ADMIN — SCOPALAMINE 1 PATCH: 1 PATCH, EXTENDED RELEASE TRANSDERMAL at 10:13

## 2019-07-02 RX ADMIN — DEXAMETHASONE SODIUM PHOSPHATE 10 MG: 10 INJECTION, SOLUTION INTRAMUSCULAR; INTRAVENOUS at 10:55

## 2019-07-02 RX ADMIN — SODIUM CHLORIDE, POTASSIUM CHLORIDE, SODIUM LACTATE AND CALCIUM CHLORIDE: 600; 310; 30; 20 INJECTION, SOLUTION INTRAVENOUS at 09:59

## 2019-07-02 RX ADMIN — PROPOFOL 200 MG: 10 INJECTION, EMULSION INTRAVENOUS at 10:49

## 2019-07-02 RX ADMIN — LIDOCAINE HYDROCHLORIDE 40 MG: 20 INJECTION, SOLUTION INFILTRATION; PERINEURAL at 10:47

## 2019-07-02 RX ADMIN — MIDAZOLAM 2 MG: 1 INJECTION INTRAMUSCULAR; INTRAVENOUS at 10:42

## 2019-07-02 RX ADMIN — OXYMETAZOLINE HYDROCHLORIDE 3 SPRAY: 0.05 SPRAY NASAL at 09:59

## 2019-07-02 RX ADMIN — OXYMETAZOLINE HYDROCHLORIDE 3 SPRAY: 0.05 SPRAY NASAL at 10:11

## 2019-07-02 RX ADMIN — HYDROCODONE BITARTRATE AND ACETAMINOPHEN 1 TABLET: 7.5; 325 TABLET ORAL at 13:16

## 2019-07-02 RX ADMIN — FENTANYL CITRATE 100 MCG: 50 INJECTION, SOLUTION INTRAMUSCULAR; INTRAVENOUS at 10:47

## 2019-07-02 NOTE — OR NURSING
Up w/o vertigo.Denies pain or nausea.Small amt bloody drainage from nares only.Patient and responsible adult given discharge instructions with no questions regarding instructions. Jt score 19. Pain level 0/10.  Discharged from unit via walking . Patient discharged to home with dad.

## 2019-07-02 NOTE — TELEPHONE ENCOUNTER
This patient was supposed to get Afrin from same day surgery, but it wasn't in his bag when he went home. He is wanting this called into the pharmacy. Please Advise.

## 2019-07-02 NOTE — OR NURSING
Taking juice,cookies and coffee w/o nausea.Med with lortab 7.5/325 po as pain prophylaxis.Mild burning nose only

## 2019-07-02 NOTE — OP NOTE
PREOPERATIVE DIAGNOSES:   1. Deviated nasal septum.   2. Turbinate hypertrophy.   3. Nasal obstruction.     POSTOPERATIVE DIAGNOSES:   1. Deviated nasal septum.   2. Turbinate hypertrophy.   3. Nasal obstruction.     PROCEDURES PERFORMED:   1. Septoplasty with reimplantation of cartilage .   2. Bilateral submucous resection of inferior turbinates.     SURGEON: Ivett Gonzalez D.O.  BLOOD LOSS: 10 mL.   COMPLICATIONS: None.   SPECIMENS: None.   ANESTHESIA: GETA.   INDICATIONS: Joshua Barron  presented to me with a lifelong history of chronic nasal obstruction.  On evaluation, the patient had severely deviated septum and turbinate hypertrophy. Therefore, my recommendation was for the above-named procedures. Preoperatively, risks discussed included the risks of infection, bleeding, the risks of general anesthesia, possible injury to the eyes, base of skull and tear duct system, septal perforation,  and possible failure of the surgery to achieve the desired result. The patient understood these risks and possible outcomes and wished to proceed.   OPERATIVE PROCEDURE: After being taken to the operating room and induction of general endotracheal tube anesthesia, the bed was rotated 90 degrees.  After that, he was prepped and draped in the normal clean fashion. I began by applying topical anesthetic in the form of 2 cottonoids on each side of the nose which had been soaked with a total of 4 mL of 4% liquid cocaine. In addition, I injected 1%lidocaine with 1:100,000 epinephrine into the base of the columella as well as the anterior insertion of the inferior turbinates bilaterally in preparation for later submucous resection.   I removed the cottonoids from the right side of the nose and entered the right nasal cavity.   I made a right hemitransfixion incision 2 mm posterior to the caudal septum.   I then dissected down onto cartilaginous septum and created a right anterior inferior tunnel with a volodymyr elevator.  I  then continued dissection through the right-sided incision to the left side. I then was able to start a mucoperichondrial pocket directly on the left side of the cartilaginous septum.  A left anterior-inferior as well as a posterior mucoperichondrial and periosteal flap was elevated with a volodymyr. After I completely elevated the mucoperichondrium off the left side of the septum, I then made a hemitransfixion incision through the cartilage approximately 1.5-2.0 cm back from the anterior edge. I broke over to the right side and raised a submucoperiosteal flap on the entire right side of the nasal septum.  I was able to carefully tease the mucoperichondrium off the large left  spur.  I intermittently used a 0 degree endoscope to better visualize the posterior extent of the septal deviation..  After this was done, I used a swivel knife to remove the entire rhomboid portion of the cartilaginous septum, and I removed it in one large piece. It was brought to the back table and morselized, and straightened with hash marks on the concavity.  I  then reinserted the cartilage back into the mucoperichondrial pocket. I laid the flaps back together and this significantly improved the nasal airway. I then closed my septoplasty incision with a running horizontal mattress suture of 4-0 vicryl and chronic.  The hemitransfixion incision was closed with interrupted 4-0 chromic.  The septum is intact and midline.   I proceeded to the final components of the operation, which was submucous resection of inferior turbinates.  1% lidocaine with 1-100,000 epinephrine was used to anesthetize the inferior turbinates bilaterally.  I placed a small nasal speculum and used Coblation at a setting of 6 to perform submucosal reduction of the turbinates bilaterally.  Outfracture was performed with a [place].  Hemostasis is adequate    At this point, the entire procedure was now complete. I reinspected both sides of the nose and there was good  hemostasis.  All instruments were accounted for and all counts were correct. The patient's bed was rotated 90 degrees back to the care of anesthesia. The patient was awakened, extubated and sent to the recovery room in good condition.

## 2019-07-02 NOTE — ANESTHESIA CARE TRANSFER NOTE
Patient: Joshua Barron    Procedure(s):  SEPTOPLASTY, BILATERAL TURBINATE REDUCTION    Diagnosis: DNS, NASAL TURBINATE HYPERTROPHY, MARLENE, SENSORINEURAL HEARING LOSS BILATERAL  Diagnosis Additional Information: No value filed.    Anesthesia Type:   General, ETT     Note:  Airway :Oral Airway and Face Mask  Patient transferred to:PACU  Handoff Report: Identifed the Patient, Identified the Reponsible Provider, Reviewed the pertinent medical history, Discussed the surgical course, Reviewed Intra-OP anesthesia mangement and issues during anesthesia, Set expectations for post-procedure period and Allowed opportunity for questions and acknowledgement of understanding      Vitals: (Last set prior to Anesthesia Care Transfer)    CRNA VITALS  7/2/2019 1136 - 7/2/2019 1213      7/2/2019             Resp Rate (observed):  3  (Abnormal)                 Electronically Signed By: Kavon Xavier  July 2, 2019  12:13 PM

## 2019-07-02 NOTE — ANESTHESIA CARE TRANSFER NOTE
Patient: Joshua Barron    Procedure(s):  SEPTOPLASTY, BILATERAL TURBINATE REDUCTION    Diagnosis: DNS, NASAL TURBINATE HYPERTROPHY, MARLENE, SENSORINEURAL HEARING LOSS BILATERAL  Diagnosis Additional Information: No value filed.    Anesthesia Type:   General, ETT     Note:  Airway :Face Mask  Patient transferred to:PACU  Handoff Report: Identifed the Patient, Identified the Reponsible Provider, Reviewed the pertinent medical history, Discussed the surgical course, Reviewed Intra-OP anesthesia mangement and issues during anesthesia, Set expectations for post-procedure period and Allowed opportunity for questions and acknowledgement of understanding      Vitals: (Last set prior to Anesthesia Care Transfer)    CRNA VITALS  7/2/2019 1136 - 7/2/2019 1212      7/2/2019             Resp Rate (observed):  3  (Abnormal)                 Electronically Signed By: ZBIGNIEW Hazel CRNA  July 2, 2019  12:12 PM

## 2019-07-02 NOTE — DISCHARGE INSTRUCTIONS
Post-Anesthesia Patient Instructions    IMMEDIATELY FOLLOWING SURGERY:  Do not drive or operate machinery for the first twenty four hours after surgery.  Do not make any important decisions for twenty four hours after surgery or while taking narcotic pain medications or sedatives.  If you develop intractable nausea and vomiting or a severe headache please notify your doctor immediately.    FOLLOW-UP:  Please make an appointment with your surgeon as instructed. You do not need to follow up with anesthesia unless specifically instructed to do so.    WOUND CARE INSTRUCTIONS (if applicable):  Keep a dry clean dressing on the anesthesia/puncture wound site if there is drainage.  Once the wound has quit draining you may leave it open to air.  Generally you should leave the bandage intact for twenty four hours unless there is drainage.  If the epidural site drains for more than 36-48 hours please call the anesthesia department.    QUESTIONS?:  Please feel free to call your physician or the hospital  if you have any questions, and they will be happy to assist you.   Instructions for Nasal Surgery    Recovery - Everyone recovers differently from a general anesthetic.  Symptoms such as fatigue, nausea, light-headedness, and sometimes a low grade fever (up to 100 degrees) are not unusual.  As your body removes the anesthetic drugs from circulation, these symptoms will resolve.  Your nose will be sore after surgery, and you may even have symptoms similar to a sinus infection with headache, congestion, and pressure.  These will resolve with healing.  For several days you may experience bloody drainage from the nose, please use the drip pad as necessary for this.  If there is persistent bleeding, please call the office during business hours or the on call ENT physician after hours.  There are no diet restrictions after sinus surgery, and you can resume your home medications.  Please do not blow your nose until  two weeks after surgery.  Limit your activity to no strenuous activities until I see you for the first follow-up visit in approximately 2 weeks.      Do not manipulate your nose or touch the bony part of your nose.  Avoid wearing sunglasses or glasses for 2 weeks.  Leave the steri strips in place and keep them dry.  We will remove them at your follow up.    Medications - Use just plain Tylenol (or ibuprofen (advil) if allowed by Dr. Gonzalez) as needed for pain.    If you were sent home with an antibiotic, it is primarily used to help the healing process.  If it causes loose bowel movements or other signs of intolerance, it is appropriate to discontinue it.  By far the most important measure you can take to speed recovery, and maximize the chances of long term success of nasal surgery is using the sinus rinses at least three time per day for the first month after surgery.   Start using harlan med saline spray today and use gentle irrigation to both nostrils at least 5 times a day for 1 month, then as directed.   The more you keep your nose moist with saline the better you will recover.    Follow-up -  Follow up appointments are necessary  to aggressively manage the most common complication,  which is scar tissue blocking the nasal airway.  These visits will require the examination of your nose and possibly removal of crusts of dry mucous and blood, with possible removal of early scar tissue.  Please prepare for these visits by using your nasal irrigations.  See Avis Freire ENT PA within 1 month after surgery.    If there are any questions or issues with the above, or if there are other issues that concern you, always feel free to call the clinic and I am happy to speak with you as soon as I can.    Ivett Gonzalez D.O.  Otolaryngology/Head and Neck Surgery  Allergy    [phone] office      Scopolamine Patch  Remove the scopolamine patch behind your right ear after 24 hours after application.   After  removing the patch, wash your hands and the area behind your ear thoroughly with soap and water.   The patch will still contain some medicine after use.   To avoid accidental contact or ingestion by children or pets, fold the used patch in half with the sticky side together and throw away in the trash out of the reach of children and pets.

## 2019-07-02 NOTE — OR NURSING
Awakened. Oral airway removed. Spit out small amt blood. Swished and gargled with small amount water. Cleared

## 2019-07-03 DIAGNOSIS — G89.4 CHRONIC PAIN SYNDROME: ICD-10-CM

## 2019-07-03 DIAGNOSIS — M51.379 DEGENERATION OF LUMBAR OR LUMBOSACRAL INTERVERTEBRAL DISC: ICD-10-CM

## 2019-07-03 NOTE — ANESTHESIA POSTPROCEDURE EVALUATION
Patient: Joshua Barron    Procedure(s):  SEPTOPLASTY, BILATERAL TURBINATE REDUCTION    Diagnosis:DNS, NASAL TURBINATE HYPERTROPHY, MARLENE, SENSORINEURAL HEARING LOSS BILATERAL  Diagnosis Additional Information: No value filed.    Anesthesia Type:  General, ETT    Note:  Anesthesia Post Evaluation    Patient location during evaluation: Phase 2, Bedside and PACU  Patient participation: Able to fully participate in evaluation  Level of consciousness: awake and alert  Pain management: adequate  Airway patency: patent  Cardiovascular status: acceptable  Respiratory status: acceptable  Hydration status: stable  PONV: none     Anesthetic complications: None          Last vitals:  Vitals:    07/02/19 1330 07/02/19 1335 07/02/19 1340   BP: 129/78 142/71    Pulse:      Resp: 20 20    Temp:  98.4  F (36.9  C)    SpO2: (!) 91% 92% 97%         Electronically Signed By: Tj Robert MD  July 3, 2019  7:28 AM

## 2019-07-05 RX ORDER — MORPHINE SULFATE 30 MG/1
TABLET, FILM COATED, EXTENDED RELEASE ORAL
Qty: 60 TABLET | Refills: 0 | Status: SHIPPED | OUTPATIENT
Start: 2019-07-05 | End: 2019-08-13

## 2019-07-05 NOTE — TELEPHONE ENCOUNTER
morphine (MS CONTIN) 30 MG CR tablet      Last Written Prescription Date:  6/7/19  Last Fill Quantity: 60,   # refills: 0  Last Office Visit: 6/25/19  Future Office visit:    Next 5 appointments (look out 90 days)    Jul 10, 2019  2:40 PM CDT  (Arrive by 2:25 PM)  [facility] [address] [place] [id number]  [phone]           Routing refill request to provider for review/approval because:  Drug not on the [facility], [facility] or  Health refill protocol or controlled substance

## 2019-07-05 NOTE — TELEPHONE ENCOUNTER
RX morphine walked to [facility]

## 2019-07-08 DIAGNOSIS — F41.1 GAD (GENERALIZED ANXIETY DISORDER): ICD-10-CM

## 2019-07-08 DIAGNOSIS — R11.0 NAUSEA: ICD-10-CM

## 2019-07-08 DIAGNOSIS — J45.40 MODERATE PERSISTENT ASTHMA WITHOUT COMPLICATION: Primary | ICD-10-CM

## 2019-07-09 RX ORDER — DRONABINOL 2.5 MG/1
CAPSULE ORAL
Qty: 60 CAPSULE | Refills: 0 | Status: SHIPPED | OUTPATIENT
Start: 2019-07-12 | End: 2019-08-09

## 2019-07-09 RX ORDER — HYDROXYZINE PAMOATE 50 MG/1
CAPSULE ORAL
Qty: 120 CAPSULE | Refills: 0 | Status: SHIPPED | OUTPATIENT
Start: 2019-07-09 | End: 2019-07-27

## 2019-07-09 RX ORDER — DIAZEPAM 2 MG
TABLET ORAL
Qty: 60 TABLET | Refills: 0 | Status: SHIPPED | OUTPATIENT
Start: 2019-07-12 | End: 2019-08-09

## 2019-07-09 RX ORDER — ALBUTEROL SULFATE 90 UG/1
AEROSOL, METERED RESPIRATORY (INHALATION)
Qty: 18 G | Refills: 0 | Status: SHIPPED | OUTPATIENT
Start: 2019-07-09 | End: 2019-09-09

## 2019-07-09 NOTE — TELEPHONE ENCOUNTER
diazepam (VALIUM) 2 MG tablet      Last Written Prescription Date:  6/12/19  Last Fill Quantity: 60,   # refills: 0  Last Office Visit: 5/8/19  Future Office visit:    Next 5 appointments (look out 90 days)    Jul 10, 2019  2:40 PM CDT  (Arrive by 2:25 PM)  [facility] with [facility]  [facility] [address] [place] [id number]  [phone]           Routing refill request to provider for review/approval because:  Drug not on the [facility], [facility] or [facility] refill protocol or controlled substance    Start date changed

## 2019-07-09 NOTE — TELEPHONE ENCOUNTER
dronabinol (MARINOL) 2.5 MG capsule      Last Written Prescription Date:  6/13/19  Last Fill Quantity: 60,   # refills: 0  Last Office Visit: 6/25/19  Future Office visit:    Next 5 appointments (look out 90 days)    Jul 10, 2019  2:40 PM CDT  (Arrive by 2:25 PM)  Return Visit with Laquita Fernandez MD  [facility] ([facility] ) [address] [place] [id number] [phone]           Routing refill request to provider for review/approval because:  Drug not on the [facility], [facility] or [facility] refill protocol or controlled substance    Start date changed

## 2019-07-10 ENCOUNTER — OFFICE VISIT (OUTPATIENT)
Dept: PSYCHIATRY | Facility: OTHER | Age: 59
End: 2019-07-10
Attending: PSYCHIATRY & NEUROLOGY
Payer: COMMERCIAL

## 2019-07-10 VITALS
HEIGHT: 70 IN | SYSTOLIC BLOOD PRESSURE: 140 MMHG | DIASTOLIC BLOOD PRESSURE: 84 MMHG | BODY MASS INDEX: 25.77 KG/M2 | WEIGHT: 180 LBS | HEART RATE: 113 BPM | TEMPERATURE: 98.4 F

## 2019-07-10 DIAGNOSIS — Z79.899 ENCOUNTER FOR LONG-TERM (CURRENT) USE OF MEDICATIONS: Primary | ICD-10-CM

## 2019-07-10 PROBLEM — F90.2 ATTENTION DEFICIT HYPERACTIVITY DISORDER (ADHD), COMBINED TYPE: Status: ACTIVE | Noted: 2019-07-10

## 2019-07-10 PROCEDURE — 99213 OFFICE O/P EST LOW 20 MIN: CPT | Performed by: PSYCHIATRY & NEUROLOGY

## 2019-07-10 PROCEDURE — G0463 HOSPITAL OUTPT CLINIC VISIT: HCPCS

## 2019-07-10 ASSESSMENT — PAIN SCALES - GENERAL: PAINLEVEL: EXTREME PAIN (8)

## 2019-07-10 ASSESSMENT — MIFFLIN-ST. JEOR: SCORE: 1652.72

## 2019-07-10 NOTE — PROGRESS NOTES
"  PSYCHIATRY CLINIC PROGRESS NOTE   20 minute medication management, more than 50% of time spent counseling patient on medications, medication side effects, symptom history and management                                                                       Social- Was  twice. Lives alone with his dog Montserrat (beltran) and 3 cats: Win the cat. Has a GF in [place]  Children-  2 kids, they are in [place]  Last visit: He is back taking nortriptyline 100 mg daily and he stopped seroquel.     Continue Trileptal 600 mg bid,  vyvanse 70 mg daily and last script for April 24 and gave script for 5/23/19 and continue diazepam 2 mg every 12 hours as needed and continue trazodone 100 mg bedtime prn insomnia    - Joshua had septoplasty and he notes thus far \"the jury is out\"  - I noted I got notice of him interested in trying Depakote. Joshua notes he does NOT want to take Depakote because he had muscle jerks / tremor.  - not taking aspirin and notes his energy is up / better  - \"I feel like I have plenty of energy right now but all my vitals are right on\"  - Dr. Pettit about year ago was his last neurosurg apptmt  - parents are back. They [facility] [place]  - Lots of family issues: Joshua has taken care of his parents and yet parents give his other brothers everything. oldest of 3 brothers. Brother in [place] youngest [place] and Major is here. Mom and dad here out on [place]. Joshua noting moving here to be closer to parents and help them, etc. But, parents don't seem to want him around much or talk to him.    SUBSTANCE USE- denies abuse of meds or substances    SYMPTOMS- paranoia,  issues with attention and concentration improved with Vyvanse: racing thoughts, depressed mood, impulsivity, distractibility  MEDICAL ROS- back pain, denies any issues with headaches or stomach pain / issues with stimulant. Intentional weight loss  MEDICAL / SURGICAL HISTORY                     Patient Active Problem List   Diagnosis     Mixed hyperlipidemia     " Tobacco Abuse, History of     Degeneration of lumbar or lumbosacral intervertebral disc     Depression, major     Chronic, continuous use of opioids     Chemical dependency (H)     Chronic rhinitis     Tinnitus of both ears     SNHL (sensorineural hearing loss)     Bipolar disorder (H)     Seizure-like activity (H)     Somatic dysfunction of pelvis region     Bilateral foot pain     Prostate cancer screening     Trigger index finger of right hand     Moderate persistent asthma without complication     Chronic lower back pain     ACP (advance care planning)     Onychia of toe of left foot     DDD (degenerative disc disease), lumbar     Seizure disorder (H)     Back muscle spasm     Benign essential hypertension     Retrograde ejaculation     Allergic rhinitis due to other allergen     Cervical spondylosis without myelopathy     Chronic headaches     DDD (degenerative disc disease)     Generalized anxiety disorder     Hypertrophy of prostate without urinary obstruction and other lower urinary tract symptoms (LUTS)     GERD (gastroesophageal reflux disease)     Lumbago     Major depressive disorder, recurrent episode, moderate (H)     Myalgia and myositis     Hyperlipidemia     Other pain disorders related to psychological factors     Spinal stenosis in cervical region     Status post lumbar spinal fusion     Tobacco use disorder     Trigger finger     Polypharmacy     Deviated nasal septum     ALLERGY   Cymbalta; Depakote [valproic acid]; and Seasonal allergies  MEDICATIONS                                                                                             Current Outpatient Medications   Medication Sig     acetaminophen (TYLENOL) 325 MG tablet TAKE 2 TABLETS BY MOUTH EVERY 4 HOURS AS NEEDED FOR MILD PAIN     albuterol (VENTOLIN HFA) 108 (90 Base) MCG/ACT inhaler USE 2 PUFFS 4 TIMES A DAY AS NEEDED FOR SHORTNESS OF BREATH     baclofen (LIORESAL) 20 MG tablet TAKE 1 TABLET BY MOUTH 4 TIMES DAILY     budesonide  (PULMICORT) 0.5 MG/2ML neb solution Squirt entire vial into previously made harlan med saline bottle, mix, irrigate both nostrils until entire bottle empty. Do this twice daily.     [START ON 7/12/2019] diazepam (VALIUM) 2 MG tablet TAKE 1 TABLET BY MOUTH EVERY 12 HOURS AS NEEDED FOR ANXIETY     diclofenac (VOLTAREN) 50 MG EC tablet TAKE 1 TABLET BY MOUTH 3 TIMES DAILY     docusate sodium (DOK) 100 MG capsule TAKE 1 CAPSULE BY MOUTH TWICE DAILY     [START ON 7/12/2019] dronabinol (MARINOL) 2.5 MG capsule TAKE 1 CAPSULE BY MOUTH 2 TIMES DAILY BEFORE MEALS     fluticasone (FLONASE) 50 MCG/ACT nasal spray USE 2 SPRAYS IN EACH NOSTRIL DAILY     gabapentin (NEURONTIN) 300 MG capsule TAKE 3 CAPSULES BY MOUTH THREE TIMES DAILY     HYDROcodone-acetaminophen (NORCO) 7.5-325 MG per tablet Take 1 tablet by mouth every 6 hours as needed for severe pain (Not to exceed > 4,000 acetaminophen in 24 hour period)     hydrOXYzine (VISTARIL) 50 MG capsule TAKE 1 TO 2 CAPSULES BY MOUTH 4 TIMES DAILY AS NEEDED FOR ANXIETY     ibuprofen (IBU) 600 MG tablet TAKE 1 TABLET BY MOUTH EVERY 6 HOURS AS NEEDED     lidocaine (LIDODERM) 5 % patch Place 3 patches on daily for 12 hours and remove and replace with three patches ( 6 patches per day)     lisdexamfetamine (VYVANSE) 70 MG capsule Take 1 capsule (70 mg) by mouth daily     losartan (COZAAR) 50 MG tablet TAKE 1 TABLET BY MOUTH DAILY     methocarbamol (ROBAXIN) 500 MG tablet TAKE 1 TABLET BY MOUTH 3 TIMES DAILY     mometasone-formoterol (DULERA) 200-5 MCG/ACT inhaler Inhale 2 puffs into the lungs 2 times daily     morphine (MS CONTIN) 30 MG CR tablet TAKE 1 TABLET BY MOUTH EVERY 12 HOURS     multivitamin  with iron (SM COMPLETE ADVANCED FORMULA) TABS TAKE 1 TABLET BY MOUTH DAILY     naloxone (NARCAN) 1 mg/mL for intranasal kit (2 syringes with 2 mucosal atomizer device) In opioid overdose put cone in nostril and push 1/2 of contents into each nostril.  Repeat every 3 min if no response until  "help arrives.     nicotine polacrilex (NICORETTE) 2 MG gum CHEW 1 PIECE AS NEEDED FOR SMOKING CESSATION     nortriptyline (PAMELOR) 50 MG capsule Take 2 capsules (100 mg) by mouth At Bedtime     omeprazole (PRILOSEC) 20 MG DR capsule TAKE 1 CAPSULE BY MOUTH EVERY DAY BEFORE A MEAL     order for DME 1 boa back brace     ORDER FOR DME Equipment being ordered: Large gloves     OXcarbazepine (TRILEPTAL) 600 MG tablet TAKE 1 TABLET BY MOUTH 2 TIMES DAILY     oxymetazoline (AFRIN) 0.05 % nasal spray Spray 2 sprays into both nostrils 2 times daily Use as directed post surgical. Not to use more than 3 days.     propranolol (INDERAL) 20 MG tablet TAKE 1 TABLET BY MOUTH TWICE DAILY     ranitidine (ZANTAC) 300 MG tablet Take 1 tablet (300 mg) by mouth At Bedtime For 1 month, then as needed for heartburn     senna-docusate (SENNA PLUS) 8.6-50 MG tablet TAKE 1 OR 2 TABLETS BY MOUTH 2 TIMES A DAY     tiZANidine (ZANAFLEX) 4 MG tablet TAKE 1 TABLET BY MOUTH 3 TIMES A DAY     traZODone (DESYREL) 50 MG tablet TAKE 2 TABLETS BY MOUTH NIGHTLY AS NEEDED     trimethoprim-polymyxin b (POLYTRIM) 31875-6.1 UNIT/ML-% ophthalmic solution Place 2 drops Into the left eye every 6 hours     ASPIRIN LOW DOSE 81 MG chewable tablet CHEW AND SWALLOW 1 TABLET BY MOUTH DAILY (Patient not taking: Reported on 7/10/2019)     atorvastatin (LIPITOR) 20 MG tablet TAKE 1 TABLET BY MOUTH DAILY (Patient not taking: Reported on 7/10/2019)     No current facility-administered medications for this visit.        VITALS   /84 (BP Location: Right arm, Patient Position: Sitting, Cuff Size: Adult Regular)   Pulse 113   Temp 98.4  F (36.9  C) (Tympanic)   Ht 1.778 m (5' 10\")   Wt 81.6 kg (180 lb)   BMI 25.83 kg/m       LABS                                                                                                                         EKG 2016 with 376 ms (on nortriptyline)    Last Basic Metabolic Panel:  Lab Results   Component Value Date     " "10/20/2017      Lab Results   Component Value Date    POTASSIUM 4.2 10/20/2017     Lab Results   Component Value Date    CHLORIDE 107 10/20/2017     Lab Results   Component Value Date    SANDRITA 9.0 10/20/2017     Lab Results   Component Value Date    CO2 28 10/20/2017     Lab Results   Component Value Date    BUN 17 10/20/2017     Lab Results   Component Value Date    CR 0.69 10/20/2017     Lab Results   Component Value Date     10/20/2017     Liver Function Studies -   Recent Labs   Lab Test  10/20/17   1443   PROTTOTAL  7.4   ALBUMIN  4.4   BILITOTAL  0.4   ALKPHOS  120   AST  18   ALT  30     CBC RESULTS:   Recent Labs   Lab Test  10/20/17   1443   WBC  4.0   RBC  4.00*   HGB  12.9*   HCT  36.5*   MCV  91   MCH  32.3   MCHC  35.3   RDW  12.4   PLT  151          MENTAL STATUS EXAM                                                                                        Alert. Oriented to person, place, and date / time. Casually groomed, calm, cooperative with good eye contact. No problems with speech or psychomotor behavior. Mood was described as \"doing okay\"  and affect was congruent to speech content and full range. Thought process, including associations, was unremarkable and thought content was devoid of suicidal and homicidal ideation.  No hallucinations. Insight was poor Judgment was  adequate for safety. Fund of knowledge was intact. Pt demonstrates no obvious problems with attention, concentration, language, recent or remote memory although these were not formally tested.     ASSESSMENT                                                                                                      HISTORICAL:  Initial psych note 10/6/15          NOTES:      Joshua is a 58 year old with bipolar, ADHD, and MDD.  We started Valium as dual purpose: muscle relaxant properties and for anxiety.We have discussed the new guidelines for short - term use of benzodiazepines (Valium) and avoiding their use along with opioids. I " had noted plan to taper down over time and eventually discontinue. We decreased from 5 mg every 12 hours as needed down to 2 mg every 12 hours as needed .    We discontinued his nortriptyline and started him on Seroquel. He had sedation and cognitive impairment and muscle twitching so he stopped it. He went back on nortriptyline.  Today we discussed potential interaction with nortritptyline and trileptal. We feel benefits outweigh risks of continuing with both especially given he is struggling very much with mood , depression. He reviewed controlled substance contract today and signed. I am not going to do UDS today -- has once 10/2018 and all as expected. I still have concern with paranoia - Joshua continues deny paranoia and notes his neighbors have taken his stuff. He notes he has recordings even of this will bring in to me next visit so I can listen. I mentioned the Depakote today and he does not want to take it.       TREATMENT RISK STATEMENT:  The risks, benefits, alternatives and potential adverse effects have been explained and are understood by the pt.  The pt agrees to the treatment plan with the ability to do so.   The pt knows to call the clinic for any problems or access emergency care if needed.        DIAGNOSES                    Bipolar disorder I with psychosis vs. Schizoaffective disorder, bipolar type  ADHD      PLAN                                                                                                                    1)  MEDICATIONS:       Continue Trileptal 600 mg bid,  and continue diazepam 2 mg every 12 hours as needed and continue trazodone 100 mg bedtime prn insomnia    2)  THERAPY:  No change    3)  LABS:  none    4)  PT MONITOR [call for probs]:  SEs from meds, worsening sx, SI/HI    5)  REFERRALS [CD, medical, other]:  None    6)  RTC:  1 - 2 months

## 2019-07-10 NOTE — NURSING NOTE
"Chief Complaint   Patient presents with     RECHECK     Mental health.  Patient c/o having tics and seizures.       Initial /84 (BP Location: Right arm, Patient Position: Sitting, Cuff Size: Adult Regular)   Pulse 113   Temp 98.4  F (36.9  C) (Tympanic)   Ht 1.778 m (5' 10\")   Wt 81.6 kg (180 lb)   BMI 25.83 kg/m   Estimated body mass index is 25.83 kg/m  as calculated from the following:    Height as of this encounter: 1.778 m (5' 10\").    Weight as of this encounter: 81.6 kg (180 lb).  Medication Reconciliation: complete    "

## 2019-07-10 NOTE — LETTER
[facility]  07/10/19    Patient: Joshua Barron  YOB: 1960  Medical Record Number: [id number]  CSN: [id number]                                                                              Non-opioid Controlled Substance Agreement    I understand that my care provider has prescribed a controlled substance to help manage my condition(s). I am taking this medicine to help me function or work. I know this is strong medicine, and that it can cause serious side effects. Controlled substances can be sedating, addicting and may cause a dependency on the drug. They can affect my ability to drive or think, and cause depression. They need to be taken exactly as prescribed. Combining controlled substances with certain medicines or chemicals (such as cocaine, sedatives and tranquilizers, sleeping pills, meth) can be dangerous or even fatal. Also, if I stop controlled substances suddenly, I may have severe withdrawal symptoms.  If not helpful, I may be asked to stop them.    The risks, benefits, and side effects of these medicine(s) were explained to me. I agree that:    1. I will take part in other treatments as advised by my care team. This may be psychiatry or counseling, physical therapy, behavioral therapy, group treatment or a referral to a pain clinic. I will reduce or stop my medicine when my care team tells me to do so.  2. I will take my medicines as prescribed. I will not change the dose or schedule unless my care team tells me to. There will be no refills if I  run out early.   I may be contactedwithout warning and asked to complete a urine drug test or pill count at any time.   3. I will keep all my appointments, and understand this is part of the monitoring of controlled substances. My care team may require an office visit for EVERY controlled substance refill. If I miss appointments or don t follow instructions, my care team may stop my medicine.  4. I will not ask other providers to  prescribe controlled substances, and I will not accept controlled substances from other people. If I need another prescribed controlled substance for a new reason, I will tell my care team within 1 business day.  5. I will use one pharmacy to fill all of my controlled substance prescriptions, and it is up to me to make sure that I do not run out of my medicines on weekends or holidays. If my care team is willing to refill my controlled substance prescription without a visit, I must request refills only during office hours, refills may take up to 3 days to process, and it may take up to 5 to 7 days for my medicine to be mailed and ready at my pharmacy. Prescriptions will not be mailed anywhere except my pharmacy.    6. I am responsible for my prescriptions. If the medicine/prescription is lost or stolen, it will not be replaced. I also agree not to share controlled substance medicines with anyone.              [facility]  07/10/19  Patient:  Joshua Barron  YOB: 1960  Medical Record Number: [id number]  CSN: [id number]    7. I agree to not use ANY illegal or recreational drugs. This includes marijuana, cocaine, bath salts or other drugs. I agree not to use alcohol unless my care team says I may. I agree to give urine samples whenever asked. If I don t give a urine sample, the care team may stop my medicine.    8. If I enroll in the [place] Medical Marijuana program, I will tell my care team. I will also sign an agreement to share my medical records with my care team.    9. I will bring in my list of medicines (or my medicine bottles) each time I come to the clinic.   10. I will tell my care team right away if I become pregnant or have a new medical problem treated outside of my regular clinic.  11. I understand that this medicine can affect my thinking and judgment. It may be unsafe for me to drive, use machinery and do dangerous tasks. I will not do any of these things until I know how the  medicine affects me. If my dose changes, I will wait to see how it affects me. I will contact my care team if I have concerns about medicine side effects.    I understand that if I do not follow any of the conditions above, my prescriptions or treatment may be stopped.      I agree that my provider, clinic care team, and pharmacy may work with any city, state or federal law enforcement agency that investigates the misuse, sale, or other diversion of my controlled medicine. I will allow my provider to discuss my care with or share a copy of this agreement with any other treating provider, pharmacy or emergency room where I receive care. I agree to give up (waive) any right of privacy or confidentiality with respect to these consents.   I have read this agreement and have asked questions about anything I did not understand.    ____________________________________________________    ____________  ________  Patient signature                                                         Date      Time    ____________________________________________________     ____________  ________  Witness                                                          Date      Time    ____________________________________________________  Provider signature

## 2019-07-11 ENCOUNTER — HOSPITAL ENCOUNTER (OUTPATIENT)
Dept: PHYSICAL THERAPY | Facility: HOSPITAL | Age: 59
Setting detail: THERAPIES SERIES
End: 2019-07-11
Attending: INTERNAL MEDICINE
Payer: COMMERCIAL

## 2019-07-11 PROCEDURE — 97110 THERAPEUTIC EXERCISES: CPT | Mod: GP

## 2019-07-12 DIAGNOSIS — K59.00 CONSTIPATION: ICD-10-CM

## 2019-07-12 DIAGNOSIS — F90.2 ADHD (ATTENTION DEFICIT HYPERACTIVITY DISORDER), COMBINED TYPE: ICD-10-CM

## 2019-07-13 DIAGNOSIS — G89.4 CHRONIC PAIN SYNDROME: Primary | ICD-10-CM

## 2019-07-15 ENCOUNTER — TELEPHONE (OUTPATIENT)
Dept: INTERVENTIONAL RADIOLOGY/VASCULAR | Facility: HOSPITAL | Age: 59
End: 2019-07-15

## 2019-07-15 RX ORDER — TRAZODONE HYDROCHLORIDE 50 MG/1
TABLET, FILM COATED ORAL
Qty: 60 TABLET | Refills: 3 | Status: SHIPPED | OUTPATIENT
Start: 2019-07-15 | End: 2019-10-15

## 2019-07-15 RX ORDER — LISDEXAMFETAMINE DIMESYLATE 70 MG/1
CAPSULE ORAL
Qty: 30 CAPSULE | Refills: 0 | Status: SHIPPED | OUTPATIENT
Start: 2019-07-15 | End: 2019-08-13

## 2019-07-15 RX ORDER — AMOXICILLIN 250 MG
CAPSULE ORAL
Qty: 120 TABLET | Refills: 0 | Status: SHIPPED | OUTPATIENT
Start: 2019-07-15 | End: 2019-08-10

## 2019-07-15 NOTE — TELEPHONE ENCOUNTER
Walked RX to [facility].      Paris Holder, RN-BSN  Care Coordination, Behavioral Health

## 2019-07-15 NOTE — TELEPHONE ENCOUNTER
INJECTION POST CALL    Procedure: Epidural Cervical/Thoracic TF Bilateral T10-11  Radiologist(s): Dr. tSeve Guido  Date of Procedure:  7/1/2019    Pre-procedure pain score was: 7 (See pre-procedure score)  Post-procedure pain score as of today is: 3  Percentage of pain improvement today?  57%  Where is the pain located? Pain in thoracic area is pretty much gone, just a little bit of tightness. Slight pain still under the ribs.  Is this new pain? No  Would you like to be scheduled for an additional injection?  No      I responded to the patient's questions/concerns.    Patient will contact the provider if there are any issues.    Patient feels mainly tightness in thoracic area and slight pain under ribs, injection was helpful.  Pain patient is having is in the hips and is scheduled for SI joint injection in a few days.     Lucy Long

## 2019-07-15 NOTE — TELEPHONE ENCOUNTER
lisdexamfetamine (VYVANSE) 70 MG capsule      Last Written Prescription Date:  5-23-19  Last Fill Quantity: 30,   # refills: 0  Last Office Visit: 7-10-19  Future Office visit:    Next 5 appointments (look out 90 days)    Aug 13, 2019  2:40 PM CDT  (Arrive by 2:25 PM)  Return Visit with Laquita Fernandez MD  [facility] [address] [place] [id number] [phone]           Routing refill request to provider for review/approval because:  Drug not on the [facility], [facility] or  Health refill protocol or controlled substance

## 2019-07-16 ENCOUNTER — HOSPITAL ENCOUNTER (OUTPATIENT)
Dept: PHYSICAL THERAPY | Facility: HOSPITAL | Age: 59
Setting detail: THERAPIES SERIES
End: 2019-07-16
Attending: INTERNAL MEDICINE
Payer: COMMERCIAL

## 2019-07-16 DIAGNOSIS — Z98.890 POST-OPERATIVE STATE: ICD-10-CM

## 2019-07-16 DIAGNOSIS — F11.90 CHRONIC, CONTINUOUS USE OF OPIOIDS: Primary | ICD-10-CM

## 2019-07-16 PROCEDURE — 97110 THERAPEUTIC EXERCISES: CPT | Mod: GP | Performed by: PHYSICAL THERAPIST

## 2019-07-16 PROCEDURE — 97140 MANUAL THERAPY 1/> REGIONS: CPT | Mod: GP | Performed by: PHYSICAL THERAPIST

## 2019-07-16 RX ORDER — HYDROCODONE BITARTRATE AND ACETAMINOPHEN 10; 325 MG/1; MG/1
TABLET ORAL
Qty: 60 TABLET | Refills: 0 | Status: SHIPPED | OUTPATIENT
Start: 2019-07-20 | End: 2019-07-24

## 2019-07-16 RX ORDER — HYDROCODONE BITARTRATE AND ACETAMINOPHEN 7.5; 325 MG/1; MG/1
TABLET ORAL
Qty: 5 TABLET | Refills: 0 | OUTPATIENT
Start: 2019-07-16

## 2019-07-16 NOTE — TELEPHONE ENCOUNTER
Rx is a request for . On medication list the Norco is for 7.5-325. Please advise.     Hydrocodone-Acetaminophen       Last Written Prescription Date:  6/21/2019  Last Fill Quantity: 60,   # refills: 0  Last Office Visit: 6/25/2019  Future Office visit:    Next 5 appointments (look out 90 days)    Aug 13, 2019  2:40 PM CDT  (Arrive by 2:25 PM)  Return Visit with Laquita Fernandez MD  [facility] [address] [place] [id number] [phone]           Routing refill request to provider for review/approval because:  Drug not on the [facility], [facility] or  Health refill protocol or controlled substance

## 2019-07-18 ENCOUNTER — HOSPITAL ENCOUNTER (OUTPATIENT)
Dept: CT IMAGING | Facility: HOSPITAL | Age: 59
Discharge: HOME | End: 2019-07-18
Attending: ORTHOPAEDIC SURGERY | Admitting: ORTHOPAEDIC SURGERY
Payer: COMMERCIAL

## 2019-07-18 DIAGNOSIS — Z98.1 S/P LUMBAR FUSION: ICD-10-CM

## 2019-07-18 DIAGNOSIS — M54.50 LOW BACK PAIN AT MULTIPLE SITES: ICD-10-CM

## 2019-07-18 PROCEDURE — 25000128 H RX IP 250 OP 636

## 2019-07-18 PROCEDURE — 25000125 ZZHC RX 250

## 2019-07-18 PROCEDURE — 27096 INJECT SACROILIAC JOINT: CPT | Mod: TC

## 2019-07-18 RX ORDER — TRIAMCINOLONE ACETONIDE 40 MG/ML
INJECTION, SUSPENSION INTRA-ARTICULAR; INTRAMUSCULAR
Status: COMPLETED
Start: 2019-07-18 | End: 2019-07-18

## 2019-07-18 RX ORDER — BUPIVACAINE HYDROCHLORIDE 5 MG/ML
INJECTION, SOLUTION EPIDURAL; INTRACAUDAL
Status: COMPLETED
Start: 2019-07-18 | End: 2019-07-18

## 2019-07-18 RX ORDER — LIDOCAINE HYDROCHLORIDE 10 MG/ML
INJECTION, SOLUTION EPIDURAL; INFILTRATION; INTRACAUDAL; PERINEURAL
Status: COMPLETED
Start: 2019-07-18 | End: 2019-07-18

## 2019-07-18 RX ADMIN — BUPIVACAINE HYDROCHLORIDE 4 ML: 5 INJECTION, SOLUTION EPIDURAL; INTRACAUDAL at 14:26

## 2019-07-18 RX ADMIN — TRIAMCINOLONE ACETONIDE 80 MG: 40 INJECTION, SUSPENSION INTRA-ARTICULAR; INTRAMUSCULAR at 14:27

## 2019-07-18 RX ADMIN — LIDOCAINE HYDROCHLORIDE 8 ML: 10 INJECTION, SOLUTION EPIDURAL; INFILTRATION; INTRACAUDAL; PERINEURAL at 14:25

## 2019-07-18 NOTE — DISCHARGE INSTRUCTIONS
Home number on file [phone] (home)  Is it ok to leave a message at this number(s)? Yes    Dr Maki completed your procedure on 7/18/2019.    Current Pain Level (0-10 Scale): 8/10  Post Pain Level (0-10):  8/10    Radiology Discharge instructions for Steroid Injection    Activity Level:     Do not do any heavy activity or exercise for 24 hours.   Do not drive for 4 hours after your injection.  Diet:   Return to your normal diet.  Medications:   If you have stopped taking your Aspirin, Coumadin/Warfarin, Ibuprofen, or any   other blood thinner for this procedure you may resume in the morning unless   your primary care provider has given you other instructions.    Diabetics may see an increase in blood sugar after steroid injections. If you are concerned about your blood sugar, please contact your family doctor.    Site Care:  Remove the bandage and bathe or shower the morning after the procedure.      Please allow two weeks to experience improvement in your pain.  If you have any further issues, please contact your provider.    Call your Primary Care Provider if you have the following (if your primary care provider is not available please seek emergency care):   Nausea with vomiting   Severe headache   Drowsiness or confusion   Redness or drainage at the injection or puncture site   Temperature over 101 degrees F   Other concerns   Worsening back pain   Stiff neck

## 2019-07-18 NOTE — IP AVS SNAPSHOT
[facility] CT SCAN  [address] [address]  Phone:  [phone]  Fax:  [phone]                                    After Visit Summary   7/18/2019    Joshua Barron    MRN: [id number]           After Visit Summary Signature Page    I have received my discharge instructions, and my questions have been answered. I have discussed any challenges I see with this plan with the nurse or doctor.    ..........................................................................................................................................  Patient/Patient Representative Signature      ..........................................................................................................................................  Patient Representative Print Name and Relationship to Patient    ..................................................               ................................................  Date                                   Time    ..........................................................................................................................................  Reviewed by Signature/Title    ...................................................              ..............................................  Date                                               Time          22EPIC Rev 08/18

## 2019-07-18 NOTE — IP AVS SNAPSHOT
MRN:[id number]                      After Visit Summary   7/18/2019    Joshua Barron    MRN: [id number]           Visit Information        Provider Department      7/18/2019  1:30 PM [facility]; [facility]AN           Review of your medicines      UNREVIEWED medicines. Ask your doctor about these medicines       Dose / Directions   acetaminophen 325 MG tablet  Commonly known as:  TYLENOL  Used for:  Pain      TAKE 2 TABLETS BY MOUTH EVERY 4 HOURS AS NEEDED FOR MILD PAIN  Quantity:  200 tablet  Refills:  1     albuterol 108 (90 Base) MCG/ACT inhaler  Commonly known as:  VENTOLIN HFA  Used for:  Moderate persistent asthma without complication      USE 2 PUFFS 4 TIMES A DAY AS NEEDED FOR SHORTNESS OF BREATH  Quantity:  18 g  Refills:  0     ASPIRIN LOW DOSE 81 MG chewable tablet  Used for:  Healthcare maintenance  Generic drug:  aspirin      CHEW AND SWALLOW 1 TABLET BY MOUTH DAILY  Quantity:  30 tablet  Refills:  2     atorvastatin 20 MG tablet  Commonly known as:  LIPITOR  Used for:  Mixed hyperlipidemia      TAKE 1 TABLET BY MOUTH DAILY  Quantity:  30 tablet  Refills:  8     baclofen 20 MG tablet  Commonly known as:  LIORESAL  Used for:  Back muscle spasm, Lumbar back pain      TAKE 1 TABLET BY MOUTH 4 TIMES DAILY  Quantity:  120 tablet  Refills:  0     budesonide 0.5 MG/2ML neb solution  Commonly known as:  PULMICORT  Used for:  Nasal turbinate hypertrophy, Seasonal allergies      Squirt entire vial into previously made harlan med saline bottle, mix, irrigate both nostrils until entire bottle empty. Do this twice daily.  Quantity:  2 Box  Refills:  3     diazepam 2 MG tablet  Commonly known as:  VALIUM  Used for:  DAYANA (generalized anxiety disorder)      TAKE 1 TABLET BY MOUTH EVERY 12 HOURS AS NEEDED FOR ANXIETY  Quantity:  60 tablet  Refills:  0     diclofenac 50 MG EC tablet  Commonly known as:  VOLTAREN  Used for:  Arthralgia of both hands      TAKE 1 TABLET BY MOUTH 3 TIMES DAILY  Quantity:   90 tablet  Refills:  3     docusate sodium 100 MG capsule  Commonly known as:  DOK      TAKE 1 CAPSULE BY MOUTH TWICE DAILY  Quantity:  60 capsule  Refills:  5     dronabinol 2.5 MG capsule  Commonly known as:  MARINOL  Used for:  Nausea      TAKE 1 CAPSULE BY MOUTH 2 TIMES DAILY BEFORE MEALS  Quantity:  60 capsule  Refills:  0     fluticasone 50 MCG/ACT nasal spray  Commonly known as:  FLONASE  Used for:  Health care maintenance      USE 2 SPRAYS IN EACH NOSTRIL DAILY  Quantity:  16 g  Refills:  2     gabapentin 300 MG capsule  Commonly known as:  NEURONTIN  Used for:  Low back pain, unspecified back pain laterality, unspecified chronicity, with sciatica presence unspecified, Chronic pain syndrome      TAKE 3 CAPSULES BY MOUTH THREE TIMES DAILY  Quantity:  270 capsule  Refills:  0     * HYDROcodone-acetaminophen 7.5-325 MG per tablet  Commonly known as:  NORCO  Used for:  Post-operative state      Dose:  1 tablet  Take 1 tablet by mouth every 6 hours as needed for severe pain (Not to exceed > 4,000 acetaminophen in 24 hour period)  Quantity:  5 tablet  Refills:  0     * HYDROcodone-acetaminophen  MG per tablet  Commonly known as:  NORCO  Used for:  Chronic, continuous use of opioids      Start taking on:  7/20/2019  TAKE 1 TABLET BY MOUTH 2 TIMES DAILY AS NEEDED FOR SEVERE PAIN  Quantity:  60 tablet  Refills:  0     hydrOXYzine 50 MG capsule  Commonly known as:  VISTARIL  Used for:  DAYANA (generalized anxiety disorder)      TAKE 1 TO 2 CAPSULES BY MOUTH 4 TIMES DAILY AS NEEDED FOR ANXIETY  Quantity:  120 capsule  Refills:  0     ibuprofen 600 MG tablet  Commonly known as:  IBU  Used for:  Low back pain      TAKE 1 TABLET BY MOUTH EVERY 6 HOURS AS NEEDED  Quantity:  120 tablet  Refills:  1     lidocaine 5 % patch  Commonly known as:  LIDODERM  Used for:  Chronic bilateral low back pain without sciatica      Place 3 patches on daily for 12 hours and remove and replace with three patches ( 6 patches per  day)  Quantity:  180 patch  Refills:  11     losartan 50 MG tablet  Commonly known as:  COZAAR  Used for:  Essential hypertension, benign      TAKE 1 TABLET BY MOUTH DAILY  Quantity:  30 tablet  Refills:  9     methocarbamol 500 MG tablet  Commonly known as:  ROBAXIN  Used for:  Back muscle spasm      TAKE 1 TABLET BY MOUTH 3 TIMES DAILY  Quantity:  90 tablet  Refills:  3     mometasone-formoterol 200-5 MCG/ACT inhaler  Commonly known as:  DULERA  Used for:  Moderate persistent asthma without complication      Dose:  2 puff  Inhale 2 puffs into the lungs 2 times daily  Quantity:  3 Inhaler  Refills:  3     morphine 30 MG CR tablet  Commonly known as:  MS CONTIN  Used for:  Chronic pain disorder, Degeneration of lumbar or lumbosacral intervertebral disc      TAKE 1 TABLET BY MOUTH EVERY 12 HOURS  Quantity:  60 tablet  Refills:  0     multivitamin  with iron Tabs  Used for:  Health care maintenance      TAKE 1 TABLET BY MOUTH DAILY  Quantity:  30 tablet  Refills:  11     naloxone 1 mg/mL for intranasal kit (2 syringes with 2 mucosal atomizer device)  Commonly known as:  NARCAN  Used for:  Chronic pain syndrome      In opioid overdose put cone in nostril and push 1/2 of contents into each nostril.  Repeat every 3 min if no response until help arrives.  Quantity:  2 kit  Refills:  0     nicotine 2 MG gum  Commonly known as:  NICORETTE      CHEW 1 PIECE AS NEEDED FOR SMOKING CESSATION  Quantity:  110 each  Refills:  3     nortriptyline 50 MG capsule  Commonly known as:  PAMELOR  Used for:  Chronic bilateral low back pain with bilateral sciatica, Fibromyalgia      Dose:  100 mg  Take 2 capsules (100 mg) by mouth At Bedtime  Quantity:  180 capsule  Refills:  1     omeprazole 20 MG DR capsule  Commonly known as:  priLOSEC  Used for:  Health care maintenance      TAKE 1 CAPSULE BY MOUTH EVERY DAY BEFORE A MEAL  Quantity:  30 capsule  Refills:  5     OXcarbazepine 600 MG tablet  Commonly known as:  TRILEPTAL  Used for:   Bipolar affective disorder, current episode hypomanic (H)      TAKE 1 TABLET BY MOUTH 2 TIMES DAILY  Quantity:  60 tablet  Refills:  6     oxymetazoline 0.05 % nasal spray  Commonly known as:  AFRIN  Used for:  Postoperative state      Dose:  2 spray  Spray 2 sprays into both nostrils 2 times daily Use as directed post surgical. Not to use more than 3 days.  Quantity:  1 Bottle  Refills:  0     propranolol 20 MG tablet  Commonly known as:  INDERAL  Used for:  Essential hypertension      TAKE 1 TABLET BY MOUTH TWICE DAILY  Quantity:  60 tablet  Refills:  5     ranitidine 300 MG tablet  Commonly known as:  ZANTAC  Used for:  Gastroesophageal reflux disease, esophagitis presence not specified      Dose:  300 mg  Take 1 tablet (300 mg) by mouth At Bedtime For 1 month, then as needed for heartburn  Quantity:  60 tablet  Refills:  1     senna-docusate 8.6-50 MG tablet  Commonly known as:  SENNA-PLUS  Used for:  Constipation      TAKE 1 OR 2 TABLETS BY MOUTH 2 TIMES A DAY  Quantity:  120 tablet  Refills:  0     tiZANidine 4 MG tablet  Commonly known as:  ZANAFLEX  Used for:  Back muscle spasm      TAKE 1 TABLET BY MOUTH 3 TIMES A DAY  Quantity:  90 tablet  Refills:  0     * traZODone 50 MG tablet  Commonly known as:  DESYREL  Used for:  Chronic pain syndrome      TAKE 2 TABLETS BY MOUTH NIGHTLY AS NEEDED  Quantity:  60 tablet  Refills:  3     * traZODone 50 MG tablet  Commonly known as:  DESYREL  Used for:  Chronic pain syndrome      TAKE 2 TABLETS BY MOUTH NIGHTLY AS NEEDED  Quantity:  60 tablet  Refills:  3     trimethoprim-polymyxin b 22114-9.1 UNIT/ML-% ophthalmic solution  Commonly known as:  POLYTRIM      Dose:  2 drop  Place 2 drops Into the left eye every 6 hours  Quantity:  2 mL  Refills:  0     VYVANSE 70 MG capsule  Used for:  ADHD (attention deficit hyperactivity disorder), combined type  Generic drug:  lisdexamfetamine      TAKE 1 CAPSULE BY MOUTH DAILY  Quantity:  30 capsule  Refills:  0         * This list  has 4 medication(s) that are the same as other medications prescribed for you. Read the directions carefully, and ask your doctor or other care provider to review them with you.            CONTINUE these medicines which have NOT CHANGED       Dose / Directions   * order for DME  Used for:  Need for assistance with personal care      Equipment being ordered: Large gloves  Quantity:  2 Box  Refills:  0     * order for DME  Used for:  Chronic low back pain with sciatica, sciatica laterality unspecified, unspecified back pain laterality      1 boa back brace  Quantity:  1 Device  Refills:  0         * This list has 2 medication(s) that are the same as other medications prescribed for you. Read the directions carefully, and ask your doctor or other care provider to review them with you.                  Protect others around you: Learn how to safely use, store and throw away your medicines at www.disposemymeds.org.       Follow-ups after your visit       Your next 10 appointments already scheduled    Jul 23, 2019  2:00 PM CDT  Treatment with Vivian Ibrahim, PTA  [facility] ([facility] ) [address] [address]  [phone]      Jul 25, 2019  2:00 PM CDT  Treatment with Vivian Aniaw, PTA  [facility] ([facility] ) [address] [address]  [phone]      Jul 30, 2019  2:00 PM CDT  Treatment with Vivian Aniaw, PTA  [facility] ([facility] ) [address] [address]  [phone]      Aug 02, 2019  2:00 PM CDT  Treatment with Noemi Dunbar PT  [facility] ([facility] ) [address] [address]  [phone]      Aug 13, 2019  2:40 PM CDT  (Arrive by 2:25 PM)  Return Visit with Laquita Fernandez MD  [facility] ([facility] ) [address] [address]  [phone]      Apr 15, 2020  2:45 PM CDT  (Arrive by 2:30  "PM)  Hearing Eval with Kevin Moreno  [facility] ([facility] ) [place] [address]  [phone]         Care Instructions       Further instructions from your care team       Home number on file [phone] (home)  Is it ok to leave a message at this number(s)? Yes    Dr Maki completed your procedure on 7/18/2019.    Current Pain Level (0-10 Scale): 8/10  Post Pain Level (0-10):  8/10    Radiology Discharge instructions for Steroid Injection    Activity Level:     Do not do any heavy activity or exercise for 24 hours.   Do not drive for 4 hours after your injection.  Diet:   Return to your normal diet.  Medications:   If you have stopped taking your Aspirin, Coumadin/Warfarin, Ibuprofen, or any   other blood thinner for this procedure you may resume in the morning unless   your primary care provider has given you other instructions.    Diabetics may see an increase in blood sugar after steroid injections. If you are concerned about your blood sugar, please contact your family doctor.    Site Care:  Remove the bandage and bathe or shower the morning after the procedure.      Please allow two weeks to experience improvement in your pain.  If you have any further issues, please contact your provider.    Call your Primary Care Provider if you have the following (if your primary care provider is not available please seek emergency care):   Nausea with vomiting   Severe headache   Drowsiness or confusion   Redness or drainage at the injection or puncture site   Temperature over 101 degrees F   Other concerns   Worsening back pain   Stiff neck          Additional Information About Your Visit       [employer] Information    [employer] lets you send messages to your doctor, view your test results, renew your prescriptions, schedule appointments and more. To sign up, go to [email or web address] . Click on \"Log in\" on the left side of the screen, which " "will take you to the Welcome page. Then click on \"Sign up Now\" on the right side of the page.     You will be asked to enter the access code listed below, as well as some personal information. Please follow the directions to create your username and password.     Your access code is: [email or web address]  Expires: 2019  3:37 PM     Your access code will  in 60 days. If you need help or a new code, please call your [facility] clinic or [phone].       Care EveryWhere ID    This is your Care EveryWhere ID. This could be used by other organizations to access your [facility] medical records  [id number]        Primary Care Provider Office Phone # Fax #    Lyle Morenope,  [phone]      Equal Access to Services    SHAHBAZ DANIELS : Rosie england Soro, waaxda luqadaha, qaybta kaalmacheng mcmillan, jaki millan . So [facility] [phone].    ATENCIÓN: Si habla español, tiene a hastings disposición servicios gratuitos de asistencia lingüística. Tip al [phone].    We comply with applicable federal civil rights laws and [place] laws. We do not discriminate on the basis of race, color, national origin, age, disability, sex, sexual orientation, or gender identity.           Thank you!    Thank you for choosing [facility] for your care. Our goal is always to provide you with excellent care. Hearing back from our patients is one way we can continue to improve our services. Please take a few minutes to complete the written survey that you may receive in the mail after you visit with us. Thank you!            Medication List      Medications          Morning Afternoon Evening Bedtime As Needed    * order for DME  INSTRUCTIONS:  Equipment being ordered: Large gloves                     * order for DME  INSTRUCTIONS:  1 boa back brace                        * This list has 2 medication(s) that are the same as other medications prescribed for you. Read the directions " carefully, and ask your doctor or other care provider to review them with you.            ASK your doctor about these medications          Morning Afternoon Evening Bedtime As Needed    acetaminophen 325 MG tablet  Also known as:  TYLENOL  INSTRUCTIONS:  TAKE 2 TABLETS BY MOUTH EVERY 4 HOURS AS NEEDED FOR MILD PAIN                     albuterol 108 (90 Base) MCG/ACT inhaler  Also known as:  VENTOLIN HFA  INSTRUCTIONS:  USE 2 PUFFS 4 TIMES A DAY AS NEEDED FOR SHORTNESS OF BREATH                     ASPIRIN LOW DOSE 81 MG chewable tablet  INSTRUCTIONS:  CHEW AND SWALLOW 1 TABLET BY MOUTH DAILY  Generic drug:  aspirin                     atorvastatin 20 MG tablet  Also known as:  LIPITOR  INSTRUCTIONS:  TAKE 1 TABLET BY MOUTH DAILY                     baclofen 20 MG tablet  Also known as:  LIORESAL  INSTRUCTIONS:  TAKE 1 TABLET BY MOUTH 4 TIMES DAILY                     budesonide 0.5 MG/2ML neb solution  Also known as:  PULMICORT  INSTRUCTIONS:  Squirt entire vial into previously made harlan med saline bottle, mix, irrigate both nostrils until entire bottle empty. Do this twice daily.                     diazepam 2 MG tablet  Also known as:  VALIUM  INSTRUCTIONS:  TAKE 1 TABLET BY MOUTH EVERY 12 HOURS AS NEEDED FOR ANXIETY  Doctor's comments:  START DATE 7/12/19  Caroline meserin                     diclofenac 50 MG EC tablet  Also known as:  VOLTAREN  INSTRUCTIONS:  TAKE 1 TABLET BY MOUTH 3 TIMES DAILY                     docusate sodium 100 MG capsule  Also known as:  DOK  INSTRUCTIONS:  TAKE 1 CAPSULE BY MOUTH TWICE DAILY                     dronabinol 2.5 MG capsule  Also known as:  MARINOL  INSTRUCTIONS:  TAKE 1 CAPSULE BY MOUTH 2 TIMES DAILY BEFORE MEALS  Doctor's comments:  START DATE 7/12/19  Caroline mesaba                     fluticasone 50 MCG/ACT nasal spray  Also known as:  FLONASE  INSTRUCTIONS:  USE 2 SPRAYS IN EACH NOSTRIL DAILY                     gabapentin 300 MG capsule  Also known as:   NEURONTIN  INSTRUCTIONS:  TAKE 3 CAPSULES BY MOUTH THREE TIMES DAILY                     * HYDROcodone-acetaminophen 7.5-325 MG per tablet  Also known as:  NORCO  INSTRUCTIONS:  Take 1 tablet by mouth every 6 hours as needed for severe pain (Not to exceed > 4,000 acetaminophen in 24 hour period)                     * HYDROcodone-acetaminophen  MG per tablet  Also known as:  NORCO  Start taking on:  7/20/2019  INSTRUCTIONS:  TAKE 1 TABLET BY MOUTH 2 TIMES DAILY AS NEEDED FOR SEVERE PAIN  Doctor's comments:  MAY FILL ON July 20, 2019                     hydrOXYzine 50 MG capsule  Also known as:  VISTARIL  INSTRUCTIONS:  TAKE 1 TO 2 CAPSULES BY MOUTH 4 TIMES DAILY AS NEEDED FOR ANXIETY                     ibuprofen 600 MG tablet  Also known as:  IBU  INSTRUCTIONS:  TAKE 1 TABLET BY MOUTH EVERY 6 HOURS AS NEEDED                     lidocaine 5 % patch  Also known as:  LIDODERM  INSTRUCTIONS:  Place 3 patches on daily for 12 hours and remove and replace with three patches ( 6 patches per day)                     losartan 50 MG tablet  Also known as:  COZAAR  INSTRUCTIONS:  TAKE 1 TABLET BY MOUTH DAILY                     methocarbamol 500 MG tablet  Also known as:  ROBAXIN  INSTRUCTIONS:  TAKE 1 TABLET BY MOUTH 3 TIMES DAILY                     mometasone-formoterol 200-5 MCG/ACT inhaler  Also known as:  DULERA  INSTRUCTIONS:  Inhale 2 puffs into the lungs 2 times daily                     morphine 30 MG CR tablet  Also known as:  MS CONTIN  INSTRUCTIONS:  TAKE 1 TABLET BY MOUTH EVERY 12 HOURS                     multivitamin  with iron Tabs  INSTRUCTIONS:  TAKE 1 TABLET BY MOUTH DAILY                     naloxone 1 mg/mL for intranasal kit (2 syringes with 2 mucosal atomizer device)  Also known as:  NARCAN  INSTRUCTIONS:  In opioid overdose put cone in nostril and push 1/2 of contents into each nostril.  Repeat every 3 min if no response until help arrives.  Doctor's comments:  For intranasal administration:  Dispense 2 naloxone injection 2 mg/2 mL syringes.  Include 2 mucosal atomization devices (MAD-Nasal).                     nicotine 2 MG gum  Also known as:  NICORETTE  INSTRUCTIONS:  CHEW 1 PIECE AS NEEDED FOR SMOKING CESSATION                     nortriptyline 50 MG capsule  Also known as:  PAMELOR  INSTRUCTIONS:  Take 2 capsules (100 mg) by mouth At Bedtime                     omeprazole 20 MG DR capsule  Also known as:  priLOSEC  INSTRUCTIONS:  TAKE 1 CAPSULE BY MOUTH EVERY DAY BEFORE A MEAL                     OXcarbazepine 600 MG tablet  Also known as:  TRILEPTAL  INSTRUCTIONS:  TAKE 1 TABLET BY MOUTH 2 TIMES DAILY                     oxymetazoline 0.05 % nasal spray  Also known as:  AFRIN  INSTRUCTIONS:  Spray 2 sprays into both nostrils 2 times daily Use as directed post surgical. Not to use more than 3 days.                     propranolol 20 MG tablet  Also known as:  INDERAL  INSTRUCTIONS:  TAKE 1 TABLET BY MOUTH TWICE DAILY                     ranitidine 300 MG tablet  Also known as:  ZANTAC  INSTRUCTIONS:  Take 1 tablet (300 mg) by mouth At Bedtime For 1 month, then as needed for heartburn                     senna-docusate 8.6-50 MG tablet  Also known as:  SENNA-PLUS  INSTRUCTIONS:  TAKE 1 OR 2 TABLETS BY MOUTH 2 TIMES A DAY                     tiZANidine 4 MG tablet  Also known as:  ZANAFLEX  INSTRUCTIONS:  TAKE 1 TABLET BY MOUTH 3 TIMES A DAY                     * traZODone 50 MG tablet  Also known as:  DESYREL  INSTRUCTIONS:  TAKE 2 TABLETS BY MOUTH NIGHTLY AS NEEDED                     * traZODone 50 MG tablet  Also known as:  DESYREL  INSTRUCTIONS:  TAKE 2 TABLETS BY MOUTH NIGHTLY AS NEEDED                     trimethoprim-polymyxin b 65999-7.1 UNIT/ML-% ophthalmic solution  Also known as:  POLYTRIM  INSTRUCTIONS:  Place 2 drops Into the left eye every 6 hours                     VYVANSE 70 MG capsule  INSTRUCTIONS:  TAKE 1 CAPSULE BY MOUTH DAILY  Generic drug:  lisdexamfetamine                         * This list has 4 medication(s) that are the same as other medications prescribed for you. Read the directions carefully, and ask your doctor or other care provider to review them with you.

## 2019-07-19 DIAGNOSIS — G89.4 CHRONIC PAIN SYNDROME: ICD-10-CM

## 2019-07-19 DIAGNOSIS — M54.50 LUMBAR BACK PAIN: ICD-10-CM

## 2019-07-19 DIAGNOSIS — M62.830 BACK MUSCLE SPASM: ICD-10-CM

## 2019-07-19 DIAGNOSIS — M54.5 LOW BACK PAIN, UNSPECIFIED BACK PAIN LATERALITY, UNSPECIFIED CHRONICITY, WITH SCIATICA PRESENCE UNSPECIFIED: ICD-10-CM

## 2019-07-19 RX ORDER — BACLOFEN 20 MG/1
TABLET ORAL
Qty: 120 TABLET | Refills: 0 | Status: SHIPPED | OUTPATIENT
Start: 2019-07-19 | End: 2019-10-15

## 2019-07-19 RX ORDER — GABAPENTIN 300 MG/1
CAPSULE ORAL
Qty: 270 CAPSULE | Refills: 0 | Status: SHIPPED | OUTPATIENT
Start: 2019-07-19 | End: 2019-08-26

## 2019-07-19 NOTE — TELEPHONE ENCOUNTER
Baclofen      Last Written Prescription Date:  6/24/2019  Last Fill Quantity: 120,   # refills: 0  Last Office Visit: 6/25/2019  Future Office visit:    Next 5 appointments (look out 90 days)    Aug 13, 2019  2:40 PM CDT  (Arrive by 2:25 PM)  [facility] Visit with [facility]quita Fernandez MD  [facility] [address] [place] [address]  [phone]           Routing refill request to provider for review/approval because:  Drug not on the [facility], [facility] or [facility] refill protocol or controlled substance

## 2019-07-19 NOTE — TELEPHONE ENCOUNTER
Gabapentin      Last Written Prescription Date:  6/24/2019  Last Fill Quantity: 270,   # refills: 0  Last Office Visit: 6/25/2019  Future Office visit:    Next 5 appointments (look out 90 days)    Aug 13, 2019  2:40 PM CDT  (Arrive by 2:25 PM)  Return Visit with La[facility]ita Fernandez MD  [facility] [address] [place], [place] [address] [address]  [phone]           Routing refill request to provider for review/approval because:  Drug not on the [facility], [facility] or [facility] refill protocol or controlled substance    Tizanidine      Last Written Prescription Date:  6/24/2019  Last Fill Quantity: 90,   # refills: 0  Last Office Visit: 6/25/2019  Future Office visit:    Next 5 appointments (look out 90 days)    Aug 13, 2019  2:40 PM CDT  (Arrive by 2:25 PM)  Return Visit with Laquita Fernandez MD  [facility] ) [address] [place] [id number]  [phone]           Routing refill request to provider for review/approval because:  Drug not on the [facility], [facility] or [facility] refill protocol or controlled substance

## 2019-07-22 ENCOUNTER — TELEPHONE (OUTPATIENT)
Dept: PEDIATRICS | Facility: OTHER | Age: 59
End: 2019-07-22

## 2019-07-22 NOTE — TELEPHONE ENCOUNTER
Pt called in regards to recent injections. He states that he is in an immense amount of pain. He would like a call back from Dr. Fisher or his nurse.   Please advise.

## 2019-07-24 ENCOUNTER — OFFICE VISIT (OUTPATIENT)
Dept: OTOLARYNGOLOGY | Facility: OTHER | Age: 59
End: 2019-07-24
Attending: NURSE PRACTITIONER
Payer: COMMERCIAL

## 2019-07-24 VITALS
TEMPERATURE: 97.8 F | HEART RATE: 75 BPM | WEIGHT: 180 LBS | DIASTOLIC BLOOD PRESSURE: 80 MMHG | OXYGEN SATURATION: 95 % | SYSTOLIC BLOOD PRESSURE: 122 MMHG | BODY MASS INDEX: 25.77 KG/M2 | HEIGHT: 70 IN

## 2019-07-24 DIAGNOSIS — Z98.890 S/P SINUS SURGERY: ICD-10-CM

## 2019-07-24 DIAGNOSIS — Z98.890 S/P NASAL SEPTOPLASTY: Primary | ICD-10-CM

## 2019-07-24 DIAGNOSIS — Z71.6 TOBACCO ABUSE COUNSELING: ICD-10-CM

## 2019-07-24 DIAGNOSIS — G47.33 OSA (OBSTRUCTIVE SLEEP APNEA): ICD-10-CM

## 2019-07-24 PROCEDURE — 31237 NSL/SINS NDSC SURG BX POLYPC: CPT | Performed by: NURSE PRACTITIONER

## 2019-07-24 PROCEDURE — G0463 HOSPITAL OUTPT CLINIC VISIT: HCPCS

## 2019-07-24 PROCEDURE — 99024 POSTOP FOLLOW-UP VISIT: CPT | Performed by: NURSE PRACTITIONER

## 2019-07-24 ASSESSMENT — MIFFLIN-ST. JEOR: SCORE: 1652.72

## 2019-07-24 ASSESSMENT — PAIN SCALES - GENERAL: PAINLEVEL: EXTREME PAIN (8)

## 2019-07-24 NOTE — PATIENT INSTRUCTIONS
Apply pea sized amount of Aquaphor to the inside of both nostrils at night, and as needed.    [facility] Saline irrigation bottle.  Fill bottle up to line with warm distilled water and then add 1 packet of the salt solution that comes with the bottle (2 packets if you were instructed to use hypertonic saline). Dissolve in bottle up to 240 ml juju.  Irrigate each side of your nose leaning over the sink, using 1/2 the volume of the bottle in each nostril every irrigation.Irrigate 2-3 times daily and as needed.  After irrigation, blow nose gently, then apply any other nasal medication that you may have been prescribed today (nasal steroids or nasal antihistamines)     Follow up as needed.    Thank you for allowing Sowmya Baird CNP and our ENT team to participate in your care.  If your medications are too expensive, please give the nurse a call.  We can possibly change this medication.  If you have a scheduling or an appointment question please contact [facility] Health Unit Coordinator at their direct line [phone]  ALL nursing questions or concerns can be directed to your ENT nurse at: [phone] - [place]

## 2019-07-24 NOTE — PROGRESS NOTES
"     Chief Complaint:     Chief Complaint   Patient presents with     Surgical Followup     Septo, turbinate reduction           History of Present Illness:     HPI - Joshua Barron is a 58 year old male,  status post septoplasty with reimplantation of cartilage, bilateral submucosal resection of inferior turbinates, done on 7/2/19 by Dr. Gonzalez.      For the last 1 week, has been having some green/thick nasal drainage upon blowing his nose. Sometimes feel dry debris does not come out all the way.   Had a sore throat and front/upper gum/tooth pain last week, since resolved, denying all sinus pain/pressure/nasal congestion/fever today.  He has been apply neosporin to bilateral nares due to nasal dryness.   Last week had some sinus pain/pressure, since resolved.  States no nasal congestion, feels his nasal airflow restriction has improved following surgery.  No epistaxis.   Denies current dentalgia, oral lesions or recent dental work.    He continues to do the [employer] Med sinus rinses 2-3 times/day, with budesonide rinses about once every other day.     Sleep study recommended at 2 months post operatively. Distant sleep study shows AHI 13.8 (7/16/15). He has not been using due to abnormal fit.   He continues to chew.    Operative Note 7/2/19  PREOPERATIVE DIAGNOSES:   1. Deviated nasal septum.   2. Turbinate hypertrophy.   3. Nasal obstruction.      POSTOPERATIVE DIAGNOSES:   1. Deviated nasal septum.   2. Turbinate hypertrophy.   3. Nasal obstruction.      PROCEDURES PERFORMED:   1. Septoplasty with reimplantation of cartilage .   2. Bilateral submucous resection of inferior turbinates.      SURGEON: Ivett Gonzalez D.O.  BLOOD LOSS: 10 mL.   COMPLICATIONS: None.   SPECIMENS: None.   ANESTHESIA: GETA.          Review of Systems:     ROS: See HPI         Physical Exam:     /80   Pulse 75   Temp 97.8  F (36.6  C) (Tympanic)   Ht 1.778 m (5' 10\")   Wt 81.6 kg (180 lb)   SpO2 95%   BMI 25.83 kg/m  "     General - The patient is well nourished and well developed, and appears to have good nutritional status.  Alert and oriented to person and place, answers questions and cooperates with examination appropriately.   Head and Face - Normocephalic and atraumatic, with no gross asymmetry noted of the contour of the facial features.  The facial nerve is intact, with strong symmetric movements.  Eyes - Extraocular movements intact, and the pupils were reactive to light.  Sclera were not icteric or injected, conjunctiva were pink and moist.  Ears -The external auditory canals are patent, the tympanic membranes are intact without effusion, retraction or mass.  Bony landmarks are intact.  Mouth - Examination of the oral cavity shows pink, healthy, moist mucosa.  No lesions or ulceration noted.  The dentition are fair condition, no erythema/drainage/lesions of the gingiva.  The tongue is mobile and midline. Scattered chewing tobacco throughout.   Nose - Dorsal hump.     To evaluate the nose and sinuses, I performed rigid nasal endoscopy. I sprayed both nares with lidocaine and neosynephrine.    I began with the LEFT side using a 0 degree rigid nasal endoscope, and then similarly examined the RIGHT side    Findings:  Septum with some dried purulent secretions anterior mucosa, stuck in sutures.   Inferior turbinates: Reduced  Middle turbinate and middle meatus:  No active purulence, no polyposis.  Mucosa is healthy throughout, without polyps nor polypoid degeneration  The patient tolerated the procedure well    Debrided secretions with 8 cazares, also loosed some with wet Q-tip off the septum. Did trim sutures.          Assessment and Plan:       ICD-10-CM    1. S/P nasal septoplasty Z98.890    2. S/P sinus surgery: Bilateral reduction of inferior turbinate mucosa Z98.890    3. MARLENE (obstructive sleep apnea) G47.33    4. Tobacco abuse counseling Z71.6       There is some dried purulent debris on anterior septum, likely stuck  in the sutures. I suspect this is what he has been having symptoms with. No active purulence or sinus pain/pressure/fever/nasal congestion.    Debris removed and sutures trimmed.    Increase Jericho med sinus rinse to 3-4 times daily.  Aquaphor into each nostril 1-2 times daily to help with nasal moisture.  Advised not to pick at area.  He is happy with improvement in his nasal congestion.    Referral to sleep medicine, to be done at least at 2 months post op.     Discussed with patient that it takes 20 years of quitting tobacco to be at same risk of developing head and neck cancer as a person who has never used tobacco. Patient voiced understanding to ongoing risks associated with continued tobacco use. Tobacco cessation was strongly encouraged    Encouraged to follow up sooner as needed.    Sowmya Baird NP  ENT  [facility]  [phone]

## 2019-07-24 NOTE — LETTER
7/24/2019         RE: Joshua Barron  [address] [place] [address]        Dear Colleague,    Thank you for referring your patient, Joshua Barron, to the [facility]. Please see a copy of my visit note below.         Chief Complaint:     Chief Complaint   Patient presents with     Surgical Followup     Septo, turbinate reduction           History of Present Illness:     HPI - Joshua Barron is a 58 year old male,  status post septoplasty with reimplantation of cartilage, bilateral submucosal resection of inferior turbinates, done on 7/2/19 by Dr. Gonzalez.      For the last 1 week, has been having some green/thick nasal drainage upon blowing his nose. Sometimes feel dry debris does not come out all the way.   Had a sore throat and front/upper gum/tooth pain last week, since resolved, denying all sinus pain/pressure/nasal congestion/fever today.  He has been apply neosporin to bilateral nares due to nasal dryness.   Last week had some sinus pain/pressure, since resolved.  States no nasal congestion, feels his nasal airflow restriction has improved following surgery.  No epistaxis.   Denies current dentalgia, oral lesions or recent dental work.    He continues to do the [employer] Med sinus rinses 2-3 times/day, with budesonide rinses about once every other day.     Sleep study recommended at 2 months post operatively. Distant sleep study shows AHI 13.8 (7/16/15). He has not been using due to abnormal fit.   He continues to chew.    Operative Note 7/2/19  PREOPERATIVE DIAGNOSES:   1. Deviated nasal septum.   2. Turbinate hypertrophy.   3. Nasal obstruction.      POSTOPERATIVE DIAGNOSES:   1. Deviated nasal septum.   2. Turbinate hypertrophy.   3. Nasal obstruction.      PROCEDURES PERFORMED:   1. Septoplasty with reimplantation of cartilage .   2. Bilateral submucous resection of inferior turbinates.      SURGEON: Ivett Gonzalez D.O.  BLOOD LOSS: 10 mL.   COMPLICATIONS:  "None.   SPECIMENS: None.   ANESTHESIA: GETA.          Review of Systems:     ROS: See HPI         Physical Exam:     /80   Pulse 75   Temp 97.8  F (36.6  C) (Tympanic)   Ht 1.778 m (5' 10\")   Wt 81.6 kg (180 lb)   SpO2 95%   BMI 25.83 kg/m       General - The patient is well nourished and well developed, and appears to have good nutritional status.  Alert and oriented to person and place, answers questions and cooperates with examination appropriately.   Head and Face - Normocephalic and atraumatic, with no gross asymmetry noted of the contour of the facial features.  The facial nerve is intact, with strong symmetric movements.  Eyes - Extraocular movements intact, and the pupils were reactive to light.  Sclera were not icteric or injected, conjunctiva were pink and moist.  Ears -The external auditory canals are patent, the tympanic membranes are intact without effusion, retraction or mass.  Bony landmarks are intact.  Mouth - Examination of the oral cavity shows pink, healthy, moist mucosa.  No lesions or ulceration noted.  The dentition are fair condition, no erythema/drainage/lesions of the gingiva.  The tongue is mobile and midline. Scattered chewing tobacco throughout.   Nose - Dorsal hump.     To evaluate the nose and sinuses, I performed rigid nasal endoscopy. I sprayed both nares with lidocaine and neosynephrine.    I began with the LEFT side using a 0 degree rigid nasal endoscope, and then similarly examined the RIGHT side    Findings:  Septum with some dried purulent secretions anterior mucosa, stuck in sutures.   Inferior turbinates: Reduced  Middle turbinate and middle meatus:  No active purulence, no polyposis.  Mucosa is healthy throughout, without polyps nor polypoid degeneration  The patient tolerated the procedure well    Debrided secretions with 8 cazares, also loosed some with wet Q-tip off the septum. Did trim sutures.          Assessment and Plan:       ICD-10-CM    1. S/P nasal " septoplasty Z98.890    2. S/P sinus surgery: Bilateral reduction of inferior turbinate mucosa Z98.890    3. MARLENE (obstructive sleep apnea) G47.33    4. Tobacco abuse counseling Z71.6       There is some dried purulent debris on anterior septum, likely stuck in the sutures. I suspect this is what he has been having symptoms with. No active purulence or sinus pain/pressure/fever/nasal congestion.    Debris removed and sutures trimmed.    Increase Jericho med sinus rinse to 3-4 times daily.  Aquaphor into each nostril 1-2 times daily to help with nasal moisture.  Advised not to pick at area.  He is happy with improvement in his nasal congestion.    Referral to sleep medicine, to be done at least at 2 months post op.     Discussed with patient that it takes 20 years of quitting tobacco to be at same risk of developing head and neck cancer as a person who has never used tobacco. Patient voiced understanding to ongoing risks associated with continued tobacco use. Tobacco cessation was strongly encouraged    Encouraged to follow up sooner as needed.    Sowmya Baird NP  ENT  [facility]  [phone]        Again, thank you for allowing me to participate in the care of your patient.        Sincerely,        ZBIGNIEW Dewitt CNP

## 2019-07-24 NOTE — NURSING NOTE
"Chief Complaint   Patient presents with     Surgical Followup     Septo, turbinate reduction        Initial /80   Pulse 75   Temp 97.8  F (36.6  C) (Tympanic)   Ht 1.778 m (5' 10\")   Wt 81.6 kg (180 lb)   SpO2 95%   BMI 25.83 kg/m   Estimated body mass index is 25.83 kg/m  as calculated from the following:    Height as of this encounter: 1.778 m (5' 10\").    Weight as of this encounter: 81.6 kg (180 lb).  Medication Reconciliation: complete  "

## 2019-07-27 DIAGNOSIS — Z00.00 HEALTHCARE MAINTENANCE: ICD-10-CM

## 2019-07-27 DIAGNOSIS — F41.1 GAD (GENERALIZED ANXIETY DISORDER): ICD-10-CM

## 2019-07-29 RX ORDER — ASPIRIN 81 MG
TABLET,CHEWABLE ORAL
Qty: 30 TABLET | Refills: 3 | Status: SHIPPED | OUTPATIENT
Start: 2019-07-29 | End: 2019-12-02

## 2019-07-29 RX ORDER — HYDROXYZINE PAMOATE 50 MG/1
CAPSULE ORAL
Qty: 120 CAPSULE | Refills: 1 | Status: SHIPPED | OUTPATIENT
Start: 2019-07-29 | End: 2019-09-03

## 2019-07-30 ENCOUNTER — NURSE TRIAGE (OUTPATIENT)
Dept: PEDIATRICS | Facility: OTHER | Age: 59
End: 2019-07-30

## 2019-07-30 NOTE — TELEPHONE ENCOUNTER
Left side is in a lot of pain after 2 cortisone injections. Had one last week on Thursday 7/25/2019. First injection in the spine, second injection is in the sacroiliac joint. Patient states the paint is very sharp. Patient states it hurts all the way into his butt cheek. Patient is having difficulty walking. Patient states he is in more pain than the injection helping. Patient states the pain is at an 8-9/10. Patient can be contacted at [phone]. Please advise. Thank you.

## 2019-08-01 ENCOUNTER — TELEPHONE (OUTPATIENT)
Dept: INTERVENTIONAL RADIOLOGY/VASCULAR | Facility: HOSPITAL | Age: 59
End: 2019-08-01

## 2019-08-01 DIAGNOSIS — M54.50 LUMBAR BACK PAIN: ICD-10-CM

## 2019-08-01 DIAGNOSIS — M51.379 DEGENERATION OF LUMBAR OR LUMBOSACRAL INTERVERTEBRAL DISC: Primary | ICD-10-CM

## 2019-08-01 DIAGNOSIS — G89.4 CHRONIC PAIN SYNDROME: ICD-10-CM

## 2019-08-01 NOTE — TELEPHONE ENCOUNTER
INJECTION POST CALL    Procedure: CT guided bilateral SI joint injection  Radiologist(s): Dr. Juvenal Maki  Date of Procedure:  7/18/19    Pre-procedure pain score was: 8 (See pre-procedure score)  Post-procedure pain score as of today is: 8-10 on the lower back left side, 0-3 on the lower back Right side   Percentage of pain improvement today?  0%  Where is the pain located? Pain on the right side of the lower back along with middle back pain radiating to underneath the ribs.   Is this new pain? No  Would you like to be scheduled for an additional injection?  Yes      I responded to the patient's questions/concerns.    Patient will contact the provider if there are any issues.    Patient had a previous injection in thoracic area relieving that pain, and since after this injection that thoracic pain has returned and is radiating underneath the ribs mostly on the left side. Pain in the lower back on left side is unbearable. Patient states the day after the injection the pain was worse and has still not subsided. He would like to see what the radiologist recommends to do next and then be called. Patient will contact PCP afterwards to have an order put in.      Lucy Long

## 2019-08-02 ENCOUNTER — OFFICE VISIT (OUTPATIENT)
Dept: INTERNAL MEDICINE | Facility: OTHER | Age: 59
End: 2019-08-02
Attending: INTERNAL MEDICINE
Payer: COMMERCIAL

## 2019-08-02 ENCOUNTER — TELEPHONE (OUTPATIENT)
Dept: PEDIATRICS | Facility: OTHER | Age: 59
End: 2019-08-02

## 2019-08-02 VITALS
SYSTOLIC BLOOD PRESSURE: 136 MMHG | WEIGHT: 180 LBS | OXYGEN SATURATION: 98 % | TEMPERATURE: 98.1 F | HEIGHT: 70 IN | HEART RATE: 85 BPM | BODY MASS INDEX: 25.77 KG/M2 | DIASTOLIC BLOOD PRESSURE: 80 MMHG

## 2019-08-02 DIAGNOSIS — G89.4 CHRONIC PAIN SYNDROME: ICD-10-CM

## 2019-08-02 DIAGNOSIS — R29.3 POOR POSTURE: ICD-10-CM

## 2019-08-02 DIAGNOSIS — M54.50 LUMBAR BACK PAIN: ICD-10-CM

## 2019-08-02 DIAGNOSIS — G89.29 CHRONIC BILATERAL THORACIC BACK PAIN: Primary | ICD-10-CM

## 2019-08-02 DIAGNOSIS — M54.6 CHRONIC BILATERAL THORACIC BACK PAIN: Primary | ICD-10-CM

## 2019-08-02 PROCEDURE — G0463 HOSPITAL OUTPT CLINIC VISIT: HCPCS | Performed by: COUNSELOR

## 2019-08-02 PROCEDURE — 99214 OFFICE O/P EST MOD 30 MIN: CPT | Performed by: INTERNAL MEDICINE

## 2019-08-02 RX ORDER — LORATADINE 10 MG/1
10 TABLET ORAL DAILY
COMMUNITY
End: 2024-09-23 | Stop reason: ALTCHOICE

## 2019-08-02 RX ORDER — MORPHINE SULFATE 30 MG/1
TABLET, FILM COATED, EXTENDED RELEASE ORAL
Qty: 60 TABLET | Refills: 0 | Status: SHIPPED | OUTPATIENT
Start: 2019-08-02 | End: 2019-09-03

## 2019-08-02 ASSESSMENT — MIFFLIN-ST. JEOR: SCORE: 1652.72

## 2019-08-02 ASSESSMENT — PAIN SCALES - GENERAL: PAINLEVEL: EXTREME PAIN (8)

## 2019-08-02 NOTE — PROGRESS NOTES
Subjective     Joshua Barron is a 58 year old male who presents to clinic today for the following health issues:    HPI   Joint Pain    Onset: 7/25/2019    Description:   Location: low left side back that wraps around his side into abdomen and under left rib  Character: Sharp and Stabbing    Intensity: severe    Progression of Symptoms: worse    Accompanying Signs & Symptoms:  Other symptoms: radiation of pain to side into under left rib    History:   Previous similar pain: YES      Precipitating factors:   Trauma or overuse: no     Alleviating factors:  Improved by: rest/inactivity    Therapies Tried and outcome: rest and pain medication       Joshua had cortisone injections on the thoracic trans foraminal injections on 07/25/19 and on the bilateral SI joints.  He reports no pain relief from the injections.  He reports most of his pain is on the left side.  He reports no relief from his injections this time arround.    -------------------------------------    Patient Active Problem List   Diagnosis     Mixed hyperlipidemia     Tobacco Abuse, History of     Degeneration of lumbar or lumbosacral intervertebral disc     Depression, major     Chronic, continuous use of opioids     Chemical dependency (H)     Chronic rhinitis     Tinnitus of both ears     SNHL (sensorineural hearing loss)     Bipolar disorder (H)     Seizure-like activity (H)     Somatic dysfunction of pelvis region     Bilateral foot pain     Prostate cancer screening     Trigger index finger of right hand     Moderate persistent asthma without complication     Chronic lower back pain     ACP (advance care planning)     Onychia of toe of left foot     DDD (degenerative disc disease), lumbar     Seizure disorder (H)     Back muscle spasm     Benign essential hypertension     Retrograde ejaculation     Allergic rhinitis due to other allergen     Cervical spondylosis without myelopathy     Chronic headaches     DDD (degenerative disc disease)      Generalized anxiety disorder     Hypertrophy of prostate without urinary obstruction and other lower urinary tract symptoms (LUTS)     GERD (gastroesophageal reflux disease)     Lumbago     Major depressive disorder, recurrent episode, moderate (H)     Myalgia and myositis     Hyperlipidemia     Other pain disorders related to psychological factors     Spinal stenosis in cervical region     Status post lumbar spinal fusion     Tobacco use disorder     Trigger finger     Polypharmacy     Deviated nasal septum     Attention deficit hyperactivity disorder (ADHD), combined type     Past Surgical History:   Procedure Laterality Date     APPENDECTOMY      Appendicitis     BACK SURGERY  ,    back surgery 3 disk fusion     BACK SURGERY      L1-L2, L3-L4 laminectomy     COLONOSCOPY  2007    repeat 5-10 years     COLONOSCOPY N/A 2016    Procedure: COLONOSCOPY;  Surgeon: Steve Hoff DO;  Location: [facility] OR     exophytic lesion posterior scalp line  2011    Excision     laminectomy L3-4 and L1-2       RELEASE TRIGGER FINGER      4th digit both hands     RELEASE TRIGGER FINGER Right 2016    Procedure: RELEASE TRIGGER FINGER;  Surgeon: Zev Schroeder MD;  Location: [facility] OR     SEPTOPLASTY, TURBINOPLASTY, COMBINED N/A 2019    Procedure: SEPTOPLASTY, BILATERAL TURBINATE REDUCTION;  Surgeon: Ivett Gonzalez MD;  Location: [facility] OR       Social History     Tobacco Use     Smoking status: Former Smoker     Packs/day: 0.00     Years: 30.00     Pack years: 0.00     Last attempt to quit: 2007     Years since quittin.9     Smokeless tobacco: Current User     Types: Chew   Substance Use Topics     Alcohol use: Yes     Comment: Rarely     Family History   Problem Relation Age of Onset     Asthma Mother      Musculoskeletal Disorder Mother         arthritis     Diabetes Father      Cancer Maternal Grandmother         stomach     Alzheimer Disease Maternal Grandfather      Cancer Paternal  "Grandmother         stomach     Hypertension Paternal Grandfather            -------------------------------------  Reviewed and updated as needed this visit by Provider         Review of Systems   ROS COMP: Constitutional, HEENT, cardiovascular, pulmonary, gi and gu systems are negative, except as otherwise noted.      He denies incontinence or constipation.  He denies nausea or abdominal pain. He denies urinary retention or hematuria.   Objective    /80 (BP Location: Left arm, Patient Position: Sitting, Cuff Size: Adult Regular)   Pulse 85   Temp 98.1  F (36.7  C) (Tympanic)   Ht 1.778 m (5' 10\")   Wt 81.6 kg (180 lb)   SpO2 98%   BMI 25.83 kg/m    Body mass index is 25.83 kg/m .  Physical Exam   GENERAL: healthy, alert and no distress  GENERAL: poor posture with humped over gait  RESP: lungs clear to auscultation - no rales, rhonchi or wheezes  CV: regular rate and rhythm, normal S1 S2, no S3 or S4, no murmur, click or rub, no peripheral edema and peripheral pulses strong  MS: no gross musculoskeletal defects noted, no edema  MS: No spasms.  He has loss of his thoracic lumbar curvature transition.  NEURO: Normal strength and tone, mentation intact and speech normal  PSYCH: mentation appears normal, affect normal/bright    Diagnostic Test Results:  Labs reviewed in Epic  none           ASSESSMENT /PLAN:  (M54.6,  G89.29) Chronic bilateral thoracic back pain  (primary encounter diagnosis)  Comment: I once again had a discussion with Joshua in regards to his back and that he needs to strengthen his paraspinal muscle as he has poor posture and now has complete loss of his lumbar lordosis.  Joshua is reluctant to do exercises to to some pain with the exercises.  I have convinced him to start wearing another lumbar / lower thoracic spine support belt.  Plan:   XR Cervical Thoracic Transforaminal Inj Right, ORTHOTICS REFERRAL spinal brace/ support belt.    (R29.3) Poor posture  Comment: Secondary to weak " paraspinal muscles  Plan:   ORTHOTICS REFERRAL, spinal brace/ support belt    (M54.5) Lumbar back pain  Comment: as noted above  Plan:   ORTHOTICS REFERRAL    (G89.4) Chronic pain syndrome  Comment: Joshua has some pain for which he has been on much too high of opioid dosing.  Plan:   Continue opioid wean.  He will continue Robaxin 500 mg TID, Lidocaine patches 3 patches every 12 hours and Morphine ER 30 BID.  Joshua really should be weaned to no Opioids as these are not necessary and unlikely helping with his pain, but more of a psychological dependence.     Next month reduce to 15 mg TID          Follow up with Provider - one month for chronic pain.          Lyle Fisher, DO

## 2019-08-02 NOTE — NURSING NOTE
"Chief Complaint   Patient presents with     Pain     After injections       Initial /80 (BP Location: Left arm, Patient Position: Sitting, Cuff Size: Adult Regular)   Pulse 85   Temp 98.1  F (36.7  C) (Tympanic)   Ht 1.778 m (5' 10\")   Wt 81.6 kg (180 lb)   SpO2 98%   BMI 25.83 kg/m   Estimated body mass index is 25.83 kg/m  as calculated from the following:    Height as of this encounter: 1.778 m (5' 10\").    Weight as of this encounter: 81.6 kg (180 lb).  Medication Reconciliation: complete     Paris Childers      "

## 2019-08-05 ENCOUNTER — TELEPHONE (OUTPATIENT)
Dept: PEDIATRICS | Facility: OTHER | Age: 59
End: 2019-08-05

## 2019-08-05 NOTE — TELEPHONE ENCOUNTER
"Pt called with concerns in regards to his pain medication. Pt is upset that his pain medications are being tapered. Pt states \"I have 3 herniated discs and my surgeon does not recommend surgery. He recommends that I continue on my opioids.\" Pt also states \"This is cruel and unusual. I cannot function on the current dose of medication, let alone with a lower dose.\" Did explain to pt that per charting, pt agreed to an opioid reduction plan as of October 2018. Pt adamantly denies that he agreed to this. Pt verbalizes many paranoid thoughts regarding people coming into his house and stealing his meds, changing the combination on his gun safe, and turning the ringer off on his cell phone.     Pt would like message sent to Dr Fisher regarding his concerns. Please advise. Thank you!    "

## 2019-08-06 NOTE — TELEPHONE ENCOUNTER
His back pains have been discussed with him many times.  He needs to do his strengthening exercises or his back will never get better with pain medications injections or surgery.  His is over stating his back condition.  I have repeatedly told him that he needs to strengthen his back muscles.

## 2019-08-06 NOTE — TELEPHONE ENCOUNTER
"Called and notified patient that his prescription was wrote for one tablet every 12 hours. He claims that the pharmacy tells him that he can only take one tablet daily. He is saying that exercising wont work for him as his back is very painful. He says that he has convulsions every time he is exercising. He is un happy about the lower dose on the medication and with the pain he is having. He is claiming that his neighbors are stealing his pain medications. He is saying that his neighbors are trying to \"F\" him up on purpose. He wont notify the police because he doesn't trust them because they will charge him with a false crime he claims. I notified him that there is nothing I can do but for him to notify the police.     He keeps saying that Dr. Fisher doesn't know what is going on and everything goes in one ear out the other. He claims that his back surgeon is seeing completely different than Dr. Fisher and that the back surgeon recommends doubling the pain medications so he can do back strengthing exercises    "

## 2019-08-09 DIAGNOSIS — R11.0 NAUSEA: ICD-10-CM

## 2019-08-09 DIAGNOSIS — F41.1 GAD (GENERALIZED ANXIETY DISORDER): ICD-10-CM

## 2019-08-09 RX ORDER — DIAZEPAM 2 MG
TABLET ORAL
Qty: 60 TABLET | Refills: 0 | Status: SHIPPED | OUTPATIENT
Start: 2019-08-09 | End: 2019-09-03

## 2019-08-09 RX ORDER — DRONABINOL 2.5 MG/1
CAPSULE ORAL
Qty: 60 CAPSULE | Refills: 0 | Status: SHIPPED | OUTPATIENT
Start: 2019-08-09 | End: 2019-09-09

## 2019-08-09 NOTE — TELEPHONE ENCOUNTER
Dronabinol 2.5mg      Last Written Prescription Date:  7/12/2019  Last Fill Quantity: 60,   # refills: 0  Last Office Visit: 8/02/2019  Future Office visit:    Next 5 appointments (look out 90 days)    Aug 13, 2019  2:40 PM CDT  (Arrive by 2:25 PM)  Return Visit with Laquita Fernandez MD  [facility] ([facility] ) [address] [address] [phone]   Sep 12, 2019  2:00 PM CDT  (Arrive by 1:40 PM)  Office Visit with Lyle Fisher DO  [facility] ([facility] ) [facility] [address]  [phone]           Routing refill request to provider for review/approval because:  Drug not on the [facility], [facility] or [facility] refill protocol or controlled substance    Patient states he will be out on Sunday. Please advise.

## 2019-08-09 NOTE — TELEPHONE ENCOUNTER
Not on protocol, please advise.    diazepam (VALIUM) 2 MG tablet     Last Written Prescription Date:  7/12/19  Last Fill Quantity: 60,   # refills: 0  Last Office Visit: 8/2/19  Future Office visit:    Next 5 appointments (look out 90 days)    Aug 13, 2019  2:40 PM CDT  (Arrive by 2:25 PM)  Return Visit with Laquita Fernandez MD  [facility] ([facility] ) [address] [place] [address] [phone]   Sep 12, 2019  2:00 PM CDT  (Arrive by 1:40 PM)  Office Visit with Lyle Fisher DO  [facility] ([facility] ) [address] [place] [address]  [phone]           Routing refill request to provider for review/approval because:  Drug not on the [facility], [facility] or [facility] refill protocol or controlled substance

## 2019-08-10 DIAGNOSIS — K59.00 CONSTIPATION: ICD-10-CM

## 2019-08-12 RX ORDER — AMOXICILLIN 250 MG
CAPSULE ORAL
Qty: 120 TABLET | Refills: 0 | Status: SHIPPED | OUTPATIENT
Start: 2019-08-12 | End: 2019-09-10

## 2019-08-13 ENCOUNTER — OFFICE VISIT (OUTPATIENT)
Dept: PSYCHIATRY | Facility: OTHER | Age: 59
End: 2019-08-13
Attending: PSYCHIATRY & NEUROLOGY
Payer: COMMERCIAL

## 2019-08-13 VITALS
BODY MASS INDEX: 25.77 KG/M2 | HEART RATE: 82 BPM | HEIGHT: 70 IN | SYSTOLIC BLOOD PRESSURE: 110 MMHG | DIASTOLIC BLOOD PRESSURE: 80 MMHG | WEIGHT: 180 LBS | OXYGEN SATURATION: 97 % | TEMPERATURE: 99.2 F

## 2019-08-13 DIAGNOSIS — F90.2 ADHD (ATTENTION DEFICIT HYPERACTIVITY DISORDER), COMBINED TYPE: ICD-10-CM

## 2019-08-13 PROCEDURE — 99214 OFFICE O/P EST MOD 30 MIN: CPT | Performed by: PSYCHIATRY & NEUROLOGY

## 2019-08-13 PROCEDURE — G0463 HOSPITAL OUTPT CLINIC VISIT: HCPCS

## 2019-08-13 RX ORDER — LISDEXAMFETAMINE DIMESYLATE 70 MG/1
70 CAPSULE ORAL DAILY
Qty: 30 CAPSULE | Refills: 0 | Status: SHIPPED | OUTPATIENT
Start: 2019-09-10 | End: 2019-10-11

## 2019-08-13 RX ORDER — LISDEXAMFETAMINE DIMESYLATE 70 MG/1
70 CAPSULE ORAL DAILY
Qty: 30 CAPSULE | Refills: 0 | Status: SHIPPED | OUTPATIENT
Start: 2019-08-13 | End: 2019-08-13

## 2019-08-13 ASSESSMENT — PAIN SCALES - GENERAL: PAINLEVEL: EXTREME PAIN (8)

## 2019-08-13 ASSESSMENT — MIFFLIN-ST. JEOR: SCORE: 1652.72

## 2019-08-13 NOTE — NURSING NOTE
"Chief Complaint   Patient presents with     RECHECK     Mental health.       Initial /80 (BP Location: Right arm, Patient Position: Sitting, Cuff Size: Adult Regular)   Pulse 82   Temp 99.2  F (37.3  C) (Tympanic)   Ht 1.778 m (5' 10\")   Wt 81.6 kg (180 lb)   SpO2 97%   BMI 25.83 kg/m   Estimated body mass index is 25.83 kg/m  as calculated from the following:    Height as of this encounter: 1.778 m (5' 10\").    Weight as of this encounter: 81.6 kg (180 lb).  Medication Reconciliation: complete    "

## 2019-08-13 NOTE — PROGRESS NOTES
PSYCHIATRY CLINIC PROGRESS NOTE   20 minute medication management, more than 50% of time spent counseling patient on medications, medication side effects, symptom history and management                                                                       Social- Was  twice. Lives alone with his dog Montserrat (beltran) and 3 cats: Smoky the cat. Has a GF in [place]  Children-  2 kids, they are in [place]  Last visit: He is back taking nortriptyline 100 mg daily and he stopped seroquel.     Continue Trileptal 600 mg bid,  vyvanse 70 mg daily and last script for April 24 and gave script for 5/23/19 and continue diazepam 2 mg every 12 hours as needed and continue trazodone 100 mg bedtime prn insomnia    - still issues with his neighbors  - feels is doing generally okay  - Dr. Pettit about year ago was his last neurosurg apptmt  - Lots of family issues: Joshua has taken care of his parents and yet parents give his other brothers everything. oldest of 3 brothers. Brother in [place] youngest Mark and Major is here. Mom and dad here [place] on [place]. Joshua noting moving here to be closer to parents and help them, etc. But, parents don't seem to want him around much or talk to him.    SUBSTANCE USE- denies abuse of meds or substances    SYMPTOMS- paranoia,  issues with attention and concentration improved with Vyvanse: racing thoughts, depressed mood, impulsivity, distractibility  MEDICAL ROS- back pain, denies any issues with headaches or stomach pain / issues with stimulant. Intentional weight loss  MEDICAL / SURGICAL HISTORY                     Patient Active Problem List   Diagnosis     Mixed hyperlipidemia     Tobacco Abuse, History of     Degeneration of lumbar or lumbosacral intervertebral disc     Depression, major     Chronic, continuous use of opioids     Chemical dependency (H)     Chronic rhinitis     Tinnitus of both ears     SNHL (sensorineural hearing loss)     Bipolar disorder (H)     Seizure-like activity (H)      Somatic dysfunction of pelvis region     Bilateral foot pain     Prostate cancer screening     Trigger index finger of right hand     Moderate persistent asthma without complication     Chronic lower back pain     ACP (advance care planning)     Onychia of toe of left foot     DDD (degenerative disc disease), lumbar     Seizure disorder (H)     Back muscle spasm     Benign essential hypertension     Retrograde ejaculation     Allergic rhinitis due to other allergen     Cervical spondylosis without myelopathy     Chronic headaches     DDD (degenerative disc disease)     Generalized anxiety disorder     Hypertrophy of prostate without urinary obstruction and other lower urinary tract symptoms (LUTS)     GERD (gastroesophageal reflux disease)     Lumbago     Major depressive disorder, recurrent episode, moderate (H)     Myalgia and myositis     Hyperlipidemia     Other pain disorders related to psychological factors     Spinal stenosis in cervical region     Status post lumbar spinal fusion     Tobacco use disorder     Trigger finger     Polypharmacy     Deviated nasal septum     Attention deficit hyperactivity disorder (ADHD), combined type     ALLERGY   Cymbalta; Depakote [valproic acid]; and Seasonal allergies  MEDICATIONS                                                                                             Current Outpatient Medications   Medication Sig     acetaminophen (TYLENOL) 325 MG tablet TAKE 2 TABLETS BY MOUTH EVERY 4 HOURS AS NEEDED FOR MILD PAIN     albuterol (VENTOLIN HFA) 108 (90 Base) MCG/ACT inhaler USE 2 PUFFS 4 TIMES A DAY AS NEEDED FOR SHORTNESS OF BREATH     ASPIRIN LOW DOSE 81 MG chewable tablet CHEW AND SWALLOW 1 TABLET BY MOUTH DAILY     atorvastatin (LIPITOR) 20 MG tablet TAKE 1 TABLET BY MOUTH DAILY     baclofen (LIORESAL) 20 MG tablet TAKE 1 TABLET BY MOUTH 4 TIMES DAILY     budesonide (PULMICORT) 0.5 MG/2ML neb solution Squirt entire vial into previously made [facility] med saline bottle,  mix, irrigate both nostrils until entire bottle empty. Do this twice daily.     diazepam (VALIUM) 2 MG tablet TAKE 1 TABLET BY MOUTH EVERY 12 HOURS AS NEEDED FOR ANXIETY     diclofenac (VOLTAREN) 50 MG EC tablet TAKE 1 TABLET BY MOUTH 3 TIMES DAILY     docusate sodium (DOK) 100 MG capsule TAKE 1 CAPSULE BY MOUTH TWICE DAILY     dronabinol (MARINOL) 2.5 MG capsule TAKE 1 CAPSULE BY MOUTH 2 TIMES DAILY BEFORE MEALS     fluticasone (FLONASE) 50 MCG/ACT nasal spray USE 2 SPRAYS IN EACH NOSTRIL DAILY     gabapentin (NEURONTIN) 300 MG capsule TAKE 3 CAPSULES BY MOUTH THREE TIMES DAILY     hydrOXYzine (VISTARIL) 50 MG capsule TAKE 1 TO 2 CAPSULES BY MOUTH 4 TIMES DAILY AS NEEDED FOR ANXIETY     ibuprofen (IBU) 600 MG tablet TAKE 1 TABLET BY MOUTH EVERY 6 HOURS AS NEEDED     lidocaine (LIDODERM) 5 % patch Place 3 patches on daily for 12 hours and remove and replace with three patches ( 6 patches per day)     loratadine (CLARITIN) 10 MG tablet Take 10 mg by mouth daily     losartan (COZAAR) 50 MG tablet TAKE 1 TABLET BY MOUTH DAILY     methocarbamol (ROBAXIN) 500 MG tablet TAKE 1 TABLET BY MOUTH 3 TIMES DAILY     mometasone-formoterol (DULERA) 200-5 MCG/ACT inhaler Inhale 2 puffs into the lungs 2 times daily     morphine (MS CONTIN) 30 MG CR tablet TAKE 1 TABLET BY MOUTH EVERY 12 HOURS     multivitamin  with iron (SM COMPLETE ADVANCED FORMULA) TABS TAKE 1 TABLET BY MOUTH DAILY     naloxone (NARCAN) 1 mg/mL for intranasal kit (2 syringes with 2 mucosal atomizer device) In opioid overdose put cone in nostril and push 1/2 of contents into each nostril.  Repeat every 3 min if no response until help arrives.     nicotine polacrilex (NICORETTE) 2 MG gum CHEW 1 PIECE AS NEEDED FOR SMOKING CESSATION     nortriptyline (PAMELOR) 50 MG capsule Take 2 capsules (100 mg) by mouth At Bedtime     omeprazole (PRILOSEC) 20 MG DR capsule TAKE 1 CAPSULE BY MOUTH EVERY DAY BEFORE A MEAL     order for DME 1 boa back brace     ORDER FOR DME  "Equipment being ordered: Large gloves     OXcarbazepine (TRILEPTAL) 600 MG tablet TAKE 1 TABLET BY MOUTH 2 TIMES DAILY     oxymetazoline (AFRIN) 0.05 % nasal spray Spray 2 sprays into both nostrils 2 times daily Use as directed post surgical. Not to use more than 3 days.     propranolol (INDERAL) 20 MG tablet TAKE 1 TABLET BY MOUTH TWICE DAILY     ranitidine (ZANTAC) 300 MG tablet Take 1 tablet (300 mg) by mouth At Bedtime For 1 month, then as needed for heartburn     senna-docusate (SENNA-PLUS) 8.6-50 MG tablet TAKE 1 OR 2 TABLETS BY MOUTH 2 TIMES A DAY     tiZANidine (ZANAFLEX) 4 MG tablet TAKE 1 TABLET BY MOUTH 3 TIMES A DAY     traZODone (DESYREL) 50 MG tablet TAKE 2 TABLETS BY MOUTH NIGHTLY AS NEEDED     trimethoprim-polymyxin b (POLYTRIM) 02875-6.1 UNIT/ML-% ophthalmic solution Place 2 drops Into the left eye every 6 hours     VYVANSE 70 MG capsule TAKE 1 CAPSULE BY MOUTH DAILY     No current facility-administered medications for this visit.        VITALS   /80 (BP Location: Right arm, Patient Position: Sitting, Cuff Size: Adult Regular)   Pulse 82   Temp 99.2  F (37.3  C) (Tympanic)   Ht 1.778 m (5' 10\")   Wt 81.6 kg (180 lb)   SpO2 97%   BMI 25.83 kg/m       LABS                                                                                                                           Last Comprehensive Metabolic Panel:  Sodium   Date Value Ref Range Status   06/03/2019 142 133 - 144 mmol/L Final     Potassium   Date Value Ref Range Status   06/03/2019 4.3 3.4 - 5.3 mmol/L Final     Chloride   Date Value Ref Range Status   06/03/2019 106 94 - 109 mmol/L Final     Carbon Dioxide   Date Value Ref Range Status   06/03/2019 31 20 - 32 mmol/L Final     Anion Gap   Date Value Ref Range Status   06/03/2019 5 3 - 14 mmol/L Final     Glucose   Date Value Ref Range Status   06/03/2019 99 70 - 99 mg/dL Final     Urea Nitrogen   Date Value Ref Range Status   06/03/2019 20 7 - 30 mg/dL Final     Creatinine " "  Date Value Ref Range Status   06/03/2019 1.22 0.66 - 1.25 mg/dL Final     GFR Estimate   Date Value Ref Range Status   06/03/2019 66 >60 mL/min/[1.73_m2] Final     Comment:     Non  GFR Calc  Starting 12/18/2018, serum creatinine based estimated GFR (eGFR) will be   calculated using the Chronic Kidney Disease Epidemiology Collaboration   (CKD-EPI) equation.       Calcium   Date Value Ref Range Status   06/03/2019 8.8 8.5 - 10.1 mg/dL Final     CBC RESULTS:   Recent Labs   Lab Test 06/03/19  1600   WBC 4.6   RBC 4.23*   HGB 13.3   HCT 38.2*   MCV 90   MCH 31.4   MCHC 34.8   RDW 12.7          Here are the results:  Lab Results   Component Value Date    CHOL 131 10/25/2018     Lab Results   Component Value Date    HDL 51 10/25/2018     Lab Results   Component Value Date    LDL 60 10/25/2018     Lab Results   Component Value Date    TRIG 101 10/25/2018     Lab Results   Component Value Date    CHOLHDLRATIO 4.2 11/17/2014         EKG 6/3/19 with QTc of 415 ms     MENTAL STATUS EXAM                                                                                        Alert. Oriented to person, place, and date / time. Casually groomed, calm, cooperative with good eye contact. No problems psychomotor behavior. Speech: loud. .  Mood was described as \"doing okay\"  and affect was congruent to speech content and full range. Thought process, including associations, was unremarkable and thought content was devoid of suicidal and homicidal ideation.  No hallucinations. Insight was poor Judgment was  adequate for safety. Fund of knowledge was intact. Pt demonstrates no obvious problems with attention, concentration, language, recent or remote memory although these were not formally tested.     ASSESSMENT                                                                                                      HISTORICAL:  Initial psych note 10/6/15          NOTES:      Joshua is a 58 year old with bipolar, ADHD, " and MDD.  We started Valium as dual purpose: muscle relaxant properties and for anxiety.We have discussed the new guidelines for short - term use of benzodiazepines (Valium) and avoiding their use along with opioids. I had noted plan to taper down over time and eventually discontinue. We decreased from 5 mg every 12 hours as needed down to 2 mg every 12 hours as needed .     Doing okay today. We agreed on keeping meds as are.      TREATMENT RISK STATEMENT:  The risks, benefits, alternatives and potential adverse effects have been explained and are understood by the pt.  The pt agrees to the treatment plan with the ability to do so.   The pt knows to call the clinic for any problems or access emergency care if needed.        DIAGNOSES                    Bipolar disorder I with psychosis vs. Schizoaffective disorder, bipolar type  ADHD      PLAN                                                                                                                    1)  MEDICATIONS:       Continue Trileptal 600 mg bid,  and continue diazepam 2 mg every 12 hours as needed and continue trazodone 100 mg bedtime prn insomnia. Vyvanse last script was 7/15/19 and gave scripts today for 8/13/19 and for 9/10/19    2)  THERAPY:  No change    3)  LABS:  none    4)  PT MONITOR [call for probs]:  SEs from meds, worsening sx, SI/HI    5)  REFERRALS [CD, medical, other]:  None    6)  RTC:  2 months

## 2019-08-16 ENCOUNTER — TELEPHONE (OUTPATIENT)
Dept: PEDIATRICS | Facility: OTHER | Age: 59
End: 2019-08-16

## 2019-08-17 RX ORDER — MORPHINE SULFATE 15 MG/1
15 TABLET, FILM COATED, EXTENDED RELEASE ORAL 3 TIMES DAILY
Qty: 90 TABLET | Refills: 0 | COMMUNITY
Start: 2019-08-17 | End: 2019-09-19

## 2019-08-19 ENCOUNTER — OFFICE VISIT (OUTPATIENT)
Dept: OTOLARYNGOLOGY | Facility: OTHER | Age: 59
End: 2019-08-19
Attending: OTOLARYNGOLOGY
Payer: COMMERCIAL

## 2019-08-19 VITALS
WEIGHT: 180 LBS | BODY MASS INDEX: 25.77 KG/M2 | DIASTOLIC BLOOD PRESSURE: 74 MMHG | HEART RATE: 62 BPM | OXYGEN SATURATION: 97 % | HEIGHT: 70 IN | SYSTOLIC BLOOD PRESSURE: 110 MMHG | TEMPERATURE: 97 F

## 2019-08-19 DIAGNOSIS — Z98.890 S/P SINUS SURGERY: ICD-10-CM

## 2019-08-19 DIAGNOSIS — Z98.890 S/P NASAL SEPTOPLASTY: Primary | ICD-10-CM

## 2019-08-19 DIAGNOSIS — G47.33 OSA (OBSTRUCTIVE SLEEP APNEA): ICD-10-CM

## 2019-08-19 DIAGNOSIS — Z71.6 TOBACCO ABUSE COUNSELING: ICD-10-CM

## 2019-08-19 PROCEDURE — 31231 NASAL ENDOSCOPY DX: CPT | Performed by: NURSE PRACTITIONER

## 2019-08-19 PROCEDURE — G0463 HOSPITAL OUTPT CLINIC VISIT: HCPCS

## 2019-08-19 PROCEDURE — 99024 POSTOP FOLLOW-UP VISIT: CPT | Mod: 25 | Performed by: NURSE PRACTITIONER

## 2019-08-19 PROCEDURE — G0463 HOSPITAL OUTPT CLINIC VISIT: HCPCS | Mod: 25

## 2019-08-19 ASSESSMENT — PAIN SCALES - GENERAL: PAINLEVEL: EXTREME PAIN (8)

## 2019-08-19 ASSESSMENT — MIFFLIN-ST. JEOR: SCORE: 1652.72

## 2019-08-19 NOTE — NURSING NOTE
"Chief Complaint   Patient presents with     Follow Up     Nat, TR       Initial /74   Pulse 62   Temp 97  F (36.1  C) (Tympanic)   Ht 1.778 m (5' 10\")   Wt 81.6 kg (180 lb)   SpO2 97%   BMI 25.83 kg/m   Estimated body mass index is 25.83 kg/m  as calculated from the following:    Height as of this encounter: 1.778 m (5' 10\").    Weight as of this encounter: 81.6 kg (180 lb).  Medication Reconciliation: complete  "

## 2019-08-19 NOTE — LETTER
8/19/2019         RE: Joshua Barron  [address] [place] [address]        Dear Colleague,    Thank you for referring your patient, Joshua Barron, to the [facility]. Please see a copy of my visit note below.         Chief Complaint:     Chief Complaint   Patient presents with     Follow Up     Septo, TR          History of Present Illness:     HPI - Joshua Barron is a 58 year old male status post septoplasty with reimplantation of cartilage, bilateral submucosal resection of inferior turbinates, done on 7/2/19 by Dr. Gonzalez, here with concerns following his surgery.    He was last seen in ENT by myself 7/24/19 in which he had some post operative nasal congestion, relieved with debridement.     Today he reports he can feel a suture on his left septum that he wants trimmed. He did try removing some with tweezers.    Intermittent yellow nasal drainage, yesterday states it was almost resolved.   Manipulating nasal septum when trying to get crusted debris off.   States his nasal breathing is still significantly improved, not needing to use any additional nasal sprays and he is very relieved with this.  Denies sinus pain/pressure, fever.     He continues to do the [facility] sinus rinses 2-5 times/day.     Sleep study recommended at 2 months post operatively. Distant sleep study shows AHI 13.8 (7/16/15). He has not been using due to abnormal fit.   He continues to chew.    Operative Note 7/2/19  PREOPERATIVE DIAGNOSES:   1. Deviated nasal septum.   2. Turbinate hypertrophy.   3. Nasal obstruction.      POSTOPERATIVE DIAGNOSES:   1. Deviated nasal septum.   2. Turbinate hypertrophy.   3. Nasal obstruction.      PROCEDURES PERFORMED:   1. Septoplasty with reimplantation of cartilage .   2. Bilateral submucous resection of inferior turbinates.      SURGEON: Ivett Gonzalez D.O.  BLOOD LOSS: 10 mL.   COMPLICATIONS: None.   SPECIMENS: None.   ANESTHESIA: GETA.          Review  "of Systems:     ROS: See HPI         Physical Exam:     /74   Pulse 62   Temp 97  F (36.1  C) (Tympanic)   Ht 1.778 m (5' 10\")   Wt 81.6 kg (180 lb)   SpO2 97%   BMI 25.83 kg/m       General - The patient is well nourished and well developed, and appears to have good nutritional status.  Alert and oriented to person and place, answers questions and cooperates with examination appropriately.   Head and Face - Normocephalic and atraumatic, with no gross asymmetry noted of the contour of the facial features.  The facial nerve is intact, with strong symmetric movements.  Eyes - Extraocular movements intact, and the pupils were reactive to light.  Sclera were not icteric or injected, conjunctiva were pink and moist.  Ears -The external auditory canals are patent, the tympanic membranes are intact without effusion, retraction or mass.  Bony landmarks are intact.  Mouth - Examination of the oral cavity shows pink, healthy, moist mucosa.  No lesions or ulceration noted.  The dentition are fair condition, no erythema/drainage/lesions of the gingiva.  The tongue is mobile and midline. Scattered chewing tobacco throughout.   Nose - Dorsal hump.     To evaluate the nose and sinuses, I performed rigid nasal endoscopy. I sprayed both nares with lidocaine and neosynephrine.    I began with the LEFT side using a 0 degree rigid nasal endoscope, and then similarly examined the RIGHT side    Findings:  Septum with some dried purulent secretions anterior mucosa, stuck in sutures. Area was softened with use of sterile q-tip and normal saline. Trimmed suture left anterior septum and had him palpate area, stating bothersome area is gone.   Inferior turbinates: Reduced  Middle turbinate and middle meatus:  No active purulence, no polyposis.  Mucosa is healthy throughout, without polyps nor polypoid degeneration  The patient tolerated the procedure well         Assessment and Plan:       ICD-10-CM    1. S/P nasal septoplasty " Z98.890    2. S/P sinus surgery: Bilateral reduction of inferior turbinate mucosa Z98.890    3. MARLENE (obstructive sleep apnea) G47.33    4. Tobacco abuse counseling Z71.6      Bothersome suture trimmed and patient is happy with this.  Instructed to avoid manipulation to septum.  Continue with [facility] sinus rinses at least BID along with PRN.   He continues to be very happy with the improvement in his nasal congestion post operatively.     Referral to sleep medicine, to be done at least at 2 months post op.     Discussed with patient that it takes 20 years of quitting tobacco to be at same risk of developing head and neck cancer as a person who has never used tobacco. Patient voiced understanding to ongoing risks associated with continued tobacco use. Tobacco cessation was strongly encouraged    Encouraged to follow up sooner as needed.    Sowmya Baird NP  ENT  [facility]  [phone]        Again, thank you for allowing me to participate in the care of your patient.        Sincerely,        Sowmya Baird, ZBIGNIEW CNP

## 2019-08-19 NOTE — PATIENT INSTRUCTIONS
Thank you for allowing Sowmya Baird CNP and our ENT team to participate in your care.  If your medications are too expensive, please give the nurse a call.  We can possibly change this medication.  If you have a scheduling or an appointment question please contact [facility] Coordinator at their direct line [phone]  ALL nursing questions or concerns can be directed to your ENT nurse at: [phone] - [facility]    Follow up as needed

## 2019-08-19 NOTE — PROGRESS NOTES
"     Chief Complaint:     Chief Complaint   Patient presents with     Follow Up     Septo, TR          History of Present Illness:     HPI - Joshua Barron is a 58 year old male status post septoplasty with reimplantation of cartilage, bilateral submucosal resection of inferior turbinates, done on 7/2/19 by Dr. Gonzalez, here with concerns following his surgery.    He was last seen in ENT by myself 7/24/19 in which he had some post operative nasal congestion, relieved with debridement.     Today he reports he can feel a suture on his left septum that he wants trimmed. He did try removing some with tweezers.    Intermittent yellow nasal drainage, yesterday states it was almost resolved.   Manipulating nasal septum when trying to get crusted debris off.   States his nasal breathing is still significantly improved, not needing to use any additional nasal sprays and he is very relieved with this.  Denies sinus pain/pressure, fever.     He continues to do the [facility] sinus rinses 2-5 times/day.     Sleep study recommended at 2 months post operatively. Distant sleep study shows AHI 13.8 (7/16/15). He has not been using due to abnormal fit.   He continues to chew.    Operative Note 7/2/19  PREOPERATIVE DIAGNOSES:   1. Deviated nasal septum.   2. Turbinate hypertrophy.   3. Nasal obstruction.      POSTOPERATIVE DIAGNOSES:   1. Deviated nasal septum.   2. Turbinate hypertrophy.   3. Nasal obstruction.      PROCEDURES PERFORMED:   1. Septoplasty with reimplantation of cartilage .   2. Bilateral submucous resection of inferior turbinates.      SURGEON: Ivett Gonzalez D.O.  BLOOD LOSS: 10 mL.   COMPLICATIONS: None.   SPECIMENS: None.   ANESTHESIA: GETA.          Review of Systems:     ROS: See HPI         Physical Exam:     /74   Pulse 62   Temp 97  F (36.1  C) (Tympanic)   Ht 1.778 m (5' 10\")   Wt 81.6 kg (180 lb)   SpO2 97%   BMI 25.83 kg/m      General - The patient is well nourished and well " developed, and appears to have good nutritional status.  Alert and oriented to person and place, answers questions and cooperates with examination appropriately.   Head and Face - Normocephalic and atraumatic, with no gross asymmetry noted of the contour of the facial features.  The facial nerve is intact, with strong symmetric movements.  Eyes - Extraocular movements intact, and the pupils were reactive to light.  Sclera were not icteric or injected, conjunctiva were pink and moist.  Ears -The external auditory canals are patent, the tympanic membranes are intact without effusion, retraction or mass.  Bony landmarks are intact.  Mouth - Examination of the oral cavity shows pink, healthy, moist mucosa.  No lesions or ulceration noted.  The dentition are fair condition, no erythema/drainage/lesions of the gingiva.  The tongue is mobile and midline. Scattered chewing tobacco throughout.   Nose - Dorsal hump.     To evaluate the nose and sinuses, I performed rigid nasal endoscopy. I sprayed both nares with lidocaine and neosynephrine.    I began with the LEFT side using a 0 degree rigid nasal endoscope, and then similarly examined the RIGHT side    Findings:  Septum with some dried purulent secretions anterior mucosa, stuck in sutures. Area was softened with use of sterile q-tip and normal saline. Trimmed suture left anterior septum and had him palpate area, stating bothersome area is gone.   Inferior turbinates: Reduced  Middle turbinate and middle meatus:  No active purulence, no polyposis.  Mucosa is healthy throughout, without polyps nor polypoid degeneration  The patient tolerated the procedure well         Assessment and Plan:       ICD-10-CM    1. S/P nasal septoplasty Z98.890    2. S/P sinus surgery: Bilateral reduction of inferior turbinate mucosa Z98.890    3. MARLENE (obstructive sleep apnea) G47.33    4. Tobacco abuse counseling Z71.6      Bothersome suture trimmed and patient is happy with this.  Instructed to  avoid manipulation to septum.  Continue with [facility] sinus rinses at least BID along with PRN.   He continues to be very happy with the improvement in his nasal congestion post operatively.     Referral to sleep medicine, to be done at least at 2 months post op.     Discussed with patient that it takes 20 years of quitting tobacco to be at same risk of developing head and neck cancer as a person who has never used tobacco. Patient voiced understanding to ongoing risks associated with continued tobacco use. Tobacco cessation was strongly encouraged    Encouraged to follow up sooner as needed.    Sowmya Baird NP  ENT  [facility]  [phone]

## 2019-08-21 DIAGNOSIS — F11.90 CHRONIC, CONTINUOUS USE OF OPIOIDS: ICD-10-CM

## 2019-08-21 RX ORDER — HYDROCODONE BITARTRATE AND ACETAMINOPHEN 10; 325 MG/1; MG/1
TABLET ORAL
Qty: 60 TABLET | Refills: 0 | Status: SHIPPED | OUTPATIENT
Start: 2019-08-21 | End: 2019-09-13

## 2019-08-21 NOTE — TELEPHONE ENCOUNTER
Controlled Substance Refill Request for Hydrocodone   Problem List Complete:    Yes    Last Written Prescription Date:  7/20/2019  Last Fill Quantity: 60,   # refills: 0    THE MOST RECENT OFFICE VISIT MUST BE WITHIN THE PAST 3 MONTHS. AT LEAST ONE FACE TO FACE VISIT MUST OCCUR EVERY 6 MONTHS. ADDITIONAL VISITS CAN BE VIRTUAL.  (THIS STATEMENT SHOULD BE DELETED.)    Last Office Visit with [facility] primary care provider: 8/2/2019    Future Office visit:   Next 5 appointments (look out 90 days)    Sep 12, 2019  2:00 PM CDT  (Arrive by 1:40 PM)  Office Visit with Lyle Fisher DO  [facility] ([facility] [place] ) [address]  [place] [address]  [phone]   Oct 15, 2019  2:40 PM CDT  (Arrive by 2:25 PM)  Return Visit with Laquita Fernandez MD  [facility] [place] ([facility] [place] ) [address]  [place] [address] [phone]          Controlled substance agreement:   Encounter-Level CSA - 05/23/2017:    Controlled Substance Agreement - Scan on 7/31/2018 12:12 PM: CONTROLLED SUBSTANCE AGREEMENT (below)       Encounter-Level CSA - 09/18/2015:    Controlled Substance Agreement - Scan on 11/25/2015  2:25 PM: SCHEDULED MEDICATION USE AGREEMENT (below)       Patient-Level CSA:    Controlled Substance Agreement - Non - Opioid - Scan on 7/15/2019 10:03 AM: NON-OPIOID CONTROLLED SUBSTANCE AGREEMENT (below)           Last Urine Drug Screen:   Pain Drug SCR UR W RPTD Meds   Date Value Ref Range Status   05/10/2017   Final    FINAL  (Note)  ====================================================================  TOX[facility]RE COMP DRUG ANALYSIS,UR  ====================================================================  Test                             Result       Flag       Units  Drug Present and Declared for Prescription Verification   Amphetamine                    >6061        EXPECTED   ng/mg creat    Amphetamine is available as a schedule II  prescription drug.     Desmethyldiazepam              653          EXPECTED   ng/mg creat   Oxazepam                       905          EXPECTED   ng/mg creat   Temazepam                      999          EXPECTED   ng/mg creat    Desmethyldiazepam, oxazepam, and temazepam are expected    metabolites of diazepam. Desmethyldiazepam and oxazepam are also    expected metabolites of other drugs, including chlordiazepoxide,    prazepam, clorazepate, and halazepam. Oxazepam is an expected    metabolite of temazepam. Oxazepam and temazepam are also    available as scheduled prescription medications.     Hydrocodone                    2941         EXPECTED   ng/mg creat   Hydromorphone                  281          EXPECTED   ng/mg creat   Dihydrocodeine                 83           EXPECTED   ng/mg creat   Norhydrocodone                 2072         EXPECTED   ng/mg creat    Sources of hydrocodone include scheduled prescription    medications. Hydromorphone, dihydrocodeine and norhydrocodone are    expected metabolites of hydrocodone. Hydromorphone and    dihydrocodeine are also available as scheduled prescription    medications.     Fentanyl                       158          EXPECTED   ng/mg creat   Norfentanyl                    >606         EXPECTED   ng/mg creat    Source of fentanyl is a scheduled prescription medication,    including IV, patch, and transmucosal formulations. Norfentanyl    is an expected metabolite of fentanyl.     Acetaminophen                  PRESENT      EXPECTED    Drug Present not Declared for Prescription Verification   Gabapentin                     PRESENT      UNEXPECTED   Nortriptyline                  PRESENT      UNEXPECTED    Nortriptyline may be administered as a prescription drug; it is    also an expected metabolite of amitriptyline.     Trazodone                      PRESENT      UNEXPECTED   1,3 chlorophenyl piperazine    PRESENT      UNEXPECTED    1,3-chlorophenyl piperazine is  an expected metabolite of    trazodone.     Diclofenac                     PRESENT      UNEXPECTED   Ibuprofen                      PRESENT      UNEXPECTED   Diphenhydramine                PRESENT      UNEXPECTED   Lidocaine                      PRESENT      UNEXPECTED   Propranolol                    PRESENT      UNEXPECTED  ====================================================================  Test                      Result    Flag   Units      Ref Range   Creatinine              165              mg/dL      >=20  ====================================================================  Declared Medications:  The flagging and interpretation on this report are based on the  following declared medications.  Unexpected results may arise from  inaccuracies in the declared medications.    **Note: The testing scope of this panel includes these medications:    Amphetamine  Amphetamine (Lisdexamfetamine)  Diazepam  Fentanyl  Hydrocodone (Norco)    **Note: The testing scope of this panel does not include small to  moderate amounts of these reported medications:    Acetaminophen (Norco)  ====================================================================  For clinical consultation, please call [phone].  ====================================================================  Analysis performed by [facility] [place] [address]     , No results found for: COMDAT, No results found for: THC13, PCP13, COC13, MAMP13, OPI13, AMP13, BZO13, TCA13, MTD13, BAR13, OXY13, PPX13, BUP13     Processing:  Staff will hand deliver Rx to on-site pharmacy

## 2019-08-26 DIAGNOSIS — R52 PAIN: ICD-10-CM

## 2019-08-26 DIAGNOSIS — M54.50 LUMBAR BACK PAIN: ICD-10-CM

## 2019-08-26 DIAGNOSIS — G89.4 CHRONIC PAIN SYNDROME: ICD-10-CM

## 2019-08-26 DIAGNOSIS — M54.5 LOW BACK PAIN, UNSPECIFIED BACK PAIN LATERALITY, UNSPECIFIED CHRONICITY, WITH SCIATICA PRESENCE UNSPECIFIED: ICD-10-CM

## 2019-08-26 DIAGNOSIS — M62.830 BACK MUSCLE SPASM: Primary | ICD-10-CM

## 2019-08-28 RX ORDER — GABAPENTIN 300 MG/1
CAPSULE ORAL
Qty: 270 CAPSULE | Refills: 0 | Status: SHIPPED | OUTPATIENT
Start: 2019-08-28 | End: 2019-09-26

## 2019-08-28 RX ORDER — BACLOFEN 20 MG/1
TABLET ORAL
Qty: 120 TABLET | Refills: 0 | Status: SHIPPED | OUTPATIENT
Start: 2019-08-28 | End: 2019-09-19

## 2019-08-28 RX ORDER — ACETAMINOPHEN 325 MG/1
TABLET ORAL
Qty: 200 TABLET | Refills: 3 | Status: SHIPPED | OUTPATIENT
Start: 2019-08-28 | End: 2019-12-14

## 2019-08-28 NOTE — TELEPHONE ENCOUNTER
Gabapentin  Last office visit:08/02/19  Last refill: 07/19/19 #270    Thank you.    Baclofen  Last office visit: 08/02/19  Last refill: 07/19/19  #120    Thank you.

## 2019-09-02 NOTE — PROGRESS NOTES
Physical Therapy Discharge Note      Name: Joshua Barron MRN# [id number]   Age: 58 year old YOB: 1960        Requesting provider: Dr. Fisher  Diagnosis: DDD Lumbar  Prescription: Evaluate and treat  Frequency/duration: Therapists discretion  Services provided: The patient was seen for a total of 4 visits from 11/6/19 to 12/4/19.  See the previous documentation for treatment details.        Plan  Intervention:  The patient has not contacted the department since the previous visit.  For additional information regarding goals and status as of last, please refer to prior documentation.  It is uncertain if the patient has attained any further goals at this time.  The patient will be formally discharged from physical therapy care.  We will resume physical therapy services as per physician referral and discretion.     Follow up:  Recheck with physician as needed

## 2019-09-03 DIAGNOSIS — M54.42 CHRONIC BILATERAL LOW BACK PAIN WITH BILATERAL SCIATICA: ICD-10-CM

## 2019-09-03 DIAGNOSIS — M54.50 LOW BACK PAIN: ICD-10-CM

## 2019-09-03 DIAGNOSIS — M54.41 CHRONIC BILATERAL LOW BACK PAIN WITH BILATERAL SCIATICA: ICD-10-CM

## 2019-09-03 DIAGNOSIS — G89.4 CHRONIC PAIN SYNDROME: ICD-10-CM

## 2019-09-03 DIAGNOSIS — M79.7 FIBROMYALGIA: ICD-10-CM

## 2019-09-03 DIAGNOSIS — Z00.00 HEALTH CARE MAINTENANCE: ICD-10-CM

## 2019-09-03 DIAGNOSIS — M51.379 DEGENERATION OF LUMBAR OR LUMBOSACRAL INTERVERTEBRAL DISC: Primary | ICD-10-CM

## 2019-09-03 DIAGNOSIS — G89.29 CHRONIC BILATERAL LOW BACK PAIN WITH BILATERAL SCIATICA: ICD-10-CM

## 2019-09-03 DIAGNOSIS — F41.1 GAD (GENERALIZED ANXIETY DISORDER): ICD-10-CM

## 2019-09-03 NOTE — TELEPHONE ENCOUNTER
Controlled Substance Refill Request for MS Contin   Problem List Complete:    Yes    Last Written Prescription Date:  0  Last Fill Quantity: 0,   # refills: 0    THE MOST RECENT OFFICE VISIT MUST BE WITHIN THE PAST 3 MONTHS. AT LEAST ONE FACE TO FACE VISIT MUST OCCUR EVERY 6 MONTHS. ADDITIONAL VISITS CAN BE VIRTUAL.  (THIS STATEMENT SHOULD BE DELETED.)    Last Office Visit with [facility] primary care provider: 8/2/2019    Future Office visit:   Next 5 appointments (look out 90 days)    Sep 12, 2019  2:00 PM CDT  (Arrive by 1:40 PM)  Office Visit with Lyle Fisher DO  [facility] [place] ([facility] [place] ) [address]  [place] [address]  [phone]   Oct 15, 2019  2:40 PM CDT  (Arrive by 2:25 PM)  Return Visit with Laquita Fernandez MD  [facility] [place] ([facility] [place] ) [address]  [place] [address] [phone]          Controlled substance agreement:   Encounter-Level CSA - 05/23/2017:    Controlled Substance Agreement - Scan on 7/31/2018 12:12 PM: CONTROLLED SUBSTANCE AGREEMENT (below)       Encounter-Level CSA - 09/18/2015:    Controlled Substance Agreement - Scan on 11/25/2015  2:25 PM: SCHEDULED MEDICATION USE AGREEMENT (below)       Patient-Level CSA:    Controlled Substance Agreement - Non - Opioid - Scan on 7/15/2019 10:03 AM: NON-OPIOID CONTROLLED SUBSTANCE AGREEMENT (below)           Last Urine Drug Screen:   Pain Drug SCR UR W RPTD Meds   Date Value Ref Range Status   05/10/2017   Final    FINAL  (Note)  ====================================================================  TOXAS[facility]RE COMP DRUG ANALYSIS,UR  ====================================================================  Test                             Result       Flag       Units  Drug Present and Declared for Prescription Verification   Amphetamine                    >6061        EXPECTED   ng/mg creat    Amphetamine is available as a schedule II prescription  drug.     Desmethyldiazepam              653          EXPECTED   ng/mg creat   Oxazepam                       905          EXPECTED   ng/mg creat   Temazepam                      999          EXPECTED   ng/mg creat    Desmethyldiazepam, oxazepam, and temazepam are expected    metabolites of diazepam. Desmethyldiazepam and oxazepam are also    expected metabolites of other drugs, including chlordiazepoxide,    prazepam, clorazepate, and halazepam. Oxazepam is an expected    metabolite of temazepam. Oxazepam and temazepam are also    available as scheduled prescription medications.     Hydrocodone                    2941         EXPECTED   ng/mg creat   Hydromorphone                  281          EXPECTED   ng/mg creat   Dihydrocodeine                 83           EXPECTED   ng/mg creat   Norhydrocodone                 2072         EXPECTED   ng/mg creat    Sources of hydrocodone include scheduled prescription    medications. Hydromorphone, dihydrocodeine and norhydrocodone are    expected metabolites of hydrocodone. Hydromorphone and    dihydrocodeine are also available as scheduled prescription    medications.     Fentanyl                       158          EXPECTED   ng/mg creat   Norfentanyl                    >606         EXPECTED   ng/mg creat    Source of fentanyl is a scheduled prescription medication,    including IV, patch, and transmucosal formulations. Norfentanyl    is an expected metabolite of fentanyl.     Acetaminophen                  PRESENT      EXPECTED    Drug Present not Declared for Prescription Verification   Gabapentin                     PRESENT      UNEXPECTED   Nortriptyline                  PRESENT      UNEXPECTED    Nortriptyline may be administered as a prescription drug; it is    also an expected metabolite of amitriptyline.     Trazodone                      PRESENT      UNEXPECTED   1,3 chlorophenyl piperazine    PRESENT      UNEXPECTED    1,3-chlorophenyl piperazine is an expected  metabolite of    trazodone.     Diclofenac                     PRESENT      UNEXPECTED   Ibuprofen                      PRESENT      UNEXPECTED   Diphenhydramine                PRESENT      UNEXPECTED   Lidocaine                      PRESENT      UNEXPECTED   Propranolol                    PRESENT      UNEXPECTED  ====================================================================  Test                      Result    Flag   Units      Ref Range   Creatinine              165              mg/dL      >=20  ====================================================================  Declared Medications:  The flagging and interpretation on this report are based on the  following declared medications.  Unexpected results may arise from  inaccuracies in the declared medications.    **Note: The testing scope of this panel includes these medications:    Amphetamine  Amphetamine (Lisdexamfetamine)  Diazepam  Fentanyl  Hydrocodone (Norco)    **Note: The testing scope of this panel does not include small to  moderate amounts of these reported medications:    Acetaminophen (Norco)  ====================================================================  For clinical consultation, please call [phone].  ====================================================================  Analysis performed by [facility] [place] [address]     , No results found for: COMDAT, No results found for: THC13, PCP13, COC13, MAMP13, OPI13, AMP13, BZO13, TCA13, MTD13, BAR13, OXY13, PPX13, BUP13     Processing:  Staff will hand deliver Rx to on-site pharmacy

## 2019-09-04 ENCOUNTER — TELEPHONE (OUTPATIENT)
Dept: PEDIATRICS | Facility: OTHER | Age: 59
End: 2019-09-04

## 2019-09-04 RX ORDER — DIAZEPAM 2 MG
TABLET ORAL
Qty: 60 TABLET | Refills: 0 | Status: SHIPPED | OUTPATIENT
Start: 2019-09-06 | End: 2019-10-11

## 2019-09-04 RX ORDER — HYDROXYZINE PAMOATE 50 MG/1
CAPSULE ORAL
Qty: 120 CAPSULE | Refills: 3 | Status: SHIPPED | OUTPATIENT
Start: 2019-09-04 | End: 2019-11-19

## 2019-09-04 RX ORDER — MORPHINE SULFATE 15 MG/1
TABLET, FILM COATED, EXTENDED RELEASE ORAL
Qty: 90 TABLET | Refills: 0 | Status: SHIPPED | OUTPATIENT
Start: 2019-09-04 | End: 2019-09-27

## 2019-09-04 RX ORDER — NORTRIPTYLINE HYDROCHLORIDE 50 MG/1
CAPSULE ORAL
Qty: 60 CAPSULE | Refills: 0 | Status: SHIPPED | OUTPATIENT
Start: 2019-09-04 | End: 2020-01-20

## 2019-09-04 RX ORDER — MORPHINE SULFATE 30 MG/1
TABLET, FILM COATED, EXTENDED RELEASE ORAL
Qty: 60 TABLET | Refills: 0 | OUTPATIENT
Start: 2019-09-04

## 2019-09-04 RX ORDER — IBUPROFEN 600 MG/1
TABLET, FILM COATED ORAL
Qty: 120 TABLET | Refills: 3 | Status: SHIPPED | OUTPATIENT
Start: 2019-09-04 | End: 2020-01-29

## 2019-09-04 NOTE — TELEPHONE ENCOUNTER
Patient stating that he needs prior auth for a taxi ride to the [place]. Not quite sure how to do this. Could you help?

## 2019-09-04 NOTE — TELEPHONE ENCOUNTER
Last fill 8.2.19 MS Contin 30mg BID #60.  Last note stated to decrease to 15mg TID.  This is pended.  Please review and sign if appropriate.

## 2019-09-04 NOTE — TELEPHONE ENCOUNTER
Diazepam 2mg      Last Written Prescription Date:  8/9/2019  Last Fill Quantity: 60,   # refills: 0  Last Office Visit: 8/2/2019  Future Office visit:    Next 5 appointments (look out 90 days)    Sep 12, 2019  2:00 PM CDT  (Arrive by 1:40 PM)  Office Visit with Lyle Fisher DO  [facility] ([facility] ) [address] [place] [address]  [phone]   Oct 15, 2019  2:40 PM CDT  (Arrive by 2:25 PM)  Return Visit with Laquita Fernandez MD  [facility] ([facility] ) [address] [place] [id number] [phone]           Routing refill request to provider for review/approval because:  Drug not on the [facility], [facility] or  Health refill protocol or controlled substance

## 2019-09-04 NOTE — TELEPHONE ENCOUNTER
reviewed. Start date adjusted.     Paris Holder, RN-BSN  Care Coordination, Behavioral Health

## 2019-09-06 ENCOUNTER — TRANSFERRED RECORDS (OUTPATIENT)
Dept: HEALTH INFORMATION MANAGEMENT | Facility: CLINIC | Age: 59
End: 2019-09-06

## 2019-09-06 NOTE — PROGRESS NOTES
Subjective     Joshua Barron is a 58 year old male who presents to clinic today for the following health issues:    HPI   Chronic/Recurring Back Pain Follow Up      Where is your back pain located? (Select all that apply) middle of back bilateral    How would you describe your back pain?  sharp and stabbing    Where does your back pain spread? the left buttock, the left  thigh and the left  Knee, bottom of both feet     Since your last clinic visit for back pain, how has your pain changed? unchanged    Does your back pain interfere with your job? No    Since your last visit, have you tried any new treatment? Yes -  steroid injection    He just had fact injections to the lower thoracic spine 2 days ago and reports that he is feeling better and able to lie flat without pain.  He reports that he has the mild sensation of skin burning.    -------------------------------------    Patient Active Problem List   Diagnosis     Mixed hyperlipidemia     Tobacco Abuse, History of     Degeneration of lumbar or lumbosacral intervertebral disc     Depression, major     Chronic, continuous use of opioids     Chemical dependency (H)     Chronic rhinitis     Tinnitus of both ears     SNHL (sensorineural hearing loss)     Bipolar disorder (H)     Seizure-like activity (H)     Somatic dysfunction of pelvis region     Bilateral foot pain     Prostate cancer screening     Trigger index finger of right hand     Moderate persistent asthma without complication     Chronic lower back pain     ACP (advance care planning)     Onychia of toe of left foot     DDD (degenerative disc disease), lumbar     Seizure disorder (H)     Back muscle spasm     Benign essential hypertension     Retrograde ejaculation     Allergic rhinitis due to other allergen     Cervical spondylosis without myelopathy     Chronic headaches     DDD (degenerative disc disease)     Generalized anxiety disorder     Hypertrophy of prostate without urinary obstruction and  other lower urinary tract symptoms (LUTS)     GERD (gastroesophageal reflux disease)     Lumbago     Major depressive disorder, recurrent episode, moderate (H)     Myalgia and myositis     Hyperlipidemia     Other pain disorders related to psychological factors     Spinal stenosis in cervical region     Status post lumbar spinal fusion     Tobacco use disorder     Trigger finger     Polypharmacy     Deviated nasal septum     Attention deficit hyperactivity disorder (ADHD), combined type     Past Surgical History:   Procedure Laterality Date     APPENDECTOMY      Appendicitis     BACK SURGERY  ,    back surgery 3 disk fusion     BACK SURGERY      L1-L2, L3-L4 laminectomy     COLONOSCOPY  2007    repeat 5-10 years     COLONOSCOPY N/A 2016    Procedure: COLONOSCOPY;  Surgeon: Steve Hoff DO;  Location: [facility]     exophytic lesion posterior scalp line  2011    Excision     laminectomy L3-4 and L1-2       RELEASE TRIGGER FINGER      4th digit both hands     RELEASE TRIGGER FINGER Right 2016    Procedure: RELEASE TRIGGER FINGER;  Surgeon: Zev Schroeder MD;  Location: [facility]     SEPTOPLASTY, TURBINOPLASTY, COMBINED N/A 2019    Procedure: SEPTOPLASTY, BILATERAL TURBINATE REDUCTION;  Surgeon: Ivett Gonzalez MD;  Location: [facility]       Social History     Tobacco Use     Smoking status: Former Smoker     Packs/day: 0.00     Years: 30.00     Pack years: 0.00     Last attempt to quit: 2007     Years since quittin.1     Smokeless tobacco: Current User     Types: Chew     Tobacco comment: pt denied quit plan 19   Substance Use Topics     Alcohol use: Yes     Comment: Rarely     Family History   Problem Relation Age of Onset     Asthma Mother      Musculoskeletal Disorder Mother         arthritis     Diabetes Father      Cancer Maternal Grandmother         stomach     Alzheimer Disease Maternal Grandfather      Cancer Paternal Grandmother         stomach     Hypertension  Paternal Grandfather          BP Readings from Last 6 Encounters:   09/12/19 (!) 142/90   08/19/19 110/74   08/13/19 110/80   08/02/19 136/80   07/24/19 122/80   07/10/19 140/84     -------------------------------------  Reviewed and updated as needed this visit by Provider         Review of Systems   ROS COMP: Constitutional, HEENT, cardiovascular, pulmonary, gi and gu systems are negative, except as otherwise noted.  : he has urinary elimination problems with slow stream which requires him to have to sit down.     Objective    BP (!) 146/86   Pulse 90   Temp 99  F (37.2  C) (Tympanic)   Wt 80.9 kg (178 lb 6.4 oz)   SpO2 98%   BMI 25.60 kg/m    Body mass index is 25.6 kg/m .  Physical Exam   GENERAL: healthy, alert and no distress  NECK: no adenopathy, no asymmetry, masses, or scars and thyroid normal to palpation  RESP: lungs clear to auscultation - no rales, rhonchi or wheezes  CV: regular rate and rhythm, normal S1 S2, no S3 or S4, no murmur, click or rub, no peripheral edema and peripheral pulses strong  ABDOMEN: soft, nontender, no hepatosplenomegaly, no masses and bowel sounds normal  ABDOMEN: no bruits heard  MS: no gross musculoskeletal defects noted, no edema  MS:   Poor posture with loss of lumbar lordosis.  No noted spasms.  Midline surgical scar from L1 to the sacrum.  PSYCH: mentation appears normal, affect normal/bright    Diagnostic Test Results:  Labs reviewed in Epic  none                 ASSESSMENT /PLAN:  (M54.42,  M54.41,  G89.29) Chronic bilateral low back pain with bilateral sciatica  (primary encounter diagnosis)  Comment: Stable.  Plan:   ORTHOTICS REFERRAL.  He will continue Morphine 15 mg CR BID and Norco 10/325 mg BID between his morphine doses.    (M54.5) Lumbar back pain  Comment:   Plan:  ORTHOTICS REFERRAL    (M54.6,  G89.29) Chronic bilateral thoracic back pain  Comment: Improved with facet injections.  He has poor posture due to weak paraspinal muscles which has been avoiding  his paraspinal muscle exercises.  Plan:   ORTHOTICS REFERRAL for support belt.  I encouraged Joshua to get back to doing his strengthening exercises.      (N40.1,  R39.12) Benign prostatic hyperplasia with weak urinary stream  Comment:  Plan:   Start tamsulosin (FLOMAX) 0.4 MG capsule        Follow up with Provider - 3 months for HTN, Chronic pain and depression.          Lyle Fisher, DO, DO

## 2019-09-09 DIAGNOSIS — J45.40 MODERATE PERSISTENT ASTHMA WITHOUT COMPLICATION: ICD-10-CM

## 2019-09-09 DIAGNOSIS — R11.0 NAUSEA: ICD-10-CM

## 2019-09-10 ENCOUNTER — HOSPITAL ENCOUNTER (OUTPATIENT)
Dept: INTERVENTIONAL RADIOLOGY/VASCULAR | Facility: HOSPITAL | Age: 59
Discharge: HOME | End: 2019-09-10
Attending: INTERNAL MEDICINE | Admitting: INTERNAL MEDICINE
Payer: COMMERCIAL

## 2019-09-10 ENCOUNTER — TELEPHONE (OUTPATIENT)
Dept: PEDIATRICS | Facility: OTHER | Age: 59
End: 2019-09-10

## 2019-09-10 DIAGNOSIS — M54.6 CHRONIC BILATERAL THORACIC BACK PAIN: ICD-10-CM

## 2019-09-10 DIAGNOSIS — G89.29 CHRONIC BILATERAL THORACIC BACK PAIN: ICD-10-CM

## 2019-09-10 DIAGNOSIS — K59.00 CONSTIPATION: ICD-10-CM

## 2019-09-10 DIAGNOSIS — M51.379 DEGENERATION OF LUMBAR OR LUMBOSACRAL INTERVERTEBRAL DISC: Primary | ICD-10-CM

## 2019-09-10 PROCEDURE — 64490 INJ PARAVERT F JNT C/T 1 LEV: CPT | Mod: TC,50

## 2019-09-10 PROCEDURE — 25000125 ZZHC RX 250

## 2019-09-10 PROCEDURE — 25500064 ZZH RX 255 OP 636: Performed by: RADIOLOGY

## 2019-09-10 PROCEDURE — 25000128 H RX IP 250 OP 636: Performed by: RADIOLOGY

## 2019-09-10 RX ORDER — LIDOCAINE HYDROCHLORIDE 10 MG/ML
INJECTION, SOLUTION EPIDURAL; INFILTRATION; INTRACAUDAL; PERINEURAL
Status: COMPLETED
Start: 2019-09-10 | End: 2019-09-10

## 2019-09-10 RX ORDER — METHYLPREDNISOLONE ACETATE 80 MG/ML
INJECTION, SUSPENSION INTRA-ARTICULAR; INTRALESIONAL; INTRAMUSCULAR; SOFT TISSUE
Status: DISCONTINUED
Start: 2019-09-10 | End: 2019-09-11 | Stop reason: HOSPADM

## 2019-09-10 RX ORDER — IOPAMIDOL 612 MG/ML
50 INJECTION, SOLUTION INTRAVASCULAR ONCE
Status: COMPLETED | OUTPATIENT
Start: 2019-09-10 | End: 2019-09-10

## 2019-09-10 RX ORDER — METHYLPREDNISOLONE ACETATE 80 MG/ML
80 INJECTION, SUSPENSION INTRA-ARTICULAR; INTRALESIONAL; INTRAMUSCULAR; SOFT TISSUE ONCE
Status: COMPLETED | OUTPATIENT
Start: 2019-09-10 | End: 2019-09-10

## 2019-09-10 RX ADMIN — IOPAMIDOL 3 ML: 612 INJECTION, SOLUTION INTRAVENOUS at 13:57

## 2019-09-10 RX ADMIN — LIDOCAINE HYDROCHLORIDE 5 ML: 10 INJECTION, SOLUTION EPIDURAL; INFILTRATION; INTRACAUDAL; PERINEURAL at 13:57

## 2019-09-10 RX ADMIN — METHYLPREDNISOLONE ACETATE 80 MG: 80 INJECTION, SUSPENSION INTRA-ARTICULAR; INTRALESIONAL; INTRAMUSCULAR; SOFT TISSUE at 13:57

## 2019-09-10 NOTE — TELEPHONE ENCOUNTER
Patient would like to have a DME for a back brace sent over to  clinic. Please advise if this can be done. Thank you.

## 2019-09-10 NOTE — IP AVS SNAPSHOT
[facility]  [address] [address]  Phone:  [phone]  Fax:  [phone]                                    After Visit Summary   9/10/2019    Joshua Barron    MRN: [id number]           After Visit Summary Signature Page    I have received my discharge instructions, and my questions have been answered. I have discussed any challenges I see with this plan with the nurse or doctor.    ..........................................................................................................................................  Patient/Patient Representative Signature      ..........................................................................................................................................  Patient Representative Print Name and Relationship to Patient    ..................................................               ................................................  Date                                   Time    ..........................................................................................................................................  Reviewed by Signature/Title    ...................................................              ..............................................  Date                                               Time          22EPIC Rev 08/18

## 2019-09-10 NOTE — DISCHARGE INSTRUCTIONS
Home number on file [phone] (home)  Is it ok to leave a message at this number(s)? Yes    Dr. Guido completed your procedure on 9/10/2019.    Current Pain Level (0-10 Scale): 6/10  Post Pain Level (0-10):  5/10    Radiology Discharge instructions for Steroid Injection    Activity Level:     Do not do any heavy activity or exercise for 24 hours.   Do not drive for 4 hours after your injection.  Diet:   Return to your normal diet.  Medications:   If you have stopped taking your Aspirin, Coumadin/Warfarin, Ibuprofen, or any   other blood thinner for this procedure you may resume in the morning unless   your primary care provider has given you other instructions.    Diabetics may see an increase in blood sugar after steroid injections. If you are concerned about your blood sugar, please contact your family doctor.    Site Care:  Remove the bandage and bathe or shower the morning after the procedure.      Please allow two weeks to experience improvement in your pain.  If you have any further issues, please contact your provider.    Call your Primary Care Provider if you have the following (if your primary care provider is not available please seek emergency care):   Nausea with vomiting   Severe headache   Drowsiness or confusion   Redness or drainage at the injection or puncture site   Temperature over 101 degrees F   Other concerns   Worsening back pain   Stiff neck

## 2019-09-11 RX ORDER — AMOXICILLIN 250 MG
CAPSULE ORAL
Qty: 120 TABLET | Refills: 3 | Status: SHIPPED | OUTPATIENT
Start: 2019-09-11 | End: 2020-02-03

## 2019-09-11 RX ORDER — FLUTICASONE PROPIONATE 50 MCG
SPRAY, SUSPENSION (ML) NASAL
Qty: 16 G | Refills: 2 | Status: SHIPPED | OUTPATIENT
Start: 2019-09-11 | End: 2019-09-19

## 2019-09-11 RX ORDER — ALBUTEROL SULFATE 90 UG/1
AEROSOL, METERED RESPIRATORY (INHALATION)
Qty: 18 G | Refills: 1 | Status: SHIPPED | OUTPATIENT
Start: 2019-09-11 | End: 2020-01-06

## 2019-09-11 NOTE — TELEPHONE ENCOUNTER
Dronabinol 2.5mg      Last Written Prescription Date:  8/9/2019  Last Fill Quantity: 60,   # refills: 0  Last Office Visit: 8/2/2019  Future Office visit:    Next 5 appointments (look out 90 days)    Sep 12, 2019  2:00 PM CDT  (Arrive by 1:40 PM)  Office Visit with Lyle Fisher DO  [facility] ([facility] ) [address] [place] [address]  [phone]   Oct 15, 2019  2:40 PM CDT  (Arrive by 2:25 PM)  Return Visit with Laquita Fernandez MD  [facility] ([facility] ) [address] [place] [phone]           Routing refill request to provider for review/approval because:  Drug not on the [facility], [facility] or [facility] refill protocol or controlled substance

## 2019-09-12 ENCOUNTER — OFFICE VISIT (OUTPATIENT)
Dept: INTERNAL MEDICINE | Facility: OTHER | Age: 59
End: 2019-09-12
Attending: INTERNAL MEDICINE
Payer: COMMERCIAL

## 2019-09-12 VITALS
WEIGHT: 178.4 LBS | BODY MASS INDEX: 25.6 KG/M2 | SYSTOLIC BLOOD PRESSURE: 146 MMHG | DIASTOLIC BLOOD PRESSURE: 86 MMHG | HEART RATE: 90 BPM | OXYGEN SATURATION: 98 % | TEMPERATURE: 99 F

## 2019-09-12 DIAGNOSIS — M54.6 CHRONIC BILATERAL THORACIC BACK PAIN: ICD-10-CM

## 2019-09-12 DIAGNOSIS — M54.50 LUMBAR BACK PAIN: ICD-10-CM

## 2019-09-12 DIAGNOSIS — N40.1 BENIGN PROSTATIC HYPERPLASIA WITH WEAK URINARY STREAM: ICD-10-CM

## 2019-09-12 DIAGNOSIS — G89.29 CHRONIC BILATERAL THORACIC BACK PAIN: ICD-10-CM

## 2019-09-12 DIAGNOSIS — R39.12 BENIGN PROSTATIC HYPERPLASIA WITH WEAK URINARY STREAM: ICD-10-CM

## 2019-09-12 DIAGNOSIS — M54.42 CHRONIC BILATERAL LOW BACK PAIN WITH BILATERAL SCIATICA: Primary | ICD-10-CM

## 2019-09-12 DIAGNOSIS — G89.29 CHRONIC BILATERAL LOW BACK PAIN WITH BILATERAL SCIATICA: Primary | ICD-10-CM

## 2019-09-12 DIAGNOSIS — M54.41 CHRONIC BILATERAL LOW BACK PAIN WITH BILATERAL SCIATICA: Primary | ICD-10-CM

## 2019-09-12 PROCEDURE — 99214 OFFICE O/P EST MOD 30 MIN: CPT | Performed by: INTERNAL MEDICINE

## 2019-09-12 PROCEDURE — G0463 HOSPITAL OUTPT CLINIC VISIT: HCPCS

## 2019-09-12 RX ORDER — TAMSULOSIN HYDROCHLORIDE 0.4 MG/1
0.4 CAPSULE ORAL DAILY
Qty: 90 CAPSULE | Refills: 1 | Status: SHIPPED | OUTPATIENT
Start: 2019-09-12 | End: 2020-03-12

## 2019-09-12 RX ORDER — DRONABINOL 2.5 MG/1
CAPSULE ORAL
Qty: 60 CAPSULE | Refills: 0 | Status: SHIPPED | OUTPATIENT
Start: 2019-09-12 | End: 2019-10-11

## 2019-09-12 ASSESSMENT — PAIN SCALES - GENERAL: PAINLEVEL: MODERATE PAIN (5)

## 2019-09-12 NOTE — NURSING NOTE
"Chief Complaint   Patient presents with     Musculoskeletal Problem       Initial BP (!) 146/98 (BP Location: Left arm, Patient Position: Chair, Cuff Size: Adult Regular)   Pulse 90   Temp 99  F (37.2  C) (Tympanic)   Wt 80.9 kg (178 lb 6.4 oz)   SpO2 98%   BMI 25.60 kg/m   Estimated body mass index is 25.6 kg/m  as calculated from the following:    Height as of 8/19/19: 1.778 m (5' 10\").    Weight as of this encounter: 80.9 kg (178 lb 6.4 oz).  Medication Reconciliation: complete     Obi Rubio LPN      "

## 2019-09-13 DIAGNOSIS — F11.90 CHRONIC, CONTINUOUS USE OF OPIOIDS: ICD-10-CM

## 2019-09-16 NOTE — TELEPHONE ENCOUNTER
Hydrocodone-acetaminophen 10-325mg      Last Written Prescription Date:  8/21/2019  Last Fill Quantity: 60,   # refills: 0  Last Office Visit: 9/12/2019  Future Office visit:    Next 5 appointments (look out 90 days)    Oct 15, 2019  2:40 PM CDT  (Arrive by 2:25 PM)  Return Visit with Laquita Fernandez MD  [facility] ([facility] ) [address] [place] [id number] [phone]   Dec 12, 2019  3:20 PM CST  (Arrive by 3:05 PM)  Office Visit with Lyle Fisher DO  [facility] ([facility] ) [facility] [address]  [phone]           Routing refill request to provider for review/approval because:  Drug not on the [facility], [facility] or  Health refill protocol or controlled substance

## 2019-09-17 RX ORDER — HYDROCODONE BITARTRATE AND ACETAMINOPHEN 10; 325 MG/1; MG/1
TABLET ORAL
Qty: 60 TABLET | Refills: 0 | Status: SHIPPED | OUTPATIENT
Start: 2019-09-20 | End: 2019-10-18

## 2019-09-19 ENCOUNTER — HOSPITAL ENCOUNTER (EMERGENCY)
Facility: HOSPITAL | Age: 59
Discharge: HOME | End: 2019-09-19
Attending: EMERGENCY MEDICINE | Admitting: EMERGENCY MEDICINE
Payer: COMMERCIAL

## 2019-09-19 ENCOUNTER — TELEPHONE (OUTPATIENT)
Dept: PEDIATRICS | Facility: OTHER | Age: 59
End: 2019-09-19

## 2019-09-19 VITALS
SYSTOLIC BLOOD PRESSURE: 144 MMHG | DIASTOLIC BLOOD PRESSURE: 83 MMHG | TEMPERATURE: 97.4 F | RESPIRATION RATE: 14 BRPM | OXYGEN SATURATION: 98 % | HEART RATE: 76 BPM

## 2019-09-19 DIAGNOSIS — D64.9 ANEMIA, UNSPECIFIED TYPE: ICD-10-CM

## 2019-09-19 DIAGNOSIS — G89.29 CHRONIC LOW BACK PAIN, UNSPECIFIED BACK PAIN LATERALITY, WITH SCIATICA PRESENCE UNSPECIFIED: ICD-10-CM

## 2019-09-19 DIAGNOSIS — M54.5 CHRONIC LOW BACK PAIN, UNSPECIFIED BACK PAIN LATERALITY, WITH SCIATICA PRESENCE UNSPECIFIED: ICD-10-CM

## 2019-09-19 LAB
ANION GAP SERPL CALCULATED.3IONS-SCNC: 6 MMOL/L (ref 3–14)
BASOPHILS # BLD AUTO: 0 10E9/L (ref 0–0.2)
BASOPHILS NFR BLD AUTO: 0.3 %
BUN SERPL-MCNC: 22 MG/DL (ref 7–30)
CALCIUM SERPL-MCNC: 8.3 MG/DL (ref 8.5–10.1)
CHLORIDE SERPL-SCNC: 112 MMOL/L (ref 94–109)
CO2 SERPL-SCNC: 26 MMOL/L (ref 20–32)
CREAT SERPL-MCNC: 0.58 MG/DL (ref 0.66–1.25)
DIFFERENTIAL METHOD BLD: ABNORMAL
EOSINOPHIL # BLD AUTO: 0.1 10E9/L (ref 0–0.7)
EOSINOPHIL NFR BLD AUTO: 2.4 %
ERYTHROCYTE [DISTWIDTH] IN BLOOD BY AUTOMATED COUNT: 13.6 % (ref 10–15)
GFR SERPL CREATININE-BSD FRML MDRD: >90 ML/MIN/{1.73_M2}
GLUCOSE SERPL-MCNC: 120 MG/DL (ref 70–99)
HCT VFR BLD AUTO: 30.7 % (ref 40–53)
HGB BLD-MCNC: 10.4 G/DL (ref 13.3–17.7)
IMM GRANULOCYTES # BLD: 0 10E9/L (ref 0–0.4)
IMM GRANULOCYTES NFR BLD: 0.7 %
LYMPHOCYTES # BLD AUTO: 1.8 10E9/L (ref 0.8–5.3)
LYMPHOCYTES NFR BLD AUTO: 29.9 %
MCH RBC QN AUTO: 30.4 PG (ref 26.5–33)
MCHC RBC AUTO-ENTMCNC: 33.9 G/DL (ref 31.5–36.5)
MCV RBC AUTO: 90 FL (ref 78–100)
MONOCYTES # BLD AUTO: 0.6 10E9/L (ref 0–1.3)
MONOCYTES NFR BLD AUTO: 9.9 %
NEUTROPHILS # BLD AUTO: 3.3 10E9/L (ref 1.6–8.3)
NEUTROPHILS NFR BLD AUTO: 56.8 %
NRBC # BLD AUTO: 0 10*3/UL
NRBC BLD AUTO-RTO: 0 /100
PLATELET # BLD AUTO: 175 10E9/L (ref 150–450)
POTASSIUM SERPL-SCNC: 3.9 MMOL/L (ref 3.4–5.3)
RBC # BLD AUTO: 3.42 10E12/L (ref 4.4–5.9)
SODIUM SERPL-SCNC: 144 MMOL/L (ref 133–144)
WBC # BLD AUTO: 5.9 10E9/L (ref 4–11)

## 2019-09-19 PROCEDURE — 99283 EMERGENCY DEPT VISIT LOW MDM: CPT

## 2019-09-19 PROCEDURE — 80048 BASIC METABOLIC PNL TOTAL CA: CPT | Performed by: EMERGENCY MEDICINE

## 2019-09-19 PROCEDURE — 36415 COLL VENOUS BLD VENIPUNCTURE: CPT | Performed by: EMERGENCY MEDICINE

## 2019-09-19 PROCEDURE — 99283 EMERGENCY DEPT VISIT LOW MDM: CPT | Mod: Z6 | Performed by: EMERGENCY MEDICINE

## 2019-09-19 PROCEDURE — 85025 COMPLETE CBC W/AUTO DIFF WBC: CPT | Performed by: EMERGENCY MEDICINE

## 2019-09-19 RX ORDER — HYDROXYZINE HYDROCHLORIDE 25 MG/1
25 TABLET, FILM COATED ORAL EVERY 8 HOURS PRN
Qty: 15 TABLET | Refills: 0 | Status: SHIPPED | OUTPATIENT
Start: 2019-09-19 | End: 2019-10-15

## 2019-09-19 ASSESSMENT — ENCOUNTER SYMPTOMS
RESPIRATORY NEGATIVE: 1
HEMATOLOGIC/LYMPHATIC NEGATIVE: 1
GASTROINTESTINAL NEGATIVE: 1
CARDIOVASCULAR NEGATIVE: 1

## 2019-09-19 NOTE — TELEPHONE ENCOUNTER
Patient called and stated that he woke up this afternoon and attempted to walk across the kitchen and he was wobbly. Patient stated his legs are cold to the touch. Patient reported this starting today. Patient also reports his legs are weak and numb. Placed patient on hold and spoke with Dr. Fisher's nurse. Nurse advised patient should go to UC/ER. Patient was advised to go to UC/ER. Patient stated understanding.

## 2019-09-19 NOTE — ED AVS SNAPSHOT
[facility]  [address]  [place] [address]  Phone:  [phone]                                    Joshua Barron   MRN: [id number]    Department:  [facility]   Date of Visit:  9/19/2019           After Visit Summary Signature Page    I have received my discharge instructions, and my questions have been answered. I have discussed any challenges I see with this plan with the nurse or doctor.    ..........................................................................................................................................  Patient/Patient Representative Signature      ..........................................................................................................................................  Patient Representative Print Name and Relationship to Patient    ..................................................               ................................................  Date                                   Time    ..........................................................................................................................................  Reviewed by Signature/Title    ...................................................              ..............................................  Date                                               Time          22EPIC Rev 08/18
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
## 2019-09-20 NOTE — ED NOTES
"Patient presents to emergency room with c/o bilateral leg numbness below both knees. Hx chronic back pain. Pt states, \"Dr. Fisher is weaning him down off of his opioids currently and pt states its not working for me.\" Currently taking morphine sulfate 15mg TID. Ambulated to room without difficulty. Able to move all extremities. C/o bilateral knee caps feeling cool to the touch today. Pt states \"last back injection a week ago isn't helping.\" pt wearing a lower back brace. Warm blankets given. Negative be fast exam.   "

## 2019-09-24 ENCOUNTER — TELEPHONE (OUTPATIENT)
Dept: INTERVENTIONAL RADIOLOGY/VASCULAR | Facility: HOSPITAL | Age: 59
End: 2019-09-24

## 2019-09-24 NOTE — TELEPHONE ENCOUNTER
INJECTION POST CALL    Procedure: Bilat Thoracic Facet T10-11  Radiologist(s): Dr. Steve Guido  Date of Procedure:  9/10/19        The patient was not available by telephone. Left message on voicemail.      Postcard sent out on 9/26, patient has not responded to message left on phone.        Nazia Bishop

## 2019-09-25 ENCOUNTER — TELEPHONE (OUTPATIENT)
Dept: PEDIATRICS | Facility: OTHER | Age: 59
End: 2019-09-25

## 2019-09-25 DIAGNOSIS — G89.4 CHRONIC PAIN SYNDROME: ICD-10-CM

## 2019-09-25 DIAGNOSIS — M51.379 DEGENERATION OF LUMBAR OR LUMBOSACRAL INTERVERTEBRAL DISC: ICD-10-CM

## 2019-09-25 NOTE — TELEPHONE ENCOUNTER
"Patient called and states he was in the ER 2 days ago (9-19-19) due to pain and numbness and \"my feet and legs were ice cold, left leg just kind of failed me\". States that they told him that he is also anemic. Patient is wondering if there is a 24 hour antihistamine, and \"something that will go along with my multivitamin to add more iron\". Patient is wondering if he should be eating more red meat as well.   States ER did not prescribe him anything but was given 15 tabs of hydrOXYzine (ATARAX) 25 MG tablet and is not sure whether or not he should be seen or can just get information/meds from his PCP.      [phone]  Or [phone]  Pharmacy [facility] in the clinic.    "

## 2019-09-26 DIAGNOSIS — M54.5 LOW BACK PAIN, UNSPECIFIED BACK PAIN LATERALITY, UNSPECIFIED CHRONICITY, WITH SCIATICA PRESENCE UNSPECIFIED: ICD-10-CM

## 2019-09-26 DIAGNOSIS — G89.4 CHRONIC PAIN SYNDROME: ICD-10-CM

## 2019-09-26 NOTE — TELEPHONE ENCOUNTER
Stated to patient that vegatables are high in iron. He asked for allergy pills and he is already on 2 allergy medications. He is upset that he is on a lower dose of pain medications. He wants more pain medications. He said that he will buy canned vegtables because it is cheaper than fresh vegetables

## 2019-09-26 NOTE — TELEPHONE ENCOUNTER
Please advise when patient should be seen, his medication requests, and his dietary questions.   Thank you.

## 2019-09-27 RX ORDER — MORPHINE SULFATE 15 MG/1
TABLET, FILM COATED, EXTENDED RELEASE ORAL
Qty: 90 TABLET | Refills: 0 | OUTPATIENT
Start: 2019-09-27

## 2019-09-27 RX ORDER — MORPHINE SULFATE 15 MG/1
TABLET, FILM COATED, EXTENDED RELEASE ORAL
Qty: 90 TABLET | Refills: 0 | Status: SHIPPED | OUTPATIENT
Start: 2019-10-03 | End: 2019-11-01

## 2019-09-27 NOTE — TELEPHONE ENCOUNTER
morphine (MS CONTIN) 15 MG CR tablet      Last Written Prescription Date:  9-4-19  Last Fill Quantity: 90,   # refills: 0  Last Office Visit: 9-12-19  Future Office visit:    Next 5 appointments (look out 90 days)    Oct 15, 2019  2:40 PM CDT  (Arrive by 2:25 PM)  Return Visit with Laquita Fernandez MD  [facility] ([facility] [place] ) [address]  [place] [address]  [phone]   Nov 13, 2019  2:00 PM CST  (Arrive by 1:45 PM)  SHORT with Lyle Fisher DO  [facility] ([facility] [place] ) [address]  [place] [address]  [phone]   Dec 12, 2019  3:20 PM CST  (Arrive by 3:05 PM)  Office Visit with Lyle Fisher DO  [facility] [place] ([facility] ) [address]  [place] [address]  [phone]           Routing refill request to provider for review/approval because:  Drug not on the G, P or [facility] refill protocol or controlled substance

## 2019-09-30 ENCOUNTER — TELEPHONE (OUTPATIENT)
Dept: PEDIATRICS | Facility: OTHER | Age: 59
End: 2019-09-30

## 2019-09-30 RX ORDER — GABAPENTIN 300 MG/1
CAPSULE ORAL
Qty: 270 CAPSULE | Refills: 0 | Status: SHIPPED | OUTPATIENT
Start: 2019-09-30 | End: 2019-10-31
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
## 2019-09-30 NOTE — TELEPHONE ENCOUNTER
gabapentin (NEURONTIN) 300 MG capsule      Last Written Prescription Date:  8-28-19  Last Fill Quantity: 270,   # refills: 0  Last Office Visit: 9-12-19  Future Office visit:    Next 5 appointments (look out 90 days)    Oct 15, 2019  2:40 PM CDT  (Arrive by 2:25 PM)  Return Visit with Laquita Fernandez MD  [facility] [place] ([facility] [place] ) [address] [place] [address]  [place] [address]  [phone]   Nov 13, 2019  2:00 PM CST  (Arrive by 1:45 PM)  SHORT with Lyle Fisher DO  [facility] [place] ([facility] [place] ) [address]  [place] [address]  [phone]   Dec 12, 2019  3:20 PM CST  (Arrive by 3:05 PM)  Office Visit with Lyle Fisher DO  [facility] [place] ([facility] [place] ) [address]  [place] [address]  [phone]           Routing refill request to provider for review/approval because:  Drug not on the G, P or [facility] refill protocol or controlled substance

## 2019-10-01 ENCOUNTER — TELEPHONE (OUTPATIENT)
Dept: INTERVENTIONAL RADIOLOGY/VASCULAR | Facility: HOSPITAL | Age: 59
End: 2019-10-01

## 2019-10-01 NOTE — TELEPHONE ENCOUNTER
Patient called and Left message with concerns of pain he is having still after KONRAD's. Called patient and informed him IR will update radiologist as he is gone for the day then will call patient back tomorrow after speaking with radiologist. Patient appeared content with this.

## 2019-10-02 ENCOUNTER — TELEPHONE (OUTPATIENT)
Dept: INTERVENTIONAL RADIOLOGY/VASCULAR | Facility: HOSPITAL | Age: 59
End: 2019-10-02

## 2019-10-02 NOTE — TELEPHONE ENCOUNTER
INJECTION POST CALL    Procedure: Bilateral T10-11 facet joint injection  Radiologist(s): Dr. Steve Guido  Date of Procedure:  9/10/19    Pre-procedure pain score was: 6 (See pre-procedure score)  Post-procedure pain score as of today is: 10  Percentage of pain improvement today?  0%  Where is the pain located? Pain is only on the left side. Pain starts at the hip, to beltline (lovehandle), up rib cage, stops under armpit. Pain is the worst on the left love handle and bottom front of left ribcage. Patient describes pain to be located 6 inches in at the beltline.   Is this new pain? No  Would you like to be scheduled for an additional injection?  Yes, patient wants to know why this is happening and what he should do.      I responded to the patient's questions/concerns.    Patient will contact the provider if there are any issues.    Patient states this pain is not the same pain he was experiencing before even having the first injection. Patient began to feel this pain while the injection was being done and after injection, and has had the pain ever since. Feels a nerve was hit while getting the injection. Describes the pain to feel like a side ache that is 10x intense. [place] like medicine was injected into vein up side. Patient also notes when taking in a breath causes a sharp pain allowing to only take a half a breath, worse when laying down.      Lucy Long

## 2019-10-03 DIAGNOSIS — M54.50 LUMBAR BACK PAIN: ICD-10-CM

## 2019-10-03 DIAGNOSIS — M62.830 BACK MUSCLE SPASM: ICD-10-CM

## 2019-10-07 RX ORDER — BACLOFEN 20 MG/1
TABLET ORAL
Qty: 120 TABLET | Refills: 0 | Status: SHIPPED | OUTPATIENT
Start: 2019-10-07 | End: 2019-11-07

## 2019-10-07 NOTE — TELEPHONE ENCOUNTER
Baclofen       Last Written Prescription Date:  7/19/2019  Last Fill Quantity: 120,   # refills: 0  Last Office Visit: 9/12/2019  Future Office visit:    Next 5 appointments (look out 90 days)    Oct 15, 2019  2:40 PM CDT  (Arrive by 2:25 PM)  Return Visit with Laquita Fernandez MD  [facility] [place] ([facility] [place] ) [address] [place] [address]  [place] [address]  [phone]   Nov 13, 2019  2:00 PM CST  (Arrive by 1:45 PM)  SHORT with Lyle Fisher DO  [facility] [place] ([facility] [place] ) [address]  [place] [address]  [phone]   Dec 12, 2019  3:20 PM CST  (Arrive by 3:05 PM)  Office Visit with Lyle Fisher DO  [facility] [place] ([facility] [place] ) [address]  [place] [address]  [phone]           Routing refill request to provider for review/approval because:  Drug not on the [facility], [facility] or [facility] refill protocol or controlled substance

## 2019-10-08 ENCOUNTER — TELEPHONE (OUTPATIENT)
Dept: INTERVENTIONAL RADIOLOGY/VASCULAR | Facility: HOSPITAL | Age: 59
End: 2019-10-08

## 2019-10-08 ENCOUNTER — TELEPHONE (OUTPATIENT)
Dept: PEDIATRICS | Facility: OTHER | Age: 59
End: 2019-10-08

## 2019-10-08 DIAGNOSIS — M54.6 PAIN IN THORACIC SPINE: ICD-10-CM

## 2019-10-08 DIAGNOSIS — M51.379 DEGENERATION OF LUMBAR OR LUMBOSACRAL INTERVERTEBRAL DISC: Primary | ICD-10-CM

## 2019-10-08 NOTE — TELEPHONE ENCOUNTER
Patient wants new referral to Dr. Neri for injections as he has seen him previously and has an appt on 10-16-19 at 2:45 and 10-21-19 at 11:00 Needs referral so he can get a ride to the appt at [facility]  On both days.

## 2019-10-08 NOTE — TELEPHONE ENCOUNTER
Being patients symptoms are new since he started getting therapeutic injections,  is recommending a repeat MRI of the Thoracic and Cervical spine to reassess and potentially guide any further intervention.

## 2019-10-11 DIAGNOSIS — F90.2 ADHD (ATTENTION DEFICIT HYPERACTIVITY DISORDER), COMBINED TYPE: ICD-10-CM

## 2019-10-11 DIAGNOSIS — F41.1 GAD (GENERALIZED ANXIETY DISORDER): ICD-10-CM

## 2019-10-11 DIAGNOSIS — R11.0 NAUSEA: ICD-10-CM

## 2019-10-11 RX ORDER — DRONABINOL 2.5 MG/1
CAPSULE ORAL
Qty: 60 CAPSULE | Refills: 0 | Status: SHIPPED | OUTPATIENT
Start: 2019-10-11 | End: 2019-11-11

## 2019-10-11 NOTE — TELEPHONE ENCOUNTER
diazepam (VALIUM) 2 MG tablet  Last Written Prescription Date:  9/6/2019  Last Fill Quantity: 60,   # refills: 0  Last Office Visit: 9/12/2019  Future Office visit:    lisdexamfetamine (VYVANSE) 70 MG capsule  Last Written Prescription Date:  9/10/2019  Last Fill Quantity: 90,   # refills: 0  Last Office Visit: 9/12/2019  Future Office visit:      Next 5 appointments (look out 90 days)    Oct 15, 2019  2:40 PM CDT  (Arrive by 2:25 PM)  Return Visit with Laquita Fernandez MD  [facility] ([facility] ) [address] [place] [address]  [place] [address]  [phone]   Nov 13, 2019  2:00 PM CST  (Arrive by 1:45 PM)  SHORT with Lyle Fisher DO  [facility] ([facility] ) [address] [place] [address]  [phone]   Dec 12, 2019  3:20 PM CST  (Arrive by 3:05 PM)  Office Visit with Lyle Fisher DO  [facility] ([facility] ) [address]  [facility] [address]  [phone]           Routing refill request to provider for review/approval because:  Drug not on the [facility], [facility] or [facility] refill protocol or controlled substance

## 2019-10-11 NOTE — TELEPHONE ENCOUNTER
dronabinol (MARINOL) 2.5 MG capsule  Last Written Prescription Date:  9/12/2019  Last Fill Quantity: 60,   # refills: 0  Last Office Visit: 9/12/2019  Future Office visit:    Next 5 appointments (look out 90 days)    Oct 15, 2019  2:40 PM CDT  (Arrive by 2:25 PM)  Return Visit with Laquita Fernandez MD  [facility] ([facility] [place] ) [address] [place] [address]  [place] [address]  [phone]   Nov 13, 2019  2:00 PM CST  (Arrive by 1:45 PM)  SHORT with Lyle Fisher DO  [facility] ([facility] ) [address]  [place] [address]  [phone]   Dec 12, 2019  3:20 PM CST  (Arrive by 3:05 PM)  Office Visit with Lyle Fisher DO  [facility] ([facility] ) [address]  [place] [address]  [phone]           Routing refill request to provider for review/approval because:  Drug not on the [facility], [facility] or [facility] refill protocol or controlled substance

## 2019-10-12 RX ORDER — DIAZEPAM 2 MG
TABLET ORAL
Qty: 60 TABLET | Refills: 0 | Status: SHIPPED | OUTPATIENT
Start: 2019-10-12 | End: 2019-11-11

## 2019-10-14 ENCOUNTER — TELEPHONE (OUTPATIENT)
Dept: PSYCHIATRY | Facility: OTHER | Age: 59
End: 2019-10-14

## 2019-10-14 RX ORDER — LISDEXAMFETAMINE DIMESYLATE 70 MG/1
CAPSULE ORAL
Qty: 30 CAPSULE | Refills: 0 | Status: SHIPPED | OUTPATIENT
Start: 2019-10-14 | End: 2019-10-15

## 2019-10-15 ENCOUNTER — OFFICE VISIT (OUTPATIENT)
Dept: PSYCHIATRY | Facility: OTHER | Age: 59
End: 2019-10-15
Attending: PSYCHIATRY & NEUROLOGY
Payer: COMMERCIAL

## 2019-10-15 VITALS
OXYGEN SATURATION: 95 % | HEART RATE: 107 BPM | WEIGHT: 185 LBS | BODY MASS INDEX: 26.54 KG/M2 | TEMPERATURE: 98.5 F | SYSTOLIC BLOOD PRESSURE: 130 MMHG | DIASTOLIC BLOOD PRESSURE: 86 MMHG

## 2019-10-15 DIAGNOSIS — F90.2 ADHD (ATTENTION DEFICIT HYPERACTIVITY DISORDER), COMBINED TYPE: ICD-10-CM

## 2019-10-15 DIAGNOSIS — G89.4 CHRONIC PAIN SYNDROME: ICD-10-CM

## 2019-10-15 DIAGNOSIS — Z79.899 ENCOUNTER FOR LONG-TERM (CURRENT) USE OF MEDICATIONS: Primary | ICD-10-CM

## 2019-10-15 PROCEDURE — G0463 HOSPITAL OUTPT CLINIC VISIT: HCPCS

## 2019-10-15 PROCEDURE — 99214 OFFICE O/P EST MOD 30 MIN: CPT | Performed by: PSYCHIATRY & NEUROLOGY

## 2019-10-15 RX ORDER — LISDEXAMFETAMINE DIMESYLATE 70 MG/1
70 CAPSULE ORAL DAILY
Qty: 30 CAPSULE | Refills: 0 | Status: SHIPPED | OUTPATIENT
Start: 2019-11-11 | End: 2019-12-13

## 2019-10-15 RX ORDER — TRAZODONE HYDROCHLORIDE 50 MG/1
TABLET, FILM COATED ORAL
Qty: 60 TABLET | Refills: 6 | Status: SHIPPED | OUTPATIENT
Start: 2019-10-15 | End: 2020-05-26

## 2019-10-15 ASSESSMENT — PAIN SCALES - GENERAL: PAINLEVEL: EXTREME PAIN (9)

## 2019-10-15 NOTE — LETTER
[facility]  10/15/19    Patient: Joshua Barron  YOB: 1960  Medical Record Number: [id number]  CSN: [id number]                                                                              Non-opioid Controlled Substance Agreement    I understand that my care provider has prescribed a controlled substance to help manage my condition(s). I am taking this medicine to help me function or work. I know this is strong medicine, and that it can cause serious side effects. Controlled substances can be sedating, addicting and may cause a dependency on the drug. They can affect my ability to drive or think, and cause depression. They need to be taken exactly as prescribed. Combining controlled substances with certain medicines or chemicals (such as cocaine, sedatives and tranquilizers, sleeping pills, meth) can be dangerous or even fatal. Also, if I stop controlled substances suddenly, I may have severe withdrawal symptoms.  If not helpful, I may be asked to stop them.    The risks, benefits, and side effects of these medicine(s) were explained to me. I agree that:    1. I will take part in other treatments as advised by my care team. This may be psychiatry or counseling, physical therapy, behavioral therapy, group treatment or a referral to a pain clinic. I will reduce or stop my medicine when my care team tells me to do so.  2. I will take my medicines as prescribed. I will not change the dose or schedule unless my care team tells me to. There will be no refills if I  run out early.   I may be contactedwithout warning and asked to complete a urine drug test or pill count at any time.   3. I will keep all my appointments, and understand this is part of the monitoring of controlled substances. My care team may require an office visit for EVERY controlled substance refill. If I miss appointments or don t follow instructions, my care team may stop my medicine.  4. I will not ask other providers to  prescribe controlled substances, and I will not accept controlled substances from other people. If I need another prescribed controlled substance for a new reason, I will tell my care team within 1 business day.  5. I will use one pharmacy to fill all of my controlled substance prescriptions, and it is up to me to make sure that I do not run out of my medicines on weekends or holidays. If my care team is willing to refill my controlled substance prescription without a visit, I must request refills only during office hours, refills may take up to 3 days to process, and it may take up to 5 to 7 days for my medicine to be mailed and ready at my pharmacy. Prescriptions will not be mailed anywhere except my pharmacy.    6. I am responsible for my prescriptions. If the medicine/prescription is lost or stolen, it will not be replaced. I also agree not to share controlled substance medicines with anyone.              [facility]  10/15/19  Patient:  Joshua Barron  YOB: 1960  Medical Record Number: [id number]  CSN: [id number]    7. I agree to not use ANY illegal or recreational drugs. This includes marijuana, cocaine, bath salts or other drugs. I agree not to use alcohol unless my care team says I may. I agree to give urine samples whenever asked. If I don t give a urine sample, the care team may stop my medicine.    8. If I enroll in the [place] Medical Marijuana program, I will tell my care team. I will also sign an agreement to share my medical records with my care team.    9. I will bring in my list of medicines (or my medicine bottles) each time I come to the clinic.   10. I will tell my care team right away if I become pregnant or have a new medical problem treated outside of my regular clinic.  11. I understand that this medicine can affect my thinking and judgment. It may be unsafe for me to drive, use machinery and do dangerous tasks. I will not do any of these things until I know how the  medicine affects me. If my dose changes, I will wait to see how it affects me. I will contact my care team if I have concerns about medicine side effects.    I understand that if I do not follow any of the conditions above, my prescriptions or treatment may be stopped.      I agree that my provider, clinic care team, and pharmacy may work with any city, state or federal law enforcement agency that investigates the misuse, sale, or other diversion of my controlled medicine. I will allow my provider to discuss my care with or share a copy of this agreement with any other treating provider, pharmacy or emergency room where I receive care. I agree to give up (waive) any right of privacy or confidentiality with respect to these consents.   I have read this agreement and have asked questions about anything I did not understand.    ____________________________________________________    ____________  ________  Patient signature                                                         Date      Time    ____________________________________________________     ____________  ________  Witness                                                          Date      Time    ____________________________________________________  Provider signature

## 2019-10-15 NOTE — PROGRESS NOTES
"  PSYCHIATRY CLINIC PROGRESS NOTE   20 minute medication management, more than 50% of time spent counseling patient on medications, medication side effects, symptom history and management                                                                       Social- Was  twice. Lives alone with his dog Montserrat (beltran) and 3 cats: Smoky the cat. Has a GF in [place]  Children-  2 kids, they are in [place]  Last visit: He is back taking nortriptyline 100 mg daily and he stopped seroquel.     Continue Trileptal 600 mg bid,  vyvanse 70 mg daily and last script for April 24 and gave script for 5/23/19 and continue diazepam 2 mg every 12 hours as needed and continue trazodone 100 mg bedtime prn insomnia    - \"I'm in really bad shape right now\". Joshua notes as his pain meds are being tapered down he is having more issues with trying to do things such as mowing his lawn  - notes last injection he feels nerve was hit or something of the sort and notes \"was like my legs were about to go out from under me\"  - Lots of family issues: Joshua has taken care of his parents and yet parents give his other brothers everything. oldest of 3 brothers. Brother in [place] youngest Mark and Major is here. Mom and dad here out on 40 acres. Joshua noting moving here to be closer to parents and help them, etc. But, parents don't seem to want him around much or talk to him.    SUBSTANCE USE- reports no abuse of meds or substances    SYMPTOMS- paranoia,  issues with attention and concentration improved with Vyvanse: racing thoughts, depressed mood, impulsivity, distractibility  MEDICAL ROS- back pain, denies any issues with headaches or stomach pain / issues with stimulant. Intentional weight loss  MEDICAL / SURGICAL HISTORY                     Patient Active Problem List   Diagnosis     Mixed hyperlipidemia     Tobacco Abuse, History of     Degeneration of lumbar or lumbosacral intervertebral disc     Depression, major     Chronic, continuous use of " opioids     Chemical dependency (H)     Chronic rhinitis     Tinnitus of both ears     SNHL (sensorineural hearing loss)     Bipolar disorder (H)     Seizure-like activity (H)     Somatic dysfunction of pelvis region     Bilateral foot pain     Prostate cancer screening     Trigger index finger of right hand     Moderate persistent asthma without complication     Chronic lower back pain     ACP (advance care planning)     Onychia of toe of left foot     DDD (degenerative disc disease), lumbar     Seizure disorder (H)     Back muscle spasm     Benign essential hypertension     Retrograde ejaculation     Allergic rhinitis due to other allergen     Cervical spondylosis without myelopathy     Chronic headaches     DDD (degenerative disc disease)     Generalized anxiety disorder     Hypertrophy of prostate without urinary obstruction and other lower urinary tract symptoms (LUTS)     GERD (gastroesophageal reflux disease)     Lumbago     Major depressive disorder, recurrent episode, moderate (H)     Myalgia and myositis     Hyperlipidemia     Other pain disorders related to psychological factors     Spinal stenosis in cervical region     Status post lumbar spinal fusion     Tobacco use disorder     Trigger finger     Polypharmacy     Deviated nasal septum     Attention deficit hyperactivity disorder (ADHD), combined type     ALLERGY   Cymbalta; Depakote [valproic acid]; and Seasonal allergies  MEDICATIONS                                                                                             Current Outpatient Medications   Medication Sig     acetaminophen (TYLENOL) 325 MG tablet TAKE 2 TABLETS BY MOUTH EVERY 4 HOURS AS NEEDED FOR MILD PAIN     albuterol (VENTOLIN HFA) 108 (90 Base) MCG/ACT inhaler USE 2 PUFFS 4 TIMES A DAY AS NEEDED FOR SHORTNESS OF BREATH     atorvastatin (LIPITOR) 20 MG tablet TAKE 1 TABLET BY MOUTH DAILY     baclofen (LIORESAL) 20 MG tablet TAKE 1 TABLET BY MOUTH 4 TIMES DAILY     budesonide  (PULMICORT) 0.5 MG/2ML neb solution Squirt entire vial into previously made harlan med saline bottle, mix, irrigate both nostrils until entire bottle empty. Do this twice daily.     diazepam (VALIUM) 2 MG tablet TAKE 1 TABLET BY MOUTH EVERY 12 HOURS AS NEEDED FOR ANXIETY     diclofenac (VOLTAREN) 50 MG EC tablet TAKE 1 TABLET BY MOUTH 3 TIMES DAILY     docusate sodium (DOK) 100 MG capsule TAKE 1 CAPSULE BY MOUTH TWICE DAILY     dronabinol (MARINOL) 2.5 MG capsule TAKE 1 CAPSULE BY MOUTH 2 TIMES DAILY BEFORE MEALS     fluticasone (FLONASE) 50 MCG/ACT nasal spray USE 2 SPRAYS IN EACH NOSTRIL DAILY     gabapentin (NEURONTIN) 300 MG capsule TAKE 3 CAPSULES BY MOUTH THREE TIMES DAILY     HYDROcodone-acetaminophen (NORCO)  MG per tablet TAKE 1 TABLET BY MOUTH TWICE DAILY AS NEEDED FOR SEVERE PAIN     hydrOXYzine (VISTARIL) 50 MG capsule TAKE 1 TO 2 CAPSULES BY MOUTH 4 TIMES DAILY AS NEEDED FOR ANXIETY     ibuprofen (ADVIL/MOTRIN) 600 MG tablet TAKE 1 TABLET BY MOUTH EVERY 6 HOURS AS NEEDED     lidocaine (LIDODERM) 5 % patch Place 3 patches on daily for 12 hours and remove and replace with three patches ( 6 patches per day)     loratadine (CLARITIN) 10 MG tablet Take 10 mg by mouth daily     losartan (COZAAR) 50 MG tablet TAKE 1 TABLET BY MOUTH DAILY     methocarbamol (ROBAXIN) 500 MG tablet TAKE 1 TABLET BY MOUTH 3 TIMES DAILY     mometasone-formoterol (DULERA) 200-5 MCG/ACT inhaler Inhale 2 puffs into the lungs 2 times daily     morphine (MS CONTIN) 15 MG CR tablet TAKE 1 TABLET BY MOUTH EVERY 8 HOURS     multivitamin  with iron (SM COMPLETE ADVANCED FORMULA) TABS TAKE 1 TABLET BY MOUTH DAILY     naloxone (NARCAN) 1 mg/mL for intranasal kit (2 syringes with 2 mucosal atomizer device) In opioid overdose put cone in nostril and push 1/2 of contents into each nostril.  Repeat every 3 min if no response until help arrives.     nicotine polacrilex (NICORETTE) 2 MG gum CHEW 1 PIECE AS NEEDED FOR SMOKING CESSATION      nortriptyline (PAMELOR) 50 MG capsule TAKE 2 CAPSULES BY MOUTH AT BEDTIME     omeprazole (PRILOSEC) 20 MG DR capsule TAKE 1 CAPSULE BY MOUTH EVERY DAY BEFORE A MEAL     order for DME Equipment being ordered: Thoracic and lumbar Back Brace     order for DME 1 boa back brace     ORDER FOR DME Equipment being ordered: Large gloves     OXcarbazepine (TRILEPTAL) 600 MG tablet TAKE 1 TABLET BY MOUTH 2 TIMES DAILY     oxymetazoline (AFRIN) 0.05 % nasal spray Spray 2 sprays into both nostrils 2 times daily Use as directed post surgical. Not to use more than 3 days.     propranolol (INDERAL) 20 MG tablet TAKE 1 TABLET BY MOUTH TWICE DAILY     ranitidine (ZANTAC) 300 MG tablet Take 1 tablet (300 mg) by mouth At Bedtime For 1 month, then as needed for heartburn     senna-docusate (SENNA-PLUS) 8.6-50 MG tablet TAKE 1 OR 2 TABLETS BY MOUTH 2 TIMES A DAY     tamsulosin (FLOMAX) 0.4 MG capsule Take 1 capsule (0.4 mg) by mouth daily     tiZANidine (ZANAFLEX) 4 MG tablet TAKE 1 TABLET BY MOUTH 3 TIMES A DAY     traZODone (DESYREL) 50 MG tablet TAKE 2 TABLETS BY MOUTH NIGHTLY AS NEEDED     trimethoprim-polymyxin b (POLYTRIM) 11362-2.1 UNIT/ML-% ophthalmic solution Place 2 drops Into the left eye every 6 hours     VYVANSE 70 MG capsule TAKE 1 CAPSULE BY MOUTH DAILY     ASPIRIN LOW DOSE 81 MG chewable tablet CHEW AND SWALLOW 1 TABLET BY MOUTH DAILY (Patient not taking: Reported on 10/15/2019)     No current facility-administered medications for this visit.        VITALS   /86 (BP Location: Right arm, Patient Position: Sitting, Cuff Size: Adult Regular)   Pulse 107   Temp 98.5  F (36.9  C) (Tympanic)   Wt 83.9 kg (185 lb)   SpO2 95%   BMI 26.54 kg/m       LABS                                                                                                                           Last Comprehensive Metabolic Panel:  Sodium   Date Value Ref Range Status   09/19/2019 144 133 - 144 mmol/L Final     Potassium   Date Value Ref  Range Status   09/19/2019 3.9 3.4 - 5.3 mmol/L Final     Chloride   Date Value Ref Range Status   09/19/2019 112 (H) 94 - 109 mmol/L Final     Carbon Dioxide   Date Value Ref Range Status   09/19/2019 26 20 - 32 mmol/L Final     Anion Gap   Date Value Ref Range Status   09/19/2019 6 3 - 14 mmol/L Final     Glucose   Date Value Ref Range Status   09/19/2019 120 (H) 70 - 99 mg/dL Final     Urea Nitrogen   Date Value Ref Range Status   09/19/2019 22 7 - 30 mg/dL Final     Creatinine   Date Value Ref Range Status   09/19/2019 0.58 (L) 0.66 - 1.25 mg/dL Final     GFR Estimate   Date Value Ref Range Status   09/19/2019 >90 >60 mL/min/[1.73_m2] Final     Comment:     Non  GFR Calc  Starting 12/18/2018, serum creatinine based estimated GFR (eGFR) will be   calculated using the Chronic Kidney Disease Epidemiology Collaboration   (CKD-EPI) equation.       Calcium   Date Value Ref Range Status   09/19/2019 8.3 (L) 8.5 - 10.1 mg/dL Final     CBC RESULTS:   Recent Labs   Lab Test 06/03/19  1600   WBC 4.6   RBC 4.23*   HGB 13.3   HCT 38.2*   MCV 90   MCH 31.4   MCHC 34.8   RDW 12.7          Here are the results:  Lab Results   Component Value Date    CHOL 131 10/25/2018     Lab Results   Component Value Date    HDL 51 10/25/2018     Lab Results   Component Value Date    LDL 60 10/25/2018     Lab Results   Component Value Date    TRIG 101 10/25/2018     Lab Results   Component Value Date    CHOLHDLRATIO 4.2 11/17/2014         EKG 6/3/19 with QTc of 415 ms     MENTAL STATUS EXAM                                                                                        Alert. Oriented to person, place, and date / time. Casually groomed, calm, cooperative with good eye contact. No problems psychomotor behavior. Speech: loud. .  Mood was described as frustrated and affect was congruent to speech content and full range. Thought process, including associations, was unremarkable and thought content was devoid of  suicidal and homicidal ideation.  No hallucinations. Insight was poor Judgment was  adequate for safety. Fund of knowledge was intact. Pt demonstrates no obvious problems with attention, concentration, language, recent or remote memory although these were not formally tested.     ASSESSMENT                                                                                                      HISTORICAL:  Initial psych note 10/6/15          NOTES:      Joshua is a 59 year old with bipolar, ADHD, and MDD.  We started Valium as dual purpose: muscle relaxant properties and for anxiety.We have discussed the new guidelines for short - term use of benzodiazepines (Valium) and avoiding their use along with opioids. I had noted plan to taper down over time and eventually discontinue. We decreased from 5 mg every 12 hours as needed down to 2 mg every 12 hours as needed. We continue to revisit this : he is being tapered down on his pain meds.     Today we discussed having him submit UDS as part of being presribed controlled substances. We agreed on as future order and either interim next visit or at next visit he can submit. I discussed several times this is routine for ANYONE being prescribed controlled substances (and it is NOT that I worry is abusing , diverting meds, etc).       TREATMENT RISK STATEMENT:  The risks, benefits, alternatives and potential adverse effects have been explained and are understood by the pt.  The pt agrees to the treatment plan with the ability to do so.   The pt knows to call the clinic for any problems or access emergency care if needed.        DIAGNOSES                    Bipolar disorder I with psychosis vs. Schizoaffective disorder, bipolar type  ADHD      PLAN                                                                                                                    1)  MEDICATIONS:       Continue Trileptal 600 mg bid,  and continue diazepam 2 mg every 12 hours as needed and continue  trazodone 100 mg bedtime prn insomnia. Vyvanse filled script for 10/14/19 and gave him script for 11/11/19  2)  THERAPY:  No change    3)  LABS:  none    4)  PT MONITOR [call for probs]:  SEs from meds, worsening sx, SI/HI    5)  REFERRALS [CD, medical, other]:  None    6)  RTC: 6 weeks

## 2019-10-15 NOTE — NURSING NOTE
"Chief Complaint   Patient presents with     RECHECK     Mental health.       Initial /86 (BP Location: Right arm, Patient Position: Sitting, Cuff Size: Adult Regular)   Pulse 107   Temp 98.5  F (36.9  C) (Tympanic)   Wt 83.9 kg (185 lb)   SpO2 95%   BMI 26.54 kg/m   Estimated body mass index is 26.54 kg/m  as calculated from the following:    Height as of 8/19/19: 1.778 m (5' 10\").    Weight as of this encounter: 83.9 kg (185 lb).  Medication Reconciliation: complete  JACQUELYN SUAREZ LPN    "

## 2019-10-16 DIAGNOSIS — I10 ESSENTIAL HYPERTENSION: ICD-10-CM

## 2019-10-16 DIAGNOSIS — E78.2 MIXED HYPERLIPIDEMIA: ICD-10-CM

## 2019-10-16 DIAGNOSIS — M25.541 ARTHRALGIA OF BOTH HANDS: ICD-10-CM

## 2019-10-16 DIAGNOSIS — F11.90 CHRONIC, CONTINUOUS USE OF OPIOIDS: ICD-10-CM

## 2019-10-16 DIAGNOSIS — M25.542 ARTHRALGIA OF BOTH HANDS: ICD-10-CM

## 2019-10-16 NOTE — TELEPHONE ENCOUNTER
Propanolol      Last Written Prescription Date:  03/26/19  Last Fill Quantity: 60,   # refills: 5  Last Office Visit: 09/12/19  Future Office visit:    Next 5 appointments (look out 90 days)    Nov 13, 2019  2:00 PM CST  (Arrive by 1:45 PM)  SHORT with Lyle Fisher DO  [facility] [place] ([facility] [place] [facility] [address]  [place] [address]  [phone]   Nov 26, 2019  3:40 PM CST  (Arrive by 3:25 PM)  Return Visit with Laquita Fernandez MD  [facility] [place] ([facility] [place] ) [address]  [place] [address]  [phone]   Dec 12, 2019  3:20 PM CST  (Arrive by 3:05 PM)  Office Visit with Lyle Fisher DO  [facility] [place] ([facility] [place] ) [address]  [place] [address]  [phone]         Lipitor      Last Written Prescription Date:  01/24/19  Last Fill Quantity: 30,   # refills: 8  Last Office Visit: 09/12/19  Future Office visit:    Next 5 appointments (look out 90 days)    Nov 13, 2019  2:00 PM CST  (Arrive by 1:45 PM)  SHORT with Lyle Fisher DO  [facility] [place] ([facility] [place] ) [address]  [place] [address]  [phone]   Nov 26, 2019  3:40 PM CST  (Arrive by 3:25 PM)  Return Visit with Laquita Fernandez MD  [facility] [place] ([facility] [place] ) [address]  [place] [address]  [phone]   Dec 12, 2019  3:20 PM CST  (Arrive by 3:05 PM)  Office Visit with Lyle Fisher DO  [facility] [place] ([facility] [place] ) [address]  [place] [address]  [phone]           Diclofenac      Last Written Prescription Date:  02/07/19  Last Fill Quantity: 90,   # refills: 3  Last Office Visit: 09/12/19  Future Office visit:    Next 5 appointments (look out 90 days)    Nov 13, 2019  2:00 PM CST  (Arrive by 1:45 PM)  SHORT with Lyle Martinez  DO Natalia  [facility] [place] ([facility] [place] ) [address]  [place] [address]  [phone]   Nov 26, 2019  3:40 PM CST  (Arrive by 3:25 PM)  Return Visit with Laquita Fernandez MD  [facility] [place] ([facility] [place] ) [address] [place] [address]  [place] [address]  [phone]   Dec 12, 2019  3:20 PM CST  (Arrive by 3:05 PM)  Office Visit with Lyle Fisher DO  [facility] [place] ([facility] [place] ) [address]  [place] [address]  [phone]

## 2019-10-18 RX ORDER — ATORVASTATIN CALCIUM 20 MG/1
20 TABLET, FILM COATED ORAL DAILY
Qty: 30 TABLET | Refills: 8 | Status: SHIPPED | OUTPATIENT
Start: 2019-10-18 | End: 2020-08-26

## 2019-10-18 RX ORDER — PROPRANOLOL HYDROCHLORIDE 20 MG/1
20 TABLET ORAL 2 TIMES DAILY
Qty: 60 TABLET | Refills: 5 | Status: SHIPPED | OUTPATIENT
Start: 2019-10-18 | End: 2020-03-30

## 2019-10-18 RX ORDER — HYDROCODONE BITARTRATE AND ACETAMINOPHEN 10; 325 MG/1; MG/1
TABLET ORAL
Qty: 60 TABLET | Refills: 0 | Status: SHIPPED | OUTPATIENT
Start: 2019-10-18 | End: 2019-11-11

## 2019-10-23 DIAGNOSIS — M62.830 BACK MUSCLE SPASM: ICD-10-CM

## 2019-10-23 DIAGNOSIS — K21.9 GASTROESOPHAGEAL REFLUX DISEASE, ESOPHAGITIS PRESENCE NOT SPECIFIED: ICD-10-CM

## 2019-10-25 RX ORDER — METHOCARBAMOL 500 MG/1
TABLET, FILM COATED ORAL
Qty: 90 TABLET | Refills: 0 | OUTPATIENT
Start: 2019-10-25

## 2019-10-25 NOTE — TELEPHONE ENCOUNTER
methocarbamol (ROBAXIN) 500 MG tablet      Last Written Prescription Date:  6-6-19  Last Fill Quantity: 90,   # refills: 3  Last Office Visit: 9-12-19  Future Office visit:    Next 5 appointments (look out 90 days)    Nov 13, 2019  2:00 PM CST  (Arrive by 1:45 PM)  SHORT with Lyle Fisher DO  [facility] [place] ([facility] [place] ) [address] [facility] [place] [address]  [phone]   Nov 26, 2019  3:40 PM CST  (Arrive by 3:25 PM)  Return Visit with Laquita Fernandez MD  [facility] [place] ([facility] [place] ) [address] [place] [address]  [place] [address] [phone]   Dec 12, 2019  3:20 PM CST  (Arrive by 3:05 PM)  Office Visit with Lyle Fisher DO  [facility] [place] ([facility] [place] ) [address]  [place] [address]  [phone]           Routing refill request to provider for review/approval because:  Drug not on the [facility], [facility] or [facility] refill protocol or controlled substance

## 2019-10-29 RX ORDER — METHOCARBAMOL 500 MG/1
TABLET, FILM COATED ORAL
Qty: 90 TABLET | Refills: 3 | Status: SHIPPED | OUTPATIENT
Start: 2019-10-29 | End: 2020-03-12

## 2019-10-31 DIAGNOSIS — G89.4 CHRONIC PAIN SYNDROME: ICD-10-CM

## 2019-10-31 DIAGNOSIS — M51.379 DEGENERATION OF LUMBAR OR LUMBOSACRAL INTERVERTEBRAL DISC: ICD-10-CM

## 2019-10-31 DIAGNOSIS — M54.50 LOW BACK PAIN: ICD-10-CM

## 2019-10-31 RX ORDER — MORPHINE SULFATE 15 MG/1
TABLET, FILM COATED, EXTENDED RELEASE ORAL
Qty: 90 TABLET | Refills: 0 | OUTPATIENT
Start: 2019-10-31

## 2019-10-31 NOTE — TELEPHONE ENCOUNTER
morphine (MS CONTIN) 15 MG CR tablet      Last Written Prescription Date:  10-3-19  Last Fill Quantity: 90,   # refills: 0  Last Office Visit: 9-12-19  Future Office visit:    Next 5 appointments (look out 90 days)    Nov 13, 2019  2:00 PM CST  (Arrive by 1:45 PM)  SHORT with Lyle Fisher DO  [facility] ([facility] ) [address]  [place] [address]  [phone]       Routing refill request to provider for review/approval because:  Drug not on the [facility], [facility] or [facility] refill protocol or controlled substance    gabapentin (NEURONTIN) 300 MG capsule      Last Written Prescription Date:  9-30-19  Last Fill Quantity: 270,   # refills: 0  Last Office Visit: 9-12-19  Future Office visit:      Routing refill request to provider for review/approval because:  Drug not on the [facility], [facility] or [facility] refill protocol or controlled substance

## 2019-11-01 RX ORDER — MORPHINE SULFATE 15 MG/1
TABLET, FILM COATED, EXTENDED RELEASE ORAL
Qty: 90 TABLET | Refills: 0 | Status: SHIPPED | OUTPATIENT
Start: 2019-11-01 | End: 2019-12-04

## 2019-11-01 RX ORDER — GABAPENTIN 300 MG/1
CAPSULE ORAL
Qty: 270 CAPSULE | Refills: 0 | Status: SHIPPED | OUTPATIENT
Start: 2019-11-01 | End: 2019-12-05

## 2019-11-08 NOTE — TELEPHONE ENCOUNTER
I reviewed the H&P, I examined the patient, and there are no changes in the patient's condition.     Pt of Dr. Fisher.Fentanyl last filled on 4.25.17,# 15. Last office visit on 4.17.17. Medication pended.Thank you-see below.Pt going to [place].

## 2019-11-11 DIAGNOSIS — R11.0 NAUSEA: ICD-10-CM

## 2019-11-11 DIAGNOSIS — F11.90 CHRONIC, CONTINUOUS USE OF OPIOIDS: ICD-10-CM

## 2019-11-11 DIAGNOSIS — F41.1 GAD (GENERALIZED ANXIETY DISORDER): ICD-10-CM

## 2019-11-12 RX ORDER — DRONABINOL 2.5 MG/1
CAPSULE ORAL
Qty: 60 CAPSULE | Refills: 0 | Status: SHIPPED | OUTPATIENT
Start: 2019-11-12 | End: 2019-12-14

## 2019-11-12 RX ORDER — HYDROCODONE BITARTRATE AND ACETAMINOPHEN 10; 325 MG/1; MG/1
TABLET ORAL
Qty: 60 TABLET | Refills: 0 | Status: SHIPPED | OUTPATIENT
Start: 2019-11-16 | End: 2019-12-12

## 2019-11-12 RX ORDER — DIAZEPAM 2 MG
TABLET ORAL
Qty: 60 TABLET | Refills: 0 | Status: SHIPPED | OUTPATIENT
Start: 2019-11-12 | End: 2019-12-13

## 2019-11-12 NOTE — TELEPHONE ENCOUNTER
HYDROcodone-acetaminophen (NORCO)  MG per tablet      Last Written Prescription Date:  10/18/19  Last Fill Quantity: 60,   # refills: 0    dronabinol (MARINOL) 2.5 MG capsule      Last Written Prescription Date:  10/11/19  Last Fill Quantity: 60,   # refills: 0    Last Office Visit: 9/12/19  Future Office visit:    Next 5 appointments (look out 90 days)    Nov 13, 2019  2:00 PM CST  (Arrive by 1:45 PM)  SHORT with Lyle Fisher DO  [facility] ([facility] ) [address] [facility] [address]  [phone]   Nov 26, 2019  3:40 PM CST  (Arrive by 3:25 PM)  Return Visit with Laquita Fernandez MD  [facility] ([facility] ) [address] [place] [address]  [place] [address]  [phone]   Dec 12, 2019  3:20 PM CST  (Arrive by 3:05 PM)  Office Visit with Lyle Fisher DO  [facility] ([facility] ) [address] [facility] [address]  [phone]           Routing refill request to provider for review/approval because:  Drug not on the [facility], [facility] or [facility] refill protocol or controlled substance    Norco start date changed

## 2019-11-12 NOTE — TELEPHONE ENCOUNTER
Valium -  reviewed   Last visit: 10.15.19  Last refill: 10.12.19    Future appointments:   Next 5 appointments (look out 90 days)    Nov 13, 2019  2:00 PM CST  (Arrive by 1:45 PM)  SHORT with Lyle Fisher DO  [facility] [place] ([facility] [place] ) [address]  [place] [address]  [phone]   Nov 26, 2019  3:40 PM CST  (Arrive by 3:25 PM)  Return Visit with Laquita Fernandez MD  [facility] [place] ([facility] [place] ) [address] [place] [address]  [place] [address] [phone]   Dec 12, 2019  3:20 PM CST  (Arrive by 3:05 PM)  Office Visit with Lyle iFsher DO  [facility] [place] ([facility] [place] ) [address]  [place] [address]  [phone]

## 2019-11-18 DIAGNOSIS — M25.541 ARTHRALGIA OF BOTH HANDS: ICD-10-CM

## 2019-11-18 DIAGNOSIS — M25.542 ARTHRALGIA OF BOTH HANDS: ICD-10-CM

## 2019-11-21 NOTE — TELEPHONE ENCOUNTER
VOLTAREN      Last Written Prescription Date:  10-18-19  Last Fill Quantity: 90,   # refills: 0  Last Office Visit: 9-  Future Office visit:    Next 5 appointments (look out 90 days)    Nov 26, 2019  3:40 PM CST  (Arrive by 3:25 PM)  Return Visit with Laquita Fernandez MD  [facility] ([facility] [address] [place] [address] [phone]   Dec 12, 2019  3:20 PM CST  (Arrive by 3:05 PM)  Office Visit with Lyle Fisher DO  [facility] ([facility] ) [facility] [address]  [phone]           Routing refill request to provider for review/approval because:  Drug not on the [facility], [facility] or [facility] refill protocol or controlled substance

## 2019-11-29 ENCOUNTER — TELEPHONE (OUTPATIENT)
Dept: PEDIATRICS | Facility: OTHER | Age: 59
End: 2019-11-29

## 2019-11-29 NOTE — TELEPHONE ENCOUNTER
Patient states he has had a reaction to cortisone shots that he received in [place].  States he is still having back and side pain and believes it is from the injection.  Patient is very angry and yelling and swearing about the Dr who injected him.  States he thinks they did it on purpose.  Patient states he doesn't know what to do and would like some advise.  State patches won't stay stuck on his side.  Patient wants a copy of this phone message to be sent to the head [facility] because when nothing gets done it will be documented with them. Patient refuses to go to the ER.    [phone]    Or  [phone]

## 2019-12-04 DIAGNOSIS — M51.379 DEGENERATION OF LUMBAR OR LUMBOSACRAL INTERVERTEBRAL DISC: ICD-10-CM

## 2019-12-04 DIAGNOSIS — G89.4 CHRONIC PAIN SYNDROME: ICD-10-CM

## 2019-12-04 RX ORDER — MORPHINE SULFATE 15 MG/1
TABLET, FILM COATED, EXTENDED RELEASE ORAL
Qty: 90 TABLET | Refills: 0 | Status: SHIPPED | OUTPATIENT
Start: 2019-12-04 | End: 2020-01-02

## 2019-12-04 NOTE — TELEPHONE ENCOUNTER
morphine (MS CONTIN) 15 MG CR tablet      Last Written Prescription Date:  11/1/19  Last Fill Quantity: 90,   # refills: 0  Last Office Visit: 9/12/19  Future Office visit:    Next 5 appointments (look out 90 days)    Dec 12, 2019  3:20 PM CST  (Arrive by 3:05 PM)  Office Visit with Lyle Fisher DO  [facility] ([facility] ) [address] [place] [address]  [phone]   Jan 14, 2020  3:00 PM CST  (Arrive by 2:45 PM)  Return Visit with Laquita Fernandez MD  [facility] ([facility] ) [address] [place] [id number] [phone]           Routing refill request to provider for review/approval because:  Drug not on the [facility], [facility] or  Health refill protocol or controlled substance     Male

## 2019-12-05 ENCOUNTER — TELEPHONE (OUTPATIENT)
Dept: PEDIATRICS | Facility: OTHER | Age: 59
End: 2019-12-05

## 2019-12-05 NOTE — TELEPHONE ENCOUNTER
Patient calling and states he was refilling his tizanidine and had refills left, but the pharmacy is stating that Dr. Fisher pulled the medication and thought it was due to also taking baclofen. Patient states he has been taking both for awhile and that he really needs this medication. Medication is still on medication list and not discontinued. Please clarify if patient is to be taking this and update pharmacy. Thank you. Patient is aware that Dr. Fisher is not in today and may not hear back until he returns.

## 2019-12-10 NOTE — PROGRESS NOTES
Subjective     Joshua Barron is a 59 year old male who presents to clinic today for the following health issues:    HPI   Hypertension Follow-up      Do you check your blood pressure regularly outside of the clinic? No     Are you following a low salt diet? Yes    Are your blood pressures ever more than 140 on the top number (systolic) OR more   than 90 on the bottom number (diastolic), for example 140/90? No      BP Readings from Last 6 Encounters:   19 110/70   10/15/19 130/86   19 144/83   19 (!) 146/86   19 110/74   19 110/80     Depression and Anxiety Follow-Up    How are you doing with your depression since your last visit? Worsened     How are you doing with your anxiety since your last visit?  Worsened     Are you having other symptoms that might be associated with depression or anxiety? No    Have you had a significant life event? No     Do you have any concerns with your use of alcohol or other drugs? No    Social History     Tobacco Use     Smoking status: Former Smoker     Packs/day: 0.00     Years: 30.00     Pack years: 0.00     Last attempt to quit: 2007     Years since quittin.3     Smokeless tobacco: Current User     Types: Chew     Tobacco comment: pt denied quit plan 19   Substance Use Topics     Alcohol use: Yes     Comment: Rarely     Drug use: No     PHQ 4/3/2019 2019 2019   PHQ-9 Total Score 8 11 -   Q9: Thoughts of better off dead/self-harm past 2 weeks Not at all Not at all Not at all   F/U: Thoughts of suicide or self-harm - - -   F/U: Safety concerns - - -     DAYANA-7 SCORE 2019 4/3/2019 2019   Total Score 2 3 1     He reports that his depression is worse due to his pain and limited mobility and activity.      Suicide Assessment Five-step Evaluation and Treatment (SAFE-T)    Chronic Pain Follow-Up       Type / Location of Pain: back   Analgesia/pain control:       Recent changes:  worse      Overall control: Inadequate pain  control  Activity level/function:      Daily activities:  Can do most things most days, with some rest    Work:  not applicable  Adverse effects:  No  Adherance    Taking medication as directed?  Yes    Participating in other treatments: not applicable  Risk Factors:    Sleep:  Fair    Mood/anxiety:  worsened    Recent family or social stressors:  none noted    Other aggravating factors: movement   PHQ-9 SCORE 2/11/2019 4/3/2019 6/5/2019   PHQ-9 Total Score - - -   PHQ-9 Total Score 4 8 11     DAYANA-7 SCORE 2/11/2019 4/3/2019 6/5/2019   Total Score 2 3 1     Encounter-Level CSA - 05/23/2017:    Controlled Substance Agreement - Scan on 7/31/2018 12:12 PM: CONTROLLED SUBSTANCE AGREEMENT     Encounter-Level CSA - 09/18/2015:    Controlled Substance Agreement - Scan on 11/25/2015  2:25 PM: SCHEDULED MEDICATION USE AGREEMENT     Patient-Level CSA:    Controlled Substance Agreement - Non - Opioid - Scan on 7/15/2019 10:03 AM: NON-OPIOID CONTROLLED SUBSTANCE AGREEMENT         How many servings of fruits and vegetables do you eat daily?  0-1    On average, how many sweetened beverages do you drink each day (Examples: soda, juice, sweet tea, etc.  Do NOT count diet or artificially sweetened beverages)?   0    How many days per week do you miss taking your medication? 0    His most recent injection in [place] were painful.  He had increased pain post injection with no decrease in his pain.  He has been wearing and lumbar spine support belt.      -------------------------------------    Patient Active Problem List   Diagnosis     Mixed hyperlipidemia     Tobacco Abuse, History of     Degeneration of lumbar or lumbosacral intervertebral disc     Depression, major     Chronic, continuous use of opioids     Chemical dependency (H)     Chronic rhinitis     Tinnitus of both ears     SNHL (sensorineural hearing loss)     Bipolar disorder (H)     Seizure-like activity (H)     Somatic dysfunction of pelvis region     Bilateral foot pain      Prostate cancer screening     Trigger index finger of right hand     Moderate persistent asthma without complication     Chronic lower back pain     ACP (advance care planning)     Onychia of toe of left foot     DDD (degenerative disc disease), lumbar     Seizure disorder (H)     Back muscle spasm     Benign essential hypertension     Retrograde ejaculation     Allergic rhinitis due to other allergen     Cervical spondylosis without myelopathy     Chronic headaches     DDD (degenerative disc disease)     Generalized anxiety disorder     Hypertrophy of prostate without urinary obstruction and other lower urinary tract symptoms (LUTS)     GERD (gastroesophageal reflux disease)     Lumbago     Major depressive disorder, recurrent episode, moderate (H)     Myalgia and myositis     Hyperlipidemia     Other pain disorders related to psychological factors     Spinal stenosis in cervical region     Status post lumbar spinal fusion     Tobacco use disorder     Trigger finger     Polypharmacy     Deviated nasal septum     Attention deficit hyperactivity disorder (ADHD), combined type     Past Surgical History:   Procedure Laterality Date     APPENDECTOMY      Appendicitis     BACK SURGERY  2007,2010    back surgery 3 disk fusion     BACK SURGERY      L1-L2, L3-L4 laminectomy     COLONOSCOPY  11/2007    repeat 5-10 years     COLONOSCOPY N/A 7/1/2016    Procedure: COLONOSCOPY;  Surgeon: Steve Hoff DO;  Location: [facility]     exophytic lesion posterior scalp line  1/2011    Excision     laminectomy L3-4 and L1-2       RELEASE TRIGGER FINGER  2010    4th digit both hands     RELEASE TRIGGER FINGER Right 1/7/2016    Procedure: RELEASE TRIGGER FINGER;  Surgeon: Zev Schroeder MD;  Location: [facility]     SEPTOPLASTY, TURBINOPLASTY, COMBINED N/A 7/2/2019    Procedure: SEPTOPLASTY, BILATERAL TURBINATE REDUCTION;  Surgeon: Ivett Gonzalez MD;  Location: [facility]       Social History     Tobacco Use     Smoking status:  Former Smoker     Packs/day: 0.00     Years: 30.00     Pack years: 0.00     Last attempt to quit: 2007     Years since quittin.3     Smokeless tobacco: Current User     Types: Chew     Tobacco comment: pt denied quit plan 19   Substance Use Topics     Alcohol use: Yes     Comment: Rarely     Family History   Problem Relation Age of Onset     Asthma Mother      Musculoskeletal Disorder Mother         arthritis     Diabetes Father      Cancer Maternal Grandmother         stomach     Alzheimer Disease Maternal Grandfather      Cancer Paternal Grandmother         stomach     Hypertension Paternal Grandfather            -------------------------------------  Reviewed and updated as needed this visit by Provider         Review of Systems   ROS COMP: CONSTITUTIONAL: NEGATIVE for fever, chills, change in weight  CONSTITUTIONAL:POSITIVE  for poor appetite  EYES: NEGATIVE for vision changes or irritation  ENT/MOUTH: POSITIVE for nasal congestion and sinus pressure and NEGATIVE for ear pain   RESP: POSITIVE for cough-non productive and wheezing and NEGATIVE for dyspnea on exertion and SOB/dyspnea  CV: NEGATIVE for chest pain, palpitations or peripheral edema  GI: NEGATIVE for nausea, abdominal pain, heartburn, or change in bowel habits  GI: POSITIVE for constipation and NEGATIVE for abdominal pain , diarrhea, gas or bloating, heartburn or reflux, hematemesis, hematochezia, melena and nausea  : NEGATIVE for frequency, dysuria, or hematuria  MUSCULOSKELETAL:Back pain   NEURO: NEGATIVE for weakness, dizziness or paresthesias  PSYCHIATRIC:   See HPI      Objective    /70 (BP Location: Left arm, Patient Position: Chair, Cuff Size: Adult Regular)   Pulse 103   Temp 97.8  F (36.6  C) (Tympanic)   Wt 82.4 kg (181 lb 9.6 oz)   SpO2 98%   BMI 26.06 kg/m    Body mass index is 26.06 kg/m .  Physical Exam   GENERAL: healthy, alert and no distress  HENT: ear canals and TM's normal, nose and mouth without ulcers or  lesions  HENT: nasal mucosa edematous , oropharynx clear, oral mucous membranes moist and sinuses: not tender  NECK: no adenopathy, no asymmetry, masses, or scars and thyroid normal to palpation  RESP: lungs clear to auscultation - no rales, rhonchi or wheezes  CV: regular rate and rhythm, normal S1 S2, no S3 or S4, no murmur, click or rub, no peripheral edema and peripheral pulses strong  ABDOMEN: soft, nontender, no hepatosplenomegaly, no masses and bowel sounds normal  ABDOMEN: soft, nontender, without hepatosplenomegaly or masses, bowel sounds normal and no bruits heard  MS: muscle wasting of the paraspinal muscles and poor poster  PSYCH: mentation appears normal, affect normal/bright    Diagnostic Test Results:  Labs reviewed in Epic  Results for orders placed or performed in visit on 12/12/19   CBC with platelets     Status: Abnormal   Result Value Ref Range    WBC 9.5 4.0 - 11.0 10e9/L    RBC Count 4.37 (L) 4.4 - 5.9 10e12/L    Hemoglobin 13.2 (L) 13.3 - 17.7 g/dL    Hematocrit 38.5 (L) 40.0 - 53.0 %    MCV 88 78 - 100 fl    MCH 30.2 26.5 - 33.0 pg    MCHC 34.3 31.5 - 36.5 g/dL    RDW 13.0 10.0 - 15.0 %    Platelet Count 216 150 - 450 10e9/L         9/10/19  2:04 PM [id number] [facility] INTER[facility] RAD    PACS Images      Show images for XR Cervical Thoracic Facet Inj Bilateral   Addendum     On follow-up, is the patient experiencing significant longer-term  relief? No.   Pain is now worse baseline.     Patient now describes left-sided pain that radiates from the hip to  the belt line, of the rib cage into the armpit patient states that the  point of maximal discomfort is located on the left, 6 inches from the  belt line.      Any new symptom? No.     Did the patient have significant relief immediately following this  particular injection? No.     MRI of the thoracic spine dated 10/3/2018 demonstrates high-grade  central canal stenosis at T10-11 secondary to chronic bulge and  ligamentum flavum hypertrophy.  Addition, disc herniations are also  seen at T6-7, T7-8 and T8-9. Severe degenerative changes are seen  within the mid portions of the cervical spine and postoperative  changes are seen throughout the lumbar spine.     Recommendation: Patient states that some the symptoms are new since  she started getting the therapeutic injections. Consider repeat MRI of  the thoracic and cervical spine to reassess and potentially guide any  further intervention.     Please feel free to contact us if we can be of assistance.     ANNETTE GUIDO MD   Addended by Annette Guido MD on 10/7/2019  4:53 PM      Study Result     Exam:XR CERVICAL THORACIC FACET INJ BILATERAL.     History:58 years Male Chronic bilateral thoracic back pain; Chronic  bilateral thoracic back pain; Chronic bilateral thoracic back pain     Comparison:  MRI thoracic spine 10/30/2018     Technique: After informed consent was obtained, a timeout was  performed, satisfactorily identifying the patient and the site of the  procedure.     The patient was then placed in the prone position and prepped and  draped in usual sterile fashion. Local anesthetic consisting of 3 ml  of 1% lidocaine was then instilled into the skin and deep tissues. The  tip of a 22-gauge spinal needle was then directed toward the caudal  aspect of the right and then left T10-11 facet joint. Fluoroscopic  guidance was used to position each needle.  1.0 ml of Isovue-M 300 was  then instilled into the right joint, demonstrating satisfactory  position of the needle tip. 2.0 mL of Isovue-M contrast was instilled  into the joint and overlying soft tissues of the left T10-11 facet  joint.     1.0 mL of 1% lidocaine and 40 mg of Depo-Medrol (80 mg/mL) were then  administered into each joint as well as into the adjacent ligaments.  Needle was then removed.  The patient tolerated the procedure well.     Findings: Contrast is seen accumulating in the joint and overlying  soft  tissues.  Degenerative changes are seen within the spine.                                                                           Impression:  1.  Technically successful fluoroscopically guided bilateral T10-11  therapeutic facet joint injection.     2.  Total fluoroscopy time: .32 minutes.     3.  RV=R 1,Moderate relief.     ANNETTE GUIDO MD        7/18/19  2:30 PM [id number] [facility] CT SCAN       [facility]    PACS Images      Show images for CT SI Joint Injection   Addendum     On follow-up, is the patient experiencing significant longer-term  relief? No.   Patient's right-sided back pain has near completely resolved.  Left-sided pain however has returned to baseline.     Patient describes recurrent pain the left lower back. Patient also  notes pain in the thoracolumbar region which she states has been  treated previously with a therapeutic injection.      Any new symptom? No.     Did the patient have significant relief immediately following this  particular injection? No.     Review the MRI thoracic spine 10/30/2018 demonstrates multilevel  degenerative change with mild annular bulging at a number of levels. I  cannot find any documentation of previous therapeutic injection  targeting the thoracic or thoracolumbar region.     Patient also has moderate sized effusions within the T10-11 facet  joints which may contribute to symptoms.     Recommendation: Bilateral T10-11 facet joint injections.     If the therapeutic facet joint injections are of limited benefit, we  could attempt a T8-9 and T9-10 levels.     Please feel free to contact us if we can be of assistance.     ANNETTE GUIDO MD   Addended by Annette Guido MD on 8/9/2019 11:41 AM      Study Result     PROCEDURE: CT SACROILIAC THERAPEUTIC JOINT INJECTION 7/18/2019 2:30 PM     HISTORY: Low back pain at multiple sites; S/P lumbar fusion        TECHNIQUE: The patient's back was prepped and draped in a sterile  manner. Using lidocaine  anesthesia and CT guidance 25-gauge needles  were placed into the sacroiliac joints bilaterally. Into each  sacroiliac joint 40 mg of Kenalog and 2 cc of Marcaine was injected.  The patient tolerated the procedure well. The preprocedure pain was 8.     BURT KITCHEN MD            Assessment & Plan     (I10) Essential hypertension, benign  Comment: AT goal  Plan:   He will continue Losartan 20 mg daily.    (F41.1) DAYANA (generalized anxiety disorder)  Comment: Stable  Plan:  As under MDD.  He will continue Hydroxyzine 50 mg uo to 4 times per day.    (F33.1) Major depressive disorder, recurrent episode, moderate (H)  Comment: Stable with less tangential speech.  Plan:   He will continue Trileptal 600 mg BID and Pamelor 100 mg at bedtime.    (G89.4) Chronic pain syndrome  Comment: Secondary to thoracic and lumbar DDD with dependence on opioids and poor compliance with following instruction and [id number] to strengthen his back.  Plan:   He will continue NORCO 10/325 mg BID and Morphine ER 15 mg TID.    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  He will continue baclofen 10 mg TID.  He should probably come off of his Zanaflex.       (M54.5) Lumbar back pain  Comment:   Plan:   He will continue to use a support belt.  He will start doing his spine exercises every other day.    (M51.37) Degeneration of lumbar or lumbosacral intervertebral disc  Comment:   Plan:   As above.    (M62.59) Atrophy of muscle of multiple sites  Comment: Mainly the spinal column  Plan:   Testosterone Free and Total    (D64.9) Anemia, unspecified type  Comment: He has a mild anemia with normal MCV  Plan:   Follow CBC with platelets as indicated.            FUTURE APPOINTMENTS:       - Follow-up visit in 2 months for chronic pain.    No follow-ups on file.    Lyle Fisher DO,   [facility] [place]

## 2019-12-12 ENCOUNTER — OFFICE VISIT (OUTPATIENT)
Dept: INTERNAL MEDICINE | Facility: OTHER | Age: 59
End: 2019-12-12
Attending: INTERNAL MEDICINE
Payer: COMMERCIAL

## 2019-12-12 VITALS
HEART RATE: 103 BPM | TEMPERATURE: 97.8 F | SYSTOLIC BLOOD PRESSURE: 110 MMHG | WEIGHT: 181.6 LBS | BODY MASS INDEX: 26.06 KG/M2 | OXYGEN SATURATION: 98 % | DIASTOLIC BLOOD PRESSURE: 70 MMHG

## 2019-12-12 DIAGNOSIS — F41.1 GAD (GENERALIZED ANXIETY DISORDER): ICD-10-CM

## 2019-12-12 DIAGNOSIS — D64.9 ANEMIA, UNSPECIFIED TYPE: ICD-10-CM

## 2019-12-12 DIAGNOSIS — M51.379 DEGENERATION OF LUMBAR OR LUMBOSACRAL INTERVERTEBRAL DISC: ICD-10-CM

## 2019-12-12 DIAGNOSIS — I10 ESSENTIAL HYPERTENSION, BENIGN: ICD-10-CM

## 2019-12-12 DIAGNOSIS — G89.4 CHRONIC PAIN SYNDROME: ICD-10-CM

## 2019-12-12 DIAGNOSIS — M54.50 LUMBAR BACK PAIN: ICD-10-CM

## 2019-12-12 DIAGNOSIS — M62.59 ATROPHY OF MUSCLE OF MULTIPLE SITES: ICD-10-CM

## 2019-12-12 DIAGNOSIS — M40.00 ACQUIRED POSTURAL KYPHOSIS: Primary | ICD-10-CM

## 2019-12-12 DIAGNOSIS — F33.1 MAJOR DEPRESSIVE DISORDER, RECURRENT EPISODE, MODERATE (H): ICD-10-CM

## 2019-12-12 DIAGNOSIS — F11.90 CHRONIC, CONTINUOUS USE OF OPIOIDS: ICD-10-CM

## 2019-12-12 LAB
ERYTHROCYTE [DISTWIDTH] IN BLOOD BY AUTOMATED COUNT: 13 % (ref 10–15)
HCT VFR BLD AUTO: 38.5 % (ref 40–53)
HGB BLD-MCNC: 13.2 G/DL (ref 13.3–17.7)
MCH RBC QN AUTO: 30.2 PG (ref 26.5–33)
MCHC RBC AUTO-ENTMCNC: 34.3 G/DL (ref 31.5–36.5)
MCV RBC AUTO: 88 FL (ref 78–100)
PLATELET # BLD AUTO: 216 10E9/L (ref 150–450)
RBC # BLD AUTO: 4.37 10E12/L (ref 4.4–5.9)
WBC # BLD AUTO: 9.5 10E9/L (ref 4–11)

## 2019-12-12 PROCEDURE — 99214 OFFICE O/P EST MOD 30 MIN: CPT | Performed by: INTERNAL MEDICINE

## 2019-12-12 PROCEDURE — 84403 ASSAY OF TOTAL TESTOSTERONE: CPT | Mod: ZL | Performed by: INTERNAL MEDICINE

## 2019-12-12 PROCEDURE — 84270 ASSAY OF SEX HORMONE GLOBUL: CPT | Mod: ZL | Performed by: INTERNAL MEDICINE

## 2019-12-12 PROCEDURE — 85027 COMPLETE CBC AUTOMATED: CPT | Mod: ZL | Performed by: INTERNAL MEDICINE

## 2019-12-12 PROCEDURE — G0463 HOSPITAL OUTPT CLINIC VISIT: HCPCS

## 2019-12-12 PROCEDURE — 36415 COLL VENOUS BLD VENIPUNCTURE: CPT | Mod: ZL | Performed by: INTERNAL MEDICINE

## 2019-12-12 ASSESSMENT — PAIN SCALES - GENERAL: PAINLEVEL: EXTREME PAIN (9)

## 2019-12-12 NOTE — NURSING NOTE
"Chief Complaint   Patient presents with     Musculoskeletal Problem     Depression     Anxiety     Hypertension       Initial /70 (BP Location: Left arm, Patient Position: Chair, Cuff Size: Adult Regular)   Pulse 103   Temp 97.8  F (36.6  C) (Tympanic)   Wt 82.4 kg (181 lb 9.6 oz)   SpO2 98%   BMI 26.06 kg/m   Estimated body mass index is 26.06 kg/m  as calculated from the following:    Height as of 8/19/19: 1.778 m (5' 10\").    Weight as of this encounter: 82.4 kg (181 lb 9.6 oz).  Medication Reconciliation: complete  Obi Rubio LPN  "

## 2019-12-12 NOTE — PATIENT INSTRUCTIONS
Patient Education     Back Basics: A Healthy Spine    A healthy spine supports the body while letting it move freely. It does this with the help of three natural curves. Strong, flexible muscles help, too. They support the spine by keeping its curves properly aligned. The disks that cushion the bones of your spine also play a role in back fitness.  Three natural curves  The spine is made of bones (vertebrae) and pads of soft tissue (disks). These parts are arranged in three curves: cervical, thoracic, and lumbar. When properly aligned, these curves keep your body balanced. They also support your body when you move. By distributing your weight throughout your spine, the curves make back injuries less likely.  Strong, flexible muscles  Strong, flexible back muscles help support the three curves of the spine. They do so by holding the vertebrae and disks in proper alignment. Strong, flexible abdominal, hip, and leg muscles also reduce strain on the back.  The lumbar curve  The lumbar curve is the hardest-working part of the spine. It carries more weight and moves the most. Aligning this curve helps prevent damage to vertebrae, disks, and other parts of the spine.  Cushioning disks  Disks are the soft pads of tissue between the vertebrae. The disks absorb shock caused by movement. Each disk has a spongy center (nucleus) and a tougher outer ring (annulus). Movement within the nucleus allows the vertebrae to rock back and forth on the disks. This provides the flexibility needed to bend and move.    Date Last Reviewed: 5/1/2018 2000-2018 [employer]. [address] [place] [address], [place] [id number]. All rights reserved. This information is not intended as a substitute for professional medical care. Always follow your healthcare professional's instructions.

## 2019-12-12 NOTE — LETTER
December 13, 2019      Joshua Barron  [address] [place] [address]        Dear ,    We are writing to inform you of your test results.    Your test results fall within the expected range(s) or remain unchanged from previous results.  Please continue with current treatment plan.    Resulted Orders   CBC with platelets   Result Value Ref Range    WBC 9.5 4.0 - 11.0 10e9/L    RBC Count 4.37 (L) 4.4 - 5.9 10e12/L    Hemoglobin 13.2 (L) 13.3 - 17.7 g/dL    Hematocrit 38.5 (L) 40.0 - 53.0 %    MCV 88 78 - 100 fl    MCH 30.2 26.5 - 33.0 pg    MCHC 34.3 31.5 - 36.5 g/dL    RDW 13.0 10.0 - 15.0 %    Platelet Count 216 150 - 450 10e9/L       If you have any questions or concerns, please call the clinic at the number listed above.       Sincerely,        Lyle Fisher, , DO

## 2019-12-12 NOTE — LETTER
December 17, 2019      Joshua Barron  [address] [place] [address]        Dear ,    We are writing to inform you of your test results.    Your test results fall within the expected range(s) or remain unchanged from previous results.  Please continue with current treatment plan.    Resulted Orders   CBC with platelets   Result Value Ref Range    WBC 9.5 4.0 - 11.0 10e9/L    RBC Count 4.37 (L) 4.4 - 5.9 10e12/L    Hemoglobin 13.2 (L) 13.3 - 17.7 g/dL    Hematocrit 38.5 (L) 40.0 - 53.0 %    MCV 88 78 - 100 fl    MCH 30.2 26.5 - 33.0 pg    MCHC 34.3 31.5 - 36.5 g/dL    RDW 13.0 10.0 - 15.0 %    Platelet Count 216 150 - 450 10e9/L   Testosterone Free and Total   Result Value Ref Range    Testosterone Total 436 240 - 950 ng/dL      Comment:      This test was developed and its performance characteristics determined by the   [facility],  Special [facility] Laboratory. It has   not been cleared or approved by the FDA. The laboratory is regulated under   CLIA as qualified to perform high-complexity testing. This test is used for   clinical purposes. It should not be regarded as investigational or for   research.      Sex Hormone Binding Globulin 77 11 - 80 nmol/L    Free Testosterone Calculated 4.86 4.7 - 24.4 ng/dL       If you have any questions or concerns, please call the clinic at the number listed above.       Sincerely,        Lyle Fisher, DO, DO

## 2019-12-13 DIAGNOSIS — F90.2 ADHD (ATTENTION DEFICIT HYPERACTIVITY DISORDER), COMBINED TYPE: ICD-10-CM

## 2019-12-13 DIAGNOSIS — F41.1 GAD (GENERALIZED ANXIETY DISORDER): ICD-10-CM

## 2019-12-13 RX ORDER — HYDROCODONE BITARTRATE AND ACETAMINOPHEN 10; 325 MG/1; MG/1
TABLET ORAL
Qty: 60 TABLET | Refills: 0 | Status: SHIPPED | OUTPATIENT
Start: 2019-12-13 | End: 2020-01-17

## 2019-12-13 NOTE — TELEPHONE ENCOUNTER
norco      Last Written Prescription Date:  11/16/2019  Last Fill Quantity: 60,   # refills: 0  Last Office Visit: 12/12/2019  Future Office visit:    Next 5 appointments (look out 90 days)    Jan 14, 2020  3:00 PM CST  (Arrive by 2:45 PM)  Return Visit with Laquita Fernandez MD  [facility] [place] ([facility] [place] ) [address] [place], [place] [address] [place] [id number] [phone]   Feb 12, 2020  4:00 PM CST  (Arrive by 3:45 PM)  SHORT with Lyle Fisher DO  [facility] [place] ([facility] [place] ) [address] [facility] [place] [address]  [phone]

## 2019-12-14 DIAGNOSIS — R11.0 NAUSEA: ICD-10-CM

## 2019-12-14 DIAGNOSIS — R52 PAIN: ICD-10-CM

## 2019-12-16 RX ORDER — DIAZEPAM 2 MG
TABLET ORAL
Qty: 60 TABLET | Refills: 0 | Status: SHIPPED | OUTPATIENT
Start: 2019-12-16 | End: 2020-01-17

## 2019-12-16 RX ORDER — LISDEXAMFETAMINE DIMESYLATE 70 MG/1
CAPSULE ORAL
Qty: 30 CAPSULE | Refills: 0 | Status: SHIPPED | OUTPATIENT
Start: 2019-12-16 | End: 2020-01-17

## 2019-12-16 NOTE — TELEPHONE ENCOUNTER
dronabinol (MARINOL) 2.5 MG capsule  Last visit date with prescribing provider: 12-  Last refill date: 11-  Quantity: 11, Refills: 0    acetaminophen (TYLENOL) 325 MG tablet  Last visit date with prescribing provider: 11-  Last refill date: 8-  Quantity: 200, Refills: 3    Lissy Pantoja LPN      Next 5 appointments (look out 90 days)    Jan 14, 2020  3:00 PM CST  (Arrive by 2:45 PM)  Return Visit with Laquita Fernandez MD  [facility] [place] ([facility] [place] ) [address]  [place] [address]  [phone]   Feb 12, 2020  4:00 PM CST  (Arrive by 3:45 PM)  SHORT with Lyle Fisher DO  [facility] [place] ([facility] [place] ) [address]  [place] [address]  [phone]            Next 5 appointments (look out 90 days)    Jan 14, 2020  3:00 PM CST  (Arrive by 2:45 PM)  Return Visit with Laquita Fernandez MD  [facility] [place] ([facility] [place] ) [address]  [place] [address]  [phone]   Feb 12, 2020  4:00 PM CST  (Arrive by 3:45 PM)  SHORT with Lyle Fisher DO  [facility] [place] ([facility] [place] ) [address]  [place] [address]  [phone]

## 2019-12-16 NOTE — TELEPHONE ENCOUNTER
Vyvanse -  reviewed   Last visit: 10.15.19  Last refill: 11.11.19    Valium  Last refill: 11.12.19    Future appointments:   Next 5 appointments (look out 90 days)    Jan 14, 2020  3:00 PM CST  (Arrive by 2:45 PM)  Return Visit with Laquita Fernandez MD  [facility] [place] ([facility] [place] ) [address] [place] [id number] [phone]   Feb 12, 2020  4:00 PM CST  (Arrive by 3:45 PM)  SHORT with Lyle Fisher DO  [facility] [place] ([facility] [place] ) [address] [facility] [address]  [phone]

## 2019-12-17 LAB
SHBG SERPL-SCNC: 77 NMOL/L (ref 11–80)
TESTOST FREE SERPL-MCNC: 4.86 NG/DL (ref 4.7–24.4)
TESTOST SERPL-MCNC: 436 NG/DL (ref 240–950)

## 2019-12-18 DIAGNOSIS — R11.0 NAUSEA: ICD-10-CM

## 2019-12-18 RX ORDER — DRONABINOL 2.5 MG/1
CAPSULE ORAL
Qty: 60 CAPSULE | Refills: 0 | OUTPATIENT
Start: 2019-12-18

## 2019-12-18 RX ORDER — DRONABINOL 2.5 MG/1
CAPSULE ORAL
Qty: 60 CAPSULE | Refills: 0 | Status: SHIPPED | OUTPATIENT
Start: 2019-12-18 | End: 2020-01-21

## 2019-12-18 RX ORDER — ACETAMINOPHEN 325 MG/1
TABLET ORAL
Qty: 200 TABLET | Refills: 0 | Status: SHIPPED | OUTPATIENT
Start: 2019-12-18 | End: 2020-01-20

## 2019-12-18 NOTE — TELEPHONE ENCOUNTER
Please advise    dronabinol (MARINOL) 2.5 MG capsule      Last Written Prescription Date:  11/12/19  Last Fill Quantity: 60,   # refills: 0  Last Office Visit: 12/12/19  Future Office visit:    Next 5 appointments (look out 90 days)    Jan 14, 2020  3:00 PM CST  (Arrive by 2:45 PM)  Return Visit with Laquita Fernandez MD  [facility] ([facility] ) [address] [place] [id number] [phone]   Feb 12, 2020  4:00 PM CST  (Arrive by 3:45 PM)  SHORT with Lyle Fisher DO  [facility] ([facility] ) [facility] [address]  [phone]           Routing refill request to provider for review/approval because:  Drug not on the [facility], [facility] or [facility] refill protocol or controlled substance

## 2019-12-26 DIAGNOSIS — F41.1 GAD (GENERALIZED ANXIETY DISORDER): ICD-10-CM

## 2019-12-26 RX ORDER — HYDROXYZINE PAMOATE 50 MG/1
CAPSULE ORAL
Qty: 120 CAPSULE | Refills: 1 | Status: SHIPPED | OUTPATIENT
Start: 2019-12-26 | End: 2020-02-03

## 2019-12-26 NOTE — TELEPHONE ENCOUNTER
Vistaril   Last Office Visit: 12/12/2019  Last Refill Date:11/19/2019  # 120          Refills  1      Thank you!

## 2019-12-31 DIAGNOSIS — G89.4 CHRONIC PAIN SYNDROME: ICD-10-CM

## 2019-12-31 DIAGNOSIS — M51.379 DEGENERATION OF LUMBAR OR LUMBOSACRAL INTERVERTEBRAL DISC: ICD-10-CM

## 2020-01-01 NOTE — TELEPHONE ENCOUNTER
Updated patient. No early refills or stolen scripts will be filled early. They will be filled when due per his pain contract. Instructed patient to report to police and also place in lock box. Patient understands.    n/a

## 2020-01-02 RX ORDER — MORPHINE SULFATE 15 MG/1
TABLET, FILM COATED, EXTENDED RELEASE ORAL
Qty: 90 TABLET | Refills: 0 | Status: SHIPPED | OUTPATIENT
Start: 2020-01-02 | End: 2020-02-03

## 2020-01-02 NOTE — TELEPHONE ENCOUNTER
Controlled Substance Refill Request for Morphine   Problem List Complete:    Yes    Last Written Prescription Date:  12/13/2019  Last Fill Quantity: 60,   # refills: 0    [place] [facility] [place]: 12/12/2019    Future Office visit:   Next 5 appointments (look out 90 days)    Jan 14, 2020  3:00 PM CST  (Arrive by 2:45 PM)  Return Visit with Laquita Fernandez MD  [facility] [place] ([facility] [place] ) [address]  [place] [address] [phone]   Feb 12, 2020  4:00 PM CST  (Arrive by 3:45 PM)  SHORT with Lyle Fisher DO  [facility] [place] ([facility] [place] ) [address]  [place] [address]  [phone]          Controlled substance agreement:   Encounter-Level CSA - 05/23/2017:    Controlled Substance Agreement - Scan on 7/31/2018 12:12 PM: CONTROLLED SUBSTANCE AGREEMENT     Encounter-Level CSA - 09/18/2015:    Controlled Substance Agreement - Scan on 11/25/2015  2:25 PM: SCHEDULED MEDICATION USE AGREEMENT     Patient-Level CSA:    Controlled Substance Agreement - Non - Opioid - Scan on 7/15/2019 10:03 AM: NON-OPIOID CONTROLLED SUBSTANCE AGREEMENT         Last Urine Drug Screen:   Pain Drug SCR UR W RPTD Meds   Date Value Ref Range Status   05/10/2017   Final    FINAL  (Note)  ====================================================================  TOXASSURE COMP DRUG ANALYSIS,UR  ====================================================================  Test                             Result       Flag       Units  Drug Present and Declared for Prescription Verification   Amphetamine                    >6061        EXPECTED   ng/mg creat    Amphetamine is available as a schedule II prescription drug.     Desmethyldiazepam              653          EXPECTED   ng/mg creat   Oxazepam                       905          EXPECTED   ng/mg creat   Temazepam                      999          EXPECTED   ng/mg creat     Desmethyldiazepam, oxazepam, and temazepam are expected    metabolites of diazepam. Desmethyldiazepam and oxazepam are also    expected metabolites of other drugs, including chlordiazepoxide,    prazepam, clorazepate, and halazepam. Oxazepam is an expected    metabolite of temazepam. Oxazepam and temazepam are also    available as scheduled prescription medications.     Hydrocodone                    2941         EXPECTED   ng/mg creat   Hydromorphone                  281          EXPECTED   ng/mg creat   Dihydrocodeine                 83           EXPECTED   ng/mg creat   Norhydrocodone                 2072         EXPECTED   ng/mg creat    Sources of hydrocodone include scheduled prescription    medications. Hydromorphone, dihydrocodeine and norhydrocodone are    expected metabolites of hydrocodone. Hydromorphone and    dihydrocodeine are also available as scheduled prescription    medications.     Fentanyl                       158          EXPECTED   ng/mg creat   Norfentanyl                    >606         EXPECTED   ng/mg creat    Source of fentanyl is a scheduled prescription medication,    including IV, patch, and transmucosal formulations. Norfentanyl    is an expected metabolite of fentanyl.     Acetaminophen                  PRESENT      EXPECTED    Drug Present not Declared for Prescription Verification   Gabapentin                     PRESENT      UNEXPECTED   Nortriptyline                  PRESENT      UNEXPECTED    Nortriptyline may be administered as a prescription drug; it is    also an expected metabolite of amitriptyline.     Trazodone                      PRESENT      UNEXPECTED   1,3 chlorophenyl piperazine    PRESENT      UNEXPECTED    1,3-chlorophenyl piperazine is an expected metabolite of    trazodone.     Diclofenac                     PRESENT      UNEXPECTED   Ibuprofen                      PRESENT      UNEXPECTED   Diphenhydramine                PRESENT      UNEXPECTED   Lidocaine                       PRESENT      UNEXPECTED   Propranolol                    PRESENT      UNEXPECTED  ====================================================================  Test                      Result    Flag   Units      Ref Range   Creatinine              165              mg/dL      >=20  ====================================================================  Declared Medications:  The flagging and interpretation on this report are based on the  following declared medications.  Unexpected results may arise from  inaccuracies in the declared medications.    **Note: The testing scope of this panel includes these medications:    Amphetamine  Amphetamine (Lisdexamfetamine)  Diazepam  Fentanyl  Hydrocodone (Norco)    **Note: The testing scope of this panel does not include small to  moderate amounts of these reported medications:    Acetaminophen (Norco)  ====================================================================  For clinical consultation, please call [phone].  ====================================================================  Analysis performed by [employer], [employer] [place] [address]     , No results found for: COMDAT, No results found for: THC13, PCP13, COC13, MAMP13, OPI13, AMP13, BZO13, TCA13, MTD13, BAR13, OXY13, PPX13, BUP13     Processing:  Rx to be electronically transmitted to pharmacy by provider

## 2020-01-17 DIAGNOSIS — F31.0 BIPOLAR AFFECTIVE DISORDER, CURRENT EPISODE HYPOMANIC (H): ICD-10-CM

## 2020-01-17 DIAGNOSIS — F11.90 CHRONIC, CONTINUOUS USE OF OPIOIDS: ICD-10-CM

## 2020-01-17 DIAGNOSIS — F41.1 GAD (GENERALIZED ANXIETY DISORDER): ICD-10-CM

## 2020-01-17 DIAGNOSIS — F90.2 ADHD (ATTENTION DEFICIT HYPERACTIVITY DISORDER), COMBINED TYPE: ICD-10-CM

## 2020-01-17 RX ORDER — OXCARBAZEPINE 600 MG/1
TABLET, FILM COATED ORAL
Qty: 60 TABLET | Refills: 3 | Status: SHIPPED | OUTPATIENT
Start: 2020-01-17 | End: 2020-05-13

## 2020-01-17 RX ORDER — DIAZEPAM 2 MG
TABLET ORAL
Qty: 60 TABLET | Refills: 0 | Status: SHIPPED | OUTPATIENT
Start: 2020-01-17 | End: 2020-02-14

## 2020-01-17 RX ORDER — LISDEXAMFETAMINE DIMESYLATE 70 MG/1
CAPSULE ORAL
Qty: 30 CAPSULE | Refills: 0 | Status: SHIPPED | OUTPATIENT
Start: 2020-01-17 | End: 2020-02-14

## 2020-01-17 RX ORDER — HYDROCODONE BITARTRATE AND ACETAMINOPHEN 10; 325 MG/1; MG/1
TABLET ORAL
Qty: 60 TABLET | Refills: 0 | Status: SHIPPED | OUTPATIENT
Start: 2020-01-17 | End: 2020-02-13

## 2020-01-17 NOTE — TELEPHONE ENCOUNTER
hydrocodone      Last Written Prescription Date:  12/13/19  Last Fill Quantity: 60,   # refills: 0  Last Office Visit: 12/12/19  Future Office visit:    Next 5 appointments (look out 90 days)    Feb 12, 2020  4:00 PM CST  (Arrive by 3:45 PM)  SHORT with Lyle Fisher DO  [facility] [place] ([facility] [place] ) [address]  [place] [address]  [phone]   Apr 15, 2020  2:45 PM CDT  (Arrive by 2:30 PM)  Nurse Only with Bettye Jules  [facility] [place] ([facility] [place] ) [address]  [place] [address]  [phone]           Routing refill request to provider for review/approval because:  Drug not on the [facility], U[facility] or [facility] refill protocol or controlled substance

## 2020-01-17 NOTE — TELEPHONE ENCOUNTER
Vyvanse -  reviewed.   Last visit: 10.15.19  Last refill: 12.16.19    Valium  Last refill: 12.16.19    Future appointments:   Next 5 appointments (look out 90 days)    Feb 12, 2020  4:00 PM CST  (Arrive by 3:45 PM)  SHORT with Lyle Fsiher DO  [facility] ([facility] ) [address] [place] [address]  [phone]   Apr 15, 2020  2:45 PM CDT  (Arrive by 2:30 PM)  Nurse Only with Bettye Jules  [facility] ([facility] ) [address] [place] [address]  [phone]

## 2020-01-18 DIAGNOSIS — G89.29 CHRONIC BILATERAL LOW BACK PAIN WITH BILATERAL SCIATICA: ICD-10-CM

## 2020-01-18 DIAGNOSIS — R52 PAIN: ICD-10-CM

## 2020-01-18 DIAGNOSIS — Z00.00 HEALTHCARE MAINTENANCE: ICD-10-CM

## 2020-01-18 DIAGNOSIS — M79.7 FIBROMYALGIA: ICD-10-CM

## 2020-01-18 DIAGNOSIS — R11.0 NAUSEA: ICD-10-CM

## 2020-01-18 DIAGNOSIS — M54.41 CHRONIC BILATERAL LOW BACK PAIN WITH BILATERAL SCIATICA: ICD-10-CM

## 2020-01-18 DIAGNOSIS — M25.542 ARTHRALGIA OF BOTH HANDS: ICD-10-CM

## 2020-01-18 DIAGNOSIS — M25.541 ARTHRALGIA OF BOTH HANDS: ICD-10-CM

## 2020-01-18 DIAGNOSIS — M54.42 CHRONIC BILATERAL LOW BACK PAIN WITH BILATERAL SCIATICA: ICD-10-CM

## 2020-01-20 DIAGNOSIS — M25.541 ARTHRALGIA OF BOTH HANDS: ICD-10-CM

## 2020-01-20 DIAGNOSIS — Z00.00 HEALTH CARE MAINTENANCE: ICD-10-CM

## 2020-01-20 DIAGNOSIS — R52 PAIN: ICD-10-CM

## 2020-01-20 DIAGNOSIS — M54.41 CHRONIC BILATERAL LOW BACK PAIN WITH BILATERAL SCIATICA: ICD-10-CM

## 2020-01-20 DIAGNOSIS — M79.7 FIBROMYALGIA: ICD-10-CM

## 2020-01-20 DIAGNOSIS — M54.42 CHRONIC BILATERAL LOW BACK PAIN WITH BILATERAL SCIATICA: ICD-10-CM

## 2020-01-20 DIAGNOSIS — Z00.00 HEALTHCARE MAINTENANCE: ICD-10-CM

## 2020-01-20 DIAGNOSIS — R11.0 NAUSEA: ICD-10-CM

## 2020-01-20 DIAGNOSIS — G89.29 CHRONIC BILATERAL LOW BACK PAIN WITH BILATERAL SCIATICA: ICD-10-CM

## 2020-01-20 DIAGNOSIS — M25.542 ARTHRALGIA OF BOTH HANDS: ICD-10-CM

## 2020-01-20 RX ORDER — ASPIRIN 81 MG/1
TABLET, CHEWABLE ORAL
Qty: 30 TABLET | Refills: 5 | Status: SHIPPED | OUTPATIENT
Start: 2020-01-20 | End: 2020-07-02

## 2020-01-20 RX ORDER — ACETAMINOPHEN 325 MG/1
TABLET ORAL
Qty: 200 TABLET | Refills: 1 | Status: SHIPPED | OUTPATIENT
Start: 2020-01-20 | End: 2020-03-23

## 2020-01-20 RX ORDER — NORTRIPTYLINE HYDROCHLORIDE 50 MG/1
100 CAPSULE ORAL AT BEDTIME
Qty: 60 CAPSULE | Refills: 2 | Status: SHIPPED | OUTPATIENT
Start: 2020-01-20 | End: 2020-04-28

## 2020-01-20 NOTE — TELEPHONE ENCOUNTER
Please advise.    dronabinol (MARINOL) 2.5 MG capsule      Last Written Prescription Date:  12/18/19  Last Fill Quantity: 60,   # refills: 0  Last Office Visit: 12/12/19  Future Office visit:    Next 5 appointments (look out 90 days)    Feb 12, 2020  4:00 PM CST  (Arrive by 3:45 PM)  SHORT with Lyle Fisher,   [facility] [place] ([facility] [place] ) [address]  [place] [address]  [phone]   Apr 15, 2020  2:45 PM CDT  (Arrive by 2:30 PM)  Nurse Only with Bettye Jules  [facility] [place] ([facility] [place] ) [address]  [place] [address]  [phone]           Routing refill request to provider for review/approval because:  Drug not on the G, P or [facility] refill protocol or controlled substance

## 2020-01-21 RX ORDER — DRONABINOL 2.5 MG/1
CAPSULE ORAL
Qty: 60 CAPSULE | Refills: 0 | Status: SHIPPED | OUTPATIENT
Start: 2020-01-21 | End: 2020-02-13

## 2020-01-21 RX ORDER — ACETAMINOPHEN 325 MG/1
TABLET ORAL
Qty: 200 TABLET | Refills: 0 | OUTPATIENT
Start: 2020-01-21

## 2020-01-21 RX ORDER — ASPIRIN 81 MG
TABLET,CHEWABLE ORAL
Qty: 30 TABLET | Refills: 0 | OUTPATIENT
Start: 2020-01-21

## 2020-01-21 RX ORDER — NORTRIPTYLINE HYDROCHLORIDE 50 MG/1
CAPSULE ORAL
Qty: 60 CAPSULE | Refills: 0 | OUTPATIENT
Start: 2020-01-21

## 2020-01-21 NOTE — TELEPHONE ENCOUNTER
marinol       Last Written Prescription Date:  12/18/19  Last Fill Quantity: 60,   # refills: 0  Last Office Visit: 12/12/19  Future Office visit:    Next 5 appointments (look out 90 days)    Feb 12, 2020  4:00 PM CST  (Arrive by 3:45 PM)  SHORT with Lyle Fisher DO  [facility] [place] ([facility] [place] ) [address]  [place] [address]  [phone]   Apr 15, 2020  2:45 PM CDT  (Arrive by 2:30 PM)  Nurse Only with Bettye Jules  [facility] [place] ([facility] [place] ) [address]  [place] [address]  [phone]           Routing refill request to provider for review/approval because:  Drug not on the [facility], [facility] or [facility] refill protocol or controlled substance

## 2020-01-22 RX ORDER — DRONABINOL 2.5 MG/1
CAPSULE ORAL
Qty: 60 CAPSULE | Refills: 2 | OUTPATIENT
Start: 2020-01-22

## 2020-01-25 DIAGNOSIS — M54.50 LOW BACK PAIN: ICD-10-CM

## 2020-01-29 RX ORDER — IBUPROFEN 600 MG/1
TABLET, FILM COATED ORAL
Qty: 120 TABLET | Refills: 0 | Status: SHIPPED | OUTPATIENT
Start: 2020-01-29 | End: 2020-03-11

## 2020-01-29 NOTE — TELEPHONE ENCOUNTER
ibuprofen (ADVIL/MOTRIN) 600 MG tablet     Last Written Prescription Date:  9/4/19  Last Fill Quantity: 120,   # refills: 3  Last Office Visit: 12/12/19  Future Office visit:    Next 5 appointments (look out 90 days)    Feb 12, 2020  4:00 PM CST  (Arrive by 3:45 PM)  SHORT with Lyle Fisher,   [facility] [place] ([facility] [place] ) [address]  [place] [address]  [phone]   Apr 15, 2020  2:45 PM CDT  (Arrive by 2:30 PM)  Nurse Only with Bettye Jules  [facility] [place] ([facility] [place] ) [address]  [place] [address]  [phone]           Routing refill request to provider for review/approval because:

## 2020-01-31 DIAGNOSIS — G89.4 CHRONIC PAIN SYNDROME: ICD-10-CM

## 2020-01-31 DIAGNOSIS — F41.1 GAD (GENERALIZED ANXIETY DISORDER): ICD-10-CM

## 2020-01-31 DIAGNOSIS — M51.379 DEGENERATION OF LUMBAR OR LUMBOSACRAL INTERVERTEBRAL DISC: ICD-10-CM

## 2020-02-03 ENCOUNTER — TELEPHONE (OUTPATIENT)
Dept: FAMILY MEDICINE | Facility: OTHER | Age: 60
End: 2020-02-03

## 2020-02-03 DIAGNOSIS — K59.00 CONSTIPATION: ICD-10-CM

## 2020-02-03 RX ORDER — AMOXICILLIN 250 MG
CAPSULE ORAL
Qty: 120 TABLET | Refills: 0 | Status: SHIPPED | OUTPATIENT
Start: 2020-02-03 | End: 2020-03-17

## 2020-02-03 RX ORDER — HYDROXYZINE PAMOATE 50 MG/1
CAPSULE ORAL
Qty: 120 CAPSULE | Refills: 0 | Status: SHIPPED | OUTPATIENT
Start: 2020-02-03 | End: 2020-02-13

## 2020-02-03 RX ORDER — MORPHINE SULFATE 15 MG/1
TABLET, FILM COATED, EXTENDED RELEASE ORAL
Qty: 90 TABLET | Refills: 0 | Status: SHIPPED | OUTPATIENT
Start: 2020-02-03 | End: 2020-03-02

## 2020-02-03 NOTE — TELEPHONE ENCOUNTER
Morphine 15mg      Last Written Prescription Date:  1/2/2020  Last Fill Quantity: 90,   # refills: 0  Last Office Visit: 12/12/2019  Future Office visit:    Next 5 appointments (look out 90 days)    Feb 12, 2020  4:00 PM CST  (Arrive by 3:45 PM)  SHORT with Lyle Fisher DO  [facility] [place] ([facility] [place] ) [address]  [place] [address]  [phone]   Apr 15, 2020  2:45 PM CDT  (Arrive by 2:30 PM)  Nurse Only with Bettye Jules  [facility] [place] ([facility] [place] ) [address]  [place] [address]  [phone]           Routing refill request to provider for review/approval because:  Drug not on the [facility], UMP or [facility] refill protocol or controlled substance

## 2020-02-12 DIAGNOSIS — F11.90 CHRONIC, CONTINUOUS USE OF OPIOIDS: ICD-10-CM

## 2020-02-12 DIAGNOSIS — F90.2 ADHD (ATTENTION DEFICIT HYPERACTIVITY DISORDER), COMBINED TYPE: ICD-10-CM

## 2020-02-12 DIAGNOSIS — J45.40 MODERATE PERSISTENT ASTHMA WITHOUT COMPLICATION: ICD-10-CM

## 2020-02-12 DIAGNOSIS — F41.1 GAD (GENERALIZED ANXIETY DISORDER): ICD-10-CM

## 2020-02-12 DIAGNOSIS — R11.0 NAUSEA: ICD-10-CM

## 2020-02-13 RX ORDER — HYDROXYZINE PAMOATE 50 MG/1
CAPSULE ORAL
Qty: 120 CAPSULE | Refills: 0 | Status: SHIPPED | OUTPATIENT
Start: 2020-02-13 | End: 2020-03-06

## 2020-02-13 RX ORDER — HYDROCODONE BITARTRATE AND ACETAMINOPHEN 10; 325 MG/1; MG/1
TABLET ORAL
Qty: 60 TABLET | Refills: 0 | Status: SHIPPED | OUTPATIENT
Start: 2020-02-13 | End: 2020-03-12

## 2020-02-13 RX ORDER — ALBUTEROL SULFATE 90 UG/1
AEROSOL, METERED RESPIRATORY (INHALATION)
Qty: 18 G | Refills: 0 | Status: SHIPPED | OUTPATIENT
Start: 2020-02-13 | End: 2020-04-14

## 2020-02-13 RX ORDER — DRONABINOL 2.5 MG/1
CAPSULE ORAL
Qty: 60 CAPSULE | Refills: 0 | Status: SHIPPED | OUTPATIENT
Start: 2020-02-13 | End: 2020-03-12

## 2020-02-13 NOTE — TELEPHONE ENCOUNTER
baljeet      Last Written Prescription Date:  1/17/20  Last Fill Quantity: 30,   # refills: 0  Last Office Visit: 12/12/19  Future Office visit:    Next 5 appointments (look out 90 days)    Apr 15, 2020  2:45 PM CDT  (Arrive by 2:30 PM)  Nurse Only with Bettye Jules  [facility] [place] ([facility] [place] ) [address]  [place] [address]  [phone]           Routing refill request to provider for review/approval because:  Drug not on the [facility], [facility] or [facility] refill protocol or controlled substance  diazepam      Last Written Prescription Date:  1/17/20  Last Fill Quantity: 60,   # refills: 0  Last Office Visit: 12/12/19  Future Office visit:    Next 5 appointments (look out 90 days)    Apr 15, 2020  2:45 PM CDT  (Arrive by 2:30 PM)  Nurse Only with Bettye Jules  [facility] [place] ([facility] [place] ) [address]  [place] [address]  [phone]           Routing refill request to provider for review/approval because:  Drug not on the [facility], [facility] or [facility] refill protocol or controlled substance

## 2020-02-13 NOTE — TELEPHONE ENCOUNTER
hydrocodone      Last Written Prescription Date:  1/17/20  Last Fill Quantity: 60,   # refills: 0  Last Office Visit: 12/12/19  Future Office visit:    Next 5 appointments (look out 90 days)    Apr 15, 2020  2:45 PM CDT  (Arrive by 2:30 PM)  Nurse Only with Bettye Jules  [facility] [place] ([facility] [place] ) [address]  [place] [address]  [phone]           Routing refill request to provider for review/approval because:  Drug not on the [facility], P or [facility] refill protocol or controlled substance  dronabinol      Last Written Prescription Date:  1/21/20  Last Fill Quantity: 60,   # refills: 0  Last Office Visit: 12/12/19  Future Office visit:    Next 5 appointments (look out 90 days)    Apr 15, 2020  2:45 PM CDT  (Arrive by 2:30 PM)  Nurse Only with Bettye Jules  [facility] [place] ([facility] [place] ) [address]  [place] [address]  [phone]         Hydroxyzine 2/3/20 #120  Albuterol 1/6/20 #18g

## 2020-02-14 RX ORDER — LISDEXAMFETAMINE DIMESYLATE 70 MG/1
CAPSULE ORAL
Qty: 30 CAPSULE | Refills: 0 | Status: SHIPPED | OUTPATIENT
Start: 2020-02-14 | End: 2020-03-11

## 2020-02-14 RX ORDER — DIAZEPAM 2 MG
TABLET ORAL
Qty: 60 TABLET | Refills: 0 | Status: SHIPPED | OUTPATIENT
Start: 2020-02-14 | End: 2020-03-16

## 2020-02-19 DIAGNOSIS — Z00.00 HEALTH CARE MAINTENANCE: ICD-10-CM

## 2020-02-20 ENCOUNTER — OFFICE VISIT (OUTPATIENT)
Dept: CHIROPRACTIC MEDICINE | Facility: OTHER | Age: 60
End: 2020-02-20
Attending: CHIROPRACTOR
Payer: COMMERCIAL

## 2020-02-20 DIAGNOSIS — M99.02 SEGMENTAL AND SOMATIC DYSFUNCTION OF THORACIC REGION: ICD-10-CM

## 2020-02-20 DIAGNOSIS — M99.01 SEGMENTAL AND SOMATIC DYSFUNCTION OF CERVICAL REGION: ICD-10-CM

## 2020-02-20 DIAGNOSIS — M99.03 SEGMENTAL AND SOMATIC DYSFUNCTION OF LUMBAR REGION: Primary | ICD-10-CM

## 2020-02-20 DIAGNOSIS — M54.50 ACUTE BILATERAL LOW BACK PAIN WITHOUT SCIATICA: ICD-10-CM

## 2020-02-20 PROCEDURE — 98941 CHIROPRACT MANJ 3-4 REGIONS: CPT | Mod: AT | Performed by: CHIROPRACTOR

## 2020-02-20 PROCEDURE — 99212 OFFICE O/P EST SF 10 MIN: CPT | Mod: 25 | Performed by: CHIROPRACTOR

## 2020-02-20 NOTE — PROGRESS NOTES
Subjective Finding:    Chief compalint: No chief complaint on file.  , Pain Scale: 4/10, Intensity: dull, Duration: 2 days, Radiating: no.    Date of injury:     Activities that the pain restricts:   Home/household/hobbies/social activities: yes.  Work duties: yes.  Sleep: yes.  Makes symptoms better: rest.  Makes symptoms worse: lumbar extension and lumbar flexion.  Have you seen anyone else for the symptoms? No.  Work related: no.  Automobile related injury: no.    Objective and Assessment:    Posture Analysis:   High shoulder: .  Head tilt: .  High iliac crest: .  Head carriage: neutral.  Thoracic Kyphosis: neutral.  Lumbar Lordosis: forward.    Lumbar Range of Motion: flexion decreased and extension decreased.  Cervical Range of Motion: extension decreased.  Thoracic Range of Motion: extension decreased.  Extremity Range of Motion: .    Palpation:   Quad lumb: bilateral, referred pain: no  T paraspinals: dull pain, no    Segmental dysfunction pre-treatment and treatment area: C4, T5, T6 and Sacrum.    Assessment post-treatment:  Cervical: ROM increased.  Thoracic: ROM increased.  Lumbar: ROM increased.    Comments: history of DDD in C and L spine.      Complicating Factors: .    Procedure(s):  CMT:  41432 Chiropractic manipulative treatment 3-4 regions performed   Cervical: Diversified, See above for level, Supine, Thoracic: Diversified, See above for level, Prone and Lumbar: Diversified, See above for level, Side posture    Modalities:  None performed this visit    Therapeutic procedures:  None    Plan:  Treatment plan: PRN.  Instructed patient: stretch as instructed at visit.  Short term goals: increase ROM.  Long term goals: increase ADL.  Prognosis: good.

## 2020-02-21 RX ORDER — FLUTICASONE PROPIONATE 50 MCG
SPRAY, SUSPENSION (ML) NASAL
Qty: 16 G | Refills: 0 | Status: SHIPPED | OUTPATIENT
Start: 2020-02-21 | End: 2020-03-12

## 2020-02-21 NOTE — TELEPHONE ENCOUNTER
flonase      Last Written Prescription Date:  2/26/19  Last Fill Quantity: 16 g,   # refills: 2  Last Office Visit: 12/12/19  Future Office visit:    Next 5 appointments (look out 90 days)    Feb 24, 2020  2:20 PM CST  Return Visit with Shamar Escamilla DC  [facility] ([facility]) [address] [place] [address]  [phone]   Apr 15, 2020  2:45 PM CDT  (Arrive by 2:30 PM)  Nurse Only with Bettye Jules  [facility] [address]  [facility] [address]  [phone]

## 2020-02-24 ENCOUNTER — OFFICE VISIT (OUTPATIENT)
Dept: CHIROPRACTIC MEDICINE | Facility: OTHER | Age: 60
End: 2020-02-24
Attending: CHIROPRACTOR
Payer: COMMERCIAL

## 2020-02-24 DIAGNOSIS — M99.02 SEGMENTAL AND SOMATIC DYSFUNCTION OF THORACIC REGION: ICD-10-CM

## 2020-02-24 DIAGNOSIS — M99.01 SEGMENTAL AND SOMATIC DYSFUNCTION OF CERVICAL REGION: ICD-10-CM

## 2020-02-24 DIAGNOSIS — M99.03 SEGMENTAL AND SOMATIC DYSFUNCTION OF LUMBAR REGION: Primary | ICD-10-CM

## 2020-02-24 DIAGNOSIS — M54.50 ACUTE BILATERAL LOW BACK PAIN WITHOUT SCIATICA: ICD-10-CM

## 2020-02-24 PROCEDURE — 98941 CHIROPRACT MANJ 3-4 REGIONS: CPT | Mod: AT | Performed by: CHIROPRACTOR

## 2020-02-24 NOTE — PROGRESS NOTES

## 2020-03-02 DIAGNOSIS — G89.4 CHRONIC PAIN SYNDROME: ICD-10-CM

## 2020-03-02 DIAGNOSIS — M51.379 DEGENERATION OF LUMBAR OR LUMBOSACRAL INTERVERTEBRAL DISC: ICD-10-CM

## 2020-03-02 RX ORDER — MORPHINE SULFATE 15 MG/1
TABLET, FILM COATED, EXTENDED RELEASE ORAL
Qty: 90 TABLET | Refills: 0 | Status: SHIPPED | OUTPATIENT
Start: 2020-03-02 | End: 2020-03-30

## 2020-03-02 NOTE — TELEPHONE ENCOUNTER
Ms contin      Last Written Prescription Date:  2/3/20  Last Fill Quantity: 90,   # refills: 0  Last Office Visit: 12/12/19  Future Office visit:    Next 5 appointments (look out 90 days)    Apr 15, 2020  2:45 PM CDT  (Arrive by 2:30 PM)  Nurse Only with Bettye Jules  [facility] [address]  [place] [address]  [phone]           Routing refill request to provider for review/approval because:  Drug not on the [facility], UMP or [facility] refill protocol or controlled substance

## 2020-03-03 DIAGNOSIS — F41.1 GAD (GENERALIZED ANXIETY DISORDER): ICD-10-CM

## 2020-03-05 NOTE — TELEPHONE ENCOUNTER
Hydroxyzine 50 MG      Last Written Prescription Date:  2-  Last Fill Quantity: 120,   # refills: 0  Last Office Visit: 12-12-19  Future Office visit:    Next 5 appointments (look out 90 days)    Apr 15, 2020  2:45 PM CDT  (Arrive by 2:30 PM)  Nurse Only with Bettye Jules  [facility] [place] ([facility] ) [address]  [place] [address]  [phone]           Routing refill request to provider for review/approval because:

## 2020-03-06 DIAGNOSIS — M54.50 CHRONIC LEFT-SIDED LOW BACK PAIN WITHOUT SCIATICA: Primary | ICD-10-CM

## 2020-03-06 DIAGNOSIS — G89.29 CHRONIC LEFT-SIDED LOW BACK PAIN WITHOUT SCIATICA: Primary | ICD-10-CM

## 2020-03-06 DIAGNOSIS — M54.50 LOW BACK PAIN: ICD-10-CM

## 2020-03-06 RX ORDER — HYDROXYZINE PAMOATE 50 MG/1
CAPSULE ORAL
Qty: 120 CAPSULE | Refills: 3 | Status: SHIPPED | OUTPATIENT
Start: 2020-03-06 | End: 2020-05-19

## 2020-03-06 NOTE — TELEPHONE ENCOUNTER
Ibuprofen 600 MG      Last Written Prescription Date:  1-  Last Fill Quantity: 120,   # refills: 0  Last Office Visit: 12-12-19  Future Office visit:    Next 5 appointments (look out 90 days)    Apr 15, 2020  2:45 PM CDT  (Arrive by 2:30 PM)  Nurse Only with Bettye Jules  [facility] [place] ([facility] ) [address]  [place] [address]  [phone]           Routing refill request to provider for review/approval because:

## 2020-03-11 NOTE — TELEPHONE ENCOUNTER
Protocol failed due to    Normal CBC on file in past 12 months Protocol Details    Normal serum creatinine on file in past 12 months      CBC RESULTS:   Recent Labs   Lab Test 12/12/19  1625   WBC 9.5   RBC 4.37*   HGB 13.2*   HCT 38.5*   MCV 88   MCH 30.2   MCHC 34.3   RDW 13.0        Creatinine   Date Value Ref Range Status   09/19/2019 0.58 (L) 0.66 - 1.25 mg/dL Final     Please advise. Thank you!

## 2020-03-12 RX ORDER — IBUPROFEN 600 MG/1
TABLET, FILM COATED ORAL
Qty: 120 TABLET | Refills: 3 | Status: SHIPPED | OUTPATIENT
Start: 2020-03-12 | End: 2020-07-02

## 2020-03-13 DIAGNOSIS — F41.1 GAD (GENERALIZED ANXIETY DISORDER): ICD-10-CM

## 2020-03-13 RX ORDER — DIAZEPAM 2 MG
TABLET ORAL
Qty: 60 TABLET | Refills: 0 | OUTPATIENT
Start: 2020-03-13

## 2020-03-13 NOTE — TELEPHONE ENCOUNTER
Valium discontinued by       9:21 PM   Laquita Fernandez MD routed this conversation to Me   Laquita Fernandez MD           9:21 PM   Note      Per  last fill 2/14. I'll fill Vyvanse. Time to get rid of the remaining diazepam (2 mg) thus I will not refill that one. He's on lots of other meds with respiratory suppression.      Thanks!

## 2020-03-14 DIAGNOSIS — F41.1 GAD (GENERALIZED ANXIETY DISORDER): ICD-10-CM

## 2020-03-16 RX ORDER — DIAZEPAM 2 MG
TABLET ORAL
Qty: 60 TABLET | Refills: 0 | OUTPATIENT
Start: 2020-03-16

## 2020-03-18 NOTE — TELEPHONE ENCOUNTER
Anderson  Last visit: 9.21.18  Last refill: 9.12.18 #120    Next 5 appointments (look out 90 days)     Oct 25, 2018  2:40 PM CDT   (Arrive by 2:20 PM)   SHORT with Lyle Fisher DO   [facility] [place] ([facility] [place] )    [address]  [place] [address]   [phone]            Nov 07, 2018  3:00 PM CST   (Arrive by 2:45 PM)   Return Visit with Laquita Fernandez MD   [facility] [place] ([facility] [place] )    [address] [place] [address]  [place] [id number] [phone]                  
Walked RX Norco to [facility].     
no

## 2020-03-23 ENCOUNTER — VIRTUAL VISIT (OUTPATIENT)
Dept: PSYCHIATRY | Facility: OTHER | Age: 60
End: 2020-03-23
Attending: PSYCHIATRY & NEUROLOGY
Payer: COMMERCIAL

## 2020-03-23 DIAGNOSIS — F41.1 GAD (GENERALIZED ANXIETY DISORDER): Primary | ICD-10-CM

## 2020-03-23 PROCEDURE — 99442 ZZC PHYSICIAN TELEPHONE EVALUATION 11-20 MIN: CPT | Performed by: PSYCHIATRY & NEUROLOGY

## 2020-03-23 RX ORDER — DIAZEPAM 2 MG
2 TABLET ORAL DAILY PRN
Qty: 30 TABLET | Refills: 0 | Status: SHIPPED | OUTPATIENT
Start: 2020-03-23 | End: 2020-04-21

## 2020-03-23 ASSESSMENT — ANXIETY QUESTIONNAIRES
6. BECOMING EASILY ANNOYED OR IRRITABLE: MORE THAN HALF THE DAYS
GAD7 TOTAL SCORE: 6
7. FEELING AFRAID AS IF SOMETHING AWFUL MIGHT HAPPEN: NOT AT ALL
3. WORRYING TOO MUCH ABOUT DIFFERENT THINGS: MORE THAN HALF THE DAYS
2. NOT BEING ABLE TO STOP OR CONTROL WORRYING: MORE THAN HALF THE DAYS
5. BEING SO RESTLESS THAT IT IS HARD TO SIT STILL: NOT AT ALL
1. FEELING NERVOUS, ANXIOUS, OR ON EDGE: NOT AT ALL

## 2020-03-23 ASSESSMENT — PATIENT HEALTH QUESTIONNAIRE - PHQ9
5. POOR APPETITE OR OVEREATING: NOT AT ALL
SUM OF ALL RESPONSES TO PHQ QUESTIONS 1-9: 13

## 2020-03-23 NOTE — PROGRESS NOTES
"Joshua Barron is a 59 year old male who is being evaluated via a telephone visit.      The patient has been notified of following (by YESSI Wong LPN     \"We have found that certain health care needs can be provided without the need for a physical exam.  This service lets us provide the care you need with a short phone conversation.  If a prescription is necessary we can send it directly to your pharmacy.  If lab work is needed we can place an order for that and you can then stop by our lab to have the test done at a later time.    This telephone visit will be conducted via 3 way call with the you (the patient) , the physician/provider, and a me all on the line at the same time.  This allows your physician/provider to have the phone conversation with you while I will be taking notes for your medical record.  We will have full access to your [employer] medical record during this entire phone call.    Since this is like an office visit,  will bill your insurance company for this service.  Please check with your medical insurance if this type of telephone/virtual is covered . You may be responsible for the cost of this service if insurance coverage is denied.  The typical cost is $30 (10min), $59(11-20min) and $85 (21-30min)     If during the course of the call the physician/provider feels a telephone visit is not appropriate, you will not be charged for this service\"    Consent has been obtained for this service by care team member: yes.  See the scanned image in the medical record.    Total time of call between patient and provider was 20 minutes     Laquita Fernandez (MD signature)  ===================================================    I have reviewed the note as documented above.  This accurately captures the substance of my conversation with the patient,                  "
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
## 2020-03-24 ASSESSMENT — ANXIETY QUESTIONNAIRES: GAD7 TOTAL SCORE: 6

## 2020-03-30 ENCOUNTER — NURSE TRIAGE (OUTPATIENT)
Dept: PEDIATRICS | Facility: OTHER | Age: 60
End: 2020-03-30

## 2020-03-30 DIAGNOSIS — K21.9 GASTROESOPHAGEAL REFLUX DISEASE, ESOPHAGITIS PRESENCE NOT SPECIFIED: ICD-10-CM

## 2020-03-30 NOTE — TELEPHONE ENCOUNTER
COVID 19 Nurse Triage Plan    Please be aware that novel coronavirus (COVID-19) may be circulating in the community. If you develop symptoms such as fever, cough, or SOB or if you have concerns about the presence of another infection including coronavirus (COVID-19), please contact your health care provider or visit [email or web address].     Patient HAS known exposure, fever, cough or SOB in addition to reason for call.   Patient advised to stay home with mild symptoms and follow home care symptom management.     Instructions Given to Patient  Your symptoms could be due to COVID-19, but it also could be due to a number of other respiratory illnesses.  We are not doing testing at this time for COVID-19 unless symptoms are severe enough to require hospitalization.  It is recommended that you stay home to prevent the spread of your illness.  To do this follow these instructions:    Regardless of if you have been tested or not:  Patient who have symptoms (cough, fever, or shortness of breath), need to isolate for 7 days from when symptoms started AND 72 hours after fever resolves (without fever reducing medications) AND improvement of respiratory symptoms (whichever is longer).      Isolate yourself at home (in own room/own bathroom if possible)    Do Not allow any visitors    Do Not go to work or school    Do Not go to Y[employer],  centers, shopping, or other public places.    Do Not shake hands.    Avoid close and intimate contact with others (hugging, kissing).    Follow CDC recommendations for household cleaning of frequently touched services.     After the initial 7 days, continue to isolate yourself from household members as much as possible. To continue decrease the risk of community spread and exposure, you and any members of your household should limit activities in public for 14 days after starting home isolation.     You can reference the following CDC link for helpful home isolation/care  tips:  https://www.cdc.gov/coronavirus/2019-ncov/downloads/10Things.pdf    Protect Others:    Cover your mouth and nose with a mask, disposable tissue or wash cloth to avoid spreading germs to others.    Wash your hands and face frequently with soap and water.    Fever Medicines:    For fever relief, take acetaminophen or ibuprofen.    Treat fevers above 101  F (38.3  C) to lower fevers and make you more comfortable.     Acetaminophen (e.g., Tylenol): Take 650 mg (two 325 mg pills) by mouth every 4-6 hours as needed of regular strength Tylenol or 1,000 mg (two 500 mg pills) every 8 hours as needed of Extra Strength Tylenol.     Ibuprofen (e.g., Motrin, Advil): Take 400 mg (two 200 mg pills) by mouth every 6 hours as needed.     Acetaminophen is thought to be safer than ibuprofen or naproxen for people over 65 years old. Acetaminophen is in many OTC and prescription medicines. It might be in more than one medicine that you are taking. You need to be careful and not take an overdose. Before taking any medicine, read all the instructions on the package.    Caution - NSAIDs (e.g., ibuprofen, naproxen): Do not take nonsteroidal anti-inflammatory drugs (NSAIDs) if you have stomach problems, kidney disease, heart failure, or other contraindications to using this type of medicine. Do not take NSAID medicines for over 7 days without consulting your PCP. Do not take NSAID medicines if you are pregnant. Do not take NSAID medicines if you are also taking blood thinners.     Call Back If: Breathing difficulty develops or you become worse.    Thank you for limiting contact with others, wearing a simple mask to cover your cough, practice good hand hygiene habits and accessing our virtual services where possible to limit the spread of this virus.    For more information about COVID19 and options for caring for yourself at home, please visit the CDC website at https://www.cdc.gov/coronavirus/2019-ncov/about/steps-when-sick.html  For  more options for care at [facility], please visit our website at [email or web address]          Reason for Disposition    [1] No COVID-19 EXPOSURE BUT [2] questions about    Additional Information    Negative: Severe difficulty breathing (e.g., struggling for each breath, speak in single words, bluish lips)    Negative: Sounds like a life-threatening emergency to the triager    Negative: [1] Difficulty breathing occurs AND [2] within 14 days of COVID-19 EXPOSURE (Close Contact)    Negative: Patient sounds very sick or weak to the triager    Negative: [1] Difficulty breathing (shortness of breath) occurs AND [2] onset > 14 days after COVID-19 EXPOSURE (Close Contact)    Negative: [1] Dry cough occurs AND [2] onset > 14 days after COVID-19 EXPOSURE    Negative: [1] Wet cough (i.e., white-yellow, yellow, green, or melvin colored sputum) AND [2] onset > 14 days after COVID-19 EXPOSURE    Negative: [1] Common cold symptoms AND [2] onset > 14 days after COVID-19 EXPOSURE    Negative: [1] Fever or feeling feverish AND [2] within 14 Days of COVID-19 EXPOSURE (Close Contact)    Negative: [1] Cough occurs AND [2] within 14 days of COVID-19 EXPOSURE    Negative: [1] Fever (or feeling feverish) OR cough occurs AND [2] travel from or living in high risk area (identified by CDC) AND [3] within last 14 days    Negative: [1] COVID-19 EXPOSURE within last 14 days AND [2] mild body aches, chills, diarrhea, headache, runny nose, or sore throat occur    Negative: [1] COVID-19 EXPOSURE within last 14 days AND [2] NO cough, fever, or breathing difficulty AND [3] exposed person is a healthcare worker who was NOT using all recommended personal protective equipment (i.e., a respirator-N95 mask, eye protection, gloves, and gown)    Negative: [1] COVID-19 EXPOSURE (Close Contact) within last 14 days AND [2] NO cough, fever, or breathing difficulty    Negative: [1] Travel from or living in high risk area  "(identified by CDC) AND [2] within last 14 days AND [3] NO cough or fever or breathing difficulty    Negative: [1] COVID-19 EXPOSURE (Close Contact) AND [2] 15 or more days ago AND [3] NO cough or fever or breathing difficulty    Negative: [1] No COVID-19 EXPOSURE BUT [2] living with someone who was exposed and who has no fever or cough    Negative: [1] Caller concerned that exposure to COVID-19 occurred BUT [2] does not meet COVID-19 EXPOSURE criteria from CDC    Negative: COVID-19 testing, questions about who needs it    Answer Assessment - Initial Assessment Questions  1. CONFIRMED CASE: \"Who is the person with the confirmed COVID-19 infection that you were exposed to?\"      No  2. PLACE of CONTACT: \"Where were you when you were exposed to COVID-19  (coronavirus disease 2019)?\" (e.g., city, state, country)      No  3. TYPE of CONTACT: \"How much contact was there?\" (e.g., live in same house, work in same office, same school)      NO  4. DATE of CONTACT: \"When did you have contact with a coronavirus patient?\" (e.g., days)      no  5. DURATION of CONTACT: \"How long were you in contact with the COVID-19 (coronavirus disease) patient?\" (e.g., a few seconds, passed by person, a few minutes, live with the patient)      No  6. SYMPTOMS: \"Do you have any symptoms?\" (e.g., fever, cough, breathing difficulty)      Nasal Congestion, increased wheezing- becoming constant, stomachache, diarrhea, chills, nausea  7. PREGNANCY OR POSTPARTUM: \"Is there any chance you are pregnant?\" \"When was your last menstrual period?\" \"Did you deliver in the last 2 weeks?\"      No  8. HIGH RISK: \"Do you have any heart or lung problems? Do you have a weakened immune system?\" (e.g., CHF, COPD, asthma, HIV positive, chemotherapy, renal failure, diabetes mellitus, sickle cell anemia)      Asthma, diabetes, COPD    Protocols used: CORONAVIRUS (COVID-19) EXPOSURE-A- 3.17.20       "

## 2020-04-03 ENCOUNTER — NURSE TRIAGE (OUTPATIENT)
Dept: PEDIATRICS | Facility: OTHER | Age: 60
End: 2020-04-03

## 2020-04-03 NOTE — TELEPHONE ENCOUNTER
Patient calling with symptoms of sinus pressure, eyes burning.  No cough, no shortness of breath.  Has asthma, COPD.  Had questions about COVID 19 testing.  He has been on the [facility] website and familiar with signs & symptoms.  He will continue to isolate at home and call back with any changes.  COVID 19 Nurse Triage Plan/Patient Instructions    Please be aware that novel coronavirus (COVID-19) may be circulating in the community. If you develop symptoms such as fever, cough, or SOB or if you have concerns about the presence of another infection including coronavirus (COVID-19), please contact your health care provider or visit [email or web address].     Disposition/Instructions    Patient to stay at home and follow home care protocol based instructions.     Thank you for limiting contact with others, wearing a simple mask to cover your cough, practice good hand hygiene habits and accessing our virtual services where possible to limit the spread of this virus.    For more information about COVID19 and options for caring for yourself at home, please visit the CDC website at https://www.cdc.gov/coronavirus/2019-ncov/about/steps-when-sick.html  For more options for care at [facility], please visit our website at [email or web address]    For more information, please use the [facility] ([facility]) COVID-19 Hotlines (Interpreters available):     Health questions: Phone Number: [phone] or [phone] and Hours: 7 a.m. to 7 p.m.    Schools and  questions: Phone Number: [phone] or [phone] and Hours 7 a.m. to 7 p.m.                      Reason for Disposition    HIGH RISK patient (e.g., age > 64 years, diabetes, heart or lung disease, weak immune system)    COVID-19, questions about    Additional Information    Negative: Severe difficulty breathing (e.g., struggling for each breath, can only speak in single words, bluish lips)    Negative: Sounds like a  life-threatening emergency to the triager    Negative: [1] Child has symptoms of COVID-19 (fever, cough or SOB) AND [2] lab test positive    Negative: [1] Child has symptoms of COVID-19 (fever, cough or SOB) AND [2] major community spread AND [3] testing not being done for mild symptoms    Negative: [1] Difficulty breathing (or shortness of breath) occurs AND [2] > 14 days after COVID-19 exposure (Close Contact) AND [3] no community spread where patient lives    Negative: [1] Cough occurs AND [2] > 14 days after COVID-19 exposure AND [3] no community spread where patient lives    Negative: [1] Common cold symptoms AND [2] > 14 days after COVID-19 exposure AND [3] no community spread where patient lives    Negative: [1] Any difficulty breathing (SOB) occurs AND [2] within 14 days of close contact with confirmed COVID-19 patient    Negative: SEVERE difficulty breathing (e.g., struggling for each breath, speaks in single words)    Negative: Difficult to awaken or acting confused (e.g., disoriented, slurred speech)    Negative: Bluish (or gray) lips or face now    Negative: Shock suspected (e.g., cold/pale/clammy skin, too weak to stand, low BP, rapid pulse)    Negative: Sounds like a life-threatening emergency to the triager    Negative: [1] COVID-19 suspected (e.g., cough, fever, shortness of breath) AND [2] public health department recommends testing    Negative: [1] COVID-19 exposure AND [2] no symptoms    Negative: COVID-19 and Breastfeeding, questions about    Negative: SEVERE or constant chest pain (Exception: mild central chest pain, present only when coughing)    Negative: MODERATE difficulty breathing (e.g., speaks in phrases, SOB even at rest, pulse 100-120)    Negative: Patient sounds very sick or weak to the triager    Negative: Fever present > 3 days (72 hours)    Negative: [1] Fever returns after gone for over 24 hours AND [2] symptoms worse or not improved    Negative: [1] Continuous (nonstop) coughing  interferes with work or school AND [2] no improvement using cough treatment per protocol    Negative: Cough present > 3 weeks    Negative: 1] COVID-19 infection diagnosed or suspected AND [2] mild symptoms (fever, cough) AND [2] no trouble breathing or other complications    Protocols used: CORONAVIRUS (COVID-19) DIAGNOSED OR [id number] 3.30.20, CORONAVIRUS (COVID-19) EXPOSURE-P- 3.30.20       Statement Selected

## 2020-04-06 NOTE — PROGRESS NOTES
Discharge Summary  Dr Fisher  6/28/2019-7/16/2019  Goals not met, patient did not return, discharge at this time       07/16/19 1300   Signing Clinician's Name / Credentials   Signing clinician's name / credentials Zev Foster SPT. Noemi Dunbar DPT, OCS, Cert DN   Session Number   Session Number 3   Progress Note/Recertification   Progress Note Completed Date 04/06/20   Adult Goals   PT Ortho Eval Goals 1;2;3   Ortho Goal 1   Goal Identifier STG 12   Goal Description Patient will be compliant with HEP in order to make progress while at home   Target Date 07/19/19   Date Met   (patient did not return, goal not met)   Ortho Goal 2   Goal Identifier LTG 1   Goal Description Patient will demonstrate erect posture while performing gait tasks for 3 minutes in order to improve his walking and mobility tolerance   Target Date 08/09/19   Date Met   (patient did not return, goal not met)   Ortho Goal 3   Goal Identifier LTG 2   Goal Description Patient will report overall 50% or more improvement in his back and leg symptoms in order to improve his function at home   Target Date 08/23/19   Date Met   (patient did not return, goal not met)   Subjective Report   Subjective Report Patient feels really sore based off of humidity. Has not slept well the last two days. Has been able to do pelvic tilts but wall slides are not going well as low back pops.   Treatment Interventions   Interventions Therapeutic Procedure/Exercise;Manual Therapy   Therapeutic Procedure/exercise   Therapeutic Procedures: strength, endurance, ROM, flexibillity minutes (56238) 20   Skilled Intervention ther ex   Patient Response good   Treatment Detail Wall sits 25 reps. Bridges with posterior pelvic tilts 2 sets 10 reps. Seated forward and lateral stretch of low back with yoga ball, 2 sets 10 reps each direcion. Hooklying low back rotational stretch with emphasis going right stretching left side, 4 stretches 30 seconds each   Manual Therapy   Manual  Therapy: Mobilization, MFR, MLD, friction massage minutes (56222) 10   Skilled Intervention Man ther   Patient Response good   Treatment Detail STM/MFR to left lumbar paraspinals    Assessments Completed   Assessments Completed Patient is now willing to do wall sits since they do not experience pain if there is padding between him and wall. Was gaurded with left low back, seemed excited about trunk rotational stretch as it gets to the spot where they feel most of their soreness.    Plan   Home program Continue current HEP.   Updates to plan of care Hooklying trunk rotational stretch as needed   Plan for next session Assess response to STM/MFR. Assess wall sits. Progress HEP if appropriate.   Comments   Comments patient did not return, goals not met, discharge    Total Session Time   Timed Code Treatment Minutes 30   Total Treatment Time (sum of timed and untimed services) 30

## 2020-04-07 DIAGNOSIS — F11.90 CHRONIC, CONTINUOUS USE OF OPIOIDS: ICD-10-CM

## 2020-04-07 RX ORDER — HYDROCODONE BITARTRATE AND ACETAMINOPHEN 10; 325 MG/1; MG/1
TABLET ORAL
Qty: 60 TABLET | Refills: 0 | Status: SHIPPED | OUTPATIENT
Start: 2020-04-07 | End: 2020-05-05

## 2020-04-07 NOTE — TELEPHONE ENCOUNTER
HYDROcodone-acetaminophen (NORCO)  MG per tablet       Last Written Prescription Date:  03/12/20  Last Fill Quantity: 60,   # refills: 0  Last Office Visit: 12/12/19  Future Office visit:    Next 5 appointments (look out 90 days)    Apr 08, 2020  3:40 PM CDT  Telephone Visit with L[facility] DO  [facility] [address] [facility] [address]  [phone]   Apr 27, 2020  3:20 PM CDT  Telephone Visit with Laquita Fernandez MD  [facility] ([facility] ) [address] [place] [phone]           Routing refill request to provider for review/approval because:  Drug not on the [facility], [facility] or [facility] refill protocol or controlled substance

## 2020-04-08 ENCOUNTER — VIRTUAL VISIT (OUTPATIENT)
Dept: INTERNAL MEDICINE | Facility: OTHER | Age: 60
End: 2020-04-08
Attending: INTERNAL MEDICINE
Payer: COMMERCIAL

## 2020-04-08 VITALS — BODY MASS INDEX: 25.2 KG/M2 | WEIGHT: 180 LBS | HEIGHT: 71 IN

## 2020-04-08 DIAGNOSIS — K21.00 GASTROESOPHAGEAL REFLUX DISEASE WITH ESOPHAGITIS: ICD-10-CM

## 2020-04-08 DIAGNOSIS — R10.84 GENERALIZED ABDOMINAL CRAMPING: Primary | ICD-10-CM

## 2020-04-08 PROCEDURE — 99442 ZZC PHYSICIAN TELEPHONE EVALUATION 11-20 MIN: CPT | Performed by: INTERNAL MEDICINE

## 2020-04-08 PROCEDURE — G0463 HOSPITAL OUTPT CLINIC VISIT: HCPCS | Mod: TEL

## 2020-04-08 ASSESSMENT — MIFFLIN-ST. JEOR: SCORE: 1663.6

## 2020-04-08 ASSESSMENT — PAIN SCALES - GENERAL: PAINLEVEL: EXTREME PAIN (8)

## 2020-04-08 NOTE — PROGRESS NOTES
"Subjective     Joshua Barron is a 59 year old male who is being evaluated via a billable telephone visit.      The patient has been notified of following:     \"This telephone visit will be conducted via a call between you and your physician/provider. We have found that certain health care needs can be provided without the need for a physical exam.  This service lets us provide the care you need with a short phone conversation.  If a prescription is necessary we can send it directly to your pharmacy.  If lab work is needed we can place an order for that and you can then stop by our lab to have the test done at a later time.    Telephone visits are billed at different rates depending on your insurance coverage. During this emergency period, for some insurers they may be billed the same as an in-person visit.  Please reach out to your insurance provider with any questions.    If during the course of the call the physician/provider feels a telephone visit is not appropriate, you will not be charged for this service.\"    Patient has given verbal consent for Telephone visit?  Yes    Joshua Barron complains of   Chief Complaint   Patient presents with     Nausea     Vomiting       ALLERGIES  Cymbalta; Depakote [valproic acid]; and Seasonal allergies    Abdominal Pain      Duration: ongoing 3 weeks     Description (location/character/radiation): Pain bilateral upper quadrants        Associated flank pain: None    Intensity:  moderate    Accompanying signs and symptoms:        Fever/Chills: YES       Gas/Bloating: YES       Nausea/vomitting: YES       Diarrhea: YES, BM has been black on and off        Dysuria or Hematuria: no     History (previous similar pain/trauma/previous testing): none    Precipitating or alleviating factors:       Pain worse with eating/BM/urination: urination and BM        Pain relieved by BM: no     Therapies tried and outcome: stool softners    LMP:  not applicable    He reports originally he " has a sore throat, abdominal cramps, and diarrhea for a few days.  He continues to have diarrhea and cramping and post prandial abdominal pain.  His diarrhea episodes are 2-3 per day.  He feels that foods are getting stuck in the the lower neck.  He is waking up with a sour taste in his mouth.    -------------------------------------    Patient Active Problem List   Diagnosis     Mixed hyperlipidemia     Tobacco Abuse, History of     Degeneration of lumbar or lumbosacral intervertebral disc     Depression, major     Chronic, continuous use of opioids     Chemical dependency (H)     Chronic rhinitis     Tinnitus of both ears     SNHL (sensorineural hearing loss)     Bipolar disorder (H)     Seizure-like activity (H)     Somatic dysfunction of pelvis region     Bilateral foot pain     Prostate cancer screening     Trigger index finger of right hand     Moderate persistent asthma without complication     Chronic lower back pain     ACP (advance care planning)     Onychia of toe of left foot     DDD (degenerative disc disease), lumbar     Seizure disorder (H)     Back muscle spasm     Benign essential hypertension     Retrograde ejaculation     Allergic rhinitis due to other allergen     Cervical spondylosis without myelopathy     Chronic headaches     DDD (degenerative disc disease)     Generalized anxiety disorder     Hypertrophy of prostate without urinary obstruction and other lower urinary tract symptoms (LUTS)     GERD (gastroesophageal reflux disease)     Lumbago     Major depressive disorder, recurrent episode, moderate (H)     Myalgia and myositis     Hyperlipidemia     Other pain disorders related to psychological factors     Spinal stenosis in cervical region     Status post lumbar spinal fusion     Tobacco use disorder     Trigger finger     Polypharmacy     Deviated nasal septum     Attention deficit hyperactivity disorder (ADHD), combined type     Past Surgical History:   Procedure Laterality Date      APPENDECTOMY      Appendicitis     BACK SURGERY  ,    back surgery 3 disk fusion     BACK SURGERY      L1-L2, L3-L4 laminectomy     COLONOSCOPY  2007    repeat 5-10 years     COLONOSCOPY N/A 2016    Procedure: COLONOSCOPY;  Surgeon: Steve Hoff DO;  Location: [facility] OR     exophytic lesion posterior scalp line  2011    Excision     laminectomy L3-4 and L1-2       RELEASE TRIGGER FINGER      4th digit both hands     RELEASE TRIGGER FINGER Right 2016    Procedure: RELEASE TRIGGER FINGER;  Surgeon: Zev Schroeder MD;  Location: [facility] OR     SEPTOPLASTY, TURBINOPLASTY, COMBINED N/A 2019    Procedure: SEPTOPLASTY, BILATERAL TURBINATE REDUCTION;  Surgeon: Ivett Gonzalez MD;  Location: [facility] OR       Social History     Tobacco Use     Smoking status: Former Smoker     Packs/day: 0.00     Years: 30.00     Pack years: 0.00     Last attempt to quit: 2007     Years since quittin.6     Smokeless tobacco: Current User     Types: Chew     Tobacco comment: pt denied quit plan 19   Substance Use Topics     Alcohol use: Yes     Comment: Rarely     Family History   Problem Relation Age of Onset     Asthma Mother      Musculoskeletal Disorder Mother         arthritis     Diabetes Father      Cancer Maternal Grandmother         stomach     Alzheimer Disease Maternal Grandfather      Cancer Paternal Grandmother         stomach     Hypertension Paternal Grandfather          Current Outpatient Medications   Medication Sig Dispense Refill     acetaminophen (TYLENOL) 325 MG tablet TAKE 2 TABLETS BY MOUTH EVERY 4 HOURS AS NEEDED FOR MILD PAIN 200 tablet 0     albuterol (PROAIR HFA/PROVENTIL HFA/VENTOLIN HFA) 108 (90 Base) MCG/ACT inhaler USE 2 PUFFS 4 TIMES A DAY AS NEEDED FOR SHORTNESS OF BREATH 18 g 0     aspirin (ASPIRIN LOW DOSE) 81 MG chewable tablet CHEW AND SWALLOW 1 TABLET BY MOUTH DAILY 30 tablet 5     Aspirin-Acetaminophen-Caffeine (EQ HEADACHE RELIEF PO) Take 200 mg by mouth        atorvastatin (LIPITOR) 20 MG tablet Take 1 tablet (20 mg) by mouth daily 30 tablet 8     baclofen (LIORESAL) 20 MG tablet TAKE 1 TABLET BY MOUTH 4 TIMES DAILY 120 tablet 3     budesonide (PULMICORT) 0.5 MG/2ML neb solution Squirt entire vial into previously made harlan med saline bottle, mix, irrigate both nostrils until entire bottle empty. Do this twice daily. 2 Box 3     diazepam (VALIUM) 2 MG tablet Take 1 tablet (2 mg) by mouth daily as needed for anxiety 30 tablet 0     diclofenac (VOLTAREN) 50 MG EC tablet TAKE 1 TABLET BY MOUTH 3 TIMES DAILY 90 tablet 0     docusate sodium (DOK) 100 MG capsule TAKE 1 CAPSULE BY MOUTH TWICE DAILY 60 capsule 5     dronabinol (MARINOL) 2.5 MG capsule TAKE 1 CAPSULE BY MOUTH 2 TIMES DAILY BEFORE MEALS 60 capsule 3     fluticasone (FLONASE) 50 MCG/ACT nasal spray USE 2 SPRAYS IN EACH NOSTRIL DAILY 16 g 11     gabapentin (NEURONTIN) 300 MG capsule TAKE 3 CAPSULES BY MOUTH THREE TIMES DAILY 270 capsule 5     HYDROcodone-acetaminophen (NORCO)  MG per tablet TAKE 1 TABLET BY MOUTH 2 TIMES DAILY AS NEEDED FOR SEVERE PAIN 60 tablet 0     hydrOXYzine (VISTARIL) 50 MG capsule TAKE 1 TO 2 CAPSULES BY MOUTH 4 TIMES DAILY AS NEEDED FOR ANXIETY 120 capsule 3     ibuprofen (ADVIL/MOTRIN) 600 MG tablet TAKE 1 TABLET BY MOUTH EVERY 6 HOURS AS NEEDED 120 tablet 3     lidocaine (LIDODERM) 5 % patch Place 3 patches on daily for 12 hours and remove and replace with three patches ( 6 patches per day) 180 patch 11     loratadine (CLARITIN) 10 MG tablet Take 10 mg by mouth daily       losartan (COZAAR) 50 MG tablet TAKE 1 TABLET BY MOUTH DAILY 30 tablet 9     methocarbamol (ROBAXIN) 500 MG tablet TAKE 1 TABLET BY MOUTH 3 TIMES DAILY 90 tablet 4     mometasone-formoterol (DULERA) 200-5 MCG/ACT inhaler Inhale 2 puffs into the lungs 2 times daily 3 Inhaler 3     morphine (MS CONTIN) 15 MG CR tablet TAKE 1 TABLET BY MOUTH EVERY 8 HOURS 90 tablet 0     multivitamin  with iron (SM COMPLETE ADVANCED  FORMULA) TABS TAKE 1 TABLET BY MOUTH DAILY 30 tablet 8     naloxone (NARCAN) 1 mg/mL for intranasal kit (2 syringes with 2 mucosal atomizer device) In opioid overdose put cone in nostril and push 1/2 of contents into each nostril.  Repeat every 3 min if no response until help arrives. 2 kit 0     nicotine polacrilex (NICORETTE) 2 MG gum CHEW 1 PIECE AS NEEDED FOR SMOKING CESSATION 110 each 3     nortriptyline (PAMELOR) 50 MG capsule Take 2 capsules (100 mg) by mouth At Bedtime 60 capsule 2     omeprazole (PRILOSEC) 20 MG DR capsule TAKE 1 CAPSULE BY MOUTH EVERY DAY BEFORE A MEAL 30 capsule 2     order for DME Equipment being ordered: Thoracic and lumbar Back Brace 1 Device 0     order for DME 1 boa back brace 1 Device 0     ORDER FOR DME Equipment being ordered: Large gloves 2 Box 0     OXcarbazepine (TRILEPTAL) 600 MG tablet TAKE 1 TABLET BY MOUTH 2 TIMES DAILY 60 tablet 3     oxymetazoline (AFRIN) 0.05 % nasal spray Spray 2 sprays into both nostrils 2 times daily Use as directed post surgical. Not to use more than 3 days. 1 Bottle 0     propranolol (INDERAL) 20 MG tablet TAKE 1 TABLET BY MOUTH 2 TIMES A DAY 60 tablet 2     ranitidine (ZANTAC) 300 MG tablet TAKE 1 TABLET BY MOUTH AT BEDTIME FOR 1 MONTH AND THEN AS NEEDED FOR HEARTBURN 30 tablet 0     senna-docusate (SENNA-PLUS) 8.6-50 MG tablet TAKE 1 OR 2 TABLETS BY MOUTH 2 TIMES A  tablet 8     tamsulosin (FLOMAX) 0.4 MG capsule TAKE 1 CAPSULE BY MOUTH DAILY 30 capsule 8     tiZANidine (ZANAFLEX) 4 MG tablet TAKE 1 TABLET BY MOUTH 3 TIMES A DAY 90 tablet 3     traZODone (DESYREL) 50 MG tablet TAKE 2 TABLETS BY MOUTH NIGHTLY AS NEEDED 60 tablet 6     VYVANSE 70 MG capsule TAKE 1 CAPSULE BY MOUTH DAILY 30 capsule 0       Reviewed and updated as needed this visit by Provider         Review of Systems   ROS COMP: Constitutional, HEENT, cardiovascular, pulmonary, gi and gu systems are negative, except as otherwise noted.       Objective   Reported vitals:  Ht  "1.803 m (5' 11\")   Wt 81.6 kg (180 lb)   BMI 25.10 kg/m       NA-telephone visit     Diagnostic Test Results:  Labs reviewed in Epic  none         Assessment/Plan:  (R10.84) Generalized abdominal cramping  (primary encounter diagnosis)  Comment: He may be having some mild gallbladder symptoms   Plan:   Stop all laxatives and stools softeners for one week, then re-evaluate.    (K21.0) Gastroesophageal reflux disease with esophagitis  Comment: He has sour tastes in his mouth and lower pharyngeal sensations of food getting caught suggesting reflux.  Plan:   He will increase his omeprazole 20 mg to twice per day dosing.        No follow-ups on file.  Follow up with Provider - 1 weeks for abdominal issues.             Phone call duration:  11 minutes    Lyle Fisher, DO, DO      "

## 2020-04-13 DIAGNOSIS — J45.40 MODERATE PERSISTENT ASTHMA WITHOUT COMPLICATION: ICD-10-CM

## 2020-04-13 DIAGNOSIS — F90.2 ADHD (ATTENTION DEFICIT HYPERACTIVITY DISORDER), COMBINED TYPE: ICD-10-CM

## 2020-04-13 NOTE — TELEPHONE ENCOUNTER
albuterol (PROAIR HFA/PROVENTIL HFA/VENTOLIN HFA) 108 (90 Base) MCG/ACT         Last Written Prescription Date:  2/13/20  Last Fill Quantity: 18 g,   # refills: 0  Last Office Visit: 4/8/20 Virtual Visit   Future Office visit:    Next 5 appointments (look out 90 days)    Apr 15, 2020  1:00 PM CDT  Telephone Visit with Lyle Fisher DO  [facility] ([facility] ) [address] [facility] [address]  [phone]   Apr 27, 2020  3:20 PM CDT  Telephone Visit with Laquita Fernandez MD  [facility] [address] [place] [address] [phone]           Routing refill request to provider for review/approval because:    Asthma Protocol Failed due to:     Asthma control assessment score within normal limits in last 6 months    ACT Total Scores 6/5/2019   ACT TOTAL SCORE -   ASTHMA ER VISITS -   ASTHMA HOSPITALIZATIONS -   ACT TOTAL SCORE (Goal Greater than or Equal to 20) 16   In the past 12 months, how many times did you visit the emergency room for your asthma without being admitted to the hospital? (No Data)   In the past 12 months, how many times were you hospitalized overnight because of your asthma? (No Data)

## 2020-04-14 RX ORDER — ALBUTEROL SULFATE 90 UG/1
AEROSOL, METERED RESPIRATORY (INHALATION)
Qty: 18 G | Refills: 0 | Status: SHIPPED | OUTPATIENT
Start: 2020-04-14 | End: 2020-06-16

## 2020-04-15 RX ORDER — LISDEXAMFETAMINE DIMESYLATE 70 MG/1
CAPSULE ORAL
Qty: 30 CAPSULE | Refills: 0 | Status: SHIPPED | OUTPATIENT
Start: 2020-04-15 | End: 2020-05-20

## 2020-04-15 NOTE — TELEPHONE ENCOUNTER
Vyvanse  Last Written Prescription Date:  3.11.2020  Last Fill Quantity:30,   # refills: 0  Last Office Visit: 3.23.2020  Future Office visit:    Next 5 appointments (look out 90 days)    Apr 15, 2020  1:00 PM CDT  Telephone Visit with Lyle Fisher DO  [facility] ([facility] ) [address] [place] [id number]  [phone]   Apr 27, 2020  3:20 PM CDT  Telephone Visit with Laquita Fernandez MD  [facility] ([facility] ) [address] [place] [phone]           Routing refill request to provider for review/approval because:  Drug not on the [facility], [facility] or [facility] refill protocol or controlled substance

## 2020-04-15 NOTE — TELEPHONE ENCOUNTER
Thanks Jennifer. I bet he didn't realize there was one written for March. I just filled the one for today.     Reviewed [place]  and last fill was: 2/14/20

## 2020-04-20 DIAGNOSIS — M25.542 ARTHRALGIA OF BOTH HANDS: ICD-10-CM

## 2020-04-20 DIAGNOSIS — M25.541 ARTHRALGIA OF BOTH HANDS: ICD-10-CM

## 2020-04-20 NOTE — TELEPHONE ENCOUNTER
Voltaren      Last Written Prescription Date:  3/23/2020  Last Fill Quantity: 90,   # refills: 0  Last Office Visit: 4/8/2020  Future Office visit:    Next 5 appointments (look out 90 days)    Apr 27, 2020  3:20 PM CDT  Telephone Visit with Laquita Fernandez MD  [facility] [address] [place] [id number]  [phone]           Routing refill request to provider for review/approval because:    NSAID Medications [id number]/20/2020 01:10 PM   Normal CBC on file in past 12 months Protocol Details    Normal serum creatinine on file in past 12 months

## 2020-04-23 ENCOUNTER — TELEPHONE (OUTPATIENT)
Dept: PEDIATRICS | Facility: OTHER | Age: 60
End: 2020-04-23

## 2020-04-23 NOTE — TELEPHONE ENCOUNTER
Received a PA from [facility] for Lidocaine 5% patches. Submitted on CMM with supporting documentation. Waiting for a response.

## 2020-04-24 NOTE — TELEPHONE ENCOUNTER
Received a DENIAL from [facility] for Lidocaine 5% patches.     Forms scanned to Epic.

## 2020-04-28 ENCOUNTER — TELEPHONE (OUTPATIENT)
Dept: FAMILY MEDICINE | Facility: OTHER | Age: 60
End: 2020-04-28

## 2020-04-28 ENCOUNTER — TELEPHONE (OUTPATIENT)
Dept: PEDIATRICS | Facility: OTHER | Age: 60
End: 2020-04-28

## 2020-04-28 DIAGNOSIS — M54.50 CHRONIC BILATERAL LOW BACK PAIN WITHOUT SCIATICA: ICD-10-CM

## 2020-04-28 DIAGNOSIS — G89.29 CHRONIC BILATERAL LOW BACK PAIN WITH BILATERAL SCIATICA: ICD-10-CM

## 2020-04-28 DIAGNOSIS — M54.42 CHRONIC BILATERAL LOW BACK PAIN WITH BILATERAL SCIATICA: ICD-10-CM

## 2020-04-28 DIAGNOSIS — M54.41 CHRONIC BILATERAL LOW BACK PAIN WITH BILATERAL SCIATICA: ICD-10-CM

## 2020-04-28 DIAGNOSIS — M79.7 FIBROMYALGIA: ICD-10-CM

## 2020-04-28 DIAGNOSIS — G89.29 CHRONIC BILATERAL LOW BACK PAIN WITHOUT SCIATICA: ICD-10-CM

## 2020-04-28 RX ORDER — NORTRIPTYLINE HYDROCHLORIDE 50 MG/1
CAPSULE ORAL
Qty: 60 CAPSULE | Refills: 1 | Status: SHIPPED | OUTPATIENT
Start: 2020-04-28 | End: 2020-07-02

## 2020-04-28 RX ORDER — LIDOCAINE 50 MG/G
PATCH TOPICAL
Qty: 90 PATCH | Refills: 3 | Status: SHIPPED | OUTPATIENT
Start: 2020-04-28 | End: 2021-03-24

## 2020-04-28 NOTE — TELEPHONE ENCOUNTER
Pt is requesting a new order for lidocaine patches for 3 patches once daily instead of the sig 6 patches daily. Pt is hoping this change will be covered under his insurance plan based on previous denial.      Karen Jeffries LPN

## 2020-04-29 DIAGNOSIS — M51.379 DEGENERATION OF LUMBAR OR LUMBOSACRAL INTERVERTEBRAL DISC: ICD-10-CM

## 2020-04-29 DIAGNOSIS — G89.4 CHRONIC PAIN SYNDROME: ICD-10-CM

## 2020-04-29 RX ORDER — MORPHINE SULFATE 15 MG/1
TABLET, FILM COATED, EXTENDED RELEASE ORAL
Qty: 90 TABLET | Refills: 0 | Status: SHIPPED | OUTPATIENT
Start: 2020-04-29 | End: 2020-05-28

## 2020-04-29 NOTE — TELEPHONE ENCOUNTER
morphine      Last Written Prescription Date:  3/30/20  Last Fill Quantity: 90,   # refills: 0  Last Office Visit: 4/8/20  Future Office visit:       Routing refill request to provider for review/approval because:  Drug not on the [facility], [facility] or [facility] refill protocol or controlled substance

## 2020-04-30 ENCOUNTER — TELEPHONE (OUTPATIENT)
Dept: PEDIATRICS | Facility: OTHER | Age: 60
End: 2020-04-30

## 2020-04-30 DIAGNOSIS — E11.9 TYPE 2 DIABETES MELLITUS WITHOUT COMPLICATION, WITHOUT LONG-TERM CURRENT USE OF INSULIN (H): Primary | ICD-10-CM

## 2020-04-30 NOTE — TELEPHONE ENCOUNTER
He sees Dr. BRENDA Fernandez for his mental health.  As far as his hair loss goes, it is not likely his medications but may be his thyroid.  He can come in and have his thyroid checked in lab.  I will order this.

## 2020-04-30 NOTE — TELEPHONE ENCOUNTER
Pt called stating over the last 6 months he has increased hair loss which he believes is attributed to some of his medication. Pt is wondering what can be done about this hair loss. Also is having troubling focusing with his bipolar disorder thinks he could be borderline manic, wanted to update MD on that.    Please advise.    Karen Jeffries LPN

## 2020-04-30 NOTE — TELEPHONE ENCOUNTER
Submitted a reconsideration with Les as a new PA won't be accepted as it was too recently already denied. Waiting for a response.

## 2020-05-02 NOTE — TELEPHONE ENCOUNTER
CJ, as of now there's a spot open in clinic Monday at 1:00. Could you see if Joshua would be able to do a telephone visit then?    Thanks!

## 2020-05-04 DIAGNOSIS — F11.90 CHRONIC, CONTINUOUS USE OF OPIOIDS: ICD-10-CM

## 2020-05-05 RX ORDER — HYDROCODONE BITARTRATE AND ACETAMINOPHEN 10; 325 MG/1; MG/1
TABLET ORAL
Qty: 60 TABLET | Refills: 0 | Status: SHIPPED | OUTPATIENT
Start: 2020-05-05 | End: 2020-06-01

## 2020-05-05 NOTE — TELEPHONE ENCOUNTER
HYDROcodone-acetaminophen (NORCO)  MG per tablet      Last Written Prescription Date:  4/7/20  Last Fill Quantity: 60,   # refills: 0  Last Office Visit: 4/8/20 virtual visit  Future Office visit:    Next 5 appointments (look out 90 days)    Jul 29, 2020  2:30 PM CDT  (Arrive by 2:15 PM)  Nurse Only with Bettye Jules  [facility] [place] ([facility] [place] [facility] [address]  [place] [address]  [phone]           Routing refill request to provider for review/approval because:  Drug not on the [facility], P or [facility] refill protocol or controlled substance    Chronic, continuous use of opioids   Problem Detail     Noted:  9/8/2011    Priority:  Medium    Overview Addendum 12/5/2018  5:10 PM by Love Quan, RN    Overview:   Opioid Drug Agreement Form.   Patient is followed by Lyle Fisher DO, DO for ongoing prescription of pain medication.  All refills should only be approved by this provider, or covering partner.     Medication(s): MS Contin 80mg TID, Norco 10/325mg - currently on opioid taper  Maximum quantity per month: #90, #90  Clinic visit frequency required: Q 3 months      Controlled substance agreement:  Encounter-Level CSA - 09/18/2015:                     Controlled Substance Agreement - Scan on 11/25/2015  2:25 PM : SCHEDULED MEDICATION USE AGREEMENT (below)                Pain Clinic evaluation in the past: No     DIRE Total Score(s):  No flowsheet data found.     Last [facility] website verification:  Done on 10.25.18   [email or web address]

## 2020-05-12 DIAGNOSIS — F41.1 GAD (GENERALIZED ANXIETY DISORDER): ICD-10-CM

## 2020-05-12 DIAGNOSIS — F31.0 BIPOLAR AFFECTIVE DISORDER, CURRENT EPISODE HYPOMANIC (H): ICD-10-CM

## 2020-05-13 RX ORDER — OXCARBAZEPINE 600 MG/1
TABLET, FILM COATED ORAL
Qty: 60 TABLET | Refills: 5 | Status: SHIPPED | OUTPATIENT
Start: 2020-05-13 | End: 2020-11-10

## 2020-05-13 RX ORDER — DIAZEPAM 2 MG
TABLET ORAL
Qty: 30 TABLET | Refills: 0 | Status: SHIPPED | OUTPATIENT
Start: 2020-05-19 | End: 2020-06-16

## 2020-05-13 NOTE — TELEPHONE ENCOUNTER
Diazepam 2mg      Last Written Prescription Date:  4/21/20  Last Fill Quantity: 30,   # refills: 0  Last Office Visit: 3/23/20  Future Office visit:    Next 5 appointments (look out 90 days)    Jul 29, 2020  2:30 PM CDT  (Arrive by 2:15 PM)  Nurse Only with Bettye Jules  [facility] [place] ([facility] [place] ) [address]  [place] [address]  [phone]           Routing refill request to provider for review/approval because:  Drug not on the [facility], [facility] or [facility] refill protocol or controlled substance    oxcarbazepine      Last Written Prescription Date:  1/17/20  Last Fill Quantity: 60,   # refills: 3  Last Office Visit: 3/23/20  Future Office visit:    Next 5 appointments (look out 90 days)    Jul 29, 2020  2:30 PM CDT  (Arrive by 2:15 PM)  Nurse Only with Bettye Jules  [facility] [place] ([facility] [place] ) [address]  [place] [address]  [phone]           Routing refill request to provider for review/approval because:  Drug not on the [facility], [facility] or [facility] refill protocol or controlled substance

## 2020-05-18 ENCOUNTER — TELEPHONE (OUTPATIENT)
Dept: PEDIATRICS | Facility: OTHER | Age: 60
End: 2020-05-18

## 2020-05-18 NOTE — TELEPHONE ENCOUNTER
Pt is wondering when his next appointment should be made?      LM for return call from patient to schedule a f/u appt with Dr. Coelho.  Call back # given.  Thank you

## 2020-05-20 DIAGNOSIS — F90.2 ADHD (ATTENTION DEFICIT HYPERACTIVITY DISORDER), COMBINED TYPE: ICD-10-CM

## 2020-05-20 RX ORDER — LISDEXAMFETAMINE DIMESYLATE 70 MG/1
CAPSULE ORAL
Qty: 30 CAPSULE | Refills: 0 | Status: SHIPPED | OUTPATIENT
Start: 2020-05-20 | End: 2020-06-15

## 2020-05-20 NOTE — TELEPHONE ENCOUNTER
VYVANSE 70 MG capsule       Last Written Prescription Date:  4/15/20  Last Fill Quantity: 30,   # refills: 0  Last Office Visit: 3/23/220 virtual  Future Office visit:    Next 5 appointments (look out 90 days)    Jul 29, 2020  2:30 PM CDT  (Arrive by 2:15 PM)  Nurse Only with Bettye Jules  [facility] ([facility] ) [address]  [place] [address]  [phone]           Routing refill request to provider for review/approval because:  Drug not on the [facility], P or [facility] refill protocol or controlled substance

## 2020-05-20 NOTE — TELEPHONE ENCOUNTER
Last fill date was 4.15.20 at [facility] [place].   is not 100% accurate at times.  Have been some errors in the past.

## 2020-05-27 ENCOUNTER — TELEPHONE (OUTPATIENT)
Dept: PEDIATRICS | Facility: OTHER | Age: 60
End: 2020-05-27

## 2020-05-27 DIAGNOSIS — J30.2 SEASONAL ALLERGIC RHINITIS: Primary | ICD-10-CM

## 2020-05-27 DIAGNOSIS — J30.2 SEASONAL ALLERGIC RHINITIS, UNSPECIFIED TRIGGER: ICD-10-CM

## 2020-05-27 NOTE — TELEPHONE ENCOUNTER
"Call from patient inquiring on a referral to ENT for allergy testing.     Patient has been tested 25 years ago. Feels he is in need of testing due to severity of allergies.     Currently taking loratadine (Claritin) for allergies currently, along with Flonase Nasal Spray.     Patient also requesting an appt. with ENT Surgeon for his nose, broke nose in past and unsure of deviated septum. Would like referral to ENT for evaluation, left nostril \"pinches shut\" - feels narrowed.     Please advise.   "

## 2020-06-01 DIAGNOSIS — F11.90 CHRONIC, CONTINUOUS USE OF OPIOIDS: ICD-10-CM

## 2020-06-01 RX ORDER — HYDROCODONE BITARTRATE AND ACETAMINOPHEN 10; 325 MG/1; MG/1
TABLET ORAL
Qty: 60 TABLET | Refills: 0 | Status: SHIPPED | OUTPATIENT
Start: 2020-06-01 | End: 2020-06-25

## 2020-06-01 NOTE — TELEPHONE ENCOUNTER
HYDROcodone-acetaminophen (NORCO)  MG per tablet      Last Written Prescription Date:  5/5/20  Last Fill Quantity: 60,   # refills: 0  Last Office Visit: 4/8/20 virtual visit  Future Office visit:    Next 5 appointments (look out 90 days)    Jul 29, 2020  2:30 PM CDT  (Arrive by 2:15 PM)  Nurse Only with Bettye Jules  [facility] [place] ([facility] [place] ) [address]  [place] [address]  [phone]           Routing refill request to provider for review/approval because:  Drug not on the [facility], P or [facility] refill protocol or controlled substance    Chronic, continuous use of opioids   Problem Detail     Noted:  9/8/2011    Priority:  Medium    Overview Addendum 12/5/2018  5:10 PM by Love Quan, RN    Overview:   Opioid Drug Agreement Form.   Patient is followed by Lyle Fisher DO, DO for ongoing prescription of pain medication.  All refills should only be approved by this provider, or covering partner.     Medication(s): MS Contin 80mg TID, Norco 10/325mg - currently on opioid taper  Maximum quantity per month: #90, #90  Clinic visit frequency required: Q 3 months      Controlled substance agreement:  Encounter-Level CSA - 09/18/2015:                     Controlled Substance Agreement - Scan on 11/25/2015  2:25 PM : SCHEDULED MEDICATION USE AGREEMENT (below)                Pain Clinic evaluation in the past: No     DIRE Total Score(s):  No flowsheet data found.     Last [facility] website verification:  Done on 10.25.18   [email or web address]

## 2020-06-01 NOTE — TELEPHONE ENCOUNTER
I contacted [facility] for the status of the reconsideration/appeal that I faxed in on 04/30/2020 for Lidocaine 5% patches. The appeal was also DENIED. A letter was mailed out last week and I have not yet received that. I will update this encounter once the letter is received. Forms scanned to Epic.

## 2020-06-08 ENCOUNTER — TELEPHONE (OUTPATIENT)
Dept: PEDIATRICS | Facility: OTHER | Age: 60
End: 2020-06-08

## 2020-06-08 DIAGNOSIS — J45.40 MODERATE PERSISTENT ASTHMA WITHOUT COMPLICATION: ICD-10-CM

## 2020-06-08 NOTE — TELEPHONE ENCOUNTER
Patient called and left a VM about abdominal pain. I called back to get more information and to see if he wanted a visit or what he would like. I did not get an answer so I left a VM

## 2020-06-09 ENCOUNTER — OFFICE VISIT (OUTPATIENT)
Dept: OTOLARYNGOLOGY | Facility: OTHER | Age: 60
End: 2020-06-09
Attending: INTERNAL MEDICINE
Payer: COMMERCIAL

## 2020-06-09 VITALS
SYSTOLIC BLOOD PRESSURE: 134 MMHG | WEIGHT: 185 LBS | HEART RATE: 83 BPM | DIASTOLIC BLOOD PRESSURE: 80 MMHG | BODY MASS INDEX: 25.8 KG/M2 | OXYGEN SATURATION: 97 % | TEMPERATURE: 98 F

## 2020-06-09 DIAGNOSIS — J34.829 NASAL VALVE COLLAPSE: Primary | ICD-10-CM

## 2020-06-09 DIAGNOSIS — Z98.890 S/P NASAL SEPTOPLASTY: ICD-10-CM

## 2020-06-09 DIAGNOSIS — G47.33 OSA (OBSTRUCTIVE SLEEP APNEA): ICD-10-CM

## 2020-06-09 DIAGNOSIS — R13.10 DYSPHAGIA, UNSPECIFIED TYPE: ICD-10-CM

## 2020-06-09 DIAGNOSIS — Z72.0 TOBACCO ABUSE DISORDER: ICD-10-CM

## 2020-06-09 DIAGNOSIS — K21.9 LPRD (LARYNGOPHARYNGEAL REFLUX DISEASE): ICD-10-CM

## 2020-06-09 DIAGNOSIS — K21.9 GASTROESOPHAGEAL REFLUX DISEASE, ESOPHAGITIS PRESENCE NOT SPECIFIED: ICD-10-CM

## 2020-06-09 PROCEDURE — 31575 DIAGNOSTIC LARYNGOSCOPY: CPT | Performed by: NURSE PRACTITIONER

## 2020-06-09 PROCEDURE — G0463 HOSPITAL OUTPT CLINIC VISIT: HCPCS

## 2020-06-09 PROCEDURE — 99213 OFFICE O/P EST LOW 20 MIN: CPT | Mod: 25 | Performed by: NURSE PRACTITIONER

## 2020-06-09 RX ORDER — FAMOTIDINE 40 MG/1
40 TABLET, FILM COATED ORAL DAILY
Qty: 30 TABLET | Refills: 3 | Status: SHIPPED | OUTPATIENT
Start: 2020-06-09 | End: 2020-09-28

## 2020-06-09 RX ORDER — MOMETASONE FUROATE AND FORMOTEROL FUMARATE DIHYDRATE 100; 5 UG/1; UG/1
AEROSOL RESPIRATORY (INHALATION)
Qty: 13 G | Refills: 0 | Status: SHIPPED | OUTPATIENT
Start: 2020-06-09 | End: 2020-08-20 | Stop reason: DRUGHIGH

## 2020-06-09 ASSESSMENT — PAIN SCALES - GENERAL: PAINLEVEL: NO PAIN (0)
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
## 2020-06-09 NOTE — NURSING NOTE
"Chief Complaint   Patient presents with     Consult     Seasonal Allergic Rhinitis; Referred by Dr. Fisher       Initial /80   Pulse 83   Temp 98  F (36.7  C) (Tympanic)   Wt 83.9 kg (185 lb)   SpO2 97%   BMI 25.80 kg/m   Estimated body mass index is 25.8 kg/m  as calculated from the following:    Height as of 4/8/20: 1.803 m (5' 11\").    Weight as of this encounter: 83.9 kg (185 lb).  Medication Reconciliation: complete  Genevieve Tamayo LPN  "
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
## 2020-06-09 NOTE — PATIENT INSTRUCTIONS
Continue nasal rinses 2-3 times per day  Start taking prilosec 20 mg twice daily  Start taking pepcid (famotadine) daily  Follow up with Dr Gonzalez for recheck of throat and trouble swallowing  Follow up with Dr Gonzalez for consult for nasal valve collapse.      Adult lifestyle changes to prevent LPR reviewed      Avoid eating and drinking within two to three hours prior to bedtime    Do not drink alcohol    Eat small meals and slowly    Limit problem foods:    o Caffeine  o Carbonated drinks  o Chocolate  o Peppermint  o Tomato  o Citrus fruits  o Fatty and fried foods      Lose weight    Quit smoking    Wear loose clothing      Thank you for allowing [facility] and our ENT team to participate in your care.  If your medications are too expensive, please call my nurse at the number listed below.  We can possibly change this medication.    If you have a scheduling or an appointment question please contact our Health Unit Coordinator at their direct line [phone].   ALL nursing questions or concerns can be directed to your ENT nurse at: [phone] Abigail

## 2020-06-09 NOTE — TELEPHONE ENCOUNTER
mometasone-formoterol (DULERA) 200-5 MCG/ACT inhaler      Last Written Prescription Date:  4/18/19  Last Fill Quantity: 3,   # refills: 3  Last Office Visit: 4/8/20 virtual  Future Office visit:    Next 5 appointments (look out 90 days)    Jul 29, 2020  2:30 PM CDT  (Arrive by 2:15 PM)  Nurse Only with Bettye Jules  [facility] ([facility] [place] ) [address]  [place] [address]  [phone]           Routing refill request to provider for review/approval because:   Asthma control assessment score within normal limits in last 6 months

## 2020-06-12 NOTE — TELEPHONE ENCOUNTER
I have read them.  I have told him on multiple occasions that his paraspinal muscle are too weak and he needs strengthening.

## 2020-06-12 NOTE — TELEPHONE ENCOUNTER
Talked with patient, his pain is getting so bad that he can barley walk anymore. Pain is from mid back down. He can not prepare his own meals and has a hard time showering. He is requesting stronger pain medications

## 2020-06-12 NOTE — TELEPHONE ENCOUNTER
"Pt stated physical therapy has not helped and he is stretching at home and that is not working. He claims that this is \"torture\". He says that he is having seizures from his pain and he is afraid of having a heart attack. I advised that if he is having a seizure to go to ER and he denied because they only test him for drugs and do nothing else.   He would like you to read the notes from Dr. Brenden Pettit from [facility] spine and what he had to say about his back.   "

## 2020-06-15 ENCOUNTER — OFFICE VISIT (OUTPATIENT)
Dept: CHIROPRACTIC MEDICINE | Facility: OTHER | Age: 60
End: 2020-06-15
Attending: CHIROPRACTOR
Payer: COMMERCIAL

## 2020-06-15 ENCOUNTER — TELEPHONE (OUTPATIENT)
Dept: PEDIATRICS | Facility: OTHER | Age: 60
End: 2020-06-15

## 2020-06-15 DIAGNOSIS — F90.2 ADHD (ATTENTION DEFICIT HYPERACTIVITY DISORDER), COMBINED TYPE: ICD-10-CM

## 2020-06-15 DIAGNOSIS — J45.40 MODERATE PERSISTENT ASTHMA WITHOUT COMPLICATION: ICD-10-CM

## 2020-06-15 DIAGNOSIS — F41.1 GENERALIZED ANXIETY DISORDER: ICD-10-CM

## 2020-06-15 DIAGNOSIS — M99.03 SEGMENTAL AND SOMATIC DYSFUNCTION OF LUMBAR REGION: Primary | ICD-10-CM

## 2020-06-15 DIAGNOSIS — M54.50 ACUTE BILATERAL LOW BACK PAIN WITHOUT SCIATICA: ICD-10-CM

## 2020-06-15 DIAGNOSIS — F41.1 GAD (GENERALIZED ANXIETY DISORDER): ICD-10-CM

## 2020-06-15 DIAGNOSIS — M99.02 SEGMENTAL AND SOMATIC DYSFUNCTION OF THORACIC REGION: ICD-10-CM

## 2020-06-15 DIAGNOSIS — G89.4 CHRONIC PAIN SYNDROME: Primary | ICD-10-CM

## 2020-06-15 DIAGNOSIS — M99.01 SEGMENTAL AND SOMATIC DYSFUNCTION OF CERVICAL REGION: ICD-10-CM

## 2020-06-15 PROCEDURE — 98941 CHIROPRACT MANJ 3-4 REGIONS: CPT | Mod: AT | Performed by: CHIROPRACTOR

## 2020-06-15 RX ORDER — DIAZEPAM 2 MG
TABLET ORAL
Qty: 30 TABLET | Refills: 0 | OUTPATIENT
Start: 2020-06-15

## 2020-06-15 RX ORDER — LISDEXAMFETAMINE DIMESYLATE 70 MG/1
CAPSULE ORAL
Qty: 30 CAPSULE | Refills: 0 | Status: SHIPPED | OUTPATIENT
Start: 2020-06-19 | End: 2020-06-24

## 2020-06-15 NOTE — TELEPHONE ENCOUNTER
Call from patient reporting his insurance is not covering the lidocaine patches any longer, inquiring if there is a cream that could be an alternative to the patches per pharmacist.     Pharmacy: [facility]    Please update patient

## 2020-06-15 NOTE — TELEPHONE ENCOUNTER
Valium - supposed to be discontinued - please adviseMichael - dated 6.18.20 -  checked this date.  Last Written Prescription Date:  5.19.20, 5.20.20  Last Fill Quantity: #30, #60,   # refills: 0  Last Office Visit: 3.23.20  Future Office visit:    Next 5 appointments (look out 90 days)    Gil 15, 2020  4:00 PM CDT  Return Visit with Shamar Escamilla DC  [facility] ([facility]) [address]  [place] [facility] [address]  [phone]   Jul 20, 2020  2:30 PM CDT  (Arrive by 2:15 PM)  Return Visit with Ivett Gonzalez MD  [facility] ([facility] ) [address] [address]  [phone]   Jul 29, 2020  2:30 PM CDT  (Arrive by 2:15 PM)  Nurse Only with Bettye Jules  [facility] ([facility] ) [address] [address]  [phone]           Routing refill request to provider for review/approval because:  Drug not on the G, P or [facility] refill protocol or controlled substance

## 2020-06-15 NOTE — PROGRESS NOTES

## 2020-06-16 ENCOUNTER — TELEPHONE (OUTPATIENT)
Dept: INTERNAL MEDICINE | Facility: OTHER | Age: 60
End: 2020-06-16

## 2020-06-16 DIAGNOSIS — M54.50 CHRONIC BILATERAL LOW BACK PAIN WITHOUT SCIATICA: Primary | ICD-10-CM

## 2020-06-16 DIAGNOSIS — G89.29 CHRONIC BILATERAL LOW BACK PAIN WITHOUT SCIATICA: Primary | ICD-10-CM

## 2020-06-16 RX ORDER — LIDOCAINE 50 MG/G
OINTMENT TOPICAL
Qty: 100 G | Refills: 1 | Status: SHIPPED | OUTPATIENT
Start: 2020-06-16 | End: 2021-03-24

## 2020-06-16 RX ORDER — LIDOCAINE 40 MG/G
CREAM TOPICAL
Qty: 1 TUBE | Refills: 0 | Status: CANCELLED | OUTPATIENT
Start: 2020-06-16

## 2020-06-16 RX ORDER — ALBUTEROL SULFATE 90 UG/1
AEROSOL, METERED RESPIRATORY (INHALATION)
Qty: 18 G | Refills: 0 | Status: SHIPPED | OUTPATIENT
Start: 2020-06-16 | End: 2020-07-14

## 2020-06-16 RX ORDER — MOMETASONE FUROATE AND FORMOTEROL FUMARATE DIHYDRATE 200; 5 UG/1; UG/1
AEROSOL RESPIRATORY (INHALATION)
Qty: 13 G | Refills: 0 | OUTPATIENT
Start: 2020-06-16

## 2020-06-16 NOTE — TELEPHONE ENCOUNTER
Fax received from pharmacy. Lidocaine patches not covered by insurance. Patient would like to dry lidocaine 5% ointment or cream. Please advise   DEBBIE DUNN LPN

## 2020-06-16 NOTE — TELEPHONE ENCOUNTER
Failed protocol due to Asthma control assessment score within normal limits in last 6 months.  Please advise, thank you.    albuterol (PROAIR HFA/PROVENTIL HFA/VENTOLIN HFA) 108 (90 Base) MCG/ACT inhaler       Last Written Prescription Date:  04/14/20  Last Fill Quantity: 18 g,   # refills: 0  Last Office Visit: 04/08/20  Future Office visit:    Next 5 appointments (look out 90 days)    Jun 18, 2020  1:40 PM CDT  Return Visit with Shamar Escamilla DC  [facility] ([facility]) [address]  [facility] [address]  [phone]   Jul 20, 2020  2:30 PM CDT  (Arrive by 2:15 PM)  Return Visit with Ivett Gonzalez MD  [facility] ([facility] ) [address] [address]  [phone]   Jul 29, 2020  2:30 PM CDT  (Arrive by 2:15 PM)  Nurse Only with Bettye Jules  [facility] ([facility] ) [address] [address]  [phone]

## 2020-06-17 ENCOUNTER — TELEPHONE (OUTPATIENT)
Dept: FAMILY MEDICINE | Facility: OTHER | Age: 60
End: 2020-06-17

## 2020-06-17 NOTE — TELEPHONE ENCOUNTER
Patient called and stated he has had a person come in to do a home count for his Vyvanse. Patient is upset as he states that the person doing the count is taking his medications and that he is now a whole week short. Patient stated this happened before as well. Patient would like to talk to Dr. Fernandez. Patient also was transferred to scheduling to schedule an appointment per his request.

## 2020-06-18 ENCOUNTER — OFFICE VISIT (OUTPATIENT)
Dept: CHIROPRACTIC MEDICINE | Facility: OTHER | Age: 60
End: 2020-06-18
Attending: CHIROPRACTOR
Payer: COMMERCIAL

## 2020-06-18 DIAGNOSIS — M99.02 SEGMENTAL AND SOMATIC DYSFUNCTION OF THORACIC REGION: ICD-10-CM

## 2020-06-18 DIAGNOSIS — M99.03 SEGMENTAL AND SOMATIC DYSFUNCTION OF LUMBAR REGION: Primary | ICD-10-CM

## 2020-06-18 DIAGNOSIS — M54.50 ACUTE BILATERAL LOW BACK PAIN WITHOUT SCIATICA: ICD-10-CM

## 2020-06-18 DIAGNOSIS — M99.01 SEGMENTAL AND SOMATIC DYSFUNCTION OF CERVICAL REGION: ICD-10-CM

## 2020-06-18 PROCEDURE — 98941 CHIROPRACT MANJ 3-4 REGIONS: CPT | Mod: AT | Performed by: CHIROPRACTOR

## 2020-06-18 NOTE — PROGRESS NOTES
Subjective Finding:    Chief compalint: Patient presents with:  Back Pain  , Pain Scale: 4/10, Intensity: dull, Duration: 2 days, Radiating: no.    Date of injury:     Activities that the pain restricts:   Home/household/hobbies/social activities: yes.  Work duties: yes.  Sleep: yes.  Makes symptoms better: rest.  Makes symptoms worse: lumbar extension and lumbar flexion.  Have you seen anyone else for the symptoms? No.  Work related: no.  Automobile related injury: no.    Objective and Assessment:    Posture Analysis:   High shoulder: .  Head tilt: .  High iliac crest: .  Head carriage: neutral.  Thoracic Kyphosis: neutral.  Lumbar Lordosis: forward.    Lumbar Range of Motion: flexion decreased and extension decreased.  Cervical Range of Motion: extension decreased.  Thoracic Range of Motion: extension decreased.  Extremity Range of Motion: .    Palpation:   Quad lumb: bilateral, referred pain: no  T paraspinals: dull pain, no    Segmental dysfunction pre-treatment and treatment area: C4, T5, T6 and Sacrum.    Assessment post-treatment:  Cervical: ROM increased.  Thoracic: ROM increased.  Lumbar: ROM increased.    Comments: history of DDD in C and L spine.      Complicating Factors: .    Procedure(s):  CMT:  83289 Chiropractic manipulative treatment 3-4 regions performed   Cervical: Diversified, See above for level, Supine, Thoracic: Diversified, See above for level, Prone and Lumbar: Diversified, See above for level, Side posture    Modalities:  None performed this visit    Therapeutic procedures:  None    Plan:  Treatment plan: PRN.  Instructed patient: stretch as instructed at visit.  Short term goals: increase ROM.  Long term goals: increase ADL.  Prognosis: good.

## 2020-06-23 ENCOUNTER — OFFICE VISIT (OUTPATIENT)
Dept: CHIROPRACTIC MEDICINE | Facility: OTHER | Age: 60
End: 2020-06-23
Attending: CHIROPRACTOR
Payer: COMMERCIAL

## 2020-06-23 DIAGNOSIS — M99.01 SEGMENTAL AND SOMATIC DYSFUNCTION OF CERVICAL REGION: ICD-10-CM

## 2020-06-23 DIAGNOSIS — M99.02 SEGMENTAL AND SOMATIC DYSFUNCTION OF THORACIC REGION: ICD-10-CM

## 2020-06-23 DIAGNOSIS — M99.03 SEGMENTAL AND SOMATIC DYSFUNCTION OF LUMBAR REGION: Primary | ICD-10-CM

## 2020-06-23 DIAGNOSIS — M54.50 ACUTE BILATERAL LOW BACK PAIN WITHOUT SCIATICA: ICD-10-CM

## 2020-06-23 PROCEDURE — 98941 CHIROPRACT MANJ 3-4 REGIONS: CPT | Mod: AT | Performed by: CHIROPRACTOR

## 2020-06-24 ENCOUNTER — VIRTUAL VISIT (OUTPATIENT)
Dept: PSYCHIATRY | Facility: OTHER | Age: 60
End: 2020-06-24
Attending: PSYCHIATRY & NEUROLOGY
Payer: COMMERCIAL

## 2020-06-24 ENCOUNTER — TELEPHONE (OUTPATIENT)
Dept: PSYCHIATRY | Facility: OTHER | Age: 60
End: 2020-06-24

## 2020-06-24 DIAGNOSIS — F90.2 ADHD (ATTENTION DEFICIT HYPERACTIVITY DISORDER), COMBINED TYPE: ICD-10-CM

## 2020-06-24 PROCEDURE — 99214 OFFICE O/P EST MOD 30 MIN: CPT | Mod: 95 | Performed by: PSYCHIATRY & NEUROLOGY

## 2020-06-24 RX ORDER — LISDEXAMFETAMINE DIMESYLATE 70 MG/1
70 CAPSULE ORAL DAILY
Qty: 30 CAPSULE | Refills: 0 | Status: SHIPPED | OUTPATIENT
Start: 2020-07-16 | End: 2020-07-24

## 2020-06-24 ASSESSMENT — PAIN SCALES - GENERAL: PAINLEVEL: EXTREME PAIN (8)

## 2020-06-24 ASSESSMENT — ANXIETY QUESTIONNAIRES
GAD7 TOTAL SCORE: 9
3. WORRYING TOO MUCH ABOUT DIFFERENT THINGS: NEARLY EVERY DAY
5. BEING SO RESTLESS THAT IT IS HARD TO SIT STILL: NOT AT ALL
7. FEELING AFRAID AS IF SOMETHING AWFUL MIGHT HAPPEN: NOT AT ALL
2. NOT BEING ABLE TO STOP OR CONTROL WORRYING: NOT AT ALL
6. BECOMING EASILY ANNOYED OR IRRITABLE: NEARLY EVERY DAY
1. FEELING NERVOUS, ANXIOUS, OR ON EDGE: NOT AT ALL

## 2020-06-24 ASSESSMENT — PATIENT HEALTH QUESTIONNAIRE - PHQ9
5. POOR APPETITE OR OVEREATING: NEARLY EVERY DAY
SUM OF ALL RESPONSES TO PHQ QUESTIONS 1-9: 21

## 2020-06-24 NOTE — PROGRESS NOTES

## 2020-06-24 NOTE — NURSING NOTE
"Chief Complaint   Patient presents with     RECHECK     Telephone visit       Initial There were no vitals taken for this visit. Estimated body mass index is 25.8 kg/m  as calculated from the following:    Height as of 4/8/20: 1.803 m (5' 11\").    Weight as of 6/9/20: 83.9 kg (185 lb).  Medication Reconciliation: complete  JACQUELYN SUAREZ LPN    "

## 2020-06-24 NOTE — PROGRESS NOTES
"Joshua Barron is a 59 year old male who is being evaluated via a billable telephone visit.      The patient has been notified of following:     \"This telephone visit will be conducted via a call between you and your physician/provider. We have found that certain health care needs can be provided without the need for a physical exam.  This service lets us provide the care you need with a short phone conversation.  If a prescription is necessary we can send it directly to your pharmacy.  If lab work is needed we can place an order for that and you can then stop by our lab to have the test done at a later time.    Telephone visits are billed at different rates depending on your insurance coverage. During this emergency period, for some insurers they may be billed the same as an in-person visit.  Please reach out to your insurance provider with any questions.    If during the course of the call the physician/provider feels a telephone visit is not appropriate, you will not be charged for this service.\"    Patient has given verbal consent for Telephone visit?  Yes    What phone number would you like to be contacted at? [phone]      Phone call duration: 35 minutes                                                                          Social- Was  twice. Lives alone with his 3 cats: Win the cat. Has a GF in [place]  Children-  2 kids, they are in [place]  Last visit was telephone visit in 3/2020:  Continue Trileptal 600 mg bid.   Continue trazodone 100 mg bedtime prn insomnia. Continue Vyvanse 70 mg daily    - Joshua notes his neighbors are still \"being assholes\". They mess with his stuff  - notes in pain and \"I'm afraid to leave the house\". I inquired about why he is scared to leave and he notes afraid of neighbors coming in his house  - weight stable but still low appetite.  - notes last injection he feels nerve was hit or something of the sort and notes \"was like my legs were about to go out from under me\"  - " Lots of family issues: Joshua has taken care of his parents and yet parents give his other brothers everything. oldest of 3 brothers. Brother in [place] youngest Mark and Major is here. Mom and dad here out on [place] acres. Joshua noting moving here to be closer to parents and help them, etc. But, parents don't seem to want him around much or talk to him.    SUBSTANCE USE- reports no abuse of meds or substances    SYMPTOMS- paranoia,  issues with attention and concentration improved with Vyvanse: racing thoughts, depressed mood, impulsivity, distractibility  MEDICAL ROS- back pain, denies any issues with headaches or stomach pain / issues with stimulant. Intentional weight loss  MEDICAL / SURGICAL HISTORY                     Patient Active Problem List   Diagnosis     Mixed hyperlipidemia     Tobacco Abuse, History of     Degeneration of lumbar or lumbosacral intervertebral disc     Depression, major     Chronic, continuous use of opioids     Chemical dependency (H)     Chronic rhinitis     Tinnitus of both ears     SNHL (sensorineural hearing loss)     Bipolar disorder (H)     Seizure-like activity (H)     Somatic dysfunction of pelvis region     Bilateral foot pain     Prostate cancer screening     Trigger index finger of right hand     Moderate persistent asthma without complication     Chronic lower back pain     ACP (advance care planning)     Onychia of toe of left foot     DDD (degenerative disc disease), lumbar     Seizure disorder (H)     Back muscle spasm     Benign essential hypertension     Retrograde ejaculation     Allergic rhinitis due to other allergen     Cervical spondylosis without myelopathy     Chronic headaches     DDD (degenerative disc disease)     Generalized anxiety disorder     Hypertrophy of prostate without urinary obstruction and other lower urinary tract symptoms (LUTS)     GERD (gastroesophageal reflux disease)     Lumbago     Major depressive disorder, recurrent episode, moderate (H)      Myalgia and myositis     Hyperlipidemia     Other pain disorders related to psychological factors     Spinal stenosis in cervical region     Status post lumbar spinal fusion     Tobacco use disorder     Trigger finger     Polypharmacy     Deviated nasal septum     Attention deficit hyperactivity disorder (ADHD), combined type     ALLERGY   Cymbalta; Depakote [valproic acid]; and Seasonal allergies  MEDICATIONS                                                                                             Current Outpatient Medications   Medication Sig     acetaminophen (TYLENOL) 325 MG tablet TAKE 2 TABLETS BY MOUTH EVERY 4 HOURS AS NEEDED FOR MILD PAIN     albuterol (VENTOLIN HFA) 108 (90 Base) MCG/ACT inhaler USE 2 PUFFS 4 TIMES A DAY AS NEEDED FOR SHORTNESS OF BREATH     aspirin (ASPIRIN LOW DOSE) 81 MG chewable tablet CHEW AND SWALLOW 1 TABLET BY MOUTH DAILY     Aspirin-Acetaminophen-Caffeine (EQ HEADACHE RELIEF PO) Take 200 mg by mouth     atorvastatin (LIPITOR) 20 MG tablet Take 1 tablet (20 mg) by mouth daily     baclofen (LIORESAL) 20 MG tablet TAKE 1 TABLET BY MOUTH 4 TIMES DAILY     budesonide (PULMICORT) 0.5 MG/2ML neb solution Squirt entire vial into previously made harlan med saline bottle, mix, irrigate both nostrils until entire bottle empty. Do this twice daily.     diclofenac (VOLTAREN) 50 MG EC tablet TAKE 1 TABLET BY MOUTH 2 TIMES DAILY     docusate sodium (DOK) 100 MG capsule TAKE 1 CAPSULE BY MOUTH TWICE DAILY     dronabinol (MARINOL) 2.5 MG capsule TAKE 1 CAPSULE BY MOUTH 2 TIMES DAILY BEFORE MEALS     DULERA 100-5 MCG/ACT inhaler INHALE 2 PUFFS INTO THE LUNGS 2 TIMES A DAY     famotidine (PEPCID) 40 MG tablet Take 1 tablet (40 mg) by mouth daily     fluticasone (FLONASE) 50 MCG/ACT nasal spray USE 2 SPRAYS IN EACH NOSTRIL DAILY     gabapentin (NEURONTIN) 300 MG capsule TAKE 3 CAPSULES BY MOUTH THREE TIMES DAILY     HYDROcodone-acetaminophen (NORCO)  MG per tablet TAKE 1 TABLET BY MOUTH 2 TIMES  DAILY AS NEEDED FOR SEVERE PAIN     hydrOXYzine (VISTARIL) 50 MG capsule TAKE 1 TO 2 CAPSULES BY MOUTH 4 TIMES DAILY AS NEEDED FOR ANXIETY     ibuprofen (ADVIL/MOTRIN) 600 MG tablet TAKE 1 TABLET BY MOUTH EVERY 6 HOURS AS NEEDED     lidocaine (LIDODERM) 5 % patch PLACE 3 PATCHES ONTO SKIN DAILY FOR 12 HOURS AND REMOVE.     lidocaine (XYLOCAINE) 5 % external ointment Apply to the back using gloves three times per vday as needed for moderate to severe pain.     loratadine (CLARITIN) 10 MG tablet Take 10 mg by mouth daily     losartan (COZAAR) 50 MG tablet TAKE 1 TABLET BY MOUTH DAILY     methocarbamol (ROBAXIN) 500 MG tablet TAKE 1 TABLET BY MOUTH 3 TIMES DAILY     mometasone-formoterol (DULERA) 200-5 MCG/ACT inhaler Inhale 2 puffs into the lungs 2 times daily     morphine (MS CONTIN) 15 MG CR tablet TAKE 1 TABLET BY MOUTH EVERY 8 HOURS     multivitamin  with iron (SM COMPLETE ADVANCED FORMULA) TABS TAKE 1 TABLET BY MOUTH DAILY     naloxone (NARCAN) 1 mg/mL for intranasal kit (2 syringes with 2 mucosal atomizer device) In opioid overdose put cone in nostril and push 1/2 of contents into each nostril.  Repeat every 3 min if no response until help arrives.     nicotine polacrilex (NICORETTE) 2 MG gum CHEW 1 PIECE AS NEEDED FOR SMOKING CESSATION     nortriptyline (PAMELOR) 50 MG capsule TAKE 2 CAPSULES (100MG) BY MOUTH AT BEDTIME     omeprazole (PRILOSEC) 20 MG DR capsule Take 1 capsule (20 mg) by mouth 2 times daily TAKE 1 CAPSULE BY MOUTH EVERY DAY BEFORE A MEAL     order for DME Equipment being ordered: Thoracic and lumbar Back Brace     order for DME 1 boa back brace     ORDER FOR DME Equipment being ordered: Large gloves     OXcarbazepine (TRILEPTAL) 600 MG tablet TAKE 1 TABLET BY MOUTH 2 TIMES DAILY     oxymetazoline (AFRIN) 0.05 % nasal spray Spray 2 sprays into both nostrils 2 times daily Use as directed post surgical. Not to use more than 3 days.     propranolol (INDERAL) 20 MG tablet TAKE 1 TABLET BY MOUTH 2  TIMES A DAY     senna-docusate (SENNA-PLUS) 8.6-50 MG tablet TAKE 1 OR 2 TABLETS BY MOUTH 2 TIMES A DAY     tamsulosin (FLOMAX) 0.4 MG capsule TAKE 1 CAPSULE BY MOUTH DAILY     tiZANidine (ZANAFLEX) 4 MG tablet TAKE 1 TABLET BY MOUTH 3 TIMES A DAY     traZODone (DESYREL) 50 MG tablet TAKE 2 TABLETS BY MOUTH NIGHTLY AS NEEDED     VYVANSE 70 MG capsule TAKE 1 CAPSULE BY MOUTH DAILY     No current facility-administered medications for this visit.        VITALS   There were no vitals taken for this visit.     LABS                                                                                                                           Last Comprehensive Metabolic Panel:  Sodium   Date Value Ref Range Status   09/19/2019 144 133 - 144 mmol/L Final     Potassium   Date Value Ref Range Status   09/19/2019 3.9 3.4 - 5.3 mmol/L Final     Chloride   Date Value Ref Range Status   09/19/2019 112 (H) 94 - 109 mmol/L Final     Carbon Dioxide   Date Value Ref Range Status   09/19/2019 26 20 - 32 mmol/L Final     Anion Gap   Date Value Ref Range Status   09/19/2019 6 3 - 14 mmol/L Final     Glucose   Date Value Ref Range Status   09/19/2019 120 (H) 70 - 99 mg/dL Final     Urea Nitrogen   Date Value Ref Range Status   09/19/2019 22 7 - 30 mg/dL Final     Creatinine   Date Value Ref Range Status   09/19/2019 0.58 (L) 0.66 - 1.25 mg/dL Final     GFR Estimate   Date Value Ref Range Status   09/19/2019 >90 >60 mL/min/[1.73_m2] Final     Comment:     Non  GFR Calc  Starting 12/18/2018, serum creatinine based estimated GFR (eGFR) will be   calculated using the Chronic Kidney Disease Epidemiology Collaboration   (CKD-EPI) equation.       Calcium   Date Value Ref Range Status   09/19/2019 8.3 (L) 8.5 - 10.1 mg/dL Final       CBC RESULTS:   Recent Labs   Lab Test 12/12/19  1625   WBC 9.5   RBC 4.37*   HGB 13.2*   HCT 38.5*   MCV 88   MCH 30.2   MCHC 34.3   RDW 13.0        Recent Labs   Lab Test 10/25/18  1434  03/17/16  1412 11/17/14  1045 12/23/13  0949   CHOL 131 217* 185 161   HDL 51 36* 44 41   LDL 60 Cannot estimate LDL when triglyceride exceeds 400 mg/dL 86 62   TRIG 101 431* 275* 289*   CHOLHDLRATIO  --   --  4.2 3.9       EKG 6/3/19 with QTc of 415 ms     MENTAL STATUS EXAM                                                                                       Speech: loud.  Mood was irritable and agitated. Thought process was tangential and thought content was devoid of suicidal and homicidal ideation. + paranoia / delusions.  No hallucinations. Insight was fair.  Judgment was adequate for safety. Fund of knowledge was intact. Pt demonstrates no obvious problems with attention, concentration, language, recent or remote memory although these were not formally tested.     ASSESSMENT                                                                                                      HISTORICAL:  Initial psych note 10/6/15          NOTES:      Joshua is a 59 year old with bipolar, ADHD, and MDD.  We started Valium as dual purpose: muscle relaxant properties and for anxiety. We have discussed the new guidelines for short - term use of benzodiazepines (Valium) and avoiding their use along with opioids. I had noted plan to taper down over time and eventually discontinue. We decreased from 5 mg every 12 hours as needed down to 2 mg every 12 hours as needed. March '20 we decreased to once daily and then discontinued recently.         TREATMENT RISK STATEMENT:  The risks, benefits, alternatives and potential adverse effects have been explained and are understood by the pt.  The pt agrees to the treatment plan with the ability to do so.   The pt knows to call the clinic for any problems or access emergency care if needed.        DIAGNOSES                    Bipolar disorder I with psychosis vs. Schizoaffective disorder, bipolar type  ADHD      PLAN                                                                                                                     1)  MEDICATIONS:       Continue Trileptal 600 mg bid. Now off diazepam.  Continue trazodone 100 mg bedtime prn insomnia. Continue Vyvanse 70 mg daily last filled 6/19, I set to be able to fill next 7/17/20    2)  THERAPY:  No change    3)  LABS:  none    4)  PT MONITOR [call for probs]:  SEs from meds, worsening sx, SI/HI    5)  REFERRALS [CD, medical, other]:  None    6)  RTC: 1 month

## 2020-06-25 DIAGNOSIS — F11.90 CHRONIC, CONTINUOUS USE OF OPIOIDS: ICD-10-CM

## 2020-06-25 RX ORDER — HYDROCODONE BITARTRATE AND ACETAMINOPHEN 10; 325 MG/1; MG/1
TABLET ORAL
Qty: 60 TABLET | Refills: 0 | Status: SHIPPED | OUTPATIENT
Start: 2020-06-30 | End: 2020-07-27

## 2020-06-25 ASSESSMENT — ANXIETY QUESTIONNAIRES: GAD7 TOTAL SCORE: 9

## 2020-06-25 NOTE — TELEPHONE ENCOUNTER
Hydrocodone      Last Written Prescription Date:  6/1/20  Last Fill Quantity: 60,   # refills: 0  Last Office Visit: 4/8/20  Future Office visit:    Next 5 appointments (look out 90 days)    Jun 29, 2020  4:00 PM CDT  Return Visit with Shamar Escamilla DC  [facility] ([facility]) [address]  [place] [address]  [phone]   Jul 20, 2020  2:30 PM CDT  (Arrive by 2:15 PM)  Return Visit with Ivett Gonzalez MD  [facility] ([facility] ) [address] [facility] [address]  [phone]   Jul 24, 2020 10:20 AM CDT  (Arrive by 10:05 AM)  Return Visit with Laquita Fernandez MD  [facility] ([facility] ) [address]  [facility] [address] [phone]   Jul 29, 2020  2:30 PM CDT  (Arrive by 2:15 PM)  Nurse Only with Bettye Jules  [facility] ([facility] ) [address] [facility] [address]  [phone]           Routing refill request to provider for review/approval because:  Drug not on the [facility], [facility] or [facility] refill protocol or controlled substance

## 2020-06-29 ENCOUNTER — OFFICE VISIT (OUTPATIENT)
Dept: CHIROPRACTIC MEDICINE | Facility: OTHER | Age: 60
End: 2020-06-29
Attending: CHIROPRACTOR
Payer: COMMERCIAL

## 2020-06-29 DIAGNOSIS — M99.02 SEGMENTAL AND SOMATIC DYSFUNCTION OF THORACIC REGION: ICD-10-CM

## 2020-06-29 DIAGNOSIS — M54.50 ACUTE BILATERAL LOW BACK PAIN WITHOUT SCIATICA: ICD-10-CM

## 2020-06-29 DIAGNOSIS — M99.03 SEGMENTAL AND SOMATIC DYSFUNCTION OF LUMBAR REGION: Primary | ICD-10-CM

## 2020-06-29 DIAGNOSIS — M99.01 SEGMENTAL AND SOMATIC DYSFUNCTION OF CERVICAL REGION: ICD-10-CM

## 2020-06-29 PROCEDURE — 98941 CHIROPRACT MANJ 3-4 REGIONS: CPT | Mod: AT | Performed by: CHIROPRACTOR

## 2020-06-30 DIAGNOSIS — G89.29 CHRONIC LEFT-SIDED LOW BACK PAIN WITHOUT SCIATICA: ICD-10-CM

## 2020-06-30 DIAGNOSIS — M62.830 BACK MUSCLE SPASM: ICD-10-CM

## 2020-06-30 DIAGNOSIS — M79.7 FIBROMYALGIA: ICD-10-CM

## 2020-06-30 DIAGNOSIS — M54.42 CHRONIC BILATERAL LOW BACK PAIN WITH BILATERAL SCIATICA: ICD-10-CM

## 2020-06-30 DIAGNOSIS — F41.1 GAD (GENERALIZED ANXIETY DISORDER): ICD-10-CM

## 2020-06-30 DIAGNOSIS — M54.50 CHRONIC LEFT-SIDED LOW BACK PAIN WITHOUT SCIATICA: ICD-10-CM

## 2020-06-30 DIAGNOSIS — G89.4 CHRONIC PAIN SYNDROME: ICD-10-CM

## 2020-06-30 DIAGNOSIS — G89.29 CHRONIC BILATERAL LOW BACK PAIN WITH BILATERAL SCIATICA: ICD-10-CM

## 2020-06-30 DIAGNOSIS — Z00.00 HEALTHCARE MAINTENANCE: ICD-10-CM

## 2020-06-30 DIAGNOSIS — M51.379 DEGENERATION OF LUMBAR OR LUMBOSACRAL INTERVERTEBRAL DISC: ICD-10-CM

## 2020-06-30 DIAGNOSIS — M54.41 CHRONIC BILATERAL LOW BACK PAIN WITH BILATERAL SCIATICA: ICD-10-CM

## 2020-06-30 NOTE — PROGRESS NOTES

## 2020-06-30 NOTE — TELEPHONE ENCOUNTER
Trazadone      Last Written Prescription Date:  5.26.2020  Last Fill Quantity: 60,   # refills: 0  Last Office Visit: 6.24.2020

## 2020-06-30 NOTE — TELEPHONE ENCOUNTER
ibuprofen      Last Written Prescription Date:  3.12.2020  Last Fill Quantity: 120,   # refills: 3  Last Office Visit: 04.08.2020

## 2020-07-01 NOTE — TELEPHONE ENCOUNTER
Hydroxyzine      Last Written Prescription Date:  5-19-20  Last Fill Quantity: 120,   # refills: 1  Last Office Visit: 6-29-20  Future Office visit:    Next 5 appointments (look out 90 days)    Jul 02, 2020  3:45 PM CDT  [facility] Visit with [facility] DC  [facility]) [address]  [place] [address]  [phone]   Jul 20, 2020  2:30 PM CDT  (Arrive by 2:15 PM)  Return Visit with Ivett Gonzalez MD  [facility] [address] [facility] [address]  [phone]   Jul 24, 2020 10:20 AM CDT  (Arrive by 10:05 AM)  Return Visit with Laquita Fernandez MD  [facility] ([facility] ) [address]  [place] [id number] [phone]   Jul 29, 2020  2:30 PM CDT  (Arrive by 2:15 PM)  Nurse Only with Bettye Jules  [facility] ([facility] ) [address] [facility] [address]  [phone]

## 2020-07-02 ENCOUNTER — OFFICE VISIT (OUTPATIENT)
Dept: CHIROPRACTIC MEDICINE | Facility: OTHER | Age: 60
End: 2020-07-02
Attending: CHIROPRACTOR
Payer: COMMERCIAL

## 2020-07-02 DIAGNOSIS — M54.50 ACUTE BILATERAL LOW BACK PAIN WITHOUT SCIATICA: ICD-10-CM

## 2020-07-02 DIAGNOSIS — M99.02 SEGMENTAL AND SOMATIC DYSFUNCTION OF THORACIC REGION: ICD-10-CM

## 2020-07-02 DIAGNOSIS — M99.01 SEGMENTAL AND SOMATIC DYSFUNCTION OF CERVICAL REGION: ICD-10-CM

## 2020-07-02 DIAGNOSIS — M99.03 SEGMENTAL AND SOMATIC DYSFUNCTION OF LUMBAR REGION: Primary | ICD-10-CM

## 2020-07-02 DIAGNOSIS — G89.4 CHRONIC PAIN SYNDROME: ICD-10-CM

## 2020-07-02 PROCEDURE — 98941 CHIROPRACT MANJ 3-4 REGIONS: CPT | Mod: AT | Performed by: CHIROPRACTOR

## 2020-07-02 RX ORDER — IBUPROFEN 600 MG/1
TABLET, FILM COATED ORAL
Qty: 120 TABLET | Refills: 0 | Status: SHIPPED | OUTPATIENT
Start: 2020-07-02 | End: 2020-08-10

## 2020-07-02 RX ORDER — ASPIRIN 81 MG/1
TABLET, CHEWABLE ORAL
Qty: 30 TABLET | Refills: 0 | Status: SHIPPED | OUTPATIENT
Start: 2020-07-02 | End: 2020-07-29

## 2020-07-02 RX ORDER — TRAZODONE HYDROCHLORIDE 50 MG/1
TABLET, FILM COATED ORAL
Qty: 60 TABLET | Refills: 0 | Status: SHIPPED | OUTPATIENT
Start: 2020-07-02 | End: 2020-07-24 | Stop reason: DRUGHIGH

## 2020-07-02 RX ORDER — NORTRIPTYLINE HYDROCHLORIDE 50 MG/1
CAPSULE ORAL
Qty: 60 CAPSULE | Refills: 0 | Status: SHIPPED | OUTPATIENT
Start: 2020-07-02 | End: 2020-07-29

## 2020-07-02 RX ORDER — HYDROXYZINE PAMOATE 50 MG/1
CAPSULE ORAL
Qty: 120 CAPSULE | Refills: 0 | Status: SHIPPED | OUTPATIENT
Start: 2020-07-02 | End: 2020-07-14

## 2020-07-02 NOTE — TELEPHONE ENCOUNTER
Low Dose Aspirin      Last Written Prescription Date:  1.20.2020  Last Fill Quantity: 30,   # refills: 5  Last Office Visit: 04.08.2020    Morphine ms      Last Written Prescription Date:  5.29.2020  Last Fill Quantity: 90,   # refills: 0  Last Office Visit: 04.08.2020    pamelor      Last Written Prescription Date:  4.28.2020  Last Fill Quantity: 60,   # refills: 1  Last Office Visit: 04.08.2020    Zanaflex      Last Written Prescription Date:  3.23.2020  Last Fill Quantity: 90,   # refills: 3  Last Office Visit: 04.08.2020      
TIZANIDINE HCL 4 MG TABLET     Routing refill request to provider for review/approval because:    Drug not on the [facility], [facility] or [facility] refill protocol or controlled substance    MORPHINE ER 15 MG  MALLINCK     Routing refill request to provider for review/approval because:    Drug not on the [facility], [facility] or [facility] refill protocol or controlled substance      
none

## 2020-07-02 NOTE — TELEPHONE ENCOUNTER
IBUPROFEN 600 MG TABLET     NSAID Medications Failed    Normal ALT on file in past 12 months    Lab Results   Component Value Date    ALT 35 06/03/2019     Normal AST on file in past 12 months    AST   Date Value Ref Range Status   06/03/2019 13 0 - 45 U/L Final     Normal CBC on file in past 12 months    CBC RESULTS:   Recent Labs   Lab Test 12/12/19  1625   WBC 9.5   RBC 4.37*   HGB 13.2*   HCT 38.5*   MCV 88   MCH 30.2   MCHC 34.3   RDW 13.0        Normal serum creatinine on file in past 12 months    Creatinine   Date Value Ref Range Status   09/19/2019 0.58 (L) 0.66 - 1.25 mg/dL Final

## 2020-07-06 RX ORDER — MORPHINE SULFATE 15 MG/1
TABLET, FILM COATED, EXTENDED RELEASE ORAL
Qty: 90 TABLET | Refills: 0 | Status: SHIPPED | OUTPATIENT
Start: 2020-07-06 | End: 2020-08-03

## 2020-07-06 NOTE — PROGRESS NOTES

## 2020-07-07 DIAGNOSIS — R52 PAIN: ICD-10-CM

## 2020-07-07 RX ORDER — TRAZODONE HYDROCHLORIDE 50 MG/1
TABLET, FILM COATED ORAL
Qty: 60 TABLET | Refills: 0 | OUTPATIENT
Start: 2020-07-07

## 2020-07-08 NOTE — TELEPHONE ENCOUNTER
Acetaminophen 325 MG      Last Written Prescription Date:  5-  Last Fill Quantity: 200,   # refills: 0  Last Office Visit: 4-8-2020  Future Office visit:    Next 5 appointments (look out 90 days)    Jul 09, 2020  2:50 PM CDT  Return Visit with Shamar Esc[facility]illa DC  [facility] [place]) [address]  [place] [address]  [phone]   Jul 20, 2020  2:30 PM CDT  (Arrive by 2:15 PM)  Return Visit with Ivett [facility]onazlez MD  [facility] ) [address] [facility] [address]  [phone]   Jul 24, 2020 10:20 AM CDT  (Arrive by 10:05 AM)  Return Visit with Laquita Fernandez MD  [facility] ([facility] ) [address]  [place] [address] [phone]   Jul 29, 2020  2:30 PM CDT  (Arrive by 2:15 PM)  Nurse Only with Bettye Jules  [facility] ([facility] ) [address] [facility] [address]  [phone]

## 2020-07-10 RX ORDER — ACETAMINOPHEN 325 MG/1
TABLET ORAL
Qty: 200 TABLET | Refills: 1 | Status: SHIPPED | OUTPATIENT
Start: 2020-07-10 | End: 2020-08-26

## 2020-07-13 DIAGNOSIS — J45.40 MODERATE PERSISTENT ASTHMA WITHOUT COMPLICATION: ICD-10-CM

## 2020-07-13 DIAGNOSIS — F41.1 GAD (GENERALIZED ANXIETY DISORDER): ICD-10-CM

## 2020-07-14 RX ORDER — ALBUTEROL SULFATE 90 UG/1
AEROSOL, METERED RESPIRATORY (INHALATION)
Qty: 18 G | Refills: 0 | Status: SHIPPED | OUTPATIENT
Start: 2020-07-14 | End: 2020-09-17

## 2020-07-14 RX ORDER — HYDROXYZINE PAMOATE 50 MG/1
CAPSULE ORAL
Qty: 120 CAPSULE | Refills: 0 | Status: SHIPPED | OUTPATIENT
Start: 2020-07-14 | End: 2020-08-03

## 2020-07-14 NOTE — TELEPHONE ENCOUNTER
hydroxyzine      Last Written Prescription Date:  7-2-20  Last Fill Quantity: 120,   # refills: 0  Last Office Visit: 4-8-20  Future Office visit:    Next 5 appointments (look out 90 days)    Jul 20, 2020  2:30 PM CDT  (Arrive by 2:15 PM)  Return Visit with Ivett Gonzalez MD  [facility] ([facility] ) [address]  [place] [address]  [phone]   Jul 24, 2020 10:20 AM CDT  (Arrive by 10:05 AM)  Return Visit with Laquita Fernandez MD  [facility] [place] ([facility] ) [address]  [place] [address]  [phone]   Jul 29, 2020  2:30 PM CDT  (Arrive by 2:15 PM)  Nurse Only with Bettye Jules  [facility] [place] ([facility] ) [address]  [place] [address]  [phone]         albuterol      Last Written Prescription Date:  6-16-20  Last Fill Quantity: 18g,   # refills: 0  Last Office Visit: 4-8-20  Future Office visit:    Next 5 appointments (look out 90 days)    Jul 20, 2020  2:30 PM CDT  (Arrive by 2:15 PM)  [facility] [address]  [place] [address]  [phone]   Jul 24, 2020 10:20 AM CDT  (Arrive by 10:05 AM)  Return Visit with Laquita Fernandez MD  [facility] [place] ([facility] ) [address]  [place] [address] [phone]   Jul 29, 2020  2:30 PM CDT  (Arrive by 2:15 PM)  Nurse Only with Bettye Jules  [facility] [place] ([facility] ) [address]  [place] [address]  [phone]           Routing refill request to provider for review/approval because:      Routing refill request to provider for review/approval because:

## 2020-07-16 NOTE — PROGRESS NOTES
Otolaryngology Progress Note          Joshua Barron is a 59 year old male  Presents for follow-up of dysphasia and headaches    He has chronic post nasal drainage , feels like it is causing him dysphagia  No weight loss  Frequent ear plugging and otalgia, bilateral tinnitus which is bothersome  No new onset hearing loss  2019 audiogram reviewed with him, HFSNHL moderate to severe, no asymmetry    He also has frequent headaches associated with barometric pressure changes  Associated nausea, and photophobia  Temple and parietal pain, feels like his head will explode    He does have worsening bilateral tinnitus before the migraines which are also associated with barometric pressure changes    Takes tylenol sinus     He was seen by Lela on 6 9    He is a smoker and due to his dysphasia she performed flexible laryngoscopy some erythema of the endolarynx was noted.  he is here for follow-up after starting Pepcid  and tobacco cessation counseling     distant septoplasty and turbinate reduction on 7/2/2019  No  complaints of nasal congestion         Physical Exam  /80   Pulse 73   Temp 97.7  F (36.5  C) (Tympanic)   Wt 83.9 kg (185 lb)   SpO2 97%   BMI 25.80 kg/m    General - The patient is well nourished and well developed, and appears to have good nutritional status.  Alert and oriented to person and place, interactive.  Head and Face - Normocephalic and atraumatic, with no gross asymmetry noted of the contour of the facial features.  The facial nerve is intact, with strong symmetric movements.  Neck-no palpable lymphadenopathy or thyroid mass.  Trachea is midline.  Eyes - Extraocular movements intact.   Ears-   EAC with chronic excoriations and void of cerumen  External auditory canals are patent, tympanic membranes are intact without effusion or worrisome retractions   Nose - Nasal mucosa is pink and moist with no abnormal mucus.  The septum was grossly midline and non-obstructive, turbinates of normal size  and position.  No polyps, masses, or purulence noted on examination.  Mouth - dry oral cavity.   No lesions or ulceration noted.  The dentition are in fair repair.  fom soft.  The tongue is mobile and midline.  Throat - The walls of the oropharynx were smooth, pink, moist, symmetric, and had no lesions or ulcerations.  The tonsillar pillars and soft palate were symmetric.  The uvula was midline on elevation.        Attempts at mirror laryngoscopy were not possible due to gag reflex.  Therefore I proceeded with a fiberoptic examination after informed consent.  First I sprayed both sides of the nose with a mixture of lidocaine and neosynephrine.  I then passed the scope through the nasal cavity.     The nasopharynx was mucosally covered and symmetric.  No np mass.  The eustachian tube openings were unobstructed.  Going further down I had a clear view of the base of tongue which had normal appearing lingual tonsillar tissue.  The base of tongue was free of lesions, and the vallecula was open.  The epiglottis was smooth and mucosally covered.  The supraglottic larynx was then clearly visualized.  The vocal cords moved smoothly and symmetrically and were pearly white.  The pyriform sinuses were open and without tor mass or pooling of secretions upon valsalva, and the limited view of the postcricoid region did not show any lesions.  The patient tolerated the procedure well.      Impression/Plan  Joshua Barron is a 59 year old male    ICD-10-CM    1. Migraine with aura and without status migrainosus, not intractable  G43.109 SUMAtriptan (IMITREX) 50 MG tablet     CT Sinus w/o Contrast   2. Sensorineural hearing loss (SNHL) of both ears  H90.3 AUDIOLOGY ADULT REFERRAL   3. Tinnitus, bilateral  H93.13 AUDIOLOGY ADULT REFERRAL   4. Globus pharyngeus  R09.89 XR Esophagram   5. chronic noninfective otitis externa  H60.593    6. Chronic rhinitis  J31.0      Lean toward neurogenic rhinitis, consider amytriptiline if no  improvement but would require pharmacy consult  Ct pending    Start mucinex, stop harlan med and budesonide until after I review CT  Continue claritin    Complete Audiogram  Complete Sinus CT  Complete Barium Swallow  Use Vinegar Ear wash as needed  Start Imitrex and use as needed for loud ringing before migraine  No evidence of cancer on laryngoscopy  Increase Water  Decrease Caffeine  Stop Chewing Tobacco  Follow up with Lela Calix in 2 months,      Tinnitus education was provided.  Tinnitus is widely considered a disorder of cental auditory processing.    Hearing preservation was reinforced  I also cautioned the patient against investing in any oral supplements advertised to cure tinnitus.   The patient will follow up as necessary for worsening symptoms or changes in symptoms.   I have also recommended yearly audiograms, and consideration of a hearing aid evaluation if not already performed.  Consider TRT (tinnitus retraining therapy)    Chewing Tobacco cessation was strongly encouraged.  The associated risk of head and neck cancer was discussed.  Every year of cessation offers health benefits. This was discussed with the patient today and they voiced understanding.  Quit tools and a nicotine patch were offered.              VINEGAR AND DISTILLED WATER IRRIGATION FOR EARS    This solution should only be used if the ears have noted drainage, or debris.      Using a sterile cup, mix 1/2 cup of white vinegar with 1/2 cup distilled water.    Irrigate the ear(s) using 15mL of solution every other day.    Completely dry the ear after irrigation, using a blow dryer on a low heat setting.       **If you are using ear drops, use the drops in the morning and the irrigation solution in the evening.       Ivett Gonzalez D.O.  Otolaryngology/Head and Neck Surgery  Allergy

## 2020-07-20 ENCOUNTER — OFFICE VISIT (OUTPATIENT)
Dept: OTOLARYNGOLOGY | Facility: OTHER | Age: 60
End: 2020-07-20
Attending: OTOLARYNGOLOGY
Payer: COMMERCIAL

## 2020-07-20 VITALS
SYSTOLIC BLOOD PRESSURE: 120 MMHG | DIASTOLIC BLOOD PRESSURE: 80 MMHG | WEIGHT: 185 LBS | OXYGEN SATURATION: 97 % | BODY MASS INDEX: 25.8 KG/M2 | HEART RATE: 73 BPM | TEMPERATURE: 97.7 F

## 2020-07-20 DIAGNOSIS — H60.593 OTHER NONINFECTIVE ACUTE OTITIS EXTERNA OF BOTH EARS: ICD-10-CM

## 2020-07-20 DIAGNOSIS — J31.0 CHRONIC RHINITIS: ICD-10-CM

## 2020-07-20 DIAGNOSIS — R09.A2 GLOBUS PHARYNGEUS: ICD-10-CM

## 2020-07-20 DIAGNOSIS — H93.13 TINNITUS, BILATERAL: ICD-10-CM

## 2020-07-20 DIAGNOSIS — H90.3 SENSORINEURAL HEARING LOSS (SNHL) OF BOTH EARS: ICD-10-CM

## 2020-07-20 DIAGNOSIS — G43.109 MIGRAINE WITH AURA AND WITHOUT STATUS MIGRAINOSUS, NOT INTRACTABLE: Primary | ICD-10-CM

## 2020-07-20 PROCEDURE — 99214 OFFICE O/P EST MOD 30 MIN: CPT | Mod: 25 | Performed by: OTOLARYNGOLOGY

## 2020-07-20 PROCEDURE — 31575 DIAGNOSTIC LARYNGOSCOPY: CPT | Performed by: OTOLARYNGOLOGY

## 2020-07-20 PROCEDURE — G0463 HOSPITAL OUTPT CLINIC VISIT: HCPCS

## 2020-07-20 RX ORDER — SUMATRIPTAN 50 MG/1
50 TABLET, FILM COATED ORAL
Qty: 60 TABLET | Refills: 3 | Status: SHIPPED | OUTPATIENT
Start: 2020-07-20 | End: 2021-07-28

## 2020-07-20 RX ORDER — GUAIFENESIN 400 MG/1
400 TABLET ORAL 2 TIMES DAILY
Qty: 60 TABLET | Refills: 6 | Status: SHIPPED | OUTPATIENT
Start: 2020-07-20 | End: 2020-08-19

## 2020-07-20 ASSESSMENT — PAIN SCALES - GENERAL: PAINLEVEL: MILD PAIN (2)

## 2020-07-20 NOTE — PATIENT INSTRUCTIONS
Thank you for allowing Dr. Gonzalez and our ENT team to participate in your care.  If your medications are too expensive, please give the nurse a call.  We can possibly change this medication.  If you have a scheduling or an appointment question please contact our Health Unit Coordinator at their direct line [phone].   ALL nursing questions or concerns can be directed to your ENT nurse at: [phone] - Lily    Complete Audiogram  Complete Sinus CT  Complete Barium Swallow  Use Vinegar Ear wash as needed  Start Imitrex and use as needed for loud ringing before migraine  No evidence of cancer on laryngoscopy  Increase Water  Decrease Caffeine  Stop Chewing Tobacco  Follow up with Lela Calix in 2 months        VINEGAR AND DISTILLED WATER IRRIGATION FOR EARS    This solution should only be used if the ears have noted drainage, or debris.      Using a sterile cup, mix 1/2 cup of white vinegar with 1/2 cup distilled water.    Irrigate the ear(s) using 15mL of solution every other day.    Completely dry the ear after irrigation, using a blow dryer on a low heat setting.       **If you are using ear drops, use the drops in the morning and the irrigation solution in the evening.

## 2020-07-20 NOTE — NURSING NOTE
"Chief Complaint   Patient presents with     RECHECK     Follow Up Nasal Valve Collapse, GERD, LPRD, Dysphagia       Initial /80   Pulse 73   Temp 97.7  F (36.5  C) (Tympanic)   Wt 83.9 kg (185 lb)   SpO2 97%   BMI 25.80 kg/m   Estimated body mass index is 25.8 kg/m  as calculated from the following:    Height as of 4/8/20: 1.803 m (5' 11\").    Weight as of this encounter: 83.9 kg (185 lb).  Medication Reconciliation: complete  Genevieve Tamayo LPN  "

## 2020-07-20 NOTE — LETTER
7/20/2020         RE: Joshua Barron  [address] [place] [address]        Dear Colleague,    Thank you for referring your patient, Joshua Barron, to the [facility]. Please see a copy of my visit note below.    Otolaryngology Progress Note          Joshua Barron is a 59 year old male  Presents for follow-up of dysphasia and headaches    He has chronic post nasal drainage , feels like it is causing him dysphagia  No weight loss  Frequent ear plugging and otalgia, bilateral tinnitus which is bothersome  No new onset hearing loss  2019 audiogram reviewed with him, HFSNHL moderate to severe, no asymmetry    He also has frequent headaches associated with barometric pressure changes  Associated nausea, and photophobia  Temple and parietal pain, feels like his head will explode    He does have worsening bilateral tinnitus before the migraines which are also associated with barometric pressure changes    Takes tylenol sinus     He was seen by Lela on 6 9    He is a smoker and due to his dysphasia she performed flexible laryngoscopy some erythema of the endolarynx was noted.  he is here for follow-up after starting Pepcid  and tobacco cessation counseling     distant septoplasty and turbinate reduction on 7/2/2019  No  complaints of nasal congestion         Physical Exam  /80   Pulse 73   Temp 97.7  F (36.5  C) (Tympanic)   Wt 83.9 kg (185 lb)   SpO2 97%   BMI 25.80 kg/m    General - The patient is well nourished and well developed, and appears to have good nutritional status.  Alert and oriented to person and place, interactive.  Head and Face - Normocephalic and atraumatic, with no gross asymmetry noted of the contour of the facial features.  The facial nerve is intact, with strong symmetric movements.  Neck-no palpable lymphadenopathy or thyroid mass.  Trachea is midline.  Eyes - Extraocular movements intact.   Ears-   EAC with chronic excoriations and void of  cerumen  External auditory canals are patent, tympanic membranes are intact without effusion or worrisome retractions   Nose - Nasal mucosa is pink and moist with no abnormal mucus.  The septum was grossly midline and non-obstructive, turbinates of normal size and position.  No polyps, masses, or purulence noted on examination.  Mouth - dry oral cavity.   No lesions or ulceration noted.  The dentition are in fair repair.  fom soft.  The tongue is mobile and midline.  Throat - The walls of the oropharynx were smooth, pink, moist, symmetric, and had no lesions or ulcerations.  The tonsillar pillars and soft palate were symmetric.  The uvula was midline on elevation.        Attempts at mirror laryngoscopy were not possible due to gag reflex.  Therefore I proceeded with a fiberoptic examination after informed consent.  First I sprayed both sides of the nose with a mixture of lidocaine and neosynephrine.  I then passed the scope through the nasal cavity.     The nasopharynx was mucosally covered and symmetric.  No np mass.  The eustachian tube openings were unobstructed.  Going further down I had a clear view of the base of tongue which had normal appearing lingual tonsillar tissue.  The base of tongue was free of lesions, and the vallecula was open.  The epiglottis was smooth and mucosally covered.  The supraglottic larynx was then clearly visualized.  The vocal cords moved smoothly and symmetrically and were pearly white.  The pyriform sinuses were open and without tor mass or pooling of secretions upon valsalva, and the limited view of the postcricoid region did not show any lesions.  The patient tolerated the procedure well.      Impression/Plan  Joshua Barron is a 59 year old male    ICD-10-CM    1. Migraine with aura and without status migrainosus, not intractable  G43.109 SUMAtriptan (IMITREX) 50 MG tablet     CT Sinus w/o Contrast   2. Sensorineural hearing loss (SNHL) of both ears  H90.3 AUDIOLOGY ADULT  REFERRAL   3. Tinnitus, bilateral  H93.13 AUDIOLOGY ADULT REFERRAL   4. Globus pharyngeus  R09.89 XR Esophagram   5. chronic noninfective otitis externa  H60.593    6. Chronic rhinitis  J31.0      Lean toward neurogenic rhinitis, consider amytriptiline if no improvement but would require pharmacy consult  Ct pending    Start mucinex, stop harlan med and budesonide until after I review CT  Continue claritin    Complete Audiogram  Complete Sinus CT  Complete Barium Swallow  Use Vinegar Ear wash as needed  Start Imitrex and use as needed for loud ringing before migraine  No evidence of cancer on laryngoscopy  Increase Water  Decrease Caffeine  Stop Chewing Tobacco  Follow up with Lela Calix in 2 months,      Tinnitus education was provided.  Tinnitus is widely considered a disorder of cental auditory processing.    Hearing preservation was reinforced  I also cautioned the patient against investing in any oral supplements advertised to cure tinnitus.   The patient will follow up as necessary for worsening symptoms or changes in symptoms.   I have also recommended yearly audiograms, and consideration of a hearing aid evaluation if not already performed.  Consider TRT (tinnitus retraining therapy)    Chewing Tobacco cessation was strongly encouraged.  The associated risk of head and neck cancer was discussed.  Every year of cessation offers health benefits. This was discussed with the patient today and they voiced understanding.  Quit tools and a nicotine patch were offered.              VINEGAR AND DISTILLED WATER IRRIGATION FOR EARS    This solution should only be used if the ears have noted drainage, or debris.      Using a sterile cup, mix 1/2 cup of white vinegar with 1/2 cup distilled water.    Irrigate the ear(s) using 15mL of solution every other day.    Completely dry the ear after irrigation, using a blow dryer on a low heat setting.       **If you are using ear drops, use the drops in the morning and the  irrigation solution in the evening.       Ivett Gonzalez D.O.  Otolaryngology/Head and Neck Surgery  Allergy                    Again, thank you for allowing me to participate in the care of your patient.        Sincerely,        Ivett Gonzalez MD

## 2020-07-21 ENCOUNTER — HOSPITAL ENCOUNTER (OUTPATIENT)
Facility: HOSPITAL | Age: 60
End: 2020-07-21
Attending: CHIROPRACTOR | Admitting: RADIOLOGY
Payer: COMMERCIAL

## 2020-07-24 ENCOUNTER — OFFICE VISIT (OUTPATIENT)
Dept: PSYCHIATRY | Facility: OTHER | Age: 60
End: 2020-07-24
Attending: PSYCHIATRY & NEUROLOGY
Payer: COMMERCIAL

## 2020-07-24 VITALS
HEIGHT: 70 IN | TEMPERATURE: 97.1 F | OXYGEN SATURATION: 98 % | WEIGHT: 180 LBS | BODY MASS INDEX: 25.77 KG/M2 | SYSTOLIC BLOOD PRESSURE: 130 MMHG | DIASTOLIC BLOOD PRESSURE: 80 MMHG | HEART RATE: 89 BPM

## 2020-07-24 DIAGNOSIS — F11.90 CHRONIC, CONTINUOUS USE OF OPIOIDS: ICD-10-CM

## 2020-07-24 DIAGNOSIS — M25.542 ARTHRALGIA OF BOTH HANDS: ICD-10-CM

## 2020-07-24 DIAGNOSIS — J45.40 MODERATE PERSISTENT ASTHMA WITHOUT COMPLICATION: ICD-10-CM

## 2020-07-24 DIAGNOSIS — M25.541 ARTHRALGIA OF BOTH HANDS: ICD-10-CM

## 2020-07-24 DIAGNOSIS — G89.4 CHRONIC PAIN SYNDROME: ICD-10-CM

## 2020-07-24 DIAGNOSIS — G47.00 PERSISTENT INSOMNIA: Primary | ICD-10-CM

## 2020-07-24 DIAGNOSIS — F90.2 ADHD (ATTENTION DEFICIT HYPERACTIVITY DISORDER), COMBINED TYPE: ICD-10-CM

## 2020-07-24 PROCEDURE — 99214 OFFICE O/P EST MOD 30 MIN: CPT | Performed by: PSYCHIATRY & NEUROLOGY

## 2020-07-24 PROCEDURE — G0463 HOSPITAL OUTPT CLINIC VISIT: HCPCS

## 2020-07-24 RX ORDER — LISDEXAMFETAMINE DIMESYLATE 70 MG/1
70 CAPSULE ORAL DAILY
Qty: 30 CAPSULE | Refills: 0 | Status: SHIPPED | OUTPATIENT
Start: 2020-07-24 | End: 2020-08-25

## 2020-07-24 RX ORDER — TRAZODONE HYDROCHLORIDE 100 MG/1
100 TABLET ORAL AT BEDTIME
Qty: 30 TABLET | Refills: 3 | Status: SHIPPED | OUTPATIENT
Start: 2020-07-24 | End: 2020-11-23

## 2020-07-24 ASSESSMENT — PAIN SCALES - GENERAL: PAINLEVEL: EXTREME PAIN (8)

## 2020-07-24 ASSESSMENT — MIFFLIN-ST. JEOR: SCORE: 1647.72

## 2020-07-24 NOTE — NURSING NOTE
"Chief Complaint   Patient presents with     Mental Health Problem       Initial /80 (BP Location: Right arm, Patient Position: Chair, Cuff Size: Adult Regular)   Pulse 89   Temp 97.1  F (36.2  C) (Tympanic)   Ht 1.778 m (5' 10\")   Wt 81.6 kg (180 lb)   SpO2 98%   BMI 25.83 kg/m   Estimated body mass index is 25.83 kg/m  as calculated from the following:    Height as of this encounter: 1.778 m (5' 10\").    Weight as of this encounter: 81.6 kg (180 lb).  Medication Reconciliation: complete  SUSAN KELLER LPN    "

## 2020-07-24 NOTE — PROGRESS NOTES
"                                                                          Social- Was  twice. Lives alone with his 3 cats: Smoky the cat. Has a GF in [place]  Children-  2 kids, they are in [place]  Last visit was telephone visit in 3/2020:  Continue Trileptal 600 mg bid.   Continue trazodone 100 mg bedtime prn insomnia. Continue Vyvanse 70 mg daily    - Joshua notes his neighbors are \"making my life a living hell\"  - notes on his PHQ-9 : pain   - weight stable but still low appetite.  - has been having issues with waking up fully and like still experiencing his dreams / nightmares while awake  - mom with dementia, then fell and broke her hip  - been having some wheezing, migraines. Saw ENT 7/20   - Lots of family issues: Joshua has taken care of his parents and yet parents give his other brothers everything. oldest of 3 brothers. Brother in [place] youngest [place] and Major is here. Mom and dad here out on [place]. Joshua noting moving here to be closer to parents and help them, etc. But, parents don't seem to want him around much or talk to him.    SUBSTANCE USE- reports no abuse of meds or substances    SYMPTOMS- paranoia, hypnopompic hallucinations, issues with attention and concentration improved with Vyvanse: racing thoughts, depressed mood, impulsivity, distractibility  MEDICAL ROS- back pain, denies any issues with headaches or stomach pain / issues with stimulant. Intentional weight loss  MEDICAL / SURGICAL HISTORY                     Patient Active Problem List   Diagnosis     Mixed hyperlipidemia     Tobacco Abuse, History of     Degeneration of lumbar or lumbosacral intervertebral disc     Depression, major     Chronic, continuous use of opioids     Chemical dependency (H)     Chronic rhinitis     Tinnitus of both ears     SNHL (sensorineural hearing loss)     Bipolar disorder (H)     Seizure-like activity (H)     Somatic dysfunction of pelvis region     Bilateral foot pain     Prostate cancer screening     Trigger " index finger of right hand     Moderate persistent asthma without complication     Chronic lower back pain     ACP (advance care planning)     Onychia of toe of left foot     DDD (degenerative disc disease), lumbar     Seizure disorder (H)     Back muscle spasm     Benign essential hypertension     Retrograde ejaculation     Allergic rhinitis due to other allergen     Cervical spondylosis without myelopathy     Chronic headaches     DDD (degenerative disc disease)     Generalized anxiety disorder     Hypertrophy of prostate without urinary obstruction and other lower urinary tract symptoms (LUTS)     GERD (gastroesophageal reflux disease)     Lumbago     Major depressive disorder, recurrent episode, moderate (H)     Myalgia and myositis     Hyperlipidemia     Other pain disorders related to psychological factors     Spinal stenosis in cervical region     Status post lumbar spinal fusion     Tobacco use disorder     Trigger finger     Polypharmacy     Deviated nasal septum     Attention deficit hyperactivity disorder (ADHD), combined type     chronic noninfective otitis externa     Migraine with aura and without status migrainosus, not intractable     ALLERGY   Cymbalta; Depakote [valproic acid]; and Seasonal allergies  MEDICATIONS                                                                                             Current Outpatient Medications   Medication Sig     acetaminophen (TYLENOL) 325 MG tablet TAKE 2 TABLETS BY MOUTH EVERY 4 HOURS AS NEEDED FOR MILD PAIN     albuterol (PROAIR HFA/PROVENTIL HFA/VENTOLIN HFA) 108 (90 Base) MCG/ACT inhaler USE 2 PUFFS 4 TIMES A DAY AS NEEDED FOR SHORTNESS OF BREATH     aspirin (ASPIRIN LOW DOSE) 81 MG chewable tablet CHEW AND SWALLOW 1 TABLET BY MOUTH DAILY     Aspirin-Acetaminophen-Caffeine (EQ HEADACHE RELIEF PO) Take 200 mg by mouth     atorvastatin (LIPITOR) 20 MG tablet Take 1 tablet (20 mg) by mouth daily     baclofen (LIORESAL) 20 MG tablet TAKE 1 TABLET BY MOUTH  4 TIMES DAILY     diclofenac (VOLTAREN) 50 MG EC tablet TAKE 1 TABLET BY MOUTH 2 TIMES DAILY     docusate sodium (DOK) 100 MG capsule TAKE 1 CAPSULE BY MOUTH TWICE DAILY     dronabinol (MARINOL) 2.5 MG capsule TAKE 1 CAPSULE BY MOUTH 2 TIMES DAILY BEFORE MEALS     DULERA 100-5 MCG/ACT inhaler INHALE 2 PUFFS INTO THE LUNGS 2 TIMES A DAY     famotidine (PEPCID) 40 MG tablet Take 1 tablet (40 mg) by mouth daily     gabapentin (NEURONTIN) 300 MG capsule TAKE 3 CAPSULES BY MOUTH THREE TIMES DAILY     guaiFENesin 400 MG TABS Take 1 tablet (400 mg) by mouth 2 times daily     HYDROcodone-acetaminophen (NORCO)  MG per tablet TAKE 1 TABLET BY MOUTH 2 TIMES DAILY AS NEEDED FOR SEVERE PAIN     hydrOXYzine (VISTARIL) 50 MG capsule TAKE 1 TO 2 CAPSULES BY MOUTH 4 TIMES DAILY AS NEEDED FOR ANXIETY     ibuprofen (ADVIL/MOTRIN) 600 MG tablet TAKE 1 TABLET BY MOUTH EVERY 6 HOURS AS NEEDED     lidocaine (LIDODERM) 5 % patch PLACE 3 PATCHES ONTO SKIN DAILY FOR 12 HOURS AND REMOVE.     lidocaine (XYLOCAINE) 5 % external ointment Apply to the back using gloves three times per vday as needed for moderate to severe pain.     lisdexamfetamine (VYVANSE) 70 MG capsule Take 1 capsule (70 mg) by mouth daily     loratadine (CLARITIN) 10 MG tablet Take 10 mg by mouth daily     losartan (COZAAR) 50 MG tablet TAKE 1 TABLET BY MOUTH DAILY     methocarbamol (ROBAXIN) 500 MG tablet TAKE 1 TABLET BY MOUTH 3 TIMES DAILY     mometasone-formoterol (DULERA) 200-5 MCG/ACT inhaler Inhale 2 puffs into the lungs 2 times daily     morphine (MS CONTIN) 15 MG CR tablet TAKE 1 TABLET BY MOUTH EVERY 8 HOURS     multivitamin  with iron (SM COMPLETE ADVANCED FORMULA) TABS TAKE 1 TABLET BY MOUTH DAILY     naloxone (NARCAN) 1 mg/mL for intranasal kit (2 syringes with 2 mucosal atomizer device) In opioid overdose put cone in nostril and push 1/2 of contents into each nostril.  Repeat every 3 min if no response until help arrives.     nicotine polacrilex  "(NICORETTE) 2 MG gum CHEW 1 PIECE AS NEEDED FOR SMOKING CESSATION     nortriptyline (PAMELOR) 50 MG capsule TAKE 2 CAPSULES (100MG) BY MOUTH AT BEDTIME     omeprazole (PRILOSEC) 20 MG DR capsule Take 1 capsule (20 mg) by mouth 2 times daily TAKE 1 CAPSULE BY MOUTH EVERY DAY BEFORE A MEAL     order for DME Equipment being ordered: Thoracic and lumbar Back Brace     order for DME 1 boa back brace     ORDER FOR DME Equipment being ordered: Large gloves     OXcarbazepine (TRILEPTAL) 600 MG tablet TAKE 1 TABLET BY MOUTH 2 TIMES DAILY     oxymetazoline (AFRIN) 0.05 % nasal spray Spray 2 sprays into both nostrils 2 times daily Use as directed post surgical. Not to use more than 3 days.     propranolol (INDERAL) 20 MG tablet TAKE 1 TABLET BY MOUTH 2 TIMES A DAY     senna-docusate (SENNA-PLUS) 8.6-50 MG tablet TAKE 1 OR 2 TABLETS BY MOUTH 2 TIMES A DAY     SUMAtriptan (IMITREX) 50 MG tablet Take 1 tablet (50 mg) by mouth at onset of headache for migraine May repeat in 2 hours. Max 4 tablets/24 hours.     tamsulosin (FLOMAX) 0.4 MG capsule TAKE 1 CAPSULE BY MOUTH DAILY     tiZANidine (ZANAFLEX) 4 MG tablet TAKE 1 TABLET BY MOUTH 3 TIMES A DAY     traZODone (DESYREL) 50 MG tablet TAKE 2 TABLETS BY MOUTH NIGHTLY AS NEEDED     No current facility-administered medications for this visit.        VITALS   /80 (BP Location: Right arm, Patient Position: Chair, Cuff Size: Adult Regular)   Pulse 89   Temp 97.1  F (36.2  C) (Tympanic)   Ht 1.778 m (5' 10\")   Wt 81.6 kg (180 lb)   SpO2 98%   BMI 25.83 kg/m       LABS                                                                                                                           Last Comprehensive Metabolic Panel:  Sodium   Date Value Ref Range Status   09/19/2019 144 133 - 144 mmol/L Final     Potassium   Date Value Ref Range Status   09/19/2019 3.9 3.4 - 5.3 mmol/L Final     Chloride   Date Value Ref Range Status   09/19/2019 112 (H) 94 - 109 mmol/L Final     Carbon " Dioxide   Date Value Ref Range Status   09/19/2019 26 20 - 32 mmol/L Final     Anion Gap   Date Value Ref Range Status   09/19/2019 6 3 - 14 mmol/L Final     Glucose   Date Value Ref Range Status   09/19/2019 120 (H) 70 - 99 mg/dL Final     Urea Nitrogen   Date Value Ref Range Status   09/19/2019 22 7 - 30 mg/dL Final     Creatinine   Date Value Ref Range Status   09/19/2019 0.58 (L) 0.66 - 1.25 mg/dL Final     GFR Estimate   Date Value Ref Range Status   09/19/2019 >90 >60 mL/min/[1.73_m2] Final     Comment:     Non  GFR Calc  Starting 12/18/2018, serum creatinine based estimated GFR (eGFR) will be   calculated using the Chronic Kidney Disease Epidemiology Collaboration   (CKD-EPI) equation.       Calcium   Date Value Ref Range Status   09/19/2019 8.3 (L) 8.5 - 10.1 mg/dL Final       CBC RESULTS:   Recent Labs   Lab Test 12/12/19  1625   WBC 9.5   RBC 4.37*   HGB 13.2*   HCT 38.5*   MCV 88   MCH 30.2   MCHC 34.3   RDW 13.0        Recent Labs   Lab Test 10/25/18  1434 03/17/16  1412 11/17/14  1045 12/23/13  0949   CHOL 131 217* 185 161   HDL 51 36* 44 41   LDL 60 Cannot estimate LDL when triglyceride exceeds 400 mg/dL 86 62   TRIG 101 431* 275* 289*   CHOLHDLRATIO  --   --  4.2 3.9       EKG 6/3/19 with QTc of 415 ms     MENTAL STATUS EXAM                                                                                          Alert. Oriented to person, place, and date / time. Casually groomed, calm, cooperative with good eye contact. No problems psychomotor behavior. Speech: loud. .  Mood was described as frustrated and affect was congruent to speech content and full range. Thought process, including associations, was unremarkable and thought content was devoid of suicidal and homicidal ideation.  No hallucinations. Insight was poor Judgment was  adequate for safety. Fund of knowledge was intact. Pt demonstrates no obvious problems with attention, concentration, language, recent or remote  memory although these were not formally tested.       ASSESSMENT                                                                                                      HISTORICAL:  Initial psych note 10/6/15          NOTES:      Joshua is a 59 year old with bipolar, ADHD, and MDD.  We started Valium as dual purpose: muscle relaxant properties and for anxiety. We have discussed the new guidelines for short - term use of benzodiazepines (Valium) and avoiding their use along with opioids. I had noted plan to taper down over time and eventually discontinue. We decreased from 5 mg every 12 hours as needed down to 2 mg every 12 hours as needed. March '20 we decreased to once daily and then discontinued recently.     Today only change is I'm going to fill for 100 mg trazodone rather than him taking two - 50s. Will fill vyvanse.         TREATMENT RISK STATEMENT:  The risks, benefits, alternatives and potential adverse effects have been explained and are understood by the pt.  The pt agrees to the treatment plan with the ability to do so.   The pt knows to call the clinic for any problems or access emergency care if needed.        DIAGNOSES                    Bipolar disorder I with psychosis vs. Schizoaffective disorder, bipolar type  ADHD      PLAN                                                                                                                    1)  MEDICATIONS:       Continue Trileptal 600 mg bid. Now off diazepam.  Continue trazodone 100 mg bedtime prn insomnia. Continue Vyvanse 70 mg daily last fill 7/17/20    2)  THERAPY:  No change    3)  LABS:  none    4)  PT MONITOR [call for probs]:  SEs from meds, worsening sx, SI/HI    5)  REFERRALS [CD, medical, other]:  None    6)  RTC:2 months

## 2020-07-27 ENCOUNTER — OFFICE VISIT (OUTPATIENT)
Dept: CHIROPRACTIC MEDICINE | Facility: OTHER | Age: 60
End: 2020-07-27
Attending: CHIROPRACTOR
Payer: COMMERCIAL

## 2020-07-27 DIAGNOSIS — M99.03 SEGMENTAL AND SOMATIC DYSFUNCTION OF LUMBAR REGION: Primary | ICD-10-CM

## 2020-07-27 DIAGNOSIS — M99.01 SEGMENTAL AND SOMATIC DYSFUNCTION OF CERVICAL REGION: ICD-10-CM

## 2020-07-27 DIAGNOSIS — M54.50 ACUTE BILATERAL LOW BACK PAIN WITHOUT SCIATICA: ICD-10-CM

## 2020-07-27 DIAGNOSIS — M99.02 SEGMENTAL AND SOMATIC DYSFUNCTION OF THORACIC REGION: ICD-10-CM

## 2020-07-27 PROCEDURE — 98941 CHIROPRACT MANJ 3-4 REGIONS: CPT | Mod: AT | Performed by: CHIROPRACTOR

## 2020-07-27 RX ORDER — HYDROCODONE BITARTRATE AND ACETAMINOPHEN 10; 325 MG/1; MG/1
TABLET ORAL
Qty: 60 TABLET | Refills: 0 | Status: SHIPPED | OUTPATIENT
Start: 2020-07-27 | End: 2020-08-24

## 2020-07-27 RX ORDER — MOMETASONE FUROATE AND FORMOTEROL FUMARATE DIHYDRATE 200; 5 UG/1; UG/1
AEROSOL RESPIRATORY (INHALATION)
Qty: 13 G | Refills: 0 | Status: SHIPPED | OUTPATIENT
Start: 2020-07-27 | End: 2020-08-20

## 2020-07-27 NOTE — PROGRESS NOTES

## 2020-07-29 ENCOUNTER — OFFICE VISIT (OUTPATIENT)
Dept: AUDIOLOGY | Facility: OTHER | Age: 60
End: 2020-07-29
Attending: AUDIOLOGIST
Payer: COMMERCIAL

## 2020-07-29 DIAGNOSIS — H90.3 SENSORINEURAL HEARING LOSS (SNHL) OF BOTH EARS: Primary | ICD-10-CM

## 2020-07-29 DIAGNOSIS — H90.3 SENSORINEURAL HEARING LOSS (SNHL) OF BOTH EARS: ICD-10-CM

## 2020-07-29 DIAGNOSIS — H93.13 TINNITUS, BILATERAL: ICD-10-CM

## 2020-07-29 PROCEDURE — V5266 BATTERY FOR HEARING DEVICE: HCPCS

## 2020-07-29 PROCEDURE — 92557 COMPREHENSIVE HEARING TEST: CPT | Performed by: AUDIOLOGIST

## 2020-07-29 PROCEDURE — 92550 TYMPANOMETRY & REFLEX THRESH: CPT | Performed by: AUDIOLOGIST

## 2020-07-29 PROCEDURE — V5299 HEARING SERVICE: HCPCS

## 2020-07-29 NOTE — PROGRESS NOTES
Audiology Evaluation Completed. Please refer SCANNED AUDIOGRAM and/or TYMPANOGRAM for BACKGROUND, RESULTS, RECOMMENDATIONS.      Ciara WHEAT, [facility]  Audiologist #[id number]

## 2020-07-29 NOTE — PROGRESS NOTES
HEARING AID CHECK    BACKGROUND:  Joshua Barron, a 59 year old male, was seen today for an hearing aid check.  Mr. Barron wears Binaural Unitron N [employer] Fit 700 hearing aids .  Mr. Barron reported no concerns.    FINDINGS:  Visual inspection and cleaning provided.   C-stop changed with new. Large closed domes changed with new. Battery was tested and good. Good listening check.    Reviewed cleaning, troubleshooting, and preventative care with patient. Any cleaning tools used were provided as customer courtesy.    Services performed and warranty reviewed with patient.    PLAN:  Based on patient report, no audiology appointment for adjustments needed.Mr. Barron will return to clinic in 12 months, sooner if needed. Patient had no further questions and reports satisfaction.         Muna Jules  Audiology Assistant  [facility]  [phone]    Per guidelines of patient's insurance, 32 units of size 312 batteries were dispensed today, Battery Modifier: NU (New). Reviewed that patent is eligible for batteries once every 90 days, and how they can request new batteries.    Muna Jules  Audiology Assistant  [facility]  [phone]

## 2020-08-03 ENCOUNTER — OFFICE VISIT (OUTPATIENT)
Dept: CHIROPRACTIC MEDICINE | Facility: OTHER | Age: 60
End: 2020-08-03
Attending: CHIROPRACTOR
Payer: COMMERCIAL

## 2020-08-03 DIAGNOSIS — M99.03 SEGMENTAL AND SOMATIC DYSFUNCTION OF LUMBAR REGION: Primary | ICD-10-CM

## 2020-08-03 DIAGNOSIS — M54.50 ACUTE BILATERAL LOW BACK PAIN WITHOUT SCIATICA: ICD-10-CM

## 2020-08-03 DIAGNOSIS — M99.02 SEGMENTAL AND SOMATIC DYSFUNCTION OF THORACIC REGION: ICD-10-CM

## 2020-08-03 DIAGNOSIS — M99.01 SEGMENTAL AND SOMATIC DYSFUNCTION OF CERVICAL REGION: ICD-10-CM

## 2020-08-03 PROCEDURE — 98941 CHIROPRACT MANJ 3-4 REGIONS: CPT | Mod: AT | Performed by: CHIROPRACTOR

## 2020-08-04 NOTE — PROGRESS NOTES
Subjective Finding:    Chief compalint: Patient presents with:  Back Pain  , Pain Scale: 4/10, Intensity: dull, Duration: 2 days, Radiating: no.    Date of injury:     Activities that the pain restricts:   Home/household/hobbies/social activities: yes.  Work duties: yes.  Sleep: yes.  Makes symptoms better: rest.  Makes symptoms worse: lumbar extension and lumbar flexion.  Have you seen anyone else for the symptoms? No.  Work related: no.  Automobile related injury: no.    Objective and Assessment:    Posture Analysis:   High shoulder: .  Head tilt: .  High iliac crest: .  Head carriage: neutral.  Thoracic Kyphosis: neutral.  Lumbar Lordosis: forward.    Lumbar Range of Motion: flexion decreased and extension decreased.  Cervical Range of Motion: extension decreased.  Thoracic Range of Motion: extension decreased.  Extremity Range of Motion: .    Palpation:   Quad lumb: bilateral, referred pain: no  T paraspinals: dull pain, no    Segmental dysfunction pre-treatment and treatment area: C4, T5, T6 and Sacrum.    Assessment post-treatment:  Cervical: ROM increased.  Thoracic: ROM increased.  Lumbar: ROM increased.    Comments: history of DDD in C and L spine.      Complicating Factors: .    Procedure(s):  CMT:  74764 Chiropractic manipulative treatment 3-4 regions performed   Cervical: Diversified, See above for level, Supine, Thoracic: Diversified, See above for level, Prone and Lumbar: Diversified, See above for level, Side posture    Modalities:  None performed this visit    Therapeutic procedures:  None    Plan:  Treatment plan: PRN.  Instructed patient: stretch as instructed at visit.  Short term goals: increase ROM.  Long term goals: increase ADL.  Prognosis: good.

## 2020-08-06 ENCOUNTER — OFFICE VISIT (OUTPATIENT)
Dept: CHIROPRACTIC MEDICINE | Facility: OTHER | Age: 60
End: 2020-08-06
Attending: CHIROPRACTOR
Payer: COMMERCIAL

## 2020-08-06 DIAGNOSIS — M99.01 SEGMENTAL AND SOMATIC DYSFUNCTION OF CERVICAL REGION: ICD-10-CM

## 2020-08-06 DIAGNOSIS — M99.02 SEGMENTAL AND SOMATIC DYSFUNCTION OF THORACIC REGION: ICD-10-CM

## 2020-08-06 DIAGNOSIS — M54.50 ACUTE BILATERAL LOW BACK PAIN WITHOUT SCIATICA: ICD-10-CM

## 2020-08-06 DIAGNOSIS — M99.03 SEGMENTAL AND SOMATIC DYSFUNCTION OF LUMBAR REGION: Primary | ICD-10-CM

## 2020-08-06 PROCEDURE — 98941 CHIROPRACT MANJ 3-4 REGIONS: CPT | Mod: AT | Performed by: CHIROPRACTOR

## 2020-08-10 ENCOUNTER — OFFICE VISIT (OUTPATIENT)
Dept: CHIROPRACTIC MEDICINE | Facility: OTHER | Age: 60
End: 2020-08-10
Attending: CHIROPRACTOR
Payer: COMMERCIAL

## 2020-08-10 DIAGNOSIS — R11.0 NAUSEA: ICD-10-CM

## 2020-08-10 DIAGNOSIS — M62.830 BACK MUSCLE SPASM: ICD-10-CM

## 2020-08-10 DIAGNOSIS — M99.02 SEGMENTAL AND SOMATIC DYSFUNCTION OF THORACIC REGION: ICD-10-CM

## 2020-08-10 DIAGNOSIS — G89.29 CHRONIC LEFT-SIDED LOW BACK PAIN WITHOUT SCIATICA: ICD-10-CM

## 2020-08-10 DIAGNOSIS — M99.01 SEGMENTAL AND SOMATIC DYSFUNCTION OF CERVICAL REGION: ICD-10-CM

## 2020-08-10 DIAGNOSIS — M99.03 SEGMENTAL AND SOMATIC DYSFUNCTION OF LUMBAR REGION: Primary | ICD-10-CM

## 2020-08-10 DIAGNOSIS — M54.50 CHRONIC LEFT-SIDED LOW BACK PAIN WITHOUT SCIATICA: ICD-10-CM

## 2020-08-10 DIAGNOSIS — M54.50 ACUTE BILATERAL LOW BACK PAIN WITHOUT SCIATICA: ICD-10-CM

## 2020-08-10 PROCEDURE — 98941 CHIROPRACT MANJ 3-4 REGIONS: CPT | Mod: AT | Performed by: CHIROPRACTOR

## 2020-08-10 RX ORDER — IBUPROFEN 600 MG/1
TABLET, FILM COATED ORAL
Qty: 120 TABLET | Refills: 0 | Status: SHIPPED | OUTPATIENT
Start: 2020-08-10 | End: 2020-09-10

## 2020-08-10 NOTE — TELEPHONE ENCOUNTER
ibuprofen       Last Written Prescription Date:  07-  Last Fill Quantity: 120,   # refills: 0  Last Office Visit: 04-  Future Office visit:    Next 5 appointments (look out 90 days)    Aug 10, 2020  2:20 PM CDT  Return Visit with Shamar Escamilla DC  [facility] ([facility]) [address]  [facility] [address]  [phone]   Aug 25, 2020  2:40 PM CDT  (Arrive by 2:25 PM)  [facility] [address] [facility] [address] [phone]   Sep 21, 2020  2:00 PM CDT  (Arrive by 1:45 PM)  [facility] Linda Calix NP  [facility] [address] [facility] [facility] [address]  [phone]           Routing refill request to provider for review/approval because:

## 2020-08-11 RX ORDER — DRONABINOL 2.5 MG/1
CAPSULE ORAL
Qty: 60 CAPSULE | Refills: 0 | Status: SHIPPED | OUTPATIENT
Start: 2020-08-11 | End: 2020-09-10

## 2020-08-11 NOTE — TELEPHONE ENCOUNTER
Dronabinol       Last Written Prescription Date:  3-  Last Fill Quantity: 60,   # refills: 3  Last Office Visit: 4-8-2020  Future Office visit:    Next 5 appointments (look out 90 days)    Aug 13, 2020  2:20 PM CDT  Return Visit with Shamar Escamilla DC  [facility] [place] ([facility] [place]) [address]  [place] [address]  [phone]   Aug 25, 2020  2:40 PM CDT  (Arrive by 2:25 PM)  Return Visit with Laquita Fernandez MD  [facility] [place] ([facility] [place] ) [address] [place] [address]  [place] [id number] [phone]   Sep 21, 2020  2:00 PM CDT  (Arrive by 1:45 PM)  Return Visit with Linda Calix NP  [facility] ([facility] [place] ) [address]  [place] [address]  [phone]        Tizanidine       Last Written Prescription Date:  7-6-2020  Last Fill Quantity: 90,   # refills: 0  Last Office Visit: 4-8-2020  Future Office visit:    Next 5 appointments (look out 90 days)    Aug 13, 2020  2:20 PM CDT  Return Visit with Shamar Escamilla DC  [facility] [place] ([facility]) [address] [place] [address]  [place] [address]  [phone]   Aug 25, 2020  2:40 PM CDT  (Arrive by 2:25 PM)  Return Visit with Laquita Fernandez MD  [facility] [place] ([facility] [place] ) [address] [place] [address]  [place] [id number] [phone]   Sep 21, 2020  2:00 PM CDT  (Arrive by 1:45 PM)  Return Visit with Linda Calix NP  [facility] ([facility] [place] ) [address]  [place] [address]  [phone]

## 2020-08-12 NOTE — PROGRESS NOTES

## 2020-08-13 ENCOUNTER — OFFICE VISIT (OUTPATIENT)
Dept: CHIROPRACTIC MEDICINE | Facility: OTHER | Age: 60
End: 2020-08-13
Attending: CHIROPRACTOR
Payer: COMMERCIAL

## 2020-08-13 DIAGNOSIS — M54.50 ACUTE BILATERAL LOW BACK PAIN WITHOUT SCIATICA: ICD-10-CM

## 2020-08-13 DIAGNOSIS — M99.03 SEGMENTAL AND SOMATIC DYSFUNCTION OF LUMBAR REGION: Primary | ICD-10-CM

## 2020-08-13 DIAGNOSIS — M99.01 SEGMENTAL AND SOMATIC DYSFUNCTION OF CERVICAL REGION: ICD-10-CM

## 2020-08-13 DIAGNOSIS — M99.02 SEGMENTAL AND SOMATIC DYSFUNCTION OF THORACIC REGION: ICD-10-CM

## 2020-08-13 PROCEDURE — 98941 CHIROPRACT MANJ 3-4 REGIONS: CPT | Mod: AT | Performed by: CHIROPRACTOR

## 2020-08-13 NOTE — PROGRESS NOTES

## 2020-08-16 ENCOUNTER — HOSPITAL ENCOUNTER (EMERGENCY)
Facility: HOSPITAL | Age: 60
Discharge: HOME | End: 2020-08-17
Attending: INTERNAL MEDICINE
Payer: COMMERCIAL

## 2020-08-16 ENCOUNTER — APPOINTMENT (OUTPATIENT)
Dept: GENERAL RADIOLOGY | Facility: HOSPITAL | Age: 60
End: 2020-08-16
Attending: INTERNAL MEDICINE
Payer: COMMERCIAL

## 2020-08-16 DIAGNOSIS — K52.9 GASTROENTERITIS: ICD-10-CM

## 2020-08-16 PROCEDURE — 99284 EMERGENCY DEPT VISIT MOD MDM: CPT | Mod: Z6 | Performed by: INTERNAL MEDICINE

## 2020-08-16 PROCEDURE — 93005 ELECTROCARDIOGRAM TRACING: CPT

## 2020-08-16 PROCEDURE — C9803 HOPD COVID-19 SPEC COLLECT: HCPCS

## 2020-08-16 PROCEDURE — 71045 X-RAY EXAM CHEST 1 VIEW: CPT | Mod: TC

## 2020-08-16 PROCEDURE — 84484 ASSAY OF TROPONIN QUANT: CPT | Performed by: INTERNAL MEDICINE

## 2020-08-16 PROCEDURE — 99285 EMERGENCY DEPT VISIT HI MDM: CPT | Mod: 25

## 2020-08-16 NOTE — ED AVS SNAPSHOT
[facility]  [address]  [place] [address]  Phone:  [phone]                                    Joshua Barron   MRN: [id number]    Department:  [facility]   Date of Visit:  8/16/2020           After Visit Summary Signature Page    I have received my discharge instructions, and my questions have been answered. I have discussed any challenges I see with this plan with the nurse or doctor.    ..........................................................................................................................................  Patient/Patient Representative Signature      ..........................................................................................................................................  Patient Representative Print Name and Relationship to Patient    ..................................................               ................................................  Date                                   Time    ..........................................................................................................................................  Reviewed by Signature/Title    ...................................................              ..............................................  Date                                               Time          22EPIC Rev 08/18

## 2020-08-17 ENCOUNTER — OFFICE VISIT (OUTPATIENT)
Dept: CHIROPRACTIC MEDICINE | Facility: OTHER | Age: 60
End: 2020-08-17
Attending: CHIROPRACTOR
Payer: COMMERCIAL

## 2020-08-17 VITALS
DIASTOLIC BLOOD PRESSURE: 98 MMHG | TEMPERATURE: 98.8 F | SYSTOLIC BLOOD PRESSURE: 163 MMHG | RESPIRATION RATE: 18 BRPM | HEART RATE: 62 BPM | OXYGEN SATURATION: 100 %

## 2020-08-17 DIAGNOSIS — M99.02 SEGMENTAL AND SOMATIC DYSFUNCTION OF THORACIC REGION: ICD-10-CM

## 2020-08-17 DIAGNOSIS — M99.03 SEGMENTAL AND SOMATIC DYSFUNCTION OF LUMBAR REGION: Primary | ICD-10-CM

## 2020-08-17 DIAGNOSIS — M99.01 SEGMENTAL AND SOMATIC DYSFUNCTION OF CERVICAL REGION: ICD-10-CM

## 2020-08-17 DIAGNOSIS — M54.50 ACUTE BILATERAL LOW BACK PAIN WITHOUT SCIATICA: ICD-10-CM

## 2020-08-17 LAB
ALBUMIN SERPL-MCNC: 3.8 G/DL (ref 3.4–5)
ALP SERPL-CCNC: 69 U/L (ref 40–150)
ALT SERPL W P-5'-P-CCNC: 42 U/L (ref 0–70)
ANION GAP SERPL CALCULATED.3IONS-SCNC: 6 MMOL/L (ref 3–14)
AST SERPL W P-5'-P-CCNC: 14 U/L (ref 0–45)
BASOPHILS # BLD AUTO: 0 10E9/L (ref 0–0.2)
BASOPHILS NFR BLD AUTO: 0.6 %
BILIRUB SERPL-MCNC: 0.2 MG/DL (ref 0.2–1.3)
BUN SERPL-MCNC: 16 MG/DL (ref 7–30)
CALCIUM SERPL-MCNC: 8 MG/DL (ref 8.5–10.1)
CHLORIDE SERPL-SCNC: 106 MMOL/L (ref 94–109)
CO2 SERPL-SCNC: 24 MMOL/L (ref 20–32)
CREAT SERPL-MCNC: 0.66 MG/DL (ref 0.66–1.25)
CRP SERPL-MCNC: <2.9 MG/L (ref 0–8)
DIFFERENTIAL METHOD BLD: ABNORMAL
EOSINOPHIL # BLD AUTO: 0.2 10E9/L (ref 0–0.7)
EOSINOPHIL NFR BLD AUTO: 3.6 %
ERYTHROCYTE [DISTWIDTH] IN BLOOD BY AUTOMATED COUNT: 13.2 % (ref 10–15)
GFR SERPL CREATININE-BSD FRML MDRD: >90 ML/MIN/{1.73_M2}
GLUCOSE SERPL-MCNC: 112 MG/DL (ref 70–99)
HCT VFR BLD AUTO: 31.7 % (ref 40–53)
HGB BLD-MCNC: 10.9 G/DL (ref 13.3–17.7)
IMM GRANULOCYTES # BLD: 0 10E9/L (ref 0–0.4)
IMM GRANULOCYTES NFR BLD: 0.4 %
LACTATE BLD-SCNC: 0.7 MMOL/L (ref 0.7–2)
LYMPHOCYTES # BLD AUTO: 2.2 10E9/L (ref 0.8–5.3)
LYMPHOCYTES NFR BLD AUTO: 44 %
MCH RBC QN AUTO: 29.7 PG (ref 26.5–33)
MCHC RBC AUTO-ENTMCNC: 34.4 G/DL (ref 31.5–36.5)
MCV RBC AUTO: 86 FL (ref 78–100)
MONOCYTES # BLD AUTO: 0.4 10E9/L (ref 0–1.3)
MONOCYTES NFR BLD AUTO: 7.9 %
NEUTROPHILS # BLD AUTO: 2.2 10E9/L (ref 1.6–8.3)
NEUTROPHILS NFR BLD AUTO: 43.5 %
NRBC # BLD AUTO: 0 10*3/UL
NRBC BLD AUTO-RTO: 0 /100
PLATELET # BLD AUTO: 154 10E9/L (ref 150–450)
POTASSIUM SERPL-SCNC: 4 MMOL/L (ref 3.4–5.3)
PROT SERPL-MCNC: 6.4 G/DL (ref 6.8–8.8)
RBC # BLD AUTO: 3.67 10E12/L (ref 4.4–5.9)
SODIUM SERPL-SCNC: 136 MMOL/L (ref 133–144)
TROPONIN I SERPL-MCNC: <0.015 UG/L (ref 0–0.04)
WBC # BLD AUTO: 5 10E9/L (ref 4–11)

## 2020-08-17 PROCEDURE — U0003 INFECTIOUS AGENT DETECTION BY NUCLEIC ACID (DNA OR RNA); SEVERE ACUTE RESPIRATORY SYNDROME CORONAVIRUS 2 (SARS-COV-2) (CORONAVIRUS DISEASE [COVID-19]), AMPLIFIED PROBE TECHNIQUE, MAKING USE OF HIGH THROUGHPUT TECHNOLOGIES AS DESCRIBED BY CMS-2020-01-R: HCPCS | Performed by: INTERNAL MEDICINE

## 2020-08-17 PROCEDURE — 84484 ASSAY OF TROPONIN QUANT: CPT | Performed by: INTERNAL MEDICINE

## 2020-08-17 PROCEDURE — 80053 COMPREHEN METABOLIC PANEL: CPT | Performed by: INTERNAL MEDICINE

## 2020-08-17 PROCEDURE — 83605 ASSAY OF LACTIC ACID: CPT | Performed by: INTERNAL MEDICINE

## 2020-08-17 PROCEDURE — 85025 COMPLETE CBC W/AUTO DIFF WBC: CPT | Performed by: INTERNAL MEDICINE

## 2020-08-17 PROCEDURE — 36415 COLL VENOUS BLD VENIPUNCTURE: CPT | Performed by: INTERNAL MEDICINE

## 2020-08-17 PROCEDURE — 98941 CHIROPRACT MANJ 3-4 REGIONS: CPT | Mod: AT | Performed by: CHIROPRACTOR

## 2020-08-17 PROCEDURE — 86140 C-REACTIVE PROTEIN: CPT | Performed by: INTERNAL MEDICINE

## 2020-08-17 NOTE — ED NOTES
"Pt presents with c/o, \"I think I have covid, I have looked up the symptoms on line and I seem to have all of them. I think I might have pneumonia. I feel really tired, my lungs burn and a sore throat and I have had all of these symptoms for over a month. I am having a burning problem with my breathing and it got worse today. I am having SOB. I have had a migraine headache every day.I have had diarrhea off and on for about a month 2 to 3 times a day. I have had have had a poor appetite. I have lost my sense of smell and taste 3 weeks ago. I can actually taste a little bit today. I was getting fevers 2 weeks ago. I feel so woozy.\"   "

## 2020-08-18 LAB
SARS-COV-2 RNA SPEC QL NAA+PROBE: NOT DETECTED
SPECIMEN SOURCE: NORMAL

## 2020-08-18 NOTE — PROGRESS NOTES

## 2020-08-19 ENCOUNTER — NURSE TRIAGE (OUTPATIENT)
Dept: PEDIATRICS | Facility: OTHER | Age: 60
End: 2020-08-19

## 2020-08-19 NOTE — TELEPHONE ENCOUNTER
"Call from patient reporting continued cough with increased shortness of breath over the last 2-3 weeks.     In to ED on 20, tested for COVID with negative result. Symptoms starting x 3 weeks ago. See protocol for details.     Instructed to continue with Albuterol Inhaler, patient also reporting he has a neb machine and solution at home, unsure if the neb solution is . Notified to check the expiration date and contact the pharmacy for further questions.    Appt has been coordinated with Dr. Fisher for further evaluation and treatment.      Next 5 appointments (look out 90 days)    Aug 20, 2020  3:10 PM CDT  Return Visit with Shamar Escamilla DC  [facility] [place] [facility] ([facility]) [address]  [place] [address]  [phone]   Aug 20, 2020  3:30 PM CDT  (Arrive by 3:15 PM)  SHORT with Lyle Fisher DO  [facility] [place] ([facility] [place] ) [address]  [place] [address]  [phone]   Aug 25, 2020  2:40 PM CDT  (Arrive by 2:25 PM)  Return Visit with Laquita Fernandez MD  [facility] [place] ([facility] [place] ) [address]  [place] [id number] [phone]   Sep 21, 2020  2:00 PM CDT  (Arrive by 1:45 PM)  Return Visit with Linda Calix NP  [facility] [place] ([facility] [place] ) [address] [facility]  [place] [address]  [phone]            Additional Information    Negative: SEVERE difficulty breathing (e.g., struggling for each breath, speaks in single words)    Negative: Difficult to awaken or acting confused (e.g., disoriented, slurred speech)    Negative: Bluish (or gray) lips or face now    Negative: Shock suspected (e.g., cold/pale/clammy skin, too weak to stand, low BP, rapid pulse)    Negative: Sounds like a life-threatening emergency to the triager    Answer Assessment - Initial Assessment Questions  1. COVID-19 DIAGNOSIS: \"Who made your Coronavirus (COVID-19) " "diagnosis?\" \"Was it confirmed by a positive lab test?\" If not diagnosed by a HCP, ask \"Are there lots of cases (community spread) where you live?\" (See public health department website, if unsure)      Patient has not been diagnosed with covid 19    2. ONSET: \"When did the COVID-19 symptoms start?\"       3 weeks    3. WORST SYMPTOM: \"What is your worst symptom?\" (e.g., cough, fever, shortness of breath, muscle aches)      Shortness of breath/difficulty breathing    4. COUGH: \"Do you have a cough?\" If so, ask: \"How bad is the cough?\"        Yes, productive     5. FEVER: \"Do you have a fever?\" If so, ask: \"What is your temperature, how was it measured, and when did it start?\"      No (reports felt as if he had a fever x 3 weeks ago when the symptoms started)    6. RESPIRATORY STATUS: \"Describe your breathing?\" (e.g., shortness of breath, wheezing, unable to speak)       Shortness of breath     7. BETTER-SAME-WORSE: \"Are you getting better, staying the same or getting worse compared to yesterday?\"  If getting worse, ask, \"In what way?\"      Same as yesterday    8. HIGH RISK DISEASE: \"Do you have any chronic medical problems?\" (e.g., asthma, heart or lung disease, weak immune system, etc.)      Yes, COPD     9. PREGNANCY: \"Is there any chance you are pregnant?\" \"When was your last menstrual period?\"      nA   10. OTHER SYMPTOMS: \"Do you have any other symptoms?\"  (e.g., chills, fatigue, headache, loss of smell or taste, muscle pain, sore throat)        Headache, loss of smell and taste, sore throat and muscle pain.    Protocols used: CORONAVIRUS (COVID-19) DIAGNOSED OR [id number]-AD 5.16.20      "

## 2020-08-19 NOTE — PROGRESS NOTES

## 2020-08-20 ENCOUNTER — OFFICE VISIT (OUTPATIENT)
Dept: PEDIATRICS | Facility: OTHER | Age: 60
End: 2020-08-20
Attending: INTERNAL MEDICINE
Payer: COMMERCIAL

## 2020-08-20 VITALS
OXYGEN SATURATION: 98 % | WEIGHT: 183.4 LBS | BODY MASS INDEX: 26.32 KG/M2 | SYSTOLIC BLOOD PRESSURE: 140 MMHG | DIASTOLIC BLOOD PRESSURE: 96 MMHG | TEMPERATURE: 97.4 F | HEART RATE: 82 BPM

## 2020-08-20 DIAGNOSIS — I10 ESSENTIAL HYPERTENSION: ICD-10-CM

## 2020-08-20 DIAGNOSIS — J45.40 MODERATE PERSISTENT ASTHMA WITHOUT COMPLICATION: Primary | ICD-10-CM

## 2020-08-20 PROCEDURE — 99214 OFFICE O/P EST MOD 30 MIN: CPT | Performed by: INTERNAL MEDICINE

## 2020-08-20 PROCEDURE — G0463 HOSPITAL OUTPT CLINIC VISIT: HCPCS

## 2020-08-20 RX ORDER — FLUTICASONE PROPIONATE AND SALMETEROL XINAFOATE 230; 21 UG/1; UG/1
1 AEROSOL, METERED RESPIRATORY (INHALATION) 2 TIMES DAILY
Qty: 2 INHALER | Refills: 2 | Status: SHIPPED | OUTPATIENT
Start: 2020-08-20 | End: 2020-09-08

## 2020-08-20 RX ORDER — METOPROLOL SUCCINATE 25 MG/1
25 TABLET, EXTENDED RELEASE ORAL DAILY
Qty: 30 TABLET | Refills: 1 | Status: SHIPPED | OUTPATIENT
Start: 2020-08-20 | End: 2020-09-08

## 2020-08-20 ASSESSMENT — PAIN SCALES - GENERAL: PAINLEVEL: NO PAIN (0)
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
## 2020-08-20 NOTE — NURSING NOTE
"Chief Complaint   Patient presents with     Cough     Breathing Problem       Initial BP (!) 140/96 (BP Location: Right arm, Patient Position: Chair, Cuff Size: Adult Regular)   Pulse 82   Temp 97.4  F (36.3  C) (Tympanic)   Wt 83.2 kg (183 lb 6.4 oz)   SpO2 98%   BMI 26.32 kg/m   Estimated body mass index is 26.32 kg/m  as calculated from the following:    Height as of 7/24/20: 1.778 m (5' 10\").    Weight as of this encounter: 83.2 kg (183 lb 6.4 oz).  Medication Reconciliation: complete  Obi Rubio LPN  " n/a

## 2020-08-21 ASSESSMENT — ENCOUNTER SYMPTOMS
NECK PAIN: 0
CONFUSION: 0
SHORTNESS OF BREATH: 1
ANAL BLEEDING: 0
NUMBNESS: 0
VOICE CHANGE: 0
SLEEP DISTURBANCE: 0
WHEEZING: 0
WEAKNESS: 0
MYALGIAS: 1
FEVER: 0
CHEST TIGHTNESS: 0
VOMITING: 0
BLOOD IN STOOL: 0
DYSURIA: 0
DIAPHORESIS: 0
PALPITATIONS: 0
LIGHT-HEADEDNESS: 0
ABDOMINAL PAIN: 1
BACK PAIN: 1
DIZZINESS: 0
COUGH: 0
ABDOMINAL DISTENTION: 0
FLANK PAIN: 0
NAUSEA: 0
HEADACHES: 0
ARTHRALGIAS: 0
COLOR CHANGE: 0
CHILLS: 0
FREQUENCY: 0
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
## 2020-08-24 ENCOUNTER — OFFICE VISIT (OUTPATIENT)
Dept: CHIROPRACTIC MEDICINE | Facility: OTHER | Age: 60
End: 2020-08-24
Attending: CHIROPRACTOR
Payer: COMMERCIAL

## 2020-08-24 DIAGNOSIS — M54.50 ACUTE BILATERAL LOW BACK PAIN WITHOUT SCIATICA: ICD-10-CM

## 2020-08-24 DIAGNOSIS — M99.01 SEGMENTAL AND SOMATIC DYSFUNCTION OF CERVICAL REGION: ICD-10-CM

## 2020-08-24 DIAGNOSIS — F41.1 GAD (GENERALIZED ANXIETY DISORDER): ICD-10-CM

## 2020-08-24 DIAGNOSIS — M99.03 SEGMENTAL AND SOMATIC DYSFUNCTION OF LUMBAR REGION: Primary | ICD-10-CM

## 2020-08-24 DIAGNOSIS — F11.90 CHRONIC, CONTINUOUS USE OF OPIOIDS: ICD-10-CM

## 2020-08-24 DIAGNOSIS — M99.02 SEGMENTAL AND SOMATIC DYSFUNCTION OF THORACIC REGION: ICD-10-CM

## 2020-08-24 PROCEDURE — 98941 CHIROPRACT MANJ 3-4 REGIONS: CPT | Mod: AT | Performed by: CHIROPRACTOR

## 2020-08-24 RX ORDER — HYDROXYZINE PAMOATE 50 MG/1
CAPSULE ORAL
Qty: 120 CAPSULE | Refills: 0 | Status: SHIPPED | OUTPATIENT
Start: 2020-08-24 | End: 2020-09-17

## 2020-08-24 RX ORDER — HYDROCODONE BITARTRATE AND ACETAMINOPHEN 10; 325 MG/1; MG/1
TABLET ORAL
Qty: 60 TABLET | Refills: 0 | Status: SHIPPED | OUTPATIENT
Start: 2020-08-24 | End: 2020-09-28

## 2020-08-24 NOTE — TELEPHONE ENCOUNTER
norco      Last Written Prescription Date:  7.27.2020  Last Fill Quantity: 60,   # refills: 0  Last Office Visit: 08.20.2020    vistaril      Last Written Prescription Date:  8.3.2020  Last Fill Quantity: 120,   # refills: 0  Last Office Visit: 08.20.2020

## 2020-08-25 ENCOUNTER — OFFICE VISIT (OUTPATIENT)
Dept: PSYCHIATRY | Facility: OTHER | Age: 60
End: 2020-08-25
Attending: PSYCHIATRY & NEUROLOGY
Payer: COMMERCIAL

## 2020-08-25 VITALS
OXYGEN SATURATION: 97 % | SYSTOLIC BLOOD PRESSURE: 132 MMHG | TEMPERATURE: 98.2 F | BODY MASS INDEX: 26.2 KG/M2 | WEIGHT: 183 LBS | HEIGHT: 70 IN | HEART RATE: 71 BPM | DIASTOLIC BLOOD PRESSURE: 76 MMHG

## 2020-08-25 DIAGNOSIS — F90.2 ADHD (ATTENTION DEFICIT HYPERACTIVITY DISORDER), COMBINED TYPE: ICD-10-CM

## 2020-08-25 PROCEDURE — 99214 OFFICE O/P EST MOD 30 MIN: CPT | Performed by: PSYCHIATRY & NEUROLOGY

## 2020-08-25 PROCEDURE — G0463 HOSPITAL OUTPT CLINIC VISIT: HCPCS

## 2020-08-25 RX ORDER — LISDEXAMFETAMINE DIMESYLATE 70 MG/1
70 CAPSULE ORAL DAILY
Qty: 30 CAPSULE | Refills: 0 | Status: SHIPPED | OUTPATIENT
Start: 2020-08-25 | End: 2020-10-13

## 2020-08-25 RX ORDER — LISDEXAMFETAMINE DIMESYLATE 70 MG/1
70 CAPSULE ORAL EVERY MORNING
Qty: 30 CAPSULE | Refills: 0 | Status: SHIPPED | OUTPATIENT
Start: 2020-09-23 | End: 2020-10-26

## 2020-08-25 ASSESSMENT — ANXIETY QUESTIONNAIRES
2. NOT BEING ABLE TO STOP OR CONTROL WORRYING: NOT AT ALL
3. WORRYING TOO MUCH ABOUT DIFFERENT THINGS: NOT AT ALL
6. BECOMING EASILY ANNOYED OR IRRITABLE: SEVERAL DAYS
1. FEELING NERVOUS, ANXIOUS, OR ON EDGE: SEVERAL DAYS
5. BEING SO RESTLESS THAT IT IS HARD TO SIT STILL: NOT AT ALL

## 2020-08-25 ASSESSMENT — PATIENT HEALTH QUESTIONNAIRE - PHQ9
SUM OF ALL RESPONSES TO PHQ QUESTIONS 1-9: 5
5. POOR APPETITE OR OVEREATING: NOT AT ALL

## 2020-08-25 ASSESSMENT — PAIN SCALES - GENERAL: PAINLEVEL: EXTREME PAIN (8)

## 2020-08-25 ASSESSMENT — MIFFLIN-ST. JEOR: SCORE: 1661.33

## 2020-08-25 NOTE — NURSING NOTE
"Chief Complaint   Patient presents with     RECHECK     Mental health.       Initial /76 (BP Location: Left arm, Patient Position: Sitting, Cuff Size: Adult Regular)   Pulse 71   Temp 98.2  F (36.8  C) (Tympanic)   Ht 1.778 m (5' 10\")   Wt 83 kg (183 lb)   SpO2 97%   BMI 26.26 kg/m   Estimated body mass index is 26.26 kg/m  as calculated from the following:    Height as of this encounter: 1.778 m (5' 10\").    Weight as of this encounter: 83 kg (183 lb).  Medication Reconciliation: complete  JACQUELYN SUAREZ LPN    "

## 2020-08-25 NOTE — PROGRESS NOTES
"                                                                          Social- Was  twice. Lives alone with his 3 cats: Smoky the cat. Has a GF in [place]  Children-  2 kids, they are in [place]  Last visit 7/24/20:  Continue Trileptal 600 mg bid. Now off diazepam.  Continue trazodone 100 mg bedtime prn insomnia. Continue Vyvanse 70 mg daily last fill 7/17/20    - Joshua notes his neighbors still bothering him and he has to lock everything up  - today notes he is doing \"okay\"   - weight stabilized  - has been having issues with waking up fully and like still experiencing his dreams / nightmares while awake  - mom with dementia, then fell and broke her hip  - been having some migraines and feeling stuffed up. Saw ENT 7/20   - Lots of family issues: Joshua has taken care of his parents and yet parents give his other brothers everything. oldest of 3 brothers. Brother in [place] youngest Mark and Major is here. Mom and dad here [place] on [place]. Joshua noting moving here to be closer to parents and help them, etc. But, parents don't seem to want him around much or talk to him.    SUBSTANCE USE- reports no abuse of meds or substances    SYMPTOMS- paranoia, hypnopompic hallucinations, issues with attention and concentration improved with Vyvanse: racing thoughts, depressed mood, impulsivity, distractibility  MEDICAL ROS- back pain, denies any issues with headaches or stomach pain / issues with stimulant. Intentional weight loss  MEDICAL / SURGICAL HISTORY                     Patient Active Problem List   Diagnosis     Mixed hyperlipidemia     Tobacco Abuse, History of     Degeneration of lumbar or lumbosacral intervertebral disc     Depression, major     Chronic, continuous use of opioids     Chemical dependency (H)     Chronic rhinitis     Tinnitus of both ears     SNHL (sensorineural hearing loss)     Bipolar disorder (H)     Seizure-like activity (H)     Somatic dysfunction of pelvis region     Bilateral foot pain     Prostate " cancer screening     Trigger index finger of right hand     Moderate persistent asthma without complication     Chronic lower back pain     ACP (advance care planning)     Onychia of toe of left foot     DDD (degenerative disc disease), lumbar     Seizure disorder (H)     Back muscle spasm     Benign essential hypertension     Retrograde ejaculation     Allergic rhinitis due to other allergen     Cervical spondylosis without myelopathy     Chronic headaches     DDD (degenerative disc disease)     Generalized anxiety disorder     Hypertrophy of prostate without urinary obstruction and other lower urinary tract symptoms (LUTS)     GERD (gastroesophageal reflux disease)     Lumbago     Major depressive disorder, recurrent episode, moderate (H)     Myalgia and myositis     Hyperlipidemia     Other pain disorders related to psychological factors     Spinal stenosis in cervical region     Status post lumbar spinal fusion     Tobacco use disorder     Trigger finger     Polypharmacy     Deviated nasal septum     Attention deficit hyperactivity disorder (ADHD), combined type     chronic noninfective otitis externa     Migraine with aura and without status migrainosus, not intractable     ALLERGY   Cymbalta; Depakote [valproic acid]; and Seasonal allergies  MEDICATIONS                                                                                             Current Outpatient Medications   Medication Sig     acetaminophen (TYLENOL) 325 MG tablet TAKE 2 TABLETS BY MOUTH EVERY 4 HOURS AS NEEDED FOR MILD PAIN     albuterol (PROAIR HFA/PROVENTIL HFA/VENTOLIN HFA) 108 (90 Base) MCG/ACT inhaler USE 2 PUFFS 4 TIMES A DAY AS NEEDED FOR SHORTNESS OF BREATH     aspirin (ASPIRIN LOW DOSE) 81 MG chewable tablet CHEW AND SWALLOW 1 TABLET BY MOUTH DAILY     Aspirin-Acetaminophen-Caffeine (EQ HEADACHE RELIEF PO) Take 200 mg by mouth     atorvastatin (LIPITOR) 20 MG tablet Take 1 tablet (20 mg) by mouth daily     baclofen (LIORESAL) 20 MG  tablet TAKE 1 TABLET BY MOUTH 4 TIMES DAILY     diclofenac (VOLTAREN) 50 MG EC tablet TAKE 1 TABLET BY MOUTH TWICE DAILY     docusate sodium (DOK) 100 MG capsule TAKE 1 CAPSULE BY MOUTH TWICE DAILY     dronabinol (MARINOL) 2.5 MG capsule TAKE 1 CAPSULE BY MOUTH 2 TIMES DAILY BEFORE MEALS     famotidine (PEPCID) 40 MG tablet Take 1 tablet (40 mg) by mouth daily     fluticasone-salmeterol (ADVAIR-HFA) 230-21 MCG/ACT inhaler Inhale 1 puff into the lungs 2 times daily     gabapentin (NEURONTIN) 300 MG capsule TAKE 3 CAPSULES BY MOUTH THREE TIMES DAILY     HYDROcodone-acetaminophen (NORCO)  MG per tablet TAKE 1 TABLET BY MOUTH 2 TIMES DAILY AS NEEDED FOR SEVERE PAIN     hydrOXYzine (VISTARIL) 50 MG capsule TAKE 1 TO 2 CAPSULES BY MOUTH 4 TIMES DAILY AS NEEDED FOR ANXIETY     ibuprofen (ADVIL/MOTRIN) 600 MG tablet TAKE 1 TABLET BY MOUTH EVERY 6 HOURS AS NEEDED     lidocaine (LIDODERM) 5 % patch PLACE 3 PATCHES ONTO SKIN DAILY FOR 12 HOURS AND REMOVE.     lidocaine (XYLOCAINE) 5 % external ointment Apply to the back using gloves three times per vday as needed for moderate to severe pain.     lisdexamfetamine (VYVANSE) 70 MG capsule Take 1 capsule (70 mg) by mouth daily     loratadine (CLARITIN) 10 MG tablet Take 10 mg by mouth daily     losartan (COZAAR) 50 MG tablet TAKE 1 TABLET BY MOUTH DAILY     methocarbamol (ROBAXIN) 500 MG tablet TAKE 1 TABLET BY MOUTH 3 TIMES DAILY     metoprolol succinate ER (TOPROL-XL) 25 MG 24 hr tablet Take 1 tablet (25 mg) by mouth daily     morphine (MS CONTIN) 15 MG CR tablet TAKE 1 TABLET BY MOUTH EVERY 8 HOURS     multivitamin  with iron (SM COMPLETE ADVANCED FORMULA) TABS TAKE 1 TABLET BY MOUTH DAILY     naloxone (NARCAN) 1 mg/mL for intranasal kit (2 syringes with 2 mucosal atomizer device) In opioid overdose put cone in nostril and push 1/2 of contents into each nostril.  Repeat every 3 min if no response until help arrives.     nortriptyline (PAMELOR) 50 MG capsule TAKE 2  "CAPSULES (100MG) BY MOUTH AT BEDTIME     omeprazole (PRILOSEC) 20 MG DR capsule Take 1 capsule (20 mg) by mouth 2 times daily TAKE 1 CAPSULE BY MOUTH EVERY DAY BEFORE A MEAL     order for DME Equipment being ordered: Thoracic and lumbar Back Brace     order for DME 1 boa back brace     ORDER FOR DME Equipment being ordered: Large gloves     OXcarbazepine (TRILEPTAL) 600 MG tablet TAKE 1 TABLET BY MOUTH 2 TIMES DAILY     senna-docusate (SENNA-PLUS) 8.6-50 MG tablet TAKE 1 OR 2 TABLETS BY MOUTH 2 TIMES A DAY     SUMAtriptan (IMITREX) 50 MG tablet Take 1 tablet (50 mg) by mouth at onset of headache for migraine May repeat in 2 hours. Max 4 tablets/24 hours.     tamsulosin (FLOMAX) 0.4 MG capsule TAKE 1 CAPSULE BY MOUTH DAILY     tiZANidine (ZANAFLEX) 4 MG tablet TAKE 1 TABLET BY MOUTH 3 TIMES A DAY     traZODone (DESYREL) 100 MG tablet Take 1 tablet (100 mg) by mouth At Bedtime     No current facility-administered medications for this visit.        VITALS   /76 (BP Location: Left arm, Patient Position: Sitting, Cuff Size: Adult Regular)   Pulse 71   Temp 98.2  F (36.8  C) (Tympanic)   Ht 1.778 m (5' 10\")   Wt 83 kg (183 lb)   SpO2 97%   BMI 26.26 kg/m       LABS                                                                                                                           Last Comprehensive Metabolic Panel:  Sodium   Date Value Ref Range Status   08/17/2020 136 133 - 144 mmol/L Final     Potassium   Date Value Ref Range Status   08/17/2020 4.0 3.4 - 5.3 mmol/L Final     Chloride   Date Value Ref Range Status   08/17/2020 106 94 - 109 mmol/L Final     Carbon Dioxide   Date Value Ref Range Status   08/17/2020 24 20 - 32 mmol/L Final     Anion Gap   Date Value Ref Range Status   08/17/2020 6 3 - 14 mmol/L Final     Glucose   Date Value Ref Range Status   08/17/2020 112 (H) 70 - 99 mg/dL Final     Urea Nitrogen   Date Value Ref Range Status   08/17/2020 16 7 - 30 mg/dL Final     Creatinine   Date " Value Ref Range Status   08/17/2020 0.66 0.66 - 1.25 mg/dL Final     GFR Estimate   Date Value Ref Range Status   08/17/2020 >90 >60 mL/min/[1.73_m2] Final     Comment:     Non  GFR Calc  Starting 12/18/2018, serum creatinine based estimated GFR (eGFR) will be   calculated using the Chronic Kidney Disease Epidemiology Collaboration   (CKD-EPI) equation.       Calcium   Date Value Ref Range Status   08/17/2020 8.0 (L) 8.5 - 10.1 mg/dL Final       CBC RESULTS:   Recent Labs   Lab Test 08/17/20  0015   WBC 5.0   RBC 3.67*   HGB 10.9*   HCT 31.7*   MCV 86   MCH 29.7   MCHC 34.4   RDW 13.2          Recent Labs   Lab Test 10/25/18  1434 03/17/16  1412 11/17/14  1045 12/23/13  0949   CHOL 131 217* 185 161   HDL 51 36* 44 41   LDL 60 Cannot estimate LDL when triglyceride exceeds 400 mg/dL 86 62   TRIG 101 431* 275* 289*   CHOLHDLRATIO  --   --  4.2 3.9       EKG 6/3/19 with QTc of 415 ms     MENTAL STATUS EXAM                                                                                          Alert. Oriented to person, place, and date / time. Casually groomed, calm, cooperative with good eye contact. No problems psychomotor behavior. Speech: loud. .  Mood was described as frustrated and affect was congruent to speech content and full range. Thought process, including associations, was unremarkable and thought content was devoid of suicidal and homicidal ideation.  No hallucinations. Insight was poor Judgment was  adequate for safety. Fund of knowledge was intact. Pt demonstrates no obvious problems with attention, concentration, language, recent or remote memory although these were not formally tested.       ASSESSMENT                                                                                                      HISTORICAL:  Initial psych note 10/6/15          NOTES:      Joshua is a 59 year old with bipolar, ADHD, and MDD.  We started Valium as dual purpose: muscle relaxant properties and  for anxiety. We have discussed the new guidelines for short - term use of benzodiazepines (Valium) and avoiding their use along with opioids. I had noted plan to taper down over time and eventually discontinue. We decreased from 5 mg every 12 hours as needed down to 2 mg every 12 hours as needed. March '20 we decreased to once daily and then discontinued. Watching CBC : given he is on Trileptal following he runs lower but overall looks stable.    Filled vyvanse 8/25/20 and 9/23/20        TREATMENT RISK STATEMENT:  The risks, benefits, alternatives and potential adverse effects have been explained and are understood by the pt.  The pt agrees to the treatment plan with the ability to do so.   The pt knows to call the clinic for any problems or access emergency care if needed.        DIAGNOSES                    Bipolar disorder I with psychosis vs. Schizoaffective disorder, bipolar type  ADHD      PLAN                                                                                                                    1)  MEDICATIONS:       Continue Trileptal 600 mg bid. Now off diazepam.  Continue trazodone 100 mg bedtime prn insomnia. Continue Vyvanse 70 mg daily  filled today 8/25 and for 9/23    2)  THERAPY:  No change    3)  LABS:  CBC 8/ 2020    4)  PT MONITOR [call for probs]:  SEs from meds, worsening sx, SI/HI    5)  REFERRALS [CD, medical, other]:  None    6)  RTC: 2 months

## 2020-08-26 NOTE — PROGRESS NOTES

## 2020-08-27 ENCOUNTER — OFFICE VISIT (OUTPATIENT)
Dept: CHIROPRACTIC MEDICINE | Facility: OTHER | Age: 60
End: 2020-08-27
Attending: CHIROPRACTOR
Payer: COMMERCIAL

## 2020-08-27 DIAGNOSIS — M99.03 SEGMENTAL AND SOMATIC DYSFUNCTION OF LUMBAR REGION: Primary | ICD-10-CM

## 2020-08-27 DIAGNOSIS — M99.01 SEGMENTAL AND SOMATIC DYSFUNCTION OF CERVICAL REGION: ICD-10-CM

## 2020-08-27 DIAGNOSIS — M99.02 SEGMENTAL AND SOMATIC DYSFUNCTION OF THORACIC REGION: ICD-10-CM

## 2020-08-27 DIAGNOSIS — M54.50 ACUTE BILATERAL LOW BACK PAIN WITHOUT SCIATICA: ICD-10-CM

## 2020-08-27 PROCEDURE — 98941 CHIROPRACT MANJ 3-4 REGIONS: CPT | Mod: AT | Performed by: CHIROPRACTOR

## 2020-09-01 NOTE — PROGRESS NOTES
"Subjective     Joshua Barron is a 59 year old male who presents to clinic today for the following health issues:    HPI   `Hypertension Follow-up      Do you check your blood pressure regularly outside of the clinic? No     Are you following a low salt diet? No    Are your blood pressures ever more than 140 on the top number (systolic) OR more   than 90 on the bottom number (diastolic), for example 140/90? No  He took his blood pressure medications in the last hour or 2 as he woke up late.    BP Readings from Last 6 Encounters:   09/08/20 (!) 140/82   08/25/20 132/76   08/20/20 (!) 140/96   08/17/20 163/98   07/24/20 130/80   07/20/20 120/80     Asthma Follow-Up    Was ACT completed today?    Yes    ACT Total Scores 9/8/2020   ACT TOTAL SCORE -   ASTHMA ER VISITS -   ASTHMA HOSPITALIZATIONS -   ACT TOTAL SCORE (Goal Greater than or Equal to 20) 8   In the past 12 months, how many times did you visit the emergency room for your asthma without being admitted to the hospital? 0   In the past 12 months, how many times were you hospitalized overnight because of your asthma? 0     How many days per week do you miss taking your asthma controller medication?  0    Please describe any recent triggers for your asthma: Patient is unaware of triggers    Have you had any Emergency Room Visits, Urgent Care Visits, or Hospital Admissions since your last office visit?  No    He has started Advair after the last visit.  He continues to have wheezing.  He has chest tightness in the morning.      Review of Systems   Constitutional, HEENT, cardiovascular, pulmonary, gi and gu systems are negative, except as otherwise noted.      Objective    BP (!) 140/82 (Patient Position: Sitting)   Pulse 118   Ht 1.803 m (5' 11\")   Wt 84.8 kg (187 lb)   SpO2 98%   BMI 26.08 kg/m    Body mass index is 26.08 kg/m .  Physical Exam   GENERAL: healthy, alert and no distress  EYES: Eyes grossly normal to inspection, PERRL and conjunctivae and " sclerae normal  NECK: no adenopathy, no asymmetry, masses, or scars and thyroid normal to palpation  RESP: lungs clear to auscultation - no rales, rhonchi or wheezes  CV: regular rate and rhythm, normal S1 S2, no S3 or S4, no murmur, click or rub, no peripheral edema and peripheral pulses strong  ABDOMEN: soft, nontender, no hepatosplenomegaly, no masses and bowel sounds normal  MS: no gross musculoskeletal defects noted, no edema  PSYCH: mentation appears normal, affect normal/bright    Results for orders placed or performed in visit on 09/08/20   Lipid Profile     Status: None   Result Value Ref Range    Cholesterol 184 <200 mg/dL    Triglycerides 102 <150 mg/dL    HDL Cholesterol 78 >39 mg/dL    LDL Cholesterol Calculated 86 <100 mg/dL    Non HDL Cholesterol 106 <130 mg/dL   Hepatitis C antibody     Status: None   Result Value Ref Range    Hepatitis C Antibody Nonreactive NR^Nonreactive   CBC with platelets     Status: Abnormal   Result Value Ref Range    WBC 7.1 4.0 - 11.0 10e9/L    RBC Count 4.18 (L) 4.4 - 5.9 10e12/L    Hemoglobin 12.2 (L) 13.3 - 17.7 g/dL    Hematocrit 36.9 (L) 40.0 - 53.0 %    MCV 88 78 - 100 fl    MCH 29.2 26.5 - 33.0 pg    MCHC 33.1 31.5 - 36.5 g/dL    RDW 14.2 10.0 - 15.0 %    Platelet Count 179 150 - 450 10e9/L   Iron and iron binding capacity     Status: None   Result Value Ref Range    Iron 123 35 - 180 ug/dL    Iron Binding Cap 371 240 - 430 ug/dL    Iron Saturation Index 33 15 - 46 %   Ferritin     Status: None   Result Value Ref Range    Ferritin 46 26 - 388 ng/mL               ASSESSMENT /PLAN:   (I10) Essential hypertension  Comment: Borderline control with tachycardia   Plan:   Home Blood Pressure Monitor Order for DME - ONLY FOR DME, increase metoprolol succinate ER  (TOPROL-XL) from 25 MG to 50 MG 24 hr tablet    (J45.40) Moderate persistent asthma without complication  Comment: Joshua is a poor historian and his ACT score does not correlate with exam findings  Plan:   He will  continue fluticasone-salmeterol (ADVAIR-HFA) 230-21  MCG/ACT inhaler, increase from 1 puff BID to 2 puffs BID    (E78.2) Mixed hyperlipidemia  Comment: Controlled  Plan:  He will continue Lipitor 20 mg daily.    (Z11.59) Need for hepatitis C screening test  (primary encounter diagnosis)  Comment: His Hepatitis C is negatuve  Plan:   No further testing needs to be done.    (F11.90) Chronic, continuous use of opioids  Comment:   Plan:   Pain Drug Scr UR W Rptd Meds    (D64.9) Anemia, unspecified type  Comment: His iron levels are normal and his hemoglobin has improved.  Plan:   Follow clinically with repast CBC in 4-6 months.        Follow up with Provider - 10-14 days for HTN and ASTHMA      Lyle Fisher DO, DO

## 2020-09-08 ENCOUNTER — OFFICE VISIT (OUTPATIENT)
Dept: PEDIATRICS | Facility: OTHER | Age: 60
End: 2020-09-08
Attending: INTERNAL MEDICINE
Payer: COMMERCIAL

## 2020-09-08 VITALS
SYSTOLIC BLOOD PRESSURE: 140 MMHG | BODY MASS INDEX: 26.18 KG/M2 | DIASTOLIC BLOOD PRESSURE: 82 MMHG | HEIGHT: 71 IN | HEART RATE: 118 BPM | OXYGEN SATURATION: 98 % | WEIGHT: 187 LBS

## 2020-09-08 DIAGNOSIS — F11.90 CHRONIC, CONTINUOUS USE OF OPIOIDS: ICD-10-CM

## 2020-09-08 DIAGNOSIS — J45.40 MODERATE PERSISTENT ASTHMA WITHOUT COMPLICATION: ICD-10-CM

## 2020-09-08 DIAGNOSIS — I10 ESSENTIAL HYPERTENSION: ICD-10-CM

## 2020-09-08 DIAGNOSIS — E78.2 MIXED HYPERLIPIDEMIA: ICD-10-CM

## 2020-09-08 DIAGNOSIS — Z11.59 NEED FOR HEPATITIS C SCREENING TEST: Primary | ICD-10-CM

## 2020-09-08 DIAGNOSIS — D64.9 ANEMIA, UNSPECIFIED TYPE: ICD-10-CM

## 2020-09-08 LAB
CHOLEST SERPL-MCNC: 184 MG/DL
ERYTHROCYTE [DISTWIDTH] IN BLOOD BY AUTOMATED COUNT: 14.2 % (ref 10–15)
FERRITIN SERPL-MCNC: 46 NG/ML (ref 26–388)
HCT VFR BLD AUTO: 36.9 % (ref 40–53)
HDLC SERPL-MCNC: 78 MG/DL
HGB BLD-MCNC: 12.2 G/DL (ref 13.3–17.7)
IRON SATN MFR SERPL: 33 % (ref 15–46)
IRON SERPL-MCNC: 123 UG/DL (ref 35–180)
LDLC SERPL CALC-MCNC: 86 MG/DL
MCH RBC QN AUTO: 29.2 PG (ref 26.5–33)
MCHC RBC AUTO-ENTMCNC: 33.1 G/DL (ref 31.5–36.5)
MCV RBC AUTO: 88 FL (ref 78–100)
NONHDLC SERPL-MCNC: 106 MG/DL
PLATELET # BLD AUTO: 179 10E9/L (ref 150–450)
RBC # BLD AUTO: 4.18 10E12/L (ref 4.4–5.9)
TIBC SERPL-MCNC: 371 UG/DL (ref 240–430)
TRIGL SERPL-MCNC: 102 MG/DL
WBC # BLD AUTO: 7.1 10E9/L (ref 4–11)

## 2020-09-08 PROCEDURE — 80061 LIPID PANEL: CPT | Mod: ZL | Performed by: INTERNAL MEDICINE

## 2020-09-08 PROCEDURE — 82728 ASSAY OF FERRITIN: CPT | Mod: ZL | Performed by: INTERNAL MEDICINE

## 2020-09-08 PROCEDURE — 83540 ASSAY OF IRON: CPT | Mod: ZL | Performed by: INTERNAL MEDICINE

## 2020-09-08 PROCEDURE — 86803 HEPATITIS C AB TEST: CPT | Mod: ZL | Performed by: INTERNAL MEDICINE

## 2020-09-08 PROCEDURE — 83550 IRON BINDING TEST: CPT | Mod: ZL | Performed by: INTERNAL MEDICINE

## 2020-09-08 PROCEDURE — 85027 COMPLETE CBC AUTOMATED: CPT | Mod: ZL | Performed by: INTERNAL MEDICINE

## 2020-09-08 PROCEDURE — 99214 OFFICE O/P EST MOD 30 MIN: CPT | Performed by: INTERNAL MEDICINE

## 2020-09-08 PROCEDURE — 36415 COLL VENOUS BLD VENIPUNCTURE: CPT | Mod: ZL | Performed by: INTERNAL MEDICINE

## 2020-09-08 PROCEDURE — G0463 HOSPITAL OUTPT CLINIC VISIT: HCPCS

## 2020-09-08 RX ORDER — FLUTICASONE PROPIONATE AND SALMETEROL XINAFOATE 230; 21 UG/1; UG/1
2 AEROSOL, METERED RESPIRATORY (INHALATION) 2 TIMES DAILY
Qty: 2 INHALER | Refills: 2 | Status: SHIPPED | OUTPATIENT
Start: 2020-09-08 | End: 2020-12-10

## 2020-09-08 RX ORDER — METOPROLOL SUCCINATE 50 MG/1
50 TABLET, EXTENDED RELEASE ORAL DAILY
Qty: 30 TABLET | Refills: 1 | Status: SHIPPED | OUTPATIENT
Start: 2020-09-08 | End: 2020-09-18

## 2020-09-08 RX ORDER — PROPRANOLOL HYDROCHLORIDE 20 MG/1
TABLET ORAL
COMMUNITY
Start: 2020-08-03 | End: 2021-03-24

## 2020-09-08 ASSESSMENT — ASTHMA QUESTIONNAIRES
QUESTION_2 LAST FOUR WEEKS HOW OFTEN HAVE YOU HAD SHORTNESS OF BREATH: MORE THAN ONCE A DAY
ACT_TOTALSCORE: 8
QUESTION_3 LAST FOUR WEEKS HOW OFTEN DID YOUR ASTHMA SYMPTOMS (WHEEZING, COUGHING, SHORTNESS OF BREATH, CHEST TIGHTNESS OR PAIN) WAKE YOU UP AT NIGHT OR EARLIER THAN USUAL IN THE MORNING: TWO OR THREE NIGHTS A WEEK
QUESTION_1 LAST FOUR WEEKS HOW MUCH OF THE TIME DID YOUR ASTHMA KEEP YOU FROM GETTING AS MUCH DONE AT WORK, SCHOOL OR AT HOME: MOST OF THE TIME
QUESTION_4 LAST FOUR WEEKS HOW OFTEN HAVE YOU USED YOUR RESCUE INHALER OR NEBULIZER MEDICATION (SUCH AS ALBUTEROL): THREE OR MORE TIMES PER DAY
QUESTION_5 LAST FOUR WEEKS HOW WOULD YOU RATE YOUR ASTHMA CONTROL: POORLY CONTROLLED

## 2020-09-08 ASSESSMENT — PAIN SCALES - GENERAL: PAINLEVEL: EXTREME PAIN (8)

## 2020-09-08 ASSESSMENT — MIFFLIN-ST. JEOR: SCORE: 1695.36

## 2020-09-08 NOTE — LETTER
My Asthma Action Plan    Name: Joshua Barron   YOB: 1960  Date: 9/8/2020   My doctor: Lyle Fisher, DO, DO   My clinic: [facility] [place]        My Rescue Medicine:   Albuterol inhaler (Proair/Ventolin/Proventil HFA)  2-4 puffs EVERY 4 HOURS as needed. Use a spacer if recommended by your provider.   My Asthma Severity:   Intermittent / Exercise Induced  Know your asthma triggers: Patient is unaware of triggers  Patient is unaware of triggers          GREEN ZONE   Good Control    I feel good    No cough or wheeze    Can work, sleep and play without asthma symptoms       Take your asthma control medicine every day.     1. If exercise triggers your asthma, take your rescue medication    15 minutes before exercise or sports, and    During exercise if you have asthma symptoms  2. Spacer to use with inhaler: If you have a spacer, make sure to use it with your inhaler             YELLOW ZONE Getting Worse  I have ANY of these:    I do not feel good    Cough or wheeze    Chest feels tight    Wake up at night   1. Keep taking your Green Zone medications  2. Start taking your rescue medicine:    every 20 minutes for up to 1 hour. Then every 4 hours for 24-48 hours.  3. If you stay in the Yellow Zone for more than 12-24 hours, contact your doctor.  4. If you do not return to the Green Zone in 12-24 hours or you get worse, start taking your oral steroid medicine if prescribed by your provider.           RED ZONE Medical Alert - Get Help  I have ANY of these:    I feel awful    Medicine is not helping    Breathing getting harder    Trouble walking or talking    Nose opens wide to breathe       1. Take your rescue medicine NOW  2. If your provider has prescribed an oral steroid medicine, start taking it NOW  3. Call your doctor NOW  4. If you are still in the Red Zone after 20 minutes and you have not reached your doctor:    Take your rescue medicine again and    Call 911 or go to the  emergency room right away    See your regular doctor within 2 weeks of an Emergency Room or Urgent Care visit for follow-up treatment.          Annual Reminders:  Meet with Asthma Educator,  Flu Shot in the Fall, consider Pneumonia Vaccination for patients with asthma (aged 19 and older).    Pharmacy: [facility] - [place], [place] - [address]    Electronically signed by Lyle Fisher DO, DO   Date: 09/08/20                    Asthma Triggers  How To Control Things That Make Your Asthma Worse    Triggers are things that make your asthma worse.  Look at the list below to help you find your triggers and   what you can do about them. You can help prevent asthma flare-ups by staying away from your triggers.      Trigger                                                          What you can do   Cigarette Smoke  Tobacco smoke can make asthma worse. Do not allow smoking in your home, car or around you.  Be sure no one smokes at a child s day care or school.  If you smoke, ask your health care provider for ways to help you quit.  Ask family members to quit too.  Ask your health care provider for a referral to Quit Plan to help you quit smoking, or call 3-391-695-PLAN.     Colds, Flu, Bronchitis  These are common triggers of asthma. Wash your hands often.  Don t touch your eyes, nose or mouth.  Get a flu shot every year.     Dust Mites  These are tiny bugs that live in cloth or carpet. They are too small to see. Wash sheets and blankets in hot water every week.   Encase pillows and mattress in dust mite proof covers.  Avoid having carpet if you can. If you have carpet, vacuum weekly.   Use a dust mask and HEPA vacuum.   Pollen and Outdoor Mold  Some people are allergic to trees, grass, or weed pollen, or molds. Try to keep your windows closed.  Limit time out doors when pollen count is high.   Ask you health care provider about taking medicine during allergy season.     Animal Dander  Some people are  allergic to skin flakes, urine or saliva from pets with fur or feathers. Keep pets with fur or feathers out of your home.    If you can t keep the pet outdoors, then keep the pet out of your bedroom.  Keep the bedroom door closed.  Keep pets off cloth furniture and away from stuffed toys.     Mice, Rats, and Cockroaches  Some people are allergic to the waste from these pests.   Cover food and garbage.  Clean up spills and food crumbs.  Store grease in the refrigerator.   Keep food out of the bedroom.   Indoor Mold  This can be a trigger if your home has high moisture. Fix leaking faucets, pipes, or other sources of water.   Clean moldy surfaces.  Dehumidify basement if it is damp and smelly.   Smoke, Strong Odors, and Sprays  These can reduce air quality. Stay away from strong odors and sprays, such as perfume, powder, hair spray, paints, smoke incense, paint, cleaning products, candles and new carpet.   Exercise or Sports  Some people with asthma have this trigger. Be active!  Ask your doctor about taking medicine before sports or exercise to prevent symptoms.    Warm up for 5-10 minutes before and after sports or exercise.     Other Triggers of Asthma  Cold air:  Cover your nose and mouth with a scarf.  Sometimes laughing or crying can be a trigger.  Some medicines and food can trigger asthma.

## 2020-09-08 NOTE — NURSING NOTE
"Chief Complaint   Patient presents with     ER F/U       Initial BP (!) 140/82 (Patient Position: Sitting)   Pulse 118   Ht 1.803 m (5' 11\")   Wt 84.8 kg (187 lb)   SpO2 98%   BMI 26.08 kg/m   Estimated body mass index is 26.08 kg/m  as calculated from the following:    Height as of this encounter: 1.803 m (5' 11\").    Weight as of this encounter: 84.8 kg (187 lb).  Medication Reconciliation: complete  Mae Chandler LPN  "

## 2020-09-09 ENCOUNTER — OFFICE VISIT (OUTPATIENT)
Dept: CHIROPRACTIC MEDICINE | Facility: OTHER | Age: 60
End: 2020-09-09
Attending: CHIROPRACTOR
Payer: COMMERCIAL

## 2020-09-09 DIAGNOSIS — G89.29 CHRONIC LEFT-SIDED LOW BACK PAIN WITHOUT SCIATICA: ICD-10-CM

## 2020-09-09 DIAGNOSIS — M62.830 BACK MUSCLE SPASM: ICD-10-CM

## 2020-09-09 DIAGNOSIS — M99.03 SEGMENTAL AND SOMATIC DYSFUNCTION OF LUMBAR REGION: Primary | ICD-10-CM

## 2020-09-09 DIAGNOSIS — M54.50 CHRONIC LEFT-SIDED LOW BACK PAIN WITHOUT SCIATICA: ICD-10-CM

## 2020-09-09 DIAGNOSIS — M99.02 SEGMENTAL AND SOMATIC DYSFUNCTION OF THORACIC REGION: ICD-10-CM

## 2020-09-09 DIAGNOSIS — M54.50 ACUTE BILATERAL LOW BACK PAIN WITHOUT SCIATICA: ICD-10-CM

## 2020-09-09 DIAGNOSIS — R11.0 NAUSEA: ICD-10-CM

## 2020-09-09 DIAGNOSIS — M99.01 SEGMENTAL AND SOMATIC DYSFUNCTION OF CERVICAL REGION: ICD-10-CM

## 2020-09-09 PROCEDURE — 98941 CHIROPRACT MANJ 3-4 REGIONS: CPT | Mod: AT | Performed by: CHIROPRACTOR

## 2020-09-09 ASSESSMENT — ASTHMA QUESTIONNAIRES: ACT_TOTALSCORE: 8

## 2020-09-10 LAB — HCV AB SERPL QL IA: NONREACTIVE

## 2020-09-10 RX ORDER — DRONABINOL 2.5 MG/1
CAPSULE ORAL
Qty: 60 CAPSULE | Refills: 0 | Status: SHIPPED | OUTPATIENT
Start: 2020-09-10 | End: 2020-10-13

## 2020-09-10 RX ORDER — IBUPROFEN 600 MG/1
TABLET, FILM COATED ORAL
Qty: 120 TABLET | Refills: 0 | Status: SHIPPED | OUTPATIENT
Start: 2020-09-10 | End: 2020-10-13

## 2020-09-10 NOTE — TELEPHONE ENCOUNTER
dronabinol      Last Written Prescription Date:  8-  Last Fill Quantity: 60,   # refills: 0  Last Office Visit: 9-8-2020  Future Office visit:    Next 5 appointments (look out 90 days)    Sep 18, 2020  1:30 PM CDT  (Arrive by 1:15 PM)  SHORT with Lyle Fisher DO  [facility] ([facility] ) [address] [facility] [address]  [phone]   Sep 21, 2020  2:00 PM CDT  (Arrive by 1:45 PM)  Return Visit with Linda Calix NP  [facility] ([facility] [address] [facility] [address]  [phone]   Oct 26, 2020  3:20 PM CDT  (Arrive by 3:05 PM)  Return Visit with Laquita Fernandez MD  [facility] ([facility] ) [address] [address] [phone]             ibuprofen       Last Written Prescription Date:  8-  Last Fill Quantity: 120,   # refills: 0  Last Office Visit: 9-8-2020  Future Office visit:      Tizanidine       Not current on medication list

## 2020-09-10 NOTE — PROGRESS NOTES

## 2020-09-11 ENCOUNTER — TELEPHONE (OUTPATIENT)
Dept: PEDIATRICS | Facility: OTHER | Age: 60
End: 2020-09-11

## 2020-09-11 NOTE — PROGRESS NOTES
Subjective      Asthma     HPI     Hypertension Follow-up      Do you check your blood pressure regularly outside of the clinic? No     Are you following a low salt diet? No    Are your blood pressures ever more than 140 on the top number (systolic) OR more   than 90 on the bottom number (diastolic), for example 140/90? No    Asthma Follow-Up    Was ACT completed today?    Yes    ACT Total Scores 9/18/2020   ACT TOTAL SCORE -   ASTHMA ER VISITS -   ASTHMA HOSPITALIZATIONS -   ACT TOTAL SCORE (Goal Greater than or Equal to 20) 7   In the past 12 months, how many times did you visit the emergency room for your asthma without being admitted to the hospital? 0   In the past 12 months, how many times were you hospitalized overnight because of your asthma? 0       How many days per week do you miss taking your asthma controller medication?  0    Please describe any recent triggers for your asthma: pollens and mold    Have you had any Emergency Room Visits, Urgent Care Visits, or Hospital Admissions since your last office visit?  No      How many servings of fruits and vegetables do you eat daily?  0-1    On average, how many sweetened beverages do you drink each day (Examples: soda, juice, sweet tea, etc.  Do NOT count diet or artificially sweetened beverages)?   0    How many days per week do you exercise enough to make your heart beat faster? 3 or less    How many minutes a day do you exercise enough to make your heart beat faster? 9 or less    How many days per week do you miss taking your medication? 0         Review of Systems  Constitutional, eye, ENT, skin, respiratory, cardiac, and GI are normal except as otherwise noted.    Problem List  Patient Active Problem List    Diagnosis Date Noted     chronic noninfective otitis externa 07/20/2020     Priority: Medium     Migraine with aura and without status migrainosus, not intractable 07/20/2020     Priority: Medium     Attention deficit hyperactivity disorder (ADHD),  combined type 07/10/2019     Priority: Medium     Patient is followed by  for ongoing prescription of stimulants.  All refills should be approved by this provider, or covering partner.    Medication(s): Vyvanse 70 mg.   Maximum quantity per month: 30  Clinic visit frequency required: Q 3 months     Controlled substance agreement on file: Yes       Date(s): 7.10.19  Neuropsych evaluation for ADD completed:  Managed by     [facility] website verification:  done on 7.10.19  [email or web address]           Deviated nasal septum 06/25/2019     Priority: Medium     Polypharmacy 02/15/2018     Priority: Medium     Retrograde ejaculation 07/26/2017     Priority: Medium     Benign essential hypertension 07/07/2017     Priority: Medium     Back muscle spasm 06/27/2017     Priority: Medium     Seizure disorder (H) 06/06/2017     Priority: Medium     DDD (degenerative disc disease), lumbar 06/20/2016     Priority: Medium     ACP (advance care planning) 06/15/2016     Priority: Medium     Advance Care Planning 6/15/2016: ACP Review of Chart / Resources Provided:  Reviewed chart for advance care plan.  Joshua Barron has been provided information and resources to begin or update their advance care plan.  Added by Chelo Rader             Onychia of toe of left foot 06/15/2016     Priority: Medium     Chronic lower back pain 04/20/2016     Priority: Medium     Prostate cancer screening 12/30/2015     Priority: Medium     Trigger index finger of right hand 12/30/2015     Priority: Medium     Moderate persistent asthma without complication 12/30/2015     Priority: Medium     Bilateral foot pain 10/22/2015     Priority: Medium     Somatic dysfunction of pelvis region 08/04/2015     Priority: Medium     Seizure-like activity (H) 06/10/2015     Priority: Medium     Bipolar disorder (H) 08/06/2014     Priority: Medium     Chronic rhinitis 09/03/2013     Priority: Medium     Tinnitus of both ears  09/03/2013     Priority: Medium     SNHL (sensorineural hearing loss) 09/03/2013     Priority: Medium     Chemical dependency (H)      Priority: Medium     Depression, major 07/16/2013     Priority: Medium     Degeneration of lumbar or lumbosacral intervertebral disc 09/08/2011     Priority: Medium     Overview:   With radiculopathy (per 6/16/05). Chronic back pain syndrome (per 12/6/04). Disc desiccation L4-5 & L5-S1 w/evidence of central disc herniation at L4-5 and thecal sac impingement centrally (per 11/18/02). Lumbar epidural steroid inj 11/18/02. L-spine MRI 5/10/02 [place]. LS-spine x-rays 5/6/02 [place].  IMO Update 10/11       Chronic, continuous use of opioids 09/08/2011     Priority: Medium     Overview:   Opioid Drug Agreement Form.   Patient is followed by Lyle Fisher DO, DO for ongoing prescription of pain medication.  All refills should only be approved by this provider, or covering partner.    Medication(s): MS Contin 80mg TID, Norco 10/325mg - currently on opioid taper  Maximum quantity per month: #90, #90  Clinic visit frequency required: Q 3 months     Controlled substance agreement:  Encounter-Level CSA - 09/18/2015:                 Controlled Substance Agreement - Scan on 11/25/2015  2:25 PM : SCHEDULED MEDICATION USE AGREEMENT (below)            Pain Clinic evaluation in the past: No    DIRE Total Score(s):  No flowsheet data found.    Last [facility] website verification:  Done on 10.25.18   [email or web address]         Trigger finger 03/17/2011     Priority: Medium     Overview:   IMO Update 10/11       Chronic headaches 02/18/2011     Priority: Medium     Overview:   IMO Update 10/11       Myalgia and myositis 02/18/2011     Priority: Medium     Overview:   IMO Update 10/11       Spinal stenosis in cervical region 02/18/2011     Priority: Medium     Overview:   IMO Update 10/11       Status post lumbar spinal fusion 02/18/2011     Priority: Medium     Generalized anxiety  disorder 02/11/2011     Priority: Medium              Major depressive disorder, recurrent episode, moderate (H) 02/11/2011     Priority: Medium     Other pain disorders related to psychological factors 02/11/2011     Priority: Medium     Mixed hyperlipidemia 01/19/2011     Priority: Medium     Tobacco Abuse, History of 01/19/2011     Priority: Medium     DDD (degenerative disc disease) 05/01/2010     Priority: Medium     GERD (gastroesophageal reflux disease) 05/01/2010     Priority: Medium     Hyperlipidemia 05/01/2010     Priority: Medium     Overview:   IMO Update 10/11       Tobacco use disorder 05/01/2010     Priority: Medium     Overview:   Quit in 2006       Allergic rhinitis due to other allergen 08/30/2007     Priority: Medium     Hypertrophy of prostate without urinary obstruction and other lower urinary tract symptoms (LUTS) 10/27/2006     Priority: Medium     Lumbago 09/01/2006     Priority: Medium     Overview:   Chronic low back pain syndrome.   IMO Update 10/11       Cervical spondylosis without myelopathy 06/27/2005     Priority: Medium     Overview:   With radiculopathy (per 6/16/05).         Medications  acetaminophen (TYLENOL) 325 MG tablet, TAKE 2 TABLETS BY MOUTH EVERY 4 HOURS AS NEEDED FOR MILD PAIN  albuterol (PROAIR HFA/PROVENTIL HFA/VENTOLIN HFA) 108 (90 Base) MCG/ACT inhaler, USE 2 PUFFS 4 TIMES A DAY AS NEEDED FOR SHORTNESS OF BREATH  aspirin (ASPIRIN LOW DOSE) 81 MG chewable tablet, CHEW AND SWALLOW 1 TABLET BY MOUTH DAILY  Aspirin-Acetaminophen-Caffeine (EQ HEADACHE RELIEF PO), Take 200 mg by mouth  atorvastatin (LIPITOR) 20 MG tablet, TAKE 1 TABLET BY MOUTH DAILY  baclofen (LIORESAL) 20 MG tablet, TAKE 1 TABLET BY MOUTH 4 TIMES DAILY  diclofenac (VOLTAREN) 50 MG EC tablet, TAKE 1 TABLET BY MOUTH TWICE DAILY with food.  docusate sodium (DOK) 100 MG capsule, TAKE 1 CAPSULE BY MOUTH TWICE DAILY  dronabinol (MARINOL) 2.5 MG capsule, TAKE 1 CAPSULE BY MOUTH 2 TIMES DAILY BEFORE  MEALS  famotidine (PEPCID) 40 MG tablet, Take 1 tablet (40 mg) by mouth daily  fluticasone-salmeterol (ADVAIR-HFA) 230-21 MCG/ACT inhaler, Inhale 2 puffs into the lungs 2 times daily  gabapentin (NEURONTIN) 300 MG capsule, TAKE 3 CAPSULES BY MOUTH THREE TIMES DAILY  HYDROcodone-acetaminophen (NORCO)  MG per tablet, TAKE 1 TABLET BY MOUTH 2 TIMES DAILY AS NEEDED FOR SEVERE PAIN  hydrOXYzine (VISTARIL) 50 MG capsule, TAKE 1 TO 2 CAPSULES BY MOUTH 4 TIMES DAILY AS NEEDED FOR ANXIETY  ibuprofen (ADVIL/MOTRIN) 600 MG tablet, TAKE 1 TABLET BY MOUTH EVERY 6 HOURS AS NEEDED  lidocaine (LIDODERM) 5 % patch, PLACE 3 PATCHES ONTO SKIN DAILY FOR 12 HOURS AND REMOVE.  lidocaine (XYLOCAINE) 5 % external ointment, Apply to the back using gloves three times per vday as needed for moderate to severe pain.  lisdexamfetamine (VYVANSE) 70 MG capsule, Take 1 capsule (70 mg) by mouth daily  [START ON 9/23/2020] lisdexamfetamine (VYVANSE) 70 MG capsule, Take 1 capsule (70 mg) by mouth every morning  loratadine (CLARITIN) 10 MG tablet, Take 10 mg by mouth daily  losartan (COZAAR) 50 MG tablet, TAKE 1 TABLET BY MOUTH DAILY  methocarbamol (ROBAXIN) 500 MG tablet, TAKE 1 TABLET BY MOUTH 3 TIMES DAILY  metoprolol succinate ER (TOPROL-XL) 50 MG 24 hr tablet, Take 1 tablet (50 mg) by mouth daily  morphine (MS CONTIN) 15 MG CR tablet, TAKE 1 TABLET BY MOUTH EVERY 8 HOURS  multivitamin  with iron (SM COMPLETE ADVANCED FORMULA) TABS, TAKE 1 TABLET BY MOUTH DAILY  naloxone (NARCAN) 1 mg/mL for intranasal kit (2 syringes with 2 mucosal atomizer device), In opioid overdose put cone in nostril and push 1/2 of contents into each nostril.  Repeat every 3 min if no response until help arrives.  nortriptyline (PAMELOR) 50 MG capsule, TAKE 2 CAPSULES (100MG) BY MOUTH AT BEDTIME  omeprazole (PRILOSEC) 20 MG DR capsule, Take 1 capsule (20 mg) by mouth 2 times daily TAKE 1 CAPSULE BY MOUTH EVERY DAY BEFORE A MEAL  order for DME, Equipment being ordered:  "Thoracic and lumbar Back Brace  order for DME, 1 boa back brace  ORDER FOR DME, Equipment being ordered: Large gloves  OXcarbazepine (TRILEPTAL) 600 MG tablet, TAKE 1 TABLET BY MOUTH 2 TIMES DAILY  propranolol (INDERAL) 20 MG tablet,   senna-docusate (SENNA-PLUS) 8.6-50 MG tablet, TAKE 1 OR 2 TABLETS BY MOUTH 2 TIMES A DAY  SUMAtriptan (IMITREX) 50 MG tablet, Take 1 tablet (50 mg) by mouth at onset of headache for migraine May repeat in 2 hours. Max 4 tablets/24 hours.  tamsulosin (FLOMAX) 0.4 MG capsule, TAKE 1 CAPSULE BY MOUTH DAILY  tiZANidine (ZANAFLEX) 4 MG tablet, TAKE 1 TABLET BY MOUTH 3 TIMES A DAY  traZODone (DESYREL) 100 MG tablet, Take 1 tablet (100 mg) by mouth At Bedtime    No current facility-administered medications on file prior to visit.     Allergies  Allergies   Allergen Reactions     Cymbalta Unknown     Suicidal thoughts     Depakote [Valproic Acid]      Drowsiness       Seasonal Allergies      Reviewed and updated as needed this visit by Provider           Objective    /86 (BP Location: Right arm, Patient Position: Sitting, Cuff Size: Adult Regular)   Pulse 97   Temp 96.4  F (35.8  C) (Tympanic)   Ht 1.803 m (5' 11\")   Wt 83.5 kg (184 lb)   SpO2 98%   BMI 25.66 kg/m    Facility age limit for growth percentiles is 20 years.    Physical Exam  GENERAL: healthy, alert and no distress  HENT: ear canals and TM's normal, nose and mouth without ulcers or lesions  RESP: right lung is wheezing and is congested. Coughing production is thick and and tenacious.   CV: regular rate and rhythm, normal S1 S2, no S3 or S4, no murmur, click or rub, no peripheral edema and peripheral pulses strong  PSYCH:ranxious, he is having pressured speech, feeling Dr. Fisher is trying to literally hurt him.      Diagnostics: None  Results for orders placed or performed in visit on 09/18/20 (from the past 24 hour(s))   CRP, inflammation   Result Value Ref Range    CRP Inflammation 47.0 (H) 0.0 - 8.0 mg/L   CBC with " platelets differential   Result Value Ref Range    WBC 7.1 4.0 - 11.0 10e9/L    RBC Count 3.74 (L) 4.4 - 5.9 10e12/L    Hemoglobin 11.2 (L) 13.3 - 17.7 g/dL    Hematocrit 33.0 (L) 40.0 - 53.0 %    MCV 88 78 - 100 fl    MCH 29.9 26.5 - 33.0 pg    MCHC 33.9 31.5 - 36.5 g/dL    RDW 13.8 10.0 - 15.0 %    Platelet Count 187 150 - 450 10e9/L    Diff Method Automated Method     % Neutrophils 66.3 %    % Lymphocytes 19.0 %    % Monocytes 11.3 %    % Eosinophils 2.5 %    % Basophils 0.3 %    % Immature Granulocytes 0.6 %    Nucleated RBCs 0 0 /100    Absolute Neutrophil 4.7 1.6 - 8.3 10e9/L    Absolute Lymphocytes 1.4 0.8 - 5.3 10e9/L    Absolute Monocytes 0.8 0.0 - 1.3 10e9/L    Absolute Eosinophils 0.2 0.0 - 0.7 10e9/L    Absolute Basophils 0.0 0.0 - 0.2 10e9/L    Abs Immature Granulocytes 0.0 0 - 0.4 10e9/L    Absolute Nucleated RBC 0.0      *Note: Due to a large number of results and/or encounters for the requested time period, some results have not been displayed. A complete set of results can be found in Results Review.         Assessment & Plan      1. Essential hypertension  Refill is given. Also on Cozaar. Good combination for him.   - metoprolol succinate ER (TOPROL-XL) 50 MG 24 hr tablet; Take 1 tablet (50 mg) by mouth daily  Dispense: 30 tablet; Refill: 3    2. Essential hypertension, benign  Good pressure today.     3. Moderate persistent asthma without complication  Refill of medication cover with prednisone.   - albuterol (PROAIR HFA/PROVENTIL HFA/VENTOLIN HFA) 108 (90 Base) MCG/ACT inhaler; 2 puffs every 4 hours as needed.  Dispense: 18 g; Refill: 0  - predniSONE (DELTASONE) 20 MG tablet; Take 1 tablet (20 mg) by mouth daily  Dispense: 5 tablet; Refill: 0    4. Moderate persistent asthma with exacerbation  No sign of pneumonia but has bronchitis on right and wheezing.  We david treat with prednisone and Cefzil. encourage use of his proair.   - dextromethorphan-guaiFENesin (MUCINEX DM)  MG 12 hr tablet;  Take 1 tablet by mouth every 12 hours  Dispense: 60 tablet; Refill: 0  - XR CHEST 2 VW (Clinic Performed); Future  - CRP, inflammation  - CBC with platelets differential  - cefPROZIL (CEFZIL) 500 MG tablet; Take 1 tablet (500 mg) by mouth 2 times daily  Dispense: 20 tablet; Refill: 0    Follow Up  No follow-ups on file.  See patient instructions    Lyle Fisher DO, DO

## 2020-09-11 NOTE — TELEPHONE ENCOUNTER
Patient called looking for the medication for the cramps that he has been having.  States the Dr said he would give him something on Tuesday when he had his appointment.  Please call him back to discuss.  [phone]

## 2020-09-14 DIAGNOSIS — G89.4 CHRONIC PAIN SYNDROME: ICD-10-CM

## 2020-09-14 RX ORDER — GABAPENTIN 300 MG/1
CAPSULE ORAL
Qty: 270 CAPSULE | Refills: 0 | Status: SHIPPED | OUTPATIENT
Start: 2020-09-14 | End: 2020-10-13

## 2020-09-14 NOTE — TELEPHONE ENCOUNTER
Gabapentin       Last Written Prescription Date:  6/16/2020  Last Fill Quantity: 270,   # refills: 2  Last Office Visit: 9/8/2020  Future Office visit:    Next 5 appointments (look out 90 days)    Sep 18, 2020  1:30 PM CDT  (Arrive by 1:15 PM)  SHORT with Lyle Fisher DO  [facility] [place] ([facility] [place] ) [address]  [place] [address]  [phone]   Sep 21, 2020  2:00 PM CDT  (Arrive by 1:45 PM)  Return Visit with Linda Calix NP  [facility] [place] ([facility] [place] ) [address]  [place] [address]  [phone]   Oct 26, 2020  3:20 PM CDT  (Arrive by 3:05 PM)  Return Visit with Laquita Fernandez MD  [facility] [place] ([facility] [place] ) [address] [place]  [place] [address] [phone]

## 2020-09-17 DIAGNOSIS — J45.40 MODERATE PERSISTENT ASTHMA WITHOUT COMPLICATION: ICD-10-CM

## 2020-09-17 DIAGNOSIS — F41.1 GAD (GENERALIZED ANXIETY DISORDER): ICD-10-CM

## 2020-09-17 RX ORDER — ALBUTEROL SULFATE 90 UG/1
AEROSOL, METERED RESPIRATORY (INHALATION)
Qty: 18 G | Refills: 0 | Status: SHIPPED | OUTPATIENT
Start: 2020-09-17 | End: 2020-09-18

## 2020-09-17 RX ORDER — HYDROXYZINE PAMOATE 50 MG/1
CAPSULE ORAL
Qty: 120 CAPSULE | Refills: 0 | Status: SHIPPED | OUTPATIENT
Start: 2020-09-17 | End: 2020-10-06

## 2020-09-17 NOTE — TELEPHONE ENCOUNTER
Albuterol inhaler      Last Written Prescription Date:  7-  Last Fill Quantity: 18g,   # refills: 0  Last Office Visit: 9-8-2020  Future Office visit:    Next 5 appointments (look out 90 days)    Sep 18, 2020  2:00 PM CDT  (Arrive by 1:45 PM)  SHORT with Lyle Fisher DO  [facility] ([facility] ) [address] [facility] [address]  [phone]   Sep 21, 2020  2:00 PM CDT  (Arrive by 1:45 PM)  Return Visit with Linda Calix NP  [facility] [address] [facility] [address]  [phone]   Oct 26, 2020  3:20 PM CDT  (Arrive by 3:05 PM)  Return Visit with Laquita Fernandez MD  [facility] [address] [place] [phone]             hydroxyzine       Last Written Prescription Date:  8-  Last Fill Quantity: 120,   # refills: 0  Last Office Visit: 9-8-2020  Future Office visit:

## 2020-09-18 ENCOUNTER — ANCILLARY PROCEDURE (OUTPATIENT)
Dept: GENERAL RADIOLOGY | Facility: OTHER | Age: 60
End: 2020-09-18
Attending: PHYSICIAN ASSISTANT
Payer: COMMERCIAL

## 2020-09-18 ENCOUNTER — OFFICE VISIT (OUTPATIENT)
Dept: FAMILY MEDICINE | Facility: OTHER | Age: 60
End: 2020-09-18
Attending: INTERNAL MEDICINE
Payer: COMMERCIAL

## 2020-09-18 VITALS
SYSTOLIC BLOOD PRESSURE: 132 MMHG | OXYGEN SATURATION: 98 % | DIASTOLIC BLOOD PRESSURE: 86 MMHG | HEART RATE: 97 BPM | BODY MASS INDEX: 25.76 KG/M2 | WEIGHT: 184 LBS | HEIGHT: 71 IN | TEMPERATURE: 96.4 F

## 2020-09-18 DIAGNOSIS — J45.41 MODERATE PERSISTENT ASTHMA WITH EXACERBATION: Primary | ICD-10-CM

## 2020-09-18 DIAGNOSIS — J45.40 MODERATE PERSISTENT ASTHMA WITHOUT COMPLICATION: ICD-10-CM

## 2020-09-18 DIAGNOSIS — I10 ESSENTIAL HYPERTENSION, BENIGN: ICD-10-CM

## 2020-09-18 DIAGNOSIS — I10 ESSENTIAL HYPERTENSION: ICD-10-CM

## 2020-09-18 DIAGNOSIS — J45.41 MODERATE PERSISTENT ASTHMA WITH EXACERBATION: ICD-10-CM

## 2020-09-18 LAB
BASOPHILS # BLD AUTO: 0 10E9/L (ref 0–0.2)
BASOPHILS NFR BLD AUTO: 0.3 %
CRP SERPL-MCNC: 47 MG/L (ref 0–8)
DIFFERENTIAL METHOD BLD: ABNORMAL
EOSINOPHIL # BLD AUTO: 0.2 10E9/L (ref 0–0.7)
EOSINOPHIL NFR BLD AUTO: 2.5 %
ERYTHROCYTE [DISTWIDTH] IN BLOOD BY AUTOMATED COUNT: 13.8 % (ref 10–15)
HCT VFR BLD AUTO: 33 % (ref 40–53)
HGB BLD-MCNC: 11.2 G/DL (ref 13.3–17.7)
IMM GRANULOCYTES # BLD: 0 10E9/L (ref 0–0.4)
IMM GRANULOCYTES NFR BLD: 0.6 %
LYMPHOCYTES # BLD AUTO: 1.4 10E9/L (ref 0.8–5.3)
LYMPHOCYTES NFR BLD AUTO: 19 %
MCH RBC QN AUTO: 29.9 PG (ref 26.5–33)
MCHC RBC AUTO-ENTMCNC: 33.9 G/DL (ref 31.5–36.5)
MCV RBC AUTO: 88 FL (ref 78–100)
MONOCYTES # BLD AUTO: 0.8 10E9/L (ref 0–1.3)
MONOCYTES NFR BLD AUTO: 11.3 %
NEUTROPHILS # BLD AUTO: 4.7 10E9/L (ref 1.6–8.3)
NEUTROPHILS NFR BLD AUTO: 66.3 %
NRBC # BLD AUTO: 0 10*3/UL
NRBC BLD AUTO-RTO: 0 /100
PLATELET # BLD AUTO: 187 10E9/L (ref 150–450)
RBC # BLD AUTO: 3.74 10E12/L (ref 4.4–5.9)
WBC # BLD AUTO: 7.1 10E9/L (ref 4–11)

## 2020-09-18 PROCEDURE — 99214 OFFICE O/P EST MOD 30 MIN: CPT | Performed by: PHYSICIAN ASSISTANT

## 2020-09-18 PROCEDURE — 85025 COMPLETE CBC W/AUTO DIFF WBC: CPT | Mod: ZL | Performed by: PHYSICIAN ASSISTANT

## 2020-09-18 PROCEDURE — 86140 C-REACTIVE PROTEIN: CPT | Mod: ZL | Performed by: PHYSICIAN ASSISTANT

## 2020-09-18 PROCEDURE — G0463 HOSPITAL OUTPT CLINIC VISIT: HCPCS | Mod: 25

## 2020-09-18 PROCEDURE — 36415 COLL VENOUS BLD VENIPUNCTURE: CPT | Mod: ZL | Performed by: PHYSICIAN ASSISTANT

## 2020-09-18 PROCEDURE — 71046 X-RAY EXAM CHEST 2 VIEWS: CPT | Mod: TC

## 2020-09-18 RX ORDER — PREDNISONE 20 MG/1
20 TABLET ORAL DAILY
Qty: 5 TABLET | Refills: 0 | Status: SHIPPED | OUTPATIENT
Start: 2020-09-18 | End: 2020-10-26

## 2020-09-18 RX ORDER — ALBUTEROL SULFATE 1.25 MG/3ML
1.25 SOLUTION RESPIRATORY (INHALATION) EVERY 6 HOURS PRN
Qty: 100 VIAL | Refills: 3 | Status: SHIPPED | OUTPATIENT
Start: 2020-09-18

## 2020-09-18 RX ORDER — CEFPROZIL 500 MG/1
500 TABLET, FILM COATED ORAL 2 TIMES DAILY
Qty: 20 TABLET | Refills: 0 | Status: SHIPPED | OUTPATIENT
Start: 2020-09-18 | End: 2020-10-26

## 2020-09-18 RX ORDER — ALBUTEROL SULFATE 90 UG/1
AEROSOL, METERED RESPIRATORY (INHALATION)
Qty: 18 G | Refills: 0 | Status: SHIPPED | OUTPATIENT
Start: 2020-09-18 | End: 2020-12-14

## 2020-09-18 RX ORDER — METOPROLOL SUCCINATE 50 MG/1
50 TABLET, EXTENDED RELEASE ORAL DAILY
Qty: 30 TABLET | Refills: 3 | Status: SHIPPED | OUTPATIENT
Start: 2020-09-18 | End: 2021-03-14

## 2020-09-18 RX ORDER — GUAIFENESIN AND DEXTROMETHORPHAN HYDROBROMIDE 600; 30 MG/1; MG/1
1 TABLET, EXTENDED RELEASE ORAL EVERY 12 HOURS
Qty: 60 TABLET | Refills: 0 | Status: SHIPPED | OUTPATIENT
Start: 2020-09-18 | End: 2020-10-15

## 2020-09-18 ASSESSMENT — ASTHMA QUESTIONNAIRES
QUESTION_3 LAST FOUR WEEKS HOW OFTEN DID YOUR ASTHMA SYMPTOMS (WHEEZING, COUGHING, SHORTNESS OF BREATH, CHEST TIGHTNESS OR PAIN) WAKE YOU UP AT NIGHT OR EARLIER THAN USUAL IN THE MORNING: FOUR OR MORE NIGHTS A WEEK
QUESTION_2 LAST FOUR WEEKS HOW OFTEN HAVE YOU HAD SHORTNESS OF BREATH: MORE THAN ONCE A DAY
QUESTION_5 LAST FOUR WEEKS HOW WOULD YOU RATE YOUR ASTHMA CONTROL: POORLY CONTROLLED
QUESTION_1 LAST FOUR WEEKS HOW MUCH OF THE TIME DID YOUR ASTHMA KEEP YOU FROM GETTING AS MUCH DONE AT WORK, SCHOOL OR AT HOME: MOST OF THE TIME
QUESTION_4 LAST FOUR WEEKS HOW OFTEN HAVE YOU USED YOUR RESCUE INHALER OR NEBULIZER MEDICATION (SUCH AS ALBUTEROL): THREE OR MORE TIMES PER DAY
ACT_TOTALSCORE: 7

## 2020-09-18 ASSESSMENT — PATIENT HEALTH QUESTIONNAIRE - PHQ9: SUM OF ALL RESPONSES TO PHQ QUESTIONS 1-9: 5

## 2020-09-18 ASSESSMENT — ANXIETY QUESTIONNAIRES
7. FEELING AFRAID AS IF SOMETHING AWFUL MIGHT HAPPEN: NOT AT ALL
GAD7 TOTAL SCORE: 2
5. BEING SO RESTLESS THAT IT IS HARD TO SIT STILL: NOT AT ALL
6. BECOMING EASILY ANNOYED OR IRRITABLE: SEVERAL DAYS
4. TROUBLE RELAXING: NOT AT ALL
3. WORRYING TOO MUCH ABOUT DIFFERENT THINGS: NOT AT ALL
2. NOT BEING ABLE TO STOP OR CONTROL WORRYING: NOT AT ALL
1. FEELING NERVOUS, ANXIOUS, OR ON EDGE: SEVERAL DAYS

## 2020-09-18 ASSESSMENT — PAIN SCALES - GENERAL: PAINLEVEL: EXTREME PAIN (8)

## 2020-09-18 ASSESSMENT — MIFFLIN-ST. JEOR: SCORE: 1681.75

## 2020-09-18 NOTE — TELEPHONE ENCOUNTER
Please schedule patient for date/time: Wait list or call back each day to see if there are any same days or cancellations. Can go to ER if needed.     Have patient go to ER/Urgent Care Center. Urgent Care hours are 9:30 am to 8 pm, open 7 days a week. Yes.    Provider will call patient.No.    Other:     normal... Well appearing, awake, alert, oriented to person, place, time/situation and in no apparent distress.

## 2020-09-18 NOTE — NURSING NOTE
"Chief Complaint   Patient presents with     Asthma       Initial /86 (BP Location: Right arm, Patient Position: Sitting, Cuff Size: Adult Regular)   Pulse 97   Temp 96.4  F (35.8  C) (Tympanic)   Ht 1.803 m (5' 11\")   Wt 83.5 kg (184 lb)   SpO2 98%   BMI 25.66 kg/m   Estimated body mass index is 25.66 kg/m  as calculated from the following:    Height as of this encounter: 1.803 m (5' 11\").    Weight as of this encounter: 83.5 kg (184 lb).  Medication Reconciliation: complete  Kayleen Bautista LPN  "

## 2020-09-19 ASSESSMENT — ASTHMA QUESTIONNAIRES: ACT_TOTALSCORE: 7

## 2020-09-19 ASSESSMENT — ANXIETY QUESTIONNAIRES: GAD7 TOTAL SCORE: 2

## 2020-09-21 DIAGNOSIS — F41.1 GAD (GENERALIZED ANXIETY DISORDER): ICD-10-CM

## 2020-09-23 RX ORDER — HYDROXYZINE PAMOATE 50 MG/1
CAPSULE ORAL
Qty: 120 CAPSULE | Refills: 0 | OUTPATIENT
Start: 2020-09-23

## 2020-09-28 ENCOUNTER — OFFICE VISIT (OUTPATIENT)
Dept: CHIROPRACTIC MEDICINE | Facility: OTHER | Age: 60
End: 2020-09-28
Attending: CHIROPRACTOR
Payer: COMMERCIAL

## 2020-09-28 DIAGNOSIS — M99.01 SEGMENTAL AND SOMATIC DYSFUNCTION OF CERVICAL REGION: ICD-10-CM

## 2020-09-28 DIAGNOSIS — K21.9 GASTROESOPHAGEAL REFLUX DISEASE, ESOPHAGITIS PRESENCE NOT SPECIFIED: ICD-10-CM

## 2020-09-28 DIAGNOSIS — F90.2 ADHD (ATTENTION DEFICIT HYPERACTIVITY DISORDER), COMBINED TYPE: ICD-10-CM

## 2020-09-28 DIAGNOSIS — K21.9 LPRD (LARYNGOPHARYNGEAL REFLUX DISEASE): ICD-10-CM

## 2020-09-28 DIAGNOSIS — M99.03 SEGMENTAL AND SOMATIC DYSFUNCTION OF LUMBAR REGION: Primary | ICD-10-CM

## 2020-09-28 DIAGNOSIS — M54.50 ACUTE BILATERAL LOW BACK PAIN WITHOUT SCIATICA: ICD-10-CM

## 2020-09-28 DIAGNOSIS — M99.02 SEGMENTAL AND SOMATIC DYSFUNCTION OF THORACIC REGION: ICD-10-CM

## 2020-09-28 DIAGNOSIS — F11.90 CHRONIC, CONTINUOUS USE OF OPIOIDS: ICD-10-CM

## 2020-09-28 DIAGNOSIS — M51.379 DEGENERATION OF LUMBAR OR LUMBOSACRAL INTERVERTEBRAL DISC: ICD-10-CM

## 2020-09-28 DIAGNOSIS — G89.4 CHRONIC PAIN SYNDROME: ICD-10-CM

## 2020-09-28 PROCEDURE — 98941 CHIROPRACT MANJ 3-4 REGIONS: CPT | Mod: AT | Performed by: CHIROPRACTOR

## 2020-09-28 RX ORDER — FAMOTIDINE 40 MG/1
TABLET, FILM COATED ORAL
Qty: 30 TABLET | Refills: 0 | Status: SHIPPED | OUTPATIENT
Start: 2020-09-28 | End: 2020-10-27

## 2020-09-28 RX ORDER — HYDROCODONE BITARTRATE AND ACETAMINOPHEN 10; 325 MG/1; MG/1
TABLET ORAL
Qty: 60 TABLET | Refills: 0 | Status: SHIPPED | OUTPATIENT
Start: 2020-09-28 | End: 2020-10-26

## 2020-09-28 NOTE — PROGRESS NOTES
Subjective Finding:    Chief compalint: Patient presents with:  Back Pain  , Pain Scale: 4/10, Intensity: dull, Duration: 2 days, Radiating: no.    Date of injury:     Activities that the pain restricts:   Home/household/hobbies/social activities: yes.  Work duties: yes.  Sleep: yes.  Makes symptoms better: rest.  Makes symptoms worse: lumbar extension and lumbar flexion.  Have you seen anyone else for the symptoms? No.  Work related: no.  Automobile related injury: no.    Objective and Assessment:    Posture Analysis:   High shoulder: .  Head tilt: .  High iliac crest: .  Head carriage: neutral.  Thoracic Kyphosis: neutral.  Lumbar Lordosis: forward.    Lumbar Range of Motion: flexion decreased and extension decreased.  Cervical Range of Motion: extension decreased.  Thoracic Range of Motion: extension decreased.  Extremity Range of Motion: .    Palpation:   Quad lumb: bilateral, referred pain: no  T paraspinals: dull pain, no    Segmental dysfunction pre-treatment and treatment area: C4, T5, T6 and Sacrum.    Assessment post-treatment:  Cervical: ROM increased.  Thoracic: ROM increased.  Lumbar: ROM increased.    Comments: history of DDD in C and L spine.      Complicating Factors: .    Procedure(s):  CMT:  23391 Chiropractic manipulative treatment 3-4 regions performed   Cervical: Diversified, See above for level, Supine, Thoracic: Diversified, See above for level, Prone and Lumbar: Diversified, See above for level, Side posture    Modalities:  None performed this visit    Therapeutic procedures:  None    Plan:  Treatment plan: PRN.  Instructed patient: stretch as instructed at visit.  Short term goals: increase ROM.  Long term goals: increase ADL.  Prognosis: good.

## 2020-09-28 NOTE — TELEPHONE ENCOUNTER
Patient would like this filled as soon as possible as he is out. Thank you.    HYDROcodone-acetaminophen (NORCO)  MG per tablet       Last Written Prescription Date:  08/24/20  Last Fill Quantity: 60,   # refills: 0  Last Office Visit: 09/18/20  Future Office visit:    Next 5 appointments (look out 90 days)    Oct 01, 2020 11:50 AM CDT  Return Visit with Shamar Escamilla DC  [facility] [place] [address] [place], [place] [address] [address]  [phone]   Oct 02, 2020  4:30 PM CDT  (Arrive by 4:15 PM)  SHORT with Lyle Fisher DO  [facility] [place] [facility] [address] [facility] [address]  [phone]   Oct 07, 2020 11:50 AM CDT  Return Visit with Shamar Escamilla DC  [facility] [place] ([facility] [place]) [address] [place]  [place] [address]  [phone]   Oct 26, 2020  3:20 PM CDT  (Arrive by 3:05 PM)  Return Visit with Laquita Fernandez MD  [facility] [place] ([facility] [place] ) [address]  [place] [address] [phone]           Routing refill request to provider for review/approval because:  Drug not on the [facility], [facility] or [facility] refill protocol or controlled substance

## 2020-09-28 NOTE — TELEPHONE ENCOUNTER
pepcid  Last Written Prescription Date: 6/9/20  Last Fill Quantity: 30 # of Refills: 3  Last Office Visit: 9/18/20

## 2020-09-29 RX ORDER — MORPHINE SULFATE 15 MG/1
TABLET, FILM COATED, EXTENDED RELEASE ORAL
Qty: 90 TABLET | Refills: 0 | Status: SHIPPED | OUTPATIENT
Start: 2020-09-29 | End: 2020-10-26

## 2020-09-29 RX ORDER — LISDEXAMFETAMINE DIMESYLATE 70 MG/1
CAPSULE ORAL
Qty: 30 CAPSULE | Refills: 0 | OUTPATIENT
Start: 2020-09-29

## 2020-09-29 NOTE — TELEPHONE ENCOUNTER
Denying current refill request as this medication was just filled on 9/23/20.    Vyvanse      Last Written Prescription Date:  9/23/20  Last Fill Quantity: 30,   # refills: 0  Last Office Visit: 8/25/20  Future Office visit:    Next 5 appointments (look out 90 days)    Oct 01, 2020 11:50 AM CDT  Return Visit with Shamar Escamilla DC  [facility] [place] ([facility] [place]) [address] [place] [address]  [place] [address]  [phone]   Oct 02, 2020  4:30 PM CDT  (Arrive by 4:15 PM)  SHORT with Lyle Fisher DO  [facility] [place] ([facility] [place] ) [address] [facility] [place] [address]  [phone]   Oct 07, 2020 11:50 AM CDT  Return Visit with Shamar Escamilla DC  [facility] [place] ([facility] [place]) [address] [place] [address]  [place] [address]  [phone]   Oct 26, 2020  3:20 PM CDT  (Arrive by 3:05 PM)  Return Visit with Laquita Fernandez MD  [facility] [place] ([facility] [place] ) [address] [facility] [place] [id number] [phone]           Routing refill request to provider for review/approval because:  Drug not on the [facility], [facility] or [facility] refill protocol or controlled substance

## 2020-09-29 NOTE — TELEPHONE ENCOUNTER
Michael      Last Written Prescription Date:  8/25/20  Last Fill Quantity: 30,   # refills: 0  Last Office Visit: 8/25/20  Future Office visit:    Next 5 appointments (look out 90 days)    Oct 01, 2020 11:50 AM CDT  Return Visit with Shamar Escamilla DC  [facility] [place] [facility] [place] [address] [place] [address]  [place] [address]  [phone]   Oct 02, 2020  4:30 PM CDT  (Arrive by 4:15 PM)  SHORT with Lyle Fisher DO  [facility] [place] [facility] [address] [facility] [place] [address]  [phone]   Oct 07, 2020 11:50 AM CDT  Return Visit with Shamar Escamilla DC  [facility] [place] [facility] [place]) [address] [place] [address]  [place] [address]  [phone]   Oct 26, 2020  3:20 PM CDT  (Arrive by 3:05 PM)  Return Visit with Laquita Fernandez MD  [facility] [place] [facility] [address] [facility] [place] [phone]           Routing refill request to provider for review/approval because:  Drug not on the [facility], [facility] or [facility] refill protocol or controlled substance

## 2020-10-05 DIAGNOSIS — F41.1 GAD (GENERALIZED ANXIETY DISORDER): ICD-10-CM

## 2020-10-06 RX ORDER — HYDROXYZINE PAMOATE 50 MG/1
CAPSULE ORAL
Qty: 120 CAPSULE | Refills: 0 | Status: SHIPPED | OUTPATIENT
Start: 2020-10-06 | End: 2020-10-28

## 2020-10-06 NOTE — TELEPHONE ENCOUNTER
hydroxyzine  Last Written Prescription Date: 9/17/2020  Last Fill Quantity: 120 # of Refills: 0  Last Office Visit: 9/8/20

## 2020-10-06 NOTE — TELEPHONE ENCOUNTER
Vistaril       Last Written Prescription Date:  9/17/2020  Last Fill Quantity: 120,   # refills: 0  Last Office Visit: 9/18/2020  Future Office visit:    Next 5 appointments (look out 90 days)    Oct 07, 2020 11:50 AM  Return Visit with Shamar Escamilla DC  [facility] [place] ([facility]) [address]  [place] [address]  [phone]   Oct 26, 2020  3:20 PM  (Arrive by 3:05 PM)  Return Visit with Laquita Fernandez MD  [facility] [place] ([facility] [place] ) [address] [facility] [place] [address] [phone]   Oct 27, 2020  2:30 PM  (Arrive by 2:15 PM)  SHORT with Lissy Moore MD  [facility] [place] ([facility] [place] ) [address]  [place] [address]  [phone]

## 2020-10-10 DIAGNOSIS — G89.29 CHRONIC LEFT-SIDED LOW BACK PAIN WITHOUT SCIATICA: ICD-10-CM

## 2020-10-10 DIAGNOSIS — M54.50 CHRONIC LEFT-SIDED LOW BACK PAIN WITHOUT SCIATICA: ICD-10-CM

## 2020-10-10 DIAGNOSIS — R11.0 NAUSEA: ICD-10-CM

## 2020-10-10 DIAGNOSIS — F90.2 ADHD (ATTENTION DEFICIT HYPERACTIVITY DISORDER), COMBINED TYPE: ICD-10-CM

## 2020-10-10 DIAGNOSIS — G89.4 CHRONIC PAIN SYNDROME: ICD-10-CM

## 2020-10-10 DIAGNOSIS — K21.00 GASTROESOPHAGEAL REFLUX DISEASE WITH ESOPHAGITIS, UNSPECIFIED WHETHER HEMORRHAGE: Primary | ICD-10-CM

## 2020-10-10 DIAGNOSIS — M62.830 BACK MUSCLE SPASM: ICD-10-CM

## 2020-10-13 RX ORDER — LISDEXAMFETAMINE DIMESYLATE 70 MG/1
CAPSULE ORAL
Qty: 30 CAPSULE | Refills: 0 | Status: SHIPPED | OUTPATIENT
Start: 2020-10-13 | End: 2020-12-30

## 2020-10-13 RX ORDER — DRONABINOL 2.5 MG/1
CAPSULE ORAL
Qty: 60 CAPSULE | Refills: 0 | Status: SHIPPED | OUTPATIENT
Start: 2020-10-13 | End: 2020-11-10

## 2020-10-13 RX ORDER — IBUPROFEN 600 MG/1
TABLET, FILM COATED ORAL
Qty: 120 TABLET | Refills: 0 | Status: SHIPPED | OUTPATIENT
Start: 2020-10-13 | End: 2020-11-10

## 2020-10-13 RX ORDER — GABAPENTIN 300 MG/1
CAPSULE ORAL
Qty: 270 CAPSULE | Refills: 0 | Status: SHIPPED | OUTPATIENT
Start: 2020-10-13 | End: 2020-11-10

## 2020-10-13 NOTE — TELEPHONE ENCOUNTER
dronabinol      Last Written Prescription Date:  9-  Last Fill Quantity: 60,   # refills: 0  Last Office Visit: 9-8-2020  Future Office visit:    Next 5 appointments (look out 90 days)    Oct 26, 2020  3:20 PM  (Arrive by 3:05 PM)  Return Visit with Laquita Fernandez MD  [facility] [place] ([facility] [place] ) [address] [place] [address]  [place] [id number] [phone]   Oct 27, 2020  2:30 PM  (Arrive by 2:15 PM)  SHORT with Lissy Moore MD  [facility] [place] ([facility] [place] ) [address]  [place] [address]  [phone]             Gabapentin       Last Written Prescription Date:  9-  Last Fill Quantity: 270,   # refills: 0  Last Office Visit: 9-8-2020  Future Office visit:      advil      Last Written Prescription Date:  9-  Last Fill Quantity: 120,   # refills: 0  Last Office Visit: 9-8-2020  Future Office visit:      omeprazole       Last Written Prescription Date:  4-8-2020  Last Fill Quantity: 180,   # refills: 1  Last Office Visit: 9-8-2020  Future Office visit:      zanaflex      Not on medication list active

## 2020-10-13 NOTE — TELEPHONE ENCOUNTER
Vyvanse      Last Written Prescription Date:  8/25/2020  Last Fill Quantity: 30,   # refills: 0  Last Office Visit: 9/18/2020  Future Office visit:    Next 5 appointments (look out 90 days)    Oct 26, 2020  3:20 PM  (Arrive by 3:05 PM)  Return Visit with Laquita Fernandez MD  [facility] [place] ([facility] [place] ) [address] [place] [address]  [place] [address] [phone]   Oct 27, 2020  2:30 PM  (Arrive by 2:15 PM)  SHORT with Lissy Moore MD  [facility] [place] ([facility] [place] ) [address]  [place] [address]  [phone]

## 2020-10-14 DIAGNOSIS — J45.41 MODERATE PERSISTENT ASTHMA WITH EXACERBATION: ICD-10-CM

## 2020-10-15 RX ORDER — GUAIFENESIN AND DEXTROMETHORPHAN HYDROBROMIDE 600; 30 MG/1; MG/1
1 TABLET, EXTENDED RELEASE ORAL EVERY 12 HOURS
Qty: 60 TABLET | Refills: 0 | Status: SHIPPED | OUTPATIENT
Start: 2020-10-15 | End: 2020-11-10

## 2020-10-15 NOTE — TELEPHONE ENCOUNTER
Mucinex       Last Written Prescription Date:  9/18/2020  Last Fill Quantity: 60,   # refills: 0  Last Office Visit: 9/18/2020  Future Office visit:    Next 5 appointments (look out 90 days)    Oct 26, 2020  3:20 PM  (Arrive by 3:05 PM)  Return Visit with Laquita Fernandez MD  [facility] [place] ([facility] [place] ) [address] [place] [address] [address] [phone]   Oct 27, 2020  2:30 PM  (Arrive by 2:15 PM)  SHORT with Lissy Moore MD  [facility] [place] ([facility] [place] ) [address] [facility] [address]\[facility] [place] [facility]\""[place] [address]  [phone]

## 2020-10-19 NOTE — PROGRESS NOTES
Subjective     Joshua Barron is a 60 year old male who presents to clinic today for the following health issues:    HPI         New Patient/Transfer of Care  Hypertension Follow-up      Do you check your blood pressure regularly outside of the clinic? No     Are you following a low salt diet? No    Are your blood pressures ever more than 140 on the top number (systolic) OR more   than 90 on the bottom number (diastolic), for example 140/90? No    Asthma Follow-Up    Was ACT completed today?    Yes    ACT Total Scores 10/27/2020   ACT TOTAL SCORE -   ASTHMA ER VISITS -   ASTHMA HOSPITALIZATIONS -   ACT TOTAL SCORE (Goal Greater than or Equal to 20) 16   In the past 12 months, how many times did you visit the emergency room for your asthma without being admitted to the hospital? 0   In the past 12 months, how many times were you hospitalized overnight because of your asthma? 0       How many days per week do you miss taking your asthma controller medication?  0    Please describe any recent triggers for your asthma: dust mites, pollens, animal dander and mold    Have you had any Emergency Room Visits, Urgent Care Visits, or Hospital Admissions since your last office visit?  No  This has improved since September when his ACT was 7. He was treated at that time with prednisone and Cefzil      Chronic/Recurring Back Pain Follow Up      Where is your back pain located? (Select all that apply) low back right    How would you describe your back pain?  stabbing    Where does your back pain spread? nowhere    Since your last clinic visit for back pain, how has your pain changed? gradually worsening    Does your back pain interfere with your job? Not applicable    Since your last visit, have you tried any new treatment? No       he has had two lumbar surgeries in the past and has been followed recently by Dr Soares at [facility]. He continues on morphine and hydrocodone as well as gabapentin. He has had injections per   "[place] as well at [facility].           Review of Systems   Constitutional, HEENT, cardiovascular, pulmonary, GI, , musculoskeletal, neuro, skin, endocrine and psych systems are negative, except as otherwise noted.      Objective    /74   Pulse 99   Temp 98  F (36.7  C) (Tympanic)   Resp 19   Ht 1.803 m (5' 11\")   Wt 90.3 kg (199 lb)   SpO2 99%   BMI 27.75 kg/m    Body mass index is 27.75 kg/m .  Physical Exam   GENERAL: healthy, alert and no distress  EYES: Eyes grossly normal to inspection, PERRL and conjunctivae and sclerae normal  HENT: ear canals and TM's normal, nose  without ulcers or lesions  NECK: no adenopathy, no asymmetry, masses, or scars and thyroid normal to palpation  RESP: lungs clear to auscultation - no rales, rhonchi or wheezes  CV: regular rate and rhythm, normal S1 S2, no S3 or S4, no murmur, click or rub, no peripheral edema and peripheral pulses strong  ABDOMEN: soft, nontender, no hepatosplenomegaly, no masses and bowel sounds normal  MS: no gross musculoskeletal defects noted, no edema, he walks with a stooped posture  SKIN: no suspicious lesions or rashes  NEURO: Normal strength and tone, mentation intact and speech normal  PSYCH: mentation appears normal, affect normal/bright            Assessment & Plan     Encounter to establish care  PMH, PSH, medications, allergies, SH, all reviewed    Moderate persistent asthma without complication  Will add singulair to see if this is helpful  Recheck one month  - montelukast (SINGULAIR) 10 MG tablet; Take 1 tablet (10 mg) by mouth At Bedtime    Benign essential hypertension  Good control on current medication    Bipolar affective disorder, current episode hypomanic (H)  Followed by Dr Fernandez    Mixed hyperlipidemia  Labs recently done    Seizure disorder (H)  Also with seizure like activity on chart  Trileptal is being used as a mood stabilizer    Generalized anxiety disorder  Stable     Chronic, continuous use of opioids  Discussed. " "He continues on morphine and hydrocodone and gabapentin. Will continue at this time     Tobacco use  Due for low dose CT scanning. He is in agreement, risks and benefits discussed. He wishes to proceed with CT scanning      Tobacco abuse counseling  Snuff, will try nicorette gum in place of snuff, encouraged to quit   - nicotine (NICORETTE) 4 MG gum; Place 1 each (4 mg) inside cheek as needed for smoking cessation    Need for prophylactic vaccination and inoculation against influenza  Given today  - [place] RIV4 (FLUBLOK) VACCINE RECOMBINANT DNA PRSRV ANTIBIO FREE, IM [[id number]]  - Vaccine Administration, Initial [54264]  - EA ADD'L VACCINE    Increased glucose level  Recheck A1C which was normal 2 years ago with weight loss he did  - Hemoglobin A1c     BMI:   Estimated body mass index is 27.75 kg/m  as calculated from the following:    Height as of this encounter: 1.803 m (5' 11\").    Weight as of this encounter: 90.3 kg (199 lb).                Return in about 4 weeks (around 11/24/2020) for asthma .    Lissy Moore MD  [facility] [place]    Lung Cancer Screening Shared Decision Making Visit     Joshua Barron is eligible for lung cancer screening on the basis of the information provided in my signed lung cancer screening order.     I have discussed with patient the risks and benefits of screening for lung cancer with low-dose CT.     The risks include:  radiation exposure: one low dose chest CT has as much ionizing radiation as about 15 chest x-rays or 6 months of background radiation living in [place]    false positives: 96% of positive findings/nodules are NOT cancer, but some might still require additional diagnostic evaluation, including biopsy  over-diagnosis: some slow growing cancers that might never have been clinically significant will be detected and treated unnecessarily     The benefit of early detection of lung cancer is contingent upon adherence to annual screening or more frequent " follow up if indicated.     Furthermore, reaping the benefits of screening requires Joshua Barron to be willing and physically able to undergo diagnostic procedures, if indicated. Although no specific guide is available for determining severity of comorbidities, it is reasonable to withhold screening in patients who have greater mortality risk from other diseases.     We did discuss that the only way to prevent lung cancer is to not smoke. Smoking cessation counseling was not given.      I did not offer risk estimation using a calculator such as this one:    ShouldIScreen

## 2020-10-23 DIAGNOSIS — M51.379 DEGENERATION OF LUMBAR OR LUMBOSACRAL INTERVERTEBRAL DISC: ICD-10-CM

## 2020-10-23 DIAGNOSIS — R52 PAIN: ICD-10-CM

## 2020-10-23 DIAGNOSIS — G89.4 CHRONIC PAIN SYNDROME: ICD-10-CM

## 2020-10-23 DIAGNOSIS — K21.9 GASTROESOPHAGEAL REFLUX DISEASE: ICD-10-CM

## 2020-10-23 DIAGNOSIS — K21.9 LPRD (LARYNGOPHARYNGEAL REFLUX DISEASE): ICD-10-CM

## 2020-10-23 DIAGNOSIS — F11.90 CHRONIC, CONTINUOUS USE OF OPIOIDS: ICD-10-CM

## 2020-10-23 DIAGNOSIS — Z00.00 HEALTHCARE MAINTENANCE: ICD-10-CM

## 2020-10-26 ENCOUNTER — OFFICE VISIT (OUTPATIENT)
Dept: PSYCHIATRY | Facility: OTHER | Age: 60
End: 2020-10-26
Attending: PSYCHIATRY & NEUROLOGY
Payer: COMMERCIAL

## 2020-10-26 VITALS
DIASTOLIC BLOOD PRESSURE: 70 MMHG | WEIGHT: 180 LBS | HEART RATE: 88 BPM | TEMPERATURE: 98.1 F | BODY MASS INDEX: 25.1 KG/M2 | OXYGEN SATURATION: 97 % | SYSTOLIC BLOOD PRESSURE: 120 MMHG

## 2020-10-26 DIAGNOSIS — F90.2 ADHD (ATTENTION DEFICIT HYPERACTIVITY DISORDER), COMBINED TYPE: ICD-10-CM

## 2020-10-26 PROCEDURE — 99213 OFFICE O/P EST LOW 20 MIN: CPT | Performed by: PSYCHIATRY & NEUROLOGY

## 2020-10-26 PROCEDURE — G0463 HOSPITAL OUTPT CLINIC VISIT: HCPCS

## 2020-10-26 RX ORDER — ACETAMINOPHEN 325 MG/1
TABLET ORAL
Qty: 200 TABLET | Refills: 0 | Status: SHIPPED | OUTPATIENT
Start: 2020-10-26 | End: 2020-11-10

## 2020-10-26 RX ORDER — LISDEXAMFETAMINE DIMESYLATE 70 MG/1
70 CAPSULE ORAL EVERY MORNING
Qty: 30 CAPSULE | Refills: 0 | Status: SHIPPED | OUTPATIENT
Start: 2020-11-25 | End: 2020-10-27

## 2020-10-26 RX ORDER — HYDROCODONE BITARTRATE AND ACETAMINOPHEN 10; 325 MG/1; MG/1
TABLET ORAL
Qty: 60 TABLET | Refills: 0 | Status: SHIPPED | OUTPATIENT
Start: 2020-10-26 | End: 2020-11-25

## 2020-10-26 RX ORDER — MORPHINE SULFATE 15 MG/1
TABLET, FILM COATED, EXTENDED RELEASE ORAL
Qty: 90 TABLET | Refills: 0 | Status: SHIPPED | OUTPATIENT
Start: 2020-10-26 | End: 2020-11-25

## 2020-10-26 RX ORDER — LISDEXAMFETAMINE DIMESYLATE 70 MG/1
70 CAPSULE ORAL EVERY MORNING
Qty: 30 CAPSULE | Refills: 0 | Status: SHIPPED | OUTPATIENT
Start: 2020-10-26 | End: 2021-03-24

## 2020-10-26 RX ORDER — ASPIRIN 81 MG/1
TABLET, CHEWABLE ORAL
Qty: 28 TABLET | Refills: 0 | Status: SHIPPED | OUTPATIENT
Start: 2020-10-26 | End: 2020-12-22

## 2020-10-26 ASSESSMENT — PATIENT HEALTH QUESTIONNAIRE - PHQ9
5. POOR APPETITE OR OVEREATING: NOT AT ALL
SUM OF ALL RESPONSES TO PHQ QUESTIONS 1-9: 6

## 2020-10-26 ASSESSMENT — ANXIETY QUESTIONNAIRES
2. NOT BEING ABLE TO STOP OR CONTROL WORRYING: NOT AT ALL
3. WORRYING TOO MUCH ABOUT DIFFERENT THINGS: NOT AT ALL
1. FEELING NERVOUS, ANXIOUS, OR ON EDGE: NOT AT ALL
7. FEELING AFRAID AS IF SOMETHING AWFUL MIGHT HAPPEN: NOT AT ALL
IF YOU CHECKED OFF ANY PROBLEMS ON THIS QUESTIONNAIRE, HOW DIFFICULT HAVE THESE PROBLEMS MADE IT FOR YOU TO DO YOUR WORK, TAKE CARE OF THINGS AT HOME, OR GET ALONG WITH OTHER PEOPLE: NOT DIFFICULT AT ALL
5. BEING SO RESTLESS THAT IT IS HARD TO SIT STILL: NOT AT ALL

## 2020-10-26 ASSESSMENT — PAIN SCALES - GENERAL: PAINLEVEL: EXTREME PAIN (8)

## 2020-10-26 NOTE — TELEPHONE ENCOUNTER
tylenol       Last Written Prescription Date:  8-  Last Fill Quantity: 200,   # refills: 1  Last Office Visit: 9-8-2020  Future Office visit:    Next 5 appointments (look out 90 days)    Oct 26, 2020  3:20 PM  (Arrive by 3:05 PM)  Return Visit with Laquita Fernandez MD  [facility] [place] ([facility] [place] ) [address] [place] [address]  [place] [address] [phone]   Oct 27, 2020  2:30 PM  (Arrive by 2:15 PM)  SHORT with Lissy Moore MD  [facility] [place] ([facility] [place] ) [address]  [place] [address]  [phone]           aspirin       Last Written Prescription Date:  9-3-2020  Last Fill Quantity: 90,   # refills: 1  Last Office Visit: 9-8-2020  Future Office visit:          norco       Last Written Prescription Date:  9-  Last Fill Quantity: 60,   # refills: 0  Last Office Visit: 9-8-2020  Future Office visit:      Morphine       Last Written Prescription Date:  9-  Last Fill Quantity: 90,   # refills: 0  Last Office Visit: 9-8-2020  Future Office visit:

## 2020-10-26 NOTE — NURSING NOTE
"Chief Complaint   Patient presents with     RECHECK     Mental health.       Initial /70 (BP Location: Left arm, Patient Position: Sitting, Cuff Size: Adult Regular)   Pulse 88   Temp 98.1  F (36.7  C) (Tympanic)   Wt 81.6 kg (180 lb)   SpO2 97%   BMI 25.10 kg/m   Estimated body mass index is 25.1 kg/m  as calculated from the following:    Height as of 9/18/20: 1.803 m (5' 11\").    Weight as of this encounter: 81.6 kg (180 lb).  Medication Reconciliation: complete  JACQUELYN SUAREZ LPN    "

## 2020-10-26 NOTE — PROGRESS NOTES
Social- Was  twice. Lives alone with his 3 cats: Smoky the cat. Has a GF in [place]  Children-  2 kids, they are in [place]  Last visit 8/2020: Continue Trileptal 600 mg bid. Now off diazepam.  Continue trazodone 100 mg bedtime prn insomnia. Continue Vyvanse 70 mg daily  filled today 8/25 and for 9/23    - given his primary leaving, will need to find a new primary (I then noted I see he has apptmt with Dr. Nava Moore tomorrow)  - notes pain ongoing   - notes they have something in their house to listen   - weight stabilized  - has been having issues with waking up fully and like still experiencing his dreams / nightmares while awake  - mom with dementia, then fell and broke her hip. Parents   - been having some migraines and feeling stuffed up. Saw ENT 7/20   - Lots of family issues: Joshua has taken care of his parents and yet parents give his other brothers everything. oldest of 3 brothers. Brother in [place] youngest [place] and Major is here. Mom and dad here [place] on [place]. Joshua noting moving here to be closer to parents and help them, etc. But, parents don't seem to want him around much or talk to him.    SUBSTANCE USE- reports no abuse of meds or substances    SYMPTOMS- paranoia, hypnopompic hallucinations, issues with attention and concentration improved with Vyvanse: racing thoughts, depressed mood, impulsivity, distractibility  MEDICAL ROS- GERD: feels trouble with heartburn feeling like fluid gets stuck in his throat. back pain, . Intentional weight loss  MEDICAL / SURGICAL HISTORY                     Patient Active Problem List   Diagnosis     Mixed hyperlipidemia     Tobacco Abuse, History of     Degeneration of lumbar or lumbosacral intervertebral disc     Depression, major     Chronic, continuous use of opioids     Chemical dependency (H)     Chronic rhinitis     Tinnitus of both ears     SNHL (sensorineural hearing loss)     Bipolar  disorder (H)     Seizure-like activity (H)     Somatic dysfunction of pelvis region     Bilateral foot pain     Prostate cancer screening     Trigger index finger of right hand     Moderate persistent asthma without complication     Chronic lower back pain     ACP (advance care planning)     Onychia of toe of left foot     DDD (degenerative disc disease), lumbar     Seizure disorder (H)     Back muscle spasm     Benign essential hypertension     Retrograde ejaculation     Allergic rhinitis due to other allergen     Cervical spondylosis without myelopathy     Chronic headaches     DDD (degenerative disc disease)     Generalized anxiety disorder     Hypertrophy of prostate without urinary obstruction and other lower urinary tract symptoms (LUTS)     GERD (gastroesophageal reflux disease)     Lumbago     Major depressive disorder, recurrent episode, moderate (H)     Myalgia and myositis     Hyperlipidemia     Other pain disorders related to psychological factors     Spinal stenosis in cervical region     Status post lumbar spinal fusion     Tobacco use disorder     Trigger finger     Polypharmacy     Deviated nasal septum     Attention deficit hyperactivity disorder (ADHD), combined type     chronic noninfective otitis externa     Migraine with aura and without status migrainosus, not intractable     ALLERGY   Cymbalta, Depakote [valproic acid], and Seasonal allergies  MEDICATIONS                                                                                             Current Outpatient Medications   Medication Sig     acetaminophen (TYLENOL) 325 MG tablet TAKE 2 TABLETS BY MOUTH EVERY 4 HOURS AS NEEDED FOR MILD PAIN     albuterol (ACCUNEB) 1.25 MG/3ML neb solution Take 1 vial (1.25 mg) by nebulization every 6 hours as needed for shortness of breath / dyspnea or wheezing     albuterol (PROAIR HFA/PROVENTIL HFA/VENTOLIN HFA) 108 (90 Base) MCG/ACT inhaler 2 puffs every 4 hours as needed.     aspirin (ASPIRIN LOW DOSE)  81 MG chewable tablet CHEW AND SWALLOW 1 TABLET BY MOUTH DAILY     Aspirin-Acetaminophen-Caffeine (EQ HEADACHE RELIEF PO) Take 200 mg by mouth     atorvastatin (LIPITOR) 20 MG tablet TAKE 1 TABLET BY MOUTH DAILY     baclofen (LIORESAL) 20 MG tablet TAKE 1 TABLET BY MOUTH 4 TIMES DAILY     dextromethorphan-guaiFENesin (MUCINEX DM)  MG 12 hr tablet TAKE 1 TABLET BY MOUTH EVERY 12 HOURS     diclofenac (VOLTAREN) 50 MG EC tablet TAKE 1 TABLET BY MOUTH TWICE DAILY with food.     docusate sodium (DOK) 100 MG capsule TAKE 1 CAPSULE BY MOUTH TWICE DAILY     dronabinol (MARINOL) 2.5 MG capsule TAKE 1 CAPSULE BY MOUTH 2 TIMES DAILY BEFORE MEALS     famotidine (PEPCID) 40 MG tablet TAKE 1 TABLET BY MOUTH DAILY     fluticasone-salmeterol (ADVAIR-HFA) 230-21 MCG/ACT inhaler Inhale 2 puffs into the lungs 2 times daily     gabapentin (NEURONTIN) 300 MG capsule TAKE 3 CAPSULES BY MOUTH THREE TIMES DAILY     HYDROcodone-acetaminophen (NORCO)  MG per tablet TAKE 1 TABLET BY MOUTH 2 TIMES DAILY AS NEEDED FOR SEVERE PAIN     hydrOXYzine (VISTARIL) 50 MG capsule TAKE 1 TO 2 CAPSULES BY MOUTH 4 TIMES DAILY AS NEEDED FOR ANXIETY     ibuprofen (ADVIL/MOTRIN) 600 MG tablet TAKE 1 TABLET BY MOUTH EVERY 6 HOURS AS NEEDED     lidocaine (LIDODERM) 5 % patch PLACE 3 PATCHES ONTO SKIN DAILY FOR 12 HOURS AND REMOVE.     lidocaine (XYLOCAINE) 5 % external ointment Apply to the back using gloves three times per vday as needed for moderate to severe pain.     lisdexamfetamine (VYVANSE) 70 MG capsule Take 1 capsule (70 mg) by mouth every morning     loratadine (CLARITIN) 10 MG tablet Take 10 mg by mouth daily     losartan (COZAAR) 50 MG tablet TAKE 1 TABLET BY MOUTH DAILY     methocarbamol (ROBAXIN) 500 MG tablet TAKE 1 TABLET BY MOUTH 3 TIMES DAILY     metoprolol succinate ER (TOPROL-XL) 50 MG 24 hr tablet Take 1 tablet (50 mg) by mouth daily     morphine (MS CONTIN) 15 MG CR tablet TAKE 1 TABLET BY MOUTH EVERY 8 HOURS     multivitamin   with iron (SM COMPLETE ADVANCED FORMULA) TABS TAKE 1 TABLET BY MOUTH DAILY     naloxone (NARCAN) 1 mg/mL for intranasal kit (2 syringes with 2 mucosal atomizer device) In opioid overdose put cone in nostril and push 1/2 of contents into each nostril.  Repeat every 3 min if no response until help arrives.     nortriptyline (PAMELOR) 50 MG capsule TAKE 2 CAPSULES (100MG) BY MOUTH AT BEDTIME     omeprazole (PRILOSEC) 20 MG DR capsule TAKE 1 CAPSULE BY MOUTH 2 TIMES DAILY     order for DME Equipment being ordered: Thoracic and lumbar Back Brace     order for DME 1 boa back brace     ORDER FOR DME Equipment being ordered: Large gloves     OXcarbazepine (TRILEPTAL) 600 MG tablet TAKE 1 TABLET BY MOUTH 2 TIMES DAILY     propranolol (INDERAL) 20 MG tablet      senna-docusate (SENNA-PLUS) 8.6-50 MG tablet TAKE 1 OR 2 TABLETS BY MOUTH 2 TIMES A DAY     SUMAtriptan (IMITREX) 50 MG tablet Take 1 tablet (50 mg) by mouth at onset of headache for migraine May repeat in 2 hours. Max 4 tablets/24 hours.     tamsulosin (FLOMAX) 0.4 MG capsule TAKE 1 CAPSULE BY MOUTH DAILY     tiZANidine (ZANAFLEX) 4 MG tablet TAKE 1 TABLET BY MOUTH 3 TIMES A DAY     traZODone (DESYREL) 100 MG tablet Take 1 tablet (100 mg) by mouth At Bedtime     VYVANSE 70 MG capsule TAKE 1 CAPSULE BY MOUTH DAILY     No current facility-administered medications for this visit.        VITALS   /70 (BP Location: Left arm, Patient Position: Sitting, Cuff Size: Adult Regular)   Pulse 88   Temp 98.1  F (36.7  C) (Tympanic)   Wt 81.6 kg (180 lb)   SpO2 97%   BMI 25.10 kg/m       LABS                                                                                                                           Last Comprehensive Metabolic Panel:  Sodium   Date Value Ref Range Status   08/17/2020 136 133 - 144 mmol/L Final     Potassium   Date Value Ref Range Status   08/17/2020 4.0 3.4 - 5.3 mmol/L Final     Chloride   Date Value Ref Range Status   08/17/2020 106 94 -  109 mmol/L Final     Carbon Dioxide   Date Value Ref Range Status   08/17/2020 24 20 - 32 mmol/L Final     Anion Gap   Date Value Ref Range Status   08/17/2020 6 3 - 14 mmol/L Final     Glucose   Date Value Ref Range Status   08/17/2020 112 (H) 70 - 99 mg/dL Final     Urea Nitrogen   Date Value Ref Range Status   08/17/2020 16 7 - 30 mg/dL Final     Creatinine   Date Value Ref Range Status   08/17/2020 0.66 0.66 - 1.25 mg/dL Final     GFR Estimate   Date Value Ref Range Status   08/17/2020 >90 >60 mL/min/[1.73_m2] Final     Comment:     Non  GFR Calc  Starting 12/18/2018, serum creatinine based estimated GFR (eGFR) will be   calculated using the Chronic Kidney Disease Epidemiology Collaboration   (CKD-EPI) equation.       Calcium   Date Value Ref Range Status   08/17/2020 8.0 (L) 8.5 - 10.1 mg/dL Final       CBC RESULTS:   Recent Labs   Lab Test 08/17/20  0015   WBC 5.0   RBC 3.67*   HGB 10.9*   HCT 31.7*   MCV 86   MCH 29.7   MCHC 34.4   RDW 13.2          Recent Labs   Lab Test 10/25/18  1434 03/17/16  1412 11/17/14  1045 12/23/13  0949   CHOL 131 217* 185 161   HDL 51 36* 44 41   LDL 60 Cannot estimate LDL when triglyceride exceeds 400 mg/dL 86 62   TRIG 101 431* 275* 289*   CHOLHDLRATIO  --   --  4.2 3.9       EKG 6/3/19 with QTc of 415 ms     MENTAL STATUS EXAM                                                                                          Alert. Oriented to person, place, and date / time. Casually groomed, calm, cooperative with good eye contact. No problems psychomotor behavior. Speech: loud. .  Mood was described as frustrated and affect was congruent to speech content and full range. Thought process, including associations, was unremarkable and thought content was devoid of suicidal and homicidal ideation.  No hallucinations. Insight was poor Judgment was  adequate for safety. Fund of knowledge was intact. Pt demonstrates no obvious problems with attention, concentration,  language, recent or remote memory although these were not formally tested.       ASSESSMENT                                                                                                      HISTORICAL:  Initial psych note 10/6/15          NOTES:      Joshua is a 60 year old with bipolar, ADHD, and MDD.  We started Valium as dual purpose: muscle relaxant properties and for anxiety. We have discussed the new guidelines for short - term use of benzodiazepines (Valium) and avoiding their use along with opioids. I had noted plan to taper down over time and eventually discontinue. We decreased from 5 mg every 12 hours as needed down to 2 mg every 12 hours as needed. March '20 we decreased to once daily and then discontinued. Watching CBC : given he is on Trileptal following he runs lower but overall looks stable.    Last Vyvanse         TREATMENT RISK STATEMENT:  The risks, benefits, alternatives and potential adverse effects have been explained and are understood by the pt.  The pt agrees to the treatment plan with the ability to do so.   The pt knows to call the clinic for any problems or access emergency care if needed.        DIAGNOSES                    Bipolar disorder I with psychosis vs. Schizoaffective disorder, bipolar type  ADHD      PLAN                                                                                                                    1)  MEDICATIONS:       Continue Trileptal 600 mg bid. Now off diazepam.  Continue trazodone 100 mg bedtime prn insomnia. Continue Vyvanse 70 mg daily and last filled 9/23. Refilled today 10/26 and for 11/23    2)  THERAPY:  No change    3)  LABS:  CBC 8/ 2020    4)  PT MONITOR [call for probs]:  SEs from meds, worsening sx, SI/HI    5)  REFERRALS [CD, medical, other]:  None    6)  RTC: 2 months

## 2020-10-27 ENCOUNTER — OFFICE VISIT (OUTPATIENT)
Dept: FAMILY MEDICINE | Facility: OTHER | Age: 60
End: 2020-10-27
Attending: FAMILY MEDICINE
Payer: COMMERCIAL

## 2020-10-27 VITALS
TEMPERATURE: 98 F | HEIGHT: 71 IN | DIASTOLIC BLOOD PRESSURE: 74 MMHG | BODY MASS INDEX: 27.86 KG/M2 | HEART RATE: 99 BPM | OXYGEN SATURATION: 99 % | RESPIRATION RATE: 19 BRPM | WEIGHT: 199 LBS | SYSTOLIC BLOOD PRESSURE: 110 MMHG

## 2020-10-27 DIAGNOSIS — Z23 NEED FOR PROPHYLACTIC VACCINATION AND INOCULATION AGAINST INFLUENZA: ICD-10-CM

## 2020-10-27 DIAGNOSIS — F41.1 GENERALIZED ANXIETY DISORDER: ICD-10-CM

## 2020-10-27 DIAGNOSIS — F11.90 CHRONIC, CONTINUOUS USE OF OPIOIDS: ICD-10-CM

## 2020-10-27 DIAGNOSIS — J45.40 MODERATE PERSISTENT ASTHMA WITHOUT COMPLICATION: ICD-10-CM

## 2020-10-27 DIAGNOSIS — E78.2 MIXED HYPERLIPIDEMIA: ICD-10-CM

## 2020-10-27 DIAGNOSIS — G40.909 SEIZURE DISORDER (H): ICD-10-CM

## 2020-10-27 DIAGNOSIS — Z72.0 TOBACCO USE: ICD-10-CM

## 2020-10-27 DIAGNOSIS — Z76.89 ENCOUNTER TO ESTABLISH CARE: Primary | ICD-10-CM

## 2020-10-27 DIAGNOSIS — Z71.6 TOBACCO ABUSE COUNSELING: ICD-10-CM

## 2020-10-27 DIAGNOSIS — Z87.891 PERSONAL HISTORY OF TOBACCO USE: ICD-10-CM

## 2020-10-27 DIAGNOSIS — F31.0 BIPOLAR AFFECTIVE DISORDER, CURRENT EPISODE HYPOMANIC (H): ICD-10-CM

## 2020-10-27 DIAGNOSIS — R73.09 INCREASED GLUCOSE LEVEL: ICD-10-CM

## 2020-10-27 DIAGNOSIS — I10 BENIGN ESSENTIAL HYPERTENSION: ICD-10-CM

## 2020-10-27 LAB
EST. AVERAGE GLUCOSE BLD GHB EST-MCNC: 117 MG/DL
HBA1C MFR BLD: 5.7 % (ref 0–5.6)

## 2020-10-27 PROCEDURE — G0008 ADMIN INFLUENZA VIRUS VAC: HCPCS

## 2020-10-27 PROCEDURE — 83036 HEMOGLOBIN GLYCOSYLATED A1C: CPT | Mod: ZL | Performed by: FAMILY MEDICINE

## 2020-10-27 PROCEDURE — 90472 IMMUNIZATION ADMIN EACH ADD: CPT

## 2020-10-27 PROCEDURE — G0296 VISIT TO DETERM LDCT ELIG: HCPCS | Performed by: FAMILY MEDICINE

## 2020-10-27 PROCEDURE — G0463 HOSPITAL OUTPT CLINIC VISIT: HCPCS | Mod: 25

## 2020-10-27 PROCEDURE — 99214 OFFICE O/P EST MOD 30 MIN: CPT | Performed by: FAMILY MEDICINE

## 2020-10-27 PROCEDURE — 36415 COLL VENOUS BLD VENIPUNCTURE: CPT | Mod: ZL | Performed by: FAMILY MEDICINE

## 2020-10-27 PROCEDURE — 36416 COLLJ CAPILLARY BLOOD SPEC: CPT | Mod: ZL | Performed by: FAMILY MEDICINE

## 2020-10-27 PROCEDURE — 999N001182 HC STATISTIC ESTIMATED AVERAGE GLUCOSE: Mod: ZL | Performed by: FAMILY MEDICINE

## 2020-10-27 RX ORDER — MONTELUKAST SODIUM 10 MG/1
10 TABLET ORAL AT BEDTIME
Qty: 90 TABLET | Refills: 3 | Status: SHIPPED | OUTPATIENT
Start: 2020-10-27 | End: 2021-12-02

## 2020-10-27 RX ORDER — FAMOTIDINE 40 MG/1
TABLET, FILM COATED ORAL
Qty: 30 TABLET | Refills: 2 | Status: SHIPPED | OUTPATIENT
Start: 2020-10-27 | End: 2021-01-21

## 2020-10-27 ASSESSMENT — PAIN SCALES - GENERAL: PAINLEVEL: SEVERE PAIN (6)

## 2020-10-27 ASSESSMENT — MIFFLIN-ST. JEOR: SCORE: 1744.79

## 2020-10-27 NOTE — NURSING NOTE
"Chief Complaint   Patient presents with     Establish Care     Flu Shot     Imm/Inj     due for shingrix       Initial /74   Pulse 99   Temp 98  F (36.7  C) (Tympanic)   Resp 19   Ht 1.803 m (5' 11\")   Wt 90.3 kg (199 lb)   SpO2 99%   BMI 27.75 kg/m   Estimated body mass index is 27.75 kg/m  as calculated from the following:    Height as of this encounter: 1.803 m (5' 11\").    Weight as of this encounter: 90.3 kg (199 lb).  Medication Reconciliation: complete  Kristen Breaux LPN    "

## 2020-10-27 NOTE — PATIENT INSTRUCTIONS

## 2020-10-28 DIAGNOSIS — F41.1 GAD (GENERALIZED ANXIETY DISORDER): ICD-10-CM

## 2020-10-28 RX ORDER — HYDROXYZINE PAMOATE 50 MG/1
CAPSULE ORAL
Qty: 120 CAPSULE | Refills: 0 | Status: SHIPPED | OUTPATIENT
Start: 2020-10-28 | End: 2020-11-18

## 2020-10-28 ASSESSMENT — ASTHMA QUESTIONNAIRES: ACT_TOTALSCORE: 16

## 2020-10-28 NOTE — TELEPHONE ENCOUNTER
vistaril       Last Written Prescription Date:  10-6-2020  Last Fill Quantity: 120,   # refills: 0  Last Office Visit: 10-  Future Office visit:    Next 5 appointments (look out 90 days)    Oct 28, 2020  4:20 PM  Return Visit with Shamar Escamilla DC  [facility] [place] ([facility]) [address]  [place] [address]  [phone]   Nov 24, 2020  4:00 PM  (Arrive by 3:45 PM)  SHORT with Lissy Moore MD  [facility] [place] ([facility] [place] ) [address] [address]  [phone]   Dec 29, 2020  3:20 PM  (Arrive by 3:05 PM)  Return Visit with Laquita Fernandez MD  [facility] [place] [facility] [address] [facility] [place] [address] [phone]

## 2020-11-02 ENCOUNTER — TELEPHONE (OUTPATIENT)
Dept: FAMILY MEDICINE | Facility: OTHER | Age: 60
End: 2020-11-02

## 2020-11-02 DIAGNOSIS — M54.40 CHRONIC BILATERAL LOW BACK PAIN WITH SCIATICA, SCIATICA LATERALITY UNSPECIFIED: ICD-10-CM

## 2020-11-02 DIAGNOSIS — Z98.1 STATUS POST LUMBAR SPINAL FUSION: ICD-10-CM

## 2020-11-02 DIAGNOSIS — M48.02 SPINAL STENOSIS IN CERVICAL REGION: Primary | ICD-10-CM

## 2020-11-02 DIAGNOSIS — G89.29 CHRONIC BILATERAL LOW BACK PAIN WITH SCIATICA, SCIATICA LATERALITY UNSPECIFIED: ICD-10-CM
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
## 2020-11-03 ENCOUNTER — HOSPITAL ENCOUNTER (EMERGENCY)
Facility: HOSPITAL | Age: 60
Discharge: HOME | End: 2020-11-03
Attending: EMERGENCY MEDICINE | Admitting: EMERGENCY MEDICINE
Payer: COMMERCIAL

## 2020-11-03 ENCOUNTER — APPOINTMENT (OUTPATIENT)
Dept: CT IMAGING | Facility: HOSPITAL | Age: 60
End: 2020-11-03
Attending: EMERGENCY MEDICINE
Payer: COMMERCIAL

## 2020-11-03 VITALS
HEART RATE: 73 BPM | DIASTOLIC BLOOD PRESSURE: 99 MMHG | OXYGEN SATURATION: 97 % | TEMPERATURE: 98.2 F | RESPIRATION RATE: 16 BRPM | SYSTOLIC BLOOD PRESSURE: 140 MMHG

## 2020-11-03 DIAGNOSIS — S09.90XA MINOR HEAD INJURY, INITIAL ENCOUNTER: Primary | ICD-10-CM

## 2020-11-03 PROCEDURE — 99284 EMERGENCY DEPT VISIT MOD MDM: CPT | Mod: 25

## 2020-11-03 PROCEDURE — 99284 EMERGENCY DEPT VISIT MOD MDM: CPT | Performed by: EMERGENCY MEDICINE

## 2020-11-03 PROCEDURE — 70450 CT HEAD/BRAIN W/O DYE: CPT

## 2020-11-03 ASSESSMENT — ENCOUNTER SYMPTOMS
FEVER: 0
SHORTNESS OF BREATH: 0
ABDOMINAL PAIN: 0

## 2020-11-03 NOTE — ED AVS SNAPSHOT
[facility]  [address]  [place] [address]  Phone: [phone]                                    Joshua Barron   MRN: [id number]    Department: [facility]   Date of Visit: 11/3/2020           After Visit Summary Signature Page    I have received my discharge instructions, and my questions have been answered. I have discussed any challenges I see with this plan with the nurse or doctor.    ..........................................................................................................................................  Patient/Patient Representative Signature      ..........................................................................................................................................  Patient Representative Print Name and Relationship to Patient    ..................................................               ................................................  Date                                   Time    ..........................................................................................................................................  Reviewed by Signature/Title    ...................................................              ..............................................  Date                                               Time          22EPIC Rev 08/18
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
## 2020-11-05 ENCOUNTER — OFFICE VISIT (OUTPATIENT)
Dept: CHIROPRACTIC MEDICINE | Facility: OTHER | Age: 60
End: 2020-11-05
Attending: CHIROPRACTOR
Payer: COMMERCIAL

## 2020-11-05 DIAGNOSIS — M99.01 SEGMENTAL AND SOMATIC DYSFUNCTION OF CERVICAL REGION: ICD-10-CM

## 2020-11-05 DIAGNOSIS — M99.02 SEGMENTAL AND SOMATIC DYSFUNCTION OF THORACIC REGION: ICD-10-CM

## 2020-11-05 DIAGNOSIS — M99.03 SEGMENTAL AND SOMATIC DYSFUNCTION OF LUMBAR REGION: Primary | ICD-10-CM

## 2020-11-05 DIAGNOSIS — M54.50 ACUTE BILATERAL LOW BACK PAIN WITHOUT SCIATICA: ICD-10-CM

## 2020-11-05 PROCEDURE — 98941 CHIROPRACT MANJ 3-4 REGIONS: CPT | Mod: AT | Performed by: CHIROPRACTOR

## 2020-11-09 DIAGNOSIS — J45.41 MODERATE PERSISTENT ASTHMA WITH EXACERBATION: ICD-10-CM

## 2020-11-09 DIAGNOSIS — M62.830 BACK MUSCLE SPASM: ICD-10-CM

## 2020-11-09 DIAGNOSIS — R52 PAIN: ICD-10-CM

## 2020-11-09 DIAGNOSIS — K21.00 GASTROESOPHAGEAL REFLUX DISEASE WITH ESOPHAGITIS, UNSPECIFIED WHETHER HEMORRHAGE: ICD-10-CM

## 2020-11-09 DIAGNOSIS — M54.50 CHRONIC LEFT-SIDED LOW BACK PAIN WITHOUT SCIATICA: ICD-10-CM

## 2020-11-09 DIAGNOSIS — G89.4 CHRONIC PAIN SYNDROME: ICD-10-CM

## 2020-11-09 DIAGNOSIS — F31.0 BIPOLAR AFFECTIVE DISORDER, CURRENT EPISODE HYPOMANIC (H): ICD-10-CM

## 2020-11-09 DIAGNOSIS — G89.29 CHRONIC LEFT-SIDED LOW BACK PAIN WITHOUT SCIATICA: ICD-10-CM

## 2020-11-09 DIAGNOSIS — R11.0 NAUSEA: ICD-10-CM

## 2020-11-09 NOTE — PROGRESS NOTES

## 2020-11-10 RX ORDER — DRONABINOL 2.5 MG/1
CAPSULE ORAL
Qty: 60 CAPSULE | Refills: 0 | Status: SHIPPED | OUTPATIENT
Start: 2020-11-10 | End: 2020-12-11

## 2020-11-10 RX ORDER — OXCARBAZEPINE 600 MG/1
TABLET, FILM COATED ORAL
Qty: 60 TABLET | Refills: 3 | Status: SHIPPED | OUTPATIENT
Start: 2020-11-10 | End: 2021-03-21

## 2020-11-10 RX ORDER — GUAIFENESIN AND DEXTROMETHORPHAN HYDROBROMIDE 600; 30 MG/1; MG/1
1 TABLET, EXTENDED RELEASE ORAL EVERY 12 HOURS
Qty: 60 TABLET | Refills: 0 | Status: SHIPPED | OUTPATIENT
Start: 2020-11-10 | End: 2020-12-14

## 2020-11-10 RX ORDER — GABAPENTIN 300 MG/1
CAPSULE ORAL
Qty: 270 CAPSULE | Refills: 0 | Status: SHIPPED | OUTPATIENT
Start: 2020-11-10 | End: 2020-12-17

## 2020-11-10 RX ORDER — ACETAMINOPHEN 325 MG/1
TABLET ORAL
Qty: 200 TABLET | Refills: 0 | Status: SHIPPED | OUTPATIENT
Start: 2020-11-10 | End: 2020-12-11

## 2020-11-10 RX ORDER — IBUPROFEN 600 MG/1
TABLET, FILM COATED ORAL
Qty: 120 TABLET | Refills: 0 | Status: SHIPPED | OUTPATIENT
Start: 2020-11-10 | End: 2020-12-11

## 2020-11-10 NOTE — TELEPHONE ENCOUNTER
oxcarbazeprine     Last Written Prescription Date:  5/13/20  Last Fill Quantity: 60,   # refills: 5  Last Office Visit: 10/26/20  Future Office visit:    Next 5 appointments (look out 90 days)    Nov 24, 2020  4:00 PM  (Arrive by 3:45 PM)  SHORT with Lissy Moore MD  [facility] [place] ([facility] [place] ) [address]  [place] [address]  [phone]   Dec 29, 2020  3:20 PM  (Arrive by 3:05 PM)  Return Visit with Laquita Fernandez MD  [facility] [place] ([facility] [place] ) [address]  [place] [id number] [phone]

## 2020-11-10 NOTE — TELEPHONE ENCOUNTER
marinol      Last Written Prescription Date:  10/13/20  Last Fill Quantity: 60,   # refills: 0  Last Office Visit: 10/27/20  Future Office visit:    Next 5 appointments (look out 90 days)    Nov 24, 2020  4:00 PM  (Arrive by 3:45 PM)  SHORT with Lissy Moore MD  [facility] [place] ([facility] [place] ) [address]  [place] [address]  [phone]   Dec 29, 2020  3:20 PM  (Arrive by 3:05 PM)  Return Visit with Laquita Fernandez MD  [facility] [place] ([facility] [place] ) [address] [place] [address] [address] [phone]           Gabapentin 10/13/20 #270  Tylenol 10/26/20 #200  mucinex 10/15/20 #60  Ibuprofen 10/13/20 #120  Omeprazole 10/13/20 #60  zanaflex

## 2020-11-18 DIAGNOSIS — F41.1 GAD (GENERALIZED ANXIETY DISORDER): ICD-10-CM

## 2020-11-18 RX ORDER — HYDROXYZINE PAMOATE 50 MG/1
CAPSULE ORAL
Qty: 120 CAPSULE | Refills: 0 | Status: SHIPPED | OUTPATIENT
Start: 2020-11-18 | End: 2020-12-14

## 2020-11-18 NOTE — TELEPHONE ENCOUNTER
hydroxyzine      Last Written Prescription Date:  10/28/20  Last Fill Quantity: 120,   # refills: 0  Last Office Visit: 10/27/20  Future Office visit:    Next 5 appointments (look out 90 days)    Nov 24, 2020  4:00 PM  (Arrive by 3:45 PM)  SHORT with Lissy Moore MD  [facility] [place] ([facility] [place] ) [address] [facility]  [place] [address]  [phone]   Dec 29, 2020  3:20 PM  (Arrive by 3:05 PM)  Return Visit with Laquita Fernandez MD  [facility] [address] [place] [address]  [place] [address] [phone]   Feb 16, 2021  2:15 PM  (Arrive by 2:00 PM)  Nurse Only with Bettye Jules  [facility] [place] ([facility] [address]  [facility] [address]  [phone]           Routing refill request to provider for review/approval because:  Drug not on the [facility], P or [facility] refill protocol or controlled substance

## 2020-11-20 DIAGNOSIS — R39.12 BENIGN PROSTATIC HYPERPLASIA WITH WEAK URINARY STREAM: ICD-10-CM

## 2020-11-20 DIAGNOSIS — G47.00 PERSISTENT INSOMNIA: ICD-10-CM

## 2020-11-20 DIAGNOSIS — M54.50 LUMBAR BACK PAIN: ICD-10-CM

## 2020-11-20 DIAGNOSIS — M62.830 BACK MUSCLE SPASM: ICD-10-CM

## 2020-11-20 DIAGNOSIS — N40.1 BENIGN PROSTATIC HYPERPLASIA WITH WEAK URINARY STREAM: ICD-10-CM

## 2020-11-23 RX ORDER — TAMSULOSIN HYDROCHLORIDE 0.4 MG/1
CAPSULE ORAL
Qty: 30 CAPSULE | Refills: 0 | Status: SHIPPED | OUTPATIENT
Start: 2020-11-23 | End: 2020-12-23

## 2020-11-23 RX ORDER — TRAZODONE HYDROCHLORIDE 100 MG/1
TABLET ORAL
Qty: 30 TABLET | Refills: 0 | Status: SHIPPED | OUTPATIENT
Start: 2020-11-23 | End: 2020-12-23

## 2020-11-23 RX ORDER — BACLOFEN 20 MG/1
TABLET ORAL
Qty: 120 TABLET | Refills: 0 | Status: SHIPPED | OUTPATIENT
Start: 2020-11-23 | End: 2020-12-23

## 2020-11-23 NOTE — TELEPHONE ENCOUNTER
traZODone (DESYREL) 100 MG tablet   Last Written Prescription Date:  07/24/2020  Last Fill Quantity: 30,   # refills: 3  Last Office Visit: 10/27/2020  Future Office visit:    Next 5 appointments (look out 90 days)    Nov 24, 2020  4:00 PM  (Arrive by 3:45 PM)  SHORT with Lissy Moore MD  [facility] [place] ([facility] [place] ) [address] [place] [address]  [phone]   Dec 29, 2020  3:20 PM  (Arrive by 3:05 PM)  Return Visit with Laquita Fernandez MD  [facility] [address] [place] [address]  [place] [address] [phone]   Feb 16, 2021  2:15 PM  (Arrive by 2:00 PM)  Nurse Only with Bettye Jules  [facility] [place] [facility] [address]  [facility] [address]  [phone]           Routing refill request to provider for review/approval because:

## 2020-11-24 DIAGNOSIS — M51.379 DEGENERATION OF LUMBAR OR LUMBOSACRAL INTERVERTEBRAL DISC: ICD-10-CM

## 2020-11-24 DIAGNOSIS — G89.4 CHRONIC PAIN SYNDROME: ICD-10-CM

## 2020-11-24 DIAGNOSIS — F11.90 CHRONIC, CONTINUOUS USE OF OPIOIDS: ICD-10-CM

## 2020-11-25 RX ORDER — HYDROCODONE BITARTRATE AND ACETAMINOPHEN 10; 325 MG/1; MG/1
TABLET ORAL
Qty: 60 TABLET | Refills: 0 | Status: SHIPPED | OUTPATIENT
Start: 2020-11-25 | End: 2020-12-22

## 2020-11-25 RX ORDER — MORPHINE SULFATE 15 MG/1
TABLET, FILM COATED, EXTENDED RELEASE ORAL
Qty: 90 TABLET | Refills: 0 | Status: SHIPPED | OUTPATIENT
Start: 2020-11-25 | End: 2020-12-22

## 2020-11-25 NOTE — TELEPHONE ENCOUNTER
morphine      Last Written Prescription Date:  10/26/20  Last Fill Quantity: 90,   # refills: 0  Last Office Visit: 11/24/20  Future Office visit:    Next 5 appointments (look out 90 days)    Dec 29, 2020  3:20 PM  (Arrive by 3:05 PM)  Return Visit with Laquita Fernandez MD  [facility] [address] [place] [address]  [phone]   Feb 16, 2021  2:15 PM  (Arrive by 2:00 PM)  Nurse Only with Bettye Jules  [facility] [address] [address]  [phone]           Routing refill request to provider for review/approval because:  Drug not on the [facility], [facility] or [facility] refill protocol or controlled substance  hydrocodone      Last Written Prescription Date:  10/26/20  Last Fill Quantity: 60,   # refills: 0

## 2020-12-03 DIAGNOSIS — M25.541 ARTHRALGIA OF BOTH HANDS: ICD-10-CM

## 2020-12-03 DIAGNOSIS — M25.542 ARTHRALGIA OF BOTH HANDS: ICD-10-CM

## 2020-12-03 NOTE — TELEPHONE ENCOUNTER
Voltaren 50 mcg      Last Written Prescription Date:  8/26/20  Last Fill Quantity: 60,   # refills: 2  Last Office Visit: 11/24/20  Future Office visit:    Next 5 appointments (look out 90 days)    Dec 08, 2020  3:00 PM  (Arrive by 2:45 PM)  SHORT with Lissy [facility] MD  [facility] ) [address]  [place] [address]  [phone]   Feb 16, 2021  2:15 PM  (Arrive by 2:00 PM)  Nurse Only with Bettye Jules  [facility] ) [address] [facility] [address]  [phone]           Routing refill request to provider for review/approval because:

## 2020-12-10 DIAGNOSIS — M62.830 BACK MUSCLE SPASM: ICD-10-CM

## 2020-12-10 DIAGNOSIS — R52 PAIN: ICD-10-CM

## 2020-12-10 DIAGNOSIS — G89.29 CHRONIC LEFT-SIDED LOW BACK PAIN WITHOUT SCIATICA: ICD-10-CM

## 2020-12-10 DIAGNOSIS — Z00.00 HEALTH CARE MAINTENANCE: ICD-10-CM

## 2020-12-10 DIAGNOSIS — K21.00 GASTROESOPHAGEAL REFLUX DISEASE WITH ESOPHAGITIS, UNSPECIFIED WHETHER HEMORRHAGE: ICD-10-CM

## 2020-12-10 DIAGNOSIS — J45.40 MODERATE PERSISTENT ASTHMA WITHOUT COMPLICATION: ICD-10-CM

## 2020-12-10 DIAGNOSIS — R11.0 NAUSEA: ICD-10-CM

## 2020-12-10 DIAGNOSIS — M54.50 CHRONIC LEFT-SIDED LOW BACK PAIN WITHOUT SCIATICA: ICD-10-CM

## 2020-12-10 RX ORDER — FLUTICASONE PROPIONATE AND SALMETEROL XINAFOATE 230; 21 UG/1; UG/1
AEROSOL, METERED RESPIRATORY (INHALATION)
Qty: 12 G | Refills: 0 | Status: SHIPPED | OUTPATIENT
Start: 2020-12-10 | End: 2020-12-22 | Stop reason: ALTCHOICE

## 2020-12-10 RX ORDER — MV,CAL,MIN/IRON/FOLIC ACID/LUT 18-500-300
TABLET ORAL
Qty: 30 TABLET | Refills: 0 | Status: SHIPPED | OUTPATIENT
Start: 2020-12-10 | End: 2021-01-17

## 2020-12-10 NOTE — TELEPHONE ENCOUNTER
Advair      Last Written Prescription Date:  09/08/20  Last Fill Quantity: 2 inhlarers,   # refills: 2  Last Office Visit: 12/08/20  Future Office visit:    Next 5 appointments (look out 90 days)    Feb 16, 2021  2:15 PM  (Arrive by 2:00 PM)  Nurse Only with Bettye Jules  [facility] ([facility] ) [address] [facility] [address]  [phone]           Multivitamin      Last Written Prescription Date:  03/12/20  Last Fill Quantity: 30,   # refills: 8  Last Office Visit: 12/08/20  Future Office visit:    Next 5 appointments (look out 90 days)    Feb 16, 2021  2:15 PM  (Arrive by 2:00 PM)  Nurse Only with Bettye Jules  [facility] ([facility] ) [address] [facility] [address]  [phone]

## 2020-12-10 NOTE — TELEPHONE ENCOUNTER
Tylenol      Last Written Prescription Date:  11/10/20  Last Fill Quantity: 200,   # refills: 0  Last Office Visit: 12/08/20  Future Office visit:    Next 5 appointments (look out 90 days)    Feb 16, 2021  2:15 PM  (Arrive by 2:00 PM)  Nurse Only with Bettye Jules  [facility] ([facility] [place] ) [address]  [place] [address]  [phone]             IBU      Last Written Prescription Date:  11/10/20  Last Fill Quantity: 120,   # refills: 0  Last Office Visit: 12/08/20  Future Office visit:    Next 5 appointments (look out 90 days)    Feb 16, 2021  2:15 PM  (Arrive by 2:00 PM)  Nurse Only with Bettye Jules  [facility] ([facility] [place] ) [address]  [place] [address]  [phone]           Zanaflex      Last Written Prescription Date:  11/10/20  Last Fill Quantity: 90,   # refills: 0  Last Office Visit: 12/08/20  Future Office visit:    Next 5 appointments (look out 90 days)    Feb 16, 2021  2:15 PM  (Arrive by 2:00 PM)  Nurse Only with Bettye Jules  [facility] ([facility] ) [address] [facility] [address]  [phone]               Prilosec      Last Written Prescription Date:  11/10/20  Last Fill Quantity: 60,   # refills: 0  Last Office Visit: 12/08/20  Future Office visit:    Next 5 appointments (look out 90 days)    Feb 16, 2021  2:15 PM  (Arrive by 2:00 PM)  Nurse Only with Bettye Jules  [facility] ([facility] [place] ) [address] [facility] [address]  [phone]             Marinol      Last Written Prescription Date:  11/10/20  Last Fill Quantity: 60,   # refills: 0  Last Office Visit: 12/08/20  Future Office visit:    Next 5 appointments (look out 90 days)    Feb 16, 2021  2:15 PM  (Arrive by 2:00 PM)  Nurse Only with Bettye Jules  [facility] [place] ([facility] [place] ) [address]  [place] [address]  [phone]

## 2020-12-11 DIAGNOSIS — F41.1 GAD (GENERALIZED ANXIETY DISORDER): ICD-10-CM

## 2020-12-11 DIAGNOSIS — J45.41 MODERATE PERSISTENT ASTHMA WITH EXACERBATION: ICD-10-CM

## 2020-12-11 RX ORDER — DRONABINOL 2.5 MG/1
CAPSULE ORAL
Qty: 60 CAPSULE | Refills: 0 | Status: SHIPPED | OUTPATIENT
Start: 2020-12-11 | End: 2021-01-17

## 2020-12-11 RX ORDER — IBUPROFEN 600 MG/1
TABLET, FILM COATED ORAL
Qty: 120 TABLET | Refills: 0 | Status: SHIPPED | OUTPATIENT
Start: 2020-12-11 | End: 2021-01-17

## 2020-12-11 RX ORDER — ACETAMINOPHEN 325 MG/1
TABLET ORAL
Qty: 200 TABLET | Refills: 0 | Status: SHIPPED | OUTPATIENT
Start: 2020-12-11 | End: 2021-01-12

## 2020-12-14 ENCOUNTER — APPOINTMENT (OUTPATIENT)
Dept: GENERAL RADIOLOGY | Facility: HOSPITAL | Age: 60
End: 2020-12-14
Attending: EMERGENCY MEDICINE
Payer: COMMERCIAL

## 2020-12-14 ENCOUNTER — HOSPITAL ENCOUNTER (EMERGENCY)
Facility: HOSPITAL | Age: 60
Discharge: HOME | End: 2020-12-14
Attending: EMERGENCY MEDICINE | Admitting: EMERGENCY MEDICINE
Payer: COMMERCIAL

## 2020-12-14 VITALS
RESPIRATION RATE: 18 BRPM | HEART RATE: 82 BPM | OXYGEN SATURATION: 95 % | DIASTOLIC BLOOD PRESSURE: 102 MMHG | SYSTOLIC BLOOD PRESSURE: 165 MMHG | TEMPERATURE: 98.3 F

## 2020-12-14 DIAGNOSIS — J45.41 MODERATE PERSISTENT ASTHMA WITH EXACERBATION: Primary | ICD-10-CM

## 2020-12-14 LAB
ALBUMIN SERPL-MCNC: 4 G/DL (ref 3.4–5)
ALP SERPL-CCNC: 81 U/L (ref 40–150)
ALT SERPL W P-5'-P-CCNC: 31 U/L (ref 0–70)
ANION GAP SERPL CALCULATED.3IONS-SCNC: 6 MMOL/L (ref 3–14)
AST SERPL W P-5'-P-CCNC: 19 U/L (ref 0–45)
BASOPHILS # BLD AUTO: 0 10E9/L (ref 0–0.2)
BASOPHILS NFR BLD AUTO: 0.5 %
BILIRUB SERPL-MCNC: 0.1 MG/DL (ref 0.2–1.3)
BUN SERPL-MCNC: 16 MG/DL (ref 7–30)
CALCIUM SERPL-MCNC: 8.2 MG/DL (ref 8.5–10.1)
CHLORIDE SERPL-SCNC: 107 MMOL/L (ref 94–109)
CO2 SERPL-SCNC: 28 MMOL/L (ref 20–32)
CREAT SERPL-MCNC: 0.77 MG/DL (ref 0.66–1.25)
CRP SERPL-MCNC: <2.9 MG/L (ref 0–8)
D DIMER PPP FEU-MCNC: <0.3 UG/ML FEU (ref 0–0.5)
DIFFERENTIAL METHOD BLD: ABNORMAL
EOSINOPHIL # BLD AUTO: 0.2 10E9/L (ref 0–0.7)
EOSINOPHIL NFR BLD AUTO: 2.4 %
ERYTHROCYTE [DISTWIDTH] IN BLOOD BY AUTOMATED COUNT: 13.2 % (ref 10–15)
GFR SERPL CREATININE-BSD FRML MDRD: >90 ML/MIN/{1.73_M2}
GLUCOSE SERPL-MCNC: 108 MG/DL (ref 70–99)
HCT VFR BLD AUTO: 34.5 % (ref 40–53)
HGB BLD-MCNC: 11.3 G/DL (ref 13.3–17.7)
IMM GRANULOCYTES # BLD: 0.1 10E9/L (ref 0–0.4)
IMM GRANULOCYTES NFR BLD: 1.1 %
LYMPHOCYTES # BLD AUTO: 1.6 10E9/L (ref 0.8–5.3)
LYMPHOCYTES NFR BLD AUTO: 23.9 %
MCH RBC QN AUTO: 29.7 PG (ref 26.5–33)
MCHC RBC AUTO-ENTMCNC: 32.8 G/DL (ref 31.5–36.5)
MCV RBC AUTO: 91 FL (ref 78–100)
MONOCYTES # BLD AUTO: 0.6 10E9/L (ref 0–1.3)
MONOCYTES NFR BLD AUTO: 8.8 %
NEUTROPHILS # BLD AUTO: 4.2 10E9/L (ref 1.6–8.3)
NEUTROPHILS NFR BLD AUTO: 63.3 %
NRBC # BLD AUTO: 0 10*3/UL
NRBC BLD AUTO-RTO: 0 /100
PLATELET # BLD AUTO: 174 10E9/L (ref 150–450)
POTASSIUM SERPL-SCNC: 3.8 MMOL/L (ref 3.4–5.3)
PROT SERPL-MCNC: 6.6 G/DL (ref 6.8–8.8)
RBC # BLD AUTO: 3.81 10E12/L (ref 4.4–5.9)
SODIUM SERPL-SCNC: 141 MMOL/L (ref 133–144)
TROPONIN I SERPL-MCNC: <0.015 UG/L (ref 0–0.04)
WBC # BLD AUTO: 6.6 10E9/L (ref 4–11)

## 2020-12-14 PROCEDURE — 71045 X-RAY EXAM CHEST 1 VIEW: CPT

## 2020-12-14 PROCEDURE — 250N000012 HC RX MED GY IP 250 OP 636 PS 637: Performed by: EMERGENCY MEDICINE

## 2020-12-14 PROCEDURE — 94640 AIRWAY INHALATION TREATMENT: CPT

## 2020-12-14 PROCEDURE — 250N000013 HC RX MED GY IP 250 OP 250 PS 637: Performed by: EMERGENCY MEDICINE

## 2020-12-14 PROCEDURE — 94664 DEMO&/EVAL PT USE INHALER: CPT

## 2020-12-14 PROCEDURE — 80053 COMPREHEN METABOLIC PANEL: CPT | Performed by: EMERGENCY MEDICINE

## 2020-12-14 PROCEDURE — 84484 ASSAY OF TROPONIN QUANT: CPT | Performed by: EMERGENCY MEDICINE

## 2020-12-14 PROCEDURE — 99284 EMERGENCY DEPT VISIT MOD MDM: CPT | Performed by: EMERGENCY MEDICINE

## 2020-12-14 PROCEDURE — 999N000157 HC STATISTIC RCP TIME EA 10 MIN

## 2020-12-14 PROCEDURE — 93005 ELECTROCARDIOGRAM TRACING: CPT

## 2020-12-14 PROCEDURE — 36415 COLL VENOUS BLD VENIPUNCTURE: CPT | Performed by: EMERGENCY MEDICINE

## 2020-12-14 PROCEDURE — 99285 EMERGENCY DEPT VISIT HI MDM: CPT | Mod: 25

## 2020-12-14 PROCEDURE — 85025 COMPLETE CBC W/AUTO DIFF WBC: CPT | Performed by: EMERGENCY MEDICINE

## 2020-12-14 PROCEDURE — 85379 FIBRIN DEGRADATION QUANT: CPT | Performed by: EMERGENCY MEDICINE

## 2020-12-14 PROCEDURE — 86140 C-REACTIVE PROTEIN: CPT | Performed by: EMERGENCY MEDICINE

## 2020-12-14 PROCEDURE — 93010 ELECTROCARDIOGRAM REPORT: CPT | Performed by: INTERNAL MEDICINE

## 2020-12-14 RX ORDER — ALBUTEROL SULFATE 90 UG/1
6 AEROSOL, METERED RESPIRATORY (INHALATION) EVERY 4 HOURS PRN
Qty: 1 INHALER | Refills: 0 | Status: SHIPPED | OUTPATIENT
Start: 2020-12-14 | End: 2021-04-08

## 2020-12-14 RX ORDER — PREDNISONE 20 MG/1
40 TABLET ORAL ONCE
Status: COMPLETED | OUTPATIENT
Start: 2020-12-14 | End: 2020-12-14

## 2020-12-14 RX ORDER — HYDROXYZINE PAMOATE 50 MG/1
CAPSULE ORAL
Qty: 120 CAPSULE | Refills: 0 | Status: SHIPPED | OUTPATIENT
Start: 2020-12-14 | End: 2021-01-04

## 2020-12-14 RX ORDER — ALBUTEROL SULFATE 90 UG/1
6 AEROSOL, METERED RESPIRATORY (INHALATION)
Status: DISCONTINUED | OUTPATIENT
Start: 2020-12-14 | End: 2020-12-14 | Stop reason: HOSPADM

## 2020-12-14 RX ORDER — GUAIFENESIN AND DEXTROMETHORPHAN HYDROBROMIDE 600; 30 MG/1; MG/1
1 TABLET, EXTENDED RELEASE ORAL EVERY 12 HOURS
Qty: 60 TABLET | Refills: 0 | Status: SHIPPED | OUTPATIENT
Start: 2020-12-14 | End: 2021-01-17

## 2020-12-14 RX ORDER — PREDNISONE 20 MG/1
TABLET ORAL
Qty: 9 TABLET | Refills: 0 | Status: SHIPPED | OUTPATIENT
Start: 2020-12-14 | End: 2020-12-24

## 2020-12-14 RX ADMIN — PREDNISONE 40 MG: 20 TABLET ORAL at 20:27

## 2020-12-14 RX ADMIN — ALBUTEROL SULFATE 6 PUFF: 90 AEROSOL, METERED RESPIRATORY (INHALATION) at 20:43

## 2020-12-14 ASSESSMENT — ENCOUNTER SYMPTOMS
HEADACHES: 0
COLOR CHANGE: 0
WHEEZING: 1
FEVER: 0
CONFUSION: 0
EYE REDNESS: 0
NECK STIFFNESS: 0
ABDOMINAL PAIN: 0
ARTHRALGIAS: 0
DIFFICULTY URINATING: 0
SHORTNESS OF BREATH: 1

## 2020-12-14 NOTE — ED AVS SNAPSHOT
[facility]  [address]  [place] [address]  Phone: [phone]                                    Joshua Barron   MRN: [id number]    Department: [facility]   Date of Visit: 12/14/2020           After Visit Summary Signature Page    I have received my discharge instructions, and my questions have been answered. I have discussed any challenges I see with this plan with the nurse or doctor.    ..........................................................................................................................................  Patient/Patient Representative Signature      ..........................................................................................................................................  Patient Representative Print Name and Relationship to Patient    ..................................................               ................................................  Date                                   Time    ..........................................................................................................................................  Reviewed by Signature/Title    ...................................................              ..............................................  Date                                               Time          22EPIC Rev 08/18

## 2020-12-14 NOTE — TELEPHONE ENCOUNTER
Mucinex      Last Written Prescription Date:  11/10/20  Last Fill Quantity: 60,   # refills: 0  Last Office Visit: 12/08/20  Future Office visit:    Next 5 appointments (look out 90 days)    Feb 16, 2021  2:15 PM  (Arrive by 2:00 PM)  Nurse Only with Bettye Jules  [facility] ([facility] [place] ) [address] [facility] [place] [address]  [phone]           Hydroxyzine      Last Written Prescription Date:  11/18/20  Last Fill Quantity: 120,   # refills: 0  Last Office Visit: 12/08/20  Future Office visit:    Next 5 appointments (look out 90 days)    Feb 16, 2021  2:15 PM  (Arrive by 2:00 PM)  Nurse Only with Bettye Jules  [facility] ([facility] [place] ) [address] [facility] [address] [facility] [address]  [phone]

## 2020-12-15 ENCOUNTER — TELEPHONE (OUTPATIENT)
Dept: FAMILY MEDICINE | Facility: OTHER | Age: 60
End: 2020-12-15

## 2020-12-15 DIAGNOSIS — M54.50 CHRONIC BILATERAL LOW BACK PAIN WITHOUT SCIATICA: ICD-10-CM

## 2020-12-15 DIAGNOSIS — G89.29 CHRONIC BILATERAL LOW BACK PAIN WITHOUT SCIATICA: ICD-10-CM

## 2020-12-15 NOTE — ED TRIAGE NOTES
Patinet in with complaints of dry cough and SOB and increasing difficulty of breathing.  Has COPD and asthma.  Concerned could be covid or sinus infection/pnuemonia

## 2020-12-15 NOTE — TELEPHONE ENCOUNTER
Patient called stating that the insurance will not cover the lidocaine patches anymore.  His pharmacist recommended Diclofenac gel as an alternative.  Please advise.    Call patient to discuss.  [phone]

## 2020-12-15 NOTE — ED NOTES
Pt states he has been feeling more tired than usual. States he has been feeling more SOB for the last 3 weeks. C/O rib and back pain. Pt states over the last month he has felt like he might have had a fever. States his inhalers haven't felt like they have been helping.
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
## 2020-12-17 DIAGNOSIS — G89.4 CHRONIC PAIN SYNDROME: ICD-10-CM

## 2020-12-17 RX ORDER — GABAPENTIN 300 MG/1
CAPSULE ORAL
Qty: 270 CAPSULE | Refills: 0 | Status: SHIPPED | OUTPATIENT
Start: 2020-12-17 | End: 2021-01-17

## 2020-12-17 NOTE — TELEPHONE ENCOUNTER
gabapentin (NEURONTIN) 300 MG capsule      Last Written Prescription Date:  11/10/20  Last Fill Quantity: 270,   # refills: 0  Last Office Visit: 12/8/20  Future Office visit:    Next 5 appointments (look out 90 days)    Feb 16, 2021  2:15 PM  (Arrive by 2:00 PM)  [facility]e Jules  [facility] [address]  [place] [address]  [phone]           Routing refill request to provider for review/approval

## 2020-12-20 ENCOUNTER — HEALTH MAINTENANCE LETTER (OUTPATIENT)
Age: 60
End: 2020-12-20

## 2020-12-22 ENCOUNTER — TELEPHONE (OUTPATIENT)
Dept: FAMILY MEDICINE | Facility: OTHER | Age: 60
End: 2020-12-22

## 2020-12-22 DIAGNOSIS — N40.1 BENIGN PROSTATIC HYPERPLASIA WITH WEAK URINARY STREAM: ICD-10-CM

## 2020-12-22 DIAGNOSIS — G47.00 PERSISTENT INSOMNIA: ICD-10-CM

## 2020-12-22 DIAGNOSIS — R39.12 BENIGN PROSTATIC HYPERPLASIA WITH WEAK URINARY STREAM: ICD-10-CM

## 2020-12-22 DIAGNOSIS — M62.830 BACK MUSCLE SPASM: ICD-10-CM

## 2020-12-22 DIAGNOSIS — M51.379 DEGENERATION OF LUMBAR OR LUMBOSACRAL INTERVERTEBRAL DISC: Primary | ICD-10-CM

## 2020-12-22 DIAGNOSIS — M47.812 CERVICAL SPONDYLOSIS WITHOUT MYELOPATHY: ICD-10-CM

## 2020-12-22 DIAGNOSIS — M51.379 DEGENERATION OF LUMBAR OR LUMBOSACRAL INTERVERTEBRAL DISC: ICD-10-CM

## 2020-12-22 DIAGNOSIS — F11.90 CHRONIC, CONTINUOUS USE OF OPIOIDS: ICD-10-CM

## 2020-12-22 DIAGNOSIS — G89.4 CHRONIC PAIN SYNDROME: ICD-10-CM

## 2020-12-22 DIAGNOSIS — M48.02 SPINAL STENOSIS IN CERVICAL REGION: ICD-10-CM

## 2020-12-22 DIAGNOSIS — M54.50 LUMBAR BACK PAIN: ICD-10-CM

## 2020-12-22 DIAGNOSIS — J45.40 MODERATE PERSISTENT ASTHMA WITHOUT COMPLICATION: ICD-10-CM

## 2020-12-22 DIAGNOSIS — Z98.1 STATUS POST LUMBAR SPINAL FUSION: ICD-10-CM

## 2020-12-22 RX ORDER — HYDROCODONE BITARTRATE AND ACETAMINOPHEN 10; 325 MG/1; MG/1
TABLET ORAL
Qty: 60 TABLET | Refills: 0 | Status: SHIPPED | OUTPATIENT
Start: 2020-12-22 | End: 2021-01-21

## 2020-12-22 RX ORDER — MORPHINE SULFATE 15 MG/1
TABLET, FILM COATED, EXTENDED RELEASE ORAL
Qty: 90 TABLET | Refills: 0 | Status: SHIPPED | OUTPATIENT
Start: 2020-12-22 | End: 2021-01-21

## 2020-12-22 NOTE — TELEPHONE ENCOUNTER
Dulera  Last Written Prescription Date: 6/8/20, has been discontinued  Last Fill Quantity: 13 g # of Refills: 0  Last Office Visit: 12/8/20  Pt would like this instead of Advair

## 2020-12-22 NOTE — TELEPHONE ENCOUNTER
Patient called requesting a call back concerning medical marijuana. He is requesting a call back to discuss [facility].

## 2020-12-22 NOTE — TELEPHONE ENCOUNTER
HYDROcodone-acetaminophen (NORCO)  MG per tablet      Last Written Prescription Date:  11/25/20  Last Fill Quantity: 60,   # refills: 0  Last Office Visit: 12/8/20  Future Office visit:    Next 5 appointments (look out 90 days)    Feb 16, 2021  2:15 PM  (Arrive by 2:00 PM)  [facility]  [facility] [address]  [place] [address]  [phone]           morphine (MS CONTIN) 15 MG CR tablet      Last Written Prescription Date:  11/25/20  Last Fill Quantity: 90,   # refills: 0  Last Office Visit: 12/8/20  Future Office visit:    Next 5 appointments (look out 90 days)    Feb 16, 2021  2:15 PM  (Arrive by 2:00 PM)  Nurse Only with Bettye Jules  [facility] ) [address]  [place] [address]  [phone]           Routing refill request to provider for review/approval because:

## 2020-12-22 NOTE — TELEPHONE ENCOUNTER
Called patient back in regards to message below. I asked if he ever had a appointment with the pain clinic at [facility] but said he has bad luck with them and not sure due to his rambling words if he really kept or got an appointment. Message sent to [facility] to see if she can get any records from [facility]. Pt is requesting referral to [facility]. Referral pended if ok.

## 2020-12-23 RX ORDER — TRAZODONE HYDROCHLORIDE 100 MG/1
TABLET ORAL
Qty: 30 TABLET | Refills: 0 | Status: SHIPPED | OUTPATIENT
Start: 2020-12-23 | End: 2021-01-21

## 2020-12-23 RX ORDER — TAMSULOSIN HYDROCHLORIDE 0.4 MG/1
CAPSULE ORAL
Qty: 30 CAPSULE | Refills: 0 | Status: SHIPPED | OUTPATIENT
Start: 2020-12-23 | End: 2021-01-21

## 2020-12-23 RX ORDER — BACLOFEN 20 MG/1
TABLET ORAL
Qty: 120 TABLET | Refills: 0 | Status: SHIPPED | OUTPATIENT
Start: 2020-12-23 | End: 2021-01-21

## 2020-12-23 NOTE — TELEPHONE ENCOUNTER
Baclofen 20 mg      Last Written Prescription Date:  11/23/2020  Last Fill Quantity: 120,   # refills: 0  Last Office Visit: 10/27/2020  Future Office visit:    Next 5 appointments (look out 90 days)    Dec 23, 2020  1:50 PM  Return Visit with Shamar Escamilla DC  [facility] [place] [facility] ([facility]) [address] [place] [address]  [place] [address]  [phone]   Feb 16, 2021  2:15 PM  (Arrive by 2:00 PM)  Nurse Only with Bettye Jules  [facility] [place] ([facility] [place] ) [address]  [place] [address]  [phone]               Flomax 0.4 mg      Last Written Prescription Date:  11/23/2020  Last Fill Quantity: 30,   # refills: 0  Last Office Visit: 10/27/2020  Future Office visit:    Next 5 appointments (look out 90 days)    Dec 23, 2020  1:50 PM  Return Visit with Shamar Escamilla DC  [facility] [place] [facility] ([facility]) [address] [place] [address]  [place] [address]  [phone]   Feb 16, 2021  2:15 PM  (Arrive by 2:00 PM)  Nurse Only with Bettye Jules  [facility] [place] ([facility] [place] ) [address]  [place] [address]  [phone]           Trazodone 100 mg      Last Written Prescription Date:  11/23/2020  Last Fill Quantity: 30,   # refills: 0  Last Office Visit: 10/27/2020  Future Office visit:    Next 5 appointments (look out 90 days)    Dec 23, 2020  1:50 PM  Return Visit with Shamar Escamilla DC  [facility] [place] [facility] ([facility]) [address] [place] [address]  [place] [address]  [phone]   Feb 16, 2021  2:15 PM  (Arrive by 2:00 PM)  Nurse Only with Bettye Jules  [facility] [place] ([facility] [place] ) [address]  [place] [address]  [phone]

## 2020-12-23 NOTE — TELEPHONE ENCOUNTER
Patient needs to schedule an appointment with me in January to be seen every 3 months for his narcotics

## 2020-12-29 ENCOUNTER — OFFICE VISIT (OUTPATIENT)
Dept: CHIROPRACTIC MEDICINE | Facility: OTHER | Age: 60
End: 2020-12-29
Attending: CHIROPRACTOR
Payer: COMMERCIAL

## 2020-12-29 DIAGNOSIS — M99.01 SEGMENTAL AND SOMATIC DYSFUNCTION OF CERVICAL REGION: Primary | ICD-10-CM

## 2020-12-29 DIAGNOSIS — M99.02 SEGMENTAL AND SOMATIC DYSFUNCTION OF THORACIC REGION: ICD-10-CM

## 2020-12-29 DIAGNOSIS — M54.2 CERVICALGIA: ICD-10-CM

## 2020-12-29 DIAGNOSIS — M99.03 SEGMENTAL AND SOMATIC DYSFUNCTION OF LUMBAR REGION: ICD-10-CM

## 2020-12-29 PROCEDURE — 98941 CHIROPRACT MANJ 3-4 REGIONS: CPT | Mod: AT | Performed by: CHIROPRACTOR

## 2020-12-29 NOTE — PROGRESS NOTES

## 2020-12-30 ENCOUNTER — VIRTUAL VISIT (OUTPATIENT)
Dept: PSYCHIATRY | Facility: OTHER | Age: 60
End: 2020-12-30
Attending: PSYCHIATRY & NEUROLOGY
Payer: COMMERCIAL

## 2020-12-30 DIAGNOSIS — F90.2 ADHD (ATTENTION DEFICIT HYPERACTIVITY DISORDER), COMBINED TYPE: ICD-10-CM

## 2020-12-30 PROCEDURE — 99213 OFFICE O/P EST LOW 20 MIN: CPT | Mod: 95 | Performed by: PSYCHIATRY & NEUROLOGY

## 2020-12-30 RX ORDER — LISDEXAMFETAMINE DIMESYLATE 70 MG/1
70 CAPSULE ORAL DAILY
Qty: 30 CAPSULE | Refills: 0 | Status: SHIPPED | OUTPATIENT
Start: 2021-01-07 | End: 2021-03-24

## 2020-12-30 RX ORDER — LISDEXAMFETAMINE DIMESYLATE 70 MG/1
70 CAPSULE ORAL EVERY MORNING
Qty: 30 CAPSULE | Refills: 0 | Status: SHIPPED | OUTPATIENT
Start: 2021-02-04 | End: 2021-03-08

## 2020-12-30 ASSESSMENT — ANXIETY QUESTIONNAIRES
7. FEELING AFRAID AS IF SOMETHING AWFUL MIGHT HAPPEN: NOT AT ALL
5. BEING SO RESTLESS THAT IT IS HARD TO SIT STILL: NOT AT ALL
2. NOT BEING ABLE TO STOP OR CONTROL WORRYING: NOT AT ALL
IF YOU CHECKED OFF ANY PROBLEMS ON THIS QUESTIONNAIRE, HOW DIFFICULT HAVE THESE PROBLEMS MADE IT FOR YOU TO DO YOUR WORK, TAKE CARE OF THINGS AT HOME, OR GET ALONG WITH OTHER PEOPLE: SOMEWHAT DIFFICULT
3. WORRYING TOO MUCH ABOUT DIFFERENT THINGS: SEVERAL DAYS
GAD7 TOTAL SCORE: 1
6. BECOMING EASILY ANNOYED OR IRRITABLE: NOT AT ALL
4. TROUBLE RELAXING: NOT AT ALL
1. FEELING NERVOUS, ANXIOUS, OR ON EDGE: NOT AT ALL

## 2020-12-30 ASSESSMENT — PATIENT HEALTH QUESTIONNAIRE - PHQ9: SUM OF ALL RESPONSES TO PHQ QUESTIONS 1-9: 11

## 2020-12-30 NOTE — PROGRESS NOTES
"Joshua Barron is a 60 year old male who is being evaluated via a billable telephone visit.      The patient has been notified of following:     \"This telephone visit will be conducted via a call between you and your physician/provider. We have found that certain health care needs can be provided without the need for a physical exam.  This service lets us provide the care you need with a short phone conversation.  If a prescription is necessary we can send it directly to your pharmacy.  If lab work is needed we can place an order for that and you can then stop by our lab to have the test done at a later time.    Telephone visits are billed at different rates depending on your insurance coverage. During this emergency period, for some insurers they may be billed the same as an in-person visit.  Please reach out to your insurance provider with any questions.    If during the course of the call the physician/provider feels a telephone visit is not appropriate, you will not be charged for this service.\"    Patient has given verbal consent for Telephone visit?  Yes    What phone number would you like to be contacted at?  [phone] (Mobile)    How would you like to obtain your AVS? [employer]    Phone call duration: 34 minutes                                                                            Social- Was  twice. Lives alone with his 3 cats: Win the cat. Has a GF in [place]  Children-  2 kids, they are in [place]  Last visit 10/26/20: Continue Trileptal 600 mg bid. Now off diazepam.  Continue trazodone 100 mg bedtime prn insomnia. Continue Vyvanse 70 mg daily and last filled 9/23. Refilled today 10/26 and for 11/23    - his primary, Dr. [place], left  - ER visit November in which  head injury   - Trent notes so much went on when he was a kid and an adult too: ex-wife had major mental health issues. Trent was bullied as a childe  - mom with dementia, then fell and broke her hip.   - Lots of family issues: Joshua has taken " care of his parents and yet parents give his other brothers everything. oldest of 3 brothers. Brother in [place] youngest [place] and Major is here. Mom and dad here [place]. Joshua noting moving here to be closer to parents and help them, etc. But, parents don't seem to want him around much or talk to him.    SUBSTANCE USE- reports no abuse of meds or substances    SYMPTOMS- paranoia, hypnopompic hallucinations, issues with attention and concentration improved with Vyvanse: racing thoughts, depressed mood, impulsivity, distractibility  MEDICAL ROS- GERD: feels trouble with heartburn feeling like fluid gets stuck in his throat. back pain, . Intentional weight loss  MEDICAL / SURGICAL HISTORY                     Patient Active Problem List   Diagnosis     Mixed hyperlipidemia     Tobacco Abuse, History of     Degeneration of lumbar or lumbosacral intervertebral disc     Depression, major     Chronic, continuous use of opioids     Chemical dependency (H)     Chronic rhinitis     Tinnitus of both ears     SNHL (sensorineural hearing loss)     Bipolar disorder (H)     Seizure-like activity (H)     Somatic dysfunction of pelvis region     Bilateral foot pain     Prostate cancer screening     Trigger index finger of right hand     Moderate persistent asthma without complication     Chronic lower back pain     ACP (advance care planning)     Onychia of toe of left foot     DDD (degenerative disc disease), lumbar     Seizure disorder (H)     Back muscle spasm     Benign essential hypertension     Retrograde ejaculation     Allergic rhinitis due to other allergen     Cervical spondylosis without myelopathy     Chronic headaches     DDD (degenerative disc disease)     Generalized anxiety disorder     Hypertrophy of prostate without urinary obstruction and other lower urinary tract symptoms (LUTS)     GERD (gastroesophageal reflux disease)     Lumbago     Major depressive disorder, recurrent episode, moderate (H)     Myalgia  and myositis     Hyperlipidemia     Other pain disorders related to psychological factors     Spinal stenosis in cervical region     Status post lumbar spinal fusion     Tobacco use disorder     Trigger finger     Polypharmacy     Deviated nasal septum     Attention deficit hyperactivity disorder (ADHD), combined type     chronic noninfective otitis externa     Migraine with aura and without status migrainosus, not intractable     Tobacco use     Tobacco abuse counseling     Prediabetes     ALLERGY   Cymbalta, Depakote [valproic acid], and Seasonal allergies  MEDICATIONS                                                                                             Current Outpatient Medications   Medication Sig     acetaminophen (TYLENOL) 325 MG tablet TAKE 2 TABLETS BY MOUTH EVERY 4 HOURS AS NEEDED FOR MILD PAIN     albuterol (ACCUNEB) 1.25 MG/3ML neb solution Take 1 vial (1.25 mg) by nebulization every 6 hours as needed for shortness of breath / dyspnea or wheezing     albuterol (PROAIR HFA/PROVENTIL HFA/VENTOLIN HFA) 108 (90 Base) MCG/ACT inhaler Inhale 6 puffs into the lungs every 4 hours as needed for shortness of breath / dyspnea or wheezing     aspirin (ASPIRIN LOW DOSE) 81 MG chewable tablet CHEW AND SWALLOW 1 TABLET BY MOUTH DAILY     Aspirin-Acetaminophen-Caffeine (EQ HEADACHE RELIEF PO) Take 200 mg by mouth     atorvastatin (LIPITOR) 20 MG tablet TAKE 1 TABLET BY MOUTH DAILY     baclofen (LIORESAL) 20 MG tablet TAKE 1 TABLET BY MOUTH 4 TIMES DAILY     dextromethorphan-guaiFENesin (MUCINEX DM)  MG 12 hr tablet TAKE 1 TABLET BY MOUTH EVERY 12 HOURS     diclofenac (VOLTAREN) 1 % topical gel Apply 2 g topically 4 times daily     diclofenac (VOLTAREN) 50 MG EC tablet TAKE 1 TABLET BY MOUTH TWICE DAILY WITH FOOD     docusate sodium (DOK) 100 MG capsule TAKE 1 CAPSULE BY MOUTH TWICE DAILY     dronabinol (MARINOL) 2.5 MG capsule TAKE 1 CAPSULE BY MOUTH 2 TIMES DAILY BEFORE MEALS     famotidine (PEPCID) 40 MG  tablet TAKE 1 TABLET BY MOUTH DAILY     gabapentin (NEURONTIN) 300 MG capsule TAKE 3 CAPSULES BY MOUTH THREE TIMES DAILY     HYDROcodone-acetaminophen (NORCO)  MG per tablet TAKE 1 TABLET BY MOUTH 2 TIMES DAILY AS NEEDED FOR SEVERE PAIN     hydrOXYzine (VISTARIL) 50 MG capsule TAKE 1 TO 2 CAPSULES BY MOUTH 4 TIMES DAILY AS NEEDED FOR ANXIETY     ibuprofen (ADVIL/MOTRIN) 600 MG tablet TAKE 1 TABLET BY MOUTH EVERY 6 HOURS AS NEEDED     lidocaine (LIDODERM) 5 % patch PLACE 3 PATCHES ONTO SKIN DAILY FOR 12 HOURS AND REMOVE.     lidocaine (XYLOCAINE) 5 % external ointment Apply to the back using gloves three times per vday as needed for moderate to severe pain.     lisdexamfetamine (VYVANSE) 70 MG capsule Take 1 capsule (70 mg) by mouth every morning     loratadine (CLARITIN) 10 MG tablet Take 10 mg by mouth daily     losartan (COZAAR) 50 MG tablet TAKE 1 TABLET BY MOUTH DAILY     methocarbamol (ROBAXIN) 500 MG tablet TAKE 1 TABLET BY MOUTH 3 TIMES DAILY     metoprolol succinate ER (TOPROL-XL) 50 MG 24 hr tablet Take 1 tablet (50 mg) by mouth daily     mometasone-formoterol (DULERA) 200-5 MCG/ACT inhaler Inhale 2 puffs into the lungs 2 times daily     mometasone-formoterol (DULERA) 200-5 MCG/ACT inhaler Inhale 2 puffs into the lungs 2 times daily     montelukast (SINGULAIR) 10 MG tablet Take 1 tablet (10 mg) by mouth At Bedtime     morphine (MS CONTIN) 15 MG CR tablet TAKE 1 TABLET BY MOUTH EVERY 8 HOURS     multivitamin  with iron (SM COMPLETE ADVANCED FORMULA) TABS TAKE 1 TABLET BY MOUTH DAILY     naloxone (NARCAN) 1 mg/mL for intranasal kit (2 syringes with 2 mucosal atomizer device) In opioid overdose put cone in nostril and push 1/2 of contents into each nostril.  Repeat every 3 min if no response until help arrives.     nicotine (NICORETTE) 4 MG gum Place 1 each (4 mg) inside cheek as needed for smoking cessation     nortriptyline (PAMELOR) 50 MG capsule TAKE 2 CAPSULES (100MG) BY MOUTH AT BEDTIME      omeprazole (PRILOSEC) 20 MG DR capsule TAKE 1 CAPSULE BY MOUTH 2 TIMES DAILY     order for DME Equipment being ordered: Thoracic and lumbar Back Brace     order for DME 1 boa back brace     ORDER FOR DME Equipment being ordered: Large gloves     OXcarbazepine (TRILEPTAL) 600 MG tablet TAKE 1 TABLET BY MOUTH 2 TIMES DAILY     propranolol (INDERAL) 20 MG tablet      senna-docusate (SENNA-PLUS) 8.6-50 MG tablet TAKE 1 OR 2 TABLETS BY MOUTH 2 TIMES A DAY     SUMAtriptan (IMITREX) 50 MG tablet Take 1 tablet (50 mg) by mouth at onset of headache for migraine May repeat in 2 hours. Max 4 tablets/24 hours.     tamsulosin (FLOMAX) 0.4 MG capsule TAKE 1 CAPSULE BY MOUTH DAILY     tiZANidine (ZANAFLEX) 4 MG tablet TAKE 1 TABLET BY MOUTH 3 TIMES A DAY     traZODone (DESYREL) 100 MG tablet TAKE 1 TABLET BY MOUTH AT BEDTIME     VYVANSE 70 MG capsule TAKE 1 CAPSULE BY MOUTH DAILY     No current facility-administered medications for this visit.        VITALS   There were no vitals taken for this visit.     LABS                                                                                                                           Last Comprehensive Metabolic Panel:  Sodium   Date Value Ref Range Status   12/14/2020 141 133 - 144 mmol/L Final     Potassium   Date Value Ref Range Status   12/14/2020 3.8 3.4 - 5.3 mmol/L Final     Chloride   Date Value Ref Range Status   12/14/2020 107 94 - 109 mmol/L Final     Carbon Dioxide   Date Value Ref Range Status   12/14/2020 28 20 - 32 mmol/L Final     Anion Gap   Date Value Ref Range Status   12/14/2020 6 3 - 14 mmol/L Final     Glucose   Date Value Ref Range Status   12/14/2020 108 (H) 70 - 99 mg/dL Final     Urea Nitrogen   Date Value Ref Range Status   12/14/2020 16 7 - 30 mg/dL Final     Creatinine   Date Value Ref Range Status   12/14/2020 0.77 0.66 - 1.25 mg/dL Final     GFR Estimate   Date Value Ref Range Status   12/14/2020 >90 >60 mL/min/[1.73_m2] Final     Comment:     Non   GFR Calc  Starting 12/18/2018, serum creatinine based estimated GFR (eGFR) will be   calculated using the Chronic Kidney Disease Epidemiology Collaboration   (CKD-EPI) equation.       Calcium   Date Value Ref Range Status   12/14/2020 8.2 (L) 8.5 - 10.1 mg/dL Final       CBC RESULTS:   Recent Labs   Lab Test 08/17/20  0015   WBC 5.0   RBC 3.67*   HGB 10.9*   HCT 31.7*   MCV 86   MCH 29.7   MCHC 34.4   RDW 13.2          Recent Labs   Lab Test 10/25/18  1434 03/17/16  1412 11/17/14  1045 12/23/13  0949   CHOL 131 217* 185 161   HDL 51 36* 44 41   LDL 60 Cannot estimate LDL when triglyceride exceeds 400 mg/dL 86 62   TRIG 101 431* 275* 289*   CHOLHDLRATIO  --   --  4.2 3.9       EKG 6/3/19 with QTc of 415 ms     MENTAL STATUS EXAM                                                                                          Alert. Oriented to person, place, and date / time. Casually groomed, calm, cooperative with good eye contact. No problems psychomotor behavior. Speech: loud. .  Mood was described as frustrated and affect was congruent to speech content and full range. Thought process, including associations, was unremarkable and thought content was devoid of suicidal and homicidal ideation.  No hallucinations. Insight was poor Judgment was  adequate for safety. Fund of knowledge was intact. Pt demonstrates no obvious problems with attention, concentration, language, recent or remote memory although these were not formally tested.       ASSESSMENT                                                                                                      HISTORICAL:  Initial psych note 10/6/15          NOTES:      Joshua is a 60 year old with bipolar, ADHD, and MDD.  We started Valium as dual purpose: muscle relaxant properties and for anxiety. We have discussed the new guidelines for short - term use of benzodiazepines (Valium) and avoiding their use along with opioids. I had noted plan to taper down over  time and eventually discontinue. We decreased from 5 mg every 12 hours as needed down to 2 mg every 12 hours as needed. March '20 we decreased to once daily and then discontinued. Watching CBC : given he is on Trileptal following he runs lower but overall looks stable.          TREATMENT RISK STATEMENT:  The risks, benefits, alternatives and potential adverse effects have been explained and are understood by the pt.  The pt agrees to the treatment plan with the ability to do so.   The pt knows to call the clinic for any problems or access emergency care if needed.        DIAGNOSES                    Bipolar disorder I with psychosis vs. Schizoaffective disorder, bipolar type  ADHD      PLAN                                                                                                                    1)  MEDICATIONS:       Continue Trileptal 600 mg bid.  Continue trazodone 100 mg bedtime prn insomnia. Continue Vyvanse 70 mg daily and last fill was on 12/10/20. Set to fill for 1/7/21 and for 2/4/21.     2)  THERAPY:  No change    3)  LABS:  CBC 8/ 2020    4)  PT MONITOR [call for probs]:  SEs from meds, worsening sx, SI/HI    5)  REFERRALS [CD, medical, other]:  None    6)  RTC: 2 months

## 2020-12-31 ASSESSMENT — ANXIETY QUESTIONNAIRES: GAD7 TOTAL SCORE: 1

## 2021-01-02 DIAGNOSIS — F41.1 GAD (GENERALIZED ANXIETY DISORDER): ICD-10-CM

## 2021-01-02 DIAGNOSIS — M25.541 ARTHRALGIA OF BOTH HANDS: ICD-10-CM

## 2021-01-02 DIAGNOSIS — M25.542 ARTHRALGIA OF BOTH HANDS: ICD-10-CM

## 2021-01-04 ENCOUNTER — OFFICE VISIT (OUTPATIENT)
Dept: CHIROPRACTIC MEDICINE | Facility: OTHER | Age: 61
End: 2021-01-04
Attending: CHIROPRACTOR
Payer: COMMERCIAL

## 2021-01-04 DIAGNOSIS — M99.02 SEGMENTAL AND SOMATIC DYSFUNCTION OF THORACIC REGION: ICD-10-CM

## 2021-01-04 DIAGNOSIS — M99.01 SEGMENTAL AND SOMATIC DYSFUNCTION OF CERVICAL REGION: ICD-10-CM

## 2021-01-04 DIAGNOSIS — M99.03 SEGMENTAL AND SOMATIC DYSFUNCTION OF LUMBAR REGION: Primary | ICD-10-CM

## 2021-01-04 DIAGNOSIS — M54.50 ACUTE BILATERAL LOW BACK PAIN WITHOUT SCIATICA: ICD-10-CM

## 2021-01-04 PROCEDURE — 98941 CHIROPRACT MANJ 3-4 REGIONS: CPT | Mod: AT | Performed by: CHIROPRACTOR

## 2021-01-04 RX ORDER — HYDROXYZINE PAMOATE 50 MG/1
CAPSULE ORAL
Qty: 120 CAPSULE | Refills: 0 | Status: SHIPPED | OUTPATIENT
Start: 2021-01-04 | End: 2021-02-02

## 2021-01-04 NOTE — TELEPHONE ENCOUNTER
voltaren      Last Written Prescription Date:  12/16/2020  Last Fill Quantity: 100 g,   # refills: 1  Last Office Visit: 10/27/2020  Future Office visit:    Next 5 appointments (look out 90 days)    Jan 04, 2021  2:20 PM  Return Visit with Shamar Escamilla DC  [facility] [place] [facility] ([facility]) [address] [place] [address]  [place] [address]  [phone]   Feb 16, 2021  2:15 PM  (Arrive by 2:00 PM)  Nurse Only with Bettye Jules  [facility] [place] ([facility] [place] ) [address] [place] [address]  [phone]         visteril      Last Written Prescription Date:  12/14/2020  Last Fill Quantity: 120,   # refills: 0  Last Office Visit: 10/27/2020  Future Office visit:    Next 5 appointments (look out 90 days)    Jan 04, 2021  2:20 PM  Return Visit with Shamar Escamilla DC  [facility] [place] [facility] ([facility]) [address] [place] [address] [address]  [phone]   Feb 16, 2021  2:15 PM  (Arrive by 2:00 PM)  Nurse Only with Bettye Jules  [facility] [place] ([facility] [place] ) [address]  [place] [address]  [phone]

## 2021-01-06 NOTE — PROGRESS NOTES

## 2021-01-12 DIAGNOSIS — R52 PAIN: ICD-10-CM

## 2021-01-12 RX ORDER — ACETAMINOPHEN 325 MG/1
TABLET ORAL
Qty: 200 TABLET | Refills: 0 | Status: SHIPPED | OUTPATIENT
Start: 2021-01-12 | End: 2021-02-16

## 2021-01-12 NOTE — TELEPHONE ENCOUNTER
Tylenol 325 mg      Last Written Prescription Date:  12-  Last Fill Quantity: 200,   # refills: 0  Last Office Visit: 10-  Future Office visit:    Next 5 appointments (look out 90 days)    Feb 16, 2021  2:15 PM  (Arrive by 2:00 PM)  Nurse Only with Bettye Jules  [facility] [place] ([facility] [place] ) [address]  [place] [address]  [phone]

## 2021-01-16 DIAGNOSIS — G89.4 CHRONIC PAIN SYNDROME: ICD-10-CM

## 2021-01-16 DIAGNOSIS — R11.0 NAUSEA: ICD-10-CM

## 2021-01-16 DIAGNOSIS — G89.29 CHRONIC LEFT-SIDED LOW BACK PAIN WITHOUT SCIATICA: ICD-10-CM

## 2021-01-16 DIAGNOSIS — M54.50 CHRONIC LEFT-SIDED LOW BACK PAIN WITHOUT SCIATICA: ICD-10-CM

## 2021-01-16 DIAGNOSIS — M62.830 BACK MUSCLE SPASM: ICD-10-CM

## 2021-01-16 DIAGNOSIS — K21.00 GASTROESOPHAGEAL REFLUX DISEASE WITH ESOPHAGITIS, UNSPECIFIED WHETHER HEMORRHAGE: ICD-10-CM

## 2021-01-16 DIAGNOSIS — Z00.00 HEALTH CARE MAINTENANCE: ICD-10-CM

## 2021-01-16 DIAGNOSIS — J45.41 MODERATE PERSISTENT ASTHMA WITH EXACERBATION: ICD-10-CM

## 2021-01-17 RX ORDER — GABAPENTIN 300 MG/1
CAPSULE ORAL
Qty: 270 CAPSULE | Refills: 0 | Status: SHIPPED | OUTPATIENT
Start: 2021-01-17 | End: 2021-02-11

## 2021-01-17 RX ORDER — DRONABINOL 2.5 MG/1
CAPSULE ORAL
Qty: 60 CAPSULE | Refills: 0 | Status: SHIPPED | OUTPATIENT
Start: 2021-01-17 | End: 2021-02-11

## 2021-01-17 RX ORDER — MV,CAL,MIN/IRON/FOLIC ACID/LUT 18-500-300
TABLET ORAL
Qty: 30 TABLET | Refills: 0 | Status: SHIPPED | OUTPATIENT
Start: 2021-01-17 | End: 2021-02-11

## 2021-01-17 RX ORDER — IBUPROFEN 600 MG/1
TABLET, FILM COATED ORAL
Qty: 120 TABLET | Refills: 0 | Status: SHIPPED | OUTPATIENT
Start: 2021-01-17 | End: 2021-03-01

## 2021-01-17 RX ORDER — GUAIFENESIN AND DEXTROMETHORPHAN HYDROBROMIDE 600; 30 MG/1; MG/1
1 TABLET, EXTENDED RELEASE ORAL EVERY 12 HOURS
Qty: 60 TABLET | Refills: 0 | Status: SHIPPED | OUTPATIENT
Start: 2021-01-17 | End: 2021-02-11

## 2021-01-17 NOTE — TELEPHONE ENCOUNTER
Dextromethorphan-guaifenesin      Last Written Prescription Date:  12/14/2020  Last Fill Quantity: 60,   # refills: 0  Last Office Visit: 12/8/2020  Future Office visit:    Next 5 appointments (look out 90 days)    Feb 16, 2021  2:15 PM  (Arrive by 2:00 PM)  Nurse Only with Bettye Jules  [facility] [place] ([facility] [place] ) [address]  [place] [address]  [phone]           Routing refill request to provider for review/approval because:    dronabinol      Last Written Prescription Date:  12/11/2020  Last Fill Quantity: 60,   # refills: 0  Last Office Visit: 12/8/2020  Future Office visit:    Next 5 appointments (look out 90 days)    Feb 16, 2021  2:15 PM  (Arrive by 2:00 PM)  Nurse Only with Bettye Jules  [facility] [place] ([facility] [place] ) [address]  [place] [address]  [phone]           Routing refill request to provider for review/approval because:    gabapentin      Last Written Prescription Date:  12/17/2020  Last Fill Quantity: 270,   # refills: 0  Last Office Visit: 12/8/2020  Future Office visit:    Next 5 appointments (look out 90 days)    Feb 16, 2021  2:15 PM  (Arrive by 2:00 PM)  Nurse Only with Bettye Julse  [facility] [place] ([facility] [place] ) [address]  [place] [address]  [phone]           Routing refill request to provider for review/approval because:    Ibuprofen 600 mg      Last Written Prescription Date:  12/11/2020  Last Fill Quantity: 120,   # refills: 0  Last Office Visit: 12/8/2020  Future Office visit:    Next 5 appointments (look out 90 days)    Feb 16, 2021  2:15 PM  (Arrive by 2:00 PM)  Nurse Only with Bettye Jules  [facility] [place] ([facility] [place] ) [address]  [place] [address]  [phone]           Routing refill request to provider for review/approval because:      Multivitamin with iron      Last  Written Prescription Date:  12/10/2020  Last Fill Quantity: 30,   # refills: 0  Last Office Visit: 12/8/2020  Future Office visit:    Next 5 appointments (look out 90 days)    Feb 16, 2021  2:15 PM  (Arrive by 2:00 PM)  Nurse Only with Bettye Jules  [facility] [place] ([facility] [place] ) [address]  [place] [address]  [phone]           Routing refill request to provider for review/approval because:    omeprazole      Last Written Prescription Date:  12/11/2020  Last Fill Quantity: 60,   # refills: 0  Last Office Visit: 12/8/2020  Future Office visit:    Next 5 appointments (look out 90 days)    Feb 16, 2021  2:15 PM  (Arrive by 2:00 PM)  Nurse Only with Bettye Jules  [facility] [place] ([facility] [place] ) [address] [facility] [facility] [address]  [place] [address]  [phone]           Routing refill request to provider for review/approval because:    tizanidine      Last Written Prescription Date:  12/11/2020  Last Fill Quantity: 90,   # refills: 0  Last Office Visit: 12/8/2020  Future Office visit:    Next 5 appointments (look out 90 days)    Feb 16, 2021  2:15 PM  (Arrive by 2:00 PM)  Nurse Only with Bettye Jules  [facility] [place] ([facility] [place] ) [address] [facility] [address] [facility]  [place] [address]  [phone]           Routing refill request to provider for review/approval because:

## 2021-01-18 ENCOUNTER — TELEPHONE (OUTPATIENT)
Dept: FAMILY MEDICINE | Facility: OTHER | Age: 61
End: 2021-01-18
Payer: COMMERCIAL

## 2021-01-18 NOTE — TELEPHONE ENCOUNTER
To: Nurse to Ciara Valladares  Patient would like return call to be sure he is on track with his appointments.  Please call him @ [phone] Thank you

## 2021-01-20 DIAGNOSIS — M79.7 FIBROMYALGIA: ICD-10-CM

## 2021-01-20 DIAGNOSIS — G89.29 CHRONIC BILATERAL LOW BACK PAIN WITH BILATERAL SCIATICA: ICD-10-CM

## 2021-01-20 DIAGNOSIS — M54.42 CHRONIC BILATERAL LOW BACK PAIN WITH BILATERAL SCIATICA: ICD-10-CM

## 2021-01-20 DIAGNOSIS — M54.41 CHRONIC BILATERAL LOW BACK PAIN WITH BILATERAL SCIATICA: ICD-10-CM

## 2021-01-21 RX ORDER — NORTRIPTYLINE HYDROCHLORIDE 50 MG/1
CAPSULE ORAL
Qty: 60 CAPSULE | Refills: 0 | Status: SHIPPED | OUTPATIENT
Start: 2021-01-21 | End: 2021-02-11

## 2021-02-02 DIAGNOSIS — F41.1 GAD (GENERALIZED ANXIETY DISORDER): ICD-10-CM

## 2021-02-02 RX ORDER — HYDROXYZINE PAMOATE 50 MG/1
CAPSULE ORAL
Qty: 120 CAPSULE | Refills: 0 | Status: SHIPPED | OUTPATIENT
Start: 2021-02-02 | End: 2021-02-22

## 2021-02-04 ENCOUNTER — TELEPHONE (OUTPATIENT)
Dept: FAMILY MEDICINE | Facility: OTHER | Age: 61
End: 2021-02-04

## 2021-02-04 DIAGNOSIS — B34.9 VIRAL ILLNESS: Primary | ICD-10-CM

## 2021-02-04 NOTE — TELEPHONE ENCOUNTER
Pt called, requesting covid antibody testing. Reports that he was sick approx 4 weeks ago and feels that he did have covid. Please advise. Thank you!

## 2021-02-05 DIAGNOSIS — B34.9 VIRAL ILLNESS: ICD-10-CM

## 2021-02-05 PROCEDURE — 36415 COLL VENOUS BLD VENIPUNCTURE: CPT | Mod: ZL | Performed by: FAMILY MEDICINE

## 2021-02-05 PROCEDURE — 86769 SARS-COV-2 COVID-19 ANTIBODY: CPT | Mod: ZL | Performed by: FAMILY MEDICINE

## 2021-02-08 LAB
LAB TEST METHOD: NORMAL
SARS-COV-2 AB PNL SERPL IA: NEGATIVE

## 2021-02-16 DIAGNOSIS — R52 PAIN: ICD-10-CM

## 2021-02-16 RX ORDER — ACETAMINOPHEN 325 MG/1
TABLET ORAL
Qty: 200 TABLET | Refills: 0 | Status: SHIPPED | OUTPATIENT
Start: 2021-02-16 | End: 2021-03-14

## 2021-02-16 NOTE — PROGRESS NOTES
"    Assessment & Plan     Chronic, continuous use of opioids  Chronic narcotic contract reviewed and signed with him today and copy is given to him to take  - Pain Clinic Referral for Oral Morphine Equivalents (OME) over 120 mg; Future  - drug screen panel: Laboratory Miscellaneous Order  - Send outs misc test  - Estimated Average Glucose    Chronic bilateral low back pain with sciatica, sciatica laterality unspecified  Will refer to the [facility] for pain evaluation and consideration of pain pump  Will continue current pain medications at this time   - Pain Clinic Referral for Oral Morphine Equivalents (OME) over 120 mg; Future    Benign essential hypertension  Controlled     Bipolar affective disorder, current episode hypomanic (H)  Stable     Prediabetes  Due for lab today  - Hemoglobin A1c    Psoriasis  Treat with triamcinolone cream bid     Dysphagia  Will refer to ENT for further evaluation      Review of external notes as documented elsewhere in note  60 minutes spent on the date of the encounter doing chart review, review of outside records, patient visit and documentation        BMI:   Estimated body mass index is 29.29 kg/m  as calculated from the following:    Height as of this encounter: 1.803 m (5' 11\").    Weight as of this encounter: 95.3 kg (210 lb).   Weight management plan: Discussed healthy diet and exercise guidelines        Return in about 3 months (around 6/4/2021) for chronic narcotics, back pain.    Lissy Moore MD  [facility] - KRISTI Lewis is a 60 year old who presents for the following health issues     HPI       Hypertension Follow-up      Do you check your blood pressure regularly outside of the clinic? No     Are you following a low salt diet? Yes    Are your blood pressures ever more than 140 on the top number (systolic) OR more   than 90 on the bottom number (diastolic), for example 140/90? Yes    Asthma Follow-Up    Was ACT completed today?  No      Do " "you have a cough?  YES    Are you experiencing any wheezing in your chest?  yes    Do you have any shortness of breath?  No     How often are you using a short-acting (rescue) inhaler or nebulizer, such as Albuterol?  2-3 times per day    How many days per week do you miss taking your asthma controller medication?  0    Please describe any recent triggers for your asthma: cold air    Have you had any Emergency Room Visits, Urgent Care Visits, or Hospital Admissions since your last office visit?  No      Opoid use  Long discussion today about opioids. Joshua has had 2 lumbar surgeries, has scoliosis and has ongoing pain. He is on multiple drugs. We added gabapentin which has been helpful. He was followed by Dr Fisher previously. He would like to consider a morphine pump. He had seen Dr Pettit at [facility] and Dr Soares at [facility]. He is wondering about medical mariajuana. Discussed that this would be fine but he would need to come off of narcotics. Dr Fisher did wean him down somewhat. He had daily pain of the upper lumbar and lower thoracic spine. He is taking tid MS and hydrocodone for breakthrough pain      Psoriasis   Of temple area       He feels that food gets caught in his throat near his tonsils     Review of Systems   Constitutional, HEENT, cardiovascular, pulmonary, gi and gu systems are negative, except as otherwise noted.      Objective    /84 (BP Location: Left arm, Patient Position: Sitting, Cuff Size: Adult Large)   Pulse 96   Temp 98.2  F (36.8  C) (Tympanic)   Resp 18   Ht 1.803 m (5' 11\")   Wt 95.3 kg (210 lb)   SpO2 96%   BMI 29.29 kg/m    Body mass index is 29.29 kg/m .  Physical Exam   GENERAL: healthy, alert and no distress  HEENT: oropharynx is normal   RESP: lungs clear to auscultation - no rales, rhonchi or wheezes  CV: regular rate and rhythm, normal S1 S2, no S3 or S4, no murmur, click or rub, no peripheral edema and peripheral pulses strong  MS: he is unable to stand up straight " due to previous surgery and scoliosis. Well healed fusion scar of the lumbar spine with thoracic muscle spasm noted and tenderness of this area. He has forward flexion to 70 degrees with pain and isn't able to extend due to surgery.   NEURO: Normal strength and tone, mentation intact and DTR's normal and symmetric of the lower extremities   SKIN: patch of flaky skin of the right temple area, quarter sized  PSYCH: mentation appears normal, affect normal/bright

## 2021-02-22 DIAGNOSIS — E78.2 MIXED HYPERLIPIDEMIA: ICD-10-CM

## 2021-02-22 DIAGNOSIS — F41.1 GAD (GENERALIZED ANXIETY DISORDER): ICD-10-CM

## 2021-02-22 DIAGNOSIS — G89.4 CHRONIC PAIN SYNDROME: ICD-10-CM

## 2021-02-22 DIAGNOSIS — F11.90 CHRONIC, CONTINUOUS USE OF OPIOIDS: ICD-10-CM

## 2021-02-22 DIAGNOSIS — M51.379 DEGENERATION OF LUMBAR OR LUMBOSACRAL INTERVERTEBRAL DISC: ICD-10-CM

## 2021-02-22 RX ORDER — MORPHINE SULFATE 15 MG/1
TABLET, FILM COATED, EXTENDED RELEASE ORAL
Qty: 90 TABLET | Refills: 0 | Status: SHIPPED | OUTPATIENT
Start: 2021-02-22 | End: 2021-03-20

## 2021-02-22 RX ORDER — HYDROCODONE BITARTRATE AND ACETAMINOPHEN 10; 325 MG/1; MG/1
TABLET ORAL
Qty: 60 TABLET | Refills: 0 | Status: SHIPPED | OUTPATIENT
Start: 2021-02-22 | End: 2021-03-20

## 2021-02-22 RX ORDER — HYDROXYZINE PAMOATE 50 MG/1
CAPSULE ORAL
Qty: 120 CAPSULE | Refills: 0 | Status: SHIPPED | OUTPATIENT
Start: 2021-02-22 | End: 2021-03-14

## 2021-02-22 RX ORDER — ATORVASTATIN CALCIUM 20 MG/1
TABLET, FILM COATED ORAL
Qty: 90 TABLET | Refills: 0 | Status: SHIPPED | OUTPATIENT
Start: 2021-02-22 | End: 2021-06-24

## 2021-02-22 NOTE — TELEPHONE ENCOUNTER
atorvastatin (LIPITOR) 20 MG tablet    Last Written Prescription Date:  8/26/20  Last Fill Quantity: 90,   # refills: 1  Last Office Visit: 12/8/20  Future Office visit:    Next 5 appointments (look out 90 days)    Mar 04, 2021  3:30 PM  (Arrive by 3:15 PM)  Office Visit with Lissy Moore MD  [facility] [place] ([facility] [place] ) [address]  [place] [address]  [phone]           Routing refill request to provider for review/approval

## 2021-02-22 NOTE — TELEPHONE ENCOUNTER
hydrocodone      Last Written Prescription Date:  1/21/21  Last Fill Quantity: 60,   # refills: 0  Last Office Visit: 12/8/20  Future Office visit:    Next 5 appointments (look out 90 days)    Mar 04, 2021  3:30 PM  (Arrive by 3:15 PM)  Office Visit with Lissy Moore MD  [facility] ([facility] ) [address]  [place] [address]  [phone]           Routing refill request to provider for review/approval because:  Drug not on the [facility], [facility] or [facility] refill protocol or controlled substance  morphine      Last Written Prescription Date:  1/21/21  Last Fill Quantity: 90,   # refills: 0

## 2021-02-27 ENCOUNTER — HEALTH MAINTENANCE LETTER (OUTPATIENT)
Age: 61
End: 2021-02-27

## 2021-03-01 DIAGNOSIS — M54.50 CHRONIC LEFT-SIDED LOW BACK PAIN WITHOUT SCIATICA: ICD-10-CM

## 2021-03-01 DIAGNOSIS — G89.29 CHRONIC LEFT-SIDED LOW BACK PAIN WITHOUT SCIATICA: ICD-10-CM

## 2021-03-01 RX ORDER — IBUPROFEN 600 MG/1
TABLET, FILM COATED ORAL
Qty: 120 TABLET | Refills: 0 | Status: SHIPPED | OUTPATIENT
Start: 2021-03-01 | End: 2021-04-06

## 2021-03-01 NOTE — TELEPHONE ENCOUNTER
Ibuprofen  Last Written Prescription Date: 1/17/21  Last Fill Quantity: 120 # of Refills: 0  Last Office Visit: 12/8/20

## 2021-03-04 ENCOUNTER — OFFICE VISIT (OUTPATIENT)
Dept: FAMILY MEDICINE | Facility: OTHER | Age: 61
End: 2021-03-04
Attending: FAMILY MEDICINE
Payer: COMMERCIAL

## 2021-03-04 VITALS
DIASTOLIC BLOOD PRESSURE: 84 MMHG | RESPIRATION RATE: 18 BRPM | HEIGHT: 71 IN | OXYGEN SATURATION: 96 % | HEART RATE: 96 BPM | TEMPERATURE: 98.2 F | BODY MASS INDEX: 29.4 KG/M2 | WEIGHT: 210 LBS | SYSTOLIC BLOOD PRESSURE: 137 MMHG

## 2021-03-04 DIAGNOSIS — R13.19 OTHER DYSPHAGIA: ICD-10-CM

## 2021-03-04 DIAGNOSIS — R73.03 PREDIABETES: ICD-10-CM

## 2021-03-04 DIAGNOSIS — F31.0 BIPOLAR AFFECTIVE DISORDER, CURRENT EPISODE HYPOMANIC (H): ICD-10-CM

## 2021-03-04 DIAGNOSIS — M54.40 CHRONIC BILATERAL LOW BACK PAIN WITH SCIATICA, SCIATICA LATERALITY UNSPECIFIED: ICD-10-CM

## 2021-03-04 DIAGNOSIS — F11.90 CHRONIC, CONTINUOUS USE OF OPIOIDS: Primary | ICD-10-CM

## 2021-03-04 DIAGNOSIS — I10 BENIGN ESSENTIAL HYPERTENSION: ICD-10-CM

## 2021-03-04 DIAGNOSIS — L40.9 PSORIASIS: ICD-10-CM

## 2021-03-04 DIAGNOSIS — G89.29 CHRONIC BILATERAL LOW BACK PAIN WITH SCIATICA, SCIATICA LATERALITY UNSPECIFIED: ICD-10-CM

## 2021-03-04 LAB
EST. AVERAGE GLUCOSE BLD GHB EST-MCNC: 114 MG/DL
HBA1C MFR BLD: 5.6 % (ref 0–5.6)

## 2021-03-04 PROCEDURE — 36415 COLL VENOUS BLD VENIPUNCTURE: CPT | Mod: ZL | Performed by: FAMILY MEDICINE

## 2021-03-04 PROCEDURE — 80306 DRUG TEST PRSMV INSTRMNT: CPT | Mod: ZL | Performed by: FAMILY MEDICINE

## 2021-03-04 PROCEDURE — 999N001182 HC STATISTIC ESTIMATED AVERAGE GLUCOSE: Mod: ZL | Performed by: FAMILY MEDICINE

## 2021-03-04 PROCEDURE — 84999 UNLISTED CHEMISTRY PROCEDURE: CPT | Mod: ZL | Performed by: FAMILY MEDICINE

## 2021-03-04 PROCEDURE — 36416 COLLJ CAPILLARY BLOOD SPEC: CPT | Mod: ZL | Performed by: FAMILY MEDICINE

## 2021-03-04 PROCEDURE — 99215 OFFICE O/P EST HI 40 MIN: CPT | Performed by: FAMILY MEDICINE

## 2021-03-04 PROCEDURE — 80307 DRUG TEST PRSMV CHEM ANLYZR: CPT | Mod: ZL | Performed by: FAMILY MEDICINE

## 2021-03-04 PROCEDURE — 83036 HEMOGLOBIN GLYCOSYLATED A1C: CPT | Mod: ZL | Performed by: FAMILY MEDICINE

## 2021-03-04 PROCEDURE — G0463 HOSPITAL OUTPT CLINIC VISIT: HCPCS

## 2021-03-04 RX ORDER — FLUTICASONE PROPIONATE AND SALMETEROL XINAFOATE 230; 21 UG/1; UG/1
AEROSOL, METERED RESPIRATORY (INHALATION)
COMMUNITY
Start: 2020-12-10 | End: 2021-03-24

## 2021-03-04 RX ORDER — FLUTICASONE PROPIONATE 50 MCG
SPRAY, SUSPENSION (ML) NASAL
COMMUNITY
Start: 2021-01-12 | End: 2021-04-08

## 2021-03-04 RX ORDER — TRIAMCINOLONE ACETONIDE 1 MG/G
CREAM TOPICAL 2 TIMES DAILY
Qty: 45 G | Refills: 1 | Status: SHIPPED | OUTPATIENT
Start: 2021-03-04 | End: 2021-03-24

## 2021-03-04 ASSESSMENT — PAIN SCALES - GENERAL: PAINLEVEL: EXTREME PAIN (8)

## 2021-03-04 ASSESSMENT — MIFFLIN-ST. JEOR: SCORE: 1794.68

## 2021-03-04 NOTE — NURSING NOTE
"Chief Complaint   Patient presents with     Hypertension     Asthma       Initial /84 (BP Location: Left arm, Patient Position: Sitting, Cuff Size: Adult Large)   Pulse 96   Temp 98.2  F (36.8  C) (Tympanic)   Resp 18   Ht 1.803 m (5' 11\")   Wt 95.3 kg (210 lb)   SpO2 96%   BMI 29.29 kg/m   Estimated body mass index is 29.29 kg/m  as calculated from the following:    Height as of this encounter: 1.803 m (5' 11\").    Weight as of this encounter: 95.3 kg (210 lb).  Medication Reconciliation: complete  Frances Bob LPN  "

## 2021-03-04 NOTE — LETTER
[facility]    03/04/21    Patient: Joshua Barron  YOB: 1960  Medical Record Number: [id number]                                                                  Controlled Substance Agreement  I understand that my care provider has prescribed controlled substances (narcotics, tranquilizers, and/or stimulants) to help manage my condition(s).  I am taking this medicine to help me function or work.  I know that this is strong medicine.  It could have serious side effects and even cause a dependency on the drug.  If I stop these medicines suddenly, I could have severe withdrawal symptoms.    The risks, benefits, and side effects of these medication(s) were explained to me.  I agree that:  1. I will take part in other treatments as advised by my provider.  This may be psychiatry or counseling, physical therapy, behavioral therapy, group treatment, or a referral to a pain clinic.  I will reduce or stop my medicine when my provider tells me to do so.   2. I will take my medicines as prescribed.  I will not change the dose or schedule unless my provider tells me to.  There will be no refills if I  run out early.   I may be contacted at any time without warning and asked to complete a drug test or pill count.   3. I will keep all my appointments at the clinic.  If I miss appointments or fail to follow instructions, my provider may stop my medicine.  4. I will not ask other providers to prescribe controlled substances. And I will not accept controlled substances from other people. If I need another prescribed controlled substance for a new reason, I will notify my provider within one business day.  5. If I enroll in the [place] Medical Marijuana program, I will tell my provider.  I will also sign an agreement to share my medical records with my provider.  6. I will use one pharmacy to fill all of my controlled substance prescriptions.  If my prescription is mailed to my pharmacy, it may take 5 to 7  days for my medicine to be ready.  7. I understand that my provider, clinic care team, and pharmacy can track controlled substance prescriptions from other providers through a central database (prescription monitoring program).  8. I will bring in my list of medications (or my medicine bottles) each time I come to the clinic.  [id number] REV-  07/2018                                                                                                                                    Page 1 of 2      [facility]    03/04/21    Patient: Joshua Barron  YOB: 1960  Medical Record Number: [id number]    9. Refills of controlled substances will be made only during office hours.  It is up to me to make sure that I do not run out of my medicines on weekends or holidays.    10. I am responsible for my prescriptions.  If the medicine/prescription is lost or stolen, it will not be replaced.   I also agree not to share these medicines with anyone.  11. I agree to not use ANY illegal or recreational drugs.  This includes marijuana, cocaine, bath salts or other drugs.  I agree not to use alcohol unless my provider says I may.  I agree to give urine samples whenever asked.  If I fail to give a urine sample, the provider may stop my medicine.     12. I will tell my nurse or provider right away if I become pregnant or have a new medical problem treated outside of [facility].  13. I understand that this medicine can affect my thinking and judgment.  It may be unsafe for me to drive, use machinery and do dangerous tasks.  I will not do any of these things until I know how the medicine affects me.  If my dose changes, I will wait to see how it affects me.  I will contact my provider if I have concerns about medicine side effects.  I understand that if I do not follow any of the conditions above, my prescriptions or treatment may be stopped.    I agree that my provider, clinic care team, and pharmacy may  work with any city, state or federal law enforcement agency that investigates the misuse, sale, or other diversion of my controlled medicine. I will allow my provider to discuss my care with or share a copy of this agreement with any other treating provider, pharmacy or emergency room where I receive care.  I agree to give up (waive) any right of privacy or confidentiality with respect to these authorizations.   I have read this agreement and have asked questions about anything I did not understand.   ___________________________________    ___________________________  Patient Signature                                                           Date and Time  ___________________________________     ____________________________  Witness                                                                            Date and Time  ___________________________________  Lissy Moore MD  [id number] REV-  07/2018                                                                                                                                                   Page 2 of 2

## 2021-03-05 LAB — MISCELLANEOUS TEST: NORMAL

## 2021-03-06 DIAGNOSIS — F90.2 ADHD (ATTENTION DEFICIT HYPERACTIVITY DISORDER), COMBINED TYPE: ICD-10-CM

## 2021-03-08 DIAGNOSIS — M62.830 BACK MUSCLE SPASM: ICD-10-CM

## 2021-03-08 RX ORDER — METHOCARBAMOL 500 MG/1
TABLET, FILM COATED ORAL
Qty: 90 TABLET | Refills: 0 | Status: SHIPPED | OUTPATIENT
Start: 2021-03-08 | End: 2021-04-06

## 2021-03-08 RX ORDER — LISDEXAMFETAMINE DIMESYLATE 70 MG/1
CAPSULE ORAL
Qty: 30 CAPSULE | Refills: 0 | Status: SHIPPED | OUTPATIENT
Start: 2021-03-08 | End: 2021-03-24

## 2021-03-08 NOTE — TELEPHONE ENCOUNTER
Vyvanse 70 mg      Last Written Prescription Date:  1/7/21  Last Fill Quantity: 30,   # refills: 0  Last Office Visit: 3/4/21  Future Office visit:       Routing refill request to provider for review/approval because:

## 2021-03-08 NOTE — TELEPHONE ENCOUNTER
Robaxin  Last Written Prescription Date: 2/2/21  Last Fill Quantity: 90 # of Refills: 0  Last Office Visit: 3/4/21

## 2021-03-13 DIAGNOSIS — M54.41 CHRONIC BILATERAL LOW BACK PAIN WITH BILATERAL SCIATICA: ICD-10-CM

## 2021-03-13 DIAGNOSIS — M79.7 FIBROMYALGIA: ICD-10-CM

## 2021-03-13 DIAGNOSIS — G89.4 CHRONIC PAIN SYNDROME: ICD-10-CM

## 2021-03-13 DIAGNOSIS — R39.12 BENIGN PROSTATIC HYPERPLASIA WITH WEAK URINARY STREAM: ICD-10-CM

## 2021-03-13 DIAGNOSIS — R52 PAIN: ICD-10-CM

## 2021-03-13 DIAGNOSIS — M54.42 CHRONIC BILATERAL LOW BACK PAIN WITH BILATERAL SCIATICA: ICD-10-CM

## 2021-03-13 DIAGNOSIS — G89.29 CHRONIC BILATERAL LOW BACK PAIN WITH BILATERAL SCIATICA: ICD-10-CM

## 2021-03-13 DIAGNOSIS — M62.830 BACK MUSCLE SPASM: ICD-10-CM

## 2021-03-13 DIAGNOSIS — J45.41 MODERATE PERSISTENT ASTHMA WITH EXACERBATION: ICD-10-CM

## 2021-03-13 DIAGNOSIS — R11.0 NAUSEA: ICD-10-CM

## 2021-03-13 DIAGNOSIS — K59.00 CONSTIPATION, UNSPECIFIED CONSTIPATION TYPE: ICD-10-CM

## 2021-03-13 DIAGNOSIS — I10 ESSENTIAL HYPERTENSION: ICD-10-CM

## 2021-03-13 DIAGNOSIS — N40.1 BENIGN PROSTATIC HYPERPLASIA WITH WEAK URINARY STREAM: ICD-10-CM

## 2021-03-13 DIAGNOSIS — F41.1 GAD (GENERALIZED ANXIETY DISORDER): ICD-10-CM

## 2021-03-13 NOTE — TELEPHONE ENCOUNTER
Metoprolol       Last Written Prescription Date:  9/18/2020  Last Fill Quantity: 30,   # refills: 3  Last Office Visit: 3/4/2021  Future Office visit:    Next 5 appointments (look out 90 days)    Jun 07, 2021  2:00 PM  (Arrive by 1:45 PM)  SHORT with Lissy Moore MD  [facility] [place] ([facility] [place] ) [address]  [place] [address]  [phone]

## 2021-03-13 NOTE — TELEPHONE ENCOUNTER
Vistaril       Last Written Prescription Date:  2/22/2021  Last Fill Quantity: 120,   # refills: 0    Tylenol       Last Written Prescription Date:  2/16/2021  Last Fill Quantity: 200,   # refills: 0    Mucinex      Last Written Prescription Date:  2/11/2021  Last Fill Quantity: 60,   # refills: 0    Marinol       Last Written Prescription Date:  2/11/2021  Last Fill Quantity: 60,   # refills: 0    Gabapentin       Last Written Prescription Date:  2/11/2021  Last Fill Quantity: 270,   # refills: 0    Zanaflex       Last Written Prescription Date:  2/11/2021  Last Fill Quantity: 90,   # refills: 0    Senna       Last Written Prescription Date:  2/11/2021  Last Fill Quantity: 120,   # refills: 0    Flomax       Last Written Prescription Date:  2/11/2021  Last Fill Quantity: 30,   # refills: 0    Pamelor       Last Written Prescription Date:  2/11/2021  Last Fill Quantity: 60,   # refills: 0  Last Office Visit: 3/4/2021  Future Office visit:    Next 5 appointments (look out 90 days)    Jun 07, 2021  2:00 PM  (Arrive by 1:45 PM)  SHORT with Lissy Moore MD  [facility] [place] ([facility] [place] ) [address]  [place] [address]  [phone]

## 2021-03-14 RX ORDER — NORTRIPTYLINE HYDROCHLORIDE 50 MG/1
CAPSULE ORAL
Qty: 60 CAPSULE | Refills: 0 | Status: SHIPPED | OUTPATIENT
Start: 2021-03-14 | End: 2021-04-08

## 2021-03-14 RX ORDER — GABAPENTIN 300 MG/1
CAPSULE ORAL
Qty: 270 CAPSULE | Refills: 0 | Status: SHIPPED | OUTPATIENT
Start: 2021-03-14 | End: 2021-04-08

## 2021-03-14 RX ORDER — HYDROXYZINE PAMOATE 50 MG/1
CAPSULE ORAL
Qty: 120 CAPSULE | Refills: 0 | Status: SHIPPED | OUTPATIENT
Start: 2021-03-14 | End: 2021-04-05

## 2021-03-14 RX ORDER — AMOXICILLIN 250 MG
CAPSULE ORAL
Qty: 120 TABLET | Refills: 0 | Status: SHIPPED | OUTPATIENT
Start: 2021-03-14 | End: 2021-04-08

## 2021-03-14 RX ORDER — METOPROLOL SUCCINATE 50 MG/1
TABLET, EXTENDED RELEASE ORAL
Qty: 30 TABLET | Refills: 0 | Status: SHIPPED | OUTPATIENT
Start: 2021-03-14 | End: 2021-05-05

## 2021-03-14 RX ORDER — DRONABINOL 2.5 MG/1
CAPSULE ORAL
Qty: 60 CAPSULE | Refills: 0 | Status: SHIPPED | OUTPATIENT
Start: 2021-03-14 | End: 2021-04-08

## 2021-03-14 RX ORDER — GUAIFENESIN AND DEXTROMETHORPHAN HYDROBROMIDE 600; 30 MG/1; MG/1
1 TABLET, EXTENDED RELEASE ORAL EVERY 12 HOURS
Qty: 60 TABLET | Refills: 0 | Status: SHIPPED | OUTPATIENT
Start: 2021-03-14 | End: 2021-04-08

## 2021-03-14 RX ORDER — ACETAMINOPHEN 325 MG/1
TABLET ORAL
Qty: 200 TABLET | Refills: 0 | Status: SHIPPED | OUTPATIENT
Start: 2021-03-14 | End: 2021-04-08

## 2021-03-14 RX ORDER — TAMSULOSIN HYDROCHLORIDE 0.4 MG/1
CAPSULE ORAL
Qty: 30 CAPSULE | Refills: 0 | Status: SHIPPED | OUTPATIENT
Start: 2021-03-14 | End: 2021-04-08

## 2021-03-16 DIAGNOSIS — H91.93 DECREASED HEARING OF BOTH EARS: Primary | ICD-10-CM

## 2021-03-19 DIAGNOSIS — G89.4 CHRONIC PAIN SYNDROME: ICD-10-CM

## 2021-03-19 DIAGNOSIS — F31.0 BIPOLAR AFFECTIVE DISORDER, CURRENT EPISODE HYPOMANIC (H): ICD-10-CM

## 2021-03-19 DIAGNOSIS — F11.90 CHRONIC, CONTINUOUS USE OF OPIOIDS: ICD-10-CM

## 2021-03-19 DIAGNOSIS — M51.379 DEGENERATION OF LUMBAR OR LUMBOSACRAL INTERVERTEBRAL DISC: ICD-10-CM

## 2021-03-19 NOTE — TELEPHONE ENCOUNTER
Morphine (MS Contin) 15 mg CR tablet  Take 1 tablet by mouth every 8 hours  Last Written Prescription Date:  2-  Last Fill Quantity: 90,   # refills: 0  Last Office Visit: 3-4-21  Future Office visit:    Next 5 appointments (look out 90 days)    Mar 31, 2021  1:45 PM  (Arrive by 1:30 PM)  Return Visit with Avis Freire PA-C  [facility] ([facility] ) [address]  [place] [address]  [phone]   May 04, 2021  2:00 PM  (Arrive by 1:45 PM)  Nurse Only with Bettye Jules  [facility] ([facility] ) [address]  [place] [address]  [phone]   Jun 07, 2021  2:00 PM  (Arrive by 1:45 PM)  SHORT with Lissy Moore MD  [facility] ([facility] ) [address]  [place] [address]  [phone]           Hydrocodone-acetaminophen (Norco)   mg per tablet  Take 1 tablet by mouth 2 times daily as needed for severe pain    Last Written Prescription Date:  2-22-21  Last Fill Quantity: 60,   # refills: 0  Last Office Visit: 3-4-21  Future Office visit:    Next 5 appointments (look out 90 days)    Mar 31, 2021  1:45 PM  (Arrive by 1:30 PM)  Return Visit with Avis Freire PA-C  [facility] ([facility] ) [address]  [place] [address]  [phone]   May 04, 2021  2:00 PM  (Arrive by 1:45 PM)  Nurse Only with Bettye Jules  [facility] ([facility] ) [address]  [place] [address]  [phone]   Jun 07, 2021  2:00 PM  (Arrive by 1:45 PM)  SHORT with Lissy Moore MD  [facility] ([facility] ) [address]  [place] [address]  [phone]

## 2021-03-19 NOTE — TELEPHONE ENCOUNTER
Oxcarbazepine (Trileptal) 600 mg tablet  Take 1 tablet by mouth 2 times daily  Last Written Prescription Date:  11-10-20  Last Fill Quantity: 60,   # refills: 3  Last Office Visit: 12-30-20  Future Office visit:    Next 5 appointments (look out 90 days)    Mar 31, 2021  1:45 PM  (Arrive by 1:30 PM)  Return Visit with Avis Freire PA-C  [facility] ([facility] ) [address] [place] [address]  [phone]   May 04, 2021  2:00 PM  (Arrive by 1:45 PM)  Nurse Only with Bettye Jules  [facility] ([facility] ) [address]  [place] [address]  [phone]   Jun 07, 2021  2:00 PM  (Arrive by 1:45 PM)  SHORT with Lissy Moore MD  [facility] ([facility] ) [address]  [place] [address]  [phone]

## 2021-03-20 RX ORDER — MORPHINE SULFATE 15 MG/1
TABLET, FILM COATED, EXTENDED RELEASE ORAL
Qty: 90 TABLET | Refills: 0 | Status: SHIPPED | OUTPATIENT
Start: 2021-03-20 | End: 2021-04-22

## 2021-03-20 RX ORDER — HYDROCODONE BITARTRATE AND ACETAMINOPHEN 10; 325 MG/1; MG/1
TABLET ORAL
Qty: 60 TABLET | Refills: 0 | Status: SHIPPED | OUTPATIENT
Start: 2021-03-20 | End: 2021-04-22

## 2021-03-23 RX ORDER — OXCARBAZEPINE 600 MG/1
TABLET, FILM COATED ORAL
Qty: 60 TABLET | Refills: 3 | Status: SHIPPED | OUTPATIENT
Start: 2021-03-23 | End: 2021-07-26

## 2021-03-24 ENCOUNTER — OFFICE VISIT (OUTPATIENT)
Dept: CHIROPRACTIC MEDICINE | Facility: OTHER | Age: 61
End: 2021-03-24
Attending: CHIROPRACTOR
Payer: COMMERCIAL

## 2021-03-24 ENCOUNTER — TELEPHONE (OUTPATIENT)
Dept: PSYCHIATRY | Facility: OTHER | Age: 61
End: 2021-03-24

## 2021-03-24 ENCOUNTER — VIRTUAL VISIT (OUTPATIENT)
Dept: PSYCHIATRY | Facility: OTHER | Age: 61
End: 2021-03-24
Attending: PSYCHIATRY & NEUROLOGY
Payer: COMMERCIAL

## 2021-03-24 DIAGNOSIS — F90.2 ADHD (ATTENTION DEFICIT HYPERACTIVITY DISORDER), COMBINED TYPE: ICD-10-CM

## 2021-03-24 DIAGNOSIS — M99.02 SEGMENTAL AND SOMATIC DYSFUNCTION OF THORACIC REGION: ICD-10-CM

## 2021-03-24 DIAGNOSIS — M99.01 SEGMENTAL AND SOMATIC DYSFUNCTION OF CERVICAL REGION: ICD-10-CM

## 2021-03-24 DIAGNOSIS — M99.03 SEGMENTAL AND SOMATIC DYSFUNCTION OF LUMBAR REGION: Primary | ICD-10-CM

## 2021-03-24 DIAGNOSIS — M54.50 ACUTE BILATERAL LOW BACK PAIN WITHOUT SCIATICA: ICD-10-CM

## 2021-03-24 PROCEDURE — 99214 OFFICE O/P EST MOD 30 MIN: CPT | Mod: 95 | Performed by: PSYCHIATRY & NEUROLOGY

## 2021-03-24 PROCEDURE — 98941 CHIROPRACT MANJ 3-4 REGIONS: CPT | Mod: AT | Performed by: CHIROPRACTOR

## 2021-03-24 RX ORDER — LISDEXAMFETAMINE DIMESYLATE 70 MG/1
CAPSULE ORAL
Qty: 30 CAPSULE | Refills: 0 | Status: SHIPPED | OUTPATIENT
Start: 2021-04-05 | End: 2021-05-05

## 2021-03-24 ASSESSMENT — ANXIETY QUESTIONNAIRES
GAD7 TOTAL SCORE: 10
7. FEELING AFRAID AS IF SOMETHING AWFUL MIGHT HAPPEN: SEVERAL DAYS
6. BECOMING EASILY ANNOYED OR IRRITABLE: MORE THAN HALF THE DAYS
3. WORRYING TOO MUCH ABOUT DIFFERENT THINGS: NOT AT ALL
5. BEING SO RESTLESS THAT IT IS HARD TO SIT STILL: SEVERAL DAYS
2. NOT BEING ABLE TO STOP OR CONTROL WORRYING: NOT AT ALL
1. FEELING NERVOUS, ANXIOUS, OR ON EDGE: NEARLY EVERY DAY

## 2021-03-24 ASSESSMENT — PAIN SCALES - GENERAL: PAINLEVEL: EXTREME PAIN (8)

## 2021-03-24 ASSESSMENT — PATIENT HEALTH QUESTIONNAIRE - PHQ9
5. POOR APPETITE OR OVEREATING: NEARLY EVERY DAY
SUM OF ALL RESPONSES TO PHQ QUESTIONS 1-9: 17

## 2021-03-24 NOTE — PROGRESS NOTES
"Joshua Barron is a 60 year old male who is being evaluated via a billable telephone visit.      The patient has been notified of following:     \"This telephone visit will be conducted via a call between you and your physician/provider. We have found that certain health care needs can be provided without the need for a physical exam.  This service lets us provide the care you need with a short phone conversation.  If a prescription is necessary we can send it directly to your pharmacy.  If lab work is needed we can place an order for that and you can then stop by our lab to have the test done at a later time.    Telephone visits are billed at different rates depending on your insurance coverage. During this emergency period, for some insurers they may be billed the same as an in-person visit.  Please reach out to your insurance provider with any questions.    If during the course of the call the physician/provider feels a telephone visit is not appropriate, you will not be charged for this service.\"    Patient has given verbal consent for Telephone visit?  Yes    What phone number would you like to be contacted at?  [phone] (Mobile)    How would you like to obtain your AVS? [facility]    Phone call duration: 38 minutes. Total visit time of 38 minutes.                                                                            Social- Was  twice. Lives alone with his 3 cats: Win the cat. Has a GF in [place]  Children-  2 kids, they are in [place]  Last visit 12/30/20: Continue Trileptal 600 mg bid.  Continue trazodone 100 mg bedtime prn insomnia. Continue Vyvanse 70 mg daily and last fill was on 12/10/20. Set to fill for 1/7/21 and for 2/4/21.       - his primary, Dr. Fisher, left  - ER visit November in which  head injury   - Trent notes so much went on when he was a kid and an adult too: ex-wife had major mental health issues. Trent was bullied as a childe  - mom with dementia, then fell and broke her hip.   - Lots of " family issues: Joshua has taken care of his parents and yet parents give his other brothers everything. oldest of 3 brothers. Brother in [place] youngest Mark and Major is here. Mom and dad here out on [place]. Joshua noting moving here to be closer to parents and help them, etc. But, parents don't seem to want him around much or talk to him.    SUBSTANCE USE- reports no abuse of meds or substances    SYMPTOMS- paranoia, hypnopompic hallucinations, issues with attention and concentration improved with Vyvanse: racing thoughts, depressed mood, impulsivity, distractibility  MEDICAL ROS- GERD: feels trouble with heartburn feeling like fluid gets stuck in his throat. back pain, . Intentional weight loss  MEDICAL / SURGICAL HISTORY                     Patient Active Problem List   Diagnosis     Mixed hyperlipidemia     Tobacco Abuse, History of     Degeneration of lumbar or lumbosacral intervertebral disc     Depression, major     Chronic, continuous use of opioids     Chemical dependency (H)     Chronic rhinitis     Tinnitus of both ears     SNHL (sensorineural hearing loss)     Bipolar disorder (H)     Seizure-like activity (H)     Somatic dysfunction of pelvis region     Bilateral foot pain     Prostate cancer screening     Trigger index finger of right hand     Moderate persistent asthma without complication     Chronic lower back pain     ACP (advance care planning)     Onychia of toe of left foot     DDD (degenerative disc disease), lumbar     Seizure disorder (H)     Back muscle spasm     Benign essential hypertension     Retrograde ejaculation     Allergic rhinitis due to other allergen     Cervical spondylosis without myelopathy     Chronic headaches     DDD (degenerative disc disease)     Generalized anxiety disorder     Hypertrophy of prostate without urinary obstruction and other lower urinary tract symptoms (LUTS)     GERD (gastroesophageal reflux disease)     Lumbago     Major depressive disorder, recurrent  episode, moderate (H)     Myalgia and myositis     Hyperlipidemia     Other pain disorders related to psychological factors     Spinal stenosis in cervical region     Status post lumbar spinal fusion     Tobacco use disorder     Trigger finger     Polypharmacy     Deviated nasal septum     Attention deficit hyperactivity disorder (ADHD), combined type     chronic noninfective otitis externa     Migraine with aura and without status migrainosus, not intractable     Tobacco use     Tobacco abuse counseling     Prediabetes     ALLERGY   Cymbalta, Depakote [valproic acid], and Seasonal allergies  MEDICATIONS                                                                                             Current Outpatient Medications   Medication Sig     acetaminophen (TYLENOL) 325 MG tablet TAKE 2 TABLETS BY MOUTH EVERY 4 HOURS AS NEEDED FOR MILD PAIN     albuterol (ACCUNEB) 1.25 MG/3ML neb solution Take 1 vial (1.25 mg) by nebulization every 6 hours as needed for shortness of breath / dyspnea or wheezing     albuterol (PROAIR HFA/PROVENTIL HFA/VENTOLIN HFA) 108 (90 Base) MCG/ACT inhaler Inhale 6 puffs into the lungs every 4 hours as needed for shortness of breath / dyspnea or wheezing     aspirin (ASPIRIN LOW DOSE) 81 MG chewable tablet CHEW AND SWALLOW 1 TABLET BY MOUTH DAILY     atorvastatin (LIPITOR) 20 MG tablet TAKE 1 TABLET BY MOUTH DAILY     baclofen (LIORESAL) 20 MG tablet TAKE 1 TABLET BY MOUTH 4 TIMES DAILY     dextromethorphan-guaiFENesin (MUCINEX DM)  MG 12 hr tablet TAKE 1 TABLET BY MOUTH EVERY 12 HOURS     diclofenac (VOLTAREN) 50 MG EC tablet TAKE 1 TABLET BY MOUTH TWICE DAILY WITH FOOD     docusate sodium (DOK) 100 MG capsule TAKE 1 CAPSULE BY MOUTH TWICE DAILY     dronabinol (MARINOL) 2.5 MG capsule TAKE 1 CAPSULE BY MOUTH 2 TIMES DAILY BEFORE MEALS     famotidine (PEPCID) 40 MG tablet TAKE 1 TABLET BY MOUTH DAILY     fluticasone (FLONASE) 50 MCG/ACT nasal spray      gabapentin (NEURONTIN) 300 MG  capsule TAKE 3 CAPSULES BY MOUTH THREE TIMES DAILY     HYDROcodone-acetaminophen (NORCO)  MG per tablet TAKE 1 TABLET BY MOUTH 2 TIMES DAILY AS NEEDED FOR SEVERE PAIN     hydrOXYzine (VISTARIL) 50 MG capsule TAKE 1 TO 2 CAPSULES BY MOUTH 4 TIMES DAILY AS NEEDED FOR ANXIETY     ibuprofen (ADVIL/MOTRIN) 600 MG tablet TAKE 1 TABLET BY MOUTH EVERY 6 HOURS AS NEEDED     loratadine (CLARITIN) 10 MG tablet Take 10 mg by mouth daily     losartan (COZAAR) 50 MG tablet TAKE 1 TABLET BY MOUTH DAILY     methocarbamol (ROBAXIN) 500 MG tablet TAKE 1 TABLET BY MOUTH 3 TIMES DAILY     metoprolol succinate ER (TOPROL-XL) 50 MG 24 hr tablet TAKE 1 TABLET BY MOUTH DAILY     mometasone-formoterol (DULERA) 200-5 MCG/ACT inhaler Inhale 2 puffs into the lungs 2 times daily     montelukast (SINGULAIR) 10 MG tablet Take 1 tablet (10 mg) by mouth At Bedtime     morphine (MS CONTIN) 15 MG CR tablet TAKE 1 TABLET BY MOUTH EVERY 8 HOURS     multivitamin  with iron (SM COMPLETE ADVANCED FORMULA) TABS TAKE 1 TABLET BY MOUTH DAILY     nicotine (NICORETTE) 4 MG gum Place 1 each (4 mg) inside cheek as needed for smoking cessation     nortriptyline (PAMELOR) 50 MG capsule TAKE 2 CAPSULES (100MG) BY MOUTH AT BEDTIME     omeprazole (PRILOSEC) 20 MG DR capsule TAKE 1 CAPSULE BY MOUTH 2 TIMES DAILY     ORDER FOR DME Equipment being ordered: Large gloves     OXcarbazepine (TRILEPTAL) 600 MG tablet TAKE 1 TABLET BY MOUTH 2 TIMES DAILY     SUMAtriptan (IMITREX) 50 MG tablet Take 1 tablet (50 mg) by mouth at onset of headache for migraine May repeat in 2 hours. Max 4 tablets/24 hours.     tamsulosin (FLOMAX) 0.4 MG capsule TAKE 1 CAPSULE BY MOUTH DAILY     tiZANidine (ZANAFLEX) 4 MG tablet TAKE 1 TABLET BY MOUTH 3 TIMES A DAY     traZODone (DESYREL) 100 MG tablet TAKE 1 TABLET BY MOUTH AT BEDTIME     lisdexamfetamine (VYVANSE) 70 MG capsule Take 1 capsule (70 mg) by mouth daily     lisdexamfetamine (VYVANSE) 70 MG capsule Take 1 capsule (70 mg) by  mouth every morning     mometasone-formoterol (DULERA) 200-5 MCG/ACT inhaler Inhale 2 puffs into the lungs 2 times daily     naloxone (NARCAN) 1 mg/mL for intranasal kit (2 syringes with 2 mucosal atomizer device) In opioid overdose put cone in nostril and push 1/2 of contents into each nostril.  Repeat every 3 min if no response until help arrives. (Patient not taking: Reported on 3/24/2021)     order for DME Equipment being ordered: Thoracic and lumbar Back Brace     order for DME 1 boa back brace     senna-docusate (SENEXON-S) 8.6-50 MG tablet TAKE 1 OR 2 TABLETS BY MOUTH 2 TIMES A DAY     VYVANSE 70 MG capsule TAKE 1 CAPSULE BY MOUTH EVERY MORNING     No current facility-administered medications for this visit.        VITALS   There were no vitals taken for this visit.     LABS                                                                                                                           Last Comprehensive Metabolic Panel:  Sodium   Date Value Ref Range Status   12/14/2020 141 133 - 144 mmol/L Final     Potassium   Date Value Ref Range Status   12/14/2020 3.8 3.4 - 5.3 mmol/L Final     Chloride   Date Value Ref Range Status   12/14/2020 107 94 - 109 mmol/L Final     Carbon Dioxide   Date Value Ref Range Status   12/14/2020 28 20 - 32 mmol/L Final     Anion Gap   Date Value Ref Range Status   12/14/2020 6 3 - 14 mmol/L Final     Glucose   Date Value Ref Range Status   12/14/2020 108 (H) 70 - 99 mg/dL Final     Urea Nitrogen   Date Value Ref Range Status   12/14/2020 16 7 - 30 mg/dL Final     Creatinine   Date Value Ref Range Status   12/14/2020 0.77 0.66 - 1.25 mg/dL Final     GFR Estimate   Date Value Ref Range Status   12/14/2020 >90 >60 mL/min/[1.73_m2] Final     Comment:     Non  GFR Calc  Starting 12/18/2018, serum creatinine based estimated GFR (eGFR) will be   calculated using the Chronic Kidney Disease Epidemiology Collaboration   (CKD-EPI) equation.       Calcium   Date Value  "Ref Range Status   12/14/2020 8.2 (L) 8.5 - 10.1 mg/dL Final       CBC RESULTS:   Recent Labs   Lab Test 08/17/20  0015   WBC 5.0   RBC 3.67*   HGB 10.9*   HCT 31.7*   MCV 86   MCH 29.7   MCHC 34.4   RDW 13.2          Recent Labs   Lab Test 10/25/18  1434 03/17/16  1412 11/17/14  1045 12/23/13  0949   CHOL 131 217* 185 161   HDL 51 36* 44 41   LDL 60 Cannot estimate LDL when triglyceride exceeds 400 mg/dL 86 62   TRIG 101 431* 275* 289*   CHOLHDLRATIO  --   --  4.2 3.9       EKG 6/3/19 with QTc of 415 ms     MENTAL STATUS EXAM                                                                                         No problems psychomotor behavior. Speech: loud. .  Mood was described as \"I'm horrible frankly\". Thought process, including associations, was unremarkable and thought content was devoid of suicidal and homicidal ideation.  No hallucinations. Insight was poor Judgment was  adequate for safety. Fund of knowledge was intact. Pt demonstrates no obvious problems with attention, concentration, language, recent or remote memory although these were not formally tested.       ASSESSMENT                                                                                                      HISTORICAL:  Initial psych note 10/6/15          NOTES:      Joshua is a 60 year old with bipolar, ADHD, and MDD.  We started Valium as dual purpose: muscle relaxant properties and for anxiety. We have discussed the new guidelines for short - term use of benzodiazepines (Valium) and avoiding their use along with opioids. I had noted plan to taper down over time and eventually discontinue. We decreased from 5 mg every 12 hours as needed down to 2 mg every 12 hours as needed. March '20 we decreased to once daily and then discontinued. Watching CBC : given he is on Trileptal following he runs lower but overall looks stable. Reviewed labs..         TREATMENT RISK STATEMENT:  The risks, benefits, alternatives and potential adverse " effects have been explained and are understood by the pt.  The pt agrees to the treatment plan with the ability to do so.   The pt knows to call the clinic for any problems or access emergency care if needed.        DIAGNOSES                    Bipolar disorder I with psychosis vs. Schizoaffective disorder, bipolar type  ADHD      PLAN                                                                                                                    1)  MEDICATIONS:       Continue Trileptal 600 mg bid.  Continue trazodone 100 mg bedtime prn insomnia. Continue Vyvanse 70 mg daily and last fill was on 3/8/21 and set to fill early April 2021.     2)  THERAPY:  No change    3)  LABS:  CBC Dec 2020    4)  PT MONITOR [call for probs]:  SEs from meds, worsening sx, SI/HI    5)  REFERRALS [CD, medical, other]:  None    6)  RTC: 2 months

## 2021-03-24 NOTE — NURSING NOTE
"Chief Complaint   Patient presents with     RECHECK     Telephone visit       Initial There were no vitals taken for this visit. Estimated body mass index is 29.29 kg/m  as calculated from the following:    Height as of 3/4/21: 1.803 m (5' 11\").    Weight as of 3/4/21: 95.3 kg (210 lb).  Medication Reconciliation: complete  JACQUELYN SUAREZ LPN    "

## 2021-03-25 ASSESSMENT — ANXIETY QUESTIONNAIRES: GAD7 TOTAL SCORE: 10

## 2021-03-25 NOTE — PROGRESS NOTES

## 2021-03-26 ENCOUNTER — TELEPHONE (OUTPATIENT)
Dept: FAMILY MEDICINE | Facility: OTHER | Age: 61
End: 2021-03-26

## 2021-03-26 NOTE — TELEPHONE ENCOUNTER
Please call patient concerning pain medication. He feels his pain is not under control and would like his pain medication increased.

## 2021-03-27 DIAGNOSIS — M54.50 LUMBAR BACK PAIN: ICD-10-CM

## 2021-03-27 DIAGNOSIS — M62.830 BACK MUSCLE SPASM: ICD-10-CM

## 2021-03-28 RX ORDER — BACLOFEN 20 MG/1
TABLET ORAL
Qty: 120 TABLET | Refills: 0 | Status: SHIPPED | OUTPATIENT
Start: 2021-03-28 | End: 2021-05-05

## 2021-03-28 NOTE — TELEPHONE ENCOUNTER
[facility]       Last Written Prescription Date:  2/11/2021  Last Fill Quantity: 120,   # refills: 0  Last Office Visit: 3/4/2021  Future Office visit:    Next 5 appointments (look out 90 days)    Mar 31, 2021  1:45 PM  (Arrive by 1:30 PM)  Return Visit with Avis Freire PA-C  [facility] [place] ([facility] [place] ) [address]  [place] [address]  [phone]   Apr 27, 2021  3:00 PM  (Arrive by 2:45 PM)  SHORT with Lissy Moore MD  [facility] [place] ([facility] [place] ) [address]  [place] [address]  [phone]   May 04, 2021  2:00 PM  (Arrive by 1:45 PM)  Nurse Only with Bettye Jules  [facility] [place] ([facility] [place] ) [address]  [place] [address]  [phone]   Jun 07, 2021  2:00 PM  (Arrive by 1:45 PM)  SHORT with Lissy Moore MD  [facility] [place] ([facility] [place] ) [address]  [place] [address]  [phone]

## 2021-03-30 LAB — LAB SCANNED RESULT: ABNORMAL

## 2021-03-31 ENCOUNTER — OFFICE VISIT (OUTPATIENT)
Dept: OTOLARYNGOLOGY | Facility: OTHER | Age: 61
End: 2021-03-31
Attending: PHYSICIAN ASSISTANT
Payer: COMMERCIAL

## 2021-03-31 ENCOUNTER — OFFICE VISIT (OUTPATIENT)
Dept: AUDIOLOGY | Facility: OTHER | Age: 61
End: 2021-03-31
Attending: AUDIOLOGIST
Payer: COMMERCIAL

## 2021-03-31 VITALS
WEIGHT: 205 LBS | HEART RATE: 87 BPM | HEIGHT: 71 IN | BODY MASS INDEX: 28.7 KG/M2 | DIASTOLIC BLOOD PRESSURE: 72 MMHG | OXYGEN SATURATION: 96 % | SYSTOLIC BLOOD PRESSURE: 112 MMHG | TEMPERATURE: 98.1 F

## 2021-03-31 DIAGNOSIS — H90.3 SENSORINEURAL HEARING LOSS (SNHL) OF BOTH EARS: Primary | ICD-10-CM

## 2021-03-31 DIAGNOSIS — H91.93 DECREASED HEARING OF BOTH EARS: ICD-10-CM

## 2021-03-31 DIAGNOSIS — G43.109 MIGRAINE WITH AURA AND WITHOUT STATUS MIGRAINOSUS, NOT INTRACTABLE: ICD-10-CM

## 2021-03-31 DIAGNOSIS — H93.13 TINNITUS, BILATERAL: ICD-10-CM

## 2021-03-31 DIAGNOSIS — R09.A2 GLOBUS PHARYNGEUS: ICD-10-CM

## 2021-03-31 DIAGNOSIS — K21.9 GASTROESOPHAGEAL REFLUX DISEASE, UNSPECIFIED WHETHER ESOPHAGITIS PRESENT: ICD-10-CM

## 2021-03-31 DIAGNOSIS — R13.14 PHARYNGOESOPHAGEAL DYSPHAGIA: ICD-10-CM

## 2021-03-31 PROCEDURE — G0463 HOSPITAL OUTPT CLINIC VISIT: HCPCS | Mod: 25

## 2021-03-31 PROCEDURE — 92591 HC HEARING AID EXAM BINAURAL: CPT | Performed by: AUDIOLOGIST

## 2021-03-31 PROCEDURE — 99214 OFFICE O/P EST MOD 30 MIN: CPT | Mod: 25 | Performed by: PHYSICIAN ASSISTANT

## 2021-03-31 PROCEDURE — 92504 EAR MICROSCOPY EXAMINATION: CPT

## 2021-03-31 PROCEDURE — 92557 COMPREHENSIVE HEARING TEST: CPT | Performed by: AUDIOLOGIST

## 2021-03-31 PROCEDURE — 92567 TYMPANOMETRY: CPT | Performed by: AUDIOLOGIST

## 2021-03-31 PROCEDURE — 92504 EAR MICROSCOPY EXAMINATION: CPT | Performed by: PHYSICIAN ASSISTANT

## 2021-03-31 RX ORDER — PANTOPRAZOLE SODIUM 40 MG/1
40 TABLET, DELAYED RELEASE ORAL 2 TIMES DAILY
Qty: 60 TABLET | Refills: 4 | Status: ON HOLD | OUTPATIENT
Start: 2021-03-31 | End: 2021-11-30

## 2021-03-31 ASSESSMENT — MIFFLIN-ST. JEOR: SCORE: 1772

## 2021-03-31 ASSESSMENT — PAIN SCALES - GENERAL: PAINLEVEL: NO PAIN (0)

## 2021-03-31 NOTE — PROGRESS NOTES
Audiology Evaluation Completed. Please refer SCANNED AUDIOGRAM and/or TYMPANOGRAM for BACKGROUND, RESULTS, RECOMMENDATIONS.      Ciara WHEAT, [facility]-A  Audiologist #[id number]

## 2021-03-31 NOTE — PROGRESS NOTES
BACKGROUND:  Joshua was seen today for consult regarding hearing aids. The patient notes difficulty with communication in a variety of listening situations and would like to explore possible benefit obtained via use of amplification.      Patient has received medical clearance for hearing aid use from Avis Freire PA-C.  Joshua is a binaural hearing aid candidate and will receive a Hearing Aid Recommendation today.    RESULTS:  Audiology Assistant reviewed below information:      Estimated insurance coverage for hearing aids reviewed.    [place] Department of Health form titled 'Legal rights and consumer information about purchasing a hearing instrument verbally reviewed and given to patient. Trial Period, cancellation fee, warranties highlighted. Patient risk factors have been provided to the patient in writing prior to the sale of the hearing aid per FDA regulation. The risk factors are also available in the User Instructional Booklet to be presented on the day of the hearing aid fitting.       Hearing aid recommendation provided by Audiologist.    Thru use of hearing aids patient would like to improve ability to hear:    where there are groups and noise.     to hear the television at a lower volume to enjoy this activity with other people.     Joshua also reports trouble hearing in the car, at home, and on the telephone if there is not a volume control.      Discussed hearing aid styles, technology, features, and options at length with Joshua.  Sample devices were shown. Pros/Cons of options reviewed.  Expectations for amplification discussed. .Also discussed the importance of binaural amplification for hearing speech in the presence of background noise and localizing the directions of sounds.  Wireless options were also discussed at length and sample devices were shown.       Hearing Aid Recommendations: Hearing Aid Recommendation reviewed with patient. Pt chose to proceed with order and Purchase Agreement  completed. Copies provided to patient.      Hearing Aids:  Binaural Unitron [id number]  Color: dark grey  Battery Size: rechargeable  [place]old/Tips: 2-85      RECOMMENDATIONS:  The 45 day trial period was explained to patient, and they expressed understanding. Mr. Barron signed the Hearing Aid Purchase Agreement and was given a copy, as well as details on his hearing aids. Patient was counseled that exact out of pocket amounts cannot be determined for hearing aid claims being sent to insurance. Any insurance coverage information presented to the patient is an estimate only, and is not a guarantee of payment. Patient has been advised to check with their own insurance.      Patient scheduled to return to clinic in 2 weeks for hearing aid fitting, programming and orientation.    Ciara Valladares M.S.,[facility]-A  Audiologist #[id number]

## 2021-03-31 NOTE — PATIENT INSTRUCTIONS
Medical clearance for hearing aids.   Follow up for hearing aid consult.     Complete barium swallow.   Consider barium swallow.   Hold Prilosec, Pepcid  Start Protonix 40 mg twice a day for 2 months.   Increase water intake  Limit caffeine.       Thank you for allowing Avis Freire PA-C and our ENT team to participate in your care.  If your medications are too expensive, please give the nurse a call.  We can possibly change this medication.  If you have a scheduling or an appointment question please contact our Health Unit Coordinator at [phone], Ext. [phone].    ALL nursing questions or concerns can be directed to your ENT nurse at: [phone] [facility]      Adult lifestyle changes to prevent LPR reviewed      Avoid eating and drinking within two to three hours prior to bedtime    Do not drink alcohol    Eat small meals and slowly    Limit problem foods:    o Caffeine  o Carbonated drinks  o Chocolate  o Peppermint  o Tomato  o Citrus fruits  o Fatty and fried foods      Lose weight    Quit smoking    Wear loose clothing
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
## 2021-03-31 NOTE — NURSING NOTE
"Chief Complaint   Patient presents with     Hearing Problem     Pt is here for hearing aid medical clearance.       Initial /72 (BP Location: Right arm, Patient Position: Sitting, Cuff Size: Adult Regular)   Pulse 87   Temp 98.1  F (36.7  C) (Tympanic)   Ht 1.803 m (5' 11\")   Wt 93 kg (205 lb)   SpO2 96%   BMI 28.59 kg/m   Estimated body mass index is 28.59 kg/m  as calculated from the following:    Height as of this encounter: 1.803 m (5' 11\").    Weight as of this encounter: 93 kg (205 lb).  Medication Reconciliation: complete  Tawnya Julio LPN    "
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
## 2021-04-02 DIAGNOSIS — F41.1 GAD (GENERALIZED ANXIETY DISORDER): ICD-10-CM

## 2021-04-05 DIAGNOSIS — M54.50 CHRONIC LEFT-SIDED LOW BACK PAIN WITHOUT SCIATICA: ICD-10-CM

## 2021-04-05 DIAGNOSIS — M62.830 BACK MUSCLE SPASM: ICD-10-CM

## 2021-04-05 DIAGNOSIS — G89.29 CHRONIC LEFT-SIDED LOW BACK PAIN WITHOUT SCIATICA: ICD-10-CM

## 2021-04-05 RX ORDER — HYDROXYZINE PAMOATE 50 MG/1
CAPSULE ORAL
Qty: 120 CAPSULE | Refills: 0 | Status: SHIPPED | OUTPATIENT
Start: 2021-04-05 | End: 2021-04-28

## 2021-04-05 NOTE — TELEPHONE ENCOUNTER
Ibuprofen  Last Written Prescription Date: 3/1/21  Last Fill Quantity: 120 # of Refills: 0  Last Office Visit: 3/4/21    Robaxin  Last Written Prescription Date: 3/8/21  Last Fill Quantity: 90 # of Refills: 0  Last Office Visit: 3/4/21

## 2021-04-06 RX ORDER — IBUPROFEN 600 MG/1
TABLET, FILM COATED ORAL
Qty: 120 TABLET | Refills: 0 | Status: SHIPPED | OUTPATIENT
Start: 2021-04-06 | End: 2021-05-06

## 2021-04-06 RX ORDER — METHOCARBAMOL 500 MG/1
TABLET, FILM COATED ORAL
Qty: 90 TABLET | Refills: 0 | Status: SHIPPED | OUTPATIENT
Start: 2021-04-06 | End: 2021-06-17

## 2021-04-07 ENCOUNTER — TELEPHONE (OUTPATIENT)
Dept: FAMILY MEDICINE | Facility: OTHER | Age: 61
End: 2021-04-07

## 2021-04-07 NOTE — TELEPHONE ENCOUNTER
Pt called, reports issues with chronic pain and believes his pain is not being managed well. Pt is requesting a higher dose of pain medication. Pt did call about this issue on 3/26 and was scheduled for appt with PCP on 4/27/21. Pt requesting to be seen sooner. No appts available. Pt declines appt with covering provider. Please advise. Thank you!

## 2021-04-07 NOTE — TELEPHONE ENCOUNTER
We cannot increase his pain medications and would need to look at other options. He can be worked into a same day appointment next week

## 2021-04-08 DIAGNOSIS — G89.4 CHRONIC PAIN SYNDROME: ICD-10-CM

## 2021-04-08 DIAGNOSIS — M54.42 CHRONIC BILATERAL LOW BACK PAIN WITH BILATERAL SCIATICA: ICD-10-CM

## 2021-04-08 DIAGNOSIS — J31.0 CHRONIC RHINITIS: Primary | ICD-10-CM

## 2021-04-08 DIAGNOSIS — J45.40 MODERATE PERSISTENT ASTHMA WITHOUT COMPLICATION: Primary | ICD-10-CM

## 2021-04-08 DIAGNOSIS — M62.830 BACK MUSCLE SPASM: ICD-10-CM

## 2021-04-08 DIAGNOSIS — M79.7 FIBROMYALGIA: ICD-10-CM

## 2021-04-08 DIAGNOSIS — J45.41 MODERATE PERSISTENT ASTHMA WITH EXACERBATION: ICD-10-CM

## 2021-04-08 DIAGNOSIS — M54.41 CHRONIC BILATERAL LOW BACK PAIN WITH BILATERAL SCIATICA: ICD-10-CM

## 2021-04-08 DIAGNOSIS — R52 PAIN: ICD-10-CM

## 2021-04-08 DIAGNOSIS — K59.00 CONSTIPATION, UNSPECIFIED CONSTIPATION TYPE: ICD-10-CM

## 2021-04-08 DIAGNOSIS — G89.29 CHRONIC BILATERAL LOW BACK PAIN WITH BILATERAL SCIATICA: ICD-10-CM

## 2021-04-08 DIAGNOSIS — R11.0 NAUSEA: ICD-10-CM

## 2021-04-08 DIAGNOSIS — N40.1 BENIGN PROSTATIC HYPERPLASIA WITH WEAK URINARY STREAM: ICD-10-CM

## 2021-04-08 DIAGNOSIS — R39.12 BENIGN PROSTATIC HYPERPLASIA WITH WEAK URINARY STREAM: ICD-10-CM

## 2021-04-08 RX ORDER — ACETAMINOPHEN 325 MG/1
TABLET ORAL
Qty: 200 TABLET | Refills: 0 | Status: SHIPPED | OUTPATIENT
Start: 2021-04-08 | End: 2021-05-25

## 2021-04-08 RX ORDER — DRONABINOL 2.5 MG/1
CAPSULE ORAL
Qty: 60 CAPSULE | Refills: 0 | Status: SHIPPED | OUTPATIENT
Start: 2021-04-08 | End: 2021-05-05

## 2021-04-08 RX ORDER — GUAIFENESIN AND DEXTROMETHORPHAN HYDROBROMIDE 600; 30 MG/1; MG/1
1 TABLET, EXTENDED RELEASE ORAL EVERY 12 HOURS
Qty: 60 TABLET | Refills: 0 | Status: SHIPPED | OUTPATIENT
Start: 2021-04-08 | End: 2021-05-05

## 2021-04-08 RX ORDER — FLUTICASONE PROPIONATE 50 MCG
SPRAY, SUSPENSION (ML) NASAL
Qty: 16 G | Refills: 5 | Status: SHIPPED | OUTPATIENT
Start: 2021-04-08 | End: 2021-11-02

## 2021-04-08 RX ORDER — AMOXICILLIN 250 MG
CAPSULE ORAL
Qty: 120 TABLET | Refills: 0 | Status: SHIPPED | OUTPATIENT
Start: 2021-04-08 | End: 2021-05-05

## 2021-04-08 RX ORDER — NORTRIPTYLINE HYDROCHLORIDE 50 MG/1
CAPSULE ORAL
Qty: 60 CAPSULE | Refills: 0 | Status: SHIPPED | OUTPATIENT
Start: 2021-04-08 | End: 2021-05-05

## 2021-04-08 RX ORDER — GABAPENTIN 300 MG/1
CAPSULE ORAL
Qty: 270 CAPSULE | Refills: 0 | Status: SHIPPED | OUTPATIENT
Start: 2021-04-08 | End: 2021-05-05

## 2021-04-08 RX ORDER — TAMSULOSIN HYDROCHLORIDE 0.4 MG/1
CAPSULE ORAL
Qty: 30 CAPSULE | Refills: 0 | Status: SHIPPED | OUTPATIENT
Start: 2021-04-08 | End: 2021-05-05

## 2021-04-08 RX ORDER — ALBUTEROL SULFATE 90 UG/1
AEROSOL, METERED RESPIRATORY (INHALATION)
Qty: 18 G | Refills: 0 | Status: SHIPPED | OUTPATIENT
Start: 2021-04-08 | End: 2021-05-05

## 2021-04-08 NOTE — TELEPHONE ENCOUNTER
fluticasone (FLONASE) 50 MCG/ACT nasal spray        Last Written Prescription Date:  1/12/21  Last Fill Quantity: ,   # refills:   Last Office Visit: 3/4/21  Future Office visit:    Next 5 appointments (look out 90 days)    Apr 12, 2021  1:40 PM  Return Visit with Shamar Escamilla DC  [facility] [place] ([facility] [place] ) [address]  [place] [address]  [phone]   Apr 13, 2021  2:30 PM  (Arrive by 2:15 PM)  SHORT with Lissy Moore MD  [facility] ([facility] ) [address]  [place] [address]  [phone]   May 04, 2021  2:00 PM  (Arrive by 1:45 PM)  Nurse Only with Bettye Jules  [facility] ([facility] ) [address]  [place] [address]  [phone]   Jun 01, 2021  1:15 PM  (Arrive by 1:00 PM)  Return Visit with Avis Freire PA-C  [facility] ([facility] ) [address]  [place] [address]  [phone]   Jun 07, 2021  2:00 PM  (Arrive by 1:45 PM)  SHORT with Lissy Moore MD  [facility] ([facility] ) [address]  [place] [address]  [phone]           Routing refill request to provider for review/approval because:    Medication reported historical

## 2021-04-08 NOTE — TELEPHONE ENCOUNTER
gabapentin  Last Written Prescription Date:  3/14/21  Last Fill Quantity: 270,   # refills: 0  Last Office Visit: 3/4/21  Future Office visit:    Next 5 appointments (look out 90 days)    Apr 12, 2021  1:40 PM  Return Visit with Shamar Escamilla [facility] [place] ([facility] [place] ) [address]  [place] [address]  [phone]   Apr 13, 2021  2:30 PM  (Arrive by 2:15 PM)  SHORT with Lissy Moore MD  [facility] [place] ([facility] [place] ) [address]  [place] [address]  [phone]   May 04, 2021  2:00 PM  (Arrive by 1:45 PM)  Nurse Only with Bettye Jules  [facility] [place] ) [address]  [place] [address]  [phone]   Jun 01, 2021  1:15 PM  (Arrive by 1:00 PM)  Return Visit with Avis Freire PA-C  [facility] [place] ([facility] [place] ) [address]  [place] [address]  [phone]   Jun 07, 2021  2:00 PM  (Arrive by 1:45 PM)  SHORT with Lissy Moore MD  [facility] [place] ([facility] [place] ) [address]  [place] [address]  [phone]         marinol 3/14/21 #60  zanaflex 3/14/21 #90  flomax 3/14/21 #30  nortriptylene 3/14/21 #60  Senna 3/14/21 #120  mucinex 3/14/21 #60

## 2021-04-12 ENCOUNTER — TELEPHONE (OUTPATIENT)
Dept: FAMILY MEDICINE | Facility: OTHER | Age: 61
End: 2021-04-12

## 2021-04-12 ENCOUNTER — NURSE TRIAGE (OUTPATIENT)
Dept: FAMILY MEDICINE | Facility: OTHER | Age: 61
End: 2021-04-12

## 2021-04-12 DIAGNOSIS — R50.9 FEVER AND CHILLS: Primary | ICD-10-CM

## 2021-04-12 NOTE — TELEPHONE ENCOUNTER
"Pt called for Covid testing. Pt does have s/s compatible with Covid.See below.Today he woke up and his heart is pounding.he can feel it racing.He denies chest pain.Describes this like taking epinephrine as a child with an asthma attack.He states he is SOB at rest and is wheezing.He feels like he can not take a deep breath. Advised ED and to have a .If he cannot do this,then call 911 for ambulance. He verbalized understanding.      Jennifer Hernandez RN      Answer Assessment - Initial Assessment Questions  1. COVID-19 DIAGNOSIS: \"Who made your Coronavirus (COVID-19) diagnosis?\" \"Was it confirmed by a positive lab test?\" If not diagnosed by a HCP, ask \"Are there lots of cases (community spread) where you live?\" (See public health department website, if unsure)      no  2. COVID-19 EXPOSURE: \"Was there any known exposure to COVID before the symptoms began?\" CDC Definition of close contact: within 6 feet (2 meters) for a total of 15 minutes or more over a 24-hour period.       no  3. ONSET: \"When did the COVID-19 symptoms start?\"      4.9.2021  4. WORST SYMPTOM: \"What is your worst symptom?\" (e.g., cough, fever, shortness of breath, muscle aches)     Pounding heart  5. COUGH: \"Do you have a cough?\" If so, ask: \"How bad is the cough?\"       yes  6. FEVER: \"Do you have a fever?\" If so, ask: \"What is your temperature, how was it measured, and when did it start?\"    Burning up Friday  7. RESPIRATORY STATUS: \"Describe your breathing?\" (e.g., shortness of breath, wheezing, unable to speak)       Wheezing,not breathing normal-cant get a full breat of air  8. BETTER-SAME-WORSE: \"Are you getting better, staying the same or getting worse compared to yesterday?\"  If getting worse, ask, \"In what way?\"     same  9. HIGH RISK DISEASE: \"Do you have any chronic medical problems?\" (e.g., asthma, heart or lung disease, weak immune system, obesity, etc.)      Asthma as a child and now, angina ,HTN  10. PREGNANCY: \"Is there any chance you " "are pregnant?\" \"When was your last menstrual period?\"       no  11. OTHER SYMPTOMS: \"Do you have any other symptoms?\"  (e.g., chills, fatigue, headache, loss of smell or taste, muscle pain, sore throat; new loss of smell or taste especially support the diagnosis of COVID-19)       Loss of smell and taste,chills,sore throat on Friday    Protocols used: CORONAVIRUS (COVID-19) DIAGNOSED OR QHKMGRXIR-C-HC 1.3      "

## 2021-04-12 NOTE — TELEPHONE ENCOUNTER
Sent pt to ED.See triage encounter.    He does have appt tomorrow.Reschedule or change to telephone visit?      Next 5 appointments (look out 90 days)    Apr 13, 2021  2:30 PM  (Arrive by 2:15 PM)  SHORT with Lissy Moore MD  [facility] ([facility] ) [address]  [place] [address]  [phone]   May 04, 2021  2:00 PM  (Arrive by 1:45 PM)  Nurse Only with Bettye Jules  [facility] ([facility] ) [address]  [place] [address]  [phone]   Jun 01, 2021  1:15 PM  (Arrive by 1:00 PM)  Return Visit with Avis Freire PA-C  [facility] ([facility] [place] ) [address]  [place] [address]  [phone]   Jun 07, 2021  2:00 PM  (Arrive by 1:45 PM)  SHORT with Lissy Moore MD  [facility] [place] ([facility] ) [address]  [place] [address]  [phone]        Jennifer Hernandez RN

## 2021-04-13 NOTE — TELEPHONE ENCOUNTER
Joshua missed his appointment today. Please call and check on him as he was ill yesterday. If he is ill he should go to UC to be evaluated

## 2021-04-13 NOTE — TELEPHONE ENCOUNTER
Pt made aware order is in place for covid testing and for pain management. Will be getting a call from [facility] to set up consult for possible injection. Will have him called to set up appointment at [place] tomorrow. Please call and set up patient to get tested at [facility].

## 2021-04-13 NOTE — TELEPHONE ENCOUNTER
Called patient back states he's getting his energy back. States his back pain is getting worse. Feels like shooting pains and hot knives poking his back. Pain goes all the way up to his neck. States has had injections before here at the hospital. Wondering if this could be done again. Last one was done 9/10/19.       Asked patient about call we got yesterday, states he was running hot this morning, has to take breaks in between doing this as he is still short of breath. Heart isn't pounding anymore. Coughing at times. Doesn't want to go to urgent care. Wondering if he could be tested at [place] tomorrow?

## 2021-04-15 ENCOUNTER — OFFICE VISIT (OUTPATIENT)
Dept: FAMILY MEDICINE | Facility: OTHER | Age: 61
End: 2021-04-15
Attending: FAMILY MEDICINE
Payer: COMMERCIAL

## 2021-04-15 DIAGNOSIS — R50.9 FEVER AND CHILLS: ICD-10-CM

## 2021-04-15 PROCEDURE — U0005 INFEC AGEN DETEC AMPLI PROBE: HCPCS | Mod: ZL | Performed by: FAMILY MEDICINE

## 2021-04-15 PROCEDURE — U0003 INFECTIOUS AGENT DETECTION BY NUCLEIC ACID (DNA OR RNA); SEVERE ACUTE RESPIRATORY SYNDROME CORONAVIRUS 2 (SARS-COV-2) (CORONAVIRUS DISEASE [COVID-19]), AMPLIFIED PROBE TECHNIQUE, MAKING USE OF HIGH THROUGHPUT TECHNOLOGIES AS DESCRIBED BY CMS-2020-01-R: HCPCS | Mod: ZL | Performed by: FAMILY MEDICINE

## 2021-04-16 ENCOUNTER — TELEPHONE (OUTPATIENT)
Dept: FAMILY MEDICINE | Facility: OTHER | Age: 61
End: 2021-04-16

## 2021-04-16 LAB
LABORATORY COMMENT REPORT: NORMAL
SARS-COV-2 RNA RESP QL NAA+PROBE: NEGATIVE
SARS-COV-2 RNA RESP QL NAA+PROBE: NORMAL
SPECIMEN SOURCE: NORMAL
SPECIMEN SOURCE: NORMAL

## 2021-04-16 NOTE — TELEPHONE ENCOUNTER
Called pt and discussed quarantine guidelines. Pt is still awaiting covid test results. Advised on guidelines for both positive and negative test results, Pt verbalizes understanding.

## 2021-04-20 ENCOUNTER — OFFICE VISIT (OUTPATIENT)
Dept: AUDIOLOGY | Facility: OTHER | Age: 61
End: 2021-04-20
Attending: AUDIOLOGIST
Payer: COMMERCIAL

## 2021-04-20 ENCOUNTER — IMMUNIZATION (OUTPATIENT)
Dept: FAMILY MEDICINE | Facility: OTHER | Age: 61
End: 2021-04-20
Attending: FAMILY MEDICINE
Payer: COMMERCIAL

## 2021-04-20 DIAGNOSIS — H90.3 SENSORINEURAL HEARING LOSS (SNHL) OF BOTH EARS: Primary | ICD-10-CM

## 2021-04-20 PROCEDURE — V5261 HEARING AID, DIGIT, BIN, BTE: HCPCS | Mod: NU | Performed by: AUDIOLOGIST

## 2021-04-20 PROCEDURE — 92593 HC HEARING AID CHECK, BINAURAL: CPT | Performed by: AUDIOLOGIST

## 2021-04-20 PROCEDURE — V5160 DISPENSING FEE BINAURAL: HCPCS | Performed by: AUDIOLOGIST

## 2021-04-20 PROCEDURE — 91300 PR COVID VAC PFIZER DIL RECON 30 MCG/0.3 ML IM: CPT

## 2021-04-20 PROCEDURE — V5011 HEARING AID FITTING/CHECKING: HCPCS | Mod: LT | Performed by: AUDIOLOGIST

## 2021-04-20 PROCEDURE — V5020 CONFORMITY EVALUATION: HCPCS | Mod: LT | Performed by: AUDIOLOGIST

## 2021-04-20 NOTE — PROGRESS NOTES
AUDIOLOGY REPORT    SUBJECTIVE: Joshua Barron, a 60 year old male, was seen in the Audiology Clinic at [facility] today for a Binaural hearing aid fitting. Previous results have revealed a bilateral  sensorineural hearing loss.     OBJECTIVE:  Prior to fitting, a hearing aid check was performed to ensure device functionality. The hearing aid conformity evaluation was completed.The hearing aids were placed and they provided a good fit. Real-ear-probe-microphone measurements were completed on the [employer] system and were a good match to NAL-NL2 target with soft sounds audible, moderate sounds comfortable, and loud sounds below discomfort. UCLs are verified through maximum power output measures and demonstrate appropriate limiting of loud inputs.     Hearing Device Info  4/20/2021    Left Ear Make: Unitron   Left Ear Model #: [employer] Next Moxi Move R 7   Left Ear Style: BTE   Left HA Warranty End Date: 4/30/2024   Left HA Serial #: [id number]   Right Ear Make: Unitron   Right Ear Model #: [employer] Next Moxi Move R 7   Right Ear Style: BTE   Right HA Warranty End Date: 4/30/2024   Right HA Serial #: [id number]   Fitting Plan: Medical Assistance    4/20/2021     2-M with open fit large domes with retention hooks. May change to closed if feedback.      ASSESSMENT: Hearing aid fitting completed today. Verification measures were performed. Patient and/or caregiver demonstrated ability to insert and remove hearing aids and adjust volume toggle.    Ciara Valladares M.S., [facility]  Audiologist #[id number]      HEARING AID ORIENTATION/AUDIOLOGY ASSISTANT      Mr. Barron was oriented to proper hearing aid use, care, cleaning (no water, dry brush), batteries (size Rechargeable, low-battery signal), aid insertion/removal, user booklet, warranty information, storage cases, and other hearing aid details. The patient confirmed understanding of hearing aid use and care, and showed proper insertion of hearing aid while  in the office today. Mr. Barron reported good volume and sound quality today.    Muna Jules  Audiology Assistant  [facility]  [phone]        PLAN: Mr. Barron will return for follow-up in 2-3 weeks for a hearing aid review appointment. Please call this clinic with questions regarding today s appointment.

## 2021-04-21 DIAGNOSIS — G89.4 CHRONIC PAIN SYNDROME: ICD-10-CM

## 2021-04-21 DIAGNOSIS — F11.90 CHRONIC, CONTINUOUS USE OF OPIOIDS: ICD-10-CM

## 2021-04-21 DIAGNOSIS — M51.379 DEGENERATION OF LUMBAR OR LUMBOSACRAL INTERVERTEBRAL DISC: ICD-10-CM

## 2021-04-22 RX ORDER — HYDROCODONE BITARTRATE AND ACETAMINOPHEN 10; 325 MG/1; MG/1
TABLET ORAL
Qty: 60 TABLET | Refills: 0 | Status: SHIPPED | OUTPATIENT
Start: 2021-04-22 | End: 2021-05-21

## 2021-04-22 RX ORDER — MORPHINE SULFATE 15 MG/1
TABLET, FILM COATED, EXTENDED RELEASE ORAL
Qty: 90 TABLET | Refills: 0 | Status: SHIPPED | OUTPATIENT
Start: 2021-04-22 | End: 2021-05-21

## 2021-04-22 NOTE — TELEPHONE ENCOUNTER
Norco  mg      Last Written Prescription Date:  3/20/21  Last Fill Quantity: 60,   # refills: 0  Last Office Visit: 4/13/21  Future Office visit:    Next 5 appointments (look out 90 days)    May 04, 2021  2:00 PM  (Arrive by 1:45 PM)  Nurse Only with Bettye Jules  [facility] ([facility] ) [address]  [place] [address]  [phone]   Jun 01, 2021  1:15 PM  (Arrive by 1:00 PM)  Return Visit with Avis Freire PA-C  [facility] ([facility] ) [address]  [place] [address]  [phone]   Jun 07, 2021  2:00 PM  (Arrive by 1:45 PM)  SHORT with Lissy Moore MD  [facility] ([facility] ) [address]  [place] [address]  [phone]           Routing refill request to provider for review/approval because:      Morphine 15 mg      Last Written Prescription Date:  3/20/21  Last Fill Quantity: 90,   # refills: 0  Last Office Visit: 4/13/21  Future Office visit:    Next 5 appointments (look out 90 days)    May 04, 2021  2:00 PM  (Arrive by 1:45 PM)  Nurse Only with Bettye Jules  [facility] ([facility] [place] ) [address]  [place] [address]  [phone]   Jun 01, 2021  1:15 PM  (Arrive by 1:00 PM)  Return Visit with Avis Freire PA-C  [facility] ([facility] [place] ) [address]  [place] [address]  [phone]   Jun 07, 2021  2:00 PM  (Arrive by 1:45 PM)  SHORT with Lissy Moore MD  [facility] ([facility] [place] ) [address]  [place] [address]  [phone]           Routing refill request to provider for review/approval because:

## 2021-04-28 DIAGNOSIS — F41.1 GAD (GENERALIZED ANXIETY DISORDER): ICD-10-CM

## 2021-04-28 RX ORDER — HYDROXYZINE PAMOATE 50 MG/1
CAPSULE ORAL
Qty: 120 CAPSULE | Refills: 0 | Status: SHIPPED | OUTPATIENT
Start: 2021-04-28 | End: 2021-05-21

## 2021-04-28 NOTE — TELEPHONE ENCOUNTER
vistaril      Last Written Prescription Date:  4/5/21  Last Fill Quantity: 120,   # refills: 0  Last Office Visit: 3/4/21  Future Office visit:    Next 5 appointments (look out 90 days)    May 04, 2021  2:00 PM  (Arrive by 1:45 PM)  Nurse Only with Bettye Jules  [facility] ([facility] ) [address]  [place] [address]  [phone]   Jun 01, 2021  1:15 PM  (Arrive by 1:00 PM)  Return Visit with Avis Freire PA-C  [facility] ([facility] ) [address] [place] [address]  [phone]   Jun 07, 2021  2:00 PM  (Arrive by 1:45 PM)  SHORT with Lissy Moore MD  [facility] ([facility] ) [address]  [place] [address]  [phone]

## 2021-04-29 DIAGNOSIS — M25.542 ARTHRALGIA OF BOTH HANDS: ICD-10-CM

## 2021-04-29 DIAGNOSIS — M25.541 ARTHRALGIA OF BOTH HANDS: ICD-10-CM

## 2021-04-30 NOTE — TELEPHONE ENCOUNTER
Voltaren      Last Written Prescription Date:  1/4/21  Last Fill Quantity: 60,   # refills: 0  Last Office Visit: 4/13/21  Future Office visit:    Next 5 appointments (look out 90 days)    May 04, 2021  2:00 PM  (Arrive by 1:45 PM)  Nurse Only with Bettye Jules  [facility] ([facility] ) [address]  [place] [address]  [phone]   Jun 01, 2021  1:15 PM  (Arrive by 1:00 PM)  Return Visit with Avis Freire PA-C  [facility] ([facility] ) [address] [place] [address]  [phone]   Jun 07, 2021  2:00 PM  (Arrive by 1:45 PM)  SHORT with Lissy Moore MD  [facility] ([facility] ) [address] [facility] [address]  [phone]           Routing refill request to provider for review/approval because:

## 2021-05-04 ENCOUNTER — ALLIED HEALTH/NURSE VISIT (OUTPATIENT)
Dept: AUDIOLOGY | Facility: OTHER | Age: 61
End: 2021-05-04
Attending: AUDIOLOGIST
Payer: COMMERCIAL

## 2021-05-04 DIAGNOSIS — G89.29 CHRONIC BILATERAL LOW BACK PAIN WITH BILATERAL SCIATICA: ICD-10-CM

## 2021-05-04 DIAGNOSIS — H90.3 SENSORINEURAL HEARING LOSS (SNHL) OF BOTH EARS: Primary | ICD-10-CM

## 2021-05-04 DIAGNOSIS — M54.42 CHRONIC BILATERAL LOW BACK PAIN WITH BILATERAL SCIATICA: ICD-10-CM

## 2021-05-04 DIAGNOSIS — M79.7 FIBROMYALGIA: ICD-10-CM

## 2021-05-04 DIAGNOSIS — J45.41 MODERATE PERSISTENT ASTHMA WITH EXACERBATION: ICD-10-CM

## 2021-05-04 DIAGNOSIS — N40.1 BENIGN PROSTATIC HYPERPLASIA WITH WEAK URINARY STREAM: ICD-10-CM

## 2021-05-04 DIAGNOSIS — R11.0 NAUSEA: ICD-10-CM

## 2021-05-04 DIAGNOSIS — F90.2 ADHD (ATTENTION DEFICIT HYPERACTIVITY DISORDER), COMBINED TYPE: ICD-10-CM

## 2021-05-04 DIAGNOSIS — J45.40 MODERATE PERSISTENT ASTHMA WITHOUT COMPLICATION: ICD-10-CM

## 2021-05-04 DIAGNOSIS — I10 ESSENTIAL HYPERTENSION: ICD-10-CM

## 2021-05-04 DIAGNOSIS — M54.50 LUMBAR BACK PAIN: ICD-10-CM

## 2021-05-04 DIAGNOSIS — M62.830 BACK MUSCLE SPASM: ICD-10-CM

## 2021-05-04 DIAGNOSIS — M54.41 CHRONIC BILATERAL LOW BACK PAIN WITH BILATERAL SCIATICA: ICD-10-CM

## 2021-05-04 DIAGNOSIS — G89.4 CHRONIC PAIN SYNDROME: ICD-10-CM

## 2021-05-04 DIAGNOSIS — K59.00 CONSTIPATION, UNSPECIFIED CONSTIPATION TYPE: ICD-10-CM

## 2021-05-04 DIAGNOSIS — R39.12 BENIGN PROSTATIC HYPERPLASIA WITH WEAK URINARY STREAM: ICD-10-CM

## 2021-05-04 PROCEDURE — V5299 HEARING SERVICE: HCPCS

## 2021-05-04 NOTE — TELEPHONE ENCOUNTER
Lisdexamfetamine (Vyvanse) 70 mg capsule  Take 1 capsule by mouth every morning  Last Written Prescription Date:  4-5-21  Last Fill Quantity: 30,   # refills: 0  Last Office Visit: 3-24-21  Future Office visit:  0  Next 5 appointments (look out 90 days)    May 05, 2021  2:10 PM  Return Visit with Shamar Escamilla DC  [facility] ([facility] ) [address]  [facility] [address]  [phone]   Jun 01, 2021  1:15 PM  (Arrive by 1:00 PM)  Return Visit with Avis Freire PA-C  [facility] ([facility] ) [address]  [facility] [address]  [phone]   Jun 07, 2021  2:00 PM  (Arrive by 1:45 PM)  SHORT with Lissy Moore MD  [facility] ([facility] ) [address]  [facility] [address]  [phone]

## 2021-05-04 NOTE — PROGRESS NOTES
Background/Results:Joshua Barron is a 60 year old male declined hearing aid follow up appointment reporting they are an experienced hearing aid user and have no questions or concern.The patient has been seen previously in this clinic and was fit with Unitron [employer] Next Moxi Move R 7 hearing aids on 4/20/2021.  Joshua reports good sound quality with the hearing aid(s).     The hearing aid conformity evaluation was previously completed by the audiologist. Based on patient report, no audiology appointment for adjustments needed.  The patient reports satisfaction and wants to keep the hearing aids.  Patient denied concerns with retention, fit, or function.    Recommendations:Joshua will return for follow-up as needed, or in 12 months.       Muna Jules  Audiology Assistant  [facility]  [phone]

## 2021-05-05 ENCOUNTER — OFFICE VISIT (OUTPATIENT)
Dept: CHIROPRACTIC MEDICINE | Facility: OTHER | Age: 61
End: 2021-05-05
Attending: CHIROPRACTOR
Payer: COMMERCIAL

## 2021-05-05 DIAGNOSIS — M99.03 SEGMENTAL AND SOMATIC DYSFUNCTION OF LUMBAR REGION: Primary | ICD-10-CM

## 2021-05-05 DIAGNOSIS — M54.50 ACUTE BILATERAL LOW BACK PAIN WITHOUT SCIATICA: ICD-10-CM

## 2021-05-05 DIAGNOSIS — M99.01 SEGMENTAL AND SOMATIC DYSFUNCTION OF CERVICAL REGION: ICD-10-CM

## 2021-05-05 DIAGNOSIS — M99.02 SEGMENTAL AND SOMATIC DYSFUNCTION OF THORACIC REGION: ICD-10-CM

## 2021-05-05 PROCEDURE — 98941 CHIROPRACT MANJ 3-4 REGIONS: CPT | Mod: AT | Performed by: CHIROPRACTOR

## 2021-05-05 RX ORDER — GABAPENTIN 300 MG/1
CAPSULE ORAL
Qty: 270 CAPSULE | Refills: 0 | Status: SHIPPED | OUTPATIENT
Start: 2021-05-05 | End: 2021-06-15

## 2021-05-05 RX ORDER — LISDEXAMFETAMINE DIMESYLATE 70 MG/1
CAPSULE ORAL
Qty: 30 CAPSULE | Refills: 0 | Status: SHIPPED | OUTPATIENT
Start: 2021-05-05 | End: 2021-06-08

## 2021-05-05 RX ORDER — ALBUTEROL SULFATE 90 UG/1
AEROSOL, METERED RESPIRATORY (INHALATION)
Qty: 18 G | Refills: 0 | Status: SHIPPED | OUTPATIENT
Start: 2021-05-05 | End: 2021-09-15

## 2021-05-05 RX ORDER — GUAIFENESIN AND DEXTROMETHORPHAN HYDROBROMIDE 600; 30 MG/1; MG/1
1 TABLET, EXTENDED RELEASE ORAL EVERY 12 HOURS
Qty: 60 TABLET | Refills: 0 | Status: SHIPPED | OUTPATIENT
Start: 2021-05-05 | End: 2021-06-22

## 2021-05-05 RX ORDER — TAMSULOSIN HYDROCHLORIDE 0.4 MG/1
CAPSULE ORAL
Qty: 30 CAPSULE | Refills: 5 | Status: SHIPPED | OUTPATIENT
Start: 2021-05-05 | End: 2021-09-15

## 2021-05-05 RX ORDER — METOPROLOL SUCCINATE 50 MG/1
TABLET, EXTENDED RELEASE ORAL
Qty: 30 TABLET | Refills: 5 | Status: SHIPPED | OUTPATIENT
Start: 2021-05-05 | End: 2021-09-15

## 2021-05-05 RX ORDER — BACLOFEN 20 MG/1
TABLET ORAL
Qty: 120 TABLET | Refills: 0 | Status: SHIPPED | OUTPATIENT
Start: 2021-05-05 | End: 2021-06-08

## 2021-05-05 RX ORDER — AMOXICILLIN 250 MG
CAPSULE ORAL
Qty: 120 TABLET | Refills: 0 | Status: SHIPPED | OUTPATIENT
Start: 2021-05-05 | End: 2021-09-15

## 2021-05-05 RX ORDER — NORTRIPTYLINE HYDROCHLORIDE 50 MG/1
CAPSULE ORAL
Qty: 60 CAPSULE | Refills: 5 | Status: SHIPPED | OUTPATIENT
Start: 2021-05-05 | End: 2021-09-15

## 2021-05-05 RX ORDER — DRONABINOL 2.5 MG/1
CAPSULE ORAL
Qty: 60 CAPSULE | Refills: 0 | Status: SHIPPED | OUTPATIENT
Start: 2021-05-05 | End: 2021-06-15

## 2021-05-05 NOTE — TELEPHONE ENCOUNTER
Baclofen  Last Written Prescription Date:  3/28/21  Last Fill Quantity: 120,   # refills: 0  Last Office Visit: 4/13/21  Future Office visit:    Next 5 appointments (look out 90 days)    May 11, 2021 12:40 PM  Return Visit with Shamar Escamilla DC  [facility] [place] ([facility] [place] ) [address] [place] [address]  [place] [address]  [phone]   Jun 01, 2021  1:15 PM  (Arrive by 1:00 PM)  Return Visit with Avis Freire PA-C  [facility] ([facility] ) [address]  [place] [address]  [phone]   Jun 07, 2021  2:00 PM  (Arrive by 1:45 PM)  SHORT with Lissy Moore MD  [facility] ([facility] ) [address]  [place] [address]  [phone]           Routing refill request to provider for review/approval because:      Metoprolol      Last Written Prescription Date:  3/14/21  Last Fill Quantity: 30,   # refills: 0  Last Office Visit: 4/13/21  Future Office visit:    Next 5 appointments (look out 90 days)    May 11, 2021 12:40 PM  Return Visit with Shamar Escamilla DC  [facility] [place] ([facility] [place] ) [address] [place] [address]  [place] [address]  [phone]   Jun 01, 2021  1:15 PM  (Arrive by 1:00 PM)  Return Visit with Avis Freire PA-C  [facility] ([facility] ) [address]  [place] [address]  [phone]   Jun 07, 2021  2:00 PM  (Arrive by 1:45 PM)  SHORT with Lissy Moore MD  [facility] ([facility] ) [address]  [place] [address]  [phone]           Routing refill request to provider for review/approval because:      Fosomax      Last Written Prescription Date:  4/8/21  Last Fill Quantity: 30,   # refills: 0  Last Office Visit: 4/13/21  Future Office visit:    Next 5 appointments (look out 90 days)    May 11, 2021 12:40 PM  Return Visit with  Shamar Escamilla DC  [facility] [place] [facility] ([facility] [place] ) [address]  [place] [address]  [phone]   Jun 01, 2021  1:15 PM  (Arrive by 1:00 PM)  Return Visit with Avis Freire PA-C  [facility] [place] ([facility] [place] ) [address]  [place] [address]  [phone]   Jun 07, 2021  2:00 PM  (Arrive by 1:45 PM)  SHORT with Lissy Moore MD  [facility] [place] ) [address]  [place] [address]  [phone]           Routing refill request to provider for review/approval because:      Mucinex      Last Written Prescription Date:  4/8/21  Last Fill Quantity: 60,   # refills: 0  Last Office Visit: 4/13/21  Future Office visit:    Next 5 appointments (look out 90 days)    May 11, 2021 12:40 PM  Return Visit with Shamar Escamilla DC  [facility] [place] [facility] ([facility] [place] ) [address] [place]  [place] [address]  [phone]   Jun 01, 2021  1:15 PM  (Arrive by 1:00 PM)  Return Visit with Avis Freire PA-C  [facility] [place] ([facility] [place] ) [address]  [place] [address]  [phone]   Jun 07, 2021  2:00 PM  (Arrive by 1:45 PM)  SHORT with Lissy Moore MD  [facility] [place] ([facility] [place] ) [address]  [place] [address]  [phone]           Routing refill request to provider for review/approval because:      Senexon      Last Written Prescription Date:  4/8/21  Last Fill Quantity: 120,   # refills: 0  Last Office Visit: 4/13/21  Future Office visit:    Next 5 appointments (look out 90 days)    May 11, 2021 12:40 PM  Return Visit with Shamar Escamilla DC  [facility] [place] [facility] ([facility] [place] ) [address] [place] [address]  [place] [address]  [phone]   Jun 01, 2021  1:15 PM  (Arrive by 1:00 PM)  Return Visit with ADAL Mariscal  [facility] ([facility] [place] ) [address]  [place] [address]  [phone]   Jun 07, 2021  2:00 PM  (Arrive by 1:45 PM)  SHORT with Lissy Moore MD  [facility] [address]  [place] [address]  [phone]           Routing refill request to provider for review/approval because:      Pamelor      Last Written Prescription Date:  4/8/21  Last Fill Quantity: 60,   # refills: 0  Last Office Visit: 4/13/21  Future Office visit:    Next 5 appointments (look out 90 days)    May 11, 2021 12:40 PM  Return Visit with Shamar Escamilla DC  [facility] ) [address]  [place] [address]  [phone]   Jun 01, 2021  1:15 PM  (Arrive by 1:00 PM)  Return Visit with Avis Freire PA-C  [facility] ([facility] [place] ) [address]  [place] [address]  [phone]   Jun 07, 2021  2:00 PM  (Arrive by 1:45 PM)  SHORT with Lissy Moore MD  [facility] ([facility] [place] ) [address]  [place] [address]  [phone]           Routing refill request to provider for review/approval because:      Neurontin      Last Written Prescription Date:  4/8/21  Last Fill Quantity: 270,   # refills: 0  Last Office Visit: 4/13/21  Future Office visit:    Next 5 appointments (look out 90 days)    May 11, 2021 12:40 PM  Return Visit with Shamar Escamilla DC  [facility] [place] ([facility]  [place] ) [address]  [place] [address]  [phone]   Jun 01, 2021  1:15 PM  (Arrive by 1:00 PM)  Return Visit with Avis Freire PA-C  [facility] [place] ([facility] [place] ) [address]  [place] [address]  [phone]   Jun 07, 2021  2:00 PM  (Arrive by 1:45 PM)  SHORT with Lissy Moore MD  [facility] [place] ) [address]  [place] [address]  [phone]           Routing refill request to provider for review/approval because:      Zanaflex      Last Written Prescription Date:  4/8/21  Last Fill Quantity: 90,   # refills: 0  Last Office Visit: 4/13/21  Future Office visit:    Next 5 appointments (look out 90 days)    May 11, 2021 12:40 PM  Return Visit with Shamar Escamilla DC  [facility] [place] [facility] ([facility] [place] ) [address]  [place] [address]  [phone]   Jun 01, 2021  1:15 PM  (Arrive by 1:00 PM)  Return Visit with Avis Freire PA-C  [facility] [place] ([facility] [place] ) [address]  [place] [address]  [phone]   Jun 07, 2021  2:00 PM  (Arrive by 1:45 PM)  SHORT with Lissy Moore MD  [facility] ([facility] [place] ) [address]  [place] [address]  [phone]           Routing refill request to provider for review/approval because:    Proair      Last Written Prescription Date:  4/8/21  Last Fill Quantity: 18 g,   # refills: 0  Last Office Visit: 4/13/21  Future Office visit:    Next 5 appointments (look out 90 days)    May 11, 2021 12:40 PM  Return Visit with Shamar Escamilla DC  [facility] [place] [facility] ([facility] [place] ) [address] [place]  [place] [address]  [phone]   Jun 01, 2021  1:15 PM  (Arrive by 1:00 PM)  Return Visit with Avis Freire PA-C  [facility] [place] ([facility] [place] ) [address]  [place] [address]  [phone]   Jun 07, 2021  2:00 PM  (Arrive by 1:45 PM)  SHORT with Lissy Moore MD  [facility] ([facility] [place] ) [address]  [place] [address]  [phone]           Routing refill request to provider for review/approval because:      Marinol      Last Written Prescription Date:  4/8/21  Last  Fill Quantity: 60,   # refills: 0  Last Office Visit: 4/13/21  Future Office visit:    Next 5 appointments (look out 90 days)    May 11, 2021 12:40 PM  Return Visit with Shamar Escamilla DC  [facility] ([facility] ) [address] [facility] [address]  [phone]   Jun 01, 2021  1:15 PM  (Arrive by 1:00 PM)  Return Visit with Avis Freire PA-C  [facility] ([facility] ) [address] [facility] [address]  [phone]   Jun 07, 2021  2:00 PM  (Arrive by 1:45 PM)  SHORT with Lissy Moore MD  [facility] ([facility] ) [address] [facility] [address]  [phone]           Routing refill request to provider for review/approval because:

## 2021-05-06 DIAGNOSIS — M54.50 CHRONIC LEFT-SIDED LOW BACK PAIN WITHOUT SCIATICA: ICD-10-CM

## 2021-05-06 DIAGNOSIS — G89.29 CHRONIC LEFT-SIDED LOW BACK PAIN WITHOUT SCIATICA: ICD-10-CM

## 2021-05-06 RX ORDER — IBUPROFEN 600 MG/1
TABLET, FILM COATED ORAL
Qty: 120 TABLET | Refills: 0 | Status: SHIPPED | OUTPATIENT
Start: 2021-05-06 | End: 2021-06-22

## 2021-05-06 NOTE — TELEPHONE ENCOUNTER
Ibuprofen 600  mg      Last Written Prescription Date:  4-6-2021  Last Fill Quantity: 120,   # refills: 0  Last Office Visit: 3-4-2021  Future Office visit:    Next 5 appointments (look out 90 days)    May 11, 2021 12:40 PM  Return Visit with Shamar Escamilla DC  [facility] ([facility] ) [address] [place] [address]  [phone]   Jun 01, 2021  1:15 PM  (Arrive by 1:00 PM)  Return Visit with Avis Freire PA-C  [facility] ([facility] ) [address]  [facility] [address]  [phone]   Jun 07, 2021  2:00 PM  (Arrive by 1:45 PM)  SHORT with Lissy Moore MD  [facility] ([facility] ) [address]  [facility] [address]  [phone]

## 2021-05-07 ENCOUNTER — IMMUNIZATION (OUTPATIENT)
Dept: FAMILY MEDICINE | Facility: OTHER | Age: 61
End: 2021-05-07
Attending: FAMILY MEDICINE
Payer: COMMERCIAL

## 2021-05-07 PROCEDURE — 91300 PR COVID VAC PFIZER DIL RECON 30 MCG/0.3 ML IM: CPT

## 2021-05-12 ENCOUNTER — OFFICE VISIT (OUTPATIENT)
Dept: CHIROPRACTIC MEDICINE | Facility: OTHER | Age: 61
End: 2021-05-12
Attending: CHIROPRACTOR
Payer: COMMERCIAL

## 2021-05-12 DIAGNOSIS — M99.01 SEGMENTAL AND SOMATIC DYSFUNCTION OF CERVICAL REGION: ICD-10-CM

## 2021-05-12 DIAGNOSIS — M99.02 SEGMENTAL AND SOMATIC DYSFUNCTION OF THORACIC REGION: ICD-10-CM

## 2021-05-12 DIAGNOSIS — M54.50 ACUTE BILATERAL LOW BACK PAIN WITHOUT SCIATICA: ICD-10-CM

## 2021-05-12 DIAGNOSIS — M99.03 SEGMENTAL AND SOMATIC DYSFUNCTION OF LUMBAR REGION: Primary | ICD-10-CM

## 2021-05-12 PROCEDURE — 98941 CHIROPRACT MANJ 3-4 REGIONS: CPT | Mod: AT | Performed by: CHIROPRACTOR

## 2021-05-12 NOTE — PROGRESS NOTES
Subjective Finding:    Chief compalint: Patient presents with:  Back Pain  , Pain Scale: 4/10, Intensity: dull, Duration: 2 days, Radiating: no.    Date of injury:     Activities that the pain restricts:   Home/household/hobbies/social activities: yes.  Work duties: yes.  Sleep: yes.  Makes symptoms better: rest.  Makes symptoms worse: lumbar extension and lumbar flexion.  Have you seen anyone else for the symptoms? No.  Work related: no.  Automobile related injury: no.    Objective and Assessment:    Posture Analysis:   High shoulder: .  Head tilt: .  High iliac crest: .  Head carriage: neutral.  Thoracic Kyphosis: neutral.  Lumbar Lordosis: forward.    Lumbar Range of Motion: flexion decreased and extension decreased.  Cervical Range of Motion: extension decreased.  Thoracic Range of Motion: extension decreased.  Extremity Range of Motion: .    Palpation:   Quad lumb: bilateral, referred pain: no  T paraspinals: dull pain, no    Segmental dysfunction pre-treatment and treatment area: C4, T5, T6 and Sacrum.    Assessment post-treatment:  Cervical: ROM increased.  Thoracic: ROM increased.  Lumbar: ROM increased.    Comments: history of DDD in C and L spine.      Complicating Factors: .    Procedure(s):  CMT:  89527 Chiropractic manipulative treatment 3-4 regions performed   Cervical: Diversified, See above for level, Supine, Thoracic: Diversified, See above for level, Prone and Lumbar: Diversified, See above for level, Side posture    Modalities:  None performed this visit    Therapeutic procedures:  None    Plan:  Treatment plan: PRN.  Instructed patient: stretch as instructed at visit.  Short term goals: increase ROM.  Long term goals: increase ADL.  Prognosis: good.

## 2021-05-17 ENCOUNTER — OFFICE VISIT (OUTPATIENT)
Dept: FAMILY MEDICINE | Facility: OTHER | Age: 61
End: 2021-05-17
Attending: FAMILY MEDICINE
Payer: COMMERCIAL

## 2021-05-17 VITALS
HEART RATE: 104 BPM | SYSTOLIC BLOOD PRESSURE: 112 MMHG | WEIGHT: 201 LBS | RESPIRATION RATE: 21 BRPM | DIASTOLIC BLOOD PRESSURE: 74 MMHG | OXYGEN SATURATION: 98 % | BODY MASS INDEX: 28.14 KG/M2 | HEIGHT: 71 IN

## 2021-05-17 DIAGNOSIS — R13.10 DYSPHAGIA, UNSPECIFIED TYPE: ICD-10-CM

## 2021-05-17 DIAGNOSIS — Z87.891 PERSONAL HISTORY OF TOBACCO USE, PRESENTING HAZARDS TO HEALTH: ICD-10-CM

## 2021-05-17 DIAGNOSIS — G89.29 CHRONIC BILATERAL LOW BACK PAIN WITH SCIATICA, SCIATICA LATERALITY UNSPECIFIED: ICD-10-CM

## 2021-05-17 DIAGNOSIS — F11.90 CHRONIC, CONTINUOUS USE OF OPIOIDS: Primary | ICD-10-CM

## 2021-05-17 DIAGNOSIS — J45.40 MODERATE PERSISTENT ASTHMA WITHOUT COMPLICATION: ICD-10-CM

## 2021-05-17 DIAGNOSIS — Z71.6 TOBACCO ABUSE COUNSELING: ICD-10-CM

## 2021-05-17 DIAGNOSIS — I10 BENIGN ESSENTIAL HYPERTENSION: ICD-10-CM

## 2021-05-17 DIAGNOSIS — M54.40 CHRONIC BILATERAL LOW BACK PAIN WITH SCIATICA, SCIATICA LATERALITY UNSPECIFIED: ICD-10-CM

## 2021-05-17 DIAGNOSIS — K08.9: ICD-10-CM

## 2021-05-17 PROCEDURE — 99215 OFFICE O/P EST HI 40 MIN: CPT | Performed by: FAMILY MEDICINE

## 2021-05-17 PROCEDURE — G0463 HOSPITAL OUTPT CLINIC VISIT: HCPCS | Mod: 25

## 2021-05-17 PROCEDURE — G0463 HOSPITAL OUTPT CLINIC VISIT: HCPCS

## 2021-05-17 RX ORDER — NICOTINE 21 MG/24HR
1 PATCH, TRANSDERMAL 24 HOURS TRANSDERMAL EVERY 24 HOURS
Qty: 30 PATCH | Refills: 3 | Status: ON HOLD | OUTPATIENT
Start: 2021-05-17 | End: 2021-11-30

## 2021-05-17 ASSESSMENT — PAIN SCALES - GENERAL: PAINLEVEL: EXTREME PAIN (8)

## 2021-05-17 ASSESSMENT — MIFFLIN-ST. JEOR: SCORE: 1753.86

## 2021-05-17 NOTE — PROGRESS NOTES
Assessment & Plan     Chronic, continuous use of opioids  Discussed, will refer for pain management and evaluation of his medications  - PAIN MANAGEMENT REFERRAL; Future    Chronic bilateral low back pain with sciatica, sciatica laterality unspecified  Refer back to Dr Monroe at [facility] for evaluation  - NEUROSURGERY REFERRAL  - PAIN MANAGEMENT REFERRAL; Future    Benign essential hypertension  Controlled     Tobacco Abuse, History of      Tobacco abuse counseling  Would like to try patches   - nicotine (NICODERM CQ) 21 MG/24HR 24 hr patch; Place 1 patch onto the skin every 24 hours    Moderate persistent asthma without complication  Continue current medications    Dysphagia, unspecified type  Evaluate further   - XR Video Swallow with SLP or OT - Order with Speech Therapy Referral; Future    Tooth and supporting structure disorder  Referral done  - DENTAL REFERRAL      55 minutes spent on the date of the encounter doing chart review, review of outside records, patient visit and documentation            Return in about 3 months (around 8/17/2021) for chronic thoracic pain, lumbar pain.    Lissy Moore MD  [facility] Newman is a 60 year old who presents for the following health issues     HPI     Chronic Pain Follow-Up    Where in your body do you have pain? Mid to low back pain   How has your pain affected your ability to work? Not applicable  Which of these pain treatments have you tried since your last clinic visit? Chiropractor, Cold, Heat, Physical Therapy and Surgery  How well are you sleeping? Poor  How has your mood been since your last visit? About the same  Have you had a significant life event? No  Other aggravating factors: prolonged sitting and prolonged standing  Taking medication as directed? Yes    He would like to see Dr Soares in follow up  For ongoing pain at the mid thoracic spine. He is on multiple medications for pain and muscle  relaxers started by other providers. He states he would like to get off of them and slim down the list. We discussed the possibility of a pain pump.     He is having difficulty swallowing with food catching at times.     Dental issues  He would like to see a dentist as his retired, for ongoing issues       PHQ-9 SCORE 10/26/2020 12/30/2020 3/24/2021   PHQ-9 Total Score - - -   PHQ-9 Total Score 6 11 17     DAYANA-7 SCORE 9/18/2020 12/30/2020 3/24/2021   Total Score 2 1 10     No flowsheet data found.  Encounter-Level CSA - 05/23/2017:    Controlled Substance Agreement - Scan on 7/31/2018 12:12 PM: CONTROLLED SUBSTANCE AGREEMENT     Encounter-Level CSA - 09/18/2015:    Controlled Substance Agreement - Scan on 3/19/2020  7:52 AM: SCHEDULED MEDICATION USE AGREEMENT  Controlled Substance Agreement - Scan on 11/25/2015  2:25 PM: SCHEDULED MEDICATION USE AGREEMENT     Patient-Level CSA:    Controlled Substance Agreement - Opioid - Scan on 3/9/2021 11:32 AM: controlled substance agreement  Controlled Substance Agreement - Non - Opioid - Scan on 7/15/2019 10:03 AM: NON-OPIOID CONTROLLED SUBSTANCE AGREEMENT         Asthma Follow-Up    Was ACT completed today?    Yes    ACT Total Scores 5/18/2021   ACT TOTAL SCORE -   ASTHMA ER VISITS -   ASTHMA HOSPITALIZATIONS -   ACT TOTAL SCORE (Goal Greater than or Equal to 20) 19   In the past 12 months, how many times did you visit the emergency room for your asthma without being admitted to the hospital? 0   In the past 12 months, how many times were you hospitalized overnight because of your asthma? 0         How many days per week do you miss taking your asthma controller medication?  I do not have an asthma controller medication    Please describe any recent triggers for your asthma: None    Have you had any Emergency Room Visits, Urgent Care Visits, or Hospital Admissions since your last office visit?  No        Review of Systems   Constitutional, HEENT, cardiovascular, pulmonary, gi  "and gu systems are negative, except as otherwise noted.      Objective    /74   Pulse 104   Resp 21   Ht 1.803 m (5' 11\")   Wt 91.2 kg (201 lb)   SpO2 98%   BMI 28.03 kg/m    Body mass index is 28.03 kg/m .  Physical Exam   GENERAL: healthy, alert and no distress  NECK: no adenopathy, no asymmetry, masses, or scars and thyroid normal to palpation  RESP: lungs clear to auscultation - no rales, rhonchi or wheezes  CV: regular rate and rhythm, normal S1 S2, no S3 or S4, no murmur, click or rub, no peripheral edema and peripheral pulses strong  MS: significant scoliosis noted of the thoracic spine with rotation to the left in relation to his pelvis. Muscle spasm of the left trapezius and para spinous muscles   SKIN: no suspicious lesions or rashes  NEURO: Normal strength and tone, mentation intact and speech normal  PSYCH: mentation appears normal and speech pressured                "

## 2021-05-17 NOTE — NURSING NOTE
"Chief Complaint   Patient presents with     Pain       Initial /74   Pulse 104   Resp 21   Ht 1.803 m (5' 11\")   Wt 91.2 kg (201 lb)   SpO2 98%   BMI 28.03 kg/m   Estimated body mass index is 28.03 kg/m  as calculated from the following:    Height as of this encounter: 1.803 m (5' 11\").    Weight as of this encounter: 91.2 kg (201 lb).  Medication Reconciliation: complete  Kristen Breaux LPN    "

## 2021-05-17 NOTE — PROGRESS NOTES

## 2021-05-18 ENCOUNTER — TELEPHONE (OUTPATIENT)
Dept: PALLIATIVE MEDICINE | Facility: CLINIC | Age: 61
End: 2021-05-18

## 2021-05-18 NOTE — TELEPHONE ENCOUNTER
Order received for a New Evaluation.    My Clinical Question Is:   patient inherited with multiple pain medications and muscle relaxers with multiple back surgeries and ongoinog scoliosis. consolidate medication, consider pain pump        Routing to review.    Kayleen GONZALEZ    [facility] Pain Management

## 2021-05-18 NOTE — TELEPHONE ENCOUNTER
Dr. Moore,  I'm [facility] to [facility], as they are the only ones doing intrathecal pumps.    Please let us know if there seems to be any scheduling concerns.    Jennifer Hernandez MD  [facility] Pain Management

## 2021-05-19 ENCOUNTER — OFFICE VISIT (OUTPATIENT)
Dept: CHIROPRACTIC MEDICINE | Facility: OTHER | Age: 61
End: 2021-05-19
Attending: CHIROPRACTOR
Payer: COMMERCIAL

## 2021-05-19 DIAGNOSIS — M99.03 SEGMENTAL AND SOMATIC DYSFUNCTION OF LUMBAR REGION: Primary | ICD-10-CM

## 2021-05-19 DIAGNOSIS — M99.02 SEGMENTAL AND SOMATIC DYSFUNCTION OF THORACIC REGION: ICD-10-CM

## 2021-05-19 DIAGNOSIS — M99.01 SEGMENTAL AND SOMATIC DYSFUNCTION OF CERVICAL REGION: ICD-10-CM

## 2021-05-19 DIAGNOSIS — M54.50 ACUTE BILATERAL LOW BACK PAIN WITHOUT SCIATICA: ICD-10-CM

## 2021-05-19 PROCEDURE — 98941 CHIROPRACT MANJ 3-4 REGIONS: CPT | Mod: AT | Performed by: CHIROPRACTOR

## 2021-05-19 ASSESSMENT — ASTHMA QUESTIONNAIRES: ACT_TOTALSCORE: 19

## 2021-05-21 DIAGNOSIS — M51.379 DEGENERATION OF LUMBAR OR LUMBOSACRAL INTERVERTEBRAL DISC: ICD-10-CM

## 2021-05-21 DIAGNOSIS — F11.90 CHRONIC, CONTINUOUS USE OF OPIOIDS: ICD-10-CM

## 2021-05-21 DIAGNOSIS — G89.4 CHRONIC PAIN SYNDROME: ICD-10-CM

## 2021-05-21 DIAGNOSIS — F41.1 GAD (GENERALIZED ANXIETY DISORDER): ICD-10-CM

## 2021-05-21 RX ORDER — MORPHINE SULFATE 15 MG/1
TABLET, FILM COATED, EXTENDED RELEASE ORAL
Qty: 90 TABLET | Refills: 0 | Status: SHIPPED | OUTPATIENT
Start: 2021-05-21 | End: 2021-06-17

## 2021-05-21 RX ORDER — HYDROXYZINE PAMOATE 50 MG/1
CAPSULE ORAL
Qty: 120 CAPSULE | Refills: 0 | Status: SHIPPED | OUTPATIENT
Start: 2021-05-21 | End: 2021-06-15

## 2021-05-21 RX ORDER — HYDROCODONE BITARTRATE AND ACETAMINOPHEN 10; 325 MG/1; MG/1
TABLET ORAL
Qty: 60 TABLET | Refills: 0 | Status: SHIPPED | OUTPATIENT
Start: 2021-05-21 | End: 2021-06-17

## 2021-05-21 NOTE — TELEPHONE ENCOUNTER
hydrocodone-acetaminophen  mg      Last Written Prescription Date:  4-  Last Fill Quantity: 60,   # refills: 0    Morphine 15 mg CR      Last Written Prescription Date:  4-  Last Fill Quantity: 90,   # refills: 0    Hydroxyzine 50 mg      Last Written Prescription Date:  4-  Last Fill Quantity: 120,   # refills: 0  Last Office Visit: 5-  Future Office visit:    Next 5 appointments (look out 90 days)    May 26, 2021  2:10 PM  Return Visit with Shamar Escamilla DC  [facility] ([facility] ) [address] [place] [address]  [phone]   Jun 01, 2021  1:15 PM  (Arrive by 1:00 PM)  Return Visit with Avis Freire PA-C  [facility] ([facility] [place] ) [address]  [place] [address]  [phone]   Jun 07, 2021  2:00 PM  (Arrive by 1:45 PM)  SHORT with Lissy Moore MD  [facility] ([facility] [place] ) [address] [facility] [address]  [phone]

## 2021-05-24 DIAGNOSIS — M25.541 ARTHRALGIA OF BOTH HANDS: ICD-10-CM

## 2021-05-24 DIAGNOSIS — R52 PAIN: ICD-10-CM

## 2021-05-24 DIAGNOSIS — M25.542 ARTHRALGIA OF BOTH HANDS: ICD-10-CM

## 2021-05-25 RX ORDER — ACETAMINOPHEN 325 MG/1
TABLET ORAL
Qty: 200 TABLET | Refills: 3 | Status: SHIPPED | OUTPATIENT
Start: 2021-05-25 | End: 2021-09-29

## 2021-05-25 NOTE — TELEPHONE ENCOUNTER
diclofenac (VOLTAREN) 50 MG EC tablet      Last Written Prescription Date:  04/30/21  Last Fill Quantity: 60,   # refills: 0  Last Office Visit: 05/17/21  Future Office visit:    Next 5 appointments (look out 90 days)    May 26, 2021  2:10 PM  Return Visit with Shamar Escamilla DC  [facility] [place] [facility] ([facility] ) [address]  [facility] [address]  [phone]   Jun 01, 2021  1:15 PM  (Arrive by 1:00 PM)  Return Visit with Avis Freire PA-C  [facility] ([facility] ) [address]  [place] [address]  [phone]   Jun 07, 2021  2:00 PM  (Arrive by 1:45 PM)  SHORT with Lissy Moore MD  [facility] ([facility] ) [address] [facility] [address]  [phone]           Routing refill request to provider for review/approval because:  Normal CBC on file in past 12 months        Recent Labs   Lab Test 12/14/20 2021   WBC 6.6   RBC 3.81*   HGB 11.3*   HCT 34.5*

## 2021-05-26 ENCOUNTER — OFFICE VISIT (OUTPATIENT)
Dept: CHIROPRACTIC MEDICINE | Facility: OTHER | Age: 61
End: 2021-05-26
Attending: CHIROPRACTOR
Payer: COMMERCIAL

## 2021-05-26 DIAGNOSIS — M54.50 ACUTE BILATERAL LOW BACK PAIN WITHOUT SCIATICA: ICD-10-CM

## 2021-05-26 DIAGNOSIS — M99.03 SEGMENTAL AND SOMATIC DYSFUNCTION OF LUMBAR REGION: Primary | ICD-10-CM

## 2021-05-26 DIAGNOSIS — M99.02 SEGMENTAL AND SOMATIC DYSFUNCTION OF THORACIC REGION: ICD-10-CM

## 2021-05-26 DIAGNOSIS — M99.01 SEGMENTAL AND SOMATIC DYSFUNCTION OF CERVICAL REGION: ICD-10-CM

## 2021-05-26 PROCEDURE — 98941 CHIROPRACT MANJ 3-4 REGIONS: CPT | Mod: AT | Performed by: CHIROPRACTOR

## 2021-06-01 ENCOUNTER — OFFICE VISIT (OUTPATIENT)
Dept: OTOLARYNGOLOGY | Facility: OTHER | Age: 61
End: 2021-06-01
Attending: PHYSICIAN ASSISTANT
Payer: COMMERCIAL

## 2021-06-01 VITALS
WEIGHT: 208 LBS | DIASTOLIC BLOOD PRESSURE: 80 MMHG | HEART RATE: 85 BPM | SYSTOLIC BLOOD PRESSURE: 132 MMHG | TEMPERATURE: 98.5 F | HEIGHT: 71 IN | OXYGEN SATURATION: 94 % | BODY MASS INDEX: 29.12 KG/M2

## 2021-06-01 DIAGNOSIS — J31.0 CHRONIC RHINITIS: Primary | ICD-10-CM

## 2021-06-01 DIAGNOSIS — K21.9 LPRD (LARYNGOPHARYNGEAL REFLUX DISEASE): ICD-10-CM

## 2021-06-01 DIAGNOSIS — R09.A2 GLOBUS PHARYNGEUS: ICD-10-CM

## 2021-06-01 DIAGNOSIS — H90.3 SENSORINEURAL HEARING LOSS (SNHL) OF BOTH EARS: ICD-10-CM

## 2021-06-01 DIAGNOSIS — H93.13 TINNITUS, BILATERAL: ICD-10-CM

## 2021-06-01 DIAGNOSIS — Z98.890 S/P NASAL SEPTOPLASTY: ICD-10-CM

## 2021-06-01 PROCEDURE — G0463 HOSPITAL OUTPT CLINIC VISIT: HCPCS | Mod: 25

## 2021-06-01 PROCEDURE — 92504 EAR MICROSCOPY EXAMINATION: CPT | Performed by: PHYSICIAN ASSISTANT

## 2021-06-01 PROCEDURE — 31575 DIAGNOSTIC LARYNGOSCOPY: CPT | Performed by: PHYSICIAN ASSISTANT

## 2021-06-01 PROCEDURE — 99214 OFFICE O/P EST MOD 30 MIN: CPT | Mod: 25 | Performed by: PHYSICIAN ASSISTANT

## 2021-06-01 PROCEDURE — 92504 EAR MICROSCOPY EXAMINATION: CPT

## 2021-06-01 RX ORDER — BUDESONIDE 0.5 MG/2ML
0.5 INHALANT ORAL 2 TIMES DAILY
Qty: 60 ML | Refills: 1 | Status: SHIPPED | OUTPATIENT
Start: 2021-06-01

## 2021-06-01 ASSESSMENT — MIFFLIN-ST. JEOR: SCORE: 1785.61

## 2021-06-01 ASSESSMENT — PAIN SCALES - GENERAL: PAINLEVEL: EXTREME PAIN (8)

## 2021-06-01 NOTE — PATIENT INSTRUCTIONS
Complete Barium swallow.   Consider upper scope.   Increase water. Limit caffeine   Continue with protonix.   Hold Prilosec.     Ears look well. No fluid or infection  Continue with Flonase, Claritin.   Annual audiogram.     Start Budesonide rinses.   Rinse 1-2 times daily.   Budesonide nasal saline irrigation per instructions:  -Obtain [facility] Med Sinus rinse over the counter.    -Use warm distilled water and 2 packets of the salt solution that comes with the bottle, dissolve in bottle up to the 240 mL juju.  -Add 1 vial of budesonide.  -Irrigate each side of your nose leaning over the sink, using 1/3 to 1/2 the volume of the bottle in each nostril every irrigation.  Irrigate 2 times daily.  -If additional rinses are needed/recommended, you may use the plan [facility] Sinus irrigation without the use of added budesonide        Thank you for allowing Avis Freire PA-C and our ENT team to participate in your care.  If your medications are too expensive, please give the nurse a call.  We can possibly change this medication.  If you have a scheduling or an appointment question please contact our Health Unit Coordinator at [phone], Ext. [phone].    ALL nursing questions or concerns can be directed to your ENT nurse at: [phone] [place]

## 2021-06-01 NOTE — PROGRESS NOTES
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
## 2021-06-01 NOTE — NURSING NOTE
"Chief Complaint   Patient presents with     Hearing Problem     Pt is here for a f/u SNHL and bilateral tinnitus.  Pt is here for hearing aid medical clearance.  Pt feels like his ears are plugged.     Throat Problem     Pt is here for a f/u Gerd, pharyngoesophageal dysphagia and globus pharyngeus.       Initial /80 (BP Location: Right arm, Cuff Size: Adult Regular)   Pulse 85   Temp 98.5  F (36.9  C) (Tympanic)   Ht 1.803 m (5' 11\")   Wt 94.3 kg (208 lb)   SpO2 94%   BMI 29.01 kg/m   Estimated body mass index is 29.01 kg/m  as calculated from the following:    Height as of this encounter: 1.803 m (5' 11\").    Weight as of this encounter: 94.3 kg (208 lb).  Medication Reconciliation: complete  Yolette Samano LPN    "

## 2021-06-02 ENCOUNTER — OFFICE VISIT (OUTPATIENT)
Dept: CHIROPRACTIC MEDICINE | Facility: OTHER | Age: 61
End: 2021-06-02
Attending: CHIROPRACTOR
Payer: COMMERCIAL

## 2021-06-02 DIAGNOSIS — M99.01 SEGMENTAL AND SOMATIC DYSFUNCTION OF CERVICAL REGION: ICD-10-CM

## 2021-06-02 DIAGNOSIS — M99.02 SEGMENTAL AND SOMATIC DYSFUNCTION OF THORACIC REGION: ICD-10-CM

## 2021-06-02 DIAGNOSIS — M99.03 SEGMENTAL AND SOMATIC DYSFUNCTION OF LUMBAR REGION: Primary | ICD-10-CM

## 2021-06-02 DIAGNOSIS — M54.50 ACUTE LEFT-SIDED LOW BACK PAIN WITHOUT SCIATICA: ICD-10-CM

## 2021-06-02 PROCEDURE — 98941 CHIROPRACT MANJ 3-4 REGIONS: CPT | Mod: AT | Performed by: CHIROPRACTOR

## 2021-06-03 NOTE — PROGRESS NOTES

## 2021-06-07 DIAGNOSIS — F90.2 ADHD (ATTENTION DEFICIT HYPERACTIVITY DISORDER), COMBINED TYPE: ICD-10-CM

## 2021-06-07 NOTE — PROGRESS NOTES

## 2021-06-08 DIAGNOSIS — M62.830 BACK MUSCLE SPASM: ICD-10-CM

## 2021-06-08 DIAGNOSIS — M54.50 LUMBAR BACK PAIN: ICD-10-CM

## 2021-06-08 RX ORDER — LISDEXAMFETAMINE DIMESYLATE 70 MG/1
CAPSULE ORAL
Qty: 30 CAPSULE | Refills: 0 | Status: SHIPPED | OUTPATIENT
Start: 2021-06-08 | End: 2021-07-12

## 2021-06-08 RX ORDER — BACLOFEN 20 MG/1
TABLET ORAL
Qty: 120 TABLET | Refills: 0 | Status: SHIPPED | OUTPATIENT
Start: 2021-06-08 | End: 2021-07-12

## 2021-06-08 NOTE — TELEPHONE ENCOUNTER
Not on protocol, please advise    lisdexamfetamine (VYVANSE) 70 MG capsule      Last Written Prescription Date:  5/5/1  Last Fill Quantity: 30,   # refills: 0  Last Office Visit: 6/7/21  Future Office visit:    Next 5 appointments (look out 90 days)    Jun 09, 2021  2:20 PM  Return Visit with Shamar Escamilla DC  [facility] ([facility] ) [address] [place] [address]  [phone]   Jul 15, 2021  1:00 PM  (Arrive by 12:45 PM)  Return Visit with Avis Freire PA-C  [facility] ([facility] ) [address] [place], [place] [address] [address]  [phone]           Routing refill request to provider for review/approval because:  Drug not on the [facility], [facility] or [facility] refill protocol or controlled substance

## 2021-06-08 NOTE — PROGRESS NOTES
Trent is a 60 year old who is being evaluated via a billable telephone visit.      What phone number would you like to be contacted at? [phone]  How would you like to obtain your AVS? MyChart    Assessment & Plan     Chronic bilateral low back pain with sciatica, sciatica laterality unspecified  Stable on current medications, working to streamline as we are able.   Appointment made with chronic pain clinic at  [facility]    Chronic, continuous use of opioids  As above     Bipolar affective disorder, current episode hypomanic (H)  Stable     Moderate persistent asthma with exacerbation  He is feeling this is well controlled at this time    Tooth and supporting structure disorder  Working with dentist he was able to find with his insurance                   Return in about 3 months (around 9/22/2021) for chronic pain, asthma follow up.    Lissy Moore MD  [facility] [place]    Subjective   Trent is a 60 year old who presents for the following health issues     HPI     Chronic/Recurring Back Pain Follow Up      Where is your back pain located? (Select all that apply) low back both, middle of back both and upper back both    How would you describe your back pain?  sharp    Where does your back pain spread? the right and left neck    Since your last clinic visit for back pain, how has your pain changed? gradually worsening    Does your back pain interfere with your job? YES    Since your last visit, have you tried any new treatment? No     He had an appointment for chronic pain at the [facility] but wasn't able to get there so this was rescheduled for August 2.       Asthma Follow-Up    Was ACT completed today?    Yes    ACT Total Scores 6/22/2021   ACT TOTAL SCORE -   ASTHMA ER VISITS -   ASTHMA HOSPITALIZATIONS -   ACT TOTAL SCORE (Goal Greater than or Equal to 20) 17   In the past 12 months, how many times did you visit the emergency room for your asthma without being admitted to the hospital? 0   In the  past 12 months, how many times were you hospitalized overnight because of your asthma? 0       How many days per week do you miss taking your asthma controller medication?  0    Please describe any recent triggers for your asthma: dust mites, pollens, animal dander and mold    Have you had any Emergency Room Visits, Urgent Care Visits, or Hospital Admissions since your last office visit?  No    He feels this is well controlled, and is taking his inhaler and Singulair regularly     Dental work  He was able to find a dentist and needs work done on this teeth    Tobacco use  Using snuff with nicorette gum and patches as needed, cutting down.     Review of Systems   Constitutional, HEENT, cardiovascular, pulmonary, gi and gu systems are negative, except as otherwise noted.      Objective           Vitals:  No vitals were obtained today due to virtual visit.    Physical Exam   healthy, alert and no distress  PSYCH: Alert and oriented times 3; coherent speech, normal   rate and volume, able to articulate logical thoughts, able   to abstract reason, no tangential thoughts, no hallucinations   or delusions  His affect is normal  RESP: No cough, no audible wheezing, able to talk in full sentences  Remainder of exam unable to be completed due to telephone visits                Phone call duration: 13 minutes

## 2021-06-08 NOTE — TELEPHONE ENCOUNTER
Lioresal       Last Written Prescription Date:  5/5/2021  Last Fill Quantity: 120,   # refills: 0    Zanaflex       Last Written Prescription Date:  5/5/2021  Last Fill Quantity: 90,   # refills: 0  Last Office Visit: 6/7/2021  Future Office visit:    Next 5 appointments (look out 90 days)    Jun 09, 2021  2:20 PM  Return Visit with Shamar Escamilla DC  [facility] [place] [facility] ([facility] [place] ) [address] [place] [address]  [place] [address]  [phone]   Jul 15, 2021  1:00 PM  (Arrive by 12:45 PM)  Return Visit with Avis Freire PA-C  [facility] [place] ([facility] [place] ) [facility] [place] [address]  [phone]

## 2021-06-09 ENCOUNTER — OFFICE VISIT (OUTPATIENT)
Dept: CHIROPRACTIC MEDICINE | Facility: OTHER | Age: 61
End: 2021-06-09
Attending: CHIROPRACTOR
Payer: COMMERCIAL

## 2021-06-09 DIAGNOSIS — M54.50 ACUTE BILATERAL LOW BACK PAIN WITHOUT SCIATICA: ICD-10-CM

## 2021-06-09 DIAGNOSIS — M99.02 SEGMENTAL AND SOMATIC DYSFUNCTION OF THORACIC REGION: ICD-10-CM

## 2021-06-09 DIAGNOSIS — M99.01 SEGMENTAL AND SOMATIC DYSFUNCTION OF CERVICAL REGION: ICD-10-CM

## 2021-06-09 DIAGNOSIS — M99.03 SEGMENTAL AND SOMATIC DYSFUNCTION OF LUMBAR REGION: Primary | ICD-10-CM

## 2021-06-09 PROCEDURE — 98941 CHIROPRACT MANJ 3-4 REGIONS: CPT | Mod: AT | Performed by: CHIROPRACTOR

## 2021-06-10 NOTE — PROGRESS NOTES

## 2021-06-13 ENCOUNTER — HEALTH MAINTENANCE LETTER (OUTPATIENT)
Age: 61
End: 2021-06-13

## 2021-06-14 DIAGNOSIS — I10 ESSENTIAL HYPERTENSION, BENIGN: ICD-10-CM

## 2021-06-14 DIAGNOSIS — F41.1 GAD (GENERALIZED ANXIETY DISORDER): ICD-10-CM

## 2021-06-14 DIAGNOSIS — R11.0 NAUSEA: ICD-10-CM

## 2021-06-15 ENCOUNTER — TELEPHONE (OUTPATIENT)
Dept: FAMILY MEDICINE | Facility: OTHER | Age: 61
End: 2021-06-15

## 2021-06-15 ENCOUNTER — OFFICE VISIT (OUTPATIENT)
Dept: CHIROPRACTIC MEDICINE | Facility: OTHER | Age: 61
End: 2021-06-15
Attending: CHIROPRACTOR
Payer: COMMERCIAL

## 2021-06-15 DIAGNOSIS — G89.4 CHRONIC PAIN SYNDROME: ICD-10-CM

## 2021-06-15 DIAGNOSIS — M99.02 SEGMENTAL AND SOMATIC DYSFUNCTION OF THORACIC REGION: ICD-10-CM

## 2021-06-15 DIAGNOSIS — M99.01 SEGMENTAL AND SOMATIC DYSFUNCTION OF CERVICAL REGION: ICD-10-CM

## 2021-06-15 DIAGNOSIS — M54.50 ACUTE BILATERAL LOW BACK PAIN WITHOUT SCIATICA: ICD-10-CM

## 2021-06-15 DIAGNOSIS — M99.03 SEGMENTAL AND SOMATIC DYSFUNCTION OF LUMBAR REGION: Primary | ICD-10-CM

## 2021-06-15 PROCEDURE — 98941 CHIROPRACT MANJ 3-4 REGIONS: CPT | Mod: AT | Performed by: CHIROPRACTOR

## 2021-06-15 RX ORDER — LOSARTAN POTASSIUM 50 MG/1
TABLET ORAL
Qty: 90 TABLET | Refills: 3 | Status: SHIPPED | OUTPATIENT
Start: 2021-06-15 | End: 2022-06-02

## 2021-06-15 RX ORDER — GABAPENTIN 300 MG/1
CAPSULE ORAL
Qty: 270 CAPSULE | Refills: 0 | Status: SHIPPED | OUTPATIENT
Start: 2021-06-15 | End: 2021-07-12

## 2021-06-15 RX ORDER — HYDROXYZINE PAMOATE 50 MG/1
CAPSULE ORAL
Qty: 120 CAPSULE | Refills: 0 | Status: SHIPPED | OUTPATIENT
Start: 2021-06-15 | End: 2021-07-12

## 2021-06-15 RX ORDER — DRONABINOL 2.5 MG/1
CAPSULE ORAL
Qty: 60 CAPSULE | Refills: 0 | Status: SHIPPED | OUTPATIENT
Start: 2021-06-15 | End: 2021-07-12

## 2021-06-15 NOTE — TELEPHONE ENCOUNTER
Pt called reports he lost his paperwork for his chronic pain management appointment in the [facility]. Pt reports Dr. Moore sent him a referral. Pt was disconnected twice. Writer attempted to call back each time.

## 2021-06-15 NOTE — TELEPHONE ENCOUNTER
New pcp to advise on refill previously signed by Dr. Natalia Chiang    Last Written Prescription Date:  5/13/20  Last Fill Quantity: 90,   # refills: 3  Last Office Visit: 6/7/21  Future Office visit:    Next 5 appointments (look out 90 days)    Jun 17, 2021  2:10 PM  Return Visit with Shamar Escamilla DC  [facility] [place] ([facility] [place] ) [address] [place] [address]  [place] [address]  [phone]   Jun 21, 2021  2:20 PM  Return Visit with Shamar Escamilla DC  [facility] [place] ([facility] [place] ) [address] [place] [address]  [place] [address]  [phone]   Jun 24, 2021  2:20 PM  Return Visit with Shamar Escamilla DC  [facility] [place] ([facility] [place] ) [address] [place] [address]  [place] [address]  [phone]           Routing refill request to provider for review/approval because:  New pcp

## 2021-06-15 NOTE — TELEPHONE ENCOUNTER
NEURONTIN      Last Written Prescription Date:  5-5-2021  Last Fill Quantity: 270,   # refills: 0  Last Office Visit: 6-7-2021  Future Office visit:    Next 5 appointments (look out 90 days)    Jun 17, 2021  2:10 PM  Return Visit with Shamar Escamilla DC  [facility] [place] ([facility] [place] [address] [place] [address]  [place] [address]  [phone]   Jun 21, 2021  2:20 PM  Return Visit with Shamar Escamilla DC  [facility] [place] ([facility] [place] ) [address] [place], [place] [address] [address]  [phone]   Jun 24, 2021  2:20 PM  Return Visit with Shamar Escamilla DC  [facility] [place] ([facility] [place] ) [address] [place], [place] [address] [address]  [phone]           Routing refill request to provider for review/approval because:

## 2021-06-15 NOTE — TELEPHONE ENCOUNTER
Pt called again, Pt updated on note below. Pt verbalizes understanding pt is going to trying calling pain clinic to reschedule [phone]

## 2021-06-16 NOTE — PROGRESS NOTES

## 2021-06-17 ENCOUNTER — OFFICE VISIT (OUTPATIENT)
Dept: CHIROPRACTIC MEDICINE | Facility: OTHER | Age: 61
End: 2021-06-17
Attending: CHIROPRACTOR
Payer: COMMERCIAL

## 2021-06-17 DIAGNOSIS — M54.50 ACUTE BILATERAL LOW BACK PAIN WITHOUT SCIATICA: ICD-10-CM

## 2021-06-17 DIAGNOSIS — M99.02 SEGMENTAL AND SOMATIC DYSFUNCTION OF THORACIC REGION: ICD-10-CM

## 2021-06-17 DIAGNOSIS — M99.03 SEGMENTAL AND SOMATIC DYSFUNCTION OF LUMBAR REGION: Primary | ICD-10-CM

## 2021-06-17 DIAGNOSIS — F11.90 CHRONIC, CONTINUOUS USE OF OPIOIDS: ICD-10-CM

## 2021-06-17 DIAGNOSIS — M51.379 DEGENERATION OF LUMBAR OR LUMBOSACRAL INTERVERTEBRAL DISC: ICD-10-CM

## 2021-06-17 DIAGNOSIS — G89.4 CHRONIC PAIN SYNDROME: ICD-10-CM

## 2021-06-17 DIAGNOSIS — M25.541 ARTHRALGIA OF BOTH HANDS: ICD-10-CM

## 2021-06-17 DIAGNOSIS — M99.01 SEGMENTAL AND SOMATIC DYSFUNCTION OF CERVICAL REGION: ICD-10-CM

## 2021-06-17 DIAGNOSIS — M62.830 BACK MUSCLE SPASM: ICD-10-CM

## 2021-06-17 DIAGNOSIS — M25.542 ARTHRALGIA OF BOTH HANDS: ICD-10-CM

## 2021-06-17 PROCEDURE — 98941 CHIROPRACT MANJ 3-4 REGIONS: CPT | Mod: AT | Performed by: CHIROPRACTOR

## 2021-06-17 RX ORDER — HYDROCODONE BITARTRATE AND ACETAMINOPHEN 10; 325 MG/1; MG/1
TABLET ORAL
Qty: 60 TABLET | Refills: 0 | Status: SHIPPED | OUTPATIENT
Start: 2021-06-17 | End: 2021-07-19

## 2021-06-17 RX ORDER — METHOCARBAMOL 500 MG/1
TABLET, FILM COATED ORAL
Qty: 90 TABLET | Refills: 0 | Status: SHIPPED | OUTPATIENT
Start: 2021-06-17 | End: 2021-07-26

## 2021-06-17 RX ORDER — MORPHINE SULFATE 15 MG/1
TABLET, FILM COATED, EXTENDED RELEASE ORAL
Qty: 90 TABLET | Refills: 0 | Status: SHIPPED | OUTPATIENT
Start: 2021-06-17 | End: 2021-07-19

## 2021-06-17 NOTE — TELEPHONE ENCOUNTER
HYDROcodone-acetaminophen (NORCO)  MG per tablet      Last Written Prescription Date:  05/21/21  Last Fill Quantity: 60,   # refills: 0  Last Office Visit: 06/07/21  Future Office visit:    Next 5 appointments (look out 90 days)    Jun 21, 2021  2:20 PM  Return Visit with Shamar Escamilla DC  [facility] ([facility] ) [address] [place] [address]  [phone]   Jun 24, 2021  2:20 PM  Return Visit with Shamar Escamilla DC  [facility] ([facility] ) [address] [place] [address]  [phone]   Jul 15, 2021  1:00 PM  (Arrive by 12:45 PM)  Return Visit with Avis Freire PA-C  [facility] ([facility] ) [address] [facility] [address]  [phone]           Routing refill request to provider for review/approval because:  Drug not on the [facility], [facility] or [facility] refill protocol or controlled substance    morphine (MS CONTIN) 15 MG CR tablet      Last Written Prescription Date:  05/21/21  Last Fill Quantity: 90,   # refills: 0    methocarbamol (ROBAXIN) 500 MG tablet      Last Written Prescription Date:  04/06/21  Last Fill Quantity: 90,   # refills: 0    diclofenac (VOLTAREN) 50 MG EC tablet      Last Written Prescription Date:  05/25/21  Last Fill Quantity: 60,   # refills: 0    diclofenac (VOLTAREN) 1 % topical gel      Last Written Prescription Date:  Med historical   Last Fill Quantity: NA,   # refills: NA

## 2021-06-21 ENCOUNTER — OFFICE VISIT (OUTPATIENT)
Dept: CHIROPRACTIC MEDICINE | Facility: OTHER | Age: 61
End: 2021-06-21
Attending: CHIROPRACTOR
Payer: COMMERCIAL

## 2021-06-21 DIAGNOSIS — M54.50 ACUTE BILATERAL LOW BACK PAIN WITHOUT SCIATICA: ICD-10-CM

## 2021-06-21 DIAGNOSIS — F11.90 CHRONIC, CONTINUOUS USE OF OPIOIDS: ICD-10-CM

## 2021-06-21 DIAGNOSIS — M99.03 SEGMENTAL AND SOMATIC DYSFUNCTION OF LUMBAR REGION: Primary | ICD-10-CM

## 2021-06-21 DIAGNOSIS — M99.02 SEGMENTAL AND SOMATIC DYSFUNCTION OF THORACIC REGION: ICD-10-CM

## 2021-06-21 DIAGNOSIS — M99.01 SEGMENTAL AND SOMATIC DYSFUNCTION OF CERVICAL REGION: ICD-10-CM

## 2021-06-21 PROCEDURE — 98941 CHIROPRACT MANJ 3-4 REGIONS: CPT | Mod: AT | Performed by: CHIROPRACTOR

## 2021-06-21 NOTE — TELEPHONE ENCOUNTER
HYDROcodone-acetaminophen (NORCO)  MG per tablet      Last Written Prescription Date:  6/17/2021  Last Fill Quantity: 60,   # refills: 0  Last Office Visit: 6/7/21  Future Office visit:    Next 5 appointments (look out 90 days)    Jun 24, 2021  2:20 PM  Return Visit with Shamar Escamilla DC  [facility] ([facility] [place] ) [address] [place] [address]  [phone]   Jun 28, 2021  2:20 PM  Return Visit with Shamar Escamilla DC  [facility] ([facility] [place] ) [address] [place] [address]  [phone]   Jul 01, 2021  2:20 PM  Return Visit with Shamar Escamilla DC  [facility] ([facility] [place] ) [address] [place] [address]  [phone]   Jul 15, 2021  1:00 PM  (Arrive by 12:45 PM)  Return Visit with Avis Freire PA-C  [facility] [place] ([facility] [place] ) [address]  [facility] [address]  [phone]           Routing refill request to provider for review/approval

## 2021-06-21 NOTE — PROGRESS NOTES

## 2021-06-22 ENCOUNTER — VIRTUAL VISIT (OUTPATIENT)
Dept: FAMILY MEDICINE | Facility: OTHER | Age: 61
End: 2021-06-22
Attending: FAMILY MEDICINE
Payer: COMMERCIAL

## 2021-06-22 DIAGNOSIS — F31.0 BIPOLAR AFFECTIVE DISORDER, CURRENT EPISODE HYPOMANIC (H): ICD-10-CM

## 2021-06-22 DIAGNOSIS — M54.50 CHRONIC LEFT-SIDED LOW BACK PAIN WITHOUT SCIATICA: ICD-10-CM

## 2021-06-22 DIAGNOSIS — M54.40 CHRONIC BILATERAL LOW BACK PAIN WITH SCIATICA, SCIATICA LATERALITY UNSPECIFIED: Primary | ICD-10-CM

## 2021-06-22 DIAGNOSIS — G89.29 CHRONIC BILATERAL LOW BACK PAIN WITH SCIATICA, SCIATICA LATERALITY UNSPECIFIED: Primary | ICD-10-CM

## 2021-06-22 DIAGNOSIS — J45.41 MODERATE PERSISTENT ASTHMA WITH EXACERBATION: ICD-10-CM

## 2021-06-22 DIAGNOSIS — F11.90 CHRONIC, CONTINUOUS USE OF OPIOIDS: ICD-10-CM

## 2021-06-22 DIAGNOSIS — G89.29 CHRONIC LEFT-SIDED LOW BACK PAIN WITHOUT SCIATICA: ICD-10-CM

## 2021-06-22 DIAGNOSIS — K08.9: ICD-10-CM

## 2021-06-22 PROCEDURE — 99214 OFFICE O/P EST MOD 30 MIN: CPT | Mod: 95 | Performed by: FAMILY MEDICINE

## 2021-06-22 RX ORDER — HYDROCODONE BITARTRATE AND ACETAMINOPHEN 10; 325 MG/1; MG/1
TABLET ORAL
Qty: 60 TABLET | Refills: 0 | OUTPATIENT
Start: 2021-06-22

## 2021-06-22 RX ORDER — IBUPROFEN 600 MG/1
TABLET, FILM COATED ORAL
Qty: 120 TABLET | Refills: 0 | Status: SHIPPED | OUTPATIENT
Start: 2021-06-22 | End: 2021-07-26

## 2021-06-22 RX ORDER — GUAIFENESIN AND DEXTROMETHORPHAN HYDROBROMIDE 600; 30 MG/1; MG/1
1 TABLET, EXTENDED RELEASE ORAL EVERY 12 HOURS
Qty: 60 TABLET | Refills: 0 | Status: SHIPPED | OUTPATIENT
Start: 2021-06-22 | End: 2021-07-26

## 2021-06-22 ASSESSMENT — ASTHMA QUESTIONNAIRES
QUESTION_5 LAST FOUR WEEKS HOW WOULD YOU RATE YOUR ASTHMA CONTROL: WELL CONTROLLED
QUESTION_2 LAST FOUR WEEKS HOW OFTEN HAVE YOU HAD SHORTNESS OF BREATH: NOT AT ALL
ACT_TOTALSCORE: 17
QUESTION_4 LAST FOUR WEEKS HOW OFTEN HAVE YOU USED YOUR RESCUE INHALER OR NEBULIZER MEDICATION (SUCH AS ALBUTEROL): ONE OR TWO TIMES PER DAY
QUESTION_3 LAST FOUR WEEKS HOW OFTEN DID YOUR ASTHMA SYMPTOMS (WHEEZING, COUGHING, SHORTNESS OF BREATH, CHEST TIGHTNESS OR PAIN) WAKE YOU UP AT NIGHT OR EARLIER THAN USUAL IN THE MORNING: ONCE A WEEK
QUESTION_1 LAST FOUR WEEKS HOW MUCH OF THE TIME DID YOUR ASTHMA KEEP YOU FROM GETTING AS MUCH DONE AT WORK, SCHOOL OR AT HOME: SOME OF THE TIME

## 2021-06-22 NOTE — NURSING NOTE
"Chief Complaint   Patient presents with     Back Pain     Asthma       Initial There were no vitals taken for this visit. Estimated body mass index is 29.01 kg/m  as calculated from the following:    Height as of 6/1/21: 1.803 m (5' 11\").    Weight as of 6/1/21: 94.3 kg (208 lb).  Medication Reconciliation: complete  Emely Flores LPN  "

## 2021-06-22 NOTE — PROGRESS NOTES

## 2021-06-22 NOTE — TELEPHONE ENCOUNTER
dextromethorphan-guaiFENesin (MUCINEX DM)  MG 12 hr tablet      Last Written Prescription Date:  5/5/21  Last Fill Quantity: 60,   # refills: 0  Last Office Visit: 6/22/21  Future Office visit:    Next 5 appointments (look out 90 days)    Jun 24, 2021  2:20 PM  Return Visit with Shamar Escamilla DC  [facility] ([facility] ) [address] [place], [place] [address] [address]  [phone]   Jun 28, 2021  2:20 PM  Return Visit with Shamar Escamilla DC  [facility] ([facility] [place] ) [address] [place], [place] [address] [address]  [phone]   Jul 01, 2021  2:20 PM  Return Visit with Shamar Escamilla DC  [facility] ([facility] [place] ) [address] [place], [place] [address] [address]  [phone]   Jul 15, 2021  1:00 PM  (Arrive by 12:45 PM)  Return Visit with Avis Freire PA-C  [facility] [place] ([facility] [place] ) [address] [facility] [place] [address]  [phone]           ibuprofen (ADVIL/MOTRIN) 600 MG tablet    Last Written Prescription Date:  5/6/21  Last Fill Quantity: 120,   # refills: 0  Last Office Visit: 6/22/21  Future Office visit:    Next 5 appointments (look out 90 days)    Jun 24, 2021  2:20 PM  Return Visit with Shamar Escamilla DC  [facility] ([facility] ) [address] [place], [place] [address] [address]  [phone]   Jun 28, 2021  2:20 PM  Return Visit with Shamar Escamilla DC  [facility] ([facility] ) [address] [place], [place] [address] [address]  [phone]   Jul 01, 2021  2:20 PM  Return Visit with Shamar Escamilla DC  [facility] ([facility] [place] ) [address] [place], [place] [address] [address]  [phone]   Jul 15, 2021  1:00 PM  (Arrive by 12:45 PM)  Return Visit with Avis Freire PA-C  [facility] [place] ([facility] [place] ) [address]  [address]  [phone]           Routing refill request to provider for review/approval

## 2021-06-23 ASSESSMENT — ASTHMA QUESTIONNAIRES: ACT_TOTALSCORE: 17

## 2021-06-25 ENCOUNTER — TELEPHONE (OUTPATIENT)
Dept: FAMILY MEDICINE | Facility: OTHER | Age: 61
End: 2021-06-25

## 2021-06-25 NOTE — TELEPHONE ENCOUNTER
Form received special diets form ,placed in provider's wall bin.   After form is completed patient would like form to be faxed to [phone].

## 2021-07-10 ENCOUNTER — TELEPHONE (OUTPATIENT)
Dept: FAMILY MEDICINE | Facility: OTHER | Age: 61
End: 2021-07-10

## 2021-07-10 DIAGNOSIS — M62.830 BACK MUSCLE SPASM: ICD-10-CM

## 2021-07-10 DIAGNOSIS — F90.2 ADHD (ATTENTION DEFICIT HYPERACTIVITY DISORDER), COMBINED TYPE: ICD-10-CM

## 2021-07-10 DIAGNOSIS — R11.0 NAUSEA: ICD-10-CM

## 2021-07-10 DIAGNOSIS — M54.50 LUMBAR BACK PAIN: ICD-10-CM

## 2021-07-10 DIAGNOSIS — F41.1 GAD (GENERALIZED ANXIETY DISORDER): ICD-10-CM

## 2021-07-10 DIAGNOSIS — G89.4 CHRONIC PAIN SYNDROME: ICD-10-CM

## 2021-07-12 RX ORDER — LISDEXAMFETAMINE DIMESYLATE 70 MG/1
CAPSULE ORAL
Qty: 30 CAPSULE | Refills: 0 | Status: SHIPPED | OUTPATIENT
Start: 2021-07-12 | End: 2021-08-10

## 2021-07-12 RX ORDER — BACLOFEN 20 MG/1
TABLET ORAL
Qty: 120 TABLET | Refills: 0 | Status: SHIPPED | OUTPATIENT
Start: 2021-07-12 | End: 2021-08-18

## 2021-07-12 RX ORDER — HYDROXYZINE PAMOATE 50 MG/1
CAPSULE ORAL
Qty: 120 CAPSULE | Refills: 0 | Status: SHIPPED | OUTPATIENT
Start: 2021-07-12 | End: 2021-08-18

## 2021-07-12 RX ORDER — GABAPENTIN 300 MG/1
CAPSULE ORAL
Qty: 270 CAPSULE | Refills: 0 | Status: SHIPPED | OUTPATIENT
Start: 2021-07-12 | End: 2021-08-18

## 2021-07-12 RX ORDER — DRONABINOL 2.5 MG/1
CAPSULE ORAL
Qty: 60 CAPSULE | Refills: 0 | Status: SHIPPED | OUTPATIENT
Start: 2021-07-12 | End: 2021-08-18

## 2021-07-12 NOTE — TELEPHONE ENCOUNTER
Baclofen (LIORESAL) 20MG      Last Written Prescription Date:  6/8/2021  Last Fill Quantity: 120,   # refills: 0  Last Office Visit: 6/22/2021  Future Office visit:    Next 5 appointments (look out 90 days)    Jul 15, 2021  1:00 PM  (Arrive by 12:45 PM)  Return Visit with Avis Freire PA-C  [facility] [place] ([facility] [place] ) [address]  [place] [address]  [phone]   Jul 22, 2021 10:15 AM  (Arrive by 10:00 AM)  SHORT with Lissy Moore MD  [facility] [place] ([facility] [place] ) [address]  [place] [address]  [phone]           Routing refill request to provider for review/approval because:    Dronabinol (MARINOL) 2.5mg      Last Written Prescription Date:  6-  Last Fill Quantity: 60,   # refills: 0  Last Office Visit: 6/22/2021  Future Office visit:    Next 5 appointments (look out 90 days)    Jul 15, 2021  1:00 PM  (Arrive by 12:45 PM)  Return Visit with Avis Freire PA-C  [facility] [place] ([facility] [place] ) [address]  [place] [address]  [phone]   Jul 22, 2021 10:15 AM  (Arrive by 10:00 AM)  SHORT with Lissy Moore MD  [facility] [place] ([facility] [place] ) [address]  [place] [address]  [phone]           Routing refill request to provider for review/approval because:    Gabapentin (NEURONTIN) 300mg      Last Written Prescription Date:  6/15/2021  Last Fill Quantity: 270,   # refills: 0  Last Office Visit: 6/22/2021  Future Office visit:    Next 5 appointments (look out 90 days)    Jul 15, 2021  1:00 PM  (Arrive by 12:45 PM)  Return Visit with Avis Freire PA-C  [facility] [place] ([facility] [place] ) [address]  [place] [address]  [phone]   Jul 22, 2021 10:15 AM  (Arrive by 10:00 AM)  SHORT with Lissy Moore MD  [facility] [place] ([facility] [place] ) [address]  [address]  [place] [address]  [phone]           Routing refill request to provider for review/approval because:    hydroOXYzine (VISTARIL) 50mg      Last Written Prescription Date:  120  Last Fill Quantity: 0,   # refills: 6/15/2021  Last Office Visit: 6/22/2021  Future Office visit:    Next 5 appointments (look out 90 days)    Jul 15, 2021  1:00 PM  (Arrive by 12:45 PM)  Return Visit with Avis Freire PA-C  [facility] ([facility] ) [address]  [place] [address]  [phone]   Jul 22, 2021 10:15 AM  (Arrive by 10:00 AM)  SHORT with Lissy Moore MD  [facility] ([facility] ) [address]  [place] [address]  [phone]           Routing refill request to provider for review/approval because:    lisdexamfetamine (VYVANSE) 70mg      Last Written Prescription Date:  6/8/2021  Last Fill Quantity: 30,   # refills: 0  Last Office Visit: 6/22/2021  Future Office visit:    Next 5 appointments (look out 90 days)    Jul 15, 2021  1:00 PM  (Arrive by 12:45 PM)  Return Visit with Avis Freire PA-C  [facility] [address]  [place] [address]  [phone]   Jul 22, 2021 10:15 AM  (Arrive by 10:00 AM)  SHORT with Lissy Moore MD  [facility] ([facility] ) [address]  [place] [address]  [phone]           Routing refill request to provider for review/approval because:    tiZANidine (ZANAFLEX) 4mg      Last Written Prescription Date:  6/8/2021  Last Fill Quantity: 90,   # refills: 0  Last Office Visit: 6/22/2021  Future Office visit:    Next 5 appointments (look out 90 days)    Jul 15, 2021  1:00 PM  (Arrive by 12:45 PM)  Return Visit with Avis Freire PA-C  [facility] [place] ([facility] [place] ) [address]  [place] [address]  [phone]   Jul 22, 2021 10:15 AM  (Arrive by 10:00 AM)  SHORT  with Lissy Moore MD  [facility] [place] ([facility] [place] ) [address]  [place] [address]  [phone]           Routing refill request to provider for review/approval because:

## 2021-07-13 ENCOUNTER — HOSPITAL ENCOUNTER (EMERGENCY)
Facility: HOSPITAL | Age: 61
Discharge: HOME | End: 2021-07-13
Attending: NURSE PRACTITIONER | Admitting: NURSE PRACTITIONER
Payer: COMMERCIAL

## 2021-07-13 VITALS
RESPIRATION RATE: 16 BRPM | HEART RATE: 91 BPM | DIASTOLIC BLOOD PRESSURE: 86 MMHG | OXYGEN SATURATION: 95 % | TEMPERATURE: 97.4 F | SYSTOLIC BLOOD PRESSURE: 122 MMHG

## 2021-07-13 DIAGNOSIS — J06.9 VIRAL URI: Primary | ICD-10-CM

## 2021-07-13 LAB
GROUP A STREP BY PCR: NOT DETECTED
SARS-COV-2 RNA RESP QL NAA+PROBE: NEGATIVE

## 2021-07-13 PROCEDURE — U0005 INFEC AGEN DETEC AMPLI PROBE: HCPCS | Performed by: NURSE PRACTITIONER

## 2021-07-13 PROCEDURE — 87651 STREP A DNA AMP PROBE: CPT | Performed by: NURSE PRACTITIONER

## 2021-07-13 PROCEDURE — G0463 HOSPITAL OUTPT CLINIC VISIT: HCPCS

## 2021-07-13 PROCEDURE — C9803 HOPD COVID-19 SPEC COLLECT: HCPCS

## 2021-07-13 PROCEDURE — 99213 OFFICE O/P EST LOW 20 MIN: CPT | Performed by: NURSE PRACTITIONER

## 2021-07-13 ASSESSMENT — ENCOUNTER SYMPTOMS
TROUBLE SWALLOWING: 1
RHINORRHEA: 1
APPETITE CHANGE: 0
COUGH: 0
EYE REDNESS: 0
FACIAL SWELLING: 0
WEAKNESS: 0
SINUS PRESSURE: 1
SINUS PAIN: 1
FEVER: 0
DIZZINESS: 0
EYE PAIN: 0
MYALGIAS: 0
EYE ITCHING: 1
FATIGUE: 1
DIARRHEA: 1
CHILLS: 0
SORE THROAT: 1
HEADACHES: 0
NAUSEA: 0
SHORTNESS OF BREATH: 0
VOMITING: 0
PSYCHIATRIC NEGATIVE: 1

## 2021-07-14 NOTE — DISCHARGE INSTRUCTIONS
Symptomatic treatments recommended.  -Discussed that antibiotics would not help symptoms of viral URI. Education provided on symptoms of secondary bacterial infection such as new fever, chills, rigors, shortness of breath, increased work of breathing, that can occur with viral URI and need for further evaluation, if they occur.   - Ensure you are staying hydrated by drinking plenty of fluids or eating foods such as popsicles, jello, pudding.  - Honey can be soothing for sore throat  - Warm salt water gurgles can help soothe sore throat  - Rest  - Humidifier can help with congestion and help keep mucus membranes such as throat and nose from drying out.  - Sleeping slightly propped up can help with congestion and postnasal drainage that can worsen cough at bedtime.  - As long as you have never been told to take Tylenol and/or Ibuprofen you can use them to manage fever and body aches per package instructions  Make sure you eat when you take ibuprofen to avoid stomach upset.  - OTC cough medications per package instructions to help with cough. Check to see if the cough/cold medication already has acetaminophen (Tylenol) in it. If it does avoid taking additional Tylenol.  - If sudden onset of new fever, worsening symptoms return for further evaluation.  - OTC nasal steroid such as Flonase can help decrease sinus inflammation to help with congestion.  - Education provided on symptoms of post-viral bacterial infections including ear infection and pneumonia. This would require re-evaluation for treatment.    Follow up with primary care provider or return to urgent care/ED with any worsening in condition or additional concerns.

## 2021-07-14 NOTE — ED TRIAGE NOTES
Pt presents with sore throat and swollen tonsils. Pt states he thinks it is tonsillitis. Pt is covid vaccinated. Sx started Saturday. Pt has been taking tylenol and  ibuprofen.
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
## 2021-07-15 ENCOUNTER — OFFICE VISIT (OUTPATIENT)
Dept: OTOLARYNGOLOGY | Facility: OTHER | Age: 61
End: 2021-07-15
Attending: PHYSICIAN ASSISTANT
Payer: COMMERCIAL

## 2021-07-15 VITALS
HEIGHT: 71 IN | HEART RATE: 106 BPM | OXYGEN SATURATION: 95 % | TEMPERATURE: 99 F | SYSTOLIC BLOOD PRESSURE: 138 MMHG | BODY MASS INDEX: 29.12 KG/M2 | WEIGHT: 208 LBS | DIASTOLIC BLOOD PRESSURE: 80 MMHG

## 2021-07-15 DIAGNOSIS — J36 PERITONSILLAR ABSCESS: Primary | ICD-10-CM

## 2021-07-15 DIAGNOSIS — H92.02 OTALGIA, LEFT: ICD-10-CM

## 2021-07-15 DIAGNOSIS — J02.9 SORE THROAT: ICD-10-CM

## 2021-07-15 DIAGNOSIS — R09.A2 GLOBUS PHARYNGEUS: ICD-10-CM

## 2021-07-15 PROCEDURE — 10060 I&D ABSCESS SIMPLE/SINGLE: CPT

## 2021-07-15 PROCEDURE — 99214 OFFICE O/P EST MOD 30 MIN: CPT | Mod: 25 | Performed by: OTOLARYNGOLOGY

## 2021-07-15 PROCEDURE — 96372 THER/PROPH/DIAG INJ SC/IM: CPT | Mod: 59

## 2021-07-15 PROCEDURE — 31575 DIAGNOSTIC LARYNGOSCOPY: CPT | Performed by: OTOLARYNGOLOGY

## 2021-07-15 PROCEDURE — G0463 HOSPITAL OUTPT CLINIC VISIT: HCPCS | Mod: 25

## 2021-07-15 PROCEDURE — 10060 I&D ABSCESS SIMPLE/SINGLE: CPT | Performed by: OTOLARYNGOLOGY

## 2021-07-15 RX ORDER — CEFTRIAXONE SODIUM 1 G
1 VIAL (EA) INJECTION ONCE
Status: COMPLETED | OUTPATIENT
Start: 2021-07-15 | End: 2021-07-15

## 2021-07-15 RX ORDER — DEXAMETHASONE SODIUM PHOSPHATE 10 MG/ML
12 INJECTION INTRAMUSCULAR; INTRAVENOUS ONCE
Status: COMPLETED | OUTPATIENT
Start: 2021-07-15 | End: 2021-07-15

## 2021-07-15 RX ORDER — DEXAMETHASONE 4 MG/1
TABLET ORAL
Qty: 10 TABLET | Refills: 0 | Status: ON HOLD | OUTPATIENT
Start: 2021-07-15 | End: 2021-11-30

## 2021-07-15 RX ORDER — DEXAMETHASONE SODIUM PHOSPHATE 10 MG/ML
12 INJECTION INTRAMUSCULAR; INTRAVENOUS ONCE
Status: DISCONTINUED | OUTPATIENT
Start: 2021-07-15 | End: 2021-07-15

## 2021-07-15 RX ADMIN — DEXAMETHASONE SODIUM PHOSPHATE 12 MG: 10 INJECTION INTRAMUSCULAR; INTRAVENOUS at 15:12

## 2021-07-15 RX ADMIN — CEFTRIAXONE SODIUM 1 G: 1 INJECTION, POWDER, FOR SOLUTION INTRAMUSCULAR; INTRAVENOUS at 15:11

## 2021-07-15 ASSESSMENT — MIFFLIN-ST. JEOR: SCORE: 1785.61

## 2021-07-15 NOTE — PATIENT INSTRUCTIONS
Injections given today.   Start Augmentin twice a day for 10 days.   Start Decadron taper.   Maintain good oral intake.   If symptoms worsen, present to ER/ UC for evaluation.   Return in  1 week for repeat exam      Thank you for allowing Avis Freire PA-C and our ENT team to participate in your care.  If your medications are too expensive, please give the nurse a call.  We can possibly change this medication.  If you have a scheduling or an appointment question please contact our Health Unit Coordinator at [phone], Ext. [phone].    ALL nursing questions or concerns can be directed to your ENT nurse at: [phone] [place]

## 2021-07-15 NOTE — NURSING NOTE
Clinic Administered Medication Documentation    Administrations This Visit     cefTRIAXone (ROCEPHIN) injection 1 g     Admin Date  07/15/2021 Action  Given Dose  1 g Route  Intramuscular Site  Right Gluteus Miguel Administered By  Yolette Samano LPN    Ordering Provider: Avis Freire PA-C    Patient Supplied?: No          dexamethasone (DECADRON) injection 12 mg     Admin Date  07/15/2021 Action  Given Dose  12 mg Route  Intramuscular Site  Left Gluteus Miguel Administered By  Yolette Samano LPN    Ordering Provider: Avis Freire PA-C    Patient Supplied?: No

## 2021-07-15 NOTE — NURSING NOTE
"Chief Complaint   Patient presents with     RECHECK     Pt is here for a f/u chronic rhinitis, LPRD, globus pharyngeus, SNHL bilateral and bilateral tinnitus.       Initial /80 (BP Location: Right arm, Patient Position: Sitting, Cuff Size: Adult Regular)   Pulse 106   Temp 99  F (37.2  C) (Tympanic)   Ht 1.803 m (5' 11\")   Wt 94.3 kg (208 lb)   SpO2 95%   BMI 29.01 kg/m   Estimated body mass index is 29.01 kg/m  as calculated from the following:    Height as of this encounter: 1.803 m (5' 11\").    Weight as of this encounter: 94.3 kg (208 lb).  Medication Reconciliation: complete  Tawnya Julio LPN    "
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
## 2021-07-19 DIAGNOSIS — M51.379 DEGENERATION OF LUMBAR OR LUMBOSACRAL INTERVERTEBRAL DISC: ICD-10-CM

## 2021-07-19 DIAGNOSIS — F11.90 CHRONIC, CONTINUOUS USE OF OPIOIDS: ICD-10-CM

## 2021-07-19 DIAGNOSIS — G89.4 CHRONIC PAIN SYNDROME: ICD-10-CM

## 2021-07-19 RX ORDER — HYDROCODONE BITARTRATE AND ACETAMINOPHEN 10; 325 MG/1; MG/1
TABLET ORAL
Qty: 60 TABLET | Refills: 0 | Status: SHIPPED | OUTPATIENT
Start: 2021-07-19 | End: 2021-08-18

## 2021-07-19 RX ORDER — MORPHINE SULFATE 15 MG/1
TABLET, FILM COATED, EXTENDED RELEASE ORAL
Qty: 90 TABLET | Refills: 0 | Status: SHIPPED | OUTPATIENT
Start: 2021-07-19 | End: 2021-08-18

## 2021-07-19 NOTE — TELEPHONE ENCOUNTER
Not on protocol    morphine (MS CONTIN) 15 MG CR tablet      Last Written Prescription Date:  6/17/20  Last Fill Quantity: 90,   # refills: 0  Last Office Visit: 6/22/21  Future Office visit:    Next 5 appointments (look out 90 days)    Jul 22, 2021 10:15 AM  (Arrive by 10:00 AM)  SHORT with Lissy Moore MD  [facility] ([facility] [place] ) [address]  [place] [address]  [phone]   Jul 22, 2021 11:30 AM  (Arrive by 11:15 AM)  Return Visit with Avis Freire PA-C  [facility] [place] ([facility] [place] ) [address]  [place] [address]  [phone]           Routing refill request to provider for review/approval because:  Drug not on the [facility], [facility] or [facility] refill protocol or controlled substance      HYDROcodone-acetaminophen (NORCO)  MG per tablet      Last Written Prescription Date:  6/17/21  Last Fill Quantity: 60,   # refills: 0  Last Office Visit: 6/22/21  Future Office visit:    Next 5 appointments (look out 90 days)    Jul 22, 2021 10:15 AM  (Arrive by 10:00 AM)  SHORT with Lissy Moore MD  [facility] ([facility] [place] ) [address]  [place] [address]  [phone]   Jul 22, 2021 11:30 AM  (Arrive by 11:15 AM)  Return Visit with Avis Freire PA-C  [facility] [place] ([facility] [place] ) [address]  [place] [address]  [phone]           Routing refill request to provider for review/approval because:  Drug not on the [facility], [facility] or [facility] refill protocol or controlled substance

## 2021-07-20 RX ORDER — CEPHALEXIN 500 MG/1
500 CAPSULE ORAL 2 TIMES DAILY
Qty: 14 CAPSULE | Refills: 0 | Status: SHIPPED | OUTPATIENT
Start: 2021-07-20 | End: 2021-07-27

## 2021-07-20 NOTE — PROGRESS NOTES
Assessment & Plan     Moderate persistent asthma without complication  Not well controlled but hasn't had inhalers today  Continue current medication    Prediabetes   due for lab today  - Hemoglobin A1c; Future    Mixed hyperlipidemia  Due for lab today  - Lipid Profile (Chol, Trig, HDL, LDL calc); Future    Benign essential hypertension  Good control      Chronic, continuous use of opioids  Discussed streamlining medications, he is not ready to change any medications at this time     Bipolar affective disorder, current episode hypomanic (H)  Pressured speech today  Continue with current medications      Peritonsillar abscess  Resolving  He is seeing ENT in follow up this morning       35 minutes spent on the date of the encounter doing chart review, review of outside records, interpretation of tests, patient visit and documentation            Return in about 3 months (around 10/22/2021) for chronic pain.    Lissy Moore MD  [facility] [place] Newman is a 60 year old who presents for the following health issues     HPI     Asthma Follow-Up    Was ACT completed today?    Yes    ACT Total Scores 7/22/2021   ACT TOTAL SCORE -   ASTHMA ER VISITS -   ASTHMA HOSPITALIZATIONS -   ACT TOTAL SCORE (Goal Greater than or Equal to 20) 15   In the past 12 months, how many times did you visit the emergency room for your asthma without being admitted to the hospital? 0   In the past 12 months, how many times were you hospitalized overnight because of your asthma? 0         How many days per week do you miss taking your asthma controller medication?  0    Please describe any recent triggers for your asthma: smoke, pollens and mold, URI    Have you had any Emergency Room Visits, Urgent Care Visits, or Hospital Admissions since your last office visit?  Yes  Number of ER or Urgent Care visits for asthma: 1    Chronic/Recurring Back Pain Follow Up      Where is your back pain located? (Select all that  apply) low back bilateral and neck, middle back, left back rib pain    How would you describe your back pain?  sharp and stabbing    Where does your back pain spread? the right and left buttock, the right and left  thigh and the right and left foot    Since your last clinic visit for back pain, how has your pain changed? rapidly worsening    Does your back pain interfere with your job? Not applicable    Since your last visit, have you tried any new treatment? No      He is due to be evalauated by the [facility] pain center regarding his medications. He has been on multiple pain medications, muscle relaxers, and Marinol, started by other providers. He does have significant back pain with severe scoliosis and past surgery. He doesn't want any medications changed at this time .     Peritonsillar abscess  Left, this was I and D'd by Dr Gonzalez and has been draining with pain relieved    Review of Systems   Constitutional, HEENT, cardiovascular, pulmonary, gi and gu systems are negative, except as otherwise noted.      Objective    /70 (BP Location: Right arm, Patient Position: Chair, Cuff Size: Adult Large)   Pulse 105   Temp 98.2  F (36.8  C) (Tympanic)   Resp 16   Wt 91.2 kg (201 lb)   SpO2 99%   BMI 28.03 kg/m    Body mass index is 28.03 kg/m .  Physical Exam   GENERAL: healthy, alert and no distress  EYES: Eyes grossly normal to inspection, PERRL and conjunctivae and sclerae normal  HENT: normal cephalic/atraumatic, ear canals and TM's normal, nose and mouth without ulcers or lesions, oropharynx clear, oral mucous membranes moist and previous site of I and D is noted over left tonsil, with tonsil normal size, minimal drainage   NECK: no adenopathy, no asymmetry, masses, or scars and thyroid normal to palpation  RESP: lungs clear to auscultation - no rales, rhonchi or wheezes  CV: regular rate and rhythm, normal S1 S2, no S3 or S4, no murmur, click or rub, no peripheral edema and peripheral pulses  strong  MS: no gross musculoskeletal defects noted, no edema  SKIN: no suspicious lesions or rashes  NEURO: Normal strength and tone, mentation intact and speech normal  PSYCH: concentration poor, tangential and agitated at times

## 2021-07-22 ENCOUNTER — OFFICE VISIT (OUTPATIENT)
Dept: OTOLARYNGOLOGY | Facility: OTHER | Age: 61
End: 2021-07-22
Attending: PHYSICIAN ASSISTANT
Payer: COMMERCIAL

## 2021-07-22 ENCOUNTER — OFFICE VISIT (OUTPATIENT)
Dept: FAMILY MEDICINE | Facility: OTHER | Age: 61
End: 2021-07-22
Attending: FAMILY MEDICINE
Payer: COMMERCIAL

## 2021-07-22 VITALS
RESPIRATION RATE: 16 BRPM | SYSTOLIC BLOOD PRESSURE: 136 MMHG | WEIGHT: 201 LBS | TEMPERATURE: 98.2 F | BODY MASS INDEX: 28.03 KG/M2 | OXYGEN SATURATION: 99 % | DIASTOLIC BLOOD PRESSURE: 70 MMHG | HEART RATE: 105 BPM

## 2021-07-22 VITALS
TEMPERATURE: 98.1 F | HEART RATE: 101 BPM | WEIGHT: 201 LBS | HEIGHT: 71 IN | BODY MASS INDEX: 28.14 KG/M2 | DIASTOLIC BLOOD PRESSURE: 86 MMHG | SYSTOLIC BLOOD PRESSURE: 128 MMHG | OXYGEN SATURATION: 98 %

## 2021-07-22 DIAGNOSIS — F31.0 BIPOLAR AFFECTIVE DISORDER, CURRENT EPISODE HYPOMANIC (H): ICD-10-CM

## 2021-07-22 DIAGNOSIS — J36 PERITONSILLAR ABSCESS: ICD-10-CM

## 2021-07-22 DIAGNOSIS — I10 BENIGN ESSENTIAL HYPERTENSION: ICD-10-CM

## 2021-07-22 DIAGNOSIS — Z71.6 TOBACCO ABUSE COUNSELING: ICD-10-CM

## 2021-07-22 DIAGNOSIS — J36 PERITONSILLAR ABSCESS: Primary | ICD-10-CM

## 2021-07-22 DIAGNOSIS — H90.3 SENSORINEURAL HEARING LOSS (SNHL) OF BOTH EARS: ICD-10-CM

## 2021-07-22 DIAGNOSIS — R13.10 DYSPHAGIA, UNSPECIFIED TYPE: ICD-10-CM

## 2021-07-22 DIAGNOSIS — R73.03 PREDIABETES: ICD-10-CM

## 2021-07-22 DIAGNOSIS — E78.2 MIXED HYPERLIPIDEMIA: ICD-10-CM

## 2021-07-22 DIAGNOSIS — F11.90 CHRONIC, CONTINUOUS USE OF OPIOIDS: ICD-10-CM

## 2021-07-22 DIAGNOSIS — R68.2 DRY MOUTH: ICD-10-CM

## 2021-07-22 DIAGNOSIS — K21.9 GASTROESOPHAGEAL REFLUX DISEASE, UNSPECIFIED WHETHER ESOPHAGITIS PRESENT: ICD-10-CM

## 2021-07-22 DIAGNOSIS — R09.A2 GLOBUS PHARYNGEUS: ICD-10-CM

## 2021-07-22 DIAGNOSIS — J45.40 MODERATE PERSISTENT ASTHMA WITHOUT COMPLICATION: Primary | ICD-10-CM

## 2021-07-22 LAB
CHOLEST SERPL-MCNC: 168 MG/DL
EST. AVERAGE GLUCOSE BLD GHB EST-MCNC: 123 MG/DL
FASTING STATUS PATIENT QL REPORTED: YES
HBA1C MFR BLD: 5.9 % (ref 0–5.6)
HDLC SERPL-MCNC: 59 MG/DL
LDLC SERPL CALC-MCNC: 42 MG/DL
NONHDLC SERPL-MCNC: 109 MG/DL
TRIGL SERPL-MCNC: 335 MG/DL

## 2021-07-22 PROCEDURE — 99214 OFFICE O/P EST MOD 30 MIN: CPT | Performed by: FAMILY MEDICINE

## 2021-07-22 PROCEDURE — 36415 COLL VENOUS BLD VENIPUNCTURE: CPT | Mod: ZL | Performed by: FAMILY MEDICINE

## 2021-07-22 PROCEDURE — G0463 HOSPITAL OUTPT CLINIC VISIT: HCPCS | Mod: 27

## 2021-07-22 PROCEDURE — 82465 ASSAY BLD/SERUM CHOLESTEROL: CPT | Mod: ZL | Performed by: FAMILY MEDICINE

## 2021-07-22 PROCEDURE — 83036 HEMOGLOBIN GLYCOSYLATED A1C: CPT | Mod: ZL | Performed by: FAMILY MEDICINE

## 2021-07-22 PROCEDURE — 99214 OFFICE O/P EST MOD 30 MIN: CPT | Performed by: PHYSICIAN ASSISTANT

## 2021-07-22 PROCEDURE — G0463 HOSPITAL OUTPT CLINIC VISIT: HCPCS

## 2021-07-22 RX ORDER — FAMOTIDINE 20 MG/1
20 TABLET, FILM COATED ORAL
Qty: 30 TABLET | Refills: 1 | Status: SHIPPED | OUTPATIENT
Start: 2021-07-22 | End: 2021-09-15

## 2021-07-22 RX ORDER — SALIVA STIMULANT COMB. NO.7
4 GEL (GRAM) MUCOUS MEMBRANE 4 TIMES DAILY
Qty: 126 G | Refills: 11 | Status: SHIPPED | OUTPATIENT
Start: 2021-07-22 | End: 2023-10-24

## 2021-07-22 ASSESSMENT — ASTHMA QUESTIONNAIRES
QUESTION_4 LAST FOUR WEEKS HOW OFTEN HAVE YOU USED YOUR RESCUE INHALER OR NEBULIZER MEDICATION (SUCH AS ALBUTEROL): TWO OR THREE TIMES PER WEEK
QUESTION_5 LAST FOUR WEEKS HOW WOULD YOU RATE YOUR ASTHMA CONTROL: SOMEWHAT CONTROLLED
QUESTION_1 LAST FOUR WEEKS HOW MUCH OF THE TIME DID YOUR ASTHMA KEEP YOU FROM GETTING AS MUCH DONE AT WORK, SCHOOL OR AT HOME: SOME OF THE TIME
ACT_TOTALSCORE: 15
QUESTION_3 LAST FOUR WEEKS HOW OFTEN DID YOUR ASTHMA SYMPTOMS (WHEEZING, COUGHING, SHORTNESS OF BREATH, CHEST TIGHTNESS OR PAIN) WAKE YOU UP AT NIGHT OR EARLIER THAN USUAL IN THE MORNING: ONCE OR TWICE
QUESTION_2 LAST FOUR WEEKS HOW OFTEN HAVE YOU HAD SHORTNESS OF BREATH: ONCE A DAY

## 2021-07-22 ASSESSMENT — ANXIETY QUESTIONNAIRES
4. TROUBLE RELAXING: NOT AT ALL
5. BEING SO RESTLESS THAT IT IS HARD TO SIT STILL: NOT AT ALL
GAD7 TOTAL SCORE: 3
6. BECOMING EASILY ANNOYED OR IRRITABLE: SEVERAL DAYS
1. FEELING NERVOUS, ANXIOUS, OR ON EDGE: SEVERAL DAYS
7. FEELING AFRAID AS IF SOMETHING AWFUL MIGHT HAPPEN: NOT AT ALL
2. NOT BEING ABLE TO STOP OR CONTROL WORRYING: SEVERAL DAYS
3. WORRYING TOO MUCH ABOUT DIFFERENT THINGS: NOT AT ALL

## 2021-07-22 ASSESSMENT — MIFFLIN-ST. JEOR: SCORE: 1753.86

## 2021-07-22 ASSESSMENT — PAIN SCALES - GENERAL
PAINLEVEL: EXTREME PAIN (8)
PAINLEVEL: MILD PAIN (2)

## 2021-07-22 NOTE — NURSING NOTE
"Chief Complaint   Patient presents with     Pain     Asthma       Initial /70 (BP Location: Right arm, Patient Position: Chair, Cuff Size: Adult Large)   Pulse 105   Temp 98.2  F (36.8  C) (Tympanic)   Resp 16   Wt 91.2 kg (201 lb)   SpO2 99%   BMI 28.03 kg/m   Estimated body mass index is 28.03 kg/m  as calculated from the following:    Height as of 7/15/21: 1.803 m (5' 11\").    Weight as of this encounter: 91.2 kg (201 lb).  Medication Reconciliation: complete  Chelo Rader LPN    "
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
## 2021-07-22 NOTE — NURSING NOTE
"Chief Complaint   Patient presents with     RECHECK     Pt is here for a f/u peritonsillar abscess.       Initial /86   Pulse 101   Temp 98.1  F (36.7  C) (Tympanic)   Ht 1.803 m (5' 11\")   Wt 91.2 kg (201 lb)   SpO2 98%   BMI 28.03 kg/m   Estimated body mass index is 28.03 kg/m  as calculated from the following:    Height as of this encounter: 1.803 m (5' 11\").    Weight as of this encounter: 91.2 kg (201 lb).  Medication Reconciliation: complete  Linda Bates LPN  "

## 2021-07-22 NOTE — PATIENT INSTRUCTIONS
Continue with protonix 40 mg twice a day  pepcid as needed for reflux.     Maintain good water intake  Limit caffeine/ alcohol.   Complete swallow study- pending results consider general surgery for upper scope.     Throat has healed very well.   Complete oral antibiotic.   Eat yogurt daily.     Return for annual hearing test in May 2022.     Thank you for allowing Avis Freire PA-C and our ENT team to participate in your care.  If your medications are too expensive, please give the nurse a call.  We can possibly change this medication.  If you have a scheduling or an appointment question please contact our Health Unit Coordinator at [phone], Ext. [phone].    ALL nursing questions or concerns can be directed to your ENT nurse at: [phone] [place]
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
## 2021-07-23 DIAGNOSIS — F31.0 BIPOLAR AFFECTIVE DISORDER, CURRENT EPISODE HYPOMANIC (H): ICD-10-CM

## 2021-07-23 DIAGNOSIS — G89.29 CHRONIC LEFT-SIDED LOW BACK PAIN WITHOUT SCIATICA: ICD-10-CM

## 2021-07-23 DIAGNOSIS — J45.41 MODERATE PERSISTENT ASTHMA WITH EXACERBATION: ICD-10-CM

## 2021-07-23 DIAGNOSIS — M62.830 BACK MUSCLE SPASM: ICD-10-CM

## 2021-07-23 DIAGNOSIS — M54.50 CHRONIC LEFT-SIDED LOW BACK PAIN WITHOUT SCIATICA: ICD-10-CM

## 2021-07-23 ASSESSMENT — ANXIETY QUESTIONNAIRES: GAD7 TOTAL SCORE: 3

## 2021-07-23 ASSESSMENT — ASTHMA QUESTIONNAIRES: ACT_TOTALSCORE: 15

## 2021-07-26 RX ORDER — IBUPROFEN 600 MG/1
TABLET, FILM COATED ORAL
Qty: 120 TABLET | Refills: 0 | Status: SHIPPED | OUTPATIENT
Start: 2021-07-26 | End: 2021-09-01

## 2021-07-26 RX ORDER — GUAIFENESIN AND DEXTROMETHORPHAN HYDROBROMIDE 600; 30 MG/1; MG/1
1 TABLET, EXTENDED RELEASE ORAL EVERY 12 HOURS
Qty: 60 TABLET | Refills: 0 | Status: SHIPPED | OUTPATIENT
Start: 2021-07-26 | End: 2021-08-30

## 2021-07-26 RX ORDER — METHOCARBAMOL 500 MG/1
TABLET, FILM COATED ORAL
Qty: 90 TABLET | Refills: 0 | Status: SHIPPED | OUTPATIENT
Start: 2021-07-26 | End: 2021-08-23

## 2021-07-26 RX ORDER — OXCARBAZEPINE 600 MG/1
TABLET, FILM COATED ORAL
Qty: 60 TABLET | Refills: 2 | Status: SHIPPED | OUTPATIENT
Start: 2021-07-26 | End: 2021-10-26

## 2021-07-26 NOTE — TELEPHONE ENCOUNTER
Trileptal      Last Written Prescription Date:  3/23/21  Last Fill Quantity: 60,   # refills: 3  Last Office Visit: 3/24/21  Future Office visit:

## 2021-07-26 NOTE — TELEPHONE ENCOUNTER
methocarbamol (ROBAXIN) 500 MG tablet      Last Written Prescription Date:  6/17/21  Last Fill Quantity: 90,   # refills: 0  Last Office Visit: 7/22/21  Future Office visit:       Routing refill request to provider for review/approval because:  Drug not on the [facility], [facility] or [facility] refill protocol or controlled substance      dextromethorphan-guaiFENesin (MUCINEX DM)  MG 12 hr tablet      Last Written Prescription Date:  6/22/21  Last Fill Quantity: 60,   # refills: 0  Last Office Visit: 7/22/21  Future Office visit:       Routing refill request to provider for review/approval because:  Drug not on the [facility], [facility] or [facility] refill protocol or controlled substance      ibuprofen (IBU) 600 MG tablet     Last Written Prescription Date:  6/22/21  Last Fill Quantity: 120,   # refills: 0  Last Office Visit: 7/22/21  Future Office visit:       Routing refill request to provider for review/approval because:  Drug not on the [facility], [facility] or [facility] refill protocol or controlled substance

## 2021-07-28 DIAGNOSIS — M25.542 ARTHRALGIA OF BOTH HANDS: ICD-10-CM

## 2021-07-28 DIAGNOSIS — M25.541 ARTHRALGIA OF BOTH HANDS: ICD-10-CM

## 2021-07-28 DIAGNOSIS — G43.109 MIGRAINE WITH AURA AND WITHOUT STATUS MIGRAINOSUS, NOT INTRACTABLE: ICD-10-CM

## 2021-07-28 RX ORDER — SUMATRIPTAN 50 MG/1
TABLET, FILM COATED ORAL
Qty: 9 TABLET | Refills: 0 | Status: SHIPPED | OUTPATIENT
Start: 2021-07-28 | End: 2021-08-19

## 2021-07-28 NOTE — TELEPHONE ENCOUNTER
SUMATRIPTAN SUCC 50 MG TABLET     Last Written Prescription Date:  7/20/2020  Last Fill Quantity: 60,   # refills: 3  Last Office Visit: 7/22/2021  Future Office visit:    Next 5 appointments (look out 90 days)    Oct 25, 2021  1:30 PM  (Arrive by 1:15 PM)  SHORT with Lissy Moore MD  [facility] [place] ([facility] - [place] ) [address]  [place] [address]  [phone]           Routing refill request to provider for review/approval because:

## 2021-08-09 DIAGNOSIS — F90.2 ADHD (ATTENTION DEFICIT HYPERACTIVITY DISORDER), COMBINED TYPE: ICD-10-CM

## 2021-08-10 RX ORDER — LISDEXAMFETAMINE DIMESYLATE 70 MG/1
CAPSULE ORAL
Qty: 30 CAPSULE | Refills: 0 | Status: SHIPPED | OUTPATIENT
Start: 2021-08-10 | End: 2021-09-15

## 2021-08-10 NOTE — TELEPHONE ENCOUNTER
Vyvanse      Last Written Prescription Date:  7/12/21  Last Fill Quantity: 30,   # refills: 0  Last Office Visit: 7/22/21  Future Office visit:    Next 5 appointments (look out 90 days)    Oct 25, 2021  1:30 PM  (Arrive by 1:15 PM)  SHORT with Lissy Moore MD  [facility] [place] ([facility] [place] ) [address]  [place] [address]  [phone]           Routing refill request to provider for review/approval because:

## 2021-08-11 ENCOUNTER — OFFICE VISIT (OUTPATIENT)
Dept: CHIROPRACTIC MEDICINE | Facility: OTHER | Age: 61
End: 2021-08-11
Attending: CHIROPRACTOR
Payer: COMMERCIAL

## 2021-08-11 DIAGNOSIS — M99.03 SEGMENTAL AND SOMATIC DYSFUNCTION OF LUMBAR REGION: Primary | ICD-10-CM

## 2021-08-11 DIAGNOSIS — M99.01 SEGMENTAL AND SOMATIC DYSFUNCTION OF CERVICAL REGION: ICD-10-CM

## 2021-08-11 DIAGNOSIS — M54.50 ACUTE BILATERAL LOW BACK PAIN WITHOUT SCIATICA: ICD-10-CM

## 2021-08-11 DIAGNOSIS — M99.02 SEGMENTAL AND SOMATIC DYSFUNCTION OF THORACIC REGION: ICD-10-CM

## 2021-08-11 PROCEDURE — 98941 CHIROPRACT MANJ 3-4 REGIONS: CPT | Mod: AT | Performed by: CHIROPRACTOR

## 2021-08-16 NOTE — PROGRESS NOTES
Subjective Finding:    Chief compalint: Patient presents with:  Back Pain  , Pain Scale: 4/10, Intensity: dull, Duration: 2 days, Radiating: no.    Date of injury:     Activities that the pain restricts:   Home/household/hobbies/social activities: yes.  Work duties: yes.  Sleep: yes.  Makes symptoms better: rest.  Makes symptoms worse: lumbar extension and lumbar flexion.  Have you seen anyone else for the symptoms? No.  Work related: no.  Automobile related injury: no.    Objective and Assessment:    Posture Analysis:   High shoulder: .  Head tilt: .  High iliac crest: .  Head carriage: neutral.  Thoracic Kyphosis: neutral.  Lumbar Lordosis: forward.    Lumbar Range of Motion: flexion decreased and extension decreased.  Cervical Range of Motion: extension decreased.  Thoracic Range of Motion: extension decreased.  Extremity Range of Motion: .    Palpation:   Quad lumb: bilateral, referred pain: no  T paraspinals: dull pain, no    Segmental dysfunction pre-treatment and treatment area: C4, T5, T6 and Sacrum.    Assessment post-treatment:  Cervical: ROM increased.  Thoracic: ROM increased.  Lumbar: ROM increased.    Comments: history of DDD in C and L spine.      Complicating Factors: .    Procedure(s):  CMT:  37492 Chiropractic manipulative treatment 3-4 regions performed   Cervical: Diversified, See above for level, Supine, Thoracic: Diversified, See above for level, Prone and Lumbar: Diversified, See above for level, Side posture    Modalities:  None performed this visit    Therapeutic procedures:  None    Plan:  Treatment plan: PRN.  Instructed patient: stretch as instructed at visit.  Short term goals: increase ROM.  Long term goals: increase ADL.  Prognosis: good.

## 2021-08-17 DIAGNOSIS — M62.830 BACK MUSCLE SPASM: ICD-10-CM

## 2021-08-17 DIAGNOSIS — M54.50 LUMBAR BACK PAIN: ICD-10-CM

## 2021-08-17 DIAGNOSIS — R11.0 NAUSEA: ICD-10-CM

## 2021-08-17 DIAGNOSIS — F11.90 CHRONIC, CONTINUOUS USE OF OPIOIDS: ICD-10-CM

## 2021-08-17 DIAGNOSIS — F41.1 GAD (GENERALIZED ANXIETY DISORDER): ICD-10-CM

## 2021-08-17 DIAGNOSIS — M51.379 DEGENERATION OF LUMBAR OR LUMBOSACRAL INTERVERTEBRAL DISC: ICD-10-CM

## 2021-08-17 DIAGNOSIS — G89.4 CHRONIC PAIN SYNDROME: ICD-10-CM

## 2021-08-18 DIAGNOSIS — G43.109 MIGRAINE WITH AURA AND WITHOUT STATUS MIGRAINOSUS, NOT INTRACTABLE: ICD-10-CM

## 2021-08-18 RX ORDER — HYDROCODONE BITARTRATE AND ACETAMINOPHEN 10; 325 MG/1; MG/1
TABLET ORAL
Qty: 60 TABLET | Refills: 0 | Status: SHIPPED | OUTPATIENT
Start: 2021-08-18 | End: 2021-09-15

## 2021-08-18 RX ORDER — BACLOFEN 20 MG/1
TABLET ORAL
Qty: 120 TABLET | Refills: 0 | Status: SHIPPED | OUTPATIENT
Start: 2021-08-18 | End: 2021-09-15

## 2021-08-18 RX ORDER — HYDROXYZINE PAMOATE 50 MG/1
CAPSULE ORAL
Qty: 120 CAPSULE | Refills: 0 | Status: SHIPPED | OUTPATIENT
Start: 2021-08-18 | End: 2021-09-01

## 2021-08-18 RX ORDER — DRONABINOL 2.5 MG/1
CAPSULE ORAL
Qty: 60 CAPSULE | Refills: 0 | Status: SHIPPED | OUTPATIENT
Start: 2021-08-18 | End: 2021-09-15

## 2021-08-18 RX ORDER — MORPHINE SULFATE 15 MG/1
TABLET, FILM COATED, EXTENDED RELEASE ORAL
Qty: 90 TABLET | Refills: 0 | Status: SHIPPED | OUTPATIENT
Start: 2021-08-18 | End: 2021-09-15

## 2021-08-18 RX ORDER — GABAPENTIN 300 MG/1
CAPSULE ORAL
Qty: 270 CAPSULE | Refills: 0 | Status: SHIPPED | OUTPATIENT
Start: 2021-08-18 | End: 2021-09-15

## 2021-08-18 NOTE — TELEPHONE ENCOUNTER
tizanidine      Last Written Prescription Date:  Not on current list  Last Fill Quantity: 0,   # refills: 0  Last Office Visit: 7/22/21  Future Office visit:    Next 5 appointments (look out 90 days)    Aug 18, 2021  2:50 PM  Return Visit with Shamar Escamilla DC  [facility] ([facility] ) [address] [place] [address]  [phone]   Oct 25, 2021  1:30 PM  (Arrive by 1:15 PM)  SHORT with Lissy Moore MD  [facility] ([facility] ) [address] [facility] [address]  [phone]         marinol 7/12/21 #60  Baclofen 7/12/21 #120  Hydrocodone 7/19/21 #60  Morphine 7/19/21 #90  Gabapentin 7/12/21 #270  Vistaril 7/12/21 #120

## 2021-08-18 NOTE — TELEPHONE ENCOUNTER
Imitrex       Last Written Prescription Date:  7/28/2021  Last Fill Quantity: 9,   # refills: 0  Last Office Visit: 7/22/2021  Future Office visit:    Next 5 appointments (look out 90 days)    Oct 25, 2021  1:30 PM  (Arrive by 1:15 PM)  SHORT with Lissy Moore MD  [facility] ([facility] [place] ) [address]  [place] [address]  [phone]

## 2021-08-19 ENCOUNTER — OFFICE VISIT (OUTPATIENT)
Dept: CHIROPRACTIC MEDICINE | Facility: OTHER | Age: 61
End: 2021-08-19
Attending: CHIROPRACTOR
Payer: COMMERCIAL

## 2021-08-19 DIAGNOSIS — M99.03 SEGMENTAL AND SOMATIC DYSFUNCTION OF LUMBAR REGION: Primary | ICD-10-CM

## 2021-08-19 DIAGNOSIS — M54.50 ACUTE BILATERAL LOW BACK PAIN WITHOUT SCIATICA: ICD-10-CM

## 2021-08-19 DIAGNOSIS — M99.02 SEGMENTAL AND SOMATIC DYSFUNCTION OF THORACIC REGION: ICD-10-CM

## 2021-08-19 DIAGNOSIS — M99.01 SEGMENTAL AND SOMATIC DYSFUNCTION OF CERVICAL REGION: ICD-10-CM

## 2021-08-19 PROCEDURE — 98941 CHIROPRACT MANJ 3-4 REGIONS: CPT | Mod: AT | Performed by: CHIROPRACTOR

## 2021-08-19 RX ORDER — SUMATRIPTAN 50 MG/1
TABLET, FILM COATED ORAL
Qty: 9 TABLET | Refills: 0 | Status: SHIPPED | OUTPATIENT
Start: 2021-08-19 | End: 2021-09-08

## 2021-08-19 NOTE — PROGRESS NOTES

## 2021-08-20 DIAGNOSIS — M62.830 BACK MUSCLE SPASM: ICD-10-CM

## 2021-08-23 RX ORDER — METHOCARBAMOL 500 MG/1
TABLET, FILM COATED ORAL
Qty: 90 TABLET | Refills: 0 | Status: SHIPPED | OUTPATIENT
Start: 2021-08-23 | End: 2021-09-15

## 2021-08-23 NOTE — TELEPHONE ENCOUNTER
methocarbamol (ROBAXIN) 500 MG tablet  Last Written Prescription Date:  7/29/21  Last Fill Quantity: 90,  # refills: 0   Last office visit: 7/22/2021   Future Office Visit:   Next 5 appointments (look out 90 days)    Aug 24, 2021  2:00 PM  Return Visit with Shamar Escamilla DC  [facility] ([facility] ) [address] [place] [address]  [phone]   Aug 26, 2021  2:00 PM  Return Visit with Shamar Escamilla DC  [facility] ([facility] ) [address] [place] [address]  [phone]   Oct 25, 2021  1:30 PM  (Arrive by 1:15 PM)  SHORT with Lissy Moore MD  [facility] ([facility] ) [address] [facility] [address]  [phone]           Routing refill request to provider for review/approval because:  Drug not on the G refill protocol

## 2021-08-25 ENCOUNTER — OFFICE VISIT (OUTPATIENT)
Dept: CHIROPRACTIC MEDICINE | Facility: OTHER | Age: 61
End: 2021-08-25
Attending: CHIROPRACTOR
Payer: COMMERCIAL

## 2021-08-25 DIAGNOSIS — M99.02 SEGMENTAL AND SOMATIC DYSFUNCTION OF THORACIC REGION: ICD-10-CM

## 2021-08-25 DIAGNOSIS — M54.50 ACUTE BILATERAL LOW BACK PAIN WITHOUT SCIATICA: ICD-10-CM

## 2021-08-25 DIAGNOSIS — M99.01 SEGMENTAL AND SOMATIC DYSFUNCTION OF CERVICAL REGION: ICD-10-CM

## 2021-08-25 DIAGNOSIS — M99.03 SEGMENTAL AND SOMATIC DYSFUNCTION OF LUMBAR REGION: Primary | ICD-10-CM

## 2021-08-25 PROCEDURE — 98941 CHIROPRACT MANJ 3-4 REGIONS: CPT | Mod: AT | Performed by: CHIROPRACTOR

## 2021-08-25 NOTE — PROGRESS NOTES
Subjective Finding:    Chief compalint: Patient presents with:  Back Pain  , Pain Scale: 4/10, Intensity: dull, Duration: 2 days, Radiating: no.    Date of injury:     Activities that the pain restricts:   Home/household/hobbies/social activities: yes.  Work duties: yes.  Sleep: yes.  Makes symptoms better: rest.  Makes symptoms worse: lumbar extension and lumbar flexion.  Have you seen anyone else for the symptoms? No.  Work related: no.  Automobile related injury: no.    Objective and Assessment:    Posture Analysis:   High shoulder: .  Head tilt: .  High iliac crest: .  Head carriage: neutral.  Thoracic Kyphosis: neutral.  Lumbar Lordosis: forward.    Lumbar Range of Motion: flexion decreased and extension decreased.  Cervical Range of Motion: extension decreased.  Thoracic Range of Motion: extension decreased.  Extremity Range of Motion: .    Palpation:   Quad lumb: bilateral, referred pain: no  T paraspinals: dull pain, no    Segmental dysfunction pre-treatment and treatment area: C4, T5, T6 and Sacrum.    Assessment post-treatment:  Cervical: ROM increased.  Thoracic: ROM increased.  Lumbar: ROM increased.    Comments: history of DDD in C and L spine.      Complicating Factors: .    Procedure(s):  CMT:  99909 Chiropractic manipulative treatment 3-4 regions performed   Cervical: Diversified, See above for level, Supine, Thoracic: Diversified, See above for level, Prone and Lumbar: Diversified, See above for level, Side posture    Modalities:  None performed this visit    Therapeutic procedures:  None    Plan:  Treatment plan: PRN.  Instructed patient: stretch as instructed at visit.  Short term goals: increase ROM.  Long term goals: increase ADL.  Prognosis: good.

## 2021-08-26 DIAGNOSIS — M25.541 ARTHRALGIA OF BOTH HANDS: ICD-10-CM

## 2021-08-26 DIAGNOSIS — J45.41 MODERATE PERSISTENT ASTHMA WITH EXACERBATION: ICD-10-CM

## 2021-08-26 DIAGNOSIS — M25.542 ARTHRALGIA OF BOTH HANDS: ICD-10-CM

## 2021-08-30 RX ORDER — GUAIFENESIN AND DEXTROMETHORPHAN HYDROBROMIDE 600; 30 MG/1; MG/1
1 TABLET, EXTENDED RELEASE ORAL EVERY 12 HOURS
Qty: 60 TABLET | Refills: 0 | Status: SHIPPED | OUTPATIENT
Start: 2021-08-30 | End: 2021-10-07

## 2021-08-30 NOTE — TELEPHONE ENCOUNTER
mucinex      Last Written Prescription Date:  7/26/21  Last Fill Quantity: 60,   # refills: 0  Last Office Visit: 7/22/21  Future Office visit:    Next 5 appointments (look out 90 days)    Sep 01, 2021  2:40 PM  Return Visit with Shamar Escamilla DC  [facility] [place] ([facility] ) [address] [place] [address]  [phone]   Oct 25, 2021  1:30 PM  (Arrive by 1:15 PM)  SHORT with Lissy Moore MD  [facility] ([facility] ) [address]  [facility] [address]  [phone]           voltren 7/28/21 #60

## 2021-08-31 ENCOUNTER — OFFICE VISIT (OUTPATIENT)
Dept: CHIROPRACTIC MEDICINE | Facility: OTHER | Age: 61
End: 2021-08-31
Attending: CHIROPRACTOR
Payer: COMMERCIAL

## 2021-08-31 DIAGNOSIS — M54.50 CHRONIC LEFT-SIDED LOW BACK PAIN WITHOUT SCIATICA: ICD-10-CM

## 2021-08-31 DIAGNOSIS — M99.03 SEGMENTAL AND SOMATIC DYSFUNCTION OF LUMBAR REGION: Primary | ICD-10-CM

## 2021-08-31 DIAGNOSIS — G89.29 CHRONIC LEFT-SIDED LOW BACK PAIN WITHOUT SCIATICA: ICD-10-CM

## 2021-08-31 DIAGNOSIS — M54.50 ACUTE BILATERAL LOW BACK PAIN WITHOUT SCIATICA: ICD-10-CM

## 2021-08-31 DIAGNOSIS — M99.02 SEGMENTAL AND SOMATIC DYSFUNCTION OF THORACIC REGION: ICD-10-CM

## 2021-08-31 DIAGNOSIS — F41.1 GAD (GENERALIZED ANXIETY DISORDER): ICD-10-CM

## 2021-08-31 DIAGNOSIS — M99.01 SEGMENTAL AND SOMATIC DYSFUNCTION OF CERVICAL REGION: ICD-10-CM

## 2021-08-31 PROCEDURE — 98941 CHIROPRACT MANJ 3-4 REGIONS: CPT | Mod: AT | Performed by: CHIROPRACTOR

## 2021-09-01 RX ORDER — IBUPROFEN 600 MG/1
TABLET, FILM COATED ORAL
Qty: 120 TABLET | Refills: 0 | Status: SHIPPED | OUTPATIENT
Start: 2021-09-01 | End: 2021-10-15

## 2021-09-01 RX ORDER — HYDROXYZINE PAMOATE 50 MG/1
CAPSULE ORAL
Qty: 120 CAPSULE | Refills: 1 | Status: SHIPPED | OUTPATIENT
Start: 2021-09-01 | End: 2021-10-14

## 2021-09-01 NOTE — TELEPHONE ENCOUNTER
Ibuprofen       Last Written Prescription Date:  7/26/2021  Last Fill Quantity: 120,   # refills: 0  Last Office Visit: 7/22/2021  Future Office visit:    Next 5 appointments (look out 90 days)    Sep 02, 2021  2:40 PM  Return Visit with Shamar Escamilla DC  [facility] [place] [facility] ([facility]  [place] ) [address] [place] [address]  [phone]   Oct 25, 2021  1:30 PM  (Arrive by 1:15 PM)  SHORT with Lissy Moore MD  [facility] [place] ([facility] ) [address]  [place] [address]  [phone]

## 2021-09-01 NOTE — PROGRESS NOTES

## 2021-09-02 ENCOUNTER — OFFICE VISIT (OUTPATIENT)
Dept: CHIROPRACTIC MEDICINE | Facility: OTHER | Age: 61
End: 2021-09-02
Attending: CHIROPRACTOR
Payer: COMMERCIAL

## 2021-09-02 DIAGNOSIS — M99.03 SEGMENTAL AND SOMATIC DYSFUNCTION OF LUMBAR REGION: Primary | ICD-10-CM

## 2021-09-02 DIAGNOSIS — M54.50 ACUTE BILATERAL LOW BACK PAIN WITHOUT SCIATICA: ICD-10-CM

## 2021-09-02 DIAGNOSIS — M99.01 SEGMENTAL AND SOMATIC DYSFUNCTION OF CERVICAL REGION: ICD-10-CM

## 2021-09-02 DIAGNOSIS — M99.02 SEGMENTAL AND SOMATIC DYSFUNCTION OF THORACIC REGION: ICD-10-CM

## 2021-09-02 PROCEDURE — 98941 CHIROPRACT MANJ 3-4 REGIONS: CPT | Mod: AT | Performed by: CHIROPRACTOR

## 2021-09-07 DIAGNOSIS — G43.109 MIGRAINE WITH AURA AND WITHOUT STATUS MIGRAINOSUS, NOT INTRACTABLE: ICD-10-CM

## 2021-09-08 RX ORDER — SUMATRIPTAN 50 MG/1
TABLET, FILM COATED ORAL
Qty: 9 TABLET | Refills: 0 | Status: SHIPPED | OUTPATIENT
Start: 2021-09-08 | End: 2021-10-07

## 2021-09-08 NOTE — TELEPHONE ENCOUNTER
Imitrex      Last Written Prescription Date:  8.19.21  Last Fill Quantity: #9,   # refills: 0-  Last Office Visit: 7.22.21  Future Office visit:    Next 5 appointments (look out 90 days)    Oct 25, 2021  1:30 PM  (Arrive by 1:15 PM)  SHORT with Lissy Moore MD  [facility] [place] ([facility] [place] ) [address]  [place] [address]  [phone]           Routing refill request to provider for review/approval because:

## 2021-09-09 ENCOUNTER — TELEPHONE (OUTPATIENT)
Dept: FAMILY MEDICINE | Facility: OTHER | Age: 61
End: 2021-09-09

## 2021-09-09 DIAGNOSIS — R73.03 PREDIABETES: Primary | ICD-10-CM

## 2021-09-09 RX ORDER — LANCING DEVICE
EACH MISCELLANEOUS
Qty: 1 EACH | Refills: 0 | Status: SHIPPED | OUTPATIENT
Start: 2021-09-09

## 2021-09-09 NOTE — TELEPHONE ENCOUNTER
Pt called, reports that he is prediabetic and has been having some symptoms that he feels are related to either low blood sugar or low blood pressure. Pt is requesting an order be sent to pharmacy for a blood glucose monitor. Pended. Please advise. Thank you!

## 2021-09-13 DIAGNOSIS — M54.50 LUMBAR BACK PAIN: ICD-10-CM

## 2021-09-13 DIAGNOSIS — M54.41 CHRONIC BILATERAL LOW BACK PAIN WITH BILATERAL SCIATICA: ICD-10-CM

## 2021-09-13 DIAGNOSIS — M54.42 CHRONIC BILATERAL LOW BACK PAIN WITH BILATERAL SCIATICA: ICD-10-CM

## 2021-09-13 DIAGNOSIS — F90.2 ADHD (ATTENTION DEFICIT HYPERACTIVITY DISORDER), COMBINED TYPE: ICD-10-CM

## 2021-09-13 DIAGNOSIS — I10 ESSENTIAL HYPERTENSION: ICD-10-CM

## 2021-09-13 DIAGNOSIS — G89.4 CHRONIC PAIN SYNDROME: ICD-10-CM

## 2021-09-13 DIAGNOSIS — K21.9 GASTROESOPHAGEAL REFLUX DISEASE, UNSPECIFIED WHETHER ESOPHAGITIS PRESENT: ICD-10-CM

## 2021-09-13 DIAGNOSIS — G89.29 CHRONIC BILATERAL LOW BACK PAIN WITH BILATERAL SCIATICA: ICD-10-CM

## 2021-09-13 DIAGNOSIS — R39.12 BENIGN PROSTATIC HYPERPLASIA WITH WEAK URINARY STREAM: ICD-10-CM

## 2021-09-13 DIAGNOSIS — N40.1 BENIGN PROSTATIC HYPERPLASIA WITH WEAK URINARY STREAM: ICD-10-CM

## 2021-09-13 DIAGNOSIS — M79.7 FIBROMYALGIA: ICD-10-CM

## 2021-09-13 DIAGNOSIS — J45.40 MODERATE PERSISTENT ASTHMA WITHOUT COMPLICATION: ICD-10-CM

## 2021-09-13 DIAGNOSIS — M51.379 DEGENERATION OF LUMBAR OR LUMBOSACRAL INTERVERTEBRAL DISC: ICD-10-CM

## 2021-09-13 DIAGNOSIS — F11.90 CHRONIC, CONTINUOUS USE OF OPIOIDS: ICD-10-CM

## 2021-09-13 DIAGNOSIS — R11.0 NAUSEA: ICD-10-CM

## 2021-09-13 DIAGNOSIS — M62.830 BACK MUSCLE SPASM: ICD-10-CM

## 2021-09-13 DIAGNOSIS — K59.00 CONSTIPATION, UNSPECIFIED CONSTIPATION TYPE: ICD-10-CM

## 2021-09-13 NOTE — PROGRESS NOTES

## 2021-09-14 ENCOUNTER — TELEPHONE (OUTPATIENT)
Dept: FAMILY MEDICINE | Facility: OTHER | Age: 61
End: 2021-09-14

## 2021-09-14 NOTE — TELEPHONE ENCOUNTER
[facility], Suzy called. Stating pt is reporting he has pain management referral at [facility] and [facility] is trying to set up a transportation ride, [facility] inquiring why pt wasn't referred to [place] for pain management.    [facility] appointment for pain management is 9/16/21 Per [facility] they don't give rides for over 60 miles unless approved. Writer reviewed referral with Suzy and Leida present pt must go to the [facility]. Per Suzy can close this encounter no further information needed.

## 2021-09-14 NOTE — TELEPHONE ENCOUNTER
Michael      Last Written Prescription Date:  8.10.21  Last Fill Quantity: #30,   # refills: 0  Last Office Visit: 3.24.21  Future Office visit:    Next 5 appointments (look out 90 days)    Oct 25, 2021  1:30 PM  (Arrive by 1:15 PM)  SHORT with Lissy Moore MD  [facility] ([facility] ) [address] [facility] [address]  [place] [address]  [phone]           Routing refill request to provider for review/approval because:  Drug not on the [facility], UMP or [facility] refill protocol or controlled substance

## 2021-09-15 DIAGNOSIS — J30.89 SEASONAL ALLERGIC RHINITIS DUE TO OTHER ALLERGIC TRIGGER: Primary | ICD-10-CM

## 2021-09-15 RX ORDER — METOPROLOL SUCCINATE 50 MG/1
TABLET, EXTENDED RELEASE ORAL
Qty: 30 TABLET | Refills: 4 | Status: SHIPPED | OUTPATIENT
Start: 2021-09-15 | End: 2021-12-29

## 2021-09-15 RX ORDER — AMOXICILLIN 250 MG
CAPSULE ORAL
Qty: 120 TABLET | Refills: 4 | Status: SHIPPED | OUTPATIENT
Start: 2021-09-15 | End: 2022-07-01

## 2021-09-15 RX ORDER — FAMOTIDINE 20 MG/1
TABLET, FILM COATED ORAL
Qty: 90 TABLET | Refills: 0 | Status: SHIPPED | OUTPATIENT
Start: 2021-09-15 | End: 2024-08-26

## 2021-09-15 RX ORDER — METHOCARBAMOL 500 MG/1
TABLET, FILM COATED ORAL
Qty: 270 TABLET | Refills: 0 | Status: SHIPPED | OUTPATIENT
Start: 2021-09-15 | End: 2021-12-29

## 2021-09-15 RX ORDER — CETIRIZINE HYDROCHLORIDE 10 MG/1
10 TABLET ORAL DAILY
Qty: 30 TABLET | Refills: 3 | Status: SHIPPED | OUTPATIENT
Start: 2021-09-15 | End: 2021-12-29

## 2021-09-15 RX ORDER — ALBUTEROL SULFATE 90 UG/1
AEROSOL, METERED RESPIRATORY (INHALATION)
Qty: 18 G | Refills: 4 | Status: SHIPPED | OUTPATIENT
Start: 2021-09-15 | End: 2022-06-01

## 2021-09-15 RX ORDER — BACLOFEN 20 MG/1
TABLET ORAL
Qty: 360 TABLET | Refills: 0 | Status: ON HOLD | OUTPATIENT
Start: 2021-09-15 | End: 2021-12-01

## 2021-09-15 RX ORDER — NORTRIPTYLINE HYDROCHLORIDE 50 MG/1
CAPSULE ORAL
Qty: 60 CAPSULE | Refills: 4 | Status: SHIPPED | OUTPATIENT
Start: 2021-09-15 | End: 2022-03-22

## 2021-09-15 RX ORDER — HYDROCODONE BITARTRATE AND ACETAMINOPHEN 10; 325 MG/1; MG/1
TABLET ORAL
Qty: 60 TABLET | Refills: 0 | Status: SHIPPED | OUTPATIENT
Start: 2021-09-15 | End: 2021-10-15

## 2021-09-15 RX ORDER — TAMSULOSIN HYDROCHLORIDE 0.4 MG/1
CAPSULE ORAL
Qty: 30 CAPSULE | Refills: 4 | Status: SHIPPED | OUTPATIENT
Start: 2021-09-15 | End: 2021-12-29

## 2021-09-15 RX ORDER — MORPHINE SULFATE 15 MG/1
TABLET, FILM COATED, EXTENDED RELEASE ORAL
Qty: 90 TABLET | Refills: 0 | Status: SHIPPED | OUTPATIENT
Start: 2021-09-15 | End: 2021-10-15

## 2021-09-15 RX ORDER — LISDEXAMFETAMINE DIMESYLATE 70 MG/1
CAPSULE ORAL
Qty: 30 CAPSULE | Refills: 0 | Status: SHIPPED | OUTPATIENT
Start: 2021-09-15 | End: 2021-10-15

## 2021-09-15 RX ORDER — DRONABINOL 2.5 MG/1
CAPSULE ORAL
Qty: 60 CAPSULE | Refills: 0 | Status: SHIPPED | OUTPATIENT
Start: 2021-09-15 | End: 2021-10-15

## 2021-09-15 RX ORDER — GABAPENTIN 300 MG/1
CAPSULE ORAL
Qty: 810 CAPSULE | Refills: 0 | Status: SHIPPED | OUTPATIENT
Start: 2021-09-15 | End: 2021-12-13

## 2021-09-15 NOTE — TELEPHONE ENCOUNTER
Controlled Substance Refill Request for morphine  Problem List Complete:    Yes    Last Written Prescription Date:  8/18/21  Last Fill Quantity: 90,   # refills: 0    THE MOST RECENT OFFICE VISIT MUST BE WITHIN THE PAST 3 MONTHS. AT LEAST ONE FACE TO FACE VISIT MUST OCCUR EVERY 6 MONTHS. ADDITIONAL VISITS CAN BE VIRTUAL.  (THIS STATEMENT SHOULD BE DELETED.)    Last Office Visit with [facility] primary care provider: 7/22/21    Future Office visit:   Next 5 appointments (look out 90 days)    Oct 25, 2021  1:30 PM  (Arrive by 1:15 PM)  SHORT with Lissy Moore MD  [facility] [place] ([facility] ) [address]  [place] [address]  [phone]          Controlled substance agreement:   Encounter-Level CSA - 05/23/2017:    Controlled Substance Agreement - Scan on 7/31/2018 12:12 PM: CONTROLLED SUBSTANCE AGREEMENT     Encounter-Level CSA - 09/18/2015:    Controlled Substance Agreement - Scan on 3/19/2020  7:52 AM: SCHEDULED MEDICATION USE AGREEMENT  Controlled Substance Agreement - Scan on 11/25/2015  2:25 PM: SCHEDULED MEDICATION USE AGREEMENT     Patient-Level CSA:    Controlled Substance Agreement - Opioid - Scan on 3/9/2021 11:32 AM: controlled substance agreement  Controlled Substance Agreement - Non - Opioid - Scan on 7/15/2019 10:03 AM: NON-OPIOID CONTROLLED SUBSTANCE AGREEMENT         Last Urine Drug Screen:   Pain Drug SCR UR W RPTD Meds   Date Value Ref Range Status   05/10/2017   Final    FINAL  (Note)  ====================================================================  TOXASSURE COMP DRUG ANALYSIS,UR  ====================================================================  Test                             Result       Flag       Units  Drug Present and Declared for Prescription Verification   Amphetamine                    >6061        EXPECTED   ng/mg creat    Amphetamine is available as a schedule II prescription drug.     Desmethyldiazepam              653          EXPECTED    ng/mg creat   Oxazepam                       905          EXPECTED   ng/mg creat   Temazepam                      999          EXPECTED   ng/mg creat    Desmethyldiazepam, oxazepam, and temazepam are expected    metabolites of diazepam. Desmethyldiazepam and oxazepam are also    expected metabolites of other drugs, including chlordiazepoxide,    prazepam, clorazepate, and halazepam. Oxazepam is an expected    metabolite of temazepam. Oxazepam and temazepam are also    available as scheduled prescription medications.     Hydrocodone                    2941         EXPECTED   ng/mg creat   Hydromorphone                  281          EXPECTED   ng/mg creat   Dihydrocodeine                 83           EXPECTED   ng/mg creat   Norhydrocodone                 2072         EXPECTED   ng/mg creat    Sources of hydrocodone include scheduled prescription    medications. Hydromorphone, dihydrocodeine and norhydrocodone are    expected metabolites of hydrocodone. Hydromorphone and    dihydrocodeine are also available as scheduled prescription    medications.     Fentanyl                       158          EXPECTED   ng/mg creat   Norfentanyl                    >606         EXPECTED   ng/mg creat    Source of fentanyl is a scheduled prescription medication,    including IV, patch, and transmucosal formulations. Norfentanyl    is an expected metabolite of fentanyl.     Acetaminophen                  PRESENT      EXPECTED    Drug Present not Declared for Prescription Verification   Gabapentin                     PRESENT      UNEXPECTED   Nortriptyline                  PRESENT      UNEXPECTED    Nortriptyline may be administered as a prescription drug; it is    also an expected metabolite of amitriptyline.     Trazodone                      PRESENT      UNEXPECTED   1,3 chlorophenyl piperazine    PRESENT      UNEXPECTED    1,3-chlorophenyl piperazine is an expected metabolite of    trazodone.     Diclofenac                      PRESENT      UNEXPECTED   Ibuprofen                      PRESENT      UNEXPECTED   Diphenhydramine                PRESENT      UNEXPECTED   Lidocaine                      PRESENT      UNEXPECTED   Propranolol                    PRESENT      UNEXPECTED  ====================================================================  Test                      Result    Flag   Units      Ref Range   Creatinine              165              mg/dL      >=20  ====================================================================  Declared Medications:  The flagging and interpretation on this report are based on the  following declared medications.  Unexpected results may arise from  inaccuracies in the declared medications.    **Note: The testing scope of this panel includes these medications:    Amphetamine  Amphetamine (Lisdexamfetamine)  Diazepam  Fentanyl  Hydrocodone (Norco)    **Note: The testing scope of this panel does not include small to  moderate amounts of these reported medications:    Acetaminophen (Norco)  ====================================================================  For clinical consultation, please call [phone].  ====================================================================  Analysis performed by [employer] [place] [address]     , No results found for: COMDAT, No results found for: THC13, PCP13, COC13, MAMP13, OPI13, AMP13, BZO13, TCA13, MTD13, BAR13, OXY13, PPX13, BUP13     Processing:  Rx to be electronically transmitted to pharmacy by provider     [email or web address]   checked in past 3 months?      Dronabinol       Last Written Prescription Date:  8/18/2160  Last Fill Quantity: 60,   # refills: 0      zanaflex      Last Written Prescription Date:  8/18/21  Last Fill Quantity: 90,   # refills: 0      baclofen      Last Written Prescription Date:  8/18/21  Last Fill Quantity: 120,   # refills: 0    Gabapentin       Last Written Prescription Date:   8/18/21  Last Fill Quantity: 270,   # refills: 0    norco      Last Written Prescription Date:  8/18/21  Last Fill Quantity: 60,   # refills: 0      methocarbamol       Last Written Prescription Date:  8/23/21  Last Fill Quantity: 90,   # refills: 0

## 2021-09-16 ENCOUNTER — OFFICE VISIT (OUTPATIENT)
Dept: ANESTHESIOLOGY | Facility: CLINIC | Age: 61
End: 2021-09-16
Payer: COMMERCIAL

## 2021-09-16 VITALS — SYSTOLIC BLOOD PRESSURE: 156 MMHG | OXYGEN SATURATION: 94 % | HEART RATE: 87 BPM | DIASTOLIC BLOOD PRESSURE: 96 MMHG

## 2021-09-16 DIAGNOSIS — G89.29 CHRONIC MIDLINE LOW BACK PAIN WITH SCIATICA, SCIATICA LATERALITY UNSPECIFIED: Primary | ICD-10-CM

## 2021-09-16 DIAGNOSIS — M54.40 CHRONIC MIDLINE LOW BACK PAIN WITH SCIATICA, SCIATICA LATERALITY UNSPECIFIED: Primary | ICD-10-CM

## 2021-09-16 PROCEDURE — 99202 OFFICE O/P NEW SF 15 MIN: CPT | Performed by: ANESTHESIOLOGY

## 2021-09-16 ASSESSMENT — PAIN SCALES - GENERAL: PAINLEVEL: EXTREME PAIN (8)

## 2021-09-16 NOTE — NURSING NOTE
Patient presents with:  Consult: ANNE NEW, RM 12, pt reports 8/10 painin  his legs, back, feet and buttox      Extreme Pain (8)     Pain Medications     Analgesics Other Refills Start End     acetaminophen (TYLENOL) 325 MG tablet    3 5/25/2021     Sig: TAKE 2 TABLETS BY MOUTH EVERY 4 HOURS AS NEEDED FOR MILD PAIN    Class: E-Prescribe    Opioid Combinations Refills Start End     HYDROcodone-acetaminophen (NORCO)  MG per tablet    0 9/15/2021     Sig: TAKE 1 TABLET BY MOUTH 2 TIMES DAILY AS NEEDED FOR SEVERE PAIN    Class: E-Prescribe    Earliest Fill Date: 9/15/2021    Prior authorization: Closed - Prior Authorization not required for patient/medication    Salicylates Refills Start End     aspirin (ASPIRIN LOW DOSE) 81 MG chewable tablet    3 12/22/2020     Sig: CHEW AND SWALLOW 1 TABLET BY MOUTH DAILY    Class: E-Prescribe    Opioid Agonists Refills Start End     morphine (MS CONTIN) 15 MG CR tablet    0 9/15/2021     Sig: TAKE 1 TABLET BY MOUTH EVERY 8 HOURS    Class: E-Prescribe    Earliest Fill Date: 9/15/2021    Prior authorization: Closed - Prior Authorization not required for patient/medication          What medications are you using for pain?   Hydrocodone, morphine tablet    (New patients only) Have you been seen by another pain clinic/ provider? N/A      Pt doesn't like fentanyl patch but liked the morphine tablet.    Pt is looking for better pain management       Fran Rhodes, EMT

## 2021-09-16 NOTE — PROGRESS NOTES
"Patient was roomed and I entered the clinic exam room to complete the clinical encounter.  As regards started the patient complained of back pain and stated that his morphine dose was reduced.  He explained that he used to be on fentanyl patches 75 mcg.  Subsequently he was rotated to morphine ER and has been on a taper of morphine by his primary care provider.  The patient states that he has not had difficulties with early refills, urine drug screens in the past.    Notably the patient is on a combination of morphine ER, Marinol, hydrocodone which has been prescribed by his primary care provider's clinic.    It is unclear to me as to the exact reason why the morphine taper was undertaken.  However in today's clinic visit from the beginning the patient was speaking extremely loudly.  The clinic staff was concerned hearing the loud conversation by the patient in the examination room and one of the clinic staff stepped in to join me for the clinic visit.    The patient made statements including- \" Dr. how much money do you make for today?\"  He also made allegations that his medications were being tapered because other people had abused oral medications.     The patient stated that he is interested in undergoing an intrathecal drug delivery system implanted.  He would like to continue oral medications with the pump implanted.  And he feels that his pharmacy should be able to refill him close to home for his ongoing pump therapies.  It is noted during the clinic visit that the patient lives 4 hours away and cannot follow-up in the clinic for drug refills.    I discussed with the patient that currently we are offering this therapy to cancer patients in cancer survivors.  Given his core morbidities he may have difficulties with the intrathecal pump.  He has pain in the neck and the lower back.  We do not offer home-based refills and he would  have to travel to our clinic often.    Keeping this in mind I have discussed " with the patient to explore the option of [facility].  They have a home refill option and have pain psychologist onsite

## 2021-09-16 NOTE — LETTER
"9/16/2021       RE: Joshua Barron  [address] [place] [address]     Dear Colleague,    Thank you for referring your patient, [place] Barron, to the [facility] [place] at [facility]. Please see a copy of my visit note below.    Patient was roomed and I entered the clinic exam room to complete the clinical encounter.  As regards started the patient complained of back pain and stated that his morphine dose was reduced.  He explained that he used to be on fentanyl patches 75 mcg.  Subsequently he was rotated to morphine ER and has been on a taper of morphine by his primary care provider.  The patient states that he has not had difficulties with early refills, urine drug screens in the past.    Notably the patient is on a combination of morphine ER, Marinol, hydrocodone which has been prescribed by his primary care provider's clinic.    It is unclear to me as to the exact reason why the morphine taper was undertaken.  However in today's clinic visit from the beginning the patient was speaking extremely loudly.  The clinic staff was concerned hearing the loud conversation by the patient in the examination room and one of the clinic staff stepped in to join me for the clinic visit.    The patient made statements including- \" Dr. how much money do you make for today?\"  He also made allegations that his medications were being tapered because other people had abused oral medications.     The patient stated that he is interested in undergoing an intrathecal drug delivery system implanted.  He would like to continue oral medications with the pump implanted.  And he feels that his pharmacy should be able to refill him close to home for his ongoing pump therapies.  It is noted during the clinic visit that the patient lives 4 hours away and cannot follow-up in the clinic for drug refills.    I discussed with the patient " that currently we are offering this therapy to cancer patients in cancer survivors.  Given his core morbidities he may have difficulties with the intrathecal pump.  He has pain in the neck and the lower back.  We do not offer home-based refills and he would  have to travel to our clinic often.    Keeping this in mind I have discussed with the patient to explore the option of [facility] pain clinics.  They have a home refill option and have pain psychologist onsite      Again, thank you for allowing me to participate in the care of your patient.      Sincerely,    Heather David MD

## 2021-09-17 DIAGNOSIS — F90.2 ADHD (ATTENTION DEFICIT HYPERACTIVITY DISORDER), COMBINED TYPE: ICD-10-CM

## 2021-09-20 RX ORDER — LISDEXAMFETAMINE DIMESYLATE 70 MG/1
CAPSULE ORAL
Qty: 30 CAPSULE | Refills: 0 | OUTPATIENT
Start: 2021-09-20

## 2021-09-22 ENCOUNTER — TELEPHONE (OUTPATIENT)
Dept: FAMILY MEDICINE | Facility: OTHER | Age: 61
End: 2021-09-22

## 2021-09-22 ENCOUNTER — PATIENT OUTREACH (OUTPATIENT)
Dept: CARE COORDINATION | Facility: OTHER | Age: 61
End: 2021-09-22

## 2021-09-22 NOTE — TELEPHONE ENCOUNTER
Nazia at [facility] called [phone]    She is looking for a referral that patient said was placed to [facility] in [place].  Please let her know if patient has a referral for this .  It is for a morphine pump. Pt is not able to receive treatment closer to home he states.

## 2021-09-22 NOTE — PROGRESS NOTES
Clinic Care Coordination Contact  Care Team Conversations    Received voicemail from patient wanting to fill his Vyvanse. Rx was sent 9/15/21 to [facility]. Verified with [facility] pharmacy that Rx was received.   [facility] has received rx. Notified patient.     Paris Holder, RN-BSN  Care Coordination, Behavioral Health

## 2021-09-23 ENCOUNTER — TELEPHONE (OUTPATIENT)
Dept: FAMILY MEDICINE | Facility: OTHER | Age: 61
End: 2021-09-23

## 2021-09-23 DIAGNOSIS — F11.90 CHRONIC, CONTINUOUS USE OF OPIOIDS: Primary | ICD-10-CM

## 2021-09-23 DIAGNOSIS — Z98.1 STATUS POST LUMBAR SPINAL FUSION: ICD-10-CM

## 2021-09-23 DIAGNOSIS — M48.02 SPINAL STENOSIS IN CERVICAL REGION: ICD-10-CM

## 2021-09-23 NOTE — TELEPHONE ENCOUNTER
Nazia called back this morning. After review pt chart and information it looks like pt was seen at the [place] campus on 9/16/21 as a self referral to Dr David. In that office note it was noted he recommended being referred to [facility] pain clinic as an option since he is unable to travel to the [facility]. Nazia noted all the information and said she would be contacting the pt to see if he wants to proceed with that referral or not. I don't see that it was done yet by Dr David. She will inform us later if he wants that referral or not.

## 2021-09-23 NOTE — TELEPHONE ENCOUNTER
Call from patient requesting a referral to [facility].    Patient can be reached at [phone] for any questions.

## 2021-09-23 NOTE — TELEPHONE ENCOUNTER
He was seen by DR David at the  [facility] and given the name of [facility] pain clinics at that visit on 9-16-21. I can send a referral for them if needed

## 2021-09-28 DIAGNOSIS — M25.542 ARTHRALGIA OF BOTH HANDS: ICD-10-CM

## 2021-09-28 DIAGNOSIS — R52 PAIN: ICD-10-CM

## 2021-09-28 DIAGNOSIS — M25.541 ARTHRALGIA OF BOTH HANDS: ICD-10-CM

## 2021-09-28 NOTE — TELEPHONE ENCOUNTER
Diclofenac  Last Written Prescription Date: 8/30/21  Last Fill Quantity: 60 # of Refills: 0  Last Office Visit: 7/22/21    Tylenol  Last Written Prescription Date: 5/25/21  Last Fill Quantity: 200 # of Refills: 3  Last Office Visit: 7/22/21

## 2021-09-29 RX ORDER — ACETAMINOPHEN 325 MG/1
TABLET ORAL
Qty: 200 TABLET | Refills: 0 | Status: SHIPPED | OUTPATIENT
Start: 2021-09-29 | End: 2021-12-02

## 2021-10-03 ENCOUNTER — HEALTH MAINTENANCE LETTER (OUTPATIENT)
Age: 61
End: 2021-10-03

## 2021-10-13 DIAGNOSIS — R11.0 NAUSEA: ICD-10-CM

## 2021-10-13 DIAGNOSIS — F90.2 ADHD (ATTENTION DEFICIT HYPERACTIVITY DISORDER), COMBINED TYPE: ICD-10-CM

## 2021-10-13 DIAGNOSIS — G89.4 CHRONIC PAIN SYNDROME: ICD-10-CM

## 2021-10-13 DIAGNOSIS — F11.90 CHRONIC, CONTINUOUS USE OF OPIOIDS: ICD-10-CM

## 2021-10-13 DIAGNOSIS — F41.1 GAD (GENERALIZED ANXIETY DISORDER): ICD-10-CM

## 2021-10-13 DIAGNOSIS — M54.50 CHRONIC LEFT-SIDED LOW BACK PAIN WITHOUT SCIATICA: ICD-10-CM

## 2021-10-13 DIAGNOSIS — G89.29 CHRONIC LEFT-SIDED LOW BACK PAIN WITHOUT SCIATICA: ICD-10-CM

## 2021-10-13 DIAGNOSIS — M51.379 DEGENERATION OF LUMBAR OR LUMBOSACRAL INTERVERTEBRAL DISC: ICD-10-CM

## 2021-10-14 ENCOUNTER — OFFICE VISIT (OUTPATIENT)
Dept: CHIROPRACTIC MEDICINE | Facility: OTHER | Age: 61
End: 2021-10-14
Attending: CHIROPRACTOR
Payer: COMMERCIAL

## 2021-10-14 ENCOUNTER — TELEPHONE (OUTPATIENT)
Dept: FAMILY MEDICINE | Facility: OTHER | Age: 61
End: 2021-10-14

## 2021-10-14 DIAGNOSIS — M99.01 SEGMENTAL AND SOMATIC DYSFUNCTION OF CERVICAL REGION: Primary | ICD-10-CM

## 2021-10-14 DIAGNOSIS — M54.2 CERVICALGIA: ICD-10-CM

## 2021-10-14 DIAGNOSIS — M99.02 SEGMENTAL AND SOMATIC DYSFUNCTION OF THORACIC REGION: ICD-10-CM

## 2021-10-14 DIAGNOSIS — M99.03 SEGMENTAL AND SOMATIC DYSFUNCTION OF LUMBAR REGION: ICD-10-CM

## 2021-10-14 PROCEDURE — 98941 CHIROPRACT MANJ 3-4 REGIONS: CPT | Mod: AT | Performed by: CHIROPRACTOR

## 2021-10-14 RX ORDER — HYDROXYZINE PAMOATE 50 MG/1
CAPSULE ORAL
Qty: 120 CAPSULE | Refills: 0 | Status: SHIPPED | OUTPATIENT
Start: 2021-10-14 | End: 2021-11-12

## 2021-10-14 NOTE — TELEPHONE ENCOUNTER
"Patient calling and states he has done research on his symptoms and thinks he has EPI, exocrine pancreatic insufficiency. Patient states his symptoms have been going on 2 1/2 weeks. Patient states he has pain in his stomach, gas and \"gurggling\" noises. Patient states he is not digesting his food. Advised patient to follow up with PCP. Patient has an appt schedule on 10/25.  Patient was to have a swallowing video, but missed his appointment. Patient requesting to reschedule this. This writer transferred patient to schedule this appointment.  "

## 2021-10-15 RX ORDER — IBUPROFEN 600 MG/1
TABLET, FILM COATED ORAL
Qty: 120 TABLET | Refills: 0 | Status: SHIPPED | OUTPATIENT
Start: 2021-10-15 | End: 2021-12-02

## 2021-10-15 RX ORDER — LISDEXAMFETAMINE DIMESYLATE 70 MG/1
CAPSULE ORAL
Qty: 30 CAPSULE | Refills: 0 | Status: SHIPPED | OUTPATIENT
Start: 2021-10-21 | End: 2021-10-26

## 2021-10-15 RX ORDER — DRONABINOL 2.5 MG/1
CAPSULE ORAL
Qty: 60 CAPSULE | Refills: 0 | Status: SHIPPED | OUTPATIENT
Start: 2021-10-15 | End: 2021-11-10

## 2021-10-15 RX ORDER — MORPHINE SULFATE 15 MG/1
TABLET, FILM COATED, EXTENDED RELEASE ORAL
Qty: 90 TABLET | Refills: 0 | Status: SHIPPED | OUTPATIENT
Start: 2021-10-15 | End: 2021-11-10

## 2021-10-15 RX ORDER — HYDROCODONE BITARTRATE AND ACETAMINOPHEN 10; 325 MG/1; MG/1
TABLET ORAL
Qty: 60 TABLET | Refills: 0 | Status: SHIPPED | OUTPATIENT
Start: 2021-10-15 | End: 2021-11-10

## 2021-10-15 NOTE — TELEPHONE ENCOUNTER
Vyvanse - RX is dated 10.21.21      Last Written Prescription Date:  9.22.21  Last Fill Quantity: #30,   # refills: 0  Last Office Visit: 3.24.21  Future Office visit:    Next 5 appointments (look out 90 days)    Oct 19, 2021  1:50 PM  Return Visit with Shamar Escamilla DC  [facility] [place] [facility] ([facility] ) [address]  [place] [address]  [phone]   Oct 25, 2021  1:30 PM  (Arrive by 1:15 PM)  SHORT with Lissy Moore MD  [facility] ) [address] [address]  [phone]   Oct 26, 2021  3:40 PM  (Arrive by 3:25 PM)  Return Visit with Laquita Fernandez MD  [facility] ([facility] ) [address]  [facility] [id number] [phone]           Routing refill request to provider for review/approval because:  Drug not on the [facility], [facility] or [facility] refill protocol or controlled substance

## 2021-10-15 NOTE — TELEPHONE ENCOUNTER
dronabinol (MARINOL) 2.5 MG capsule  Last Written Prescription Date:  9/15/21  Last Fill Quantity: 60,  # refills: 0     HYDROcodone-acetaminophen (NORCO)  MG per tablet  Last Written Prescription Date:  9/15/21  Last Fill Quantity: 60,  # refills: 0     ibuprofen (IBU) 600 MG tablet  Last Written Prescription Date:  9/1/21  Last Fill Quantity: 120,  # refills: 0     morphine (MS CONTIN) 15 MG CR tablet  Last Written Prescription Date:  9/15/21  Last Fill Quantity: 90,  # refills: 0     Last office visit: 7/22/2021   Future Office Visit:   Next 5 appointments (look out 90 days)    Oct 19, 2021  1:50 PM  Return Visit with Shamar Escamilla DC  [facility] [place] [facility] ([facility]  [place] ) [address] [place] [address]  [place] [address]  [phone]     Routing refill request to provider for review/approval because:  Drug not on the [facility] refill protocol

## 2021-10-18 NOTE — PROGRESS NOTES

## 2021-10-26 ENCOUNTER — VIRTUAL VISIT (OUTPATIENT)
Dept: PSYCHIATRY | Facility: OTHER | Age: 61
End: 2021-10-26
Attending: PSYCHIATRY & NEUROLOGY
Payer: COMMERCIAL

## 2021-10-26 DIAGNOSIS — F31.0 BIPOLAR AFFECTIVE DISORDER, CURRENT EPISODE HYPOMANIC (H): ICD-10-CM

## 2021-10-26 DIAGNOSIS — G47.00 PERSISTENT INSOMNIA: ICD-10-CM

## 2021-10-26 DIAGNOSIS — Z79.899 ENCOUNTER FOR LONG-TERM (CURRENT) USE OF MEDICATIONS: Primary | ICD-10-CM

## 2021-10-26 DIAGNOSIS — F90.2 ADHD (ATTENTION DEFICIT HYPERACTIVITY DISORDER), COMBINED TYPE: ICD-10-CM

## 2021-10-26 PROCEDURE — G0463 HOSPITAL OUTPT CLINIC VISIT: HCPCS | Mod: TEL,95

## 2021-10-26 PROCEDURE — 99213 OFFICE O/P EST LOW 20 MIN: CPT | Mod: 95 | Performed by: PSYCHIATRY & NEUROLOGY

## 2021-10-26 RX ORDER — OXCARBAZEPINE 600 MG/1
600 TABLET, FILM COATED ORAL 2 TIMES DAILY
Qty: 60 TABLET | Refills: 4 | Status: SHIPPED | OUTPATIENT
Start: 2021-10-26 | End: 2022-04-07

## 2021-10-26 RX ORDER — LISDEXAMFETAMINE DIMESYLATE 70 MG/1
CAPSULE ORAL
Qty: 30 CAPSULE | Refills: 0 | Status: SHIPPED | OUTPATIENT
Start: 2021-11-18 | End: 2021-12-14

## 2021-10-26 RX ORDER — TRAZODONE HYDROCHLORIDE 100 MG/1
100 TABLET ORAL AT BEDTIME
Qty: 30 TABLET | Refills: 6 | Status: SHIPPED | OUTPATIENT
Start: 2021-10-26 | End: 2022-06-01

## 2021-10-26 ASSESSMENT — ANXIETY QUESTIONNAIRES
7. FEELING AFRAID AS IF SOMETHING AWFUL MIGHT HAPPEN: NOT AT ALL
1. FEELING NERVOUS, ANXIOUS, OR ON EDGE: NOT AT ALL
5. BEING SO RESTLESS THAT IT IS HARD TO SIT STILL: NOT AT ALL
2. NOT BEING ABLE TO STOP OR CONTROL WORRYING: SEVERAL DAYS
6. BECOMING EASILY ANNOYED OR IRRITABLE: NOT AT ALL
3. WORRYING TOO MUCH ABOUT DIFFERENT THINGS: NOT AT ALL
GAD7 TOTAL SCORE: 1

## 2021-10-26 ASSESSMENT — PAIN SCALES - GENERAL: PAINLEVEL: EXTREME PAIN (8)

## 2021-10-26 ASSESSMENT — PATIENT HEALTH QUESTIONNAIRE - PHQ9
SUM OF ALL RESPONSES TO PHQ QUESTIONS 1-9: 16
5. POOR APPETITE OR OVEREATING: NOT AT ALL
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
## 2021-10-26 NOTE — NURSING NOTE
"Chief Complaint   Patient presents with     RECHECK     Telephone visit.       Initial There were no vitals taken for this visit. Estimated body mass index is 28.03 kg/m  as calculated from the following:    Height as of 7/22/21: 1.803 m (5' 11\").    Weight as of 7/22/21: 91.2 kg (201 lb).  Medication Reconciliation: complete  JACQUELYN SUAREZ LPN    "

## 2021-10-27 ASSESSMENT — ANXIETY QUESTIONNAIRES: GAD7 TOTAL SCORE: 1

## 2021-10-28 ENCOUNTER — MEDICAL CORRESPONDENCE (OUTPATIENT)
Dept: MRI IMAGING | Facility: HOSPITAL | Age: 61
End: 2021-10-28

## 2021-10-28 ENCOUNTER — TELEPHONE (OUTPATIENT)
Dept: FAMILY MEDICINE | Facility: OTHER | Age: 61
End: 2021-10-28

## 2021-10-28 DIAGNOSIS — M25.542 ARTHRALGIA OF BOTH HANDS: ICD-10-CM

## 2021-10-28 DIAGNOSIS — Z00.00 HEALTHCARE MAINTENANCE: ICD-10-CM

## 2021-10-28 DIAGNOSIS — M25.541 ARTHRALGIA OF BOTH HANDS: ICD-10-CM

## 2021-10-28 NOTE — TELEPHONE ENCOUNTER
----- Message from Love Quan RN sent at 10/28/2021  7:50 AM CDT -----  Trent missed his last appt with Dr. Vick.  Please call him to reschedule with her.    Thank you!  Love

## 2021-10-29 RX ORDER — ASPIRIN 81 MG/1
TABLET, CHEWABLE ORAL
Qty: 30 TABLET | Refills: 0 | Status: SHIPPED | OUTPATIENT
Start: 2021-10-29 | End: 2021-12-02

## 2021-10-29 NOTE — TELEPHONE ENCOUNTER
ASA      Last Written Prescription Date:  12/22/20  Last Fill Quantity: 90,   # refills: 3  Last Office Visit: 10/25/21  Future Office visit:    Next 5 appointments (look out 90 days)    Nov 30, 2021  2:30 PM  (Arrive by 2:15 PM)  SHORT with Lissy Moore MD  [facility] [place] ([facility] [place] ) [address]  [place] [address]  [phone]   Dec 28, 2021  1:00 PM  (Arrive by 12:45 PM)  Return Visit with Laquita Fernandez MD  [facility] [place] ([facility] [place] ) [address] [place] [address]  [place] [address] [phone]           Routing refill request to provider for review/approval because:        Diclofenac      Last Written Prescription Date:  9/29/21  Last Fill Quantity: 60,   # refills: 0  Last Office Visit: 10/25/21  Future Office visit:    Next 5 appointments (look out 90 days)    Nov 30, 2021  2:30 PM  (Arrive by 2:15 PM)  SHORT with Lissy Moore MD  [facility] [place] ([facility] [place] ) [address]  [place] [address]  [phone]   Dec 28, 2021  1:00 PM  (Arrive by 12:45 PM)  Return Visit with Laquita Fernandez MD  [facility] [place] ([facility] [place] ) [address] [place] [address]  [place] [address] [phone]           Routing refill request to provider for review/approval because:

## 2021-11-01 DIAGNOSIS — J31.0 CHRONIC RHINITIS: ICD-10-CM

## 2021-11-02 RX ORDER — FLUTICASONE PROPIONATE 50 MCG
SPRAY, SUSPENSION (ML) NASAL
Qty: 16 G | Refills: 0 | Status: SHIPPED | OUTPATIENT
Start: 2021-11-02 | End: 2021-12-13

## 2021-11-02 NOTE — TELEPHONE ENCOUNTER
Flonase      Last Written Prescription Date:  04/08/21  Last Fill Quantity: 16g,   # refills: 5  Last Office Visit: 10/25/21  Future Office visit:    Next 5 appointments (look out 90 days)    Nov 30, 2021  2:30 PM  (Arrive by 2:15 PM)  SHORT with Lissy Moore MD  [facility] [place] ([facility] [place] ) [address]  [place] [address]  [phone]   Dec 28, 2021  1:00 PM  (Arrive by 12:45 PM)  Return Visit with Laquita Fernandez MD  [facility] [place] ([facility] [place] ) [address] [place] [address]  [place] [id number] [phone]

## 2021-11-03 ENCOUNTER — TRANSFERRED RECORDS (OUTPATIENT)
Dept: HEALTH INFORMATION MANAGEMENT | Facility: CLINIC | Age: 61
End: 2021-11-03

## 2021-11-08 DIAGNOSIS — F11.90 CHRONIC, CONTINUOUS USE OF OPIOIDS: ICD-10-CM

## 2021-11-08 DIAGNOSIS — R11.0 NAUSEA: ICD-10-CM

## 2021-11-08 DIAGNOSIS — M51.379 DEGENERATION OF LUMBAR OR LUMBOSACRAL INTERVERTEBRAL DISC: ICD-10-CM

## 2021-11-08 DIAGNOSIS — G89.4 CHRONIC PAIN SYNDROME: ICD-10-CM

## 2021-11-08 DIAGNOSIS — Z71.6 TOBACCO ABUSE COUNSELING: ICD-10-CM

## 2021-11-10 RX ORDER — MORPHINE SULFATE 15 MG/1
TABLET, FILM COATED, EXTENDED RELEASE ORAL
Qty: 90 TABLET | Refills: 0 | Status: SHIPPED | OUTPATIENT
Start: 2021-11-10 | End: 2021-12-10

## 2021-11-10 RX ORDER — DRONABINOL 2.5 MG/1
CAPSULE ORAL
Qty: 60 CAPSULE | Refills: 0 | Status: SHIPPED | OUTPATIENT
Start: 2021-11-10 | End: 2021-12-10

## 2021-11-10 RX ORDER — HYDROCODONE BITARTRATE AND ACETAMINOPHEN 10; 325 MG/1; MG/1
TABLET ORAL
Qty: 60 TABLET | Refills: 0 | Status: SHIPPED | OUTPATIENT
Start: 2021-11-10 | End: 2021-12-10

## 2021-11-10 NOTE — TELEPHONE ENCOUNTER
Nicorette      Last Written Prescription Date:  10/27/20  Last Fill Quantity: 100,   # refills: 11  Last Office Visit: 10/25/21  Future Office visit:    Next 5 appointments (look out 90 days)    Nov 30, 2021  2:30 PM  (Arrive by 2:15 PM)  SHORT with Lissy Moore MD  [facility] [place] ([facility] [place] ) [address]  [place] [address]  [phone]   Dec 28, 2021  1:00 PM  (Arrive by 12:45 PM)  Return Visit with Laquita Fernandez MD  [facility] [place] ([facility] [address] [place] [id number] [phone]           Routing refill request to provider for review/approval because:

## 2021-11-10 NOTE — TELEPHONE ENCOUNTER
Norco      Last Written Prescription Date:  10/15/21  Last Fill Quantity: 60,   # refills: 0  Last Office Visit: 10/25/21  Future Office visit:    Next 5 appointments (look out 90 days)    Nov 30, 2021  2:30 PM  (Arrive by 2:15 PM)  SHORT with Lissy Moore MD  [facility] [place] ([facility] [place] ) [address]  [place] [address]  [phone]   Dec 28, 2021  1:00 PM  (Arrive by 12:45 PM)  Return Visit with Laquita Fernandez MD  [facility] [place] ([facility] [place] ) [address] [place] [address]  [place] [id number] [phone]           Routing refill request to provider for review/approval because:        Marinol      Last Written Prescription Date:  10/15/21  Last Fill Quantity: 60,   # refills: 0  Last Office Visit: 10/25/21  Future Office visit:    Next 5 appointments (look out 90 days)    Nov 30, 2021  2:30 PM  (Arrive by 2:15 PM)  SHORT with Lissy Moore MD  [facility] [place] ([facility] [place] ) [address]  [place] [address]  [phone]   Dec 28, 2021  1:00 PM  (Arrive by 12:45 PM)  Return Visit with Laquita Fernandez MD  [facility] [place] ([facility] [place] ) [address] [place] [address]  [place] [id number] [phone]           Routing refill request to provider for review/approval because:        MS Contin      Last Written Prescription Date:  10/15/21  Last Fill Quantity: 90,   # refills: 0  Last Office Visit: 10/25/21  Future Office visit:    Next 5 appointments (look out 90 days)    Nov 30, 2021  2:30 PM  (Arrive by 2:15 PM)  SHORT with Lissy Moore MD  [facility] [place] ([facility] [place] ) [address]  [place] [address]  [phone]   Dec 28, 2021  1:00 PM  (Arrive by 12:45 PM)  Return Visit with Laquita Fernandez MD  [facility] [place] ([facility] [place] ) [address]  [place] [address] [phone]            Routing refill request to provider for review/approval because:

## 2021-11-11 DIAGNOSIS — F41.1 GAD (GENERALIZED ANXIETY DISORDER): ICD-10-CM

## 2021-11-11 DIAGNOSIS — K21.00 GASTROESOPHAGEAL REFLUX DISEASE WITH ESOPHAGITIS, UNSPECIFIED WHETHER HEMORRHAGE: ICD-10-CM

## 2021-11-12 RX ORDER — HYDROXYZINE PAMOATE 50 MG/1
CAPSULE ORAL
Qty: 120 CAPSULE | Refills: 0 | Status: SHIPPED | OUTPATIENT
Start: 2021-11-12 | End: 2021-12-02

## 2021-11-12 NOTE — TELEPHONE ENCOUNTER
Vistaril      Last Written Prescription Date:  10/14/21  Last Fill Quantity: 120,   # refills: 0  Last Office Visit: 10/25/21  Future Office visit:    Next 5 appointments (look out 90 days)    Nov 30, 2021  2:30 PM  (Arrive by 2:15 PM)  SHORT with Lissy Moore MD  [facility] [place] ([facility] [place] ) [address]  [place] [address]  [phone]   Dec 28, 2021  1:00 PM  (Arrive by 12:45 PM)  Return Visit with Laquita Fernandez MD  [facility] [place] ([facility] [place] ) [address]  [place] [address] [phone]           Prilosec      Last Written Prescription Date:  02/11/21  Last Fill Quantity: 60,   # refills: 9  Last Office Visit: 10/25/21  Future Office visit:    Next 5 appointments (look out 90 days)    Nov 30, 2021  2:30 PM  (Arrive by 2:15 PM)  SHORT with Lissy Moore MD  [facility] [place] ([facility] [place] ) [address]  [place] [address]  [phone]   Dec 28, 2021  1:00 PM  (Arrive by 12:45 PM)  Return Visit with Laquita Fernandez MD  [facility] [place] ([facility] [place] ) [address]  [place] [address]  [phone]

## 2021-11-16 NOTE — PROGRESS NOTES
"  Assessment & Plan     Chest pain, unspecified  type  BP initially is 60 systolic, rechecked 80 systolic with chest pain, diaphoresis , concern for cardiac ischemia. He has not missed his pain medications and is not in withdrawal.  He is take to the ER for further evaluation and treatment     Degeneration of lumbar or lumbosacral intervertebral disc  Chronic     Benign essential hypertension      Bipolar affective disorder, current episode hypomanic (H)  Stable     Chronic midline low back pain with sciatica, sciatica laterality unspecified  Chronic  He has seen two different pain clinics in the [facility] in the last few months to discuss his chronic narcotics and consider a pain pump and has been referred to other clinics both visits    Moderate persistent asthma without complication  Not well controlled     Generalized anxiety disorder  Stable     Spinal stenosis in cervical region  severe    Status post lumbar spinal fusion      Prediabetes          25 minutes spent on the date of the encounter doing chart review, patient visit and documentation        BMI:   Estimated body mass index is 27.17 kg/m  as calculated from the following:    Height as of 7/22/21: 1.803 m (5' 11\").    Weight as of this encounter: 88.4 kg (194 lb 12.8 oz).           No follow-ups on file.    Lissy Moore MD  [facility] - GLORIASHELBI Newman is a 61 year old who presents for the following health issues      HPI     Chronic/Recurring Back Pain Follow Up      Where is your back pain located? (Select all that apply) low back bilateral, middle of back bilateral, shoulders left, gluteus bilateral and hip bilateral    How would you describe your back pain?  burning and sharp    Where does your back pain spread? the right and left  knee    Since your last clinic visit for back pain, how has your pain changed? rapidly worsening    Does your back pain interfere with your job? YES    Since your last visit, have you tried any " new treatment? No      Asthma Follow-Up    Was ACT completed today?    Yes    ACT Total Scores 11/30/2021   ACT TOTAL SCORE -   ASTHMA ER VISITS -   ASTHMA HOSPITALIZATIONS -   ACT TOTAL SCORE (Goal Greater than or Equal to 20) 12   In the past 12 months, how many times did you visit the emergency room for your asthma without being admitted to the hospital? 0   In the past 12 months, how many times were you hospitalized overnight because of your asthma? 0       How many days per week do you miss taking your asthma controller medication?  0    Please describe any recent triggers for your asthma: dust mites, pollens, mold, exercise or sports and cold air    Have you had any Emergency Room Visits, Urgent Care Visits, or Hospital Admissions since your last office visit?  No      How many servings of fruits and vegetables do you eat daily?  0-1    On average, how many sweetened beverages do you drink each day (Examples: soda, juice, sweet tea, etc.  Do NOT count diet or artificially sweetened beverages)?   2    How many days per week do you exercise enough to make your heart beat faster? 3 or less    How many minutes a day do you exercise enough to make your heart beat faster? 9 or less    How many days per week do you miss taking your medication? 0    Trent has been having chest pain for the last 5 days in the left chest radiating to the left neck and back. His father has had bypass surgery. He is short of breath with this and quite diaphoretic     Review of Systems   Constitutional, HEENT, cardiovascular, pulmonary, gi and gu systems are negative, except as otherwise noted.      Objective    BP (!) 80/41   Pulse 102   Temp 98.6  F (37  C)   Resp 30   Wt 88.4 kg (194 lb 12.8 oz)   SpO2 95%   BMI 27.17 kg/m    Body mass index is 27.17 kg/m .  Physical Exam   GENERAL: alert and moderate distress, diaphoretic, soaking shirt through, pale   NECK: no adenopathy, no asymmetry, masses, or scars and thyroid normal to  palpation  RESP: lungs clear to auscultation - no rales, rhonchi or wheezes  CV: regular rate and rhythm, normal S1 S2, no S3 or S4, no murmur, click or rub, no peripheral edema and peripheral pulses strong  MS: no gross musculoskeletal defects noted, no edema  NEURO: Normal strength and tone, mentation intact and speech normal  PSYCH: mentation appears normal, anxious and fatigued

## 2021-11-18 DIAGNOSIS — J45.41 MODERATE PERSISTENT ASTHMA WITH EXACERBATION: ICD-10-CM

## 2021-11-21 NOTE — TELEPHONE ENCOUNTER
MUCINEX DM      Last Written Prescription Date:  10-7-2021  Last Fill Quantity: 60,   # refills: 0  Last Office Visit: 10-  Future Office visit:    Next 5 appointments (look out 90 days)    Nov 30, 2021  2:30 PM  (Arrive by 2:15 PM)  SHORT with Lissy Moore MD  [facility] ([facility] ) [address] [facility] [address]  [phone]   Dec 28, 2021  1:00 PM  (Arrive by 12:45 PM)  Return Visit with Laquita Fernandez MD  [facility] ([facility] ) [address] [place] [id number] [phone]           Routing refill request to provider for review/approval because:  Drug not on the [facility], [facility] or [facility] refill protocol or controlled substance

## 2021-11-22 RX ORDER — GUAIFENESIN AND DEXTROMETHORPHAN HYDROBROMIDE 600; 30 MG/1; MG/1
1 TABLET, EXTENDED RELEASE ORAL EVERY 12 HOURS
Qty: 60 TABLET | Refills: 0 | Status: SHIPPED | OUTPATIENT
Start: 2021-11-22 | End: 2021-12-07

## 2021-11-27 ENCOUNTER — HEALTH MAINTENANCE LETTER (OUTPATIENT)
Age: 61
End: 2021-11-27

## 2021-11-30 ENCOUNTER — APPOINTMENT (OUTPATIENT)
Dept: CT IMAGING | Facility: HOSPITAL | Age: 61
End: 2021-11-30
Attending: STUDENT IN AN ORGANIZED HEALTH CARE EDUCATION/TRAINING PROGRAM
Payer: COMMERCIAL

## 2021-11-30 ENCOUNTER — APPOINTMENT (OUTPATIENT)
Dept: GENERAL RADIOLOGY | Facility: HOSPITAL | Age: 61
End: 2021-11-30
Attending: STUDENT IN AN ORGANIZED HEALTH CARE EDUCATION/TRAINING PROGRAM
Payer: COMMERCIAL

## 2021-11-30 ENCOUNTER — APPOINTMENT (OUTPATIENT)
Dept: ULTRASOUND IMAGING | Facility: HOSPITAL | Age: 61
End: 2021-11-30
Attending: PHYSICIAN ASSISTANT
Payer: COMMERCIAL

## 2021-11-30 ENCOUNTER — APPOINTMENT (OUTPATIENT)
Dept: ULTRASOUND IMAGING | Facility: HOSPITAL | Age: 61
End: 2021-11-30
Attending: STUDENT IN AN ORGANIZED HEALTH CARE EDUCATION/TRAINING PROGRAM
Payer: COMMERCIAL

## 2021-11-30 ENCOUNTER — HOSPITAL ENCOUNTER (OUTPATIENT)
Facility: HOSPITAL | Age: 61
Setting detail: OBSERVATION
Discharge: HOME | End: 2021-12-01
Attending: STUDENT IN AN ORGANIZED HEALTH CARE EDUCATION/TRAINING PROGRAM | Admitting: INTERNAL MEDICINE
Payer: COMMERCIAL

## 2021-11-30 ENCOUNTER — OFFICE VISIT (OUTPATIENT)
Dept: FAMILY MEDICINE | Facility: OTHER | Age: 61
End: 2021-11-30
Attending: FAMILY MEDICINE
Payer: COMMERCIAL

## 2021-11-30 VITALS
DIASTOLIC BLOOD PRESSURE: 41 MMHG | SYSTOLIC BLOOD PRESSURE: 80 MMHG | RESPIRATION RATE: 30 BRPM | WEIGHT: 194.8 LBS | BODY MASS INDEX: 27.17 KG/M2 | OXYGEN SATURATION: 95 % | TEMPERATURE: 98.6 F | HEART RATE: 102 BPM

## 2021-11-30 DIAGNOSIS — J32.9 SINUSITIS, UNSPECIFIED CHRONICITY, UNSPECIFIED LOCATION: ICD-10-CM

## 2021-11-30 DIAGNOSIS — J45.40 MODERATE PERSISTENT ASTHMA WITHOUT COMPLICATION: ICD-10-CM

## 2021-11-30 DIAGNOSIS — R07.9 CHEST PAIN, UNSPECIFIED TYPE: Primary | ICD-10-CM

## 2021-11-30 DIAGNOSIS — M62.830 BACK MUSCLE SPASM: ICD-10-CM

## 2021-11-30 DIAGNOSIS — E86.1 HYPOTENSION DUE TO HYPOVOLEMIA: Primary | ICD-10-CM

## 2021-11-30 DIAGNOSIS — M48.02 SPINAL STENOSIS IN CERVICAL REGION: ICD-10-CM

## 2021-11-30 DIAGNOSIS — G89.29 CHRONIC MIDLINE LOW BACK PAIN WITH SCIATICA, SCIATICA LATERALITY UNSPECIFIED: ICD-10-CM

## 2021-11-30 DIAGNOSIS — M54.50 LUMBAR BACK PAIN: ICD-10-CM

## 2021-11-30 DIAGNOSIS — Z98.1 STATUS POST LUMBAR SPINAL FUSION: ICD-10-CM

## 2021-11-30 DIAGNOSIS — M54.40 CHRONIC MIDLINE LOW BACK PAIN WITH SCIATICA, SCIATICA LATERALITY UNSPECIFIED: ICD-10-CM

## 2021-11-30 DIAGNOSIS — I95.9 HYPOTENSION, UNSPECIFIED HYPOTENSION TYPE: ICD-10-CM

## 2021-11-30 DIAGNOSIS — M51.379 DEGENERATION OF LUMBAR OR LUMBOSACRAL INTERVERTEBRAL DISC: ICD-10-CM

## 2021-11-30 DIAGNOSIS — F31.0 BIPOLAR AFFECTIVE DISORDER, CURRENT EPISODE HYPOMANIC (H): ICD-10-CM

## 2021-11-30 DIAGNOSIS — F41.1 GENERALIZED ANXIETY DISORDER: ICD-10-CM

## 2021-11-30 DIAGNOSIS — R73.03 PREDIABETES: ICD-10-CM

## 2021-11-30 DIAGNOSIS — I10 BENIGN ESSENTIAL HYPERTENSION: ICD-10-CM

## 2021-11-30 LAB
ALBUMIN SERPL-MCNC: 4.3 G/DL (ref 3.4–5)
ALBUMIN UR-MCNC: NEGATIVE MG/DL
ALP SERPL-CCNC: 117 U/L (ref 40–150)
ALT SERPL W P-5'-P-CCNC: 37 U/L (ref 0–70)
AMPHETAMINES UR QL: DETECTED
ANION GAP SERPL CALCULATED.3IONS-SCNC: 10 MMOL/L (ref 3–14)
APPEARANCE UR: CLEAR
AST SERPL W P-5'-P-CCNC: 15 U/L (ref 0–45)
BARBITURATES UR QL SCN: NOT DETECTED
BASOPHILS # BLD AUTO: 0 10E3/UL (ref 0–0.2)
BASOPHILS NFR BLD AUTO: 0 %
BENZODIAZ UR QL SCN: NOT DETECTED
BILIRUB SERPL-MCNC: 0.3 MG/DL (ref 0.2–1.3)
BILIRUB UR QL STRIP: NEGATIVE
BUN SERPL-MCNC: 19 MG/DL (ref 7–30)
BUPRENORPHINE UR QL: NOT DETECTED
CALCIUM SERPL-MCNC: 8.9 MG/DL (ref 8.5–10.1)
CANNABINOIDS UR QL: DETECTED
CHLORIDE BLD-SCNC: 97 MMOL/L (ref 94–109)
CO2 SERPL-SCNC: 25 MMOL/L (ref 20–32)
COCAINE UR QL SCN: NOT DETECTED
COLOR UR AUTO: ABNORMAL
CREAT SERPL-MCNC: 1.22 MG/DL (ref 0.66–1.25)
D DIMER PPP FEU-MCNC: <0.3 UG/ML FEU (ref 0–0.5)
D-METHAMPHET UR QL: NOT DETECTED
EOSINOPHIL # BLD AUTO: 0.1 10E3/UL (ref 0–0.7)
EOSINOPHIL NFR BLD AUTO: 2 %
ERYTHROCYTE [DISTWIDTH] IN BLOOD BY AUTOMATED COUNT: 12.7 % (ref 10–15)
GFR SERPL CREATININE-BSD FRML MDRD: 64 ML/MIN/1.73M2
GLUCOSE BLD-MCNC: 137 MG/DL (ref 70–99)
GLUCOSE UR STRIP-MCNC: NEGATIVE MG/DL
HCT VFR BLD AUTO: 41 % (ref 40–53)
HGB BLD-MCNC: 13.8 G/DL (ref 13.3–17.7)
HGB UR QL STRIP: NEGATIVE
HOLD SPECIMEN: NORMAL
HYALINE CASTS: 11 /LPF
IMM GRANULOCYTES # BLD: 0 10E3/UL
IMM GRANULOCYTES NFR BLD: 1 %
KETONES UR STRIP-MCNC: NEGATIVE MG/DL
LACTATE SERPL-SCNC: 0.7 MMOL/L (ref 0.7–2)
LACTATE SERPL-SCNC: 0.9 MMOL/L (ref 0.7–2)
LACTATE SERPL-SCNC: 2.9 MMOL/L (ref 0.7–2)
LEUKOCYTE ESTERASE UR QL STRIP: NEGATIVE
LIPASE SERPL-CCNC: 116 U/L (ref 73–393)
LYMPHOCYTES # BLD AUTO: 1.6 10E3/UL (ref 0.8–5.3)
LYMPHOCYTES NFR BLD AUTO: 36 %
MAGNESIUM SERPL-MCNC: 2 MG/DL (ref 1.6–2.3)
MCH RBC QN AUTO: 28.9 PG (ref 26.5–33)
MCHC RBC AUTO-ENTMCNC: 33.7 G/DL (ref 31.5–36.5)
MCV RBC AUTO: 86 FL (ref 78–100)
METHADONE UR QL SCN: NOT DETECTED
MONOCYTES # BLD AUTO: 0.5 10E3/UL (ref 0–1.3)
MONOCYTES NFR BLD AUTO: 11 %
MUCOUS THREADS #/AREA URNS LPF: PRESENT /LPF
NEUTROPHILS # BLD AUTO: 2.3 10E3/UL (ref 1.6–8.3)
NEUTROPHILS NFR BLD AUTO: 50 %
NITRATE UR QL: NEGATIVE
NRBC # BLD AUTO: 0 10E3/UL
NRBC BLD AUTO-RTO: 0 /100
NT-PROBNP SERPL-MCNC: 217 PG/ML (ref 0–900)
OPIATES UR QL SCN: DETECTED
OXYCODONE UR QL SCN: NOT DETECTED
PCP UR QL SCN: NOT DETECTED
PH UR STRIP: 6 [PH] (ref 4.7–8)
PLATELET # BLD AUTO: 222 10E3/UL (ref 150–450)
POTASSIUM BLD-SCNC: 4 MMOL/L (ref 3.4–5.3)
PROCALCITONIN SERPL-MCNC: <0.05 NG/ML
PROPOXYPH UR QL: NOT DETECTED
PROT SERPL-MCNC: 7.7 G/DL (ref 6.8–8.8)
RBC # BLD AUTO: 4.78 10E6/UL (ref 4.4–5.9)
RBC URINE: 0 /HPF
SARS-COV-2 RNA RESP QL NAA+PROBE: NEGATIVE
SODIUM SERPL-SCNC: 132 MMOL/L (ref 133–144)
SP GR UR STRIP: 1.01 (ref 1–1.03)
SQUAMOUS EPITHELIAL: <1 /HPF
TRICYCLICS UR QL SCN: DETECTED
TROPONIN I SERPL HS-MCNC: 4 NG/L
TROPONIN I SERPL HS-MCNC: 4 NG/L
TSH SERPL DL<=0.005 MIU/L-ACNC: 0.94 MU/L (ref 0.4–4)
UROBILINOGEN UR STRIP-MCNC: NORMAL MG/DL
WBC # BLD AUTO: 4.5 10E3/UL (ref 4–11)
WBC URINE: <1 /HPF

## 2021-11-30 PROCEDURE — 250N000013 HC RX MED GY IP 250 OP 250 PS 637: Performed by: INTERNAL MEDICINE

## 2021-11-30 PROCEDURE — 99220 PR INITIAL OBSERVATION CARE,LEVEL III: CPT | Performed by: INTERNAL MEDICINE

## 2021-11-30 PROCEDURE — 72125 CT NECK SPINE W/O DYE: CPT

## 2021-11-30 PROCEDURE — 250N000009 HC RX 250: Performed by: INTERNAL MEDICINE

## 2021-11-30 PROCEDURE — 83605 ASSAY OF LACTIC ACID: CPT | Performed by: INTERNAL MEDICINE

## 2021-11-30 PROCEDURE — 250N000013 HC RX MED GY IP 250 OP 250 PS 637: Performed by: STUDENT IN AN ORGANIZED HEALTH CARE EDUCATION/TRAINING PROGRAM

## 2021-11-30 PROCEDURE — 99285 EMERGENCY DEPT VISIT HI MDM: CPT | Mod: 25

## 2021-11-30 PROCEDURE — 85025 COMPLETE CBC W/AUTO DIFF WBC: CPT | Performed by: STUDENT IN AN ORGANIZED HEALTH CARE EDUCATION/TRAINING PROGRAM

## 2021-11-30 PROCEDURE — 250N000011 HC RX IP 250 OP 636: Performed by: STUDENT IN AN ORGANIZED HEALTH CARE EDUCATION/TRAINING PROGRAM

## 2021-11-30 PROCEDURE — 80053 COMPREHEN METABOLIC PANEL: CPT | Performed by: STUDENT IN AN ORGANIZED HEALTH CARE EDUCATION/TRAINING PROGRAM

## 2021-11-30 PROCEDURE — 258N000003 HC RX IP 258 OP 636: Performed by: STUDENT IN AN ORGANIZED HEALTH CARE EDUCATION/TRAINING PROGRAM

## 2021-11-30 PROCEDURE — 71275 CT ANGIOGRAPHY CHEST: CPT

## 2021-11-30 PROCEDURE — 96361 HYDRATE IV INFUSION ADD-ON: CPT

## 2021-11-30 PROCEDURE — 83690 ASSAY OF LIPASE: CPT | Performed by: STUDENT IN AN ORGANIZED HEALTH CARE EDUCATION/TRAINING PROGRAM

## 2021-11-30 PROCEDURE — 82024 ASSAY OF ACTH: CPT | Performed by: INTERNAL MEDICINE

## 2021-11-30 PROCEDURE — 96374 THER/PROPH/DIAG INJ IV PUSH: CPT

## 2021-11-30 PROCEDURE — 87040 BLOOD CULTURE FOR BACTERIA: CPT | Performed by: STUDENT IN AN ORGANIZED HEALTH CARE EDUCATION/TRAINING PROGRAM

## 2021-11-30 PROCEDURE — G0378 HOSPITAL OBSERVATION PER HR: HCPCS

## 2021-11-30 PROCEDURE — 93005 ELECTROCARDIOGRAM TRACING: CPT

## 2021-11-30 PROCEDURE — 74174 CTA ABD&PLVS W/CONTRAST: CPT

## 2021-11-30 PROCEDURE — 36415 COLL VENOUS BLD VENIPUNCTURE: CPT | Performed by: STUDENT IN AN ORGANIZED HEALTH CARE EDUCATION/TRAINING PROGRAM

## 2021-11-30 PROCEDURE — 96375 TX/PRO/DX INJ NEW DRUG ADDON: CPT

## 2021-11-30 PROCEDURE — 250N000011 HC RX IP 250 OP 636: Performed by: RADIOLOGY

## 2021-11-30 PROCEDURE — 250N000009 HC RX 250: Performed by: STUDENT IN AN ORGANIZED HEALTH CARE EDUCATION/TRAINING PROGRAM

## 2021-11-30 PROCEDURE — 84443 ASSAY THYROID STIM HORMONE: CPT | Performed by: INTERNAL MEDICINE

## 2021-11-30 PROCEDURE — 94640 AIRWAY INHALATION TREATMENT: CPT

## 2021-11-30 PROCEDURE — 82533 TOTAL CORTISOL: CPT | Performed by: STUDENT IN AN ORGANIZED HEALTH CARE EDUCATION/TRAINING PROGRAM

## 2021-11-30 PROCEDURE — 36415 COLL VENOUS BLD VENIPUNCTURE: CPT | Performed by: INTERNAL MEDICINE

## 2021-11-30 PROCEDURE — 71045 X-RAY EXAM CHEST 1 VIEW: CPT

## 2021-11-30 PROCEDURE — 85379 FIBRIN DEGRADATION QUANT: CPT | Performed by: STUDENT IN AN ORGANIZED HEALTH CARE EDUCATION/TRAINING PROGRAM

## 2021-11-30 PROCEDURE — 83880 ASSAY OF NATRIURETIC PEPTIDE: CPT | Performed by: STUDENT IN AN ORGANIZED HEALTH CARE EDUCATION/TRAINING PROGRAM

## 2021-11-30 PROCEDURE — 83735 ASSAY OF MAGNESIUM: CPT | Performed by: STUDENT IN AN ORGANIZED HEALTH CARE EDUCATION/TRAINING PROGRAM

## 2021-11-30 PROCEDURE — 82040 ASSAY OF SERUM ALBUMIN: CPT | Performed by: STUDENT IN AN ORGANIZED HEALTH CARE EDUCATION/TRAINING PROGRAM

## 2021-11-30 PROCEDURE — 93308 TTE F-UP OR LMTD: CPT | Mod: 26 | Performed by: STUDENT IN AN ORGANIZED HEALTH CARE EDUCATION/TRAINING PROGRAM

## 2021-11-30 PROCEDURE — 250N000012 HC RX MED GY IP 250 OP 636 PS 637: Performed by: INTERNAL MEDICINE

## 2021-11-30 PROCEDURE — U0005 INFEC AGEN DETEC AMPLI PROBE: HCPCS | Performed by: STUDENT IN AN ORGANIZED HEALTH CARE EDUCATION/TRAINING PROGRAM

## 2021-11-30 PROCEDURE — 80306 DRUG TEST PRSMV INSTRMNT: CPT | Performed by: INTERNAL MEDICINE

## 2021-11-30 PROCEDURE — 70450 CT HEAD/BRAIN W/O DYE: CPT

## 2021-11-30 PROCEDURE — 84484 ASSAY OF TROPONIN QUANT: CPT | Performed by: STUDENT IN AN ORGANIZED HEALTH CARE EDUCATION/TRAINING PROGRAM

## 2021-11-30 PROCEDURE — 84145 PROCALCITONIN (PCT): CPT | Performed by: INTERNAL MEDICINE

## 2021-11-30 PROCEDURE — 99213 OFFICE O/P EST LOW 20 MIN: CPT | Performed by: FAMILY MEDICINE

## 2021-11-30 PROCEDURE — 83605 ASSAY OF LACTIC ACID: CPT | Mod: 91 | Performed by: STUDENT IN AN ORGANIZED HEALTH CARE EDUCATION/TRAINING PROGRAM

## 2021-11-30 PROCEDURE — 99291 CRITICAL CARE FIRST HOUR: CPT | Mod: 25 | Performed by: STUDENT IN AN ORGANIZED HEALTH CARE EDUCATION/TRAINING PROGRAM

## 2021-11-30 PROCEDURE — C9803 HOPD COVID-19 SPEC COLLECT: HCPCS

## 2021-11-30 PROCEDURE — 93308 TTE F-UP OR LMTD: CPT | Mod: TC

## 2021-11-30 PROCEDURE — 93010 ELECTROCARDIOGRAM REPORT: CPT | Performed by: INTERNAL MEDICINE

## 2021-11-30 PROCEDURE — 81001 URINALYSIS AUTO W/SCOPE: CPT | Mod: 59 | Performed by: INTERNAL MEDICINE

## 2021-11-30 RX ORDER — TAMSULOSIN HYDROCHLORIDE 0.4 MG/1
0.4 CAPSULE ORAL DAILY
Status: DISCONTINUED | OUTPATIENT
Start: 2021-12-01 | End: 2021-12-01 | Stop reason: HOSPADM

## 2021-11-30 RX ORDER — NORTRIPTYLINE HCL 25 MG
50 CAPSULE ORAL AT BEDTIME
Status: DISCONTINUED | OUTPATIENT
Start: 2021-11-30 | End: 2021-12-01 | Stop reason: HOSPADM

## 2021-11-30 RX ORDER — HYDROCODONE BITARTRATE AND ACETAMINOPHEN 10; 325 MG/1; MG/1
1 TABLET ORAL 2 TIMES DAILY PRN
Status: DISCONTINUED | OUTPATIENT
Start: 2021-11-30 | End: 2021-12-01 | Stop reason: HOSPADM

## 2021-11-30 RX ORDER — MORPHINE SULFATE 15 MG/1
15 TABLET, FILM COATED, EXTENDED RELEASE ORAL
Status: DISCONTINUED | OUTPATIENT
Start: 2021-11-30 | End: 2021-12-01 | Stop reason: HOSPADM

## 2021-11-30 RX ORDER — AMOXICILLIN 250 MG
1 CAPSULE ORAL 2 TIMES DAILY
Status: DISCONTINUED | OUTPATIENT
Start: 2021-12-01 | End: 2021-12-01 | Stop reason: HOSPADM

## 2021-11-30 RX ORDER — IBUPROFEN 200 MG
600 TABLET ORAL EVERY 6 HOURS PRN
Status: DISCONTINUED | OUTPATIENT
Start: 2021-11-30 | End: 2021-12-01 | Stop reason: HOSPADM

## 2021-11-30 RX ORDER — GUAIFENESIN AND DEXTROMETHORPHAN HYDROBROMIDE 600; 30 MG/1; MG/1
1 TABLET, EXTENDED RELEASE ORAL EVERY 12 HOURS
Status: DISCONTINUED | OUTPATIENT
Start: 2021-11-30 | End: 2021-12-01 | Stop reason: HOSPADM

## 2021-11-30 RX ORDER — PANTOPRAZOLE SODIUM 40 MG/1
40 TABLET, DELAYED RELEASE ORAL
Status: DISCONTINUED | OUTPATIENT
Start: 2021-12-01 | End: 2021-12-01 | Stop reason: HOSPADM

## 2021-11-30 RX ORDER — FAMOTIDINE 20 MG/1
20 TABLET, FILM COATED ORAL DAILY PRN
Status: DISCONTINUED | OUTPATIENT
Start: 2021-11-30 | End: 2021-12-01 | Stop reason: HOSPADM

## 2021-11-30 RX ORDER — OXCARBAZEPINE 600 MG/1
600 TABLET, FILM COATED ORAL 2 TIMES DAILY
Status: DISCONTINUED | OUTPATIENT
Start: 2021-12-01 | End: 2021-12-01 | Stop reason: HOSPADM

## 2021-11-30 RX ORDER — BACLOFEN 10 MG/1
20 TABLET ORAL 4 TIMES DAILY
Status: DISCONTINUED | OUTPATIENT
Start: 2021-11-30 | End: 2021-12-01 | Stop reason: HOSPADM

## 2021-11-30 RX ORDER — DOXYCYCLINE 100 MG/1
100 CAPSULE ORAL EVERY 12 HOURS SCHEDULED
Status: DISCONTINUED | OUTPATIENT
Start: 2021-11-30 | End: 2021-12-01 | Stop reason: HOSPADM

## 2021-11-30 RX ORDER — SODIUM CHLORIDE 9 MG/ML
INJECTION, SOLUTION INTRAVENOUS CONTINUOUS
Status: DISCONTINUED | OUTPATIENT
Start: 2021-11-30 | End: 2021-11-30

## 2021-11-30 RX ORDER — BUDESONIDE 0.5 MG/2ML
0.5 INHALANT ORAL 2 TIMES DAILY
Status: DISCONTINUED | OUTPATIENT
Start: 2021-11-30 | End: 2021-11-30

## 2021-11-30 RX ORDER — IPRATROPIUM BROMIDE AND ALBUTEROL SULFATE 2.5; .5 MG/3ML; MG/3ML
3 SOLUTION RESPIRATORY (INHALATION)
Status: DISCONTINUED | OUTPATIENT
Start: 2021-11-30 | End: 2021-12-01

## 2021-11-30 RX ORDER — ONDANSETRON 4 MG/1
4 TABLET, ORALLY DISINTEGRATING ORAL EVERY 6 HOURS PRN
Status: DISCONTINUED | OUTPATIENT
Start: 2021-11-30 | End: 2021-12-01 | Stop reason: HOSPADM

## 2021-11-30 RX ORDER — ASPIRIN 81 MG/1
81 TABLET, CHEWABLE ORAL DAILY
Status: DISCONTINUED | OUTPATIENT
Start: 2021-12-01 | End: 2021-12-01 | Stop reason: HOSPADM

## 2021-11-30 RX ORDER — IOPAMIDOL 755 MG/ML
66 INJECTION, SOLUTION INTRAVASCULAR ONCE
Status: COMPLETED | OUTPATIENT
Start: 2021-11-30 | End: 2021-11-30

## 2021-11-30 RX ORDER — NOREPINEPHRINE BITARTRATE 0.02 MG/ML
INJECTION, SOLUTION INTRAVENOUS
Status: DISCONTINUED
Start: 2021-11-30 | End: 2021-11-30 | Stop reason: HOSPADM

## 2021-11-30 RX ORDER — CEFDINIR 300 MG/1
300 CAPSULE ORAL EVERY 12 HOURS SCHEDULED
Status: DISCONTINUED | OUTPATIENT
Start: 2021-11-30 | End: 2021-12-01 | Stop reason: HOSPADM

## 2021-11-30 RX ORDER — ASPIRIN 81 MG/1
324 TABLET, CHEWABLE ORAL ONCE
Status: COMPLETED | OUTPATIENT
Start: 2021-11-30 | End: 2021-11-30

## 2021-11-30 RX ORDER — GABAPENTIN 300 MG/1
900 CAPSULE ORAL 3 TIMES DAILY
Status: DISCONTINUED | OUTPATIENT
Start: 2021-11-30 | End: 2021-12-01 | Stop reason: HOSPADM

## 2021-11-30 RX ORDER — ROPIVACAINE IN 0.9% SOD CHL/PF 0.1 %
.03-.125 PLASTIC BAG, INJECTION (ML) EPIDURAL CONTINUOUS
Status: DISCONTINUED | OUTPATIENT
Start: 2021-11-30 | End: 2021-11-30

## 2021-11-30 RX ORDER — LISDEXAMFETAMINE DIMESYLATE 70 MG/1
70 CAPSULE ORAL DAILY
Status: DISCONTINUED | OUTPATIENT
Start: 2021-12-01 | End: 2021-12-01 | Stop reason: HOSPADM

## 2021-11-30 RX ORDER — ONDANSETRON 2 MG/ML
4 INJECTION INTRAMUSCULAR; INTRAVENOUS EVERY 6 HOURS PRN
Status: DISCONTINUED | OUTPATIENT
Start: 2021-11-30 | End: 2021-12-01 | Stop reason: HOSPADM

## 2021-11-30 RX ORDER — NICOTINE 21 MG/24HR
1 PATCH, TRANSDERMAL 24 HOURS TRANSDERMAL EVERY 24 HOURS
Status: DISCONTINUED | OUTPATIENT
Start: 2021-11-30 | End: 2021-11-30

## 2021-11-30 RX ORDER — HYDROXYZINE HYDROCHLORIDE 25 MG/1
50 TABLET, FILM COATED ORAL 4 TIMES DAILY PRN
Status: DISCONTINUED | OUTPATIENT
Start: 2021-11-30 | End: 2021-12-01 | Stop reason: HOSPADM

## 2021-11-30 RX ORDER — ALBUTEROL SULFATE 0.83 MG/ML
2.5 SOLUTION RESPIRATORY (INHALATION)
Status: DISCONTINUED | OUTPATIENT
Start: 2021-11-30 | End: 2021-12-01 | Stop reason: HOSPADM

## 2021-11-30 RX ORDER — FLUTICASONE PROPIONATE 50 MCG
2 SPRAY, SUSPENSION (ML) NASAL DAILY
Status: DISCONTINUED | OUTPATIENT
Start: 2021-11-30 | End: 2021-11-30

## 2021-11-30 RX ORDER — PREDNISONE 20 MG/1
40 TABLET ORAL DAILY
Status: DISCONTINUED | OUTPATIENT
Start: 2021-11-30 | End: 2021-12-01 | Stop reason: HOSPADM

## 2021-11-30 RX ORDER — CETIRIZINE HYDROCHLORIDE 5 MG/1
10 TABLET ORAL DAILY
Status: DISCONTINUED | OUTPATIENT
Start: 2021-12-01 | End: 2021-12-01 | Stop reason: HOSPADM

## 2021-11-30 RX ORDER — ALBUTEROL SULFATE 5 MG/ML
2.5 SOLUTION RESPIRATORY (INHALATION) EVERY 6 HOURS PRN
Status: DISCONTINUED | OUTPATIENT
Start: 2021-11-30 | End: 2021-11-30

## 2021-11-30 RX ADMIN — DOXYCYCLINE HYCLATE 100 MG: 100 CAPSULE ORAL at 21:55

## 2021-11-30 RX ADMIN — SODIUM CHLORIDE 1000 ML: 9 INJECTION, SOLUTION INTRAVENOUS at 15:08

## 2021-11-30 RX ADMIN — CEFDINIR 300 MG: 300 CAPSULE ORAL at 21:56

## 2021-11-30 RX ADMIN — TIZANIDINE 4 MG: 4 TABLET ORAL at 21:56

## 2021-11-30 RX ADMIN — IOPAMIDOL 66 ML: 755 INJECTION, SOLUTION INTRAVENOUS at 15:28

## 2021-11-30 RX ADMIN — BACLOFEN 20 MG: 10 TABLET ORAL at 21:57

## 2021-11-30 RX ADMIN — IPRATROPIUM BROMIDE AND ALBUTEROL SULFATE 3 ML: .5; 3 SOLUTION RESPIRATORY (INHALATION) at 19:31

## 2021-11-30 RX ADMIN — SODIUM CHLORIDE 1000 ML: 9 INJECTION, SOLUTION INTRAVENOUS at 15:06

## 2021-11-30 RX ADMIN — ASPIRIN 81 MG CHEWABLE TABLET 324 MG: 81 TABLET CHEWABLE at 15:03

## 2021-11-30 RX ADMIN — SODIUM CHLORIDE: 9 INJECTION, SOLUTION INTRAVENOUS at 15:42

## 2021-11-30 RX ADMIN — HYDROXYZINE HYDROCHLORIDE 50 MG: 25 TABLET, FILM COATED ORAL at 21:59

## 2021-11-30 RX ADMIN — GABAPENTIN 900 MG: 300 CAPSULE ORAL at 21:55

## 2021-11-30 RX ADMIN — PREDNISONE 40 MG: 20 TABLET ORAL at 21:56

## 2021-11-30 RX ADMIN — Medication 0.03 MCG/KG/MIN: at 15:35

## 2021-11-30 RX ADMIN — HYDROCORTISONE SODIUM SUCCINATE 100 MG: 100 INJECTION, POWDER, FOR SOLUTION INTRAMUSCULAR; INTRAVENOUS at 15:35

## 2021-11-30 ASSESSMENT — ASTHMA QUESTIONNAIRES
ACT_TOTALSCORE: 12
QUESTION_3 LAST FOUR WEEKS HOW OFTEN DID YOUR ASTHMA SYMPTOMS (WHEEZING, COUGHING, SHORTNESS OF BREATH, CHEST TIGHTNESS OR PAIN) WAKE YOU UP AT NIGHT OR EARLIER THAN USUAL IN THE MORNING: NOT AT ALL
QUESTION_5 LAST FOUR WEEKS HOW WOULD YOU RATE YOUR ASTHMA CONTROL: SOMEWHAT CONTROLLED
QUESTION_4 LAST FOUR WEEKS HOW OFTEN HAVE YOU USED YOUR RESCUE INHALER OR NEBULIZER MEDICATION (SUCH AS ALBUTEROL): ONE OR TWO TIMES PER DAY
QUESTION_1 LAST FOUR WEEKS HOW MUCH OF THE TIME DID YOUR ASTHMA KEEP YOU FROM GETTING AS MUCH DONE AT WORK, SCHOOL OR AT HOME: ALL OF THE TIME
QUESTION_2 LAST FOUR WEEKS HOW OFTEN HAVE YOU HAD SHORTNESS OF BREATH: MORE THAN ONCE A DAY

## 2021-11-30 ASSESSMENT — ANXIETY QUESTIONNAIRES
4. TROUBLE RELAXING: SEVERAL DAYS
IF YOU CHECKED OFF ANY PROBLEMS ON THIS QUESTIONNAIRE, HOW DIFFICULT HAVE THESE PROBLEMS MADE IT FOR YOU TO DO YOUR WORK, TAKE CARE OF THINGS AT HOME, OR GET ALONG WITH OTHER PEOPLE: SOMEWHAT DIFFICULT
2. NOT BEING ABLE TO STOP OR CONTROL WORRYING: NOT AT ALL
1. FEELING NERVOUS, ANXIOUS, OR ON EDGE: MORE THAN HALF THE DAYS
5. BEING SO RESTLESS THAT IT IS HARD TO SIT STILL: NOT AT ALL
7. FEELING AFRAID AS IF SOMETHING AWFUL MIGHT HAPPEN: NOT AT ALL
GAD7 TOTAL SCORE: 6
6. BECOMING EASILY ANNOYED OR IRRITABLE: SEVERAL DAYS
3. WORRYING TOO MUCH ABOUT DIFFERENT THINGS: MORE THAN HALF THE DAYS

## 2021-11-30 ASSESSMENT — PAIN SCALES - GENERAL: PAINLEVEL: EXTREME PAIN (8)

## 2021-11-30 NOTE — ED PROVIDER NOTES
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 critically ill.     After the initial assessment, the care team initiated multiple lab tests, initiated IV fluid administration, initiated medication therapy with hydrocortisone and consulted with endocrinology to provide stabilization care. Due to the critical nature of this patient, I reassessed nursing observations, vital signs, physical exam, review of cardiac rhythm monitor, 12 lead ECG analysis, interpretation of imaging and mental status multiple times prior to his disposition.     Time also spent performing documentation, reviewing test results, discussion with consultants and coordination of care.     Critical care time (excluding teaching time and procedures): 45 minutes.     New Prescriptions    No medications on file       Final diagnoses:   Hypotension, unspecified hypotension type       11/30/2021   [facility]     Clark Guardado MD  11/30/21 1808       Clark Guardado MD  11/30/21 1831

## 2021-11-30 NOTE — DISCHARGE INSTRUCTIONS
Appointments: [facility] will be calling you at home tomorrow to schedule a Hospital Follow-up appointment if they do not contact you by tomorrow afternoon please call [phone] to schedule    If wanting referral for medical marijuana you will need to go to one of the options below:   [facility]- [place]  [phone]  [facility]- [place]   [phone]        What to expect when you have contrast    During your exam, we will inject  contrast  into your vein or artery. (Contrast is a clear liquid with iodine in it. It shows up on X-rays.)    You may feel warm or hot. You may have a metal taste in your mouth and a slight upset stomach. You may also feel pressure near the kidneys and bladder. These effects will last about 1 to 3 minutes.    Please tell us if you have:    Sneezing     Itching    Hives     Swelling in the face    A hoarse voice    Breathing problems    Other new symptoms    Serious problems are rare.  They may include:    Irregular heartbeat     Seizures    Kidney failure              Tissue damage    Shock      Death    If you have any problems during the exam, we  will treat them right away.    When you get home    Call your hospital if you have any new symptoms in the next 2 days, like hives or swelling. (Phone numbers are at the bottom of this page.) Or call your family doctor.     If you have wheezing or trouble breathing, call 911.    Self-care  -Drink at least 4 extra glasses of water today.   This reduces the stress on your kidneys.  -Keep taking your regular medicines.    The contrast will pass out of your body in your  Urine(pee). This will happen in the next 24 hours. You  will not feel this. Your urine will not  change color.    If you have kidney problems or take metformin    Drink 4 to 8 large glasses of water for the next  2 days, if you are not on a fluid restriction.    ?If you take metformin (Glucophage or Glucovance) for diabetes, keep taking  it.      ?Your kidney function tests are abnormal.  If you take Metformin, do not take it for 48 hours. Please go to your clinic for a blood test within 3 days after your exam before the restarting this medicine.     (Note to provider:please give patient prescription for lab tests.)    ?Special instructions: -    I have read and understand the above information.    Patient Sign Here:______________________________________Date:________Time:______    Staff Sign Here:________________________________________Date:_______Time:______      Radiology Departments:     ?[facility]: [phone] ?[place]: [phone]     ?[place]: [phone] ?[facility]:[phone]      ?[place]: [phone]  ?[facility]: [phone]  ?[place]:[phone]    ?[facility] [place]:[phone]  ?[facility] [place]:[phone]

## 2021-11-30 NOTE — NURSING NOTE
"Chief Complaint   Patient presents with     Pain     Asthma       Initial BP (!) 80/41   Pulse 102   Temp 98.6  F (37  C)   Resp 30   Wt 88.4 kg (194 lb 12.8 oz)   SpO2 95%   BMI 27.17 kg/m   Estimated body mass index is 27.17 kg/m  as calculated from the following:    Height as of 7/22/21: 1.803 m (5' 11\").    Weight as of this encounter: 88.4 kg (194 lb 12.8 oz).  Medication Reconciliation: princess Gregorio  "
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
## 2021-11-30 NOTE — ED NOTES
DATE:  11/30/2021   TIME OF RECEIPT FROM LAB:  5486  LAB TEST:  lactic  LAB VALUE:  2.9  RESULTS GIVEN WITH READ-BACK TO (PROVIDER):  DR. Guardado  TIME LAB VALUE REPORTED TO PROVIDER:  2117
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
## 2021-12-01 ENCOUNTER — TELEPHONE (OUTPATIENT)
Dept: FAMILY MEDICINE | Facility: OTHER | Age: 61
End: 2021-12-01
Payer: COMMERCIAL

## 2021-12-01 VITALS
DIASTOLIC BLOOD PRESSURE: 83 MMHG | HEART RATE: 105 BPM | TEMPERATURE: 99 F | RESPIRATION RATE: 20 BRPM | SYSTOLIC BLOOD PRESSURE: 145 MMHG | OXYGEN SATURATION: 95 %

## 2021-12-01 LAB
ANION GAP SERPL CALCULATED.3IONS-SCNC: 5 MMOL/L (ref 3–14)
BUN SERPL-MCNC: 12 MG/DL (ref 7–30)
CALCIUM SERPL-MCNC: 8.5 MG/DL (ref 8.5–10.1)
CHLORIDE BLD-SCNC: 101 MMOL/L (ref 94–109)
CO2 SERPL-SCNC: 26 MMOL/L (ref 20–32)
CORTIS SERPL-MCNC: 27 UG/DL (ref 4–22)
CREAT SERPL-MCNC: 0.6 MG/DL (ref 0.66–1.25)
ERYTHROCYTE [DISTWIDTH] IN BLOOD BY AUTOMATED COUNT: 13 % (ref 10–15)
GFR SERPL CREATININE-BSD FRML MDRD: >90 ML/MIN/1.73M2
GLUCOSE BLD-MCNC: 160 MG/DL (ref 70–99)
HCT VFR BLD AUTO: 35.7 % (ref 40–53)
HGB BLD-MCNC: 12.2 G/DL (ref 13.3–17.7)
LACTATE SERPL-SCNC: 1.2 MMOL/L (ref 0.7–2)
MCH RBC QN AUTO: 29.7 PG (ref 26.5–33)
MCHC RBC AUTO-ENTMCNC: 34.2 G/DL (ref 31.5–36.5)
MCV RBC AUTO: 87 FL (ref 78–100)
PLATELET # BLD AUTO: 161 10E3/UL (ref 150–450)
POTASSIUM BLD-SCNC: 4.8 MMOL/L (ref 3.4–5.3)
RBC # BLD AUTO: 4.11 10E6/UL (ref 4.4–5.9)
SODIUM SERPL-SCNC: 132 MMOL/L (ref 133–144)
T4 FREE SERPL-MCNC: 0.99 NG/DL (ref 0.76–1.46)
WBC # BLD AUTO: 4.5 10E3/UL (ref 4–11)

## 2021-12-01 PROCEDURE — 99217 PR OBSERVATION CARE DISCHARGE: CPT | Performed by: INTERNAL MEDICINE

## 2021-12-01 PROCEDURE — 85027 COMPLETE CBC AUTOMATED: CPT | Performed by: INTERNAL MEDICINE

## 2021-12-01 PROCEDURE — 250N000012 HC RX MED GY IP 250 OP 636 PS 637: Performed by: INTERNAL MEDICINE

## 2021-12-01 PROCEDURE — 80048 BASIC METABOLIC PNL TOTAL CA: CPT | Performed by: INTERNAL MEDICINE

## 2021-12-01 PROCEDURE — 250N000013 HC RX MED GY IP 250 OP 250 PS 637: Performed by: INTERNAL MEDICINE

## 2021-12-01 PROCEDURE — 36415 COLL VENOUS BLD VENIPUNCTURE: CPT | Performed by: INTERNAL MEDICINE

## 2021-12-01 PROCEDURE — 83605 ASSAY OF LACTIC ACID: CPT | Performed by: INTERNAL MEDICINE

## 2021-12-01 PROCEDURE — G0378 HOSPITAL OBSERVATION PER HR: HCPCS

## 2021-12-01 PROCEDURE — 999N000147 HC STATISTIC PT IP EVAL DEFER: Performed by: PHYSICAL THERAPIST

## 2021-12-01 PROCEDURE — 84439 ASSAY OF FREE THYROXINE: CPT | Performed by: INTERNAL MEDICINE

## 2021-12-01 RX ORDER — PREDNISONE 20 MG/1
40 TABLET ORAL DAILY
Qty: 8 TABLET | Refills: 0 | Status: SHIPPED | OUTPATIENT
Start: 2021-12-02 | End: 2021-12-06

## 2021-12-01 RX ORDER — CEFDINIR 300 MG/1
300 CAPSULE ORAL EVERY 12 HOURS
Qty: 20 CAPSULE | Refills: 0 | Status: SHIPPED | OUTPATIENT
Start: 2021-12-01 | End: 2021-12-11

## 2021-12-01 RX ORDER — BACLOFEN 20 MG/1
TABLET ORAL
Qty: 360 TABLET | Refills: 0
Start: 2021-12-01 | End: 2021-12-13

## 2021-12-01 RX ORDER — FLUTICASONE PROPIONATE 50 MCG
1 SPRAY, SUSPENSION (ML) NASAL DAILY
Status: DISCONTINUED | OUTPATIENT
Start: 2021-12-01 | End: 2021-12-01 | Stop reason: HOSPADM

## 2021-12-01 RX ORDER — DOXYCYCLINE 100 MG/1
100 CAPSULE ORAL EVERY 12 HOURS
Qty: 20 CAPSULE | Refills: 0 | Status: SHIPPED | OUTPATIENT
Start: 2021-12-01 | End: 2021-12-11

## 2021-12-01 RX ADMIN — BACLOFEN 20 MG: 10 TABLET ORAL at 08:35

## 2021-12-01 RX ADMIN — METOPROLOL SUCCINATE 12.5 MG: 25 TABLET, EXTENDED RELEASE ORAL at 08:35

## 2021-12-01 RX ADMIN — PANTOPRAZOLE SODIUM 40 MG: 40 TABLET, DELAYED RELEASE ORAL at 06:54

## 2021-12-01 RX ADMIN — SALINE NASAL SPRAY 1 SPRAY: 1.5 SOLUTION NASAL at 14:09

## 2021-12-01 RX ADMIN — OXCARBAZEPINE 600 MG: 600 TABLET, FILM COATED ORAL at 08:35

## 2021-12-01 RX ADMIN — DOXYCYCLINE HYCLATE 100 MG: 100 CAPSULE ORAL at 08:35

## 2021-12-01 RX ADMIN — PREDNISONE 40 MG: 20 TABLET ORAL at 08:34

## 2021-12-01 RX ADMIN — TIZANIDINE 4 MG: 4 TABLET ORAL at 14:09

## 2021-12-01 RX ADMIN — GUAIFENESIN AND DEXTROMETHORPHAN HYDROBROMIDE 1 TABLET: 600; 30 TABLET, EXTENDED RELEASE ORAL at 08:34

## 2021-12-01 RX ADMIN — NICOTINE POLACRILEX 2 MG: 2 GUM, CHEWING ORAL at 06:15

## 2021-12-01 RX ADMIN — SENNOSIDES AND DOCUSATE SODIUM 1 TABLET: 8.6; 5 TABLET ORAL at 08:33

## 2021-12-01 RX ADMIN — CETIRIZINE HYDROCHLORIDE 10 MG: 5 TABLET ORAL at 08:34

## 2021-12-01 RX ADMIN — TIZANIDINE 4 MG: 4 TABLET ORAL at 08:35

## 2021-12-01 RX ADMIN — MORPHINE SULFATE 15 MG: 15 TABLET, EXTENDED RELEASE ORAL at 08:35

## 2021-12-01 RX ADMIN — LISDEXAMFETAMINE DIMESYLATE 70 MG: 70 CAPSULE ORAL at 08:34

## 2021-12-01 RX ADMIN — DICLOFENAC SODIUM 50 MG: 50 TABLET, DELAYED RELEASE ORAL at 08:34

## 2021-12-01 RX ADMIN — HYDROCODONE BITARTRATE AND ACETAMINOPHEN 1 TABLET: 10; 325 TABLET ORAL at 06:12

## 2021-12-01 RX ADMIN — TAMSULOSIN HYDROCHLORIDE 0.4 MG: 0.4 CAPSULE ORAL at 08:35

## 2021-12-01 RX ADMIN — HYDROCODONE BITARTRATE AND ACETAMINOPHEN 1 TABLET: 10; 325 TABLET ORAL at 14:15

## 2021-12-01 RX ADMIN — GABAPENTIN 900 MG: 300 CAPSULE ORAL at 08:34

## 2021-12-01 RX ADMIN — GABAPENTIN 900 MG: 300 CAPSULE ORAL at 14:09

## 2021-12-01 RX ADMIN — BACLOFEN 20 MG: 10 TABLET ORAL at 12:38

## 2021-12-01 RX ADMIN — ASPIRIN 81 MG CHEWABLE TABLET 81 MG: 81 TABLET CHEWABLE at 06:54

## 2021-12-01 RX ADMIN — HYDROXYZINE HYDROCHLORIDE 50 MG: 25 TABLET, FILM COATED ORAL at 14:15

## 2021-12-01 RX ADMIN — CEFDINIR 300 MG: 300 CAPSULE ORAL at 08:35

## 2021-12-01 RX ADMIN — MORPHINE SULFATE 15 MG: 15 TABLET, EXTENDED RELEASE ORAL at 12:38

## 2021-12-01 ASSESSMENT — PATIENT HEALTH QUESTIONNAIRE - PHQ9: SUM OF ALL RESPONSES TO PHQ QUESTIONS 1-9: 15

## 2021-12-01 ASSESSMENT — ASTHMA QUESTIONNAIRES: ACT_TOTALSCORE: 12

## 2021-12-01 ASSESSMENT — ANXIETY QUESTIONNAIRES: GAD7 TOTAL SCORE: 6

## 2021-12-01 NOTE — DISCHARGE SUMMARY
[facility]    Discharge Summary  Hospitalist    Date of Admission:  11/30/2021  Date of Discharge:  12/1/2021  Discharging Provider: Ramon Stein MD  Date of Service (when I saw the patient): 12/01/21    Discharge Diagnoses   Active Problems:    Hypotension  Rule out adrenal insufficiency  Mild acute on chronic COPD exacerbation  Acute sinusitis  Lactic acidosis    History of Present Illness   Joshua Barron is an 61 year old male who presented with dizziness, nasal congestion and discharge.  Please see admission H+P for additional details.      Hospital Course   Joshua Barron was admitted on 11/30/2021.  61-year-old male with history of COPD, bipolar disorder, chronic back pain with chronic opioid use presents with some dizziness associated with nasal congestion and discharge.  Patient came in rather hypotensive, given IV fluid bolus with little response.  He was actually started on norepinephrine and was also given Solu-Cortef with good response.  Suspicion was then raised for possible adrenal insufficiency, so endocrinology was consulted on admission.  Cortisol levels were drawn, patient was placed on stress dose steroids.  With slight shortness of breath and wheezing, patient was treated for possible COPD exacerbation as well.  With symptoms consistent with sinusitis, patient was given cefdinir as well as doxycycline.  Patient's hypotension resolved, symptom of dizziness resolved as well.  Endocrinology at [facility] was consulted, cortisol level returned at 27, which was appropriate for stress.  This most likely rules out adrenal insufficiency as etiology for patient's symptoms.  Will discharge patient with steroid burst as well as antibiotics to treat sinusitis as well as COPD.  Patient is not requiring any supplemental oxygen at this time, clinically feeling much better at this point.  He is stable to be discharge from acute care.  We will have him follow-up with his primary care physician  within 3 to 5 days to ensure continued wellbeing.    Ramon Stein MD        Significant Results and Procedures   See below.    Pending Results   These results will be followed up by Lissy Mooreulted Labs Ordered in the Past 30 Days of this Admission     Date and Time Order Name Status Description    11/30/2021  5:24 PM Adrenal corticotropin In process     11/30/2021  2:58 PM Blood Culture Peripheral Blood In process     11/30/2021  2:58 PM Blood Culture Peripheral Blood In process           Code Status   Full Code       Primary Care Physician   Lissy Moore    Physical Exam   Temp: 99  F (37.2  C) Temp src: Tympanic BP: 145/83 Pulse: 105   Resp: 20 SpO2: 95 % O2 Device: None (Room air)    There were no vitals filed for this visit.  Vital Signs with Ranges  Temp:  [96.5  F (35.8  C)-99  F (37.2  C)] 99  F (37.2  C)  Pulse:  [] 105  Resp:  [6-33] 20  BP: ()/() 145/83  SpO2:  [93 %-100 %] 95 %  I/O last 3 completed shifts:  In: 2840 [P.O.:840; I.V.:2000]  Out: 700 [Urine:700]    Constitutional: AA, NAD  Eyes: PERRLA, no injection, no icterus  HEENT: atraumatic, normocephalic  Respiratory: CTA b/l  Cardiovascular: S1 S2 RRR  GI: soft, NT, ND, + bowel sounds  Lymph/Hematologic: no palpable lymphadenopathy  Skin: no rashes, no lesions  Musculoskeletal: No edema, good tone, no deformities  Neurologic: oriented x 3, no focal deficits  Psychiatric: appropriate affect    Discharge Disposition   Discharged to home  Condition at discharge: Stable    Consultations This Hospital Stay   PHYSICAL THERAPY ADULT IP CONSULT  OCCUPATIONAL THERAPY ADULT IP CONSULT    Time Spent on this Encounter   IRamon MD, personally saw the patient today and spent greater than 30 minutes discharging this patient.    Discharge Orders      Reason for your hospital stay    Hypotension, acute sinusitis     Follow-up and recommended labs and tests     Follow up with primary care provider, Lissy Moore, within 7  days to evaluate medication change.  No follow up labs or test are needed.     Activity    Your activity upon discharge: activity as tolerated     Diet    Follow this diet upon discharge: Orders Placed This Encounter      Regular Diet Adult     Discharge Medications   Current Discharge Medication List      START taking these medications    Details   cefdinir (OMNICEF) 300 MG capsule Take 1 capsule (300 mg) by mouth every 12 hours for 10 days  Qty: 20 capsule, Refills: 0    Associated Diagnoses: Sinusitis, unspecified chronicity, unspecified location      doxycycline hyclate (VIBRAMYCIN) 100 MG capsule Take 1 capsule (100 mg) by mouth every 12 hours for 10 days  Qty: 20 capsule, Refills: 0    Associated Diagnoses: Sinusitis, unspecified chronicity, unspecified location      predniSONE (DELTASONE) 20 MG tablet Take 2 tablets (40 mg) by mouth daily for 4 days  Qty: 8 tablet, Refills: 0    Associated Diagnoses: Hypotension, unspecified hypotension type; Hypotension due to hypovolemia         CONTINUE these medications which have NOT CHANGED    Details   acetaminophen (TYLENOL) 325 MG tablet TAKE 2 TABLETS BY MOUTH EVERY 4 HOURS AS NEEDED FOR MILD PAIN  Qty: 200 tablet, Refills: 0    Associated Diagnoses: Pain      albuterol (ACCUNEB) 1.25 MG/3ML neb solution Take 1 vial (1.25 mg) by nebulization every 6 hours as needed for shortness of breath / dyspnea or wheezing  Qty: 100 vial, Refills: 3    Associated Diagnoses: Moderate persistent asthma with exacerbation      albuterol (PROAIR HFA/PROVENTIL HFA/VENTOLIN HFA) 108 (90 Base) MCG/ACT inhaler INHALE 2 PUFFS INTO THE LUNGS EVERY 4 HOURS AS NEEDED  Qty: 18 g, Refills: 4    Associated Diagnoses: Moderate persistent asthma without complication      artificial saliva (BIOTENE ORALBALANCE) GEL gel Take 4 g by mouth 4 times daily  Qty: 126 g, Refills: 11    Associated Diagnoses: Dry mouth      aspirin (ASPIRIN LOW DOSE) 81 MG chewable tablet CHEW AND SWALLOW 1 TABLET BY MOUTH  DAILY  Qty: 30 tablet, Refills: 0    Associated Diagnoses: Healthcare maintenance      atorvastatin (LIPITOR) 20 MG tablet TAKE 1 TABLET BY MOUTH DAILY  Qty: 90 tablet, Refills: 1    Associated Diagnoses: Mixed hyperlipidemia      baclofen (LIORESAL) 20 MG tablet TAKE 1 TABLET BY MOUTH 4 TIMES DAILY  Qty: 360 tablet, Refills: 0    Associated Diagnoses: Back muscle spasm; Lumbar back pain      budesonide (PULMICORT) 0.5 MG/2ML neb solution Spray 2 mLs (0.5 mg) in nostril 2 times daily Make 240 cc [place] med sinus irrigation Mix 2 ml vial of budesonide 0.5 mg Rinse 1-2 times daily for 2-4 weeks.  Qty: 60 mL, Refills: 1    Associated Diagnoses: Chronic rhinitis      cetirizine (ZYRTEC) 10 MG tablet Take 1 tablet (10 mg) by mouth daily  Qty: 30 tablet, Refills: 3    Associated Diagnoses: Seasonal allergic rhinitis due to other allergic trigger      dextromethorphan-guaiFENesin (MUCINEX DM)  MG 12 hr tablet TAKE 1 TABLET BY MOUTH EVERY 12 HOURS  Qty: 60 tablet, Refills: 0    Associated Diagnoses: Moderate persistent asthma with exacerbation      diclofenac (VOLTAREN) 1 % topical gel APPLY 2 GRAMS TOPICALLY FOUR TIMES A DAY  Qty: 100 g, Refills: 0    Associated Diagnoses: Arthralgia of both hands      diclofenac (VOLTAREN) 50 MG EC tablet TAKE 1 TABLET BY MOUTH TWICE DAILY WITH FOOD  Qty: 60 tablet, Refills: 0    Associated Diagnoses: Arthralgia of both hands      dronabinol (MARINOL) 2.5 MG capsule TAKE 1 CAPSULE BY MOUTH 2 TIMES DAILY BEFORE MEALS  Qty: 60 capsule, Refills: 0    Associated Diagnoses: Nausea      famotidine (PEPCID) 20 MG tablet TAKE 1 TABLET BY MOUTH NIGHTLY AS NEEDED FOR REFLUX  Qty: 90 tablet, Refills: 0    Associated Diagnoses: Gastroesophageal reflux disease, unspecified whether esophagitis present      fluticasone (FLONASE) 50 MCG/ACT nasal spray USE 2 SPRAYS IN EACH NOSTRIL DAILY  Qty: 16 g, Refills: 0    Associated Diagnoses: Chronic rhinitis      gabapentin (NEURONTIN) 300 MG capsule TAKE 3  CAPSULES BY MOUTH THREE TIMES DAILY  Qty: 810 capsule, Refills: 0    Associated Diagnoses: Chronic pain syndrome      HYDROcodone-acetaminophen (NORCO)  MG per tablet TAKE 1 TABLET BY MOUTH 2 TIMES DAILY AS NEEDED FOR SEVERE PAIN  Qty: 60 tablet, Refills: 0    Associated Diagnoses: Chronic, continuous use of opioids      hydrOXYzine (VISTARIL) 50 MG capsule TAKE 1 TO 2 CAPSULES BY MOUTH 4 TIMES DAILY AS NEEDED FOR ANXIETY  Qty: 120 capsule, Refills: 0    Associated Diagnoses: DAYANA (generalized anxiety disorder)      ibuprofen (IBU) 600 MG tablet TAKE 1 TABLET BY MOUTH EVERY 6 HOURS AS NEEDED  Qty: 120 tablet, Refills: 0    Associated Diagnoses: Chronic left-sided low back pain without sciatica      lisdexamfetamine (VYVANSE) 70 MG capsule TAKE 1 CAPSULE BY MOUTH EVERY MORNING  Qty: 30 capsule, Refills: 0    Associated Diagnoses: ADHD (attention deficit hyperactivity disorder), combined type      loratadine (CLARITIN) 10 MG tablet Take 10 mg by mouth daily      losartan (COZAAR) 50 MG tablet TAKE 1 TABLET BY MOUTH DAILY  Qty: 90 tablet, Refills: 3    Associated Diagnoses: Essential hypertension, benign      methocarbamol (ROBAXIN) 500 MG tablet TAKE 1 TABLET BY MOUTH 3 TIMES DAILY  Qty: 270 tablet, Refills: 0    Associated Diagnoses: Back muscle spasm      metoprolol succinate ER (TOPROL-XL) 50 MG 24 hr tablet TAKE 1 TABLET BY MOUTH DAILY  Qty: 30 tablet, Refills: 4    Associated Diagnoses: Essential hypertension      mometasone-formoterol (DULERA) 200-5 MCG/ACT inhaler Inhale 2 puffs into the lungs 2 times daily  Qty: 13 g, Refills: 3    Associated Diagnoses: Moderate persistent asthma without complication      montelukast (SINGULAIR) 10 MG tablet Take 1 tablet (10 mg) by mouth At Bedtime  Qty: 90 tablet, Refills: 3    Associated Diagnoses: Moderate persistent asthma without complication      morphine (MS CONTIN) 15 MG CR tablet TAKE 1 TABLET BY MOUTH EVERY 8 HOURS  Qty: 90 tablet, Refills: 0    Associated  Diagnoses: Degeneration of lumbar or lumbosacral intervertebral disc; Chronic pain syndrome      multivitamin  with iron (SM COMPLETE ADVANCED FORMULA) TABS TAKE 1 TABLET BY MOUTH DAILY  Qty: 30 tablet, Refills: 9    Associated Diagnoses: Health care maintenance      naloxone (NARCAN) 1 mg/mL for intranasal kit (2 syringes with 2 mucosal atomizer device) In opioid overdose put cone in nostril and push 1/2 of contents into each nostril.  Repeat every 3 min if no response until help arrives.  Qty: 2 kit, Refills: 0    Comments: For intranasal administration: Dispense 2 naloxone injection 2 mg/2 mL syringes.  Include 2 mucosal atomization devices (MAD-Nasal).  Associated Diagnoses: Chronic pain syndrome      nicotine polacrilex (NICORETTE) 4 MG gum PLACE 1 PIECE OF GUM INSIDE CHEEK AS NEEDED FOR SMOKING CESSATION  Qty: 110 each, Refills: 0    Associated Diagnoses: Tobacco abuse counseling      nortriptyline (PAMELOR) 50 MG capsule TAKE 2 CAPSULES (100MG) BY MOUTH AT BEDTIME  Qty: 60 capsule, Refills: 4    Associated Diagnoses: Chronic bilateral low back pain with bilateral sciatica; Fibromyalgia      omeprazole (PRILOSEC) 20 MG DR capsule TAKE 1 CAPSULE BY MOUTH 2 TIMES DAILY  Qty: 180 capsule, Refills: 0    Associated Diagnoses: Gastroesophageal reflux disease with esophagitis, unspecified whether hemorrhage      OXcarbazepine (TRILEPTAL) 600 MG tablet Take 1 tablet (600 mg) by mouth 2 times daily  Qty: 60 tablet, Refills: 4    Associated Diagnoses: Bipolar affective disorder, current episode hypomanic (H)      senna-docusate (SENEXON-S) 8.6-50 MG tablet TAKE 1 OR 2 TABLETS BY MOUTH 2 TIMES A DAY  Qty: 120 tablet, Refills: 4    Associated Diagnoses: Constipation, unspecified constipation type      SUMAtriptan (IMITREX) 50 MG tablet TAKE 1 TABLET BY MOUTH AT ONSET OF HEADACHE FOR MIGRAINE. MAY REPEAT IN 2 HOURS. MAX OF 4 TABLETS IN 24 HOURS  Qty: 9 tablet, Refills: 3    Associated Diagnoses: Migraine with aura and  without status migrainosus, not intractable      tamsulosin (FLOMAX) 0.4 MG capsule TAKE 1 CAPSULE BY MOUTH DAILY  Qty: 30 capsule, Refills: 4    Associated Diagnoses: Benign prostatic hyperplasia with weak urinary stream      tiZANidine (ZANAFLEX) 4 MG tablet TAKE 1 TABLET BY MOUTH 3 TIMES A DAY  Qty: 270 tablet, Refills: 0    Associated Diagnoses: Back muscle spasm      traZODone (DESYREL) 100 MG tablet Take 1 tablet (100 mg) by mouth At Bedtime  Qty: 30 tablet, Refills: 6    Associated Diagnoses: Persistent insomnia      blood glucose (NO BRAND SPECIFIED) lancets standard Use to test blood sugar 1 time daily or as directed.  Qty: 100 each, Refills: 0    Associated Diagnoses: Prediabetes      blood glucose (NO BRAND SPECIFIED) lancing device Device to be used with lancets.  Qty: 1 each, Refills: 0    Associated Diagnoses: Prediabetes      blood glucose (NO BRAND SPECIFIED) test strip Use to test blood sugar 1 times daily or as directed.  Qty: 100 strip, Refills: 0    Associated Diagnoses: Prediabetes      blood glucose monitoring (NO BRAND SPECIFIED) meter device kit Use to test blood sugar 1 time daily or as directed.  Qty: 1 kit, Refills: 0    Associated Diagnoses: Prediabetes           Allergies   Allergies   Allergen Reactions     Cymbalta Unknown     Suicidal thoughts     Depakote [Valproic Acid]      Drowsiness       Seasonal Allergies      Data   Most Recent 3 CBC's:  Recent Labs   Lab Test 12/01/21  0555 11/30/21  1457 12/14/20 2021   WBC 4.5 4.5 6.6   HGB 12.2* 13.8 11.3*   MCV 87 86 91    222 174      Most Recent 3 BMP's:  Recent Labs   Lab Test 12/01/21  0555 11/30/21  1457 12/14/20 2021   * 132* 141   POTASSIUM 4.8 4.0 3.8   CHLORIDE 101 97 107   CO2 26 25 28   BUN 12 19 16   CR 0.60* 1.22 0.77   ANIONGAP 5 10 6   SANDRITA 8.5 8.9 8.2*   * 137* 108*     Most Recent 2 LFT's:  Recent Labs   Lab Test 11/30/21  1457 12/14/20 2021   AST 15 19   ALT 37 31   ALKPHOS 117 81   BILITOTAL 0.3  0.1*     Most Recent INR's and Anticoagulation Dosing History:  Anticoagulation Dose History    There is no flowsheet data to display.       Most Recent 3 Troponin's:  Recent Labs   Lab Test 12/14/20  2021 08/17/20  0015 06/03/19  1600   TROPI <0.015 <0.015 <0.015     Most Recent Cholesterol Panel:  Recent Labs   Lab Test 07/22/21  1210   CHOL 168   LDL 42   HDL 59   TRIG 335*     Most Recent 6 Bacteria Isolates From Any Culture (See EPIC Reports for Culture Details):No lab results found.  Most Recent TSH, T4 and A1c Labs:  Recent Labs   Lab Test 12/01/21  0555 11/30/21  1842 07/22/21  1210   TSH  --  0.94  --    T4 0.99  --   --    A1C  --   --  5.9*     Results for orders placed or performed during the hospital encounter of 11/30/21   POC US ECHO LIMITED    Impression    [facility] Procedure Note      Limited Bedside ED Cardiac Ultrasound:    PROCEDURE: PERFORMED BY: Dr. Clark Guardado MD  INDICATIONS/SYMPTOM:  Chest Pain and Hypotension/shock  PROBE: Cardiac phased array probe  BODY LOCATION: Chest  FINDINGS:   The ultrasound was performed utilizing the subcostal, parasternal long axis, parasternal short axis and apical 4 chamber views.  Cardiac contractility:  Present  Gross estimation of cardiac kinesis: normal  Pericardial Effusion:  None  RV:LV ratio: LV > RV  IVC:                                          Collapsibility:  IVC collapses > 50% with inspiration  INTERPRETATION:  Chamber size and motion were grossly normal with LV > RV, normal cardiac kinesis.  No pericardial effusion was found.  IVC visualized and findings indicate hypovolemia.       XR Chest Port 1 View    Narrative    PROCEDURE:  XR CHEST PORT 1 VIEW    HISTORY: CHest p ain. .    COMPARISON:  12/14/2020    FINDINGS:    The cardiomediastinal contours are magnified by portable technique.  No focal consolidation, effusion or pneumothorax.      Impression    IMPRESSION:  Stable portable chest.      TANESHA BOWDEN MD         SYSTEM ID:   [id number]   CTA Chest Abdomen Pelvis w Contrast    Narrative    CTA CHEST ABDOMEN PELVIS W CONTRAST    CLINICAL HISTORY: Male, age 61 years, ;    Comparison:  CT scan of the chest 2/20/2019    TECHNIQUE:  CT was performed of the chest, abdomen and pelvis  after  the administration of IV  contrast.   Sagittal, coronal, axial and MIP reconstructions were reviewed.     FINDINGS:  Chest CT:   There is excellent opacification of the thoracic aorta without acute  abnormality. There are a few calcified atherosclerotic plaques  throughout the thoracic aorta. Dense calcifications are seen within  the coronary arteries.     Mild air trapping with areas of dependent atelectasis are seen in both  lungs. No evidence of well-defined pneumonia/consolidation.    No evidence of pathologic lymph node enlargement.    Bony structures demonstrate no acute abnormality. There is a healed  rib fractures seen posteriorly in the right 11th rib. No evidence of  vertebral body fracture. Sternum is intact.    Abdomen/Pelvis CT:  Stomach and duodenum: Normal.    Liver: Normal    Gallbladder: Normal    Spleen: Normal.    Anchors: Normal.    Large and small bowel: Normal.    Kidneys: Normal.    Ureters: Normal.    Urinary bladder: Incompletely distended with diffuse wall thickening.  Prostate gland is mildly enlarged.    Large and small bowel: Moderate volume of stool throughout the colon.  No acute abnormality.    Appendix: Not seen. No secondary signs of appendicitis.    No evidence of pathologic lymph node enlargement. The abdominal aorta  and the arterial structures of the pelvis are patent with scattered  areas of calcified and noncalcified atherosclerotic plaque.     Postoperative changes are seen within the spine. Mild degenerative  changes are seen in the hip joints.        Impression    IMPRESSION:   No evidence of acute vascular abnormality.    Questionable wall thickening of the urinary bladder suggesting the  possibility of cystitis.  Urinary bladder is only partially distended  which likely accounts for some of the perceived wall thickening.    Additional findings as described above.    ANNETTE CASTANO MD         SYSTEM ID:  [id number]   CT Head w/o Contrast    Narrative    PROCEDURE: CT HEAD W/O CONTRAST     HISTORY: Pain.    COMPARISON: 11/3/20.    TECHNIQUE:  Helical images of the head from the foramen magnum to the  vertex were obtained without contrast.    FINDINGS: The ventricles and sulci are normal in volume. No acute  intracranial hemorrhage, mass effect, midline shift, hydrocephalus or  basilar cystern effacement are present.    The grey-white matter interface is preserved.    The calvarium is intact. The mastoid air cells are clear.        Impression    IMPRESSION: No acute intracranial hemorrhage or CT evidence of acute  transcortical ischemia.      TANESHA BOWDEN MD         SYSTEM ID:  [id number]   CT Cervical Spine w/o Contrast    Narrative    PROCEDURE: CT CERVICAL SPINE W/O CONTRAST     HISTORY: Pain.    TECHNIQUE: Helical noncontrast CT images of the cervical spine.    COMPARISON: None.    FINDINGS:     No acute fracture is identified. The vertebral bodies are normal in  height. The cervical lordosis is reversed centered on C3-4. The C1-2  articulation and the craniocervical junction are intact.     Diffuse degenerative changes of the cervical spine are present  including diffuse disc height loss from C3-4 through C7-T1. There is  at least moderate spinal stenosis at C3-4. Scattered foraminal  narrowing is present.     The paravertebral soft tissues are unremarkable. The lung apices are  clear.      Impression    IMPRESSION: No evidence of acute cervical spine fracture. Advanced  degenerative changes.    TANESHA BOWDEN MD         SYSTEM ID:  [id number]     *Note: Due to a large number of results and/or encounters for the requested time period, some results have not been displayed. A complete set of results can be found  in Results Review.

## 2021-12-01 NOTE — PLAN OF CARE
BP stabalized. Pt is up independently. Up with staff this AM to assess gait. See note. Pt excitable and talks loudly but no aphasia. Some rambling but makes sense. Pt home meds brought to med room until discharge. Awaiting labs to result. Pt has been pleasant and cooperative. Continues on omnicef and doxy for lungs. And pred for poss adrenal crisis. Pt requested and received order for nasal spray.    Around 1400 pt c/o not getting all his meds that's hes suppose to be on. Pt does appear alittle more irritable and diaphoretic on the forehead. Slightly more tachy. MD notified. Pt usually takes marinol    Also paged MD for pts flonase. As the ocean spray did not help pt.    Face to face report given with opportunity to observe patient.    Report given to Gwendolyn Law RN   12/1/2021  3:19 PM       Labs sent for pre-op.  TAS

## 2021-12-01 NOTE — PLAN OF CARE
[facility] Inpatient Admission Note:    Patient admitted to 3314/3314-01 at approximately 1900 via bed accompanied by transport tech from emergency room . Report received from Kayce BURNHAM in SBAR format at 1850 via face to face in room. Patient transferred to bed via self.. Patient is alert and oriented X 3, denies pain; rates at 0 on 0-10 scale.  Patient oriented to room, unit, hourly rounding, and plan of care. Explained admission packet and patient handbook with patient bill of rights brochure. Will continue to monitor and document as needed.     Inpatient Nursing criteria listed below was met:    Health care directives status obtained and documented: Yes    Patient identifies a surrogate decision maker: Yes If yes, who:Jaymie Contact Information:see facesheet     If initial lactic acid greater than 2.0, repeat lactic acid drawn within one hour of arrival to unit: NA. If no, state reason: n/a    Clergy visit ordered if patient requests: N/A    Skin issues/needs documented: Yes    Isolation Patient: no Education given, correct sign in place and documentation row added to PCS:   n/a    Fall Prevention N/A: Care plan updated, education given and documented, sticker and magnet in place: N/A    Care Plan initiated: Yes    Education Documented (including assessment): Yes    Patient has discharge needs : No If yes, please explain:n/a    Alert and orientated, however states neighbor comes to house when he is not there and puts extra meds in his daily pill organizer.Very manicy/hyperactive, had to frequently redirect so assessment and admit could be completed.

## 2021-12-01 NOTE — PLAN OF CARE
Prior to Admission Medication Reconciliation:     Medications added:   [x] None  [] As listed below:    Medications deleted:   [x] None  [] As listed below:    Medications marked for review/removal by attending:  [x] None  [] As listed below:    Changes made to existing medications:   [] None  [] Updated strengths and frequencies to most current  [x] As listed below:    Baclofen- pt takes 2 tabs AM, 1 tab afternoon and 1 tab HS    Last times/dates taken verified with patient:  [] Yes- completed myself  [] Prepared PTA medlist for review only. (will not be available to review personally)  [] Did not review with patient. Rx verification only. Review completed by nursing.    [x] Nurse completed no changes made (double checked entries)  [] Unable to review with patient at this time:  [] Nurse completed/changes made:     Allergies listed at another location:  []Allergies match allergies listed in Epic  []Other allergies listed:    Allergy review:    [x]Did not review: reviewed by nursing  []Did not review: pt unable at this time  []Patient/MAR verified NKDA  []Patient/MAR verified current existing allergies: no changes made  []Patient confirmed current existing allergies and new allergies added:    Medication reconciliation sources:   [x]Patient  []Patient family member/emergency contact: **  [x][facility] Report Review  [x]Epic Chart Review  [x]Care Everywhere review  []Pharmacy med list: **  []Pharmacy phone call  [x]Outside meds dispense report: see below  []Nursing home or Assisted Living MAR:  []Other: **    Pharmacy desired at discharge: [facility]    Is patient on coumadin?  [x]No    Requests for consultation by provider or pharmacist:   [x] Patient understands why all of their meds were prescribed and how to take them. No questions.   [] Managing party has no questions.   [] Patient/ managing party has questions about the following:  [] Did not review with patient. Cannot assess.     Fill dates and reported  compliancy:  [] Fill dates coincide with reported compliancy for all/most maintenance meds.   [] Fill dates do not coincide with compliancy with maintenance meds. See notes in PTA medlist and in comments.    [] Fill dates do not coincide with the following medications but pt reports compliancy:  [] Did not review with patient. Cannot assess.     Historian accuracy:  [x] Excellent- alert and oriented, understands why meds were prescribed and how to take, able to answer specifics  [] Good- alert and oriented, understands why meds were prescribed and how to take, some confusion   [] Fair- alert and oriented, doesn't know medications without list, cannot answer specifics about medications, but has a decent process for which to take at home  [] Poor- does not know medications, may not have a process to take at home, may be cognitively unable to review at this time  []Medication management done by family member or facility, no concerns about historian accuracy.   [] Did not review with patient. Cannot assess.     Medication Management:  [x] Manages meds independently  [] Family member/ other party manages meds/assists:  [] Meds managed by staff at facility  [] Meds set up by home care, family/other party helps administer  [] Meds set up by home care, self administers  [] Did not review with patient. Cannot assess.     Other medications aside from PTA:  [x] Denies taking any medications aside from those listed in PTA meds  [] Reports taking another medication(s) but cannot recall the name(s)  [] Refuses to say.  [] Did not review with patient. Cannot assess.     Comments: did not get specific last times taken since already reviewed with nursing.     Yessenia Gary on 12/1/2021 at 9:42 AM       Discrepancies: [x] No []Not Applicable []Yes: listed below    Issues completing PTA medication reconciliation:  [] On hold for a long time  [] Waited for a call back  [] Fax didn't come through  [] Fax took a long time  []  Other:    Notifying appropriate party of changes/additions/discrepancies:  [x]No pertinent changes made, notification not necessary.   [] Notified attending provider via text page/phone call  [] Notified attending provider in person  [] Notified pharmacy  [] Notified nurse  [] Medications have not been reconciled by a provider yet, notification not necessary  [] Pt is not admitted to floor yet, PTA meds completed before admission.     Medications Prior to Admission   Medication Sig Dispense Refill Last Dose     acetaminophen (TYLENOL) 325 MG tablet TAKE 2 TABLETS BY MOUTH EVERY 4 HOURS AS NEEDED FOR MILD PAIN 200 tablet 0 11/30/2021 at Unknown time     albuterol (ACCUNEB) 1.25 MG/3ML neb solution Take 1 vial (1.25 mg) by nebulization every 6 hours as needed for shortness of breath / dyspnea or wheezing 100 vial 3 Unknown at Unknown time     albuterol (PROAIR HFA/PROVENTIL HFA/VENTOLIN HFA) 108 (90 Base) MCG/ACT inhaler INHALE 2 PUFFS INTO THE LUNGS EVERY 4 HOURS AS NEEDED 18 g 4 11/29/2021 at Unknown time     artificial saliva (BIOTENE ORALBALANCE) GEL gel Take 4 g by mouth 4 times daily 126 g 11 Past Month at Unknown time     aspirin (ASPIRIN LOW DOSE) 81 MG chewable tablet CHEW AND SWALLOW 1 TABLET BY MOUTH DAILY 30 tablet 0 11/30/2021 at Unknown time     atorvastatin (LIPITOR) 20 MG tablet TAKE 1 TABLET BY MOUTH DAILY 90 tablet 1 11/30/2021 at Unknown time     baclofen (LIORESAL) 20 MG tablet TAKE 1 TABLET BY MOUTH 4 TIMES DAILY (Patient taking differently: Take 2 tablets in the morning, 1 tablet in the afternoon and 1 tablet at bedtime.) 360 tablet 0 11/30/2021 at 1200     budesonide (PULMICORT) 0.5 MG/2ML neb solution Spray 2 mLs (0.5 mg) in nostril 2 times daily Make 240 cc [place] [facility] sinus irrigation Mix 2 ml vial of budesonide 0.5 mg Rinse 1-2 times daily for 2-4 weeks. 60 mL 1 11/29/2021 at Unknown time     cetirizine (ZYRTEC) 10 MG tablet Take 1 tablet (10 mg) by mouth daily 30 tablet 3 11/30/2021 at Unknown  time     dextromethorphan-guaiFENesin (MUCINEX DM)  MG 12 hr tablet TAKE 1 TABLET BY MOUTH EVERY 12 HOURS 60 tablet 0 Past Week at Unknown time     diclofenac (VOLTAREN) 1 % topical gel APPLY 2 GRAMS TOPICALLY FOUR TIMES A  g 0 Unknown at Unknown time     diclofenac (VOLTAREN) 50 MG EC tablet TAKE 1 TABLET BY MOUTH TWICE DAILY WITH FOOD 60 tablet 0 11/30/2021 at 1200     dronabinol (MARINOL) 2.5 MG capsule TAKE 1 CAPSULE BY MOUTH 2 TIMES DAILY BEFORE MEALS 60 capsule 0 11/30/2021 at 1200     famotidine (PEPCID) 20 MG tablet TAKE 1 TABLET BY MOUTH NIGHTLY AS NEEDED FOR REFLUX 90 tablet 0 Past Month at Unknown time     fluticasone (FLONASE) 50 MCG/ACT nasal spray USE 2 SPRAYS IN EACH NOSTRIL DAILY 16 g 0 11/29/2021 at Unknown time     gabapentin (NEURONTIN) 300 MG capsule TAKE 3 CAPSULES BY MOUTH THREE TIMES DAILY 810 capsule 0 11/30/2021 at 1200     HYDROcodone-acetaminophen (NORCO)  MG per tablet TAKE 1 TABLET BY MOUTH 2 TIMES DAILY AS NEEDED FOR SEVERE PAIN 60 tablet 0 11/30/2021 at 1200     hydrOXYzine (VISTARIL) 50 MG capsule TAKE 1 TO 2 CAPSULES BY MOUTH 4 TIMES DAILY AS NEEDED FOR ANXIETY 120 capsule 0 11/30/2021 at 1200     ibuprofen (IBU) 600 MG tablet TAKE 1 TABLET BY MOUTH EVERY 6 HOURS AS NEEDED 120 tablet 0 11/30/2021 at 1200     lisdexamfetamine (VYVANSE) 70 MG capsule TAKE 1 CAPSULE BY MOUTH EVERY MORNING 30 capsule 0 11/30/2021 at Unknown time     loratadine (CLARITIN) 10 MG tablet Take 10 mg by mouth daily   11/30/2021 at Unknown time     losartan (COZAAR) 50 MG tablet TAKE 1 TABLET BY MOUTH DAILY 90 tablet 3 11/30/2021 at Unknown time     methocarbamol (ROBAXIN) 500 MG tablet TAKE 1 TABLET BY MOUTH 3 TIMES DAILY 270 tablet 0 11/30/2021 at 1200     metoprolol succinate ER (TOPROL-XL) 50 MG 24 hr tablet TAKE 1 TABLET BY MOUTH DAILY 30 tablet 4 11/30/2021 at Unknown time     mometasone-formoterol (DULERA) 200-5 MCG/ACT inhaler Inhale 2 puffs into the lungs 2 times daily 13 g 3  11/29/2021 at Unknown time     montelukast (SINGULAIR) 10 MG tablet Take 1 tablet (10 mg) by mouth At Bedtime 90 tablet 3 11/29/2021 at Unknown time     morphine (MS CONTIN) 15 MG CR tablet TAKE 1 TABLET BY MOUTH EVERY 8 HOURS 90 tablet 0 11/30/2021 at 1200     multivitamin  with iron (SM COMPLETE ADVANCED FORMULA) TABS TAKE 1 TABLET BY MOUTH DAILY 30 tablet 9 Past Week at Unknown time     naloxone (NARCAN) 1 mg/mL for intranasal kit (2 syringes with 2 mucosal atomizer device) In opioid overdose put cone in nostril and push 1/2 of contents into each nostril.  Repeat every 3 min if no response until help arrives. 2 kit 0 More than a month at Unknown time     nicotine polacrilex (NICORETTE) 4 MG gum PLACE 1 PIECE OF GUM INSIDE CHEEK AS NEEDED FOR SMOKING CESSATION 110 each 0 11/30/2021 at Unknown time     nortriptyline (PAMELOR) 50 MG capsule TAKE 2 CAPSULES (100MG) BY MOUTH AT BEDTIME 60 capsule 4 11/29/2021 at Unknown time     omeprazole (PRILOSEC) 20 MG DR capsule TAKE 1 CAPSULE BY MOUTH 2 TIMES DAILY 180 capsule 0 11/30/2021 at 1200     OXcarbazepine (TRILEPTAL) 600 MG tablet Take 1 tablet (600 mg) by mouth 2 times daily 60 tablet 4 11/30/2021 at 1200     senna-docusate (SENEXON-S) 8.6-50 MG tablet TAKE 1 OR 2 TABLETS BY MOUTH 2 TIMES A  tablet 4 11/30/2021 at 1200     SUMAtriptan (IMITREX) 50 MG tablet TAKE 1 TABLET BY MOUTH AT ONSET OF HEADACHE FOR MIGRAINE. MAY REPEAT IN 2 HOURS. MAX OF 4 TABLETS IN 24 HOURS 9 tablet 3 11/29/2021 at Unknown time     tamsulosin (FLOMAX) 0.4 MG capsule TAKE 1 CAPSULE BY MOUTH DAILY 30 capsule 4 11/30/2021 at Unknown time     tiZANidine (ZANAFLEX) 4 MG tablet TAKE 1 TABLET BY MOUTH 3 TIMES A  tablet 0 11/30/2021 at 1200     traZODone (DESYREL) 100 MG tablet Take 1 tablet (100 mg) by mouth At Bedtime 30 tablet 6 11/29/2021 at Unknown time     blood glucose (NO BRAND SPECIFIED) lancets standard Use to test blood sugar 1 time daily or as directed. 100 each 0      blood  glucose (NO BRAND SPECIFIED) lancing device Device to be used with lancets. 1 each 0      blood glucose (NO BRAND SPECIFIED) test strip Use to test blood sugar 1 times daily or as directed. 100 strip 0      blood glucose monitoring (NO BRAND SPECIFIED) meter device kit Use to test blood sugar 1 time daily or as directed. 1 kit 0              Medication Dispense History (from 12/1/2020 to 11/30/2021)  Expand All  Collapse All    Acetaminophen     Dispensed Days Supply Quantity Provider Pharmacy   ACETAMINOPHEN 325 MG TABLET 09/29/2021 17 200 Units [facility]   ACETAMINOPHEN 325 MG TABLET 08/09/2021 17 200 [facility]   ACETAMINOPHEN 325 MG TABLET 07/23/2021 17 200 Units [facility]   ACETAMINOPHEN 325 MG TABLET 06/17/2021 17 200 [facility]   ACETAMINOPHEN 325 MG TABLET 05/25/2021 17 200 Units [facility]           Albuterol Sulfate     Dispensed Days Supply Quantity Provider Pharmacy   VENTOLIN HFA 90 MCG INHALER 11/08/2021 17 18 each [facility]   ALBUTEROL HFA 90 MCG INHALER 09/15/2021 25 18 each [facility]   ALBUTEROL HFA 90 MCG INHALER 05/05/2021 17 18 each [facility]   ALBUTEROL HFA 90 MCG INHALER 04/08/2021 25 18 each [facility]   ALBUTEROL HFA 90 MCG INHALER 03/08/2021 25 18 each [facility]   ALBUTEROL HFA 90 MCG INHALER 12/15/2020 5 18 each [facility]...           Amoxicillin-Pot Clavulanate     Dispensed Days Supply Quantity Provider Pharmacy   AMOX-CLAV 875-125 MG TABLET 07/15/2021 10 20 Units [facility]           Aspirin     Dispensed Days Supply Quantity Provider Pharmacy   ASPIRIN 81 MG CHEWABLE TABLET 10/29/2021 30 30 Units [facility]   ASPIRIN 81 MG CHEWABLE TABLET 09/28/2021 30 30 [facility]   ASPIRIN 81 MG CHEWABLE TABLET 08/26/2021 30 30 Units [facility]   ASPIRIN 81 MG CHEWABLE TABLET 07/23/2021 30 30 Units [facility]...           Atorvastatin Calcium     Dispensed Days Supply Quantity Provider Pharmacy   ATORVASTATIN 20 MG TABLET 10/07/2021 90 90 Units [facility]   ATORVASTATIN 20 MG TABLET 06/24/2021 90 90 Units [facility]   ATORVASTATIN 20 MG TABLET 02/22/2021 90 90 tablet [facility]           Baclofen     Dispensed Days Supply Quantity Provider Pharmacy   BACLOFEN 20 MG TABLET 09/15/2021 90 360 Units [facility]   BACLOFEN 20 MG TABLET 08/18/2021 30 120 Units [facility]   BACLOFEN 20 MG TABLET 07/12/2021 30 120 [facility]   BACLOFEN 20 MG TABLET 06/09/2021 30 120 Units [facility]   BACLOFEN 20 MG TABLET 05/05/2021 30 120 Units [facility]   BACLOFEN 20 MG TABLET 03/29/2021 30 120 tablet [facility]   BACLOFEN 20 MG TABLET 02/12/2021 30 120 tablet [facility]   BACLOFEN 20 MG TABLET 01/21/2021 30 120 tablet [facility]   BACLOFEN 20 MG TABLET 12/23/2020 30 120 tablet [facility]           Blood Glucose Monitoring Suppl     Dispensed Days Supply Quantity Provider Pharmacy   ACCU-CHEK GUIDE MONITOR SYSTEM 09/09/2021 1 1 Units [facility]           Budesonide     Dispensed Days Supply Quantity Provider Pharmacy   BUDESONIDE 0.5 MG/2 ML SUSP 06/01/2021 15 60 mL [facility]           Cephalexin     Dispensed Days Supply Quantity Provider Pharmacy    CEPHALEXIN 500 MG CAPSULE 07/20/2021 7 14 Units [facility]           Cetirizine HCl     Dispensed Days Supply Quantity Provider Pharmacy   CETIRIZINE HCL 10 MG TABLET 11/08/2021 30 30 Units [facility]   CETIRIZINE HCL 10 MG TABLET 10/14/2021 30 30 Units [facility]   CETIRIZINE HCL 10 MG TABLET 09/15/2021 30 30 Units [facility]           Dexamethasone     Dispensed Days Supply Quantity Provider Pharmacy   DEXAMETHASONE 4 MG TABLET 07/15/2021 5 10 Units [facility]           Dextromethorphan-guaiFENesin     Dispensed Days Supply Quantity Provider Pharmacy   MUCINEX DM -30 MG TABLET 11/22/2021 30 60 Units [facility]   MUCINEX DM -30 MG TABLET 10/07/2021 30 60 Units [facility]   MUCINEX DM -30 MG TABLET 08/30/2021 30 60 Units [facility]   MUCINEX DM -30 MG TABLET 07/26/2021 30 60 Units [facility]           Diclofenac Sodium     Dispensed Days Supply Quantity Provider Pharmacy   DICLOFENAC SOD EC 50 MG TAB 10/29/2021 30 60 Units [facility]   DICLOFENAC SOD EC 50 MG TAB 09/29/2021 30 60 Units [facility]   DICLOFENAC SOD EC 50 MG TAB 08/30/2021 30 60 Units [facility]   DICLOFENAC SOD EC 50 MG TAB 07/28/2021 30 60 Units [facility]   DICLOFENAC SOD EC 50 MG TAB 06/17/2021 30 60 Units [facility]   DICLOFENAC SODIUM 1% GEL 06/17/2021 13 100 each [facility]   DICLOFENAC SOD EC 50 MG TAB 05/26/2021 30 60 Units [facility]   DICLOFENAC SODIUM 1% GEL 05/24/2021 13 100 each [facility]   DICLOFENAC SODIUM 1% GEL 05/04/2021 13 100  each [facility]   DICLOFENAC SOD EC 50 MG TAB 04/30/2021 30 60 Units [facility]   DICLOFENAC SODIUM 1% GEL 04/08/2021 13 100 g [facility]   DICLOFENAC SODIUM 1% GEL 02/11/2021 13 100 g [facility]   DICLOFENAC SODIUM 1% GEL 01/12/2021 13 100 g [facility]   DICLOFENAC SOD EC 50 MG TAB 01/05/2021 30 60 tablet [facility]   DICLOFENAC SODIUM 1% GEL 12/16/2020 13 100 g [facility]   DICLOFENAC SOD EC 50 MG TAB 12/03/2020 30 60 tablet [facility]           Dronabinol     Dispensed Days Supply Quantity Provider Pharmacy   DRONABINOL 2.5 MG CAPSULE 11/10/2021 30 60 Units [facility]   DRONABINOL 2.5 MG CAPSULE 10/15/2021 30 60 Units [facility]   DRONABINOL 2.5 MG CAPSULE 09/15/2021 30 60 Units [facility]   DRONABINOL 2.5 MG CAPSULE 08/18/2021 30 60 Units [facility]   DRONABINOL 2.5 MG CAPSULE 07/12/2021 30 60 Units [facility]   DRONABINOL 2.5 MG CAPSULE 06/15/2021 30 60 Units [facility]   DRONABINOL 2.5 MG CAPSULE 05/05/2021 30 60 Units [facility]   DRONABINOL 2.5 MG CAPSULE 04/08/2021 30 60 capsule [facility]   DRONABINOL 2.5 MG CAPSULE 03/15/2021 30 60 capsule [facility]   DRONABINOL 2.5 MG CAPSULE 02/12/2021 30 60 capsule [facility]   DRONABINOL 2.5 MG CAPSULE 01/18/2021 30 60 capsule [facility]   DRONABINOL 2.5 MG CAPSULE 12/11/2020 30 60 capsule [facility]           Famotidine     Dispensed Days Supply Quantity Provider Pharmacy   FAMOTIDINE 20 MG  TABLET 09/15/2021 90 90 Units [facility]   FAMOTIDINE 20 MG TABLET 08/20/2021 30 30 Units [facility]   FAMOTIDINE 20 MG TABLET 07/22/2021 30 30 Units [facility]   FAMOTIDINE 40 MG TABLET 03/08/2021 30 30 tablet [facility]   FAMOTIDINE 40 MG TABLET 01/21/2021 30 30 tablet [facility]   FAMOTIDINE 40 MG TABLET 12/22/2020 30 30 tablet [facility]           Fluticasone Propionate     Dispensed Days Supply [facility] Provider Pharmacy   FLUTICASONE PROP 50 MCG SPRAY 11/02/2021 30 16 each [facility]   FLUTICASONE PROP 50 MCG SPRAY 09/15/2021 30 16 each [facility]   FLUTICASONE PROP 50 MCG SPRAY 08/24/2021 30 16 each [facility]   FLUTICASONE PROP 50 MCG SPRAY 08/02/2021 30 16 each [facility]   FLUTICASONE PROP 50 MCG SPRAY 06/07/2021 30 16 each [facility]   FLUTICASONE PROP 50 MCG SPRAY 05/04/2021 30 16 each [facility]   FLUTICASONE PROP 50 MCG SPRAY 04/08/2021 30 16 spray [facility]   FLUTICASONE PROP 50 MCG SPRAY 03/05/2021 30 16 spray [facility]...   FLUTICASONE PROP 50 MCG SPRAY 01/12/2021 30 16 spray [facility]...   FLUTICASONE PROP 50 MCG SPRAY 12/10/2020 30 16 spray [facility]           Fluticasone-Salmeterol     Dispensed Days Supply Quantity Provider Pharmacy   ADVAIR -21 MCG INHALER 12/10/2020 30 12 each [facility]           Gabapentin     Dispensed Days Supply Quantity Provider Pharmacy   GABAPENTIN 300 MG CAPSULE 09/15/2021 90 810 Units [facility]   GABAPENTIN 300 MG CAPSULE 08/18/2021 30 270 Units  [facility]   GABAPENTIN 300 MG CAPSULE 07/12/2021 30 270 Units [facility]   GABAPENTIN 300 MG CAPSULE 06/16/2021 30 270 Units [facility]   GABAPENTIN 300 MG CAPSULE 05/05/2021 30 270 Units [facility]   GABAPENTIN 300 MG CAPSULE 04/08/2021 30 270 capsule [facility]   GABAPENTIN 300 MG CAPSULE 03/15/2021 30 270 capsule [facility]   GABAPENTIN 300 MG CAPSULE 02/12/2021 30 270 capsule [facility]   GABAPENTIN 300 MG CAPSULE 01/18/2021 30 270 capsule [facility]   GABAPENTIN 300 MG CAPSULE 12/17/2020 30 270 capsule [facility]...           Glucose Blood     Dispensed Days Supply Quantity Provider Pharmacy   ACCU-CHEK GUIDE TEST STRIP 10/28/2021 30 50 Units [facility]   ACCU-CHEK GUIDE TEST STRIP 09/09/2021 30 50 Units [facility]           HYDROcodone-Acetaminophen     Dispensed Days Supply Quantity Provider Pharmacy   HYDROCODONE-ACETAMIN  MG 11/10/2021 30 60 Units [facility]   HYDROCODONE-ACETAMIN  MG 10/15/2021 30 60 Units [facility]   HYDROCODONE-ACETAMIN  MG 09/15/2021 30 60 Units [facility]   HYDROCODONE-ACETAMIN  MG 08/19/2021 30 60 Units [facility]   HYDROCODONE-ACETAMIN  MG 07/20/2021 30 60 Units [facility]   HYDROCODONE-ACETAMIN  MG 06/22/2021 30 60 Units [facility]   HYDROCODONE-ACETAMIN  MG 05/21/2021 30 60 Units [facility]   HYDROCODONE-ACETAMIN  MG 04/22/2021 30 60 tablet [facility]   HYDROCODONE-ACETAMIN  MG  03/22/2021 30 60 tablet [facility]   HYDROCODONE-ACETAMIN  MG 02/23/2021 30 60 tablet [facility]   HYDROCODONE-ACETAMIN  MG 01/21/2021 30 60 tablet [facility]   HYDROCODONE-ACETAMIN  MG 12/23/2020 30 60 tablet [facility]...           Ibuprofen     Dispensed Days Supply Quantity Provider Pharmacy    MG TABLET 10/15/2021 30 120 Units [facility]    MG TABLET 09/01/2021 30 120 Units [facility]    MG TABLET 07/26/2021 30 120 Units [facility]    MG TABLET 06/22/2021 30 120 Units [facility]    MG TABLET 05/06/2021 30 120 Units [facility]   IBUPROFEN 600 MG TABLET 04/06/2021 30 120 tablet [facility]   IBUPROFEN 600 MG TABLET 03/01/2021 30 120 tablet [facility]   IBUPROFEN 600 MG TABLET 01/18/2021 30 120 tablet [facility]   IBUPROFEN 600 MG TABLET 12/11/2020 30 120 tablet [facility]           Lancets     Dispensed Days Supply Quantity Provider Pharmacy   ACCU-CHEK SOFTCLIX LANCETS 09/09/2021 30 100 Units [facility]           Lisdexamfetamine Dimesylate     Dispensed Days Supply Quantity Provider Pharmacy   VYVANSE 70 MG CAPSULE 11/18/2021 30 30 Units [facility]   VYVANSE 70 MG CAPSULE 10/21/2021 30 30 Units [facility]   VYVANSE 70 MG CAPSULE 09/22/2021 30 30 [facility]   VYVANSE 70 MG CAPSULE 08/10/2021 30 30 Units [facility]   VYVANSE 70 MG CAPSULE 07/12/2021 30 30 Units [facility]   VYVANSE 70 MG CAPSULE 06/08/2021 30 30  Units [facility]   VYVANSE 70 MG CAPSULE 05/05/2021 30 30 Units [facility]   VYVANSE 70 MG CAPSULE 04/05/2021 30 30 capsule [facility]   VYVANSE 70 MG CAPSULE 03/08/2021 30 30 capsule [facility]   VYVANSE 70 MG CAPSULE 02/04/2021 30 30 capsule [facility]   VYVANSE 70 MG CAPSULE 01/07/2021 30 30 capsule [facility]   VYVANSE 70 MG CAPSULE 12/10/2020 30 30 capsule [facility]...           Losartan Potassium     Dispensed Days Supply Quantity Provider Pharmacy   LOSARTAN POTASSIUM 50 MG TAB 10/13/2021 90 90 Units [facility]   LOSARTAN POTASSIUM 50 MG TAB 06/15/2021 90 90 Units [facility]   LOSARTAN POTASSIUM 50 MG TAB 01/12/2021 90 90 tablet [facility]...           Methocarbamol     Dispensed Days Supply Quantity Provider Pharmacy   METHOCARBAMOL 500 MG TABLET 10/28/2021 30 90 Units [facility]   METHOCARBAMOL 500 MG TABLET 09/15/2021 30 90 Units [facility]   METHOCARBAMOL 500 MG TABLET 08/23/2021 30 90 Units [facility]   METHOCARBAMOL 500 MG TABLET 07/26/2021 30 90 Units [facility]   METHOCARBAMOL 500 MG TABLET 06/17/2021 30 90 Units [facility]   METHOCARBAMOL 500 MG TABLET 05/24/2021 30 90 Units [facility]   METHOCARBAMOL 500 MG TABLET 04/06/2021 30 90 tablet [facility]   METHOCARBAMOL 500 MG TABLET 03/08/2021 30 90 tablet [facility]   METHOCARBAMOL 500 MG TABLET 02/02/2021 30 90 tablet [facility]   METHOCARBAMOL 500 MG TABLET 12/14/2020 30 90 tablet [facility]...           Metoprolol Succinate     Dispensed Days Supply Quantity Provider Pharmacy   METOPROLOL SUCC ER 50 MG TAB 09/15/2021 90 90 Units [facility]   METOPROLOL SUCC ER 50 MG TAB 08/09/2021 30 30 Units [facility]   METOPROLOL SUCC ER 50 MG TAB 07/10/2021 30 30 Units [facility]   METOPROLOL SUCC ER 50 MG TAB 06/14/2021 30 30 Units [facility]   METOPROLOL SUCC ER 50 MG TAB 05/05/2021 30 30 Units [facility]   METOPROLOL SUCC ER 50 MG TAB 04/05/2021 30 30 tablet [facility]   METOPROLOL SUCC ER 50 MG TAB 03/13/2021 30 30 tablet [facility]   METOPROLOL SUCC ER 50 MG TAB 01/12/2021 30 30 tablet [facility]   METOPROLOL SUCC ER 50 MG TAB 12/10/2020 30 30 tablet [facility]           Mometasone Furo-Formoterol Fum     Dispensed Days Supply Quantity Provider Pharmacy   DULERA 200 MCG-5 MCG INHALER 08/02/2021 30 13 each [facility]   DULERA 200 MCG-5 MCG INHALER 04/08/2021 30 13 each [facility]   DULERA 200 MCG-5 MCG INHALER 03/08/2021 30 13 each [facility]   DULERA 200 MCG-5 MCG INHALER 12/22/2020 30 13 each [facility]...           Montelukast Sodium     Dispensed Days Supply Quantity Provider Pharmacy   MONTELUKAST SOD 10 MG TABLET 06/17/2021 90 90 Units [facility]   MONTELUKAST SOD 10 MG TABLET 03/20/2021 90 90 tablet [facility]   MONTELUKAST SOD 10 MG TABLET 01/12/2021 90 90 tablet [facility]           Morphine Sulfate     Dispensed Days Supply Quantity Provider Pharmacy   MORPHINE SULF ER 15 MG TABLET 11/10/2021 30 90 Units [facility]   MORPHINE SULF ER 15 MG TABLET 10/15/2021 30 90 Units [facility]   MORPHINE SULF ER 15 MG TABLET 09/15/2021 30 90 Units [facility]   MORPHINE SULF ER 15 MG TABLET 08/18/2021 30 90 Units [facility]   MORPHINE SULF ER 15 MG TABLET 07/20/2021 30 90 Units [facility]   MORPHINE SULF ER 15 MG TABLET 06/17/2021 30 90 Units [facility]   MORPHINE SULF ER 15 MG TABLET 05/21/2021 30 90 Units [facility]   MORPHINE SULF ER 15 MG TABLET 04/22/2021 30 90 tablet [facility]   MORPHINE SULF ER 15 MG TABLET 03/22/2021 30 90 tablet [facility]   MORPHINE SULF ER 15 MG TABLET 02/22/2021 30 90 tablet [facility]   MORPHINE SULF ER 15 MG TABLET 01/21/2021 30 90 tablet [facility]   MORPHINE SULF ER 15 MG TABLET 12/22/2020 30 90 tablet [facility]           Multiple Vitamins-Minerals     Dispensed Days Supply Quantity Provider Pharmacy   SM COMPLETE ADVANCED TABLET 10/28/2021 30 30 Units [facility]   SM COMPLETE ADVANCED TABLET 09/22/2021 30 30 Units [facility]   SM COMPLETE ADVANCED TABLET 08/26/2021 30 30 Units [facility]   SM COMPLETE ADVANCED TABLET 07/23/2021 30 30 Units [facility]   SM COMPLETE ADVANCED TABLET 06/17/2021 30 30 Units [facility]   SM COMPLETE ADVANCED TABLET 05/04/2021 30 30 Units [facility]   SM COMPLETE ADVANCED TABLET 04/08/2021 30 30 tablet [facility]   SM COMPLETE ADVANCED TABLET 03/13/2021 30 30 tablet [facility]   SM COMPLETE ADVANCED TABLET 02/11/2021 30 30  tablet [facility]   SM COMPLETE ADVANCED TABLET 01/18/2021 30 30 tablet [facility]   SM COMPLETE ADVANCED TABLET 12/10/2020 30 30 tablet [facility]           Nicotine     Dispensed Days Supply Quantity Provider Pharmacy   NICOTINE 21 MG/24HR PATCH 05/17/2021 30 30 Units [facility]           Nicotine Polacrilex     Dispensed Days Supply Quantity Provider Pharmacy   NICOTINE 4 MG CHEWING GUM 11/10/2021 5 110 Units [facility]   NICOTINE 4 MG CHEWING GUM 09/13/2021 5 110 Units [facility]   NICOTINE 4 MG CHEWING GUM 09/07/2021 5 110 Units [facility]   NICOTINE 4 MG CHEWING GUM 08/09/2021 5 110 Units [facility]   NICOTINE 4 MG CHEWING GUM 07/15/2021 5 110 Units [facility]   NICOTINE 4 MG CHEWING GUM 04/08/2021 5 110 [facility]   NICOTINE 4 MG CHEWING GUM 01/12/2021 5 110 [facility]   NICOTINE 4 MG CHEWING GUM 12/14/2020 5 110 [facility]           Nortriptyline HCl     Dispensed Days Supply Quantity Provider Pharmacy   NORTRIPTYLINE HCL 50 MG CAP 09/15/2021 90 180 Units [facility]   NORTRIPTYLINE HCL 50 MG CAP 08/09/2021 30 60 Units [facility]   NORTRIPTYLINE HCL 50 MG CAP 07/10/2021 30 60 Units [facility]   NORTRIPTYLINE HCL 50 MG CAP 06/14/2021 30 60 Units [facility]   NORTRIPTYLINE HCL 50 MG CAP 05/05/2021 30 60 Units [facility]   NORTRIPTYLINE HCL 50 MG CAP 04/08/2021 30 60 capsule [facility]   NORTRIPTYLINE HCL 50 MG CAP 03/15/2021 30 60 capsule [facility]   NORTRIPTYLINE HCL  50 MG CAP 02/12/2021 30 60 capsule [facility]   NORTRIPTYLINE HCL 50 MG CAP 01/21/2021 30 60 capsule [facility]   NORTRIPTYLINE HCL 50 MG CAP 12/22/2020 30 60 capsule [facility]...           OXcarbazepine     Dispensed Days Supply Quantity Provider Pharmacy   OXCARBAZEPINE 600 MG TABLET 11/11/2021 30 60 Units [facility]   OXCARBAZEPINE 600 MG TABLET 10/13/2021 30 60 Units [facility]DA[facility]...   OXCARBAZEPINE 600 MG TABLET 08/26/2021 30 60 Units [facility]...   OXCARBAZEPINE 600 MG TABLET 07/26/2021 30 60 Units [facility]...   OXCARBAZEPINE 600 MG TABLET 06/17/2021 30 60 Units [facility]DA[facility]...   OXCARBAZEPINE 600 MG TABLET 05/21/2021 30 60 Units [facility]IT[facility]...   OXCARBAZEPINE 600 MG TABLET 04/21/2021 30 60 tablet [facility]...   OXCARBAZEPINE 600 MG TABLET 03/23/2021 30 60 tablet [facility]...   OXCARBAZEPINE 600 MG TABLET 02/22/2021 30 60 tablet [facility]...   OXCARBAZEPINE 600 MG TABLET 01/12/2021 30 60 tablet [facility]   OXCARBAZEPINE 600 MG TABLET 12/10/2020 30 60 tablet [facility]...           Omeprazole     Dispensed Days Supply Quantity Provider Pharmacy   OMEPRAZOLE DR 20 MG CAPSULE 11/19/2021 90 180 Units [facility]   OMEPRAZOLE DR 20 MG CAPSULE 09/13/2021 90 180 Units [facility]   OMEPRAZOLE DR 20 MG CAPSULE 07/23/2021 30 60 Units [facility]   OMEPRAZOLE DR 20 MG CAPSULE 06/17/2021 30 60 Units [facility]   OMEPRAZOLE DR 20 MG CAPSULE 05/04/2021 30 60 Units [facility]   OMEPRAZOLE DR 20 MG CAPSULE  04/08/2021 30 60 capsule GEETA[facility]...   OMEPRAZOLE DR 20 MG CAPSULE 03/13/2021 30 60 capsule [facility]...   OMEPRAZOLE DR 20 MG CAPSULE 02/11/2021 30 60 capsule RUDJANNA[facility]...   OMEPRAZOLE DR 20 MG CAPSULE 01/18/2021 30 60 capsule [facility]...   OMEPRAZOLE DR 20 MG CAPSULE 12/11/2020 30 60 capsule [facility]...           Pantoprazole Sodium     Dispensed Days Supply Quantity Provider Pharmacy   PANTOPRAZOLE SOD DR 40 MG TAB 07/23/2021 30 60 [facility]   PANTOPRAZOLE SOD DR 40 MG TAB 06/17/2021 30 60 [facility]...   PANTOPRAZOLE SOD DR 40 MG TAB 05/21/2021 30 60 [facility]   PANTOPRAZOLE SOD DR 40 MG TAB 04/28/2021 30 60 tablet [facility]   PANTOPRAZOLE SOD DR 40 MG TAB 03/31/2021 30 60 [facility]           Propranolol HCl     Dispensed Days Supply Quantity Provider Pharmacy   PROPRANOLOL 20 MG TABLET 12/10/2020 30 60 [facility]           SUMAtriptan Succinate     Dispensed Days Supply Quantity Provider Pharmacy   SUMATRIPTAN SUCC 50 MG TABLET 11/11/2021 3 9 Units [facility]   SUMATRIPTAN SUCC 50 MG TABLET 10/28/2021 3 9 Units [facility]   SUMATRIPTAN SUCC 50 MG TABLET 10/07/2021 3 9 Units [facility]CR[facility]HI[facility]...   SUMATRIPTAN SUCC 50 MG TABLET 09/08/2021 2 9 Units [facility]   SUMATRIPTAN SUCC 50 MG TABLET 08/19/2021 30 9 Units [facility]...   SUMATRIPTAN SUCC 50 MG TABLET 07/28/2021 3 9 Units [facility]...   SUMATRIPTAN SUCC 50 MG TABLET 07/06/2021 3 9 Units [facility]...   SUMATRIPTAN SUCC 50 MG TABLET 06/08/2021 3  9 Units [facility]...   SUMATRIPTAN SUCC 50 MG TABLET 05/24/2021 3 9 Units [facility]...   SUMATRIPTAN SUCC 50 MG TABLET 05/11/2021 3 9 Units [facility]...           Sennosides-Docusate Sodium     Dispensed Days Supply Quantity Provider Pharmacy   SENEXON-S 50-8.6 MG TABLET 09/15/2021 30 120 Units [facility]   SENEXON-S 50-8.6 MG TABLET 05/05/2021 30 120 Units [facility]   SENEXON-S 50-8.6 MG TABLET 04/08/2021 30 120 tablet [facility]   SENEXON-S 50-8.6 MG TABLET 03/15/2021 30 120 tablet [facility]   SENEXON-S 50-8.6 MG TABLET 02/11/2021 30 120 tablet [facility]   SENEXON-S 50-8.6 MG TABLET 01/18/2021 30 120 tablet [facility]   SENEXON-S 50-8.6 MG TABLET 12/10/2020 30 120 tablet [facility]...           Tamsulosin HCl     Dispensed Days Supply Quantity Provider Pharmacy   TAMSULOSIN HCL 0.4 MG CAPSULE 09/15/2021 90 90 Units [facility]   TAMSULOSIN HCL 0.4 MG CAPSULE 08/17/2021 30 30 Units [facility]   TAMSULOSIN HCL 0.4 MG CAPSULE 07/23/2021 30 30 Units [facility]   TAMSULOSIN HCL 0.4 MG CAPSULE 06/17/2021 30 30 Units [facility]   TAMSULOSIN HCL 0.4 MG CAPSULE 05/05/2021 30 30 Units [facility]   TAMSULOSIN HCL 0.4 MG CAPSULE 04/08/2021 30 30 capsule [facility]   TAMSULOSIN HCL 0.4 MG CAPSULE 03/15/2021 30 30 capsule [facility]   TAMSULOSIN HCL 0.4 MG CAPSULE 02/12/2021 30 30 capsule [facility]   TAMSULOSIN HCL 0.4 MG CAPSULE 01/21/2021 30 30 capsule [facility]    TAMSULOSIN HCL 0.4 MG CAPSULE 12/23/2020 30 30 capsule [facility]           Triamcinolone Acetonide     Dispensed Days Supply Quantity Provider Pharmacy   TRIAMCINOLONE 0.1% CREAM 03/04/2021 30 45 g [facility]...           Other     Dispensed Days Supply Quantity Provider Pharmacy   SENNA PLUS TABLET 10/28/2021 30 120 Units [facility]...           hydrOXYzine Pamoate     Dispensed Days Supply Quantity Provider Pharmacy   HYDROXYZINE CAMELIA 50 MG CAP 11/12/2021 15 120 Units [facility]   HYDROXYZINE CAMELIA 50 MG CAP 10/14/2021 15 120 Units [facility]   HYDROXYZINE CAMELIA 50 MG CAP 09/13/2021 15 120 Units [facility]...   HYDROXYZINE CAMELIA 50 MG CAP 09/01/2021 15 120 Units [facility]...   HYDROXYZINE CAMELIA 50 MG CAP 08/18/2021 15 120 Units [facility]...   HYDROXYZINE CAMELIA 50 MG CAP 07/12/2021 15 120 Units [facility]   HYDROXYZINE CAMELIA 50 MG CAP 06/15/2021 15 120 Units [facility]   HYDROXYZINE CAMELIA 50 MG CAP 05/21/2021 15 120 Units [facility]   HYDROXYZINE CAMELIA 50 MG CAP 04/28/2021 15 120 capsule [facility]   HYDROXYZINE CAMELIA 50 MG CAP 04/05/2021 15 120 capsule [facility]   HYDROXYZINE CAMELIA 50 MG CAP 03/15/2021 15 120 capsule [facility]   HYDROXYZINE CAMELIA 50 MG CAP 02/22/2021 15 120 capsule [facility]   HYDROXYZINE CAMELIA 50 MG CAP 02/02/2021 15 120 capsule [facility]   HYDROXYZINE CAMELIA 50 MG CAP 01/05/2021 15 120 capsule [facility]   HYDROXYZINE CAMELIA 50 MG CAP 12/14/2020 15 120 capsule [facility]           predniSONE     Dispensed Days  Supply Quantity Provider Pharmacy   PREDNISONE 20 MG TABLET 12/15/2020 10 9 tablet [facility]...           tiZANidine HCl     Dispensed Days Supply Quantity Provider Pharmacy   TIZANIDINE HCL 4 MG TABLET 09/15/2021 90 270 Units [facility]   TIZANIDINE HCL 4 MG TABLET 08/18/2021 30 90 Units [facility]   TIZANIDINE HCL 4 MG TABLET 07/12/2021 30 90 Units [facility]CRISTHIAN SALDIVAR [facility]...   TIZANIDINE HCL 4 MG TABLET 06/09/2021 30 90 Units [facility]...   TIZANIDINE HCL 4 MG TABLET 05/05/2021 30 90 Units [facility]...   TIZANIDINE HCL 4 MG TABLET 04/08/2021 30 90 tablet [facility]...   TIZANIDINE HCL 4 MG TABLET 03/15/2021 30 90 tablet [facility]CR[facility]HIAN [facility]...   TIZANIDINE HCL 4 MG TABLET 02/12/2021 30 90 tablet [facility]JANNAMISAELCRISTHI[facility]...   TIZANIDINE HCL 4 MG TABLET 01/18/2021 30 90 tablet [facility]HIAN [facility]...   TIZANIDINE HCL 4 MG TABLET 12/11/2020 30 90 tablet [facility]...           traZODone HCl     Dispensed Days Supply Quantity Provider Pharmacy   TRAZODONE 100 MG TABLET 10/26/2021 30 30 Units [facility]...           Disclaimer    Certain dispenses may not be available or accurate in this report, including over-the-counter medications, low cost prescriptions, prescriptions paid for by the patient or non-participating sources, or errors in insurance claims information. The provider should independently verify medication history with the patient.     External Sources

## 2021-12-01 NOTE — PROVIDER NOTIFICATION
Another nurse saw pt take home medication, asked pt what he took stated immitrex I have a headache, educated on not usomg home meds while in hospital. Pt gave up medications and placed in med room. Notified Dr. Wellington  Face to face report given with opportunity to observe patient.    Report given to Flower Kuo RN   11/30/2021  11:14 PM

## 2021-12-01 NOTE — PROGRESS NOTES
Physical Therapy: Orders received. Chart reviewed and discussed with care team.? Physical Therapy not indicated per Dr. Stein, pt up ambulating with nursing without difficulty.? Will complete orders.
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
## 2021-12-01 NOTE — PROGRESS NOTES
Assessment completed by visit with Trent.    LOC: alert, oriented     Dx: hypotension  Chronic Disease Management: COPD, HTN, sciatica, bipolar, schizoaffective disorder     Lives with: alone with 3 cats   Living at:  home  Transportation: YES     Primary PCP: Lissy Moore; hoping to establish with someone who will send a referral for medical marijuana once Dr. Moore retires.    Insurance:  [facility]      Support System:  Family, a few friends   Homecare/PCA: not connected   /County Services:   Not connected  : NO      How was the [facility] notification completed: n/a    Health Care Directive: completed with Trent and sent to medical records   Guardian: no  POA: no    Pharmacy: [facility]  Meds management: independently manages pill box     Adequate Resources for needs (housing, utilities, food/med): YES  Household chores: independently manages  Work/community/social activity: YES as desired     ADLs: independently manages  Ambulation:independent   Falls: denies  Nutrition: no concerns   Sleep: no concerns       Mental health: bipolar, schizoaffective- connected with Dr. Fernandez and indicates things are stable.  Interested in referral for medical marijuana as he indicates he qualifies.  Information on where to go for this left in discharge instructions.   Substance abuse: denies tobacco and drug use.  Will have 2-4 beers two-three times a week   Exposure to violence/abuse: denies   Stressors: denies     Able to Return to Prior Living Arrangements: YES    Choice of Vendor: n/a    Barriers: no barriers identified    BILLY: medium     Plan: return home via private vehicle

## 2021-12-01 NOTE — PLAN OF CARE
Occupational Therapy: Orders received. Chart reviewed and discussed with care team.? Occupational Therapy not indicated at this time due to pt's current functional status; he is moving around well with nursing.? Will complete orders.

## 2021-12-01 NOTE — PHARMACY-MEDICATION REGIMEN REVIEW
Pharmacy Antimicrobial Stewardship Assessment     Current Antimicrobial Therapy:  Anti-infectives (From now, onward)    Start     Dose/Rate Route Frequency Ordered Stop    21  doxycycline hyclate (VIBRAMYCIN) capsule 100 mg         100 mg Oral EVERY 12 HOURS SCHEDULED 21  cefdinir (OMNICEF) capsule 300 mg         300 mg Oral EVERY 12 HOURS SCHEDULED 21            Indication: COPD    Days of Therapy: 2     Pertinent Labs:  Recent Labs   Lab Test 21  0555 21  1457   WBC 4.5 4.5     Recent Labs   Lab Test 21  0555 21  19421  1842 21  1507 LACT 1.2 0.9  --    < > CRP  --   --   --   --  PCAL  --   --  <0.05  --   < > = values in this interval not displayed.        Temperature:  Temp (24hrs), Av.6  F (36.4  C), Min:96.5  F (35.8  C), Max:98.6  F (37  C)    Culture Results:   (21) COVID = negative  (21) Blood    Recommendations/Interventions:  1. Assess need for antibiotics & consider discontinuation    Shaw Vale, [facility]  2021

## 2021-12-01 NOTE — PLAN OF CARE
Ambulated in hallway SBA with gait belt for safety. Pt ambulated twice up and down hallway. Denies dizziness. MD updated in care conferences

## 2021-12-01 NOTE — TELEPHONE ENCOUNTER
3:43 PM    Reason for Call: OVERBOOK    Patient is having the following symptoms: Patient is getting discharged from the hospital today and needs a hospital follow up in 7 days with Dr Lissy Moore.     T    Was an appointment offered for this call? No  If yes : Appointment type              Date    Preferred method for responding to this message: Telephone Call  What is your phone number ? [phone]    If we cannot reach you directly, may we leave a detailed response at the number you provided? Yes    Can this message wait until your PCP/provider returns, if unavailable today? YES, No,     Paris Juares

## 2021-12-01 NOTE — PLAN OF CARE
Patient discharged at 4:14 PM via wheel chair accompanied by staff. Prescriptions sent to patients preferred pharmacy. All belongings sent with patient.     Discharge instructions reviewed with patient. Listed belongings gathered and returned to patient. Home meds, clothing, glasses, backpack    Patient discharged to home.   Report called to n/a    Surgical Patient   Surgical Procedures during stay: n/a  Did patient receive discharge instruction on wound care and recognition of infection symptoms? N/A    MISC  Follow up appointment made:  No, clinic will call in AM  Home medications returned to patient: Yes  Patient reports pain was well managed at discharge: Yes     Patient became very agitated right before discharge but was able to be redirected and calmed down. Offered patient a taxi ride but patient denied.    /83   Pulse 105   Temp 99  F (37.2  C) (Tympanic)   Resp 20   SpO2 95%

## 2021-12-02 ENCOUNTER — TELEPHONE (OUTPATIENT)
Dept: CASE MANAGEMENT | Facility: HOSPITAL | Age: 61
End: 2021-12-02
Payer: COMMERCIAL

## 2021-12-02 DIAGNOSIS — M25.541 ARTHRALGIA OF BOTH HANDS: ICD-10-CM

## 2021-12-02 DIAGNOSIS — J45.40 MODERATE PERSISTENT ASTHMA WITHOUT COMPLICATION: ICD-10-CM

## 2021-12-02 DIAGNOSIS — M54.50 CHRONIC LEFT-SIDED LOW BACK PAIN WITHOUT SCIATICA: ICD-10-CM

## 2021-12-02 DIAGNOSIS — M25.542 ARTHRALGIA OF BOTH HANDS: ICD-10-CM

## 2021-12-02 DIAGNOSIS — R52 PAIN: ICD-10-CM

## 2021-12-02 DIAGNOSIS — G89.29 CHRONIC LEFT-SIDED LOW BACK PAIN WITHOUT SCIATICA: ICD-10-CM

## 2021-12-02 DIAGNOSIS — F41.1 GAD (GENERALIZED ANXIETY DISORDER): ICD-10-CM

## 2021-12-02 DIAGNOSIS — Z00.00 HEALTHCARE MAINTENANCE: ICD-10-CM

## 2021-12-02 LAB — ACTH PLAS-MCNC: <10 PG/ML

## 2021-12-02 RX ORDER — HYDROXYZINE PAMOATE 50 MG/1
CAPSULE ORAL
Qty: 120 CAPSULE | Refills: 1 | Status: SHIPPED | OUTPATIENT
Start: 2021-12-02 | End: 2022-01-19

## 2021-12-02 RX ORDER — ACETAMINOPHEN 325 MG/1
TABLET ORAL
Qty: 200 TABLET | Refills: 0 | Status: SHIPPED | OUTPATIENT
Start: 2021-12-02 | End: 2021-12-29

## 2021-12-02 RX ORDER — MONTELUKAST SODIUM 10 MG/1
TABLET ORAL
Qty: 90 TABLET | Refills: 0 | Status: SHIPPED | OUTPATIENT
Start: 2021-12-02 | End: 2022-03-22

## 2021-12-02 RX ORDER — ASPIRIN 81 MG/1
TABLET, CHEWABLE ORAL
Qty: 30 TABLET | Refills: 0 | Status: SHIPPED | OUTPATIENT
Start: 2021-12-02 | End: 2021-12-29

## 2021-12-02 RX ORDER — IBUPROFEN 600 MG/1
TABLET, FILM COATED ORAL
Qty: 120 TABLET | Refills: 0 | Status: SHIPPED | OUTPATIENT
Start: 2021-12-02 | End: 2021-12-29

## 2021-12-02 NOTE — TELEPHONE ENCOUNTER
Attempted to make follow up phone call with patient. Unable to reach patient at this time. First attempt. Will attempt again.    Maren Leyva RN on 12/2/2021 at 1:00 PM

## 2021-12-02 NOTE — TELEPHONE ENCOUNTER
Ibuprofen  Last Written Prescription Date: 10/15/21  Last Fill Quantity: 120 # of Refills: 0  Last Office Visit: 11/30/21    Aspirin  Last Written Prescription Date: 10/29/21  Last Fill Quantity: 30 # of Refills: 0  Last Office Visit: 11/30/21       (4) walks frequently

## 2021-12-02 NOTE — TELEPHONE ENCOUNTER
Tylenol  Last Written Prescription Date: 9/29/21  Last Fill Quantity: 200 # of Refills: 0  Last Office Visit: 11/30/21    Singulair  Last Written Prescription Date: 10/27/20  Last Fill Quantity: 90 # of Refills: 3  Last Office Visit: 11/30/21    Voltaren  Last Written Prescription Date: 10/29/21  Last Fill Quantity: 60 # of Refills: 0  Last Office Visit: 11/30/21

## 2021-12-03 ENCOUNTER — TELEPHONE (OUTPATIENT)
Dept: CASE MANAGEMENT | Facility: HOSPITAL | Age: 61
End: 2021-12-03
Payer: COMMERCIAL

## 2021-12-03 NOTE — TELEPHONE ENCOUNTER
Joshua Barron, was discharged to home on 12/1/21 from [facility]. I spoke today with him regarding the patient's discharge.   He indicates he has receive(d) sufficient information upon discharge. Medications were reviewed in full on discharge, including: Medications to be started; medications to be stopped; medications to be continued from preadmission and any side effects. Prescriptions were received at discharge and were able to be filled. Medications are being taken as prescribed.   HeHe indicates they have an appointment scheduled for 12/7/21 and have transportation available. They are able to confirm their appointment time and have it written down.   Questions regarding discharge instructions or condition have been answered.  Per their request, the following employee (s) can be recognized for their outstanding services delivered:  Everyone was good   Suggestions to improve service: food was awful   He was informed they may receive a survey in the mail from the hospital. Asked if they would kindly complete the survey in order for [facility] to know if services fully met patient needs.
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
## 2021-12-06 LAB
BACTERIA BLD CULT: NO GROWTH
BACTERIA BLD CULT: NO GROWTH

## 2021-12-07 ENCOUNTER — OFFICE VISIT (OUTPATIENT)
Dept: FAMILY MEDICINE | Facility: OTHER | Age: 61
End: 2021-12-07
Attending: FAMILY MEDICINE
Payer: COMMERCIAL

## 2021-12-07 VITALS
SYSTOLIC BLOOD PRESSURE: 134 MMHG | HEIGHT: 71 IN | BODY MASS INDEX: 27.44 KG/M2 | HEART RATE: 100 BPM | DIASTOLIC BLOOD PRESSURE: 72 MMHG | TEMPERATURE: 97.4 F | RESPIRATION RATE: 19 BRPM | WEIGHT: 196 LBS | OXYGEN SATURATION: 98 %

## 2021-12-07 DIAGNOSIS — I10 BENIGN ESSENTIAL HYPERTENSION: ICD-10-CM

## 2021-12-07 DIAGNOSIS — F41.1 GENERALIZED ANXIETY DISORDER: ICD-10-CM

## 2021-12-07 DIAGNOSIS — J45.40 MODERATE PERSISTENT ASTHMA WITHOUT COMPLICATION: ICD-10-CM

## 2021-12-07 DIAGNOSIS — F31.0 BIPOLAR AFFECTIVE DISORDER, CURRENT EPISODE HYPOMANIC (H): ICD-10-CM

## 2021-12-07 DIAGNOSIS — F11.90 CHRONIC, CONTINUOUS USE OF OPIOIDS: ICD-10-CM

## 2021-12-07 DIAGNOSIS — I95.89 OTHER SPECIFIED HYPOTENSION: Primary | ICD-10-CM

## 2021-12-07 PROCEDURE — 99214 OFFICE O/P EST MOD 30 MIN: CPT | Performed by: FAMILY MEDICINE

## 2021-12-07 PROCEDURE — G0463 HOSPITAL OUTPT CLINIC VISIT: HCPCS | Mod: 25

## 2021-12-07 ASSESSMENT — MIFFLIN-ST. JEOR: SCORE: 1726.18

## 2021-12-07 ASSESSMENT — PAIN SCALES - GENERAL: PAINLEVEL: SEVERE PAIN (6)

## 2021-12-07 NOTE — NURSING NOTE
"Chief Complaint   Patient presents with     Hospital F/U       Initial /72   Pulse 100   Temp 97.4  F (36.3  C) (Tympanic)   Resp 19   Ht 1.803 m (5' 11\")   Wt 88.9 kg (196 lb)   SpO2 98%   BMI 27.34 kg/m   Estimated body mass index is 27.34 kg/m  as calculated from the following:    Height as of this encounter: 1.803 m (5' 11\").    Weight as of this encounter: 88.9 kg (196 lb).  Medication Reconciliation: complete  Kristen Breaux LPN    "

## 2021-12-07 NOTE — PATIENT INSTRUCTIONS
Wean Tizanidine by taking 1/2 of 4 mg pill for 2 weeks, then take two 1/2 pills a day for two weeks, then one pill a day for two weeks, then stop

## 2021-12-07 NOTE — LETTER
My Asthma Action Plan    Name: Joshua Barron   YOB: 1960  Date: 12/7/2021   My doctor: Lissy Moore MD   [facility]        My Rescue Medicine:   Albuterol inhaler (Proair/Ventolin/Proventil HFA)  2-4 puffs EVERY 4 HOURS as needed. Use a spacer if recommended by your provider.   My Asthma Severity:   Intermittent / Exercise Induced  Know your asthma triggers:   dust mites  pollens  mold  exercise or sports  cold air          GREEN ZONE   Good Control    I feel good    No cough or wheeze    Can work, sleep and play without asthma symptoms       Take your asthma control medicine every day.     1. If exercise triggers your asthma, take your rescue medication    15 minutes before exercise or sports, and    During exercise if you have asthma symptoms  2. Spacer to use with inhaler: If you have a spacer, make sure to use it with your inhaler             YELLOW ZONE Getting Worse  I have ANY of these:    I do not feel good    Cough or wheeze    Chest feels tight    Wake up at night   1. Keep taking your Green Zone medications  2. Start taking your rescue medicine:    every 20 minutes for up to 1 hour. Then every 4 hours for 24-48 hours.  3. If you stay in the Yellow Zone for more than 12-24 hours, contact your doctor.  4. If you do not return to the Green Zone in 12-24 hours or you get worse, start taking your oral steroid medicine if prescribed by your provider.           RED ZONE Medical Alert - Get Help  I have ANY of these:    I feel awful    Medicine is not helping    Breathing getting harder    Trouble walking or talking    Nose opens wide to breathe       1. Take your rescue medicine NOW  2. If your provider has prescribed an oral steroid medicine, start taking it NOW  3. Call your doctor NOW  4. If you are still in the Red Zone after 20 minutes and you have not reached your doctor:    Take your rescue medicine again and    Call 911 or go to the emergency room  right away    See your regular doctor within 2 weeks of an Emergency Room or Urgent Care visit for follow-up treatment.          Annual Reminders:  Meet with Asthma Educator,  Flu Shot in the Fall, consider Pneumonia Vaccination for patients with asthma (aged 19 and older).    Pharmacy: [facility] - [place] - [id number]    Electronically signed by Lissy Moore MD   Date: 12/07/21                    Asthma Triggers  How To Control Things That Make Your Asthma Worse    Triggers are things that make your asthma worse.  Look at the list below to help you find your triggers and   what you can do about them. You can help prevent asthma flare-ups by staying away from your triggers.      Trigger                                                          What you can do   Cigarette Smoke  Tobacco smoke can make asthma worse. Do not allow smoking in your home, car or around you.  Be sure no one smokes at a child s day care or school.  If you smoke, ask your health care provider for ways to help you quit.  Ask family members to quit too.  Ask your health care provider for a referral to Quit Plan to help you quit smoking, or call 0-161-643-PLAN.     Colds, Flu, Bronchitis  These are common triggers of asthma. Wash your hands often.  Don t touch your eyes, nose or mouth.  Get a flu shot every year.     Dust Mites  These are tiny bugs that live in cloth or carpet. They are too small to see. Wash sheets and blankets in hot water every week.   Encase pillows and mattress in dust mite proof covers.  Avoid having carpet if you can. If you have carpet, vacuum weekly.   Use a dust mask and HEPA vacuum.   Pollen and Outdoor Mold  Some people are allergic to trees, grass, or weed pollen, or molds. Try to keep your windows closed.  Limit time out doors when pollen count is high.   Ask you health care provider about taking medicine during allergy season.     Animal Dander  Some people are allergic to skin flakes,  urine or saliva from pets with fur or feathers. Keep pets with fur or feathers out of your home.    If you can t keep the pet outdoors, then keep the pet out of your bedroom.  Keep the bedroom door closed.  Keep pets off cloth furniture and away from stuffed toys.     Mice, Rats, and Cockroaches  Some people are allergic to the waste from these pests.   Cover food and garbage.  Clean up spills and food crumbs.  Store grease in the refrigerator.   Keep food out of the bedroom.   Indoor Mold  This can be a trigger if your home has high moisture. Fix leaking faucets, pipes, or other sources of water.   Clean moldy surfaces.  Dehumidify basement if it is damp and smelly.   Smoke, Strong Odors, and Sprays  These can reduce air quality. Stay away from strong odors and sprays, such as perfume, powder, hair spray, paints, smoke incense, paint, cleaning products, candles and new carpet.   Exercise or Sports  Some people with asthma have this trigger. Be active!  Ask your doctor about taking medicine before sports or exercise to prevent symptoms.    Warm up for 5-10 minutes before and after sports or exercise.     Other Triggers of Asthma  Cold air:  Cover your nose and mouth with a scarf.  Sometimes laughing or crying can be a trigger.  Some medicines and food can trigger asthma.

## 2021-12-08 ENCOUNTER — DOCUMENTATION ONLY (OUTPATIENT)
Dept: OTHER | Facility: CLINIC | Age: 61
End: 2021-12-08
Payer: COMMERCIAL

## 2021-12-08 ENCOUNTER — OFFICE VISIT (OUTPATIENT)
Dept: CHIROPRACTIC MEDICINE | Facility: OTHER | Age: 61
End: 2021-12-08
Attending: CHIROPRACTOR
Payer: COMMERCIAL

## 2021-12-08 DIAGNOSIS — M99.03 SEGMENTAL AND SOMATIC DYSFUNCTION OF LUMBAR REGION: Primary | ICD-10-CM

## 2021-12-08 DIAGNOSIS — M54.50 ACUTE BILATERAL LOW BACK PAIN WITHOUT SCIATICA: ICD-10-CM

## 2021-12-08 DIAGNOSIS — M99.01 SEGMENTAL AND SOMATIC DYSFUNCTION OF CERVICAL REGION: ICD-10-CM

## 2021-12-08 DIAGNOSIS — M99.02 SEGMENTAL AND SOMATIC DYSFUNCTION OF THORACIC REGION: ICD-10-CM

## 2021-12-08 PROCEDURE — 98941 CHIROPRACT MANJ 3-4 REGIONS: CPT | Mod: AT | Performed by: CHIROPRACTOR

## 2021-12-09 DIAGNOSIS — M51.379 DEGENERATION OF LUMBAR OR LUMBOSACRAL INTERVERTEBRAL DISC: ICD-10-CM

## 2021-12-09 DIAGNOSIS — R11.0 NAUSEA: ICD-10-CM

## 2021-12-09 DIAGNOSIS — G89.4 CHRONIC PAIN SYNDROME: ICD-10-CM

## 2021-12-09 DIAGNOSIS — F11.90 CHRONIC, CONTINUOUS USE OF OPIOIDS: ICD-10-CM

## 2021-12-09 NOTE — PROGRESS NOTES

## 2021-12-10 RX ORDER — MORPHINE SULFATE 15 MG/1
TABLET, FILM COATED, EXTENDED RELEASE ORAL
Qty: 90 TABLET | Refills: 0 | Status: SHIPPED | OUTPATIENT
Start: 2021-12-10 | End: 2022-01-10

## 2021-12-10 RX ORDER — DRONABINOL 2.5 MG/1
CAPSULE ORAL
Qty: 60 CAPSULE | Refills: 0 | Status: SHIPPED | OUTPATIENT
Start: 2021-12-10 | End: 2021-12-29

## 2021-12-10 RX ORDER — HYDROCODONE BITARTRATE AND ACETAMINOPHEN 10; 325 MG/1; MG/1
TABLET ORAL
Qty: 60 TABLET | Refills: 0 | Status: SHIPPED | OUTPATIENT
Start: 2021-12-10 | End: 2022-01-10

## 2021-12-10 NOTE — TELEPHONE ENCOUNTER
Marinol  Last Written Prescription Date:  11/10/21  Last Fill Quantity: 60,   # refills: 0  Last Office Visit: 12/7/21  Future Office visit:    Next 5 appointments (look out 90 days)    Dec 13, 2021 10:00 AM  Return Visit with Shamar Escamilla DC  [facility] [place] [facility] ([facility] [place] ) [address]  [place] [address]  [phone]   Dec 16, 2021 11:30 AM  Return Visit with Shamar Escamilla DC  [facility] [place] [facility] ([facility] [place] ) [address]  [place] [address]  [phone]   Dec 28, 2021  1:00 PM  (Arrive by 12:45 PM)  Return Visit with Laquita Fernandez MD  [facility] [place] ([facility] [place] ) [address] [place] [address]  [place] [address]  [phone]           Routing refill request to provider for review/approval because:      Norco      Last Written Prescription Date:  11/10/21  Last Fill Quantity: 60,   # refills: 0  Last Office Visit: 12/7/21  Future Office visit:    Next 5 appointments (look out 90 days)    Dec 13, 2021 10:00 AM  Return Visit with Shamar Escamilla DC  [facility] [place] [facility] ([facility] [place] ) [address] [place] [address]  [place] [address]  [phone]   Dec 16, 2021 11:30 AM  Return Visit with Shamar Escamilla DC  [facility] [place] ([facility] [place] ) [address] [place] [address]  [place] [address]  [phone]   Dec 28, 2021  1:00 PM  (Arrive by 12:45 PM)  Return Visit with Laquita Fernandez MD  [facility] [place] ([facility] [place] ) [address] [place] [address]  [place] [address]  [phone]           Routing refill request to provider for review/approval because:

## 2021-12-10 NOTE — TELEPHONE ENCOUNTER
morphine      Last Written Prescription Date:  11/10/21  Last Fill Quantity: 90,   # refills: 0  Last Office Visit: 12/7/21  Future Office visit:    Next 5 appointments (look out 90 days)    Dec 13, 2021 10:00 AM  Return Visit with Shamar Escamilla DC  [facility] [address] [place] [address]  [phone]   Dec 16, 2021 11:30 AM  Return Visit with Shamar Escamilla DC  [facility] [facility] [address] [place] [address]  [phone]   Dec 28, 2021  1:00 PM  (Arrive by 12:45 PM)  Return Visit with Laquita Fernandez MD  [facility] ([facility] ) [address] [place], [place] [address] [address]  [phone]

## 2021-12-13 DIAGNOSIS — M54.50 LUMBAR BACK PAIN: ICD-10-CM

## 2021-12-13 DIAGNOSIS — G89.4 CHRONIC PAIN SYNDROME: ICD-10-CM

## 2021-12-13 DIAGNOSIS — M62.830 BACK MUSCLE SPASM: ICD-10-CM

## 2021-12-13 DIAGNOSIS — F90.2 ADHD (ATTENTION DEFICIT HYPERACTIVITY DISORDER), COMBINED TYPE: ICD-10-CM

## 2021-12-13 DIAGNOSIS — J31.0 CHRONIC RHINITIS: ICD-10-CM

## 2021-12-13 RX ORDER — FLUTICASONE PROPIONATE 50 MCG
SPRAY, SUSPENSION (ML) NASAL
Qty: 16 G | Refills: 0 | Status: SHIPPED | OUTPATIENT
Start: 2021-12-13 | End: 2022-02-04

## 2021-12-13 RX ORDER — BACLOFEN 20 MG/1
TABLET ORAL
Qty: 360 TABLET | Refills: 0 | Status: SHIPPED | OUTPATIENT
Start: 2021-12-13 | End: 2022-03-22

## 2021-12-13 RX ORDER — GABAPENTIN 300 MG/1
CAPSULE ORAL
Qty: 810 CAPSULE | Refills: 0 | Status: SHIPPED | OUTPATIENT
Start: 2021-12-13 | End: 2022-04-11

## 2021-12-13 NOTE — TELEPHONE ENCOUNTER
Baclofen      Last Written Prescription Date:  12/01/21  Last Fill Quantity: 360,   # refills: 0  Last Office Visit: 12/07/21  Future Office visit:    Next 5 appointments (look out 90 days)    Dec 16, 2021 11:30 AM  Return Visit with Shamar Escamilla DC  [facility] [place] [facility] ([facility] [place] ) [address]  [place] [address]  [phone]   Dec 28, 2021  1:00 PM  (Arrive by 12:45 PM)  Return Visit with Laquita Fernandez MD  [facility] [place] ([facility] [place] ) [address]  [place] [address]  [phone]             Gabapentin      Last Written Prescription Date:  09/15/21  Last Fill Quantity: 810,   # refills: 0  Last Office Visit: 12/07/21  Future Office visit:    Next 5 appointments (look out 90 days)    Dec 16, 2021 11:30 AM  Return Visit with Shamar Escamilla DC  [facility] [place] [facility] ([facility] [place] ) [address]  [place] [address]  [phone]   Dec 28, 2021  1:00 PM  (Arrive by 12:45 PM)  Return Visit with Laquita Fernandez MD  [facility] ([facility] [place] ) [address]  [place] [address]  [phone]           Zanaflex      Last Written Prescription Date:  09/15/21  Last Fill Quantity: 270,   # refills: 0  Last Office Visit: 12/07/21  Future Office visit:    Next 5 appointments (look out 90 days)    Dec 16, 2021 11:30 AM  Return Visit with Shamar Escamilla DC  [facility] [place] [facility] ([facility] [place] ) [address]  [place] [address]  [phone]   Dec 28, 2021  1:00 PM  (Arrive by 12:45 PM)  Return Visit with Laquita Fernandez MD  [facility] [place] ([facility] [place] ) [address]  [place] [address] [phone]

## 2021-12-13 NOTE — TELEPHONE ENCOUNTER
Flonase      Last Written Prescription Date:  11/2/21  Last Fill Quantity: 16 g,   # refills: 0  Last Office Visit: 12/7/21  Future Office visit:    Next 5 appointments (look out 90 days)    Dec 16, 2021 11:30 AM  Return Visit with Shamar Escamilla DC  [facility] ([facility] ) [address]  [facility] [address]  [phone]   Dec 28, 2021  1:00 PM  (Arrive by 12:45 PM)  Return Visit with Laquita Fernandez MD  [facility] ([facility] ) [address] [facility] [address] [phone]           Routing refill request to provider for review/approval because:

## 2021-12-14 RX ORDER — LISDEXAMFETAMINE DIMESYLATE 70 MG/1
CAPSULE ORAL
Qty: 30 CAPSULE | Refills: 0 | Status: SHIPPED | OUTPATIENT
Start: 2021-12-14 | End: 2022-01-10

## 2021-12-16 DIAGNOSIS — G43.109 MIGRAINE WITH AURA AND WITHOUT STATUS MIGRAINOSUS, NOT INTRACTABLE: ICD-10-CM

## 2021-12-17 RX ORDER — SUMATRIPTAN 50 MG/1
TABLET, FILM COATED ORAL
Qty: 9 TABLET | Refills: 0 | Status: SHIPPED | OUTPATIENT
Start: 2021-12-17 | End: 2022-01-20

## 2021-12-17 NOTE — TELEPHONE ENCOUNTER
Imitrex      Last Written Prescription Date:  10/7/21  Last Fill Quantity: 9,   # refills: 3  Last Office Visit: 12/7/21  Future Office visit:    Next 5 appointments (look out 90 days)    Dec 20, 2021  3:00 PM  Return Visit with Shamar Escamilla DC  [facility] ) [address]  [facility] [address]  [phone]   Dec 28, 2021  1:00 PM  (Arrive by 12:45 PM)  Return Visit with Laquita Fernandez MD  [facility] ([facility] ) [address] [facility] [address] [phone]           Routing refill request to provider for review/approval because:

## 2021-12-21 ENCOUNTER — HOSPITAL ENCOUNTER (OUTPATIENT)
Dept: MRI IMAGING | Facility: HOSPITAL | Age: 61
End: 2021-12-21
Attending: PAIN MEDICINE
Payer: COMMERCIAL

## 2021-12-21 ENCOUNTER — HOSPITAL ENCOUNTER (OUTPATIENT)
Dept: GENERAL RADIOLOGY | Facility: HOSPITAL | Age: 61
End: 2021-12-21
Attending: PAIN MEDICINE
Payer: COMMERCIAL

## 2021-12-21 DIAGNOSIS — M54.50 LOW BACK PAIN: ICD-10-CM

## 2021-12-21 DIAGNOSIS — M54.12 RADICULOPATHY, CERVICAL REGION: ICD-10-CM

## 2021-12-21 DIAGNOSIS — M54.6 PAIN IN THORACIC SPINE: ICD-10-CM

## 2021-12-21 PROCEDURE — 72146 MRI CHEST SPINE W/O DYE: CPT

## 2021-12-21 PROCEDURE — 72141 MRI NECK SPINE W/O DYE: CPT

## 2021-12-21 PROCEDURE — 72148 MRI LUMBAR SPINE W/O DYE: CPT

## 2021-12-21 PROCEDURE — 72110 X-RAY EXAM L-2 SPINE 4/>VWS: CPT

## 2021-12-29 DIAGNOSIS — J30.89 SEASONAL ALLERGIC RHINITIS DUE TO OTHER ALLERGIC TRIGGER: ICD-10-CM

## 2021-12-29 DIAGNOSIS — M62.830 BACK MUSCLE SPASM: ICD-10-CM

## 2021-12-29 DIAGNOSIS — G89.29 CHRONIC LEFT-SIDED LOW BACK PAIN WITHOUT SCIATICA: ICD-10-CM

## 2021-12-29 DIAGNOSIS — R52 PAIN: ICD-10-CM

## 2021-12-29 DIAGNOSIS — Z00.00 HEALTH CARE MAINTENANCE: ICD-10-CM

## 2021-12-29 DIAGNOSIS — M25.542 ARTHRALGIA OF BOTH HANDS: ICD-10-CM

## 2021-12-29 DIAGNOSIS — I10 ESSENTIAL HYPERTENSION: ICD-10-CM

## 2021-12-29 DIAGNOSIS — Z00.00 HEALTHCARE MAINTENANCE: ICD-10-CM

## 2021-12-29 DIAGNOSIS — Z71.6 TOBACCO ABUSE COUNSELING: ICD-10-CM

## 2021-12-29 DIAGNOSIS — M54.50 CHRONIC LEFT-SIDED LOW BACK PAIN WITHOUT SCIATICA: ICD-10-CM

## 2021-12-29 DIAGNOSIS — R11.0 NAUSEA: ICD-10-CM

## 2021-12-29 DIAGNOSIS — N40.1 BENIGN PROSTATIC HYPERPLASIA WITH WEAK URINARY STREAM: ICD-10-CM

## 2021-12-29 DIAGNOSIS — E78.2 MIXED HYPERLIPIDEMIA: ICD-10-CM

## 2021-12-29 DIAGNOSIS — M25.541 ARTHRALGIA OF BOTH HANDS: ICD-10-CM

## 2021-12-29 DIAGNOSIS — R39.12 BENIGN PROSTATIC HYPERPLASIA WITH WEAK URINARY STREAM: ICD-10-CM

## 2021-12-29 RX ORDER — GUAIFENESIN AND DEXTROMETHORPHAN HYDROBROMIDE 600; 30 MG/1; MG/1
TABLET, EXTENDED RELEASE ORAL
Qty: 60 TABLET | Refills: 0 | OUTPATIENT
Start: 2021-12-29

## 2021-12-29 RX ORDER — IBUPROFEN 600 MG/1
TABLET, FILM COATED ORAL
Qty: 120 TABLET | Refills: 0 | Status: SHIPPED | OUTPATIENT
Start: 2021-12-29 | End: 2022-02-09

## 2021-12-29 RX ORDER — METOPROLOL SUCCINATE 50 MG/1
TABLET, EXTENDED RELEASE ORAL
Qty: 90 TABLET | Refills: 0 | Status: SHIPPED | OUTPATIENT
Start: 2021-12-29 | End: 2022-03-07

## 2021-12-29 RX ORDER — MV,CAL,MIN/IRON/FOLIC ACID/LUT 18-500-300
TABLET ORAL
Qty: 30 TABLET | Refills: 11 | Status: SHIPPED | OUTPATIENT
Start: 2021-12-29 | End: 2023-10-24

## 2021-12-29 RX ORDER — METHOCARBAMOL 500 MG/1
TABLET, FILM COATED ORAL
Qty: 90 TABLET | Refills: 1 | Status: SHIPPED | OUTPATIENT
Start: 2021-12-29 | End: 2022-03-07

## 2021-12-29 RX ORDER — ACETAMINOPHEN 325 MG/1
TABLET ORAL
Qty: 200 TABLET | Refills: 3 | Status: SHIPPED | OUTPATIENT
Start: 2021-12-29 | End: 2022-05-05

## 2021-12-29 RX ORDER — TAMSULOSIN HYDROCHLORIDE 0.4 MG/1
CAPSULE ORAL
Qty: 90 CAPSULE | Refills: 0 | Status: SHIPPED | OUTPATIENT
Start: 2021-12-29 | End: 2022-03-07

## 2021-12-29 RX ORDER — CETIRIZINE HYDROCHLORIDE 10 MG/1
TABLET ORAL
Qty: 30 TABLET | Refills: 11 | Status: SHIPPED | OUTPATIENT
Start: 2021-12-29 | End: 2024-08-06

## 2021-12-29 RX ORDER — ATORVASTATIN CALCIUM 20 MG/1
TABLET, FILM COATED ORAL
Qty: 90 TABLET | Refills: 1 | Status: SHIPPED | OUTPATIENT
Start: 2021-12-29 | End: 2022-07-15

## 2021-12-29 RX ORDER — DRONABINOL 2.5 MG/1
CAPSULE ORAL
Qty: 60 CAPSULE | Refills: 0 | Status: SHIPPED | OUTPATIENT
Start: 2021-12-29 | End: 2022-02-09

## 2021-12-29 RX ORDER — ASPIRIN 81 MG/1
TABLET, CHEWABLE ORAL
Qty: 30 TABLET | Refills: 0 | Status: SHIPPED | OUTPATIENT
Start: 2021-12-29 | End: 2022-02-09

## 2021-12-29 NOTE — TELEPHONE ENCOUNTER
Tylenol      Last Written Prescription Date:  12/02/21  Last Fill Quantity: 200,   # refills: 0  Last Office Visit: 12/07/21  Future Office visit:         Lipitor      Last Written Prescription Date:  06/24/21  Last Fill Quantity: 90,   # refills: 1  Last Office Visit: 12/07/21  Future Office visit:         Zyrtec      Last Written Prescription Date:  09/15/21  Last Fill Quantity: 30,   # refills: 3  Last Office Visit: 12/07/21  Future Office visit:         Voltaren      Last Written Prescription Date:  12/02/21  Last Fill Quantity: 60,   # refills: 0  Last Office Visit: 12/07/21  Future Office visit:       Mucinex DM      Last Written Prescription Date:  11/22/21  Last Fill Quantity: 60,   # refills: 0  Last Office Visit: 12/07/21  Future Office visit:       Discontinued from pt's med list, states therapy completed.    Robaxin      Last Written Prescription Date:  09/15/21  Last Fill Quantity: 270,   # refills: 0  Last Office Visit: 12/07/21  Future Office visit:       MVI      Last Written Prescription Date:  02/11/21  Last Fill Quantity: 30,   # refills: 9  Last Office Visit: 12/07/21  Future Office visit:

## 2021-12-29 NOTE — TELEPHONE ENCOUNTER
ASA 81MG      Last Written Prescription Date:  12-2-2021  Last Fill Quantity: 30,   # refills: 0  Last Office Visit: 12-7-2021  Future Office visit:           MARINOL      Last Written Prescription Date:  12-  Last Fill Quantity: 60,   # refills: 0  Last Office Visit: 12-7-2021  Future Office visit:           IBUPROFEN 600MG      Last Written Prescription Date:  12-2-2021  Last Fill Quantity: 120,   # refills: 0  Last Office Visit: 12-7-2021  Future Office visit:       Routing refill request to provider for review/approval because:  Drug not on the [facility], [facility] or [facility] refill protocol or controlled substance

## 2021-12-29 NOTE — TELEPHONE ENCOUNTER
Flomax      Last Written Prescription Date:  0  Last Fill Quantity: 0,   # refills: 0  Last Office Visit: 12/7/21  Future Office visit:       Routing refill request to provider for review/approval because:  Drug not active on patient's medication list    Metoprolol      Last Written Prescription Date:  9/15/21  Last Fill Quantity: 30,   # refills: 4  Last Office Visit: 12/7/21  Future Office visit:       Routing refill request to provider for review/approval because:

## 2021-12-29 NOTE — TELEPHONE ENCOUNTER
Nicotine gum      Last Written Prescription Date:  11/10/21  Last Fill Quantity: 110,   # refills: 0  Last Office Visit: 12/07/21  Future Office visit:

## 2022-01-10 ENCOUNTER — TELEPHONE (OUTPATIENT)
Dept: FAMILY MEDICINE | Facility: OTHER | Age: 62
End: 2022-01-10
Payer: COMMERCIAL

## 2022-01-10 ENCOUNTER — TRANSFERRED RECORDS (OUTPATIENT)
Dept: HEALTH INFORMATION MANAGEMENT | Facility: CLINIC | Age: 62
End: 2022-01-10

## 2022-01-10 DIAGNOSIS — G89.4 CHRONIC PAIN SYNDROME: ICD-10-CM

## 2022-01-10 DIAGNOSIS — F11.90 CHRONIC, CONTINUOUS USE OF OPIOIDS: ICD-10-CM

## 2022-01-10 DIAGNOSIS — F90.2 ADHD (ATTENTION DEFICIT HYPERACTIVITY DISORDER), COMBINED TYPE: ICD-10-CM

## 2022-01-10 DIAGNOSIS — M51.379 DEGENERATION OF LUMBAR OR LUMBOSACRAL INTERVERTEBRAL DISC: ICD-10-CM

## 2022-01-10 RX ORDER — HYDROCODONE BITARTRATE AND ACETAMINOPHEN 10; 325 MG/1; MG/1
TABLET ORAL
Qty: 60 TABLET | Refills: 0 | Status: SHIPPED | OUTPATIENT
Start: 2022-01-10 | End: 2022-02-09

## 2022-01-10 RX ORDER — MORPHINE SULFATE 15 MG/1
15 TABLET, FILM COATED, EXTENDED RELEASE ORAL EVERY 8 HOURS PRN
Qty: 90 TABLET | Refills: 0 | Status: SHIPPED | OUTPATIENT
Start: 2022-01-10 | End: 2022-02-10

## 2022-01-10 RX ORDER — LISDEXAMFETAMINE DIMESYLATE 70 MG/1
CAPSULE ORAL
Qty: 30 CAPSULE | Refills: 0 | Status: SHIPPED | OUTPATIENT
Start: 2022-01-10 | End: 2022-02-08

## 2022-01-10 NOTE — TELEPHONE ENCOUNTER
Usha    LUIS APPT 2-  Last Written Prescription Date:  12-  Last Fill Quantity: 60,   # refills: 0  Last Office Visit: 12-7-2021  Future Office visit:       Routing refill request to provider for review/approval because:  Drug not on the [facility], [facility] or [facility] refill protocol or controlled substance

## 2022-01-10 NOTE — TELEPHONE ENCOUNTER
ANDIE      Last Written Prescription Date:  12-  Last Fill Quantity: 30,   # refills: 0  Last Office Visit: VIRTUAL 10-  Future Office visit:       Routing refill request to provider for review/approval because:  Drug not on the [facility], [facility] or [facility] refill protocol or controlled substance

## 2022-01-10 NOTE — TELEPHONE ENCOUNTER
Pt establish care appointment with Dr. Marinelli on 2/23/22.    Called requesting morphine, med sent from pharmacy to refill pool    Controlled Substance Refill Request for morphine  Problem List Complete:    Yes    Last Written Prescription Date:  12/10/21  Last Fill Quantity: 90,   # refills: 0    THE MOST RECENT OFFICE VISIT MUST BE WITHIN THE PAST 3 MONTHS. AT LEAST ONE FACE TO FACE VISIT MUST OCCUR EVERY 6 MONTHS. ADDITIONAL VISITS CAN BE VIRTUAL.  (THIS STATEMENT SHOULD BE DELETED.)    Last Office Visit with [facility] primary care provider: 12/7/21    Future Office visit:     Controlled substance agreement:   CSA -- Encounter Level on 05/23/2017:    Controlled Substance Agreement - Scan on 7/31/2018 12:12 PM: CONTROLLED SUBSTANCE AGREEMENT     CSA -- Encounter Level on 09/18/2015:    Controlled Substance Agreement - Scan on 3/19/2020  7:52 AM: SCHEDULED MEDICATION USE AGREEMENT  Controlled Substance Agreement - Scan on 11/25/2015  2:25 PM: SCHEDULED MEDICATION USE AGREEMENT     CSA -- Patient Level:    Controlled Substance Agreement - Opioid - Scan on 3/9/2021 11:32 AM: controlled substance agreement  Controlled Substance Agreement - Non - Opioid - Scan on 7/15/2019 10:03 AM: NON-OPIOID CONTROLLED SUBSTANCE AGREEMENT         Last Urine Drug Screen:   Pain Drug SCR UR W RPTD Meds   Date Value Ref Range Status   05/10/2017   Final    FINAL  (Note)  ====================================================================  [facility] COMP DRUG ANALYSIS,UR  ====================================================================  Test                             Result       Flag       Units  Drug Present and Declared for Prescription Verification   Amphetamine                    >6061        EXPECTED   ng/mg creat    Amphetamine is available as a schedule II prescription drug.     Desmethyldiazepam              653          EXPECTED   ng/mg creat   Oxazepam                       905          EXPECTED   ng/mg creat   Temazepam                       999          EXPECTED   ng/mg creat    Desmethyldiazepam, oxazepam, and temazepam are expected    metabolites of diazepam. Desmethyldiazepam and oxazepam are also    expected metabolites of other drugs, including chlordiazepoxide,    prazepam, clorazepate, and halazepam. Oxazepam is an expected    metabolite of temazepam. Oxazepam and temazepam are also    available as scheduled prescription medications.     Hydrocodone                    2941         EXPECTED   ng/mg creat   Hydromorphone                  281          EXPECTED   ng/mg creat   Dihydrocodeine                 83           EXPECTED   ng/mg creat   Norhydrocodone                 2072         EXPECTED   ng/mg creat    Sources of hydrocodone include scheduled prescription    medications. Hydromorphone, dihydrocodeine and norhydrocodone are    expected metabolites of hydrocodone. Hydromorphone and    dihydrocodeine are also available as scheduled prescription    medications.     Fentanyl                       158          EXPECTED   ng/mg creat   Norfentanyl                    >606         EXPECTED   ng/mg creat    Source of fentanyl is a scheduled prescription medication,    including IV, patch, and transmucosal formulations. Norfentanyl    is an expected metabolite of fentanyl.     Acetaminophen                  PRESENT      EXPECTED    Drug Present not Declared for Prescription Verification   Gabapentin                     PRESENT      UNEXPECTED   Nortriptyline                  PRESENT      UNEXPECTED    Nortriptyline may be administered as a prescription drug; it is    also an expected metabolite of amitriptyline.     Trazodone                      PRESENT      UNEXPECTED   1,3 chlorophenyl piperazine    PRESENT      UNEXPECTED    1,3-chlorophenyl piperazine is an expected metabolite of    trazodone.     Diclofenac                     PRESENT      UNEXPECTED   Ibuprofen                      PRESENT      UNEXPECTED   Diphenhydramine                 PRESENT      UNEXPECTED   Lidocaine                      PRESENT      UNEXPECTED   Propranolol                    PRESENT      UNEXPECTED  ====================================================================  Test                      Result    Flag   Units      Ref Range   Creatinine              165              mg/dL      >=20  ====================================================================  Declared Medications:  The flagging and interpretation on this report are based on the  following declared medications.  Unexpected results may arise from  inaccuracies in the declared medications.    **Note: The testing scope of this panel includes these medications:    Amphetamine  Amphetamine (Lisdexamfetamine)  Diazepam  Fentanyl  Hydrocodone (Norco)    **Note: The testing scope of this panel does not include small to  moderate amounts of these reported medications:    Acetaminophen (Norco)  ====================================================================  For clinical consultation, please call [phone].  ====================================================================  Analysis performed by [email or web address] [place] [address]     , No results found for: COMDAT,   Cannabinoids (26-iff-6-carboxy-9-THC)   Date Value Ref Range Status   11/30/2021 Detected (A) Not Detected, Indeterminate Final     Comment:     Cutoff for a positive cannabinoid is greater than 50 ng/ml.  This is an unconfirmed screening result to be used for medical purposes only.      Phencyclidine   Date Value Ref Range Status   11/30/2021 Not Detected Not Detected, Indeterminate Final     Comment:     Cutoff for a negative PCP is 25 ng/mL or less.     Cocaine (Benzoylecgonine)   Date Value Ref Range Status   11/30/2021 Not Detected Not Detected, Indeterminate Final     Comment:     Cutoff for a negative cocaine is 150 ng/ml or less.     Methamphetamine (d-Methamphetamine)   Date Value Ref Range Status   11/30/2021  Not Detected Not Detected, Indeterminate Final     Comment:     Cutoff for a negative methamphetamine is 500 ng/ml or less.     Opiates (Morphine)   Date Value Ref Range Status   11/30/2021 Detected (A) Not Detected, Indeterminate Final     Comment:     Cutoff for a positive opiate is greater than 100 ng/ml.  This is an unconfirmed screening result to be used for medical purposes only.      Amphetamine (d-Amphetamine)   Date Value Ref Range Status   11/30/2021 Detected (A) Not Detected, Indeterminate Final     Comment:     Cutoff for a positive amphetamine is greater than 500 ng/ml.  This is an unconfirmed screening result to be used for medical purposes only.      Benzodiazepines (Nordiazepam)   Date Value Ref Range Status   11/30/2021 Not Detected Not Detected, Indeterminate Final     Comment:     Cutoff for a negative benzodiazepine is 150 ng/ml or less.     Tricyclic Antidepressants (Desipramine)   Date Value Ref Range Status   11/30/2021 Detected (A) Not Detected, Indeterminate Final     Comment:     Cutoff for a positive tricyclic antidepressant is greater than 300 ng/ml.   This is an unconfirmed screening result to be used for medical purposes only.      Methadone   Date Value Ref Range Status   11/30/2021 Not Detected Not Detected, Indeterminate Final     Comment:     Cutoff for a negative methadone is 200 ng/ml or less.     Barbiturates (Butalbital)   Date Value Ref Range Status   11/30/2021 Not Detected Not Detected, Indeterminate Final     Comment:     Cutoff for a negative barbituate is 200 ng/ml or less.     Oxycodone   Date Value Ref Range Status   11/30/2021 Not Detected Not Detected, Indeterminate Final     Comment:     Cutoff for a negative oxycodone is 100 ng/mL or less.     Propoxyphene (Norpropoxyphene)   Date Value Ref Range Status   11/30/2021 Not Detected Not Detected, Indeterminate Final     Comment:     Cutoff for a negative propoxyphene is 300 ng/ml or less.     Buprenorphine   Date Value  Ref Range Status   11/30/2021 Not Detected Not Detected, Indeterminate Final     Comment:     Cutoff for a negative buprenorphine is 10 ng/ml or less.        Processing:  Rx to be electronically transmitted to pharmacy by provider     [email or web address]   checked in past 3 months?

## 2022-01-10 NOTE — TELEPHONE ENCOUNTER
morphine (MS CONTIN) 15 MG CR tablet      Last Written Prescription Date:  12/10/21  Last Fill Quantity: 90,   # refills: 0  Last Office Visit: 12/7/21  Future Office visit:       Routing refill request to provider for review/approval because:  Drug not on the FMG, UMP or [facility] refill protocol or controlled substance

## 2022-01-10 NOTE — TELEPHONE ENCOUNTER
3:18 PM    Reason for Call: Phone Call    Description: Patient had questions in regards to his medication refills and would like call back please. He stated he did not get the proper amount and inquiring if something has changed. Please call back     Was an appointment offered for this call? No  If yes : Appointment type              Date    Preferred method for responding to this message: Telephone Call  What is your phone number ? [phone]    If we cannot reach you directly, may we leave a detailed response at the number you provided? Yes    Can this message wait until your PCP/provider returns, if available today? YES    Nazia Vasquez

## 2022-01-11 RX ORDER — MORPHINE SULFATE 15 MG/1
TABLET, FILM COATED, EXTENDED RELEASE ORAL
Qty: 90 TABLET | Refills: 0 | OUTPATIENT
Start: 2022-01-11

## 2022-01-17 ENCOUNTER — OFFICE VISIT (OUTPATIENT)
Dept: CHIROPRACTIC MEDICINE | Facility: OTHER | Age: 62
End: 2022-01-17
Attending: CHIROPRACTOR
Payer: COMMERCIAL

## 2022-01-17 DIAGNOSIS — M99.01 SEGMENTAL AND SOMATIC DYSFUNCTION OF CERVICAL REGION: ICD-10-CM

## 2022-01-17 DIAGNOSIS — M54.50 ACUTE BILATERAL LOW BACK PAIN WITHOUT SCIATICA: ICD-10-CM

## 2022-01-17 DIAGNOSIS — M99.02 SEGMENTAL AND SOMATIC DYSFUNCTION OF THORACIC REGION: ICD-10-CM

## 2022-01-17 DIAGNOSIS — M99.03 SEGMENTAL AND SOMATIC DYSFUNCTION OF LUMBAR REGION: Primary | ICD-10-CM

## 2022-01-17 PROCEDURE — 99212 OFFICE O/P EST SF 10 MIN: CPT | Mod: 25 | Performed by: CHIROPRACTOR

## 2022-01-17 PROCEDURE — 98941 CHIROPRACT MANJ 3-4 REGIONS: CPT | Mod: AT | Performed by: CHIROPRACTOR

## 2022-01-18 DIAGNOSIS — G43.109 MIGRAINE WITH AURA AND WITHOUT STATUS MIGRAINOSUS, NOT INTRACTABLE: ICD-10-CM

## 2022-01-18 DIAGNOSIS — F41.1 GAD (GENERALIZED ANXIETY DISORDER): ICD-10-CM

## 2022-01-18 DIAGNOSIS — R05.9 COUGH: Primary | ICD-10-CM

## 2022-01-18 DIAGNOSIS — Z71.6 TOBACCO ABUSE COUNSELING: ICD-10-CM

## 2022-01-18 NOTE — PROGRESS NOTES

## 2022-01-19 RX ORDER — HYDROXYZINE PAMOATE 50 MG/1
CAPSULE ORAL
Qty: 120 CAPSULE | Refills: 0 | Status: SHIPPED | OUTPATIENT
Start: 2022-01-19 | End: 2022-02-22

## 2022-01-20 RX ORDER — SUMATRIPTAN 50 MG/1
TABLET, FILM COATED ORAL
Qty: 9 TABLET | Refills: 0 | Status: SHIPPED | OUTPATIENT
Start: 2022-01-20 | End: 2022-03-17

## 2022-01-20 RX ORDER — GUAIFENESIN AND DEXTROMETHORPHAN HYDROBROMIDE 600; 30 MG/1; MG/1
TABLET, EXTENDED RELEASE ORAL
Qty: 60 TABLET | Refills: 0 | Status: SHIPPED | OUTPATIENT
Start: 2022-01-20 | End: 2022-03-09

## 2022-01-20 NOTE — TELEPHONE ENCOUNTER
MUCINEX DM      Last Written Prescription Date:  Unknown  Last Fill Quantity: Unknown ,   # refills: Unknown   Last Office Visit: 12/7/2021  Future Office visit:    Next 5 appointments (look out 90 days)    Jan 24, 2022  4:20 PM  Return Visit with Shamar Escamilla DC  [facility] ([facility] ) [address] [place] [address]  [place] [address]  [phone]           Routing refill request to provider for review/approval because:    IMITREX      Last Written Prescription Date:  12/17/2021  Last Fill Quantity: 9,   # refills: 0

## 2022-01-22 ENCOUNTER — HEALTH MAINTENANCE LETTER (OUTPATIENT)
Age: 62
End: 2022-01-22

## 2022-01-24 ENCOUNTER — OFFICE VISIT (OUTPATIENT)
Dept: CHIROPRACTIC MEDICINE | Facility: OTHER | Age: 62
End: 2022-01-24
Attending: CHIROPRACTOR
Payer: COMMERCIAL

## 2022-01-24 DIAGNOSIS — M54.50 ACUTE BILATERAL LOW BACK PAIN WITHOUT SCIATICA: ICD-10-CM

## 2022-01-24 DIAGNOSIS — M99.01 SEGMENTAL AND SOMATIC DYSFUNCTION OF CERVICAL REGION: ICD-10-CM

## 2022-01-24 DIAGNOSIS — M99.02 SEGMENTAL AND SOMATIC DYSFUNCTION OF THORACIC REGION: ICD-10-CM

## 2022-01-24 DIAGNOSIS — M99.03 SEGMENTAL AND SOMATIC DYSFUNCTION OF LUMBAR REGION: Primary | ICD-10-CM

## 2022-01-24 PROCEDURE — 98941 CHIROPRACT MANJ 3-4 REGIONS: CPT | Mod: AT | Performed by: CHIROPRACTOR

## 2022-01-26 NOTE — PROGRESS NOTES
Subjective Finding:    Chief compalint: Patient presents with:  Back Pain  Neck Pain  , Pain Scale: 4/10, Intensity: dull, Duration: 2 days, Radiating: no.    Date of injury:     Activities that the pain restricts:   Home/household/hobbies/social activities: yes.  Work duties: yes.  Sleep: yes.  Makes symptoms better: rest.  Makes symptoms worse: lumbar extension and lumbar flexion.  Have you seen anyone else for the symptoms? No.  Work related: no.  Automobile related injury: no.    Objective and Assessment:    Posture Analysis:   High shoulder: .  Head tilt: .  High iliac crest: .  Head carriage: neutral.  Thoracic Kyphosis: neutral.  Lumbar Lordosis: forward.    Lumbar Range of Motion: flexion decreased and extension decreased.  Cervical Range of Motion: extension decreased.  Thoracic Range of Motion: extension decreased.  Extremity Range of Motion: .    Palpation:   Quad lumb: bilateral, referred pain: no  T paraspinals: dull pain, no    Segmental dysfunction pre-treatment and treatment area: C4, T5, T6 and Sacrum.    Assessment post-treatment:  Cervical: ROM increased.  Thoracic: ROM increased.  Lumbar: ROM increased.    Comments: history of DDD in C and L spine.      Complicating Factors: .    Procedure(s):  CMT:  06334 Chiropractic manipulative treatment 3-4 regions performed   Cervical: Diversified, See above for level, Supine, Thoracic: Diversified, See above for level, Prone and Lumbar: Diversified, See above for level, Side posture    Modalities:  None performed this visit    Therapeutic procedures:  None    Plan:  Treatment plan: PRN.  Instructed patient: stretch as instructed at visit.  Short term goals: increase ROM.  Long term goals: increase ADL.  Prognosis: good.

## 2022-02-03 DIAGNOSIS — J31.0 CHRONIC RHINITIS: ICD-10-CM

## 2022-02-03 NOTE — TELEPHONE ENCOUNTER
Called patient to schedule apt, patient stated that he feels as though he has a sinus infection and was having some problems with his chest, he was wondering if the Fluconazole 100 mg he is taking for his Trench mouth would help with his sinus infection.  Please advise and let me know if patient should be seen by another provider or if I should encourage ER/UC since you are out of office until Monday.    Thank you   
He should hold off for a few days and see if his sinuses clear.  He should increase his water intake to triple his normal as this will hydrate the mucus in his sinuses allowing drainage.  He can call back on the 5th if this does not improve.  The fluconazole will not help with his sinuses.  
Please tell ciara to schedule a f/u appt.   
To: Dr. Fisher nurse  Please call patient today @ [phone] he would like to discuss many issues of concern. Thank you   
Walk in

## 2022-02-04 RX ORDER — FLUTICASONE PROPIONATE 50 MCG
SPRAY, SUSPENSION (ML) NASAL
Qty: 16 G | Refills: 3 | Status: SHIPPED | OUTPATIENT
Start: 2022-02-04 | End: 2022-06-01

## 2022-02-07 DIAGNOSIS — M54.50 CHRONIC LEFT-SIDED LOW BACK PAIN WITHOUT SCIATICA: ICD-10-CM

## 2022-02-07 DIAGNOSIS — F11.90 CHRONIC, CONTINUOUS USE OF OPIOIDS: ICD-10-CM

## 2022-02-07 DIAGNOSIS — F90.2 ADHD (ATTENTION DEFICIT HYPERACTIVITY DISORDER), COMBINED TYPE: ICD-10-CM

## 2022-02-07 DIAGNOSIS — G89.29 CHRONIC LEFT-SIDED LOW BACK PAIN WITHOUT SCIATICA: ICD-10-CM

## 2022-02-07 DIAGNOSIS — R11.0 NAUSEA: ICD-10-CM

## 2022-02-07 DIAGNOSIS — Z00.00 HEALTHCARE MAINTENANCE: ICD-10-CM

## 2022-02-08 RX ORDER — LISDEXAMFETAMINE DIMESYLATE 70 MG/1
CAPSULE ORAL
Qty: 30 CAPSULE | Refills: 0 | Status: SHIPPED | OUTPATIENT
Start: 2022-02-08 | End: 2022-03-07

## 2022-02-08 NOTE — TELEPHONE ENCOUNTER
lisdexamfetamine (VYVANSE) 70 MG capsule  Per , last filled 1/10/22 #30  Last office visit 10/26/21

## 2022-02-09 DIAGNOSIS — H91.93 DECREASED HEARING OF BOTH EARS: Primary | ICD-10-CM

## 2022-02-09 RX ORDER — IBUPROFEN 600 MG/1
TABLET, FILM COATED ORAL
Qty: 120 TABLET | Refills: 0 | Status: SHIPPED | OUTPATIENT
Start: 2022-02-09 | End: 2022-03-09

## 2022-02-09 RX ORDER — ASPIRIN 81 MG/1
TABLET, CHEWABLE ORAL
Qty: 30 TABLET | Refills: 0 | Status: SHIPPED | OUTPATIENT
Start: 2022-02-09 | End: 2022-03-09

## 2022-02-09 RX ORDER — HYDROCODONE BITARTRATE AND ACETAMINOPHEN 10; 325 MG/1; MG/1
TABLET ORAL
Qty: 60 TABLET | Refills: 0 | Status: SHIPPED | OUTPATIENT
Start: 2022-02-09 | End: 2022-03-09

## 2022-02-09 RX ORDER — DRONABINOL 2.5 MG/1
CAPSULE ORAL
Qty: 60 CAPSULE | Refills: 0 | Status: SHIPPED | OUTPATIENT
Start: 2022-02-09 | End: 2022-03-09

## 2022-02-09 NOTE — TELEPHONE ENCOUNTER
Norco    - est care with you 2.15.22  Last Written Prescription Date:  1.10.22  Last Fill Quantity: #60,   # refills: 0  Last Office Visit: 12.7.21  Future Office visit:    Next 5 appointments (look out 90 days)    May 04, 2022  1:45 PM  (Arrive by 1:30 PM)  Nurse Only with Bettye Jules  [facility] [place] ([facility] [place] ) [address]  [place] [address]  [phone]           Routing refill request to provider for review/approval because:  Drug not on the FMG, UMP or  Health refill protocol or controlled substance

## 2022-02-09 NOTE — TELEPHONE ENCOUNTER
Asa, Ibuprofen, Marinol - est care with you 2.15.22      Last Written Prescription Date:  12.29.21, 12.29.21 1.3.22  Last Fill Quantity: #30, #120, #60,   # refills: 0  Last Office Visit: 12.7.21  Future Office visit:    Next 5 appointments (look out 90 days)    May 04, 2022  1:45 PM  (Arrive by 1:30 PM)  Nurse Only with Bettye Jules  [facility] ([facility] ) [address] [place] [address] AVE  [place] [address]  [phone]           Routing refill request to provider for review/approval because:  Drug not on the FMG, P or  Health refill protocol or controlled substance

## 2022-02-10 DIAGNOSIS — M51.379 DEGENERATION OF LUMBAR OR LUMBOSACRAL INTERVERTEBRAL DISC: ICD-10-CM

## 2022-02-10 DIAGNOSIS — G89.4 CHRONIC PAIN SYNDROME: ICD-10-CM

## 2022-02-10 RX ORDER — MORPHINE SULFATE 15 MG/1
TABLET, FILM COATED, EXTENDED RELEASE ORAL
Qty: 90 TABLET | Refills: 0 | Status: SHIPPED | OUTPATIENT
Start: 2022-02-10 | End: 2022-03-09

## 2022-02-10 NOTE — TELEPHONE ENCOUNTER
MS Contin    - est care with you 2.15.22  Last Written Prescription Date:  1.11.22  Last Fill Quantity: #90,   # refills: 0  Last Office Visit: 12.7.21  Future Office visit:    Next 5 appointments (look out 90 days)    May 04, 2022  1:45 PM  (Arrive by 1:30 PM)  Nurse Only with Bettye Jules  [facility] [place] ([facility] [place] ) [address]  [place] [address]  [phone]           Routing refill request to provider for review/approval because:  Drug not on the FMG, UMP or  Health refill protocol or controlled substance

## 2022-02-15 ENCOUNTER — TELEPHONE (OUTPATIENT)
Dept: INTERNAL MEDICINE | Facility: OTHER | Age: 62
End: 2022-02-15

## 2022-02-15 NOTE — TELEPHONE ENCOUNTER
No sorry, I am receiving numerous similar requests and can't make an exception for just one person

## 2022-02-15 NOTE — TELEPHONE ENCOUNTER
10:31 AM    Reason for Call: GEORGIANA Lewis had an establish care appointment with Dr Marinelli this morning his alarm didn't go off and when he went outside to start his vehicle it didn't start he rescheduled this appointment to May 10, 2022 at arrival time 8:45am to see Dr Marinelli patient is wondering is there another day he can get in sooner to see Dr Marinelli?     The patient is requesting an appointment with Dr Marinelli.    Was an appointment offered for this call? No  If yes : Appointment type              Date    Preferred method for responding to this message: Telephone Call  What is your phone number ? [phone]    If we cannot reach you directly, may we leave a detailed response at the number you provided? Yes    Can this message wait until your PCP/provider returns, if unavailable today? Geovanna Juares

## 2022-02-21 DIAGNOSIS — F41.1 GAD (GENERALIZED ANXIETY DISORDER): ICD-10-CM

## 2022-02-22 RX ORDER — HYDROXYZINE PAMOATE 50 MG/1
CAPSULE ORAL
Qty: 120 CAPSULE | Refills: 0 | Status: SHIPPED | OUTPATIENT
Start: 2022-02-22 | End: 2022-03-07

## 2022-03-02 ENCOUNTER — OFFICE VISIT (OUTPATIENT)
Dept: CHIROPRACTIC MEDICINE | Facility: OTHER | Age: 62
End: 2022-03-02
Attending: CHIROPRACTOR
Payer: COMMERCIAL

## 2022-03-02 DIAGNOSIS — M54.50 ACUTE BILATERAL LOW BACK PAIN WITHOUT SCIATICA: ICD-10-CM

## 2022-03-02 DIAGNOSIS — M99.03 SEGMENTAL AND SOMATIC DYSFUNCTION OF LUMBAR REGION: Primary | ICD-10-CM

## 2022-03-02 DIAGNOSIS — M99.01 SEGMENTAL AND SOMATIC DYSFUNCTION OF CERVICAL REGION: ICD-10-CM

## 2022-03-02 DIAGNOSIS — M99.02 SEGMENTAL AND SOMATIC DYSFUNCTION OF THORACIC REGION: ICD-10-CM

## 2022-03-02 PROCEDURE — 98941 CHIROPRACT MANJ 3-4 REGIONS: CPT | Mod: AT | Performed by: CHIROPRACTOR

## 2022-03-07 DIAGNOSIS — F90.2 ADHD (ATTENTION DEFICIT HYPERACTIVITY DISORDER), COMBINED TYPE: ICD-10-CM

## 2022-03-07 DIAGNOSIS — M25.541 ARTHRALGIA OF BOTH HANDS: ICD-10-CM

## 2022-03-07 DIAGNOSIS — G89.4 CHRONIC PAIN SYNDROME: ICD-10-CM

## 2022-03-07 DIAGNOSIS — M51.379 DEGENERATION OF LUMBAR OR LUMBOSACRAL INTERVERTEBRAL DISC: ICD-10-CM

## 2022-03-07 DIAGNOSIS — M25.542 ARTHRALGIA OF BOTH HANDS: ICD-10-CM

## 2022-03-07 DIAGNOSIS — F11.90 CHRONIC, CONTINUOUS USE OF OPIOIDS: ICD-10-CM

## 2022-03-07 DIAGNOSIS — R11.0 NAUSEA: ICD-10-CM

## 2022-03-07 DIAGNOSIS — G89.29 CHRONIC LEFT-SIDED LOW BACK PAIN WITHOUT SCIATICA: ICD-10-CM

## 2022-03-07 DIAGNOSIS — M62.830 BACK MUSCLE SPASM: ICD-10-CM

## 2022-03-07 DIAGNOSIS — M54.50 CHRONIC LEFT-SIDED LOW BACK PAIN WITHOUT SCIATICA: ICD-10-CM

## 2022-03-07 DIAGNOSIS — F41.1 GAD (GENERALIZED ANXIETY DISORDER): ICD-10-CM

## 2022-03-07 DIAGNOSIS — R05.9 COUGH: ICD-10-CM

## 2022-03-07 DIAGNOSIS — Z00.00 HEALTHCARE MAINTENANCE: ICD-10-CM

## 2022-03-07 RX ORDER — HYDROXYZINE PAMOATE 50 MG/1
CAPSULE ORAL
Qty: 120 CAPSULE | Refills: 0 | Status: SHIPPED | OUTPATIENT
Start: 2022-03-07 | End: 2022-04-07

## 2022-03-07 RX ORDER — LISDEXAMFETAMINE DIMESYLATE 70 MG/1
CAPSULE ORAL
Qty: 30 CAPSULE | Refills: 0 | Status: SHIPPED | OUTPATIENT
Start: 2022-03-07 | End: 2022-04-07

## 2022-03-07 RX ORDER — METHOCARBAMOL 500 MG/1
TABLET, FILM COATED ORAL
Qty: 90 TABLET | Refills: 0 | Status: SHIPPED | OUTPATIENT
Start: 2022-03-07 | End: 2022-04-06

## 2022-03-07 NOTE — TELEPHONE ENCOUNTER
ASA      Last Written Prescription Date:  2/9/22  Last Fill Quantity: 30,   # refills: 0  Last Office Visit: 12/7/21  Future Office visit:    Next 5 appointments (look out 90 days)    Mar 09, 2022  4:30 PM  Return Visit with Shamar Escamilla DC  [facility] [place] ([facility] [place] ) [address]  [place] [address]  [phone]   May 04, 2022  1:45 PM  (Arrive by 1:30 PM)  Nurse Only with Bettye Jules  [facility] [place] ([facility] [place] ) [address]  [place] [address]  [phone]           Routing refill request to provider for review/approval because:      Mucinex      Last Written Prescription Date:  1/20/22  Last Fill Quantity: 60,   # refills: 0  Last Office Visit: 12/7/21  Future Office visit:    Next 5 appointments (look out 90 days)    Mar 09, 2022  4:30 PM  Return Visit with Shamar Escamilla DC  [facility] [place] ([facility] [place] ) [address] [place] [address]  [place] [address]  [phone]   May 04, 2022  1:45 PM  (Arrive by 1:30 PM)  Nurse Only with Bettye Jules  [facility] [place] ([facility] [place] ) [address]  [place] [address]  [phone]           Routing refill request to provider for review/approval because:      Marinol      Last Written Prescription Date:  2/9/22  Last Fill Quantity: 60,   # refills: 0  Last Office Visit: 12/7/21  Future Office visit:    Next 5 appointments (look out 90 days)    Mar 09, 2022  4:30 PM  Return Visit with Shamar Escamilla DC  [facility] [place] ([facility] [place] ) [address] [place] [address]  [place] [address]  [phone]   May 04, 2022  1:45 PM  (Arrive by 1:30 PM)  Nurse Only with Bettye Jules  [facility] [place] ([facility] [place] ) [address]  [place] [address]  [phone]           Routing refill request to provider for review/approval because:      Norco      Last  Written Prescription Date:  2/9/22  Last Fill Quantity: 60,   # refills: 0  Last Office Visit: 12/7/21  Future Office visit:    Next 5 appointments (look out 90 days)    Mar 09, 2022  4:30 PM  Return Visit with Shamar Escamilla DC  [facility] [place] ([facility] [place] ) [address] [place] [address]  [place] [address]  [phone]   May 04, 2022  1:45 PM  (Arrive by 1:30 PM)  Nurse Only with Bettye Jules  [facility] [place] ([facility] [place] ) [address]  [place] [address]  [phone]           Routing refill request to provider for review/approval because:      Ibuprofen      Last Written Prescription Date:  2/9/22  Last Fill Quantity: 120,   # refills: 0  Last Office Visit: 12/7/21  Future Office visit:    Next 5 appointments (look out 90 days)    Mar 09, 2022  4:30 PM  Return Visit with Shamar Escamilla DC  [facility] [place] ([facility] [place] ) [address] [place] [address]  [place] [address]  [phone]   May 04, 2022  1:45 PM  (Arrive by 1:30 PM)  Nurse Only with Bettye Jules  [facility] [place] ([facility] [place] ) [address]  [place] [address]  [phone]           Routing refill request to provider for review/approval because:      MS Contin      Last Written Prescription Date:  2/10/22  Last Fill Quantity: 90,   # refills: 0  Last Office Visit: 12/7/21  Future Office visit:    Next 5 appointments (look out 90 days)    Mar 09, 2022  4:30 PM  Return Visit with Shamar Escamilla DC  [facility] [place] ([facility] [place] ) [address] [place] [address]  [place] [address]  [phone]   May 04, 2022  1:45 PM  (Arrive by 1:30 PM)  Nurse Only with Bettye Jules  [facility] [place] ([facility] [place] ) [address]  [place] [address]  [phone]           Routing refill request to provider for review/approval because:

## 2022-03-07 NOTE — TELEPHONE ENCOUNTER
Robaxin      Last Written Prescription Date:  12/29/21  Last Fill Quantity: 90,   # refills: 1  Last Office Visit: 12/7/21  Future Office visit:    Next 5 appointments (look out 90 days)    Mar 09, 2022  4:30 PM  Return Visit with Shamar Escamilla DC  [facility] [place] ([facility] [place] ) [address]  [place] [address]  [phone]   May 04, 2022  1:45 PM  (Arrive by 1:30 PM)  Nurse Only with Bettye Jules  [facility] [place] ([facility] [place] ) [address]  [place] [address]  [phone]           Routing refill request to provider for review/approval because:      Voltaren      Last Written Prescription Date:  12/29/21  Last Fill Quantity: 60,   # refills: 1  Last Office Visit: 12/7/21  Future Office visit:    Next 5 appointments (look out 90 days)    Mar 09, 2022  4:30 PM  Return Visit with Shamar Escamilla DC  [facility] [place] ([facility] [place] ) [address] [place] [address]  [place] [address]  [phone]   May 04, 2022  1:45 PM  (Arrive by 1:30 PM)  Nurse Only with Bettye Jules  [facility] [place] ([facility] [place] ) [address]  [place] [address]  [phone]           Routing refill request to provider for review/approval because:

## 2022-03-07 NOTE — TELEPHONE ENCOUNTER
lisdexamfetamine (VYVANSE) 70 MG capsule  Per , last filled 2/8/22 #30  Last office visit 10/26/21        .

## 2022-03-07 NOTE — TELEPHONE ENCOUNTER
Vistaril      Last Written Prescription Date:  2/22/22  Last Fill Quantity: 120,   # refills: 0  Last Office Visit: 12/7/21  Future Office visit:    Next 5 appointments (look out 90 days)    Mar 09, 2022  4:30 PM  Return Visit with Shamar Escamilla DC  [facility] [facility] ([facility] ) [address] [place] [address]  [phone]   May 04, 2022  1:45 PM  (Arrive by 1:30 PM)  Nurse Only with Bettye Jules  [facility] ([facility] ) [address]  [place] [address]  [phone]           Routing refill request to provider for review/approval because:

## 2022-03-08 NOTE — PROGRESS NOTES

## 2022-03-09 ENCOUNTER — TRANSFERRED RECORDS (OUTPATIENT)
Dept: HEALTH INFORMATION MANAGEMENT | Facility: CLINIC | Age: 62
End: 2022-03-09

## 2022-03-09 RX ORDER — ASPIRIN 81 MG/1
TABLET, CHEWABLE ORAL
Qty: 30 TABLET | Refills: 0 | Status: SHIPPED | OUTPATIENT
Start: 2022-03-09 | End: 2022-04-06

## 2022-03-09 RX ORDER — IBUPROFEN 600 MG/1
TABLET, FILM COATED ORAL
Qty: 120 TABLET | Refills: 0 | Status: SHIPPED | OUTPATIENT
Start: 2022-03-09 | End: 2022-04-08

## 2022-03-09 RX ORDER — DRONABINOL 2.5 MG/1
CAPSULE ORAL
Qty: 60 CAPSULE | Refills: 0 | Status: SHIPPED | OUTPATIENT
Start: 2022-03-09 | End: 2022-04-06

## 2022-03-09 RX ORDER — HYDROCODONE BITARTRATE AND ACETAMINOPHEN 10; 325 MG/1; MG/1
TABLET ORAL
Qty: 60 TABLET | Refills: 0 | Status: SHIPPED | OUTPATIENT
Start: 2022-03-09 | End: 2022-04-06

## 2022-03-09 RX ORDER — MORPHINE SULFATE 15 MG/1
TABLET, FILM COATED, EXTENDED RELEASE ORAL
Qty: 90 TABLET | Refills: 0 | Status: SHIPPED | OUTPATIENT
Start: 2022-03-09 | End: 2022-04-06

## 2022-03-09 RX ORDER — GUAIFENESIN AND DEXTROMETHORPHAN HYDROBROMIDE 600; 30 MG/1; MG/1
TABLET, EXTENDED RELEASE ORAL
Qty: 60 TABLET | Refills: 0 | Status: SHIPPED | OUTPATIENT
Start: 2022-03-09 | End: 2022-04-06

## 2022-03-17 DIAGNOSIS — M79.7 FIBROMYALGIA: ICD-10-CM

## 2022-03-17 DIAGNOSIS — G89.29 CHRONIC BILATERAL LOW BACK PAIN WITH BILATERAL SCIATICA: ICD-10-CM

## 2022-03-17 DIAGNOSIS — G43.109 MIGRAINE WITH AURA AND WITHOUT STATUS MIGRAINOSUS, NOT INTRACTABLE: ICD-10-CM

## 2022-03-17 DIAGNOSIS — M54.42 CHRONIC BILATERAL LOW BACK PAIN WITH BILATERAL SCIATICA: ICD-10-CM

## 2022-03-17 DIAGNOSIS — M54.41 CHRONIC BILATERAL LOW BACK PAIN WITH BILATERAL SCIATICA: ICD-10-CM

## 2022-03-17 RX ORDER — SUMATRIPTAN 50 MG/1
TABLET, FILM COATED ORAL
Qty: 9 TABLET | Refills: 0 | Status: SHIPPED | OUTPATIENT
Start: 2022-03-17 | End: 2022-04-21

## 2022-03-17 NOTE — TELEPHONE ENCOUNTER
Imitrex 50 MG      Last Written Prescription Date:  01/20/22  Last Fill Quantity: 9,   # refills: 0  Last Office Visit: 12/07/21  Future Office visit:    Next 5 appointments (look out 90 days)    May 04, 2022  1:45 PM  (Arrive by 1:30 PM)  Nurse Only with Betyte Jules  [facility] [place] ([facility] [place] ) [address]  [place] [address]  [phone]           Routing refill request to provider for review/approval because:    Serotonin Agonist request needs review.    Please review patient's record. If patient has had 8 or more treatments in the past month, please forward to provider.      Recent (12 mo) or future (30 days) visit within the authorizing provider's specialty    Appointments in Next Year    May 04, 2022  1:45 PM  (Arrive by 1:30 PM)  Nurse Only with Bettye Jules  [facility] [place] ([facility] [place] ) [phone]   May 04, 2022  2:00 PM  (Arrive by 1:45 PM)  Hearing Eval with Kevin Moreno  [facility] ([facility] [place] ) [phone]   May 10, 2022  9:00 AM  (Arrive by 8:45 AM)  New Patient with Dario Marinelli DO  [facility] ([facility] ) [phone]   Jun 24, 2022  3:00 PM  (Arrive by 2:45 PM)  New Patient with Lissy Kraft MD  [facility] ([facility] ) [phone]

## 2022-03-17 NOTE — TELEPHONE ENCOUNTER
Nortriptyline- last signed by Dr Mayers, previous Dr JAIRO Moore patient    Appointments in Next Year       May 10, 2022  9:00 AM  (Arrive by 8:45 AM)  New Patient with Dario Marinelli DO  [facility] ([facility] ) [phone]   Jun 24, 2022  3:00 PM  (Arrive by 2:45 PM)  New Patient with Lissy Kraft MD  [facility] ([facility] ) [phone]        Please advise. Thank you!

## 2022-03-19 ENCOUNTER — HEALTH MAINTENANCE LETTER (OUTPATIENT)
Age: 62
End: 2022-03-19

## 2022-03-21 DIAGNOSIS — M62.830 BACK MUSCLE SPASM: ICD-10-CM

## 2022-03-21 DIAGNOSIS — M54.50 LUMBAR BACK PAIN: ICD-10-CM

## 2022-03-21 DIAGNOSIS — J45.40 MODERATE PERSISTENT ASTHMA WITHOUT COMPLICATION: ICD-10-CM

## 2022-03-22 DIAGNOSIS — J30.89 SEASONAL ALLERGIC RHINITIS DUE TO OTHER ALLERGIC TRIGGER: ICD-10-CM

## 2022-03-22 RX ORDER — BACLOFEN 20 MG/1
TABLET ORAL
Qty: 360 TABLET | Refills: 0 | Status: SHIPPED | OUTPATIENT
Start: 2022-03-22 | End: 2022-06-23

## 2022-03-22 RX ORDER — MONTELUKAST SODIUM 10 MG/1
TABLET ORAL
Qty: 90 TABLET | Refills: 0 | Status: SHIPPED | OUTPATIENT
Start: 2022-03-22 | End: 2022-06-02

## 2022-03-22 RX ORDER — NORTRIPTYLINE HYDROCHLORIDE 50 MG/1
CAPSULE ORAL
Qty: 120 CAPSULE | Refills: 0 | Status: SHIPPED | OUTPATIENT
Start: 2022-03-22 | End: 2022-05-05

## 2022-03-22 RX ORDER — CETIRIZINE HYDROCHLORIDE 10 MG/1
10 TABLET ORAL DAILY
Qty: 30 TABLET | Refills: 0 | OUTPATIENT
Start: 2022-03-22

## 2022-03-22 NOTE — TELEPHONE ENCOUNTER
Zanaflex      Last Written Prescription Date:  12/13/21  Last Fill Quantity: 270,   # refills: 0  Last Office Visit: 12/7/21  Future Office visit:    Next 5 appointments (look out 90 days)    May 04, 2022  1:45 PM  (Arrive by 1:30 PM)  Nurse Only with Bettye Jules  [facility] [place] ([facility] [place] ) [address]  [place] [address]  [phone]           Routing refill request to provider for review/approval because:      Baclofen      Last Written Prescription Date:  12/13/21  Last Fill Quantity: 360,   # refills: 0  Last Office Visit: 12/7/21  Future Office visit:    Next 5 appointments (look out 90 days)    May 04, 2022  1:45 PM  (Arrive by 1:30 PM)  Nurse Only with Bettye Jules  [facility] [place] ([facility] [place] ) [address]  [place] [address]  [phone]           Routing refill request to provider for review/approval because:      Singulair      Last Written Prescription Date:  12/2/21  Last Fill Quantity: 90,   # refills: 0  Last Office Visit: 12/7/21  Future Office visit:    Next 5 appointments (look out 90 days)    May 04, 2022  1:45 PM  (Arrive by 1:30 PM)  Nurse Only with Bettye Jules  [facility] [place] ([facility] [place] ) [address]  [place] [address]  [phone]           Routing refill request to provider for review/approval because:

## 2022-03-29 DIAGNOSIS — K21.00 GASTROESOPHAGEAL REFLUX DISEASE WITH ESOPHAGITIS, UNSPECIFIED WHETHER HEMORRHAGE: ICD-10-CM

## 2022-03-30 NOTE — TELEPHONE ENCOUNTER
omeprazole       Last Written Prescription Date:  11/12/21  Last Fill Quantity: 180,   # refills: 0  Last Office Visit: 12/7/21  Pt establishing with   Future Office visit:             Appointments in Next Year    Mar 30, 2022  1:20 PM  (Arrive by 1:05 PM)  COVID-19 Pfizer Vaccine Addtional Dose with HC COVID VACCINE PFIZER  [facility] ([facility] [place] ) [phone]   May 04, 2022  1:45 PM  (Arrive by 1:30 PM)  Nurse Only with Kevin Moreno  [facility] ([facility] ) [phone]   May 04, 2022  2:00 PM  (Arrive by 1:45 PM)  Hearing Eval with Kevin Moreno  [facility] ([facility] [place] ) [phone]   May 10, 2022  9:00 AM  (Arrive by 8:45 AM)  New Patient with Dario Marinelli DO  [facility] ([facility] ) [phone]   Jun 24, 2022  3:00 PM  (Arrive by 2:45 PM)  New Patient with Lissy Kraft MD  [facility] ([facility] ) [phone]            Routing refill request to provider for review/approval because:  needing new sig  Pt no show 2/15/22

## 2022-04-06 DIAGNOSIS — M25.541 ARTHRALGIA OF BOTH HANDS: ICD-10-CM

## 2022-04-06 DIAGNOSIS — F11.90 CHRONIC, CONTINUOUS USE OF OPIOIDS: ICD-10-CM

## 2022-04-06 DIAGNOSIS — R11.0 NAUSEA: ICD-10-CM

## 2022-04-06 DIAGNOSIS — M25.542 ARTHRALGIA OF BOTH HANDS: ICD-10-CM

## 2022-04-06 DIAGNOSIS — M62.830 BACK MUSCLE SPASM: ICD-10-CM

## 2022-04-06 DIAGNOSIS — R05.9 COUGH: ICD-10-CM

## 2022-04-06 DIAGNOSIS — M51.379 DEGENERATION OF LUMBAR OR LUMBOSACRAL INTERVERTEBRAL DISC: ICD-10-CM

## 2022-04-06 DIAGNOSIS — F41.1 GAD (GENERALIZED ANXIETY DISORDER): ICD-10-CM

## 2022-04-06 DIAGNOSIS — Z00.00 HEALTHCARE MAINTENANCE: ICD-10-CM

## 2022-04-06 DIAGNOSIS — F31.0 BIPOLAR AFFECTIVE DISORDER, CURRENT EPISODE HYPOMANIC (H): ICD-10-CM

## 2022-04-06 DIAGNOSIS — G89.4 CHRONIC PAIN SYNDROME: ICD-10-CM

## 2022-04-06 DIAGNOSIS — F90.2 ADHD (ATTENTION DEFICIT HYPERACTIVITY DISORDER), COMBINED TYPE: ICD-10-CM

## 2022-04-06 RX ORDER — MORPHINE SULFATE 15 MG/1
TABLET, FILM COATED, EXTENDED RELEASE ORAL
Qty: 90 TABLET | Refills: 0 | Status: SHIPPED | OUTPATIENT
Start: 2022-04-06 | End: 2022-05-05

## 2022-04-06 RX ORDER — DRONABINOL 2.5 MG/1
CAPSULE ORAL
Qty: 60 CAPSULE | Refills: 0 | Status: SHIPPED | OUTPATIENT
Start: 2022-04-06 | End: 2022-05-05

## 2022-04-06 RX ORDER — GUAIFENESIN AND DEXTROMETHORPHAN HYDROBROMIDE 600; 30 MG/1; MG/1
TABLET, EXTENDED RELEASE ORAL
Qty: 60 TABLET | Refills: 0 | Status: SHIPPED | OUTPATIENT
Start: 2022-04-06 | End: 2022-05-05

## 2022-04-06 RX ORDER — METHOCARBAMOL 500 MG/1
TABLET, FILM COATED ORAL
Qty: 90 TABLET | Refills: 0 | Status: SHIPPED | OUTPATIENT
Start: 2022-04-06 | End: 2022-05-05

## 2022-04-06 RX ORDER — ASPIRIN 81 MG/1
TABLET, CHEWABLE ORAL
Qty: 30 TABLET | Refills: 0 | Status: SHIPPED | OUTPATIENT
Start: 2022-04-06 | End: 2022-05-05

## 2022-04-06 RX ORDER — HYDROCODONE BITARTRATE AND ACETAMINOPHEN 10; 325 MG/1; MG/1
TABLET ORAL
Qty: 60 TABLET | Refills: 0 | Status: SHIPPED | OUTPATIENT
Start: 2022-04-06 | End: 2022-05-05

## 2022-04-06 NOTE — TELEPHONE ENCOUNTER
Robaxin 500 mg     Last Written Prescription Date:  3-7-2022  Last Fill Quantity: 90,   # refills: 0  Last Office Visit:   Future Office visit:    Next 5 appointments (look out 90 days)    May 04, 2022  1:45 PM  (Arrive by 1:30 PM)  Nurse Only with Kevin Moreno  [facility] ([facility] ) [address] [place] [address]  [phone]             Voltaren 50 mg    Last Written Prescription Date:  3-7-2022  Last Fill Quantity: 60,   # refills: 0  Last Office Visit:   Future Office visit:    Next 5 appointments (look out 90 days)    May 04, 2022  1:45 PM  (Arrive by 1:30 PM)  Nurse Only with Kevin Moreno  [facility] ([facility] [place] ) [address] [place] [address]  [phone]

## 2022-04-06 NOTE — TELEPHONE ENCOUNTER
HYDROcodone-acetaminophen (NORCO)  MG per tablet      Last Written Prescription Date:  03/09/22  Last Fill Quantity: 60,   # refills: 0  Last Office Visit: 12/07/21  Future Office visit:  Establish care with Dr. Kraft, 06/24/22  Next 5 appointments (look out 90 days)    May 04, 2022  1:45 PM  (Arrive by 1:30 PM)  Nurse Only with Ciara Valladares, Kevin  [facility] ([facility] ) [address]  [place] [address]  [phone]           Routing refill request to provider for review/approval because:  Drug not on the [facility], [facility] or [facility] refill protocol or controlled substance    morphine (MS CONTIN) 15 MG CR tablet      Last Written Prescription Date:  03/09/22  Last Fill Quantity: 90,   # refills: 0    dextromethorphan-guaiFENesin (MUCINEX DM)  MG 12 hr tablet      Last Written Prescription Date:  03/09/22  Last Fill Quantity: 60,   # refills: 0    dronabinol (MARINOL) 2.5 MG capsule      Last Written Prescription Date:  03/09/22  Last Fill Quantity: 60,   # refills: 0    aspirin (ASPIRIN LOW DOSE) 81 MG chewable tablet      Last Written Prescription Date:  03/09/22  Last Fill Quantity: 30,   # refills: 0

## 2022-04-06 NOTE — TELEPHONE ENCOUNTER
Oxcarbazepine (Trileptal) 600 mg tablet  Take 1 tablet (600 mg) by mouth 2 times daily   Last Written Prescription Date:  10-26-21  Last Fill Quantity: 60 tablet,   # refills: 4  Last Office Visit: 10-  Future Office visit:    Next 5 appointments (look out 90 days)    May 04, 2022  1:45 PM  (Arrive by 1:30 PM)  Nurse Only with Kevin Moreno  [facility] [place] ([facility] [place] ) [address] [address]  [phone]         Lisdexamfetamine (Vyvanse) 70 mg capsule  Take 1 capsule by mouth every morning  Last Written Prescription Date:  3-7-2022  Last Fill Quantity: 30 capsule,   # refills: 0  Last Office Visit: 10-  Future Office visit:    Next 5 appointments (look out 90 days)    May 04, 2022  1:45 PM  (Arrive by 1:30 PM)  Nurse Only with Kevin Moreno  [facility] [place] ([facility] [place] ) [address] [address]  [phone]

## 2022-04-07 RX ORDER — LISDEXAMFETAMINE DIMESYLATE 70 MG/1
CAPSULE ORAL
Qty: 30 CAPSULE | Refills: 0 | Status: SHIPPED | OUTPATIENT
Start: 2022-04-07 | End: 2022-05-04

## 2022-04-07 RX ORDER — OXCARBAZEPINE 600 MG/1
TABLET, FILM COATED ORAL
Qty: 60 TABLET | Refills: 1 | Status: SHIPPED | OUTPATIENT
Start: 2022-04-07 | End: 2022-06-01

## 2022-04-07 RX ORDER — HYDROXYZINE PAMOATE 50 MG/1
CAPSULE ORAL
Qty: 120 CAPSULE | Refills: 0 | Status: SHIPPED | OUTPATIENT
Start: 2022-04-07 | End: 2022-05-05

## 2022-04-07 NOTE — TELEPHONE ENCOUNTER
Vistaril      Last Written Prescription Date:  3/7/22  Last Fill Quantity: 120,   # refills: 0  Last Office Visit: 12/7/21  Future Office visit:    Next 5 appointments (look out 90 days)    May 04, 2022  1:45 PM  (Arrive by 1:30 PM)  Nurse Only with Kevin Moreno  [facility] [place] ([facility] [place] ) [address]  [place] [address]  [phone]           Routing refill request to provider for review/approval because:

## 2022-04-08 DIAGNOSIS — M54.50 CHRONIC LEFT-SIDED LOW BACK PAIN WITHOUT SCIATICA: ICD-10-CM

## 2022-04-08 DIAGNOSIS — G89.29 CHRONIC LEFT-SIDED LOW BACK PAIN WITHOUT SCIATICA: ICD-10-CM

## 2022-04-08 DIAGNOSIS — G89.4 CHRONIC PAIN SYNDROME: ICD-10-CM

## 2022-04-08 DIAGNOSIS — Z71.6 TOBACCO ABUSE COUNSELING: ICD-10-CM

## 2022-04-11 ENCOUNTER — TELEPHONE (OUTPATIENT)
Dept: FAMILY MEDICINE | Facility: OTHER | Age: 62
End: 2022-04-11
Payer: COMMERCIAL

## 2022-04-11 RX ORDER — IBUPROFEN 600 MG/1
TABLET, FILM COATED ORAL
Qty: 120 TABLET | Refills: 0 | Status: SHIPPED | OUTPATIENT
Start: 2022-04-11 | End: 2022-05-05

## 2022-04-11 RX ORDER — GABAPENTIN 300 MG/1
CAPSULE ORAL
Qty: 810 CAPSULE | Refills: 0 | Status: SHIPPED | OUTPATIENT
Start: 2022-04-11 | End: 2022-07-06

## 2022-04-11 NOTE — TELEPHONE ENCOUNTER
Gabapentin      Last Written Prescription Date:  12.14.21  Last Fill Quantity: #810,   # refills: 0  Last Office Visit: 12.7.21  Future Office visit:    Next 5 appointments (look out 90 days)    May 04, 2022  1:45 PM  (Arrive by 1:30 PM)  Nurse Only with Kevin Moreno  [facility] [place] ([facility] ) [address]  [place] [address]  [phone]           Routing refill request to provider for review/approval because:  Drug not on the [facility], [facility] or [facility] refill protocol or controlled substance

## 2022-04-11 NOTE — TELEPHONE ENCOUNTER
Form received parking certificate ,placed in provider's wall bin.   After form is completed patient would like form to be picked up.

## 2022-04-11 NOTE — TELEPHONE ENCOUNTER
ibuprofen      Last Written Prescription Date:  3/9/22  Last Fill Quantity: 120,   # refills: 0  Last Office Visit: 12/7/21  Future Office visit:    Next 5 appointments (look out 90 days)    May 04, 2022  1:45 PM  (Arrive by 1:30 PM)  Nurse Only with Kevin Moreno  [facility] [place] ([facility] [place] ) [address]  [place] [address]  [phone]           Nicokeithte 1/19/22 #110 with 1

## 2022-04-19 DIAGNOSIS — G43.109 MIGRAINE WITH AURA AND WITHOUT STATUS MIGRAINOSUS, NOT INTRACTABLE: ICD-10-CM

## 2022-04-21 RX ORDER — SUMATRIPTAN 50 MG/1
TABLET, FILM COATED ORAL
Qty: 9 TABLET | Refills: 0 | Status: SHIPPED | OUTPATIENT
Start: 2022-04-21 | End: 2022-05-03

## 2022-04-21 NOTE — TELEPHONE ENCOUNTER
Sumatriptan      Last Written Prescription Date:  3/17/22  Last Fill Quantity: 9,   # refills: 0  Last Office Visit: 12/7/21  Future Office visit: 5/10/22 [facility]   Next 5 appointments (look out 90 days)    May 04, 2022  1:45 PM  (Arrive by 1:30 PM)  Nurse Only with Kevin Moreno  [facility] [place] ([facility] [place] ) [address]  [place] [address]  [phone]

## 2022-04-28 ENCOUNTER — TRANSFERRED RECORDS (OUTPATIENT)
Dept: HEALTH INFORMATION MANAGEMENT | Facility: CLINIC | Age: 62
End: 2022-04-28

## 2022-05-02 NOTE — TELEPHONE ENCOUNTER
Pt was a no show for the virtual consult on 5.20.21.      Can he be called to rescheduled?         Include Location In Plan?: No Detail Level: Generalized

## 2022-05-03 DIAGNOSIS — M25.542 ARTHRALGIA OF BOTH HANDS: ICD-10-CM

## 2022-05-03 DIAGNOSIS — R11.0 NAUSEA: ICD-10-CM

## 2022-05-03 DIAGNOSIS — F90.2 ADHD (ATTENTION DEFICIT HYPERACTIVITY DISORDER), COMBINED TYPE: ICD-10-CM

## 2022-05-03 DIAGNOSIS — G89.29 CHRONIC LEFT-SIDED LOW BACK PAIN WITHOUT SCIATICA: ICD-10-CM

## 2022-05-03 DIAGNOSIS — M79.7 FIBROMYALGIA: ICD-10-CM

## 2022-05-03 DIAGNOSIS — G89.29 CHRONIC BILATERAL LOW BACK PAIN WITH BILATERAL SCIATICA: ICD-10-CM

## 2022-05-03 DIAGNOSIS — M54.50 CHRONIC LEFT-SIDED LOW BACK PAIN WITHOUT SCIATICA: ICD-10-CM

## 2022-05-03 DIAGNOSIS — R05.9 COUGH: ICD-10-CM

## 2022-05-03 DIAGNOSIS — M54.42 CHRONIC BILATERAL LOW BACK PAIN WITH BILATERAL SCIATICA: ICD-10-CM

## 2022-05-03 DIAGNOSIS — R52 PAIN: ICD-10-CM

## 2022-05-03 DIAGNOSIS — M51.379 DEGENERATION OF LUMBAR OR LUMBOSACRAL INTERVERTEBRAL DISC: ICD-10-CM

## 2022-05-03 DIAGNOSIS — Z00.00 HEALTHCARE MAINTENANCE: ICD-10-CM

## 2022-05-03 DIAGNOSIS — M25.541 ARTHRALGIA OF BOTH HANDS: ICD-10-CM

## 2022-05-03 DIAGNOSIS — M54.41 CHRONIC BILATERAL LOW BACK PAIN WITH BILATERAL SCIATICA: ICD-10-CM

## 2022-05-03 DIAGNOSIS — F41.1 GAD (GENERALIZED ANXIETY DISORDER): ICD-10-CM

## 2022-05-03 DIAGNOSIS — M62.830 BACK MUSCLE SPASM: ICD-10-CM

## 2022-05-03 DIAGNOSIS — G89.4 CHRONIC PAIN SYNDROME: ICD-10-CM

## 2022-05-03 DIAGNOSIS — F11.90 CHRONIC, CONTINUOUS USE OF OPIOIDS: ICD-10-CM

## 2022-05-04 ENCOUNTER — OFFICE VISIT (OUTPATIENT)
Dept: AUDIOLOGY | Facility: OTHER | Age: 62
End: 2022-05-04
Attending: AUDIOLOGIST
Payer: COMMERCIAL

## 2022-05-04 DIAGNOSIS — H90.3 SENSORINEURAL HEARING LOSS (SNHL) OF BOTH EARS: Primary | ICD-10-CM

## 2022-05-04 DIAGNOSIS — H91.93 DECREASED HEARING OF BOTH EARS: ICD-10-CM

## 2022-05-04 PROCEDURE — 92593 HC HEARING AID CHECK, BINAURAL: CPT | Performed by: AUDIOLOGIST

## 2022-05-04 PROCEDURE — 92556 SPEECH AUDIOMETRY COMPLETE: CPT | Performed by: AUDIOLOGIST

## 2022-05-04 PROCEDURE — 92552 PURE TONE AUDIOMETRY AIR: CPT | Performed by: AUDIOLOGIST

## 2022-05-04 RX ORDER — LISDEXAMFETAMINE DIMESYLATE 70 MG/1
CAPSULE ORAL
Qty: 30 CAPSULE | Refills: 0 | Status: SHIPPED | OUTPATIENT
Start: 2022-05-04 | End: 2022-06-01

## 2022-05-04 NOTE — PROGRESS NOTES
Audiology Evaluation Completed.     Hearing aid check completed.    Please refer SCANNED AUDIOGRAM and/or TYMPANOGRAM for BACKGROUND, RESULTS, RECOMMENDATIONS.      Ciara WHEAT, [facility]  Audiologist #[id number]

## 2022-05-04 NOTE — TELEPHONE ENCOUNTER
Michael       Last Written Prescription Date:  4-7-22  Last Fill Quantity: 30,   # refills: 0  Last Office Visit: 12/7/21  Future Office visit:    Next 5 appointments (look out 90 days)    May 04, 2022  1:45 PM  (Arrive by 1:30 PM)  Nurse Only with Kevin Moreno  [facility] [place] ([facility] [place] ) [address]  [place] [address]  [phone]

## 2022-05-04 NOTE — PROGRESS NOTES
Audiology Evaluation Completed. Please refer SCANNED AUDIOGRAM and/or TYMPANOGRAM for BACKGROUND, RESULTS, RECOMMENDATIONS.      Ciara WHEAT, [facility]  Audiologist #[id number]

## 2022-05-05 RX ORDER — METHOCARBAMOL 500 MG/1
TABLET, FILM COATED ORAL
Qty: 90 TABLET | Refills: 0 | Status: SHIPPED | OUTPATIENT
Start: 2022-05-05 | End: 2022-06-02

## 2022-05-05 RX ORDER — MORPHINE SULFATE 15 MG/1
TABLET, FILM COATED, EXTENDED RELEASE ORAL
Qty: 90 TABLET | Refills: 0 | Status: SHIPPED | OUTPATIENT
Start: 2022-05-05 | End: 2022-06-03

## 2022-05-05 RX ORDER — ACETAMINOPHEN 325 MG/1
TABLET ORAL
Qty: 200 TABLET | Refills: 0 | Status: SHIPPED | OUTPATIENT
Start: 2022-05-05 | End: 2022-06-02

## 2022-05-05 RX ORDER — IBUPROFEN 600 MG/1
TABLET, FILM COATED ORAL
Qty: 120 TABLET | Refills: 0 | Status: SHIPPED | OUTPATIENT
Start: 2022-05-05 | End: 2022-06-01

## 2022-05-05 RX ORDER — NORTRIPTYLINE HYDROCHLORIDE 50 MG/1
CAPSULE ORAL
Qty: 120 CAPSULE | Refills: 0 | Status: SHIPPED | OUTPATIENT
Start: 2022-05-05 | End: 2022-06-01

## 2022-05-05 RX ORDER — GUAIFENESIN AND DEXTROMETHORPHAN HYDROBROMIDE 600; 30 MG/1; MG/1
TABLET, EXTENDED RELEASE ORAL
Qty: 60 TABLET | Refills: 0 | Status: SHIPPED | OUTPATIENT
Start: 2022-05-05 | End: 2022-06-01

## 2022-05-05 RX ORDER — HYDROXYZINE PAMOATE 50 MG/1
CAPSULE ORAL
Qty: 120 CAPSULE | Refills: 0 | Status: SHIPPED | OUTPATIENT
Start: 2022-05-05 | End: 2022-05-25

## 2022-05-05 RX ORDER — DRONABINOL 2.5 MG/1
CAPSULE ORAL
Qty: 60 CAPSULE | Refills: 0 | Status: SHIPPED | OUTPATIENT
Start: 2022-05-05 | End: 2022-06-01

## 2022-05-05 RX ORDER — ASPIRIN 81 MG/1
TABLET, CHEWABLE ORAL
Qty: 30 TABLET | Refills: 0 | Status: SHIPPED | OUTPATIENT
Start: 2022-05-05 | End: 2022-06-01

## 2022-05-05 RX ORDER — HYDROCODONE BITARTRATE AND ACETAMINOPHEN 10; 325 MG/1; MG/1
TABLET ORAL
Qty: 60 TABLET | Refills: 0 | Status: SHIPPED | OUTPATIENT
Start: 2022-05-05 | End: 2022-06-03

## 2022-05-05 NOTE — TELEPHONE ENCOUNTER
Tyl, Robaxin, Voltaren      Last Written Prescription Date:  4.3.22  Last Fill Quantity: #200, #90, #60,   # refills: 0  Last Office Visit: 12.7.21  Future Office visit:       Routing refill request to provider for review/approval because:    Est care with you 5.10.22.

## 2022-05-05 NOTE — TELEPHONE ENCOUNTER
Norco, MS Contin, ASA, Marinol, Mucinex, Ibu, Pamelor      Last Written Prescription Date:  4.7.22, 4.6.22, 4.6.22, 4.6.22, 4.6.22, 4.11.,22, 12.2.21  Last Fill Quantity: #60, #90, #30, #60, #60, #120, #120,   # refills: 0  Last Office Visit: 12.7.21  Future Office visit:       Routing refill request to provider for review/approval because:      Est care with you 5.10.22

## 2022-05-05 NOTE — TELEPHONE ENCOUNTER
hydroxyzine      Last Written Prescription Date:  4/7/22  Last Fill Quantity: 120,   # refills: 0  Last Office Visit: Has upcoming appt to establish care  Future Office visit:
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
## 2022-05-12 NOTE — PATIENT INSTRUCTIONS
Do not take your ibuprofen or aspirin, ibuprofen  10 days before your procedure  Do not take losartan day of procedure.   Preparing for Your Surgery  Getting started  A nurse will call you to review your health history and instructions. They will give you an arrival time based on your scheduled surgery time. Please be ready to share:  Your doctor's clinic name and phone number  Your medical, surgical and anesthesia history  A list of allergies and sensitivities  A list of medicines, including herbal treatments and over-the-counter drugs  Whether the patient has a legal guardian (ask how to send us the papers in advance)  Please tell us if you're pregnant--or if there's any chance you might be pregnant. Some surgeries may injure a fetus (unborn baby), so they require a pregnancy test. Surgeries that are safe for a fetus don't always need a test, and you can choose whether to have one.   If you have a child who's having surgery, please ask for a copy of Preparing for Your Child's Surgery.    Preparing for surgery  Within 30 days of surgery: Have a pre-op exam (sometimes called an H&P, or History and Physical). This can be done at a clinic or pre-operative center.  If you're having a , you may not need this exam. Talk to your care team.  At your pre-op exam, talk to your care team about all medicines you take. If you need to stop any medicines before surgery, ask when to start taking them again.  We do this for your safety. Many medicines can make you bleed too much during surgery. Some change how well surgery (anesthesia) drugs work.  Call your insurance company to let them know you're having surgery. (If you don't have insurance, call [phone].)  Call your clinic if there's any change in your health. This includes signs of a cold or flu (sore throat, runny nose, cough, rash, fever). It also includes a scrape or scratch near the surgery site.  If you have questions on the day of surgery, call your  hospital or surgery center.  COVID testing  You may need to be tested for COVID-19 before having surgery. If so, we will give you instructions.  Eating and drinking guidelines  For your safety: Unless your surgeon tells you otherwise, follow the guidelines below.  Eat and drink as usual until 8 hours before surgery. After that, no food or milk.  Drink clear liquids until 2 hours before surgery. These are liquids you can see through, like water, Gatorade and Propel Water. You may also have black coffee and tea (no cream or milk).  Nothing by mouth within 2 hours of surgery. This includes gum, candy and breath mints.  If you drink alcohol: Stop drinking it the night before surgery.  If your care team tells you to take medicine on the morning of surgery, it's okay to take it with a sip of water.  Preventing infection  Shower or bathe the night before and morning of your surgery. Follow the instructions your clinic gave you. (If no instructions, use regular soap.)  Don't shave or clip hair near your surgery site. We'll remove the hair if needed.  Don't smoke or vape the morning of surgery. You may chew nicotine gum up to 2 hours before surgery. A nicotine patch is okay.  Note: Some surgeries require you to completely quit smoking and nicotine. Check with your surgeon.  Your care team will make every effort to keep you safe from infection. We will:  Clean our hands often with soap and water (or an alcohol-based hand rub).  Clean the skin at your surgery site with a special soap that kills germs.  Give you a special gown to keep you warm. (Cold raises the risk of infection.)  Wear special hair covers, masks, gowns and gloves during surgery.  Give antibiotic medicine, if prescribed. Not all surgeries need antibiotics.  What to bring on the day of surgery  Photo ID and insurance card  Copy of your health care directive, if you have one  Glasses and hearing aides (bring cases)  You can't wear contacts during surgery  Inhaler  and eye drops, if you use them (tell us about these when you arrive)  CPAP machine or breathing device, if you use them  A few personal items, if spending the night  If you have . . .  A pacemaker, ICD (cardiac defibrillator) or other implant: Bring the ID card.  An implanted stimulator: Bring the remote control.  A legal guardian: Bring a copy of the certified (court-stamped) guardianship papers.  Please remove any jewelry, including body piercings. Leave jewelry and other valuables at home.  If you're going home the day of surgery  You must have a responsible adult drive you home. They should stay with you overnight as well.  If you don't have someone to stay with you, and you aren't safe to go home alone, we may keep you overnight. Insurance often won't pay for this.  After surgery  If it's hard to control your pain or you need more pain medicine, please call your surgeon's office.  Questions?   If you have any questions for your care team, list them here: _________________________________________________________________________________________________________________________________________________________________________ ____________________________________ ____________________________________ ____________________________________  For informational purposes only. Not to replace the advice of your health care provider. Copyright   2003, 2019 [employer] [facility]. All rights reserved. Clinically reviewed by Marybeth Bess MD. [employer] [id number] - REV 07/21.

## 2022-05-13 ENCOUNTER — TELEPHONE (OUTPATIENT)
Dept: FAMILY MEDICINE | Facility: OTHER | Age: 62
End: 2022-05-13

## 2022-05-13 ENCOUNTER — LAB (OUTPATIENT)
Dept: LAB | Facility: OTHER | Age: 62
End: 2022-05-13
Attending: FAMILY MEDICINE
Payer: COMMERCIAL

## 2022-05-13 ENCOUNTER — OFFICE VISIT (OUTPATIENT)
Dept: FAMILY MEDICINE | Facility: OTHER | Age: 62
End: 2022-05-13
Attending: FAMILY MEDICINE
Payer: COMMERCIAL

## 2022-05-13 ENCOUNTER — APPOINTMENT (OUTPATIENT)
Dept: GENERAL RADIOLOGY | Facility: OTHER | Age: 62
End: 2022-05-13
Attending: FAMILY MEDICINE
Payer: COMMERCIAL

## 2022-05-13 VITALS
DIASTOLIC BLOOD PRESSURE: 72 MMHG | HEART RATE: 97 BPM | WEIGHT: 203 LBS | SYSTOLIC BLOOD PRESSURE: 110 MMHG | RESPIRATION RATE: 18 BRPM | TEMPERATURE: 98 F | BODY MASS INDEX: 28.31 KG/M2 | OXYGEN SATURATION: 96 %

## 2022-05-13 DIAGNOSIS — I10 BENIGN ESSENTIAL HYPERTENSION: ICD-10-CM

## 2022-05-13 DIAGNOSIS — M54.40 CHRONIC MIDLINE LOW BACK PAIN WITH SCIATICA, SCIATICA LATERALITY UNSPECIFIED: ICD-10-CM

## 2022-05-13 DIAGNOSIS — Z79.899 ENCOUNTER FOR LONG-TERM (CURRENT) USE OF MEDICATIONS: ICD-10-CM

## 2022-05-13 DIAGNOSIS — E78.2 MIXED HYPERLIPIDEMIA: ICD-10-CM

## 2022-05-13 DIAGNOSIS — F90.2 ATTENTION DEFICIT HYPERACTIVITY DISORDER (ADHD), COMBINED TYPE: ICD-10-CM

## 2022-05-13 DIAGNOSIS — Z01.818 PREOP GENERAL PHYSICAL EXAM: Primary | ICD-10-CM

## 2022-05-13 DIAGNOSIS — F41.1 GENERALIZED ANXIETY DISORDER: ICD-10-CM

## 2022-05-13 DIAGNOSIS — J45.40 MODERATE PERSISTENT ASTHMA WITHOUT COMPLICATION: ICD-10-CM

## 2022-05-13 DIAGNOSIS — Z72.0 TOBACCO USE: ICD-10-CM

## 2022-05-13 DIAGNOSIS — F31.0 BIPOLAR AFFECTIVE DISORDER, CURRENT EPISODE HYPOMANIC (H): ICD-10-CM

## 2022-05-13 DIAGNOSIS — G89.29 CHRONIC MIDLINE LOW BACK PAIN WITH SCIATICA, SCIATICA LATERALITY UNSPECIFIED: ICD-10-CM

## 2022-05-13 DIAGNOSIS — F11.90 CHRONIC, CONTINUOUS USE OF OPIOIDS: ICD-10-CM

## 2022-05-13 DIAGNOSIS — G40.909 SEIZURE DISORDER (H): ICD-10-CM

## 2022-05-13 DIAGNOSIS — F33.9 RECURRENT MAJOR DEPRESSIVE DISORDER, REMISSION STATUS UNSPECIFIED (H): ICD-10-CM

## 2022-05-13 LAB
ANION GAP SERPL CALCULATED.3IONS-SCNC: 6 MMOL/L (ref 3–14)
BUN SERPL-MCNC: 23 MG/DL (ref 7–30)
CALCIUM SERPL-MCNC: 9.2 MG/DL (ref 8.5–10.1)
CHLORIDE BLD-SCNC: 103 MMOL/L (ref 94–109)
CO2 SERPL-SCNC: 28 MMOL/L (ref 20–32)
CREAT SERPL-MCNC: 0.93 MG/DL (ref 0.66–1.25)
ERYTHROCYTE [DISTWIDTH] IN BLOOD BY AUTOMATED COUNT: 13.2 % (ref 10–15)
GFR SERPL CREATININE-BSD FRML MDRD: >90 ML/MIN/1.73M2
GLUCOSE BLD-MCNC: 115 MG/DL (ref 70–99)
HCT VFR BLD AUTO: 39.6 % (ref 40–53)
HGB BLD-MCNC: 13.4 G/DL (ref 13.3–17.7)
MCH RBC QN AUTO: 29.8 PG (ref 26.5–33)
MCHC RBC AUTO-ENTMCNC: 33.8 G/DL (ref 31.5–36.5)
MCV RBC AUTO: 88 FL (ref 78–100)
PLATELET # BLD AUTO: 212 10E3/UL (ref 150–450)
POTASSIUM BLD-SCNC: 4 MMOL/L (ref 3.4–5.3)
RBC # BLD AUTO: 4.49 10E6/UL (ref 4.4–5.9)
SODIUM SERPL-SCNC: 137 MMOL/L (ref 133–144)
WBC # BLD AUTO: 7 10E3/UL (ref 4–11)

## 2022-05-13 PROCEDURE — 93010 ELECTROCARDIOGRAM REPORT: CPT | Mod: 77 | Performed by: INTERNAL MEDICINE

## 2022-05-13 PROCEDURE — 80335 ANTIDEPRESSANT TRICYCLIC 1/2: CPT | Mod: ZL

## 2022-05-13 PROCEDURE — 93005 ELECTROCARDIOGRAM TRACING: CPT | Performed by: FAMILY MEDICINE

## 2022-05-13 PROCEDURE — 80183 DRUG SCRN QUANT OXCARBAZEPIN: CPT | Mod: ZL

## 2022-05-13 PROCEDURE — 80048 BASIC METABOLIC PNL TOTAL CA: CPT | Mod: ZL | Performed by: FAMILY MEDICINE

## 2022-05-13 PROCEDURE — G0463 HOSPITAL OUTPT CLINIC VISIT: HCPCS | Mod: 25

## 2022-05-13 PROCEDURE — 36415 COLL VENOUS BLD VENIPUNCTURE: CPT | Mod: ZL | Performed by: FAMILY MEDICINE

## 2022-05-13 PROCEDURE — 99214 OFFICE O/P EST MOD 30 MIN: CPT | Performed by: FAMILY MEDICINE

## 2022-05-13 PROCEDURE — 85027 COMPLETE CBC AUTOMATED: CPT | Mod: ZL | Performed by: FAMILY MEDICINE

## 2022-05-13 RX ORDER — CEPHALEXIN 500 MG/1
CAPSULE ORAL
COMMUNITY
Start: 2022-04-28 | End: 2022-06-24

## 2022-05-13 RX ORDER — MUPIROCIN 20 MG/G
OINTMENT TOPICAL
COMMUNITY
Start: 2022-04-28

## 2022-05-13 RX ORDER — CHLORHEXIDINE GLUCONATE 4% 4 MG/100ML
LIQUID TOPICAL
COMMUNITY
Start: 2022-04-28 | End: 2023-10-24

## 2022-05-13 ASSESSMENT — ENCOUNTER SYMPTOMS
ABDOMINAL PAIN: 0
SHORTNESS OF BREATH: 0
COUGH: 1
NAUSEA: 1
DIFFICULTY URINATING: 0
PALPITATIONS: 0
ABDOMINAL DISTENTION: 1
BACK PAIN: 1
CHILLS: 0
FEVER: 0
DYSPHORIC MOOD: 0

## 2022-05-13 ASSESSMENT — PAIN SCALES - GENERAL: PAINLEVEL: EXTREME PAIN (8)

## 2022-05-13 NOTE — NURSING NOTE
"Chief Complaint   Patient presents with     Pre-Op Exam       Initial /72   Pulse 97   Temp 98  F (36.7  C) (Tympanic)   Resp 18   Wt 92.1 kg (203 lb)   SpO2 96%   BMI 28.31 kg/m   Estimated body mass index is 28.31 kg/m  as calculated from the following:    Height as of 12/7/21: 1.803 m (5' 11\").    Weight as of this encounter: 92.1 kg (203 lb).  Medication Reconciliation: complete  Ramona Fuchs LPN  "

## 2022-05-16 LAB — 10OH-CARBAZEPINE SERPL-MCNC: 14 UG/ML

## 2022-05-17 ENCOUNTER — TRANSFERRED RECORDS (OUTPATIENT)
Dept: HEALTH INFORMATION MANAGEMENT | Facility: CLINIC | Age: 62
End: 2022-05-17

## 2022-05-17 LAB — NORTRIP SERPL-MCNC: 79 NG/ML

## 2022-05-23 DIAGNOSIS — Z71.6 TOBACCO ABUSE COUNSELING: ICD-10-CM

## 2022-05-23 DIAGNOSIS — G43.109 MIGRAINE WITH AURA AND WITHOUT STATUS MIGRAINOSUS, NOT INTRACTABLE: ICD-10-CM

## 2022-05-24 ENCOUNTER — OFFICE VISIT (OUTPATIENT)
Dept: INTERNAL MEDICINE | Facility: OTHER | Age: 62
End: 2022-05-24
Attending: INTERNAL MEDICINE
Payer: COMMERCIAL

## 2022-05-24 ENCOUNTER — ANCILLARY PROCEDURE (OUTPATIENT)
Dept: GENERAL RADIOLOGY | Facility: OTHER | Age: 62
End: 2022-05-24
Attending: INTERNAL MEDICINE
Payer: COMMERCIAL

## 2022-05-24 VITALS
OXYGEN SATURATION: 94 % | DIASTOLIC BLOOD PRESSURE: 80 MMHG | BODY MASS INDEX: 29.29 KG/M2 | TEMPERATURE: 96.7 F | HEART RATE: 81 BPM | WEIGHT: 210 LBS | SYSTOLIC BLOOD PRESSURE: 130 MMHG

## 2022-05-24 DIAGNOSIS — R09.1 PLEURISY: ICD-10-CM

## 2022-05-24 DIAGNOSIS — E11.9 TYPE 2 DIABETES, HBA1C GOAL < 7% (H): ICD-10-CM

## 2022-05-24 DIAGNOSIS — F41.1 GAD (GENERALIZED ANXIETY DISORDER): ICD-10-CM

## 2022-05-24 DIAGNOSIS — G89.29 OTHER CHRONIC PAIN: Primary | ICD-10-CM

## 2022-05-24 LAB
ALBUMIN SERPL-MCNC: 3.7 G/DL (ref 3.4–5)
ALP SERPL-CCNC: 129 U/L (ref 40–150)
ALT SERPL W P-5'-P-CCNC: 28 U/L (ref 0–70)
ANION GAP SERPL CALCULATED.3IONS-SCNC: 7 MMOL/L (ref 3–14)
AST SERPL W P-5'-P-CCNC: 16 U/L (ref 0–45)
BASOPHILS # BLD AUTO: 0 10E3/UL (ref 0–0.2)
BASOPHILS NFR BLD AUTO: 0 %
BILIRUB SERPL-MCNC: 0.3 MG/DL (ref 0.2–1.3)
BUN SERPL-MCNC: 28 MG/DL (ref 7–30)
CALCIUM SERPL-MCNC: 8.5 MG/DL (ref 8.5–10.1)
CHLORIDE BLD-SCNC: 108 MMOL/L (ref 94–109)
CO2 SERPL-SCNC: 25 MMOL/L (ref 20–32)
CREAT SERPL-MCNC: 0.78 MG/DL (ref 0.66–1.25)
EOSINOPHIL # BLD AUTO: 0.3 10E3/UL (ref 0–0.7)
EOSINOPHIL NFR BLD AUTO: 4 %
ERYTHROCYTE [DISTWIDTH] IN BLOOD BY AUTOMATED COUNT: 13.9 % (ref 10–15)
EST. AVERAGE GLUCOSE BLD GHB EST-MCNC: 126 MG/DL
GFR SERPL CREATININE-BSD FRML MDRD: >90 ML/MIN/1.73M2
GLUCOSE BLD-MCNC: 119 MG/DL (ref 70–99)
HBA1C MFR BLD: 6 % (ref 0–5.6)
HCT VFR BLD AUTO: 34 % (ref 40–53)
HGB BLD-MCNC: 11.3 G/DL (ref 13.3–17.7)
LYMPHOCYTES # BLD AUTO: 1.7 10E3/UL (ref 0.8–5.3)
LYMPHOCYTES NFR BLD AUTO: 23 %
MCH RBC QN AUTO: 30.1 PG (ref 26.5–33)
MCHC RBC AUTO-ENTMCNC: 33.2 G/DL (ref 31.5–36.5)
MCV RBC AUTO: 91 FL (ref 78–100)
MONOCYTES # BLD AUTO: 0.7 10E3/UL (ref 0–1.3)
MONOCYTES NFR BLD AUTO: 10 %
NEUTROPHILS # BLD AUTO: 4.8 10E3/UL (ref 1.6–8.3)
NEUTROPHILS NFR BLD AUTO: 64 %
PLATELET # BLD AUTO: 215 10E3/UL (ref 150–450)
POTASSIUM BLD-SCNC: 4.3 MMOL/L (ref 3.4–5.3)
PROT SERPL-MCNC: 6.7 G/DL (ref 6.8–8.8)
RBC # BLD AUTO: 3.75 10E6/UL (ref 4.4–5.9)
SODIUM SERPL-SCNC: 140 MMOL/L (ref 133–144)
WBC # BLD AUTO: 7.5 10E3/UL (ref 4–11)

## 2022-05-24 PROCEDURE — G0463 HOSPITAL OUTPT CLINIC VISIT: HCPCS

## 2022-05-24 PROCEDURE — 83036 HEMOGLOBIN GLYCOSYLATED A1C: CPT | Mod: ZL | Performed by: INTERNAL MEDICINE

## 2022-05-24 PROCEDURE — 80349 CANNABINOIDS NATURAL: CPT | Mod: ZL | Performed by: INTERNAL MEDICINE

## 2022-05-24 PROCEDURE — 80171 DRUG SCREEN QUANT GABAPENTIN: CPT | Mod: ZL | Performed by: INTERNAL MEDICINE

## 2022-05-24 PROCEDURE — 80326 AMPHETAMINES 5 OR MORE: CPT | Mod: ZL | Performed by: INTERNAL MEDICINE

## 2022-05-24 PROCEDURE — 80361 OPIATES 1 OR MORE: CPT | Mod: ZL | Performed by: INTERNAL MEDICINE

## 2022-05-24 PROCEDURE — 71046 X-RAY EXAM CHEST 2 VIEWS: CPT | Mod: TC,FY

## 2022-05-24 PROCEDURE — 80307 DRUG TEST PRSMV CHEM ANLYZR: CPT | Mod: ZL | Performed by: INTERNAL MEDICINE

## 2022-05-24 PROCEDURE — 36415 COLL VENOUS BLD VENIPUNCTURE: CPT | Mod: ZL | Performed by: INTERNAL MEDICINE

## 2022-05-24 PROCEDURE — 99205 OFFICE O/P NEW HI 60 MIN: CPT | Performed by: INTERNAL MEDICINE

## 2022-05-24 PROCEDURE — 85025 COMPLETE CBC W/AUTO DIFF WBC: CPT | Mod: ZL | Performed by: INTERNAL MEDICINE

## 2022-05-24 PROCEDURE — 80365 DRUG SCREENING OXYCODONE: CPT | Mod: ZL | Performed by: INTERNAL MEDICINE

## 2022-05-24 PROCEDURE — 82374 ASSAY BLOOD CARBON DIOXIDE: CPT | Mod: ZL | Performed by: INTERNAL MEDICINE

## 2022-05-24 ASSESSMENT — ANXIETY QUESTIONNAIRES
6. BECOMING EASILY ANNOYED OR IRRITABLE: SEVERAL DAYS
7. FEELING AFRAID AS IF SOMETHING AWFUL MIGHT HAPPEN: NOT AT ALL
3. WORRYING TOO MUCH ABOUT DIFFERENT THINGS: NOT AT ALL
GAD7 TOTAL SCORE: 1
GAD7 TOTAL SCORE: 1
2. NOT BEING ABLE TO STOP OR CONTROL WORRYING: NOT AT ALL
5. BEING SO RESTLESS THAT IT IS HARD TO SIT STILL: NOT AT ALL
4. TROUBLE RELAXING: NOT AT ALL
1. FEELING NERVOUS, ANXIOUS, OR ON EDGE: NOT AT ALL

## 2022-05-24 ASSESSMENT — PAIN SCALES - GENERAL: PAINLEVEL: SEVERE PAIN (6)

## 2022-05-24 ASSESSMENT — PATIENT HEALTH QUESTIONNAIRE - PHQ9: SUM OF ALL RESPONSES TO PHQ QUESTIONS 1-9: 5

## 2022-05-24 NOTE — TELEPHONE ENCOUNTER
jodi      Last Written Prescription Date:  4/11/22  Last Fill Quantity: 110,   # refills: 1  Last Office Visit: 5/24/22  Future Office visit:    Next 5 appointments (look out 90 days)    Jul 20, 2022  1:40 PM  (Arrive by 1:25 PM)  Return Visit with Laquita Fernandez MD  [facility] [place] ([facility] [place] [facility] ) [address] [place] [id number] [phone]           Routing refill request to provider for review/approval because:

## 2022-05-24 NOTE — LETTER
Opioid / Opioid Plus Controlled Substance Agreement    This is an agreement between you and your provider about the safe and appropriate use of controlled substance/opioids prescribed by your care team. Controlled substances are medicines that can cause physical and mental dependence (abuse).    There are strict laws about having and using these medicines. We here at [facility] are committing to working with you in your efforts to get better. To support you in this work, we ll help you schedule regular office appointments for medicine refills. If we must cancel or change your appointment for any reason, we ll make sure you have enough medicine to last until your next appointment.     As a Provider, I will:    Listen carefully to your concerns and treat you with respect.     Recommend a treatment plan that I believe is in your best interest. This plan may involve therapies other than opioid pain medication.     Talk with you often about the possible benefits, and the risk of harm of any medicine that we prescribe for you.     Provide a plan on how to taper (discontinue or go off) using this medicine if the decision is made to stop its use.    As a Patient, I understand that opioid(s):     Are a controlled substance prescribed by my care team to help me function or work and manage my condition(s).     Are strong medicines and can cause serious side effects such as:    Drowsiness, which can seriously affect my driving ability    A lower breathing rate, enough to cause death    Harm to my thinking ability     Depression     Abuse of and addiction to this medicine    Need to be taken exactly as prescribed. Combining opioids with certain medicines or chemicals (such as illegal drugs, sedatives, sleeping pills, and benzodiazepines) can be dangerous or even fatal. If I stop opioids suddenly, I may have severe withdrawal symptoms.    Do not work for all types of pain nor for all patients. If they re not helpful, I may  be asked to stop them.        The risks, benefits and side effects of these medicine(s) were explained to me. I agree that:  1. I will take part in other treatments as advised by my care team. This may be psychiatry or counseling, physical therapy, behavioral therapy, group treatment or a referral to a specialist.     2. I will keep all my appointments. I understand that this is part of the monitoring of opioids. My care team may require an office visit for EVERY opioid/controlled substance refill. If I miss appointments or don t follow instructions, my care team may stop my medicine.    3. I will take my medicines as prescribed. I will not change the dose or schedule unless my care team tells me to. There will be no refills if I run out early.     4. I may be asked to come to the clinic and complete a urine drug test or complete a pill count at any time. If I don t give a urine sample or participate in a pill count, the care team may stop my medicine.    5. I will only receive prescriptions from this clinic for chronic pain. If I am treated by another provider for acute pain issues, I will tell them that I am taking opioid pain medication for chronic pain and that I have a treatment agreement with this provider. I will inform my [facility] care team within one business day if I am given a prescription for any pain medication by another healthcare provider. My [facility] care team can contact other providers and pharmacists about my use of any medicines.    6. It is up to me to make sure that I don t run out of my medicines on weekends or holidays. If my care team is willing to refill my opioid prescription without a visit, I must request refills only during office hours. Refills may take up to 3 business days to process. I will use one pharmacy to fill all my opioid and other controlled substance prescriptions. I will notify the clinic about any changes to my insurance or medication  availability.    7. I am responsible for my prescriptions. If the medicine/prescription is lost, stolen or destroyed, it will not be replaced. I also agree not to share controlled substance medicines with anyone.    8. I am aware I should not use any illegal or recreational drugs. I agree not to drink alcohol unless my care team says I can.       9. If I enroll in the [place] [facility] program, I will tell my care team prior to my next refill.     10. I will tell my care team right away if I become pregnant, have a new medical problem treated outside of my regular clinic, or have a change in my medications.    11. I understand that this medicine can affect my thinking, judgment and reaction time. Alcohol and drugs affect the brain and body, which can affect the safety of my driving. Being under the influence of alcohol or drugs can affect my decision-making, behaviors, personal safety, and the safety of others. Driving while impaired (DWI) can occur if a person is driving, operating, or in physical control of a car, motorcycle, boat, snowmobile, ATV, motorbike, off-road vehicle, or any other motor vehicle ([place] Statute 169A.20). I understand the risk if I choose to drive or operate any vehicle or machinery.    I understand that if I do not follow any of the conditions above, my prescriptions or treatment may be stopped or changed.          Opioids  What You Need to Know    What are opioids?   Opioids are pain medicines that must be prescribed by a doctor. They are also known as narcotics.     Examples are:   1. morphine (MS Contin, Rosa)  2. oxycodone (Oxycontin)  3. oxycodone and acetaminophen (Percocet)  4. hydrocodone and acetaminophen (Vicodin, Norco)   5. fentanyl patch (Duragesic)   6. hydromorphone (Dilaudid)   7. methadone  8. codeine (Tylenol #3)     What do opioids do well?   Opioids are best for severe short-term pain such as after a surgery or injury. They may work well for cancer pain. They may  help some people with long-lasting (chronic) pain.     What do opioids NOT do well?   Opioids never get rid of pain entirely, and they don t work well for most patients with chronic pain. Opioids don t reduce swelling, one of the causes of pain.                                    Other ways to manage chronic pain and improve function include:       Treat the health problem that may be causing pain    Anti-inflammation medicines, which reduce swelling and tenderness, such as ibuprofen (Advil, Motrin) or naproxen (Aleve)    Acetaminophen (Tylenol)    Antidepressants and anti-seizure medicines, especially for nerve pain    Topical treatments such as patches or creams    Injections or nerve blocks    Chiropractic or osteopathic treatment    Acupuncture, massage, deep breathing, meditation, visual imagery, aromatherapy    Use heat or ice at the pain site    Physical therapy     Exercise    Stop smoking    Take part in therapy       Risks and side effects     Talk to your doctor before you start or decide to keep taking opioids. Possible side effects include:      Lowering your breathing rate enough to cause death    Overdose, including death, especially if taking higher than prescribed doses    Worse depression symptoms; less pleasure in things you usually enjoy    Feeling tired or sluggish    Slower thoughts or cloudy thinking    Being more sensitive to pain over time; pain is harder to control    Trouble sleeping or restless sleep    Changes in hormone levels (for example, less testosterone)    Changes in sex drive or ability to have sex    Constipation    Unsafe driving    Itching and sweating    Dizziness    Nausea, throwing up and dry mouth    What else should I know about opioids?    Opioids may lead to dependence, tolerance, or addiction.      Dependence means that if you stop or reduce the medicine too quickly, you will have withdrawal symptoms. These include loose poop (diarrhea), jitters, flu-like symptoms,  nervousness and tremors. Dependence is not the same as addiction.                       Tolerance means needing higher doses over time to get the same effect. This may increase the chance of serious side effects.      Addiction is when people improperly use a substance that harms their body, their mind or their relations with others. Use of opiates can cause a relapse of addiction if you have a history of drug or alcohol abuse.      People who have used opioids for a long time may have a lower quality of life, worse depression, higher levels of pain and more visits to doctors.    You can overdose on opioids. Take these steps to lower your risk of overdose:    1. Recognize the signs:  Signs of overdose include decrease or loss of consciousness (blackout), slowed breathing, trouble waking up and blue lips. If someone is worried about overdose, they should call 911.    2. Talk to your doctor about Narcan (naloxone).   If you are at risk for overdose, you may be given a prescription for Narcan. This medicine very quickly reverses the effects of opioids.   If you overdose, a friend or family member can give you Narcan while waiting for the ambulance. They need to know the signs of overdose and how to give Narcan.     3. Don't use alcohol or street drugs.   Taking them with opioids can cause death.    4. Do not take any of these medicines unless your doctor says it s OK. Taking these with opioids can cause death:    Benzodiazepines, such as lorazepam (Ativan), alprazolam (Xanax) or diazepam (Valium)    Muscle relaxers, such as cyclobenzaprine (Flexeril)    Sleeping pills like zolpidem (Ambien)     Other opioids      How to keep you and other people safe while taking opioids:    1. Never share your opioids with others.  Opioid medicines are regulated by the Drug Enforcement Agency ([employer]). Selling or sharing medications is a criminal act.    2. Be sure to store opioids in a secure place, locked up if possible. Young children  can easily swallow them and overdose.    3. When you are traveling with your medicines, keep them in the original bottles. If you use a pill box, be sure you also carry a copy of your medicine list from your clinic or pharmacy.    4. Safe disposal of opioids    Most pharmacies have places to get rid of medicine, called disposal kiosks. Medicine disposal options are also available in every [facility]. Search your county and  medication disposal  to find more options. You can find more details at:  [email or web address]     I agree that my provider, clinic care team, and pharmacy may work with any city, state or federal law enforcement agency that investigates the misuse, sale, or other diversion of my controlled medicine. I will allow my provider to discuss my care with, or share a copy of, this agreement with any other treating provider, pharmacy or emergency room where I receive care.    I have read this agreement and have asked questions about anything I did not understand.    _______________________________________________________  Patient Signature - Joshua Barron _____________________                   Date     _______________________________________________________  Provider Signature - Dario Marinelli,    _____________________                   Date     _______________________________________________________  Witness Signature (required if provider not present while patient signing)   _____________________                   Date

## 2022-05-24 NOTE — PROGRESS NOTES
"  Assessment & Plan   Problem List Items Addressed This Visit    None     Visit Diagnoses     Other chronic pain    -  Primary    Relevant Orders    Drug Confirmation Panel Urine with Creat    Drug Screen Bld or Serum ([facility])    Drug Screen Complete Med Report, Whole Blood    Type 2 diabetes, HbA1c goal < 7% (H)        Relevant Orders    Comprehensive metabolic panel (BMP + Alb, Alk Phos, ALT, AST, Total. Bili, TP)    CBC with platelets and differential    Hemoglobin A1c    Drug Screen Complete Med Report, Whole Blood    Pleurisy        Relevant Orders    XR CHEST 2 VW (Clinic Performed)    Drug Screen Complete Med Report, Whole Blood             60 minutes spent on the date of the encounter doing chart review, review of test results, interpretation of tests, patient visit and documentation        BMI:   Estimated body mass index is 29.29 kg/m  as calculated from the following:    Height as of 12/7/21: 1.803 m (5' 11\").    Weight as of this encounter: 95.3 kg (210 lb).     Dario Marinelli, [facility]    Marce Newman is a 61 year old who presents for the following health issues     HPI     Trent presents today for establishment of care.  He has chronic back pain and recently had a pain pump placed but states it is at a low dose.  It is noted that he has tested positive for THC in the past but is on Marinol.    He had various complaints about previous providers today.  He asks if he even should have received a pain pump.  He was supposed to see the doctor that placed the pump today but canceled due to this appointment.      Depression and Anxiety Follow-Up    How are you doing with your depression since your last visit? No change    How are you doing with your anxiety since your last visit?  No change    Are you having other symptoms that might be associated with depression or anxiety? No    Have you had a significant life event? No     Do you have any concerns with your use of " alcohol or other drugs? No    Social History     Tobacco Use     Smoking status: Former Smoker     Packs/day: 0.00     Years: 30.00     Pack years: 0.00     Quit date: 2007     Years since quittin.8     Smokeless tobacco: Current User     Types: Snuff   Vaping Use     Vaping Use: Never used   Substance Use Topics     Alcohol use: Yes     Comment: daily     Drug use: No     PHQ 10/26/2021 2021 2022   PHQ-9 Total Score 16 15 5   Q9: Thoughts of better off dead/self-harm past 2 weeks Not at all Not at all Not at all   F/U: Thoughts of suicide or self-harm - - -   F/U: Safety concerns - - -     DAYANA-7 SCORE 10/26/2021 2021 2022   Total Score 1 6 1     Last PHQ-9 2022   1.  Little interest or pleasure in doing things 1   2.  Feeling down, depressed, or hopeless 0   3.  Trouble falling or staying asleep, or sleeping too much 1   4.  Feeling tired or having little energy 1   5.  Poor appetite or overeating 1   6.  Feeling bad about yourself 0   7.  Trouble concentrating 1   8.  Moving slowly or restless 0   Q9: Thoughts of better off dead/self-harm past 2 weeks 0   PHQ-9 Total Score 5   Difficulty at work, home, or with people -   In the past two weeks have you had thoughts of suicide or self harm? -   Do you have concerns about your personal safety or the safety of others? -     DAYANA-7  2022   1. Feeling nervous, anxious, or on edge 0   2. Not being able to stop or control worrying 0   3. Worrying too much about different things 0   4. Trouble relaxing 0   5. Being so restless that it is hard to sit still 0   6. Becoming easily annoyed or irritable 1   7. Feeling afraid, as if something awful might happen 0   DAYANA-7 Total Score 1   If you checked any problems, how difficult have they made it for you to do your work, take care of things at home, or get along with other people? -         Pain History:  When did you first notice your pain? - Chronic Pain   Where in your body do you have  pain? Lower back  Are you seeing anyone else for your pain? Yes - [facility]    PHQ-9 SCORE 10/26/2021 11/30/2021 5/24/2022   PHQ-9 Total Score - - -   PHQ-9 Total Score 16 15 5       DAYANA-7 SCORE 10/26/2021 11/30/2021 5/24/2022   Total Score 1 6 1               Chronic Pain Follow Up:    Location of pain: lower back   Analgesia/pain control:    - Recent changes:  Pain worsening     - Overall control: Tolerable with discomfort    - Current treatments: pain pump, morphine, norco   Adherence:     - Do you ever take more pain medicine than prescribed? No    - When did you take your last dose of pain medicine?  This morning    Adverse effects: No   PDMP Review       Value Time User    State PDMP site checked  Yes 5/12/2022  5:45 PM Lissy Kraft MD        Last CSA Agreement:   CSA -- Patient Level:    Controlled Substance Agreement - Opioid - Scan on 3/9/2021 11:32 AM: controlled substance agreement  Controlled Substance Agreement - Non - Opioid - Scan on 7/15/2019 10:03 AM: NON-OPIOID CONTROLLED SUBSTANCE AGREEMENT       Last UDS: 11/30/2021      Review of Systems   Constitutional, HEENT, cardiovascular, pulmonary, gi and gu systems are negative, except as otherwise noted.      Objective    /80 (BP Location: Right arm, Patient Position: Chair, Cuff Size: Adult Regular)   Pulse 81   Temp (!) 96.7  F (35.9  C) (Tympanic)   Wt 95.3 kg (210 lb)   SpO2 94%   BMI 29.29 kg/m    Body mass index is 29.29 kg/m .  Physical Exam   GENERAL:alert and no distress  RESP: lungs clear to auscultation - no rales, rhonchi or wheezes  CV: regular rate and rhythm, normal S1 S2, no S3 or S4, no murmur, click or rub, no peripheral edema and peripheral pulses strong  ABDOMEN: soft, nontender, no hepatosplenomegaly, no masses and bowel sounds normal  MS: no gross musculoskeletal defects noted, no edema  SKIN: Left low back incisions healing well.  No erythema or drainage    NEURO: Normal strength and tone, mentation  intact and speech normal  PSYCH: mentation appears normal, affect normal/bright    Office Visit on 05/13/2022   Component Date Value Ref Range Status     Sodium 05/13/2022 137  133 - 144 mmol/L Final     Potassium 05/13/2022 4.0  3.4 - 5.3 mmol/L Final     Chloride 05/13/2022 103  94 - 109 mmol/L Final     Carbon Dioxide (CO2) 05/13/2022 28  20 - 32 mmol/L Final     Anion Gap 05/13/2022 6  3 - 14 mmol/L Final     Urea Nitrogen 05/13/2022 23  7 - 30 mg/dL Final     Creatinine 05/13/2022 0.93  0.66 - 1.25 mg/dL Final     Calcium 05/13/2022 9.2  8.5 - 10.1 mg/dL Final     Glucose 05/13/2022 115 (A) 70 - 99 mg/dL Final     GFR Estimate 05/13/2022 >90  >60 mL/min/1.73m2 Final    Effective December 21, 2021 eGFRcr in adults is calculated using the 2021 CKD-EPI creatinine equation which includes age and gender (Ady et al., [facility], DOI: 10.1056/CDGVnz9311422)     WBC Count 05/13/2022 7.0  4.0 - 11.0 10e3/uL Final     RBC Count 05/13/2022 4.49  4.40 - 5.90 10e6/uL Final     Hemoglobin 05/13/2022 13.4  13.3 - 17.7 g/dL Final     Hematocrit 05/13/2022 39.6 (A) 40.0 - 53.0 % Final     MCV 05/13/2022 88  78 - 100 fL Final     MCH 05/13/2022 29.8  26.5 - 33.0 pg Final     MCHC 05/13/2022 33.8  31.5 - 36.5 g/dL Final     RDW 05/13/2022 13.2  10.0 - 15.0 % Final     Platelet Count 05/13/2022 212  150 - 450 10e3/uL Final     Results for orders placed or performed in visit on 05/24/22   CBC with platelets and differential     Status: None (In process)    Narrative    The following orders were created for panel order CBC with platelets and differential.  Procedure                               Abnormality         Status                     ---------                               -----------         ------                     CBC with platelets and d...[[id number]]                      In process                   Please view results for these tests on the individual orders.     Results for orders placed or performed in visit on  05/24/22 (from the past 24 hour(s))   CBC with platelets and differential    Narrative    The following orders were created for panel order CBC with platelets and differential.  Procedure                               Abnormality         Status                     ---------                               -----------         ------                     CBC with platelets and d...[[id number]]                      In process                   Please view results for these tests on the individual orders.     *Note: Due to a large number of results and/or encounters for the requested time period, some results have not been displayed. A complete set of results can be found in Results Review.

## 2022-05-24 NOTE — NURSING NOTE
"Chief Complaint   Patient presents with     Establish Care       Initial /80 (BP Location: Right arm, Patient Position: Chair, Cuff Size: Adult Regular)   Pulse 81   Temp (!) 96.7  F (35.9  C) (Tympanic)   Wt 95.3 kg (210 lb)   SpO2 94%   BMI 29.29 kg/m   Estimated body mass index is 29.29 kg/m  as calculated from the following:    Height as of 12/7/21: 1.803 m (5' 11\").    Weight as of this encounter: 95.3 kg (210 lb).  Medication Reconciliation: complete  DEBBIE DUNN LPN  "

## 2022-05-25 RX ORDER — SUMATRIPTAN 50 MG/1
TABLET, FILM COATED ORAL
Qty: 9 TABLET | Refills: 0 | Status: SHIPPED | OUTPATIENT
Start: 2022-05-25 | End: 2022-06-23

## 2022-05-25 RX ORDER — HYDROXYZINE PAMOATE 50 MG/1
CAPSULE ORAL
Qty: 120 CAPSULE | Refills: 0 | Status: SHIPPED | OUTPATIENT
Start: 2022-05-25 | End: 2022-06-23

## 2022-05-25 NOTE — TELEPHONE ENCOUNTER
Hydroxyzine      Last Written Prescription Date:  05/05/2022  Last Fill Quantity: 120,   # refills: 0  Last Office Visit: 05/24/2022

## 2022-05-25 NOTE — TELEPHONE ENCOUNTER
Imitrex       Last Written Prescription Date:  5-3-22  Last Fill Quantity: 9,   # refills: 0  Last Office Visit: 5-3-22  Future Office visit:    Next 5 appointments (look out 90 days)    Jul 20, 2022  1:40 PM  (Arrive by 1:25 PM)  Return Visit with Laquita Fernandez MD  [facility] ([facility] ) [address] [place] [id number] [phone]

## 2022-06-01 DIAGNOSIS — R11.0 NAUSEA: ICD-10-CM

## 2022-06-01 DIAGNOSIS — G89.29 CHRONIC LEFT-SIDED LOW BACK PAIN WITHOUT SCIATICA: ICD-10-CM

## 2022-06-01 DIAGNOSIS — J31.0 CHRONIC RHINITIS: ICD-10-CM

## 2022-06-01 DIAGNOSIS — M62.830 BACK MUSCLE SPASM: ICD-10-CM

## 2022-06-01 DIAGNOSIS — M51.379 DEGENERATION OF LUMBAR OR LUMBOSACRAL INTERVERTEBRAL DISC: ICD-10-CM

## 2022-06-01 DIAGNOSIS — F11.90 CHRONIC, CONTINUOUS USE OF OPIOIDS: ICD-10-CM

## 2022-06-01 DIAGNOSIS — R39.12 BENIGN PROSTATIC HYPERPLASIA WITH WEAK URINARY STREAM: ICD-10-CM

## 2022-06-01 DIAGNOSIS — G47.00 PERSISTENT INSOMNIA: ICD-10-CM

## 2022-06-01 DIAGNOSIS — I10 ESSENTIAL HYPERTENSION: ICD-10-CM

## 2022-06-01 DIAGNOSIS — R52 PAIN: ICD-10-CM

## 2022-06-01 DIAGNOSIS — M25.542 ARTHRALGIA OF BOTH HANDS: ICD-10-CM

## 2022-06-01 DIAGNOSIS — N40.1 BENIGN PROSTATIC HYPERPLASIA WITH WEAK URINARY STREAM: ICD-10-CM

## 2022-06-01 DIAGNOSIS — J45.40 MODERATE PERSISTENT ASTHMA WITHOUT COMPLICATION: ICD-10-CM

## 2022-06-01 DIAGNOSIS — M25.541 ARTHRALGIA OF BOTH HANDS: ICD-10-CM

## 2022-06-01 DIAGNOSIS — F31.0 BIPOLAR AFFECTIVE DISORDER, CURRENT EPISODE HYPOMANIC (H): ICD-10-CM

## 2022-06-01 DIAGNOSIS — M54.41 CHRONIC BILATERAL LOW BACK PAIN WITH BILATERAL SCIATICA: ICD-10-CM

## 2022-06-01 DIAGNOSIS — Z00.00 HEALTHCARE MAINTENANCE: ICD-10-CM

## 2022-06-01 DIAGNOSIS — M54.50 CHRONIC LEFT-SIDED LOW BACK PAIN WITHOUT SCIATICA: ICD-10-CM

## 2022-06-01 DIAGNOSIS — M79.7 FIBROMYALGIA: ICD-10-CM

## 2022-06-01 DIAGNOSIS — R05.9 COUGH: ICD-10-CM

## 2022-06-01 DIAGNOSIS — G89.4 CHRONIC PAIN SYNDROME: ICD-10-CM

## 2022-06-01 DIAGNOSIS — I10 ESSENTIAL HYPERTENSION, BENIGN: ICD-10-CM

## 2022-06-01 DIAGNOSIS — G89.29 CHRONIC BILATERAL LOW BACK PAIN WITH BILATERAL SCIATICA: ICD-10-CM

## 2022-06-01 DIAGNOSIS — M54.42 CHRONIC BILATERAL LOW BACK PAIN WITH BILATERAL SCIATICA: ICD-10-CM

## 2022-06-01 DIAGNOSIS — F90.2 ADHD (ATTENTION DEFICIT HYPERACTIVITY DISORDER), COMBINED TYPE: ICD-10-CM

## 2022-06-01 RX ORDER — ASPIRIN 81 MG/1
TABLET, CHEWABLE ORAL
Qty: 30 TABLET | Refills: 0 | Status: SHIPPED | OUTPATIENT
Start: 2022-06-01 | End: 2022-07-01

## 2022-06-01 RX ORDER — TRAZODONE HYDROCHLORIDE 100 MG/1
TABLET ORAL
Qty: 30 TABLET | Refills: 0 | Status: SHIPPED | OUTPATIENT
Start: 2022-06-01 | End: 2022-07-01

## 2022-06-01 RX ORDER — FLUTICASONE PROPIONATE 50 MCG
SPRAY, SUSPENSION (ML) NASAL
Qty: 16 G | Refills: 0 | Status: SHIPPED | OUTPATIENT
Start: 2022-06-01 | End: 2022-07-05

## 2022-06-01 RX ORDER — ALBUTEROL SULFATE 90 UG/1
AEROSOL, METERED RESPIRATORY (INHALATION)
Qty: 18 G | Refills: 0 | Status: SHIPPED | OUTPATIENT
Start: 2022-06-01 | End: 2022-07-01

## 2022-06-01 RX ORDER — OXCARBAZEPINE 600 MG/1
TABLET, FILM COATED ORAL
Qty: 60 TABLET | Refills: 0 | Status: SHIPPED | OUTPATIENT
Start: 2022-06-01 | End: 2022-07-01

## 2022-06-01 RX ORDER — LISDEXAMFETAMINE DIMESYLATE 70 MG/1
CAPSULE ORAL
Qty: 30 CAPSULE | Refills: 0 | Status: SHIPPED | OUTPATIENT
Start: 2022-06-01 | End: 2022-07-01

## 2022-06-01 RX ORDER — OXYCODONE HYDROCHLORIDE 5 MG/1
TABLET ORAL
COMMUNITY
Start: 2022-05-18 | End: 2022-06-24

## 2022-06-01 RX ORDER — NORTRIPTYLINE HYDROCHLORIDE 50 MG/1
CAPSULE ORAL
Qty: 120 CAPSULE | Refills: 0 | Status: SHIPPED | OUTPATIENT
Start: 2022-06-01 | End: 2022-10-12

## 2022-06-01 RX ORDER — IBUPROFEN 600 MG/1
TABLET, FILM COATED ORAL
Qty: 120 TABLET | Refills: 0 | Status: SHIPPED | OUTPATIENT
Start: 2022-06-01 | End: 2022-07-01

## 2022-06-01 RX ORDER — DRONABINOL 2.5 MG/1
CAPSULE ORAL
Qty: 60 CAPSULE | Refills: 0 | Status: SHIPPED | OUTPATIENT
Start: 2022-06-01 | End: 2022-07-01

## 2022-06-01 RX ORDER — GUAIFENESIN AND DEXTROMETHORPHAN HYDROBROMIDE 600; 30 MG/1; MG/1
TABLET, EXTENDED RELEASE ORAL
Qty: 60 TABLET | Refills: 0 | Status: SHIPPED | OUTPATIENT
Start: 2022-06-01 | End: 2022-07-01

## 2022-06-01 NOTE — TELEPHONE ENCOUNTER
Albuterol inhaler      Last Written Prescription Date:  9/15/21  Last Fill Quantity: 18g,   # refills: 4  Last Office Visit: 5/24/22  Future Office visit: 6/24/22 NEW [facility]    Next 5 appointments (look out 90 days)    Jul 20, 2022  1:40 PM  (Arrive by 1:25 PM)  Return Visit with Laquita Fernandez MD  [facility] ([facility] ) [address] [place] [phone]  [phone]         Aspirin      Last Written Prescription Date:  5/5/22  Last Fill Quantity: 30,   # refills: 0  Last Office Visit: 5/24/22  Future Office visit:      Mucinex DM      Last Written Prescription Date:  5/5/22  Last Fill Quantity: 60,   # refills: 0  Last Office Visit: 5/24/22  Future Office visit:      Marinol      Last Written Prescription Date:  5/5/22  Last Fill Quantity: 60,   # refills: 0  Last Office Visit: 5/24/22  Future Office visit:      Norco      Last Written Prescription Date:  5/5/22  Last Fill Quantity: 60,   # refills: 0  Last Office Visit: 5/24/22  Future Office visit:      Ibuprofen       Last Written Prescription Date:  5/5/22  Last Fill Quantity: 120,   # refills: 0  Last Office Visit: 5/24/22  Future Office visit:      Morphine      Last Written Prescription Date:  5/5/22  Last Fill Quantity: 90,   # refills: 0  Last Office Visit: 5/24/22  Future Office visit:      Nortriptyline      Last Written Prescription Date:  5/5/22  Last Fill Quantity: 120,   # refills: 0  Last Office Visit: 5/24/22  Future Office visit:

## 2022-06-01 NOTE — TELEPHONE ENCOUNTER
Vyvanse      Last Written Prescription Date:  5/4/22  Last Fill Quantity: 30,   # refills: 0  Last Office Visit: 5/24/22  Future Office visit:  6/24/22 NEW EST  Next 5 appointments (look out 90 days)    Jul 20, 2022  1:40 PM  (Arrive by 1:25 PM)  Return Visit with Laquita Fernandez MD  [facility] ([facility] ) [address] [place], [place] [address] [address]  [phone]         Oxycarbazepine      Last Written Prescription Date:  4/7/22  Last Fill Quantity: 60,   # refills: 1  Last Office Visit: 5/24/22  Future Office visit:  6/24/22  Next 5 appointments (look out 90 days)    Jul 20, 2022  1:40 PM  (Arrive by 1:25 PM)  Return Visit with Laquita Fernandez MD  [facility] ([facility] ) [address] [place] [address]  [phone]         Trazadone      Last Written Prescription Date:  10/26/21  Last Fill Quantity: 30,   # refills: 6  Last Office Visit: 5/24/22  Future Office visit:    Next 5 appointments (look out 90 days)    Jul 20, 2022  1:40 PM  (Arrive by 1:25 PM)  Return Visit with Laquita Fernandez MD  [facility] ([facility] ) [address] [place] [address] [address] [phone]

## 2022-06-01 NOTE — TELEPHONE ENCOUNTER
lov 05/24/22    fluticasone (FLONASE) 50 MCG/ACT nasal spray 16 g 3 2/4/2022       angela appt 06/24/22

## 2022-06-01 NOTE — TELEPHONE ENCOUNTER
Acetaminophen      Last Written Prescription Date:  5/5/22  Last Fill Quantity: 200,   # refills: 0  Last Office Visit: 5/24/22  Future Office visit:    Next 5 appointments (look out 90 days)    Jul 20, 2022  1:40 PM  (Arrive by 1:25 PM)  Return Visit with Laquita Fernandez MD  [facility] ([facility] ) [address] [place] [address]  [place] [address]  [phone]           Diclofenac      Last Written Prescription Date:  5/5/22  Last Fill Quantity: 60,   # refills: 0  Last Office Visit: 5/24/22  Future Office visit:    Next 5 appointments (look out 90 days)    Jul 20, 2022  1:40 PM  (Arrive by 1:25 PM)  Return Visit with Laquita Fernandez MD  [facility] ([facility] ) [address] [place], [place] [address] [address]  [phone]         Methocarbamol      Last Written Prescription Date:  5/5/22  Last Fill Quantity: 90,   # refills: 0  Last Office Visit: 5/24/22  Future Office visit:    Next 5 appointments (look out 90 days)    Jul 20, 2022  1:40 PM  (Arrive by 1:25 PM)  Return Visit with Laquita Fernandez MD  [facility] ([facility] ) [address] [place] [phone]  [phone]

## 2022-06-02 ENCOUNTER — TELEPHONE (OUTPATIENT)
Dept: INTERNAL MEDICINE | Facility: OTHER | Age: 62
End: 2022-06-02
Payer: COMMERCIAL

## 2022-06-02 ENCOUNTER — TRANSFERRED RECORDS (OUTPATIENT)
Dept: HEALTH INFORMATION MANAGEMENT | Facility: CLINIC | Age: 62
End: 2022-06-02

## 2022-06-02 LAB
AMPHET SERPLBLD CFM-MCNC: 80 NG/ML
DIETHYLPROPION SERPLBLD CFM-MCNC: NEGATIVE NG/ML
EPHEDRIN SERPLBLD CFM-MCNC: NEGATIVE NG/ML
MDA SERPL-MCNC: NEGATIVE NG/ML
MDEA SERPL-MCNC: NEGATIVE NG/ML
MDMA SERPL-MCNC: NEGATIVE NG/ML
METHAMPHET SERPL-MCNC: NEGATIVE NG/ML
PHENDIMETRAZINE SERPLBLD CFM-MCNC: NEGATIVE NG/ML
PHENTERMINE SERPLBLD CFM-MCNC: NEGATIVE NG/ML
PPA SERPLBLD CFM-MCNC: NEGATIVE NG/ML
PSEUDOEPHEDRINE SERPLBLD CFM-MCNC: NEGATIVE NG/ML
SYMPATHOMIMETICS SERPLBLD QL CFM: POSITIVE

## 2022-06-02 RX ORDER — METOPROLOL SUCCINATE 50 MG/1
TABLET, EXTENDED RELEASE ORAL
Qty: 90 TABLET | Refills: 0 | Status: SHIPPED | OUTPATIENT
Start: 2022-06-02 | End: 2023-12-14

## 2022-06-02 RX ORDER — LOSARTAN POTASSIUM 50 MG/1
TABLET ORAL
Qty: 90 TABLET | Refills: 0 | Status: SHIPPED | OUTPATIENT
Start: 2022-06-02

## 2022-06-02 RX ORDER — ACETAMINOPHEN 325 MG/1
TABLET ORAL
Qty: 200 TABLET | Refills: 0 | Status: SHIPPED | OUTPATIENT
Start: 2022-06-02 | End: 2022-07-06

## 2022-06-02 RX ORDER — MONTELUKAST SODIUM 10 MG/1
TABLET ORAL
Qty: 90 TABLET | Refills: 0 | Status: SHIPPED | OUTPATIENT
Start: 2022-06-02

## 2022-06-02 RX ORDER — METHOCARBAMOL 500 MG/1
TABLET, FILM COATED ORAL
Qty: 90 TABLET | Refills: 0 | Status: SHIPPED | OUTPATIENT
Start: 2022-06-02 | End: 2022-06-24

## 2022-06-02 RX ORDER — TAMSULOSIN HYDROCHLORIDE 0.4 MG/1
CAPSULE ORAL
Qty: 90 CAPSULE | Refills: 0 | Status: SHIPPED | OUTPATIENT
Start: 2022-06-02

## 2022-06-02 NOTE — TELEPHONE ENCOUNTER
Provider CHARLIE Caldwell NP from [facility] called. Per report patient in visit now and they will be working with patient and start pain pump today. Provider requesting the PCP to taper down oral pain medication.  [facility] will be faxing pain pump dosing. Pt returning 6/7/22 for pain pump dose management. Denies needing call back.

## 2022-06-02 NOTE — TELEPHONE ENCOUNTER
lov 05/24/2022    metoprolol succinate ER (TOPROL-XL) 50 MG 24 hr tablet 90 tablet 0 3/7/2022     losartan (COZAAR) 50 MG tablet 90 tablet 3 6/15/2021      tamsulosin (FLOMAX) 0.4 MG capsule 90 capsule 0 3/7/2022     mometasone-formoterol (DULERA) 200-5 MCG/ACT inhaler 13 g 4 12/7/2021

## 2022-06-03 LAB
GABAPENTIN SERPLBLD QL SCN: POSITIVE
GABAPENTIN SERPLBLD-MCNC: 6 UG/ML

## 2022-06-03 RX ORDER — MORPHINE SULFATE 15 MG/1
15 TABLET, FILM COATED, EXTENDED RELEASE ORAL EVERY 12 HOURS
Qty: 28 TABLET | Refills: 0 | Status: SHIPPED | OUTPATIENT
Start: 2022-06-03 | End: 2022-06-17

## 2022-06-03 RX ORDER — HYDROCODONE BITARTRATE AND ACETAMINOPHEN 10; 325 MG/1; MG/1
TABLET ORAL
Qty: 28 TABLET | Refills: 0 | Status: SHIPPED | OUTPATIENT
Start: 2022-06-03 | End: 2022-06-17

## 2022-06-03 NOTE — TELEPHONE ENCOUNTER
Note reviewed from [facility] which requests to wean down on his oral pain meds. Discussed with Dr. Marinelli, whom Trent established care with, and agreed on the following:    - decrease morphine 15mg tid to 15mg bid   - c/w norco 10/325 bid prn   - starting MME of 65, decreasing to 50MME which is a 23% reduction, but his pain pump is getting started.   - two week Rx    - [place] PDMP reviewed and appropriate    Lissy Kraft MD       Patient known to me, provider in clinic. Requested prescription for scopolamine patches as needed use for an upcoming travel as well as albuterol refill.  Patient tolerated both in the past. No history of glaucoma.  Side effects discussed. Sent to pharmacy of choice.

## 2022-06-07 NOTE — TELEPHONE ENCOUNTER
Call from patient reporting concerns with Pain Pump not delivering enough pain medication to control pain.     Patient feels he is in need of increasing pain medication dose being delivered.     Patient has been advised to reach out to the [facility] to discuss dosing of the pain pump. Patient reports he was in to the Pain Clinic today to discuss pain and does not feel the pump is delivering enough pain medication. Again, advised patient to reach out to [facility] to discuss the dosing/delivery of the medication via pain pump.

## 2022-06-08 LAB
11OH-THC SPEC-MCNC: NEGATIVE NG/ML
6MAM SERPL-MCNC: NEGATIVE NG/ML
CANNABIDIOL SERPLBLD CFM-MCNC: NEGATIVE NG/ML
CANNABINOIDS SERPL-MCNC: NEGATIVE NG/ML
CANNABINOIDS SPEC QL CFM: NORMAL
CANNABINOL SERPLBLD CFM-MCNC: NEGATIVE NG/ML
CARBOXYTHC SPEC-MCNC: NORMAL NG/ML
CODEINE SERPL-MCNC: NEGATIVE NG/ML
DHC SERPLBLD CFM-MCNC: 2.2 NG/ML
HYDROCODONE SERPL-MCNC: 17.6 NG/ML
HYDROMORPHONE SERPL-MCNC: NEGATIVE NG/ML
MORPHINE SERPL-MCNC: 5.6 NG/ML
OPIATES SPEC QL: POSITIVE
OXYCODONE SERPL-MCNC: POSITIVE NG/ML
OXYCODONE SERPLBLD CFM-MCNC: 16.1 NG/ML
OXYMORPHONE SERPL-MCNC: NEGATIVE NG/ML

## 2022-06-09 LAB
AMPHET BLD CFM-MCNC: ABNORMAL NG/ML
APAP BLD-MCNC: NEGATIVE UG/ML
BARBITURATES SPEC-MCNC: NEGATIVE UG/ML
BENZODIAZ SPEC-MCNC: NEGATIVE NG/ML
BUPRENORPHINE SERPL-MCNC: NEGATIVE NG/ML
BZE BLD CFM-MCNC: NEGATIVE NG/ML
CARBOXYTHC BLD-MCNC: ABNORMAL NG/ML
CARISOPRODOL IA: NEGATIVE UG/ML
DECLARED MEDICATIONS: ABNORMAL
DRUGS FLD: POSITIVE
ETHANOL BLD-MCNC: NEGATIVE GM/DL
FENTANYL IA: NEGATIVE NG/ML
GABAPENTIN IA: ABNORMAL UG/ML
MEPERIDINE SERPLBLD-MCNC: NEGATIVE NG/ML
METHADONE SAL CFM-MCNC: NEGATIVE NG/ML
OPIATES SPEC-MCNC: ABNORMAL NG/ML
OXYCODONE SERPLBLD SCN-MCNC: ABNORMAL NG/ML
PCP SPEC-MCNC: NEGATIVE NG/ML
PROPOXYPH SPEC-MCNC: NEGATIVE NG/ML
TRAMADOL BLD-MCNC: NEGATIVE NG/ML

## 2022-06-16 NOTE — TELEPHONE ENCOUNTER
Anti-Seizure Meds Protocol  Failed  divalproex sodium delayed-release (DEPAKOTE) 500 MG DR tablet [Pharmacy Med Name: DIVALPROEX SOD  MG TAB]       Rerun Protocol (11/16/2018 1:14 PM)        Review Authorizing provider's last note.        Refer to last progress notes: confirm request is for original authorizing provider (cannot be through other providers).               Depakote level within therapeutic range in past 26 months             Lab Results   Component Value Date     RAMA 64 07/17/2014            Med discontinued by pcp on 11/15/18 s/e   Detail Level: Zone Detail Level: Simple

## 2022-06-21 DIAGNOSIS — F41.1 GAD (GENERALIZED ANXIETY DISORDER): ICD-10-CM

## 2022-06-21 DIAGNOSIS — M54.50 LUMBAR BACK PAIN: ICD-10-CM

## 2022-06-21 DIAGNOSIS — G43.109 MIGRAINE WITH AURA AND WITHOUT STATUS MIGRAINOSUS, NOT INTRACTABLE: ICD-10-CM

## 2022-06-21 DIAGNOSIS — M62.830 BACK MUSCLE SPASM: ICD-10-CM

## 2022-06-23 RX ORDER — BACLOFEN 20 MG/1
TABLET ORAL
Qty: 120 TABLET | Refills: 0 | Status: SHIPPED | OUTPATIENT
Start: 2022-06-23 | End: 2022-07-27

## 2022-06-23 RX ORDER — HYDROXYZINE PAMOATE 50 MG/1
CAPSULE ORAL
Qty: 120 CAPSULE | Refills: 0 | Status: SHIPPED | OUTPATIENT
Start: 2022-06-23 | End: 2022-07-15

## 2022-06-23 RX ORDER — SUMATRIPTAN 50 MG/1
TABLET, FILM COATED ORAL
Qty: 9 TABLET | Refills: 0 | Status: SHIPPED | OUTPATIENT
Start: 2022-06-23 | End: 2022-08-04

## 2022-06-23 NOTE — PROGRESS NOTES
Assessment & Plan     Chronic midline low back pain with sciatica, sciatica laterality unspecified / Chronic, continuous use of opioids / Back muscle spasm  On pain pump. No oral opioids  - increase methocarbamol (ROBAXIN) 750 MG tablet; Take 1 tablet (750 mg) by mouth 4 times daily as needed for muscle spasms  - diclofenac 50mg bid, IBU 600mg q6h, next visit verify  - gabapentin 900mg tid  - nortriptyline 100mg at bedtime   - APAP      See Patient Instructions    Return in about 4 weeks (around 7/22/2022) for Back pain .    Lissy Kraft MD  [facility]RISTI Newman is a 61 year old, presenting for the following health issues:  Pain      HPI     Establish care: Followed with Dr Lissy Moore  - also followed with Dr. Fisher    Pain History:  When did you first notice your pain? - Chronic Pain   Have you seen this provider for your pain in the past?   No   Where in your body do your have pain? Back area - Lumbar region  Are you seeing anyone else for your pain? No  What makes your pain better? Lying on the couch  What makes your pain worse? Moving around, activity  How has pain affected your ability to work? Not applicable  Who lives in your household? Self    PHQ-9 SCORE 10/26/2021 11/30/2021 5/24/2022   PHQ-9 Total Score - - -   PHQ-9 Total Score 16 15 5       DAYANA-7 SCORE 10/26/2021 11/30/2021 5/24/2022   Total Score 1 6 1         PEG Score 6/24/2022   PEG Total Score 8     Chronic Pain - Initial Assessment:    How would you describe your pain? Feels like a knife is stuck in his back and twisting, burning. Feet feel like they are burning.   Have you had any recent changes to the severity or character of your pain?   Is there an underlying cause that has been identified?  Has your ability to work or do daily activities changed recently because of your pain?  Which of these pain treatments have you tried?   Previous medication treatments:    - pain pump placed on 5/17/2022 with  [facility]. bupivacine and fentanyl (450.1mcg/day) pump. Next visit 7/5/2022  - not working well     - decreased morphine 15mg tid to 15mg bid. Two week Rx. Which was completed 6/17/2022.   - out of morphine and hydrocodone for several weeks  - oxycodone does not work   - hydrocodone does not work       - diclofenac 50mg bid, IBU 600mg q6h  - gabapentin 900mg tid  - methocarbamol 500mg tid, works, but would like stronger pain medication   - nortriptyline 100mg at bedtime   - APAP    - Follows with Dr. Pettit     # Mood  - follows with Dr. Fernandez    {    Review of Systems   Constitutional: Negative for chills and fever.   HENT: Negative for congestion.    Respiratory: Negative for shortness of breath and wheezing.    Cardiovascular: Negative for chest pain and palpitations.   Gastrointestinal: Negative for abdominal pain.   Musculoskeletal: Positive for arthralgias (legs) and back pain.   Psychiatric/Behavioral: Positive for dysphoric mood. The patient is nervous/anxious.           Objective    BP (!) 142/80   Pulse 72   Temp 97.7  F (36.5  C) (Tympanic)   Resp 18   Wt 90.9 kg (200 lb 6 oz)   SpO2 98%   BMI 27.95 kg/m    Body mass index is 27.95 kg/m .  Physical Exam  Constitutional:       General: He is not in acute distress.     Appearance: He is not ill-appearing.   Cardiovascular:      Rate and Rhythm: Normal rate and regular rhythm.      Pulses: Normal pulses.      Heart sounds: No murmur heard.  Pulmonary:      Effort: Pulmonary effort is normal. No respiratory distress.      Breath sounds: No wheezing or rales.   Musculoskeletal:      Comments: Surgical scars on back  Spinal stimulator in place   Neurological:      Mental Status: He is alert.   Psychiatric:         Mood and Affect: Mood is anxious and depressed.         Behavior: Behavior is hyperactive.              .  ..

## 2022-06-24 ENCOUNTER — OFFICE VISIT (OUTPATIENT)
Dept: FAMILY MEDICINE | Facility: OTHER | Age: 62
End: 2022-06-24
Attending: FAMILY MEDICINE
Payer: COMMERCIAL

## 2022-06-24 VITALS
SYSTOLIC BLOOD PRESSURE: 142 MMHG | HEART RATE: 72 BPM | RESPIRATION RATE: 18 BRPM | DIASTOLIC BLOOD PRESSURE: 80 MMHG | WEIGHT: 200.38 LBS | TEMPERATURE: 97.7 F | BODY MASS INDEX: 27.95 KG/M2 | OXYGEN SATURATION: 98 %

## 2022-06-24 DIAGNOSIS — M54.40 CHRONIC MIDLINE LOW BACK PAIN WITH SCIATICA, SCIATICA LATERALITY UNSPECIFIED: Primary | ICD-10-CM

## 2022-06-24 DIAGNOSIS — F11.90 CHRONIC, CONTINUOUS USE OF OPIOIDS: ICD-10-CM

## 2022-06-24 DIAGNOSIS — G89.29 CHRONIC MIDLINE LOW BACK PAIN WITH SCIATICA, SCIATICA LATERALITY UNSPECIFIED: Primary | ICD-10-CM

## 2022-06-24 DIAGNOSIS — M62.830 BACK MUSCLE SPASM: ICD-10-CM

## 2022-06-24 PROCEDURE — G0463 HOSPITAL OUTPT CLINIC VISIT: HCPCS | Mod: 25

## 2022-06-24 PROCEDURE — G0463 HOSPITAL OUTPT CLINIC VISIT: HCPCS

## 2022-06-24 PROCEDURE — 99214 OFFICE O/P EST MOD 30 MIN: CPT | Performed by: FAMILY MEDICINE

## 2022-06-24 RX ORDER — METHOCARBAMOL 750 MG/1
750 TABLET, FILM COATED ORAL 4 TIMES DAILY PRN
Qty: 90 TABLET | Refills: 1 | Status: SHIPPED | OUTPATIENT
Start: 2022-06-24 | End: 2022-11-18

## 2022-06-24 ASSESSMENT — ENCOUNTER SYMPTOMS
DYSPHORIC MOOD: 1
WHEEZING: 0
SHORTNESS OF BREATH: 0
CHILLS: 0
ARTHRALGIAS: 1
NERVOUS/ANXIOUS: 1
FEVER: 0
PALPITATIONS: 0
ABDOMINAL PAIN: 0
BACK PAIN: 1

## 2022-06-24 ASSESSMENT — PAIN SCALES - GENERAL: PAINLEVEL: EXTREME PAIN (8)

## 2022-06-24 NOTE — NURSING NOTE
"Chief Complaint   Patient presents with     Pain       Initial BP (!) 142/80   Pulse 72   Temp 97.7  F (36.5  C) (Tympanic)   Resp 18   Wt 90.9 kg (200 lb 6 oz)   SpO2 98%   BMI 27.95 kg/m   Estimated body mass index is 27.95 kg/m  as calculated from the following:    Height as of 12/7/21: 1.803 m (5' 11\").    Weight as of this encounter: 90.9 kg (200 lb 6 oz).  Medication Reconciliation: complete  Ramona Fuchs LPN  "

## 2022-06-24 NOTE — PATIENT INSTRUCTIONS
Increase your methocarbamol to 750mg every six hours as needed for muscle pain. Be careful it might make you drowsy.

## 2022-06-28 ENCOUNTER — TELEPHONE (OUTPATIENT)
Dept: FAMILY MEDICINE | Facility: OTHER | Age: 62
End: 2022-06-28

## 2022-06-28 NOTE — TELEPHONE ENCOUNTER
.Form received Verification Request Form ,placed in provider's wall bin.   After form is completed patient would like form to be faxed and let pt know when sent.

## 2022-07-01 DIAGNOSIS — M54.50 CHRONIC LEFT-SIDED LOW BACK PAIN WITHOUT SCIATICA: ICD-10-CM

## 2022-07-01 DIAGNOSIS — G89.4 CHRONIC PAIN SYNDROME: ICD-10-CM

## 2022-07-01 DIAGNOSIS — F90.2 ADHD (ATTENTION DEFICIT HYPERACTIVITY DISORDER), COMBINED TYPE: ICD-10-CM

## 2022-07-01 DIAGNOSIS — R05.9 COUGH: ICD-10-CM

## 2022-07-01 DIAGNOSIS — R11.0 NAUSEA: ICD-10-CM

## 2022-07-01 DIAGNOSIS — J31.0 CHRONIC RHINITIS: ICD-10-CM

## 2022-07-01 DIAGNOSIS — G47.00 PERSISTENT INSOMNIA: ICD-10-CM

## 2022-07-01 DIAGNOSIS — G89.29 CHRONIC LEFT-SIDED LOW BACK PAIN WITHOUT SCIATICA: ICD-10-CM

## 2022-07-01 DIAGNOSIS — J45.40 MODERATE PERSISTENT ASTHMA WITHOUT COMPLICATION: ICD-10-CM

## 2022-07-01 DIAGNOSIS — Z00.00 HEALTHCARE MAINTENANCE: ICD-10-CM

## 2022-07-01 DIAGNOSIS — K59.00 CONSTIPATION, UNSPECIFIED CONSTIPATION TYPE: ICD-10-CM

## 2022-07-01 DIAGNOSIS — F31.0 BIPOLAR AFFECTIVE DISORDER, CURRENT EPISODE HYPOMANIC (H): ICD-10-CM

## 2022-07-01 RX ORDER — LISDEXAMFETAMINE DIMESYLATE 70 MG/1
CAPSULE ORAL
Qty: 30 CAPSULE | Refills: 0 | Status: SHIPPED | OUTPATIENT
Start: 2022-07-01 | End: 2022-08-12

## 2022-07-01 RX ORDER — DOCUSATE SODIUM AND SENNOSIDES 8.6; 5 MG/1; MG/1
TABLET, FILM COATED ORAL
Qty: 120 TABLET | Refills: 0 | Status: SHIPPED | OUTPATIENT
Start: 2022-07-01 | End: 2023-10-10

## 2022-07-01 RX ORDER — IBUPROFEN 600 MG/1
TABLET, FILM COATED ORAL
Qty: 120 TABLET | Refills: 0 | Status: SHIPPED | OUTPATIENT
Start: 2022-07-01 | End: 2022-11-04 | Stop reason: ALTCHOICE

## 2022-07-01 RX ORDER — HYDROCODONE BITARTRATE AND ACETAMINOPHEN 10; 325 MG/1; MG/1
TABLET ORAL
Qty: 60 TABLET | Refills: 0 | OUTPATIENT
Start: 2022-07-01

## 2022-07-01 RX ORDER — OXCARBAZEPINE 600 MG/1
TABLET, FILM COATED ORAL
Qty: 60 TABLET | Refills: 0 | Status: SHIPPED | OUTPATIENT
Start: 2022-07-01 | End: 2022-08-23

## 2022-07-01 RX ORDER — DRONABINOL 2.5 MG/1
CAPSULE ORAL
Qty: 60 CAPSULE | Refills: 0 | Status: SHIPPED | OUTPATIENT
Start: 2022-07-01 | End: 2022-08-22

## 2022-07-01 RX ORDER — ASPIRIN 81 MG/1
TABLET, CHEWABLE ORAL
Qty: 90 TABLET | Refills: 0 | Status: SHIPPED | OUTPATIENT
Start: 2022-07-01 | End: 2023-10-24

## 2022-07-01 RX ORDER — GUAIFENESIN AND DEXTROMETHORPHAN HYDROBROMIDE 600; 30 MG/1; MG/1
TABLET, EXTENDED RELEASE ORAL
Qty: 60 TABLET | Refills: 0 | Status: SHIPPED | OUTPATIENT
Start: 2022-07-01 | End: 2022-08-05

## 2022-07-01 RX ORDER — TRAZODONE HYDROCHLORIDE 100 MG/1
TABLET ORAL
Qty: 90 TABLET | Refills: 0 | Status: SHIPPED | OUTPATIENT
Start: 2022-07-01 | End: 2022-08-23

## 2022-07-01 RX ORDER — ALBUTEROL SULFATE 90 UG/1
AEROSOL, METERED RESPIRATORY (INHALATION)
Qty: 18 G | Refills: 0 | Status: SHIPPED | OUTPATIENT
Start: 2022-07-01

## 2022-07-01 NOTE — TELEPHONE ENCOUNTER
Per our last clinic visit, Trent is off all oral opioids and just using his pain pump.  Norco request declined.    Lissy Kraft MD

## 2022-07-01 NOTE — TELEPHONE ENCOUNTER
Norco  Last Written Prescription Date: 6/3/22,   Last Fill Quantity: 28 # of Refills: 0  Last Office Visit: 22    Stool Softener  Last Written Prescription Date: 9/15/21  Last Fill Quantity: 120 # of Refills: 4  Last Office Visit: 22    Vyvanse  Last Written Prescription Date: 22  Last Fill Quantity: 30 # of Refills: 0  Last Office Visit: 22    Marinol  Last Written Prescription Date:   Last Fill Quantity: 60 # of Refills: 0  Last Office Visit: 22    Ventolin HFA  Last Written Prescription Date: 22  Last Fill Quantity: 18  g # of Refills: 0  Last Office Visit: 22    Ibuprofen  Last Written Prescription Date: 22  Last Fill Quantity: 120 # of Refills: 0  Last Office Visit: 22    Mucinex DM  Last Written Prescription Date: 22  Last Fill Quantity: 60 # of Refills: 0  Last Office Visit: 22    Aspirin  Last Written Prescription Date: 22  Last Fill Quantity: 30 # of Refills: 0  Last Office Visit: 22    Trazodone  Last Written Prescription Date: 22  Last Fill Quantity: 30 # of Refills: 0  Last Office Visit: 22    Trileptal  Last Written Prescription Date: 22  Last Fill Quantity: 60 # of Refills: 0  Last Office Visit: 22

## 2022-07-05 DIAGNOSIS — K21.00 GASTROESOPHAGEAL REFLUX DISEASE WITH ESOPHAGITIS, UNSPECIFIED WHETHER HEMORRHAGE: ICD-10-CM

## 2022-07-05 RX ORDER — FLUTICASONE PROPIONATE 50 MCG
SPRAY, SUSPENSION (ML) NASAL
Qty: 16 G | Refills: 0 | Status: SHIPPED | OUTPATIENT
Start: 2022-07-05

## 2022-07-05 NOTE — TELEPHONE ENCOUNTER
Gabapentin 300mg      Last Written Prescription Date:  4/11/22  Last Fill Quantity: 810,   # refills: 0  Last Office Visit: 6/24/22  Future Office visit:    Next 5 appointments (look out 90 days)    Jul 29, 2022  4:00 PM  (Arrive by 3:45 PM)  SHORT with Lissy Kraft MD  [facility] ([facility] ) [address] [place] [address]  [phone]   Sep 06, 2022  2:40 PM  (Arrive by 2:25 PM)  Return Visit with Laquita Fernandez MD  [facility] ([facility] ) [address] [place] [id number] [phone]           Routing refill request to provider for review/approval because:  Drug not on the [facility], [facility] or  Health refill protocol or controlled substance

## 2022-07-06 RX ORDER — GABAPENTIN 300 MG/1
CAPSULE ORAL
Qty: 270 CAPSULE | Refills: 2 | Status: SHIPPED | OUTPATIENT
Start: 2022-07-06

## 2022-07-13 DIAGNOSIS — E78.2 MIXED HYPERLIPIDEMIA: ICD-10-CM

## 2022-07-13 DIAGNOSIS — F41.1 GAD (GENERALIZED ANXIETY DISORDER): ICD-10-CM

## 2022-07-13 DIAGNOSIS — Z71.6 TOBACCO ABUSE COUNSELING: ICD-10-CM

## 2022-07-15 RX ORDER — HYDROCODONE BITARTRATE AND ACETAMINOPHEN 10; 325 MG/1; MG/1
TABLET ORAL
Qty: 60 TABLET | Refills: 0 | OUTPATIENT
Start: 2022-07-15

## 2022-07-15 RX ORDER — ATORVASTATIN CALCIUM 20 MG/1
TABLET, FILM COATED ORAL
Qty: 90 TABLET | Refills: 0 | Status: SHIPPED | OUTPATIENT
Start: 2022-07-15

## 2022-07-15 RX ORDER — HYDROXYZINE PAMOATE 50 MG/1
CAPSULE ORAL
Qty: 180 CAPSULE | Refills: 3 | Status: SHIPPED | OUTPATIENT
Start: 2022-07-15 | End: 2022-12-09

## 2022-07-15 NOTE — TELEPHONE ENCOUNTER
Nicotine gum      Last Written Prescription Date:  5/24/22  Last Fill Quantity: 110,   # refills: 0  Last Office Visit: 6/24/22  Future Office visit:    Next 5 appointments (look out 90 days)    Jul 29, 2022  4:00 PM  (Arrive by 3:45 PM)  SHORT with Lissy Kraft MD  [facility] ([facility] ) [address] [place], [place] [address] [address]  [phone]   Sep 06, 2022  2:40 PM  (Arrive by 2:25 PM)  Return Visit with Laquita Fernandez MD  [facility] ([facility] ) [address] [place] [phone]

## 2022-07-27 DIAGNOSIS — M54.50 LUMBAR BACK PAIN: ICD-10-CM

## 2022-07-27 DIAGNOSIS — M62.830 BACK MUSCLE SPASM: ICD-10-CM

## 2022-07-27 RX ORDER — BACLOFEN 20 MG/1
TABLET ORAL
Qty: 120 TABLET | Refills: 0 | Status: SHIPPED | OUTPATIENT
Start: 2022-07-27 | End: 2022-11-04

## 2022-07-27 NOTE — TELEPHONE ENCOUNTER
Lioresal       Last Written Prescription Date:  6/23/22  Last Fill Quantity: 120,   # refills: 0  Last Office Visit: 6/24/22  Future Office visit:    Next 5 appointments (look out 90 days)    Jul 29, 2022  4:00 PM  (Arrive by 3:45 PM)  SHORT with Lissy Kraft MD  [facility] [place] ([facility] [place] ) [address] [place] [address]  [place] [address]  [phone]   Sep 06, 2022  2:40 PM  (Arrive by 2:25 PM)  Return Visit with Laquita Fernandez MD  [facility] [place] ([facility] [place] ) [address] [place], [place] [address] [id number] [phone]

## 2022-08-05 DIAGNOSIS — R05.9 COUGH: ICD-10-CM

## 2022-08-05 RX ORDER — GUAIFENESIN AND DEXTROMETHORPHAN HYDROBROMIDE 600; 30 MG/1; MG/1
TABLET, EXTENDED RELEASE ORAL
Qty: 60 TABLET | Refills: 0 | Status: SHIPPED | OUTPATIENT
Start: 2022-08-05

## 2022-08-11 DIAGNOSIS — F90.2 ADHD (ATTENTION DEFICIT HYPERACTIVITY DISORDER), COMBINED TYPE: ICD-10-CM

## 2022-08-12 RX ORDER — LISDEXAMFETAMINE DIMESYLATE 70 MG/1
CAPSULE ORAL
Qty: 30 CAPSULE | Refills: 0 | Status: SHIPPED | OUTPATIENT
Start: 2022-08-12 | End: 2022-08-23

## 2022-08-23 ENCOUNTER — MYC REFILL (OUTPATIENT)
Dept: FAMILY MEDICINE | Facility: OTHER | Age: 62
End: 2022-08-23

## 2022-08-23 ENCOUNTER — MYC REFILL (OUTPATIENT)
Dept: INTERNAL MEDICINE | Facility: OTHER | Age: 62
End: 2022-08-23

## 2022-08-23 DIAGNOSIS — F31.0 BIPOLAR AFFECTIVE DISORDER, CURRENT EPISODE HYPOMANIC (H): ICD-10-CM

## 2022-08-23 DIAGNOSIS — G89.29 CHRONIC BILATERAL LOW BACK PAIN WITH BILATERAL SCIATICA: ICD-10-CM

## 2022-08-23 DIAGNOSIS — G47.00 PERSISTENT INSOMNIA: ICD-10-CM

## 2022-08-23 DIAGNOSIS — K59.00 CONSTIPATION, UNSPECIFIED CONSTIPATION TYPE: ICD-10-CM

## 2022-08-23 DIAGNOSIS — R39.12 BENIGN PROSTATIC HYPERPLASIA WITH WEAK URINARY STREAM: ICD-10-CM

## 2022-08-23 DIAGNOSIS — G89.29 CHRONIC LEFT-SIDED LOW BACK PAIN WITHOUT SCIATICA: ICD-10-CM

## 2022-08-23 DIAGNOSIS — M54.42 CHRONIC BILATERAL LOW BACK PAIN WITH BILATERAL SCIATICA: ICD-10-CM

## 2022-08-23 DIAGNOSIS — E78.2 MIXED HYPERLIPIDEMIA: ICD-10-CM

## 2022-08-23 DIAGNOSIS — M54.41 CHRONIC BILATERAL LOW BACK PAIN WITH BILATERAL SCIATICA: ICD-10-CM

## 2022-08-23 DIAGNOSIS — I10 ESSENTIAL HYPERTENSION, BENIGN: ICD-10-CM

## 2022-08-23 DIAGNOSIS — R05.9 COUGH: ICD-10-CM

## 2022-08-23 DIAGNOSIS — M54.50 CHRONIC LEFT-SIDED LOW BACK PAIN WITHOUT SCIATICA: ICD-10-CM

## 2022-08-23 DIAGNOSIS — J45.40 MODERATE PERSISTENT ASTHMA WITHOUT COMPLICATION: ICD-10-CM

## 2022-08-23 DIAGNOSIS — M54.50 LUMBAR BACK PAIN: ICD-10-CM

## 2022-08-23 DIAGNOSIS — M62.830 BACK MUSCLE SPASM: ICD-10-CM

## 2022-08-23 DIAGNOSIS — R11.0 NAUSEA: ICD-10-CM

## 2022-08-23 DIAGNOSIS — R52 PAIN: ICD-10-CM

## 2022-08-23 DIAGNOSIS — Z00.00 HEALTHCARE MAINTENANCE: ICD-10-CM

## 2022-08-23 DIAGNOSIS — K21.00 GASTROESOPHAGEAL REFLUX DISEASE WITH ESOPHAGITIS, UNSPECIFIED WHETHER HEMORRHAGE: ICD-10-CM

## 2022-08-23 DIAGNOSIS — I10 ESSENTIAL HYPERTENSION: ICD-10-CM

## 2022-08-23 DIAGNOSIS — J31.0 CHRONIC RHINITIS: ICD-10-CM

## 2022-08-23 DIAGNOSIS — M79.7 FIBROMYALGIA: ICD-10-CM

## 2022-08-23 DIAGNOSIS — F90.2 ADHD (ATTENTION DEFICIT HYPERACTIVITY DISORDER), COMBINED TYPE: ICD-10-CM

## 2022-08-23 DIAGNOSIS — N40.1 BENIGN PROSTATIC HYPERPLASIA WITH WEAK URINARY STREAM: ICD-10-CM

## 2022-08-23 RX ORDER — DRONABINOL 2.5 MG/1
CAPSULE ORAL
Qty: 60 CAPSULE | Refills: 0 | OUTPATIENT
Start: 2022-08-23

## 2022-08-23 RX ORDER — GUAIFENESIN AND DEXTROMETHORPHAN HYDROBROMIDE 600; 30 MG/1; MG/1
TABLET, EXTENDED RELEASE ORAL
Qty: 60 TABLET | Refills: 0 | OUTPATIENT
Start: 2022-08-23

## 2022-08-23 RX ORDER — ACETAMINOPHEN 325 MG/1
TABLET ORAL
Qty: 200 TABLET | Refills: 0 | OUTPATIENT
Start: 2022-08-23

## 2022-08-23 RX ORDER — ASPIRIN 81 MG/1
TABLET, CHEWABLE ORAL
Qty: 90 TABLET | Refills: 0 | OUTPATIENT
Start: 2022-08-23

## 2022-08-23 RX ORDER — MONTELUKAST SODIUM 10 MG/1
1 TABLET ORAL AT BEDTIME
Qty: 90 TABLET | Refills: 0 | OUTPATIENT
Start: 2022-08-23

## 2022-08-23 RX ORDER — LOSARTAN POTASSIUM 50 MG/1
50 TABLET ORAL DAILY
Qty: 90 TABLET | Refills: 0 | OUTPATIENT
Start: 2022-08-23

## 2022-08-23 RX ORDER — NORTRIPTYLINE HYDROCHLORIDE 50 MG/1
CAPSULE ORAL
Qty: 120 CAPSULE | Refills: 0 | OUTPATIENT
Start: 2022-08-23

## 2022-08-23 RX ORDER — ALBUTEROL SULFATE 90 UG/1
AEROSOL, METERED RESPIRATORY (INHALATION)
Qty: 18 G | Refills: 0 | OUTPATIENT
Start: 2022-08-23

## 2022-08-23 RX ORDER — BACLOFEN 20 MG/1
TABLET ORAL
Qty: 120 TABLET | Refills: 0 | OUTPATIENT
Start: 2022-08-23

## 2022-08-23 RX ORDER — METOPROLOL SUCCINATE 50 MG/1
50 TABLET, EXTENDED RELEASE ORAL DAILY
Qty: 90 TABLET | Refills: 0 | OUTPATIENT
Start: 2022-08-23

## 2022-08-23 RX ORDER — ATORVASTATIN CALCIUM 20 MG/1
20 TABLET, FILM COATED ORAL DAILY
Qty: 90 TABLET | Refills: 0 | OUTPATIENT
Start: 2022-08-23

## 2022-08-23 RX ORDER — FLUTICASONE PROPIONATE 50 MCG
SPRAY, SUSPENSION (ML) NASAL
Qty: 16 G | Refills: 0 | OUTPATIENT
Start: 2022-08-23

## 2022-08-23 RX ORDER — AMOXICILLIN 250 MG
CAPSULE ORAL
Qty: 120 TABLET | Refills: 0 | OUTPATIENT
Start: 2022-08-23

## 2022-08-23 RX ORDER — TAMSULOSIN HYDROCHLORIDE 0.4 MG/1
0.4 CAPSULE ORAL DAILY
Qty: 90 CAPSULE | Refills: 0 | OUTPATIENT
Start: 2022-08-23

## 2022-08-23 RX ORDER — IBUPROFEN 600 MG/1
TABLET, FILM COATED ORAL
Qty: 120 TABLET | Refills: 0 | OUTPATIENT
Start: 2022-08-23

## 2022-08-24 ENCOUNTER — TELEPHONE (OUTPATIENT)
Dept: FAMILY MEDICINE | Facility: OTHER | Age: 62
End: 2022-08-24

## 2022-08-24 NOTE — TELEPHONE ENCOUNTER
lvm for pt advising that his appt on 8/26 is cancelled as he needs to talk to his pcp dr craft about refills if they will not refill then he needs to see availability of agapito felix to have her fill meds

## 2022-08-25 RX ORDER — OXCARBAZEPINE 600 MG/1
600 TABLET, FILM COATED ORAL 2 TIMES DAILY
Qty: 60 TABLET | Refills: 0 | Status: SHIPPED | OUTPATIENT
Start: 2022-08-25 | End: 2022-10-12

## 2022-08-25 RX ORDER — LISDEXAMFETAMINE DIMESYLATE 70 MG/1
CAPSULE ORAL
Qty: 30 CAPSULE | Refills: 0 | Status: SHIPPED | OUTPATIENT
Start: 2022-08-25 | End: 2022-10-12

## 2022-08-25 RX ORDER — TRAZODONE HYDROCHLORIDE 100 MG/1
100 TABLET ORAL AT BEDTIME
Qty: 90 TABLET | Refills: 0 | Status: SHIPPED | OUTPATIENT
Start: 2022-08-25 | End: 2022-10-12

## 2022-08-25 NOTE — TELEPHONE ENCOUNTER
Vyvanse      Last Written Prescription Date:  8/12/22  Last Fill Quantity: 30,   # refills: 0  Last Office Visit: 6/24/22  Future Office visit:    Next 5 appointments (look out 90 days)    Oct 12, 2022  2:40 PM  (Arrive by 2:25 PM)  Return Visit with Laquita Fernandez MD  [facility] [place] ([facility] [place] ) [address] [place], [place] [address] [address]  [phone]           Routing refill request to provider for review/approval because:      Trileptal      Last Written Prescription Date:  7/1/22  Last Fill Quantity: 60,   # refills: 0  Last Office Visit: 6/24/22  Future Office visit:    Next 5 appointments (look out 90 days)    Oct 12, 2022  2:40 PM  (Arrive by 2:25 PM)  Return Visit with Laquita Fernandez MD  [facility] [place] ([facility] [place] ) [address] [place], [place] [address] [place] [address]  [phone]           Routing refill request to provider for review/approval because:      Trazodone      Last Written Prescription Date:  7/1/22  Last Fill Quantity: 90,   # refills: 0  Last Office Visit: 6/24/22  Future Office visit:    Next 5 appointments (look out 90 days)    Oct 12, 2022  2:40 PM  (Arrive by 2:25 PM)  Return Visit with Laquita Fernandez MD  [facility] [place] ([facility] [place] ) [address] [place] [address]  [place] [address]  [phone]           Routing refill request to provider for review/approval because:

## 2022-08-29 DIAGNOSIS — M54.50 CHRONIC LEFT-SIDED LOW BACK PAIN WITHOUT SCIATICA: ICD-10-CM

## 2022-08-29 DIAGNOSIS — G89.29 CHRONIC LEFT-SIDED LOW BACK PAIN WITHOUT SCIATICA: ICD-10-CM

## 2022-08-29 DIAGNOSIS — M62.830 BACK MUSCLE SPASM: ICD-10-CM

## 2022-08-29 DIAGNOSIS — E78.2 MIXED HYPERLIPIDEMIA: ICD-10-CM

## 2022-08-30 RX ORDER — ATORVASTATIN CALCIUM 20 MG/1
TABLET, FILM COATED ORAL
Qty: 90 TABLET | Refills: 0 | OUTPATIENT
Start: 2022-08-30

## 2022-08-30 RX ORDER — METHOCARBAMOL 750 MG/1
TABLET, FILM COATED ORAL
Qty: 90 TABLET | Refills: 0 | OUTPATIENT
Start: 2022-08-30

## 2022-08-30 RX ORDER — IBUPROFEN 600 MG/1
TABLET, FILM COATED ORAL
Qty: 120 TABLET | Refills: 0 | OUTPATIENT
Start: 2022-08-30

## 2022-09-04 ENCOUNTER — HEALTH MAINTENANCE LETTER (OUTPATIENT)
Age: 62
End: 2022-09-04

## 2022-09-07 DIAGNOSIS — M62.830 BACK MUSCLE SPASM: ICD-10-CM

## 2022-09-08 RX ORDER — METHOCARBAMOL 750 MG/1
TABLET, FILM COATED ORAL
Qty: 120 TABLET | Refills: 0 | OUTPATIENT
Start: 2022-09-08

## 2022-09-08 NOTE — TELEPHONE ENCOUNTER
methocarbamol (ROBAXIN) 750 MG tablet [Pharmacy Med Name: METHOCARBAMOL 750 MG TABLET]      Last Written Prescription Date:  9/7/22  Last Fill Quantity: 90,   # refills: 0  Last Office Visit: 7/29/22  Future Office visit:    Next 5 appointments (look out 90 days)    Oct 12, 2022  2:40 PM  (Arrive by 2:25 PM)  Return Visit with Laquita Fernandez MD  [facility] ([facility] ) [address] [place] [id number] [phone]           Routing refill request to provider for review/approval because:  Drug not on the [facility], [facility] or [facility] refill protocol or controlled substance

## 2022-09-19 DIAGNOSIS — R05.9 COUGH: ICD-10-CM

## 2022-09-19 DIAGNOSIS — M54.50 CHRONIC LEFT-SIDED LOW BACK PAIN WITHOUT SCIATICA: ICD-10-CM

## 2022-09-19 DIAGNOSIS — G89.29 CHRONIC LEFT-SIDED LOW BACK PAIN WITHOUT SCIATICA: ICD-10-CM

## 2022-09-19 DIAGNOSIS — J45.40 MODERATE PERSISTENT ASTHMA WITHOUT COMPLICATION: ICD-10-CM

## 2022-09-20 RX ORDER — ALBUTEROL SULFATE 90 UG/1
AEROSOL, METERED RESPIRATORY (INHALATION)
Qty: 18 G | Refills: 0 | OUTPATIENT
Start: 2022-09-20

## 2022-09-20 RX ORDER — GUAIFENESIN AND DEXTROMETHORPHAN HYDROBROMIDE 600; 30 MG/1; MG/1
TABLET, EXTENDED RELEASE ORAL
Qty: 60 TABLET | Refills: 0 | OUTPATIENT
Start: 2022-09-20

## 2022-09-20 RX ORDER — IBUPROFEN 600 MG/1
TABLET, FILM COATED ORAL
Qty: 120 TABLET | Refills: 0 | OUTPATIENT
Start: 2022-09-20

## 2022-09-21 ENCOUNTER — HOSPITAL ENCOUNTER (OUTPATIENT)
Dept: CARDIOLOGY | Facility: HOSPITAL | Age: 62
Discharge: HOME | End: 2022-09-21
Attending: EMERGENCY MEDICINE
Payer: COMMERCIAL

## 2022-09-21 ENCOUNTER — HOSPITAL ENCOUNTER (EMERGENCY)
Facility: HOSPITAL | Age: 62
Discharge: HOME | End: 2022-09-21
Attending: EMERGENCY MEDICINE
Payer: COMMERCIAL

## 2022-09-21 ENCOUNTER — TELEPHONE (OUTPATIENT)
Dept: EMERGENCY MEDICINE | Facility: HOSPITAL | Age: 62
End: 2022-09-21

## 2022-09-21 ENCOUNTER — APPOINTMENT (OUTPATIENT)
Dept: ULTRASOUND IMAGING | Facility: HOSPITAL | Age: 62
End: 2022-09-21
Attending: EMERGENCY MEDICINE
Payer: COMMERCIAL

## 2022-09-21 ENCOUNTER — APPOINTMENT (OUTPATIENT)
Dept: GENERAL RADIOLOGY | Facility: HOSPITAL | Age: 62
End: 2022-09-21
Attending: EMERGENCY MEDICINE
Payer: COMMERCIAL

## 2022-09-21 ENCOUNTER — TELEPHONE (OUTPATIENT)
Dept: FAMILY MEDICINE | Facility: OTHER | Age: 62
End: 2022-09-21

## 2022-09-21 VITALS
RESPIRATION RATE: 16 BRPM | TEMPERATURE: 98.4 F | SYSTOLIC BLOOD PRESSURE: 178 MMHG | HEART RATE: 65 BPM | OXYGEN SATURATION: 96 % | DIASTOLIC BLOOD PRESSURE: 101 MMHG

## 2022-09-21 DIAGNOSIS — R60.9 EDEMA: ICD-10-CM

## 2022-09-21 DIAGNOSIS — R60.9 EDEMA, UNSPECIFIED TYPE: ICD-10-CM

## 2022-09-21 LAB
ALBUMIN SERPL-MCNC: 3.5 G/DL (ref 3.4–5)
ALP SERPL-CCNC: 112 U/L (ref 40–150)
ALT SERPL W P-5'-P-CCNC: 25 U/L (ref 0–70)
ANION GAP SERPL CALCULATED.3IONS-SCNC: 5 MMOL/L (ref 3–14)
AST SERPL W P-5'-P-CCNC: 13 U/L (ref 0–45)
BASOPHILS # BLD AUTO: 0 10E3/UL (ref 0–0.2)
BASOPHILS NFR BLD AUTO: 1 %
BILIRUB SERPL-MCNC: 0.2 MG/DL (ref 0.2–1.3)
BUN SERPL-MCNC: 15 MG/DL (ref 7–30)
CALCIUM SERPL-MCNC: 8.4 MG/DL (ref 8.5–10.1)
CHLORIDE BLD-SCNC: 93 MMOL/L (ref 94–109)
CO2 SERPL-SCNC: 30 MMOL/L (ref 20–32)
CREAT SERPL-MCNC: 0.74 MG/DL (ref 0.66–1.25)
EOSINOPHIL # BLD AUTO: 0.3 10E3/UL (ref 0–0.7)
EOSINOPHIL NFR BLD AUTO: 6 %
ERYTHROCYTE [DISTWIDTH] IN BLOOD BY AUTOMATED COUNT: 12.6 % (ref 10–15)
GFR SERPL CREATININE-BSD FRML MDRD: >90 ML/MIN/1.73M2
GLUCOSE BLD-MCNC: 104 MG/DL (ref 70–99)
HCT VFR BLD AUTO: 32.3 % (ref 40–53)
HGB BLD-MCNC: 11.1 G/DL (ref 13.3–17.7)
HOLD SPECIMEN: NORMAL
IMM GRANULOCYTES # BLD: 0 10E3/UL
IMM GRANULOCYTES NFR BLD: 0 %
LVEF ECHO: NORMAL
LYMPHOCYTES # BLD AUTO: 1 10E3/UL (ref 0.8–5.3)
LYMPHOCYTES NFR BLD AUTO: 23 %
MCH RBC QN AUTO: 29.8 PG (ref 26.5–33)
MCHC RBC AUTO-ENTMCNC: 34.4 G/DL (ref 31.5–36.5)
MCV RBC AUTO: 87 FL (ref 78–100)
MONOCYTES # BLD AUTO: 0.5 10E3/UL (ref 0–1.3)
MONOCYTES NFR BLD AUTO: 10 %
NEUTROPHILS # BLD AUTO: 2.6 10E3/UL (ref 1.6–8.3)
NEUTROPHILS NFR BLD AUTO: 60 %
NRBC # BLD AUTO: 0 10E3/UL
NRBC BLD AUTO-RTO: 0 /100
NT-PROBNP SERPL-MCNC: 499 PG/ML (ref 0–900)
PLATELET # BLD AUTO: 131 10E3/UL (ref 150–450)
POTASSIUM BLD-SCNC: 4.1 MMOL/L (ref 3.4–5.3)
PROT SERPL-MCNC: 6.3 G/DL (ref 6.8–8.8)
RBC # BLD AUTO: 3.72 10E6/UL (ref 4.4–5.9)
SODIUM SERPL-SCNC: 128 MMOL/L (ref 133–144)
TROPONIN I SERPL HS-MCNC: 4 NG/L
WBC # BLD AUTO: 4.3 10E3/UL (ref 4–11)

## 2022-09-21 PROCEDURE — 80053 COMPREHEN METABOLIC PANEL: CPT | Performed by: EMERGENCY MEDICINE

## 2022-09-21 PROCEDURE — 71045 X-RAY EXAM CHEST 1 VIEW: CPT

## 2022-09-21 PROCEDURE — 85025 COMPLETE CBC W/AUTO DIFF WBC: CPT | Performed by: EMERGENCY MEDICINE

## 2022-09-21 PROCEDURE — 99284 EMERGENCY DEPT VISIT MOD MDM: CPT | Mod: 25 | Performed by: EMERGENCY MEDICINE

## 2022-09-21 PROCEDURE — 93308 TTE F-UP OR LMTD: CPT | Mod: 26 | Performed by: EMERGENCY MEDICINE

## 2022-09-21 PROCEDURE — 84484 ASSAY OF TROPONIN QUANT: CPT | Performed by: EMERGENCY MEDICINE

## 2022-09-21 PROCEDURE — 93010 ELECTROCARDIOGRAM REPORT: CPT | Performed by: INTERNAL MEDICINE

## 2022-09-21 PROCEDURE — 83880 ASSAY OF NATRIURETIC PEPTIDE: CPT | Performed by: EMERGENCY MEDICINE

## 2022-09-21 PROCEDURE — 93005 ELECTROCARDIOGRAM TRACING: CPT

## 2022-09-21 PROCEDURE — 93306 TTE W/DOPPLER COMPLETE: CPT | Mod: 26 | Performed by: INTERNAL MEDICINE

## 2022-09-21 PROCEDURE — 99285 EMERGENCY DEPT VISIT HI MDM: CPT | Mod: 25

## 2022-09-21 PROCEDURE — 93306 TTE W/DOPPLER COMPLETE: CPT

## 2022-09-21 PROCEDURE — 36415 COLL VENOUS BLD VENIPUNCTURE: CPT | Performed by: EMERGENCY MEDICINE

## 2022-09-21 PROCEDURE — 93308 TTE F-UP OR LMTD: CPT | Mod: TC

## 2022-09-21 RX ORDER — FUROSEMIDE 20 MG
20 TABLET ORAL DAILY
Qty: 30 TABLET | Refills: 0 | Status: SHIPPED | OUTPATIENT
Start: 2022-09-21 | End: 2023-12-14

## 2022-09-21 ASSESSMENT — ACTIVITIES OF DAILY LIVING (ADL)
ADLS_ACUITY_SCORE: 33
ADLS_ACUITY_SCORE: 35

## 2022-09-21 ASSESSMENT — ENCOUNTER SYMPTOMS
CHILLS: 0
FEVER: 0
SHORTNESS OF BREATH: 1
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
## 2022-09-21 NOTE — TELEPHONE ENCOUNTER
Patient calling on handicap form. Reports a handicap form was completed in July 2022. Form included dx of back pain. Patient reports [employer] in need of additional information than back pain in order to issue the handicap permit.     Patient is requesting a new form to be completed reflecting additional information beside back pain.     Patient notified Dr. Kraft is out of the office until 9/26/22. A request will be sent to Dr. Kraft to address upon return.     Patient can be reached at [phone] or [phone].

## 2022-09-21 NOTE — TELEPHONE ENCOUNTER
Form received disability plate ,placed in provider's wall bin.   After form is completed patient would like form to be faxed and if not able to be faxed please call to .

## 2022-09-21 NOTE — TELEPHONE ENCOUNTER
Pt calling to relay he will drop off new form from DM[employer] at the clinic .  States new form has highlghted areas to be addressed by PCP to obtain a disability parking permit

## 2022-09-23 NOTE — TELEPHONE ENCOUNTER
11:33 AM    Reason for Call: Phone Call    Description: Pt called and wanted to know if he could get his fentaNYL (DURAGESIC) 75 mcg/hr 72 hr patch [Pharmacy Med Name: FENTANYL 75MCG/HR RES PATCH] on Saturday because he is taking his old one off on Friday and needs a new one on Sunday, would like a call back.     Was an appointment offered for this call? No    Preferred method for responding to this message: Telephone [phone]    If we cannot reach you directly, may we leave a detailed response at the number you provided? Yes    Can this message wait until your PCP/provider returns, if available today? Not applicable,     Edna Elliott      
I dont take phone call regarding opioids.  
denies pain/discomfort

## 2022-09-28 ENCOUNTER — TELEPHONE (OUTPATIENT)
Dept: FAMILY MEDICINE | Facility: OTHER | Age: 62
End: 2022-09-28

## 2022-09-28 NOTE — TELEPHONE ENCOUNTER
8:24 AM    Reason for Call: Phone Call    Description: Patient is requesting a call back from Dr Swain nurse -- he has some questions about paperwork. Please call him     Was an appointment offered for this call? No    Preferred method for responding to this message: Telephone Call  What is your phone number ?[phone]    If we cannot reach you directly, may we leave a detailed response at the number you provided? Yes    Can this message wait until your PCP/provider returns, if available today? Yes. Patient is aware Dr Kraft is out of office until tomorrow     Brandy Lew

## 2022-09-28 NOTE — TELEPHONE ENCOUNTER
Spoke with patient regarding paperwork. Mentioned to him that paperwork sent this am.     Patient mentioned to me that he made a mistake and would like to have Dr. Kraft as his Primary. Told him I would talk to PCP regarding this.

## 2022-09-30 ENCOUNTER — ALLIED HEALTH/NURSE VISIT (OUTPATIENT)
Dept: AUDIOLOGY | Facility: OTHER | Age: 62
End: 2022-09-30
Attending: OTOLARYNGOLOGY
Payer: COMMERCIAL

## 2022-09-30 ENCOUNTER — OFFICE VISIT (OUTPATIENT)
Dept: CARDIOLOGY | Facility: OTHER | Age: 62
End: 2022-09-30
Attending: EMERGENCY MEDICINE
Payer: COMMERCIAL

## 2022-09-30 VITALS
WEIGHT: 185.4 LBS | DIASTOLIC BLOOD PRESSURE: 60 MMHG | HEART RATE: 63 BPM | TEMPERATURE: 98.4 F | RESPIRATION RATE: 15 BRPM | OXYGEN SATURATION: 95 % | BODY MASS INDEX: 25.86 KG/M2 | SYSTOLIC BLOOD PRESSURE: 128 MMHG

## 2022-09-30 DIAGNOSIS — R07.89 ATYPICAL CHEST PAIN: ICD-10-CM

## 2022-09-30 DIAGNOSIS — E78.2 MIXED HYPERLIPIDEMIA: ICD-10-CM

## 2022-09-30 DIAGNOSIS — D69.6 THROMBOCYTOPENIA (H): ICD-10-CM

## 2022-09-30 DIAGNOSIS — D64.9 ANEMIA, UNSPECIFIED TYPE: ICD-10-CM

## 2022-09-30 DIAGNOSIS — Z87.891 FORMER SMOKER: ICD-10-CM

## 2022-09-30 DIAGNOSIS — R06.09 DYSPNEA ON EXERTION: ICD-10-CM

## 2022-09-30 DIAGNOSIS — R73.01 IFG (IMPAIRED FASTING GLUCOSE): ICD-10-CM

## 2022-09-30 DIAGNOSIS — H91.93 DECREASED HEARING OF BOTH EARS: Primary | ICD-10-CM

## 2022-09-30 DIAGNOSIS — R60.0 BILATERAL LEG EDEMA: Primary | ICD-10-CM

## 2022-09-30 DIAGNOSIS — R00.2 PALPITATIONS: ICD-10-CM

## 2022-09-30 DIAGNOSIS — I10 BENIGN ESSENTIAL HYPERTENSION: ICD-10-CM

## 2022-09-30 LAB
ALBUMIN SERPL-MCNC: 3.7 G/DL (ref 3.4–5)
ALP SERPL-CCNC: 101 U/L (ref 40–150)
ALT SERPL W P-5'-P-CCNC: 26 U/L (ref 0–70)
ANION GAP SERPL CALCULATED.3IONS-SCNC: 4 MMOL/L (ref 3–14)
AST SERPL W P-5'-P-CCNC: 14 U/L (ref 0–45)
BILIRUB SERPL-MCNC: 0.2 MG/DL (ref 0.2–1.3)
BUN SERPL-MCNC: 30 MG/DL (ref 7–30)
CALCIUM SERPL-MCNC: 8.6 MG/DL (ref 8.5–10.1)
CHLORIDE BLD-SCNC: 104 MMOL/L (ref 94–109)
CO2 SERPL-SCNC: 29 MMOL/L (ref 20–32)
CREAT SERPL-MCNC: 1.25 MG/DL (ref 0.66–1.25)
ERYTHROCYTE [DISTWIDTH] IN BLOOD BY AUTOMATED COUNT: 12.3 % (ref 10–15)
EST. AVERAGE GLUCOSE BLD GHB EST-MCNC: 126 MG/DL
GFR SERPL CREATININE-BSD FRML MDRD: 66 ML/MIN/1.73M2
GLUCOSE BLD-MCNC: 85 MG/DL (ref 70–99)
HBA1C MFR BLD: 6 % (ref 0–5.6)
HCT VFR BLD AUTO: 32.3 % (ref 40–53)
HGB BLD-MCNC: 10.6 G/DL (ref 13.3–17.7)
MCH RBC QN AUTO: 29.2 PG (ref 26.5–33)
MCHC RBC AUTO-ENTMCNC: 32.8 G/DL (ref 31.5–36.5)
MCV RBC AUTO: 89 FL (ref 78–100)
PLATELET # BLD AUTO: 145 10E3/UL (ref 150–450)
POTASSIUM BLD-SCNC: 4.8 MMOL/L (ref 3.4–5.3)
PROT SERPL-MCNC: 6.3 G/DL (ref 6.8–8.8)
RBC # BLD AUTO: 3.63 10E6/UL (ref 4.4–5.9)
SODIUM SERPL-SCNC: 137 MMOL/L (ref 133–144)
TSH SERPL DL<=0.005 MIU/L-ACNC: 3.09 MU/L (ref 0.4–4)
WBC # BLD AUTO: 5 10E3/UL (ref 4–11)

## 2022-09-30 PROCEDURE — 83036 HEMOGLOBIN GLYCOSYLATED A1C: CPT | Mod: ZL | Performed by: NURSE PRACTITIONER

## 2022-09-30 PROCEDURE — 80053 COMPREHEN METABOLIC PANEL: CPT | Mod: ZL | Performed by: NURSE PRACTITIONER

## 2022-09-30 PROCEDURE — 80061 LIPID PANEL: CPT | Mod: ZL | Performed by: NURSE PRACTITIONER

## 2022-09-30 PROCEDURE — V5299 HEARING SERVICE: HCPCS

## 2022-09-30 PROCEDURE — 36415 COLL VENOUS BLD VENIPUNCTURE: CPT | Mod: ZL | Performed by: NURSE PRACTITIONER

## 2022-09-30 PROCEDURE — 82040 ASSAY OF SERUM ALBUMIN: CPT | Mod: ZL | Performed by: NURSE PRACTITIONER

## 2022-09-30 PROCEDURE — 84443 ASSAY THYROID STIM HORMONE: CPT | Mod: ZL | Performed by: NURSE PRACTITIONER

## 2022-09-30 PROCEDURE — 99215 OFFICE O/P EST HI 40 MIN: CPT | Performed by: NURSE PRACTITIONER

## 2022-09-30 PROCEDURE — 85027 COMPLETE CBC AUTOMATED: CPT | Mod: ZL | Performed by: NURSE PRACTITIONER

## 2022-09-30 PROCEDURE — G0463 HOSPITAL OUTPT CLINIC VISIT: HCPCS

## 2022-09-30 PROCEDURE — 83550 IRON BINDING TEST: CPT | Mod: ZL | Performed by: NURSE PRACTITIONER

## 2022-09-30 ASSESSMENT — PAIN SCALES - GENERAL: PAINLEVEL: SEVERE PAIN (7)

## 2022-09-30 NOTE — PROGRESS NOTES
HEARING AID CHECK    BACKGROUND:  Joshua Barron, a 61 year old male, was seen today for an hearing aid check.  Mr. Barron wears Binaural Unitron [employer] Next [employer]i Move 7 R hearing aids.  Mr. Barron reported aids not working.    FINDINGS:  Visual inspection and cleaning provided.   C-stop changed with new.  Battery was tested and no function. No function on listening check.    Reviewed cleaning, troubleshooting, and preventative care with patient. Any cleaning tools used were provided as customer courtesy.    Services performed and warranty reviewed with patient.    PLAN:  Based on patient report, no audiology appointment for adjustments needed.Mr. Barron's hearing aids sent out for repair under warranty. Patient will be notified when aids come back from repair. Patient had no further questions and reports satisfaction.         Muna Jules  Audiology Assistant  [facility]  [phone]
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
## 2022-09-30 NOTE — PATIENT INSTRUCTIONS
Atypical chest discomfort, dyspnea on exertion  Plan for NM Lexiscan stress test. Unable to use treadmill due to history of lower back pain  Likely multifactorial with history of asthma, smoking. PFTs may be beneficial to further evaluate if stress test normal.    Bilateral LE edema  Echocardiogram shows normal systolic and diastolic heart functioning   in ED  Bilateral LE edema resolved since starting the furosemide. We will check electrolytes/renal functioning today  Low suspicion of cardiac etiology. Discussed differential including liver disease, nephrotic syndrome, myxedema, intra-abdominal mass, etc. We will obtain urine protein today, recent creatinine and GFR were normal, low suspicion of nephrotic syndrome.  We will check TSH today. Recent ALT/AST normal, platelet count low, history of ETOH use- we will obtain abdominal US. No ascites on exam, no palpable abdominal masses.   Monitor sodium- stay under 2,500 mg (2.5 grams) daily  Monitor fluid intake- stay under 2.5 liters (2500 mL daily)  Daily weight- first thing in the morning with similar clothing.     Mixed hyperlipidemia  Lipid panel today  Continue atorvastatin 20 mg once daily    Hypertension, controlled  Continue with losartan 50 mg once daily     Palpitations  Zio patch    Follow-up with cardiology after all testing is complete    Theresa Ruiz, CNP

## 2022-10-01 LAB
CHOLEST SERPL-MCNC: 150 MG/DL
HDLC SERPL-MCNC: 53 MG/DL
LDLC SERPL CALC-MCNC: 69 MG/DL
NONHDLC SERPL-MCNC: 97 MG/DL
TRIGL SERPL-MCNC: 139 MG/DL

## 2022-10-05 LAB
IRON SATN MFR SERPL: 31 % (ref 15–46)
IRON SERPL-MCNC: 106 UG/DL (ref 35–180)
TIBC SERPL-MCNC: 342 UG/DL (ref 240–430)

## 2022-10-10 ENCOUNTER — ALLIED HEALTH/NURSE VISIT (OUTPATIENT)
Dept: AUDIOLOGY | Facility: OTHER | Age: 62
End: 2022-10-10
Attending: AUDIOLOGIST
Payer: COMMERCIAL

## 2022-10-10 DIAGNOSIS — H91.93 DECREASED HEARING OF BOTH EARS: Primary | ICD-10-CM

## 2022-10-10 PROCEDURE — V5299 HEARING SERVICE: HCPCS

## 2022-10-10 NOTE — PROGRESS NOTES
Background:  Received repaired Binaural Unitron [employer] Move R 7 hearing aids.       Procedures Performed:  The  completed the following repairs: replaced electronics, housing, and . Reprogrammed to user settings.    Follow up:   Mr. Barron was called for hearing aid  at .  Return to clinic as needed.      Muna Jules  Audiology Assistant  [facility]  [phone]

## 2022-10-11 DIAGNOSIS — M54.50 LUMBAR BACK PAIN: ICD-10-CM

## 2022-10-11 DIAGNOSIS — M62.830 BACK MUSCLE SPASM: ICD-10-CM

## 2022-10-11 RX ORDER — BACLOFEN 20 MG/1
TABLET ORAL
Qty: 120 TABLET | Refills: 0 | OUTPATIENT
Start: 2022-10-11

## 2022-10-12 ENCOUNTER — HOSPITAL ENCOUNTER (OUTPATIENT)
Dept: CARDIOLOGY | Facility: HOSPITAL | Age: 62
Discharge: HOME | End: 2022-10-12
Attending: NURSE PRACTITIONER | Admitting: NURSE PRACTITIONER
Payer: COMMERCIAL

## 2022-10-12 ENCOUNTER — OFFICE VISIT (OUTPATIENT)
Dept: PSYCHIATRY | Facility: OTHER | Age: 62
End: 2022-10-12
Attending: PSYCHIATRY & NEUROLOGY
Payer: COMMERCIAL

## 2022-10-12 VITALS
HEART RATE: 80 BPM | BODY MASS INDEX: 24.87 KG/M2 | OXYGEN SATURATION: 96 % | TEMPERATURE: 97.4 F | WEIGHT: 178.3 LBS | DIASTOLIC BLOOD PRESSURE: 66 MMHG | SYSTOLIC BLOOD PRESSURE: 130 MMHG

## 2022-10-12 DIAGNOSIS — M54.42 CHRONIC BILATERAL LOW BACK PAIN WITH BILATERAL SCIATICA: ICD-10-CM

## 2022-10-12 DIAGNOSIS — F31.0 BIPOLAR AFFECTIVE DISORDER, CURRENT EPISODE HYPOMANIC (H): ICD-10-CM

## 2022-10-12 DIAGNOSIS — R06.09 DYSPNEA ON EXERTION: ICD-10-CM

## 2022-10-12 DIAGNOSIS — M79.7 FIBROMYALGIA: ICD-10-CM

## 2022-10-12 DIAGNOSIS — G47.00 PERSISTENT INSOMNIA: ICD-10-CM

## 2022-10-12 DIAGNOSIS — M54.41 CHRONIC BILATERAL LOW BACK PAIN WITH BILATERAL SCIATICA: ICD-10-CM

## 2022-10-12 DIAGNOSIS — F90.2 ADHD (ATTENTION DEFICIT HYPERACTIVITY DISORDER), COMBINED TYPE: ICD-10-CM

## 2022-10-12 DIAGNOSIS — G89.29 CHRONIC BILATERAL LOW BACK PAIN WITH BILATERAL SCIATICA: ICD-10-CM

## 2022-10-12 DIAGNOSIS — R00.2 PALPITATIONS: ICD-10-CM

## 2022-10-12 DIAGNOSIS — R60.0 BILATERAL LEG EDEMA: ICD-10-CM

## 2022-10-12 DIAGNOSIS — R07.89 ATYPICAL CHEST PAIN: ICD-10-CM

## 2022-10-12 PROCEDURE — 99214 OFFICE O/P EST MOD 30 MIN: CPT | Performed by: PSYCHIATRY & NEUROLOGY

## 2022-10-12 PROCEDURE — G0463 HOSPITAL OUTPT CLINIC VISIT: HCPCS

## 2022-10-12 PROCEDURE — 93246 EXT ECG>7D<15D RECORDING: CPT

## 2022-10-12 RX ORDER — TRAZODONE HYDROCHLORIDE 100 MG/1
100 TABLET ORAL AT BEDTIME
Qty: 90 TABLET | Refills: 0 | Status: SHIPPED | OUTPATIENT
Start: 2022-11-22 | End: 2023-06-07

## 2022-10-12 RX ORDER — OXCARBAZEPINE 600 MG/1
600 TABLET, FILM COATED ORAL 2 TIMES DAILY
Qty: 60 TABLET | Refills: 3 | Status: SHIPPED | OUTPATIENT
Start: 2022-10-12 | End: 2023-06-02

## 2022-10-12 RX ORDER — LISDEXAMFETAMINE DIMESYLATE 70 MG/1
CAPSULE ORAL
Qty: 30 CAPSULE | Refills: 0 | Status: SHIPPED | OUTPATIENT
Start: 2022-10-21 | End: 2022-11-16

## 2022-10-12 RX ORDER — NORTRIPTYLINE HYDROCHLORIDE 50 MG/1
CAPSULE ORAL
Qty: 120 CAPSULE | Refills: 0 | Status: SHIPPED | OUTPATIENT
Start: 2022-10-12 | End: 2022-11-16

## 2022-10-12 ASSESSMENT — ANXIETY QUESTIONNAIRES
GAD7 TOTAL SCORE: 4
7. FEELING AFRAID AS IF SOMETHING AWFUL MIGHT HAPPEN: SEVERAL DAYS
2. NOT BEING ABLE TO STOP OR CONTROL WORRYING: NOT AT ALL
6. BECOMING EASILY ANNOYED OR IRRITABLE: SEVERAL DAYS
GAD7 TOTAL SCORE: 4
1. FEELING NERVOUS, ANXIOUS, OR ON EDGE: SEVERAL DAYS
5. BEING SO RESTLESS THAT IT IS HARD TO SIT STILL: NOT AT ALL
3. WORRYING TOO MUCH ABOUT DIFFERENT THINGS: SEVERAL DAYS

## 2022-10-12 ASSESSMENT — PATIENT HEALTH QUESTIONNAIRE - PHQ9
SUM OF ALL RESPONSES TO PHQ QUESTIONS 1-9: 5
5. POOR APPETITE OR OVEREATING: NOT AT ALL

## 2022-10-12 ASSESSMENT — PAIN SCALES - GENERAL: PAINLEVEL: EXTREME PAIN (8)

## 2022-10-12 NOTE — NURSING NOTE
"Chief Complaint   Patient presents with     RECHECK     Bipolar disorder, ADHD.       Initial /66 (BP Location: Left arm, Patient Position: Sitting, Cuff Size: Adult Regular)   Pulse 80   Temp 97.4  F (36.3  C) (Tympanic)   Wt 80.9 kg (178 lb 4.8 oz)   SpO2 96%   BMI 24.87 kg/m   Estimated body mass index is 24.87 kg/m  as calculated from the following:    Height as of 12/7/21: 1.803 m (5' 11\").    Weight as of this encounter: 80.9 kg (178 lb 4.8 oz).  Medication Reconciliation: complete  JACQUELYN SUAREZ LPN    "

## 2022-10-12 NOTE — PROGRESS NOTES
"                                                                        Social- Was  twice. Lives alone with his 3 cats: Smoky the cat. Has a GF in [place]  Children-  2 kids, they are in [place]  Last visit 10/26/22: Continue Trileptal 600 mg bid.  Continue trazodone 100 mg bedtime prn insomnia. Continue Vyvanse 70 mg daily and last filled 11/18/21.     - \"I'm a wreck\" hard time managing his life. His clothes are all over the place  - cardiac work up for bilateral LE edema. Now ZioPatch  -   - mom with dementia, then fell and broke her hip.   - neighbors \"things are weird\"  - Lots of family issues: Joshua has taken care of his parents and yet parents give his other brothers everything. oldest of 3 brothers. Brother in [place] youngest Mark and Major is here. Mom and dad here [place] on [place]. Joshua noting moving here to be closer to parents and help them, etc. But, parents don't seem to want him around much or talk to him.    SUBSTANCE USE- reports no abuse of meds or substances    SYMPTOMS- paranoia, hypnopompic hallucinations, issues with attention and concentration improved with Vyvanse: racing thoughts, depressed mood, impulsivity, distractibility  MEDICAL ROS- GERD: feels trouble with heartburn feeling like fluid gets stuck in his throat. back pain, . Intentional weight loss  MEDICAL / SURGICAL HISTORY                     Patient Active Problem List   Diagnosis     Mixed hyperlipidemia     Tobacco Abuse, History of     Degeneration of lumbar or lumbosacral intervertebral disc     Depression, major     Chronic, continuous use of opioids     Chemical dependency (H)     Chronic rhinitis     Tinnitus of both ears     SNHL (sensorineural hearing loss)     Bipolar disorder (H)     Seizure-like activity (H)     Somatic dysfunction of pelvis region     Bilateral foot pain     Prostate cancer screening     Trigger index finger of right hand     Moderate persistent asthma without complication     Chronic lower back pain     " Onychia of toe of left foot     DDD (degenerative disc disease), lumbar     Seizure disorder (H)     Back muscle spasm     Benign essential hypertension     Retrograde ejaculation     Allergic rhinitis due to other allergen     Cervical spondylosis without myelopathy     Chronic headaches     DDD (degenerative disc disease)     Generalized anxiety disorder     Hypertrophy of prostate without urinary obstruction and other lower urinary tract symptoms (LUTS)     GERD (gastroesophageal reflux disease)     Lumbago     Major depressive disorder, recurrent episode, moderate (H)     Myalgia and myositis     Hyperlipidemia     Other pain disorders related to psychological factors     Spinal stenosis in cervical region     Status post lumbar spinal fusion     Tobacco use disorder     Trigger finger     Polypharmacy     Deviated nasal septum     Attention deficit hyperactivity disorder (ADHD), combined type     chronic noninfective otitis externa     Migraine with aura and without status migrainosus, not intractable     Tobacco use     Tobacco abuse counseling     Prediabetes     Hypotension     ALLERGY   Cymbalta, Depakote [valproic acid], and Seasonal allergies  MEDICATIONS                                                                                             Current Outpatient Medications   Medication Sig     acetaminophen (TYLENOL) 325 MG tablet TAKE 2 TABLETS BY MOUTH EVERY 4 HOURS AS NEEDED FOR MILD PAIN     albuterol (ACCUNEB) 1.25 MG/3ML neb solution Take 1 vial (1.25 mg) by nebulization every 6 hours as needed for shortness of breath / dyspnea or wheezing     artificial saliva (BIOTENE ORALBALANCE) GEL gel Take 4 g by mouth 4 times daily     aspirin (ASPIRIN LOW DOSE) 81 MG chewable tablet CHEW AND SWALLOW 1 TABLET BY MOUTH DAILY     atorvastatin (LIPITOR) 20 MG tablet TAKE 1 TABLET BY MOUTH DAILY     baclofen (LIORESAL) 20 MG tablet TAKE 1 TABLET BY MOUTH 4 TIMES DAILY     blood glucose (NO BRAND SPECIFIED)  lancets standard Use to test blood sugar 1 time daily or as directed.     blood glucose (NO BRAND SPECIFIED) lancing device Device to be used with lancets.     blood glucose monitoring (NO BRAND SPECIFIED) meter device kit Use to test blood sugar 1 time daily or as directed.     budesonide (PULMICORT) 0.5 MG/2ML neb solution Spray 2 mLs (0.5 mg) in nostril 2 times daily Make 240 cc [place] med sinus irrigation Mix 2 ml vial of budesonide 0.5 mg Rinse 1-2 times daily for 2-4 weeks.     cetirizine (ZYRTEC) 10 MG tablet TAKE 1 TABLET BY MOUTH DAILY     diclofenac (VOLTAREN) 50 MG EC tablet TAKE 1 TABLET BY MOUTH TWICE DAILY with food as needed for back pain     dronabinol (MARINOL) 2.5 MG capsule TAKE 1 CAPSULE BY MOUTH 2 TIMES DAILY BEFORE MEALS     famotidine (PEPCID) 20 MG tablet TAKE 1 TABLET BY MOUTH NIGHTLY AS NEEDED FOR REFLUX     fluticasone (FLONASE) 50 MCG/ACT nasal spray USE 2 SPRAYS IN EACH NOSTRIL DAILY     furosemide (LASIX) 20 MG tablet Take 1 tablet (20 mg) by mouth daily for 30 days     gabapentin (NEURONTIN) 300 MG capsule TAKE 3 CAPSULES BY MOUTH THREE TIMES DAILY     hydrOXYzine (VISTARIL) 50 MG capsule TAKE 1 TO 2 CAPSULES BY MOUTH 4 TIMES DAILY AS NEEDED FOR ANXIETY     ibuprofen (ADVIL/MOTRIN) 600 MG tablet TAKE 1 TABLET BY MOUTH EVERY 6 HOURS AS NEEDED     lisdexamfetamine (VYVANSE) 70 MG capsule TAKE 1 CAPSULE BY MOUTH EVERY MORNING     loratadine (CLARITIN) 10 MG tablet Take 10 mg by mouth daily     losartan (COZAAR) 50 MG tablet TAKE 1 TABLET BY MOUTH DAILY     methocarbamol (ROBAXIN) 750 MG tablet Take 1 tablet (750 mg) by mouth 4 times daily as needed for muscle spasms     metoprolol succinate ER (TOPROL XL) 50 MG 24 hr tablet TAKE 1 TABLET BY MOUTH DAILY     mometasone-formoterol (DULERA) 200-5 MCG/ACT inhaler Inhale 2 puffs into the lungs 2 times daily     montelukast (SINGULAIR) 10 MG tablet TAKE 1 TABLET BY MOUTH AT BEDTIME     multivitamin  with iron (SM COMPLETE ADVANCED FORMULA) TABS  TAKE 1 TABLET BY MOUTH DAILY     mupirocin (BACTROBAN) 2 % external ointment      naloxone (NARCAN) 1 mg/mL for intranasal kit (2 syringes with 2 mucosal atomizer device) In opioid overdose put cone in nostril and push 1/2 of contents into each nostril.  Repeat every 3 min if no response until help arrives.     nicotine (NICODERM CQ) 21 MG/24HR 24 hr patch PLACE 1 PATCH ONTO THE SKIN EVERY 24 HOURS     nicotine polacrilex (NICORETTE) 4 MG gum PLACE 1 PIECE OF GUM INSIDE CHEEK AS NEEDED FOR SMOKING CESSATION     nortriptyline (PAMELOR) 50 MG capsule TAKE 2 CAPSULES (100MG) BY MOUTH AT BEDTIME     omeprazole (PRILOSEC) 20 MG DR capsule TAKE 1 CAPSULE BY MOUTH 2 TIMES DAILY     OXcarbazepine (TRILEPTAL) 600 MG tablet Take 1 tablet (600 mg) by mouth 2 times daily     SM ANTISEPTIC SKIN CLEANSER 4 % solution      SUMAtriptan (IMITREX) 50 MG tablet TAKE 1 TABLET BY MOUTH AT ONSET OF HEADACHE FOR MIGRAINE. MAY REPEAT IN 2 HOURS. MAX OF 4 TABLETS IN 24 HOURS     tamsulosin (FLOMAX) 0.4 MG capsule TAKE 1 CAPSULE BY MOUTH DAILY     tiZANidine (ZANAFLEX) 4 MG tablet TAKE 1 TABLET BY MOUTH 3 TIMES DAILY     traZODone (DESYREL) 100 MG tablet Take 1 tablet (100 mg) by mouth At Bedtime     VENTOLIN  (90 Base) MCG/ACT inhaler INHALE 2 PUFFS INTO THE LUNGS EVERY 4 HOURS AS NEEDED     dextromethorphan-guaiFENesin (MUCINEX DM)  MG 12 hr tablet TAKE 1 TABLET BY MOUTH EVERY 12 HOURS     STOOL SOFTENER/LAXATIVE 50-8.6 MG tablet TAKE 1 OR 2 TABLETS BY MOUTH 2 TIMES A DAY (Patient not taking: No sig reported)     No current facility-administered medications for this visit.       VITALS   /66   Pulse 80   Temp 97.4  F (36.3  C) (Tympanic)   Wt 83.9 kg (185 lb)   SpO2 96%   BMI 25.80 kg/m       LABS                                                                                                                           Last Comprehensive Metabolic Panel:  Sodium   Date Value Ref Range Status   09/30/2022 137 133 - 144  mmol/L Final   12/14/2020 141 133 - 144 mmol/L Final     Potassium   Date Value Ref Range Status   09/30/2022 4.8 3.4 - 5.3 mmol/L Final   12/14/2020 3.8 3.4 - 5.3 mmol/L Final     Chloride   Date Value Ref Range Status   09/30/2022 104 94 - 109 mmol/L Final   12/14/2020 107 94 - 109 mmol/L Final     Carbon Dioxide   Date Value Ref Range Status   12/14/2020 28 20 - 32 mmol/L Final     Carbon Dioxide (CO2)   Date Value Ref Range Status   09/30/2022 29 20 - 32 mmol/L Final     Anion Gap   Date Value Ref Range Status   09/30/2022 4 3 - 14 mmol/L Final   12/14/2020 6 3 - 14 mmol/L Final     Glucose   Date Value Ref Range Status   09/30/2022 85 70 - 99 mg/dL Final   12/14/2020 108 (H) 70 - 99 mg/dL Final     Urea Nitrogen   Date Value Ref Range Status   09/30/2022 30 7 - 30 mg/dL Final   12/14/2020 16 7 - 30 mg/dL Final     Creatinine   Date Value Ref Range Status   09/30/2022 1.25 0.66 - 1.25 mg/dL Final   12/14/2020 0.77 0.66 - 1.25 mg/dL Final     GFR Estimate   Date Value Ref Range Status   09/30/2022 66 >60 mL/min/1.73m2 Final     Comment:     Effective December 21, 2021 eGFRcr in adults is calculated using the 2021 CKD-EPI creatinine equation which includes age and gender (Ady neal al., NEJ, DOI: 10.1056/XHUTwi6716334)   12/14/2020 >90 >60 mL/min/[1.73_m2] Final     Comment:     Non  GFR Calc  Starting 12/18/2018, serum creatinine based estimated GFR (eGFR) will be   calculated using the Chronic Kidney Disease Epidemiology Collaboration   (CKD-EPI) equation.       Calcium   Date Value Ref Range Status   09/30/2022 8.6 8.5 - 10.1 mg/dL Final   12/14/2020 8.2 (L) 8.5 - 10.1 mg/dL Final     CBC RESULTS: Recent Labs   Lab Test 09/30/22  1514   WBC 5.0   RBC 3.63*   HGB 10.6*   HCT 32.3*   MCV 89   MCH 29.2   MCHC 32.8   RDW 12.3   *     Nortriptyline level 79 as of 5/2022  Oxcarbazepine level 14 as of 5/2022    EKG 6/3/19 with QTc of 415 ms     MENTAL STATUS EXAM                                                                                          Awake, alert, oriented. No problems psychomotor behavior. Speech: normal volume, rhythm, rate. Thought process, including associations, was unremarkable and thought content was devoid of suicidal and homicidal ideation.  No hallucinations. Insight limited. Judgment  adequate for safety. Fund of knowledge was intact. Pt demonstrates no obvious problems with attention, concentration, language, recent or remote memory although these were not formally tested.       ASSESSMENT                                                                                                      HISTORICAL:  Initial psych note 10/6/15          NOTES:      Joshua is a 62 year old with bipolar, ADHD, and MDD.  We started Valium as dual purpose: muscle relaxant properties and for anxiety. We have discussed the new guidelines for short - term use of benzodiazepines (Valium) and avoiding their use along with opioids. I had noted plan to taper down over time and eventually discontinue. We decreased from 5 mg every 12 hours as needed down to 2 mg every 12 hours as needed. March '20 we decreased to once daily and then discontinued.      I pointed out to Trent oxcarbazepine can cause hyponatremia / low sodium. Was low 9/21/22 , recheck week later was normal.  I also noted I watch his CBC. We checked nortriptyline and oxcarbazepine level May 2022 and both within range.          TREATMENT RISK STATEMENT:  The risks, benefits, alternatives and potential adverse effects have been explained and are understood by the pt.  The pt agrees to the treatment plan with the ability to do so.   The pt knows to call the clinic for any problems or access emergency care if needed.        DIAGNOSES                    Bipolar disorder I with psychosis vs. Schizoaffective disorder, bipolar type  ADHD      PLAN                                                                                                                     1)  MEDICATIONS:       Continue Trileptal 600 mg bid refilled today.  Continue trazodone 100 mg bedtime prn insomnia set to fill November. Next visit discuss starting decrease nortriptyline (end of visit he brought up feeling not helping). Continue Vyvanse 70 mg daily and last filled 9/24/22 and I set to fill next 10/21/22.   2)  THERAPY:  No change    3)  LABS: UDS 5/2022. CBC and BMP 9/2022.  nortriptyline level and Trileptal level 5/2022    4)  PT MONITOR [call for probs]:  SEs from meds, worsening sx, SI/HI    5)  REFERRALS [CD, medical, other]:  None    6)  RTC: 2 months

## 2022-10-13 ENCOUNTER — TRANSFERRED RECORDS (OUTPATIENT)
Dept: HEALTH INFORMATION MANAGEMENT | Facility: CLINIC | Age: 62
End: 2022-10-13

## 2022-10-19 ENCOUNTER — TELEPHONE (OUTPATIENT)
Dept: FAMILY MEDICINE | Facility: OTHER | Age: 62
End: 2022-10-19

## 2022-10-19 DIAGNOSIS — M62.830 BACK MUSCLE SPASM: ICD-10-CM

## 2022-10-19 DIAGNOSIS — M54.50 LUMBAR BACK PAIN: ICD-10-CM

## 2022-10-19 RX ORDER — BACLOFEN 20 MG/1
TABLET ORAL
Qty: 120 TABLET | Refills: 0 | OUTPATIENT
Start: 2022-10-19

## 2022-10-19 NOTE — TELEPHONE ENCOUNTER
3:28 PM    Reason for Call: Phone Call    Description: Patient dropped off a form for Dr Kraft to sign and he is wondering if this is completed? He would like to speak to the nurse when Dr Kraft is in the office on Thursday.     Was an appointment offered for this call? No  If yes : Appointment type              Date    Preferred method for responding to this message: Telephone Call  What is your phone number ? [phone]    If we cannot reach you directly, may we leave a detailed response at the number you provided? Yes    Can this message wait until your PCP/provider returns, if available today? YES, No

## 2022-10-27 ENCOUNTER — HOSPITAL ENCOUNTER (OUTPATIENT)
Dept: ULTRASOUND IMAGING | Facility: HOSPITAL | Age: 62
Discharge: HOME | End: 2022-10-27
Attending: NURSE PRACTITIONER | Admitting: NURSE PRACTITIONER
Payer: COMMERCIAL

## 2022-10-27 DIAGNOSIS — R60.0 BILATERAL LEG EDEMA: ICD-10-CM

## 2022-10-27 PROCEDURE — 76700 US EXAM ABDOM COMPLETE: CPT

## 2022-10-28 ENCOUNTER — HOSPITAL ENCOUNTER (OUTPATIENT)
Dept: NUCLEAR MEDICINE | Facility: HOSPITAL | Age: 62
Setting detail: NUCLEAR MEDICINE
Discharge: HOME | End: 2022-10-28
Attending: NURSE PRACTITIONER
Payer: COMMERCIAL

## 2022-10-28 ENCOUNTER — HOSPITAL ENCOUNTER (OUTPATIENT)
Dept: CARDIOLOGY | Facility: HOSPITAL | Age: 62
Setting detail: NUCLEAR MEDICINE
Discharge: HOME | End: 2022-10-28
Attending: NURSE PRACTITIONER
Payer: COMMERCIAL

## 2022-10-28 DIAGNOSIS — R07.89 ATYPICAL CHEST PAIN: ICD-10-CM

## 2022-10-28 DIAGNOSIS — R06.09 DYSPNEA ON EXERTION: ICD-10-CM

## 2022-10-28 PROCEDURE — A9500 TC99M SESTAMIBI: HCPCS | Performed by: RADIOLOGY

## 2022-10-28 PROCEDURE — 343N000001 HC RX 343: Performed by: RADIOLOGY

## 2022-10-28 PROCEDURE — 93016 CV STRESS TEST SUPVJ ONLY: CPT | Performed by: INTERNAL MEDICINE

## 2022-10-28 PROCEDURE — 250N000011 HC RX IP 250 OP 636: Performed by: INTERNAL MEDICINE

## 2022-10-28 PROCEDURE — 93018 CV STRESS TEST I&R ONLY: CPT | Performed by: INTERNAL MEDICINE

## 2022-10-28 PROCEDURE — 78452 HT MUSCLE IMAGE SPECT MULT: CPT

## 2022-10-28 PROCEDURE — 93017 CV STRESS TEST TRACING ONLY: CPT

## 2022-10-28 RX ORDER — CAFFEINE CITRATE 20 MG/ML
SOLUTION INTRAVENOUS
Status: DISCONTINUED
Start: 2022-10-28 | End: 2022-10-28 | Stop reason: WASHOUT

## 2022-10-28 RX ORDER — REGADENOSON 0.08 MG/ML
0.4 INJECTION, SOLUTION INTRAVENOUS ONCE
Status: COMPLETED | OUTPATIENT
Start: 2022-10-28 | End: 2022-10-28

## 2022-10-28 RX ADMIN — Medication 10.8 MILLICURIE: at 07:22

## 2022-10-28 RX ADMIN — Medication 31.7 MILLICURIE: at 09:24

## 2022-10-28 RX ADMIN — REGADENOSON 0.4 MG: 0.08 INJECTION, SOLUTION INTRAVENOUS at 09:24

## 2022-10-31 ENCOUNTER — OFFICE VISIT (OUTPATIENT)
Dept: CHIROPRACTIC MEDICINE | Facility: OTHER | Age: 62
End: 2022-10-31
Attending: CHIROPRACTOR
Payer: COMMERCIAL

## 2022-10-31 DIAGNOSIS — M54.2 CERVICALGIA: ICD-10-CM

## 2022-10-31 DIAGNOSIS — M99.03 SEGMENTAL AND SOMATIC DYSFUNCTION OF LUMBAR REGION: ICD-10-CM

## 2022-10-31 DIAGNOSIS — M99.02 SEGMENTAL AND SOMATIC DYSFUNCTION OF THORACIC REGION: ICD-10-CM

## 2022-10-31 DIAGNOSIS — M99.01 SEGMENTAL AND SOMATIC DYSFUNCTION OF CERVICAL REGION: Primary | ICD-10-CM

## 2022-10-31 LAB
CV BLOOD PRESSURE: 74 MMHG
CV STRESS MAX HR HE: 67
NUC STRESS EJECTION FRACTION: 73 %
RATE PRESSURE PRODUCT: 8710
STRESS ECHO BASELINE DIASTOLIC HE: 76
STRESS ECHO BASELINE HR: 58 BPM
STRESS ECHO BASELINE SYSTOLIC BP: 144
STRESS ECHO CALCULATED PERCENT HR: 42 %
STRESS ECHO LAST STRESS DIASTOLIC BP: 70
STRESS ECHO LAST STRESS SYSTOLIC BP: 130
STRESS ECHO TARGET HR: 160

## 2022-10-31 PROCEDURE — 98941 CHIROPRACT MANJ 3-4 REGIONS: CPT | Mod: AT | Performed by: CHIROPRACTOR

## 2022-10-31 NOTE — PROGRESS NOTES
Subjective Finding:    Chief compalint: Patient presents with:  Back Pain  , Pain Scale: 4/10, Intensity: dull, Duration: 2 days, Radiating: no.    Date of injury:     Activities that the pain restricts:   Home/household/hobbies/social activities: yes.  Work duties: yes.  Sleep: yes.  Makes symptoms better: rest.  Makes symptoms worse: lumbar extension and lumbar flexion.  Have you seen anyone else for the symptoms? No.  Work related: no.  Automobile related injury: no.    Objective and Assessment:    Posture Analysis:   High shoulder: .  Head tilt: .  High iliac crest: .  Head carriage: neutral.  Thoracic Kyphosis: neutral.  Lumbar Lordosis: forward.    Lumbar Range of Motion: flexion decreased and extension decreased.  Cervical Range of Motion: extension decreased.  Thoracic Range of Motion: extension decreased.  Extremity Range of Motion: .    Palpation:   Quad lumb: bilateral, referred pain: no  T paraspinals: dull pain, no    Segmental dysfunction pre-treatment and treatment area: C4, T5, T6 and Sacrum.    Assessment post-treatment:  Cervical: ROM increased.  Thoracic: ROM increased.  Lumbar: ROM increased.    Comments: history of DDD in C and L spine.      Complicating Factors: .    Procedure(s):  CMT:  50177 Chiropractic manipulative treatment 3-4 regions performed   Cervical: Diversified, See above for level, Supine, Thoracic: Diversified, See above for level, Prone and Lumbar: Diversified, See above for level, Side posture    Modalities:  None performed this visit    Therapeutic procedures:  None    Plan:  Treatment plan: PRN.  Instructed patient: stretch as instructed at visit.  Short term goals: increase ROM.  Long term goals: increase ADL.  Prognosis: good.

## 2022-11-02 NOTE — PROGRESS NOTES
Assessment & Plan     Chronic bilateral low back pain without sciatica / Arthralgia of both hands / Implantable intrathecal infusion pump present / Other chronic pain  Trent will follow up with [facility] for his pain pump. Discussed I am not willing to Rx oral opioids  Medications reviewed.  - discontinued baclofen, tizandine d/t methocarbamol is the more effective muscle relaxer  - discontinued ibuprofen d/t being on diclofenac which is more effective  - diclofenac (VOLTAREN) 50 MG EC tablet; TAKE 1 TABLET BY MOUTH three times DAILY with food as needed for back pain  - c/w APAP  - c/w gabapentin 900mg tid  - obtain notes from [facility]      Bilateral leg edema / Dyspnea on exertion  Follows with cardiology, Theresa Ruiz with last visit today   - N terminal pro BNP outpatient  - normal stress test  - consideration for PFTs  - edema responding well to lasix 20mg   - RSVP 36mmHg above right atrial pressure  - ziopatch results pending  - follow up prn     Recurrent major depressive disorder, remission status unspecified (H)  Follows with Dr. Fernandez with next visit 11/16/2022    40 minutes spent on the date of the encounter doing chart review, review of test results, interpretation of tests, patient visit, documentation       See Patient Instructions    Return in about 4 weeks (around 12/2/2022) for Chronic pain .    Lissy Kraft MD  [facility] - KRISTI Newman is a 62 year old, presenting for the following health issues:  Depression, Anxiety, and Pain      HPI     Depression and Anxiety Follow-Up    How are you doing with your depression since your last visit? No change    How are you doing with your anxiety since your last visit?  No change    Are you having other symptoms that might be associated with depression or anxiety? Yes:  anger issues    Have you had a significant life event? Health Concerns     Do you have any concerns with your use of alcohol or other  drugs? No    - follows with Dr. Fernandez with last visit 10/12/2022 and next visit 11/16/2022    Social History     Tobacco Use     Smoking status: Former     Packs/day: 0.00     Years: 30.00     Pack years: 0.00     Types: Cigarettes     Quit date: 8/8/2007     Years since quitting: 15.2     Smokeless tobacco: Current     Types: Snuff   Vaping Use     Vaping Use: Never used   Substance Use Topics     Alcohol use: Yes     Comment: daily     Drug use: No     PHQ 5/24/2022 10/12/2022 11/4/2022   PHQ-9 Total Score 5 5 6   Q9: Thoughts of better off dead/self-harm past 2 weeks Not at all Not at all Not at all   F/U: Thoughts of suicide or self-harm - - -   F/U: Safety concerns - - -     DAYANA-7 SCORE 5/24/2022 10/12/2022 11/4/2022   Total Score 1 4 2         Pain History:  When did you first notice your pain? - Chronic Pain   Have you seen this provider for your pain in the past?   Yes   Where in your body do you have pain? Back  Are you seeing anyone else for your pain? No    PHQ-9 SCORE 5/24/2022 10/12/2022 11/4/2022   PHQ-9 Total Score - - -   PHQ-9 Total Score 5 5 6       DAYANA-7 SCORE 5/24/2022 10/12/2022 11/4/2022   Total Score 1 4 2           PEG Score 6/24/2022 11/4/2022   PEG Total Score 8 10       Chronic Pain Follow Up:    Location of pain: back pain  Analgesia/pain control:    - Recent changes:  No changes     - Overall control: Inadequate pain control    - Current treatments: pain pump   Adherence:     - Do you ever take more pain medicine than prescribed?     - When did you take your last dose of pain medicine?     Adverse effects:      - pain with daily activities  - follows with [facility], feels like they are not listening    - not taking baclofen, resulting in muscle spasms  - taking methocarbamol  - works best  - taking tizanidine - will discontinue    - all muscle relaxers work together     - APAP (2) 325mg q6h prn 4000g/day  - diclofenac 50mg  tid   - gabapentin 900mg tid  - also taking  ibuprofen 600mg qid      PDMP Review       Value Time User    State PDMP site checked  Yes 10/12/2022  3:21 PM Laquita Fernandez MD        Last CSA Agreement:   CSA -- Patient Level:     [Media Unavailable] Controlled Substance Agreement - Opioid - Scan on 5/27/2022  9:04 AM: CONTROLLED SUBSTANCE AGREEMENT   [Media Unavailable] Controlled Substance Agreement - Opioid - Scan on 3/9/2021 11:32 AM: controlled substance agreement   [Media Unavailable] Controlled Substance Agreement - Non - Opioid - Scan on 7/15/2019 10:03 AM: NON-OPIOID CONTROLLED SUBSTANCE AGREEMENT       Last UDS: 11/30/2021      # Cardiology appt at 9:30AM today     Review of Systems   Constitutional: Negative for chills and fever.   HENT: Negative for congestion.    Respiratory: Negative for shortness of breath and wheezing.    Cardiovascular: Negative for chest pain and palpitations.   Gastrointestinal: Negative for abdominal pain.   Musculoskeletal: Positive for back pain.        Trigger fingers   Psychiatric/Behavioral: Positive for dysphoric mood. The patient is nervous/anxious.           Objective    /70   Pulse 70   Temp 96.8  F (36  C) (Tympanic)   Resp 18   Wt 82.1 kg (181 lb)   SpO2 94%   BMI 25.24 kg/m    Body mass index is 25.24 kg/m .  Physical Exam  Constitutional:       General: He is not in acute distress.     Appearance: He is not ill-appearing.   Cardiovascular:      Rate and Rhythm: Normal rate and regular rhythm.      Pulses: Normal pulses.      Heart sounds: No murmur heard.  Pulmonary:      Effort: Pulmonary effort is normal. No respiratory distress.      Breath sounds: No wheezing or rales.   Musculoskeletal:      Comments: Stiff getting up from chair   Neurological:      Mental Status: He is alert.   Psychiatric:         Mood and Affect: Mood is anxious and depressed.        Results for orders placed or performed in visit on 11/04/22   Basic metabolic panel     Status: Abnormal   Result Value Ref Range    Sodium  137 136 - 145 mmol/L    Potassium 4.7 3.4 - 5.3 mmol/L    Chloride 101 98 - 107 mmol/L    Carbon Dioxide (CO2) 28 22 - 29 mmol/L    Anion Gap 8 7 - 15 mmol/L    Urea Nitrogen 19.5 8.0 - 23.0 mg/dL    Creatinine 0.85 0.67 - 1.17 mg/dL    Calcium 9.0 8.8 - 10.2 mg/dL    Glucose 112 (H) 70 - 99 mg/dL    GFR Estimate >90 >60 mL/min/1.73m2   Results for orders placed or performed in visit on 11/04/22   N terminal pro BNP outpatient     Status: Normal   Result Value Ref Range    N Terminal Pro BNP Outpatient 212 0 - 900 pg/mL   Drug Confirmation Panel Urine with Creat *Canceled*     Status: None ()    Narrative    The following orders were created for panel order Drug Confirmation Panel Urine with Creat.  Procedure                               Abnormality         Status                     ---------                               -----------         ------                     Urine Drug Confirmation ...[[id number]]                                                 Urine Creatinine for Mark...[[id number]]                                                 Cannabinoids qualitative...[[id number]]                                                   Please view results for these tests on the individual orders.           Recent Labs   Lab Test 09/30/22  1514 07/22/21  1210 03/17/16  1412 11/17/14  1045   CHOL 150 168   < > 185   HDL 53 59   < > 44   LDL 69 42   < > 86   TRIG 139 335*   < > 275*   CHOLHDLRATIO  --   --   --  4.2    < > = values in this interval not displayed.     Last Comprehensive Metabolic Panel:  Sodium   Date Value Ref Range Status   09/30/2022 137 133 - 144 mmol/L Final   12/14/2020 141 133 - 144 mmol/L Final     Potassium   Date Value Ref Range Status   09/30/2022 4.8 3.4 - 5.3 mmol/L Final   12/14/2020 3.8 3.4 - 5.3 mmol/L Final     Chloride   Date Value Ref Range Status   09/30/2022 104 94 - 109 mmol/L Final   12/14/2020 107 94 - 109 mmol/L Final     Carbon Dioxide   Date Value Ref Range Status   12/14/2020 28 20 -  32 mmol/L Final     Carbon Dioxide (CO2)   Date Value Ref Range Status   09/30/2022 29 20 - 32 mmol/L Final     Anion Gap   Date Value Ref Range Status   09/30/2022 4 3 - 14 mmol/L Final   12/14/2020 6 3 - 14 mmol/L Final     Glucose   Date Value Ref Range Status   09/30/2022 85 70 - 99 mg/dL Final   12/14/2020 108 (H) 70 - 99 mg/dL Final     Urea Nitrogen   Date Value Ref Range Status   09/30/2022 30 7 - 30 mg/dL Final   12/14/2020 16 7 - 30 mg/dL Final     Creatinine   Date Value Ref Range Status   09/30/2022 1.25 0.66 - 1.25 mg/dL Final   12/14/2020 0.77 0.66 - 1.25 mg/dL Final     GFR Estimate   Date Value Ref Range Status   09/30/2022 66 >60 mL/min/1.73m2 Final     Comment:     Effective December 21, 2021 eGFRcr in adults is calculated using the 2021 CKD-EPI creatinine equation which includes age and gender (Ady et al., NEJ, DOI: 10.1056/ZWMEkt7226960)   12/14/2020 >90 >60 mL/min/[1.73_m2] Final     Comment:     Non  GFR Calc  Starting 12/18/2018, serum creatinine based estimated GFR (eGFR) will be   calculated using the Chronic Kidney Disease Epidemiology Collaboration   (CKD-EPI) equation.       Calcium   Date Value Ref Range Status   09/30/2022 8.6 8.5 - 10.1 mg/dL Final   12/14/2020 8.2 (L) 8.5 - 10.1 mg/dL Final     Bilirubin Total   Date Value Ref Range Status   09/30/2022 0.2 0.2 - 1.3 mg/dL Final   12/14/2020 0.1 (L) 0.2 - 1.3 mg/dL Final     Alkaline Phosphatase   Date Value Ref Range Status   09/30/2022 101 40 - 150 U/L Final   12/14/2020 81 40 - 150 U/L Final     ALT   Date Value Ref Range Status   09/30/2022 26 0 - 70 U/L Final   12/14/2020 31 0 - 70 U/L Final     AST   Date Value Ref Range Status   09/30/2022 14 0 - 45 U/L Final   12/14/2020 19 0 - 45 U/L Final             CBC RESULTS: Recent Labs   Lab Test 09/30/22  1514   WBC 5.0   RBC 3.63*   HGB 10.6*   HCT 32.3*   MCV 89   MCH 29.2   MCHC 32.8   RDW 12.3   *

## 2022-11-03 PROBLEM — M96.1 FAILED BACK SYNDROME OF LUMBAR SPINE: Status: ACTIVE | Noted: 2022-08-02

## 2022-11-03 PROBLEM — Z97.8 PRESENCE OF INTRATHECAL PUMP: Status: ACTIVE | Noted: 2022-08-02

## 2022-11-03 PROBLEM — J43.1 PANLOBULAR EMPHYSEMA (H): Status: ACTIVE | Noted: 2022-08-02

## 2022-11-03 RX ORDER — BLOOD SUGAR DIAGNOSTIC
STRIP MISCELLANEOUS
COMMUNITY
Start: 2022-10-26

## 2022-11-03 RX ORDER — BLOOD SUGAR DIAGNOSTIC
STRIP MISCELLANEOUS
COMMUNITY
Start: 2022-10-26 | End: 2023-08-09

## 2022-11-04 ENCOUNTER — OFFICE VISIT (OUTPATIENT)
Dept: FAMILY MEDICINE | Facility: OTHER | Age: 62
End: 2022-11-04
Attending: FAMILY MEDICINE
Payer: COMMERCIAL

## 2022-11-04 ENCOUNTER — OFFICE VISIT (OUTPATIENT)
Dept: CARDIOLOGY | Facility: OTHER | Age: 62
End: 2022-11-04
Attending: FAMILY MEDICINE
Payer: COMMERCIAL

## 2022-11-04 VITALS
BODY MASS INDEX: 25.34 KG/M2 | SYSTOLIC BLOOD PRESSURE: 132 MMHG | HEART RATE: 60 BPM | DIASTOLIC BLOOD PRESSURE: 78 MMHG | WEIGHT: 181 LBS | OXYGEN SATURATION: 97 % | HEIGHT: 71 IN | RESPIRATION RATE: 16 BRPM | TEMPERATURE: 97.2 F

## 2022-11-04 VITALS
BODY MASS INDEX: 25.24 KG/M2 | TEMPERATURE: 96.8 F | RESPIRATION RATE: 18 BRPM | OXYGEN SATURATION: 94 % | SYSTOLIC BLOOD PRESSURE: 108 MMHG | WEIGHT: 181 LBS | HEART RATE: 70 BPM | DIASTOLIC BLOOD PRESSURE: 70 MMHG

## 2022-11-04 DIAGNOSIS — M54.50 CHRONIC BILATERAL LOW BACK PAIN WITHOUT SCIATICA: Primary | ICD-10-CM

## 2022-11-04 DIAGNOSIS — E78.2 MIXED HYPERLIPIDEMIA: ICD-10-CM

## 2022-11-04 DIAGNOSIS — G89.29 CHRONIC BILATERAL LOW BACK PAIN WITHOUT SCIATICA: Primary | ICD-10-CM

## 2022-11-04 DIAGNOSIS — M25.541 ARTHRALGIA OF BOTH HANDS: ICD-10-CM

## 2022-11-04 DIAGNOSIS — I10 BENIGN ESSENTIAL HYPERTENSION: ICD-10-CM

## 2022-11-04 DIAGNOSIS — R60.0 BILATERAL LEG EDEMA: ICD-10-CM

## 2022-11-04 DIAGNOSIS — M25.542 ARTHRALGIA OF BOTH HANDS: ICD-10-CM

## 2022-11-04 DIAGNOSIS — Z96.89 IMPLANTABLE INTRATHECAL INFUSION PUMP PRESENT: ICD-10-CM

## 2022-11-04 DIAGNOSIS — F33.9 RECURRENT MAJOR DEPRESSIVE DISORDER, REMISSION STATUS UNSPECIFIED (H): ICD-10-CM

## 2022-11-04 DIAGNOSIS — Z87.891 FORMER SMOKER: ICD-10-CM

## 2022-11-04 DIAGNOSIS — R07.89 ATYPICAL CHEST PAIN: Primary | ICD-10-CM

## 2022-11-04 DIAGNOSIS — R06.09 DYSPNEA ON EXERTION: ICD-10-CM

## 2022-11-04 DIAGNOSIS — G89.29 OTHER CHRONIC PAIN: ICD-10-CM

## 2022-11-04 DIAGNOSIS — R00.2 PALPITATIONS: ICD-10-CM

## 2022-11-04 LAB
ANION GAP SERPL CALCULATED.3IONS-SCNC: 8 MMOL/L (ref 7–15)
BUN SERPL-MCNC: 19.5 MG/DL (ref 8–23)
CALCIUM SERPL-MCNC: 9 MG/DL (ref 8.8–10.2)
CHLORIDE SERPL-SCNC: 101 MMOL/L (ref 98–107)
CREAT SERPL-MCNC: 0.85 MG/DL (ref 0.67–1.17)
DEPRECATED HCO3 PLAS-SCNC: 28 MMOL/L (ref 22–29)
GFR SERPL CREATININE-BSD FRML MDRD: >90 ML/MIN/1.73M2
GLUCOSE SERPL-MCNC: 112 MG/DL (ref 70–99)
NT-PROBNP SERPL-MCNC: 212 PG/ML (ref 0–900)
POTASSIUM SERPL-SCNC: 4.7 MMOL/L (ref 3.4–5.3)
SODIUM SERPL-SCNC: 137 MMOL/L (ref 136–145)

## 2022-11-04 PROCEDURE — 83880 ASSAY OF NATRIURETIC PEPTIDE: CPT | Mod: ZL | Performed by: FAMILY MEDICINE

## 2022-11-04 PROCEDURE — 36415 COLL VENOUS BLD VENIPUNCTURE: CPT | Mod: ZL | Performed by: NURSE PRACTITIONER

## 2022-11-04 PROCEDURE — 80048 BASIC METABOLIC PNL TOTAL CA: CPT | Mod: ZL | Performed by: NURSE PRACTITIONER

## 2022-11-04 PROCEDURE — G0463 HOSPITAL OUTPT CLINIC VISIT: HCPCS | Mod: 27

## 2022-11-04 PROCEDURE — 99214 OFFICE O/P EST MOD 30 MIN: CPT | Performed by: NURSE PRACTITIONER

## 2022-11-04 PROCEDURE — G0463 HOSPITAL OUTPT CLINIC VISIT: HCPCS

## 2022-11-04 PROCEDURE — 99215 OFFICE O/P EST HI 40 MIN: CPT | Performed by: FAMILY MEDICINE

## 2022-11-04 PROCEDURE — G0463 HOSPITAL OUTPT CLINIC VISIT: HCPCS | Mod: 25

## 2022-11-04 ASSESSMENT — ANXIETY QUESTIONNAIRES
2. NOT BEING ABLE TO STOP OR CONTROL WORRYING: NOT AT ALL
5. BEING SO RESTLESS THAT IT IS HARD TO SIT STILL: NOT AT ALL
6. BECOMING EASILY ANNOYED OR IRRITABLE: SEVERAL DAYS
1. FEELING NERVOUS, ANXIOUS, OR ON EDGE: NOT AT ALL
GAD7 TOTAL SCORE: 2
7. FEELING AFRAID AS IF SOMETHING AWFUL MIGHT HAPPEN: NOT AT ALL
GAD7 TOTAL SCORE: 2
3. WORRYING TOO MUCH ABOUT DIFFERENT THINGS: NOT AT ALL

## 2022-11-04 ASSESSMENT — ENCOUNTER SYMPTOMS
WHEEZING: 0
ABDOMINAL PAIN: 0
SHORTNESS OF BREATH: 0
BACK PAIN: 1
PALPITATIONS: 0
NERVOUS/ANXIOUS: 1
FEVER: 0
CHILLS: 0
DYSPHORIC MOOD: 1

## 2022-11-04 ASSESSMENT — PAIN SCALES - GENERAL
PAINLEVEL: EXTREME PAIN (8)
PAINLEVEL: WORST PAIN (10)

## 2022-11-04 ASSESSMENT — PATIENT HEALTH QUESTIONNAIRE - PHQ9
SUM OF ALL RESPONSES TO PHQ QUESTIONS 1-9: 6
5. POOR APPETITE OR OVEREATING: SEVERAL DAYS

## 2022-11-04 NOTE — PATIENT INSTRUCTIONS
Please follow up with [facility]    Stop your tizanidine and continue with methocarbamol     Stop your ibuprofen since you are on diclofenac

## 2022-11-04 NOTE — NURSING NOTE
"Chief Complaint   Patient presents with     Follow Up     testing       Initial /78 (BP Location: Left arm, Patient Position: Sitting, Cuff Size: Adult Regular)   Pulse 60   Temp 97.2  F (36.2  C) (Tympanic)   Resp 16   Ht 1.803 m (5' 11\")   Wt 82.1 kg (181 lb)   SpO2 97%   BMI 25.24 kg/m   Estimated body mass index is 25.24 kg/m  as calculated from the following:    Height as of this encounter: 1.803 m (5' 11\").    Weight as of this encounter: 82.1 kg (181 lb).  Medication Reconciliation: complete  Mariza Salas LPN  "

## 2022-11-04 NOTE — NURSING NOTE
"Chief Complaint   Patient presents with     Depression     Anxiety     Pain       Initial /70   Pulse 70   Temp 96.8  F (36  C) (Tympanic)   Resp 18   Wt 82.1 kg (181 lb)   SpO2 94%   BMI 25.24 kg/m   Estimated body mass index is 25.24 kg/m  as calculated from the following:    Height as of 12/7/21: 1.803 m (5' 11\").    Weight as of this encounter: 82.1 kg (181 lb).  Medication Reconciliation: complete  Ramona Fuchs LPN  " Yes
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
## 2022-11-04 NOTE — PATIENT INSTRUCTIONS
Plan for BMP today to evaluate kidney functioning and electrolytes  If good, continue with daily furosemide to mange symptom of LE edema    Follow-up with cardiology as needed.

## 2022-11-08 ENCOUNTER — HOSPITAL ENCOUNTER (OUTPATIENT)
Dept: RESPIRATORY THERAPY | Facility: HOSPITAL | Age: 62
Discharge: HOME | End: 2022-11-08
Attending: NURSE PRACTITIONER | Admitting: INTERNAL MEDICINE
Payer: COMMERCIAL

## 2022-11-08 DIAGNOSIS — Z87.891 FORMER SMOKER: ICD-10-CM

## 2022-11-08 DIAGNOSIS — R06.09 DYSPNEA ON EXERTION: ICD-10-CM

## 2022-11-08 LAB — HGB BLD-MCNC: 11.4 G/DL (ref 13.3–17.7)

## 2022-11-08 PROCEDURE — 94726 PLETHYSMOGRAPHY LUNG VOLUMES: CPT | Mod: 26 | Performed by: INTERNAL MEDICINE

## 2022-11-08 PROCEDURE — 94729 DIFFUSING CAPACITY: CPT

## 2022-11-08 PROCEDURE — 85018 HEMOGLOBIN: CPT | Performed by: NURSE PRACTITIONER

## 2022-11-08 PROCEDURE — 94729 DIFFUSING CAPACITY: CPT | Mod: 26 | Performed by: INTERNAL MEDICINE

## 2022-11-08 PROCEDURE — 36415 COLL VENOUS BLD VENIPUNCTURE: CPT | Performed by: NURSE PRACTITIONER

## 2022-11-08 PROCEDURE — 94726 PLETHYSMOGRAPHY LUNG VOLUMES: CPT

## 2022-11-08 PROCEDURE — 94010 BREATHING CAPACITY TEST: CPT

## 2022-11-08 PROCEDURE — 94010 BREATHING CAPACITY TEST: CPT | Mod: 26 | Performed by: INTERNAL MEDICINE

## 2022-11-16 ENCOUNTER — APPOINTMENT (OUTPATIENT)
Dept: GENERAL RADIOLOGY | Facility: HOSPITAL | Age: 62
End: 2022-11-16
Attending: NURSE PRACTITIONER
Payer: COMMERCIAL

## 2022-11-16 ENCOUNTER — HOSPITAL ENCOUNTER (EMERGENCY)
Facility: HOSPITAL | Age: 62
Discharge: HOME | End: 2022-11-16
Attending: NURSE PRACTITIONER | Admitting: NURSE PRACTITIONER
Payer: COMMERCIAL

## 2022-11-16 ENCOUNTER — OFFICE VISIT (OUTPATIENT)
Dept: CHIROPRACTIC MEDICINE | Facility: OTHER | Age: 62
End: 2022-11-16
Attending: CHIROPRACTOR
Payer: COMMERCIAL

## 2022-11-16 ENCOUNTER — OFFICE VISIT (OUTPATIENT)
Dept: PSYCHIATRY | Facility: OTHER | Age: 62
End: 2022-11-16
Attending: PSYCHIATRY & NEUROLOGY
Payer: COMMERCIAL

## 2022-11-16 VITALS
DIASTOLIC BLOOD PRESSURE: 64 MMHG | SYSTOLIC BLOOD PRESSURE: 122 MMHG | TEMPERATURE: 97.2 F | BODY MASS INDEX: 25.24 KG/M2 | HEART RATE: 76 BPM | WEIGHT: 181 LBS | OXYGEN SATURATION: 98 %

## 2022-11-16 VITALS
OXYGEN SATURATION: 99 % | HEART RATE: 83 BPM | DIASTOLIC BLOOD PRESSURE: 78 MMHG | RESPIRATION RATE: 12 BRPM | SYSTOLIC BLOOD PRESSURE: 130 MMHG | TEMPERATURE: 98 F

## 2022-11-16 DIAGNOSIS — J44.1 COPD EXACERBATION (H): Primary | ICD-10-CM

## 2022-11-16 DIAGNOSIS — G89.29 CHRONIC BILATERAL LOW BACK PAIN WITH BILATERAL SCIATICA: ICD-10-CM

## 2022-11-16 DIAGNOSIS — M99.02 SEGMENTAL AND SOMATIC DYSFUNCTION OF THORACIC REGION: ICD-10-CM

## 2022-11-16 DIAGNOSIS — M99.01 SEGMENTAL AND SOMATIC DYSFUNCTION OF CERVICAL REGION: ICD-10-CM

## 2022-11-16 DIAGNOSIS — F90.2 ADHD (ATTENTION DEFICIT HYPERACTIVITY DISORDER), COMBINED TYPE: ICD-10-CM

## 2022-11-16 DIAGNOSIS — M99.03 SEGMENTAL AND SOMATIC DYSFUNCTION OF LUMBAR REGION: Primary | ICD-10-CM

## 2022-11-16 DIAGNOSIS — M54.41 CHRONIC BILATERAL LOW BACK PAIN WITH BILATERAL SCIATICA: ICD-10-CM

## 2022-11-16 DIAGNOSIS — M54.42 CHRONIC BILATERAL LOW BACK PAIN WITH BILATERAL SCIATICA: ICD-10-CM

## 2022-11-16 DIAGNOSIS — M54.50 ACUTE BILATERAL LOW BACK PAIN WITHOUT SCIATICA: ICD-10-CM

## 2022-11-16 PROCEDURE — 999N000157 HC STATISTIC RCP TIME EA 10 MIN

## 2022-11-16 PROCEDURE — 99214 OFFICE O/P EST MOD 30 MIN: CPT | Performed by: NURSE PRACTITIONER

## 2022-11-16 PROCEDURE — 94640 AIRWAY INHALATION TREATMENT: CPT

## 2022-11-16 PROCEDURE — 87637 SARSCOV2&INF A&B&RSV AMP PRB: CPT | Performed by: NURSE PRACTITIONER

## 2022-11-16 PROCEDURE — 99213 OFFICE O/P EST LOW 20 MIN: CPT | Performed by: PSYCHIATRY & NEUROLOGY

## 2022-11-16 PROCEDURE — 71045 X-RAY EXAM CHEST 1 VIEW: CPT

## 2022-11-16 PROCEDURE — 250N000009 HC RX 250: Performed by: NURSE PRACTITIONER

## 2022-11-16 PROCEDURE — G0463 HOSPITAL OUTPT CLINIC VISIT: HCPCS | Mod: 25

## 2022-11-16 PROCEDURE — 98941 CHIROPRACT MANJ 3-4 REGIONS: CPT | Mod: AT | Performed by: CHIROPRACTOR

## 2022-11-16 PROCEDURE — G0463 HOSPITAL OUTPT CLINIC VISIT: HCPCS

## 2022-11-16 PROCEDURE — C9803 HOPD COVID-19 SPEC COLLECT: HCPCS

## 2022-11-16 RX ORDER — IPRATROPIUM BROMIDE AND ALBUTEROL SULFATE 2.5; .5 MG/3ML; MG/3ML
3 SOLUTION RESPIRATORY (INHALATION) ONCE
Status: COMPLETED | OUTPATIENT
Start: 2022-11-16 | End: 2022-11-16

## 2022-11-16 RX ORDER — CEFUROXIME AXETIL 500 MG/1
500 TABLET ORAL 2 TIMES DAILY
Qty: 14 TABLET | Refills: 0 | Status: SHIPPED | OUTPATIENT
Start: 2022-11-16 | End: 2022-11-23

## 2022-11-16 RX ORDER — LISDEXAMFETAMINE DIMESYLATE 70 MG/1
70 CAPSULE ORAL EVERY MORNING
Qty: 30 CAPSULE | Refills: 0 | Status: SHIPPED | OUTPATIENT
Start: 2022-12-20 | End: 2023-06-07

## 2022-11-16 RX ORDER — ALBUTEROL SULFATE 90 UG/1
2 AEROSOL, METERED RESPIRATORY (INHALATION) ONCE
Status: DISCONTINUED | OUTPATIENT
Start: 2022-11-16 | End: 2022-11-16

## 2022-11-16 RX ORDER — NORTRIPTYLINE HYDROCHLORIDE 50 MG/1
CAPSULE ORAL
Qty: 60 CAPSULE | Refills: 4 | Status: SHIPPED | OUTPATIENT
Start: 2022-11-16 | End: 2023-06-07

## 2022-11-16 RX ORDER — LISDEXAMFETAMINE DIMESYLATE 70 MG/1
CAPSULE ORAL
Qty: 30 CAPSULE | Refills: 0 | Status: SHIPPED | OUTPATIENT
Start: 2022-11-22 | End: 2023-02-08

## 2022-11-16 RX ADMIN — IPRATROPIUM BROMIDE AND ALBUTEROL SULFATE 3 ML: .5; 3 SOLUTION RESPIRATORY (INHALATION) at 17:02

## 2022-11-16 ASSESSMENT — ACTIVITIES OF DAILY LIVING (ADL): ADLS_ACUITY_SCORE: 35

## 2022-11-16 ASSESSMENT — ENCOUNTER SYMPTOMS
APPETITE CHANGE: 0
COUGH: 1
FEVER: 0
CHILLS: 0
SHORTNESS OF BREATH: 1

## 2022-11-16 ASSESSMENT — PAIN SCALES - GENERAL: PAINLEVEL: EXTREME PAIN (8)

## 2022-11-16 NOTE — DISCHARGE INSTRUCTIONS
Continue using your inhalers as prescribed.  Take the antibiotic as prescribed until finished.    Follow-up with your doctor if no improvement in symptoms.    Return to the emergency department for worsening or concerning symptoms.

## 2022-11-16 NOTE — ED TRIAGE NOTES
Pt reports that he's coughing up green sputum since Wednesday last week. Pt denies fevers however endorses taking ibuprofen frequently.

## 2022-11-16 NOTE — PROGRESS NOTES
Subjective Finding:    Chief compalint: Patient presents with:  Back Pain  , Pain Scale: 4/10, Intensity: dull, Duration: 2 days, Radiating: no.    Date of injury:     Activities that the pain restricts:   Home/household/hobbies/social activities: yes.  Work duties: yes.  Sleep: yes.  Makes symptoms better: rest.  Makes symptoms worse: lumbar extension and lumbar flexion.  Have you seen anyone else for the symptoms? No.  Work related: no.  Automobile related injury: no.    Objective and Assessment:    Posture Analysis:   High shoulder: .  Head tilt: .  High iliac crest: .  Head carriage: neutral.  Thoracic Kyphosis: neutral.  Lumbar Lordosis: forward.    Lumbar Range of Motion: flexion decreased and extension decreased.  Cervical Range of Motion: extension decreased.  Thoracic Range of Motion: extension decreased.  Extremity Range of Motion: .    Palpation:   Quad lumb: bilateral, referred pain: no  T paraspinals: dull pain, no    Segmental dysfunction pre-treatment and treatment area: C4, T5, T6 and Sacrum.    Assessment post-treatment:  Cervical: ROM increased.  Thoracic: ROM increased.  Lumbar: ROM increased.    Comments: history of DDD in C and L spine.      Complicating Factors: .    Procedure(s):  CMT:  67121 Chiropractic manipulative treatment 3-4 regions performed   Cervical: Diversified, See above for level, Supine, Thoracic: Diversified, See above for level, Prone and Lumbar: Diversified, See above for level, Side posture    Modalities:  None performed this visit    Therapeutic procedures:  None    Plan:  Treatment plan: PRN.  Instructed patient: stretch as instructed at visit.  Short term goals: increase ROM.  Long term goals: increase ADL.  Prognosis: good.

## 2022-11-16 NOTE — PROGRESS NOTES
Social- Was  twice. Lives alone with his 3 cats: Smoky the cat. Has a GF in [place]  Children-  2 kids, they are in [place]  Last visit 10/22/22:  Continue Trileptal 600 mg bid refilled today.  Continue trazodone 100 mg bedtime prn insomnia set to fill November. Next visit discuss starting decrease nortriptyline (end of visit he brought up feeling not helping). Continue Vyvanse 70 mg daily and last filled 9/24/22 and I set to fill next 10/21/22.     - I noted last visit Trent mentioned thinking about trying to decrease nortriptyline. He notes he has reconsidered this as thinks about when he has run out of it in the past he had more muscle pain hence feels would like to stay on it.  - recalls time when he was kid and had appendectomy  - cardiac work up for bilateral LE edema. Then had PFTs last week  - mom with dementia, then fell and broke her hip.   - inquires if I can prescribe an antibiotic  - Lots of family issues: Joshua has taken care of his parents and yet parents give his other brothers everything. oldest of 3 brothers. Brother in [place] youngest [place] and Major is here. Mom and dad here [place]. Joshua noting moving here to be closer to parents and help them, etc. But, parents don't seem to want him around much or talk to him.    SUBSTANCE USE- reports no abuse of meds or substances    MEDICAL ROS- GERD: feels trouble with heartburn feeling like fluid gets stuck in his throat. back pain, . Intentional weight loss  MEDICAL / SURGICAL HISTORY                     Patient Active Problem List   Diagnosis     Mixed hyperlipidemia     Tobacco Abuse, History of     Degeneration of lumbar or lumbosacral intervertebral disc     Depression, major     Chronic, continuous use of opioids     Chemical dependency (H)     Chronic rhinitis     Tinnitus of both ears     SNHL (sensorineural hearing loss)     Bipolar disorder (H)     Seizure-like activity (H)      Somatic dysfunction of pelvis region     Bilateral foot pain     Prostate cancer screening     Trigger index finger of right hand     Moderate persistent asthma without complication     Chronic lower back pain     Onychia of toe of left foot     DDD (degenerative disc disease), lumbar     Seizure disorder (H)     Back muscle spasm     Benign essential hypertension     Retrograde ejaculation     Allergic rhinitis due to other allergen     Cervical spondylosis without myelopathy     Chronic headaches     DDD (degenerative disc disease)     Generalized anxiety disorder     Hypertrophy of prostate without urinary obstruction and other lower urinary tract symptoms (LUTS)     GERD (gastroesophageal reflux disease)     Lumbago     Major depressive disorder, recurrent episode, moderate (H)     Myalgia and myositis     Hyperlipidemia     Other pain disorders related to psychological factors     Spinal stenosis in cervical region     Status post lumbar spinal fusion     Tobacco use disorder     Trigger finger     Polypharmacy     Deviated nasal septum     Attention deficit hyperactivity disorder (ADHD), combined type     chronic noninfective otitis externa     Migraine with aura and without status migrainosus, not intractable     Tobacco use     Tobacco abuse counseling     Prediabetes     Hypotension     Bipolar disorder, current episode mixed, mild (H)     Failed back syndrome of lumbar spine     Panlobular emphysema (H)     Implantable intrathecal infusion pump present     Chronic bilateral low back pain without sciatica     ALLERGY   Cymbalta, Depakote [valproic acid], and Seasonal allergies  MEDICATIONS                                                                                             Current Outpatient Medications   Medication Sig     ACCU-CHEK GUIDE test strip      albuterol (ACCUNEB) 1.25 MG/3ML neb solution Take 1 vial (1.25 mg) by nebulization every 6 hours as needed for shortness of breath / dyspnea or  wheezing     artificial saliva (BIOTENE ORALBALANCE) GEL gel Take 4 g by mouth 4 times daily     aspirin (ASPIRIN LOW DOSE) 81 MG chewable tablet CHEW AND SWALLOW 1 TABLET BY MOUTH DAILY     atorvastatin (LIPITOR) 20 MG tablet TAKE 1 TABLET BY MOUTH DAILY     blood glucose (ACCU-CHEK GUIDE) test strip USE TO TEST BLOOD SUGAR ONCE DAILY OR AS DIRECTED     blood glucose (NO BRAND SPECIFIED) lancets standard Use to test blood sugar 1 time daily or as directed.     blood glucose (NO BRAND SPECIFIED) lancing device Device to be used with lancets.     blood glucose monitoring (NO BRAND SPECIFIED) meter device kit Use to test blood sugar 1 time daily or as directed.     budesonide (PULMICORT) 0.5 MG/2ML neb solution Spray 2 mLs (0.5 mg) in nostril 2 times daily Make 240 cc [place] med sinus irrigation Mix 2 ml vial of budesonide 0.5 mg Rinse 1-2 times daily for 2-4 weeks.     cetirizine (ZYRTEC) 10 MG tablet TAKE 1 TABLET BY MOUTH DAILY     dextromethorphan-guaiFENesin (MUCINEX DM)  MG 12 hr tablet TAKE 1 TABLET BY MOUTH EVERY 12 HOURS (Patient taking differently: 1 tablet every 12 hours)     diclofenac (VOLTAREN) 50 MG EC tablet TAKE 1 TABLET BY MOUTH three times DAILY with food as needed for back pain     dronabinol (MARINOL) 2.5 MG capsule TAKE 1 CAPSULE BY MOUTH 2 TIMES DAILY BEFORE MEALS     fluticasone (FLONASE) 50 MCG/ACT nasal spray USE 2 SPRAYS IN EACH NOSTRIL DAILY     gabapentin (NEURONTIN) 300 MG capsule TAKE 3 CAPSULES BY MOUTH THREE TIMES DAILY     hydrOXYzine (VISTARIL) 50 MG capsule TAKE 1 TO 2 CAPSULES BY MOUTH 4 TIMES DAILY AS NEEDED FOR ANXIETY     lisdexamfetamine (VYVANSE) 70 MG capsule TAKE 1 CAPSULE BY MOUTH EVERY MORNING     loratadine (CLARITIN) 10 MG tablet Take 10 mg by mouth daily     losartan (COZAAR) 50 MG tablet TAKE 1 TABLET BY MOUTH DAILY     methocarbamol (ROBAXIN) 750 MG tablet Take 1 tablet (750 mg) by mouth 4 times daily as needed for muscle spasms     metoprolol succinate ER (TOPROL  XL) 50 MG 24 hr tablet TAKE 1 TABLET BY MOUTH DAILY     mometasone-formoterol (DULERA) 200-5 MCG/ACT inhaler Inhale 2 puffs into the lungs 2 times daily     montelukast (SINGULAIR) 10 MG tablet TAKE 1 TABLET BY MOUTH AT BEDTIME     multivitamin  with iron (SM COMPLETE ADVANCED FORMULA) TABS TAKE 1 TABLET BY MOUTH DAILY     mupirocin (BACTROBAN) 2 % external ointment      nicotine polacrilex (NICORETTE) 4 MG gum PLACE 1 PIECE OF GUM INSIDE CHEEK AS NEEDED FOR SMOKING CESSATION     nortriptyline (PAMELOR) 50 MG capsule TAKE 2 CAPSULES (100MG) BY MOUTH AT BEDTIME     omeprazole (PRILOSEC) 20 MG DR capsule TAKE 1 CAPSULE BY MOUTH 2 TIMES DAILY     OXcarbazepine (TRILEPTAL) 600 MG tablet Take 1 tablet (600 mg) by mouth 2 times daily     SM ANTISEPTIC SKIN CLEANSER 4 % solution      SUMAtriptan (IMITREX) 50 MG tablet TAKE 1 TABLET BY MOUTH AT ONSET OF HEADACHE FOR MIGRAINE. MAY REPEAT IN 2 HOURS. MAX OF 4 TABLETS IN 24 HOURS     [START ON 11/22/2022] traZODone (DESYREL) 100 MG tablet Take 1 tablet (100 mg) by mouth At Bedtime     VENTOLIN  (90 Base) MCG/ACT inhaler INHALE 2 PUFFS INTO THE LUNGS EVERY 4 HOURS AS NEEDED     famotidine (PEPCID) 20 MG tablet TAKE 1 TABLET BY MOUTH NIGHTLY AS NEEDED FOR REFLUX (Patient not taking: Reported on 11/16/2022)     furosemide (LASIX) 20 MG tablet Take 1 tablet (20 mg) by mouth daily for 30 days     naloxone (NARCAN) 1 mg/mL for intranasal kit (2 syringes with 2 mucosal atomizer device) In opioid overdose put cone in nostril and push 1/2 of contents into each nostril.  Repeat every 3 min if no response until help arrives. (Patient not taking: Reported on 11/16/2022)     nicotine (NICODERM CQ) 21 MG/24HR 24 hr patch PLACE 1 PATCH ONTO THE SKIN EVERY 24 HOURS (Patient not taking: Reported on 11/16/2022)     STOOL SOFTENER/LAXATIVE 50-8.6 MG tablet TAKE 1 OR 2 TABLETS BY MOUTH 2 TIMES A DAY (Patient not taking: Reported on 11/16/2022)     tamsulosin (FLOMAX) 0.4 MG capsule TAKE 1  CAPSULE BY MOUTH DAILY (Patient not taking: Reported on 11/16/2022)     No current facility-administered medications for this visit.       VITALS   /64 (BP Location: Left arm, Patient Position: Sitting, Cuff Size: Adult Regular)   Pulse 76   Temp 97.2  F (36.2  C) (Tympanic)   Wt 82.1 kg (181 lb)   SpO2 98%   BMI 25.24 kg/m       LABS                                                                                                                           Last Comprehensive Metabolic Panel:  Sodium   Date Value Ref Range Status   11/04/2022 137 136 - 145 mmol/L Final   12/14/2020 141 133 - 144 mmol/L Final     Potassium   Date Value Ref Range Status   11/04/2022 4.7 3.4 - 5.3 mmol/L Final   09/30/2022 4.8 3.4 - 5.3 mmol/L Final   12/14/2020 3.8 3.4 - 5.3 mmol/L Final     Chloride   Date Value Ref Range Status   11/04/2022 101 98 - 107 mmol/L Final   09/30/2022 104 94 - 109 mmol/L Final   12/14/2020 107 94 - 109 mmol/L Final     Carbon Dioxide   Date Value Ref Range Status   12/14/2020 28 20 - 32 mmol/L Final     Carbon Dioxide (CO2)   Date Value Ref Range Status   11/04/2022 28 22 - 29 mmol/L Final   09/30/2022 29 20 - 32 mmol/L Final     Anion Gap   Date Value Ref Range Status   11/04/2022 8 7 - 15 mmol/L Final   09/30/2022 4 3 - 14 mmol/L Final   12/14/2020 6 3 - 14 mmol/L Final     Glucose   Date Value Ref Range Status   11/04/2022 112 (H) 70 - 99 mg/dL Final   09/30/2022 85 70 - 99 mg/dL Final   12/14/2020 108 (H) 70 - 99 mg/dL Final     Urea Nitrogen   Date Value Ref Range Status   11/04/2022 19.5 8.0 - 23.0 mg/dL Final   09/30/2022 30 7 - 30 mg/dL Final   12/14/2020 16 7 - 30 mg/dL Final     Creatinine   Date Value Ref Range Status   11/04/2022 0.85 0.67 - 1.17 mg/dL Final   12/14/2020 0.77 0.66 - 1.25 mg/dL Final     GFR Estimate   Date Value Ref Range Status   11/04/2022 >90 >60 mL/min/1.73m2 Final     Comment:     Effective December 21, 2021 eGFRcr in adults is calculated using the 2021 CKD-EPI  "creatinine equation which includes age and gender (Ady neal al., [place], DOI: 10.1056/NDQTbk7208515)   12/14/2020 >90 >60 mL/min/[1.73_m2] Final     Comment:     Non  GFR Calc  Starting 12/18/2018, serum creatinine based estimated GFR (eGFR) will be   calculated using the Chronic Kidney Disease Epidemiology Collaboration   (CKD-EPI) equation.       Calcium   Date Value Ref Range Status   11/04/2022 9.0 8.8 - 10.2 mg/dL Final   12/14/2020 8.2 (L) 8.5 - 10.1 mg/dL Final     CBC RESULTS: Recent Labs   Lab Test 09/30/22  1514   WBC 5.0   RBC 3.63*   HGB 10.6*   HCT 32.3*   MCV 89   MCH 29.2   MCHC 32.8   RDW 12.3   *     Nortriptyline level 79 as of 5/2022  Oxcarbazepine level 14 as of 5/2022    EKG 6/3/19 with QTc of 415 ms     MENTAL STATUS EXAM                                                                                         Awake, alert, oriented. No problems psychomotor behavior. Mood today: \"I'm in a good mood today\" Speech: normal volume, rhythm, rate. Thought process, including associations, was unremarkable and thought content was devoid of suicidal and homicidal ideation.  No hallucinations. Insight limited. Judgment  adequate for safety. Fund of knowledge was intact. Pt demonstrates no obvious problems with attention, concentration, language, recent or remote memory although these were not formally tested.       ASSESSMENT                                                                                                      HISTORICAL:  Initial psych note 10/6/15          NOTES:      Joshua is a 62 year old with bipolar, ADHD, and MDD.  Last visit Trent noted considering going off nortriptyline as he felt wasn't helping anything. Today he notes he has since reconsidered as he recalled being out of it for week in past and his pain worsened. Trent had cardiology workup recently. Then had PFTs. He hasn't received PFT results yet. No changes today. Refilled nortriptyline as well as vyvanse for " next 2 months.        TREATMENT RISK STATEMENT:  The risks, benefits, alternatives and potential adverse effects have been explained and are understood by the pt.  The pt agrees to the treatment plan with the ability to do so.   The pt knows to call the clinic for any problems or access emergency care if needed.        DIAGNOSES                    Bipolar disorder I with psychosis vs. Schizoaffective disorder, bipolar type  ADHD      PLAN                                                                                                                    1)  MEDICATIONS:       Continue Trileptal 600 mg twice daily.  Continue trazodone 100 mg bedtime prn insomnia.  Continue nortriptyline 100 mg HS filled today..  Continue Vyvanse 70 mg daily filled 11/22/22 and for 12/20/22  2)  THERAPY:  No change    3)  LABS: UDS 5/2022. CBC and BMP 9/2022.  nortriptyline level and Trileptal level 5/2022    4)  PT MONITOR [call for probs]:  SEs from meds, worsening sx, SI/HI    5)  REFERRALS [CD, medical, other]:  None    6)  RTC: 2 months

## 2022-11-16 NOTE — ED TRIAGE NOTES
Pt presents with c/o productive cough.  Reports that he is coughing up green sputum. Pt had pulmonary function test last week. Sx started Wednesday last week. Pt has been taking ibuprofen frequently. Pt is a former smoker since 2007.
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
## 2022-11-17 LAB
FLUAV RNA SPEC QL NAA+PROBE: NEGATIVE
FLUBV RNA RESP QL NAA+PROBE: NEGATIVE
RSV RNA SPEC NAA+PROBE: NEGATIVE
SARS-COV-2 RNA RESP QL NAA+PROBE: NEGATIVE

## 2022-11-18 DIAGNOSIS — M62.830 BACK MUSCLE SPASM: ICD-10-CM

## 2022-11-18 RX ORDER — METHOCARBAMOL 750 MG/1
750 TABLET, FILM COATED ORAL 4 TIMES DAILY PRN
Qty: 90 TABLET | Refills: 1 | Status: SHIPPED | OUTPATIENT
Start: 2022-11-18 | End: 2023-10-24

## 2022-11-18 NOTE — TELEPHONE ENCOUNTER
Patient called for a refill.  [facility] Dr Kraft said she would be his Dr. States he established care on 11/4/22.   He want's Dr Jordan to be taking off his chart.        methocarbamol (ROBAXIN) 750 MG tablet      Last Written Prescription Date:  6/24/22  Last Fill Quantity: 90,   # refills: 1  Last Office Visit: 11/4/22  Future Office visit:    Next 5 appointments (look out 90 days)    Dec 12, 2022 11:00 AM  (Arrive by 10:45 AM)  SHORT with Lissy Kraft MD  [facility] ([facility] ) [address] [facility] [address]  [phone]   Jan 16, 2023  3:20 PM  (Arrive by 3:05 PM)  Return Visit with Laquita Fernandez MD  [facility] ([facility] ) [address] [place] [id number] [phone]           Routing refill request to provider for review/approval because:  Drug not on the [facility], [facility] or [facility] refill protocol or controlled substance

## 2022-12-08 DIAGNOSIS — F41.1 GAD (GENERALIZED ANXIETY DISORDER): ICD-10-CM

## 2022-12-09 RX ORDER — HYDROXYZINE PAMOATE 50 MG/1
CAPSULE ORAL
Qty: 90 CAPSULE | Refills: 0 | Status: SHIPPED | OUTPATIENT
Start: 2022-12-09 | End: 2024-05-31

## 2022-12-09 NOTE — TELEPHONE ENCOUNTER
Hydroxyzine (Vistaril) 50 MG capsule    Last Written Prescription Date:  7/15/22  Last Fill Quantity: 180,   # refills: 3  Last Office Visit: 11/4/22  Future Office visit:    Next 5 appointments (look out 90 days)    Dec 12, 2022 11:00 AM  (Arrive by 10:45 AM)  SHORT with Lissy Kraft MD  [facility] ([facility] [address] [place] [id number]  [phone]   Jan 16, 2023  3:20 PM  (Arrive by 3:05 PM)  Return Visit with Laquita Fernandez MD  [facility] ([facility] ) [address] [place] [id number] [phone]

## 2023-01-05 NOTE — MR AVS SNAPSHOT
"              After Visit Summary   2/14/2017    Joshua Barron    MRN: [id number]           Patient Information     Date Of Birth          1960        Visit Information        Provider Department      2/14/2017 4:00 PM Laquita Fernandez MD [facility]        Today's Diagnoses     Attention deficit disorder        DAYANA (generalized anxiety disorder)           Follow-ups after your visit        Your next 10 appointments already scheduled     Mar 01, 2017  2:00 PM CST   (Arrive by 1:45 PM)   Hearing Eval with Kevin Moreno   [facility] [place] ([facility] [place] [facility])    [address]  [place] [address]   [phone]            Apr 17, 2017  3:20 PM CDT   (Arrive by 3:05 PM)   Return Visit with Laquita Fernandez MD   [facility] [place] ([facility] [place] [facility])    [address]  [place] [address] [phone]              Who to contact     If you have questions or need follow up information about today's clinic visit or your schedule please contact [facility] directly at [phone].  Normal or non-critical lab and imaging results will be communicated to you by MyChart, letter or phone within 4 business days after the clinic has received the results. If you do not hear from us within 7 days, please contact the clinic through MyChart or phone. If you have a critical or abnormal lab result, we will notify you by phone as soon as possible.  Submit refill requests through [employer] or call your pharmacy and they will forward the refill request to us. Please allow 3 business days for your refill to be completed.          Additional Information About Your Visit        MyChart Information     [employer] lets you send messages to your doctor, view your test results, renew your prescriptions, schedule appointments and more. To sign up, go to [email or web address] . Click on \"Log in\" on the left side of the screen, which will take you to the Welcome page. Then click on " Head,  normocephalic,  atraumatic,  Face,  Face within normal limits "\"Sign up Now\" on the right side of the page.     You will be asked to enter the access code listed below, as well as some personal information. Please follow the directions to create your username and password.     Your access code is: [email or web address]  Expires: 5/15/2017  5:32 PM     Your access code will  in 90 days. If you need help or a new code, please call your [facility] or [phone].        Care EveryWhere ID     This is your Care EveryWhere ID. This could be used by other organizations to access your [facility] medical records  [id number]        Your Vitals Were     Pulse Temperature Height BMI (Body Mass Index)          75 97.4  F (36.3  C) (Tympanic) 5' 10\" (1.778 m) 33.43 kg/m2         Blood Pressure from Last 3 Encounters:   17 124/78   16 106/68   16 122/70    Weight from Last 3 Encounters:   17 233 lb (105.7 kg)   16 230 lb (104.3 kg)   16 230 lb (104.3 kg)              Today, you had the following     No orders found for display         Today's Medication Changes          These changes are accurate as of: 17  5:32 PM.  If you have any questions, ask your nurse or doctor.               Start taking these medicines.        Dose/Directions    * lisdexamfetamine 40 MG capsule   Commonly known as:  VYVANSE   Used for:  Attention deficit disorder   Started by:  Laquita Fernandez MD        Dose:  40 mg   Take 1 capsule (40 mg) by mouth every morning   Quantity:  30 capsule   Refills:  0       * lisdexamfetamine 40 MG capsule   Commonly known as:  VYVANSE   Used for:  Attention deficit disorder   Started by:  Laquita Fernandez MD        Dose:  40 mg   Start taking on:  3/14/2017   Take 1 capsule (40 mg) by mouth every morning   Quantity:  30 capsule   Refills:  0       * Notice:  This list has 2 medication(s) that are the same as other medications prescribed for you. Read the directions carefully, and ask your doctor or other care provider to review them with " you.      These medicines have changed or have updated prescriptions.        Dose/Directions    baclofen 10 MG tablet   Commonly known as:  LIORESAL   This may have changed:  Another medication with the same name was removed. Continue taking this medication, and follow the directions you see here.   Used for:  Hx of degenerative disc disease   Changed by:  Lyle Fisher DO        TAKE 1 TABLET BY MOUTH THREE TIMES DAILY   Quantity:  90 tablet   Refills:  0       DULERA 100-5 MCG/ACT oral inhaler   This may have changed:  Another medication with the same name was removed. Continue taking this medication, and follow the directions you see here.   Used for:  Moderate persistent asthma without complication   Generic drug:  mometasone-formoterol   Changed by:  Lyle Fisher DO        INHALE 2 PUFFS INTO THE LUNGS 2 TIMES A DAY   Quantity:  13 Inhaler   Refills:  5         Stop taking these medicines if you haven't already. Please contact your care team if you have questions.     methylphenidate 20 MG tablet   Commonly known as:  RITALIN   Stopped by:  Laquita Fernandez MD                Where to get your medicines      Some of these will need a paper prescription and others can be bought over the counter.  Ask your nurse if you have questions.     Bring a paper prescription for each of these medications     diazepam 5 MG tablet    lisdexamfetamine 40 MG capsule    lisdexamfetamine 40 MG capsule                Primary Care Provider Office Phone # Fax #    Lyle Juan Fisher [phone]       [facility] [place] [address] [place] [id number]        Thank you!     Thank you for choosing [facility]  for your care. Our goal is always to provide you with excellent care. Hearing back from our patients is one way we can continue to improve our services. Please take a few minutes to complete the written survey that you may receive in the mail after your visit  with us. Thank you!             Your Updated Medication List - Protect others around you: Learn how to safely use, store and throw away your medicines at www.disposemymeds.org.          This list is accurate as of: 2/14/17  5:32 PM.  Always use your most recent med list.                   Brand Name Dispense Instructions for use    * acetaminophen 325 MG tablet    TYLENOL     Take 2 tablets (650 mg) by mouth every 4 hours as needed for other (mild pain)       * NON-ASPIRIN PAIN RELIEF 325 MG tablet   Generic drug:  acetaminophen     200 tablet    TAKE 2 TABLETS BY MOUTH EVERY 4 HOURS AS NEEDED FOR MILD PAIN       baclofen 10 MG tablet    LIORESAL    90 tablet    TAKE 1 TABLET BY MOUTH THREE TIMES DAILY       blood glucose lancing device     1 each    Use to test blood sugars 3 times daily or as directed.       blood glucose monitoring lancets     100 Box    USE TO TEST BLOOD SUGARS 3 TIMES A DAY OR AS DIRECTED       blood glucose monitoring meter device kit     1 kit    Use to test blood sugars 3 times daily or as directed.       blood glucose monitoring test strip    ONE TOUCH ULTRA    300 strip    Use to test blood sugars 3 times daily or as directed.       diazepam 5 MG tablet   Start taking on:  3/14/2017    VALIUM    60 tablet    TAKE 1 TABLET BY MOUTH EVERY 12 HOURS AS NEEDED FOR ANXIETY OR SLEEP SPASM       diclofenac 50 MG EC tablet    VOLTAREN    90 tablet    TAKE 1 TABLET BY MOUTH 3 TIMES DAILY       DULERA 100-5 MCG/ACT oral inhaler   Generic drug:  mometasone-formoterol     13 Inhaler    INHALE 2 PUFFS INTO THE LUNGS 2 TIMES A DAY       fentaNYL 75 mcg/hr 72 hr patch    DURAGESIC    12 patch    APPLY 1 PATCH EVERY 48 HOURS       fluticasone 50 MCG/ACT spray    FLONASE    16 g    USE 2 SPRAYS IN EACH NOSTRIL DAILY       gabapentin 300 MG capsule    NEURONTIN    270 capsule    TAKE 3 CAPSULES BY MOUTH THREE TIMES DAILY       HYDROcodone-acetaminophen  MG per tablet    NORCO    80 tablet    TAKE 1  TABLET BY MOUTH EVERY 8 HOURS AS NEEDED FOR MODERATE TO SEVEREPAIN       ibuprofen 600 MG tablet    ADVIL/MOTRIN    120 tablet    TAKE 1 TABLET BY MOUTH EVERY 6 HOURS AS NEEDED       lidocaine 5 % Patch    LIDODERM    90 patch    PLACE 3 PATCHES ONTO THE SKIN DAILY AS NEEDED FOR MODERATE PAIN       * lisdexamfetamine 40 MG capsule    VYVANSE    30 capsule    Take 1 capsule (40 mg) by mouth every morning       * lisdexamfetamine 40 MG capsule   Start taking on:  3/14/2017    VYVANSE    30 capsule    Take 1 capsule (40 mg) by mouth every morning       multivitamin  with iron Tabs     90 tablet    TAKE 1 TABLET BY MOUTH DAILY       nicotine polacrilex 2 MG gum    NICORETTE    110 each    CHEW 1 PIECE AS NEEDED FOR SMOKING CESSATION       nortriptyline 50 MG capsule    PAMELOR    90 capsule    Take 1 capsule (50 mg) by mouth 3 times daily       omeprazole 20 MG CR capsule    priLOSEC    30 capsule    TAKE 1 CAPSULE BY MOUTH EVERY DAY BEFORE A MEAL       * order for DME     2 Box    Equipment being ordered: Large gloves       * order for DME     1 Units    Equipment being ordered: Boa Back brace       OXcarbazepine 300 MG tablet    TRILEPTAL    60 tablet    TAKE 1 TABLET BY MOUTH 2 TIMES DAILY       propranolol 20 MG tablet    INDERAL    60 tablet    TAKE 1 TABLET BY MOUTH TWICE DAILY       rosuvastatin 10 MG tablet    CRESTOR    90 tablet    Take 1 tablet (10 mg) by mouth daily       [facility] ASPIRIN 81 MG chewable tablet   Generic drug:  aspirin     30 tablet    CHEW AND SWALLOW 1 TABLET BY MOUTH DAILY        STOOL SOFTENER/LAXATIVE 8.6-50 MG per tablet   Generic drug:  senna-docusate     120 tablet    TAKE 1 OR 2 TABLETS BY MOUTH 2 TIMES A DAY       tamsulosin 0.4 MG capsule    FLOMAX    30 capsule    TAKE 1 CAPSULE BY MOUTH DAILY       traMADol 50 MG tablet    ULTRAM    120 tablet    TAKE 1 TABLET BY MOUTH EVERY 6 HOURS AS NEEDED FOR MODERATE PAIN       traZODone 50 MG tablet    DESYREL    30 tablet    TAKE 1  TABLET BY MOUTH NIGHTLY AS NEEDED FOR SLEEP       VENTOLIN  (90 BASE) MCG/ACT Inhaler   Generic drug:  albuterol     18 g    USE 2 PUFFS 4 TIMES A DAY AS NEEDED FOR SHORTNESS OF BREATH       * Notice:  This list has 6 medication(s) that are the same as other medications prescribed for you. Read the directions carefully, and ask your doctor or other care provider to review them with you.

## 2023-01-12 NOTE — TELEPHONE ENCOUNTER
RX walked to [facility].   Dfsp Histology Text: Present throughout the dermis and extending into the subcutis are fascicles of monomorphic spindled cells with a storiform pattern. The spindled cells have an infiltrative growth pattern with entrapment of adipocytes. Individual cells are hyperchromatic with elongated nuclei. Scattered mitoses\\nare present.

## 2023-02-06 DIAGNOSIS — F90.2 ADHD (ATTENTION DEFICIT HYPERACTIVITY DISORDER), COMBINED TYPE: ICD-10-CM

## 2023-02-08 RX ORDER — LISDEXAMFETAMINE DIMESYLATE 70 MG/1
CAPSULE ORAL
Qty: 30 CAPSULE | Refills: 0 | Status: SHIPPED | OUTPATIENT
Start: 2023-02-08 | End: 2023-06-07

## 2023-02-08 NOTE — TELEPHONE ENCOUNTER
Michael       Last Written Prescription Date:  12.20.22  Last Fill Quantity: #30,   # refills: 0  Last Office Visit: 11.16.22  Future Office visit:    Next 5 appointments (look out 90 days)    May 08, 2023  1:00 PM  (Arrive by 12:45 PM)  Nurse Only with Bettye Jules  [facility] ([facility] [place] ) [address]  [place] [address]  [phone]           Routing refill request to provider for review/approval because:  Drug not on the [facility], [facility] or [facility] refill protocol or controlled substance

## 2023-03-28 DIAGNOSIS — F90.2 ADHD (ATTENTION DEFICIT HYPERACTIVITY DISORDER), COMBINED TYPE: Primary | ICD-10-CM

## 2023-03-28 DIAGNOSIS — Z71.6 TOBACCO ABUSE COUNSELING: ICD-10-CM

## 2023-03-29 NOTE — TELEPHONE ENCOUNTER
Michael      Last Written Prescription Date:  2.8.23  Last Fill Quantity: #30,   # refills: 0  Last Office Visit: 11.16.22  Future Office visit:    Next 5 appointments (look out 90 days)    May 08, 2023  1:00 PM  (Arrive by 12:45 PM)  Nurse Only with Bettye Jules  [facility] ([facility] [place] ) [address]  [place] [address]  [phone]           Routing refill request to provider for review/approval because:  Drug not on the [facility], [facility] or [facility] refill protocol or controlled substance

## 2023-03-30 RX ORDER — LISDEXAMFETAMINE DIMESYLATE 70 MG/1
CAPSULE ORAL
Qty: 30 CAPSULE | Refills: 0 | Status: SHIPPED | OUTPATIENT
Start: 2023-03-30 | End: 2023-05-02

## 2023-04-13 ENCOUNTER — TRANSFERRED RECORDS (OUTPATIENT)
Dept: HEALTH INFORMATION MANAGEMENT | Facility: CLINIC | Age: 63
End: 2023-04-13

## 2023-04-26 DIAGNOSIS — H91.93 DECREASED HEARING OF BOTH EARS: Primary | ICD-10-CM

## 2023-04-26 NOTE — TELEPHONE ENCOUNTER
Behavioral Health Interdisciplinary Rounds     Patient Name: Lauren Sexton  Age: 38 y.o. Room/Bed:  725/02  Primary Diagnosis: <principal problem not specified>   Admission Status: Voluntary     Readmission within 30 days: no  Power of  in place: no  Patient requires a blocked bed: no          Reason for blocked bed:   Sleep hours: 7       Participation in Care/Groups:  yes  Medication Compliant?: Yes  PRNS (last 24 hours): None    Restraints (last 24 hours):  no  __________________________________________________  OQ Admission Analysis Survey completed:  OQ Admission Analysis Survey score:  OQ Discharge Analysis Survey completed:  OQ Discharge Analysis Survey score:   __________________________________________________     Alcohol screening (AUDIT) completed -  AUDIT Score: 33  If applicable, date SBIRT discussed in treatment team AND documented:    Tobacco - patient is a smoker: Have You Used Tobacco in the Past 30 Days: Yes  Illegal Drugs use: Have You Used Any Illegal Substances Over the Past 12 Months: No    24 hour chart check complete: no     _______________________________________________    Patient goal(s) for today:   Treatment team focus/goals:   Progress note: Patient met with treatment team and appeared with a calm and pleasant mood and affect. Patient denies SI/HI and [facility], denies side effects from medications. Patient reports fair sleep and fair appetite. Patient is aware of follow up and is looking forward to her Gnosticist service this evening. Family to pick patient up from hospital, medications filled at preferred community pharmacy. No safety concerns noted at this time.      Spiritual Care Consult:   Financial concerns/prescription coverage:    Family contact:                        Family requesting physician contact today:    Discharge plan:   Access to weapons :                                                              Outpatient provider(s):   Patient's preferred phone number prilosec      Last Written Prescription Date:  3/30/22  Last Fill Quantity: 180,   # refills: 0  Last Office Visit: 6/24/22  Future Office visit:    Next 5 appointments (look out 90 days)    Jul 29, 2022  4:00 PM  (Arrive by 3:45 PM)  SHORT with Lissy Kraft MD  [facility] [place] ([facility] [place] [facility] ) [address] [place] [address]  [phone]   Sep 06, 2022  2:40 PM  (Arrive by 2:25 PM)  Return Visit with Laquita Fernandez MD  [facility] ([facility] [place] ) [address] [place] [id number] [phone]            for follow up call :   Patient's preferred e-mail address :    LOS:  3  Expected LOS: 3    Participating treatment team members: Jovanna Bryson, MARCELO, Erik Kay, ADAMS, Michael Peraza NP, Raghu JosephD

## 2023-04-29 ENCOUNTER — HEALTH MAINTENANCE LETTER (OUTPATIENT)
Age: 63
End: 2023-04-29

## 2023-05-01 DIAGNOSIS — F90.2 ADHD (ATTENTION DEFICIT HYPERACTIVITY DISORDER), COMBINED TYPE: ICD-10-CM

## 2023-05-01 NOTE — TELEPHONE ENCOUNTER
"Subjective:       Hubert Yap  presents to the clinic after undergoing combined coronary artery bypass x3 (left internal thoracic artery to left anterior descending, saphenous vein graft to circumflex and right acute marginal), closure of patent foramen ovale, complete Maze procedure with resection of left atrial appendage, and lysis of intrapericardial adhesions at [facility] on 07/31/2020.  He is ambulating without difficulty.  He has been returning to most activities within restrictions.  He is participating in physical therapy/cardiac rehab.       Objective:      BP (!) 140/88   Pulse 81   Temp 97.5 °F (36.4 °C)   Ht 5' 5" (1.651 m)   BMI 27.42 kg/m²     Objective  General:  He looks well.  Chest:  Sternum is stable.  Sternotomy incisions are healed.  Lungs:  Clear bilaterally.  Heart:  Regular rate and rhythm.  No murmurs.  Extremities:  Leg incisions are healed.       Assessment:      Doing well postoperatively.      Plan:      1. Continue any current medications.  2. Wound care discussed.  3. Pt is to increase activities as tolerated.  4. Follow up:  As needed      " Vyvanse 70 mg capsule  Take 1 capsule by mouth every morning  Last Written Prescription Date:  3-  Last Fill Quantity: 30 capsule,   # refills: 0  Last Office Visit: 11-  Future Office visit:    Next 5 appointments (look out 90 days)    May 08, 2023  1:00 PM  (Arrive by 12:45 PM)  Nurse Only with Bettye Jules  [facility] [place] ([facility] [place] ) [facility] [address]  [place] [address]  [phone]

## 2023-05-02 RX ORDER — LISDEXAMFETAMINE DIMESYLATE 70 MG/1
CAPSULE ORAL
Qty: 30 CAPSULE | Refills: 0 | Status: SHIPPED | OUTPATIENT
Start: 2023-05-02 | End: 2023-06-02

## 2023-05-09 ENCOUNTER — LAB REQUISITION (OUTPATIENT)
Dept: LAB | Facility: HOSPITAL | Age: 63
End: 2023-05-09
Payer: COMMERCIAL

## 2023-05-09 DIAGNOSIS — D64.9 ANEMIA, UNSPECIFIED: ICD-10-CM

## 2023-05-09 LAB
RETICS # AUTO: 0.06 10E6/UL (ref 0.03–0.1)
RETICS/RBC NFR AUTO: 1.6 % (ref 0.5–2)

## 2023-05-09 PROCEDURE — 85045 AUTOMATED RETICULOCYTE COUNT: CPT | Performed by: INTERNAL MEDICINE

## 2023-05-31 DIAGNOSIS — F31.0 BIPOLAR AFFECTIVE DISORDER, CURRENT EPISODE HYPOMANIC (H): ICD-10-CM

## 2023-05-31 DIAGNOSIS — F90.2 ADHD (ATTENTION DEFICIT HYPERACTIVITY DISORDER), COMBINED TYPE: ICD-10-CM

## 2023-06-01 NOTE — TELEPHONE ENCOUNTER
OXcarbazepine (TRILEPTAL) 600 MG tablet     Last Written Prescription Date:  10-  Last Fill Quantity: 60,   # refills: 3  Last Office Visit: 11-  Future Office visit:       Routing refill request to provider for review/approval because:  Drug not on the [facility], [facility] or [facility] refill protocol or controlled substance       VYVANSE 70 MG capsule   Last Written Prescription Date:  5-1-2023  Last Fill Quantity: 30,   # refills: 0  Last Office Visit: 11-  Future Office visit:       Routing refill request to provider for review/approval because:  Drug not on the [facility], [facility] or [facility] refill protocol or controlled substance

## 2023-06-02 RX ORDER — OXCARBAZEPINE 600 MG/1
TABLET, FILM COATED ORAL
Qty: 60 TABLET | Refills: 3 | Status: SHIPPED | OUTPATIENT
Start: 2023-06-02 | End: 2023-10-10

## 2023-06-02 RX ORDER — LISDEXAMFETAMINE DIMESYLATE 70 MG/1
CAPSULE ORAL
Qty: 30 CAPSULE | Refills: 0 | Status: SHIPPED | OUTPATIENT
Start: 2023-06-02 | End: 2023-07-07

## 2023-06-07 ENCOUNTER — OFFICE VISIT (OUTPATIENT)
Dept: PSYCHIATRY | Facility: OTHER | Age: 63
End: 2023-06-07
Attending: PSYCHIATRY & NEUROLOGY
Payer: COMMERCIAL

## 2023-06-07 VITALS
BODY MASS INDEX: 23.4 KG/M2 | TEMPERATURE: 97.3 F | DIASTOLIC BLOOD PRESSURE: 78 MMHG | SYSTOLIC BLOOD PRESSURE: 140 MMHG | OXYGEN SATURATION: 96 % | WEIGHT: 167.8 LBS | HEART RATE: 76 BPM

## 2023-06-07 DIAGNOSIS — M54.42 CHRONIC BILATERAL LOW BACK PAIN WITH BILATERAL SCIATICA: ICD-10-CM

## 2023-06-07 DIAGNOSIS — M54.41 CHRONIC BILATERAL LOW BACK PAIN WITH BILATERAL SCIATICA: ICD-10-CM

## 2023-06-07 DIAGNOSIS — G47.00 PERSISTENT INSOMNIA: ICD-10-CM

## 2023-06-07 DIAGNOSIS — G89.29 CHRONIC BILATERAL LOW BACK PAIN WITH BILATERAL SCIATICA: ICD-10-CM

## 2023-06-07 PROCEDURE — 99213 OFFICE O/P EST LOW 20 MIN: CPT | Performed by: PSYCHIATRY & NEUROLOGY

## 2023-06-07 PROCEDURE — G0463 HOSPITAL OUTPT CLINIC VISIT: HCPCS

## 2023-06-07 RX ORDER — NORTRIPTYLINE HYDROCHLORIDE 50 MG/1
CAPSULE ORAL
Qty: 60 CAPSULE | Refills: 6 | Status: SHIPPED | OUTPATIENT
Start: 2023-06-07 | End: 2024-05-31

## 2023-06-07 RX ORDER — TRAZODONE HYDROCHLORIDE 100 MG/1
100 TABLET ORAL AT BEDTIME
Qty: 90 TABLET | Refills: 1 | Status: SHIPPED | OUTPATIENT
Start: 2023-06-07 | End: 2023-11-01

## 2023-06-07 ASSESSMENT — PATIENT HEALTH QUESTIONNAIRE - PHQ9
10. IF YOU CHECKED OFF ANY PROBLEMS, HOW DIFFICULT HAVE THESE PROBLEMS MADE IT FOR YOU TO DO YOUR WORK, TAKE CARE OF THINGS AT HOME, OR GET ALONG WITH OTHER PEOPLE: EXTREMELY DIFFICULT
SUM OF ALL RESPONSES TO PHQ QUESTIONS 1-9: 20
SUM OF ALL RESPONSES TO PHQ QUESTIONS 1-9: 20

## 2023-06-07 ASSESSMENT — PAIN SCALES - GENERAL: PAINLEVEL: SEVERE PAIN (6)

## 2023-06-07 NOTE — PROGRESS NOTES
Social- Was  twice. Lives alone with his 3 cats:   Children-  2 kids, they are in [place]  Last visit 11/16/22: Continue Trileptal 600 mg twice daily.  Continue trazodone 100 mg bedtime prn insomnia.  Continue nortriptyline 100 mg HS filled today..  Continue Vyvanse 70 mg daily filled 11/22/22 and for 12/20/22    - his mom had dementia and passed away spring 2022. Trent found out about it via Facebook / no one called him  - pain is so bad going to his stomach  - pain pump and has to go to [facility] every 2 months  - was working with Dr. Jordan, was also working with Dr. Kraft  - mom with dementia, then fell and broke her hip.   - Oldest of 3 brothers. Mark and Major in [place].  Dad  [place]    MEDICAL / SURGICAL HISTORY                     Patient Active Problem List   Diagnosis     Mixed hyperlipidemia     Tobacco Abuse, History of     Degeneration of lumbar or lumbosacral intervertebral disc     Depression, major     Chronic, continuous use of opioids     Chemical dependency (H)     Chronic rhinitis     Tinnitus of both ears     SNHL (sensorineural hearing loss)     Bipolar disorder (H)     Seizure-like activity (H)     Somatic dysfunction of pelvis region     Bilateral foot pain     Prostate cancer screening     Trigger index finger of right hand     Moderate persistent asthma without complication     Chronic lower back pain     Onychia of toe of left foot     DDD (degenerative disc disease), lumbar     Seizure disorder (H)     Back muscle spasm     Benign essential hypertension     Retrograde ejaculation     Allergic rhinitis due to other allergen     Cervical spondylosis without myelopathy     Chronic headaches     DDD (degenerative disc disease)     Generalized anxiety disorder     Hypertrophy of prostate without urinary obstruction and other lower urinary tract symptoms (LUTS)     GERD (gastroesophageal reflux disease)     Lumbago      Major depressive disorder, recurrent episode, moderate (H)     Myalgia and myositis     Hyperlipidemia     Other pain disorders related to psychological factors     Spinal stenosis in cervical region     Status post lumbar spinal fusion     Tobacco use disorder     Trigger finger     Polypharmacy     Deviated nasal septum     Attention deficit hyperactivity disorder (ADHD), combined type     chronic noninfective otitis externa     Migraine with aura and without status migrainosus, not intractable     Tobacco use     Tobacco abuse counseling     Prediabetes     Hypotension     Bipolar disorder, current episode mixed, mild (H)     Failed back syndrome of lumbar spine     Panlobular emphysema (H)     Implantable intrathecal infusion pump present     Chronic bilateral low back pain without sciatica     ALLERGY   Depakote [valproic acid], Duloxetine hcl, and Seasonal allergies  MEDICATIONS                                                                                             Current Outpatient Medications   Medication Sig     albuterol (ACCUNEB) 1.25 MG/3ML neb solution Take 1 vial (1.25 mg) by nebulization every 6 hours as needed for shortness of breath / dyspnea or wheezing     artificial saliva (BIOTENE ORALBALANCE) GEL gel Take 4 g by mouth 4 times daily     aspirin (ASPIRIN LOW DOSE) 81 MG chewable tablet CHEW AND SWALLOW 1 TABLET BY MOUTH DAILY     atorvastatin (LIPITOR) 20 MG tablet TAKE 1 TABLET BY MOUTH DAILY     blood glucose (NO BRAND SPECIFIED) lancets standard Use to test blood sugar 1 time daily or as directed.     blood glucose (NO BRAND SPECIFIED) lancing device Device to be used with lancets.     blood glucose monitoring (NO BRAND SPECIFIED) meter device kit Use to test blood sugar 1 time daily or as directed.     budesonide (PULMICORT) 0.5 MG/2ML neb solution Spray 2 mLs (0.5 mg) in nostril 2 times daily Make 240 cc [place] med sinus irrigation Mix 2 ml vial of budesonide 0.5 mg Rinse 1-2 times  daily for 2-4 weeks.     cetirizine (ZYRTEC) 10 MG tablet TAKE 1 TABLET BY MOUTH DAILY     dextromethorphan-guaiFENesin (MUCINEX DM)  MG 12 hr tablet TAKE 1 TABLET BY MOUTH EVERY 12 HOURS (Patient taking differently: 1 tablet every 12 hours)     diclofenac (VOLTAREN) 50 MG EC tablet TAKE 1 TABLET BY MOUTH TWICE DAILY WITH FOOD AS NEEDED FOR BACK PAIN     dronabinol (MARINOL) 2.5 MG capsule TAKE 1 CAPSULE BY MOUTH 2 TIMES DAILY BEFORE MEALS     DULERA 200-5 MCG/ACT inhaler INHALE 2 PUFFS INTO THE LUNGS 2 TIMES A DAY     fluticasone (FLONASE) 50 MCG/ACT nasal spray USE 2 SPRAYS IN EACH NOSTRIL DAILY     furosemide (LASIX) 20 MG tablet Take 1 tablet (20 mg) by mouth daily for 30 days     gabapentin (NEURONTIN) 300 MG capsule TAKE 3 CAPSULES BY MOUTH THREE TIMES DAILY     hydrOXYzine (VISTARIL) 50 MG capsule TAKE 1 TO 2 CAPSULES BY MOUTH 4 TIMES DAILY AS NEEDED FOR ANXIETY     SUMAtriptan (IMITREX) 50 MG tablet TAKE 1 TABLET BY MOUTH AT ONSET OF HEADACHE FOR MIGRAINE. MAY REPEAT IN 2 HOURS. MAX OF 4 TABLETS IN 24 HOURS     ACCU-CHEK GUIDE test strip      blood glucose (ACCU-CHEK GUIDE) test strip USE TO TEST BLOOD SUGAR ONCE DAILY OR AS DIRECTED     famotidine (PEPCID) 20 MG tablet TAKE 1 TABLET BY MOUTH NIGHTLY AS NEEDED FOR REFLUX (Patient not taking: Reported on 11/16/2022)     loratadine (CLARITIN) 10 MG tablet Take 10 mg by mouth daily     losartan (COZAAR) 50 MG tablet TAKE 1 TABLET BY MOUTH DAILY     methocarbamol (ROBAXIN) 750 MG tablet Take 1 tablet (750 mg) by mouth 4 times daily as needed for muscle spasms     metoprolol succinate ER (TOPROL XL) 50 MG 24 hr tablet TAKE 1 TABLET BY MOUTH DAILY     montelukast (SINGULAIR) 10 MG tablet TAKE 1 TABLET BY MOUTH AT BEDTIME     multivitamin  with iron (SM COMPLETE ADVANCED FORMULA) TABS TAKE 1 TABLET BY MOUTH DAILY     mupirocin (BACTROBAN) 2 % external ointment      nicotine (NICODERM CQ) 21 MG/24HR 24 hr patch PLACE 1 PATCH ONTO THE SKIN EVERY 24 HOURS  (Patient not taking: Reported on 11/16/2022)     nicotine polacrilex (NICORETTE) 4 MG gum PLACE 1 PIECE OF GUM INSIDE CHEEK AS NEEDED FOR SMOKING CESSATION     nortriptyline (PAMELOR) 50 MG capsule TAKE 2 CAPSULES (100MG) BY MOUTH AT BEDTIME     omeprazole (PRILOSEC) 20 MG DR capsule TAKE 1 CAPSULE BY MOUTH 2 TIMES DAILY     OXcarbazepine (TRILEPTAL) 600 MG tablet TAKE 1 TABLET BY MOUTH 2 TIMES DAILY     SM ANTISEPTIC SKIN CLEANSER 4 % solution      STOOL SOFTENER/LAXATIVE 50-8.6 MG tablet TAKE 1 OR 2 TABLETS BY MOUTH 2 TIMES A DAY (Patient not taking: Reported on 11/16/2022)     tamsulosin (FLOMAX) 0.4 MG capsule TAKE 1 CAPSULE BY MOUTH DAILY (Patient not taking: Reported on 11/16/2022)     traZODone (DESYREL) 100 MG tablet Take 1 tablet (100 mg) by mouth At Bedtime     VENTOLIN  (90 Base) MCG/ACT inhaler INHALE 2 PUFFS INTO THE LUNGS EVERY 4 HOURS AS NEEDED     VYVANSE 70 MG capsule TAKE 1 CAPSULE BY MOUTH EVERY MORNING     No current facility-administered medications for this visit.       VITALS   BP (!) 140/78   Pulse 76   Temp 97.3  F (36.3  C) (Tympanic)   Wt 76.1 kg (167 lb 12.8 oz)   SpO2 96%   BMI 23.40 kg/m       LABS                                                                                                                           Last Comprehensive Metabolic Panel:  Sodium   Date Value Ref Range Status   11/04/2022 137 136 - 145 mmol/L Final   12/14/2020 141 133 - 144 mmol/L Final     Potassium   Date Value Ref Range Status   11/04/2022 4.7 3.4 - 5.3 mmol/L Final   09/30/2022 4.8 3.4 - 5.3 mmol/L Final   12/14/2020 3.8 3.4 - 5.3 mmol/L Final     Chloride   Date Value Ref Range Status   11/04/2022 101 98 - 107 mmol/L Final   09/30/2022 104 94 - 109 mmol/L Final   12/14/2020 107 94 - 109 mmol/L Final     Carbon Dioxide   Date Value Ref Range Status   12/14/2020 28 20 - 32 mmol/L Final     Carbon Dioxide (CO2)   Date Value Ref Range Status   11/04/2022 28 22 - 29 mmol/L Final   09/30/2022  29 20 - 32 mmol/L Final     Anion Gap   Date Value Ref Range Status   11/04/2022 8 7 - 15 mmol/L Final   09/30/2022 4 3 - 14 mmol/L Final   12/14/2020 6 3 - 14 mmol/L Final     Glucose   Date Value Ref Range Status   11/04/2022 112 (H) 70 - 99 mg/dL Final   09/30/2022 85 70 - 99 mg/dL Final   12/14/2020 108 (H) 70 - 99 mg/dL Final     Urea Nitrogen   Date Value Ref Range Status   11/04/2022 19.5 8.0 - 23.0 mg/dL Final   09/30/2022 30 7 - 30 mg/dL Final   12/14/2020 16 7 - 30 mg/dL Final     Creatinine   Date Value Ref Range Status   11/04/2022 0.85 0.67 - 1.17 mg/dL Final   12/14/2020 0.77 0.66 - 1.25 mg/dL Final     GFR Estimate   Date Value Ref Range Status   11/04/2022 >90 >60 mL/min/1.73m2 Final     Comment:     Effective December 21, 2021 eGFRcr in adults is calculated using the 2021 CKD-EPI creatinine equation which includes age and gender (Ady et al., NEJ, DOI: 10.1056/UKJJmm0078157)   12/14/2020 >90 >60 mL/min/[1.73_m2] Final     Comment:     Non  GFR Calc  Starting 12/18/2018, serum creatinine based estimated GFR (eGFR) will be   calculated using the Chronic Kidney Disease Epidemiology Collaboration   (CKD-EPI) equation.       Calcium   Date Value Ref Range Status   11/04/2022 9.0 8.8 - 10.2 mg/dL Final   12/14/2020 8.2 (L) 8.5 - 10.1 mg/dL Final     CBC RESULTS: Recent Labs   Lab Test 09/30/22  1514   WBC 5.0   RBC 3.63*   HGB 10.6*   HCT 32.3*   MCV 89   MCH 29.2   MCHC 32.8   RDW 12.3   *     Nortriptyline level 79 as of 5/2022 (range 50 - 150 ng / mL)  Oxcarbazepine level 14 as of 5/2022 (range 3-35 micrograms / mL)         MENTAL STATUS EXAM                                                                                         Awake, alert, oriented. No problems psychomotor behavior. Mood today depressed. Speech: normal volume, rhythm, rate. Thought process, including associations, was unremarkable and thought content was devoid of suicidal and homicidal ideation.  No  hallucinations. Insight limited. Judgment  adequate for safety. Fund of knowledge was intact. Pt demonstrates no obvious problems with attention, concentration, language, recent or remote memory although these were not formally tested.       ASSESSMENT                                                                                                      HISTORICAL:  Initial psych note 10/6/15          NOTES:      Joshua is a 62 year old with bipolar, ADHD, and MDD. Continue nortriptyline 100 mg bedtime and Trileptal 600 mg 2 times daily, trazodone 100 mg bedtime and Vyvanse 70 mg daily. Today Trent notes he has been struggling with depression all the more and as he htinks about it's been bad for couple years but he was thinking it would get better. I brought up his thoughts on trying psychotherapy first before we start making med adjustments. He is open to this. We can certainly change meds need be in future it's just that seems lot of things contributing to Trent's depression are situational.        TREATMENT RISK STATEMENT:  The risks, benefits, alternatives and potential adverse effects have been explained and are understood by the pt.  The pt agrees to the treatment plan with the ability to do so.   The pt knows to call the clinic for any problems or access emergency care if needed.        DIAGNOSES                    Bipolar disorder I with psychosis vs. Schizoaffective disorder, bipolar type  ADHD      PLAN                                                                                                                    1)  MEDICATIONS:       Continue Trileptal 600 mg twice daily.  Continue trazodone 100 mg bedtime prn insomnia.  Continue nortriptyline 100 mg HS filled today..  Continue Vyvanse 70 mg daily   2)  THERAPY:  No change    3)  LABS: CBC and BMP 9/2022.  nortriptyline level and Trileptal level 5/2022    4)  PT MONITOR [call for probs]:  SEs from meds, worsening sx, SI/HI    5)  REFERRALS [CD, medical,  other]:  None    6)  RTC: 2 months                              Answers for HPI/ROS submitted by the patient on 6/7/2023  If you checked off any problems, how difficult have these problems made it for you to do your work, take care of things at home, or get along with other people?: Extremely difficult  PHQ9 TOTAL SCORE: 20

## 2023-06-20 DIAGNOSIS — Z71.6 TOBACCO ABUSE COUNSELING: ICD-10-CM

## 2023-06-23 NOTE — TELEPHONE ENCOUNTER
nicotine polacrilex (NICORETTE) 4 MG gum      Last Written Prescription Date:  3/29/2023  Last Fill Quantity: 220,   # refills: 1  Last Office Visit: 11/04/2022  Future Office visit:    Next 5 appointments (look out 90 days)    Jul 13, 2023  3:00 PM  (Arrive by 2:45 PM)  SHORT with Lissy Kraft MD  [facility] [place] ([facility] [place] ) [address] [place], [place] [address] [place] [address]  [phone]   Aug 09, 2023 11:00 AM  (Arrive by 10:45 AM)  Return Visit with Laquita Fernandez MD  [facility] [place] ([facility] [place] ) [address] [place], [place] [address] [phone]

## 2023-07-01 ENCOUNTER — NURSE TRIAGE (OUTPATIENT)
Dept: NURSING | Facility: CLINIC | Age: 63
End: 2023-07-01

## 2023-07-01 NOTE — TELEPHONE ENCOUNTER
Nurse Triage SBAR    Is this a 2nd Level Triage? NO    Pt is calling due to sinus congestion.  Denies any fever.  Not able to sleep due to the congestion.  Took OTC sinus medication at 2:00 am.  Had a HA.  Some wheezing mild Hx of asthma.      Protocol Recommended Disposition:   See PCP Within 24 Hours    Recommendation: Pt to be seen today.       Reason for Disposition    [1] Sinus pain (not just congestion) AND [2] fever    Additional Information    Negative: SEVERE difficulty breathing (e.g., struggling for each breath, speaks in single words)    Negative: Sounds like a life-threatening emergency to the triager    Negative: [1] Difficulty breathing AND [2] not from stuffy nose (e.g., not relieved by cleaning out the nose)    Negative: [1] Sinus infection AND [2] taking an antibiotic AND [3] symptoms continue    Negative: [1] SEVERE headache AND [2] fever    Negative: [1] Redness or swelling on the cheek, forehead or around the eye AND [2] fever    Negative: Fever > 104 F (40 C)    Negative: Patient sounds very sick or weak to the triager    Negative: [1] SEVERE pain AND [2] not improved 2 hours after pain medicine    Negative: [1] Redness or swelling on the cheek, forehead or around the eye AND [2] no fever    Negative: [1] Fever > 101 F (38.3 C) AND [2] age > 60 years    Negative: [1] Fever > 100.0 F (37.8 C) AND [2] diabetes mellitus or weak immune system (e.g., HIV positive, cancer chemo, splenectomy, organ transplant, chronic steroids)    Negative: [1] Fever > 100.0 F (37.8 C) AND [2] bedridden (e.g., nursing home patient, CVA, chronic illness, recovering from surgery)    Negative: Fever present > 3 days (72 hours)    Negative: [1] Fever returns after gone for over 24 hours AND [2] symptoms worse or not improved    Protocols used: SINUS PAIN OR CONGESTION-BRITNI-SORAYA Harris RN on 7/1/2023 at 6:05 AM

## 2023-07-05 DIAGNOSIS — F90.2 ADHD (ATTENTION DEFICIT HYPERACTIVITY DISORDER), COMBINED TYPE: ICD-10-CM

## 2023-07-07 RX ORDER — LISDEXAMFETAMINE DIMESYLATE 70 MG/1
CAPSULE ORAL
Qty: 30 CAPSULE | Refills: 0 | Status: SHIPPED | OUTPATIENT
Start: 2023-07-07 | End: 2023-11-01

## 2023-07-07 NOTE — TELEPHONE ENCOUNTER
Vyvanse 70 MG capsule    Last Written Prescription Date:  06/02/2023  Last Fill Quantity: 30,   # refills: 0  Last Office Visit: 12/12/2022  Future Office visit:    Next 5 appointments (look out 90 days)    Jul 13, 2023  7:30 AM  (Arrive by 7:15 AM)  SHORT with Lissy Kraft MD  [facility] ([facility] ) [address] [place] [address]  [phone]   Aug 09, 2023 11:00 AM  (Arrive by 10:45 AM)  Return Visit with Laquita Fernandez MD  [facility] ([facility] ) [address] [place] [phone]           Routing refill request to provider for review/approval because:  Drug not on the [facility], [facility] or  Health refill protocol or controlled substance

## 2023-07-12 PROBLEM — R73.01 IFG (IMPAIRED FASTING GLUCOSE): Status: ACTIVE | Noted: 2023-05-09

## 2023-07-24 ENCOUNTER — TELEPHONE (OUTPATIENT)
Dept: FAMILY MEDICINE | Facility: OTHER | Age: 63
End: 2023-07-24

## 2023-07-24 NOTE — TELEPHONE ENCOUNTER
Form received.  Special diet.  Please fill out and fax to [phone]  Patient would like a call back when it has been done at [phone].

## 2023-08-05 NOTE — TELEPHONE ENCOUNTER
Morphine 30mg      Last Written Prescription Date:  7/5/2019  Last Fill Quantity: 60,   # refills: 0  Last Office Visit: 6/25/2019  Future Office visit:    Next 5 appointments (look out 90 days)    Aug 02, 2019  2:00 PM CDT  (Arrive by 1:40 PM)  SHORT with Lyle Fisher DO  [facility] ([facility] ) [address] [facility] [address]  [phone]   Aug 13, 2019  2:40 PM CDT  (Arrive by 2:25 PM)  Return Visit with Laquita Fernandez MD  [facility] ([facility] ) [address] [place] [phone]           Routing refill request to provider for review/approval because:  Drug not on the [facility], [facility]MP or  Health refill protocol or controlled substance     - - -

## 2023-08-07 ENCOUNTER — OFFICE VISIT (OUTPATIENT)
Dept: CHIROPRACTIC MEDICINE | Facility: OTHER | Age: 63
End: 2023-08-07
Attending: CHIROPRACTOR
Payer: COMMERCIAL

## 2023-08-07 DIAGNOSIS — M99.03 SEGMENTAL AND SOMATIC DYSFUNCTION OF LUMBAR REGION: Primary | ICD-10-CM

## 2023-08-07 DIAGNOSIS — F90.2 ADHD (ATTENTION DEFICIT HYPERACTIVITY DISORDER), COMBINED TYPE: ICD-10-CM

## 2023-08-07 DIAGNOSIS — M54.50 ACUTE BILATERAL LOW BACK PAIN WITHOUT SCIATICA: ICD-10-CM

## 2023-08-07 DIAGNOSIS — M99.01 SEGMENTAL AND SOMATIC DYSFUNCTION OF CERVICAL REGION: ICD-10-CM

## 2023-08-07 DIAGNOSIS — M99.02 SEGMENTAL AND SOMATIC DYSFUNCTION OF THORACIC REGION: ICD-10-CM

## 2023-08-07 PROCEDURE — 98941 CHIROPRACT MANJ 3-4 REGIONS: CPT | Mod: AT | Performed by: CHIROPRACTOR

## 2023-08-09 ENCOUNTER — OFFICE VISIT (OUTPATIENT)
Dept: PSYCHIATRY | Facility: OTHER | Age: 63
End: 2023-08-09
Attending: PSYCHIATRY & NEUROLOGY
Payer: COMMERCIAL

## 2023-08-09 VITALS
WEIGHT: 170.8 LBS | SYSTOLIC BLOOD PRESSURE: 150 MMHG | OXYGEN SATURATION: 98 % | HEART RATE: 78 BPM | BODY MASS INDEX: 23.82 KG/M2 | TEMPERATURE: 98.5 F | DIASTOLIC BLOOD PRESSURE: 82 MMHG

## 2023-08-09 DIAGNOSIS — F90.2 ADHD (ATTENTION DEFICIT HYPERACTIVITY DISORDER), COMBINED TYPE: Primary | ICD-10-CM

## 2023-08-09 PROCEDURE — 99213 OFFICE O/P EST LOW 20 MIN: CPT | Performed by: PSYCHIATRY & NEUROLOGY

## 2023-08-09 PROCEDURE — G0463 HOSPITAL OUTPT CLINIC VISIT: HCPCS

## 2023-08-09 RX ORDER — LISDEXAMFETAMINE DIMESYLATE 70 MG/1
70 CAPSULE ORAL DAILY
Qty: 30 CAPSULE | Refills: 0 | Status: SHIPPED | OUTPATIENT
Start: 2023-10-04 | End: 2023-10-24

## 2023-08-09 RX ORDER — LISDEXAMFETAMINE DIMESYLATE 70 MG/1
70 CAPSULE ORAL DAILY
Qty: 30 CAPSULE | Refills: 0 | Status: SHIPPED | OUTPATIENT
Start: 2023-09-06 | End: 2023-10-06

## 2023-08-09 RX ORDER — LISDEXAMFETAMINE DIMESYLATE 70 MG/1
70 CAPSULE ORAL DAILY
Qty: 30 CAPSULE | Refills: 0 | Status: SHIPPED | OUTPATIENT
Start: 2023-08-09 | End: 2023-09-08

## 2023-08-09 ASSESSMENT — PATIENT HEALTH QUESTIONNAIRE - PHQ9
10. IF YOU CHECKED OFF ANY PROBLEMS, HOW DIFFICULT HAVE THESE PROBLEMS MADE IT FOR YOU TO DO YOUR WORK, TAKE CARE OF THINGS AT HOME, OR GET ALONG WITH OTHER PEOPLE: VERY DIFFICULT
SUM OF ALL RESPONSES TO PHQ QUESTIONS 1-9: 22
SUM OF ALL RESPONSES TO PHQ QUESTIONS 1-9: 22

## 2023-08-09 ASSESSMENT — PAIN SCALES - GENERAL: PAINLEVEL: NO PAIN (0)

## 2023-08-09 NOTE — TELEPHONE ENCOUNTER
Vyvanse 70 MG capsule    Last Written Prescription Date:  07/07/2023  Last Fill Quantity: 30,   # refills: 0  Last Office Visit: 11/04/2022  Future Office visit:    Next 5 appointments (look out 90 days)      Aug 09, 2023 11:00 AM  (Arrive by 10:45 AM)  Return Visit with Laquita Fernandez MD  [facility] [place] ([facility] [place] ) [address]  [place] [address]  [phone]     Aug 14, 2023  3:40 PM  Return Visit with Shamar Esc[facility]illa DC  [facility] [place] [place] [place] ) [address]  [place] [address]  [phone]     Aug 28, 2023 11:30 AM  (Arrive by 11:15 AM)  SHORT with Lissy Kraft MD  [facility] [place] ([facility] [place] ) [address]  [place] [address]  [phone]     Oct 24, 2023  2:00 PM  (Arrive by 1:45 PM)  PHYSICAL with Lissy Kraft MD  [facility] [place] ([facility] [place] ) [address] [facility] [address]  [phone]             Routing refill request to provider for review/approval because:  Drug not on the [facility], P or [facility] refill protocol or controlled substance

## 2023-08-09 NOTE — PROGRESS NOTES
Subjective Finding:    Chief compalint: Patient presents with:  Back Pain: Low back pain and neck pain.  Pain has been ongoing   he typically gets relief from chiro tx.  Has had past fusions in lumbar.  MRI on file , Pain Scale: 7/10, Intensity: sharp, Duration: 2 weeks, Radiating:  bilateral buttock.    Date of injury:     Activities that the pain restricts:   Home/household/hobbies/social activities: Yes.  Work duties: Yes.  Sleep: Yes.  Makes symptoms better: rest.  Makes symptoms worse: activity.  Have you seen anyone else for the symptoms? Yes: MD.  Work related: No.  Automobile related injury: No.    Objective and Assessment:    Posture Analysis:   High shoulder: .  Head tilt: .  High iliac crest: .  Head carriage: neutral.  Thoracic Kyphosis: neutral.  Lumbar Lordosis: forward.    Lumbar Range of Motion: extension decreased.  Cervical Range of Motion: extension decreased.  Thoracic Range of Motion: extension decreased.  Extremity Range of Motion: .    Palpation:   Quad lumb: bilateral, referred pain: no    Segmental dysfunction pre-treatment and treatment area: C4, T7, and Sacrum.    Assessment post-treatment:  Cervical: ROM increased.  Thoracic: .  Lumbar: ROM increased.    Comments: .      Complicating Factors: .    Procedure(s):  CMT:  81135 Chiropractic manipulative treatment 3-4 regions performed   Cervical: Diversified, See above for level, Supine, Thoracic: Diversified, See above for level, Prone, and Lumbar: Diversified, See above for level, Side posture    Modalities:  None performed this visit    Therapeutic procedures:  None    Plan:  Treatment plan:  2 times per week for 1 weeks.  Instructed patient: stretch as instructed at visit.  Short term goals: increase ROM.  Long term goals: increase ADL.  Prognosis: very good.

## 2023-08-09 NOTE — PROGRESS NOTES
Social- Was  twice. Lives alone with his 3 cats:   Children-  2 kids, they are in [place]  Last visit 6/7/23: Continue Trileptal 600 mg twice daily.  Continue trazodone 100 mg bedtime prn insomnia.  Continue nortriptyline 100 mg HS filled today..  Continue Vyvanse 70 mg daily     - very sad today. Trent notes his cat  is dying. She is 10 yo. She isn't eating. He has taken her to the vet and first they thought it was thyroid issues ..  - out of Vyvanse  - pain pump helps more than taking oral meds did. Still though pain especially the box itself bothers him  - his mom had dementia and passed away spring 2022. Trent found out about it via Facebook / no one called him  - pain pump and has to go to [email or web address] every 2 months  - working back with Dr. Kraft  - mom with dementia, then fell and broke her hip.   - Oldest of 3 brothers. Mark and Major in [place].  [place]  [place]    MEDICAL / SURGICAL HISTORY                     Patient Active Problem List   Diagnosis    Mixed hyperlipidemia    Tobacco Abuse, History of    Degeneration of lumbar or lumbosacral intervertebral disc    Depression, major    Chronic, continuous use of opioids    Chemical dependency (H)    Chronic rhinitis    Tinnitus of both ears    SNHL (sensorineural hearing loss)    Bipolar disorder (H)    Seizure-like activity (H)    Somatic dysfunction of pelvis region    Bilateral foot pain    Prostate cancer screening    Trigger index finger of right hand    Moderate persistent asthma without complication    Chronic lower back pain    Onychia of toe of left foot    DDD (degenerative disc disease), lumbar    Seizure disorder (H)    Back muscle spasm    Benign essential hypertension    Retrograde ejaculation    Allergic rhinitis due to other allergen    Cervical spondylosis without myelopathy    Chronic headaches    DDD (degenerative disc disease)    Generalized anxiety disorder     Hypertrophy of prostate without urinary obstruction and other lower urinary tract symptoms (LUTS)    GERD (gastroesophageal reflux disease)    Lumbago    Major depressive disorder, recurrent episode, moderate (H)    Myalgia and myositis    Hyperlipidemia    Other pain disorders related to psychological factors    Spinal stenosis in cervical region    Status post lumbar spinal fusion    Tobacco use disorder    Trigger finger    Polypharmacy    Deviated nasal septum    Attention deficit hyperactivity disorder (ADHD), combined type    chronic noninfective otitis externa    Migraine with aura and without status migrainosus, not intractable    Tobacco use    Tobacco abuse counseling    Prediabetes    Hypotension    Bipolar disorder, current episode mixed, mild (H)    Failed back syndrome of lumbar spine    Panlobular emphysema (H)    Implantable intrathecal infusion pump present    Chronic bilateral low back pain without sciatica    IFG (impaired fasting glucose)     ALLERGY   Depakote [valproic acid], Duloxetine hcl, Seasonal allergies, and Amoxicillin-pot clavulanate  MEDICATIONS                                                                                             Current Outpatient Medications   Medication Sig    ACCU-CHEK GUIDE test strip     acetaminophen (TYLENOL) 325 MG tablet TAKE 2 TABLETS BY MOUTH EVERY 6 HOURS AS NEEDED FOR MILD PAIN. LIMIT ACETAMINOPHEN TO 4000MG PER DAY FROM ALL SOURCES.    albuterol (ACCUNEB) 1.25 MG/3ML neb solution Take 1 vial (1.25 mg) by nebulization every 6 hours as needed for shortness of breath / dyspnea or wheezing    artificial saliva (BIOTENE ORALBALANCE) GEL gel Take 4 g by mouth 4 times daily    aspirin (ASPIRIN LOW DOSE) 81 MG chewable tablet CHEW AND SWALLOW 1 TABLET BY MOUTH DAILY    atorvastatin (LIPITOR) 20 MG tablet TAKE 1 TABLET BY MOUTH DAILY    blood glucose (ACCU-CHEK GUIDE) test strip USE TO TEST BLOOD SUGAR ONCE DAILY OR AS DIRECTED    blood glucose (NO BRAND  SPECIFIED) lancets standard Use to test blood sugar 1 time daily or as directed.    blood glucose (NO BRAND SPECIFIED) lancing device Device to be used with lancets.    blood glucose monitoring (NO BRAND SPECIFIED) meter device kit Use to test blood sugar 1 time daily or as directed.    budesonide (PULMICORT) 0.5 MG/2ML neb solution Spray 2 mLs (0.5 mg) in nostril 2 times daily Make 240 cc [place] med sinus irrigation Mix 2 ml vial of budesonide 0.5 mg Rinse 1-2 times daily for 2-4 weeks.    cetirizine (ZYRTEC) 10 MG tablet TAKE 1 TABLET BY MOUTH DAILY    dextromethorphan-guaiFENesin (MUCINEX DM)  MG 12 hr tablet TAKE 1 TABLET BY MOUTH EVERY 12 HOURS (Patient taking differently: 1 tablet every 12 hours)    diclofenac (VOLTAREN) 50 MG EC tablet TAKE 1 TABLET BY MOUTH TWICE DAILY WITH FOOD AS NEEDED FOR BACK PAIN    dronabinol (MARINOL) 2.5 MG capsule TAKE 1 CAPSULE BY MOUTH 2 TIMES DAILY BEFORE MEALS    DULERA 200-5 MCG/ACT inhaler INHALE 2 PUFFS INTO THE LUNGS 2 TIMES A DAY    famotidine (PEPCID) 20 MG tablet TAKE 1 TABLET BY MOUTH NIGHTLY AS NEEDED FOR REFLUX (Patient not taking: Reported on 11/16/2022)    fluticasone (FLONASE) 50 MCG/ACT nasal spray USE 2 SPRAYS IN EACH NOSTRIL DAILY    furosemide (LASIX) 20 MG tablet Take 1 tablet (20 mg) by mouth daily for 30 days    gabapentin (NEURONTIN) 300 MG capsule TAKE 3 CAPSULES BY MOUTH THREE TIMES DAILY    hydrOXYzine (VISTARIL) 50 MG capsule TAKE 1 TO 2 CAPSULES BY MOUTH 4 TIMES DAILY AS NEEDED FOR ANXIETY    ibuprofen (ADVIL/MOTRIN) 600 MG tablet     LIDOCAINE PAIN RELIEF 4 % Patch     loratadine (CLARITIN) 10 MG tablet Take 10 mg by mouth daily    losartan (COZAAR) 50 MG tablet TAKE 1 TABLET BY MOUTH DAILY    methocarbamol (ROBAXIN) 750 MG tablet Take 1 tablet (750 mg) by mouth 4 times daily as needed for muscle spasms    metoprolol succinate ER (TOPROL XL) 50 MG 24 hr tablet TAKE 1 TABLET BY MOUTH DAILY    montelukast (SINGULAIR) 10 MG tablet TAKE 1 TABLET BY  MOUTH AT BEDTIME    multivitamin  with iron (SM COMPLETE ADVANCED FORMULA) TABS TAKE 1 TABLET BY MOUTH DAILY    mupirocin (BACTROBAN) 2 % external ointment     nicotine (NICODERM CQ) 21 MG/24HR 24 hr patch PLACE 1 PATCH ONTO THE SKIN EVERY 24 HOURS (Patient not taking: Reported on 11/16/2022)    nicotine polacrilex (NICORETTE) 4 MG gum PLACE 1 PIECE OF GUM INSIDE CHEEK AS NEEDED FOR SMOKING CESSATION    nortriptyline (PAMELOR) 50 MG capsule TAKE 2 CAPSULES (100MG) BY MOUTH AT BEDTIME    omeprazole (PRILOSEC) 20 MG DR capsule TAKE 1 CAPSULE BY MOUTH 2 TIMES DAILY    OXcarbazepine (TRILEPTAL) 600 MG tablet TAKE 1 TABLET BY MOUTH 2 TIMES DAILY    SM ANTISEPTIC SKIN CLEANSER 4 % solution     STOOL SOFTENER/LAXATIVE 50-8.6 MG tablet TAKE 1 OR 2 TABLETS BY MOUTH 2 TIMES A DAY (Patient not taking: Reported on 11/16/2022)    SUMAtriptan (IMITREX) 50 MG tablet TAKE 1 TABLET BY MOUTH AT ONSET OF HEADACHE FOR MIGRAINE. MAY REPEAT IN 2 HOURS. MAX OF 4 TABLETS IN 24 HOURS    tamsulosin (FLOMAX) 0.4 MG capsule TAKE 1 CAPSULE BY MOUTH DAILY (Patient not taking: Reported on 11/16/2022)    tiZANidine (ZANAFLEX) 4 MG tablet Take 1 tablet by mouth 4 times daily    traZODone (DESYREL) 100 MG tablet Take 1 tablet (100 mg) by mouth At Bedtime    VENTOLIN  (90 Base) MCG/ACT inhaler INHALE 2 PUFFS INTO THE LUNGS EVERY 4 HOURS AS NEEDED    VYVANSE 70 MG capsule TAKE 1 CAPSULE BY MOUTH EVERY MORNING     No current facility-administered medications for this visit.       VITALS   BP (!) 150/82   Pulse 78   Temp 98.5  F (36.9  C) (Tympanic)   Wt 77.5 kg (170 lb 12.8 oz)   SpO2 98%   BMI 23.82 kg/m       LABS                                                                                                                           Last Comprehensive Metabolic Panel:  Sodium   Date Value Ref Range Status   11/04/2022 137 136 - 145 mmol/L Final   12/14/2020 141 133 - 144 mmol/L Final     Potassium   Date Value Ref Range Status    11/04/2022 4.7 3.4 - 5.3 mmol/L Final   09/30/2022 4.8 3.4 - 5.3 mmol/L Final   12/14/2020 3.8 3.4 - 5.3 mmol/L Final     Chloride   Date Value Ref Range Status   11/04/2022 101 98 - 107 mmol/L Final   09/30/2022 104 94 - 109 mmol/L Final   12/14/2020 107 94 - 109 mmol/L Final     Carbon Dioxide   Date Value Ref Range Status   12/14/2020 28 20 - 32 mmol/L Final     Carbon Dioxide (CO2)   Date Value Ref Range Status   11/04/2022 28 22 - 29 mmol/L Final   09/30/2022 29 20 - 32 mmol/L Final     Anion Gap   Date Value Ref Range Status   11/04/2022 8 7 - 15 mmol/L Final   09/30/2022 4 3 - 14 mmol/L Final   12/14/2020 6 3 - 14 mmol/L Final     Glucose   Date Value Ref Range Status   11/04/2022 112 (H) 70 - 99 mg/dL Final   09/30/2022 85 70 - 99 mg/dL Final   12/14/2020 108 (H) 70 - 99 mg/dL Final     Urea Nitrogen   Date Value Ref Range Status   11/04/2022 19.5 8.0 - 23.0 mg/dL Final   09/30/2022 30 7 - 30 mg/dL Final   12/14/2020 16 7 - 30 mg/dL Final     Creatinine   Date Value Ref Range Status   11/04/2022 0.85 0.67 - 1.17 mg/dL Final   12/14/2020 0.77 0.66 - 1.25 mg/dL Final     GFR Estimate   Date Value Ref Range Status   11/04/2022 >90 >60 mL/min/1.73m2 Final     Comment:     Effective December 21, 2021 eGFRcr in adults is calculated using the 2021 CKD-EPI creatinine equation which includes age and gender (Ady et al., NEJM, DOI: 10.1056/AZLAgu8612592)   12/14/2020 >90 >60 mL/min/[1.73_m2] Final     Comment:     Non  GFR Calc  Starting 12/18/2018, serum creatinine based estimated GFR (eGFR) will be   calculated using the Chronic Kidney Disease Epidemiology Collaboration   (CKD-EPI) equation.       Calcium   Date Value Ref Range Status   11/04/2022 9.0 8.8 - 10.2 mg/dL Final   12/14/2020 8.2 (L) 8.5 - 10.1 mg/dL Final     CBC RESULTS: Recent Labs   Lab Test 09/30/22  1514   WBC 5.0   RBC 3.63*   HGB 10.6*   HCT 32.3*   MCV 89   MCH 29.2   MCHC 32.8   RDW 12.3   *     Nortriptyline level 79  as of 5/2022 (range 50 - 150 ng / mL)  Oxcarbazepine level 14 as of 5/2022 (range 3-35 micrograms / mL)       MENTAL STATUS EXAM                                                                                         Awake, alert, oriented. No problems psychomotor behavior. Mood today depressed. Speech: normal volume, rhythm, rate. Thought process, including associations, was unremarkable and thought content was devoid of suicidal and homicidal ideation.  No hallucinations. Insight limited. Judgment  adequate for safety. Fund of knowledge was intact. Pt demonstrates no obvious problems with attention, concentration, language, recent or remote memory although these were not formally tested.       ASSESSMENT                                                                                                      HISTORICAL:  Initial psych note 10/6/15          NOTES:      Joshua is a 62 year old with bipolar, ADHD, and MDD. Continue nortriptyline 100 mg bedtime and Trileptal 600 mg 2 times daily, trazodone 100 mg bedtime and Vyvanse 70 mg daily. Today Trent was quite sad, quietest I think I've ever observed of him. He notes his 10 yo cat his dying. We talked a bit about what has been going on with his cat.. he would like to return to clinic in ~2 months.       TREATMENT RISK STATEMENT:  The risks, benefits, alternatives and potential adverse effects have been explained and are understood by the pt.  The pt agrees to the treatment plan with the ability to do so.   The pt knows to call the clinic for any problems or access emergency care if needed.        DIAGNOSES                    Bipolar disorder I with psychosis vs. Schizoaffective disorder, bipolar type  ADHD      PLAN                                                                                                                    1)  MEDICATIONS:       Continue Trileptal 600 mg twice daily.  Continue trazodone 100 mg bedtime prn insomnia.  Continue nortriptyline 100 mg  HS ..  Continue Vyvanse 70 mg daily set to fill today 8/9,  9/6 and 10/4/  2)  THERAPY:  No change    3)  LABS: CBC and BMP 9/2022.  nortriptyline level and Trileptal level 5/2022    4)  PT MONITOR [call for probs]:   SEs from meds, worsening sx, SI/HI    5)  REFERRALS [CD, medical, other]:  None    6)  RTC: 2 months      Answers submitted by the patient for this visit:  Patient Health Questionnaire (Submitted on 8/9/2023)  If you checked off any problems, how difficult have these problems made it for you to do your work, take care of things at home, or get along with other people?: Very difficult  PHQ9 TOTAL SCORE: 22

## 2023-08-10 RX ORDER — LISDEXAMFETAMINE DIMESYLATE 70 MG/1
CAPSULE ORAL
Qty: 30 CAPSULE | Refills: 0 | OUTPATIENT
Start: 2023-08-10

## 2023-08-14 ENCOUNTER — OFFICE VISIT (OUTPATIENT)
Dept: CHIROPRACTIC MEDICINE | Facility: OTHER | Age: 63
End: 2023-08-14
Payer: COMMERCIAL

## 2023-08-14 DIAGNOSIS — M99.03 SEGMENTAL AND SOMATIC DYSFUNCTION OF LUMBAR REGION: Primary | ICD-10-CM

## 2023-08-14 DIAGNOSIS — M99.02 SEGMENTAL AND SOMATIC DYSFUNCTION OF THORACIC REGION: ICD-10-CM

## 2023-08-14 DIAGNOSIS — M99.01 SEGMENTAL AND SOMATIC DYSFUNCTION OF CERVICAL REGION: ICD-10-CM

## 2023-08-14 DIAGNOSIS — M54.50 ACUTE BILATERAL LOW BACK PAIN WITHOUT SCIATICA: ICD-10-CM

## 2023-08-14 PROCEDURE — 98941 CHIROPRACT MANJ 3-4 REGIONS: CPT | Mod: AT | Performed by: CHIROPRACTOR

## 2023-08-15 ENCOUNTER — TRANSFERRED RECORDS (OUTPATIENT)
Dept: HEALTH INFORMATION MANAGEMENT | Facility: CLINIC | Age: 63
End: 2023-08-15

## 2023-08-17 ENCOUNTER — OFFICE VISIT (OUTPATIENT)
Dept: CHIROPRACTIC MEDICINE | Facility: OTHER | Age: 63
End: 2023-08-17
Payer: COMMERCIAL

## 2023-08-17 DIAGNOSIS — M99.03 SEGMENTAL AND SOMATIC DYSFUNCTION OF LUMBAR REGION: Primary | ICD-10-CM

## 2023-08-17 DIAGNOSIS — M54.50 ACUTE BILATERAL LOW BACK PAIN WITHOUT SCIATICA: ICD-10-CM

## 2023-08-17 DIAGNOSIS — M99.02 SEGMENTAL AND SOMATIC DYSFUNCTION OF THORACIC REGION: ICD-10-CM

## 2023-08-17 DIAGNOSIS — M99.01 SEGMENTAL AND SOMATIC DYSFUNCTION OF CERVICAL REGION: ICD-10-CM

## 2023-08-17 PROCEDURE — 98941 CHIROPRACT MANJ 3-4 REGIONS: CPT | Mod: AT | Performed by: CHIROPRACTOR

## 2023-08-21 ENCOUNTER — OFFICE VISIT (OUTPATIENT)
Dept: CHIROPRACTIC MEDICINE | Facility: OTHER | Age: 63
End: 2023-08-21
Payer: COMMERCIAL

## 2023-08-21 DIAGNOSIS — M54.50 ACUTE BILATERAL LOW BACK PAIN WITHOUT SCIATICA: ICD-10-CM

## 2023-08-21 DIAGNOSIS — M99.01 SEGMENTAL AND SOMATIC DYSFUNCTION OF CERVICAL REGION: ICD-10-CM

## 2023-08-21 DIAGNOSIS — M99.02 SEGMENTAL AND SOMATIC DYSFUNCTION OF THORACIC REGION: ICD-10-CM

## 2023-08-21 DIAGNOSIS — M99.03 SEGMENTAL AND SOMATIC DYSFUNCTION OF LUMBAR REGION: Primary | ICD-10-CM

## 2023-08-21 PROCEDURE — 98941 CHIROPRACT MANJ 3-4 REGIONS: CPT | Mod: AT | Performed by: CHIROPRACTOR

## 2023-08-21 NOTE — PROGRESS NOTES
Subjective Finding:    Chief compalint: Patient presents with:  Back Pain: Bilateral low back pain and neck pain.    , Pain Scale: 7/10, Intensity: sharp, Duration: 2 weeks, Radiating:  bilateral buttock.    Date of injury:     Activities that the pain restricts:   Home/household/hobbies/social activities: Yes.  Work duties: Yes.  Sleep: Yes.  Makes symptoms better: rest.  Makes symptoms worse: activity.  Have you seen anyone else for the symptoms? Yes: MD.  Work related: No.  Automobile related injury: No.    Objective and Assessment:    Posture Analysis:   High shoulder: .  Head tilt: .  High iliac crest: .  Head carriage: neutral.  Thoracic Kyphosis: neutral.  Lumbar Lordosis: forward.    Lumbar Range of Motion: extension decreased.  Cervical Range of Motion: extension decreased.  Thoracic Range of Motion: extension decreased.  Extremity Range of Motion: .    Palpation:   Quad lumb: bilateral, referred pain: no    Segmental dysfunction pre-treatment and treatment area: C4, T7, and Sacrum.    Assessment post-treatment:  Cervical: ROM increased.  Thoracic: .  Lumbar: ROM increased.    Comments: .      Complicating Factors: .    Procedure(s):  CMT:  66459 Chiropractic manipulative treatment 3-4 regions performed   Cervical: Diversified, See above for level, Supine, Thoracic: Diversified, See above for level, Prone, and Lumbar: Diversified, See above for level, Side posture    Modalities:  None performed this visit    Therapeutic procedures:  None    Plan:  Treatment plan:  2 times per week for 1 weeks.  Instructed patient: stretch as instructed at visit.  Short term goals: increase ROM.  Long term goals: increase ADL.  Prognosis: very good.

## 2023-08-24 ENCOUNTER — OFFICE VISIT (OUTPATIENT)
Dept: CHIROPRACTIC MEDICINE | Facility: OTHER | Age: 63
End: 2023-08-24
Attending: CHIROPRACTOR
Payer: COMMERCIAL

## 2023-08-24 DIAGNOSIS — M99.02 SEGMENTAL AND SOMATIC DYSFUNCTION OF THORACIC REGION: ICD-10-CM

## 2023-08-24 DIAGNOSIS — M99.03 SEGMENTAL AND SOMATIC DYSFUNCTION OF LUMBAR REGION: Primary | ICD-10-CM

## 2023-08-24 DIAGNOSIS — M54.50 ACUTE BILATERAL LOW BACK PAIN WITHOUT SCIATICA: ICD-10-CM

## 2023-08-24 DIAGNOSIS — M99.01 SEGMENTAL AND SOMATIC DYSFUNCTION OF CERVICAL REGION: ICD-10-CM

## 2023-08-24 PROCEDURE — 98941 CHIROPRACT MANJ 3-4 REGIONS: CPT | Mod: AT | Performed by: CHIROPRACTOR

## 2023-08-24 NOTE — PROGRESS NOTES

## 2023-08-28 NOTE — PROGRESS NOTES
Subjective Finding:    Chief compalint: Patient presents with:  Back Pain: Tightness in lower back but is happy with the relief from pain with the tx.  ROM is somewhat better as well.  He has had past multiple surgeries on lumbar region that does limit the imp  , Pain Scale: 7/10, Intensity: sharp, Duration: 2 weeks, Radiating:  bilateral buttock.    Date of injury:     Activities that the pain restricts:   Home/household/hobbies/social activities: Yes.  Work duties: Yes.  Sleep: Yes.  Makes symptoms better: rest.  Makes symptoms worse: activity.  Have you seen anyone else for the symptoms? Yes: MD.  Work related: No.  Automobile related injury: No.    Objective and Assessment:    Posture Analysis:   High shoulder: .  Head tilt: .  High iliac crest: .  Head carriage: neutral.  Thoracic Kyphosis: neutral.  Lumbar Lordosis: forward.    Lumbar Range of Motion: extension decreased.  Cervical Range of Motion: extension decreased.  Thoracic Range of Motion: extension decreased.  Extremity Range of Motion: .    Palpation:   Quad lumb: bilateral, referred pain: no    Segmental dysfunction pre-treatment and treatment area: C4, T7, and Sacrum.    Assessment post-treatment:  Cervical: ROM increased.  Thoracic: .  Lumbar: ROM increased.    Comments: .      Complicating Factors: .    Procedure(s):  CMT:  06698 Chiropractic manipulative treatment 3-4 regions performed   Cervical: Diversified, See above for level, Supine, Thoracic: Diversified, See above for level, Prone, and Lumbar: Diversified, See above for level, Side posture    Modalities:  None performed this visit    Therapeutic procedures:  None    Plan:  Treatment plan:  2 times per week for 1 weeks.  Instructed patient: stretch as instructed at visit.  Short term goals: increase ROM.  Long term goals: increase ADL.  Prognosis: very good.

## 2023-08-28 NOTE — PROGRESS NOTES
Subjective Finding:    Chief compalint: Patient presents with:  Back Pain: With neck pain.  Getting relief with the tx.  Rom seems to be imp  , Pain Scale: 7/10, Intensity: sharp, Duration: 2 weeks, Radiating:  bilateral buttock.    Date of injury:     Activities that the pain restricts:   Home/household/hobbies/social activities: Yes.  Work duties: Yes.  Sleep: Yes.  Makes symptoms better: rest.  Makes symptoms worse: activity.  Have you seen anyone else for the symptoms? Yes: MD.  Work related: No.  Automobile related injury: No.    Objective and Assessment:    Posture Analysis:   High shoulder: .  Head tilt: .  High iliac crest: .  Head carriage: neutral.  Thoracic Kyphosis: neutral.  Lumbar Lordosis: forward.    Lumbar Range of Motion: extension decreased.  Cervical Range of Motion: extension decreased.  Thoracic Range of Motion: extension decreased.  Extremity Range of Motion: .    Palpation:   Quad lumb: bilateral, referred pain: no    Segmental dysfunction pre-treatment and treatment area: C4, T7, and Sacrum.    Assessment post-treatment:  Cervical: ROM increased.  Thoracic: .  Lumbar: ROM increased.    Comments: .      Complicating Factors: .    Procedure(s):  CMT:  76154 Chiropractic manipulative treatment 3-4 regions performed   Cervical: Diversified, See above for level, Supine, Thoracic: Diversified, See above for level, Prone, and Lumbar: Diversified, See above for level, Side posture    Modalities:  None performed this visit    Therapeutic procedures:  None    Plan:  Treatment plan:  2 times per week for 1 weeks.  Instructed patient: stretch as instructed at visit.  Short term goals: increase ROM.  Long term goals: increase ADL.  Prognosis: very good.

## 2023-08-30 NOTE — PROGRESS NOTES
Subjective Finding:    Chief compalint: Patient presents with:  Back Pain: Tightness in back but is happy with overall imp with tx so far  , Pain Scale: 4/10, Intensity: sharp, Duration: 3 weeks, Radiating:  bilateral buttock.    Date of injury:     Activities that the pain restricts:   Home/household/hobbies/social activities: Yes.  Work duties: Yes.  Sleep: no  Makes symptoms better: rest.  Makes symptoms worse: activity.  Have you seen anyone else for the symptoms? no.  Work related: No.  Automobile related injury: No.    Objective and Assessment:    Posture Analysis:   High shoulder: .  Head tilt: .  High iliac crest: .  Head carriage: neutral.  Thoracic Kyphosis: neutral.  Lumbar Lordosis: forward.    Lumbar Range of Motion: extension decreased.  Cervical Range of Motion: extension decreased.  Thoracic Range of Motion: extension decreased.  Extremity Range of Motion: .    Palpation:   Quad lumb: bilateral, referred pain: no    Segmental dysfunction pre-treatment and treatment area: C4, T7, and Sacrum.    Assessment post-treatment:  Cervical: ROM increased.  Thoracic: .  Lumbar: ROM increased.    Comments: .      Complicating Factors: .    Procedure(s):  [facility]:  99563 Chiropractic manipulative treatment 3-4 regions performed   Cervical: Diversified, See above for level, Supine, Thoracic: Diversified, See above for level, Prone, and Lumbar: Diversified, See above for level, Side posture    Modalities:  None performed this visit    Therapeutic procedures:  None    Plan:  Treatment plan: 1 times 2 weeks.  Instructed patient: stretch as instructed at visit.  Short term goals: increase ROM.  Long term goals: increase ADL.  Prognosis: very good.

## 2023-09-03 NOTE — PLAN OF CARE
/91 (BP Location: Right arm, Patient Position: Supine)   Pulse 75   Temp (!) 96.5  F (35.8  C) (Tympanic)   Resp 18   SpO2 94%     Pt is A&O, able to make needs known. VSS, afebrile. Norco given this AM for soreness to left shoulder. LS clear. HRR. BS active. Scattered scabbing and bruising. IV SL. SBA in room, call light within reach.     Face to face report given with opportunity to observe patient.    Report given to CHACHA Summers RN   12/1/2021  7:16 AM     room air

## 2023-10-04 DIAGNOSIS — Z71.6 TOBACCO ABUSE COUNSELING: ICD-10-CM

## 2023-10-05 ENCOUNTER — TRANSFERRED RECORDS (OUTPATIENT)
Dept: HEALTH INFORMATION MANAGEMENT | Facility: CLINIC | Age: 63
End: 2023-10-05

## 2023-10-05 NOTE — TELEPHONE ENCOUNTER
nicotine polacrilex (NICORETTE) 4 MG gum 220 each 0 6/23/2023   Last Office Visit: 11/04/2022     Future Office visit:    Next 5 appointments (look out 90 days)      Oct 11, 2023  2:00 PM  (Arrive by 1:45 PM)  Return Visit with Laquita Fernandez MD  [facility] [address] [place] [phone] [phone]     Oct 24, 2023  2:00 PM  (Arrive by 1:45 PM)  PHYSICAL with Lissy Kraft MD  [facility] ([facility] ) [address] [place] [id number]  [phone]             Routing refill request to provider for review/approval because:

## 2023-10-06 DIAGNOSIS — J45.40 MODERATE PERSISTENT ASTHMA WITHOUT COMPLICATION: ICD-10-CM

## 2023-10-06 DIAGNOSIS — F31.0 BIPOLAR AFFECTIVE DISORDER, CURRENT EPISODE HYPOMANIC (H): ICD-10-CM

## 2023-10-09 DIAGNOSIS — K59.00 CONSTIPATION, UNSPECIFIED CONSTIPATION TYPE: ICD-10-CM

## 2023-10-09 RX ORDER — MOMETASONE FUROATE AND FORMOTEROL FUMARATE DIHYDRATE 200; 5 UG/1; UG/1
AEROSOL RESPIRATORY (INHALATION)
Qty: 13 G | Refills: 1 | Status: SHIPPED | OUTPATIENT
Start: 2023-10-09

## 2023-10-09 NOTE — TELEPHONE ENCOUNTER
Dulera      Last Written Prescription Date:  12/5/22  Last Fill Quantity: 13g,   # refills: 5  Last Office Visit: 11/4/22  Future Office visit:    Next 5 appointments (look out 90 days)      Oct 11, 2023  2:00 PM  (Arrive by 1:45 PM)  Return Visit with [facility] MD  [facility] [address] [place] [address] [id number] [phone]     Oct 24, 2023  2:00 PM  (Arrive by 1:45 PM)  PHYSICAL with Lissy Kraft MD  [facility] ([facility] [address]  [phone]             Routing refill request to provider for review/approval because:

## 2023-10-09 NOTE — TELEPHONE ENCOUNTER
Trileptal      Last Written Prescription Date:  6/2/23  Last Fill Quantity: 60,   # refills: 3  Last Office Visit: 8/9/23  Future Office visit:    Next 5 appointments (look out 90 days)      Oct 11, 2023  2:00 PM  (Arrive by 1:45 PM)  Return Visit with Laquita Fernandez MD  [facility] ([facility] ) [address] [place] [id number] [phone]     Oct 24, 2023  2:00 PM  (Arrive by 1:45 PM)  PHYSICAL with Lissy [facility]raft MD  [facility] [address] [facility] [address]  [phone]             Routing refill request to provider for review/approval because:

## 2023-10-10 RX ORDER — OXCARBAZEPINE 600 MG/1
TABLET, FILM COATED ORAL
Qty: 60 TABLET | Refills: 4 | Status: SHIPPED | OUTPATIENT
Start: 2023-10-10 | End: 2024-05-06

## 2023-10-10 RX ORDER — DOCUSATE SODIUM AND SENNOSIDES 8.6; 5 MG/1; MG/1
TABLET, FILM COATED ORAL
Qty: 120 TABLET | Refills: 0 | Status: SHIPPED | OUTPATIENT
Start: 2023-10-10

## 2023-10-10 NOTE — TELEPHONE ENCOUNTER
STOOL SOFTENER/LAXATIVE 50-8.6 MG tablet 120 tablet 0 7/1/2022     Last Office Visit: 11/4/22     Future Office visit:    Next 5 appointments (look out 90 days)      Oct 11, 2023  2:00 PM  (Arrive by 1:45 PM)  Return Visit with Laquita Fernandez MD  [facility] ([facility] ) [address] [place] [phone]     Oct 24, 2023  2:00 PM  (Arrive by 1:45 PM)  PHYSICAL with Lissy Kraft MD  [facility] ([facility] ) [address] [place] [id number]  [phone]             Routing refill request to provider for review/approval because:

## 2023-10-18 ASSESSMENT — ENCOUNTER SYMPTOMS
HEADACHES: 1
COUGH: 0
JOINT SWELLING: 1
FEVER: 0
MYALGIAS: 1
DIARRHEA: 1
NAUSEA: 1
CHILLS: 1
WEAKNESS: 1
HEMATOCHEZIA: 0
FREQUENCY: 0
SHORTNESS OF BREATH: 1
HEARTBURN: 1
PARESTHESIAS: 1
PALPITATIONS: 1
ARTHRALGIAS: 1
CONSTIPATION: 1
EYE PAIN: 1
SORE THROAT: 1
NERVOUS/ANXIOUS: 0
DYSURIA: 1
DIZZINESS: 1
HEMATURIA: 0
ABDOMINAL PAIN: 0

## 2023-10-18 NOTE — COMMUNITY RESOURCES LIST (ENGLISH)
10/18/2023   [facility]  N/A  For questions about this resource list or additional care needs, please contact your primary care clinic or care manager.  Phone: [phone]   Email: N/A   Address: [address] [place] [address]   Hours: N/A        Financial Stability       Rent and mortgage payment assistance  1  [facility] Distance: 0.75 miles      In-Person   [address] [place] [address]  Language: English  Hours: Mon - Fri 9:00 AM - 12:00 PM , Mon - Fri 1:00 PM - 5:00 PM  Fees: Free   Phone: [phone]) [phone] Email: imelda@[facility] OkeeneMobee Communications LtdBaylor Scott & [facility] Fort WorthNexus Biosystems.Pumpic Website: https://Tiange.[facility] Fort WorthNexus Biosystems.org/northern/[place]          Food and Nutrition       Food pantry  2  [facility] Distance: 0.75 miles      Pickup   [address] [place] [address]  Language: English  Hours: Mon 9:00 AM - 11:00 AM , Tue 1:00 PM - 3:00 PM , Wed - Thu 9:00 AM - 11:00 AM  Fees: Free, Self Pay   Phone: [phone]) [phone] Email: imelda@[facility] OkeeneMobee Communications LtdBaylor Scott & White [facility] Fort WorthNexus Biosystems.[employer]ic Website: https://Tiange.ddmap.com.org/northern/[place]     3  [facility] [place] [employer] [facility] Free Pant Distance: 19.86 miles      Pickup   [address] [place] [address]  Language: English  Hours: Mon - Sun Open 24 Hours  Fees: Free   Phone: (885) [phone] Email: edu@[place] [employer] [place].org Website: http://www.i[employer] Solutionsoa.org     SNAP application assistance  4  [facility] - Economic Services & Supports - [place] Distance: 0.82 miles      In-Person, Phone/Virtual   [address] [place] [address]  Language: English  Hours: Mon - Fri 8:00 AM - 4:30 PM  Fees: Free   Phone: (625) [phone] Website: https://www.[facility] Hospitalco[facility] Hospitaln.gov/wzkuiletwnx-a-a/public-health-human-services/economic-services-supports     5  [facility] - Public Health & Human Services - Economic Services  & Supports - [place] Distance: 19.95 miles      In-Person, Phone/Virtual   [address] [place] [address]  Language: English  Hours: Mon - Fri 8:00 AM - 4:30 PM  Fees: Free   Phone: [phone]) [phone] Email: financialassistance@[facility] Website: https://www.[facility]/avginxxmrsk-y-j/public-health-human-services/economic-services-supports     Soup kitchen or free meals  6  [facility] Distance: 0.75 miles      Pickup   [address] [place] [address]  Language: English  Hours: Mon - Fri 4:00 PM - 4:45 PM  Fees: Free   Phone: ([phone]) [phone] Email: imelda@[facility].org Website: https://[facility].org/northern/[place]          Important Numbers & Websites       Emergency Services   911  [facility] Services   311  Poison Control   (512) 924-3811  Suicide Prevention Lifeline   (105) 818-2935 (TALK)  Child Abuse Hotline   (928) 238-8026 (4-A-Child)  Sexual Assault Hotline   (514) 330-2119 (HOPE)  National Runaway Safeline   (206) 552-4819 (RUNAWAY)  All-Options Talkline   (222) 424-2807  Substance Abuse Referral   (159) 819-8698 (HELP)

## 2023-10-23 ASSESSMENT — ENCOUNTER SYMPTOMS
HEMATURIA: 0
NAUSEA: 1
ARTHRALGIAS: 1
CHILLS: 1
CONSTIPATION: 1
SORE THROAT: 1
HEADACHES: 1
HEMATOCHEZIA: 0
FEVER: 0
HEARTBURN: 1
PALPITATIONS: 1
WEAKNESS: 1
PARESTHESIAS: 1
DIARRHEA: 1
MYALGIAS: 1
EYE PAIN: 1
FREQUENCY: 0
JOINT SWELLING: 1
NERVOUS/ANXIOUS: 0
ABDOMINAL PAIN: 0
DYSURIA: 1
DIZZINESS: 1
COUGH: 0
SHORTNESS OF BREATH: 1

## 2023-10-23 NOTE — PROGRESS NOTES
SUBJECTIVE:   CC: Trent is an 63 year old who presents for preventative health visit.       Healthy Habits:     Getting at least 3 servings of Calcium per day:  NO    Bi-annual eye exam:  NO    Dental care twice a year:  NO    Sleep apnea or symptoms of sleep apnea:  Daytime drowsiness and Sleep apnea    Diet:  Other    Frequency of exercise:  2-3 days/week    Additional concerns today:  Yes      Social History     Tobacco Use    Smoking status: Former     Packs/day: 0.00     Years: 30.00     Additional pack years: 0.00     Total pack years: 0.00     Types: Cigarettes     Quit date: 2007     Years since quittin.2    Smokeless tobacco: Current     Types: Snuff   Substance Use Topics    Alcohol use: Yes     Comment: daily             10/18/2023     6:36 PM   Alcohol Use   Prescreen: >3 drinks/day or >7 drinks/week? No       Last PSA:   PSA   Date Value Ref Range Status   10/25/2018 0.19 0 - 4 ug/L Final     Comment:     Assay Method:  Chemiluminescence using Siemens Vista analyzer       Reviewed orders with patient. Reviewed health maintenance and updated orders accordingly - Yes  BP Readings from Last 3 Encounters:   10/24/23 116/80   23 (!) 150/82   23 (!) 140/78    Wt Readings from Last 3 Encounters:   10/24/23 80.4 kg (177 lb 4.8 oz)   23 77.5 kg (170 lb 12.8 oz)   23 76.1 kg (167 lb 12.8 oz)                  Patient Active Problem List   Diagnosis    Mixed hyperlipidemia    Tobacco Abuse, History of    Degeneration of lumbar or lumbosacral intervertebral disc    Depression, major    Chemical dependency (H)    Chronic rhinitis    Tinnitus of both ears    SNHL (sensorineural hearing loss)    Bipolar disorder (H)    Seizure-like activity (H)    Somatic dysfunction of pelvis region    Bilateral foot pain    Prostate cancer screening    Trigger index finger of right hand    Moderate persistent asthma without complication    Chronic lower back pain    Onychia of toe of left foot    DDD  (degenerative disc disease), lumbar    Seizure disorder (H)    Back muscle spasm    Benign essential hypertension    Retrograde ejaculation    Allergic rhinitis due to other allergen    Cervical spondylosis without myelopathy    Chronic headaches    DDD (degenerative disc disease)    Generalized anxiety disorder    Hypertrophy of prostate without urinary obstruction and other lower urinary tract symptoms (LUTS)    GERD (gastroesophageal reflux disease)    Lumbago    Major depressive disorder, recurrent episode, moderate (H)    Myalgia and myositis    Other pain disorders related to psychological factors    Spinal stenosis in cervical region    Status post lumbar spinal fusion    Tobacco use disorder    Trigger finger    Polypharmacy    Deviated nasal septum    Attention deficit hyperactivity disorder (ADHD), combined type    chronic noninfective otitis externa    Migraine with aura and without status migrainosus, not intractable    Tobacco use    Tobacco abuse counseling    Prediabetes    Hypotension    Bipolar disorder, current episode mixed, mild (H)    Failed back syndrome of lumbar spine    Panlobular emphysema (H)    Implantable intrathecal infusion pump present    Chronic bilateral low back pain without sciatica    IFG (impaired fasting glucose)     Past Surgical History:   Procedure Laterality Date    APPENDECTOMY      Appendicitis    BACK SURGERY  2007,2010    back surgery 3 disk fusion    BACK SURGERY      L1-L2, L3-L4 laminectomy    COLONOSCOPY  11/2007    repeat 5-10 years    COLONOSCOPY N/A 07/01/2016    Procedure: COLONOSCOPY;  Surgeon: Steve Hoff DO;  Location: [facility]    exophytic lesion posterior scalp line  01/2011    Excision    INSERT INTRATHECAL PAIN PUMP  05/17/2022    [facility]    laminectomy L3-4 and L1-2      RELEASE TRIGGER FINGER  2010    4th digit both hands    RELEASE TRIGGER FINGER Right 01/07/2016    Procedure: RELEASE TRIGGER FINGER;  Surgeon: Zev Schroeder  MD;  Location: [facility] OR    SEPTOPLASTY, TURBINOPLASTY, COMBINED N/A 2019    Procedure: SEPTOPLASTY, BILATERAL TURBINATE REDUCTION;  Surgeon: Ivett Gonzalez MD;  Location: [facility]       Social History     Tobacco Use    Smoking status: Former     Packs/day: 0.00     Years: 30.00     Additional pack years: 0.00     Total pack years: 0.00     Types: Cigarettes     Quit date: 2007     Years since quittin.2    Smokeless tobacco: Current     Types: Snuff   Substance Use Topics    Alcohol use: Yes     Comment: daily     Family History   Problem Relation Age of Onset    Asthma Mother     Musculoskeletal Disorder Mother         arthritis    Diabetes Father     Alzheimer Disease Maternal Grandfather     Cancer Maternal Grandmother         stomach    Hypertension Paternal Grandfather     Cancer Paternal Grandmother         stomach         Current Outpatient Medications   Medication Sig Dispense Refill    ACCU-CHEK GUIDE test strip       acetaminophen (TYLENOL) 325 MG tablet TAKE 2 TABLETS BY MOUTH EVERY 6 HOURS AS NEEDED FOR MILD PAIN. LIMIT ACETAMINOPHEN TO 4000MG PER DAY FROM ALL SOURCES.      albuterol (ACCUNEB) 1.25 MG/3ML neb solution Take 1 vial (1.25 mg) by nebulization every 6 hours as needed for shortness of breath / dyspnea or wheezing 100 vial 3    artificial saliva (BIOTENE ORALBALANCE) GEL gel Take 4 g by mouth 4 times daily 126 g 11    aspirin (ASPIRIN LOW DOSE) 81 MG chewable tablet CHEW AND SWALLOW 1 TABLET BY MOUTH DAILY 90 tablet 0    atorvastatin (LIPITOR) 20 MG tablet TAKE 1 TABLET BY MOUTH DAILY 90 tablet 0    blood glucose (NO BRAND SPECIFIED) lancets standard Use to test blood sugar 1 time daily or as directed. 100 each 0    blood glucose (NO BRAND SPECIFIED) lancing device Device to be used with lancets. 1 each 0    blood glucose monitoring (NO BRAND SPECIFIED) meter device kit Use to test blood sugar 1 time daily or as directed. 1 kit 0    budesonide (PULMICORT) 0.5 MG/2ML neb solution  Spray 2 mLs (0.5 mg) in nostril 2 times daily Make 240 cc [place] med sinus irrigation Mix 2 ml vial of budesonide 0.5 mg Rinse 1-2 times daily for 2-4 weeks. 60 mL 1    cetirizine (ZYRTEC) 10 MG tablet TAKE 1 TABLET BY MOUTH DAILY 30 tablet 11    dextromethorphan-guaiFENesin (MUCINEX DM)  MG 12 hr tablet TAKE 1 TABLET BY MOUTH EVERY 12 HOURS (Patient taking differently: 1 tablet every 12 hours) 60 tablet 0    dronabinol (MARINOL) 2.5 MG capsule TAKE 1 CAPSULE BY MOUTH 2 TIMES DAILY BEFORE MEALS 60 capsule 0    famotidine (PEPCID) 20 MG tablet TAKE 1 TABLET BY MOUTH NIGHTLY AS NEEDED FOR REFLUX 90 tablet 0    fluticasone (FLONASE) 50 MCG/ACT nasal spray USE 2 SPRAYS IN EACH NOSTRIL DAILY 16 g 0    furosemide (LASIX) 20 MG tablet Take 1 tablet (20 mg) by mouth daily for 30 days 30 tablet 0    gabapentin (NEURONTIN) 300 MG capsule TAKE 3 CAPSULES BY MOUTH THREE TIMES DAILY 270 capsule 2    hydrOXYzine (VISTARIL) 50 MG capsule TAKE 1 TO 2 CAPSULES BY MOUTH 4 TIMES DAILY AS NEEDED FOR ANXIETY 90 capsule 0    ibuprofen (ADVIL/MOTRIN) 600 MG tablet Take 600 mg by mouth every 8 hours as needed      loratadine (CLARITIN) 10 MG tablet Take 10 mg by mouth daily      losartan (COZAAR) 50 MG tablet TAKE 1 TABLET BY MOUTH DAILY 90 tablet 0    methocarbamol (ROBAXIN) 750 MG tablet Take 1 tablet (750 mg) by mouth 4 times daily as needed for muscle spasms 90 tablet 1    metoprolol succinate ER (TOPROL XL) 50 MG 24 hr tablet TAKE 1 TABLET BY MOUTH DAILY 90 tablet 0    mometasone-formoterol (DULERA) 200-5 MCG/ACT inhaler INHALE 2 PUFFS INTO THE LUNGS 2 TIMES A DAY 13 g 1    montelukast (SINGULAIR) 10 MG tablet TAKE 1 TABLET BY MOUTH AT BEDTIME 90 tablet 0    multivitamin  with iron (SM COMPLETE ADVANCED FORMULA) TABS Take 1 tablet by mouth daily 30 tablet 11    mupirocin (BACTROBAN) 2 % external ointment       nicotine (NICODERM CQ) 21 MG/24HR 24 hr patch PLACE 1 PATCH ONTO THE SKIN EVERY 24 HOURS 30 patch 1    nicotine polacrilex  (NICORETTE) 4 MG gum PLACE 1 PIECE OF GUM INSIDE CHEEK AS NEEDED FOR SMOKING CESSATION 220 each 0    nortriptyline (PAMELOR) 50 MG capsule TAKE 2 CAPSULES (100MG) BY MOUTH AT BEDTIME 60 capsule 6    omeprazole (PRILOSEC) 20 MG DR capsule TAKE 1 CAPSULE BY MOUTH 2 TIMES DAILY 180 capsule 0    OXcarbazepine (TRILEPTAL) 600 MG tablet TAKE 1 TABLET BY MOUTH 2 TIMES DAILY 60 tablet 4    senna-docusate (STOOL SOFTENER/LAXATIVE) 8.6-50 MG tablet TAKE 1 OR 2 TABLETS BY MOUTH 2 TIMES A  tablet 0    SUMAtriptan (IMITREX) 50 MG tablet TAKE 1 TABLET BY MOUTH AT ONSET OF HEADACHE FOR MIGRAINE. MAY REPEAT IN 2 HOURS. MAX OF 4 TABLETS IN 24 HOURS 9 tablet 1    tamsulosin (FLOMAX) 0.4 MG capsule TAKE 1 CAPSULE BY MOUTH DAILY 90 capsule 0    traZODone (DESYREL) 100 MG tablet Take 1 tablet (100 mg) by mouth At Bedtime 90 tablet 1    VENTOLIN  (90 Base) MCG/ACT inhaler INHALE 2 PUFFS INTO THE LUNGS EVERY 4 HOURS AS NEEDED 18 g 0    VYVANSE 70 MG capsule TAKE 1 CAPSULE BY MOUTH EVERY MORNING 30 capsule 0     Allergies   Allergen Reactions    Depakote [Valproic Acid]      Drowsiness      Duloxetine Hcl Unknown     Suicidal thoughts    Seasonal Allergies     Amoxicillin-Pot Clavulanate Diarrhea     augmentin       # Back pain  - pain pump is not adequate  - using marijuana   - methocarbamol works better than ibuprofen     # weight loss  - d/t pain    Wt Readings from Last 4 Encounters:   10/24/23 80.4 kg (177 lb 4.8 oz)   08/09/23 77.5 kg (170 lb 12.8 oz)   06/07/23 76.1 kg (167 lb 12.8 oz)   11/16/22 82.1 kg (181 lb)         # Wellness:  - Immunizations:updating COVID and influenza today   - Lipids:   - Dexa scan:  - Colon cancer screening:  - PSA:   - Lung cancer screening:  - AAA screening:     Labs: cbc w/ diff, b12, cmp, lipid, A1c - would like to hold off labs d/t expenses       Reviewed and updated as needed this visit by clinical staff   Tobacco  Allergies  Meds  Problems  Med Hx  Surg Hx  Fam Hx           Reviewed and updated as needed this visit by Provider   Tobacco  Allergies  Meds  Problems  Med Hx  Surg Hx  Fam Hx             Review of Systems   Constitutional:  Positive for chills. Negative for fever.   HENT:  Positive for congestion, ear pain, hearing loss and sore throat.    Eyes:  Positive for pain and visual disturbance.   Respiratory:  Positive for shortness of breath. Negative for cough.    Cardiovascular:  Positive for chest pain, palpitations and peripheral edema.   Gastrointestinal:  Positive for constipation, diarrhea, heartburn and nausea. Negative for abdominal pain and hematochezia.   Genitourinary:  Positive for dysuria, impotence and urgency. Negative for frequency, genital sores, hematuria and penile discharge.        Weak stream   Musculoskeletal:  Positive for arthralgias, joint swelling and myalgias.   Skin:  Negative for rash.   Neurological:  Positive for dizziness, weakness, headaches and paresthesias.   Psychiatric/Behavioral:  Positive for mood changes. The patient is not nervous/anxious.          OBJECTIVE:   /80   Pulse 62   Temp 98.3  F (36.8  C) (Tympanic)   Resp 17   Wt 80.4 kg (177 lb 4.8 oz)   SpO2 98%   BMI 24.73 kg/m      Physical Exam  Constitutional:       General: He is not in acute distress.     Appearance: He is not ill-appearing.   Cardiovascular:      Rate and Rhythm: Normal rate and regular rhythm.      Pulses: Normal pulses.      Heart sounds: No murmur heard.  Pulmonary:      Effort: Pulmonary effort is normal. No respiratory distress.      Breath sounds: No wheezing or rales.   Neurological:      Mental Status: He is alert.   Psychiatric:         Mood and Affect: Mood is anxious and depressed.         Diagnostic Test Results:  Labs reviewed in Epic    ASSESSMENT/PLAN:       ICD-10-CM    1. Routine general medical examination at a health care facility  Z00.00       2. Mixed hyperlipidemia  E78.2       3. Prediabetes  R73.03       4. Benign  "essential hypertension  I10       5. Health care maintenance  Z00.00 multivitamin  with iron (SM COMPLETE ADVANCED FORMULA) TABS      6. Healthcare maintenance  Z00.00 aspirin (ASPIRIN LOW DOSE) 81 MG chewable tablet      7. Back muscle spasm  M62.830 methocarbamol (ROBAXIN) 750 MG tablet      8. Dry mouth  R68.2 artificial saliva (BIOTENE ORALBALANCE) GEL gel      9. Need for prophylactic vaccination and inoculation against influenza  Z23       10. High priority for 2019-nCoV vaccine  Z23           [facility] for maintenance of pain pump. Will obtain last note  Discontinue tizanidine and start methocarbamol  Will not restart baclofen at this time  C/w gabapentin    Follows with Dr. Fernandez with next visit scheduled for 11/28/2023    No change in cardiac medications at this time   Echo (9/21/2022) EF 60 to 65%  Stress test (10/28/2022) negative      Labs at next visit     COUNSELING:   Reviewed preventive health counseling, as reflected in patient instructions      BMI:   Estimated body mass index is 24.73 kg/m  as calculated from the following:    Height as of 11/4/22: 1.803 m (5' 11\").    Weight as of this encounter: 80.4 kg (177 lb 4.8 oz).         He reports that he quit smoking about 16 years ago. His smoking use included cigarettes. His smokeless tobacco use includes snuff.            Lissy Kraft MD  [facility] [place]  "

## 2023-10-24 ENCOUNTER — OFFICE VISIT (OUTPATIENT)
Dept: FAMILY MEDICINE | Facility: OTHER | Age: 63
End: 2023-10-24
Attending: FAMILY MEDICINE
Payer: COMMERCIAL

## 2023-10-24 VITALS
SYSTOLIC BLOOD PRESSURE: 116 MMHG | RESPIRATION RATE: 17 BRPM | OXYGEN SATURATION: 98 % | TEMPERATURE: 98.3 F | BODY MASS INDEX: 24.73 KG/M2 | HEART RATE: 62 BPM | DIASTOLIC BLOOD PRESSURE: 80 MMHG | WEIGHT: 177.3 LBS

## 2023-10-24 DIAGNOSIS — Z00.00 HEALTH CARE MAINTENANCE: ICD-10-CM

## 2023-10-24 DIAGNOSIS — Z23 NEED FOR PROPHYLACTIC VACCINATION AND INOCULATION AGAINST INFLUENZA: ICD-10-CM

## 2023-10-24 DIAGNOSIS — Z00.00 HEALTHCARE MAINTENANCE: ICD-10-CM

## 2023-10-24 DIAGNOSIS — M62.830 BACK MUSCLE SPASM: ICD-10-CM

## 2023-10-24 DIAGNOSIS — E78.2 MIXED HYPERLIPIDEMIA: ICD-10-CM

## 2023-10-24 DIAGNOSIS — R68.2 DRY MOUTH: ICD-10-CM

## 2023-10-24 DIAGNOSIS — Z00.00 ROUTINE GENERAL MEDICAL EXAMINATION AT A HEALTH CARE FACILITY: Primary | ICD-10-CM

## 2023-10-24 DIAGNOSIS — R73.03 PREDIABETES: ICD-10-CM

## 2023-10-24 DIAGNOSIS — I10 BENIGN ESSENTIAL HYPERTENSION: ICD-10-CM

## 2023-10-24 DIAGNOSIS — Z23 HIGH PRIORITY FOR 2019-NCOV VACCINE: ICD-10-CM

## 2023-10-24 PROCEDURE — G0463 HOSPITAL OUTPT CLINIC VISIT: HCPCS | Mod: 25

## 2023-10-24 PROCEDURE — 90686 IIV4 VACC NO PRSV 0.5 ML IM: CPT

## 2023-10-24 PROCEDURE — 91320 SARSCV2 VAC 30MCG TRS-SUC IM: CPT

## 2023-10-24 PROCEDURE — 99396 PREV VISIT EST AGE 40-64: CPT | Performed by: FAMILY MEDICINE

## 2023-10-24 PROCEDURE — 99213 OFFICE O/P EST LOW 20 MIN: CPT | Mod: 25 | Performed by: FAMILY MEDICINE

## 2023-10-24 RX ORDER — METHOCARBAMOL 750 MG/1
750 TABLET, FILM COATED ORAL 4 TIMES DAILY PRN
Qty: 90 TABLET | Refills: 1 | Status: SHIPPED | OUTPATIENT
Start: 2023-10-24 | End: 2024-01-08

## 2023-10-24 RX ORDER — ASPIRIN 81 MG/1
TABLET, CHEWABLE ORAL
Qty: 90 TABLET | Refills: 0 | Status: SHIPPED | OUTPATIENT
Start: 2023-10-24 | End: 2024-02-09

## 2023-10-24 RX ORDER — MV,CAL,MIN/IRON/FOLIC ACID/LUT 18-500-300
1 TABLET ORAL DAILY
Qty: 30 TABLET | Refills: 11 | Status: SHIPPED | OUTPATIENT
Start: 2023-10-24 | End: 2024-09-23

## 2023-10-24 RX ORDER — SALIVA STIMULANT COMB. NO.7
4 GEL (GRAM) MUCOUS MEMBRANE 4 TIMES DAILY
Qty: 126 G | Refills: 11 | Status: SHIPPED | OUTPATIENT
Start: 2023-10-24

## 2023-10-24 ASSESSMENT — PAIN SCALES - GENERAL: PAINLEVEL: EXTREME PAIN (9)

## 2023-10-24 NOTE — COMMUNITY RESOURCES LIST (ENGLISH)
10/24/2023   [facility] - Outpatient Clinics  N/A  For additional resource needs, please contact your health insurance member services or your primary care team.  Phone: [phone]   Email: N/A   Address: [address] [place] [address]   Hours: N/A        Financial Stability       Rent and mortgage payment assistance  1  [facility] Distance: 0.75 miles      In-Person   [address] [place] [address]  Language: English  Hours: Mon - Fri 9:00 AM - 12:00 PM , Mon - Fri 1:00 PM - 5:00 PM  Fees: Free   Phone: (600) [phone] Email: imelda@[facility] Tulsaorgangir.amThe Hospitals of Providence Transmountain CampusTV189.com.Promethean Website: https://Find That File.[facility] Transmount[facility] CampusTV189.com.org/northern/[place]          Food and Nutrition       Food pantry  2  [facility] Distance: 0.75 miles      Pickup   [address] [place] [address]  Language: English  Hours: Mon 9:00 AM - 11:00 AM , Tue 1:00 PM - 3:00 PM , Wed - Thu 9:00 AM - 11:00 AM  Fees: Free, Self Pay   Phone: (269) [phone] Email: imelda@[facility] Tulsaorgangir.amThe Hospitals of Providence Transmountain CampusTV189.com.Promethean Website: https://Find That File.Safety HoundDelaware Psychiatric CenterTV189.com.org/northern/[place]     3  [facility] Free Pant Distance: 19.86 miles      Pickup   [address] [place] [address]  Language: English  Hours: Mon - Sun Open 24 Hours  Fees: Free   Phone: (756) 909-3662 Email: edu@[employer].org Website: http://www.TickTickTicketsoa.org     SNAP application assistance  4  [facility] - Economic Services & Supports - [place] Distance: 0.82 miles      In-Person, Phone/Virtual   [address] [place] [address]  Language: English  Hours: Mon - Fri 8:00 AM - 4:30 PM  Fees: Free   Phone: (425) 517-7321 Website: https://www.[facility].gov/askhrwijedb-d-w/public-health-human-services/economic-services-supports     5  [facility] - Public Health & Human Services - Economic Services &  Supports - [place] Distance: 19.95 miles      In-Person, Phone/Virtual   [address] [place] [address]  Language: English  Hours: Mon - Fri 8:00 AM - 4:30 PM  Fees: Free   Phone: (853) [phone] Email: financialassistance@[facility] Website: https://www.[facility]/hevidlolwec-g-u/public-health-human-services/economic-services-supports     Soup kitchen or free meals  6   Distance: 0.75 miles      Pickup   [address] [place] [address]  Language: English  Hours: Mon - Fri 4:00 PM - 4:45 PM  Fees: Free   Phone: (574) 258-1279 Email: imelda@[facility] Website: https://centralusa.[facility].org/northern/[place]          Important Numbers & Websites       [facility]   211 211[facility].org  Poison Control   (281) 979-5835 [place]  Suicide and Crisis Lifeline   [address] [employer], [place] [address]line.org  Childhelp [place] Child Abuse Hotline   723.874.2856 Childhelphotline.org  National Sexual Assault Hotline   (498) 104-2768 (HOPE) [facility].org  National Runaway Safeline   (160) 979-1605 (RUNAWAY) [facility] Centerrunaway.org  Pregnancy & Postpartum Support [place]   Call/text 966-622-7473 Ppsupportmn.org  Substance Abuse National Helpline ([facility]   962-019-HELP (5161) Findtreatment.gov  Emergency Services   911

## 2023-10-30 ENCOUNTER — HOSPITAL ENCOUNTER (EMERGENCY)
Facility: HOSPITAL | Age: 63
Discharge: HOME | End: 2023-10-30
Attending: NURSE PRACTITIONER | Admitting: NURSE PRACTITIONER
Payer: COMMERCIAL

## 2023-10-30 VITALS
RESPIRATION RATE: 16 BRPM | TEMPERATURE: 97.8 F | SYSTOLIC BLOOD PRESSURE: 133 MMHG | DIASTOLIC BLOOD PRESSURE: 81 MMHG | OXYGEN SATURATION: 94 % | HEART RATE: 70 BPM

## 2023-10-30 DIAGNOSIS — K14.6 TONGUE SORE: Primary | ICD-10-CM

## 2023-10-30 DIAGNOSIS — G47.00 PERSISTENT INSOMNIA: ICD-10-CM

## 2023-10-30 DIAGNOSIS — F90.2 ADHD (ATTENTION DEFICIT HYPERACTIVITY DISORDER), COMBINED TYPE: ICD-10-CM

## 2023-10-30 PROCEDURE — G0463 HOSPITAL OUTPT CLINIC VISIT: HCPCS

## 2023-10-30 PROCEDURE — 99213 OFFICE O/P EST LOW 20 MIN: CPT | Performed by: NURSE PRACTITIONER

## 2023-10-30 RX ORDER — CEPHALEXIN 500 MG/1
500 CAPSULE ORAL 4 TIMES DAILY
Qty: 28 CAPSULE | Refills: 0 | Status: SHIPPED | OUTPATIENT
Start: 2023-10-30 | End: 2023-11-06

## 2023-10-30 ASSESSMENT — ENCOUNTER SYMPTOMS
SEIZURES: 0
VOMITING: 0
NECK STIFFNESS: 0
SHORTNESS OF BREATH: 0
TROUBLE SWALLOWING: 0
NAUSEA: 0
CHILLS: 0
WEAKNESS: 0
SPEECH DIFFICULTY: 0
FEVER: 0
FACIAL SWELLING: 0
DIARRHEA: 0
PSYCHIATRIC NEGATIVE: 1
NECK PAIN: 0

## 2023-10-30 NOTE — DISCHARGE INSTRUCTIONS
Cephalexin as ordered  - Take entire course of antibiotic even if you start to feel better.  - Antibiotics can cause stomach upset including nausea and diarrhea. Read your bottle or ask the pharmacist if antibiotic can be taken with food to help prevent nausea. If you have symptoms of diarrhea you can take an over-the-counter probiotic and/or increase foods with probiotics such as yogurt, [place], sauerkraut.    Follow-up with primary care provider or return to urgent care/ED with any worsening in condition or additional concerns

## 2023-10-30 NOTE — ED TRIAGE NOTES
Pt presents with c/o sore on tongue, pt reports biting tongue which is occurring more often roughly 5 days ago. Pt denies fevers, N/V, tongue selling Pt concerned about possible infection on side of tongue. No current dentist. Otc tylenol
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
## 2023-10-31 NOTE — TELEPHONE ENCOUNTER
Vyvanse       Last Written Prescription Date:  7/07/2023  Last Fill Quantity: 30,   # refills: 0  Last Office Visit: 10/24/2023    Trazodone       Last Written Prescription Date:  06/07/2023  Last Fill Quantity: 90,   # refills: 1  Last Office Visit: 10/24/2023  Future Office visit:    Next 5 appointments (look out 90 days)      Nov 21, 2023  3:30 PM  (Arrive by 3:15 PM)  SHORT with Lissy Kraft MD  [facility] ([facility] ) [address] [place] [id number]  [phone]     Nov 28, 2023 10:40 AM  (Arrive by 10:25 AM)  Return Visit with Laquita Fernandez MD  [facility] ([facility] ) [address] [place] [id number] [phone]

## 2023-11-01 RX ORDER — TRAZODONE HYDROCHLORIDE 100 MG/1
100 TABLET ORAL AT BEDTIME
Qty: 90 TABLET | Refills: 1 | Status: SHIPPED | OUTPATIENT
Start: 2023-11-01

## 2023-11-01 RX ORDER — LISDEXAMFETAMINE DIMESYLATE 70 MG/1
70 CAPSULE ORAL DAILY
Qty: 30 CAPSULE | Refills: 0 | Status: SHIPPED | OUTPATIENT
Start: 2023-11-01 | End: 2023-11-27

## 2023-11-21 DIAGNOSIS — Z71.6 TOBACCO ABUSE COUNSELING: ICD-10-CM

## 2023-11-21 NOTE — TELEPHONE ENCOUNTER
Nicotine Polacrilex (Nicorette) 4 MG gum     Last Written Prescription Date:  10/06/2023  Last Fill Quantity: 220,   # refills: 0  Last Office Visit: 10/24/2023

## 2023-11-24 DIAGNOSIS — F90.2 ADHD (ATTENTION DEFICIT HYPERACTIVITY DISORDER), COMBINED TYPE: ICD-10-CM

## 2023-11-24 NOTE — TELEPHONE ENCOUNTER
Lisdexamfetamine (Vyvanse ) 70 mg capsule  Take 1 capsule by mouth daily     Last Written Prescription Date:  11-1-2023  Last Fill Quantity: 30 capsule,   # refills: 0  Last Office Visit: 8-9-2023  Future Office visit:    Next 5 appointments (look out 90 days)      Nov 28, 2023 10:40 AM  (Arrive by 10:25 AM)  Return Visit with Laquita Fernandez MD  [facility] ([facility] [address] [place] [id number] [phone]

## 2023-11-26 NOTE — PROGRESS NOTES
SUBJECTIVE:   Joshua Barron is a 58 year old male who presents to clinic today for the following health issues:      Back Pain Follow Up      Description:   Location of pain:  Bilateral lumbar area  Character of pain: sharp, stabbing and constant  Pain radiation: radiates into the right buttocks and radiates into the left buttocks  Since last visit, pain is:  worsened  New numbness or weakness in legs, not attributed to pain:  no     Intensity: Currently 8/10    History:   Pain interferes with job: Not applicable  Therapies tried without relief: acetaminophen (Tylenol), chiropractor, cold, heat, opioids and Physical Therapy  Therapies tried with relief: none   Standing and walking cause increased pain.  He has relief with laying down.    He is on Fentanyl  75 mch /hr every 48.  Oxycodone 10/325 every 6 hours ( which he takes 2 in the AM and 2 in the PM)  He is on Baclofen 20 mg TID.  He has been on gabapentin for diabetic neuropathy for which he takes 300 mg TID.      Accompanying Signs & Symptoms:  Risk of Fracture:  None  Risk of Cauda Equina:  None  Risk of Infection:  None  Risk of Cancer:  None        Amount of exercise or physical activity: He started exercising one day ago.  He reports that his back pain is better.  He biked and did several weight machines including some leg exercises.    Problems taking medications regularly: No    Medication side effects: none    Diet: regular (no restrictions)    Wt Readings from Last 5 Encounters:   10/25/18 186 lb (84.4 kg)   09/05/18 190 lb (86.2 kg)   07/30/18 190 lb (86.2 kg)   05/30/18 190 lb (86.2 kg)   04/30/18 193 lb (87.5 kg)   weight 7/2017:  201 lbs    BP Readings from Last 6 Encounters:   10/25/18 126/74   09/05/18 120/70   07/30/18 118/82   05/30/18 132/80   04/30/18 144/86   03/27/18 114/70       -------------------------------------    Problem list and histories reviewed & adjusted, as indicated.  Additional history: as documented    Patient Active  Problem List   Diagnosis     Mixed hyperlipidemia     Tobacco Abuse, History of     Degeneration of lumbar or lumbosacral intervertebral disc     Depression, major     Chronic pain syndrome     Chemical dependency (H)     Chronic rhinitis     Tinnitus of both ears     SNHL (sensorineural hearing loss)     Back pain     Bipolar disorder (H)     Seizure-like activity (H)     Somatic dysfunction of pelvis region     Bilateral foot pain     Preop general physical exam     Trigger index finger of right hand     Moderate persistent asthma without complication     Chronic lower back pain     ACP (advance care planning)     Onychia of toe of left foot     DDD (degenerative disc disease), lumbar     Seizure disorder (H)     Back muscle spasm     Benign essential hypertension     Retrograde ejaculation     Allergic rhinitis due to other allergen     Attention to dressings and sutures     Cervical spondylosis without myelopathy     Chronic headaches     DDD (degenerative disc disease)     Diabetes mellitus, type II (H)     Generalized anxiety disorder     Hypertrophy of prostate without urinary obstruction and other lower urinary tract symptoms (LUTS)     GERD (gastroesophageal reflux disease)     Lumbago     Major depressive disorder, recurrent episode, moderate (H)     Myalgia and myositis     Hyperlipidemia     Other pain disorders related to psychological factors     Spinal stenosis in cervical region     Status post lumbar spinal fusion     Tobacco use disorder     Trigger finger     Asthma     Polypharmacy     Past Surgical History:   Procedure Laterality Date     APPENDECTOMY      Appendicitis     BACK SURGERY  2007,2010    back surgery 3 disk fusion     BACK SURGERY      L1-L2, L3-L4 laminectomy     COLONOSCOPY  11/2007    repeat 5-10 years     COLONOSCOPY N/A 7/1/2016    Procedure: COLONOSCOPY;  Surgeon: Steve Hoff DO;  Location: [facility]     exophytic lesion posterior scalp line  1/2011    Excision      laminectomy L3-4 and L1-2       RELEASE TRIGGER FINGER  2010    4th digit both hands     RELEASE TRIGGER FINGER Right 1/7/2016    Procedure: RELEASE TRIGGER FINGER;  Surgeon: Zev Schroeder MD;  Location: [facility] OR       Social History   Substance Use Topics     Smoking status: Former Smoker     Packs/day: 1.00     Years: 30.00     Quit date: 8/8/2007     Smokeless tobacco: Current User     Types: Chew     Alcohol use Yes      Comment: Rarely     Family History   Problem Relation Age of Onset     Asthma Mother      Musculoskeletal Disorder Mother      arthritis     Diabetes Father      Cancer Maternal Grandmother      stomach     Alzheimer Disease Maternal Grandfather      Cancer Paternal Grandmother      stomach     Hypertension Paternal Grandfather            Reviewed and updated as needed this visit by clinical staff       Reviewed and updated as needed this visit by Provider         ROS:  Constitutional, HEENT, cardiovascular, pulmonary, gi and gu systems are negative, except as otherwise noted.    OBJECTIVE:     /74 (BP Location: Right arm, Patient Position: Sitting, Cuff Size: Adult Regular)  Pulse 85  Temp 98.2  F (36.8  C) (Tympanic)  Resp 17  Wt 186 lb (84.4 kg)  SpO2 95%  BMI 27.47 kg/m2  Body mass index is 27.47 kg/(m^2).  GENERAL: healthy, alert and no distress  EYES: Eyes grossly normal to inspection, PERRL and conjunctivae and sclerae normal  EYES: Eyes grossly normal to inspection and conjunctivae and sclerae normal  NECK: no adenopathy, no asymmetry, masses, or scars and thyroid normal to palpation  RESP: lungs clear to auscultation - no rales, rhonchi or wheezes  CV: regular rate and rhythm, normal S1 S2, no S3 or S4, no murmur, click or rub, no peripheral edema and peripheral pulses strong  ABDOMEN: soft, nontender, no hepatosplenomegaly, no masses and bowel sounds normal  ABDOMEN: no bruits heard  MS: no gross musculoskeletal defects noted, no edema  MS: no edema and paraspinal spasms    NEURO: Normal strength and tone, mentation intact and speech normal  PSYCH: mentation appears normal, tangential, anxious and speech pressured    Diagnostic Test Results:  Results for orders placed or performed in visit on 10/25/18 (from the past 24 hour(s))   Hemoglobin A1c   Result Value Ref Range    Hemoglobin A1C 5.0 0 - 5.6 %   Comprehensive metabolic panel   Result Value Ref Range    Sodium 141 133 - 144 mmol/L    Potassium 4.4 3.4 - 5.3 mmol/L    Chloride 107 94 - 109 mmol/L    Carbon Dioxide 29 20 - 32 mmol/L    Anion Gap 5 3 - 14 mmol/L    Glucose 111 (H) 70 - 99 mg/dL    Urea Nitrogen 24 7 - 30 mg/dL    Creatinine 0.92 0.66 - 1.25 mg/dL    GFR Estimate 86 >60 mL/min/1.7m2    GFR Estimate If Black >90 >60 mL/min/1.7m2    Calcium 8.2 (L) 8.5 - 10.1 mg/dL    Bilirubin Total 0.3 0.2 - 1.3 mg/dL    Albumin 4.2 3.4 - 5.0 g/dL    Protein Total 6.9 6.8 - 8.8 g/dL    Alkaline Phosphatase 88 40 - 150 U/L    ALT 34 0 - 70 U/L    AST 28 0 - 45 U/L   Lipid Profile (Chol, Trig, HDL, LDL calc)   Result Value Ref Range    Cholesterol 131 <200 mg/dL    Triglycerides 101 <150 mg/dL    HDL Cholesterol 51 >39 mg/dL    LDL Cholesterol Calculated 60 <100 mg/dL    Non HDL Cholesterol 80 <130 mg/dL   CRP inflammation   Result Value Ref Range    CRP Inflammation 32.1 (H) 0.0 - 8.0 mg/L   PSA, screen   Result Value Ref Range    PSA 0.19 0 - 4 ug/L   CBC with platelets   Result Value Ref Range    WBC 5.6 4.0 - 11.0 10e9/L    RBC Count 3.73 (L) 4.4 - 5.9 10e12/L    Hemoglobin 12.2 (L) 13.3 - 17.7 g/dL    Hematocrit 35.3 (L) 40.0 - 53.0 %    MCV 95 78 - 100 fl    MCH 32.7 26.5 - 33.0 pg    MCHC 34.6 31.5 - 36.5 g/dL    RDW 12.1 10.0 - 15.0 %    Platelet Count 166 150 - 450 10e9/L     9/15/16  2:19 PM 9/15/16  2:34 PM [id number] [facility]    [employer] Interactive Report and InfoRx   View the interactive report   PACS Images   Show images for MR Lumbar Spine w/o Contrast   Study Result         MR OF LUMBAR SPINE     TECHNIQUE:  Images were  26-Nov-2023 17:48 obtained sagittally T1 proton density  T2-weighted;  axially proton density and T2-weighted.     REPORT:  There has been fusion of L2, L3, L4, L5 and S1 with  intervertebral disk spacers, and fixed dorsally with pedicle screws  and rods.  There is some broad-based anular bulging seen at T11-T12  and T12-L1.  At L1-L2 there is loss of vertical disk space height  noted, but no significant nerve roto compression is seen. Across the  fused segment, no thecal sac or nerve root compression is noted.  Upper portion of the sacrum and sacroiliac joints appear normal.  Intradurally, the nerves of cauda equina and conus appear normal.  The  paravertebral soft tissues appear normal.     IMPRESSION:  STATUS POST FUSION OF L2 THROUGH S1.  NO RECURRENT OR  RESIDUAL DISK HERNIATION OR PROTRUSION IS SEEN ACROSS THE FUSED  SEGMENT.  THERE IS SOME ANNULAR BULGING AT T12-L1 AND L1-L2, WITHOUT  SIGNIFICANT NERVE ROOT COMPRESSION.  Exam Date: Sep 15, 2016 02:34:00 PM  Author: BURT KITCHEN  This report is final and signed        10/30/18 11:14 AM [id number] [facility]    [employer] Interactive Report and InfoRx   View the interactive report   PACS Images   Show images for MR Thoracic Spine w/o Contrast   Study Result   PROCEDURE: MR THORACIC SPINE W/O CONTRAST 10/30/2018 11:14 AM     HISTORY: ; Chronic pain syndrome     COMPARISONS: None.     Meds/Dose Given: None.     TECHNIQUE: Sagittal T1, T2 and STIR sequences and axial T2-weighted  images.     FINDINGS: There is fairly severe degenerative disc disease in the  cervical spine with disc space narrowing, reactive endplate changes  and broad-based disc bulges. These are most severe at C3-4 and C4-5  levels. There is also degenerative change in the lumbar spine and  there is been previous lumbar fusion.     There is a moderate right convex scoliosis with some straightening of  the normal thoracic kyphosis. Endplate reactive changes are seen in  the mid and lower thoracic spine.     Cord  itself appears intrinsically normal.     There is a broad-based disc bulge at the T12-L1 level. Broad-based  disc bulge is also seen at T11-12 and at T10-11. These efface ventral  CSF and do contact the cord especially at T10-11. Degenerative changes  seen in the facet joints at the T10-11 level as well and there are  facet joint effusions.     There are mild disc bulges at T9-10, T8-9, and T7-8. These efface  ventral CSF.     There is a moderate central and right paramedian protrusion at T6-7  with mild flattening of the ventral cord.     Mild disc bulges seen at T5-6 and T4-5 levels. There is a moderate  broad-based disc bulge at T2-3.          IMPRESSION:   1. Multilevel degenerative change including the thoracic, cervical and  lumbar spines with postsurgical changes in the lumbar spine.  2. Multilevel disc bulges and protrusions with some effacement of  ventral CSF but no significant central stenosis or cord compression.     PRABHU SLOAN MD         ASSESSMENT/PLAN:   (G89.4) Chronic pain syndrome  (primary encounter diagnosis)  Comment: Joshua often overstates his pain level.  He has some insight into how exercise helps his back pain as he recently went to the gym and noted that his back pain was better.  He may be experiencing more spasms and stifnness than other pain.  We reviewed his most recent lumbar MRI which showed a stable back post fusion from L2 to S1.  W3e discussed opioid induced hyperesthesia today   Plan:   Pain Drug Scr UR W Rptd Meds, MR Thoracic Spine        w/o Contrast, naloxone (NARCAN) 1 mg/mL for         intranasal kit (2 syringes with 2 mucosal         atomizer device)  He is interested in attending PT and he will continue to attend the gym with light weight workouts.  He is in agreement of opioid reduction.  He will continue Baclofen 20 mg TID.  His current MME level is between 270 to 310 as he is on Fentanyl 75 mcg patch every 2 days.  We will jay his from Fentanyl to long acting  opioids and reduce him by 20 percent per month.  This will start with Morphine ER 80 TID and Hydrocodone 10 every 8 hours for breakthrough pain (270 MME).  Then,  Morphine ER 60 TID and  Hydrocodone 10 every 8 hours for breakthrough pain ( 210 MME).  Then,  Morphine ER 50 TID and  Hydrocodone 10 every 8 hours for breakthrough pain ( 180 MME).  Then,  Morphine ER 60 BID and  Hydrocodone 10 every 8 hours for breakthrough pain ( 150 MME).  Then,  Morphine ER 40 BID and  Hydrocodone 10 every 12 hours for breakthrough pain ( 130  MME).  Then,  Morphine ER 30 TID ( 90 MME) with a wean every 2-3 weeks first to 30 mg BID, then 20 mg BID.  From there we will have a plan for other options.    (M51.36) DDD (degenerative disc disease), lumbar  Comment: As above.  Plan:   PHYSICAL THERAPY REFERRAL      (J45.40) Moderate persistent asthma without complication  Comment: Fair to good control.  Plan:   He will continue mometasone-formoterol (DULERA) 100-5 MCG/ACT  oral inhaler 2 puffs BID.  He will continue his albuterol inhaler as needed.    (E78.2) Mixed hyperlipidemia  Comment: Well controlled  Plan:  He will continue Lipitor 20 mg and ASA 81 mg daily.    (Z12.5) Prostate cancer screening  Comment: His PSA is normal  Plan:   Continue annual PSA screening.      (Z23) Need for prophylactic vaccination and inoculation against influenza  Comment:   Plan: C RIV4 (FLUBLOK) VACCINE RECOMBINANT DNA PRSRV         ANTIBIO FREE, IM, ADMIN INFLUENZA (For MEDICARE        Patients ONLY) []                FUTURE APPOINTMENTS:       - Follow-up visit in 3 weeks for Chronic pain    Lyle Fisher DO,   [facility] - [place]

## 2023-11-27 RX ORDER — LISDEXAMFETAMINE DIMESYLATE 70 MG/1
70 CAPSULE ORAL DAILY
Qty: 30 CAPSULE | Refills: 0 | Status: SHIPPED | OUTPATIENT
Start: 2023-11-27 | End: 2024-01-08

## 2023-12-10 ENCOUNTER — HEALTH MAINTENANCE LETTER (OUTPATIENT)
Age: 63
End: 2023-12-10

## 2023-12-12 DIAGNOSIS — I10 ESSENTIAL HYPERTENSION: ICD-10-CM

## 2023-12-12 NOTE — TELEPHONE ENCOUNTER
Toprol      Last Written Prescription Date:  6/2/22  Last Fill Quantity: 90,   # refills: 0  Last Office Visit: 10/24/23  Future Office visit:    Next 5 appointments (look out 90 days)      Dec 18, 2023  3:30 PM  (Arrive by 3:15 PM)  SHORT with Lissy Kraft MD  [facility] [place] ([facility] [place] ) [address]  [place] [address]  [phone]     Jan 02, 2024 11:00 AM  (Arrive by 10:45 AM)  Return Visit with Laquita Fernandez MD  [facility] [place] ([facility] [place] ) [address]  [place] [address]  [phone]             Routing refill request to provider for review/approval because:      Lasix      Last Written Prescription Date:  9/21/22  Last Fill Quantity: 30,   # refills: 0  Last Office Visit: 10/24/23  Future Office visit:    Next 5 appointments (look out 90 days)      Dec 18, 2023  3:30 PM  (Arrive by 3:15 PM)  SHORT with Lissy Kraft MD  [facility] [place] ([facility] [place] ) [address]  [place] [address]  [phone]     Jan 02, 2024 11:00 AM  (Arrive by 10:45 AM)  Return Visit with Laquita Fernandez MD  [facility] [place] ([facility] [place] ) [address]  [place] [address]  [phone]             Routing refill request to provider for review/approval because:

## 2023-12-14 RX ORDER — FUROSEMIDE 20 MG
20 TABLET ORAL DAILY
Qty: 30 TABLET | Refills: 5 | Status: SHIPPED | OUTPATIENT
Start: 2023-12-14 | End: 2024-06-06

## 2023-12-14 RX ORDER — METOPROLOL SUCCINATE 50 MG/1
TABLET, EXTENDED RELEASE ORAL
Qty: 30 TABLET | Refills: 8 | Status: SHIPPED | OUTPATIENT
Start: 2023-12-14

## 2023-12-18 ENCOUNTER — TELEPHONE (OUTPATIENT)
Dept: FAMILY MEDICINE | Facility: OTHER | Age: 63
End: 2023-12-18

## 2023-12-18 NOTE — TELEPHONE ENCOUNTER
4:01 PM    Reason for Call: Phone Call    Description: Joshua apologizes for missing his scheduled appointment today at 3:15pm he wasn't doing great couldn't sleep during the night and he fell asleep sitting up this afternoon and missed his appointment.    Was an appointment offered for this call? Yes  Friday January 5, 2024 at 9:15am with Dr Kraft    Preferred method for responding to this message: Telephone Call  What is your phone number ? [phone]    If we cannot reach you directly, may we leave a detailed response at the number you provided? Yes    Can this message wait until your PCP/provider returns, if available today? YES, No

## 2023-12-22 DIAGNOSIS — Z71.6 TOBACCO ABUSE COUNSELING: ICD-10-CM

## 2023-12-22 NOTE — TELEPHONE ENCOUNTER
Nicotine       Last Written Prescription Date:  08/04/2022  Last Fill Quantity: 30,   # refills: 1  Last Office Visit: 10/24/2023  Future Office visit:    Next 5 appointments (look out 90 days)      Jan 02, 2024 11:00 AM  (Arrive by 10:45 AM)  Return Visit with Laquita Fernandez MD  [facility] [place] ([facility] [place] ) [address] [place] [id number] [phone]     Jan 05, 2024  9:30 AM  (Arrive by 9:15 AM)  SHORT with Lissy Kraft MD  [facility] [place] ([facility] [place] ) [facility] [address]  [phone]

## 2024-01-02 NOTE — PROGRESS NOTES
Assessment & Plan     Chronic bilateral low back pain without sciatica / Implantable intrathecal infusion pump present / Chronic midline low back pain with sciatica, sciatica laterality unspecified  Follows with ADRIÁN for his pain pump, will obtain last note  Last MRI lumbar/thoracic/cervical (12/2021)  No rheumatologic work up noted  H/o being on methadone, fentanyl patches, long acting morphine, oxy, norco, tramadol   Trent would like to have his pain pump removed  Trent would consider suboxone   - diclofenac tablets have been discontinued. Continue with ibuprofen      Prediabetes  A1c today of 6.0  - Hemoglobin A1c    Mixed hyperlipidemia  LDL today of 87  - Comprehensive metabolic panel  - Lipid Profile (Chol, Trig, HDL, LDL calc)  - c/w atorvastatin 20mg     Benign essential hypertension  BP at goal   - CBC with platelets  - c/w losartan, metoprolol succinate     Bipolar affective disorder, current episode hypomanic (H)  Follows with Dr Fernandez, visit scheduled for 2/13/2024      Vitamin B12 deficiency (non anemic)  - Vitamin B12    Vitamin D deficiency  - Vitamin D Deficiency    Normocytic anemia  Hgb today of 12.8. MCV 90. H/o ferritin and iron wnl  - Lab Blood Morphology Pathologist Review; Future    41 minutes spent on the date of the encounter doing chart review, review of test results, interpretation of tests, patient visit, documentation        See Patient Instructions    Return in about 4 weeks (around 2/2/2024) for chronic pain.    Lissy Kraft MD  [facility] [place]        Subjective   Trent is a 63 year old, presenting for the following health issues:  Depression, anxiety, diabetes, lipids, htn, pain      HPI      PreDiabetes Follow-up     How often are you checking your blood sugar? A few times a month  What time of day are you checking your blood sugars (select all that apply)?    Have you had any blood sugars above 200?  No  Have you had any blood sugars below 70?  No  What  symptoms do you notice when your blood sugar is low?  None  What concerns do you have today about your diabetes? None   Do you have any of these symptoms? (Select all that apply)  No numbness or tingling in feet.  No redness, sores or blisters on feet.  No complaints of excessive thirst.  No reports of blurry vision.  No significant changes to weight.        Hyperlipidemia Follow-Up     Are you regularly taking any medication or supplement to lower your cholesterol?   Yes- Lipitor  Are you having muscle aches or other side effects that you think could be caused by your cholesterol lowering medication?  No     Hypertension Follow-up     Do you check your blood pressure regularly outside of the clinic? Yes   Are you following a low salt diet? Yes  Are your blood pressures ever more than 140 on the top number (systolic) OR more       than 90 on the bottom number (diastolic), for example 140/90? Yes         BP Readings from Last 2 Encounters:   10/30/23 133/81   10/24/23 116/80          Hemoglobin A1C (%)   Date Value   2022 6.0 (H)   2022 6.0 (H)   2021 5.6   10/27/2020 5.7 (H)          LDL Cholesterol Calculated (mg/dL)   Date Value   2022 69   2021 42   2020 86   10/25/2018 60      Depression and Anxiety Follow-Up  How are you doing with your depression since your last visit? Worsened   How are you doing with your anxiety since your last visit?  Worsened   Are you having other symptoms that might be associated with depression or anxiety? Yes:  just pissed off  Have you had a significant life event? Health Concerns        Do you have any concerns with your use of alcohol or other drugs? No     - no showed Dr Fernandez on 2024    Social History            Tobacco Use    Smoking status: Former       Packs/day: 0.00       Years: 30.00       Additional pack years: 0.00       Total pack years: 0.00       Types: Cigarettes       Quit date: 2007       Years since quittin.3     Smokeless tobacco: Current       Types: Snuff   Vaping Use    Vaping Use: Never used   Substance Use Topics    Alcohol use: Yes       Comment: daily    Drug use: No           11/4/2022     8:02 AM 6/7/2023    11:12 AM 8/9/2023    11:11 AM   PHQ   PHQ-9 Total Score 6 20 22   Q9: Thoughts of better off dead/self-harm past 2 weeks Not at all Not at all Not at all           5/24/2022    10:00 AM 10/12/2022     2:48 PM 11/4/2022     8:02 AM   DAYANA-7 SCORE   Total Score 1 4 2            Pain History:  When did you first notice your pain? f/u   Have you seen this provider for your pain in the past? Yes   Where in your body do you have pain? Lower back  Are you seeing anyone else for your pain? No          11/4/2022     8:02 AM 6/7/2023    11:12 AM 8/9/2023    11:11 AM   PHQ-9 SCORE   PHQ-9 Total Score MyChart   20 (Severe depression) 22 (Severe depression)   PHQ-9 Total Score 6 20 22             6/24/2022     3:12 PM 11/4/2022     8:02 AM 1/5/2024     9:45 AM   PEG Score   PEG Total Score 8 10 10        Chronic Pain Follow Up:     Location of pain: Lower back  Analgesia/pain control:    - Recent changes:  worsening    - Overall control: Inadequate pain control    - Current treatments: Muscle spasms   Adherence:     - Do you ever take more pain medicine than prescribed? No    - When did you take your last dose of pain medicine?  this am   Adverse effects: No      - follows with ADRIÁN with last visit 10/5/2023 -  with increase of fentanyl  - just saw them last couple of weeks at [facility] per Trent, fentanyl has been decreased     - has been ibuprofen 600mg tid  and using diclofenac 50mg tid for about one year   - was doing better without pain pump    - history of being on methadone, fentanyl, morphine, oxycodone, hydrocodone, tramadol      - would like oral pain medication   - pain feels like a knife in his lower back   - open to suboxone    - trial pain pump worked well, permanent pain pump has never worked      Wt Readings  from Last 4 Encounters:   01/05/24 85.2 kg (187 lb 12.8 oz)   10/24/23 80.4 kg (177 lb 4.8 oz)   08/09/23 77.5 kg (170 lb 12.8 oz)   06/07/23 76.1 kg (167 lb 12.8 oz)          PDMP Review           Value Time User     State PDMP site checked  Yes 11/27/2023  9:13 AM Laquita Fernandez MD             Last CSA Agreement:   CSA -- Patient Level:     [Media Unavailable] Controlled Substance Agreement - Opioid - Scan on 5/27/2022  9:04 AM: CONTROLLED SUBSTANCE AGREEMENT   [Media Unavailable] Controlled Substance Agreement - Opioid - Scan on 3/9/2021 11:32 AM: controlled substance agreement   [Media Unavailable] Controlled Substance Agreement - Non - Opioid - Scan on 7/15/2019 10:03 AM: NON-OPIOID CONTROLLED SUBSTANCE AGREEMENT         Last UDS: 11/30/2021       # Wellness: deferred  - Immunizations: RSV   - Lipids: updating today   - Dexa scan:  - Colon cancer screening: colonoscopy (7/1/2016) normal. Repeat 10 years  - PSA: deferred  - Lung cancer screening: quit 2007  - AAA screening:         Review of Systems   Constitutional, HEENT, cardiovascular, pulmonary, gi and gu systems are negative, except as otherwise noted.           Objective     /68   Pulse 63   Temp 97  F (36.1  C) (Tympanic)   Resp 17   Wt 85.2 kg (187 lb 12.8 oz)   SpO2 96%   BMI 26.19 kg/m        Physical Exam   GENERAL: healthy, alert and mild distress  RESP: lungs clear to auscultation - no rales, rhonchi or wheezes  CV: regular rate and rhythm, normal S1 S2, no S3 or S4, no murmur, click or rub, no peripheral edema and peripheral pulses strong  MSK: stiff getting up from chair and ambulating   Psych: depressed     Results for orders placed or performed in visit on 01/05/24 (from the past 24 hour(s))   Hemoglobin A1c   Result Value Ref Range    Estimated Average Glucose 126 mg/dL    Hemoglobin A1C 6.0 (H) <5.7 %   CBC with platelets   Result Value Ref Range    WBC Count 7.0 4.0 - 11.0 10e3/uL    RBC Count 4.30 (L) 4.40 - 5.90 10e6/uL     Hemoglobin 12.8 (L) 13.3 - 17.7 g/dL    Hematocrit 38.6 (L) 40.0 - 53.0 %    MCV 90 78 - 100 fL    MCH 29.8 26.5 - 33.0 pg    MCHC 33.2 31.5 - 36.5 g/dL    RDW 13.0 10.0 - 15.0 %    Platelet Count 176 150 - 450 10e3/uL   Comprehensive metabolic panel   Result Value Ref Range    Sodium 144 135 - 145 mmol/L    Potassium 4.3 3.4 - 5.3 mmol/L    Carbon Dioxide (CO2) 28 22 - 29 mmol/L    Anion Gap 10 7 - 15 mmol/L    Urea Nitrogen 16.2 8.0 - 23.0 mg/dL    Creatinine 0.88 0.67 - 1.17 mg/dL    GFR Estimate >90 >60 mL/min/1.73m2    Calcium 9.2 8.8 - 10.2 mg/dL    Chloride 106 98 - 107 mmol/L    Glucose 113 (H) 70 - 99 mg/dL    Alkaline Phosphatase 125 40 - 150 U/L    AST 26 0 - 45 U/L    ALT 18 0 - 70 U/L    Protein Total 7.1 6.4 - 8.3 g/dL    Albumin 4.8 3.5 - 5.2 g/dL    Bilirubin Total 0.2 <=1.2 mg/dL   Lipid Profile (Chol, Trig, HDL, LDL calc)   Result Value Ref Range    Cholesterol 183 <200 mg/dL    Triglycerides 125 <150 mg/dL    Direct Measure HDL 71 >=40 mg/dL    LDL Cholesterol Calculated 87 <=100 mg/dL    Non HDL Cholesterol 112 <130 mg/dL    Patient Fasting > 8hrs? No     Narrative    Cholesterol  Desirable:  <200 mg/dL    Triglycerides  Normal:  Less than 150 mg/dL  Borderline High:  150-199 mg/dL  High:  200-499 mg/dL  Very High:  Greater than or equal to 500 mg/dL    Direct Measure HDL  Female:  Greater than or equal to 50 mg/dL   Male:  Greater than or equal to 40 mg/dL    LDL Cholesterol  Desirable:  <100mg/dL  Above Desirable:  100-129 mg/dL   Borderline High:  130-159 mg/dL   High:  160-189 mg/dL   Very High:  >= 190 mg/dL    Non HDL Cholesterol  Desirable:  130 mg/dL  Above Desirable:  130-159 mg/dL  Borderline High:  160-189 mg/dL  High:  190-219 mg/dL  Very High:  Greater than or equal to 220 mg/dL     *Note: Due to a large number of results and/or encounters for the requested time period, some results have not been displayed. A complete set of results can be found in Results Review.        MRI lumbar  (12/21/2021)   IMPRESSION: Worsening disc and endplate degenerative changes at T12-L1 and L1-2.     Moderate left frontal stenosis again seen at L5-S1 with left L5 ganglionic impingement.

## 2024-01-04 PROBLEM — R73.03 PREDIABETES: Status: ACTIVE | Noted: 2020-12-03

## 2024-01-04 PROBLEM — Z71.6 TOBACCO ABUSE COUNSELING: Status: RESOLVED | Noted: 2020-10-27 | Resolved: 2024-01-04

## 2024-01-04 PROBLEM — M62.830 BACK MUSCLE SPASM: Status: RESOLVED | Noted: 2017-06-27 | Resolved: 2024-01-04

## 2024-01-04 PROBLEM — I95.9 HYPOTENSION: Status: RESOLVED | Noted: 2021-11-30 | Resolved: 2024-01-04

## 2024-01-05 ENCOUNTER — LAB (OUTPATIENT)
Dept: LAB | Facility: OTHER | Age: 64
End: 2024-01-05
Payer: COMMERCIAL

## 2024-01-05 ENCOUNTER — OFFICE VISIT (OUTPATIENT)
Dept: FAMILY MEDICINE | Facility: OTHER | Age: 64
End: 2024-01-05
Attending: FAMILY MEDICINE
Payer: COMMERCIAL

## 2024-01-05 VITALS
TEMPERATURE: 97 F | SYSTOLIC BLOOD PRESSURE: 130 MMHG | RESPIRATION RATE: 17 BRPM | DIASTOLIC BLOOD PRESSURE: 68 MMHG | BODY MASS INDEX: 26.19 KG/M2 | HEART RATE: 63 BPM | OXYGEN SATURATION: 96 % | WEIGHT: 187.8 LBS

## 2024-01-05 DIAGNOSIS — Z96.89 IMPLANTABLE INTRATHECAL INFUSION PUMP PRESENT: ICD-10-CM

## 2024-01-05 DIAGNOSIS — G89.29 CHRONIC MIDLINE LOW BACK PAIN WITH SCIATICA, SCIATICA LATERALITY UNSPECIFIED: ICD-10-CM

## 2024-01-05 DIAGNOSIS — R73.03 PREDIABETES: ICD-10-CM

## 2024-01-05 DIAGNOSIS — E53.8 VITAMIN B12 DEFICIENCY (NON ANEMIC): ICD-10-CM

## 2024-01-05 DIAGNOSIS — I10 BENIGN ESSENTIAL HYPERTENSION: ICD-10-CM

## 2024-01-05 DIAGNOSIS — E55.9 VITAMIN D DEFICIENCY: ICD-10-CM

## 2024-01-05 DIAGNOSIS — D64.9 NORMOCYTIC ANEMIA: ICD-10-CM

## 2024-01-05 DIAGNOSIS — M54.40 CHRONIC MIDLINE LOW BACK PAIN WITH SCIATICA, SCIATICA LATERALITY UNSPECIFIED: ICD-10-CM

## 2024-01-05 DIAGNOSIS — M62.830 BACK MUSCLE SPASM: ICD-10-CM

## 2024-01-05 DIAGNOSIS — G89.29 CHRONIC BILATERAL LOW BACK PAIN WITHOUT SCIATICA: Primary | ICD-10-CM

## 2024-01-05 DIAGNOSIS — F31.0 BIPOLAR AFFECTIVE DISORDER, CURRENT EPISODE HYPOMANIC (H): ICD-10-CM

## 2024-01-05 DIAGNOSIS — F90.2 ADHD (ATTENTION DEFICIT HYPERACTIVITY DISORDER), COMBINED TYPE: ICD-10-CM

## 2024-01-05 DIAGNOSIS — E78.2 MIXED HYPERLIPIDEMIA: ICD-10-CM

## 2024-01-05 DIAGNOSIS — M54.50 CHRONIC BILATERAL LOW BACK PAIN WITHOUT SCIATICA: Primary | ICD-10-CM

## 2024-01-05 LAB
ALBUMIN SERPL BCG-MCNC: 4.8 G/DL (ref 3.5–5.2)
ALP SERPL-CCNC: 125 U/L (ref 40–150)
ALT SERPL W P-5'-P-CCNC: 18 U/L (ref 0–70)
ANION GAP SERPL CALCULATED.3IONS-SCNC: 10 MMOL/L (ref 7–15)
AST SERPL W P-5'-P-CCNC: 26 U/L (ref 0–45)
BASOPHILS # BLD AUTO: 0 10E3/UL (ref 0–0.2)
BASOPHILS NFR BLD AUTO: 0 %
BILIRUB SERPL-MCNC: 0.2 MG/DL
BUN SERPL-MCNC: 16.2 MG/DL (ref 8–23)
CALCIUM SERPL-MCNC: 9.2 MG/DL (ref 8.8–10.2)
CHLORIDE SERPL-SCNC: 106 MMOL/L (ref 98–107)
CHOLEST SERPL-MCNC: 183 MG/DL
CREAT SERPL-MCNC: 0.88 MG/DL (ref 0.67–1.17)
DEPRECATED HCO3 PLAS-SCNC: 28 MMOL/L (ref 22–29)
EGFRCR SERPLBLD CKD-EPI 2021: >90 ML/MIN/1.73M2
EOSINOPHIL # BLD AUTO: 0.3 10E3/UL (ref 0–0.7)
EOSINOPHIL NFR BLD AUTO: 5 %
ERYTHROCYTE [DISTWIDTH] IN BLOOD BY AUTOMATED COUNT: 13 % (ref 10–15)
ERYTHROCYTE [DISTWIDTH] IN BLOOD BY AUTOMATED COUNT: 13.2 % (ref 10–15)
EST. AVERAGE GLUCOSE BLD GHB EST-MCNC: 126 MG/DL
FASTING STATUS PATIENT QL REPORTED: NO
GLUCOSE SERPL-MCNC: 113 MG/DL (ref 70–99)
HBA1C MFR BLD: 6 %
HCT VFR BLD AUTO: 38.3 % (ref 40–53)
HCT VFR BLD AUTO: 38.6 % (ref 40–53)
HDLC SERPL-MCNC: 71 MG/DL
HGB BLD-MCNC: 12.8 G/DL (ref 13.3–17.7)
HGB BLD-MCNC: 12.9 G/DL (ref 13.3–17.7)
IMM GRANULOCYTES # BLD: 0 10E3/UL
IMM GRANULOCYTES NFR BLD: 1 %
LDLC SERPL CALC-MCNC: 87 MG/DL
LYMPHOCYTES # BLD AUTO: 1.8 10E3/UL (ref 0.8–5.3)
LYMPHOCYTES NFR BLD AUTO: 26 %
MCH RBC QN AUTO: 29.8 PG (ref 26.5–33)
MCH RBC QN AUTO: 30.7 PG (ref 26.5–33)
MCHC RBC AUTO-ENTMCNC: 33.2 G/DL (ref 31.5–36.5)
MCHC RBC AUTO-ENTMCNC: 33.7 G/DL (ref 31.5–36.5)
MCV RBC AUTO: 90 FL (ref 78–100)
MCV RBC AUTO: 91 FL (ref 78–100)
MONOCYTES # BLD AUTO: 0.4 10E3/UL (ref 0–1.3)
MONOCYTES NFR BLD AUTO: 6 %
NEUTROPHILS # BLD AUTO: 4.3 10E3/UL (ref 1.6–8.3)
NEUTROPHILS NFR BLD AUTO: 62 %
NONHDLC SERPL-MCNC: 112 MG/DL
NRBC # BLD AUTO: 0 10E3/UL
NRBC BLD AUTO-RTO: 0 /100
PLATELET # BLD AUTO: 176 10E3/UL (ref 150–450)
PLATELET # BLD AUTO: 185 10E3/UL (ref 150–450)
POTASSIUM SERPL-SCNC: 4.3 MMOL/L (ref 3.4–5.3)
PROT SERPL-MCNC: 7.1 G/DL (ref 6.4–8.3)
RBC # BLD AUTO: 4.2 10E6/UL (ref 4.4–5.9)
RBC # BLD AUTO: 4.3 10E6/UL (ref 4.4–5.9)
RETICS # AUTO: 0.06 10E6/UL (ref 0.03–0.1)
RETICS/RBC NFR AUTO: 1.3 % (ref 0.5–2)
SODIUM SERPL-SCNC: 144 MMOL/L (ref 135–145)
TRIGL SERPL-MCNC: 125 MG/DL
WBC # BLD AUTO: 6.9 10E3/UL (ref 4–11)
WBC # BLD AUTO: 7 10E3/UL (ref 4–11)

## 2024-01-05 PROCEDURE — 83036 HEMOGLOBIN GLYCOSYLATED A1C: CPT | Mod: ZL | Performed by: FAMILY MEDICINE

## 2024-01-05 PROCEDURE — 80061 LIPID PANEL: CPT | Mod: ZL | Performed by: FAMILY MEDICINE

## 2024-01-05 PROCEDURE — 82374 ASSAY BLOOD CARBON DIOXIDE: CPT | Mod: ZL | Performed by: FAMILY MEDICINE

## 2024-01-05 PROCEDURE — 85045 AUTOMATED RETICULOCYTE COUNT: CPT | Mod: ZL

## 2024-01-05 PROCEDURE — 36415 COLL VENOUS BLD VENIPUNCTURE: CPT | Mod: ZL | Performed by: FAMILY MEDICINE

## 2024-01-05 PROCEDURE — 82306 VITAMIN D 25 HYDROXY: CPT | Mod: ZL | Performed by: FAMILY MEDICINE

## 2024-01-05 PROCEDURE — 99215 OFFICE O/P EST HI 40 MIN: CPT | Performed by: FAMILY MEDICINE

## 2024-01-05 PROCEDURE — 85025 COMPLETE CBC W/AUTO DIFF WBC: CPT | Mod: ZL

## 2024-01-05 PROCEDURE — 82040 ASSAY OF SERUM ALBUMIN: CPT | Mod: ZL | Performed by: FAMILY MEDICINE

## 2024-01-05 PROCEDURE — 85060 BLOOD SMEAR INTERPRETATION: CPT | Performed by: PATHOLOGY

## 2024-01-05 PROCEDURE — 82607 VITAMIN B-12: CPT | Mod: ZL | Performed by: FAMILY MEDICINE

## 2024-01-05 PROCEDURE — G0463 HOSPITAL OUTPT CLINIC VISIT: HCPCS

## 2024-01-05 PROCEDURE — 85027 COMPLETE CBC AUTOMATED: CPT | Mod: ZL | Performed by: FAMILY MEDICINE

## 2024-01-05 ASSESSMENT — PAIN SCALES - GENERAL: PAINLEVEL: EXTREME PAIN (9)

## 2024-01-06 LAB
VIT B12 SERPL-MCNC: 412 PG/ML (ref 232–1245)
VIT D+METAB SERPL-MCNC: 20 NG/ML (ref 20–50)

## 2024-01-08 RX ORDER — METHOCARBAMOL 750 MG/1
TABLET, FILM COATED ORAL
Qty: 90 TABLET | Refills: 2 | Status: SHIPPED | OUTPATIENT
Start: 2024-01-08 | End: 2024-05-10

## 2024-01-08 RX ORDER — LISDEXAMFETAMINE DIMESYLATE 70 MG/1
70 CAPSULE ORAL DAILY
Qty: 30 CAPSULE | Refills: 0 | Status: SHIPPED | OUTPATIENT
Start: 2024-01-08 | End: 2024-02-15

## 2024-01-08 NOTE — TELEPHONE ENCOUNTER
Disp Refills Start End KEYONA   methocarbamol (ROBAXIN) 750 MG tablet 90 tablet 1 10/24/2023 -- No       Last Office Visit: 01/05/2024  Future Office visit:    Next 5 appointments (look out 90 days)      Feb 02, 2024  2:30 PM  (Arrive by 2:15 PM)  SHORT with Lissy Kraft MD  [facility] ([facility] ) [address] [place] [address]  [phone]     Feb 13, 2024  2:00 PM  (Arrive by 1:45 PM)  Return Visit with Laquita Fernandez MD  [facility] ([facility] ) [address] [place] [id number] [phone]             Routing refill request to provider for review/approval because:

## 2024-01-08 NOTE — TELEPHONE ENCOUNTER
Michael      Last Written Prescription Date:  11.27.23  Last Fill Quantity: #30,   # refills: 0  Last Office Visit: 8.9.23  Future Office visit:    Next 5 appointments (look out 90 days)      Feb 02, 2024  2:30 PM  (Arrive by 2:15 PM)  SHORT with Lissy Kraft MD  [facility] [address] [facility] [address]  [phone]     Feb 13, 2024  2:00 PM  (Arrive by 1:45 PM)  Return Visit with Laquita Fernandez MD  [facility] ([facility] ) [address] [place] [id number] [phone]             Routing refill request to provider for review/approval because:  Drug not on the [facility], [facility] or  Health refill protocol or controlled substance

## 2024-01-09 LAB
PATH REPORT.COMMENTS IMP SPEC: NORMAL
PATH REPORT.FINAL DX SPEC: NORMAL
PATH REPORT.MICROSCOPIC SPEC OTHER STN: NORMAL
PATH REPORT.MICROSCOPIC SPEC OTHER STN: NORMAL

## 2024-01-26 ENCOUNTER — TELEPHONE (OUTPATIENT)
Dept: FAMILY MEDICINE | Facility: OTHER | Age: 64
End: 2024-01-26

## 2024-01-26 DIAGNOSIS — R11.0 NAUSEA: ICD-10-CM

## 2024-01-26 RX ORDER — DRONABINOL 2.5 MG/1
CAPSULE ORAL
Qty: 60 CAPSULE | Refills: 1 | Status: SHIPPED | OUTPATIENT
Start: 2024-01-26 | End: 2024-09-23

## 2024-01-26 NOTE — TELEPHONE ENCOUNTER
Dronabinol (Marinol) 2.5 MG capsule    Last Written Prescription Date:  08/22/2023  Last Fill Quantity: 60,   # refills: 0  Last Office Visit: 01/05/2024  Future Office visit:    Next 5 appointments (look out 90 days)      Feb 02, 2024  2:30 PM  (Arrive by 2:15 PM)  SHORT with Lissy Kraft MD  [facility] ([facility] ) [address] [facility] [address]  [phone]     Feb 13, 2024  2:00 PM  (Arrive by 1:45 PM)  Return Visit with Laquita Fernandez MD  [facility] ([facility] ) [address] [place] [address]  [place] [id number] [phone]     Apr 11, 2024  3:00 PM  (Arrive by 2:45 PM)  SHORT with Lissy Kraft MD  [facility] ([facility] ) [address] [facility] [address]  [phone]             Routing refill request to provider for review/approval because:  Drug not on the G, P or [facility] refill protocol or controlled substance

## 2024-01-26 NOTE — TELEPHONE ENCOUNTER
12:59 PM    Reason for Call: Phone Call    Description: Patient would like to speak to the nurse regarding medication. Please call patient back.    Was an appointment offered for this call? No  If yes : Appointment type              Date    Preferred method for responding to this message: Telephone Call  What is your phone number ?  [phone]    If we cannot reach you directly, may we leave a detailed response at the number you provided? Yes    Can this message wait until your PCP/provider returns, if available today? YES, Provider is in    [facility]

## 2024-01-29 DIAGNOSIS — R11.0 NAUSEA: ICD-10-CM

## 2024-01-29 RX ORDER — DRONABINOL 2.5 MG/1
2.5 CAPSULE ORAL
Qty: 60 CAPSULE | Refills: 1 | Status: SHIPPED | OUTPATIENT
Start: 2024-01-29 | End: 2024-03-25

## 2024-01-29 RX ORDER — DRONABINOL 2.5 MG/1
CAPSULE ORAL
Qty: 60 CAPSULE | Refills: 0 | OUTPATIENT
Start: 2024-01-29

## 2024-01-29 NOTE — TELEPHONE ENCOUNTER
Please see note from 's Pharmacy stating:  WE ARE UNABLE TO GET MARINOL IN STOCK.  PT REQUESTS A NEW RX BE SENT TO [facility] (OURS CAN NOT BE TRANSFERRED).  THANK YOU!    Last signed on 1/26/24    Edilma Wilkes, RN  Care Coordination

## 2024-02-02 ENCOUNTER — TELEPHONE (OUTPATIENT)
Dept: FAMILY MEDICINE | Facility: OTHER | Age: 64
End: 2024-02-02

## 2024-02-02 NOTE — TELEPHONE ENCOUNTER
12:58 PM    Reason for Call: Phone Call    Description: patient would like to talk to Dr Kraft about getting medical marijuana card was given last by sleep apnea department    Was an appointment offered for this call? No  If yes : Appointment type              Date    Preferred method for responding to this message: Telephone Call  What is your phone number ? [phone]    If we cannot reach you directly, may we leave a detailed response at the number you provided? Yes    Can this message wait until your PCP/provider returns, if available today? Geovanna Torres

## 2024-02-08 DIAGNOSIS — Z00.00 HEALTHCARE MAINTENANCE: ICD-10-CM

## 2024-02-08 DIAGNOSIS — Z71.6 TOBACCO ABUSE COUNSELING: ICD-10-CM

## 2024-02-08 NOTE — TELEPHONE ENCOUNTER
nicotine polacrilex (NICORETTE) 4 MG gum   Last Written Prescription Date:  12-  Last Fill Quantity: 220,   # refills: 0  Last Office Visit: 1-5-24  Future Office visit:    Next 5 appointments (look out 90 days)      Feb 13, 2024  2:00 PM  (Arrive by 1:45 PM)  Return Visit with Laquita Fernandez MD  [facility] ([facility] ) [address] [place] [phone]     Apr 11, 2024  3:00 PM  (Arrive by 2:45 PM)  SHORT with Lissy Kraft MD  [facility] ([facility] ) [facility] [address]  [phone]             Routing refill request to provider for review/approval because:  Drug not on the [facility], P or [facility] refill protocol or controlled substance

## 2024-02-08 NOTE — TELEPHONE ENCOUNTER
Aspirin  Last Written Prescription Date: 10/24/23  Last Fill Quantity: 90 # of Refills: 0  Last Office Visit: 1/5/24

## 2024-02-09 RX ORDER — ASPIRIN 81 MG/1
TABLET, CHEWABLE ORAL
Qty: 30 TABLET | Refills: 3 | Status: SHIPPED | OUTPATIENT
Start: 2024-02-09 | End: 2024-06-06

## 2024-02-13 DIAGNOSIS — F90.2 ADHD (ATTENTION DEFICIT HYPERACTIVITY DISORDER), COMBINED TYPE: ICD-10-CM

## 2024-02-13 NOTE — TELEPHONE ENCOUNTER
Vyvanse       Last Written Prescription Date:  1/08/2024  Last Fill Quantity: 30,   # refills: 0  Last Office Visit: 1/5/2024  Future Office visit:    Next 5 appointments (look out 90 days)      Feb 13, 2024  2:00 PM  (Arrive by 1:45 PM)  Return Visit [facility]  [facility] ) [address] [place] [id number] [phone]     Apr 11, 2024  3:00 PM  (Arrive by 2:45 PM)  SHORT with Lissy Kraft MD  [facility] ([facility] ) [facility] [address]  [phone]

## 2024-02-15 RX ORDER — LISDEXAMFETAMINE DIMESYLATE 70 MG/1
70 CAPSULE ORAL DAILY
Qty: 30 CAPSULE | Refills: 0 | Status: SHIPPED | OUTPATIENT
Start: 2024-02-15 | End: 2024-04-01

## 2024-02-21 ENCOUNTER — OFFICE VISIT (OUTPATIENT)
Dept: CHIROPRACTIC MEDICINE | Facility: OTHER | Age: 64
End: 2024-02-21
Attending: CHIROPRACTOR
Payer: COMMERCIAL

## 2024-02-21 DIAGNOSIS — M99.03 SEGMENTAL AND SOMATIC DYSFUNCTION OF LUMBAR REGION: Primary | ICD-10-CM

## 2024-02-21 DIAGNOSIS — M54.50 ACUTE BILATERAL LOW BACK PAIN WITHOUT SCIATICA: ICD-10-CM

## 2024-02-21 DIAGNOSIS — M99.01 SEGMENTAL AND SOMATIC DYSFUNCTION OF CERVICAL REGION: ICD-10-CM

## 2024-02-21 DIAGNOSIS — M99.02 SEGMENTAL AND SOMATIC DYSFUNCTION OF THORACIC REGION: ICD-10-CM

## 2024-02-21 PROCEDURE — 99212 OFFICE O/P EST SF 10 MIN: CPT | Mod: 25 | Performed by: CHIROPRACTOR

## 2024-02-21 PROCEDURE — 98941 CHIROPRACT MANJ 3-4 REGIONS: CPT | Mod: AT | Performed by: CHIROPRACTOR

## 2024-02-22 NOTE — PROGRESS NOTES
Subjective Finding:    Chief compalint: Patient presents with:  Back Pain: With neck pain   , Pain Scale: 6/10, Intensity: sharp, Duration: 1 months, Radiating: bilateral buttock.    Date of injury:     Activities that the pain restricts:   Home/household/hobbies/social activities: Yes.  Work duties: Yes.  Sleep: Yes.  Makes symptoms better: rest.  Makes symptoms worse: activity, lumbar flexion, and walking.  Have you seen anyone else for the symptoms? No.  Work related: No.  Automobile related injury: No.    Objective and Assessment:    Posture Analysis:   High shoulder: .  Head tilt: .  High iliac crest: .  Head carriage: forward.  Thoracic Kyphosis: forward.  Lumbar Lordosis: forward.    Lumbar Range of Motion: extension decreased.  Cervical Range of Motion: .  Thoracic Range of Motion: .  Extremity Range of Motion: .    Palpation:   Quad lumb: bilateral, referred pain: yes    Segmental dysfunction pre-treatment and treatment area: C2, C3, T4, and Sacrum.    Assessment post-treatment:  Cervical: ROM increased.  Thoracic: ROM increased.  Lumbar: ROM increased.    Comments: past fusion lumbar.      Complicating Factors: structural abnormalities.    Procedure(s):  CMT:  30796 Chiropractic manipulative treatment 3-4 regions performed   Cervical: Diversified, See above for level, Supine, Thoracic: Diversified, See above for level, Prone, and Lumbar: Diversified, See above for level, Side posture    Modalities:  None performed this visit    Therapeutic procedures:  None    Plan:  Treatment plan:  2 times per week for 2 weeks.  Instructed patient: stretch as instructed at visit.  Short term goals: reduce pain.  Long term goals: increase ADL.  Prognosis: good.

## 2024-02-28 ENCOUNTER — OFFICE VISIT (OUTPATIENT)
Dept: CHIROPRACTIC MEDICINE | Facility: OTHER | Age: 64
End: 2024-02-28
Attending: CHIROPRACTOR
Payer: COMMERCIAL

## 2024-02-28 DIAGNOSIS — M99.03 SEGMENTAL AND SOMATIC DYSFUNCTION OF LUMBAR REGION: Primary | ICD-10-CM

## 2024-02-28 DIAGNOSIS — M54.50 ACUTE BILATERAL LOW BACK PAIN WITHOUT SCIATICA: ICD-10-CM

## 2024-02-28 DIAGNOSIS — M99.01 SEGMENTAL AND SOMATIC DYSFUNCTION OF CERVICAL REGION: ICD-10-CM

## 2024-02-28 DIAGNOSIS — M99.02 SEGMENTAL AND SOMATIC DYSFUNCTION OF THORACIC REGION: ICD-10-CM

## 2024-02-28 PROCEDURE — 98941 CHIROPRACT MANJ 3-4 REGIONS: CPT | Mod: AT | Performed by: CHIROPRACTOR

## 2024-02-28 NOTE — PROGRESS NOTES
Subjective Finding:    Chief compalint: Patient presents with:  Back Pain: With neck pain    , Pain Scale: 4/10, Intensity: dull, Duration: 1 months, Radiating: bilateral buttock.    Date of injury:     Activities that the pain restricts:   Home/household/hobbies/social activities: Yes.  Work duties: Yes.  Sleep: Yes.  Makes symptoms better: rest.  Makes symptoms worse: activity, lumbar flexion, and walking.  Have you seen anyone else for the symptoms? No.  Work related: No.  Automobile related injury: No.    Objective and Assessment:    Posture Analysis:   High shoulder: .  Head tilt: .  High iliac crest: .  Head carriage: forward.  Thoracic Kyphosis: forward.  Lumbar Lordosis: forward.    Lumbar Range of Motion: extension decreased.  Cervical Range of Motion: .  Thoracic Range of Motion: .  Extremity Range of Motion: .    Palpation:   Quad lumb: bilateral, referred pain: yes    Segmental dysfunction pre-treatment and treatment area: C2, C3, T4, and Sacrum.    Assessment post-treatment:  Cervical: ROM increased.  Thoracic: ROM increased.  Lumbar: ROM increased.    Comments: past fusion lumbar.      Complicating Factors: structural abnormalities.    Procedure(s):  CMT:  26770 Chiropractic manipulative treatment 3-4 regions performed   Cervical: Diversified, See above for level, Supine, Thoracic: Diversified, See above for level, Prone, and Lumbar: Diversified, See above for level, Side posture    Modalities:  None performed this visit    Therapeutic procedures:  None    Plan:  Treatment plan: as needed.  Instructed patient: stretch as instructed at visit.  Short term goals: reduce pain.  Long term goals: increase ADL.  Prognosis: good.

## 2024-03-08 DIAGNOSIS — Z71.6 TOBACCO ABUSE COUNSELING: ICD-10-CM

## 2024-03-08 NOTE — TELEPHONE ENCOUNTER
NICOTINE 4 MG CHEWING GUM       Last Written Prescription Date:  2/9/2024  Last Fill Quantity: 220,   # refills: 0  Last Office Visit: 1/5/2024  Future Office visit:    Next 5 appointments (look out 90 days)      Apr 11, 2024  3:00 PM  (Arrive by 2:45 PM)  SHORT with Lissy Kraft MD  [facility] ([facility] ) [address]  [place] [address]  [phone]             Routing refill request to provider for review/approval because:    Partial Cholinergic Nicotinic Agonist Agents [id number]/08/2024 11:22 AM   Protocol Details Medication indicated for associated diagnosis        Kimberly Boecker, RN

## 2024-03-22 DIAGNOSIS — R11.0 NAUSEA: ICD-10-CM

## 2024-03-25 RX ORDER — DRONABINOL 2.5 MG/1
CAPSULE ORAL
Qty: 60 CAPSULE | Refills: 0 | Status: SHIPPED | OUTPATIENT
Start: 2024-03-25 | End: 2024-06-03

## 2024-03-25 NOTE — TELEPHONE ENCOUNTER
droNABinol (MARINOL) 2.5 MG capsule 60 capsule 1 1/29/2024   Last Office Visit: 1/5/24     Future Office visit:    Next 5 appointments (look out 90 days)      Apr 11, 2024  3:00 PM  (Arrive by 2:45 PM)  SHORT with Lissy Kraft MD  [facility] [place] ([facility] [place] ) [address]  [place] [address]  [phone]             Routing refill request to provider for review/approval because:

## 2024-03-26 DIAGNOSIS — F90.2 ADHD (ATTENTION DEFICIT HYPERACTIVITY DISORDER), COMBINED TYPE: ICD-10-CM

## 2024-03-26 NOTE — TELEPHONE ENCOUNTER
lisdexamfetamine (VYVANSE) 70 MG capsule 30 capsule 0 2/15/2024   Last Office Visit: 1/5/24      Future Office visit:    Next 5 appointments (look out 90 days)      Apr 11, 2024  3:00 PM  (Arrive by 2:45 PM)  SHORT with Lissy Kraft MD  [facility] [place] ([facility] [place] ) [address]  [place] [address]  [phone]             Routing refill request to provider for review/approval because:

## 2024-04-01 RX ORDER — LISDEXAMFETAMINE DIMESYLATE 70 MG/1
70 CAPSULE ORAL DAILY
Qty: 30 CAPSULE | Refills: 0 | Status: SHIPPED | OUTPATIENT
Start: 2024-04-01 | End: 2024-05-01

## 2024-04-10 DIAGNOSIS — F31.0 BIPOLAR AFFECTIVE DISORDER, CURRENT EPISODE HYPOMANIC (H): ICD-10-CM

## 2024-04-10 DIAGNOSIS — M54.42 CHRONIC BILATERAL LOW BACK PAIN WITH BILATERAL SCIATICA: ICD-10-CM

## 2024-04-10 DIAGNOSIS — G89.29 CHRONIC BILATERAL LOW BACK PAIN WITH BILATERAL SCIATICA: ICD-10-CM

## 2024-04-10 DIAGNOSIS — M54.41 CHRONIC BILATERAL LOW BACK PAIN WITH BILATERAL SCIATICA: ICD-10-CM

## 2024-04-12 RX ORDER — OXCARBAZEPINE 600 MG/1
TABLET, FILM COATED ORAL
Qty: 60 TABLET | Refills: 0 | OUTPATIENT
Start: 2024-04-12

## 2024-04-12 RX ORDER — NORTRIPTYLINE HYDROCHLORIDE 50 MG/1
CAPSULE ORAL
Qty: 60 CAPSULE | Refills: 0 | OUTPATIENT
Start: 2024-04-12

## 2024-04-25 ENCOUNTER — OFFICE VISIT (OUTPATIENT)
Dept: CHIROPRACTIC MEDICINE | Facility: OTHER | Age: 64
End: 2024-04-25
Attending: CHIROPRACTOR
Payer: COMMERCIAL

## 2024-04-25 DIAGNOSIS — M99.02 SEGMENTAL AND SOMATIC DYSFUNCTION OF THORACIC REGION: ICD-10-CM

## 2024-04-25 DIAGNOSIS — M99.01 SEGMENTAL AND SOMATIC DYSFUNCTION OF CERVICAL REGION: Primary | ICD-10-CM

## 2024-04-25 DIAGNOSIS — M54.50 ACUTE BILATERAL LOW BACK PAIN WITHOUT SCIATICA: ICD-10-CM

## 2024-04-25 DIAGNOSIS — M99.03 SEGMENTAL AND SOMATIC DYSFUNCTION OF LUMBAR REGION: ICD-10-CM

## 2024-04-25 PROCEDURE — 98941 CHIROPRACT MANJ 3-4 REGIONS: CPT | Mod: AT | Performed by: CHIROPRACTOR

## 2024-04-26 DIAGNOSIS — F90.2 ADHD (ATTENTION DEFICIT HYPERACTIVITY DISORDER), COMBINED TYPE: ICD-10-CM

## 2024-04-26 NOTE — TELEPHONE ENCOUNTER
Michael       Last Written Prescription Date:  4/01/2024  Last Fill Quantity: 30,   # refills: 0  Last Office Visit: 1/05/2024  Future Office visit:    Next 5 appointments (look out 90 days)      Apr 29, 2024  2:00 PM  Return Visit with FRANCY Park ([facility] [place] ) [address] [place] [address]  [place] [address]  [phone]

## 2024-04-29 ENCOUNTER — TELEPHONE (OUTPATIENT)
Dept: FAMILY MEDICINE | Facility: OTHER | Age: 64
End: 2024-04-29

## 2024-04-29 NOTE — TELEPHONE ENCOUNTER
3:32 PM    Reason for Call: Phone Call    Description: Patient calling wants an order for a full spine [facility]. Patient said he has talked to Dr Kraft regarding getting a MRI scheduled.     Was an appointment offered for this call? No  If yes : Appointment type              Date    Preferred method for responding to this message: Telephone Call  What is your phone number ? [phone]    If we cannot reach you directly, may we leave a detailed response at the number you provided? Yes    Can this message wait until your PCP/provider returns, if available today? YES, No

## 2024-04-29 NOTE — PROGRESS NOTES
Subjective Finding:    Chief compalint: Patient presents with:  Back Pain  , Pain Scale: 4/10, Intensity: dull, Duration: 1 months, Radiating: bilateral buttock.    Date of injury:     Activities that the pain restricts:   Home/household/hobbies/social activities: Yes.  Work duties: Yes.  Sleep: Yes.  Makes symptoms better: rest.  Makes symptoms worse: activity, lumbar flexion, and walking.  Have you seen anyone else for the symptoms? No.  Work related: No.  Automobile related injury: No.    Objective and Assessment:    Posture Analysis:   High shoulder: .  Head tilt: .  High iliac crest: .  Head carriage: forward.  Thoracic Kyphosis: forward.  Lumbar Lordosis: forward.    Lumbar Range of Motion: extension decreased.  Cervical Range of Motion: .  Thoracic Range of Motion: .  Extremity Range of Motion: .    Palpation:   Quad lumb: bilateral, referred pain: yes    Segmental dysfunction pre-treatment and treatment area: C2, C3, T4, and Sacrum.    Assessment post-treatment:  Cervical: ROM increased.  Thoracic: ROM increased.  Lumbar: ROM increased.    Comments: past fusion lumbar.      Complicating Factors: structural abnormalities.    Procedure(s):  CMT:  37179 Chiropractic manipulative treatment 3-4 regions performed   Cervical: Diversified, See above for level, Supine, Thoracic: Diversified, See above for level, Prone, and Lumbar: Diversified, See above for level, Side posture    Modalities:  None performed this visit    Therapeutic procedures:  None    Plan:  Treatment plan: as needed.  Instructed patient: stretch as instructed at visit.  Short term goals: reduce pain.  Long term goals: increase ADL.  Prognosis: good.

## 2024-05-01 DIAGNOSIS — F31.0 BIPOLAR AFFECTIVE DISORDER, CURRENT EPISODE HYPOMANIC (H): ICD-10-CM

## 2024-05-01 RX ORDER — LISDEXAMFETAMINE DIMESYLATE 70 MG/1
70 CAPSULE ORAL DAILY
Qty: 30 CAPSULE | Refills: 0 | Status: SHIPPED | OUTPATIENT
Start: 2024-05-01 | End: 2024-06-10

## 2024-05-02 ENCOUNTER — MEDICAL CORRESPONDENCE (OUTPATIENT)
Dept: MRI IMAGING | Facility: HOSPITAL | Age: 64
End: 2024-05-02

## 2024-05-03 NOTE — TELEPHONE ENCOUNTER
Disp Refills Start End KEYONA   OXcarbazepine (TRILEPTAL) 600 MG tablet 60 tablet 4 10/10/2023 -- No     Last Office Visit: 01/05/2024  Future Office visit:       Routing refill request to provider for review/approval because:

## 2024-05-06 RX ORDER — OXCARBAZEPINE 600 MG/1
TABLET, FILM COATED ORAL
Qty: 60 TABLET | Refills: 4 | Status: SHIPPED | OUTPATIENT
Start: 2024-05-06 | End: 2024-10-07

## 2024-05-10 DIAGNOSIS — M62.830 BACK MUSCLE SPASM: ICD-10-CM

## 2024-05-10 RX ORDER — METHOCARBAMOL 750 MG/1
TABLET, FILM COATED ORAL
Qty: 90 TABLET | Refills: 0 | Status: SHIPPED | OUTPATIENT
Start: 2024-05-10 | End: 2024-06-17

## 2024-05-10 NOTE — TELEPHONE ENCOUNTER
Robaxin      Last Written Prescription Date:  1/8/24  Last Fill Quantity: 90,   # refills: 2  Last Office Visit: 1/5/24  Future Office visit:    Next 5 appointments (look out 90 days)      May 31, 2024  9:40 AM  (Arrive by 9:25 AM)  Return Visit with Laquita Fernandez MD  [facility] ([facility] ) [facility] [address] [place] [id number]  [phone]             Routing refill request to provider for review/approval because:

## 2024-05-10 NOTE — TELEPHONE ENCOUNTER
Pt states that has appt on boooks with Dr. Fernandez and pt is in need of his medication. Please fill or call pt [phone]

## 2024-05-15 ENCOUNTER — ANCILLARY PROCEDURE (OUTPATIENT)
Dept: GENERAL RADIOLOGY | Facility: OTHER | Age: 64
End: 2024-05-15
Attending: PHYSICIAN ASSISTANT
Payer: COMMERCIAL

## 2024-05-15 ENCOUNTER — HOSPITAL ENCOUNTER (OUTPATIENT)
Dept: MRI IMAGING | Facility: HOSPITAL | Age: 64
Discharge: HOME | End: 2024-05-15
Attending: PHYSICIAN ASSISTANT
Payer: COMMERCIAL

## 2024-05-15 DIAGNOSIS — F45.42 PAIN DISORDER WITH RELATED PSYCHOLOGICAL FACTOR: ICD-10-CM

## 2024-05-15 DIAGNOSIS — M54.50 LOW BACK PAIN: ICD-10-CM

## 2024-05-15 PROCEDURE — 72110 X-RAY EXAM L-2 SPINE 4/>VWS: CPT | Mod: TC

## 2024-05-15 PROCEDURE — 72148 MRI LUMBAR SPINE W/O DYE: CPT

## 2024-05-16 ENCOUNTER — HOSPITAL ENCOUNTER (OUTPATIENT)
Dept: MRI IMAGING | Facility: HOSPITAL | Age: 64
Discharge: HOME | End: 2024-05-16
Attending: PHYSICIAN ASSISTANT | Admitting: PHYSICIAN ASSISTANT
Payer: COMMERCIAL

## 2024-05-16 DIAGNOSIS — F45.42 PAIN DISORDER WITH RELATED PSYCHOLOGICAL FACTOR: ICD-10-CM

## 2024-05-16 DIAGNOSIS — M54.2 CERVICALGIA: ICD-10-CM

## 2024-05-16 PROCEDURE — 72141 MRI NECK SPINE W/O DYE: CPT

## 2024-05-24 ENCOUNTER — HOSPITAL ENCOUNTER (OUTPATIENT)
Dept: MRI IMAGING | Facility: HOSPITAL | Age: 64
Discharge: HOME | End: 2024-05-24
Attending: PHYSICIAN ASSISTANT | Admitting: PHYSICIAN ASSISTANT
Payer: COMMERCIAL

## 2024-05-24 DIAGNOSIS — F45.42 PAIN DISORDER WITH RELATED PSYCHOLOGICAL FACTOR: ICD-10-CM

## 2024-05-24 DIAGNOSIS — M54.6 PAIN IN THORACIC SPINE: ICD-10-CM

## 2024-05-24 PROCEDURE — 72146 MRI CHEST SPINE W/O DYE: CPT

## 2024-05-29 ENCOUNTER — DOCUMENTATION ONLY (OUTPATIENT)
Dept: OTHER | Facility: CLINIC | Age: 64
End: 2024-05-29

## 2024-05-31 ENCOUNTER — OFFICE VISIT (OUTPATIENT)
Dept: PSYCHIATRY | Facility: OTHER | Age: 64
End: 2024-05-31
Attending: PSYCHIATRY & NEUROLOGY
Payer: COMMERCIAL

## 2024-05-31 VITALS
BODY MASS INDEX: 24.83 KG/M2 | DIASTOLIC BLOOD PRESSURE: 78 MMHG | HEART RATE: 87 BPM | OXYGEN SATURATION: 94 % | SYSTOLIC BLOOD PRESSURE: 136 MMHG | WEIGHT: 178 LBS | TEMPERATURE: 97.6 F

## 2024-05-31 DIAGNOSIS — F41.1 GENERALIZED ANXIETY DISORDER: Primary | ICD-10-CM

## 2024-05-31 DIAGNOSIS — M54.41 CHRONIC BILATERAL LOW BACK PAIN WITH BILATERAL SCIATICA: ICD-10-CM

## 2024-05-31 DIAGNOSIS — G89.29 CHRONIC BILATERAL LOW BACK PAIN WITH BILATERAL SCIATICA: ICD-10-CM

## 2024-05-31 DIAGNOSIS — F90.2 ADHD (ATTENTION DEFICIT HYPERACTIVITY DISORDER), COMBINED TYPE: ICD-10-CM

## 2024-05-31 DIAGNOSIS — M54.42 CHRONIC BILATERAL LOW BACK PAIN WITH BILATERAL SCIATICA: ICD-10-CM

## 2024-05-31 PROCEDURE — G0463 HOSPITAL OUTPT CLINIC VISIT: HCPCS

## 2024-05-31 PROCEDURE — 99214 OFFICE O/P EST MOD 30 MIN: CPT | Performed by: PSYCHIATRY & NEUROLOGY

## 2024-05-31 RX ORDER — MIRTAZAPINE 15 MG/1
15 TABLET, FILM COATED ORAL AT BEDTIME
Qty: 30 TABLET | Refills: 5 | Status: SHIPPED | OUTPATIENT
Start: 2024-05-31

## 2024-05-31 RX ORDER — LISDEXAMFETAMINE DIMESYLATE 70 MG/1
70 CAPSULE ORAL DAILY
Qty: 30 CAPSULE | Refills: 0 | Status: SHIPPED | OUTPATIENT
Start: 2024-05-31 | End: 2024-06-30

## 2024-05-31 RX ORDER — LISDEXAMFETAMINE DIMESYLATE 70 MG/1
70 CAPSULE ORAL DAILY
Qty: 30 CAPSULE | Refills: 0 | Status: SHIPPED | OUTPATIENT
Start: 2024-07-23 | End: 2024-06-10

## 2024-05-31 RX ORDER — LISDEXAMFETAMINE DIMESYLATE 70 MG/1
70 CAPSULE ORAL DAILY
Qty: 30 CAPSULE | Refills: 0 | Status: SHIPPED | OUTPATIENT
Start: 2024-06-28 | End: 2024-06-10

## 2024-05-31 ASSESSMENT — COLUMBIA-SUICIDE SEVERITY RATING SCALE - C-SSRS
6. IN YOUR LIFETIME, HAVE YOU EVER DONE ANYTHING, STARTED TO DO ANYTHING, OR PREPARED TO DO ANYTHING TO END YOUR LIFE?: NO
4. HAVE YOU HAD THESE THOUGHTS AND HAD SOME INTENTION OF ACTING ON THEM?: NO
1. IN THE PAST MONTH, HAVE YOU WISHED YOU WERE DEAD OR WISHED YOU COULD GO TO SLEEP AND NOT WAKE UP?: YES
2. HAVE YOU ACTUALLY HAD ANY THOUGHTS OF KILLING YOURSELF?: YES
3. HAVE YOU BEEN THINKING ABOUT HOW YOU MIGHT KILL YOURSELF?: NO
5. HAVE YOU STARTED TO WORK OUT OR WORKED OUT THE DETAILS OF HOW TO KILL YOURSELF? DO YOU INTEND TO CARRY OUT THIS PLAN?: NO

## 2024-05-31 ASSESSMENT — ANXIETY QUESTIONNAIRES
IF YOU CHECKED OFF ANY PROBLEMS ON THIS QUESTIONNAIRE, HOW DIFFICULT HAVE THESE PROBLEMS MADE IT FOR YOU TO DO YOUR WORK, TAKE CARE OF THINGS AT HOME, OR GET ALONG WITH OTHER PEOPLE: EXTREMELY DIFFICULT
7. FEELING AFRAID AS IF SOMETHING AWFUL MIGHT HAPPEN: NEARLY EVERY DAY
GAD7 TOTAL SCORE: 17
GAD7 TOTAL SCORE: 17
5. BEING SO RESTLESS THAT IT IS HARD TO SIT STILL: SEVERAL DAYS
8. IF YOU CHECKED OFF ANY PROBLEMS, HOW DIFFICULT HAVE THESE MADE IT FOR YOU TO DO YOUR WORK, TAKE CARE OF THINGS AT HOME, OR GET ALONG WITH OTHER PEOPLE?: EXTREMELY DIFFICULT
6. BECOMING EASILY ANNOYED OR IRRITABLE: NEARLY EVERY DAY
3. WORRYING TOO MUCH ABOUT DIFFERENT THINGS: NEARLY EVERY DAY
4. TROUBLE RELAXING: NEARLY EVERY DAY
2. NOT BEING ABLE TO STOP OR CONTROL WORRYING: NEARLY EVERY DAY
1. FEELING NERVOUS, ANXIOUS, OR ON EDGE: SEVERAL DAYS
7. FEELING AFRAID AS IF SOMETHING AWFUL MIGHT HAPPEN: NEARLY EVERY DAY
GAD7 TOTAL SCORE: 17

## 2024-05-31 ASSESSMENT — PAIN SCALES - GENERAL: PAINLEVEL: WORST PAIN (10)

## 2024-05-31 ASSESSMENT — PATIENT HEALTH QUESTIONNAIRE - PHQ9
10. IF YOU CHECKED OFF ANY PROBLEMS, HOW DIFFICULT HAVE THESE PROBLEMS MADE IT FOR YOU TO DO YOUR WORK, TAKE CARE OF THINGS AT HOME, OR GET ALONG WITH OTHER PEOPLE: EXTREMELY DIFFICULT
SUM OF ALL RESPONSES TO PHQ QUESTIONS 1-9: 22
SUM OF ALL RESPONSES TO PHQ QUESTIONS 1-9: 22

## 2024-05-31 NOTE — PROGRESS NOTES
Social- Was  twice. Lives alone with his 2 cats:   Children-  2 kids, they are in [place]  Last visit August '23: Continue Trileptal 600 mg twice daily.  Continue trazodone 100 mg bedtime prn insomnia.  Continue Vyvanse 70 mg daily set to fill today 8/9,  9/6 and 10/4/    - last visit his cat was really sick and they thought   - been feeling really miserable and neck is messed up now. Trying to get in with his chiropractor  - has [employer] rides that takes down to [place]  - his mom had dementia and passed away spring 2022. Trent found out about it via Facebook / no one called him  - pain pump and has to go to [employer] every 2 months  - Oldest of 3 brothers. Mark and Major in [place].  Dad out on [place] acres    MEDICAL / SURGICAL HISTORY                     Patient Active Problem List   Diagnosis    Mixed hyperlipidemia    Chemical dependency (H)    Chronic rhinitis    Tinnitus of both ears    SNHL (sensorineural hearing loss)    Bipolar disorder (H)    Seizure-like activity (H)    Somatic dysfunction of pelvis region    Bilateral foot pain    Trigger index finger of right hand    Moderate persistent asthma without complication    Chronic lower back pain    Onychia of toe of left foot    Seizure disorder (H)    Benign essential hypertension    Retrograde ejaculation    Allergic rhinitis due to other allergen    Cervical spondylosis without myelopathy    Chronic headaches    Generalized anxiety disorder    Hypertrophy of prostate without urinary obstruction and other lower urinary tract symptoms (LUTS)    GERD (gastroesophageal reflux disease)    Major depressive disorder, recurrent episode, moderate (H)    Myalgia and myositis    Other pain disorders related to psychological factors    Spinal stenosis in cervical region    Status post lumbar spinal fusion    Trigger finger    Polypharmacy    Deviated nasal septum    Attention deficit hyperactivity  disorder (ADHD), combined type    chronic noninfective otitis externa    Migraine with aura and without status migrainosus, not intractable    Tobacco use    Prediabetes    Bipolar disorder, current episode mixed, mild (H)    Failed back syndrome of lumbar spine    Panlobular emphysema (H)    Implantable intrathecal infusion pump present     ALLERGY   Depakote [valproic acid], Duloxetine hcl, Seasonal allergies, and Amoxicillin-pot clavulanate  MEDICATIONS                                                                                             Current Outpatient Medications   Medication Sig Dispense Refill    ACCU-CHEK GUIDE test strip       acetaminophen (TYLENOL) 325 MG tablet TAKE 2 TABLETS BY MOUTH EVERY 6 HOURS AS NEEDED FOR MILD PAIN. LIMIT ACETAMINOPHEN TO 4000MG PER DAY FROM ALL SOURCES.      albuterol (ACCUNEB) 1.25 MG/3ML neb solution Take 1 vial (1.25 mg) by nebulization every 6 hours as needed for shortness of breath / dyspnea or wheezing 100 vial 3    artificial saliva (BIOTENE ORALBALANCE) GEL gel Take 4 g by mouth 4 times daily 126 g 11    aspirin (ASPIRIN LOW DOSE) 81 MG chewable tablet CHEW AND SWALLOW 1 TABLET BY MOUTH DAILY 30 tablet 3    atorvastatin (LIPITOR) 20 MG tablet TAKE 1 TABLET BY MOUTH DAILY 90 tablet 0    blood glucose (NO BRAND SPECIFIED) lancets standard Use to test blood sugar 1 time daily or as directed. 100 each 0    blood glucose (NO BRAND SPECIFIED) lancing device Device to be used with lancets. 1 each 0    blood glucose monitoring (NO BRAND SPECIFIED) meter device kit Use to test blood sugar 1 time daily or as directed. 1 kit 0    budesonide (PULMICORT) 0.5 MG/2ML neb solution Spray 2 mLs (0.5 mg) in nostril 2 times daily Make 240 cc [place] med sinus irrigation Mix 2 ml vial of budesonide 0.5 mg Rinse 1-2 times daily for 2-4 weeks. 60 mL 1    cetirizine (ZYRTEC) 10 MG tablet TAKE 1 TABLET BY MOUTH DAILY 30 tablet 11    dextromethorphan-guaiFENesin (MUCINEX DM)  MG 12 hr  tablet TAKE 1 TABLET BY MOUTH EVERY 12 HOURS (Patient taking differently: 1 tablet every 12 hours) 60 tablet 0    diclofenac (VOLTAREN) 50 MG EC tablet       droNABinol (MARINOL) 2.5 MG capsule TAKE 1 CAPSULE(2.5 MG) BY MOUTH TWICE DAILY BEFORE MEALS 60 capsule 0    droNABinol (MARINOL) 2.5 MG capsule TAKE 1 CAPSULE BY MOUTH 2 TIMES DAILY BEFORE MEALS 60 capsule 1    famotidine (PEPCID) 20 MG tablet TAKE 1 TABLET BY MOUTH NIGHTLY AS NEEDED FOR REFLUX 90 tablet 0    fluticasone (FLONASE) 50 MCG/ACT nasal spray USE 2 SPRAYS IN EACH NOSTRIL DAILY 16 g 0    furosemide (LASIX) 20 MG tablet TAKE 1 TABLET BY MOUTH DAILY 30 tablet 5    gabapentin (NEURONTIN) 300 MG capsule TAKE 3 CAPSULES BY MOUTH THREE TIMES DAILY 270 capsule 2    hydrOXYzine (VISTARIL) 50 MG capsule TAKE 1 TO 2 CAPSULES BY MOUTH 4 TIMES DAILY AS NEEDED FOR ANXIETY 90 capsule 0    ibuprofen (ADVIL/MOTRIN) 600 MG tablet Take 600 mg by mouth every 8 hours as needed      lisdexamfetamine (VYVANSE) 70 MG capsule TAKE 1 CAPSULE BY MOUTH DAILY 30 capsule 0    loratadine (CLARITIN) 10 MG tablet Take 10 mg by mouth daily      losartan (COZAAR) 50 MG tablet TAKE 1 TABLET BY MOUTH DAILY 90 tablet 0    methocarbamol (ROBAXIN) 750 MG tablet TAKE 1 TABLET BY MOUTH 4 TIMES DAILY AS NEEDED FOR MUSCLE SPASMS 90 tablet 0    metoprolol succinate ER (TOPROL XL) 50 MG 24 hr tablet TAKE 1 TABLET BY MOUTH DAILY 30 tablet 8    mometasone-formoterol (DULERA) 200-5 MCG/ACT inhaler INHALE 2 PUFFS INTO THE LUNGS 2 TIMES A DAY 13 g 1    montelukast (SINGULAIR) 10 MG tablet TAKE 1 TABLET BY MOUTH AT BEDTIME 90 tablet 0    multivitamin  with iron (SM COMPLETE ADVANCED FORMULA) TABS Take 1 tablet by mouth daily 30 tablet 11    mupirocin (BACTROBAN) 2 % external ointment       nicotine (NICODERM CQ) 21 MG/24HR 24 hr patch PLACE 1 PATCH ONTO THE SKIN EVERY 24 HOURS 30 patch 1    nicotine polacrilex (NICORETTE) 4 MG gum PLACE 1 PIECE OF GUM INSIDE CHEEK AS NEEDED FOR SMOKING CESSATION 220  each 2    nortriptyline (PAMELOR) 50 MG capsule TAKE 2 CAPSULES (100MG) BY MOUTH AT BEDTIME 60 capsule 6    omeprazole (PRILOSEC) 20 MG DR capsule TAKE 1 CAPSULE BY MOUTH 2 TIMES DAILY 180 capsule 0    OXcarbazepine (TRILEPTAL) 600 MG tablet TAKE 1 TABLET BY MOUTH 2 TIMES DAILY 60 tablet 4    senna-docusate (STOOL SOFTENER/LAXATIVE) 8.6-50 MG tablet TAKE 1 OR 2 TABLETS BY MOUTH 2 TIMES A  tablet 0    SUMAtriptan (IMITREX) 50 MG tablet TAKE 1 TABLET BY MOUTH AT ONSET OF HEADACHE FOR MIGRAINE. MAY REPEAT IN 2 HOURS. MAX OF 4 TABLETS IN 24 HOURS 9 tablet 1    tamsulosin (FLOMAX) 0.4 MG capsule TAKE 1 CAPSULE BY MOUTH DAILY 90 capsule 0    tiZANidine (ZANAFLEX) 4 MG tablet       traZODone (DESYREL) 100 MG tablet TAKE 1 TABLET BY MOUTH EVERY NIGHT AT BEDTIME 90 tablet 1    VENTOLIN  (90 Base) MCG/ACT inhaler INHALE 2 PUFFS INTO THE LUNGS EVERY 4 HOURS AS NEEDED 18 g 0     No current facility-administered medications for this visit.       VITALS   /78 (Cuff Size: Adult Regular)   Pulse 87   Temp 97.6  F (36.4  C) (Tympanic)   Wt 80.7 kg (178 lb)   SpO2 94%   BMI 24.83 kg/m       LABS                                                                                                                           Last Comprehensive Metabolic Panel:  Sodium   Date Value Ref Range Status   01/05/2024 144 135 - 145 mmol/L Final     Comment:     Reference intervals for this test were updated on 09/26/2023 to more accurately reflect our healthy population. There may be differences in the flagging of prior results with similar values performed with this method. Interpretation of those prior results can be made in the context of the updated reference intervals.    12/14/2020 141 133 - 144 mmol/L Final     Potassium   Date Value Ref Range Status   01/05/2024 4.3 3.4 - 5.3 mmol/L Final   09/30/2022 4.8 3.4 - 5.3 mmol/L Final   12/14/2020 3.8 3.4 - 5.3 mmol/L Final     Chloride   Date Value Ref Range Status    01/05/2024 106 98 - 107 mmol/L Final   09/30/2022 104 94 - 109 mmol/L Final   12/14/2020 107 94 - 109 mmol/L Final     Carbon Dioxide   Date Value Ref Range Status   12/14/2020 28 20 - 32 mmol/L Final     Carbon Dioxide (CO2)   Date Value Ref Range Status   01/05/2024 28 22 - 29 mmol/L Final   09/30/2022 29 20 - 32 mmol/L Final     Anion Gap   Date Value Ref Range Status   01/05/2024 10 7 - 15 mmol/L Final   09/30/2022 4 3 - 14 mmol/L Final   12/14/2020 6 3 - 14 mmol/L Final     Glucose   Date Value Ref Range Status   01/05/2024 113 (H) 70 - 99 mg/dL Final   09/30/2022 85 70 - 99 mg/dL Final   12/14/2020 108 (H) 70 - 99 mg/dL Final     Urea Nitrogen   Date Value Ref Range Status   01/05/2024 16.2 8.0 - 23.0 mg/dL Final   09/30/2022 30 7 - 30 mg/dL Final   12/14/2020 16 7 - 30 mg/dL Final     Creatinine   Date Value Ref Range Status   01/05/2024 0.88 0.67 - 1.17 mg/dL Final   12/14/2020 0.77 0.66 - 1.25 mg/dL Final     GFR Estimate   Date Value Ref Range Status   01/05/2024 >90 >60 mL/min/1.73m2 Final   12/14/2020 >90 >60 mL/min/[1.73_m2] Final     Comment:     Non  GFR Calc  Starting 12/18/2018, serum creatinine based estimated GFR (eGFR) will be   calculated using the Chronic Kidney Disease Epidemiology Collaboration   (CKD-EPI) equation.       Calcium   Date Value Ref Range Status   01/05/2024 9.2 8.8 - 10.2 mg/dL Final   12/14/2020 8.2 (L) 8.5 - 10.1 mg/dL Final     Lab Results   Component Value Date    WBC 7.0 01/05/2024    WBC 6.9 01/05/2024    WBC 6.6 12/14/2020     Lab Results   Component Value Date    RBC 4.30 01/05/2024    RBC 4.20 01/05/2024    RBC 3.81 12/14/2020     Lab Results   Component Value Date    HGB 12.8 01/05/2024    HGB 12.9 01/05/2024    HGB 11.3 12/14/2020     Lab Results   Component Value Date    HCT 38.6 01/05/2024    HCT 38.3 01/05/2024    HCT 34.5 12/14/2020     Lab Results   Component Value Date    MCV 90 01/05/2024    MCV 91 01/05/2024    MCV 91 12/14/2020     Lab  Results   Component Value Date    MCH 29.8 01/05/2024    MCH 30.7 01/05/2024    MCH 29.7 12/14/2020     Lab Results   Component Value Date    MCHC 33.2 01/05/2024    MCHC 33.7 01/05/2024    MCHC 32.8 12/14/2020     Lab Results   Component Value Date    RDW 13.0 01/05/2024    RDW 13.2 01/05/2024    RDW 13.2 12/14/2020     Lab Results   Component Value Date     01/05/2024     01/05/2024     12/14/2020        Recent Labs   Lab Test 01/05/24  1034 09/30/22  1514   CHOL 183 150   HDL 71 53   LDL 87 69   TRIG 125 139      Hemoglobin A1C   Date Value Ref Range Status   01/05/2024 6.0 (H) <5.7 % Final     Comment:     Normal <5.7%   Prediabetes 5.7-6.4%    Diabetes 6.5% or higher     Note: Adopted from ADA consensus guidelines.   03/04/2021 5.6 0 - 5.6 % Final     Comment:     Normal <5.7% Prediabetes 5.7-6.4%  Diabetes 6.5% or higher - adopted from ADA   consensus guidelines.          Nortriptyline level 79 as of 5/2022 (range 50 - 150 ng / mL)  Oxcarbazepine level 14 as of 5/2022 (range 3-35 micrograms / mL)       MENTAL STATUS EXAM                                                                                         Awake, alert, oriented. No problems psychomotor behavior. Mood today depressed. Speech: normal volume, rhythm, rate. Thought process, including associations, was unremarkable and thought content was devoid of homicidal ideation. +SI with no intent or plan at this time.  No hallucinations. Insight limited. Judgment  adequate for safety. Fund of knowledge was intact. Pt demonstrates no obvious problems with attention, concentration, language, recent or remote memory although these were not formally tested.       ASSESSMENT                                                                                                      HISTORICAL:  Initial psych note 10/6/15          NOTES:      Joshua is a 63 year old with bipolar disorder, DAYANA, ADHD, chronic pain. Hadn't seen Trent for ~6 months and let  him know I'm happy he came in today as I certainly worry about him. He has ongoing severe depression, limited social support, and chronic suicidal ideation. He notes he has no intent or plan because his two cats. We identified his nortriptyline script ran out while ago hence he has not been taking that. He is interested in med for depression. We agreed on mirtazapine of which I noted I'm hoping it may help him with appetite as he has issues with low appetite. Discussed it tends to be sedating hence I recommend he take it at night.     TREATMENT RISK STATEMENT:  The risks, benefits, alternatives and potential adverse effects have been explained and are understood by the pt.  The pt agrees to the treatment plan with the ability to do so.   The pt knows to call the clinic for any problems or access emergency care if needed.        DIAGNOSES                    Bipolar disorder I disorder  DAYANA  ADHD      PLAN                                                                                                                    1)  MEDICATIONS:       Start mirtazapine 15 mg HS. Continue Trileptal 600 mg twice daily.  Continue trazodone 100 mg bedtime prn insomnia. .  Continue Vyvanse 70 mg daily last filled 5/1/24 set to fill today 5/31, 6/8 and 7/23  2)  THERAPY:  No change    3)  LABS: lipid , CMP, CBC, A1C     4)  PT MONITOR [call for probs]:   SEs from meds, worsening sx, SI/HI    5)  REFERRALS [CD, medical, other]:  None    6)  RTC:  ~6 weeks        Answers submitted by the patient for this visit:  Patient Health Questionnaire (Submitted on 5/31/2024)  If you checked off any problems, how difficult have these problems made it for you to do your work, take care of things at home, or get along with other people?: Extremely difficult  PHQ9 TOTAL SCORE: 22  DAYANA-7 (Submitted on 5/31/2024)  DAYANA 7 TOTAL SCORE: 17

## 2024-06-01 DIAGNOSIS — R11.0 NAUSEA: ICD-10-CM

## 2024-06-03 RX ORDER — DRONABINOL 2.5 MG/1
CAPSULE ORAL
Qty: 60 CAPSULE | Refills: 0 | Status: SHIPPED | OUTPATIENT
Start: 2024-06-03

## 2024-06-04 ENCOUNTER — TRANSFERRED RECORDS (OUTPATIENT)
Dept: HEALTH INFORMATION MANAGEMENT | Facility: CLINIC | Age: 64
End: 2024-06-04

## 2024-06-06 ENCOUNTER — TELEPHONE (OUTPATIENT)
Dept: PSYCHIATRY | Facility: OTHER | Age: 64
End: 2024-06-06

## 2024-06-06 DIAGNOSIS — I10 ESSENTIAL HYPERTENSION: ICD-10-CM

## 2024-06-06 DIAGNOSIS — F90.2 ADHD (ATTENTION DEFICIT HYPERACTIVITY DISORDER), COMBINED TYPE: ICD-10-CM

## 2024-06-06 DIAGNOSIS — Z00.00 HEALTHCARE MAINTENANCE: ICD-10-CM

## 2024-06-06 RX ORDER — FUROSEMIDE 20 MG
20 TABLET ORAL DAILY
Qty: 30 TABLET | Refills: 11 | Status: SHIPPED | OUTPATIENT
Start: 2024-06-06

## 2024-06-06 RX ORDER — ASPIRIN 81 MG/1
TABLET, CHEWABLE ORAL
Qty: 30 TABLET | Refills: 11 | Status: SHIPPED | OUTPATIENT
Start: 2024-06-06

## 2024-06-06 NOTE — TELEPHONE ENCOUNTER
Aspirin      Last Written Prescription Date:  2/9/24  Last Fill Quantity: 30,   # refills: 3  Last Office Visit: 1/5/24  Future Office visit:    Next 5 appointments (look out 90 days)      Jun 27, 2024 10:00 AM  (Arrive by 9:45 AM)  Provider Visit with Lissy Kraft MD  [facility] [place] ([facility] [place] ) [address]  [place] [address]  [phone]     Jul 19, 2024  8:00 AM  (Arrive by 7:45 AM)  Return Visit with Laquita Fernandez MD  [facility] [place] ([facility] [place] ) [address]  [place] [address]  [phone]             Routing refill request to provider for review/approval because:      Lasix      Last Written Prescription Date:  12/14/23  Last Fill Quantity: 30,   # refills: 5  Last Office Visit: 1/5/24  Future Office visit:    Next 5 appointments (look out 90 days)      Jun 27, 2024 10:00 AM  (Arrive by 9:45 AM)  Provider Visit with Lissy Kraft MD  [facility] [place] ([facility] [place] ) [address]  [place] [address]  [phone]     Jul 19, 2024  8:00 AM  (Arrive by 7:45 AM)  Return Visit with Laquita Fernandez MD  [facility] [place] ([facility] [place] ) [address]  [place] [address]  [phone]             Routing refill request to provider for review/approval because:
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
## 2024-06-06 NOTE — TELEPHONE ENCOUNTER
Vyvanse      Last Written Prescription Date:  5/31/24  Last Fill Quantity: 30,   # refills: 0  Last Office Visit: 5/31/24  Future Office visit:    Next 5 appointments (look out 90 days)      Jun 27, 2024 10:00 AM  (Arrive by 9:45 AM)  Provider Visit with Lissy Kraft MD  [facility] ([facility] ) [address] [facility] [address]  [phone]     Jul 19, 2024  8:00 AM  (Arrive by 7:45 AM)  Return Visit with Laquita Fernandez MD  [facility] ([facility] ) [address] [facility] [id number] [phone]             Routing refill request to provider for review/approval because:

## 2024-06-07 RX ORDER — LISDEXAMFETAMINE DIMESYLATE 70 MG/1
70 CAPSULE ORAL DAILY
Qty: 30 CAPSULE | Refills: 0 | OUTPATIENT
Start: 2024-06-07

## 2024-06-10 ENCOUNTER — OFFICE VISIT (OUTPATIENT)
Dept: CHIROPRACTIC MEDICINE | Facility: OTHER | Age: 64
End: 2024-06-10
Attending: CHIROPRACTOR
Payer: COMMERCIAL

## 2024-06-10 ENCOUNTER — MYC REFILL (OUTPATIENT)
Dept: PSYCHIATRY | Facility: OTHER | Age: 64
End: 2024-06-10

## 2024-06-10 DIAGNOSIS — M99.02 SEGMENTAL AND SOMATIC DYSFUNCTION OF THORACIC REGION: ICD-10-CM

## 2024-06-10 DIAGNOSIS — M54.2 CERVICALGIA: ICD-10-CM

## 2024-06-10 DIAGNOSIS — F41.1 GENERALIZED ANXIETY DISORDER: Primary | ICD-10-CM

## 2024-06-10 DIAGNOSIS — M99.01 SEGMENTAL AND SOMATIC DYSFUNCTION OF CERVICAL REGION: Primary | ICD-10-CM

## 2024-06-10 DIAGNOSIS — F90.2 ADHD (ATTENTION DEFICIT HYPERACTIVITY DISORDER), COMBINED TYPE: ICD-10-CM

## 2024-06-10 DIAGNOSIS — M99.03 SEGMENTAL AND SOMATIC DYSFUNCTION OF LUMBAR REGION: ICD-10-CM

## 2024-06-10 PROCEDURE — 98941 CHIROPRACT MANJ 3-4 REGIONS: CPT | Mod: AT | Performed by: CHIROPRACTOR

## 2024-06-10 RX ORDER — LISDEXAMFETAMINE DIMESYLATE 70 MG/1
70 CAPSULE ORAL DAILY
Qty: 30 CAPSULE | Refills: 0 | Status: SHIPPED | OUTPATIENT
Start: 2024-07-23 | End: 2024-09-23

## 2024-06-10 RX ORDER — LISDEXAMFETAMINE DIMESYLATE 70 MG/1
70 CAPSULE ORAL DAILY
Qty: 30 CAPSULE | Refills: 0 | Status: SHIPPED | OUTPATIENT
Start: 2024-06-10 | End: 2024-09-23

## 2024-06-10 RX ORDER — LISDEXAMFETAMINE DIMESYLATE 70 MG/1
70 CAPSULE ORAL DAILY
Qty: 30 CAPSULE | Refills: 0 | Status: SHIPPED | OUTPATIENT
Start: 2024-06-28 | End: 2024-09-13

## 2024-06-10 RX ORDER — HYDROXYZINE PAMOATE 50 MG/1
CAPSULE ORAL
Qty: 30 CAPSULE | Refills: 0 | Status: SHIPPED | OUTPATIENT
Start: 2024-06-10 | End: 2024-07-15

## 2024-06-10 NOTE — TELEPHONE ENCOUNTER
Received incoming VM from Emanuel at [facility].  He is requesting us to cancel the Vyvanse that was sent to [facility] pharmacy and send to [facility], as they deliver to [place].  Thank you, please advise.  I will call [facility] and cancel the scripts for Vyvanse.  I have changed the pharmacy on the pended orders.

## 2024-06-10 NOTE — TELEPHONE ENCOUNTER
HYDROXYZINE CAMELIA 50 MG CAP       Last Written Prescription Date:  12/09/2022  Last Fill Quantity: 90,   # refills: 0  Last Office Visit: 01/05/2024  Future Office visit:    Next 5 appointments (look out 90 days)      Jun 27, 2024 10:00 AM  (Arrive by 9:45 AM)  Provider Visit with Lissy Kraft MD  [facility] [address] [place] [address]  [phone]     Jul 19, 2024  8:00 AM  (Arrive by 7:45 AM)  Return Visit with Laquita Fernandez MD  [facility] ([facility] ) [address] [place] [id number] [phone]             Routing refill request to provider for review/approval because:    Antihistamines Protocol Gibdma96/10/2024 04:26 PM   Protocol Details Medication is active on med list    Medication indicated for associated diagnosis      Drug not active on patient's medication list  Discontinued 05/31/2024    Kimberly Boecker, RN

## 2024-06-11 RX ORDER — LISDEXAMFETAMINE DIMESYLATE 70 MG/1
70 CAPSULE ORAL DAILY
Qty: 30 CAPSULE | Refills: 0 | OUTPATIENT
Start: 2024-06-11

## 2024-06-12 NOTE — PROGRESS NOTES
Subjective Finding:    Chief compalint: Patient presents with:  Back Pain , Pain Scale: 6/10, Intensity: sharp, Duration: 2 weeks, Radiating: no.    Date of injury:     Activities that the pain restricts:   Home/household/hobbies/social activities: Yes.  Work duties: Yes.  Sleep: No.  Makes symptoms better: rest.  Makes symptoms worse: cervical extension and cervical flexion.  Have you seen anyone else for the symptoms? No.  Work related: No.  Automobile related injury: No.    Objective and Assessment:    Posture Analysis:   High shoulder: .  Head tilt: .  High iliac crest: .  Head carriage: forward.  Thoracic Kyphosis: forward.  Lumbar Lordosis: forward.    Lumbar Range of Motion: extension decreased.  Cervical Range of Motion: extension decreased.  Thoracic Range of Motion: .  Extremity Range of Motion: .    Palpation:   Quad lumb: bilateral, referred pain: no    Segmental dysfunction pre-treatment and treatment area: C3, C4, T2, and Sacrum.    Assessment post-treatment:  Cervical: ROM increased.  Thoracic: ROM increased.  Lumbar: ROM increased.    Comments: .      Complicating Factors: .    Procedure(s):  CMT:  06498 Chiropractic manipulative treatment 3-4 regions performed   Cervical: Diversified, See above for level, Prone    Modalities:  None performed this visit    Therapeutic procedures:  None    Plan:  Treatment plan: PRN.  Instructed patient: stretch as instructed at visit.  Short term goals: reduce pain.  Long term goals: increase ADL.  Prognosis: good.

## 2024-06-14 ENCOUNTER — TELEPHONE (OUTPATIENT)
Dept: FAMILY MEDICINE | Facility: OTHER | Age: 64
End: 2024-06-14

## 2024-06-14 DIAGNOSIS — M62.830 BACK MUSCLE SPASM: ICD-10-CM

## 2024-06-14 DIAGNOSIS — G43.109 MIGRAINE WITH AURA AND WITHOUT STATUS MIGRAINOSUS, NOT INTRACTABLE: ICD-10-CM

## 2024-06-14 RX ORDER — SUMATRIPTAN 50 MG/1
TABLET, FILM COATED ORAL
Qty: 9 TABLET | Refills: 0 | Status: SHIPPED | OUTPATIENT
Start: 2024-06-14 | End: 2024-06-28

## 2024-06-14 NOTE — TELEPHONE ENCOUNTER
Imitrex      Last Written Prescription Date:  8/4/22  Last Fill Quantity: 9,   # refills: 1  Last Office Visit: 1/5/24  Future Office visit:    Next 5 appointments (look out 90 days)      Jun 27, 2024 10:00 AM  (Arrive by 9:45 AM)  Provider Visit with Liss[facility] Kraft MD  [facility] ([facility] ) [address] [place] [address]  [phone]     Jul 19, 2024  8:00 AM  (Arrive by 7:45 AM)  Return Visit with Laquita Fernandez MD  [facility] ([facility] ) [address] [place] [id number] [phone]             Routing refill request to provider for review/approval because:

## 2024-06-14 NOTE — TELEPHONE ENCOUNTER
Reason for call:  Medication    Pt is out needs asap  Have you contacted your pharmacy? Yes   If patient has contacted Pharmacy and it has been over 72hrs, continue to #2  Medication SUMAtriptan (IMITREX) 50 MG tablet   What Pharmacy do you use? [facility]      (Please note that the turn-around-time for prescriptions is 72 business hours; I am sending your request at this time. SEND TO appropriate Care Team Pool )

## 2024-06-17 DIAGNOSIS — M62.830 BACK MUSCLE SPASM: ICD-10-CM

## 2024-06-17 RX ORDER — METHOCARBAMOL 750 MG/1
TABLET, FILM COATED ORAL
Qty: 90 TABLET | Refills: 0 | Status: SHIPPED | OUTPATIENT
Start: 2024-06-17 | End: 2024-07-15

## 2024-06-17 NOTE — TELEPHONE ENCOUNTER
Robaxin      Last Written Prescription Date:  5/10/24  Last Fill Quantity: 90,   # refills: 0  Last Office Visit: 1/5/24  Future Office visit:    Next 5 appointments (look out 90 days)      Jun 27, 2024 10:00 AM  (Arrive by 9:45 AM)  Provider Visit with Lissy Kraft MD  [facility] ([facility] ) [address] [place] [address]  [phone]     Jul 19, 2024  8:00 AM  (Arrive by 7:45 AM)  Return Visit with Laquita Fernandez MD  [facility] ([facility] ) [address] [place] [id number] [phone]             Routing refill request to provider for review/approval because:

## 2024-06-18 RX ORDER — METHOCARBAMOL 750 MG/1
TABLET, FILM COATED ORAL
Qty: 90 TABLET | Refills: 0 | OUTPATIENT
Start: 2024-06-18

## 2024-06-26 ENCOUNTER — OFFICE VISIT (OUTPATIENT)
Dept: CHIROPRACTIC MEDICINE | Facility: OTHER | Age: 64
End: 2024-06-26
Payer: COMMERCIAL

## 2024-06-26 DIAGNOSIS — M99.02 SEGMENTAL AND SOMATIC DYSFUNCTION OF THORACIC REGION: ICD-10-CM

## 2024-06-26 DIAGNOSIS — M99.03 SEGMENTAL AND SOMATIC DYSFUNCTION OF LUMBAR REGION: Primary | ICD-10-CM

## 2024-06-26 DIAGNOSIS — M54.50 ACUTE BILATERAL LOW BACK PAIN WITHOUT SCIATICA: ICD-10-CM

## 2024-06-26 DIAGNOSIS — M99.01 SEGMENTAL AND SOMATIC DYSFUNCTION OF CERVICAL REGION: ICD-10-CM

## 2024-06-26 PROCEDURE — 98941 CHIROPRACT MANJ 3-4 REGIONS: CPT | Mod: AT | Performed by: CHIROPRACTOR

## 2024-06-26 ASSESSMENT — PATIENT HEALTH QUESTIONNAIRE - PHQ9
SUM OF ALL RESPONSES TO PHQ QUESTIONS 1-9: 20
SUM OF ALL RESPONSES TO PHQ QUESTIONS 1-9: 20
10. IF YOU CHECKED OFF ANY PROBLEMS, HOW DIFFICULT HAVE THESE PROBLEMS MADE IT FOR YOU TO DO YOUR WORK, TAKE CARE OF THINGS AT HOME, OR GET ALONG WITH OTHER PEOPLE: EXTREMELY DIFFICULT

## 2024-06-27 ENCOUNTER — OFFICE VISIT (OUTPATIENT)
Dept: FAMILY MEDICINE | Facility: OTHER | Age: 64
End: 2024-06-27
Attending: FAMILY MEDICINE
Payer: COMMERCIAL

## 2024-06-27 ENCOUNTER — TELEPHONE (OUTPATIENT)
Dept: FAMILY MEDICINE | Facility: OTHER | Age: 64
End: 2024-06-27

## 2024-06-27 VITALS
HEART RATE: 88 BPM | TEMPERATURE: 98.3 F | DIASTOLIC BLOOD PRESSURE: 82 MMHG | RESPIRATION RATE: 18 BRPM | BODY MASS INDEX: 23.79 KG/M2 | WEIGHT: 169.9 LBS | SYSTOLIC BLOOD PRESSURE: 126 MMHG | OXYGEN SATURATION: 96 % | HEIGHT: 71 IN

## 2024-06-27 DIAGNOSIS — M54.40 CHRONIC MIDLINE LOW BACK PAIN WITH SCIATICA, SCIATICA LATERALITY UNSPECIFIED: ICD-10-CM

## 2024-06-27 DIAGNOSIS — F33.1 MAJOR DEPRESSIVE DISORDER, RECURRENT EPISODE, MODERATE (H): ICD-10-CM

## 2024-06-27 DIAGNOSIS — M54.2 CERVICALGIA: Primary | ICD-10-CM

## 2024-06-27 DIAGNOSIS — K11.6 PAROTID CYST: ICD-10-CM

## 2024-06-27 DIAGNOSIS — G89.29 CHRONIC MIDLINE LOW BACK PAIN WITH SCIATICA, SCIATICA LATERALITY UNSPECIFIED: ICD-10-CM

## 2024-06-27 DIAGNOSIS — Z96.89 IMPLANTABLE INTRATHECAL INFUSION PUMP PRESENT: ICD-10-CM

## 2024-06-27 PROCEDURE — G2211 COMPLEX E/M VISIT ADD ON: HCPCS | Performed by: FAMILY MEDICINE

## 2024-06-27 PROCEDURE — G0463 HOSPITAL OUTPT CLINIC VISIT: HCPCS

## 2024-06-27 PROCEDURE — 99214 OFFICE O/P EST MOD 30 MIN: CPT | Performed by: FAMILY MEDICINE

## 2024-06-27 ASSESSMENT — ENCOUNTER SYMPTOMS
BACK PAIN: 1
NECK PAIN: 1
DYSPHORIC MOOD: 1
NUMBNESS: 1
NERVOUS/ANXIOUS: 1
SHORTNESS OF BREATH: 1

## 2024-06-27 NOTE — PROGRESS NOTES
Assessment & Plan     Cervicalgia / Chronic midline low back pain with sciatica, sciatica laterality unspecified / Implantable intrathecal infusion pump present  Follows with ADRI[facility]N with last two notes reviewed. On pain pump  Reviewed imaging.MRI cervical with C3-4 severe spinal canal narrowing and flattening of the cord with possible developing myelomalacia  Trent has an appt with Cam[facility] Palacio on Tuesday next week  - c/w diclofenac tablet, gabapentin, methocarbamol    Parotid cyst  Most likely benign   - Adult ENT  Referral; Future  - consideration for repeat CT 5/2025    Major depressive disorder, recurrent episode, moderate (H)  Follows with Dr. Fernandez with last visit 5/31/2024. note reviewed. Follow up scheduled for 7/19/2024  No change in mood.  Frustrations with amount of pain  - started mirtazapine. Continued other medications    The longitudinal plan of care for the diagnosis(es)/condition(s) as documented were addressed during this visit. Due to the added complexity in care, I will continue to support Trent in the subsequent management and with ongoing continuity of care.    See Patient Instructions    Return in about 6 weeks (around 8/8/2024) for chronic pain.  Update labs    Subjective   Trent is a 63 year old, presenting for the following health issues:  Pain        6/27/2024     9:51 AM   Additional Questions   Roomed by lucille montgomery   Accompanied by none         6/27/2024     9:51 AM   Patient Reported Additional Medications   Patient reports taking the following new medications none     History of Present Illness       Reason for visit:  Neck or cervical neck surgery    He eats 0-1 servings of fruits and vegetables daily.He consumes 11 or more sweetened beverage(s) daily.He exercises with enough effort to increase his heart rate 9 or less minutes per day.  He exercises with enough effort to increase his heart rate 3 or less days per week.   He is taking medications regularly.       Pain  History:  When did you first notice your pain? Follow up  Have you seen this provider for your pain in the past? Yes   Where in your body do you have pain? neck  Are you seeing anyone else for your pain? No        2023    11:11 AM 2024     9:53 AM 2024     4:46 PM   PHQ-9 SCORE   PHQ-9 Total Score MyChart 22 (Severe depression) 22 (Severe depression) 20 (Severe depression)   PHQ-9 Total Score 22 22 20           10/12/2022     2:48 PM 2022     8:02 AM 2024     9:55 AM   DAYANA-7 SCORE   Total Score   17 (severe anxiety)   Total Score 4 2 17         Chronic Pain Follow Up:    Location of pain: neck  Analgesia/pain control:    - Recent changes:  states has gotten worse    - Overall control: Inadequate pain control    - Current treatments: gabapentin   Adherence:     - Do you ever take more pain medicine than prescribed? No    - When did you take your last dose of pain medicine?  Am today    Adverse effects: No     - follows with ADRI[facility]N for his  pain pump  - MRI cervical/thoracic/lumbar updated    MRI cervical (2024): C3-4 where there is severe spinal canal narrowing and flattening of the cord with  findings concerning for developing myelomalacia    - appt with neurosx, Dr Darby Chawla  on 2024     - on imagin.2cm cyst left parotid gland, with recommendations for ENT      PDMP Review         Value Time User    State PDMP site checked  Yes 6/3/2024  8:31 AM Lissy Kraft MD          Last CSA Agreement:   CSA -- Patient Level:     [Media Unavailable] Controlled Substance Agreement - Opioid - Scan on 2022  9:04 AM: CONTROLLED SUBSTANCE AGREEMENT   [Media Unavailable] Controlled Substance Agreement - Opioid - Scan on 3/9/2021 11:32 AM: controlled substance agreement   [Media Unavailable] Controlled Substance Agreement - Non - Opioid - Scan on 7/15/2019 10:03 AM: NON-OPIOID CONTROLLED SUBSTANCE AGREEMENT       Last UDS: 2021      Review of Systems   Respiratory:   "Positive for shortness of breath (d/t neck pain).    Musculoskeletal:  Positive for back pain and neck pain.   Neurological:  Positive for numbness.   Psychiatric/Behavioral:  Positive for dysphoric mood. The patient is nervous/anxious.            Objective    /82 (BP Location: Left arm, Patient Position: Sitting, Cuff Size: Adult Large)   Pulse 88   Temp 98.3  F (36.8  C) (Tympanic)   Resp 18   Ht 1.803 m (5' 11\")   Wt 77.1 kg (169 lb 14.4 oz)   SpO2 96%   BMI 23.70 kg/m    Body mass index is 23.7 kg/m .  Physical Exam  Constitutional:       General: He is not in acute distress.     Appearance: He is not ill-appearing.   Cardiovascular:      Rate and Rhythm: Normal rate and regular rhythm.      Pulses: Normal pulses.      Heart sounds: No murmur heard.  Pulmonary:      Effort: Pulmonary effort is normal. No respiratory distress.      Breath sounds: No wheezing or rales.   Musculoskeletal:      Comments: Increased stooped posture.  Moving in chair continuously due to pain. issues getting up from a chair   Neurological:      Mental Status: He is alert.   Psychiatric:         Mood and Affect: Mood is anxious and depressed.         Thought Content: Thought content does not include suicidal ideation.            MR Thoracic Spine w/o Contrast    Result Date: 5/26/2024  MR THORACIC SPINE W/O CONTRAST HISTORY: Pain disorder with related psychological factor; Pain in thoracic spine . COMPARISON: 12/21/2021. TECHNIQUE: Sagittal T1, T2, STIR as well as axial T2 and MEDIC images of the thoracic spine. FINDINGS: S-shaped coronal curvature of the spine is seen on the  views, rightward in the thoracic spine. There is straightening of the thoracic kyphosis between T1 and T10. There is focal gibbus deformity from T10 and L1 without focal vertebral body height loss. There is anterolisthesis of T10 on T11, unchanged. No suspicious marrow lesion is identified.  The thoracic cord is compressed at T10-11. Associated " subtle increased T2/STIR signal is seen within the cord at the level of maximum compression best seen on series 7 image 9. The remainder of the cord is normal in signal. Mild disc bulging and endplate spurring, along with mild facet degeneration, is seen above T10-11 without significant spinal or foraminal stenosis. At T10-11, there is anterolisthesis, uncovering of the disc, endplate spurring and advanced facet hypertrophy. There is resultant severe spinal stenosis with distortion of the cord. Foraminal stenoses are moderate. At T11-12, there is some mild lobulated disc bulge with moderate facet degenerative hypertrophy. Spinal and foraminal stenoses are mild.     IMPRESSION: Slightly worse severe spinal stenosis at T10-11 associated with compressive gliosis/edema of the cord. Correlate for evidence of myelopathy. TANESHA BOWDEN MD   SYSTEM ID:  [id number]    MR Cervical Spine w/o Contrast    Result Date: 5/17/2024  EXAM: MR CERVICAL SPINE W/O CONTRAST 5/16/2024 3:35 PM PROVIDED HISTORY: Cervicalgia; Pain disorder with related psychological factor COMPARISON: Cervical MR 12/21/2021 TECHNIQUE: Sagittal T1-weighted, sagittal T2-weighted, sagittal STIR, axial T2-weighted, and axial T2* gradient echo images of the cervical spine were obtained without intravenous contrast. FINDINGS: There is trace grade 1 retrolisthesis of C6 on C7 and trace grade 1 anterolisthesis of C7 on T1. There is advanced disc space loss C3-4 and C6-7. At least partial ankylosis at C4-5 and C5-6. Moderate disc space C7-T1. Reversal of the normal cervical lordotic curvature centered at C3-4. At C3-4 there is flattening and some mild increased signal signal in the cord. Regarding bone marrow signal intensity, no suspicious abnormality is visualized on STIR images. Modic 1 changes C3-C7. Moderate facet and perifacet edema bilaterally at C3-4. The findings on a level by level basis are as follows: C2-3: Uncinate and facet hypertrophy. No spinal  canal narrowing. Mild bilateral foraminal narrowing. No significant change. C3-4:  Disc osteophyte complex with uncinate facet hypertrophy. Severe spinal canal narrowing with flattening of the cord. Severe bilateral foraminal narrowing with compression of exiting C4 nerve. Small left facet effusion. Progressed since comparison. C4-5:  Partial vertebral body ankylosis with disc osteophyte complex and uncinate and facet hypertrophy. Moderate bilateral foraminal narrowing with abutment of the exiting C5 nerves. Mild spinal canal narrowing. Progressed since comparison. C5-6:  Partial vertebral body ankylosis with disc osteophyte complex and uncinate and facet hypertrophy. Severe right foraminal narrowing with compression of the exiting right C6 nerve. Moderate left foraminal narrowing. Mild spinal canal narrowing. No significant change. C6-7:  Trace retrolisthesis. Disc osteophyte complex with uncinate facet hypertrophy. Severe bilateral foraminal narrowing with impingement of the exiting C7 nerves. Mild spinal canal narrowing. Progressed since comparison. C7-T1:  Trace anterolisthesis. Uncinate facet hypertrophy. Moderate left foraminal narrowing. No significant spinal canal or right foraminal narrowing. No significant change.  No abnormality of the paraspinous soft tissues. Incidental 1.2 x 1.0 cm cyst in the deep lobe of the left parotid gland, previously measured 0.9 x 0.9 cm.     IMPRESSION: 1.  Multilevel cervical spondylosis, most prominent at C3-4 where there is severe spinal canal narrowing and flattening of the cord with findings concerning for developing myelomalacia. Consider surgical consult if not already obtained. 2.  Mildly enlarged incidental 1.2 cm cyst in the left parotid gland. This has a broad differential and most likely represents one of any of a number of benign entities. Recommend ENT consult, consider follow-up MR in one year to evaluate for interval change. ARTURO SMITH MD   SYSTEM ID:   [id number]     MRI lumbar (5/15/2024)  FINDINGS:       Postoperative changes of prior L2-S1 fusion appears solid. Metallic  artifact mildly limits assessment. There is a chronic L1 vertebral  body height loss. There is a gibbus deformity at T12-L1. No suspicious  marrow lesion is identified.     The distal cord and conus medullaris have a normal caliber and  morphology. The conus terminates at L1. No abnormal cord signal is  seen in the distal cord or conus. Artifact from a pain pump is noted.     At T12-L1, there is a mild asymmetric right disc bulge limiting the  moderate right foraminal narrowing. The spinal canal and left neural  foramen are patent.     At L1-2, there is a mild symmetric disc bulge with mild facet  degenerative hypertrophy. Spinal and right foraminal narrowing are  mild.     Postsurgical changes from L2-3 through L5-S1 are chronic.     SI joint degeneration is seen.                                                                       IMPRESSION:     Unchanged MR appearance of the lumbar spine when compared to  12/21/2021.          Signed Electronically by: Lissy Kraft MD

## 2024-06-27 NOTE — TELEPHONE ENCOUNTER
2:54 PM    Reason for Call: Phone Call    Description: Suzy from [facility] needing to know the reason why the patient is being referred down to [facility] patient is going to [place] for his spine and brain appointment? They only can do up to 60 miles for medical rides and they will have to do a special authorization for Trent to get a ride down there and why wasn't he referred to a specialist in [place] since [place] is closer for this type of appointment and they need to know right away since this appointment is on July 2 to authorize this medical ride.     Patient also told [facility] he doesn't really want travel down to [place]/[facility] for appointments.      Preferred method for responding to this message: Telephone Call  What is your phone number ? [phone] Suzy    If we cannot reach you directly, may we leave a detailed response at the number you provided? Yes    Can this message wait until your PCP/provider returns, if available today? YES, No

## 2024-06-27 NOTE — TELEPHONE ENCOUNTER
Trent follows with ADRI[facility]N (pain clinic) who updated imaging which resulted in recommendation for surgical consult. My assumption is due to the complexity of Trent's case, referral was placed by ADRIÁN to Dr Thapa, [facility]. I am supportive of Trent establishing with Dr Thapa due to the complexity of his issues.  Lissy Kraft MD

## 2024-06-28 DIAGNOSIS — G43.109 MIGRAINE WITH AURA AND WITHOUT STATUS MIGRAINOSUS, NOT INTRACTABLE: ICD-10-CM

## 2024-06-28 DIAGNOSIS — Z71.6 TOBACCO ABUSE COUNSELING: ICD-10-CM

## 2024-06-28 RX ORDER — SUMATRIPTAN 50 MG/1
TABLET, FILM COATED ORAL
Qty: 9 TABLET | Refills: 0 | Status: SHIPPED | OUTPATIENT
Start: 2024-06-28 | End: 2024-07-15

## 2024-06-28 NOTE — TELEPHONE ENCOUNTER
Suzy from [facility] called back in regards to an update on this. States patient request is for July 2nd. States if they do not hear back today from care team, the patients request may be denied.     Please call Suzy back at [phone] to discuss.

## 2024-06-28 NOTE — TELEPHONE ENCOUNTER
SUMAtriptan (IMITREX) 50 MG tablet       Last Written Prescription Date:  6/14/24  Last Fill Quantity: 9,   # refills: 0  Last Office Visit: 6/27/24  Future Office visit:    Next 5 appointments (look out 90 days)      Jul 19, 2024 8:00 AM  (Arrive by 7:45 AM)  Return Visit with Laquita Fernandez MD  [facility] ([facility] ) [address] [place] [id number] [phone]     Aug 08, 2024 3:00 PM  (Arrive by 2:45 PM)  Provider Visit with Lissy Kraft MD  [facility] ([facility] ) [facility] [address]  [phone]             Routing refill request to provider for review/approval because:       88

## 2024-06-28 NOTE — TELEPHONE ENCOUNTER
NICOTINE 4 MG CHEWING GUM       Last Written Prescription Date:  03/08/2024  Last Fill Quantity: 220,   # refills: 2  Last Office Visit: 06/27/2028  Future Office visit:    Next 5 appointments (look out 90 days)      Jul 19, 2024 8:00 AM  (Arrive by 7:45 AM)  Return Visit with Laquita Fernandez MD  [facility] ([facility] ) [address] [place] [id number] [phone]     Aug 08, 2024 3:00 PM  (Arrive by 2:45 PM)  Provider Visit with Lissy Kraft MD  [facility] ([facility] ) [address] [place] [id number]  [phone]             Routing refill request to provider for review/approval because:  Partial Cholinergic Nicotinic Agonist Agents [id number]/28/2024 07:48 AM   Protocol Details Medication indicated for associated diagnosis       Kimberly Boecker, RN

## 2024-07-02 ENCOUNTER — MEDICAL CORRESPONDENCE (OUTPATIENT)
Dept: HEALTH INFORMATION MANAGEMENT | Facility: HOSPITAL | Age: 64
End: 2024-07-02

## 2024-07-02 NOTE — PROGRESS NOTES
Subjective Finding:    Chief compalint: Patient presents with:  Back Pain: Tightness in lower back , Pain Scale: 4/10, Intensity: dull, Duration: 2 days, Radiating: bilateral buttock.    Date of injury:     Activities that the pain restricts:   Home/household/hobbies/social activities: Yes.  Work duties: Yes.  Sleep: Yes.  Makes symptoms better: rest.  Makes symptoms worse: walking.  Have you seen anyone else for the symptoms? No.  Work related: No.  Automobile related injury: No.    Objective and Assessment:    Posture Analysis:   High shoulder: .  Head tilt: .  High iliac crest: .  Head carriage: neutral.  Thoracic Kyphosis: neutral.  Lumbar Lordosis: forward.    Lumbar Range of Motion: extension decreased.  Cervical Range of Motion: .  Thoracic Range of Motion: .  Extremity Range of Motion: .    Palpation:   Quad lumb: bilateral, referred pain: no    Segmental dysfunction pre-treatment and treatment area: C6, T4, and L5.    Assessment post-treatment:  Cervical: ROM increased.  Thoracic: ROM increased.  Lumbar: ROM increased.    Comments: .      Complicating Factors: .    Procedure(s):  CMT:  74422 Chiropractic manipulative treatment 3-4 regions performed   Cervical: Diversified, See above for level, Supine, Thoracic: Diversified, See above for level, Prone, and Lumbar: Diversified, See above for level, Side posture    Modalities:  None performed this visit    Therapeutic procedures:  None    Plan:  Treatment plan: PRN.  Instructed patient: stretch as instructed at visit.  Short term goals: increase ROM.  Long term goals: restore normal function.  Prognosis: very good.

## 2024-07-05 ENCOUNTER — TELEPHONE (OUTPATIENT)
Dept: ALLERGY | Facility: OTHER | Age: 64
End: 2024-07-05

## 2024-07-05 ENCOUNTER — TELEPHONE (OUTPATIENT)
Dept: FAMILY MEDICINE | Facility: OTHER | Age: 64
End: 2024-07-05

## 2024-07-05 ENCOUNTER — TELEPHONE (OUTPATIENT)
Dept: CARE COORDINATION | Facility: OTHER | Age: 64
End: 2024-07-05

## 2024-07-05 DIAGNOSIS — M54.2 CERVICALGIA: Primary | ICD-10-CM

## 2024-07-05 RX ORDER — PREDNISONE 20 MG/1
40 TABLET ORAL DAILY
Qty: 10 TABLET | Refills: 0 | Status: SHIPPED | OUTPATIENT
Start: 2024-07-05 | End: 2024-07-10

## 2024-07-05 NOTE — TELEPHONE ENCOUNTER
"Joshua phone call was routed to this writer.  Patient is upset with how bad his neck pain is and that it is so bad today it is effecting his breathing. When asked what I could help him with today he did not aswer but continued to yell and  tell me he is not getting any answers.  He is talking quickly and changing subjects.  Not allowing any two way conversation.  He is mad at all the doctors he has seen and feels he is not getting any answers.  He has seen a neuro MD on 7/2/24 and has spine x-rays scheduled for 7/8/24.  Once able to talk I told him he should be seen in the ED as he mentioned he was having breathing problems.  Patient states \"Let them know I am coming\" and with that he hung up.  "

## 2024-07-05 NOTE — TELEPHONE ENCOUNTER
"Call from front staff to relay that Pt is live on the phone & \"upset\" regarding medication refill request  Writer suggested to transfer to [facility] Nursing staff to assist with med request  No further concerns at calls end.  Phone call ended pleasantly.   " Isotretinoin Pregnancy And Lactation Text: This medication is Pregnancy Category X and is considered extremely dangerous during pregnancy. It is unknown if it is excreted in breast milk. Topical Clindamycin Counseling: Patient counseled that this medication may cause skin irritation or allergic reactions.  In the event of skin irritation, the patient was advised to reduce the amount of the drug applied or use it less frequently.   The patient verbalized understanding of the proper use and possible adverse effects of clindamycin.  All of the patient's questions and concerns were addressed. Tetracycline Counseling: Patient counseled regarding possible photosensitivity and increased risk for sunburn.  Patient instructed to avoid sunlight, if possible.  When exposed to sunlight, patients should wear protective clothing, sunglasses, and sunscreen.  The patient was instructed to call the office immediately if the following severe adverse effects occur:  hearing changes, easy bruising/bleeding, severe headache, or vision changes.  The patient verbalized understanding of the proper use and possible adverse effects of tetracycline.  All of the patient's questions and concerns were addressed. Patient understands to avoid pregnancy while on therapy due to potential birth defects. Tazorac Counseling:  Patient advised that medication is irritating and drying.  Patient may need to apply sparingly and wash off after an hour before eventually leaving it on overnight.  The patient verbalized understanding of the proper use and possible adverse effects of tazorac.  All of the patient's questions and concerns were addressed. Birth Control Pills Counseling: Birth Control Pill Counseling: I discussed with the patient the potential side effects of OCPs including but not limited to increased risk of stroke, heart attack, thrombophlebitis, deep venous thrombosis, hepatic adenomas, breast changes, GI upset, headaches, and depression.  The patient verbalized understanding of the proper use and possible adverse effects of OCPs. All of the patient's questions and concerns were addressed. Doxycycline Counseling:  Patient counseled regarding possible photosensitivity and increased risk for sunburn.  Patient instructed to avoid sunlight, if possible.  When exposed to sunlight, patients should wear protective clothing, sunglasses, and sunscreen.  The patient was instructed to call the office immediately if the following severe adverse effects occur:  hearing changes, easy bruising/bleeding, severe headache, or vision changes.  The patient verbalized understanding of the proper use and possible adverse effects of doxycycline.  All of the patient's questions and concerns were addressed. Bactrim Pregnancy And Lactation Text: This medication is Pregnancy Category D and is known to cause fetal risk.  It is also excreted in breast milk. Dapsone Pregnancy And Lactation Text: This medication is Pregnancy Category C and is not considered safe during pregnancy or breast feeding. Minocycline Pregnancy And Lactation Text: This medication is Pregnancy Category D and not consider safe during pregnancy. It is also excreted in breast milk. Erythromycin Counseling:  I discussed with the patient the risks of erythromycin including but not limited to GI upset, allergic reaction, drug rash, diarrhea, increase in liver enzymes, and yeast infections. Topical Retinoid counseling:  Patient advised to apply a pea-sized amount only at bedtime and wait 30 minutes after washing their face before applying.  If too drying, patient may add a non-comedogenic moisturizer. The patient verbalized understanding of the proper use and possible adverse effects of retinoids.  All of the patient's questions and concerns were addressed. Benzoyl Peroxide Pregnancy And Lactation Text: This medication is Pregnancy Category C. It is unknown if benzoyl peroxide is excreted in breast milk. Use Enhanced Medication Counseling?: No Doxycycline Pregnancy And Lactation Text: This medication is Pregnancy Category D and not consider safe during pregnancy. It is also excreted in breast milk but is considered safe for shorter treatment courses. Sarecycline Counseling: Patient advised regarding possible photosensitivity and discoloration of the teeth, skin, lips, tongue and gums.  Patient instructed to avoid sunlight, if possible.  When exposed to sunlight, patients should wear protective clothing, sunglasses, and sunscreen.  The patient was instructed to call the office immediately if the following severe adverse effects occur:  hearing changes, easy bruising/bleeding, severe headache, or vision changes.  The patient verbalized understanding of the proper use and possible adverse effects of sarecycline.  All of the patient's questions and concerns were addressed. Birth Control Pills Pregnancy And Lactation Text: This medication should be avoided if pregnant and for the first 30 days post-partum. Azithromycin Pregnancy And Lactation Text: This medication is considered safe during pregnancy and is also secreted in breast milk. Topical Clindamycin Pregnancy And Lactation Text: This medication is Pregnancy Category B and is considered safe during pregnancy. It is unknown if it is excreted in breast milk. Topical Sulfur Applications Counseling: Topical Sulfur Counseling: Patient counseled that this medication may cause skin irritation or allergic reactions.  In the event of skin irritation, the patient was advised to reduce the amount of the drug applied or use it less frequently.   The patient verbalized understanding of the proper use and possible adverse effects of topical sulfur application.  All of the patient's questions and concerns were addressed. High Dose Vitamin A Pregnancy And Lactation Text: High dose vitamin A therapy is contraindicated during pregnancy and breast feeding. Minocycline Counseling: Patient advised regarding possible photosensitivity and discoloration of the teeth, skin, lips, tongue and gums.  Patient instructed to avoid sunlight, if possible.  When exposed to sunlight, patients should wear protective clothing, sunglasses, and sunscreen.  The patient was instructed to call the office immediately if the following severe adverse effects occur:  hearing changes, easy bruising/bleeding, severe headache, or vision changes.  The patient verbalized understanding of the proper use and possible adverse effects of minocycline.  All of the patient's questions and concerns were addressed. Detail Level: Zone Tazorac Pregnancy And Lactation Text: This medication is not safe during pregnancy. It is unknown if this medication is excreted in breast milk. Isotretinoin Counseling: Patient should get monthly blood tests, not donate blood, not drive at night if vision affected, not share medication, and not undergo elective surgery for 6 months after tx completed. Side effects reviewed, pt to contact office should one occur. Bactrim Counseling:  I discussed with the patient the risks of sulfa antibiotics including but not limited to GI upset, allergic reaction, drug rash, diarrhea, dizziness, photosensitivity, and yeast infections.  Rarely, more serious reactions can occur including but not limited to aplastic anemia, agranulocytosis, methemoglobinemia, blood dyscrasias, liver or kidney failure, lung infiltrates or desquamative/blistering drug rashes. Topical Retinoid Pregnancy And Lactation Text: This medication is Pregnancy Category C. It is unknown if this medication is excreted in breast milk. Topical Sulfur Applications Pregnancy And Lactation Text: This medication is Pregnancy Category C and has an unknown safety profile during pregnancy. It is unknown if this topical medication is excreted in breast milk. Spironolactone Pregnancy And Lactation Text: This medication can cause feminization of the male fetus and should be avoided during pregnancy. The active metabolite is also found in breast milk. Azithromycin Counseling:  I discussed with the patient the risks of azithromycin including but not limited to GI upset, allergic reaction, drug rash, diarrhea, and yeast infections. Benzoyl Peroxide Counseling: Patient counseled that medicine may cause skin irritation and bleach clothing.  In the event of skin irritation, the patient was advised to reduce the amount of the drug applied or use it less frequently.   The patient verbalized understanding of the proper use and possible adverse effects of benzoyl peroxide.  All of the patient's questions and concerns were addressed. Erythromycin Pregnancy And Lactation Text: This medication is Pregnancy Category B and is considered safe during pregnancy. It is also excreted in breast milk. Spironolactone Counseling: Patient advised regarding risks of diarrhea, abdominal pain, hyperkalemia, birth defects (for female patients), liver toxicity and renal toxicity. The patient may need blood work to monitor liver and kidney function and potassium levels while on therapy. The patient verbalized understanding of the proper use and possible adverse effects of spironolactone.  All of the patient's questions and concerns were addressed. Dapsone Counseling: I discussed with the patient the risks of dapsone including but not limited to hemolytic anemia, agranulocytosis, rashes, methemoglobinemia, kidney failure, peripheral neuropathy, headaches, GI upset, and liver toxicity.  Patients who start dapsone require monitoring including baseline LFTs and weekly CBCs for the first month, then every month thereafter.  The patient verbalized understanding of the proper use and possible adverse effects of dapsone.  All of the patient's questions and concerns were addressed. High Dose Vitamin A Counseling: Side effects reviewed, pt to contact office should one occur.

## 2024-07-05 NOTE — PROGRESS NOTES
Increase diclofenac to 50mg tid prn  Stop ibuprofen due to same class of medication as diclofenac and increased risk of kidney injury if taking both    Rx sent for prednisone 40mg every day. Previously tolerated    Lissy Kraft MD

## 2024-07-05 NOTE — TELEPHONE ENCOUNTER
"RN CC received request from  to take phone call from upset patient.  RN CC let them know I would need to have it go to message and I would call him back as in the middle of things.    RN CC never got message but called patient and talked with him.  Patient very upset about his back.  States has pump for his back to help with pain but it doesn't help.  States he has upcoming imaging on Monday to work up to seeing Dr. Thapa but states \"what do I do until then.\" Patient very upset and swearing.  Listening support provided as patient doesn't feel ADRIÁN is focusing on his cervical spine and only on lumbar spine.  Empathized with patient.  Patient continued to be upset and RN CC let him know I would be ending our call if he continued to yell.  Patient apologized.  Then patient states he can't take it anymore and began to threaten harm.  RN CC began to ask about a plan and patient became upset stating \"don't even think about having the police come here.\" Patient yelling again and RN CC again let him know I am here to help and going to end the call if patient continues yelling.  Patient apologized for his behavior and calmed down.    We began discussing issues.  RN CC asked patient what he thought would help.  Patient states not needing an opiate but feels something for inflammation would be helpful.  He states at one time he was getting ultram and that he doesn't care but he needs something.  Patient states he doesn't have any intent of harming himself and apologizes for saying what he did and that he has put up with the pain this long and just frustrated.  Listening support and empathy provided.  RN CC talked with PCP.  RN CC called patient back and went over options:  Ibu 800 mg TID (patient reports he hasn't been taking Ibu)  Or  Diclofenac 50 mg TID (BID currently)  Or Meloxicam 15 mg every day  And/or with Prednisone 40 mg every day x 5    RN CC called patient and he reports not being on Ibuprofen and agrees " with plan and would like PCP to choose.    PCP put in for Diclofenac 50 mg TID and Prednisone and RN CC let [facility] to know to please deliver.  Patient is thankful.    Validated patient feeling in regards to pain and encouragement to continue forward.  He is thankful.  Edilma Wilkes, CHACHA  Care Coordination

## 2024-07-06 ENCOUNTER — HEALTH MAINTENANCE LETTER (OUTPATIENT)
Age: 64
End: 2024-07-06

## 2024-07-08 ENCOUNTER — HOSPITAL ENCOUNTER (OUTPATIENT)
Dept: GENERAL RADIOLOGY | Facility: HOSPITAL | Age: 64
Discharge: HOME | End: 2024-07-08
Attending: NEUROLOGICAL SURGERY | Admitting: NEUROLOGICAL SURGERY
Payer: COMMERCIAL

## 2024-07-08 DIAGNOSIS — M41.9 SCOLIOSIS: ICD-10-CM

## 2024-07-08 PROCEDURE — 72082 X-RAY EXAM ENTIRE SPI 2/3 VW: CPT

## 2024-07-12 ENCOUNTER — MEDICAL CORRESPONDENCE (OUTPATIENT)
Dept: CT IMAGING | Facility: HOSPITAL | Age: 64
End: 2024-07-12

## 2024-07-12 DIAGNOSIS — F41.1 GENERALIZED ANXIETY DISORDER: ICD-10-CM

## 2024-07-12 DIAGNOSIS — M62.830 BACK MUSCLE SPASM: ICD-10-CM

## 2024-07-12 DIAGNOSIS — G43.109 MIGRAINE WITH AURA AND WITHOUT STATUS MIGRAINOSUS, NOT INTRACTABLE: ICD-10-CM

## 2024-07-15 RX ORDER — METHOCARBAMOL 750 MG/1
TABLET, FILM COATED ORAL
Qty: 90 TABLET | Refills: 3 | Status: SHIPPED | OUTPATIENT
Start: 2024-07-15

## 2024-07-15 RX ORDER — SUMATRIPTAN 50 MG/1
TABLET, FILM COATED ORAL
Qty: 9 TABLET | Refills: 3 | Status: SHIPPED | OUTPATIENT
Start: 2024-07-15

## 2024-07-15 RX ORDER — HYDROXYZINE PAMOATE 50 MG/1
CAPSULE ORAL
Qty: 30 CAPSULE | Refills: 3 | Status: SHIPPED | OUTPATIENT
Start: 2024-07-15

## 2024-07-15 NOTE — TELEPHONE ENCOUNTER
SUMATRIPTAN SUCC 50 MG TABLET       Last Written Prescription Date:  0  Last Fill Quantity: 0,   # refills: 0  Last Office Visit: 06/27/2024  Future Office visit:    Next 5 appointments (look out 90 days)      Jul 19, 2024 8:00 AM  (Arrive by 7:45 AM)  Return Visit with Laquita Fernandez MD  [facility] ([facility] [place] ) [address] [place] [address]  [place] [address]  [phone]     Aug 08, 2024 3:00 PM  (Arrive by 2:45 PM)  Provider Visit with Lissy Kraft MD  [facility] ([facility] ) [address] [place] [address]  [phone]             Routing refill request to provider for review/approval because:  Serotonin Agonists [id number]/12/2024 04:52 PM   Protocol Details Serotonin Agonist request needs review.       METHOCARBAMOL 750 MG TABLET       Last Written Prescription Date:  06/17/2024  Last Fill Quantity: 90,   # refills: 0  Last Office Visit: 06/27/2024  Future Office visit:    Next 5 appointments (look out 90 days)      Jul 19, 2024 8:00 AM  (Arrive by 7:45 AM)  Return Visit with Laquita Fernandez MD  [facility] ([facility] [place] ) [address] [place] [address] [address]  [phone]     Aug 08, 2024 3:00 PM  (Arrive by 2:45 PM)  Provider Visit with Lissy Kraft MD  [facility] ([facility] [place] ) [address] [place] [address]  [phone]             Routing refill request to provider for review/approval because:      HYDROXYZINE CAMELIA 50 MG CAP       Last Written Prescription Date:  06/10/2024  Last Fill Quantity: 30,   # refills: 0  Last Office Visit: 06/27/2024  Future Office visit:    Next 5 appointments (look out 90 days)      Jul 19, 2024 8:00 AM  (Arrive by 7:45 AM)  Return Visit with Laquita Fernandez MD  [facility] ([facility] ) [address]  [place]  [address]  [phone]     Aug 08, 2024 3:00 PM  (Arrive by 2:45 PM)  Provider Visit with Lissy Kraft MD  [facility] [place] [facility] ([facility] [place] ) [address] [place] [address]  [phone]             Routing refill request to provider for review/approval because:    Antihistamines Protocol [id number]/12/2024 04:52 PM   Protocol Details Medication indicated for associated diagnosis          Kimberly Boecker, RN

## 2024-07-16 ENCOUNTER — ANCILLARY PROCEDURE (OUTPATIENT)
Dept: GENERAL RADIOLOGY | Facility: OTHER | Age: 64
End: 2024-07-16
Attending: NEUROLOGICAL SURGERY
Payer: COMMERCIAL

## 2024-07-16 ENCOUNTER — HOSPITAL ENCOUNTER (OUTPATIENT)
Dept: CT IMAGING | Facility: HOSPITAL | Age: 64
Discharge: HOME | End: 2024-07-16
Attending: NEUROLOGICAL SURGERY
Payer: COMMERCIAL

## 2024-07-16 DIAGNOSIS — M54.2 CERVICAL PAIN (NECK): ICD-10-CM

## 2024-07-16 DIAGNOSIS — M41.9 SCOLIOSIS, UNSPECIFIED SCOLIOSIS TYPE, UNSPECIFIED SPINAL REGION: ICD-10-CM

## 2024-07-16 DIAGNOSIS — M54.6 THORACIC SPINE PAIN: ICD-10-CM

## 2024-07-16 DIAGNOSIS — M48.04 THORACIC STENOSIS: ICD-10-CM

## 2024-07-16 DIAGNOSIS — M47.812 CERVICAL SPONDYLOSIS WITHOUT MYELOPATHY: ICD-10-CM

## 2024-07-16 DIAGNOSIS — Z98.1 STATUS POST LUMBAR SPINAL FUSION: ICD-10-CM

## 2024-07-16 DIAGNOSIS — M54.50 LUMBAR SPINE PAIN: ICD-10-CM

## 2024-07-16 PROCEDURE — 72128 CT CHEST SPINE W/O DYE: CPT

## 2024-07-16 PROCEDURE — 72040 X-RAY EXAM NECK SPINE 2-3 VW: CPT | Mod: TC

## 2024-07-16 PROCEDURE — 72131 CT LUMBAR SPINE W/O DYE: CPT

## 2024-07-16 PROCEDURE — 72125 CT NECK SPINE W/O DYE: CPT

## 2024-08-06 DIAGNOSIS — J30.89 SEASONAL ALLERGIC RHINITIS DUE TO OTHER ALLERGIC TRIGGER: ICD-10-CM

## 2024-08-06 DIAGNOSIS — G47.00 PERSISTENT INSOMNIA: Primary | ICD-10-CM

## 2024-08-06 DIAGNOSIS — Z71.6 TOBACCO ABUSE COUNSELING: ICD-10-CM

## 2024-08-06 RX ORDER — CETIRIZINE HYDROCHLORIDE 10 MG/1
TABLET ORAL
Qty: 30 TABLET | Refills: 11 | Status: SHIPPED | OUTPATIENT
Start: 2024-08-06

## 2024-08-06 NOTE — TELEPHONE ENCOUNTER
GS NICOTINE 4 MG CHEWING GUM       Last Written Prescription Date:  0  Last Fill Quantity: 0,   # refills: 0  Last Office Visit: 06/27/2024  Future Office visit:    Next 5 appointments (look out 90 days)      Aug 08, 2024 3:00 PM  (Arrive by 2:45 PM)  Provider Visit with Lissy Kraft MD  [facility] ([facility] ) [address] [place] [address]  [phone]     Aug 28, 2024 10:40 AM  (Arrive by 10:25 AM)  Return Visit with Laquita Fernandez MD  [facility] ([facility] ) [address] [place] [id number] [phone]             Routing refill request to provider for review/approval because:    Partial Cholinergic Nicotinic Agonist Agents Vnsclu2408/06/2024 03:00 PM   Protocol Details Medication indicated for associated diagnosis        Kimberly Boecker, RN

## 2024-08-08 NOTE — TELEPHONE ENCOUNTER
Disp Refills Start End KEYONA   nortriptyline (PAMELOR) 50 MG capsule (Discontinued) 60 capsule 6 6/7/2023 5/31/2024 No     Last Office Visit: 05/31/2024  Future Office visit:    Next 5 appointments (look out 90 days)      Aug 08, 2024 3:00 PM  (Arrive by 2:45 PM)  Provider Visit with Lissy Kraft MD  [facility] [place] ([facility] [place] ) [address] [place] [address]  [phone]     Aug 12, 2024 11:50 AM  Return Visit with Shamar Escamilla DC  [facility] [place] [facility] ([facility] [place] ) [address]  [place] [address]  [phone]     Aug 28, 2024 10:40 AM  (Arrive by 10:25 AM)  Return Visit with Laquita Fernandez MD  [facility] [place] ([facility] [place] ) [address]  [place] [id number] [phone]             Routing refill request to provider for review/approval because:

## 2024-08-12 ENCOUNTER — OFFICE VISIT (OUTPATIENT)
Dept: CHIROPRACTIC MEDICINE | Facility: OTHER | Age: 64
End: 2024-08-12
Payer: COMMERCIAL

## 2024-08-12 DIAGNOSIS — M54.50 ACUTE BILATERAL LOW BACK PAIN WITHOUT SCIATICA: ICD-10-CM

## 2024-08-12 DIAGNOSIS — M99.03 SEGMENTAL AND SOMATIC DYSFUNCTION OF LUMBAR REGION: Primary | ICD-10-CM

## 2024-08-12 DIAGNOSIS — M99.01 SEGMENTAL AND SOMATIC DYSFUNCTION OF CERVICAL REGION: ICD-10-CM

## 2024-08-12 DIAGNOSIS — M99.02 SEGMENTAL AND SOMATIC DYSFUNCTION OF THORACIC REGION: ICD-10-CM

## 2024-08-12 PROCEDURE — 98941 CHIROPRACT MANJ 3-4 REGIONS: CPT | Mod: AT | Performed by: CHIROPRACTOR

## 2024-08-12 RX ORDER — NORTRIPTYLINE HYDROCHLORIDE 50 MG/1
100 CAPSULE ORAL AT BEDTIME
Qty: 60 CAPSULE | Refills: 0 | OUTPATIENT
Start: 2024-08-12

## 2024-08-14 NOTE — PROGRESS NOTES
Subjective Finding:    Chief compalint: Patient presents with:  Back Pain , Pain Scale: 5/10, Intensity: dull, Duration: 1 weeks, Radiating:  bilateral buttock.    Date of injury:     Activities that the pain restricts:   Home/household/hobbies/social activities: Yes.  Work duties: Yes.  Sleep: Yes.  Makes symptoms better: rest.  Makes symptoms worse: activity.  Have you seen anyone else for the symptoms? No.  Work related: No.  Automobile related injury: No.    Objective and Assessment:    Posture Analysis:   High shoulder: .  Head tilt: .  High iliac crest: .  Head carriage: neutral.  Thoracic Kyphosis: neutral.  Lumbar Lordosis: forward.    Lumbar Range of Motion: extension decreased.  Cervical Range of Motion: .  Thoracic Range of Motion: .  Extremity Range of Motion: .    Palpation:   Quad lumb: bilateral, referred pain: no    Segmental dysfunction pre-treatment and treatment area: C5, T5, and L5.    Assessment post-treatment:  Cervical: ROM increased.  Thoracic: ROM increased.  Lumbar: ROM increased.    Comments: .      Complicating Factors: .    Procedure(s):  CMT:  40287 Chiropractic manipulative treatment 3-4 regions performed   Cervical: Diversified, See above for level, Supine, Thoracic: Diversified, See above for level, Prone, and Lumbar: Diversified, See above for level, Side posture    Modalities:  None performed this visit    Therapeutic procedures:  None    Plan:  Treatment plan: PRN.  Instructed patient: walk 10 minutes.  Short term goals: reduce muscle spasm.  Long term goals: increase ADL.  Prognosis: good.

## 2024-08-16 ENCOUNTER — ANCILLARY PROCEDURE (OUTPATIENT)
Dept: GENERAL RADIOLOGY | Facility: OTHER | Age: 64
End: 2024-08-16
Attending: NEUROLOGICAL SURGERY
Payer: COMMERCIAL

## 2024-08-16 DIAGNOSIS — M54.2 CERVICAL PAIN: ICD-10-CM

## 2024-08-16 DIAGNOSIS — M41.9 SCOLIOSIS, UNSPECIFIED SCOLIOSIS TYPE, UNSPECIFIED SPINAL REGION: ICD-10-CM

## 2024-08-16 DIAGNOSIS — M47.812 CERVICAL SPONDYLOSIS WITHOUT MYELOPATHY: ICD-10-CM

## 2024-08-16 PROCEDURE — 72040 X-RAY EXAM NECK SPINE 2-3 VW: CPT | Mod: TC

## 2024-08-22 DIAGNOSIS — G43.109 MIGRAINE WITH AURA AND WITHOUT STATUS MIGRAINOSUS, NOT INTRACTABLE: Primary | ICD-10-CM

## 2024-08-22 DIAGNOSIS — K21.9 GASTROESOPHAGEAL REFLUX DISEASE, UNSPECIFIED WHETHER ESOPHAGITIS PRESENT: ICD-10-CM

## 2024-08-22 RX ORDER — ACETAMINOPHEN 325 MG/1
TABLET ORAL
Qty: 100 TABLET | Refills: 0 | Status: SHIPPED | OUTPATIENT
Start: 2024-08-22

## 2024-08-22 NOTE — TELEPHONE ENCOUNTER
Analgesics (Non-Narcotic Tylenol and ASA Only) [id number]/22/2024 10:59 AM   Protocol Details Medication matches indication

## 2024-08-22 NOTE — TELEPHONE ENCOUNTER
Tylenol      Last Written Prescription Date:  Historical  Last Fill Quantity: -,   # refills: -  Last Office Visit: 6/27/24  Future Office visit:    Next 5 appointments (look out 90 days)      Aug 28, 2024 10:40 AM  (Arrive by 10:25 AM)  Return Visit with Laquita Fernandez MD  [facility] ([facility] ) [address] [place] [address] [phone]             Routing refill request to provider for review/approval because:

## 2024-08-22 NOTE — TELEPHONE ENCOUNTER
Pepcid      Last Written Prescription Date:  09/15/21  Last Fill Quantity: 90,   # refills: 0  Last Office Visit: 06/27/24  Future Office visit:    Next 5 appointments (look out 90 days)      Aug 28, 2024 10:40 AM  (Arrive by 10:25 AM)  Return Visit with Laquita Fernandez MD  [facility] ([facility] ) [address] [place] [id number] [phone]

## 2024-08-23 ENCOUNTER — TELEPHONE (OUTPATIENT)
Dept: FAMILY MEDICINE | Facility: OTHER | Age: 64
End: 2024-08-23

## 2024-08-23 NOTE — TELEPHONE ENCOUNTER
2:29 PM    Reason for Call: Phone Call    Description: Patient is calling he is upset that his medication dosages have been lowered and he is not sure why they have been, he stated that with them being lowered they do not help him    Was an appointment offered for this call? No  If yes : Appointment type              Date    Preferred method for responding to this message: Telephone Call  What is your phone number ?[phone]    If we cannot reach you directly, may we leave a detailed response at the number you provided? Yes    Can this message wait until your PCP/provider returns, if available today? Not applicable    Noemi Savage

## 2024-08-26 RX ORDER — FAMOTIDINE 20 MG/1
TABLET, FILM COATED ORAL
Qty: 90 TABLET | Refills: 0 | Status: SHIPPED | OUTPATIENT
Start: 2024-08-26

## 2024-08-27 NOTE — TELEPHONE ENCOUNTER
Current Rx is written for TAKE 1 TO 2 CAPSULES BY MOUTH 4 TIMES DAILY AS NEEDED FOR ANXIETY   Maximum hydroxyzine dosage per day is 400mg  We can discuss at his next clinic visit.   Lissy Kraft MD

## 2024-08-27 NOTE — TELEPHONE ENCOUNTER
Patient concern about his hydroxyzine medication. He is currently taking this medication 3 tablets three times daily. He does not take it as needed. Please advise

## 2024-09-11 DIAGNOSIS — F90.2 ADHD (ATTENTION DEFICIT HYPERACTIVITY DISORDER), COMBINED TYPE: ICD-10-CM

## 2024-09-11 NOTE — TELEPHONE ENCOUNTER
Vyvanse  Last Written Prescription Date: 7/23/24  Last Fill Quantity: 30 # of Refills: 0  Last Office Visit: 5/31/24

## 2024-09-13 RX ORDER — LISDEXAMFETAMINE DIMESYLATE 70 MG/1
70 CAPSULE ORAL DAILY
Qty: 30 CAPSULE | Refills: 0 | Status: SHIPPED | OUTPATIENT
Start: 2024-09-13

## 2024-09-20 ENCOUNTER — TELEPHONE (OUTPATIENT)
Dept: FAMILY MEDICINE | Facility: OTHER | Age: 64
End: 2024-09-20

## 2024-09-20 NOTE — PATIENT INSTRUCTIONS
How to Take Your Medication Before Surgery  Preoperative Medication Instructions   Antiplatelet or Anticoagulation Medication Instructions   - Patient is on no antiplatelet or anticoagulation medications.    Additional Medication Instructions  Take all scheduled medications on the day of surgery EXCEPT for modifications listed below:   - losartan - ACE/ARB: Continue without modification (e.g., MAC anesthesia, neurosurgery, spine surgery, heart failure, or labile hypertension with risk of hypertension).   - lalsix - Diuretics: DO NOT TAKE on the day of surgery.   - Herbal medications and vitamins: DO NOT TAKE 14 days prior to surgery.   - diclofenac (Voltaren): DO NOT TAKE for 3 days for high bleeding risk or PRN use.   - Vyvanse - Psychostimulants: Hold the day of surgery   - Zyrtec - cetirizine and first generation antihistamines: DO NOT TAKE on day of surgery.   - rescue Inhaler: Continue PRN. Bring to hospital on the day of surgery.       Patient Education   Preparing for Your Surgery  Getting started  A nurse will call you to review your health history and instructions. They will give you an arrival time based on your scheduled surgery time. Please be ready to share:  Your doctor's clinic name and phone number  Your medical, surgical, and anesthesia history  A list of allergies and sensitivities  A list of medicines, including herbal treatments and over-the-counter drugs  Whether the patient has a legal guardian (ask how to send us the papers in advance)  Please tell us if you're pregnant--or if there's any chance you might be pregnant. Some surgeries may injure a fetus (unborn baby), so they require a pregnancy test. Surgeries that are safe for a fetus don't always need a test, and you can choose whether to have one.   If you have a child who's having surgery, please ask for a copy of Preparing for Your Child's Surgery.    Preparing for surgery  Within 10 to 30 days of surgery: Have a pre-op exam (sometimes  called an H&P, or History and Physical). This can be done at a clinic or pre-operative center.  If you're having a , you may not need this exam. Talk to your care team.  At your pre-op exam, talk to your care team about all medicines you take. If you need to stop any medicines before surgery, ask when to start taking them again.  We do this for your safety. Many medicines can make you bleed too much during surgery. Some change how well surgery (anesthesia) drugs work.  Call your insurance company to let them know you're having surgery. (If you don't have insurance, call [phone].)  Call your clinic if there's any change in your health. This includes signs of a cold or flu (sore throat, runny nose, cough, rash, fever). It also includes a scrape or scratch near the surgery site.  If you have questions on the day of surgery, call your hospital or surgery center.  Eating and drinking guidelines  For your safety: Unless your surgeon tells you otherwise, follow the guidelines below.  Eat and drink as usual until 8 hours before you arrive for surgery. After that, no food or milk.  Drink clear liquids until 2 hours before you arrive. These are liquids you can see through, like water, Gatorade, and Propel Water. They also include plain black coffee and tea (no cream or milk), candy, and breath mints. You can spit out gum when you arrive.  If you drink alcohol: Stop drinking it the night before surgery.  If your care team tells you to take medicine on the morning of surgery, it's okay to take it with a sip of water.  Preventing infection  Shower or bathe the night before and morning of your surgery. Follow the instructions your clinic gave you. (If no instructions, use regular soap.)  Don't shave or clip hair near your surgery site. We'll remove the hair if needed.  Don't smoke or vape the morning of surgery. You may chew nicotine gum up to 2 hours before surgery. A nicotine patch is okay.  Note: Some surgeries  require you to completely quit smoking and nicotine. Check with your surgeon.  Your care team will make every effort to keep you safe from infection. We will:  Clean our hands often with soap and water (or an alcohol-based hand rub).  Clean the skin at your surgery site with a special soap that kills germs.  Give you a special gown to keep you warm. (Cold raises the risk of infection.)  Wear special hair covers, masks, gowns and gloves during surgery.  Give antibiotic medicine, if prescribed. Not all surgeries need antibiotics.  What to bring on the day of surgery  Photo ID and insurance card  Copy of your health care directive, if you have one  Glasses and hearing aids (bring cases)  You can't wear contacts during surgery  Inhaler and eye drops, if you use them (tell us about these when you arrive)  CPAP machine or breathing device, if you use them  A few personal items, if spending the night  If you have . . .  A pacemaker, ICD (cardiac defibrillator) or other implant: Bring the ID card.  An implanted stimulator: Bring the remote control.  A legal guardian: Bring a copy of the certified (court-stamped) guardianship papers.  Please remove any jewelry, including body piercings. Leave jewelry and other valuables at home.  If you're going home the day of surgery  You must have a responsible adult drive you home. They should stay with you overnight as well.  If you don't have someone to stay with you, and you aren't safe to go home alone, we may keep you overnight. Insurance often won't pay for this.  After surgery  If it's hard to control your pain or you need more pain medicine, please call your surgeon's office.  Questions?   If you have any questions for your care team, list them here: _________________________________________________________________________________________________________________________________________________________________________ ____________________________________  ____________________________________ ____________________________________  For informational purposes only. Not to replace the advice of your health care provider. Copyright   2003, 2019 [facility]. All rights reserved. Clinically reviewed by Marybeth Bess MD. [employer] [id number] - REV 12/22.

## 2024-09-20 NOTE — TELEPHONE ENCOUNTER
Patient called again and stated that he will be her on Monday for his pre-op scheduled at 11:30, check in time 11:15.

## 2024-09-20 NOTE — TELEPHONE ENCOUNTER
Patient cannot be there on Monday. He has another appointment. He is requesting to come on Tuesday anytime. Please call him to confirm an appointment time.
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
## 2024-09-23 ENCOUNTER — TELEPHONE (OUTPATIENT)
Dept: FAMILY MEDICINE | Facility: OTHER | Age: 64
End: 2024-09-23

## 2024-09-23 ENCOUNTER — OFFICE VISIT (OUTPATIENT)
Dept: FAMILY MEDICINE | Facility: OTHER | Age: 64
End: 2024-09-23
Attending: FAMILY MEDICINE
Payer: COMMERCIAL

## 2024-09-23 ENCOUNTER — LAB (OUTPATIENT)
Dept: LAB | Facility: OTHER | Age: 64
End: 2024-09-23
Payer: COMMERCIAL

## 2024-09-23 VITALS
TEMPERATURE: 98.4 F | HEIGHT: 71 IN | BODY MASS INDEX: 22.6 KG/M2 | WEIGHT: 161.47 LBS | DIASTOLIC BLOOD PRESSURE: 84 MMHG | HEART RATE: 76 BPM | RESPIRATION RATE: 17 BRPM | SYSTOLIC BLOOD PRESSURE: 134 MMHG | OXYGEN SATURATION: 94 %

## 2024-09-23 DIAGNOSIS — K21.9 GASTROESOPHAGEAL REFLUX DISEASE, UNSPECIFIED WHETHER ESOPHAGITIS PRESENT: ICD-10-CM

## 2024-09-23 DIAGNOSIS — J43.1 PANLOBULAR EMPHYSEMA (H): ICD-10-CM

## 2024-09-23 DIAGNOSIS — D64.9 NORMOCYTIC ANEMIA: ICD-10-CM

## 2024-09-23 DIAGNOSIS — F33.1 MAJOR DEPRESSIVE DISORDER, RECURRENT EPISODE, MODERATE (H): ICD-10-CM

## 2024-09-23 DIAGNOSIS — M54.2 CERVICALGIA: ICD-10-CM

## 2024-09-23 DIAGNOSIS — Z01.818 PREOP GENERAL PHYSICAL EXAM: Primary | ICD-10-CM

## 2024-09-23 DIAGNOSIS — R73.03 PREDIABETES: ICD-10-CM

## 2024-09-23 DIAGNOSIS — J45.40 MODERATE PERSISTENT ASTHMA WITHOUT COMPLICATION: ICD-10-CM

## 2024-09-23 DIAGNOSIS — I10 BENIGN ESSENTIAL HYPERTENSION: ICD-10-CM

## 2024-09-23 DIAGNOSIS — E78.2 MIXED HYPERLIPIDEMIA: ICD-10-CM

## 2024-09-23 DIAGNOSIS — Z96.89 IMPLANTABLE INTRATHECAL INFUSION PUMP PRESENT: ICD-10-CM

## 2024-09-23 DIAGNOSIS — M54.40 CHRONIC MIDLINE LOW BACK PAIN WITH SCIATICA, SCIATICA LATERALITY UNSPECIFIED: ICD-10-CM

## 2024-09-23 DIAGNOSIS — Z01.818 PREOP GENERAL PHYSICAL EXAM: ICD-10-CM

## 2024-09-23 DIAGNOSIS — R63.4 WEIGHT LOSS: ICD-10-CM

## 2024-09-23 DIAGNOSIS — G89.29 CHRONIC MIDLINE LOW BACK PAIN WITH SCIATICA, SCIATICA LATERALITY UNSPECIFIED: ICD-10-CM

## 2024-09-23 LAB
ANION GAP SERPL CALCULATED.3IONS-SCNC: 13 MMOL/L (ref 7–15)
ATRIAL RATE - MUSE: 62 BPM
BUN SERPL-MCNC: 25.4 MG/DL (ref 8–23)
CALCIUM SERPL-MCNC: 9 MG/DL (ref 8.8–10.4)
CHLORIDE SERPL-SCNC: 107 MMOL/L (ref 98–107)
CREAT SERPL-MCNC: 0.99 MG/DL (ref 0.67–1.17)
DIASTOLIC BLOOD PRESSURE - MUSE: NORMAL MMHG
EGFRCR SERPLBLD CKD-EPI 2021: 87 ML/MIN/1.73M2
ERYTHROCYTE [DISTWIDTH] IN BLOOD BY AUTOMATED COUNT: 13.3 % (ref 10–15)
EST. AVERAGE GLUCOSE BLD GHB EST-MCNC: 111 MG/DL
GLUCOSE SERPL-MCNC: 108 MG/DL (ref 70–99)
HBA1C MFR BLD: 5.5 %
HCO3 SERPL-SCNC: 23 MMOL/L (ref 22–29)
HCT VFR BLD AUTO: 35.4 % (ref 40–53)
HGB BLD-MCNC: 12 G/DL (ref 13.3–17.7)
INTERPRETATION ECG - MUSE: NORMAL
MCH RBC QN AUTO: 30.8 PG (ref 26.5–33)
MCHC RBC AUTO-ENTMCNC: 33.9 G/DL (ref 31.5–36.5)
MCV RBC AUTO: 91 FL (ref 78–100)
P AXIS - MUSE: 69 DEGREES
PLATELET # BLD AUTO: 195 10E3/UL (ref 150–450)
POTASSIUM SERPL-SCNC: 4.6 MMOL/L (ref 3.4–5.3)
PR INTERVAL - MUSE: 176 MS
QRS DURATION - MUSE: 80 MS
QT - MUSE: 384 MS
QTC - MUSE: 389 MS
R AXIS - MUSE: 16 DEGREES
RBC # BLD AUTO: 3.89 10E6/UL (ref 4.4–5.9)
SODIUM SERPL-SCNC: 143 MMOL/L (ref 135–145)
SYSTOLIC BLOOD PRESSURE - MUSE: NORMAL MMHG
T AXIS - MUSE: 28 DEGREES
VENTRICULAR RATE- MUSE: 62 BPM
WBC # BLD AUTO: 7.1 10E3/UL (ref 4–11)

## 2024-09-23 PROCEDURE — 80048 BASIC METABOLIC PNL TOTAL CA: CPT | Mod: ZL

## 2024-09-23 PROCEDURE — G0463 HOSPITAL OUTPT CLINIC VISIT: HCPCS

## 2024-09-23 PROCEDURE — G0463 HOSPITAL OUTPT CLINIC VISIT: HCPCS | Mod: 25

## 2024-09-23 PROCEDURE — 93010 ELECTROCARDIOGRAM REPORT: CPT | Performed by: INTERNAL MEDICINE

## 2024-09-23 PROCEDURE — 36415 COLL VENOUS BLD VENIPUNCTURE: CPT | Mod: ZL

## 2024-09-23 PROCEDURE — 99214 OFFICE O/P EST MOD 30 MIN: CPT | Performed by: FAMILY MEDICINE

## 2024-09-23 PROCEDURE — 85027 COMPLETE CBC AUTOMATED: CPT | Mod: ZL

## 2024-09-23 PROCEDURE — G2211 COMPLEX E/M VISIT ADD ON: HCPCS | Performed by: FAMILY MEDICINE

## 2024-09-23 PROCEDURE — 83036 HEMOGLOBIN GLYCOSYLATED A1C: CPT | Mod: ZL | Performed by: FAMILY MEDICINE

## 2024-09-23 PROCEDURE — 93005 ELECTROCARDIOGRAM TRACING: CPT | Performed by: FAMILY MEDICINE

## 2024-09-23 RX ORDER — ZOLPIDEM TARTRATE 12.5 MG/1
12.5 TABLET, FILM COATED, EXTENDED RELEASE ORAL
COMMUNITY

## 2024-09-23 RX ORDER — CYCLOBENZAPRINE HCL 10 MG
TABLET ORAL
COMMUNITY
Start: 2024-09-17

## 2024-09-23 ASSESSMENT — ENCOUNTER SYMPTOMS
CHILLS: 0
TROUBLE SWALLOWING: 1
DIFFICULTY URINATING: 0
NERVOUS/ANXIOUS: 1
SLEEP DISTURBANCE: 1
BACK PAIN: 1
ABDOMINAL PAIN: 0
DYSPHORIC MOOD: 1
FEVER: 0
NECK PAIN: 1
SHORTNESS OF BREATH: 0
PALPITATIONS: 0

## 2024-09-23 ASSESSMENT — PAIN SCALES - GENERAL: PAINLEVEL: EXTREME PAIN (9)

## 2024-09-23 NOTE — TELEPHONE ENCOUNTER
Patient is calling to give the fax number to where his preop goes to. The fax number is [phone]. Please fax to the number above.

## 2024-10-02 ENCOUNTER — TELEPHONE (OUTPATIENT)
Dept: FAMILY MEDICINE | Facility: OTHER | Age: 64
End: 2024-10-02

## 2024-10-02 RX ORDER — NALOXONE HYDROCHLORIDE 4 MG/.1ML
SPRAY NASAL
COMMUNITY
Start: 2024-10-01

## 2024-10-02 RX ORDER — OXYCODONE HYDROCHLORIDE 5 MG/1
TABLET ORAL
COMMUNITY
Start: 2024-10-01 | End: 2024-10-29 | Stop reason: ALTCHOICE

## 2024-10-02 NOTE — TELEPHONE ENCOUNTER
9:10 AM    Reason for Call: Phone Call    Description: pt called about pain medication from Dr. Kraft that he did not receive from surgery and while he from recovering from surgery. Please call     Was an appointment offered for this call? No  If yes : Appointment type              Date    Preferred method for responding to this message: Telephone Call  What is your phone number ? [phone]    If we cannot reach you directly, may we leave a detailed response at the number you provided? Yes    Can this message wait until your PCP/provider returns, if available today? Geovanna Torres

## 2024-10-02 NOTE — TELEPHONE ENCOUNTER
Post op pain should be taken care of by his surgeon. Please encourage him to call his surgeon.  Or to continue to work with [facility] while he is transition from his pain pump to oral medications.   Lissy Kraft MD

## 2024-10-05 DIAGNOSIS — F31.0 BIPOLAR AFFECTIVE DISORDER, CURRENT EPISODE HYPOMANIC (H): ICD-10-CM

## 2024-10-05 DIAGNOSIS — I10 ESSENTIAL HYPERTENSION: ICD-10-CM

## 2024-10-07 RX ORDER — METOPROLOL SUCCINATE 50 MG/1
TABLET, EXTENDED RELEASE ORAL
Qty: 90 TABLET | Refills: 3 | Status: SHIPPED | OUTPATIENT
Start: 2024-10-07

## 2024-10-07 RX ORDER — OXCARBAZEPINE 600 MG/1
TABLET, FILM COATED ORAL
Qty: 60 TABLET | Refills: 5 | Status: SHIPPED | OUTPATIENT
Start: 2024-10-07

## 2024-10-07 NOTE — TELEPHONE ENCOUNTER
Oxcarbazepine (Trileptal) 600 mg tablet  Take 1 tablet by mouth 2 times daily   Last Written Prescription Date:  5-6-24  Last Fill Quantity: 60,   # refills: 4  Last Office Visit: 5-31-24  Future Office visit:    Next 5 appointments (look out 90 days)      Nov 26, 2024 11:30 AM  (Arrive by 11:15 AM)  Provider Visit with Lissy Kraft MD  [facility] [place] ([facility] [place] ) [address]  [place] [address]  [phone]

## 2024-10-07 NOTE — TELEPHONE ENCOUNTER
Toprol      Last Written Prescription Date:  12/14/23  Last Fill Quantity: 30,   # refills: 8  Last Office Visit: 9/23/24  Future Office visit:    Next 5 appointments (look out 90 days)      Nov 26, 2024 11:30 AM  (Arrive by 11:15 AM)  Provider Visit with Lissy Kraft MD  [facility] [place] ([facility] [place] ) [address]  [place] [address]  [phone]             Routing refill request to provider for review/approval because:

## 2024-10-09 DIAGNOSIS — K59.00 CONSTIPATION, UNSPECIFIED CONSTIPATION TYPE: ICD-10-CM

## 2024-10-09 DIAGNOSIS — F90.2 ADHD (ATTENTION DEFICIT HYPERACTIVITY DISORDER), COMBINED TYPE: ICD-10-CM

## 2024-10-09 RX ORDER — DOCUSATE SODIUM 50MG AND SENNOSIDES 8.6MG 8.6; 5 MG/1; MG/1
TABLET, FILM COATED ORAL
Qty: 120 TABLET | Refills: 1 | Status: SHIPPED | OUTPATIENT
Start: 2024-10-09

## 2024-10-09 NOTE — TELEPHONE ENCOUNTER
Lisdexamfetamine (Vvanse) 70 mg capsule  Take 1 capsule by mouth daily    Last Written Prescription Date:  9-13-24  Last Fill Quantity: 30 capsule,   # refills: 0  Last Office Visit: 5-31-24  Future Office visit:    Next 5 appointments (look out 90 days)      Nov 26, 2024 11:30 AM  (Arrive by 11:15 AM)  Provider Visit with Lissy Kraft MD  [facility] [place] ([facility] [place] ) [address]  [place] [address]  [phone]

## 2024-10-10 RX ORDER — LISDEXAMFETAMINE DIMESYLATE 70 MG/1
70 CAPSULE ORAL DAILY
Qty: 30 CAPSULE | Refills: 0 | Status: SHIPPED | OUTPATIENT
Start: 2024-10-10 | End: 2024-11-07

## 2024-10-17 ENCOUNTER — TRANSFERRED RECORDS (OUTPATIENT)
Dept: HEALTH INFORMATION MANAGEMENT | Facility: CLINIC | Age: 64
End: 2024-10-17

## 2024-10-28 RX ORDER — FOLIC ACID/MV,IRON,MIN/LUTEIN 0.4-18-25
TABLET ORAL
COMMUNITY
Start: 2024-10-05

## 2024-10-28 RX ORDER — ONDANSETRON 4 MG/1
TABLET, FILM COATED ORAL
COMMUNITY
Start: 2024-10-03

## 2024-10-28 RX ORDER — MORPHINE SULFATE 15 MG/1
15 TABLET ORAL EVERY 6 HOURS PRN
COMMUNITY
Start: 2024-10-03

## 2024-10-28 NOTE — PATIENT INSTRUCTIONS
How to Take Your Medication Before Surgery  Preoperative Medication Instructions   Antiplatelet or Anticoagulation Medication Instructions   - Patient is on no antiplatelet or anticoagulation medications.    Additional Medication Instructions  Take all scheduled medications on the day of surgery EXCEPT for modifications listed below:   - losartan - ACE/ARB: Continue without modification (e.g., MAC anesthesia, neurosurgery, spine surgery, heart failure, or labile hypertension with risk of hypertension).   - lalsix - Diuretics: DO NOT TAKE on the day of surgery.   - Herbal medications and vitamins: DO NOT TAKE 14 days prior to surgery.   - diclofenac (Voltaren): DO NOT TAKE for 3 days for high bleeding risk or PRN use.   - Vyvanse - Psychostimulants: Hold the day of surgery   - Zyrtec - cetirizine and first generation antihistamines: DO NOT TAKE on day of surgery.   - rescue Inhaler: Continue PRN. Bring to hospital on the day of surgery.       Call  your neurosurgeon today and let them know you are spitting your stitches out      Patient Education   Preparing for Your Surgery  For Adults  Getting started  In most cases, a nurse will call to review your health history and instructions. They will give you an arrival time based on your scheduled surgery time. Please be ready to share:  Your doctor's clinic name and phone number  Your medical, surgical, and anesthesia history  A list of allergies and sensitivities  A list of medicines, including herbal treatments and over-the-counter drugs  Whether the patient has a legal guardian (ask how to send us the papers in advance)  Note: You may not receive a call if you were seen at our PAC (Preoperative Assessment Center).  Please tell us if you're pregnant--or if there's any chance you might be pregnant. Some surgeries may injure a fetus (unborn baby), so they require a pregnancy test. Surgeries that are safe for a fetus don't always need a test, and you can choose whether to  have one.   Preparing for surgery  Within 10 to 30 days of surgery: Have a pre-op exam (sometimes called an H&P, or History and Physical). This can be done at a clinic or pre-operative center.  If you're having a , you may not need this exam. Talk to your care team.  At your pre-op exam, talk to your care team about all medicines you take. (This includes CBD oil and any drugs, such as THC, marijuana, and other forms of cannabis.) If you need to stop any medicine before surgery, ask when to start taking it again.  This is for your safety. Many medicines and drugs can make you bleed too much during surgery. Some change how well surgery (anesthesia) drugs work.  Call your insurance company to let them know you're having surgery. (If you don't have insurance, call [phone].)  Call your clinic if there's any change in your health. This includes a scrape or scratch near the surgery site, or any signs of a cold (sore throat, runny nose, cough, rash, fever).  Eating and drinking guidelines  For your safety: Unless your surgeon tells you otherwise, follow the guidelines below.  Eat and drink as normal until 8 hours before you arrive for surgery. After that, no food or milk. You can spit out gum when you arrive.  Drink clear liquids until 2 hours before you arrive. These are liquids you can see through, like water, Gatorade, and Propel Water. They also include plain black coffee and tea (no cream or milk).  No alcohol for 24 hours before you arrive. The night before surgery, stop any drinks that contain THC.  If your care team tells you to take medicine on the morning of surgery, it's okay to take it with a sip of water. No other medicines or drugs are allowed (including CBD oil)--follow your care team's instructions.  If you have questions the day of surgery, call your hospital or surgery center.   Preventing infection  Shower or bathe the night before and the morning of surgery. Follow the instructions your  clinic gave you. (If no instructions, use regular soap.)  Don't shave or clip hair near your surgery site. We'll remove the hair if needed.  Don't smoke or vape the morning of surgery. No chewing tobacco for 6 hours before you arrive. A nicotine patch is okay. You may spit out nicotine gum when you arrive.  For some surgeries, the surgeon will tell you to fully quit smoking and nicotine.  We will make every effort to keep you safe from infection. We will:  Clean our hands often with soap and water (or an alcohol-based hand rub).  Clean the skin at your surgery site with a special soap that kills germs.  Give you a special gown to keep you warm. (Cold raises the risk of infection.)  Wear hair covers, masks, gowns, and gloves during surgery.  Give antibiotic medicine, if prescribed. Not all surgeries need this medicine.  What to bring on the day of surgery  Photo ID and insurance card  Copy of your health care directive, if you have one  Glasses and hearing aids (bring cases)  You can't wear contacts during surgery  Inhaler and eye drops, if you use them (tell us about these when you arrive)  CPAP machine or breathing device, if you use them  A few personal items, if spending the night  If you have . . .  A pacemaker, ICD (cardiac defibrillator), or other implant: Bring the ID card.  An implanted stimulator: Bring the remote control.  A legal guardian: Bring a copy of the certified (court-stamped) guardianship papers.  Please remove any jewelry, including body piercings. Leave jewelry and other valuables at home.  If you're going home the day of surgery  You must have a responsible adult drive you home. They should stay with you overnight as well.  If you don't have someone to stay with you, and you aren't safe to go home alone, we may keep you overnight. Insurance often won't pay for this.  After surgery  If it's hard to control your pain or you need more pain medicine, please call your surgeon's office.  Questions?    If you have any questions for your care team, list them here:   ____________________________________________________________________________________________________________________________________________________________________________________________________________________________________________________________  For informational purposes only. Not to replace the advice of your health care provider. Copyright   2003, 2019 [facility]. All rights reserved. Clinically reviewed by Moy Mayorga MD. [employer] 552138 - REV 08/24.

## 2024-10-28 NOTE — PROGRESS NOTES
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    Hemoglobin 13.7 13.3 - 17.7 g/dL    Hematocrit 40.0 40.0 - 53.0 %    MCV 90 78 - 100 fL    MCH 30.9 26.5 - 33.0 pg    MCHC 34.3 31.5 - 36.5 g/dL    RDW 12.9 10.0 - 15.0 %    Platelet Count 203 150 - 450 10e3/uL      No EKG this visit, completed in the last 90 days.    Revised Cardiac Risk Index (RCRI)  The patient has the following serious cardiovascular risks for perioperative complications:   - No serious cardiac risks = 0 points     RCRI Interpretation: 0 points: Class I (very low risk - 0.4% complication rate)     The longitudinal plan of care for the diagnosis(es)/condition(s) as documented were addressed during this visit. Due to the added complexity in care, I will continue to support Trent in the subsequent management and with ongoing continuity of care.    Signed Electronically by: Lissy Kraft MD  A copy of this evaluation report is provided to the requesting physician.

## 2024-10-29 ENCOUNTER — LAB (OUTPATIENT)
Dept: LAB | Facility: OTHER | Age: 64
End: 2024-10-29
Payer: COMMERCIAL

## 2024-10-29 ENCOUNTER — OFFICE VISIT (OUTPATIENT)
Dept: FAMILY MEDICINE | Facility: OTHER | Age: 64
End: 2024-10-29
Attending: FAMILY MEDICINE
Payer: COMMERCIAL

## 2024-10-29 VITALS
DIASTOLIC BLOOD PRESSURE: 91 MMHG | TEMPERATURE: 98.1 F | BODY MASS INDEX: 22.3 KG/M2 | SYSTOLIC BLOOD PRESSURE: 129 MMHG | HEIGHT: 71 IN | WEIGHT: 159.3 LBS | OXYGEN SATURATION: 96 % | RESPIRATION RATE: 16 BRPM | HEART RATE: 95 BPM

## 2024-10-29 DIAGNOSIS — R73.03 PREDIABETES: ICD-10-CM

## 2024-10-29 DIAGNOSIS — J43.1 PANLOBULAR EMPHYSEMA (H): ICD-10-CM

## 2024-10-29 DIAGNOSIS — D64.9 NORMOCYTIC ANEMIA: ICD-10-CM

## 2024-10-29 DIAGNOSIS — M54.50 CHRONIC BILATERAL LOW BACK PAIN WITHOUT SCIATICA: ICD-10-CM

## 2024-10-29 DIAGNOSIS — G89.29 CHRONIC BILATERAL LOW BACK PAIN WITHOUT SCIATICA: ICD-10-CM

## 2024-10-29 DIAGNOSIS — F90.2 ATTENTION DEFICIT HYPERACTIVITY DISORDER (ADHD), COMBINED TYPE: ICD-10-CM

## 2024-10-29 DIAGNOSIS — I10 BENIGN ESSENTIAL HYPERTENSION: ICD-10-CM

## 2024-10-29 DIAGNOSIS — R63.4 WEIGHT LOSS: ICD-10-CM

## 2024-10-29 DIAGNOSIS — F11.90 CHRONIC, CONTINUOUS USE OF OPIOIDS: ICD-10-CM

## 2024-10-29 DIAGNOSIS — E78.2 MIXED HYPERLIPIDEMIA: ICD-10-CM

## 2024-10-29 DIAGNOSIS — Z01.818 PREOP GENERAL PHYSICAL EXAM: Primary | ICD-10-CM

## 2024-10-29 DIAGNOSIS — Z01.818 PREOP GENERAL PHYSICAL EXAM: ICD-10-CM

## 2024-10-29 DIAGNOSIS — J45.40 MODERATE PERSISTENT ASTHMA WITHOUT COMPLICATION: ICD-10-CM

## 2024-10-29 DIAGNOSIS — F33.1 MAJOR DEPRESSIVE DISORDER, RECURRENT EPISODE, MODERATE (H): ICD-10-CM

## 2024-10-29 DIAGNOSIS — K59.03 DRUG-INDUCED CONSTIPATION: ICD-10-CM

## 2024-10-29 LAB
ANION GAP SERPL CALCULATED.3IONS-SCNC: 13 MMOL/L (ref 7–15)
BUN SERPL-MCNC: 12.3 MG/DL (ref 8–23)
CALCIUM SERPL-MCNC: 8.7 MG/DL (ref 8.8–10.4)
CHLORIDE SERPL-SCNC: 97 MMOL/L (ref 98–107)
CREAT SERPL-MCNC: 0.87 MG/DL (ref 0.67–1.17)
EGFRCR SERPLBLD CKD-EPI 2021: >90 ML/MIN/1.73M2
ERYTHROCYTE [DISTWIDTH] IN BLOOD BY AUTOMATED COUNT: 12.9 % (ref 10–15)
GLUCOSE SERPL-MCNC: 94 MG/DL (ref 70–99)
HCO3 SERPL-SCNC: 27 MMOL/L (ref 22–29)
HCT VFR BLD AUTO: 40 % (ref 40–53)
HGB BLD-MCNC: 13.7 G/DL (ref 13.3–17.7)
MCH RBC QN AUTO: 30.9 PG (ref 26.5–33)
MCHC RBC AUTO-ENTMCNC: 34.3 G/DL (ref 31.5–36.5)
MCV RBC AUTO: 90 FL (ref 78–100)
PLATELET # BLD AUTO: 203 10E3/UL (ref 150–450)
POTASSIUM SERPL-SCNC: 3.7 MMOL/L (ref 3.4–5.3)
RBC # BLD AUTO: 4.44 10E6/UL (ref 4.4–5.9)
SODIUM SERPL-SCNC: 137 MMOL/L (ref 135–145)
WBC # BLD AUTO: 6.2 10E3/UL (ref 4–11)

## 2024-10-29 PROCEDURE — 99214 OFFICE O/P EST MOD 30 MIN: CPT | Performed by: FAMILY MEDICINE

## 2024-10-29 PROCEDURE — 85027 COMPLETE CBC AUTOMATED: CPT | Mod: ZL

## 2024-10-29 PROCEDURE — 36415 COLL VENOUS BLD VENIPUNCTURE: CPT | Mod: ZL

## 2024-10-29 PROCEDURE — G0463 HOSPITAL OUTPT CLINIC VISIT: HCPCS

## 2024-10-29 PROCEDURE — 80048 BASIC METABOLIC PNL TOTAL CA: CPT | Mod: ZL

## 2024-10-29 PROCEDURE — G2211 COMPLEX E/M VISIT ADD ON: HCPCS | Performed by: FAMILY MEDICINE

## 2024-10-29 RX ORDER — POLYETHYLENE GLYCOL 3350 17 G/17G
1 POWDER, FOR SOLUTION ORAL DAILY
Qty: 850 G | Refills: 2 | Status: SHIPPED | OUTPATIENT
Start: 2024-10-29

## 2024-10-29 ASSESSMENT — ENCOUNTER SYMPTOMS
DYSPHORIC MOOD: 1
NERVOUS/ANXIOUS: 1
PALPITATIONS: 0
CHILLS: 0
BACK PAIN: 1
FEVER: 0
NECK PAIN: 1
CONSTIPATION: 1
ABDOMINAL PAIN: 0
COUGH: 1
SHORTNESS OF BREATH: 0

## 2024-10-29 ASSESSMENT — PATIENT HEALTH QUESTIONNAIRE - PHQ9
SUM OF ALL RESPONSES TO PHQ QUESTIONS 1-9: 11
SUM OF ALL RESPONSES TO PHQ QUESTIONS 1-9: 11
10. IF YOU CHECKED OFF ANY PROBLEMS, HOW DIFFICULT HAVE THESE PROBLEMS MADE IT FOR YOU TO DO YOUR WORK, TAKE CARE OF THINGS AT HOME, OR GET ALONG WITH OTHER PEOPLE: SOMEWHAT DIFFICULT

## 2024-10-30 ENCOUNTER — TELEPHONE (OUTPATIENT)
Dept: CARE COORDINATION | Facility: OTHER | Age: 64
End: 2024-10-30

## 2024-10-30 NOTE — TELEPHONE ENCOUNTER
RN called and spoke with Tiffanie at Dr Thapa office to pass on message that patient's Vycrel stitches are coming to the surface and he is spitting them out. Tiffanie said she would pass on the message. Gave Tiffanie BURNHAM CC contact number to call with any advise. Tiffanie said they informed patient to take a pick and send via [facility] to Dr Thapa.

## 2024-11-05 ENCOUNTER — TELEPHONE (OUTPATIENT)
Dept: FAMILY MEDICINE | Facility: OTHER | Age: 64
End: 2024-11-05

## 2024-11-05 DIAGNOSIS — F90.2 ADHD (ATTENTION DEFICIT HYPERACTIVITY DISORDER), COMBINED TYPE: ICD-10-CM

## 2024-11-05 DIAGNOSIS — M62.830 BACK MUSCLE SPASM: ICD-10-CM

## 2024-11-05 DIAGNOSIS — G43.109 MIGRAINE WITH AURA AND WITHOUT STATUS MIGRAINOSUS, NOT INTRACTABLE: ICD-10-CM

## 2024-11-05 DIAGNOSIS — Z71.6 TOBACCO ABUSE COUNSELING: ICD-10-CM

## 2024-11-05 RX ORDER — SUMATRIPTAN 50 MG/1
TABLET, FILM COATED ORAL
Qty: 9 TABLET | Refills: 4 | Status: SHIPPED | OUTPATIENT
Start: 2024-11-05

## 2024-11-05 RX ORDER — METHOCARBAMOL 750 MG/1
TABLET, FILM COATED ORAL
Qty: 90 TABLET | Refills: 0 | Status: SHIPPED | OUTPATIENT
Start: 2024-11-05

## 2024-11-05 NOTE — TELEPHONE ENCOUNTER
11:01 AM    Reason for Call: Phone Call    Description: Patient is calling stating he needs all his meds refilled. He doesn't have a week full of meds at this time. Patient states they need to be filled. Patient doesn't know if he is being cut off. Patient wants to speak to the nurse.    Was an appointment offered for this call? No  If yes : Appointment type              Date    Preferred method for responding to this message: Telephone Call  What is your phone number ?  [phone]    If we cannot reach you directly, may we leave a detailed response at the number you provided? Yes    Can this message wait until your PCP/provider returns, if available today? YES, Provider is in    [facility]

## 2024-11-05 NOTE — TELEPHONE ENCOUNTER
SUMAtriptan       Last Written Prescription Date:  7/15/24  Last Fill Quantity: 9,   # refills: 3  Last Office Visit: 10/29/24  Future Office visit:    Next 5 appointments (look out 90 days)      Nov 26, 2024 11:30 AM  (Arrive by 11:15 AM)  Provider Visit with Lissy Kraft MD  [facility] [place] ([facility] [place] ) [address]  [place] [address]  [phone]             Routing refill request to provider for review/approval because:  BP Readings from Last 3 Encounters:   10/29/24 (!) 129/91   09/23/24 134/84   06/27/24 126/82

## 2024-11-05 NOTE — TELEPHONE ENCOUNTER
Talked with patient regarding meds. He is requesting to have his medications blister packed. Writer will get this set up for him

## 2024-11-05 NOTE — TELEPHONE ENCOUNTER
Disp Refills Start End KEYONA   methocarbamol (ROBAXIN) 750 MG tablet 90 tablet 3 7/15/2024 -- No      Disp Refills Start End KEYONA   nicotine polacrilex (NICORETTE) 4 MG gum 220 each 2 8/6/2024 -- No     Last Office Visit: 10/29/2024  Future Office visit:    Next 5 appointments (look out 90 days)      Nov 26, 2024 11:30 AM  (Arrive by 11:15 AM)  Provider Visit with Lissy Kraft MD  [facility] [place] ([facility] [place] ) [address]  [place] [address]  [phone]             Routing refill request to provider for review/approval because:

## 2024-11-06 NOTE — TELEPHONE ENCOUNTER
lisdexamfetamine (VYVANSE) 70 MG capsule       Last Written Prescription Date:  10-10-24  Last Fill Quantity: 30,   # refills: 0  Last Office Visit: 10-29-24  Future Office visit:    Next 5 appointments (look out 90 days)      Nov 26, 2024 11:30 AM  (Arrive by 11:15 AM)  Provider Visit with Lissy rKaft MD  [facility] ([facility] [place] ) [address] [place] [address] BLANK  [place] [address]  [phone]             Routing refill request to provider for review/approval because:  Drug not on the [facility], P or  Health refill protocol or controlled substance

## 2024-11-07 RX ORDER — LISDEXAMFETAMINE DIMESYLATE 70 MG/1
70 CAPSULE ORAL DAILY
Qty: 30 CAPSULE | Refills: 0 | Status: SHIPPED | OUTPATIENT
Start: 2024-11-07

## 2024-11-12 ENCOUNTER — TELEPHONE (OUTPATIENT)
Dept: FAMILY MEDICINE | Facility: OTHER | Age: 64
End: 2024-11-12

## 2024-11-12 DIAGNOSIS — G43.109 MIGRAINE WITH AURA AND WITHOUT STATUS MIGRAINOSUS, NOT INTRACTABLE: ICD-10-CM

## 2024-11-12 RX ORDER — ACETAMINOPHEN 325 MG/1
TABLET ORAL
Qty: 100 TABLET | Refills: 1 | Status: SHIPPED | OUTPATIENT
Start: 2024-11-12

## 2024-11-12 NOTE — TELEPHONE ENCOUNTER
9:24 AM    Reason for Call: Phone Call    Description: pt changed from morphine and to another medication and pt would like to talk to about that     Was an appointment offered for this call? Yes  If yes : Appointment type              Date    Preferred method for responding to this message: Telephone Call  What is your phone number ?   [phone]     If we cannot reach you directly, may we leave a detailed response at the number you provided? Yes    Can this message wait until your PCP/provider returns, if available today? Geovanna Torres

## 2024-11-13 NOTE — TELEPHONE ENCOUNTER
"11/13/2024 4:12 PM  Pt reports He has been unable to fill morphine, he reports its filled through the [facility] pain clinic. Pt stated, \"I maybe took one or two pills early\".     Pt advised to call [facility]. Pt agrees to plan.     Leticia Forde RN    "

## 2024-11-14 NOTE — TELEPHONE ENCOUNTER
Patient called and is requesting a call back from nurse to discuss his pain meds. He states his pain clinic has dropped him and he is hoping Dr. Kraft can take over prescribing his pain meds. Patient is frustrated and states he is in a lot of pain.

## 2024-11-14 NOTE — TELEPHONE ENCOUNTER
Stated that the [facility] will not fill his Narcs. He also missed getting his pain pump removed d/t not having a ride. He is requesting to get his pain medications refilled since he is out and it is the weekend

## 2024-11-15 ENCOUNTER — TELEPHONE (OUTPATIENT)
Dept: FAMILY MEDICINE | Facility: OTHER | Age: 64
End: 2024-11-15

## 2024-11-15 ENCOUNTER — PATIENT OUTREACH (OUTPATIENT)
Dept: CARE COORDINATION | Facility: OTHER | Age: 64
End: 2024-11-15

## 2024-11-15 DIAGNOSIS — G89.29 CHRONIC BILATERAL LOW BACK PAIN WITHOUT SCIATICA: ICD-10-CM

## 2024-11-15 DIAGNOSIS — M54.50 CHRONIC BILATERAL LOW BACK PAIN WITHOUT SCIATICA: ICD-10-CM

## 2024-11-15 DIAGNOSIS — M54.2 CERVICALGIA: Primary | ICD-10-CM

## 2024-11-15 RX ORDER — MORPHINE SULFATE 15 MG/1
15 TABLET ORAL EVERY 6 HOURS PRN
Qty: 120 TABLET | Refills: 0 | Status: SHIPPED | OUTPATIENT
Start: 2024-11-15

## 2024-11-15 NOTE — Clinical Note
RN CC enrolled patient in Care Coordination with patients permission.  Patient had left message that his pain pump was to be removed yesterday but the taxi did not show up to pick him up.  [facility] did return patients call and will be scheduling appointment to remove pain pump and they are passing on pain management to PCP per patient.  Patient reports taking 15 mg Morphine IR Q6H.  RN gave patient direct contact number to call with any needs.RN will follow up next week if able.   Kimberly Boecker, RN Care Coordination

## 2024-11-15 NOTE — TELEPHONE ENCOUNTER
9:30 AM    Reason for Call: Phone Call    Description: Patient states they were supposed to have surgery to have their pain pump removed. Transportation did not show and patient missed surgery. Patient states neuro pain management discharged patient for all services. Patient still has pain pump in body. Patient would like to discuss with care team where to go to have pump removed. Would also like to go over getting pain medications from PCP. Please reach out to patient to discuss.     Was an appointment offered for this call? No  If yes : Appointment type              Date    Preferred method for responding to this message: Telephone Call  What is your phone number ?[phone]     If we cannot reach you directly, may we leave a detailed response at the number you provided? Yes    Can this message wait until your PCP/provider returns, if available today? Not applicable    Zuleyka Cruz

## 2024-11-15 NOTE — TELEPHONE ENCOUNTER
Rx sent for Morphine.   No early refills going forward  Keep your 11/26/2024 appt  Lissy Kraft MD

## 2024-11-15 NOTE — PROGRESS NOTES
Clinic Care Coordination Contact  Care Team Conversations    RN CC enrolled patient in Care Coordination with patients permission.    Patient had left message that his pain pump was to be removed yesterday but the taxi did not show up to pick him up.    [facility] clinic did return patients call and will be scheduling appointment to remove pain pump and they are passing on pain management to PCP per patient.  Patient reports taking 15 mg Morphine IR Q6H.    RN gave patient direct contact number to call with any needs.RN will follow up next week if able.     Kimberly Boecker, RN  Care Coordination

## 2024-11-18 ENCOUNTER — OFFICE VISIT (OUTPATIENT)
Dept: CHIROPRACTIC MEDICINE | Facility: OTHER | Age: 64
End: 2024-11-18
Attending: CHIROPRACTOR
Payer: COMMERCIAL

## 2024-11-18 ENCOUNTER — HOSPITAL ENCOUNTER (EMERGENCY)
Facility: HOSPITAL | Age: 64
Discharge: HOME | End: 2024-11-18
Payer: COMMERCIAL

## 2024-11-18 ENCOUNTER — APPOINTMENT (OUTPATIENT)
Dept: GENERAL RADIOLOGY | Facility: HOSPITAL | Age: 64
End: 2024-11-18
Payer: COMMERCIAL

## 2024-11-18 VITALS
BODY MASS INDEX: 23.29 KG/M2 | TEMPERATURE: 98.7 F | DIASTOLIC BLOOD PRESSURE: 94 MMHG | OXYGEN SATURATION: 97 % | HEART RATE: 100 BPM | SYSTOLIC BLOOD PRESSURE: 169 MMHG | WEIGHT: 167 LBS | RESPIRATION RATE: 16 BRPM

## 2024-11-18 DIAGNOSIS — M99.03 SEGMENTAL AND SOMATIC DYSFUNCTION OF LUMBAR REGION: Primary | ICD-10-CM

## 2024-11-18 DIAGNOSIS — M99.01 SEGMENTAL AND SOMATIC DYSFUNCTION OF CERVICAL REGION: ICD-10-CM

## 2024-11-18 DIAGNOSIS — S51.012A LACERATION OF LEFT ELBOW, INITIAL ENCOUNTER: ICD-10-CM

## 2024-11-18 DIAGNOSIS — M54.50 ACUTE BILATERAL LOW BACK PAIN WITHOUT SCIATICA: ICD-10-CM

## 2024-11-18 DIAGNOSIS — M99.02 SEGMENTAL AND SOMATIC DYSFUNCTION OF THORACIC REGION: ICD-10-CM

## 2024-11-18 PROCEDURE — 98941 CHIROPRACT MANJ 3-4 REGIONS: CPT | Mod: AT | Performed by: CHIROPRACTOR

## 2024-11-18 PROCEDURE — 73070 X-RAY EXAM OF ELBOW: CPT | Mod: LT

## 2024-11-18 PROCEDURE — 999N000104 HC STATISTIC NO CHARGE

## 2024-11-18 PROCEDURE — 90471 IMMUNIZATION ADMIN: CPT

## 2024-11-18 PROCEDURE — 12001 RPR S/N/AX/GEN/TRNK 2.5CM/<: CPT

## 2024-11-18 PROCEDURE — 250N000009 HC RX 250

## 2024-11-18 PROCEDURE — 250N000011 HC RX IP 250 OP 636

## 2024-11-18 PROCEDURE — 90715 TDAP VACCINE 7 YRS/> IM: CPT

## 2024-11-18 RX ADMIN — Medication: at 15:00

## 2024-11-18 RX ADMIN — CLOSTRIDIUM TETANI TOXOID ANTIGEN (FORMALDEHYDE INACTIVATED), CORYNEBACTERIUM DIPHTHERIAE TOXOID ANTIGEN (FORMALDEHYDE INACTIVATED), BORDETELLA PERTUSSIS TOXOID ANTIGEN (GLUTARALDEHYDE INACTIVATED), BORDETELLA PERTUSSIS FILAMENTOUS HEMAGGLUTININ ANTIGEN (FORMALDEHYDE INACTIVATED), BORDETELLA PERTUSSIS PERTACTIN ANTIGEN, AND BORDETELLA PERTUSSIS FIMBRIAE 2/3 ANTIGEN 0.5 ML: 5; 2; 2.5; 5; 3; 5 INJECTION, SUSPENSION INTRAMUSCULAR at 15:00

## 2024-11-18 ASSESSMENT — ENCOUNTER SYMPTOMS
FEVER: 0
WOUND: 1
ACTIVITY CHANGE: 1
ARTHRALGIAS: 1
JOINT SWELLING: 0
NUMBNESS: 0

## 2024-11-18 ASSESSMENT — ACTIVITIES OF DAILY LIVING (ADL): ADLS_ACUITY_SCORE: 0

## 2024-11-18 NOTE — ED TRIAGE NOTES
Pt presents with LT elbow pain/laceration from hitting it on ceiling lights. Pt has normal ROM.  Pt takes 81 mg aspirin. Pt rates current pain 6/10.

## 2024-11-18 NOTE — DISCHARGE INSTRUCTIONS
Suture removal in 7-10 days. Keep wound covered and apply topical triple antibiotic for the next 2-3 days.   Avoid swimming and submerging until would is healed and suture is removed.    Tylenol and ibuprofen as directed if needed.   Ice for 15-20 minutes every 2-3 hours. Protect skin to prevent frost bite.     Return with any new or concerning symptoms.
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
## 2024-11-19 NOTE — PROGRESS NOTES
Subjective Finding:    Chief compalint: Patient presents with:  Back Pain: With neck pain   , Pain Scale: 5/10, Intensity: sharp, Duration: 1 weeks, Radiating:  bilateral buttock.    Date of injury:     Activities that the pain restricts:   Home/household/hobbies/social activities: Yes.  Work duties: Yes.  Sleep: No.  Makes symptoms better: rest.  Makes symptoms worse: lumbar flexion.  Have you seen anyone else for the symptoms? No.  Work related: No.  Automobile related injury: No.    Objective and Assessment:    Posture Analysis:   High shoulder: .  Head tilt: .  High iliac crest: .  Head carriage: neutral.  Thoracic Kyphosis: forward.  Lumbar Lordosis: forward.    Lumbar Range of Motion: extension decreased.  Cervical Range of Motion: extension decreased.  Thoracic Range of Motion: .  Extremity Range of Motion: .    Palpation:   Quad lumb: bilateral, referred pain: no    Segmental dysfunction pre-treatment and treatment area: C1, T6, and Sacrum.    Assessment post-treatment:  Cervical: ROM increased.  Thoracic: ROM increased.  Lumbar: ROM increased.    Comments: .      Complicating Factors: .    Procedure(s):  CMT:  82511 Chiropractic manipulative treatment 3-4 regions performed   Cervical: Diversified, See above for level, Supine, Thoracic: Diversified, See above for level, Prone, and Lumbar: Diversified, See above for level, Side posture    Modalities:  None performed this visit    Therapeutic procedures:  None    Plan:  Treatment plan: PRN.  Instructed patient: stretch as instructed at visit.  Short term goals: reduce pain.  Long term goals: increase ADL.  Prognosis: good.

## 2024-11-20 ENCOUNTER — OFFICE VISIT (OUTPATIENT)
Dept: CHIROPRACTIC MEDICINE | Facility: OTHER | Age: 64
End: 2024-11-20
Attending: CHIROPRACTOR
Payer: COMMERCIAL

## 2024-11-20 DIAGNOSIS — M54.50 ACUTE BILATERAL LOW BACK PAIN WITHOUT SCIATICA: ICD-10-CM

## 2024-11-20 DIAGNOSIS — M99.03 SEGMENTAL AND SOMATIC DYSFUNCTION OF LUMBAR REGION: Primary | ICD-10-CM

## 2024-11-20 DIAGNOSIS — M99.02 SEGMENTAL AND SOMATIC DYSFUNCTION OF THORACIC REGION: ICD-10-CM

## 2024-11-20 DIAGNOSIS — M99.01 SEGMENTAL AND SOMATIC DYSFUNCTION OF CERVICAL REGION: ICD-10-CM

## 2024-11-20 PROCEDURE — 98941 CHIROPRACT MANJ 3-4 REGIONS: CPT | Mod: AT | Performed by: CHIROPRACTOR

## 2024-11-21 ENCOUNTER — PATIENT OUTREACH (OUTPATIENT)
Dept: CARE COORDINATION | Facility: OTHER | Age: 64
End: 2024-11-21

## 2024-11-25 ENCOUNTER — HOSPITAL ENCOUNTER (EMERGENCY)
Facility: HOSPITAL | Age: 64
Discharge: HOME | End: 2024-11-25
Attending: PHYSICIAN ASSISTANT
Payer: COMMERCIAL

## 2024-11-25 ENCOUNTER — OFFICE VISIT (OUTPATIENT)
Dept: CHIROPRACTIC MEDICINE | Facility: OTHER | Age: 64
End: 2024-11-25
Attending: CHIROPRACTOR
Payer: COMMERCIAL

## 2024-11-25 VITALS
HEART RATE: 91 BPM | SYSTOLIC BLOOD PRESSURE: 134 MMHG | TEMPERATURE: 97.8 F | DIASTOLIC BLOOD PRESSURE: 78 MMHG | RESPIRATION RATE: 18 BRPM | OXYGEN SATURATION: 95 %

## 2024-11-25 DIAGNOSIS — M99.02 SEGMENTAL AND SOMATIC DYSFUNCTION OF THORACIC REGION: ICD-10-CM

## 2024-11-25 DIAGNOSIS — M54.50 ACUTE BILATERAL LOW BACK PAIN WITHOUT SCIATICA: ICD-10-CM

## 2024-11-25 DIAGNOSIS — Z53.21 PATIENT LEFT WITHOUT BEING SEEN: ICD-10-CM

## 2024-11-25 DIAGNOSIS — M99.03 SEGMENTAL AND SOMATIC DYSFUNCTION OF LUMBAR REGION: Primary | ICD-10-CM

## 2024-11-25 DIAGNOSIS — M99.01 SEGMENTAL AND SOMATIC DYSFUNCTION OF CERVICAL REGION: ICD-10-CM

## 2024-11-25 PROCEDURE — 999N000104 HC STATISTIC NO CHARGE

## 2024-11-25 ASSESSMENT — ACTIVITIES OF DAILY LIVING (ADL): ADLS_ACUITY_SCORE: 50

## 2024-11-25 NOTE — PROGRESS NOTES
Subjective Finding:    Chief compalint: Patient presents with:  Back Pain , Pain Scale: 3/10, Intensity: sharp, Duration: 2 weeks, Radiating: bilateral buttock.    Date of injury:     Activities that the pain restricts:   Home/household/hobbies/social activities: Yes.  Work duties: Yes.  Sleep: No.  Makes symptoms better: rest.  Makes symptoms worse: lumbar extension and lumbar flexion.  Have you seen anyone else for the symptoms? No.  Work related: No.  Automobile related injury: No.    Objective and Assessment:    Posture Analysis:   High shoulder: .  Head tilt: .  High iliac crest: .  Head carriage: neutral.  Thoracic Kyphosis: neutral.  Lumbar Lordosis: forward.    Lumbar Range of Motion: extension decreased.  Cervical Range of Motion: .  Thoracic Range of Motion: .  Extremity Range of Motion: .    Palpation:   Quad lumb: bilateral, referred pain: no    Segmental dysfunction pre-treatment and treatment area: C5, T6, L4, and L5.    Assessment post-treatment:  Cervical: ROM increased.  Thoracic: ROM increased.  Lumbar: ROM increased.    Comments: .      Complicating Factors: .    Procedure(s):  CMT:  48160 Chiropractic manipulative treatment 3-4 regions performed   Cervical: Diversified, See above for level, Supine, Thoracic: Diversified, See above for level, Prone, and Lumbar: Diversified, See above for level, Side posture    Modalities:  None performed this visit    Therapeutic procedures:  None    Plan:  Treatment plan: PRN.  Instructed patient: stretch as instructed at visit.  Short term goals: increase ROM.  Long term goals: increase ADL.  Prognosis: very good.

## 2024-11-25 NOTE — ED TRIAGE NOTES
Pt presents with c/o jamming thumb a week ago on the left thumb reports it has been popping and still painful   Pt had tylenol this morning

## 2024-11-27 NOTE — PROGRESS NOTES
Subjective Finding:    Chief compalint: Patient presents with:  Back Pain , Pain Scale: 3/10, Intensity: sharp, Duration: 1 weeks, Radiating: no.    Date of injury:     Activities that the pain restricts:   Home/household/hobbies/social activities: Yes.  Work duties: Yes.  Sleep: Yes.  Makes symptoms better: rest.  Makes symptoms worse: walking.  Have you seen anyone else for the symptoms? Yes: MD.  Work related: No.  Automobile related injury: No.    Objective and Assessment:    Posture Analysis:   High shoulder: .  Head tilt: .  High iliac crest: .  Head carriage: neutral.  Thoracic Kyphosis: forward.  Lumbar Lordosis: forward.    Lumbar Range of Motion: extension decreased.  Cervical Range of Motion: extension decreased.  Thoracic Range of Motion: extension decreased.  Extremity Range of Motion: .    Palpation:   Quad lumb: bilateral, referred pain: no  T paraspinals: sharp pain, no    Segmental dysfunction pre-treatment and treatment area: C2, T2, and L5.    Assessment post-treatment:  Cervical: ROM increased.  Thoracic: ROM increased.  Lumbar: ROM increased.    Comments: .      Complicating Factors: .    Procedure(s):  CMT:  60266 Chiropractic manipulative treatment 3-4 regions performed   Cervical: Diversified, See above for level, Supine, Thoracic: Diversified, See above for level, Prone, and Lumbar: Diversified, See above for level, Anterior dorsal    Modalities:  None performed this visit    Therapeutic procedures:  None    Plan:  Treatment plan: PRN.  Instructed patient: stretch as instructed at visit.  Short term goals: increase ROM.  Long term goals: increase ADL.  Prognosis: very good.

## 2024-12-03 ENCOUNTER — TELEPHONE (OUTPATIENT)
Dept: EMERGENCY MEDICINE | Facility: HOSPITAL | Age: 64
End: 2024-12-03

## 2024-12-03 NOTE — ED NOTES
Care Transitions focused note:      Patient called stating he is having problems with his elbow. Was seen in UC on 11- with elbow lac with repair.  States the wound is not healing and it's not closed right and filling up with fluid.  Would like to go to the clinic but could not get in per his report.    I did go down to the clinic to see if he could be seen. Unfortunately there are no open appointments.     Called patient back. Told him to come to  to be evaluated.    No further concerns at this time.    JENNIFER Schilling

## 2024-12-12 DIAGNOSIS — G43.109 MIGRAINE WITH AURA AND WITHOUT STATUS MIGRAINOSUS, NOT INTRACTABLE: ICD-10-CM

## 2024-12-12 RX ORDER — ACETAMINOPHEN 325 MG/1
TABLET ORAL
Qty: 100 TABLET | Refills: 3 | Status: SHIPPED | OUTPATIENT
Start: 2024-12-12

## 2024-12-12 NOTE — TELEPHONE ENCOUNTER
Acetaminophen (Tylenol) 325 MG tablet     Last Written Prescription Date:  11/12/2024  Last Fill Quantity: 100,   # refills: 0  Last Office Visit: 10/29/2024  Future Office visit:    Next 5 appointments (look out 90 days)      Dec 31, 2024 11:30 AM  (Arrive by 11:15 AM)  Provider Visit with Lissy Kraft MD  [facility] ([facility] ) [address] [place] [id number]  [phone]     Jan 28, 2025 10:00 AM  (Arrive by 9:45 AM)  Return Visit with Laquita Fernandez MD  [facility] ([facility] ) [address] [place] [id number] [phone]             Routing refill request to provider for review/approval because:

## 2024-12-30 DIAGNOSIS — M62.830 BACK MUSCLE SPASM: ICD-10-CM

## 2024-12-30 RX ORDER — CHLORHEXIDINE GLUCONATE 4% 4 G/100ML
SOLUTION TOPICAL
COMMUNITY
Start: 2024-11-04

## 2024-12-30 RX ORDER — OXYCODONE HYDROCHLORIDE 5 MG/1
TABLET ORAL
COMMUNITY
Start: 2024-11-26 | End: 2024-12-31

## 2024-12-30 RX ORDER — LIDOCAINE 50 MG/G
OINTMENT TOPICAL
COMMUNITY
Start: 2024-10-17

## 2024-12-30 NOTE — PROGRESS NOTES
Assessment & Plan     Benign essential hypertension  BP above goal, but previous readings at goal     Chronic bilateral low back pain without sciatica / Cervicalgia / Chronic, continuous use of opioids  Stable  Neck is doing well   [place] PDMP reviewed and appropriate    - c/w morphine 15mg qid prn   - CSA discussed and signed today  - UDS - no surprised expected   - Drug Confirmation Panel Urine with Creatinine; Future  - on flexeril / methocarbamol per ADRIÁN ?  - c/w gabapentin   - c/w voltaren tablets  - c/w APAP    Generalized anxiety disorder / Bipolar disorder, current episode mixed, mild (H) / Major depressive disorder, recurrent episode, moderate (H)  Follows with Dr Fernandez with last visit 5/31/2024. Next visit scheduled for 1/25/2025  No changes in mood      The longitudinal plan of care for the diagnosis(es)/condition(s) as documented were addressed during this visit. Due to the added complexity in care, I will continue to support Trent in the subsequent management and with ongoing continuity of care.      See Patient Instructions    Return in about 3 months (around 3/31/2025) for Physical Exam, chronic pain.    Subjective   Trent is a 64 year old, presenting for the following health issues:  Anxiety, Depression, Hypertension, and Pain        12/31/2024    11:12 AM   Additional Questions   Roomed by Ramona Royal   Accompanied by self     HPI       Hypertension Follow-up    Do you check your blood pressure regularly outside of the clinic? No   Are you following a low salt diet? Yes  Are your blood pressures ever more than 140 on the top number (systolic) OR more   than 90 on the bottom number (diastolic), for example 140/90? Yes    - metoprolol 50mg   - losartan 50mg   - lasix 20mg     BP Readings from Last 3 Encounters:   12/31/24 (!) 150/100   11/25/24 134/78   11/18/24 169/94       Depression and Anxiety   How are you doing with your depression since your last visit? Worsened   How are you doing with your  anxiety since your last visit?  Worsened   Are you having other symptoms that might be associated with depression or anxiety? Yes:  not wanting to due anything, lots of pain  Have you had a significant life event? Health Concerns   Do you have any concerns with your use of alcohol or other drugs? No    Follows with Dr Fernandez with last visit 2024. Next visit scheduled for 2025    - issues with relationship with dad and his brother   - issues with electric bill with catch up of $500 d/t utility underestimated     Social History     Tobacco Use    Smoking status: Former     Current packs/day: 0.00     Types: Cigarettes     Quit date: 1977     Years since quittin.4    Smokeless tobacco: Current     Types: Snuff   Vaping Use    Vaping status: Never Used   Substance Use Topics    Alcohol use: Yes     Comment: daily    Drug use: No         2024     9:53 AM 2024     4:46 PM 10/29/2024    10:22 AM   PHQ   PHQ-9 Total Score 22 20 11    Q9: Thoughts of better off dead/self-harm past 2 weeks Several days Not at all Not at all    F/U: Thoughts of suicide or self-harm Yes     F/U: Self harm-plan No     F/U: Self-harm action No     F/U: Safety concerns Yes         Proxy-reported         10/12/2022     2:48 PM 2022     8:02 AM 2024     9:55 AM   DAYANA-7 SCORE   Total Score   17 (severe anxiety)   Total Score 4 2 17         10/29/2024    10:22 AM   Last PHQ-9   1.  Little interest or pleasure in doing things 3    2.  Feeling down, depressed, or hopeless 0    3.  Trouble falling or staying asleep, or sleeping too much 3    4.  Feeling tired or having little energy 2    5.  Poor appetite or overeating 2    6.  Feeling bad about yourself 0    7.  Trouble concentrating 0    8.  Moving slowly or restless 1    Q9: Thoughts of better off dead/self-harm past 2 weeks 0    PHQ-9 Total Score 11        Proxy-reported         2024     9:55 AM   DAYANA-7    1. Feeling nervous, anxious, or on edge 1   2. Not  being able to stop or control worrying 3   3. Worrying too much about different things 3   4. Trouble relaxing 3   5. Being so restless that it is hard to sit still 1   6. Becoming easily annoyed or irritable 3   7. Feeling afraid, as if something awful might happen 3   DAYANA-7 Total Score 17   If you checked any problems, how difficult have they made it for you to do your work, take care of things at home, or get along with other people? Extremely difficult       0956}  Pain History:  When did you first notice your pain? F/U   Have you seen this provider for your pain in the past? Yes   Where in your body do you have pain? Lower back  Are you seeing anyone else for your pain? No        5/31/2024     9:53 AM 6/26/2024     4:46 PM 10/29/2024    10:22 AM   PHQ-9 SCORE   PHQ-9 Total Score MyChart 22 (Severe depression) 20 (Severe depression) 11 (Moderate depression)   PHQ-9 Total Score 22 20 11        Proxy-reported           10/12/2022     2:48 PM 11/4/2022     8:02 AM 5/31/2024     9:55 AM   DAYANA-7 SCORE   Total Score   17 (severe anxiety)   Total Score 4 2 17             5/31/2024     9:53 AM 6/26/2024     4:46 PM 10/29/2024    10:22 AM   PHQ-9 SCORE   PHQ-9 Total Score MyChart 22 (Severe depression) 20 (Severe depression) 11 (Moderate depression)   PHQ-9 Total Score 22 20 11        Proxy-reported           10/12/2022     2:48 PM 11/4/2022     8:02 AM 5/31/2024     9:55 AM   DAYANA-7 SCORE   Total Score   17 (severe anxiety)   Total Score 4 2 17           11/4/2022     8:02 AM 1/5/2024     9:45 AM 9/23/2024    11:43 AM   PEG Score   PEG Total Score 10 10 9       Chronic Pain Follow Up:    Location of pain: Lower back  Analgesia/pain control:    - Recent changes:  worsening    - Overall control: Tolerable with discomfort    - Current treatments: Morphine   Adherence:     - Do you ever take more pain medicine than prescribed? No    - When did you take your last dose of pain medicine?  This am   Adverse effects: No  "    Follows with [facility], with last visit 11/21/2024. Note reviewed   - 8 weeks status post Cervical Laminectomy C3-c4 & Thoracic Laminectomy T10-t11 (DOS: 9/25/2024) performed by Dr. Thapa.   Updated wound care and activity precautions.  Patient really has no activity precautions this time. May perform all activities as tolerated.  Patient will graduate from our care at this time. He may call back for any questions or concerns. Gave him my card and Dr. Thapa's card.  Follow-up as needed.     - pain pump - is removed (11/2024)  - was given oxycodone for removal and recovery by ADRIÁN    - thoughts of SI, but will not d/t having cats   - pain results in nausea     - morphine 15mg qid = 60MME - agrees to stay at this level. Discussed no early refills   - dilaudid works well       - CSA - discussed and signed today  - UDS - no surprises      # Immunizations: covid, flu - declines    PDMP Review         Value Time User    State PDMP site checked  Yes 12/13/2024  7:58 AM Lissy Kraft MD          Last CSA Agreement:   CSA -- Patient Level:     [Media Unavailable] Controlled Substance Agreement - Opioid - Scan on 5/27/2022  9:04 AM: CONTROLLED SUBSTANCE AGREEMENT   [Media Unavailable] Controlled Substance Agreement - Opioid - Scan on 3/9/2021 11:32 AM: controlled substance agreement   [Media Unavailable] Controlled Substance Agreement - Non - Opioid - Scan on 7/15/2019 10:03 AM: NON-OPIOID CONTROLLED SUBSTANCE AGREEMENT       Last UDS: 11/30/2021    Wt Readings from Last 4 Encounters:   12/31/24 77.4 kg (170 lb 11.2 oz)   11/18/24 75.8 kg (167 lb)   10/29/24 72.3 kg (159 lb 4.8 oz)   09/23/24 73.2 kg (161 lb 7.5 oz)           Review of Systems  Constitutional, HEENT, cardiovascular, pulmonary, gi and gu systems are negative, except as otherwise noted.      Objective    BP (!) 150/100   Pulse 88   Temp 97.4  F (36.3  C) (Tympanic)   Resp 17   Ht 1.803 m (5' 11\")   Wt 77.4 kg (170 lb " 11.2 oz)   SpO2 98%   BMI 23.81 kg/m    Body mass index is 23.81 kg/m .  Physical Exam  Constitutional:       General: He is not in acute distress.     Appearance: He is not ill-appearing.   Cardiovascular:      Rate and Rhythm: Normal rate and regular rhythm.      Heart sounds: No murmur heard.  Pulmonary:      Effort: Pulmonary effort is normal. No respiratory distress.      Breath sounds: No wheezing or rales.   Neurological:      Mental Status: He is alert.   Psychiatric:      Comments: Baseline             UDS in process         Signed Electronically by: Lissy Kraft MD

## 2024-12-30 NOTE — TELEPHONE ENCOUNTER
Robaxin  Last Written Prescription Date: 11/5/24  Last Fill Quantity: 90 # of Refills: 0  Last Office Visit: 10/29/24

## 2024-12-31 ENCOUNTER — OFFICE VISIT (OUTPATIENT)
Dept: FAMILY MEDICINE | Facility: OTHER | Age: 64
End: 2024-12-31
Attending: FAMILY MEDICINE
Payer: COMMERCIAL

## 2024-12-31 VITALS
TEMPERATURE: 97.4 F | HEART RATE: 88 BPM | HEIGHT: 71 IN | SYSTOLIC BLOOD PRESSURE: 150 MMHG | BODY MASS INDEX: 23.9 KG/M2 | OXYGEN SATURATION: 98 % | RESPIRATION RATE: 17 BRPM | DIASTOLIC BLOOD PRESSURE: 100 MMHG | WEIGHT: 170.7 LBS

## 2024-12-31 DIAGNOSIS — F11.90 CHRONIC, CONTINUOUS USE OF OPIOIDS: ICD-10-CM

## 2024-12-31 DIAGNOSIS — G89.29 CHRONIC BILATERAL LOW BACK PAIN WITHOUT SCIATICA: ICD-10-CM

## 2024-12-31 DIAGNOSIS — F31.61 BIPOLAR DISORDER, CURRENT EPISODE MIXED, MILD (H): ICD-10-CM

## 2024-12-31 DIAGNOSIS — F33.1 MAJOR DEPRESSIVE DISORDER, RECURRENT EPISODE, MODERATE (H): ICD-10-CM

## 2024-12-31 DIAGNOSIS — F41.1 GENERALIZED ANXIETY DISORDER: ICD-10-CM

## 2024-12-31 DIAGNOSIS — I10 BENIGN ESSENTIAL HYPERTENSION: Primary | ICD-10-CM

## 2024-12-31 DIAGNOSIS — M54.50 CHRONIC BILATERAL LOW BACK PAIN WITHOUT SCIATICA: ICD-10-CM

## 2024-12-31 DIAGNOSIS — M54.2 CERVICALGIA: ICD-10-CM

## 2024-12-31 LAB — CREAT UR-MCNC: 43 MG/DL

## 2024-12-31 PROCEDURE — G0463 HOSPITAL OUTPT CLINIC VISIT: HCPCS | Mod: 25

## 2024-12-31 RX ORDER — METHOCARBAMOL 750 MG/1
TABLET, FILM COATED ORAL
Qty: 90 TABLET | Refills: 0 | Status: SHIPPED | OUTPATIENT
Start: 2024-12-31

## 2024-12-31 ASSESSMENT — PAIN SCALES - GENERAL: PAINLEVEL_OUTOF10: WORST PAIN (10)

## 2024-12-31 NOTE — LETTER

## 2025-01-02 ENCOUNTER — OFFICE VISIT (OUTPATIENT)
Dept: CHIROPRACTIC MEDICINE | Facility: OTHER | Age: 65
End: 2025-01-02
Attending: CHIROPRACTOR
Payer: COMMERCIAL

## 2025-01-02 DIAGNOSIS — M99.03 SEGMENTAL AND SOMATIC DYSFUNCTION OF LUMBAR REGION: ICD-10-CM

## 2025-01-02 DIAGNOSIS — M99.01 SEGMENTAL AND SOMATIC DYSFUNCTION OF CERVICAL REGION: Primary | ICD-10-CM

## 2025-01-02 DIAGNOSIS — M99.02 SEGMENTAL AND SOMATIC DYSFUNCTION OF THORACIC REGION: ICD-10-CM

## 2025-01-02 DIAGNOSIS — M54.2 CERVICALGIA: ICD-10-CM

## 2025-01-02 NOTE — PROGRESS NOTES
Subjective Finding:    Chief compalint: Patient presents with:  Neck Pain: With back pan   , Pain Scale: 4/10, Intensity: sharp, Duration: 2 weeks, Radiating: no.    Date of injury:     Activities that the pain restricts:   Home/household/hobbies/social activities: Yes.  Work duties: Yes.  Sleep: No.  Makes symptoms better: rest.  Makes symptoms worse: walking.  Have you seen anyone else for the symptoms? No.  Work related: No.  Automobile related injury: No.    Objective and Assessment:    Posture Analysis:   High shoulder: .  Head tilt: .  High iliac crest: .  Head carriage: forward.  Thoracic Kyphosis: neutral.  Lumbar Lordosis: forward.    Lumbar Range of Motion: flexion decreased.  Cervical Range of Motion: extension decreased.  Thoracic Range of Motion: .  Extremity Range of Motion: .    Palpation:   Traps: sharp pain, referred pain: no    Segmental dysfunction pre-treatment and treatment area: C2, T3, and L5.    Assessment post-treatment:  Cervical: ROM increased.  Thoracic: ROM increased.  Lumbar: ROM increased.    Comments: .      Complicating Factors: .    Procedure(s):  CMT:  94435 Chiropractic manipulative treatment 3-4 regions performed   Cervical: Diversified, See above for level, Supine, Thoracic: Diversified, See above for level, Prone, and Lumbar: Diversified, See above for level, Side posture    Modalities:  None performed this visit    Therapeutic procedures:  None    Plan:  Treatment plan: PRN.  Instructed patient: walk 10 minutes.  Short term goals: reduce pain.  Long term goals: increase ADL.  Prognosis: good.

## 2025-01-06 LAB
AMPHET UR CFM-MCNC: 4520 NG/ML
AMPHET/CREAT UR: ABNORMAL NG/MG {CREAT}
GABAPENTIN UR QL CFM: PRESENT
MORPHINE UR CFM-MCNC: >7000 NG/ML
MORPHINE/CREAT UR: ABNORMAL

## 2025-01-07 DIAGNOSIS — K21.00 GASTROESOPHAGEAL REFLUX DISEASE WITH ESOPHAGITIS, UNSPECIFIED WHETHER HEMORRHAGE: Primary | ICD-10-CM

## 2025-01-08 RX ORDER — ONDANSETRON 4 MG/1
TABLET, FILM COATED ORAL
Qty: 15 TABLET | Refills: 0 | Status: SHIPPED | OUTPATIENT
Start: 2025-01-08

## 2025-01-11 ENCOUNTER — HEALTH MAINTENANCE LETTER (OUTPATIENT)
Age: 65
End: 2025-01-11

## 2025-01-13 ENCOUNTER — MYC MEDICAL ADVICE (OUTPATIENT)
Dept: FAMILY MEDICINE | Facility: OTHER | Age: 65
End: 2025-01-13

## 2025-01-13 DIAGNOSIS — F11.90 CHRONIC, CONTINUOUS USE OF OPIOIDS: ICD-10-CM

## 2025-01-13 DIAGNOSIS — R73.03 PREDIABETES: ICD-10-CM

## 2025-01-13 DIAGNOSIS — Z79.899 POLYPHARMACY: ICD-10-CM

## 2025-01-13 DIAGNOSIS — M54.50 CHRONIC BILATERAL LOW BACK PAIN WITHOUT SCIATICA: ICD-10-CM

## 2025-01-13 DIAGNOSIS — J45.40 MODERATE PERSISTENT ASTHMA WITHOUT COMPLICATION: ICD-10-CM

## 2025-01-13 DIAGNOSIS — G43.109 MIGRAINE WITH AURA AND WITHOUT STATUS MIGRAINOSUS, NOT INTRACTABLE: Primary | ICD-10-CM

## 2025-01-13 DIAGNOSIS — I10 BENIGN ESSENTIAL HYPERTENSION: ICD-10-CM

## 2025-01-13 DIAGNOSIS — F33.1 MAJOR DEPRESSIVE DISORDER, RECURRENT EPISODE, MODERATE (H): ICD-10-CM

## 2025-01-13 DIAGNOSIS — F31.0 BIPOLAR AFFECTIVE DISORDER, CURRENT EPISODE HYPOMANIC (H): ICD-10-CM

## 2025-01-13 DIAGNOSIS — M62.830 BACK MUSCLE SPASM: ICD-10-CM

## 2025-01-13 DIAGNOSIS — M54.2 CERVICALGIA: ICD-10-CM

## 2025-01-13 DIAGNOSIS — G89.29 CHRONIC BILATERAL LOW BACK PAIN WITHOUT SCIATICA: ICD-10-CM

## 2025-01-13 DIAGNOSIS — J43.1 PANLOBULAR EMPHYSEMA (H): ICD-10-CM

## 2025-01-20 ENCOUNTER — TELEPHONE (OUTPATIENT)
Dept: FAMILY MEDICINE | Facility: OTHER | Age: 65
End: 2025-01-20

## 2025-01-20 NOTE — TELEPHONE ENCOUNTER
CARLOS: Received fax from TEGAN Allen, at [facility] stating the pt is no longer allowed to fill medications at [facility].

## 2025-01-21 DIAGNOSIS — G43.109 MIGRAINE WITH AURA AND WITHOUT STATUS MIGRAINOSUS, NOT INTRACTABLE: ICD-10-CM

## 2025-01-21 RX ORDER — SUMATRIPTAN 50 MG/1
TABLET, FILM COATED ORAL
Qty: 9 TABLET | Refills: 2 | Status: SHIPPED | OUTPATIENT
Start: 2025-01-21

## 2025-01-21 NOTE — TELEPHONE ENCOUNTER
SUMATRIPTAN 50MG TABLET       Last Written Prescription Date:  12/20/2024  Last Fill Quantity: 90,   # refills: 4  Last Office Visit: 12/31/2024  Future Office visit:    Next 5 appointments (look out 90 days)      Jan 28, 2025 10:00 AM  (Arrive by 9:45 AM)  Return Visit with Laquita Fernandez MD  [facility] ([facility] ) [address] [place] [id number] [phone]     Mar 31, 2025 3:00 PM  (Arrive by 2:45 PM)  Provider Visit with Lissy Kraft MD  [facility] ([facility] ) [address] [place] [id number]  [phone]             Routing refill request to provider for review/approval because:    Serotonin Agonists [id number]/21/2025 12:50 PM   Protocol Details Most recent blood pressure under 140/90 in past 12 months    Review patient's medical record. If the patient has used less than or equal to nine (9) tablets or injections for migraine a month the RN may authorize the refill request.          Kimberly Boecker, RN

## 2025-01-23 ENCOUNTER — TELEPHONE (OUTPATIENT)
Dept: FAMILY MEDICINE | Facility: OTHER | Age: 65
End: 2025-01-23

## 2025-01-23 DIAGNOSIS — M54.2 CERVICALGIA: ICD-10-CM

## 2025-01-23 DIAGNOSIS — G89.29 CHRONIC BILATERAL LOW BACK PAIN WITHOUT SCIATICA: ICD-10-CM

## 2025-01-23 DIAGNOSIS — F90.2 ADHD (ATTENTION DEFICIT HYPERACTIVITY DISORDER), COMBINED TYPE: ICD-10-CM

## 2025-01-23 DIAGNOSIS — M54.50 CHRONIC BILATERAL LOW BACK PAIN WITHOUT SCIATICA: ICD-10-CM

## 2025-01-23 RX ORDER — LISDEXAMFETAMINE DIMESYLATE 70 MG/1
70 CAPSULE ORAL DAILY
Qty: 5 CAPSULE | Refills: 0 | Status: SHIPPED | OUTPATIENT
Start: 2025-01-23

## 2025-01-23 NOTE — TELEPHONE ENCOUNTER
Symptom or reason needing to speak to RN: Patient is calling about pain medication. Patient states the 5 mg morphine sulfate, he was previously at 15 extended release. Patient states that he is in lots of pain. Pain is crying, stating he is constantly in pain. Patient has stated he wished to kill himself then to deal with the pain. Patient states he just wishes on day he wont wake up.     Best number to return call: [phone]    Best time to return call: ASA

## 2025-01-23 NOTE — TELEPHONE ENCOUNTER
Talked with patient. Mentioned to him that he is currently taking 15 mg morphine. Also asked for refill on vyvanse and voltaren.

## 2025-01-27 ENCOUNTER — TELEPHONE (OUTPATIENT)
Dept: FAMILY MEDICINE | Facility: OTHER | Age: 65
End: 2025-01-27

## 2025-01-27 ENCOUNTER — TELEPHONE (OUTPATIENT)
Dept: CARE COORDINATION | Facility: OTHER | Age: 65
End: 2025-01-27

## 2025-01-27 NOTE — TELEPHONE ENCOUNTER
Patient calling to report he can't take this pain any longer. Patient said the Morpnine IR 15 mg is not touching his neck pain. RN spoke with provider. No changes will be made to patients medications at this time. Patient can go to the ED if pain is severe. RN called patient back but he did not answer his phone.

## 2025-01-27 NOTE — TELEPHONE ENCOUNTER
Symptom or reason needing to speak to RN: Severe back/neck pain. Patient agitated/upset and mentioned not wanting to live any longer with the pain he is in.    Best number to return call: [phone]      Best time to return call: ASAP

## 2025-01-28 ENCOUNTER — OFFICE VISIT (OUTPATIENT)
Dept: PSYCHIATRY | Facility: OTHER | Age: 65
End: 2025-01-28
Attending: PSYCHIATRY & NEUROLOGY
Payer: COMMERCIAL

## 2025-01-28 ENCOUNTER — TELEPHONE (OUTPATIENT)
Dept: PSYCHIATRY | Facility: OTHER | Age: 65
End: 2025-01-28

## 2025-01-28 VITALS
HEART RATE: 100 BPM | DIASTOLIC BLOOD PRESSURE: 90 MMHG | SYSTOLIC BLOOD PRESSURE: 140 MMHG | RESPIRATION RATE: 18 BRPM | OXYGEN SATURATION: 96 %

## 2025-01-28 DIAGNOSIS — F90.2 ADHD (ATTENTION DEFICIT HYPERACTIVITY DISORDER), COMBINED TYPE: ICD-10-CM

## 2025-01-28 DIAGNOSIS — F31.60 BIPOLAR DISORDER, MIXED (H): Primary | ICD-10-CM

## 2025-01-28 PROCEDURE — G0463 HOSPITAL OUTPT CLINIC VISIT: HCPCS

## 2025-01-28 RX ORDER — LISDEXAMFETAMINE DIMESYLATE 70 MG/1
70 CAPSULE ORAL DAILY
Qty: 30 CAPSULE | Refills: 0 | Status: SHIPPED | OUTPATIENT
Start: 2025-01-28

## 2025-01-28 RX ORDER — ASENAPINE 5 MG/1
5 TABLET SUBLINGUAL 2 TIMES DAILY
Qty: 60 TABLET | Refills: 3 | Status: SHIPPED | OUTPATIENT
Start: 2025-01-28

## 2025-01-28 NOTE — PROGRESS NOTES
"                                                                        Social- Was  twice. Lives alone with his 2 cats:   Children-  2 kids, they are in [place]  Last visit May '24:  Start mirtazapine 15 mg HS. Continue Trileptal 600 mg twice daily.  Continue trazodone 100 mg bedtime prn insomnia. .  Continue Vyvanse 70 mg daily last filled 5/1/24 set to fill today 5/31, 6/8 and 7/23    - Sept '24 cervical laminectomy C3-C4 & thoracic laminectoom T10-T11  - in a lot of pain   - his mom had dementia and passed away spring 2022. Trent found out about it via [facility] / no one called him  -dad mean. Got his license taken away.   - \"I'm a bipolar fuckin nutjob\"  - Oldest of 3 brothers. Mark and Major in [place].  Dad out on [place] acres    MEDICAL / SURGICAL HISTORY                     Patient Active Problem List   Diagnosis    Mixed hyperlipidemia    Chronic, continuous use of opioids    Chemical dependency (H)    Chronic rhinitis    Tinnitus of both ears    SNHL (sensorineural hearing loss)    Bipolar disorder (H)    Seizure-like activity (H)    Somatic dysfunction of pelvis region    Bilateral foot pain    Trigger index finger of right hand    Moderate persistent asthma without complication    Chronic lower back pain    Onychia of toe of left foot    Seizure disorder (H)    Benign essential hypertension    Retrograde ejaculation    Allergic rhinitis due to other allergen    Cervical spondylosis without myelopathy    Chronic headaches    Generalized anxiety disorder    Hypertrophy of prostate without urinary obstruction and other lower urinary tract symptoms (LUTS)    GERD (gastroesophageal reflux disease)    Major depressive disorder, recurrent episode, moderate (H)    Myalgia and myositis    Other pain disorders related to psychological factors    Spinal stenosis in cervical region    Status post lumbar spinal fusion    Trigger finger    Polypharmacy    Deviated nasal septum    Attention deficit hyperactivity disorder " (ADHD), combined type    chronic noninfective otitis externa    Migraine with aura and without status migrainosus, not intractable    Tobacco use    Prediabetes    Bipolar disorder, current episode mixed, mild (H)    Failed back syndrome of lumbar spine    Panlobular emphysema (H)    Implantable intrathecal infusion pump present - removed (11/2024)    Cervicalgia    Chronic bilateral low back pain without sciatica     ALLERGY   Clavulanic acid potassium, Depakote [valproic acid], Duloxetine, Duloxetine hcl, Seasonal allergies, and Amoxicillin-pot clavulanate  MEDICATIONS                                                                                             Current Outpatient Medications   Medication Sig Dispense Refill    ACCU-CHEK GUIDE test strip       acetaminophen (TYLENOL) 325 MG tablet TAKE 2 TABLETS BY MOUTH EVERY 6 HOURS AS NEEDED FOR MILD PAIN. LIMIT ACETAMINOPHEN TO 4000MG PER DAY FROM ALL SOURCES. 100 tablet 3    albuterol (ACCUNEB) 1.25 MG/3ML neb solution Take 1 vial (1.25 mg) by nebulization every 6 hours as needed for shortness of breath / dyspnea or wheezing 100 vial 3    artificial saliva (BIOTENE ORALBALANCE) GEL gel Take 4 g by mouth 4 times daily 126 g 11    aspirin (ASPIRIN LOW DOSE) 81 MG chewable tablet CHEW AND SWALLOW 1 TABLET BY MOUTH DAILY 30 tablet 11    atorvastatin (LIPITOR) 20 MG tablet TAKE 1 TABLET BY MOUTH DAILY 90 tablet 0    blood glucose (NO BRAND SPECIFIED) lancets standard Use to test blood sugar 1 time daily or as directed. 100 each 0    blood glucose (NO BRAND SPECIFIED) lancing device Device to be used with lancets. 1 each 0    blood glucose monitoring (NO BRAND SPECIFIED) meter device kit Use to test blood sugar 1 time daily or as directed. 1 kit 0    budesonide (PULMICORT) 0.5 MG/2ML neb solution Spray 2 mLs (0.5 mg) in nostril 2 times daily Make 240 cc [place] med sinus irrigation Mix 2 ml vial of budesonide 0.5 mg Rinse 1-2 times daily for 2-4 weeks. 60 mL 1     CERTAVITE/ANTIOXIDANTS tablet       cetirizine (ZYRTEC) 10 MG tablet TAKE 1 TABLET BY MOUTH DAILY 30 tablet 11    cyclobenzaprine (FLEXERIL) 10 MG tablet TAKE 1 TABLET BY MOUTH 3 TIMES DAILY EVERY DAY 90 tablet 0    dextromethorphan-guaiFENesin (MUCINEX DM)  MG 12 hr tablet TAKE 1 TABLET BY MOUTH EVERY 12 HOURS (Patient taking differently: Take 1 tablet by mouth every 12 hours as needed for cough.) 60 tablet 0    diclofenac (VOLTAREN) 50 MG EC tablet Take 1 tablet (50 mg) by mouth 3 times daily as needed for moderate pain. 90 tablet 0    droNABinol (MARINOL) 2.5 MG capsule TAKE 1 CAPSULE(2.5 MG) BY MOUTH TWICE DAILY BEFORE MEALS 60 capsule 0    famotidine (PEPCID) 20 MG tablet TAKE 1 TABLET BY MOUTH NIGHTLY AS NEEDED FOR REFLUX 90 tablet 0    Ferrous Sulfate (IRON PO)  (Patient not taking: Reported on 12/31/2024)      fluticasone (FLONASE) 50 MCG/ACT nasal spray USE 2 SPRAYS IN EACH NOSTRIL DAILY 16 g 0    FT ANTISEPTIC SKIN CLEANSER 4 % solution       furosemide (LASIX) 20 MG tablet TAKE 1 TABLET BY MOUTH DAILY 30 tablet 11    gabapentin (NEURONTIN) 300 MG capsule TAKE 3 CAPSULES BY MOUTH THREE TIMES DAILY 270 capsule 2    hydrOXYzine (VISTARIL) 50 MG capsule TAKE 1 TO 2 CAPSULES BY MOUTH 4 TIMES DAILY AS NEEDED FOR ANXIETY 30 capsule 3    lidocaine (XYLOCAINE) 5 % external ointment       lisdexamfetamine (VYVANSE) 70 MG capsule Take 1 capsule (70 mg) by mouth daily. - keep your 1/28/2025 appt with Dr Fernandez for further refills 5 capsule 0    losartan (COZAAR) 50 MG tablet TAKE 1 TABLET BY MOUTH DAILY 90 tablet 0    methocarbamol (ROBAXIN) 750 MG tablet TAKE 1 TABLET BY MOUTH 4 TIMES DAILY AS NEEDED FOR MUSCLE SPASMS 90 tablet 0    metoprolol succinate ER (TOPROL XL) 50 MG 24 hr tablet TAKE 1 TABLET BY MOUTH DAILY 90 tablet 3    mirtazapine (REMERON) 15 MG tablet Take 1 tablet (15 mg) by mouth at bedtime 30 tablet 5    mometasone-formoterol (DULERA) 200-5 MCG/ACT inhaler INHALE 2 PUFFS INTO THE LUNGS 2 TIMES  A DAY 13 g 1    montelukast (SINGULAIR) 10 MG tablet TAKE 1 TABLET BY MOUTH AT BEDTIME 90 tablet 0    morphine (MSIR) 15 MG IR tablet Take 1 tablet (15 mg) by mouth every 6 hours as needed for moderate to severe pain. 120 tablet 0    mupirocin (BACTROBAN) 2 % external ointment       NARCAN 4 MG/0.1ML nasal spray       nicotine (NICODERM CQ) 21 MG/24HR 24 hr patch PLACE 1 PATCH ONTO THE SKIN EVERY 24 HOURS (Patient not taking: Reported on 12/31/2024) 30 patch 1    nicotine polacrilex (NICORETTE) 4 MG gum PLACE 1 PIECE OF GUM INSIDE CHEEK AS NEEDED FOR SMOKING CESSATION 220 each 2    Nutritional Supplements (NUTRITIONAL SHAKE HIGH PROTEIN) LIQD Take 1 Bottle by mouth 2 times daily. 74110 mL 2    omeprazole (PRILOSEC) 20 MG DR capsule TAKE 1 CAPSULE BY MOUTH 2 TIMES DAILY 180 capsule 0    ondansetron (ZOFRAN) 4 MG tablet TAKE 1 TABLET BY MOUTH 3 TIMES DAILY AS NEEDED FOR NAUSEA 15 tablet 0    OXcarbazepine (TRILEPTAL) 600 MG tablet TAKE 1 TABLET BY MOUTH 2 TIMES DAILY 60 tablet 5    polyethylene glycol (MIRALAX) 17 GM/Dose powder Take 17 g (1 Capful) by mouth daily. 850 g 2    senna-docusate (SENEXON-S) 8.6-50 MG tablet TAKE 1 OR 2 TABLETS BY MOUTH 2 TIMES A  tablet 1    SUMAtriptan (IMITREX) 50 MG tablet TAKE 1 TABLET BY MOUTH AT ONSET OF HEADACHE FOR MIGRAINE. MAY REPEAT IN 2 HOURS. MAX OF 4 TABLETS IN 24 HOURS. 9 tablet 2    tamsulosin (FLOMAX) 0.4 MG capsule TAKE 1 CAPSULE BY MOUTH DAILY 90 capsule 0    traZODone (DESYREL) 100 MG tablet TAKE 1 TABLET BY MOUTH EVERY NIGHT AT BEDTIME 90 tablet 1    VENTOLIN  (90 Base) MCG/ACT inhaler INHALE 2 PUFFS INTO THE LUNGS EVERY 4 HOURS AS NEEDED 18 g 0    zolpidem ER (AMBIEN CR) 12.5 MG CR tablet Take 12.5 mg by mouth. (Patient not taking: Reported on 12/31/2024)       No current facility-administered medications for this visit.       VITALS   There were no vitals taken for this visit.     LABS                                                                                                                            Last Comprehensive Metabolic Panel:  Sodium   Date Value Ref Range Status   10/29/2024 137 135 - 145 mmol/L Final   12/14/2020 141 133 - 144 mmol/L Final     Potassium   Date Value Ref Range Status   10/29/2024 3.7 3.4 - 5.3 mmol/L Final   09/30/2022 4.8 3.4 - 5.3 mmol/L Final   12/14/2020 3.8 3.4 - 5.3 mmol/L Final     Chloride   Date Value Ref Range Status   10/29/2024 97 (L) 98 - 107 mmol/L Final   09/30/2022 104 94 - 109 mmol/L Final   12/14/2020 107 94 - 109 mmol/L Final     Carbon Dioxide   Date Value Ref Range Status   12/14/2020 28 20 - 32 mmol/L Final     Carbon Dioxide (CO2)   Date Value Ref Range Status   10/29/2024 27 22 - 29 mmol/L Final   09/30/2022 29 20 - 32 mmol/L Final     Anion Gap   Date Value Ref Range Status   10/29/2024 13 7 - 15 mmol/L Final   09/30/2022 4 3 - 14 mmol/L Final   12/14/2020 6 3 - 14 mmol/L Final     Glucose   Date Value Ref Range Status   10/29/2024 94 70 - 99 mg/dL Final   09/30/2022 85 70 - 99 mg/dL Final   12/14/2020 108 (H) 70 - 99 mg/dL Final     Urea Nitrogen   Date Value Ref Range Status   10/29/2024 12.3 8.0 - 23.0 mg/dL Final   09/30/2022 30 7 - 30 mg/dL Final   12/14/2020 16 7 - 30 mg/dL Final     Creatinine   Date Value Ref Range Status   10/29/2024 0.87 0.67 - 1.17 mg/dL Final   12/14/2020 0.77 0.66 - 1.25 mg/dL Final     GFR Estimate   Date Value Ref Range Status   10/29/2024 >90 >60 mL/min/1.73m2 Final     Comment:     eGFR calculated using 2021 CKD-EPI equation.   12/14/2020 >90 >60 mL/min/[1.73_m2] Final     Comment:     Non  GFR Calc  Starting 12/18/2018, serum creatinine based estimated GFR (eGFR) will be   calculated using the Chronic Kidney Disease Epidemiology Collaboration   (CKD-EPI) equation.       Calcium   Date Value Ref Range Status   10/29/2024 8.7 (L) 8.8 - 10.4 mg/dL Final     Comment:     Reference intervals for this test were updated on 7/16/2024 to reflect our healthy  population more accurately. There may be differences in the flagging of prior results with similar values performed with this method. Those prior results can be interpreted in the context of the updated reference intervals.   12/14/2020 8.2 (L) 8.5 - 10.1 mg/dL Final     Lab Results   Component Value Date    WBC 7.0 01/05/2024    WBC 6.9 01/05/2024    WBC 6.6 12/14/2020     Lab Results   Component Value Date    RBC 4.30 01/05/2024    RBC 4.20 01/05/2024    RBC 3.81 12/14/2020     Lab Results   Component Value Date    HGB 12.8 01/05/2024    HGB 12.9 01/05/2024    HGB 11.3 12/14/2020     Lab Results   Component Value Date    HCT 38.6 01/05/2024    HCT 38.3 01/05/2024    HCT 34.5 12/14/2020     Lab Results   Component Value Date    MCV 90 01/05/2024    MCV 91 01/05/2024    MCV 91 12/14/2020     Lab Results   Component Value Date    MCH 29.8 01/05/2024    MCH 30.7 01/05/2024    MCH 29.7 12/14/2020     Lab Results   Component Value Date    MCHC 33.2 01/05/2024    MCHC 33.7 01/05/2024    MCHC 32.8 12/14/2020     Lab Results   Component Value Date    RDW 13.0 01/05/2024    RDW 13.2 01/05/2024    RDW 13.2 12/14/2020     Lab Results   Component Value Date     01/05/2024     01/05/2024     12/14/2020        Recent Labs   Lab Test 01/05/24  1034 09/30/22  1514   CHOL 183 150   HDL 71 53   LDL 87 69   TRIG 125 139      Hemoglobin A1C   Date Value Ref Range Status   09/23/2024 5.5 <5.7 % Final     Comment:     Normal <5.7%   Prediabetes 5.7-6.4%    Diabetes 6.5% or higher     Note: Adopted from ADA consensus guidelines.   03/04/2021 5.6 0 - 5.6 % Final     Comment:     Normal <5.7% Prediabetes 5.7-6.4%  Diabetes 6.5% or higher - adopted from ADA   consensus guidelines.          Nortriptyline level 79 as of 5/2022 (range 50 - 150 ng / mL)  Oxcarbazepine level 14 as of 5/2022 (range 3-35 micrograms / mL)       MENTAL STATUS EXAM                                                                                          Awake, alert, oriented. No problems psychomotor behavior. Mood today elevated. Speech: loud , rhythm, rate. Thought process, including associations, was unremarkable and thought content was devoid of homicidal ideation. +SI with no intent or plan at this time.  No hallucinations. Insight limited. Judgment  adequate for safety. Fund of knowledge was intact. Pt demonstrates no obvious problems with attention, concentration, language, recent or remote memory although these were not formally tested.       ASSESSMENT                                                                                                      HISTORICAL:  Initial psych note 10/6/15          NOTES:      Joshua is a 89 year old with bipolar disorder, DAYANA, ADHD, chronic pain. Trent is really struggling - pain, loneliness. Today we talked about symptoms manifesting that likely are in relation to bipolar: mood lability including anger, he talked about buying multiple of the same thing and he has no reason to be doing so. Sleep we can't solely attribute to suleiman given his pain keeps him up. He takes Trileptal which is a mood stabilizer. We agreed on having Trent try Saphris today and he notes he also understands if things get to point he no longer feels safe he will present to ER of which I am in favor of him being admitted then to our psychiatric unit.     TREATMENT RISK STATEMENT:  The risks, benefits, alternatives and potential adverse effects have been explained and are understood by the pt.  The pt agrees to the treatment plan with the ability to do so.   The pt knows to call the clinic for any problems or access emergency care if needed.        DIAGNOSES                    Bipolar disorder I disorder, mixed episode  DAYANA  ADHD      PLAN                                                                                                                    1)  MEDICATIONS:      Start Saprhis 5 mg twice daily. Continue Trileptal 600 mg twice daily.   Continue trazodone 100 mg bedtime prn insomnia. .  Continue Vyvanse 70 mg daily   2)  THERAPY:  No change    3)  LABS: lipid , CMP, CBC, A1C     4)  PT MONITOR [call for probs]:   SEs from meds, worsening sx, SI/HI    5)  REFERRALS [CD, medical, other]:  None    6)  RTC:  ~2 weeks        Answers submitted by the patient for this visit:  Patient Health Questionnaire (Submitted on 5/31/2024)  If you checked off any problems, how difficult have these problems made it for you to do your work, take care of things at home, or get along with other people?: Extremely difficult  PHQ9 TOTAL SCORE: 22  DAYANA-7 (Submitted on 5/31/2024)  DAYANA 7 TOTAL SCORE: 17

## 2025-02-03 DIAGNOSIS — M54.40 CHRONIC MIDLINE LOW BACK PAIN WITH SCIATICA, SCIATICA LATERALITY UNSPECIFIED: ICD-10-CM

## 2025-02-03 DIAGNOSIS — M54.50 CHRONIC BILATERAL LOW BACK PAIN WITHOUT SCIATICA: ICD-10-CM

## 2025-02-03 DIAGNOSIS — M54.2 CERVICALGIA: Primary | ICD-10-CM

## 2025-02-03 DIAGNOSIS — G89.29 CHRONIC BILATERAL LOW BACK PAIN WITHOUT SCIATICA: ICD-10-CM

## 2025-02-03 DIAGNOSIS — G89.29 CHRONIC MIDLINE LOW BACK PAIN WITH SCIATICA, SCIATICA LATERALITY UNSPECIFIED: ICD-10-CM

## 2025-02-04 ENCOUNTER — TELEPHONE (OUTPATIENT)
Dept: FAMILY MEDICINE | Facility: OTHER | Age: 65
End: 2025-02-04

## 2025-02-04 NOTE — TELEPHONE ENCOUNTER
Symptom or reason needing to speak to RN: Red Flag Symptom. Patient states they are in a lot of pain. Cannot take pain anymore. States they are thinking of suicide to end the pain.    Patient stated to writer that if police come to their door they will shoot themselves in front of police due to pain.    Patient states their psychiatrist is Dr. Laquita Fernandez. Writer attempted to reach the nurse for Dr Fernandez before sending patient to care team triage line.      Best number to return call: [phone]      Best time to return call: anytime

## 2025-02-06 ENCOUNTER — HOSPITAL ENCOUNTER (EMERGENCY)
Facility: HOSPITAL | Age: 65
Discharge: HOME | End: 2025-02-06
Attending: STUDENT IN AN ORGANIZED HEALTH CARE EDUCATION/TRAINING PROGRAM | Admitting: STUDENT IN AN ORGANIZED HEALTH CARE EDUCATION/TRAINING PROGRAM
Payer: COMMERCIAL

## 2025-02-06 ENCOUNTER — APPOINTMENT (OUTPATIENT)
Dept: GENERAL RADIOLOGY | Facility: HOSPITAL | Age: 65
End: 2025-02-06
Attending: STUDENT IN AN ORGANIZED HEALTH CARE EDUCATION/TRAINING PROGRAM
Payer: COMMERCIAL

## 2025-02-06 VITALS
WEIGHT: 166.3 LBS | BODY MASS INDEX: 23.19 KG/M2 | TEMPERATURE: 98.5 F | RESPIRATION RATE: 16 BRPM | HEART RATE: 93 BPM | OXYGEN SATURATION: 98 % | SYSTOLIC BLOOD PRESSURE: 169 MMHG | DIASTOLIC BLOOD PRESSURE: 110 MMHG

## 2025-02-06 DIAGNOSIS — M79.645 THUMB PAIN, LEFT: ICD-10-CM

## 2025-02-06 DIAGNOSIS — M79.5 FOREIGN BODY (FB) IN SOFT TISSUE: ICD-10-CM

## 2025-02-06 PROCEDURE — 99283 EMERGENCY DEPT VISIT LOW MDM: CPT

## 2025-02-06 PROCEDURE — 73140 X-RAY EXAM OF FINGER(S): CPT | Mod: LT

## 2025-02-06 PROCEDURE — 99283 EMERGENCY DEPT VISIT LOW MDM: CPT | Performed by: STUDENT IN AN ORGANIZED HEALTH CARE EDUCATION/TRAINING PROGRAM

## 2025-02-06 ASSESSMENT — COLUMBIA-SUICIDE SEVERITY RATING SCALE - C-SSRS
2. HAVE YOU ACTUALLY HAD ANY THOUGHTS OF KILLING YOURSELF IN THE PAST MONTH?: YES
3. HAVE YOU BEEN THINKING ABOUT HOW YOU MIGHT KILL YOURSELF?: YES
1. IN THE PAST MONTH, HAVE YOU WISHED YOU WERE DEAD OR WISHED YOU COULD GO TO SLEEP AND NOT WAKE UP?: YES
4. HAVE YOU HAD THESE THOUGHTS AND HAD SOME INTENTION OF ACTING ON THEM?: NO
6. HAVE YOU EVER DONE ANYTHING, STARTED TO DO ANYTHING, OR PREPARED TO DO ANYTHING TO END YOUR LIFE?: YES
5. HAVE YOU STARTED TO WORK OUT OR WORKED OUT THE DETAILS OF HOW TO KILL YOURSELF? DO YOU INTEND TO CARRY OUT THIS PLAN?: YES

## 2025-02-06 ASSESSMENT — ACTIVITIES OF DAILY LIVING (ADL): ADLS_ACUITY_SCORE: 50

## 2025-02-06 NOTE — ED TRIAGE NOTES
Pt presents with c/o hitting his left thumb on a countertop, states his thumb popped out of joint, he popped it back into place and its pain free at this time.

## 2025-02-06 NOTE — DISCHARGE INSTRUCTIONS
Return to the emergency department for worsening symptoms or new concerning symptoms.  Use standard over-the-counter medications to help with pain control.  Follow-up with orthopedics as needed

## 2025-02-06 NOTE — ED NOTES
Pt screened positive for high risk suicide screening in triage. Pt became escalated stating he wanted to be dead d/t all his pain. Pt states he ran his head into the refrigerator in attempt to realign his his neck, Pt brought back to room 8 for an eval.

## 2025-02-06 NOTE — ED PROVIDER NOTES
[facility]  ED Provider Note    Chief Complaint   Patient presents with    Thumb Discomfort     History:  Joshua Barron is a 64 year old male with thumb pain. Hit his thumb 2 month ago on a counter. It hurt. He didn't get help. It pops sometimes and he wants to know if it's broken somewhere    Review of Systems   Performed; see HPI for pertinent positives and negatives.     Medical history, surgical history, and social history was reviewed.  Nursing documentation, triage note, and vitals were reviewed.    Vitals:  BP: (!) 169/110  Pulse: 93  Temp: 98.5  F (36.9  C)  Resp: 16  Weight: 75.4 kg (166 lb 4.8 oz)  SpO2: 98 %    Physical Exam:  Constitutional: Alert and conversant. NAD   HENT: NCAT   Eyes: Normal pupils   Neck: supple   CV: No pallor  Pulmonary/Chest: Non-labored respirations  Abdominal: non-distended   MSK: DAMON. left hand; no deformity present; brisk radial and ulnar pulses present; nailbed cap refill < 2 sec; patient able to flex/extend wrist and digits on affected hand, able to make OK sign, able to adduct/abduct digits; motor: 5/5 strength; SILT throughout radial/medial/ulnar nerve distributions   Neuro: Alert and appropriate   Skin: Warm and dry. No diaphoresis. No rashes on exposed skin    Psych: Appropriate mood and affect       MDM:      ED Course as of 02/06/25 1729   u Feb 06, 2025   1728 64-year-old male here concerned with thumb pain.  Head injury couple months back and hip pop sometimes.  Suspect some sort of ligamentous injury.  Reasonable for x-ray at this time to look for possible healing fractures.  On my independent read because he had potentially metallic foreign body I certainly do not see any fracture or dislocation   1728 Appropriate for outpatient management, discharged in stable condition all questions answered return precautions given       Procedures:  Procedures        Impression:  Final diagnoses:   Thumb pain, left   Foreign body (FB) in soft tissue             Clark Guardado MD  02/06/25 2191

## 2025-02-06 NOTE — ED NOTES
"Pt is here to be seen for his left thumb soreness from a \"dislocation\", incidentally pt screened high risk for suicide due to stating \"I slammed my head into my refrigerator 2x two days ago due to having too much pain\", pt states he was not trying to kill himself he was trying to put his neck back into proper alignment to relieve his pain in his neck. Pt does not wish to harm himself.   "

## 2025-02-11 DIAGNOSIS — G89.4 CHRONIC PAIN SYNDROME: ICD-10-CM

## 2025-02-11 DIAGNOSIS — M54.2 CERVICALGIA: ICD-10-CM

## 2025-02-11 DIAGNOSIS — G89.29 CHRONIC BILATERAL LOW BACK PAIN WITHOUT SCIATICA: ICD-10-CM

## 2025-02-11 DIAGNOSIS — M54.50 CHRONIC BILATERAL LOW BACK PAIN WITHOUT SCIATICA: ICD-10-CM

## 2025-02-11 DIAGNOSIS — M62.830 BACK MUSCLE SPASM: ICD-10-CM

## 2025-02-11 DIAGNOSIS — G47.00 PERSISTENT INSOMNIA: ICD-10-CM

## 2025-02-11 RX ORDER — CYCLOBENZAPRINE HCL 10 MG
10 TABLET ORAL 3 TIMES DAILY PRN
Qty: 90 TABLET | Refills: 1 | Status: SHIPPED | OUTPATIENT
Start: 2025-02-11

## 2025-02-11 RX ORDER — METHOCARBAMOL 750 MG/1
750 TABLET, FILM COATED ORAL
Qty: 90 TABLET | Refills: 0 | OUTPATIENT
Start: 2025-02-11

## 2025-02-11 RX ORDER — GABAPENTIN 300 MG/1
CAPSULE ORAL
Qty: 270 CAPSULE | Refills: 2 | Status: SHIPPED | OUTPATIENT
Start: 2025-02-11

## 2025-02-11 RX ORDER — TRAZODONE HYDROCHLORIDE 100 MG/1
100 TABLET ORAL
Qty: 90 TABLET | Refills: 1 | Status: SHIPPED | OUTPATIENT
Start: 2025-02-11

## 2025-02-11 RX ORDER — MORPHINE SULFATE 15 MG/1
15 TABLET ORAL EVERY 6 HOURS PRN
Qty: 120 TABLET | Refills: 0 | Status: SHIPPED | OUTPATIENT
Start: 2025-02-14

## 2025-02-11 RX ORDER — MORPHINE SULFATE 60 MG/1
CAPSULE, EXTENDED RELEASE ORAL
Qty: 120 CAPSULE | Refills: 0 | OUTPATIENT
Start: 2025-02-14

## 2025-02-11 NOTE — TELEPHONE ENCOUNTER
Morphine Sulfate      Last Written Prescription Date:  1/17/25  Last Fill Quantity: 120,   # refills: 0  Last Office Visit: 12/31/24  Future Office visit:    Next 5 appointments (look out 90 days)      Feb 12, 2025 10:50 AM  Return Visit with Shamar Escamilla DC  [facility] [place] [facility] ([facility] ) [address]  [place] [address]  [phone]     Mar 12, 2025 10:40 AM  (Arrive by 10:25 AM)  Return Visit with Laquita Fernandez MD  [facility] ([facility] [place] ) [address]  [facility] [address] [phone]     Mar 31, 2025 3:00 PM  (Arrive by 2:45 PM)  Provider Visit with Lissy Kraft MD  [facility] ([facility] ) [address] [facility] [address]  [phone]

## 2025-02-11 NOTE — TELEPHONE ENCOUNTER
Reason for call:  Medication      Have you contacted your pharmacy? Yes   If patient has contacted Pharmacy and it has been over 72hrs, continue to #2  Medication gabapentin (NEURONTIN) 300 MG capsule, cyclobenzaprine (FLEXERIL) 10 MG tablet, methocarbamol (ROBAXIN) 750 MG tablet, traZODone (DESYREL) 100 MG tablet   What Pharmacy do you use? '[facility]       (Please note that the turn-around-time for prescriptions is 72 business hours; I am sending your request at this time. SEND TO appropriate Care Team Pool )

## 2025-02-13 ENCOUNTER — TELEPHONE (OUTPATIENT)
Dept: CARE COORDINATION | Facility: OTHER | Age: 65
End: 2025-02-13

## 2025-02-13 DIAGNOSIS — M79.645 CHRONIC PAIN OF LEFT THUMB: Primary | ICD-10-CM

## 2025-02-13 DIAGNOSIS — G89.29 CHRONIC PAIN OF LEFT THUMB: Primary | ICD-10-CM

## 2025-02-13 NOTE — TELEPHONE ENCOUNTER
Patient calling to report he dislocated his thumb a few months ago and now it continues to pop in and out of the socket. Patient is wanting to know if he needs a splint/brace to keep in in the socket or if he needs surgery. RN to advise provider.

## 2025-02-17 NOTE — TELEPHONE ENCOUNTER
Reason for call:  Medication      Have you contacted your pharmacy? Yes   If patient has contacted Pharmacy and it has been over 72hrs, continue to #2  Medication methocarbamol (ROBAXIN) 750 MG tablet , vitamin D  What Pharmacy do you use? [facility]      (Please note that the turn-around-time for prescriptions is 72 business hours; I am sending your request at this time. SEND TO appropriate Care Team Pool )

## 2025-02-18 RX ORDER — METHOCARBAMOL 750 MG/1
750 TABLET, FILM COATED ORAL 4 TIMES DAILY
Qty: 90 TABLET | Refills: 0 | Status: CANCELLED | OUTPATIENT
Start: 2025-02-18

## 2025-02-18 NOTE — PROGRESS NOTES
Reason for call:  Medication       Have you contacted your pharmacy? Yes   If patient has contacted Pharmacy and it has been over 72hrs, continue to #2  Medication methocarbamol (ROBAXIN) 750 MG tablet , vitamin D  What Pharmacy do you use? [facility]      (Please note that the turn-around-time for prescriptions is 72 business hours; I am sending your request at this time. SEND TO appropriate Care Team Pool )       Last signed 12/31/24 #90, 0 R  Last office visit 12/31/24  Chronic bilateral low back pain without sciatica / Cervicalgia / Chronic, continuous use of opioids  Stable  Neck is doing well   [place] PDMP reviewed and appropriate    - c/w morphine 15mg qid prn   - CSA discussed and signed today  - UDS - no surprised expected   - Drug Confirmation Panel Urine with Creatinine; Future  - on flexeril / methocarbamol per ADRIÁN ?  - c/w gabapentin   - c/w voltaren tablets  - c/w APAP  Edilma Wilkes, RN  Care Coordination

## 2025-02-19 RX ORDER — MULTIPLE VITAMINS W/ MINERALS TAB 9MG-400MCG
1 TAB ORAL DAILY
Qty: 100 TABLET | Refills: 3 | Status: SHIPPED | OUTPATIENT
Start: 2025-02-19

## 2025-02-19 NOTE — TELEPHONE ENCOUNTER
Methocarbamol has been discontinue d/t it is in the same class as flexeril  Further discussions at next visit  Lissy Kraft MD

## 2025-02-19 NOTE — PROGRESS NOTES
"RN CC received message from PCP:  Methocarbamol has been discontinue d/t it is in the same class as flexeril  Further discussions at next visit  Lissy Kraft MD           RN CC called patient and talked with him regarding above message.  Patient initially upset regarding change in medications.  We discussed and patient states to let PCP know \"I grudgingly will follow PCP lead.\" We discussed this is huge for him to trust and he agrees.    Patient requests multivitamin and that has been pended for PCP.  Patient states he feels his testosterone is low and would like to be tested in the future.  Patient states he feels he has a lot of medications and we discussed MTM referral and patient is open to this if PCP agrees.  MTM referral has been pended.  Patient states he used to go to [facility] but said something he shouldn't have and doesn't go there any longer.  Patient reminded to ask TW to sync his medications so doesn't have to  one a day.  Also discussed their version of pill pack is health pack and needs to have medications syched first.  Patient thankful and will check into this.  Patient states all his pain is from sleeping on couch.  We discussed calling [facility] Linkage Line to inquire about CADI waiver as patient could benefit from some services.  He will look to do this.  Edilma Wilkes RN  Care Coordination  "

## 2025-02-20 ENCOUNTER — HOSPITAL ENCOUNTER (EMERGENCY)
Facility: HOSPITAL | Age: 65
Discharge: HOME | End: 2025-02-20
Attending: EMERGENCY MEDICINE | Admitting: EMERGENCY MEDICINE
Payer: COMMERCIAL

## 2025-02-20 VITALS
WEIGHT: 168.1 LBS | RESPIRATION RATE: 20 BRPM | TEMPERATURE: 98.2 F | SYSTOLIC BLOOD PRESSURE: 154 MMHG | HEART RATE: 78 BPM | DIASTOLIC BLOOD PRESSURE: 94 MMHG | BODY MASS INDEX: 23.45 KG/M2 | OXYGEN SATURATION: 97 %

## 2025-02-20 DIAGNOSIS — S51.011A ELBOW LACERATION, RIGHT, INITIAL ENCOUNTER: ICD-10-CM

## 2025-02-20 PROCEDURE — 250N000011 HC RX IP 250 OP 636: Performed by: EMERGENCY MEDICINE

## 2025-02-20 PROCEDURE — 12004 RPR S/N/AX/GEN/TRK7.6-12.5CM: CPT

## 2025-02-20 PROCEDURE — 99283 EMERGENCY DEPT VISIT LOW MDM: CPT

## 2025-02-20 PROCEDURE — 12004 RPR S/N/AX/GEN/TRK7.6-12.5CM: CPT | Performed by: EMERGENCY MEDICINE

## 2025-02-20 RX ORDER — LIDOCAINE HYDROCHLORIDE AND EPINEPHRINE 10; 10 MG/ML; UG/ML
1 INJECTION, SOLUTION INFILTRATION; PERINEURAL ONCE
Status: COMPLETED | OUTPATIENT
Start: 2025-02-20 | End: 2025-02-20

## 2025-02-20 RX ADMIN — LIDOCAINE HYDROCHLORIDE AND EPINEPHRINE 1 ML: 10; 10 INJECTION, SOLUTION INFILTRATION; PERINEURAL at 19:21

## 2025-02-20 ASSESSMENT — ENCOUNTER SYMPTOMS
SHORTNESS OF BREATH: 0
CHILLS: 0
FEVER: 0

## 2025-02-20 ASSESSMENT — ACTIVITIES OF DAILY LIVING (ADL)
ADLS_ACUITY_SCORE: 50

## 2025-02-20 ASSESSMENT — COLUMBIA-SUICIDE SEVERITY RATING SCALE - C-SSRS
6. HAVE YOU EVER DONE ANYTHING, STARTED TO DO ANYTHING, OR PREPARED TO DO ANYTHING TO END YOUR LIFE?: YES
1. IN THE PAST MONTH, HAVE YOU WISHED YOU WERE DEAD OR WISHED YOU COULD GO TO SLEEP AND NOT WAKE UP?: NO
2. HAVE YOU ACTUALLY HAD ANY THOUGHTS OF KILLING YOURSELF IN THE PAST MONTH?: NO

## 2025-02-20 NOTE — ED NOTES
Pt is here with c/o slipping and falling backwards walking on porcelain floor. Pt denies neck pain, denies blood thinners. Denies LOC. Pt reports did hit head. Small abrasion noted to occiput. Right elbow area, covered with dried blood soaked Band-Aid, at this time bleeding controlled. Pt denies any dizziness, or nausea or H/A

## 2025-02-20 NOTE — ED TRIAGE NOTES
"\"Slipped from ice on shoes in the house.  Cut my right arm on an unfinished baseboard.  Did not loose consciousness.  I hit my head on the porcelain floor, no concussion symptoms.  No neck pain.  No blood thinners.  No nausea, no vomiting.  No dizziness.\"  Right arm is wrapped in a towel.          "
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
## 2025-02-21 NOTE — ED NOTES
Face to face report given with opportunity to observe patient.    Report given to Jennifer Tariq RN   2/20/2025  7:16 PM

## 2025-02-24 ENCOUNTER — TELEPHONE (OUTPATIENT)
Dept: CARE COORDINATION | Facility: OTHER | Age: 65
End: 2025-02-24

## 2025-02-24 DIAGNOSIS — F31.60 BIPOLAR DISORDER, MIXED (H): ICD-10-CM

## 2025-02-24 DIAGNOSIS — F90.2 ADHD (ATTENTION DEFICIT HYPERACTIVITY DISORDER), COMBINED TYPE: ICD-10-CM

## 2025-02-24 DIAGNOSIS — E78.2 MIXED HYPERLIPIDEMIA: ICD-10-CM

## 2025-02-24 DIAGNOSIS — G43.109 MIGRAINE WITH AURA AND WITHOUT STATUS MIGRAINOSUS, NOT INTRACTABLE: ICD-10-CM

## 2025-02-24 DIAGNOSIS — M62.830 BACK MUSCLE SPASM: ICD-10-CM

## 2025-02-24 DIAGNOSIS — M54.2 CERVICALGIA: ICD-10-CM

## 2025-02-24 RX ORDER — LISDEXAMFETAMINE DIMESYLATE 70 MG/1
70 CAPSULE ORAL DAILY
Qty: 30 CAPSULE | Refills: 0 | Status: SHIPPED | OUTPATIENT
Start: 2025-02-24

## 2025-02-24 RX ORDER — ATORVASTATIN CALCIUM 20 MG/1
20 TABLET, FILM COATED ORAL DAILY
Qty: 90 TABLET | Refills: 3 | Status: SHIPPED | OUTPATIENT
Start: 2025-02-24

## 2025-02-24 RX ORDER — METHOCARBAMOL 750 MG/1
750 TABLET, FILM COATED ORAL 4 TIMES DAILY
Qty: 90 TABLET | Refills: 3 | Status: SHIPPED | OUTPATIENT
Start: 2025-02-24

## 2025-02-24 RX ORDER — SUMATRIPTAN 50 MG/1
TABLET, FILM COATED ORAL
Qty: 9 TABLET | Refills: 11 | Status: SHIPPED | OUTPATIENT
Start: 2025-02-24

## 2025-02-24 RX ORDER — QUETIAPINE FUMARATE 100 MG/1
TABLET, FILM COATED ORAL
Qty: 30 TABLET | Refills: 11 | Status: SHIPPED | OUTPATIENT
Start: 2025-02-24

## 2025-02-24 NOTE — TELEPHONE ENCOUNTER
Patient called to report he is switching pharmacies from [facility] to [facility].  Medication pended for provider to sign if appropriate.  Patient also reporting cyclobenzaprine does not work as well as methocarbamol. Cyclobenzaprine was discontinued 02/11/2025. RN updated patient.

## 2025-03-12 ENCOUNTER — LAB (OUTPATIENT)
Dept: LAB | Facility: OTHER | Age: 65
End: 2025-03-12
Payer: COMMERCIAL

## 2025-03-12 ENCOUNTER — OFFICE VISIT (OUTPATIENT)
Dept: PSYCHIATRY | Facility: OTHER | Age: 65
End: 2025-03-12
Attending: PSYCHIATRY & NEUROLOGY
Payer: COMMERCIAL

## 2025-03-12 ENCOUNTER — OFFICE VISIT (OUTPATIENT)
Dept: PHARMACY | Facility: PHYSICIAN GROUP | Age: 65
End: 2025-03-12
Attending: FAMILY MEDICINE
Payer: COMMERCIAL

## 2025-03-12 ENCOUNTER — APPOINTMENT (OUTPATIENT)
Dept: LAB | Facility: OTHER | Age: 65
End: 2025-03-12
Payer: COMMERCIAL

## 2025-03-12 VITALS
OXYGEN SATURATION: 95 % | HEART RATE: 102 BPM | TEMPERATURE: 98.2 F | BODY MASS INDEX: 23.15 KG/M2 | RESPIRATION RATE: 18 BRPM | WEIGHT: 166 LBS | DIASTOLIC BLOOD PRESSURE: 74 MMHG | SYSTOLIC BLOOD PRESSURE: 114 MMHG

## 2025-03-12 DIAGNOSIS — R11.0 NAUSEA: ICD-10-CM

## 2025-03-12 DIAGNOSIS — I10 BENIGN ESSENTIAL HYPERTENSION: ICD-10-CM

## 2025-03-12 DIAGNOSIS — E78.2 MIXED HYPERLIPIDEMIA: Primary | ICD-10-CM

## 2025-03-12 DIAGNOSIS — Z72.0 TOBACCO USE: ICD-10-CM

## 2025-03-12 DIAGNOSIS — R68.2 DRY MOUTH: ICD-10-CM

## 2025-03-12 DIAGNOSIS — R21 SKIN RASH: ICD-10-CM

## 2025-03-12 DIAGNOSIS — F31.60 BIPOLAR DISORDER, MIXED (H): Primary | ICD-10-CM

## 2025-03-12 DIAGNOSIS — E78.2 MIXED HYPERLIPIDEMIA: ICD-10-CM

## 2025-03-12 DIAGNOSIS — K59.00 CONSTIPATION, UNSPECIFIED CONSTIPATION TYPE: ICD-10-CM

## 2025-03-12 DIAGNOSIS — F31.0 BIPOLAR AFFECTIVE DISORDER, CURRENT EPISODE HYPOMANIC (H): ICD-10-CM

## 2025-03-12 DIAGNOSIS — J44.9 CHRONIC OBSTRUCTIVE PULMONARY DISEASE, UNSPECIFIED COPD TYPE (H): ICD-10-CM

## 2025-03-12 DIAGNOSIS — J30.2 SEASONAL ALLERGIC RHINITIS, UNSPECIFIED TRIGGER: ICD-10-CM

## 2025-03-12 DIAGNOSIS — G89.29 CHRONIC BILATERAL LOW BACK PAIN WITHOUT SCIATICA: ICD-10-CM

## 2025-03-12 DIAGNOSIS — Z78.9 TAKES DIETARY SUPPLEMENTS: ICD-10-CM

## 2025-03-12 DIAGNOSIS — M54.50 CHRONIC BILATERAL LOW BACK PAIN WITHOUT SCIATICA: ICD-10-CM

## 2025-03-12 DIAGNOSIS — F90.2 ADHD (ATTENTION DEFICIT HYPERACTIVITY DISORDER), COMBINED TYPE: ICD-10-CM

## 2025-03-12 DIAGNOSIS — F33.1 MAJOR DEPRESSIVE DISORDER, RECURRENT EPISODE, MODERATE (H): ICD-10-CM

## 2025-03-12 DIAGNOSIS — F90.2 ATTENTION DEFICIT HYPERACTIVITY DISORDER (ADHD), COMBINED TYPE: ICD-10-CM

## 2025-03-12 DIAGNOSIS — J45.909 ASTHMA, UNSPECIFIED ASTHMA SEVERITY, UNSPECIFIED WHETHER COMPLICATED, UNSPECIFIED WHETHER PERSISTENT: ICD-10-CM

## 2025-03-12 DIAGNOSIS — K21.00 GASTROESOPHAGEAL REFLUX DISEASE WITH ESOPHAGITIS, UNSPECIFIED WHETHER HEMORRHAGE: ICD-10-CM

## 2025-03-12 LAB
CHOLEST SERPL-MCNC: 152 MG/DL
EST. AVERAGE GLUCOSE BLD GHB EST-MCNC: 114 MG/DL
FASTING STATUS PATIENT QL REPORTED: YES
HBA1C MFR BLD: 5.6 %
HDLC SERPL-MCNC: 69 MG/DL
LDLC SERPL CALC-MCNC: 72 MG/DL
NONHDLC SERPL-MCNC: 83 MG/DL
TRIGL SERPL-MCNC: 56 MG/DL

## 2025-03-12 PROCEDURE — G0463 HOSPITAL OUTPT CLINIC VISIT: HCPCS

## 2025-03-12 PROCEDURE — 99605 MTMS BY PHARM NP 15 MIN: CPT

## 2025-03-12 PROCEDURE — 36415 COLL VENOUS BLD VENIPUNCTURE: CPT | Mod: ZL

## 2025-03-12 PROCEDURE — 99607 MTMS BY PHARM ADDL 15 MIN: CPT

## 2025-03-12 PROCEDURE — 82465 ASSAY BLD/SERUM CHOLESTEROL: CPT | Mod: ZL

## 2025-03-12 PROCEDURE — 83036 HEMOGLOBIN GLYCOSYLATED A1C: CPT | Mod: ZL

## 2025-03-12 RX ORDER — QUETIAPINE FUMARATE 200 MG/1
200 TABLET, FILM COATED ORAL EVERY EVENING
Qty: 30 TABLET | Refills: 3 | Status: SHIPPED | OUTPATIENT
Start: 2025-03-12

## 2025-03-12 RX ORDER — POLYETHYLENE GLYCOL 3350 17 G/17G
17 POWDER, FOR SOLUTION ORAL DAILY PRN
COMMUNITY

## 2025-03-12 RX ORDER — SUMATRIPTAN 50 MG/1
TABLET, FILM COATED ORAL
Qty: 9 TABLET | Refills: 11 | Status: SHIPPED | OUTPATIENT
Start: 2025-03-12

## 2025-03-12 RX ORDER — LISDEXAMFETAMINE DIMESYLATE 70 MG/1
70 CAPSULE ORAL DAILY
Qty: 30 CAPSULE | Refills: 0 | Status: SHIPPED | OUTPATIENT
Start: 2025-03-25

## 2025-03-12 RX ORDER — QUETIAPINE FUMARATE 100 MG/1
100 TABLET, FILM COATED ORAL AT BEDTIME
COMMUNITY

## 2025-03-12 ASSESSMENT — ANXIETY QUESTIONNAIRES
7. FEELING AFRAID AS IF SOMETHING AWFUL MIGHT HAPPEN: NOT AT ALL
7. FEELING AFRAID AS IF SOMETHING AWFUL MIGHT HAPPEN: NOT AT ALL
2. NOT BEING ABLE TO STOP OR CONTROL WORRYING: NOT AT ALL
8. IF YOU CHECKED OFF ANY PROBLEMS, HOW DIFFICULT HAVE THESE MADE IT FOR YOU TO DO YOUR WORK, TAKE CARE OF THINGS AT HOME, OR GET ALONG WITH OTHER PEOPLE?: EXTREMELY DIFFICULT
IF YOU CHECKED OFF ANY PROBLEMS ON THIS QUESTIONNAIRE, HOW DIFFICULT HAVE THESE PROBLEMS MADE IT FOR YOU TO DO YOUR WORK, TAKE CARE OF THINGS AT HOME, OR GET ALONG WITH OTHER PEOPLE: EXTREMELY DIFFICULT
3. WORRYING TOO MUCH ABOUT DIFFERENT THINGS: NOT AT ALL
GAD7 TOTAL SCORE: 7
4. TROUBLE RELAXING: NEARLY EVERY DAY
5. BEING SO RESTLESS THAT IT IS HARD TO SIT STILL: NEARLY EVERY DAY
1. FEELING NERVOUS, ANXIOUS, OR ON EDGE: NOT AT ALL
6. BECOMING EASILY ANNOYED OR IRRITABLE: SEVERAL DAYS
GAD7 TOTAL SCORE: 7
GAD7 TOTAL SCORE: 7

## 2025-03-12 ASSESSMENT — COLUMBIA-SUICIDE SEVERITY RATING SCALE - C-SSRS
6. IN YOUR LIFETIME, HAVE YOU EVER DONE ANYTHING, STARTED TO DO ANYTHING, OR PREPARED TO DO ANYTHING TO END YOUR LIFE?: NO
2. HAVE YOU ACTUALLY HAD ANY THOUGHTS OF KILLING YOURSELF?: NO
1. IN THE PAST MONTH, HAVE YOU WISHED YOU WERE DEAD OR WISHED YOU COULD GO TO SLEEP AND NOT WAKE UP?: YES

## 2025-03-12 ASSESSMENT — PATIENT HEALTH QUESTIONNAIRE - PHQ9
10. IF YOU CHECKED OFF ANY PROBLEMS, HOW DIFFICULT HAVE THESE PROBLEMS MADE IT FOR YOU TO DO YOUR WORK, TAKE CARE OF THINGS AT HOME, OR GET ALONG WITH OTHER PEOPLE: NOT DIFFICULT AT ALL
SUM OF ALL RESPONSES TO PHQ QUESTIONS 1-9: 3
SUM OF ALL RESPONSES TO PHQ QUESTIONS 1-9: 3

## 2025-03-12 ASSESSMENT — PAIN SCALES - GENERAL: PAINLEVEL_OUTOF10: NO PAIN (0)

## 2025-03-12 NOTE — PATIENT INSTRUCTIONS
We are increasing dose of Seroquel from 100 mg every evening to 200 mg evening.     Main thing to watch for is that you aren't too tired the next day / have a hard time waking up. If you do get get next day am sedation it often gets better in several days to a week.

## 2025-03-12 NOTE — PROGRESS NOTES
Social- Was  twice. Lives alone with his 2 cats:   Children-  2 kids, they are in [place]  Last visit 1/28/25: We were going to start Saphris however insurance will not cover. We agreed on instead of having Trent try Seroquel and start 50 mg evening x 7 days then take 100 mg evening.  Continue Trileptal 600 mg twice daily.  Continue trazodone 100 mg bedtime prn insomnia. .  Continue Vyvanse 70 mg daily     - today Trent notes he is actually feeling pretty good. Feels med helped  - notes even today pain is under decent control  - Sept '24 cervical laminectomy C3-C4 & thoracic laminectoom T10-T11  - his mom had dementia and passed away spring 2022. Trent found out about it via Facebook / no one called him  - Oldest of 3 brothers. Mark and Major in [place].  Dad out on [place] acres    MEDICAL / SURGICAL HISTORY                     Patient Active Problem List   Diagnosis    Mixed hyperlipidemia    Chronic, continuous use of opioids    Chemical dependency (H)    Chronic rhinitis    Tinnitus of both ears    SNHL (sensorineural hearing loss)    Bipolar disorder (H)    Seizure-like activity (H)    Somatic dysfunction of pelvis region    Bilateral foot pain    Trigger index finger of right hand    Moderate persistent asthma without complication    Chronic lower back pain    Onychia of toe of left foot    Seizure disorder (H)    Benign essential hypertension    Retrograde ejaculation    Allergic rhinitis due to other allergen    Cervical spondylosis without myelopathy    Chronic headaches    Generalized anxiety disorder    Hypertrophy of prostate without urinary obstruction and other lower urinary tract symptoms (LUTS)    GERD (gastroesophageal reflux disease)    Major depressive disorder, recurrent episode, moderate (H)    Myalgia and myositis    Other pain disorders related to psychological factors    Spinal stenosis in cervical region    Status post lumbar spinal  fusion    Trigger finger    Polypharmacy    Deviated nasal septum    Attention deficit hyperactivity disorder (ADHD), combined type    chronic noninfective otitis externa    Migraine with aura and without status migrainosus, not intractable    Tobacco use    Prediabetes    Bipolar disorder, current episode mixed, mild (H)    Failed back syndrome of lumbar spine    Panlobular emphysema (H)    Implantable intrathecal infusion pump present - removed (11/2024)    Cervicalgia    Chronic bilateral low back pain without sciatica     ALLERGY   Clavulanic acid potassium, Depakote [valproic acid], Duloxetine, Duloxetine hcl, Seasonal allergies, and Amoxicillin-pot clavulanate  MEDICATIONS                                                                                             Current Outpatient Medications   Medication Sig Dispense Refill    ACCU-CHEK GUIDE test strip       acetaminophen (TYLENOL) 325 MG tablet TAKE 2 TABLETS BY MOUTH EVERY 6 HOURS AS NEEDED FOR MILD PAIN. LIMIT ACETAMINOPHEN TO 4000MG PER DAY FROM ALL SOURCES. 100 tablet 3    albuterol (ACCUNEB) 1.25 MG/3ML neb solution Take 1 vial (1.25 mg) by nebulization every 6 hours as needed for shortness of breath / dyspnea or wheezing 100 vial 3    artificial saliva (BIOTENE ORALBALANCE) GEL gel Take 4 g by mouth 4 times daily 126 g 11    aspirin (ASPIRIN LOW DOSE) 81 MG chewable tablet CHEW AND SWALLOW 1 TABLET BY MOUTH DAILY 30 tablet 11    atorvastatin (LIPITOR) 20 MG tablet Take 1 tablet (20 mg) by mouth daily. 90 tablet 3    blood glucose (NO BRAND SPECIFIED) lancets standard Use to test blood sugar 1 time daily or as directed. 100 each 0    blood glucose (NO BRAND SPECIFIED) lancing device Device to be used with lancets. 1 each 0    blood glucose monitoring (NO BRAND SPECIFIED) meter device kit Use to test blood sugar 1 time daily or as directed. 1 kit 0    budesonide (PULMICORT) 0.5 MG/2ML neb solution Spray 2 mLs (0.5 mg) in nostril 2 times daily Make 240  cc [place] med sinus irrigation Mix 2 ml vial of budesonide 0.5 mg Rinse 1-2 times daily for 2-4 weeks. 60 mL 1    CERTAVITE/ANTIOXIDANTS tablet       cetirizine (ZYRTEC) 10 MG tablet TAKE 1 TABLET BY MOUTH DAILY 30 tablet 11    diclofenac (VOLTAREN) 50 MG EC tablet Take 1 tablet (50 mg) by mouth 3 times daily as needed for moderate pain. 90 tablet 3    droNABinol (MARINOL) 2.5 MG capsule TAKE 1 CAPSULE(2.5 MG) BY MOUTH TWICE DAILY BEFORE MEALS 60 capsule 0    famotidine (PEPCID) 20 MG tablet TAKE 1 TABLET BY MOUTH NIGHTLY AS NEEDED FOR REFLUX 90 tablet 0    fluticasone (FLONASE) 50 MCG/ACT nasal spray USE 2 SPRAYS IN EACH NOSTRIL DAILY 16 g 0    FT ANTISEPTIC SKIN CLEANSER 4 % solution       furosemide (LASIX) 20 MG tablet TAKE 1 TABLET BY MOUTH DAILY 30 tablet 11    gabapentin (NEURONTIN) 300 MG capsule TAKE 3 CAPSULES BY MOUTH THREE TIMES DAILY 270 capsule 2    hydrOXYzine (VISTARIL) 50 MG capsule TAKE 1 TO 2 CAPSULES BY MOUTH 4 TIMES DAILY AS NEEDED FOR ANXIETY 30 capsule 3    lidocaine (XYLOCAINE) 5 % external ointment       lisdexamfetamine (VYVANSE) 70 MG capsule Take 1 capsule (70 mg) by mouth daily. - keep your 1/28/2025 appt with Dr Fernandez for further refills 30 capsule 0    losartan (COZAAR) 50 MG tablet TAKE 1 TABLET BY MOUTH DAILY 90 tablet 0    methocarbamol (ROBAXIN) 750 MG tablet Take 1 tablet (750 mg) by mouth 4 times daily. 90 tablet 3    metoprolol succinate ER (TOPROL XL) 50 MG 24 hr tablet TAKE 1 TABLET BY MOUTH DAILY 90 tablet 3    mometasone-formoterol (DULERA) 200-5 MCG/ACT inhaler INHALE 2 PUFFS INTO THE LUNGS 2 TIMES A DAY 13 g 1    montelukast (SINGULAIR) 10 MG tablet TAKE 1 TABLET BY MOUTH AT BEDTIME 90 tablet 0    morphine (MSIR) 15 MG IR tablet Take 1 tablet (15 mg) by mouth every 6 hours as needed for moderate to severe pain. 120 tablet 0    multivitamin w/minerals (THERA-VIT-M) tablet Take 1 tablet by mouth daily. 100 tablet 3    mupirocin (BACTROBAN) 2 % external ointment       NARCAN  4 MG/0.1ML nasal spray       nicotine polacrilex (NICORETTE) 4 MG gum PLACE 1 PIECE OF GUM INSIDE CHEEK AS NEEDED FOR SMOKING CESSATION 220 each 2    Nutritional Supplements (NUTRITIONAL SHAKE HIGH PROTEIN) LIQD Take 1 Bottle by mouth 2 times daily. 66311 mL 2    omeprazole (PRILOSEC) 20 MG DR capsule TAKE 1 CAPSULE BY MOUTH 2 TIMES DAILY 180 capsule 0    ondansetron (ZOFRAN) 4 MG tablet TAKE 1 TABLET BY MOUTH 3 TIMES DAILY AS NEEDED FOR NAUSEA 15 tablet 0    OXcarbazepine (TRILEPTAL) 600 MG tablet TAKE 1 TABLET BY MOUTH 2 TIMES DAILY 60 tablet 5    polyethylene glycol (MIRALAX) 17 GM/Dose powder Take 17 g (1 Capful) by mouth daily. 850 g 2    QUEtiapine (SEROQUEL) 100 MG tablet Take 1/2 tablet (50 mg) every evening x 7 days then take 1 tablet (100 mg) every evening 30 tablet 11    senna-docusate (SENEXON-S) 8.6-50 MG tablet TAKE 1 OR 2 TABLETS BY MOUTH 2 TIMES A  tablet 1    SUMAtriptan (IMITREX) 50 MG tablet TAKE 1 TABLET BY MOUTH AT ONSET OF HEADACHE FOR MIGRAINE. MAY REPEAT IN 2 HOURS. MAX OF 4 TABLETS IN 24 HOURS 9 tablet 11    tamsulosin (FLOMAX) 0.4 MG capsule TAKE 1 CAPSULE BY MOUTH DAILY 90 capsule 0    traZODone (DESYREL) 100 MG tablet Take 1 tablet (100 mg) by mouth nightly as needed for sleep. 90 tablet 1    VENTOLIN  (90 Base) MCG/ACT inhaler INHALE 2 PUFFS INTO THE LUNGS EVERY 4 HOURS AS NEEDED 18 g 0    zolpidem ER (AMBIEN CR) 12.5 MG CR tablet Take 12.5 mg by mouth.      dextromethorphan-guaiFENesin (MUCINEX DM)  MG 12 hr tablet TAKE 1 TABLET BY MOUTH EVERY 12 HOURS (Patient not taking: Reported on 3/12/2025) 60 tablet 0     No current facility-administered medications for this visit.       VITALS   /74 (BP Location: Right arm, Patient Position: Sitting, Cuff Size: Adult Regular)   Pulse 102   Temp 98.2  F (36.8  C) (Tympanic)   Resp 18   Wt 75.3 kg (166 lb)   SpO2 95%   BMI 23.15 kg/m       LABS                                                                                                                            Last Comprehensive Metabolic Panel:  Sodium   Date Value Ref Range Status   10/29/2024 137 135 - 145 mmol/L Final   12/14/2020 141 133 - 144 mmol/L Final     Potassium   Date Value Ref Range Status   10/29/2024 3.7 3.4 - 5.3 mmol/L Final   09/30/2022 4.8 3.4 - 5.3 mmol/L Final   12/14/2020 3.8 3.4 - 5.3 mmol/L Final     Chloride   Date Value Ref Range Status   10/29/2024 97 (L) 98 - 107 mmol/L Final   09/30/2022 104 94 - 109 mmol/L Final   12/14/2020 107 94 - 109 mmol/L Final     Carbon Dioxide   Date Value Ref Range Status   12/14/2020 28 20 - 32 mmol/L Final     Carbon Dioxide (CO2)   Date Value Ref Range Status   10/29/2024 27 22 - 29 mmol/L Final   09/30/2022 29 20 - 32 mmol/L Final     Anion Gap   Date Value Ref Range Status   10/29/2024 13 7 - 15 mmol/L Final   09/30/2022 4 3 - 14 mmol/L Final   12/14/2020 6 3 - 14 mmol/L Final     Glucose   Date Value Ref Range Status   10/29/2024 94 70 - 99 mg/dL Final   09/30/2022 85 70 - 99 mg/dL Final   12/14/2020 108 (H) 70 - 99 mg/dL Final     Urea Nitrogen   Date Value Ref Range Status   10/29/2024 12.3 8.0 - 23.0 mg/dL Final   09/30/2022 30 7 - 30 mg/dL Final   12/14/2020 16 7 - 30 mg/dL Final     Creatinine   Date Value Ref Range Status   10/29/2024 0.87 0.67 - 1.17 mg/dL Final   12/14/2020 0.77 0.66 - 1.25 mg/dL Final     GFR Estimate   Date Value Ref Range Status   10/29/2024 >90 >60 mL/min/1.73m2 Final     Comment:     eGFR calculated using 2021 CKD-EPI equation.   12/14/2020 >90 >60 mL/min/[1.73_m2] Final     Comment:     Non  GFR Calc  Starting 12/18/2018, serum creatinine based estimated GFR (eGFR) will be   calculated using the Chronic Kidney Disease Epidemiology Collaboration   (CKD-EPI) equation.       Calcium   Date Value Ref Range Status   10/29/2024 8.7 (L) 8.8 - 10.4 mg/dL Final     Comment:     Reference intervals for this test were updated on 7/16/2024 to reflect our healthy population  more accurately. There may be differences in the flagging of prior results with similar values performed with this method. Those prior results can be interpreted in the context of the updated reference intervals.   12/14/2020 8.2 (L) 8.5 - 10.1 mg/dL Final     Lab Results   Component Value Date    WBC 7.0 01/05/2024    WBC 6.9 01/05/2024    WBC 6.6 12/14/2020     Lab Results   Component Value Date    RBC 4.30 01/05/2024    RBC 4.20 01/05/2024    RBC 3.81 12/14/2020     Lab Results   Component Value Date    HGB 12.8 01/05/2024    HGB 12.9 01/05/2024    HGB 11.3 12/14/2020     Lab Results   Component Value Date    HCT 38.6 01/05/2024    HCT 38.3 01/05/2024    HCT 34.5 12/14/2020     Lab Results   Component Value Date    MCV 90 01/05/2024    MCV 91 01/05/2024    MCV 91 12/14/2020     Lab Results   Component Value Date    MCH 29.8 01/05/2024    MCH 30.7 01/05/2024    MCH 29.7 12/14/2020     Lab Results   Component Value Date    MCHC 33.2 01/05/2024    MCHC 33.7 01/05/2024    MCHC 32.8 12/14/2020     Lab Results   Component Value Date    RDW 13.0 01/05/2024    RDW 13.2 01/05/2024    RDW 13.2 12/14/2020     Lab Results   Component Value Date     01/05/2024     01/05/2024     12/14/2020        Recent Labs   Lab Test 01/05/24  1034 09/30/22  1514   CHOL 183 150   HDL 71 53   LDL 87 69   TRIG 125 139      Hemoglobin A1C   Date Value Ref Range Status   09/23/2024 5.5 <5.7 % Final     Comment:     Normal <5.7%   Prediabetes 5.7-6.4%    Diabetes 6.5% or higher     Note: Adopted from ADA consensus guidelines.   03/04/2021 5.6 0 - 5.6 % Final     Comment:     Normal <5.7% Prediabetes 5.7-6.4%  Diabetes 6.5% or higher - adopted from ADA   consensus guidelines.          Nortriptyline level 79 as of 5/2022 (range 50 - 150 ng / mL)  Oxcarbazepine level 14 as of 5/2022 (range 3-35 micrograms / mL)       MENTAL STATUS EXAM                                                                                          Awake, alert, oriented. No problems psychomotor behavior. Mood today euthymic. Speech: normal volume, rhythm, rate. Thought process, including associations, was unremarkable and thought content was devoid of homicidal ideation. No SI today.  No hallucinations. Insight limited. Judgment  adequate for safety. Fund of knowledge was intact. Pt demonstrates no obvious problems with attention, concentration, language, recent or remote memory although these were not formally tested.       ASSESSMENT                                                                                                      HISTORICAL:  Initial psych note 10/6/15          NOTES:      Joshua is a 64 year old with bipolar disorder, DAYANA, ADHD, chronic pain. Today is the best I've seen Trent in years in terms of mental health and of pain!! Whether it is the Seroquel we started or a combination of factors I'm happy to see! He notes he is still feeling some of the weepiness out of the blue. We agreed on trying an increase of Seroquel dose.     TREATMENT RISK STATEMENT:  The risks, benefits, alternatives and potential adverse effects have been explained and are understood by the pt.  The pt agrees to the treatment plan with the ability to do so.   The pt knows to call the clinic for any problems or access emergency care if needed.        DIAGNOSES                    Bipolar disorder I disorder most recent episode mixed episode  DAYANA  ADHD      PLAN                                                                                                                    1)  MEDICATIONS:      Increase Seroquel 100 mg evening to 200 mg every evening. .  Continue Trileptal 600 mg twice daily.  Continue trazodone 100 mg bedtime prn insomnia. .  Continue Vyvanse 70 mg daily last filled on 2/25/25 I set to fill again   2)  THERAPY:  No change    3)  LABS: lipid , CMP, CBC, A1C     4)  PT MONITOR [call for probs]:   SEs from meds, worsening sx, SI/HI    5)  REFERRALS  [CD, medical, other]:  None    6)  RTC:  ~2 weeks          Answers submitted by the patient for this visit:  Patient Health Questionnaire (Submitted on 3/12/2025)  If you checked off any problems, how difficult have these problems made it for you to do your work, take care of things at home, or get along with other people?: Not difficult at all  PHQ9 TOTAL SCORE: 3  Patient Health Questionnaire (G7) (Submitted on 3/12/2025)  DAYANA 7 TOTAL SCORE: 7

## 2025-03-12 NOTE — PATIENT INSTRUCTIONS
"Recommendations from today's MTM visit:                                                    MTM (medication therapy management) is a service provided by a clinical pharmacist designed to help you get the most of out of your medicines.   Today we reviewed what your medicines are for, how to know if they are working, that your medicines are safe and how to make your medicine regimen as easy as possible.      Your yearly labs for cholesterol are due    Follow-up: Return in about 1 year (around 3/12/2026) for Medication Therapy Management Visit.    It was great speaking with you today.  I value your experience and would be very thankful for your time in providing feedback in our clinic survey. In the next few days, you may receive an email or text message from [employer] with a link to a survey related to your  clinical pharmacist.\"     To schedule another MTM appointment, please call the clinic directly or you may call the MTM scheduling line at [phone] or toll-free at [phone].     My Clinical Pharmacist's contact information:                                                      Please feel free to contact me with any questions or concerns you have.      Shaw Vale, PharmD  Medication Therapy Management Pharmacist     "

## 2025-03-12 NOTE — PROGRESS NOTES
Medication Therapy Management (MTM) Encounter    ASSESSMENT:                            Medication Adherence/Access: No issues identified.    Hypertension   Stable     Hyperlipidemia   Due for annual lipid panel     Allergy   Stable      COPD /Asthma  Stable      Constipation   Stable     GERD    Stable      Mental Health   Depression and Bipolar and ADHD  Plan in place with psychiatry     Skin Rash  Stable     Nausea  Stable     Pain  Stable     Tobacco Use Disorder:  Stable     Dry Mouth  Stable     Supplements   Stable     PLAN:                            Your yearly labs for cholesterol are due    Follow-up: Return in about 1 year (around 3/12/2026) for Medication Therapy Management Visit.    SUBJECTIVE/OBJECTIVE:                          Trent Barron is a 64 year old male seen for an initial visit. He was referred to me from Dr. Kraft.      Reason for visit: Medication Review.    Allergies/ADRs: Reviewed in chart  Past Medical History: Reviewed in chart  Tobacco: He reports that he quit smoking about 47 years ago. His smoking use included cigarettes. His smokeless tobacco use includes snuff.  Alcohol: not assessed    Medication Adherence/Access: patient sets up pill box    Hypertension   Aspirin 81 mg daily  Furosemide 20 mg daily  Losartan 50 mg daily  Metoprolol XL 50 mg daily  Patient reports no current medication side effects     Hyperlipidemia   Atorvastatin 20 mg daily  Patient reports no significant myalgias or other side effects.  The 10-year ASCVD risk score (Andrea HUTCHINS, et al., 2019) is: 13.3%    Values used to calculate the score:      Age: 64 years      Sex: Male      Is Non- : No      Diabetic: Yes      Tobacco smoker: No      Systolic Blood Pressure: 114 mmHg      Is BP treated: Yes      HDL Cholesterol: 71 mg/dL      Total Cholesterol: 183 mg/dL     Allergy   Budesonide irrigation daily as needed   Cetirizine 10 mg daily  Flonase 2 spray seach nostril daily  Patient reports  no current medication side effects.     Patient feels that current therapy is effective.         COPD /Asthma  Albuterol neb q6h as needed -rare use  Albuterol HFA as needed   Dulera 200-5 mcg 2 puffs twice daily as needed  Montelukast 10 mg at bedtime  Dextromethorophan-Guaifenesin twice daily as needed   Patient reports no current medication side effects.    Patient reports the following symptoms: none.     Constipation   Miralax 17 g daily as needed  Patient feels that current therapy is effective.   Patient reports no current medication side effects.       GERD    Famotidine 20 mg at bedtime as needed   Omeprazole 20 mg twice daily   Patient feels that current regimen is effective.     Mental Health   Depression and Bipolar and ADHD  Hydroxyzine 50 mg four times daily as needed   Vyvanse 70 mg daily  Oxcarbazepine 600 mg twice daily   Seroquel 100 mg bedtime   Saw Laquita Fernandez today and plan is to increase to 200 mg at bedtime  Trazodone 100 mg at bedtime  Patient reports no current medication side effects.  Follows with psychiatrist, Laquita Fernandez     Skin Rash  Mupirocin daily as needed   No reported issues at this time.     Nausea  Marinol 2.5 mg twice daily   Not taking for at least a year as unable to get supply from pharmacy  Ondansetron 4 mg three times daily as needed   No reported issues at this time.     Pain  Acetaminophen 650 mg three times daily   Voltaren gel four times daily as needed  Diclofenac 50 mg three times daily   Gabapentin 900 mg three times daily   Lidocaine ointment daily as needed   Methocarbamol 750 mg four times daily   Morphine IR 30 mg in the morning, 15 mg in the afternoon, and 15 mg at bedtime  Narcan as needed   Imitrex as needed   No reported issues at this time.     Tobacco Use Disorder:  Nicotine gum as needed   No reported issues at this time.     Dry Mouth  Biotine gel daily as needed   No reported issues at this time.     Supplements   Multivitamin daily  No reported  issues at this time.      ----------------      I spent 70 minutes with this patient today. All changes were made via collaborative practice agreement with Lissy Kraft MD.     A summary of these recommendations was sent via [employer].    Shaw Vale, PharmD  Medication Therapy Management Pharmacist     Medication Therapy Recommendations  Mixed hyperlipidemia   1 Current Medication: atorvastatin (LIPITOR) 20 MG tablet   Current Medication Sig: Take 1 tablet (20 mg) by mouth daily.   Rationale: Medication requires monitoring - Needs additional monitoring   Recommendation: Order Lab   Status: Accepted per CPA   Identified Date: 3/12/2025 Completed Date: 3/12/2025

## 2025-03-17 DIAGNOSIS — G89.4 CHRONIC PAIN SYNDROME: ICD-10-CM

## 2025-03-17 DIAGNOSIS — G43.109 MIGRAINE WITH AURA AND WITHOUT STATUS MIGRAINOSUS, NOT INTRACTABLE: ICD-10-CM

## 2025-03-17 DIAGNOSIS — G89.29 CHRONIC BILATERAL LOW BACK PAIN WITHOUT SCIATICA: ICD-10-CM

## 2025-03-17 DIAGNOSIS — G47.00 PERSISTENT INSOMNIA: ICD-10-CM

## 2025-03-17 DIAGNOSIS — M54.2 CERVICALGIA: ICD-10-CM

## 2025-03-17 DIAGNOSIS — M54.50 CHRONIC BILATERAL LOW BACK PAIN WITHOUT SCIATICA: ICD-10-CM

## 2025-03-17 DIAGNOSIS — F31.0 BIPOLAR AFFECTIVE DISORDER, CURRENT EPISODE HYPOMANIC (H): ICD-10-CM

## 2025-03-17 DIAGNOSIS — Z71.6 TOBACCO ABUSE COUNSELING: ICD-10-CM

## 2025-03-17 DIAGNOSIS — F11.90 CHRONIC, CONTINUOUS USE OF OPIOIDS: Primary | ICD-10-CM

## 2025-03-17 DIAGNOSIS — K21.00 GASTROESOPHAGEAL REFLUX DISEASE WITH ESOPHAGITIS, UNSPECIFIED WHETHER HEMORRHAGE: ICD-10-CM

## 2025-03-17 DIAGNOSIS — R73.03 PREDIABETES: ICD-10-CM

## 2025-03-17 RX ORDER — MORPHINE SULFATE 15 MG/1
15 TABLET ORAL EVERY 6 HOURS PRN
Qty: 120 TABLET | Refills: 0 | Status: SHIPPED | OUTPATIENT
Start: 2025-03-17 | End: 2025-03-17

## 2025-03-17 RX ORDER — MULTIPLE VITAMINS W/ MINERALS TAB 9MG-400MCG
1 TAB ORAL DAILY
Qty: 100 TABLET | Refills: 3 | Status: SHIPPED | OUTPATIENT
Start: 2025-03-17

## 2025-03-17 RX ORDER — NALOXONE HYDROCHLORIDE 4 MG/.1ML
4 SPRAY NASAL
Qty: 2 EACH | Refills: 1 | Status: SHIPPED | OUTPATIENT
Start: 2025-03-17

## 2025-03-17 RX ORDER — GABAPENTIN 300 MG/1
CAPSULE ORAL
Qty: 270 CAPSULE | Refills: 2 | Status: SHIPPED | OUTPATIENT
Start: 2025-03-17

## 2025-03-17 RX ORDER — OMEPRAZOLE 20 MG/1
20 CAPSULE, DELAYED RELEASE ORAL 2 TIMES DAILY
Qty: 180 CAPSULE | Refills: 0 | Status: SHIPPED | OUTPATIENT
Start: 2025-03-17

## 2025-03-17 RX ORDER — ACETAMINOPHEN 325 MG/1
650 TABLET ORAL 3 TIMES DAILY
Qty: 100 TABLET | Refills: 1 | Status: SHIPPED | OUTPATIENT
Start: 2025-03-17

## 2025-03-17 RX ORDER — TRAZODONE HYDROCHLORIDE 100 MG/1
100 TABLET ORAL
Qty: 90 TABLET | Refills: 1 | Status: SHIPPED | OUTPATIENT
Start: 2025-03-17

## 2025-03-17 RX ORDER — MORPHINE SULFATE 15 MG/1
15 TABLET ORAL EVERY 6 HOURS PRN
Qty: 120 TABLET | Refills: 0 | Status: SHIPPED | OUTPATIENT
Start: 2025-03-17

## 2025-03-17 NOTE — TELEPHONE ENCOUNTER
Oxcarbazepine (Trileptal) 600 mg tablet  Take 1 tablet by mouth 2 times daily     Last Written Prescription Date:  10-7-24  Last Fill Quantity: 60 tablet,   # refills: 5  Last Office Visit: 3-12-25  Future Office visit:    Next 5 appointments (look out 90 days)      Mar 31, 2025 3:00 PM  (Arrive by 2:45 PM)  Provider Visit with Lissy Kraft MD  [facility] ([facility] ) [address] [place] [id number]  [phone]     Apr 16, 2025 2:40 PM  (Arrive by 2:25 PM)  Return Visit with Laquita Fernandez MD  [facility] ([facility] ) [address] [place] [address] [phone]

## 2025-03-17 NOTE — TELEPHONE ENCOUNTER
Morphine (MSIR) 15 MG IR tablet      Last Written Prescription Date:  02/14/2025  Last Fill Quantity: 120,   # refills: 0  Last Office Visit: 12/31/2024  Future Office visit:    Next 5 appointments (look out 90 days)      Mar 31, 2025 3:00 PM  (Arrive by 2:45 PM)  Provider Visit with Lissy Kraft MD  [facility] ([facility] ) [address] [place] [id number]  [phone]     Apr 16, 2025 2:40 PM  (Arrive by 2:25 PM)  Return Visit with Laquita Fernandez MD  [facility] ([facility] ) [address] [place] [phone]             Routing refill request to provider for review/approval because:The patient reported taking this medication differently from how it was prescribed.  Entered by: Shaw Vale [facility] On: 3/12/2025      Kimberly Boecker, RN

## 2025-03-18 RX ORDER — OXCARBAZEPINE 600 MG/1
600 TABLET, FILM COATED ORAL 2 TIMES DAILY
Qty: 60 TABLET | Refills: 6 | Status: SHIPPED | OUTPATIENT
Start: 2025-03-18

## 2025-03-31 ENCOUNTER — OFFICE VISIT (OUTPATIENT)
Dept: FAMILY MEDICINE | Facility: OTHER | Age: 65
End: 2025-03-31
Attending: FAMILY MEDICINE
Payer: COMMERCIAL

## 2025-03-31 ENCOUNTER — TELEPHONE (OUTPATIENT)
Dept: FAMILY MEDICINE | Facility: OTHER | Age: 65
End: 2025-03-31

## 2025-03-31 VITALS
DIASTOLIC BLOOD PRESSURE: 70 MMHG | TEMPERATURE: 98.7 F | OXYGEN SATURATION: 96 % | SYSTOLIC BLOOD PRESSURE: 132 MMHG | HEART RATE: 90 BPM | BODY MASS INDEX: 23.67 KG/M2 | RESPIRATION RATE: 16 BRPM | WEIGHT: 169.1 LBS | HEIGHT: 71 IN

## 2025-03-31 DIAGNOSIS — G43.109 MIGRAINE WITH AURA AND WITHOUT STATUS MIGRAINOSUS, NOT INTRACTABLE: ICD-10-CM

## 2025-03-31 DIAGNOSIS — F41.1 GENERALIZED ANXIETY DISORDER: ICD-10-CM

## 2025-03-31 DIAGNOSIS — M54.2 CERVICALGIA: Primary | ICD-10-CM

## 2025-03-31 DIAGNOSIS — R11.0 NAUSEA: ICD-10-CM

## 2025-03-31 DIAGNOSIS — M62.830 BACK MUSCLE SPASM: ICD-10-CM

## 2025-03-31 DIAGNOSIS — F31.61 BIPOLAR DISORDER, CURRENT EPISODE MIXED, MILD (H): ICD-10-CM

## 2025-03-31 DIAGNOSIS — F33.1 MAJOR DEPRESSIVE DISORDER, RECURRENT EPISODE, MODERATE (H): ICD-10-CM

## 2025-03-31 PROCEDURE — G0463 HOSPITAL OUTPT CLINIC VISIT: HCPCS

## 2025-03-31 RX ORDER — ALCOHOL 70.47 ML/100ML
1 GEL TOPICAL
COMMUNITY
Start: 2025-03-18

## 2025-03-31 RX ORDER — CYCLOBENZAPRINE HCL 10 MG
10 TABLET ORAL 3 TIMES DAILY PRN
COMMUNITY
Start: 2025-03-18 | End: 2025-03-31 | Stop reason: ALTCHOICE

## 2025-03-31 RX ORDER — METHOCARBAMOL 750 MG/1
1500 TABLET, FILM COATED ORAL EVERY MORNING
Status: SHIPPED
Start: 2025-03-31

## 2025-03-31 RX ORDER — DRONABINOL 2.5 MG/1
2.5 CAPSULE ORAL
Qty: 60 CAPSULE | Refills: 0 | Status: SHIPPED | OUTPATIENT
Start: 2025-03-31

## 2025-03-31 ASSESSMENT — ANXIETY QUESTIONNAIRES
7. FEELING AFRAID AS IF SOMETHING AWFUL MIGHT HAPPEN: NOT AT ALL
5. BEING SO RESTLESS THAT IT IS HARD TO SIT STILL: NOT AT ALL
6. BECOMING EASILY ANNOYED OR IRRITABLE: NOT AT ALL
4. TROUBLE RELAXING: NOT AT ALL
IF YOU CHECKED OFF ANY PROBLEMS ON THIS QUESTIONNAIRE, HOW DIFFICULT HAVE THESE PROBLEMS MADE IT FOR YOU TO DO YOUR WORK, TAKE CARE OF THINGS AT HOME, OR GET ALONG WITH OTHER PEOPLE: NOT DIFFICULT AT ALL
3. WORRYING TOO MUCH ABOUT DIFFERENT THINGS: NOT AT ALL
GAD7 TOTAL SCORE: 0
8. IF YOU CHECKED OFF ANY PROBLEMS, HOW DIFFICULT HAVE THESE MADE IT FOR YOU TO DO YOUR WORK, TAKE CARE OF THINGS AT HOME, OR GET ALONG WITH OTHER PEOPLE?: NOT DIFFICULT AT ALL
GAD7 TOTAL SCORE: 0
GAD7 TOTAL SCORE: 0
2. NOT BEING ABLE TO STOP OR CONTROL WORRYING: NOT AT ALL
1. FEELING NERVOUS, ANXIOUS, OR ON EDGE: NOT AT ALL
7. FEELING AFRAID AS IF SOMETHING AWFUL MIGHT HAPPEN: NOT AT ALL

## 2025-03-31 ASSESSMENT — PAIN SCALES - GENERAL: PAINLEVEL_OUTOF10: MODERATE PAIN (5)

## 2025-03-31 ASSESSMENT — PAIN SCALES - PAIN ENJOYMENT GENERAL ACTIVITY SCALE (PEG)
AVG_PAIN_PASTWEEK: 5
INTERFERED_ENJOYMENT_LIFE: 5
INTERFERED_ENJOYMENT_LIFE: 5
PEG_TOTALSCORE: 5
INTERFERED_GENERAL_ACTIVITY: 5
AVG_PAIN_PASTWEEK: 5
PEG_TOTALSCORE: 5
INTERFERED_GENERAL_ACTIVITY: 5

## 2025-03-31 ASSESSMENT — ASTHMA QUESTIONNAIRES: ACT_TOTALSCORE: 14

## 2025-03-31 NOTE — TELEPHONE ENCOUNTER
Received a PA request from [facility] for droNABinol (MARINOL) 2.5 MG capsule. Submitted on CMM and got the following message: Prior Authorization not needed for patient / medication.

## 2025-03-31 NOTE — PROGRESS NOTES
"  Assessment & Plan     Cervicalgia  [place] PDMP reviewed and appropriate    Pain and mood have improved!  - c/w morphine 15mg qid   - c/w voltaren tablet  - c/w gabapentin     Back muscle spasm  Pain improved, most likely d/t improved mental health   - methocarbamol (ROBAXIN) 750 MG tablet; Take 2 tablets (1,500 mg) by mouth every morning. And 750 mg three times daily  - discontinue flexeril     Migraine with aura and without status migrainosus, not intractable  No changes. Trent has accepted his pain and learning to live with it   - c/w imitrex    Nausea  - droNABinol (MARINOL) 2.5 MG capsule; Take 1 capsule (2.5 mg) by mouth 2 times daily (before meals).    Bipolar disorder, current episode mixed, mild (H) / Major depressive disorder, recurrent episode, moderate (H) / Generalized anxiety disorder  Follows with Dr Fernandez with last visit 3/12/2025. Note reviewed. Increased seroquel   Trent reports the seroquel takes the \"sharp edge\" off      The longitudinal plan of care for the diagnosis(es)/condition(s) as documented were addressed during this visit. Due to the added complexity in care, I will continue to support Trent in the subsequent management and with ongoing continuity of care.        See Patient Instructions    Return in about 3 months (around 6/30/2025).    Subjective   Trent is a 64 year old, presenting for the following health issues:  Anxiety, Back Pain, and Depression        3/31/2025     3:17 PM   Additional Questions   Roomed by Ramona Royal   Accompanied by self     History of Present Illness       Back Pain:  He presents for follow up of back pain. Patient's back pain is a chronic problem.  Location of back pain:  Other  Description of back pain: burning, cramping, dull ache and stabbing  Back pain spreads: right buttocks, left buttocks, right thigh, left thigh, right knee, left knee, right foot, left foot, right shoulder, left shoulder, right side of neck and left side of neck    Since patient first " "noticed back pain, pain is: unchanged  Does back pain interfere with his job:  Yes       COPD:  He presents for follow up of COPD.   Overall, COPD symptoms are better since last visit. He has same as usual fatigue or shortness of breath with exertion and same as usual shortness of breath at rest.  He sometimes coughs and does not have change in sputum. No recent fever. He can walk less than a mile without stopping to rest. He can walk 1 flights of stairs without resting. The patient has had no ED, urgent care, or hospital admissions because of COPD since the last visit.     Headaches:   Since the patient's last clinic visit, headaches are: worsened  The patient is getting headaches:  Angela time we get bad weather  He is not able to do normal daily activities when he has a migraine.  The patient is taking the following rescue/relief medications:  Ibuprofen (Advil, Motrin), Tylenol and sumatriptan (Imitrex)   Patient states \"I get total relief\" from the rescue/relief medications.   The patient is taking the following medications to prevent migraines:  No medications to prevent migraines  In the past 4 weeks, the patient has gone to an Urgent Care or Emergency Room 0 times times due to headaches.    He eats 0-1 servings of fruits and vegetables daily.He consumes 2 sweetened beverage(s) daily.He exercises with enough effort to increase his heart rate 10 to 19 minutes per day.  He exercises with enough effort to increase his heart rate 4 days per week. He is missing 7 dose(s) of medications per week.        Depression and Anxiety   How are you doing with your depression since your last visit? Improved   How are you doing with your anxiety since your last visit?  Improved   Are you having other symptoms that might be associated with depression or anxiety? No  Have you had a significant life event? Health Concerns   Do you have any concerns with your use of alcohol or other drugs? No    - things are going well with Dr" Jim   - seroquel 200mg generally does the job, will take 100mg if needed          Social History     Tobacco Use    Smoking status: Former     Current packs/day: 0.00     Types: Cigarettes     Quit date: 1977     Years since quittin.6    Smokeless tobacco: Current     Types: Snuff   Vaping Use    Vaping status: Never Used   Substance Use Topics    Alcohol use: Yes     Comment: daily    Drug use: No         2024     4:46 PM 10/29/2024    10:22 AM 3/12/2025    10:59 AM   PHQ   PHQ-9 Total Score 20 11  3    Q9: Thoughts of better off dead/self-harm past 2 weeks Not at all Not at all  Not at all       Patient-reported    Proxy-reported         2024     9:55 AM 3/12/2025    11:01 AM 3/31/2025     3:09 PM   DAYANA-7 SCORE   Total Score 17 (severe anxiety) 7 (mild anxiety) 0 (minimal anxiety)   Total Score 17 7  0        Patient-reported         3/12/2025    10:59 AM   Last PHQ-9   1.  Little interest or pleasure in doing things 0   2.  Feeling down, depressed, or hopeless 0   3.  Trouble falling or staying asleep, or sleeping too much 0   4.  Feeling tired or having little energy 1   5.  Poor appetite or overeating 1   6.  Feeling bad about yourself 0   7.  Trouble concentrating 1   8.  Moving slowly or restless 0   Q9: Thoughts of better off dead/self-harm past 2 weeks 0   PHQ-9 Total Score 3        Patient-reported         3/31/2025     3:09 PM   DAYANA-7    1. Feeling nervous, anxious, or on edge 0   2. Not being able to stop or control worrying 0   3. Worrying too much about different things 0   4. Trouble relaxing 0   5. Being so restless that it is hard to sit still 0   6. Becoming easily annoyed or irritable 0   7. Feeling afraid, as if something awful might happen 0   DAYANA-7 Total Score 0    If you checked any problems, how difficult have they made it for you to do your work, take care of things at home, or get along with other people? Not difficult at all       Patient-reported     Answers  "submitted by the patient for this visit:  Patient Health Questionnaire (Submitted on 3/31/2025)  PHQ9 TOTAL SCORE: Incomplete  Patient Health Questionnaire (G7) (Submitted on 3/31/2025)  DAYANA 7 TOTAL SCORE: 0    Chronic/Recurring Back Pain Follow Up    Where is your back pain located? (Select all that apply) low back bilateral  How would you describe your back pain?  burning  Where does your back pain spread? the right and left neck  Since your last clinic visit for back pain, how has your pain changed? always present, but gets better and worse  Does your back pain interfere with your job? Not applicable  Since your last visit, have you tried any new treatment? Yes -  chiropractor    - morphine 15mg qid = 60MME, doing okay   - no more issues with running out early     # Appetite is doing okay       # migraines  - getting migraines with weather changes   - taking OTC migraines meds        Review of Systems  Constitutional, HEENT, cardiovascular, pulmonary, gi and gu systems are negative, except as otherwise noted.      Objective    /70   Pulse 90   Temp 98.7  F (37.1  C) (Tympanic)   Resp 16   Ht 1.803 m (5' 11\")   Wt 76.7 kg (169 lb 1.6 oz)   SpO2 96%   BMI 23.58 kg/m    Body mass index is 23.58 kg/m .  Physical Exam  Constitutional:       General: He is not in acute distress.     Appearance: He is not ill-appearing.   Cardiovascular:      Rate and Rhythm: Normal rate and regular rhythm.      Heart sounds: No murmur heard.  Pulmonary:      Effort: Pulmonary effort is normal. No respiratory distress.      Breath sounds: No wheezing or rales.   Neurological:      Mental Status: He is alert.              Signed Electronically by: Lissy Kraft MD    "

## 2025-04-10 DIAGNOSIS — G43.109 MIGRAINE WITH AURA AND WITHOUT STATUS MIGRAINOSUS, NOT INTRACTABLE: ICD-10-CM

## 2025-04-10 RX ORDER — ACETAMINOPHEN 325 MG/1
650 TABLET ORAL 3 TIMES DAILY
Qty: 168 TABLET | Refills: 3 | Status: SHIPPED | OUTPATIENT
Start: 2025-04-10

## 2025-04-10 NOTE — TELEPHONE ENCOUNTER
Patient left very pleasant message requesting increase in amount of tylenol.  States he takes Tylenol 2 tabs 3 times daily.  He would like 168 count for a month supply.  Pended for PCP.  Edilma Wilkes RN  Care Coordination

## 2025-04-16 ENCOUNTER — OFFICE VISIT (OUTPATIENT)
Dept: PSYCHIATRY | Facility: OTHER | Age: 65
End: 2025-04-16
Attending: PSYCHIATRY & NEUROLOGY
Payer: COMMERCIAL

## 2025-04-16 VITALS
SYSTOLIC BLOOD PRESSURE: 136 MMHG | HEART RATE: 115 BPM | BODY MASS INDEX: 23.57 KG/M2 | RESPIRATION RATE: 18 BRPM | OXYGEN SATURATION: 95 % | TEMPERATURE: 98.1 F | WEIGHT: 169 LBS | DIASTOLIC BLOOD PRESSURE: 90 MMHG

## 2025-04-16 DIAGNOSIS — F90.2 ADHD (ATTENTION DEFICIT HYPERACTIVITY DISORDER), COMBINED TYPE: ICD-10-CM

## 2025-04-16 PROCEDURE — G0463 HOSPITAL OUTPT CLINIC VISIT: HCPCS

## 2025-04-16 RX ORDER — LISDEXAMFETAMINE DIMESYLATE 70 MG/1
70 CAPSULE ORAL DAILY
Qty: 30 CAPSULE | Refills: 0 | Status: SHIPPED | OUTPATIENT
Start: 2025-04-24

## 2025-04-16 ASSESSMENT — PAIN SCALES - GENERAL: PAINLEVEL_OUTOF10: MODERATE PAIN (6)

## 2025-04-16 NOTE — PROGRESS NOTES
Social- Was  twice. Lives alone with his 2 cats:   Children-  2 kids, they are in [place]  Last visit 3/12/25: we increased Seroquel from 100 mg to 200 mg.     - Trent feels the increase in Seroquel heped (200 mg). He notes it's nice to finally be able to get a good stretch of sleep (6+ hours) as opposed to 2 hours. Then his mood is better and he doesn't feel as agitated.   He notes they also game Seroquel 100s and he inquires if it's okay to keep those as prn of which I noted you bet and that is just fine.  - in pain today  - Sept '24 cervical laminectomy C3-C4 & thoracic laminectoom T10-T11  - his mom had dementia and passed away spring 2022. Trent found out about it via [place] / no one called him  - Oldest of 3 brothers. Mark and Major in [place].  Dad out on 40 acres    MEDICAL / SURGICAL HISTORY                     Patient Active Problem List   Diagnosis    Mixed hyperlipidemia    Chronic, continuous use of opioids    Chemical dependency (H)    Chronic rhinitis    Tinnitus of both ears    SNHL (sensorineural hearing loss)    Bipolar disorder (H)    Seizure-like activity (H)    Somatic dysfunction of pelvis region    Bilateral foot pain    Trigger index finger of right hand    Moderate persistent asthma without complication    Chronic lower back pain    Onychia of toe of left foot    Seizure disorder (H)    Benign essential hypertension    Retrograde ejaculation    Allergic rhinitis due to other allergen    Cervical spondylosis without myelopathy    Chronic headaches    Generalized anxiety disorder    Hypertrophy of prostate without urinary obstruction and other lower urinary tract symptoms (LUTS)    GERD (gastroesophageal reflux disease)    Major depressive disorder, recurrent episode, moderate (H)    Myalgia and myositis    Other pain disorders related to psychological factors    Spinal stenosis in cervical region    Status post lumbar spinal  fusion    Trigger finger    Polypharmacy    Deviated nasal septum    Attention deficit hyperactivity disorder (ADHD), combined type    chronic noninfective otitis externa    Migraine with aura and without status migrainosus, not intractable    Tobacco use    Prediabetes    Bipolar disorder, current episode mixed, mild (H)    Failed back syndrome of lumbar spine    Panlobular emphysema (H)    Implantable intrathecal infusion pump present - removed (11/2024)    Cervicalgia    Chronic bilateral low back pain without sciatica     ALLERGY   Clavulanic acid potassium, Depakote [valproic acid], Duloxetine, Duloxetine hcl, Seasonal allergies, and Amoxicillin-pot clavulanate  MEDICATIONS                                                                                             Current Outpatient Medications   Medication Sig Dispense Refill    ACCU-CHEK GUIDE test strip       acetaminophen (TYLENOL) 325 MG tablet Take 2 tablets (650 mg) by mouth 3 times daily. 168 tablet 3    albuterol (ACCUNEB) 1.25 MG/3ML neb solution Take 1 vial (1.25 mg) by nebulization every 6 hours as needed for shortness of breath / dyspnea or wheezing 100 vial 3    artificial saliva (BIOTENE ORALBALANCE) GEL gel Take 4 g by mouth 4 times daily (Patient taking differently: Take 4 g by mouth daily as needed (dry mouth).) 126 g 11    aspirin (ASPIRIN LOW DOSE) 81 MG chewable tablet CHEW AND SWALLOW 1 TABLET BY MOUTH DAILY 30 tablet 11    atorvastatin (LIPITOR) 20 MG tablet Take 1 tablet (20 mg) by mouth daily. 90 tablet 3    blood glucose (NO BRAND SPECIFIED) lancets standard Use to test blood sugar 1 time daily or as directed. 100 each 0    blood glucose (NO BRAND SPECIFIED) lancing device Device to be used with lancets. 1 each 0    blood glucose monitoring (NO BRAND SPECIFIED) meter device kit Use to test blood sugar 1 time daily or as directed. 1 kit 0    budesonide (PULMICORT) 0.5 MG/2ML neb solution Spray 2 mLs (0.5 mg) in nostril 2 times daily  Make 240 cc [place] med sinus irrigation Mix 2 ml vial of budesonide 0.5 mg Rinse 1-2 times daily for 2-4 weeks. 60 mL 1    cetirizine (ZYRTEC) 10 MG tablet TAKE 1 TABLET BY MOUTH DAILY 30 tablet 11    dextromethorphan-guaiFENesin (MUCINEX DM)  MG 12 hr tablet TAKE 1 TABLET BY MOUTH EVERY 12 HOURS 60 tablet 0    diclofenac (VOLTAREN) 1 % topical gel Apply 2 g topically 4 times daily as needed for moderate pain.      diclofenac (VOLTAREN) 50 MG EC tablet Take 1 tablet (50 mg) by mouth 3 times daily as needed for moderate pain. 90 tablet 3    droNABinol (MARINOL) 2.5 MG capsule Take 1 capsule (2.5 mg) by mouth 2 times daily (before meals). 60 capsule 0    famotidine (PEPCID) 20 MG tablet TAKE 1 TABLET BY MOUTH NIGHTLY AS NEEDED FOR REFLUX 90 tablet 0    fluticasone (FLONASE) 50 MCG/ACT nasal spray USE 2 SPRAYS IN EACH NOSTRIL DAILY 16 g 0    furosemide (LASIX) 20 MG tablet TAKE 1 TABLET BY MOUTH DAILY 30 tablet 11    gabapentin (NEURONTIN) 300 MG capsule TAKE 3 CAPSULES BY MOUTH THREE TIMES DAILY 270 capsule 2    hydrOXYzine (VISTARIL) 50 MG capsule TAKE 1 TO 2 CAPSULES BY MOUTH 4 TIMES DAILY AS NEEDED FOR ANXIETY 30 capsule 3    lidocaine (XYLOCAINE) 5 % external ointment Apply topically daily as needed.      lisdexamfetamine (VYVANSE) 70 MG capsule Take 1 capsule (70 mg) by mouth daily. 30 capsule 0    losartan (COZAAR) 50 MG tablet TAKE 1 TABLET BY MOUTH DAILY 90 tablet 0    methocarbamol (ROBAXIN) 750 MG tablet Take 2 tablets (1,500 mg) by mouth every morning. And 750 mg three times daily      metoprolol succinate ER (TOPROL XL) 50 MG 24 hr tablet TAKE 1 TABLET BY MOUTH DAILY 90 tablet 3    mometasone-formoterol (DULERA) 200-5 MCG/ACT inhaler INHALE 2 PUFFS INTO THE LUNGS 2 TIMES A DAY (Patient taking differently: Inhale 2 puffs into the lungs daily as needed (shortness of breath).) 13 g 1    montelukast (SINGULAIR) 10 MG tablet TAKE 1 TABLET BY MOUTH AT BEDTIME 90 tablet 0    morphine (MSIR) 15 MG IR tablet  Take 1 tablet (15 mg) by mouth every 6 hours as needed for moderate to severe pain. 120 tablet 0    multivitamin (THERMEMS) TABS Take 1 tablet by mouth daily at 2 pm.      multivitamin w/minerals (THERA-VIT-M) tablet Take 1 tablet by mouth daily. 100 tablet 3    mupirocin (BACTROBAN) 2 % external ointment Apply topically daily as needed (abrasions).      NARCAN 4 MG/0.1ML nasal spray Spray 1 spray (4 mg) into one nostril alternating nostrils once as needed for opioid reversal. 2 each 1    nicotine polacrilex (NICORETTE) 4 MG gum PLACE 1 PIECE OF GUM INSIDE CHEEK AS NEEDED FOR SMOKING CESSATION 220 each 2    Nutritional Supplements (NUTRITIONAL SHAKE HIGH PROTEIN) LIQD Take 1 Bottle by mouth 2 times daily. 84284 mL 2    omeprazole (PRILOSEC) 20 MG DR capsule Take 1 capsule (20 mg) by mouth 2 times daily. 180 capsule 0    ondansetron (ZOFRAN) 4 MG tablet TAKE 1 TABLET BY MOUTH 3 TIMES DAILY AS NEEDED FOR NAUSEA 15 tablet 0    OXcarbazepine (TRILEPTAL) 600 MG tablet Take 1 tablet by mouth twice daily 60 tablet 6    polyethylene glycol (MIRALAX) 17 g packet Take 17 g by mouth daily as needed for constipation.      QUEtiapine (SEROQUEL) 100 MG tablet Take 100 mg by mouth nightly as needed (sleep).      QUEtiapine (SEROQUEL) 200 MG tablet Take 1 tablet (200 mg) by mouth every evening. 30 tablet 3    SUMAtriptan (IMITREX) 50 MG tablet TAKE 1 TABLET BY MOUTH AT ONSET OF HEADACHE FOR MIGRAINE. MAY REPEAT IN 2 HOURS. MAX OF 4 TABLETS IN 24 HOURS 9 tablet 11    traZODone (DESYREL) 100 MG tablet Take 1 tablet (100 mg) by mouth nightly as needed for sleep. 90 tablet 1    VENTOLIN  (90 Base) MCG/ACT inhaler INHALE 2 PUFFS INTO THE LUNGS EVERY 4 HOURS AS NEEDED 18 g 0     No current facility-administered medications for this visit.       VITALS   /90 (BP Location: Left arm, Patient Position: Sitting, Cuff Size: Adult Regular)   Pulse 115   Temp 98.1  F (36.7  C) (Tympanic)   Resp 18   Wt 76.7 kg (169 lb)   SpO2  95%   BMI 23.57 kg/m       LABS                                                                                                                           Last Comprehensive Metabolic Panel:  Sodium   Date Value Ref Range Status   10/29/2024 137 135 - 145 mmol/L Final   12/14/2020 141 133 - 144 mmol/L Final     Potassium   Date Value Ref Range Status   10/29/2024 3.7 3.4 - 5.3 mmol/L Final   09/30/2022 4.8 3.4 - 5.3 mmol/L Final   12/14/2020 3.8 3.4 - 5.3 mmol/L Final     Chloride   Date Value Ref Range Status   10/29/2024 97 (L) 98 - 107 mmol/L Final   09/30/2022 104 94 - 109 mmol/L Final   12/14/2020 107 94 - 109 mmol/L Final     Carbon Dioxide   Date Value Ref Range Status   12/14/2020 28 20 - 32 mmol/L Final     Carbon Dioxide (CO2)   Date Value Ref Range Status   10/29/2024 27 22 - 29 mmol/L Final   09/30/2022 29 20 - 32 mmol/L Final     Anion Gap   Date Value Ref Range Status   10/29/2024 13 7 - 15 mmol/L Final   09/30/2022 4 3 - 14 mmol/L Final   12/14/2020 6 3 - 14 mmol/L Final     Glucose   Date Value Ref Range Status   10/29/2024 94 70 - 99 mg/dL Final   09/30/2022 85 70 - 99 mg/dL Final   12/14/2020 108 (H) 70 - 99 mg/dL Final     Urea Nitrogen   Date Value Ref Range Status   10/29/2024 12.3 8.0 - 23.0 mg/dL Final   09/30/2022 30 7 - 30 mg/dL Final   12/14/2020 16 7 - 30 mg/dL Final     Creatinine   Date Value Ref Range Status   10/29/2024 0.87 0.67 - 1.17 mg/dL Final   12/14/2020 0.77 0.66 - 1.25 mg/dL Final     GFR Estimate   Date Value Ref Range Status   10/29/2024 >90 >60 mL/min/1.73m2 Final     Comment:     eGFR calculated using 2021 CKD-EPI equation.   12/14/2020 >90 >60 mL/min/[1.73_m2] Final     Comment:     Non  GFR Calc  Starting 12/18/2018, serum creatinine based estimated GFR (eGFR) will be   calculated using the Chronic Kidney Disease Epidemiology Collaboration   (CKD-EPI) equation.       Calcium   Date Value Ref Range Status   10/29/2024 8.7 (L) 8.8 - 10.4 mg/dL Final      Comment:     Reference intervals for this test were updated on 7/16/2024 to reflect our healthy population more accurately. There may be differences in the flagging of prior results with similar values performed with this method. Those prior results can be interpreted in the context of the updated reference intervals.   12/14/2020 8.2 (L) 8.5 - 10.1 mg/dL Final     Lab Results   Component Value Date    WBC 7.0 01/05/2024    WBC 6.9 01/05/2024    WBC 6.6 12/14/2020     Lab Results   Component Value Date    RBC 4.30 01/05/2024    RBC 4.20 01/05/2024    RBC 3.81 12/14/2020     Lab Results   Component Value Date    HGB 12.8 01/05/2024    HGB 12.9 01/05/2024    HGB 11.3 12/14/2020     Lab Results   Component Value Date    HCT 38.6 01/05/2024    HCT 38.3 01/05/2024    HCT 34.5 12/14/2020     Lab Results   Component Value Date    MCV 90 01/05/2024    MCV 91 01/05/2024    MCV 91 12/14/2020     Lab Results   Component Value Date    MCH 29.8 01/05/2024    MCH 30.7 01/05/2024    MCH 29.7 12/14/2020     Lab Results   Component Value Date    MCHC 33.2 01/05/2024    MCHC 33.7 01/05/2024    MCHC 32.8 12/14/2020     Lab Results   Component Value Date    RDW 13.0 01/05/2024    RDW 13.2 01/05/2024    RDW 13.2 12/14/2020     Lab Results   Component Value Date     01/05/2024     01/05/2024     12/14/2020      Recent Labs   Lab Test 03/12/25  1321 01/05/24  1034   CHOL 152 183   HDL 69 71   LDL 72 87   TRIG 56 125          Hemoglobin A1C   Date Value Ref Range Status   03/12/2025 5.6 <5.7 % Final     Comment:     Normal <5.7%   Prediabetes 5.7-6.4%    Diabetes 6.5% or higher     Note: Adopted from ADA consensus guidelines.   03/04/2021 5.6 0 - 5.6 % Final     Comment:     Normal <5.7% Prediabetes 5.7-6.4%  Diabetes 6.5% or higher - adopted from ADA   consensus guidelines.          Nortriptyline level 79 as of 5/2022 (range 50 - 150 ng / mL)  Oxcarbazepine level 14 as of 5/2022 (range 3-35 micrograms / mL)      "  MENTAL STATUS EXAM                                                                                         Awake, alert, oriented. No problems psychomotor behavior. Mood today \"in pain\" Speech: normal volume, rhythm, rate. Thought process, including associations, was unremarkable and thought content was devoid of homicidal ideation. No SI today.  No hallucinations. Insight limited. Judgment  adequate for safety. Fund of knowledge was intact. Pt demonstrates no obvious problems with attention, concentration, language, recent or remote memory although these were not formally tested.       ASSESSMENT                                                                                                      HISTORICAL:  Initial psych note 10/6/15          NOTES:      Joshua is a 64 year old with bipolar disorder, DAYANA, ADHD, chronic pain. Today we agreed on continuing the Seroquel 200 mg we increased last visit from 100 mg to 200 mg as it is helping. Trent notes he also was given the old dose of Seroquel 100 mg of which he asks if it's okay to take as prn anxiety / agitation (DAYANA) of which I noted certainly is. We will continued the scheduled Seroquel 200 mg HS for bipolar diosrder and keep 100 mg as prn anxiety. Will continue Trileptal for bipolar disorder and Vyvanse for ADHD.    TREATMENT RISK STATEMENT:  The risks, benefits, alternatives and potential adverse effects have been explained and are understood by the pt.  The pt agrees to the treatment plan with the ability to do so.   The pt knows to call the clinic for any problems or access emergency care if needed.        DIAGNOSES                    Bipolar disorder I disorder most recent episode mixed episode  DAYANA  ADHD      PLAN                                                                                                                    1)  MEDICATIONS:      Continue Seroquel 200 mg every evening. He also got the 100 mg Seroquel to and I'm fine with him taking this " prn agitation / anxiety .  Continue Trileptal 600 mg twice daily.  Continue trazodone 100 mg bedtime prn insomnia. .  Continue Vyvanse 70 mg daily last filled on 3/27/25 set to fill next for 4/24/25.     2)  THERAPY:  No change    3)  LABS: lipid profile 3/12/25     4)  PT MONITOR [call for probs]:   SEs from meds, worsening sx, SI/HI    5)  REFERRALS [CD, medical, other]:  None    6)  RTC:  ~2 months

## 2025-05-06 DIAGNOSIS — J45.40 MODERATE PERSISTENT ASTHMA WITHOUT COMPLICATION: ICD-10-CM

## 2025-05-06 DIAGNOSIS — I10 ESSENTIAL HYPERTENSION: ICD-10-CM

## 2025-05-06 RX ORDER — FUROSEMIDE 20 MG/1
20 TABLET ORAL DAILY
Qty: 90 TABLET | Refills: 3 | Status: SHIPPED | OUTPATIENT
Start: 2025-05-06

## 2025-05-06 RX ORDER — MOMETASONE FUROATE AND FORMOTEROL FUMARATE DIHYDRATE 200; 5 UG/1; UG/1
2 AEROSOL RESPIRATORY (INHALATION) 2 TIMES DAILY
Qty: 13 G | Refills: 5 | Status: SHIPPED | OUTPATIENT
Start: 2025-05-06

## 2025-05-06 NOTE — TELEPHONE ENCOUNTER
Disp Refills Start End KEYONA   furosemide (LASIX) 20 MG tablet 30 tablet 11 6/6/2024 -- No      Disp Refills Start End KEYONA   mometasone-formoterol (DULERA) 200-5 MCG/ACT inhaler 13 g 1 10/9/2023 -- No     Last Office Visit: 03/31/2025  Future Office visit:    Next 5 appointments (look out 90 days)      Jun 18, 2025 3:00 PM  (Arrive by 2:45 PM)  Return Visit with Laquita Fernandez MD  [facility] ([facility] ) [address] [place] [id number] [phone]     Jul 01, 2025 2:00 PM  (Arrive by 1:45 PM)  Provider Visit with Lissy Kraft MD  [facility] ([facility] [address]  [phone]             Routing refill request to provider for review/approval because:

## 2025-05-15 DIAGNOSIS — M54.2 CERVICALGIA: ICD-10-CM

## 2025-05-15 DIAGNOSIS — M54.50 CHRONIC BILATERAL LOW BACK PAIN WITHOUT SCIATICA: ICD-10-CM

## 2025-05-15 DIAGNOSIS — G89.29 CHRONIC BILATERAL LOW BACK PAIN WITHOUT SCIATICA: ICD-10-CM

## 2025-05-15 RX ORDER — MORPHINE SULFATE 15 MG/1
15 TABLET ORAL EVERY 6 HOURS PRN
Qty: 120 TABLET | Refills: 0 | Status: SHIPPED | OUTPATIENT
Start: 2025-05-15

## 2025-05-15 NOTE — TELEPHONE ENCOUNTER
Morphine - received 30mg from pharmacy   - so quantity was #60 versus #120 of the 15mg    Last Written Prescription Date:  4.16.25  Last Fill Quantity: #60,   # refills: 0  Last Office Visit: 3.31.25  Future Office visit:    Next 5 appointments (look out 90 days)      Jun 18, 2025 3:00 PM  (Arrive by 2:45 PM)  Return Visit with Laquita Fernandez MD  [facility] ([facility] ) [address] [place] [id number] [phone]     Jul 01, 2025 2:00 PM  (Arrive by 1:45 PM)  Provider Visit with Lissy Kraft MD  [facility] ([facility] ) [facility] [address]  [phone]             Routing refill request to provider for review/approval because:  Drug not on the G, P or [facility] refill protocol or controlled substance

## 2025-05-17 DIAGNOSIS — Z71.6 TOBACCO ABUSE COUNSELING: ICD-10-CM

## 2025-05-19 RX ORDER — NICOTINE POLACRILEX 4 MG/1
GUM, CHEWING ORAL
Qty: 220 EACH | Refills: 0 | Status: SHIPPED | OUTPATIENT
Start: 2025-05-19

## 2025-05-19 NOTE — TELEPHONE ENCOUNTER
Nicotine gum  Last signed 3/17 #220, 2 R  Last office visit 3/31  Edilma Wilkes, RN  Care Coordination

## 2025-05-20 ENCOUNTER — OFFICE VISIT (OUTPATIENT)
Dept: FAMILY MEDICINE | Facility: OTHER | Age: 65
End: 2025-05-20
Attending: FAMILY MEDICINE
Payer: COMMERCIAL

## 2025-05-20 ENCOUNTER — TELEPHONE (OUTPATIENT)
Dept: FAMILY MEDICINE | Facility: OTHER | Age: 65
End: 2025-05-20

## 2025-05-20 VITALS
WEIGHT: 159.7 LBS | OXYGEN SATURATION: 97 % | DIASTOLIC BLOOD PRESSURE: 70 MMHG | HEART RATE: 90 BPM | SYSTOLIC BLOOD PRESSURE: 118 MMHG | BODY MASS INDEX: 22.36 KG/M2 | TEMPERATURE: 98.3 F | HEIGHT: 71 IN | RESPIRATION RATE: 17 BRPM

## 2025-05-20 DIAGNOSIS — R63.4 WEIGHT LOSS: ICD-10-CM

## 2025-05-20 DIAGNOSIS — G89.29 CHRONIC BILATERAL LOW BACK PAIN WITHOUT SCIATICA: Primary | ICD-10-CM

## 2025-05-20 DIAGNOSIS — G89.29 CHRONIC BILATERAL LOW BACK PAIN WITHOUT SCIATICA: ICD-10-CM

## 2025-05-20 DIAGNOSIS — M54.2 CERVICALGIA: ICD-10-CM

## 2025-05-20 DIAGNOSIS — M54.50 CHRONIC BILATERAL LOW BACK PAIN WITHOUT SCIATICA: ICD-10-CM

## 2025-05-20 DIAGNOSIS — M54.50 CHRONIC BILATERAL LOW BACK PAIN WITHOUT SCIATICA: Primary | ICD-10-CM

## 2025-05-20 DIAGNOSIS — F31.61 BIPOLAR DISORDER, CURRENT EPISODE MIXED, MILD (H): ICD-10-CM

## 2025-05-20 PROCEDURE — G0463 HOSPITAL OUTPT CLINIC VISIT: HCPCS

## 2025-05-20 RX ORDER — BUPRENORPHINE 2 MG/1
2 TABLET SUBLINGUAL 2 TIMES DAILY
Qty: 14 TABLET | Refills: 0 | Status: SHIPPED | OUTPATIENT
Start: 2025-05-20 | End: 2025-05-21

## 2025-05-20 RX ORDER — MORPHINE SULFATE 30 MG/1
30 TABLET ORAL SEE ADMIN INSTRUCTIONS
Qty: 2 TABLET | Refills: 0 | Status: SHIPPED | OUTPATIENT
Start: 2025-05-20 | End: 2025-05-21

## 2025-05-20 ASSESSMENT — PAIN SCALES - GENERAL: PAINLEVEL_OUTOF10: SEVERE PAIN (9)

## 2025-05-20 NOTE — TELEPHONE ENCOUNTER
Talked with PCP and nothing pended as she would like patient to come into clinic to be seen regarding morphine and shortage.  DOROTHY Greene will call to schedule this week.  Edilma Wilkes RN  Care Coordination

## 2025-05-20 NOTE — PROGRESS NOTES
Assessment & Plan     Chronic bilateral low back pain without sciatica / Cervicalgia  Morphine is complete as of now. Washout period until tomorrow morning. Will start with subutex 2mg bid. Recheck by phone / MyChart on Thursday, okay to increase to tid   [place] PDMP reviewed and appropriate    - buprenorphine (SUBUTEX) 2 MG SUBL sublingual tablet; Place 1 tablet (2 mg) under the tongue 2 times daily for 7 days.  - PA submitted, will not be completed today, will do a short Rx of morphine 30mg IR (1/2 tablets)    - tomorrow (Wednesday) take morphine 15mg twice (1/2 30mg tablet), with the last dose in the evening  - the next morning (Thursday), start subutex     Weight loss  Noted. Further discussions at next visit     Bipolar disorder, current episode mixed, mild (H)  Doing well  Follows with Dr Fernandez with next 6/28/2025     The longitudinal plan of care for the diagnosis(es)/condition(s) as documented were addressed during this visit. Due to the added complexity in care, I will continue to support Trent in the subsequent management and with ongoing continuity of care.    35 minutes spent on the date of the encounter doing chart review, review of test results, interpretation of tests, patient visit, documentation and discussion with pharmacy      Follow-up  Return in about 2 weeks (around 6/3/2025) for chronic pain.    Subjective   Trent is a 64 year old, presenting for the following health issues:  Pain        5/20/2025     1:41 PM   Additional Questions   Roomed by Ramona Royal   Accompanied by self     HPI        Pain History:  When did you first notice your pain? F/U   Have you seen this provider for your pain in the past? Yes   Where in your body do you have pain? Lower back  Are you seeing anyone else for your pain? No        10/29/2024    10:22 AM 3/12/2025    10:59 AM 3/31/2025     3:08 PM   PHQ-9 SCORE   PHQ-9 Total Score Caitlyn 11 (Moderate depression) 3 (Minimal depression) Incomplete   PHQ-9 Total Score  11  3         Patient-reported    Proxy-reported           5/31/2024     9:55 AM 3/12/2025    11:01 AM 3/31/2025     3:09 PM   DAYANA-7 SCORE   Total Score 17 (severe anxiety) 7 (mild anxiety) 0 (minimal anxiety)   Total Score 17 7  0        Patient-reported           Chronic Pain Follow Up:    Location of pain: Lower back  Analgesia/pain control:    - Recent changes:  worsening    - Overall control: Inadequate pain control    - Current treatments: Opioids   Adherence:     - Do you ever take more pain medicine than prescribed? No    - When did you take your last dose of pain medicine?  This am   Adverse effects: No     - morphine shortage  - was on morphine 15mg qid = 60 MME  - out of morphine tablets Thursday of last week   - picked up morphine liquid - works okay, but GI issues     Options:   - hydrocodone 10/325 q4h  = 60 MME  - oxycodone 10 qid = 60 MME  - tramadol 100 tid = 60 MME  - mood is currently stable, so tramadol would be a risk. History of withdrawal issues with tramadol and worsening mental health   - tramadol 50mg = 10 MME    - took morphine 15mg x2 today and will be okay until tomorrow  - Trent is willing (and eagar) to try subutex       Answers submitted by the patient for this visit:  Patient Health Questionnaire (Submitted on 5/20/2025)  If you checked off any problems, how difficult have these problems made it for you to do your work, take care of things at home, or get along with other people?: Not difficult at all  PHQ9 TOTAL SCORE: 3    Wt Readings from Last 4 Encounters:   05/20/25 72.4 kg (159 lb 11.2 oz)   04/16/25 76.7 kg (169 lb)   03/31/25 76.7 kg (169 lb 1.6 oz)   03/12/25 75.3 kg (166 lb)         PDMP Review         Value Time User    State PDMP site checked  Yes 5/15/2025  9:04 AM Lissy Kraft MD          Last CSA Agreement:   CSA -- Patient Level:     [Media Unavailable] Controlled Substance Agreement - Opioid - Scan on 1/2/2025  9:11 AM: OPIOID/OPIOID PLUS CONTROLLED SUBSTANCE  "AGREEMENT   [Media Unavailable] Controlled Substance Agreement - Opioid - Scan on 5/27/2022  9:04 AM: CONTROLLED SUBSTANCE AGREEMENT   [Media Unavailable] Controlled Substance Agreement - Opioid - Scan on 3/9/2021 11:32 AM: controlled substance agreement   [Media Unavailable] Controlled Substance Agreement - Non - Opioid - Scan on 7/15/2019 10:03 AM: NON-OPIOID CONTROLLED SUBSTANCE AGREEMENT       Last UDS: 1/6/2025  }      Review of Systems  Constitutional, HEENT, cardiovascular, pulmonary, gi and gu systems are negative, except as otherwise noted.      Objective    /70   Pulse 90   Temp 98.3  F (36.8  C) (Tympanic)   Resp 17   Ht 1.803 m (5' 11\")   Wt 72.4 kg (159 lb 11.2 oz)   SpO2 97%   BMI 22.27 kg/m    Body mass index is 22.27 kg/m .  Physical Exam  Constitutional:       General: He is not in acute distress.     Appearance: He is not ill-appearing.   Cardiovascular:      Rate and Rhythm: Normal rate and regular rhythm.      Heart sounds: No murmur heard.  Pulmonary:      Effort: Pulmonary effort is normal. No respiratory distress.      Breath sounds: No wheezing or rales.   Neurological:      Mental Status: He is alert.   Psychiatric:         Mood and Affect: Mood normal.      Comments: Doing well.               Signed Electronically by: Lissy Kraft MD        "

## 2025-05-20 NOTE — TELEPHONE ENCOUNTER
Received a PA request from [facility] for buprenorphine (SUBUTEX) 2 MG SUBL sublingual tablet. Submitted on CMM. Waiting for a response.

## 2025-05-20 NOTE — PATIENT INSTRUCTIONS
No more morphine today  Start subutex 2mg twice per day for pain   MyChart me on Thursday to let me know how you are doing

## 2025-05-21 RX ORDER — MORPHINE SULFATE 30 MG/1
30 TABLET ORAL SEE ADMIN INSTRUCTIONS
Qty: 14 TABLET | Refills: 0 | Status: SHIPPED | OUTPATIENT
Start: 2025-05-21

## 2025-05-21 NOTE — TELEPHONE ENCOUNTER
Rx sent for morphine for one week  Will switch over to suboxone tablets on Tuesday  Lissy Kraft MD

## 2025-05-21 NOTE — TELEPHONE ENCOUNTER
Received a DENIAL from [facility] for buprenorphine (SUBUTEX) 2 MG SUBL sublingual tablet.

## 2025-05-22 DIAGNOSIS — G89.4 CHRONIC PAIN SYNDROME: ICD-10-CM

## 2025-05-22 RX ORDER — GABAPENTIN 300 MG/1
CAPSULE ORAL
Qty: 270 CAPSULE | Refills: 2 | Status: SHIPPED | OUTPATIENT
Start: 2025-05-22

## 2025-05-23 NOTE — PROGRESS NOTES
Assessment & Plan     Cervicalgia / Chronic bilateral low back pain without sciatica / Uncomplicated opioid dependence (H)  Morphine is not working as it usually does. Suspect back flare from mowing a rough yard on Friday. Trent is for the switch to buprenorphine product. (Subutex is not covered by insurance)   - stop moprhine and start suboxone 12 hours later.   - buprenorphine-naloxone (SUBOXONE) 2-0.5 MG SUBL sublingual tablet; Place 1 tablet under the tongue 2 times daily.    - will check by phone on Thursday, if pain is not improved, okay to increase to 2mg tid     - c/w voltaren tablets  - c/w gabapentin 900 tid  - c/w methocarbamol     Benign essential hypertension  BP low d/t taking double his normal HTN meds  No further HTN meds today    Chronic nonintractable headache, unspecified headache type  - acetaminophen-caffeine (EXCEDRIN TENSION HEADACHE) 500-65 MG TABS; Take 2 tablets by mouth every 6 hours as needed for mild pain.    Weight loss  Noted. Will work on getting pain back under control      The longitudinal plan of care for the diagnosis(es)/condition(s) as documented were addressed during this visit. Due to the added complexity in care, I will continue to support Trent in the subsequent management and with ongoing continuity of care.      Follow-up  No follow-ups on file.    Subjective   Trent is a 64 year old, presenting for the following health issues:  Pain        5/27/2025    10:13 AM   Additional Questions   Roomed by Ramona Royal   Accompanied by self     HPI        Pain History:  When did you first notice your pain? F/U  Have you seen this provider for your pain in the past? Yes   Where in your body do you have pain? Lower back   Are you seeing anyone else for your pain? No        3/12/2025    10:59 AM 3/31/2025     3:08 PM 5/20/2025     1:51 PM   PHQ-9 SCORE   PHQ-9 Total Score MyChart 3 (Minimal depression) Incomplete 3 (Minimal depression)   PHQ-9 Total Score 3   3        Patient-reported            5/31/2024     9:55 AM 3/12/2025    11:01 AM 3/31/2025     3:09 PM   DAYANA-7 SCORE   Total Score 17 (severe anxiety) 7 (mild anxiety) 0 (minimal anxiety)   Total Score 17 7  0        Patient-reported               3/12/2025    10:59 AM 3/31/2025     3:08 PM 5/20/2025     1:51 PM   PHQ-9 SCORE   PHQ-9 Total Score MyChart 3 (Minimal depression) Incomplete 3 (Minimal depression)   PHQ-9 Total Score 3   3        Patient-reported           5/31/2024     9:55 AM 3/12/2025    11:01 AM 3/31/2025     3:09 PM   DAYANA-7 SCORE   Total Score 17 (severe anxiety) 7 (mild anxiety) 0 (minimal anxiety)   Total Score 17 7  0        Patient-reported           1/5/2024     9:45 AM 9/23/2024    11:43 AM 5/20/2025     1:42 PM   PEG Score   PEG Total Score 10 9 10       Chronic Pain Follow Up:    Location of pain: lower back  Analgesia/pain control:    - Recent changes:  worsening    - Overall control: Inadequate pain control    - Current treatments: Opioids   Adherence:     - Do you ever take more pain medicine than prescribed? No    - When did you take your last dose of pain medicine?  This am   Adverse effects: No       - currently taking Morphine    - mowed lawn on Friday     # HTN  - got a double dose of HTN meds this morning. Issues filling piull box     PDMP Review         Value Time User    State PDMP site checked  Yes 5/15/2025  9:04 AM Lissy Kraft MD          Last CSA Agreement:   CSA -- Patient Level:     [Media Unavailable] Controlled Substance Agreement - Opioid - Scan on 1/2/2025  9:11 AM: OPIOID/OPIOID PLUS CONTROLLED SUBSTANCE AGREEMENT   [Media Unavailable] Controlled Substance Agreement - Opioid - Scan on 5/27/2022  9:04 AM: CONTROLLED SUBSTANCE AGREEMENT   [Media Unavailable] Controlled Substance Agreement - Opioid - Scan on 3/9/2021 11:32 AM: controlled substance agreement   [Media Unavailable] Controlled Substance Agreement - Non - Opioid - Scan on 7/15/2019 10:03 AM: NON-OPIOID CONTROLLED SUBSTANCE  "AGREEMENT       Last UDS: 1/6/2025    Wt Readings from Last 4 Encounters:   05/27/25 70.9 kg (156 lb 4.8 oz)   05/20/25 72.4 kg (159 lb 11.2 oz)   04/16/25 76.7 kg (169 lb)   03/31/25 76.7 kg (169 lb 1.6 oz)           Review of Systems  Constitutional, HEENT, cardiovascular, pulmonary, gi and gu systems are negative, except as otherwise noted.      Objective    BP (!) 82/58   Pulse 101   Temp 97.7  F (36.5  C) (Tympanic)   Resp 17   Ht 1.803 m (5' 11\")   Wt 70.9 kg (156 lb 4.8 oz)   SpO2 94%   BMI 21.80 kg/m    Body mass index is 21.8 kg/m .  Physical Exam  Constitutional:       General: He is in acute distress.      Appearance: He is ill-appearing (chronically ill).   Musculoskeletal:      Comments: Able to get up from table and ambulate w/o assistance   Neurological:      Mental Status: He is alert.   Psychiatric:         Mood and Affect: Mood normal.      Comments: At baseline                 Signed Electronically by: Lissy Kraft MD    "

## 2025-05-27 ENCOUNTER — OFFICE VISIT (OUTPATIENT)
Dept: FAMILY MEDICINE | Facility: OTHER | Age: 65
End: 2025-05-27
Attending: FAMILY MEDICINE
Payer: COMMERCIAL

## 2025-05-27 VITALS
HEART RATE: 101 BPM | HEIGHT: 71 IN | TEMPERATURE: 97.7 F | WEIGHT: 156.3 LBS | SYSTOLIC BLOOD PRESSURE: 82 MMHG | BODY MASS INDEX: 21.88 KG/M2 | RESPIRATION RATE: 17 BRPM | OXYGEN SATURATION: 94 % | DIASTOLIC BLOOD PRESSURE: 58 MMHG

## 2025-05-27 DIAGNOSIS — M54.50 CHRONIC BILATERAL LOW BACK PAIN WITHOUT SCIATICA: ICD-10-CM

## 2025-05-27 DIAGNOSIS — F11.20 UNCOMPLICATED OPIOID DEPENDENCE (H): ICD-10-CM

## 2025-05-27 DIAGNOSIS — R63.4 WEIGHT LOSS: ICD-10-CM

## 2025-05-27 DIAGNOSIS — R51.9 CHRONIC NONINTRACTABLE HEADACHE, UNSPECIFIED HEADACHE TYPE: ICD-10-CM

## 2025-05-27 DIAGNOSIS — G89.29 CHRONIC BILATERAL LOW BACK PAIN WITHOUT SCIATICA: ICD-10-CM

## 2025-05-27 DIAGNOSIS — M54.2 CERVICALGIA: Primary | ICD-10-CM

## 2025-05-27 DIAGNOSIS — I10 BENIGN ESSENTIAL HYPERTENSION: ICD-10-CM

## 2025-05-27 DIAGNOSIS — G89.29 CHRONIC NONINTRACTABLE HEADACHE, UNSPECIFIED HEADACHE TYPE: ICD-10-CM

## 2025-05-27 PROCEDURE — G0463 HOSPITAL OUTPT CLINIC VISIT: HCPCS

## 2025-05-27 RX ORDER — BUPRENORPHINE HYDROCHLORIDE AND NALOXONE HYDROCHLORIDE DIHYDRATE 2; .5 MG/1; MG/1
1 TABLET SUBLINGUAL 2 TIMES DAILY
Qty: 60 TABLET | Refills: 0 | Status: SHIPPED | OUTPATIENT
Start: 2025-05-27 | End: 2025-05-29

## 2025-05-27 ASSESSMENT — PAIN SCALES - GENERAL: PAINLEVEL_OUTOF10: SEVERE PAIN (9)

## 2025-05-27 NOTE — PATIENT INSTRUCTIONS
Stop your morphine. No more morphine today   Start suboxone tablets 2mg at 8PM today. Tomorrow take suboxone tablet 2mg twice per day     Do not take anymore blood pressure meds today

## 2025-05-29 ENCOUNTER — TELEPHONE (OUTPATIENT)
Dept: FAMILY MEDICINE | Facility: OTHER | Age: 65
End: 2025-05-29

## 2025-05-29 DIAGNOSIS — G89.29 CHRONIC BILATERAL LOW BACK PAIN WITHOUT SCIATICA: ICD-10-CM

## 2025-05-29 DIAGNOSIS — M54.2 CERVICALGIA: ICD-10-CM

## 2025-05-29 DIAGNOSIS — F11.20 UNCOMPLICATED OPIOID DEPENDENCE (H): ICD-10-CM

## 2025-05-29 DIAGNOSIS — M54.50 CHRONIC BILATERAL LOW BACK PAIN WITHOUT SCIATICA: ICD-10-CM

## 2025-05-29 RX ORDER — BUPRENORPHINE HYDROCHLORIDE AND NALOXONE HYDROCHLORIDE DIHYDRATE 2; .5 MG/1; MG/1
1 TABLET SUBLINGUAL 3 TIMES DAILY
Status: SHIPPED
Start: 2025-05-29

## 2025-05-29 NOTE — TELEPHONE ENCOUNTER
Patient very upset about his medications. Stated that he did get his medications yesterday States that the subutex is working but it does not work the 12 hours till next dose. He is wondering what he can do about this. He also stated that he will not be signing the CSA again. Very upset about [facility], his pain and the whole world

## 2025-05-30 DIAGNOSIS — F90.2 ADHD (ATTENTION DEFICIT HYPERACTIVITY DISORDER), COMBINED TYPE: ICD-10-CM

## 2025-05-30 NOTE — TELEPHONE ENCOUNTER
Michael      Last Written Prescription Date:  4.24.25  Last Fill Quantity: #30,   # refills: 0  Last Office Visit: 4.16.25  Future Office visit:    Next 5 appointments (look out 90 days)      Jun 03, 2025 1:00 PM  (Arrive by 12:45 PM)  Provider Visit with Lissy Kraft MD  [facility] [place] ([facility] [place] ) [address]  [place] [address]  [phone]     Jun 18, 2025 3:00 PM  (Arrive by 2:45 PM)  Return Visit with Laquita Fernandez MD  [facility] [place] ([facility] [place] ) [address]  [place] [address] [phone]     Jul 01, 2025 2:00 PM  (Arrive by 1:45 PM)  Provider Visit with Lissy Kraft MD  [facility] [place] ([facility] [place] ) [address]  [place] [address]  [phone]             Routing refill request to provider for review/approval because:  Drug not on the [facility], [facility] or [facility] refill protocol or controlled substance

## 2025-05-30 NOTE — TELEPHONE ENCOUNTER
Patient calling to request refill of  Vyvanse. Medication pended for provider to sign    VYVANSE 70 MG capsule      Last Written Prescription Date:  04/24/2025  Last Fill Quantity: 30,   # refills: 0  Last Office Visit: 04/16/2025  Future Office visit:    Next 5 appointments (look out 90 days)      Jun 03, 2025 1:00 PM  (Arrive by 12:45 PM)  Provider Visit with Lissy Kraft MD  [facility] ([facility] ) [address] [address]  [phone]     Jun 18, 2025 3:00 PM  (Arrive by 2:45 PM)  Return Visit with Laquita Fernandez MD  [facility] ([facility] ) [address] [place], [place] [address] [place] [address]  [phone]     Jul 01, 2025 2:00 PM  (Arrive by 1:45 PM)  Provider Visit with Lissy Kraft MD  [facility] ([facility] [address]  [phone]             Routing refill request to provider for review/approval because:      Kimberly Boecker, RN

## 2025-06-01 RX ORDER — LISDEXAMFETAMINE DIMESYLATE 70 MG/1
70 CAPSULE ORAL DAILY
Qty: 30 CAPSULE | Refills: 0 | Status: SHIPPED | OUTPATIENT
Start: 2025-06-01

## 2025-06-01 RX ORDER — LISDEXAMFETAMINE DIMESYLATE 70 MG/1
70 CAPSULE ORAL DAILY
Qty: 30 CAPSULE | Refills: 0 | OUTPATIENT
Start: 2025-06-01

## 2025-06-02 NOTE — PROGRESS NOTES
Assessment & Plan     Cervicalgia / Chronic bilateral low back pain without sciatica / Uncomplicated opioid dependence (H)  Pain seems to be improved from last visit. Trent ambulated w/o issues. Moving better than last week. Trent reports issues with sleep, muscle cramps, and nausea. Suspect muscle cramps are chronic. Will give a Rx for nausea. Trent appears to be tolerating suboxone, so will increase to qid   [place] PDMP reviewed and appropriate   - buprenorphine-naloxone (SUBOXONE) 2-0.5 MG SUBL sublingual tablet; Place 1 tablet under the tongue 4 times daily.  - c/w gabapentin   - c/w methocarbamol   - recheck one week     Gastroesophageal reflux disease with esophagitis, unspecified whether hemorrhage  - ondansetron (ZOFRAN) 4 MG tablet; Take 1 tablet (4 mg) by mouth every 8 hours as needed for nausea.    Bipolar disorder, current episode mixed, mild (H)  May have not been taking oxcarbazepine since March this year. Staff message sent to Dr Fernandez  Follows with Dr Fernandez with next visit 6/18/2025  - on seroquel 200mg at bedtime, which he reported worked well for sleep until the switch to suboxone     The longitudinal plan of care for the diagnosis(es)/condition(s) as documented were addressed during this visit. Due to the added complexity in care, I will continue to support Trent in the subsequent management and with ongoing continuity of care.      32 minutes spent on the date of the encounter doing chart review, review of test results, interpretation of tests, patient visit, documentation and communication with care coordination and psychiatry       Follow-up  Return in about 1 week (around 6/10/2025) for chronic pain.    Subjective   Trent is a 64 year old, presenting for the following health issues:  Pain        6/3/2025     1:06 PM   Additional Questions   Roomed by Ramona Royal   Accompanied by self     HPI      Pain History:  When did you first notice your pain? Chronic  Have you seen this provider for your pain  in the past? Yes   Where in your body do you have pain? Lower back  Are you seeing anyone else for your pain? No        3/12/2025    10:59 AM 3/31/2025     3:08 PM 5/20/2025     1:51 PM   PHQ-9 SCORE   PHQ-9 Total Score MyChart 3 (Minimal depression) Incomplete 3 (Minimal depression)   PHQ-9 Total Score 3   3        Patient-reported           5/31/2024     9:55 AM 3/12/2025    11:01 AM 3/31/2025     3:09 PM   DAYANA-7 SCORE   Total Score 17 (severe anxiety) 7 (mild anxiety) 0 (minimal anxiety)   Total Score 17 7  0        Patient-reported       Chronic Pain Follow Up:    Location of pain: Lower Back  Analgesia/pain control:    - Recent changes:  worsening    - Overall control: Inadequate pain control    - Current treatments: Suboxpne   Adherence:     - Do you ever take more pain medicine than prescribed? No    - When did you take your last dose of pain medicine?  This am   Adverse effects: No     - started on suboxone tablets (subutex not covered by insurance)  - having muscle cramps (last night)  - issues with nausea - does not have nausea medication  - uses cannabis     # Mood   - most likely has not been taking oxcarbazepine   - has been manic since last week with the switch to suboxone     Wt Readings from Last 4 Encounters:   06/03/25 70.9 kg (156 lb 4.8 oz)   05/27/25 70.9 kg (156 lb 4.8 oz)   05/20/25 72.4 kg (159 lb 11.2 oz)   04/16/25 76.7 kg (169 lb)         PDMP Review         Value Time User    State PDMP site checked  Yes 6/3/2025  1:01 PM Lissy Kraft MD          Last CSA Agreement:   CSA -- Patient Level:     [Media Unavailable] Controlled Substance Agreement - Opioid - Scan on 1/2/2025  9:11 AM: OPIOID/OPIOID PLUS CONTROLLED SUBSTANCE AGREEMENT   [Media Unavailable] Controlled Substance Agreement - Opioid - Scan on 5/27/2022  9:04 AM: CONTROLLED SUBSTANCE AGREEMENT   [Media Unavailable] Controlled Substance Agreement - Opioid - Scan on 3/9/2021 11:32 AM: controlled substance agreement   [Media  Unavailable] Controlled Substance Agreement - Non - Opioid - Scan on 7/15/2019 10:03 AM: NON-OPIOID CONTROLLED SUBSTANCE AGREEMENT       Last UDS: 1/6/2025        Review of Systems  Constitutional, HEENT, cardiovascular, pulmonary, gi and gu systems are negative, except as otherwise noted.      Objective    /60   Pulse 114   Temp 98.4  F (36.9  C) (Tympanic)   Resp 17   Ht 1.829 m (6')   Wt 70.9 kg (156 lb 4.8 oz)   SpO2 92%   BMI 21.20 kg/m    Body mass index is 21.2 kg/m .  Physical Exam  Constitutional:       General: He is not in acute distress.     Appearance: He is not ill-appearing.   Cardiovascular:      Rate and Rhythm: Normal rate and regular rhythm.      Heart sounds: No murmur heard.  Pulmonary:      Effort: Pulmonary effort is normal. No respiratory distress.      Breath sounds: No wheezing or rales.   Neurological:      Mental Status: He is alert.   Psychiatric:         Mood and Affect: Mood is not anxious or depressed.         Speech: Speech is rapid and pressured (mild).         Behavior: Behavior is cooperative.         Cognition and Memory: Cognition is not impaired.      Comments: Trent reports he feels manic.                 Signed Electronically by: Lissy Kraft MD

## 2025-06-03 ENCOUNTER — OFFICE VISIT (OUTPATIENT)
Dept: FAMILY MEDICINE | Facility: OTHER | Age: 65
End: 2025-06-03
Attending: FAMILY MEDICINE
Payer: COMMERCIAL

## 2025-06-03 ENCOUNTER — PATIENT OUTREACH (OUTPATIENT)
Dept: FAMILY MEDICINE | Facility: OTHER | Age: 65
End: 2025-06-03

## 2025-06-03 VITALS
WEIGHT: 156.3 LBS | SYSTOLIC BLOOD PRESSURE: 102 MMHG | DIASTOLIC BLOOD PRESSURE: 60 MMHG | BODY MASS INDEX: 21.17 KG/M2 | OXYGEN SATURATION: 92 % | RESPIRATION RATE: 17 BRPM | TEMPERATURE: 98.4 F | HEIGHT: 72 IN | HEART RATE: 114 BPM

## 2025-06-03 DIAGNOSIS — F11.90 CHRONIC, CONTINUOUS USE OF OPIOIDS: ICD-10-CM

## 2025-06-03 DIAGNOSIS — K21.00 GASTROESOPHAGEAL REFLUX DISEASE WITH ESOPHAGITIS, UNSPECIFIED WHETHER HEMORRHAGE: ICD-10-CM

## 2025-06-03 DIAGNOSIS — M54.50 CHRONIC BILATERAL LOW BACK PAIN WITHOUT SCIATICA: ICD-10-CM

## 2025-06-03 DIAGNOSIS — F31.0 BIPOLAR AFFECTIVE DISORDER, CURRENT EPISODE HYPOMANIC (H): ICD-10-CM

## 2025-06-03 DIAGNOSIS — R73.03 PREDIABETES: ICD-10-CM

## 2025-06-03 DIAGNOSIS — E78.2 MIXED HYPERLIPIDEMIA: ICD-10-CM

## 2025-06-03 DIAGNOSIS — M62.830 BACK MUSCLE SPASM: ICD-10-CM

## 2025-06-03 DIAGNOSIS — F11.20 UNCOMPLICATED OPIOID DEPENDENCE (H): ICD-10-CM

## 2025-06-03 DIAGNOSIS — M54.2 CERVICALGIA: ICD-10-CM

## 2025-06-03 DIAGNOSIS — J45.40 MODERATE PERSISTENT ASTHMA WITHOUT COMPLICATION: ICD-10-CM

## 2025-06-03 DIAGNOSIS — R68.2 DRY MOUTH: ICD-10-CM

## 2025-06-03 DIAGNOSIS — G89.29 CHRONIC BILATERAL LOW BACK PAIN WITHOUT SCIATICA: ICD-10-CM

## 2025-06-03 DIAGNOSIS — F90.2 ADHD (ATTENTION DEFICIT HYPERACTIVITY DISORDER), COMBINED TYPE: ICD-10-CM

## 2025-06-03 DIAGNOSIS — K21.9 GASTROESOPHAGEAL REFLUX DISEASE, UNSPECIFIED WHETHER ESOPHAGITIS PRESENT: ICD-10-CM

## 2025-06-03 DIAGNOSIS — R51.9 CHRONIC NONINTRACTABLE HEADACHE, UNSPECIFIED HEADACHE TYPE: ICD-10-CM

## 2025-06-03 DIAGNOSIS — F41.1 GENERALIZED ANXIETY DISORDER: ICD-10-CM

## 2025-06-03 DIAGNOSIS — G89.29 CHRONIC NONINTRACTABLE HEADACHE, UNSPECIFIED HEADACHE TYPE: ICD-10-CM

## 2025-06-03 DIAGNOSIS — R05.9 COUGH: ICD-10-CM

## 2025-06-03 DIAGNOSIS — R11.0 NAUSEA: ICD-10-CM

## 2025-06-03 DIAGNOSIS — Z71.6 TOBACCO ABUSE COUNSELING: ICD-10-CM

## 2025-06-03 DIAGNOSIS — J45.41 MODERATE PERSISTENT ASTHMA WITH EXACERBATION: ICD-10-CM

## 2025-06-03 DIAGNOSIS — G43.109 MIGRAINE WITH AURA AND WITHOUT STATUS MIGRAINOSUS, NOT INTRACTABLE: ICD-10-CM

## 2025-06-03 DIAGNOSIS — I10 ESSENTIAL HYPERTENSION, BENIGN: ICD-10-CM

## 2025-06-03 DIAGNOSIS — Z00.00 HEALTHCARE MAINTENANCE: ICD-10-CM

## 2025-06-03 DIAGNOSIS — J30.89 SEASONAL ALLERGIC RHINITIS DUE TO OTHER ALLERGIC TRIGGER: ICD-10-CM

## 2025-06-03 DIAGNOSIS — J31.0 CHRONIC RHINITIS: ICD-10-CM

## 2025-06-03 DIAGNOSIS — G89.4 CHRONIC PAIN SYNDROME: ICD-10-CM

## 2025-06-03 DIAGNOSIS — F31.61 BIPOLAR DISORDER, CURRENT EPISODE MIXED, MILD (H): Primary | ICD-10-CM

## 2025-06-03 DIAGNOSIS — F31.60 BIPOLAR DISORDER, MIXED (H): ICD-10-CM

## 2025-06-03 DIAGNOSIS — G47.00 PERSISTENT INSOMNIA: ICD-10-CM

## 2025-06-03 DIAGNOSIS — I10 ESSENTIAL HYPERTENSION: ICD-10-CM

## 2025-06-03 DIAGNOSIS — R63.4 WEIGHT LOSS: ICD-10-CM

## 2025-06-03 PROCEDURE — G0463 HOSPITAL OUTPT CLINIC VISIT: HCPCS

## 2025-06-03 RX ORDER — BUPRENORPHINE HYDROCHLORIDE AND NALOXONE HYDROCHLORIDE DIHYDRATE 2; .5 MG/1; MG/1
1 TABLET SUBLINGUAL 3 TIMES DAILY
Qty: 90 TABLET | Refills: 2 | Status: SHIPPED | OUTPATIENT
Start: 2025-06-03 | End: 2025-06-03

## 2025-06-03 RX ORDER — BUPRENORPHINE HYDROCHLORIDE AND NALOXONE HYDROCHLORIDE DIHYDRATE 2; .5 MG/1; MG/1
1 TABLET SUBLINGUAL 4 TIMES DAILY
Qty: 120 TABLET | Refills: 0 | Status: SHIPPED | OUTPATIENT
Start: 2025-06-03

## 2025-06-03 RX ORDER — ONDANSETRON 4 MG/1
4 TABLET, FILM COATED ORAL EVERY 8 HOURS PRN
Qty: 42 TABLET | Refills: 0 | Status: SHIPPED | OUTPATIENT
Start: 2025-06-03

## 2025-06-03 ASSESSMENT — PAIN SCALES - GENERAL: PAINLEVEL_OUTOF10: SEVERE PAIN (9)

## 2025-06-03 NOTE — TELEPHONE ENCOUNTER
RN CC called [facility] to set up bubble packing patient medications. The pharmacist said she would and that it could be done in one day. They needs all new scripts. They will also deliver if patient would prefer, patient to call me back and let me know.

## 2025-06-09 ENCOUNTER — TELEPHONE (OUTPATIENT)
Dept: PSYCHIATRY | Facility: OTHER | Age: 65
End: 2025-06-09

## 2025-06-09 NOTE — TELEPHONE ENCOUNTER
Received incoming call from patient.  Patient tells me he is not doing well.  He would like to talk to Dr. Fernandez.  Explained patient has a follow up appt on 6-18-25.  Patient states that is a long way off.  Offered to place on cancellation list.  Patient reports his PCP put him on a medication.  He did not say what the medication was. Difficult to know what he was asking for.  I did ask patient what I could help him with and he stated:  I don't know.   Patient ended the telephone call.  The # to reach Joshua is [phone].  Thank you

## 2025-06-10 ENCOUNTER — DOCUMENTATION ONLY (OUTPATIENT)
Dept: FAMILY MEDICINE | Facility: OTHER | Age: 65
End: 2025-06-10

## 2025-06-10 NOTE — PROGRESS NOTES
Talked with Trent Krystal 10, 2025 11:41 AM. He is doing okay today. He had a bad day yesterday  He will make his Thursday appt with me   Discussed blister packing will happen more towards July when we get his meds all in order.   Lissy Kraft MD

## 2025-06-11 NOTE — PROGRESS NOTES
Assessment & Plan     Chronic bilateral low back pain without sciatica / Cervicalgia / Uncomplicated opioid dependence (H)  Tolerating suboxone 2mg qid, but not helping much with the pain. The 2mg does not help much with the pain, will increase the dosage, but keep frequency the same.   [place] PDMP reviewed and appropriate    - buprenorphine-naloxone (SUBOXONE) 8-2 MG SUBL sublingual tablet; Place 0.5 tablets under the tongue 2 times daily.  - also keep suboxone 2-1 bid   -suboxone 4/2/2/4    Weight loss  Note. Most likely d/t food insecurity issues. Encourage to go to [facility] for evening meal   Due  for colonoscopy 7/2026    Gastroesophageal reflux disease with esophagitis, unspecified whether hemorrhage  - ondansetron (ZOFRAN) 4 MG tablet; Take 1 tablet (4 mg) by mouth every 8 hours as needed for nausea.    Pain of left thumb  - Orthopedic  Referral; Future    Bipolar disorder, mixed (H)  Follows with Dr Fernandez with next visit 6/18/2025. Mood overall stable with episodes of depression/suleiman  - QUEtiapine (SEROQUEL) 200 MG tablet; Take 1 tablet (200 mg) by mouth every evening.      The longitudinal plan of care for the diagnosis(es)/condition(s) as documented were addressed during this visit. Due to the added complexity in care, I will continue to support Trent in the subsequent management and with ongoing continuity of care.      Follow-up  Next visit July 1st     Subjective   Trent is a 64 year old, presenting for the following health issues:  Pain        6/12/2025     1:21 PM   Additional Questions   Roomed by Ramona Royal   Accompanied by self     HPI        Pain History:  When did you first notice your pain? Chronic   Have you seen this provider for your pain in the past? Yes   Where in your body do you have pain? Lower back and feet  Are you seeing anyone else for your pain? No        3/12/2025    10:59 AM 3/31/2025     3:08 PM 5/20/2025     1:51 PM   PHQ-9 SCORE   PHQ-9 Total Score Caitlyn 3  "(Minimal depression) Incomplete 3 (Minimal depression)   PHQ-9 Total Score 3   3        Patient-reported           5/31/2024     9:55 AM 3/12/2025    11:01 AM 3/31/2025     3:09 PM   DAYANA-7 SCORE   Total Score 17 (severe anxiety) 7 (mild anxiety) 0 (minimal anxiety)   Total Score 17 7  0        Patient-reported           Chronic Pain Follow Up:    Location of pain: Lower back and feet  Analgesia/pain control:    - Recent changes:  NA    - Overall control: Tolerable with discomfort    - Current treatments: Opioids   Adherence:     - Do you ever take more pain medicine than prescribed? No    - When did you take your last dose of pain medicine?  This am   Adverse effects: No     - today is a \"sore\" day  - Suboxone tablets 2 qid - tolerating, but not providing much pain relief     - alcohol - rare. Use not drinking when leaving the message on Tuesday     #  weigh loss   - food insecuring   - smoking marijauna     Wt Readings from Last 4 Encounters:   06/12/25 69.3 kg (152 lb 11.2 oz)   06/03/25 70.9 kg (156 lb 4.8 oz)   05/27/25 70.9 kg (156 lb 4.8 oz)   05/20/25 72.4 kg (159 lb 11.2 oz)         PDMP Review         Value Time User    State PDMP site checked  Yes 6/3/2025  1:01 PM Lissy Kraft MD          Last CSA Agreement:   CSA -- Patient Level:     [Media Unavailable] Controlled Substance Agreement - Opioid - Scan on 1/2/2025  9:11 AM: OPIOID/OPIOID PLUS CONTROLLED SUBSTANCE AGREEMENT   [Media Unavailable] Controlled Substance Agreement - Opioid - Scan on 5/27/2022  9:04 AM: CONTROLLED SUBSTANCE AGREEMENT   [Media Unavailable] Controlled Substance Agreement - Opioid - Scan on 3/9/2021 11:32 AM: controlled substance agreement   [Media Unavailable] Controlled Substance Agreement - Non - Opioid - Scan on 7/15/2019 10:03 AM: NON-OPIOID CONTROLLED SUBSTANCE AGREEMENT       Last UDS: 1/6/2025        Review of Systems  Constitutional, HEENT, cardiovascular, pulmonary, gi and gu systems are negative, except as " otherwise noted.      Objective    /70   Pulse 82   Temp 98.2  F (36.8  C) (Tympanic)   Resp 16   Ht 1.829 m (6')   Wt 69.3 kg (152 lb 11.2 oz)   SpO2 94%   BMI 20.71 kg/m    Body mass index is 20.71 kg/m .  Physical Exam  Constitutional:       General: He is not in acute distress.     Appearance: He is not ill-appearing.   Cardiovascular:      Rate and Rhythm: Normal rate and regular rhythm.      Heart sounds: No murmur heard.  Pulmonary:      Effort: Pulmonary effort is normal. No respiratory distress.      Breath sounds: No wheezing or rales.   Neurological:      Mental Status: He is alert.   Psychiatric:      Comments: Baseline. Doing okay         Xray (2/2025) left thumb:  IMPRESSION: Metallic density foreign body within the soft tissues adjacent to the distal phalanx of the thumb. No evidence for acute fracture-dislocation. Degenerative change at the first CMC joint and first MCP joint.           Signed Electronically by: Lissy Kraft MD

## 2025-06-12 ENCOUNTER — OFFICE VISIT (OUTPATIENT)
Dept: FAMILY MEDICINE | Facility: OTHER | Age: 65
End: 2025-06-12
Attending: FAMILY MEDICINE
Payer: COMMERCIAL

## 2025-06-12 VITALS
DIASTOLIC BLOOD PRESSURE: 70 MMHG | HEIGHT: 72 IN | SYSTOLIC BLOOD PRESSURE: 112 MMHG | WEIGHT: 152.7 LBS | BODY MASS INDEX: 20.68 KG/M2 | TEMPERATURE: 98.2 F | RESPIRATION RATE: 16 BRPM | HEART RATE: 82 BPM | OXYGEN SATURATION: 94 %

## 2025-06-12 DIAGNOSIS — R63.4 WEIGHT LOSS: ICD-10-CM

## 2025-06-12 DIAGNOSIS — M54.50 CHRONIC BILATERAL LOW BACK PAIN WITHOUT SCIATICA: Primary | ICD-10-CM

## 2025-06-12 DIAGNOSIS — M79.645 PAIN OF LEFT THUMB: ICD-10-CM

## 2025-06-12 DIAGNOSIS — K21.00 GASTROESOPHAGEAL REFLUX DISEASE WITH ESOPHAGITIS, UNSPECIFIED WHETHER HEMORRHAGE: ICD-10-CM

## 2025-06-12 DIAGNOSIS — F11.20 UNCOMPLICATED OPIOID DEPENDENCE (H): ICD-10-CM

## 2025-06-12 DIAGNOSIS — G89.29 CHRONIC BILATERAL LOW BACK PAIN WITHOUT SCIATICA: Primary | ICD-10-CM

## 2025-06-12 DIAGNOSIS — M54.2 CERVICALGIA: ICD-10-CM

## 2025-06-12 DIAGNOSIS — F31.60 BIPOLAR DISORDER, MIXED (H): ICD-10-CM

## 2025-06-12 PROCEDURE — G0463 HOSPITAL OUTPT CLINIC VISIT: HCPCS

## 2025-06-12 RX ORDER — BUPRENORPHINE HYDROCHLORIDE AND NALOXONE HYDROCHLORIDE DIHYDRATE 8; 2 MG/1; MG/1
0.5 TABLET SUBLINGUAL 2 TIMES DAILY
Qty: 30 TABLET | Refills: 0 | Status: SHIPPED | OUTPATIENT
Start: 2025-06-12

## 2025-06-12 RX ORDER — ONDANSETRON 4 MG/1
4 TABLET, FILM COATED ORAL EVERY 8 HOURS PRN
Qty: 42 TABLET | Refills: 2 | Status: SHIPPED | OUTPATIENT
Start: 2025-06-12

## 2025-06-12 RX ORDER — QUETIAPINE FUMARATE 200 MG/1
200 TABLET, FILM COATED ORAL EVERY EVENING
Qty: 30 TABLET | Refills: 3 | Status: SHIPPED | OUTPATIENT
Start: 2025-06-12

## 2025-06-12 RX ORDER — BUPRENORPHINE HYDROCHLORIDE AND NALOXONE HYDROCHLORIDE DIHYDRATE 2; .5 MG/1; MG/1
1 TABLET SUBLINGUAL 2 TIMES DAILY
Qty: 60 TABLET | Refills: 0 | Status: SHIPPED | OUTPATIENT
Start: 2025-06-12

## 2025-06-12 RX ORDER — BUPRENORPHINE HYDROCHLORIDE AND NALOXONE HYDROCHLORIDE DIHYDRATE 8; 2 MG/1; MG/1
0.5 TABLET SUBLINGUAL 4 TIMES DAILY
Qty: 60 TABLET | Refills: 1 | Status: SHIPPED | OUTPATIENT
Start: 2025-06-12 | End: 2025-06-12

## 2025-06-12 ASSESSMENT — PAIN SCALES - GENERAL: PAINLEVEL_OUTOF10: SEVERE PAIN (9)

## 2025-06-12 NOTE — PATIENT INSTRUCTIONS
Increase your suboxone to 4mg (1/2 tablet) every 6 hours.. prescription sent     Increase to 4mg,one dosage at a time per day

## 2025-06-16 DIAGNOSIS — J45.40 MODERATE PERSISTENT ASTHMA WITHOUT COMPLICATION: ICD-10-CM

## 2025-06-16 DIAGNOSIS — M62.830 BACK MUSCLE SPASM: ICD-10-CM

## 2025-06-16 DIAGNOSIS — Z00.00 HEALTHCARE MAINTENANCE: ICD-10-CM

## 2025-06-17 RX ORDER — MONTELUKAST SODIUM 10 MG/1
1 TABLET ORAL AT BEDTIME
Qty: 90 TABLET | Refills: 3 | Status: SHIPPED | OUTPATIENT
Start: 2025-06-17

## 2025-06-17 RX ORDER — METHOCARBAMOL 750 MG/1
TABLET, FILM COATED ORAL
Qty: 150 TABLET | Refills: 0 | Status: SHIPPED | OUTPATIENT
Start: 2025-06-17

## 2025-06-17 RX ORDER — ASPIRIN 81 MG/1
TABLET, CHEWABLE ORAL DAILY
Qty: 90 TABLET | Refills: 3 | Status: SHIPPED | OUTPATIENT
Start: 2025-06-17

## 2025-06-17 NOTE — TELEPHONE ENCOUNTER
Methocarbamol 750 MG Oral Tablet       Last Written Prescription Date:  04/28/2025  Last Fill Quantity: 150,   # refills: 0  Last Office Visit: 06/12/2025  Future Office visit:    Next 5 appointments (look out 90 days)      Jun 18, 2025 3:00 PM  (Arrive by 2:45 PM)  Return Visit with Laquita Fernandez MD  [facility] ([facility] ) [address] [place] [id number]  [phone]     Jul 01, 2025 2:00 PM  (Arrive by 1:45 PM)  Provider Visit with Lissy Kraft MD  [facility] ([facility] ) [facility] [address]  [phone]             Routing refill request to provider for review/approval because:      Kimberly Boecker, RN

## 2025-06-17 NOTE — TELEPHONE ENCOUNTER
EQ Aspirin Low Dose 81 MG Oral Tablet Chewable       Last Written Prescription Date:  06/06/2024  Last Fill Quantity: 30,   # refills: 11  Last Office Visit: 06/12/2025  Future Office visit:    Next 5 appointments (look out 90 days)      Jun 18, 2025 3:00 PM  (Arrive by 2:45 PM)  Return Visit with Laquita Fernandez MD  [facility] ([facility] ) [address] [place], [place] [address] [address]  [phone]     Jul 01, 2025 2:00 PM  (Arrive by 1:45 PM)  Provider Visit with Lissy Kraft MD  [facility] [address] [facility] [address]  [phone]             Routing refill request to provider for review/approval because:    Analgesics (Non-Narcotic Tylenol and ASA Only) [id number]/17/2025 08:36 AM   Protocol Details Medication is active on med list and the sig matches. RN to manually verify dose and sig if red X/fail.          montelukast (SINGULAIR) 10 MG tablet       Last Written Prescription Date:  06/02/2022  Last Fill Quantity: 90,   # refills: 0  Last Office Visit: 06/12/2025  Future Office visit:    Next 5 appointments (look out 90 days)      Jun 18, 2025 3:00 PM  (Arrive by 2:45 PM)  [facility] [address] [place] [id number] [phone]     Jul 01, 2025 2:00 PM  (Arrive by 1:45 PM)  Provider Visit with Lissy Kraft MD  [facility] [address] [facility] [address]  [phone]             Routing refill request to provider for review/approval because:    Leukotriene Inhibitors Protocol Pajene3106/17/2025 08:36 AM   Protocol Details Asthma control assessment score within normal limits in last 6 months            Kimberly Boecker, RN

## 2025-06-18 ENCOUNTER — OFFICE VISIT (OUTPATIENT)
Dept: PSYCHIATRY | Facility: OTHER | Age: 65
End: 2025-06-18
Attending: PSYCHIATRY & NEUROLOGY
Payer: COMMERCIAL

## 2025-06-18 VITALS
RESPIRATION RATE: 16 BRPM | SYSTOLIC BLOOD PRESSURE: 106 MMHG | TEMPERATURE: 98.3 F | OXYGEN SATURATION: 94 % | BODY MASS INDEX: 20.61 KG/M2 | WEIGHT: 152 LBS | DIASTOLIC BLOOD PRESSURE: 58 MMHG | HEART RATE: 80 BPM

## 2025-06-18 DIAGNOSIS — Z71.6 TOBACCO ABUSE COUNSELING: ICD-10-CM

## 2025-06-18 DIAGNOSIS — F31.0 BIPOLAR AFFECTIVE DISORDER, CURRENT EPISODE HYPOMANIC (H): Primary | ICD-10-CM

## 2025-06-18 DIAGNOSIS — F90.2 ADHD (ATTENTION DEFICIT HYPERACTIVITY DISORDER), COMBINED TYPE: ICD-10-CM

## 2025-06-18 PROCEDURE — G0463 HOSPITAL OUTPT CLINIC VISIT: HCPCS

## 2025-06-18 RX ORDER — LISDEXAMFETAMINE DIMESYLATE 70 MG/1
70 CAPSULE ORAL DAILY
Qty: 30 CAPSULE | Refills: 0 | Status: SHIPPED | OUTPATIENT
Start: 2025-06-30

## 2025-06-18 ASSESSMENT — PAIN SCALES - GENERAL: PAINLEVEL_OUTOF10: SEVERE PAIN (9)

## 2025-06-18 NOTE — PROGRESS NOTES
"                                                                        Social- Was  twice. Lives alone with his 2 cats:   Children-  2 kids, they are in [place]  Last visit 4/16/25: Continue Seroquel 200 mg every evening. He also got the 100 mg Seroquel to and I'm fine with him taking this prn agitation / anxiety .  Continue Trileptal 600 mg twice daily.  Continue trazodone 100 mg bedtime prn insomnia. .  Continue Vyvanse 70 mg daily last filled on 3/27/25 set to fill next for 4/24/25.       - pain has been bad and bugging him even trying to sleep last night. Couldn't sleep 2/2 pain  - appetite low.   - Cats Snickers and Lil baby 3 yo \"Barrel\"   - interim last viist we had discussion via telephone. He was having really rough time.   - Sept '24 cervical laminectomy C3-C4 & thoracic laminectoom T10-T11  - his mom had dementia and passed away spring 2022. Trent found out about it via Facebook / no one called him  - Oldest of 3 brothers. Mark and Major in [place].  Dad out on 40 acres    MEDICAL / SURGICAL HISTORY                     Patient Active Problem List   Diagnosis    Mixed hyperlipidemia    Chronic, continuous use of opioids    Chemical dependency (H)    Chronic rhinitis    Tinnitus of both ears    SNHL (sensorineural hearing loss)    Bipolar disorder, mixed (H)    Seizure-like activity (H)    Somatic dysfunction of pelvis region    Bilateral foot pain    Trigger index finger of right hand    Moderate persistent asthma without complication    Chronic lower back pain    Onychia of toe of left foot    Seizure disorder (H)    Benign essential hypertension    Retrograde ejaculation    Allergic rhinitis due to other allergen    Cervical spondylosis without myelopathy    Chronic headaches    Generalized anxiety disorder    Hypertrophy of prostate without urinary obstruction and other lower urinary tract symptoms (LUTS)    GERD (gastroesophageal reflux disease)    Major depressive disorder, recurrent episode, moderate (H) "    Myalgia and myositis    Other pain disorders related to psychological factors    Spinal stenosis in cervical region    Status post lumbar spinal fusion    Trigger finger    Polypharmacy    Deviated nasal septum    Attention deficit hyperactivity disorder (ADHD), combined type    chronic noninfective otitis externa    Migraine with aura and without status migrainosus, not intractable    Tobacco use    Prediabetes    Bipolar disorder, current episode mixed, mild (H)    Failed back syndrome of lumbar spine    Panlobular emphysema (H)    Implantable intrathecal infusion pump present - removed (11/2024)    Cervicalgia    Chronic bilateral low back pain without sciatica    Uncomplicated opioid dependence (H)     ALLERGY   Clavulanic acid potassium, Depakote [valproic acid], Duloxetine, Duloxetine hcl, Seasonal allergies, and Amoxicillin-pot clavulanate  MEDICATIONS                                                                                             Current Outpatient Medications   Medication Sig Dispense Refill    ACCU-CHEK GUIDE test strip       acetaminophen (TYLENOL) 325 MG tablet Take 2 tablets (650 mg) by mouth 3 times daily. 168 tablet 3    acetaminophen-caffeine (EXCEDRIN TENSION HEADACHE) 500-65 MG TABS Take 2 tablets by mouth every 6 hours as needed for mild pain. 100 tablet 1    albuterol (ACCUNEB) 1.25 MG/3ML neb solution Take 1 vial (1.25 mg) by nebulization every 6 hours as needed for shortness of breath / dyspnea or wheezing 100 vial 3    artificial saliva (BIOTENE ORALBALANCE) GEL gel Take 4 g by mouth 4 times daily (Patient taking differently: Take 4 g by mouth daily as needed (dry mouth).) 126 g 11    aspirin (EQ ASPIRIN LOW DOSE) 81 MG chewable tablet CHEW AND SWALLOW 1 TABLET BY MOUTH ONCE DAILY 90 tablet 3    atorvastatin (LIPITOR) 20 MG tablet Take 1 tablet (20 mg) by mouth daily. 90 tablet 3    blood glucose (NO BRAND SPECIFIED) lancets standard Use to test blood sugar 1 time daily or as  directed. 100 each 0    blood glucose (NO BRAND SPECIFIED) lancing device Device to be used with lancets. 1 each 0    blood glucose monitoring (NO BRAND SPECIFIED) meter device kit Use to test blood sugar 1 time daily or as directed. 1 kit 0    budesonide (PULMICORT) 0.5 MG/2ML neb solution Spray 2 mLs (0.5 mg) in nostril 2 times daily Make 240 cc [place] med sinus irrigation Mix 2 ml vial of budesonide 0.5 mg Rinse 1-2 times daily for 2-4 weeks. 60 mL 1    buprenorphine-naloxone (SUBOXONE) 2-0.5 MG SUBL sublingual tablet Place 1 tablet under the tongue 2 times daily. Take noon and evening 60 tablet 0    buprenorphine-naloxone (SUBOXONE) 8-2 MG SUBL sublingual tablet Place 0.5 tablets under the tongue 2 times daily. - morning and bedtime 30 tablet 0    cetirizine (ZYRTEC) 10 MG tablet TAKE 1 TABLET BY MOUTH DAILY 30 tablet 11    dextromethorphan-guaiFENesin (MUCINEX DM)  MG 12 hr tablet TAKE 1 TABLET BY MOUTH EVERY 12 HOURS (Patient taking differently: 1 tablet every 12 hours as needed for cough.) 60 tablet 0    diclofenac (VOLTAREN) 1 % topical gel Apply 2 g topically 4 times daily as needed for moderate pain.      diclofenac (VOLTAREN) 50 MG EC tablet Take 1 tablet (50 mg) by mouth 3 times daily as needed for moderate pain. 90 tablet 3    droNABinol (MARINOL) 2.5 MG capsule Take 1 capsule (2.5 mg) by mouth 2 times daily (before meals). 60 capsule 0    EQ NICOTINE POLACRILEX 4 MG gum PLACE 1 PIECE OF GUM INSIDE CHEEK AS NEEDED FOR SMOKING CESSATION 220 each 0    famotidine (PEPCID) 20 MG tablet TAKE 1 TABLET BY MOUTH NIGHTLY AS NEEDED FOR REFLUX 90 tablet 0    fluticasone (FLONASE) 50 MCG/ACT nasal spray USE 2 SPRAYS IN EACH NOSTRIL DAILY 16 g 0    furosemide (LASIX) 20 MG tablet Take 1 tablet (20 mg) by mouth daily. 90 tablet 3    gabapentin (NEURONTIN) 300 MG capsule TAKE 3 CAPSULES BY MOUTH THREE TIMES DAILY 270 capsule 2    hydrOXYzine (VISTARIL) 50 MG capsule TAKE 1 TO 2 CAPSULES BY MOUTH 4 TIMES DAILY AS  NEEDED FOR ANXIETY 30 capsule 3    lidocaine (XYLOCAINE) 5 % external ointment Apply topically daily as needed.      lisdexamfetamine (VYVANSE) 70 MG capsule Take 1 capsule (70 mg) by mouth daily. 30 capsule 0    losartan (COZAAR) 50 MG tablet TAKE 1 TABLET BY MOUTH DAILY 90 tablet 0    methocarbamol (ROBAXIN) 750 MG tablet TAKE 2 TABLETS BY MOUTH IN THE MORNING AND 1 THREE TIMES DAILY 150 tablet 0    metoprolol succinate ER (TOPROL XL) 50 MG 24 hr tablet TAKE 1 TABLET BY MOUTH DAILY 90 tablet 3    mometasone-formoterol (DULERA) 200-5 MCG/ACT inhaler Inhale 2 puffs into the lungs 2 times daily. 13 g 5    montelukast (SINGULAIR) 10 MG tablet Take 1 tablet (10 mg) by mouth at bedtime. 90 tablet 3    multivitamin w/minerals (THERA-VIT-M) tablet Take 1 tablet by mouth daily. 100 tablet 3    mupirocin (BACTROBAN) 2 % external ointment Apply topically daily as needed (abrasions).      NARCAN 4 MG/0.1ML nasal spray Spray 1 spray (4 mg) into one nostril alternating nostrils once as needed for opioid reversal. 2 each 1    Nutritional Supplements (NUTRITIONAL SHAKE HIGH PROTEIN) LIQD Take 1 Bottle by mouth 2 times daily. 44507 mL 2    omeprazole (PRILOSEC) 20 MG DR capsule Take 1 capsule (20 mg) by mouth 2 times daily. 180 capsule 0    ondansetron (ZOFRAN) 4 MG tablet Take 1 tablet (4 mg) by mouth every 8 hours as needed for nausea. 42 tablet 2    OXcarbazepine (TRILEPTAL) 600 MG tablet Take 1 tablet (600 mg) by mouth 2 times daily. 60 tablet 6    polyethylene glycol (MIRALAX) 17 g packet Take 17 g by mouth daily as needed for constipation.      QUEtiapine (SEROQUEL) 100 MG tablet Take 100 mg by mouth nightly as needed (sleep).      QUEtiapine (SEROQUEL) 200 MG tablet Take 1 tablet (200 mg) by mouth every evening. 30 tablet 3    SUMAtriptan (IMITREX) 50 MG tablet TAKE 1 TABLET BY MOUTH AT ONSET OF HEADACHE FOR MIGRAINE. MAY REPEAT IN 2 HOURS. MAX OF 4 TABLETS IN 24 HOURS 9 tablet 11    traZODone (DESYREL) 100 MG tablet Take 1  tablet (100 mg) by mouth nightly as needed for sleep. 90 tablet 1    VENTOLIN  (90 Base) MCG/ACT inhaler INHALE 2 PUFFS INTO THE LUNGS EVERY 4 HOURS AS NEEDED 18 g 0     No current facility-administered medications for this visit.       VITALS   Wt 68.9 kg (152 lb)   BMI 20.61 kg/m       LABS                                                                                                                           Last Comprehensive Metabolic Panel:  Sodium   Date Value Ref Range Status   10/29/2024 137 135 - 145 mmol/L Final   12/14/2020 141 133 - 144 mmol/L Final     Potassium   Date Value Ref Range Status   10/29/2024 3.7 3.4 - 5.3 mmol/L Final   09/30/2022 4.8 3.4 - 5.3 mmol/L Final   12/14/2020 3.8 3.4 - 5.3 mmol/L Final     Chloride   Date Value Ref Range Status   10/29/2024 97 (L) 98 - 107 mmol/L Final   09/30/2022 104 94 - 109 mmol/L Final   12/14/2020 107 94 - 109 mmol/L Final     Carbon Dioxide   Date Value Ref Range Status   12/14/2020 28 20 - 32 mmol/L Final     Carbon Dioxide (CO2)   Date Value Ref Range Status   10/29/2024 27 22 - 29 mmol/L Final   09/30/2022 29 20 - 32 mmol/L Final     Anion Gap   Date Value Ref Range Status   10/29/2024 13 7 - 15 mmol/L Final   09/30/2022 4 3 - 14 mmol/L Final   12/14/2020 6 3 - 14 mmol/L Final     Glucose   Date Value Ref Range Status   10/29/2024 94 70 - 99 mg/dL Final   09/30/2022 85 70 - 99 mg/dL Final   12/14/2020 108 (H) 70 - 99 mg/dL Final     Urea Nitrogen   Date Value Ref Range Status   10/29/2024 12.3 8.0 - 23.0 mg/dL Final   09/30/2022 30 7 - 30 mg/dL Final   12/14/2020 16 7 - 30 mg/dL Final     Creatinine   Date Value Ref Range Status   10/29/2024 0.87 0.67 - 1.17 mg/dL Final   12/14/2020 0.77 0.66 - 1.25 mg/dL Final     GFR Estimate   Date Value Ref Range Status   10/29/2024 >90 >60 mL/min/1.73m2 Final     Comment:     eGFR calculated using 2021 CKD-EPI equation.   12/14/2020 >90 >60 mL/min/[1.73_m2] Final     Comment:     Non  GFR  Calc  Starting 12/18/2018, serum creatinine based estimated GFR (eGFR) will be   calculated using the Chronic Kidney Disease Epidemiology Collaboration   (CKD-EPI) equation.       Calcium   Date Value Ref Range Status   10/29/2024 8.7 (L) 8.8 - 10.4 mg/dL Final     Comment:     Reference intervals for this test were updated on 7/16/2024 to reflect our healthy population more accurately. There may be differences in the flagging of prior results with similar values performed with this method. Those prior results can be interpreted in the context of the updated reference intervals.   12/14/2020 8.2 (L) 8.5 - 10.1 mg/dL Final     Lab Results   Component Value Date    WBC 7.0 01/05/2024    WBC 6.9 01/05/2024    WBC 6.6 12/14/2020     Lab Results   Component Value Date    RBC 4.30 01/05/2024    RBC 4.20 01/05/2024    RBC 3.81 12/14/2020     Lab Results   Component Value Date    HGB 12.8 01/05/2024    HGB 12.9 01/05/2024    HGB 11.3 12/14/2020     Lab Results   Component Value Date    HCT 38.6 01/05/2024    HCT 38.3 01/05/2024    HCT 34.5 12/14/2020     Lab Results   Component Value Date    MCV 90 01/05/2024    MCV 91 01/05/2024    MCV 91 12/14/2020     Lab Results   Component Value Date    MCH 29.8 01/05/2024    MCH 30.7 01/05/2024    MCH 29.7 12/14/2020     Lab Results   Component Value Date    MCHC 33.2 01/05/2024    MCHC 33.7 01/05/2024    MCHC 32.8 12/14/2020     Lab Results   Component Value Date    RDW 13.0 01/05/2024    RDW 13.2 01/05/2024    RDW 13.2 12/14/2020     Lab Results   Component Value Date     01/05/2024     01/05/2024     12/14/2020      Recent Labs   Lab Test 03/12/25  1321 01/05/24  1034   CHOL 152 183   HDL 69 71   LDL 72 87   TRIG 56 125          Hemoglobin A1C   Date Value Ref Range Status   03/12/2025 5.6 <5.7 % Final     Comment:     Normal <5.7%   Prediabetes 5.7-6.4%    Diabetes 6.5% or higher     Note: Adopted from ADA consensus guidelines.   03/04/2021 5.6 0 - 5.6 % Final      Comment:     Normal <5.7% Prediabetes 5.7-6.4%  Diabetes 6.5% or higher - adopted from ADA   consensus guidelines.          Nortriptyline level 79 as of 5/2022 (range 50 - 150 ng / mL)  Oxcarbazepine level 14 as of 5/2022 (range 3-35 micrograms / mL)       MENTAL STATUS EXAM                                                                                         Awake, alert, oriented. No problems psychomotor behavior. Mood depressed. Speech: normal volume, rhythm, rate. Thought process, including associations, was unremarkable and thought content was devoid of homicidal ideation. +SI with no intent.   No hallucinations. Insight limited. Judgment  adequate for safety. Fund of knowledge was intact. Pt demonstrates no obvious problems with attention, concentration, language, recent or remote memory although these were not formally tested.       ASSESSMENT                                                                                                      HISTORICAL:  Initial psych note 10/6/15          NOTES:      Joshua is a 64 year old with bipolar disorder, DAYANA, ADHD, chronic pain. I talked with Trent last week via telephone as he was having a really difficult time. He noted he had issues getting Trileptal and thinks he was off it for couple weeks. I sent it in to [facility] and he is going today to try to straighten out is meds.     Will continue Seroquel for bipolar disorder and Vyvanse for ADHD    TREATMENT RISK STATEMENT:  The risks, benefits, alternatives and potential adverse effects have been explained and are understood by the pt.  The pt agrees to the treatment plan with the ability to do so.   The pt knows to call the clinic for any problems or access emergency care if needed.        DIAGNOSES                    Bipolar disorder I disorder most recent episode mixed episode  DAYANA  ADHD      PLAN                                                                                                                     1)  MEDICATIONS:      Continue Seroquel 200 mg every evening and 100 mg Seroquel  prn agitation / anxiety .  Continue Trileptal 600 mg twice daily.  Continue trazodone 100 mg bedtime prn insomnia. .  Continue Vyvanse 70 mg daily for ADHD last filled on 6/2/25  I set to fill next for 6/30/25     2)  THERAPY:  No change    3)  LABS: lipid profile 3/12/25     4)  PT MONITOR [call for probs]:  SEs from meds, worsening sx, SI/HI    5)  REFERRALS [CD, medical, other]:  None    6)  RTC:  ~1 month

## 2025-06-19 RX ORDER — NICOTINE POLACRILEX 4 MG/1
GUM, CHEWING ORAL
Qty: 220 EACH | Refills: 3 | Status: SHIPPED | OUTPATIENT
Start: 2025-06-19

## 2025-06-30 NOTE — PROGRESS NOTES
Assessment & Plan     Cervicalgia / Chronic bilateral low back pain without sciatica / Uncomplicated opioid dependence (H)  [place] PDMP reviewed and appropriate    Pain better controlled with suboxone 2mg instead of 4mg, which is not unexpected. Will increase frequency o 2mg to every 4 hrs   - buprenorphine-naloxone (SUBOXONE) 2-0.5 MG SUBL sublingual tablet; Place 1 tablet under the tongue every 4 hours.      Weight loss  Most likely d/t food insecurity. Appreciate  time today.   Will update labs and refer for EGD and colonoscopy.   ERP/CRP negative.   - Adult GI  Referral - Procedure Only; Future  - CBC with Platelets & Differential  - Comprehensive metabolic panel  - TSH with free T4 reflex  - Hemoglobin A1c  - Vitamin D Deficiency  - CRP, inflammation  - ESR: Erythrocyte sedimentation rate    Normocytic anemia  Ferritin low, iron normal. Most likely AOCD with iron deficiency. Will hold on oral iron at this time d/t issues with appetite  - Iron and iron binding capacity  - Ferritin    Special screening for malignant neoplasms, colon  - Adult GI  Referral - Procedure Only; Future    Gastroesophageal reflux disease, unspecified whether esophagitis present  - Adult GI  Referral - Procedure Only; Future    Screening for prostate cancer  PSA wnl  - PSA, screen    Bipolar disorder, mixed (H) / Generalized anxiety disorder  Follows with Dr Fernandez with last visit 6/18/2025. Note reviewed. No change in meds. Follow up in one month  Mood appears stable to improved      The longitudinal plan of care for the diagnosis(es)/condition(s) as documented were addressed during this visit. Due to the added complexity in care, I will continue to support Trent in the subsequent management and with ongoing continuity of care.      Follow-up  Return in about 4 weeks (around 7/29/2025).    Subjective   Trent is a 64 year old, presenting for the following health issues:  Anxiety, Depression, and  Pain        7/1/2025     1:55 PM   Additional Questions   Roomed by Indra lpn   Accompanied by self     History of Present Illness       Back Pain:  He presents for follow up of back pain. Patient's back pain is a chronic problem.  Location of back pain:  Right lower back, left lower back, right hip, left hip, right side of waist and left side of waist  Description of back pain: burning  Back pain spreads: right buttocks and right thigh    Since patient first noticed back pain, pain is: gradually worsening  Does back pain interfere with his job:  Yes       Mental Health Follow-up:  Patient presents to follow-up on Depression.Patient's depression since last visit has been:  Bad  The patient is not having other symptoms associated with depression.      Any significant life events: financial concerns and health concerns  Patient is feeling anxious or having panic attacks.  Patient has no concerns about alcohol or drug use.    He eats 0-1 servings of fruits and vegetables daily.He consumes 2 sweetened beverage(s) daily.He exercises with enough effort to increase his heart rate 60 or more minutes per day.  He exercises with enough effort to increase his heart rate 4 days per week. He is missing 3 dose(s) of medications per week.  He is not taking prescribed medications regularly due to other.        Depression and Anxiety   How are you doing with your depression since your last visit? Worsened   How are you doing with your anxiety since your last visit?  Worsened   Are you having other symptoms that might be associated with depression or anxiety? No  Have you had a significant life event? Financial Concerns and Health Concerns   Do you have any concerns with your use of alcohol or other drugs? No    Follows with Dr Fernandez with last visit 6/18/2025. Note reviewed. No change in meds. Follow up in one month    # food insecurity -   - does not have much food at home   - eats one meal per day and ice cream   - appetite is  not all that great, cannot get his marinol   - refrig are empty  -     Wt Readings from Last 4 Encounters:   25 66.9 kg (147 lb 6.4 oz)   25 68.9 kg (152 lb)   25 69.3 kg (152 lb 11.2 oz)   25 70.9 kg (156 lb 4.8 oz)       # Wellness:  - Immunizations:  - Lipids:   - Dexa scan:  - Colon cancer screening: due 2026  - PSA:  updating today  - Lung cancer screening: quit   - AAA screening:       Social History     Tobacco Use    Smoking status: Former     Current packs/day: 0.00     Types: Cigarettes     Quit date: 1977     Years since quittin.9    Smokeless tobacco: Current     Types: Snuff   Vaping Use    Vaping status: Never Used   Substance Use Topics    Alcohol use: Yes     Comment: daily    Drug use: No         3/12/2025    10:59 AM 2025     1:51 PM 2025     1:54 PM   PHQ   PHQ-9 Total Score 3  3  0    Q9: Thoughts of better off dead/self-harm past 2 weeks Not at all Not at all Not at all       Patient-reported         3/12/2025    11:01 AM 3/31/2025     3:09 PM 2025     1:55 PM   DAYANA-7 SCORE   Total Score 7 (mild anxiety) 0 (minimal anxiety) 0 (minimal anxiety)   Total Score 7  0  0        Patient-reported         Pain History:  When did you first notice your pain? chronic   Have you seen this provider for your pain in the past? Yes   Where in your body do you have pain? Low back  Are you seeing anyone else for your pain? No        3/31/2025     3:08 PM 2025     1:51 PM 2025     1:54 PM   PHQ-9 SCORE   PHQ-9 Total Score MyChart Incomplete 3 (Minimal depression) 0   PHQ-9 Total Score  3  0        Patient-reported           3/12/2025    11:01 AM 3/31/2025     3:09 PM 2025     1:55 PM   DAYANA-7 SCORE   Total Score 7 (mild anxiety) 0 (minimal anxiety) 0 (minimal anxiety)   Total Score 7  0  0        Patient-reported       {        3/31/2025     3:08 PM 2025     1:51 PM 2025     1:54 PM   PHQ-9 SCORE   PHQ-9 Total Score MyChart Incomplete 3  (Minimal depression) 0   PHQ-9 Total Score  3  0        Patient-reported           3/12/2025    11:01 AM 3/31/2025     3:09 PM 7/1/2025     1:55 PM   DAYANA-7 SCORE   Total Score 7 (mild anxiety) 0 (minimal anxiety) 0 (minimal anxiety)   Total Score 7  0  0        Patient-reported           9/23/2024    11:43 AM 5/20/2025     1:42 PM 7/1/2025     2:08 PM   PEG Score   PEG Total Score 9 10 9.67       Chronic Pain Follow Up:    Location of pain: low back   Analgesia/pain control:    - Recent changes:  worsening    - Overall control: Inadequate pain control    - Current treatments: Buprenorphine, methocarbamol, gabapentin   Adherence:     - Do you ever take more pain medicine than prescribed? Yes:     - When did you take your last dose of pain medicine?     Adverse effects: No     - on suboxone 4/2/2/4= 12    - doing more in the summertime. Mowing, yard work   - suboxone 2mg works better than the 4mg, would like to take every 4 hours    PDMP Review         Value Time User    State PDMP site checked  Yes 6/18/2025  3:33 PM Laquita Fernandez MD          Last CSA Agreement:   CSA -- Patient Level:     [Media Unavailable] Controlled Substance Agreement - Opioid - Scan on 1/2/2025  9:11 AM: OPIOID/OPIOID PLUS CONTROLLED SUBSTANCE AGREEMENT   [Media Unavailable] Controlled Substance Agreement - Opioid - Scan on 5/27/2022  9:04 AM: CONTROLLED SUBSTANCE AGREEMENT   [Media Unavailable] Controlled Substance Agreement - Opioid - Scan on 3/9/2021 11:32 AM: controlled substance agreement   [Media Unavailable] Controlled Substance Agreement - Non - Opioid - Scan on 7/15/2019 10:03 AM: NON-OPIOID CONTROLLED SUBSTANCE AGREEMENT       Last UDS: 1/6/2025        Review of Systems  Constitutional, HEENT, cardiovascular, pulmonary, gi and gu systems are negative, except as otherwise noted.      Objective    /60 (BP Location: Left arm, Patient Position: Sitting, Cuff Size: Adult Regular)   Pulse 77   Temp 97.9  F (36.6  C) (Tympanic)    Resp 16   Wt 66.9 kg (147 lb 6.4 oz)   SpO2 94%   BMI 19.99 kg/m    Body mass index is 19.99 kg/m .  Physical Exam  Constitutional:       Appearance: He is underweight.   Neurological:      Mental Status: He is alert.   Psychiatric:      Comments: Baseline             Recent Results (from the past 24 hours)   PSA, screen   Result Value Ref Range    Prostate Specific Antigen Screen 0.28 0.00 - 4.50 ng/mL    Narrative    This result is obtained using the Roche Elecsys total PSA method on the keli [id number] immunoassay analyzer, which is an ultrasensitive method. Results obtained with different assay methods or kits cannot be used interchangeably.  This test is intended for initial prostate cancer screening. PSA values exceeding the age-specific limits are suspicious for prostate disease, but additional testing, such as prostate biopsy, is needed to diagnose prostate pathology. The American Cancer Society recommends annual examination with digital rectal examination and serum PSA beginning at age 50 and for men with a life expectancy of at least 10 years after detection of prostate cancer. For men in high-risk groups, such as  Americans or men with a first-degree relative diagnosed at a younger age, testing should begin at a younger age. It is generally recommended that information be provided to patients about the benefits and limitations of testing and treatment so they can make informed decisions.   CBC with Platelets & Differential    Narrative    The following orders were created for panel order CBC with Platelets & Differential.  Procedure                               Abnormality         Status                     ---------                               -----------         ------                     CBC with platelets and ...[[id number]]  Abnormal            Final result                 Please view results for these tests on the individual orders.   Comprehensive metabolic panel   Result Value Ref Range     Sodium 141 135 - 145 mmol/L    Potassium 4.4 3.4 - 5.3 mmol/L    Carbon Dioxide (CO2) 24 22 - 29 mmol/L    Anion Gap 12 7 - 15 mmol/L    Urea Nitrogen 19.5 8.0 - 23.0 mg/dL    Creatinine 0.97 0.67 - 1.17 mg/dL    GFR Estimate 88 >60 mL/min/1.73m2    Calcium 8.8 8.8 - 10.4 mg/dL    Chloride 105 98 - 107 mmol/L    Glucose 109 (H) 70 - 99 mg/dL    Alkaline Phosphatase 123 40 - 150 U/L    AST 22 0 - 45 U/L    ALT 21 0 - 70 U/L    Protein Total 6.5 6.4 - 8.3 g/dL    Albumin 4.5 3.5 - 5.2 g/dL    Bilirubin Total 0.2 <=1.2 mg/dL   TSH with free T4 reflex   Result Value Ref Range    TSH 1.82 0.30 - 4.20 uIU/mL   Hemoglobin A1c   Result Value Ref Range    Estimated Average Glucose 120 (H) <117 mg/dL    Hemoglobin A1C 5.8 (H) <5.7 %   CRP, inflammation   Result Value Ref Range    CRP Inflammation <3.00 <5.00 mg/L   ESR: Erythrocyte sedimentation rate   Result Value Ref Range    Erythrocyte Sedimentation Rate 5 0 - 20 mm/hr   CBC with platelets and differential   Result Value Ref Range    WBC Count 5.5 4.0 - 11.0 10e3/uL    RBC Count 4.07 (L) 4.40 - 5.90 10e6/uL    Hemoglobin 12.8 (L) 13.3 - 17.7 g/dL    Hematocrit 37.7 (L) 40.0 - 53.0 %    MCV 93 78 - 100 fL    MCH 31.4 26.5 - 33.0 pg    MCHC 34.0 31.5 - 36.5 g/dL    RDW 13.5 10.0 - 15.0 %    Platelet Count 177 150 - 450 10e3/uL    % Neutrophils 58 %    % Lymphocytes 28 %    % Monocytes 10 %    % Eosinophils 3 %    % Basophils 1 %    % Immature Granulocytes 0 %    NRBCs per 100 WBC 0 <1 /100    Absolute Neutrophils 3.2 1.6 - 8.3 10e3/uL    Absolute Lymphocytes 1.6 0.8 - 5.3 10e3/uL    Absolute Monocytes 0.5 0.0 - 1.3 10e3/uL    Absolute Eosinophils 0.2 0.0 - 0.7 10e3/uL    Absolute Basophils 0.0 0.0 - 0.2 10e3/uL    Absolute Immature Granulocytes 0.0 <=0.4 10e3/uL    Absolute NRBCs 0.0 10e3/uL   Iron and iron binding capacity   Result Value Ref Range    Iron 134 61 - 157 ug/dL    Iron Binding Capacity 351 240 - 430 ug/dL    Iron Sat Index 38 15 - 46 %   Ferritin   Result  Value Ref Range    Ferritin 34 31 - 409 ng/mL           Signed Electronically by: Lissy Kraft MD

## 2025-07-01 ENCOUNTER — RESULTS FOLLOW-UP (OUTPATIENT)
Dept: FAMILY MEDICINE | Facility: OTHER | Age: 65
End: 2025-07-01

## 2025-07-01 ENCOUNTER — OFFICE VISIT (OUTPATIENT)
Dept: FAMILY MEDICINE | Facility: OTHER | Age: 65
End: 2025-07-01
Attending: FAMILY MEDICINE
Payer: COMMERCIAL

## 2025-07-01 ENCOUNTER — CARE COORDINATION (OUTPATIENT)
Dept: FAMILY MEDICINE | Facility: OTHER | Age: 65
End: 2025-07-01

## 2025-07-01 VITALS
RESPIRATION RATE: 16 BRPM | HEART RATE: 77 BPM | DIASTOLIC BLOOD PRESSURE: 60 MMHG | OXYGEN SATURATION: 94 % | TEMPERATURE: 97.9 F | BODY MASS INDEX: 19.99 KG/M2 | SYSTOLIC BLOOD PRESSURE: 122 MMHG | WEIGHT: 147.4 LBS

## 2025-07-01 DIAGNOSIS — Z12.11 SPECIAL SCREENING FOR MALIGNANT NEOPLASMS, COLON: ICD-10-CM

## 2025-07-01 DIAGNOSIS — K21.9 GASTROESOPHAGEAL REFLUX DISEASE, UNSPECIFIED WHETHER ESOPHAGITIS PRESENT: ICD-10-CM

## 2025-07-01 DIAGNOSIS — M54.2 CERVICALGIA: Primary | ICD-10-CM

## 2025-07-01 DIAGNOSIS — E55.9 VITAMIN D DEFICIENCY: Primary | ICD-10-CM

## 2025-07-01 DIAGNOSIS — R63.4 WEIGHT LOSS: ICD-10-CM

## 2025-07-01 DIAGNOSIS — F11.20 UNCOMPLICATED OPIOID DEPENDENCE (H): ICD-10-CM

## 2025-07-01 DIAGNOSIS — D64.9 NORMOCYTIC ANEMIA: ICD-10-CM

## 2025-07-01 DIAGNOSIS — F31.60 BIPOLAR DISORDER, MIXED (H): ICD-10-CM

## 2025-07-01 DIAGNOSIS — G89.29 CHRONIC BILATERAL LOW BACK PAIN WITHOUT SCIATICA: ICD-10-CM

## 2025-07-01 DIAGNOSIS — Z12.5 SCREENING FOR PROSTATE CANCER: ICD-10-CM

## 2025-07-01 DIAGNOSIS — M54.50 CHRONIC BILATERAL LOW BACK PAIN WITHOUT SCIATICA: ICD-10-CM

## 2025-07-01 DIAGNOSIS — F41.1 GENERALIZED ANXIETY DISORDER: ICD-10-CM

## 2025-07-01 LAB
ALBUMIN SERPL BCG-MCNC: 4.5 G/DL (ref 3.5–5.2)
ALP SERPL-CCNC: 123 U/L (ref 40–150)
ALT SERPL W P-5'-P-CCNC: 21 U/L (ref 0–70)
ANION GAP SERPL CALCULATED.3IONS-SCNC: 12 MMOL/L (ref 7–15)
AST SERPL W P-5'-P-CCNC: 22 U/L (ref 0–45)
BASOPHILS # BLD AUTO: 0 10E3/UL (ref 0–0.2)
BASOPHILS NFR BLD AUTO: 1 %
BILIRUB SERPL-MCNC: 0.2 MG/DL
BUN SERPL-MCNC: 19.5 MG/DL (ref 8–23)
CALCIUM SERPL-MCNC: 8.8 MG/DL (ref 8.8–10.4)
CHLORIDE SERPL-SCNC: 105 MMOL/L (ref 98–107)
CREAT SERPL-MCNC: 0.97 MG/DL (ref 0.67–1.17)
CRP SERPL-MCNC: <3 MG/L
EGFRCR SERPLBLD CKD-EPI 2021: 88 ML/MIN/1.73M2
EOSINOPHIL # BLD AUTO: 0.2 10E3/UL (ref 0–0.7)
EOSINOPHIL NFR BLD AUTO: 3 %
ERYTHROCYTE [DISTWIDTH] IN BLOOD BY AUTOMATED COUNT: 13.5 % (ref 10–15)
ERYTHROCYTE [SEDIMENTATION RATE] IN BLOOD BY WESTERGREN METHOD: 5 MM/HR (ref 0–20)
EST. AVERAGE GLUCOSE BLD GHB EST-MCNC: 120 MG/DL
FERRITIN SERPL-MCNC: 34 NG/ML (ref 31–409)
GLUCOSE SERPL-MCNC: 109 MG/DL (ref 70–99)
HBA1C MFR BLD: 5.8 %
HCO3 SERPL-SCNC: 24 MMOL/L (ref 22–29)
HCT VFR BLD AUTO: 37.7 % (ref 40–53)
HGB BLD-MCNC: 12.8 G/DL (ref 13.3–17.7)
IMM GRANULOCYTES # BLD: 0 10E3/UL
IMM GRANULOCYTES NFR BLD: 0 %
IRON BINDING CAPACITY (ROCHE): 351 UG/DL (ref 240–430)
IRON SATN MFR SERPL: 38 % (ref 15–46)
IRON SERPL-MCNC: 134 UG/DL (ref 61–157)
LYMPHOCYTES # BLD AUTO: 1.6 10E3/UL (ref 0.8–5.3)
LYMPHOCYTES NFR BLD AUTO: 28 %
MCH RBC QN AUTO: 31.4 PG (ref 26.5–33)
MCHC RBC AUTO-ENTMCNC: 34 G/DL (ref 31.5–36.5)
MCV RBC AUTO: 93 FL (ref 78–100)
MONOCYTES # BLD AUTO: 0.5 10E3/UL (ref 0–1.3)
MONOCYTES NFR BLD AUTO: 10 %
NEUTROPHILS # BLD AUTO: 3.2 10E3/UL (ref 1.6–8.3)
NEUTROPHILS NFR BLD AUTO: 58 %
NRBC # BLD AUTO: 0 10E3/UL
NRBC BLD AUTO-RTO: 0 /100
PLATELET # BLD AUTO: 177 10E3/UL (ref 150–450)
POTASSIUM SERPL-SCNC: 4.4 MMOL/L (ref 3.4–5.3)
PROT SERPL-MCNC: 6.5 G/DL (ref 6.4–8.3)
PSA SERPL DL<=0.01 NG/ML-MCNC: 0.28 NG/ML (ref 0–4.5)
RBC # BLD AUTO: 4.07 10E6/UL (ref 4.4–5.9)
SODIUM SERPL-SCNC: 141 MMOL/L (ref 135–145)
TSH SERPL DL<=0.005 MIU/L-ACNC: 1.82 UIU/ML (ref 0.3–4.2)
WBC # BLD AUTO: 5.5 10E3/UL (ref 4–11)

## 2025-07-01 PROCEDURE — 83036 HEMOGLOBIN GLYCOSYLATED A1C: CPT | Mod: ZL | Performed by: FAMILY MEDICINE

## 2025-07-01 PROCEDURE — 80053 COMPREHEN METABOLIC PANEL: CPT | Mod: ZL | Performed by: FAMILY MEDICINE

## 2025-07-01 PROCEDURE — 82728 ASSAY OF FERRITIN: CPT | Mod: ZL | Performed by: FAMILY MEDICINE

## 2025-07-01 PROCEDURE — 85652 RBC SED RATE AUTOMATED: CPT | Mod: ZL | Performed by: FAMILY MEDICINE

## 2025-07-01 PROCEDURE — 83550 IRON BINDING TEST: CPT | Mod: ZL | Performed by: FAMILY MEDICINE

## 2025-07-01 PROCEDURE — 36415 COLL VENOUS BLD VENIPUNCTURE: CPT | Mod: ZL | Performed by: FAMILY MEDICINE

## 2025-07-01 PROCEDURE — 82306 VITAMIN D 25 HYDROXY: CPT | Mod: ZL | Performed by: FAMILY MEDICINE

## 2025-07-01 PROCEDURE — G0103 PSA SCREENING: HCPCS | Mod: ZL | Performed by: FAMILY MEDICINE

## 2025-07-01 PROCEDURE — G0463 HOSPITAL OUTPT CLINIC VISIT: HCPCS

## 2025-07-01 PROCEDURE — 85004 AUTOMATED DIFF WBC COUNT: CPT | Mod: ZL | Performed by: FAMILY MEDICINE

## 2025-07-01 PROCEDURE — 84443 ASSAY THYROID STIM HORMONE: CPT | Mod: ZL | Performed by: FAMILY MEDICINE

## 2025-07-01 PROCEDURE — 86140 C-REACTIVE PROTEIN: CPT | Mod: ZL | Performed by: FAMILY MEDICINE

## 2025-07-01 RX ORDER — BUPRENORPHINE HYDROCHLORIDE AND NALOXONE HYDROCHLORIDE DIHYDRATE 2; .5 MG/1; MG/1
1 TABLET SUBLINGUAL EVERY 4 HOURS
Qty: 180 TABLET | Refills: 0 | Status: SHIPPED | OUTPATIENT
Start: 2025-07-01

## 2025-07-01 ASSESSMENT — ANXIETY QUESTIONNAIRES
GAD7 TOTAL SCORE: 0
7. FEELING AFRAID AS IF SOMETHING AWFUL MIGHT HAPPEN: NOT AT ALL
1. FEELING NERVOUS, ANXIOUS, OR ON EDGE: NOT AT ALL
7. FEELING AFRAID AS IF SOMETHING AWFUL MIGHT HAPPEN: NOT AT ALL
3. WORRYING TOO MUCH ABOUT DIFFERENT THINGS: NOT AT ALL
GAD7 TOTAL SCORE: 0
GAD7 TOTAL SCORE: 0
4. TROUBLE RELAXING: NOT AT ALL
2. NOT BEING ABLE TO STOP OR CONTROL WORRYING: NOT AT ALL
IF YOU CHECKED OFF ANY PROBLEMS ON THIS QUESTIONNAIRE, HOW DIFFICULT HAVE THESE PROBLEMS MADE IT FOR YOU TO DO YOUR WORK, TAKE CARE OF THINGS AT HOME, OR GET ALONG WITH OTHER PEOPLE: NOT DIFFICULT AT ALL
6. BECOMING EASILY ANNOYED OR IRRITABLE: NOT AT ALL
5. BEING SO RESTLESS THAT IT IS HARD TO SIT STILL: NOT AT ALL
8. IF YOU CHECKED OFF ANY PROBLEMS, HOW DIFFICULT HAVE THESE MADE IT FOR YOU TO DO YOUR WORK, TAKE CARE OF THINGS AT HOME, OR GET ALONG WITH OTHER PEOPLE?: NOT DIFFICULT AT ALL

## 2025-07-01 ASSESSMENT — PATIENT HEALTH QUESTIONNAIRE - PHQ9
10. IF YOU CHECKED OFF ANY PROBLEMS, HOW DIFFICULT HAVE THESE PROBLEMS MADE IT FOR YOU TO DO YOUR WORK, TAKE CARE OF THINGS AT HOME, OR GET ALONG WITH OTHER PEOPLE: NOT DIFFICULT AT ALL
SUM OF ALL RESPONSES TO PHQ QUESTIONS 1-9: 0
SUM OF ALL RESPONSES TO PHQ QUESTIONS 1-9: 0

## 2025-07-01 ASSESSMENT — PAIN SCALES - GENERAL: PAINLEVEL_OUTOF10: NO PAIN (0)

## 2025-07-01 NOTE — PATIENT INSTRUCTIONS
We will call or MyChart you with your lab results.     Referral placed for EGD/ colonoscopy     Suboxone 2mg every 4 hours

## 2025-07-01 NOTE — PROGRESS NOTES
met with patient regarding food insecurity.  assisted patient with calling the [employer] and setting up an appointment for tomorrow at 1:30pm to  food. Patient is aware of where the [employer] is located and is able to get there for his appointment we set up. Patient and  also discussed going there for hot meals as well.  wrote down an appointment reminder for patient and also the contact information for the [facility].  also provided patient with the contact information for [facility].  also discussed Meals on Wheels and patient was provided with the contact information if he is interested in participating in this program through [employer]. Patient does participate in the SNAP program.  also provided patient with two bags of food to prepare when he gets home.  provided patient with this writer's contact information if he has any questions or if he would like any additional assistance.

## 2025-07-02 ENCOUNTER — TELEPHONE (OUTPATIENT)
Dept: CARE COORDINATION | Facility: OTHER | Age: 65
End: 2025-07-02

## 2025-07-02 LAB — VIT D+METAB SERPL-MCNC: 21 NG/ML (ref 20–50)

## 2025-07-02 NOTE — TELEPHONE ENCOUNTER
Noted. Let's give Trent a call tomorrow to check and see how he is doing  Sounds like he is on his way to [facility] for a meal.   Lissy Kraft MD

## 2025-07-02 NOTE — TELEPHONE ENCOUNTER
Patient called and left a VM message on triage line at 408 PM. Patient stated he is having trouble controlling his thoughts and clarity and having increased anxiety and panic attacks and he said it is scary and he can't remember how to get in contact with Dr Coelho. Patient is asking for stronger medications.

## 2025-07-03 RX ORDER — FAMOTIDINE 20 MG
1 TABLET ORAL DAILY
Qty: 90 CAPSULE | Refills: 3 | Status: SHIPPED | OUTPATIENT
Start: 2025-07-03

## 2025-07-03 NOTE — TELEPHONE ENCOUNTER
RN CC called to check in with patient. He said he was able to get a good meal at [facility] last night. RN asked patient how many meals he eats in one day and he said 1 and maybe a snack of peanut butter sandwich. Patient said he is coming to the clinic today to  the [employer] voucher and RN also gave him listing of all food shelves. RN asked patient if he had enough medications to get him through the weekend and he said yes, that he has enough meds for 3 weeks. Patient very grateful for the phone call and food voucher.

## 2025-07-21 DIAGNOSIS — M54.2 CERVICALGIA: ICD-10-CM

## 2025-07-21 DIAGNOSIS — M62.830 BACK MUSCLE SPASM: ICD-10-CM

## 2025-07-21 RX ORDER — METHOCARBAMOL 750 MG/1
TABLET, FILM COATED ORAL
Qty: 150 TABLET | Refills: 2 | Status: SHIPPED | OUTPATIENT
Start: 2025-07-21

## 2025-07-21 NOTE — TELEPHONE ENCOUNTER
Diclofenac Sodium 50 MG Oral Tablet Delayed Release       Last Written Prescription Date:  02/24/2025  Last Fill Quantity: 90,   # refills: 3  Last Office Visit: 07/01/2025  Future Office visit:    Next 5 appointments (look out 90 days)      Jul 29, 2025 1:20 PM  (Arrive by 1:05 PM)  Return Visit with Laquita [facility]andez [facility]  [facility] [address] [place], [place] [address] [place] [address]  [phone]     Aug 04, 2025 4:00 PM  (Arrive by 3:45 PM)  Provider Visit with Lissy Kraft MD  [facility] [address] [facility] [address]  [phone]             Routing refill request to provider for review/approval because:    NSAID Medications [id number]/21/2025 08:14 AM   Protocol Details Normal CBC on file in past 12 months    Medication is active on med list and the sig matches. RN to manually verify dose and sig if red X/fail.    Always Fail Criteria - Chart Review Required        Methocarbamol 750 MG Oral Tablet       Last Written Prescription Date:  06/17/2025  Last Fill Quantity: 150,   # refills: 0  Last Office Visit: 07/01/2025  Future Office visit:    Next 5 appointments (look out 90 days)      Jul 29, 2025 1:20 PM  (Arrive by 1:05 PM)  Return Visit with Laquita Fernandez MD  [facility] ([facility] ) [address] [place], [place] [address] [place] [address]  [phone]     Aug 04, 2025 4:00 PM  (Arrive by 3:45 PM)  Provider Visit with Lissy Kraft MD  [facility] ([facility] [address] [facility] [address]  [phone]             Routing refill request to provider for review/approval because:    Kimberly Boecker, RN

## 2025-07-28 DIAGNOSIS — F90.2 ADHD (ATTENTION DEFICIT HYPERACTIVITY DISORDER), COMBINED TYPE: ICD-10-CM

## 2025-07-28 RX ORDER — LISDEXAMFETAMINE DIMESYLATE 70 MG/1
70 CAPSULE ORAL DAILY
Qty: 30 CAPSULE | Refills: 0 | Status: SHIPPED | OUTPATIENT
Start: 2025-07-28

## 2025-07-28 NOTE — TELEPHONE ENCOUNTER
Michael      Last Written Prescription Date:  6.30.25  Last Fill Quantity: #30,   # refills: 0  Last Office Visit: 6.18.25  Future Office visit:    Next 5 appointments (look out 90 days)      Jul 29, 2025 1:20 PM  (Arrive by 1:05 PM)  Return Visit with Laquita Fernandez MD  [facility] ([facility] ) [address] [place] [address]  [phone]     Aug 04, 2025 4:00 PM  (Arrive by 3:45 PM)  Provider Visit with Lissy Kraft MD  [facility] [address] [facility] [facility] [address]  [phone]             Routing refill request to provider for review/approval because:  Drug not on the [facility], [facility] or  Health refill protocol or controlled substance

## 2025-07-29 ENCOUNTER — APPOINTMENT (OUTPATIENT)
Dept: GENERAL RADIOLOGY | Facility: HOSPITAL | Age: 65
End: 2025-07-29
Attending: STUDENT IN AN ORGANIZED HEALTH CARE EDUCATION/TRAINING PROGRAM
Payer: COMMERCIAL

## 2025-07-29 ENCOUNTER — HOSPITAL ENCOUNTER (EMERGENCY)
Facility: HOSPITAL | Age: 65
Discharge: HOME | End: 2025-07-29
Attending: STUDENT IN AN ORGANIZED HEALTH CARE EDUCATION/TRAINING PROGRAM
Payer: COMMERCIAL

## 2025-07-29 ENCOUNTER — APPOINTMENT (OUTPATIENT)
Dept: CT IMAGING | Facility: HOSPITAL | Age: 65
End: 2025-07-29
Attending: STUDENT IN AN ORGANIZED HEALTH CARE EDUCATION/TRAINING PROGRAM
Payer: COMMERCIAL

## 2025-07-29 ENCOUNTER — OFFICE VISIT (OUTPATIENT)
Dept: PSYCHIATRY | Facility: OTHER | Age: 65
End: 2025-07-29
Attending: PSYCHIATRY & NEUROLOGY
Payer: COMMERCIAL

## 2025-07-29 VITALS
RESPIRATION RATE: 17 BRPM | SYSTOLIC BLOOD PRESSURE: 152 MMHG | HEIGHT: 71 IN | BODY MASS INDEX: 21.06 KG/M2 | OXYGEN SATURATION: 99 % | HEART RATE: 105 BPM | TEMPERATURE: 98.3 F | DIASTOLIC BLOOD PRESSURE: 111 MMHG | WEIGHT: 150.4 LBS

## 2025-07-29 VITALS
RESPIRATION RATE: 16 BRPM | WEIGHT: 147 LBS | BODY MASS INDEX: 19.94 KG/M2 | OXYGEN SATURATION: 92 % | TEMPERATURE: 98.5 F | SYSTOLIC BLOOD PRESSURE: 110 MMHG | HEART RATE: 108 BPM | DIASTOLIC BLOOD PRESSURE: 70 MMHG

## 2025-07-29 DIAGNOSIS — F90.2 ADHD (ATTENTION DEFICIT HYPERACTIVITY DISORDER), COMBINED TYPE: Primary | ICD-10-CM

## 2025-07-29 DIAGNOSIS — F31.60 BIPOLAR DISORDER, MIXED (H): ICD-10-CM

## 2025-07-29 DIAGNOSIS — S12.9XXA: Primary | ICD-10-CM

## 2025-07-29 DIAGNOSIS — S22.41XA CLOSED FRACTURE OF MULTIPLE RIBS OF RIGHT SIDE, INITIAL ENCOUNTER: ICD-10-CM
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
## 2025-07-29 PROCEDURE — 72125 CT NECK SPINE W/O DYE: CPT

## 2025-07-29 PROCEDURE — 71046 X-RAY EXAM CHEST 2 VIEWS: CPT | Mod: 26 | Performed by: RADIOLOGY

## 2025-07-29 PROCEDURE — 99284 EMERGENCY DEPT VISIT MOD MDM: CPT | Performed by: STUDENT IN AN ORGANIZED HEALTH CARE EDUCATION/TRAINING PROGRAM

## 2025-07-29 PROCEDURE — 70450 CT HEAD/BRAIN W/O DYE: CPT | Mod: 26 | Performed by: RADIOLOGY

## 2025-07-29 PROCEDURE — 70450 CT HEAD/BRAIN W/O DYE: CPT

## 2025-07-29 PROCEDURE — 72125 CT NECK SPINE W/O DYE: CPT | Mod: 26 | Performed by: RADIOLOGY

## 2025-07-29 PROCEDURE — 71046 X-RAY EXAM CHEST 2 VIEWS: CPT

## 2025-07-29 PROCEDURE — 99284 EMERGENCY DEPT VISIT MOD MDM: CPT | Mod: 25 | Performed by: STUDENT IN AN ORGANIZED HEALTH CARE EDUCATION/TRAINING PROGRAM

## 2025-07-29 PROCEDURE — G0463 HOSPITAL OUTPT CLINIC VISIT: HCPCS

## 2025-07-29 ASSESSMENT — ENCOUNTER SYMPTOMS
BACK PAIN: 0
NECK STIFFNESS: 0
EYE PAIN: 0
FATIGUE: 0
ABDOMINAL PAIN: 0
NECK PAIN: 0
APPETITE CHANGE: 0
SHORTNESS OF BREATH: 0
PHOTOPHOBIA: 0
FEVER: 0

## 2025-07-29 ASSESSMENT — ACTIVITIES OF DAILY LIVING (ADL)
ADLS_ACUITY_SCORE: 50
ADLS_ACUITY_SCORE: 50

## 2025-07-29 ASSESSMENT — PAIN SCALES - GENERAL: PAINLEVEL_OUTOF10: NO PAIN (0)

## 2025-07-29 ASSESSMENT — COLUMBIA-SUICIDE SEVERITY RATING SCALE - C-SSRS
6. HAVE YOU EVER DONE ANYTHING, STARTED TO DO ANYTHING, OR PREPARED TO DO ANYTHING TO END YOUR LIFE?: NO
2. HAVE YOU ACTUALLY HAD ANY THOUGHTS OF KILLING YOURSELF IN THE PAST MONTH?: NO
1. IN THE PAST MONTH, HAVE YOU WISHED YOU WERE DEAD OR WISHED YOU COULD GO TO SLEEP AND NOT WAKE UP?: NO
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
## 2025-07-29 NOTE — PROGRESS NOTES
"                                                                        Social- Was  twice. Lives alone with his 2 cats:   Children-  2 kids, they are in [place]  Last visit 6/18/25: Continue Seroquel 200 mg every evening and 100 mg Seroquel  prn agitation / anxiety .  Continue Trileptal 600 mg twice daily.  Continue trazodone 100 mg bedtime prn insomnia. .  Continue Vyvanse 70 mg daily for ADHD last filled on 6/2/25  I set to fill next for 6/30/25       - Feels  doing things for [employer] out of kindness and not expecting to be paid for it isn't a thing anymore   - at times forgets to take his meds and messes with his sleep. Doesn't fall asleep if forgets meds until early am. If remembers to take his meds falls asleep in 1 to 2 hours  - [employer]   - Cats Snickers and Lil baby 3 yo \"Barrel\"   - Sept '24 cervical laminectomy C3-C4 & thoracic laminectoom T10-T11  - his mom had dementia and passed away spring 2022. Trent found out about it via Facebook / no one called him  - Oldest of 3 brothers. Mark and Major in [place].  Dad out on [place] acres    MEDICAL / SURGICAL HISTORY                     Patient Active Problem List   Diagnosis    Mixed hyperlipidemia    Chronic, continuous use of opioids    Chemical dependency (H)    Chronic rhinitis    Tinnitus of both ears    SNHL (sensorineural hearing loss)    Bipolar disorder, mixed (H)    Seizure-like activity (H)    Somatic dysfunction of pelvis region    Bilateral foot pain    Trigger index finger of right hand    Moderate persistent asthma without complication    Chronic lower back pain    Onychia of toe of left foot    Seizure disorder (H)    Benign essential hypertension    Retrograde ejaculation    Allergic rhinitis due to other allergen    Cervical spondylosis without myelopathy    Chronic headaches    Generalized anxiety disorder    Hypertrophy of prostate without urinary obstruction and other lower urinary tract symptoms (LUTS)    GERD (gastroesophageal " reflux disease)    Major depressive disorder, recurrent episode, moderate (H)    Myalgia and myositis    Other pain disorders related to psychological factors    Spinal stenosis in cervical region    Status post lumbar spinal fusion    Trigger finger    Polypharmacy    Deviated nasal septum    Attention deficit hyperactivity disorder (ADHD), combined type    chronic noninfective otitis externa    Migraine with aura and without status migrainosus, not intractable    Tobacco use    Prediabetes    Bipolar disorder, current episode mixed, mild (H)    Failed back syndrome of lumbar spine    Panlobular emphysema (H)    Implantable intrathecal infusion pump present - removed (11/2024)    Cervicalgia    Chronic bilateral low back pain without sciatica    Uncomplicated opioid dependence (H)     ALLERGY   Clavulanic acid potassium, Depakote [valproic acid], Duloxetine, Duloxetine hcl, Seasonal allergies, and Amoxicillin-pot clavulanate  MEDICATIONS                                                                                             Current Outpatient Medications   Medication Sig Dispense Refill    ACCU-CHEK GUIDE test strip       acetaminophen (TYLENOL) 325 MG tablet Take 2 tablets (650 mg) by mouth 3 times daily. 168 tablet 3    acetaminophen-caffeine (EXCEDRIN TENSION HEADACHE) 500-65 MG TABS Take 2 tablets by mouth every 6 hours as needed for mild pain. 100 tablet 1    albuterol (ACCUNEB) 1.25 MG/3ML neb solution Take 1 vial (1.25 mg) by nebulization every 6 hours as needed for shortness of breath / dyspnea or wheezing 100 vial 3    artificial saliva (BIOTENE ORALBALANCE) GEL gel Take 4 g by mouth 4 times daily (Patient taking differently: Take 4 g by mouth daily as needed (dry mouth).) 126 g 11    aspirin (EQ ASPIRIN LOW DOSE) 81 MG chewable tablet CHEW AND SWALLOW 1 TABLET BY MOUTH ONCE DAILY 90 tablet 3    atorvastatin (LIPITOR) 20 MG tablet Take 1 tablet (20 mg) by mouth daily. 90 tablet 3    blood glucose (NO  BRAND SPECIFIED) lancets standard Use to test blood sugar 1 time daily or as directed. 100 each 0    blood glucose (NO BRAND SPECIFIED) lancing device Device to be used with lancets. 1 each 0    blood glucose monitoring (NO BRAND SPECIFIED) meter device kit Use to test blood sugar 1 time daily or as directed. 1 kit 0    budesonide (PULMICORT) 0.5 MG/2ML neb solution Spray 2 mLs (0.5 mg) in nostril 2 times daily Make 240 cc [place] med sinus irrigation Mix 2 ml vial of budesonide 0.5 mg Rinse 1-2 times daily for 2-4 weeks. 60 mL 1    buprenorphine-naloxone (SUBOXONE) 2-0.5 MG SUBL sublingual tablet Place 1 tablet under the tongue every 4 hours. 180 tablet 0    cetirizine (ZYRTEC) 10 MG tablet TAKE 1 TABLET BY MOUTH DAILY 30 tablet 11    dextromethorphan-guaiFENesin (MUCINEX DM)  MG 12 hr tablet TAKE 1 TABLET BY MOUTH EVERY 12 HOURS (Patient taking differently: 1 tablet every 12 hours as needed for cough.) 60 tablet 0    diclofenac (VOLTAREN) 1 % topical gel Apply 2 g topically 4 times daily as needed for moderate pain.      diclofenac (VOLTAREN) 50 MG EC tablet TAKE 1 TABLET BY MOUTH THREE TIMES DAILY AS NEEDED FOR MODERATE PAIN 90 tablet 2    droNABinol (MARINOL) 2.5 MG capsule Take 1 capsule (2.5 mg) by mouth 2 times daily (before meals). 60 capsule 0    famotidine (PEPCID) 20 MG tablet TAKE 1 TABLET BY MOUTH NIGHTLY AS NEEDED FOR REFLUX 90 tablet 0    fluticasone (FLONASE) 50 MCG/ACT nasal spray USE 2 SPRAYS IN EACH NOSTRIL DAILY 16 g 0    furosemide (LASIX) 20 MG tablet Take 1 tablet (20 mg) by mouth daily. 90 tablet 3    gabapentin (NEURONTIN) 300 MG capsule TAKE 3 CAPSULES BY MOUTH THREE TIMES DAILY 270 capsule 2    hydrOXYzine (VISTARIL) 50 MG capsule TAKE 1 TO 2 CAPSULES BY MOUTH 4 TIMES DAILY AS NEEDED FOR ANXIETY 30 capsule 3    lidocaine (XYLOCAINE) 5 % external ointment Apply topically daily as needed.      lisdexamfetamine (VYVANSE) 70 MG capsule Take 1 capsule (70 mg) by mouth daily. 30 capsule 0     losartan (COZAAR) 50 MG tablet TAKE 1 TABLET BY MOUTH DAILY 90 tablet 0    methocarbamol (ROBAXIN) 750 MG tablet TAKE 2 TABLETS BY MOUTH IN THE MORNING AND 1 THREE TIMES DAILY 150 tablet 2    metoprolol succinate ER (TOPROL XL) 50 MG 24 hr tablet TAKE 1 TABLET BY MOUTH DAILY 90 tablet 3    mometasone-formoterol (DULERA) 200-5 MCG/ACT inhaler Inhale 2 puffs into the lungs 2 times daily. 13 g 5    montelukast (SINGULAIR) 10 MG tablet Take 1 tablet (10 mg) by mouth at bedtime. 90 tablet 3    multivitamin w/minerals (THERA-VIT-M) tablet Take 1 tablet by mouth daily. 100 tablet 3    mupirocin (BACTROBAN) 2 % external ointment Apply topically daily as needed (abrasions).      NARCAN 4 MG/0.1ML nasal spray Spray 1 spray (4 mg) into one nostril alternating nostrils once as needed for opioid reversal. 2 each 1    nicotine polacrilex (EQ NICOTINE POLACRILEX) 4 MG gum PLACE 1 PIECE OF GUM INSIDE CHEEK AS NEEDED FOR SMOKING CESSATION 220 each 3    Nutritional Supplements (NUTRITIONAL SHAKE HIGH PROTEIN) LIQD Take 1 Bottle by mouth 2 times daily. 21180 mL 2    omeprazole (PRILOSEC) 20 MG DR capsule Take 1 capsule (20 mg) by mouth 2 times daily. 180 capsule 0    ondansetron (ZOFRAN) 4 MG tablet Take 1 tablet (4 mg) by mouth every 8 hours as needed for nausea. 42 tablet 2    OXcarbazepine (TRILEPTAL) 600 MG tablet Take 1 tablet (600 mg) by mouth 2 times daily. 60 tablet 6    polyethylene glycol (MIRALAX) 17 g packet Take 17 g by mouth daily as needed for constipation.      QUEtiapine (SEROQUEL) 100 MG tablet Take 100 mg by mouth nightly as needed (sleep).      QUEtiapine (SEROQUEL) 200 MG tablet Take 1 tablet (200 mg) by mouth every evening. 30 tablet 3    SUMAtriptan (IMITREX) 50 MG tablet TAKE 1 TABLET BY MOUTH AT ONSET OF HEADACHE FOR MIGRAINE. MAY REPEAT IN 2 HOURS. MAX OF 4 TABLETS IN 24 HOURS 9 tablet 11    traZODone (DESYREL) 100 MG tablet Take 1 tablet (100 mg) by mouth nightly as needed for sleep. 90 tablet 1    VENTOLIN   (90 Base) MCG/ACT inhaler INHALE 2 PUFFS INTO THE LUNGS EVERY 4 HOURS AS NEEDED 18 g 0    Vitamin D, Cholecalciferol, 25 MCG (1000 UT) CAPS Take 1 capsule by mouth daily. 90 capsule 3     No current facility-administered medications for this visit.       VITALS   Wt 66.7 kg (147 lb)   BMI 19.94 kg/m       LABS                                                                                                                           Last Comprehensive Metabolic Panel:  Sodium   Date Value Ref Range Status   07/01/2025 141 135 - 145 mmol/L Final   12/14/2020 141 133 - 144 mmol/L Final     Potassium   Date Value Ref Range Status   07/01/2025 4.4 3.4 - 5.3 mmol/L Final   09/30/2022 4.8 3.4 - 5.3 mmol/L Final   12/14/2020 3.8 3.4 - 5.3 mmol/L Final     Chloride   Date Value Ref Range Status   07/01/2025 105 98 - 107 mmol/L Final   09/30/2022 104 94 - 109 mmol/L Final   12/14/2020 107 94 - 109 mmol/L Final     Carbon Dioxide   Date Value Ref Range Status   12/14/2020 28 20 - 32 mmol/L Final     Carbon Dioxide (CO2)   Date Value Ref Range Status   07/01/2025 24 22 - 29 mmol/L Final   09/30/2022 29 20 - 32 mmol/L Final     Anion Gap   Date Value Ref Range Status   07/01/2025 12 7 - 15 mmol/L Final   09/30/2022 4 3 - 14 mmol/L Final   12/14/2020 6 3 - 14 mmol/L Final     Glucose   Date Value Ref Range Status   07/01/2025 109 (H) 70 - 99 mg/dL Final   09/30/2022 85 70 - 99 mg/dL Final   12/14/2020 108 (H) 70 - 99 mg/dL Final     Urea Nitrogen   Date Value Ref Range Status   07/01/2025 19.5 8.0 - 23.0 mg/dL Final   09/30/2022 30 7 - 30 mg/dL Final   12/14/2020 16 7 - 30 mg/dL Final     Creatinine   Date Value Ref Range Status   07/01/2025 0.97 0.67 - 1.17 mg/dL Final   12/14/2020 0.77 0.66 - 1.25 mg/dL Final     GFR Estimate   Date Value Ref Range Status   07/01/2025 88 >60 mL/min/1.73m2 Final     Comment:     eGFR calculated using 2021 CKD-EPI equation.   12/14/2020 >90 >60 mL/min/[1.73_m2] Final     Comment:     Non   GFR Calc  Starting 12/18/2018, serum creatinine based estimated GFR (eGFR) will be   calculated using the Chronic Kidney Disease Epidemiology Collaboration   (CKD-EPI) equation.       Calcium   Date Value Ref Range Status   07/01/2025 8.8 8.8 - 10.4 mg/dL Final   12/14/2020 8.2 (L) 8.5 - 10.1 mg/dL Final     Lab Results   Component Value Date    WBC 7.0 01/05/2024    WBC 6.9 01/05/2024    WBC 6.6 12/14/2020     Lab Results   Component Value Date    RBC 4.30 01/05/2024    RBC 4.20 01/05/2024    RBC 3.81 12/14/2020     Lab Results   Component Value Date    HGB 12.8 01/05/2024    HGB 12.9 01/05/2024    HGB 11.3 12/14/2020     Lab Results   Component Value Date    HCT 38.6 01/05/2024    HCT 38.3 01/05/2024    HCT 34.5 12/14/2020     Lab Results   Component Value Date    MCV 90 01/05/2024    MCV 91 01/05/2024    MCV 91 12/14/2020     Lab Results   Component Value Date    MCH 29.8 01/05/2024    MCH 30.7 01/05/2024    MCH 29.7 12/14/2020     Lab Results   Component Value Date    MCHC 33.2 01/05/2024    MCHC 33.7 01/05/2024    MCHC 32.8 12/14/2020     Lab Results   Component Value Date    RDW 13.0 01/05/2024    RDW 13.2 01/05/2024    RDW 13.2 12/14/2020     Lab Results   Component Value Date     01/05/2024     01/05/2024     12/14/2020      Recent Labs   Lab Test 03/12/25  1321 01/05/24  1034   CHOL 152 183   HDL 69 71   LDL 72 87   TRIG 56 125          Hemoglobin A1C   Date Value Ref Range Status   07/01/2025 5.8 (H) <5.7 % Final     Comment:     Normal <5.7%   Prediabetes 5.7-6.4%    Diabetes 6.5% or higher     Note: Adopted from ADA consensus guidelines.   03/04/2021 5.6 0 - 5.6 % Final     Comment:     Normal <5.7% Prediabetes 5.7-6.4%  Diabetes 6.5% or higher - adopted from ADA   consensus guidelines.          Nortriptyline level 79 as of 5/2022 (range 50 - 150 ng / mL)  Oxcarbazepine level 14 as of 5/2022 (range 3-35 micrograms / mL)       MENTAL STATUS EXAM                                                                                          Awake, alert, oriented. No problems psychomotor behavior. Mood today elevated. Speech: normal volume, rhythm, rate. Thought process, including associations, was unremarkable and thought content was devoid of homicidal ideation. +SI with no intent.   No hallucinations. Insight limited. Judgment  adequate for safety. Fund of knowledge was intact. Pt demonstrates no obvious problems with attention, concentration, language, recent or remote memory although these were not formally tested.       ASSESSMENT                                                                                                      HISTORICAL:  Initial psych note 10/6/15          NOTES:      Joshua is a 64 year old with bipolar disorder, DAYANA, ADHD, chronic pain. He appears to be in a good place today - good mood, full of stories. Notes he has times he forgets to takes his meds then everything gets mixed up: ends up not falling asleep until early am hours, wakes up late, disruptes his whole day. If he takes his meds tends to fall asleep within 1 to 2 hours. We discussed today Bipolar and he notes he denied it for years but he does feel he has bipolar diosrder. We will continue Trileptal and Seroquel for bipolar disorder. We both agree he also has ADHD and we will continue Vyvanse for ADHD.     TREATMENT RISK STATEMENT:  The risks, benefits, alternatives and potential adverse effects have been explained and are understood by the pt.  The pt agrees to the treatment plan with the ability to do so.   The pt knows to call the clinic for any problems or access emergency care if needed.        DIAGNOSES                    Bipolar disorder I disorder most recent episode mixed episode  DAYANA  ADHD      PLAN                                                                                                                    1)  MEDICATIONS:      Continue Seroquel 200 mg every evening and 100 mg  Seroquel  prn agitation / anxiety .  Continue Trileptal 600 mg twice daily.  Continue trazodone 100 mg bedtime prn insomnia. .  Continue Vyvanse 70 mg daily for ADHD there is a script for 7/28    2)  THERAPY:  No change    3)  LABS: lipid profile 3/12/25, A1C 7/2025    4)  PT MONITOR [call for probs]:  SEs from meds, worsening sx, SI/HI    5)  REFERRALS [CD, medical, other]:  None    6)  RTC:  ~1 month

## 2025-07-30 NOTE — DISCHARGE INSTRUCTIONS
As discussed you do have some fractures of the ribs as well and 1 in the neck.  I would recommend following up with your primary provider for reevaluation.    With the rib fractures try to make sure that you are taking plenty of deep breaths to help avoid getting pneumonia.  May also use over-the-counter medication such as Tylenol and ibuprofen help with pain and inflammation.    If you develop any new or worsening symptoms in meantime you may always return to the emergency room for reevaluation.

## 2025-07-30 NOTE — ED NOTES
Pt has been discharged home to self care. AVS printed and reviewed with pt. No questions at this time. Pt ambulated out of ED, thanking healthcare team.

## 2025-08-04 ENCOUNTER — OFFICE VISIT (OUTPATIENT)
Dept: FAMILY MEDICINE | Facility: OTHER | Age: 65
End: 2025-08-04
Attending: FAMILY MEDICINE
Payer: COMMERCIAL

## 2025-08-04 VITALS
WEIGHT: 149.4 LBS | BODY MASS INDEX: 20.84 KG/M2 | DIASTOLIC BLOOD PRESSURE: 88 MMHG | TEMPERATURE: 97.2 F | HEART RATE: 77 BPM | OXYGEN SATURATION: 95 % | SYSTOLIC BLOOD PRESSURE: 136 MMHG | RESPIRATION RATE: 18 BRPM

## 2025-08-04 DIAGNOSIS — F41.1 GENERALIZED ANXIETY DISORDER: ICD-10-CM

## 2025-08-04 DIAGNOSIS — S22.41XD CLOSED FRACTURE OF MULTIPLE RIBS OF RIGHT SIDE WITH ROUTINE HEALING, SUBSEQUENT ENCOUNTER: Primary | ICD-10-CM

## 2025-08-04 DIAGNOSIS — M47.812 CERVICAL SPONDYLOSIS WITHOUT MYELOPATHY: ICD-10-CM

## 2025-08-04 DIAGNOSIS — M54.50 CHRONIC BILATERAL LOW BACK PAIN WITHOUT SCIATICA: ICD-10-CM

## 2025-08-04 DIAGNOSIS — R63.4 WEIGHT LOSS: ICD-10-CM

## 2025-08-04 DIAGNOSIS — G89.29 CHRONIC BILATERAL LOW BACK PAIN WITHOUT SCIATICA: ICD-10-CM

## 2025-08-04 DIAGNOSIS — S22.009D CLOSED FRACTURE OF TRANSVERSE PROCESS OF THORACIC VERTEBRA WITH ROUTINE HEALING, SUBSEQUENT ENCOUNTER: ICD-10-CM

## 2025-08-04 DIAGNOSIS — F33.1 MAJOR DEPRESSIVE DISORDER, RECURRENT EPISODE, MODERATE (H): ICD-10-CM

## 2025-08-04 PROCEDURE — G0463 HOSPITAL OUTPT CLINIC VISIT: HCPCS

## 2025-08-04 ASSESSMENT — ANXIETY QUESTIONNAIRES
2. NOT BEING ABLE TO STOP OR CONTROL WORRYING: SEVERAL DAYS
6. BECOMING EASILY ANNOYED OR IRRITABLE: NOT AT ALL
IF YOU CHECKED OFF ANY PROBLEMS ON THIS QUESTIONNAIRE, HOW DIFFICULT HAVE THESE PROBLEMS MADE IT FOR YOU TO DO YOUR WORK, TAKE CARE OF THINGS AT HOME, OR GET ALONG WITH OTHER PEOPLE: NOT DIFFICULT AT ALL
1. FEELING NERVOUS, ANXIOUS, OR ON EDGE: NOT AT ALL
GAD7 TOTAL SCORE: 1
3. WORRYING TOO MUCH ABOUT DIFFERENT THINGS: NOT AT ALL
5. BEING SO RESTLESS THAT IT IS HARD TO SIT STILL: NOT AT ALL
8. IF YOU CHECKED OFF ANY PROBLEMS, HOW DIFFICULT HAVE THESE MADE IT FOR YOU TO DO YOUR WORK, TAKE CARE OF THINGS AT HOME, OR GET ALONG WITH OTHER PEOPLE?: NOT DIFFICULT AT ALL
4. TROUBLE RELAXING: NOT AT ALL
3. WORRYING TOO MUCH ABOUT DIFFERENT THINGS: NOT AT ALL
5. BEING SO RESTLESS THAT IT IS HARD TO SIT STILL: NOT AT ALL
1. FEELING NERVOUS, ANXIOUS, OR ON EDGE: NOT AT ALL
7. FEELING AFRAID AS IF SOMETHING AWFUL MIGHT HAPPEN: NOT AT ALL
6. BECOMING EASILY ANNOYED OR IRRITABLE: NOT AT ALL
GAD7 TOTAL SCORE: 1
4. TROUBLE RELAXING: NOT AT ALL
GAD7 TOTAL SCORE: 1
7. FEELING AFRAID AS IF SOMETHING AWFUL MIGHT HAPPEN: NOT AT ALL
2. NOT BEING ABLE TO STOP OR CONTROL WORRYING: SEVERAL DAYS
GAD7 TOTAL SCORE: 1
7. FEELING AFRAID AS IF SOMETHING AWFUL MIGHT HAPPEN: NOT AT ALL

## 2025-08-04 ASSESSMENT — PATIENT HEALTH QUESTIONNAIRE - PHQ9
SUM OF ALL RESPONSES TO PHQ QUESTIONS 1-9: 4
SUM OF ALL RESPONSES TO PHQ QUESTIONS 1-9: 4
10. IF YOU CHECKED OFF ANY PROBLEMS, HOW DIFFICULT HAVE THESE PROBLEMS MADE IT FOR YOU TO DO YOUR WORK, TAKE CARE OF THINGS AT HOME, OR GET ALONG WITH OTHER PEOPLE: SOMEWHAT DIFFICULT

## 2025-08-04 ASSESSMENT — PAIN SCALES - GENERAL: PAINLEVEL_OUTOF10: MODERATE PAIN (5)

## 2025-08-17 DIAGNOSIS — G89.4 CHRONIC PAIN SYNDROME: ICD-10-CM

## 2025-08-17 DIAGNOSIS — G89.29 CHRONIC BILATERAL LOW BACK PAIN WITHOUT SCIATICA: ICD-10-CM

## 2025-08-17 DIAGNOSIS — M54.50 CHRONIC BILATERAL LOW BACK PAIN WITHOUT SCIATICA: ICD-10-CM

## 2025-08-18 RX ORDER — GABAPENTIN 300 MG/1
900 CAPSULE ORAL 3 TIMES DAILY
Qty: 270 CAPSULE | Refills: 2 | Status: SHIPPED | OUTPATIENT
Start: 2025-08-18

## 2025-08-18 RX ORDER — BUPRENORPHINE HYDROCHLORIDE AND NALOXONE HYDROCHLORIDE DIHYDRATE 2; .5 MG/1; MG/1
1 TABLET SUBLINGUAL EVERY 4 HOURS
Qty: 180 TABLET | Refills: 2 | Status: SHIPPED | OUTPATIENT
Start: 2025-08-18

## 2025-08-19 ENCOUNTER — TELEPHONE (OUTPATIENT)
Dept: FAMILY MEDICINE | Facility: OTHER | Age: 65
End: 2025-08-19

## 2025-08-19 DIAGNOSIS — M54.2 CERVICALGIA: Primary | ICD-10-CM

## 2025-08-19 DIAGNOSIS — M54.50 CHRONIC BILATERAL LOW BACK PAIN WITHOUT SCIATICA: ICD-10-CM

## 2025-08-19 DIAGNOSIS — F11.20 UNCOMPLICATED OPIOID DEPENDENCE (H): ICD-10-CM

## 2025-08-19 DIAGNOSIS — G89.29 CHRONIC BILATERAL LOW BACK PAIN WITHOUT SCIATICA: ICD-10-CM

## 2025-08-24 RX ORDER — BUPRENORPHINE HYDROCHLORIDE AND NALOXONE HYDROCHLORIDE DIHYDRATE 8; 2 MG/1; MG/1
1 TABLET SUBLINGUAL 3 TIMES DAILY
Qty: 90 TABLET | Refills: 1 | Status: SHIPPED | OUTPATIENT
Start: 2025-08-24

## 2025-08-25 DIAGNOSIS — K21.00 GASTROESOPHAGEAL REFLUX DISEASE WITH ESOPHAGITIS, UNSPECIFIED WHETHER HEMORRHAGE: ICD-10-CM

## 2025-08-26 DIAGNOSIS — F90.2 ADHD (ATTENTION DEFICIT HYPERACTIVITY DISORDER), COMBINED TYPE: ICD-10-CM

## 2025-08-26 RX ORDER — OMEPRAZOLE 20 MG/1
20 CAPSULE, DELAYED RELEASE ORAL 2 TIMES DAILY
Qty: 180 CAPSULE | Refills: 0 | Status: SHIPPED | OUTPATIENT
Start: 2025-08-26

## 2025-08-26 RX ORDER — POLYETHYLENE GLYCOL 3350 17 G/17G
POWDER, FOR SOLUTION ORAL
Qty: 510 G | Refills: 0 | Status: SHIPPED | OUTPATIENT
Start: 2025-08-26

## 2025-08-27 RX ORDER — LISDEXAMFETAMINE DIMESYLATE 70 MG/1
70 CAPSULE ORAL DAILY
Qty: 30 CAPSULE | Refills: 0 | Status: SHIPPED | OUTPATIENT
Start: 2025-08-27

## (undated) DEVICE — SOL-NACL 0.9% 1000ML

## (undated) DEVICE — SENSOR-OXISENSOR II ADULT

## (undated) DEVICE — SUTURE-VICRYL 4-0 P-3 J494G

## (undated) DEVICE — BLADE-SCALPEL #15

## (undated) DEVICE — GLV-6.5 PROTEXIS PI CLASSIC LF/PF

## (undated) DEVICE — CAUTERY PAD-POLYHESIVE II ADULT

## (undated) DEVICE — CANISTER-SUCTION 2000CC

## (undated) DEVICE — BIN-ENT BIN

## (undated) DEVICE — PACK-ENT-CUSTOM

## (undated) DEVICE — BLANKET-BAIR LOWER EXTREMITY

## (undated) DEVICE — SCD SLEEVE-KNEE REG.

## (undated) DEVICE — SUTURE-CHROMIC GUT 4-0 RB-1 U203H

## (undated) DEVICE — IRRIGATION-NACL 1000ML

## (undated) DEVICE — IRRIGATION-H2O 1000ML

## (undated) DEVICE — COBLATION WAND-TURBINATE REDUCT. PTR

## (undated) DEVICE — LABEL-STERILE PREPRINTED FOR OR

## (undated) DEVICE — SUTURE-SILK 0 SH K834H

## (undated) DEVICE — TUBING-IRRIG. SYSTEM CLEARVISION

## (undated) RX ORDER — LIDOCAINE HYDROCHLORIDE 20 MG/ML
INJECTION, SOLUTION EPIDURAL; INFILTRATION; INTRACAUDAL; PERINEURAL
Status: DISPENSED
Start: 2019-07-02

## (undated) RX ORDER — PROPOFOL 10 MG/ML
INJECTION, EMULSION INTRAVENOUS
Status: DISPENSED
Start: 2019-07-02

## (undated) RX ORDER — DEXAMETHASONE SODIUM PHOSPHATE 10 MG/ML
INJECTION, SOLUTION INTRAMUSCULAR; INTRAVENOUS
Status: DISPENSED
Start: 2019-07-02

## (undated) RX ORDER — ONDANSETRON 2 MG/ML
INJECTION INTRAMUSCULAR; INTRAVENOUS
Status: DISPENSED
Start: 2019-07-02

## (undated) RX ORDER — FENTANYL CITRATE 50 UG/ML
INJECTION, SOLUTION INTRAMUSCULAR; INTRAVENOUS
Status: DISPENSED
Start: 2019-07-02